# Patient Record
Sex: MALE | Race: WHITE
[De-identification: names, ages, dates, MRNs, and addresses within clinical notes are randomized per-mention and may not be internally consistent; named-entity substitution may affect disease eponyms.]

---

## 2017-01-17 ENCOUNTER — HOSPITAL ENCOUNTER (OUTPATIENT)
Dept: HOSPITAL 75 - SURG | Age: 67
Discharge: HOME | End: 2017-01-17
Attending: INTERNAL MEDICINE
Payer: SELF-PAY

## 2017-01-17 VITALS — DIASTOLIC BLOOD PRESSURE: 85 MMHG | SYSTOLIC BLOOD PRESSURE: 140 MMHG

## 2017-01-17 VITALS
WEIGHT: 200 LBS | BODY MASS INDEX: 30.31 KG/M2 | DIASTOLIC BLOOD PRESSURE: 82 MMHG | SYSTOLIC BLOOD PRESSURE: 126 MMHG | HEIGHT: 68 IN

## 2017-01-17 VITALS — DIASTOLIC BLOOD PRESSURE: 71 MMHG | SYSTOLIC BLOOD PRESSURE: 100 MMHG

## 2017-01-17 VITALS — SYSTOLIC BLOOD PRESSURE: 126 MMHG | DIASTOLIC BLOOD PRESSURE: 81 MMHG

## 2017-01-17 VITALS — DIASTOLIC BLOOD PRESSURE: 82 MMHG | SYSTOLIC BLOOD PRESSURE: 125 MMHG

## 2017-01-17 VITALS — SYSTOLIC BLOOD PRESSURE: 143 MMHG | DIASTOLIC BLOOD PRESSURE: 86 MMHG

## 2017-01-17 VITALS — SYSTOLIC BLOOD PRESSURE: 142 MMHG | DIASTOLIC BLOOD PRESSURE: 87 MMHG

## 2017-01-17 VITALS — DIASTOLIC BLOOD PRESSURE: 87 MMHG | SYSTOLIC BLOOD PRESSURE: 142 MMHG

## 2017-01-17 VITALS — DIASTOLIC BLOOD PRESSURE: 91 MMHG | SYSTOLIC BLOOD PRESSURE: 140 MMHG

## 2017-01-17 VITALS — DIASTOLIC BLOOD PRESSURE: 86 MMHG | SYSTOLIC BLOOD PRESSURE: 132 MMHG

## 2017-01-17 VITALS — DIASTOLIC BLOOD PRESSURE: 79 MMHG | SYSTOLIC BLOOD PRESSURE: 128 MMHG

## 2017-01-17 DIAGNOSIS — E11.9: ICD-10-CM

## 2017-01-17 DIAGNOSIS — Z72.0: ICD-10-CM

## 2017-01-17 DIAGNOSIS — I25.118: ICD-10-CM

## 2017-01-17 DIAGNOSIS — Z79.899: ICD-10-CM

## 2017-01-17 DIAGNOSIS — I10: ICD-10-CM

## 2017-01-17 DIAGNOSIS — Z98.61: ICD-10-CM

## 2017-01-17 DIAGNOSIS — J44.9: ICD-10-CM

## 2017-01-17 DIAGNOSIS — R07.89: Primary | ICD-10-CM

## 2017-01-17 DIAGNOSIS — E78.5: ICD-10-CM

## 2017-01-17 DIAGNOSIS — I70.0: ICD-10-CM

## 2017-01-17 LAB
ALBUMIN SERPL-MCNC: 4.1 G/DL (ref 3.2–4.5)
ALT SERPL-CCNC: 17 U/L (ref 0–55)
ANION GAP SERPL CALC-SCNC: 13 MMOL/L (ref 5–14)
APTT BLD: 27 SEC (ref 24–35)
AST SERPL-CCNC: 17 U/L (ref 5–34)
BILIRUB SERPL-MCNC: 0.3 MG/DL (ref 0.1–1)
BUN SERPL-MCNC: 29 MG/DL (ref 7–18)
BUN/CREAT SERPL: 22
CALCIUM SERPL-MCNC: 9.4 MG/DL (ref 8.5–10.1)
CHLORIDE SERPL-SCNC: 98 MMOL/L (ref 98–107)
CHOLEST SERPL-MCNC: 160 MG/DL (ref ?–200)
CO2 SERPL-SCNC: 25 MMOL/L (ref 21–32)
CREAT SERPL-MCNC: 1.29 MG/DL (ref 0.6–1.3)
ERYTHROCYTE [DISTWIDTH] IN BLOOD BY AUTOMATED COUNT: 14.4 % (ref 10–14.5)
GFR SERPLBLD BASED ON 1.73 SQ M-ARVRAT: 56 ML/MIN
GLUCOSE SERPL-MCNC: 154 MG/DL (ref 70–105)
INR PPP: 0.9 (ref 0.8–1.4)
LDLC SERPL DIRECT ASSAY-MCNC: 41 MG/DL (ref 1–129)
MCH RBC QN AUTO: 32 PG (ref 25–34)
MCHC RBC AUTO-ENTMCNC: 33 G/DL (ref 32–36)
MCV RBC AUTO: 97 FL (ref 80–99)
PLATELET # BLD: 135 10^3/UL (ref 130–400)
PMV BLD AUTO: 11.9 FL (ref 7.4–10.4)
POTASSIUM SERPL-SCNC: 4.2 MMOL/L (ref 3.6–5)
PROT SERPL-MCNC: 7.6 G/DL (ref 6.4–8.2)
PROTHROMBIN TIME: 11.6 SEC (ref 12.2–14.7)
RBC # BLD AUTO: 4.17 10^6/UL (ref 4.35–5.85)
SODIUM SERPL-SCNC: 136 MMOL/L (ref 135–145)
TRIGL SERPL-MCNC: 769 MG/DL (ref ?–150)
VLDLC SERPL CALC-MCNC: 154 MG/DL (ref 5–40)
WBC # BLD AUTO: 10.9 10^3/UL (ref 4.3–11)

## 2017-01-17 PROCEDURE — 85730 THROMBOPLASTIN TIME PARTIAL: CPT

## 2017-01-17 PROCEDURE — 93571 IV DOP VEL&/PRESS C FLO 1ST: CPT

## 2017-01-17 PROCEDURE — 87081 CULTURE SCREEN ONLY: CPT

## 2017-01-17 PROCEDURE — 36221 PLACE CATH THORACIC AORTA: CPT

## 2017-01-17 PROCEDURE — 80053 COMPREHEN METABOLIC PANEL: CPT

## 2017-01-17 PROCEDURE — 85027 COMPLETE CBC AUTOMATED: CPT

## 2017-01-17 PROCEDURE — 80061 LIPID PANEL: CPT

## 2017-01-17 PROCEDURE — 93306 TTE W/DOPPLER COMPLETE: CPT

## 2017-01-17 PROCEDURE — 36415 COLL VENOUS BLD VENIPUNCTURE: CPT

## 2017-01-17 PROCEDURE — 93458 L HRT ARTERY/VENTRICLE ANGIO: CPT

## 2017-01-17 PROCEDURE — 85610 PROTHROMBIN TIME: CPT

## 2017-01-17 NOTE — DISCHARGE INST-POST CATH
Discharge Inst-CATH


Post Cardiac Cath D/C Inst


Follow Up/Plan


F/u with Dr Guerrero in 1-2 weeks


HOLD METFORMIN UNTIL THE EVENING OF 1/20/17; THEN RESUME PREVIOUS HOME DOSE


CARDIAC CATH DISCHARGE INSTRUCTIONS





*Hold Metformin for 72 hours post heart cath.





ACTIVITY





* Go Home directly and rest.


* Limit activity of the leg (or wrist if it was used) for 7 days including 

aerobics, swimming,


   jogging, bicycling, etc.


* Restrict stair-climbing for 7 days if possible, if not, climb up with your non

-cath leg, then


   bring together on the same step.


* Avoid lifting, pushing, pulling or excessive movement of the affected 

extremity for 7 days.


* Customary sexual activity may be resumed after 2 days-use caution not to use 

a position  


   that strains or causes pain to the affected extremity.


* No driving for 24 hours.


* NO SMOKING. 


* Avoid straining for bowel movements for 7 days.


* Gentle walking on level ground is allowed.


* Returning to work will depend on the type of procedure and the results. Your 

doctor will discuss


   this with you.





CALL YOUR DOCTOR FOR ANY OF THE FOLLOWING:





*If bleeding from the puncture site occurs- Apply gentle pressure to site with 

clean cloth and call


   your doctor or EMS.


* If a knot or lump forms under the skin, increases in size, or causes pain.


* If bruising appears to be worsening or moving further down your leg instead 

of disappearing.


* Temperature above 101 F.





CARE OF YOUR GROIN INCISION;





* Bruising or purple discoloration of the skin near the puncture site is common.


* You may shower only, no bathtub bathing for 5 days.  Be careful to avoid 

slipping as your


   leg may feel stiff.


* If a closure device was used on your femoral artery, please see the attached 

guide regarding


   care of the device and your leg.


* REMOVE the dressing from your groin the next day after your procedure in the 

shower.





CARE OF YOUR WRIST INCISION;





* Bruising or purple discoloration of the skin near the puncture site is common.


* You may shower.


* DO NOT submerge wrist.


* Remove dressing in 24 hours.








JOHNNIE GUERRERO MD FACP [facility] CCDS Jan 17, 2017 10:58

## 2017-01-17 NOTE — CARDIAC CATHETERIZATION
PROCEDURE PHYSICIAN:   JOHNNIE RON

 

DATE OF PROCEDURE:  

01/17/2017

 

Pradeep Fowler is a 68-year-old man with multiple coronary risk

factors and history of coronary artery disease. He states that he

has had previous coronary intervention in the 1980s. He has been

experiencing chest discomfort suggestive of new onset and

progressive angina. Cardiac catheterization was carried out today

after having obtained an informed consent. 

 

PROCEDURE:

He was brought to the cardiac catheterization laboratory in a

fasting state. The right groin was prepared and draped usual

sterile fashion. 1% lidocaine was used for local anesthesia.

Modified Seldinger technique was used to advance a 5-Vincentian

sheath in the right femoral artery. 5-Vincentian JL4 catheter was

used for left coronary angiography. 5-Vincentian JR4 catheter was

used for right coronary angiography. 5-Vincentian pigtail catheter

was used for left heart catheterization and left ventricular

angiography. A 5-Vincentian pigtail catheter was pulled back to the

aortic arch and aortic arch angiography was performed. 

 

FRACTIONAL FLOW RESERVE MEASUREMENT IN THE LEFT CIRCUMFLEX

ARTERY: 

We exchanged the sheath over a wire for a 6-Vincentian sheath. We

advanced a 6-Vincentian JL4 guide catheter. We advanced an Aeris wire

across the lesion in the mid left circumflex artery. We gave

adenosine and 140 mcg/kg/min for 2-1/2 minutes. Fractional flow

reserve was measured at 0.93, indicating hemodynamic

non-significance of the lesion. The wire was removed.  Repeat

angiography of the left circumflex artery was performed. The

catheter was removed. Angiography of the right femoral artery had

been carried out at the beginning of the procedure. At the end of

the procedure, Angio-Seal was used to achieve hemostasis. He

tolerated the procedure well. 

 

HEMODYNAMICS:

Left ventricular end diastolic pressure following coronary

angiography was 19 mmHg.  There is no significant pressure

gradient on pullback across the valve and ascending aortic

pressure was 119/66 with a mean 86 mmHg. 

 

LEFT VENTRICULAR ANGIOGRAPHY:

Left angiography was carried out in the right anterior oblique

projection. Global left ventricular systolic function was normal.

Left ventricular ejection fraction of approximately 65%. There

does not appear to be significant mitral regurgitation. 

 

AORTIC ARCH ANGIOGRAPHY:  

Aortic arch angiography did not indicate any significant thoracic

aortic aneurysm or dissection, to the extent visualized. The

aortic arch does exhibit moderate calcification. The neck

arteries were not adequately visualized. 

 

CORONARY ANGIOGRAPHY:  

The left main coronary artery is free of significant disease. The

left anterior descending artery has diffuse, mild disease and 40

to 50% mid vessel stenosis. The left circumflex artery has a long

50 to 60% mid vessel stenosis and fractional flow reserve across

the lesion is 0.93, indicating hemodynamic non-significance. The

left circumflex artery is dominant. Right coronary artery is of

small and nondominant and does not exhibit significant disease. 

 

CONCLUSION:

1.   Coronary artery disease, moderate, consisting of

approximately 50% stenoses in the mid left anterior descending

and left circumflex arteries. Fractional flow reserve in the mid

left circumflex artery is 0.93. 

2.   Normal global left ventricular function with an ejection

fraction of 55%. 

3.   Elevated left ventricular end diastolic pressure. 

4.   No significant mitral regurgitation. 

 

DISCUSSION AND RECOMMENDATIONS:  

Based on the results of this study, it appears appropriate to

continue a conservative approach and risk factor modification.

This was discussed with him in detail. Outpatient follow-up is

advised. 

 

 

 

Job ID: [id number]

Dictated Date: 01/17/2017 10:41:59 

Transcription Date: 01/17/2017 12:49:41 / [email or web address]

## 2017-01-17 NOTE — DISCHARGE INST-CARDIOLOGY
Discharge Inst-Cardiac


Discharge Medications


New Medications:  


Aspirin (Aspirin) 81 Mg Tab.chew


81 MG PO DAILY #90 Ref 3 TAB


 


Continued Medications:  


Gabapentin (Gabapentin) 800 Mg Tablet


800 MG PO TID TAB


Linagliptin (Tradjenta) 5 Mg Tablet


5 MG PO DAILY TAB


Lisinopril/Hydrochlorothiazide (Lisinopril-Hctz 20-25 mg Tab) 1 Each Tablet


1 EACH PO DAILY TAB


Nortriptyline HCl (Nortriptyline HCl) 50 Mg Capsule


50 MG PO HS CAP


Oxycodone HCl/Acetaminophen (Oxycodone-Acetaminophen ) 1 Each Tablet


1 EACH PO Q4H PRN PAIN TAB


Pravastatin Sodium (Pravastatin Sodium) 40 Mg Tablet


40 MG PO HS TAB


 


Discontinued Medications:  


Metformin HCl (Metformin HCl) 1,000 Mg Tablet


1000 MG PO BID TAB








Patient Instructions


Patient Instructions:  


Hold metformin until the evening of 10/20/16; then resume previous home


dose





Orders-Post D/C & Referrals


Pneu Vac Indicated:  Yes








JOHNNIE RON MD FACP [facility]S Jan 17, 2017 10:59

## 2017-01-17 NOTE — CARDIAC PROCEDURE NOTE-CS/ASA
Pre-Procedure Note


Pre-Op Procedure Note


H&P Reviewed


The H&P was reviewed, patient examined and no changes noted.


Date H&P Reviewed:  Jan 17, 2017


Time H&P Reviewed:  09:44





Conscious Sedation Pre-Proced


Time Reviewed:  09:44


ASA Class:  2











Airway Mallampati Classification: ([place] appropriate class) I.  II.  III,  IV


 


Lungs 


 


Heart 


 


 ASA score


 


 ASA 1: a normal healthy patient


 


 ASA 2:  a patient with a mild systemic disease (mid diabetes, controlled 

hypertension, obesity 


 


 ASA 3:  a patient with a severe systemic disease that limits activity  (angina

, COPD, prior Myocardial infarction)


 


 ASA 4:  a patient with an incapacitating disease that is a constant threat to 

life (CHF, renal failure)


 


 ASA 5:  a moribund patient not expected to survive 24 hrs.  (ruptured aneurysm)


 


 ASA 6:  a declared brain dead patient whose organs are being harvested.


 


 For emergent operations, add the letter E after the classification








Grade 2


Sedation Plan:  Analgesia, Amnesia, Plan communicated to team members, 

Discussed options with patient/fam, Discussed risks with patient/fam


Note


The patient is an appropriate candidate to undergo the planned procedure, 

sedation, and anesthesia.





The patient immediately re-assessed prior to indication.








JOHNNIE RON MD FACP [facility]S Jan 17, 2017 09:44

## 2017-01-18 NOTE — ECHOCARDIOGRAPHY REPORT
PROCEDURE PHYSICIAN:   JOHNNIE RON

 

DATE OF PROCEDURE:  01/17/2017

 

TWO DIMENSIONAL ECHOCARDIOGRAM REPORT 

 

PRIMARY PHYSICIAN:       Dr. Mackey 

OTHER PHYSICIAN:         

REFERRING PHYSICIAN:          

ORDERING PHYSICIAN:      NARENDRA Broderick 

 

INDICATION FOR THE PROCEDURE:  

1.   Chest pain.  

2.   Shortness of breath.  

 

MEASUREMENTS                  DERIVED VALUES 

 

LV DIAMETER (LAX)   NORMALS                       NORMALS

Diastolic      4.9  (3.6-5.2)      Eject. Fract.  (60%+/-6%)    

  

Systolic            (2.3-3.9)      Diastolic Vol.      

% Shortening        (0.22-0.42)    Systolic Vol.       

                                   Aortic Root    3.1  

IVS THICKNESS 

Diastolic      0.9  (0.6-1.1)

 

LVPW THICKNESS 

Diastolic      1.1  (0.6-1.1)

 

LA DIAMETER 

Systolic       3.6  (2.1-3.7)  

 

 

DESCRIPTION:  

Two-dimensional echocardiography shows a well preserved global

left ventricular systolic function with left ventricular ejection

fraction of approximately 60%.  There is no significant

pericardial effusion. There is mild aortic valve sclerosis.

Aortic, mitral and tricuspid valve leaflets seem to have good

leaflet excursion.  The study is technically difficult and not

ideal for wall motion analysis. Doppler imaging shows no

significant valvular stenosis. There appears to be mild diastolic

dysfunction of the left ventricle. Mitral inflow is consistent

with grade 2 diastolic dysfunction of the left ventricle. There

is no evidence of significant intracardiac shunt on this

transthoracic echocardiographic study. Inferior vena cava does

not appear to be dilated. Pulmonary artery systolic pressure is

estimated to be approximately 25 mmHg. 

 

CONCLUSIONS:

1.   Normal global left ventricular systolic function with an

ejection fraction of approximately 60%. 

2.   Trivial mitral and tricuspid regurgitation. 

3.   Moderate diastolic dysfunction of the left ventricle. 

4.   No evidence of significant valvular stenosis. 

5.   Pulmonary artery systolic pressure is estimated to be

approximately 25 mmHg.   

 

 

 

 

Job ID: [id number]

Dictated Date: 01/17/2017 14:54:23 

Transcription Date: 01/18/2017 07:19:44 / tbk

## 2017-09-25 ENCOUNTER — HOSPITAL ENCOUNTER (EMERGENCY)
Dept: HOSPITAL 75 - ER | Age: 67
Discharge: LEFT BEFORE DISCHARGE | End: 2017-09-25
Payer: SELF-PAY

## 2017-09-25 VITALS — SYSTOLIC BLOOD PRESSURE: 129 MMHG | DIASTOLIC BLOOD PRESSURE: 76 MMHG

## 2017-09-25 VITALS — HEIGHT: 68 IN | WEIGHT: 200 LBS | BODY MASS INDEX: 30.31 KG/M2

## 2017-09-25 DIAGNOSIS — R47.1: ICD-10-CM

## 2017-09-25 DIAGNOSIS — E86.1: ICD-10-CM

## 2017-09-25 DIAGNOSIS — Z79.82: ICD-10-CM

## 2017-09-25 DIAGNOSIS — E87.5: ICD-10-CM

## 2017-09-25 DIAGNOSIS — N17.9: Primary | ICD-10-CM

## 2017-09-25 DIAGNOSIS — R29.707: ICD-10-CM

## 2017-09-25 LAB
ALBUMIN SERPL-MCNC: 3.9 GM/DL (ref 3.2–4.5)
ALT SERPL-CCNC: 16 U/L (ref 0–55)
ANION GAP SERPL CALC-SCNC: 12 MMOL/L (ref 5–14)
APTT BLD: 28 SEC (ref 24–35)
AST SERPL-CCNC: 16 U/L (ref 5–34)
BASOPHILS # BLD AUTO: 0.1 10^3/UL (ref 0–0.1)
BASOPHILS NFR BLD AUTO: 1 % (ref 0–10)
BILIRUB SERPL-MCNC: 0.4 MG/DL (ref 0.1–1)
BILIRUB UR QL STRIP: NEGATIVE
BUN SERPL-MCNC: 71 MG/DL (ref 7–18)
BUN/CREAT SERPL: 11
CALCIUM SERPL-MCNC: 8.3 MG/DL (ref 8.5–10.1)
CHLORIDE SERPL-SCNC: 103 MMOL/L (ref 98–107)
CO2 SERPL-SCNC: 20 MMOL/L (ref 21–32)
CREAT SERPL-MCNC: 6.62 MG/DL (ref 0.6–1.3)
EOSINOPHIL # BLD AUTO: 0.3 10^3/UL (ref 0–0.3)
EOSINOPHIL NFR BLD AUTO: 3 % (ref 0–10)
ERYTHROCYTE [DISTWIDTH] IN BLOOD BY AUTOMATED COUNT: 15.1 % (ref 10–14.5)
GFR SERPLBLD BASED ON 1.73 SQ M-ARVRAT: 8 ML/MIN
GLUCOSE SERPL-MCNC: 213 MG/DL (ref 70–105)
INR PPP: 1 (ref 0.8–1.4)
KETONES UR QL STRIP: NEGATIVE
LEUKOCYTE ESTERASE UR QL STRIP: (no result)
LYMPHOCYTES # BLD AUTO: 1.7 X 10^3 (ref 1–4)
LYMPHOCYTES NFR BLD AUTO: 17 % (ref 12–44)
MAGNESIUM SERPL-MCNC: 2.1 MG/DL (ref 1.8–2.4)
MCH RBC QN AUTO: 33 PG (ref 25–34)
MCHC RBC AUTO-ENTMCNC: 32 G/DL (ref 32–36)
MCV RBC AUTO: 103 FL (ref 80–99)
MONOCYTES # BLD AUTO: 0.6 X 10^3 (ref 0–1)
MONOCYTES NFR BLD AUTO: 6 % (ref 0–12)
NEUTROPHILS # BLD AUTO: 7.2 X 10^3 (ref 1.8–7.8)
NEUTROPHILS NFR BLD AUTO: 73 % (ref 42–75)
NITRITE UR QL STRIP: NEGATIVE
PH UR STRIP: 5 [PH] (ref 5–9)
PLATELET # BLD: 147 10^3/UL (ref 130–400)
PMV BLD AUTO: 11.3 FL (ref 7.4–10.4)
POTASSIUM SERPL-SCNC: 6.3 MMOL/L (ref 3.6–5)
PROT SERPL-MCNC: 7.6 GM/DL (ref 6.4–8.2)
PROT UR QL STRIP: (no result)
PROTHROMBIN TIME: 13.1 SEC (ref 12.2–14.7)
RBC # BLD AUTO: 3.44 10^6/UL (ref 4.35–5.85)
SODIUM SERPL-SCNC: 135 MMOL/L (ref 135–145)
SP GR UR STRIP: 1.02 (ref 1.02–1.02)
SQUAMOUS #/AREA URNS HPF: (no result) /HPF
TROPONIN I SERPL-MCNC: < 0.3 NG/ML (ref ?–0.3)
UROBILINOGEN UR-MCNC: NORMAL MG/DL
WBC # BLD AUTO: 9.8 10^3/UL (ref 4.3–11)
WBC #/AREA URNS HPF: (no result) /HPF

## 2017-09-25 PROCEDURE — 36415 COLL VENOUS BLD VENIPUNCTURE: CPT

## 2017-09-25 PROCEDURE — 85025 COMPLETE CBC W/AUTO DIFF WBC: CPT

## 2017-09-25 PROCEDURE — 70450 CT HEAD/BRAIN W/O DYE: CPT

## 2017-09-25 PROCEDURE — 93005 ELECTROCARDIOGRAM TRACING: CPT

## 2017-09-25 PROCEDURE — 96360 HYDRATION IV INFUSION INIT: CPT

## 2017-09-25 PROCEDURE — 84484 ASSAY OF TROPONIN QUANT: CPT

## 2017-09-25 PROCEDURE — 85610 PROTHROMBIN TIME: CPT

## 2017-09-25 PROCEDURE — 87088 URINE BACTERIA CULTURE: CPT

## 2017-09-25 PROCEDURE — 83735 ASSAY OF MAGNESIUM: CPT

## 2017-09-25 PROCEDURE — 85730 THROMBOPLASTIN TIME PARTIAL: CPT

## 2017-09-25 PROCEDURE — 81000 URINALYSIS NONAUTO W/SCOPE: CPT

## 2017-09-25 PROCEDURE — 93041 RHYTHM ECG TRACING: CPT

## 2017-09-25 PROCEDURE — 80053 COMPREHEN METABOLIC PANEL: CPT

## 2017-09-25 PROCEDURE — 71010: CPT

## 2017-09-25 NOTE — ED NEUROLOGICAL PROBLEM
General


Chief Complaint:  Neuro-Stroke Like Symptoms


Stated Complaint:  POSS STROKE/FACIAL DROOPING/CANNOT LIFT LEFT ARM


Nursing Triage Note:  


Pt ambulatory w/ walker to ED room 5. Pt and pt's wife reports pt began to 


experience left sided weakness midday on Saturday () and symptoms have 


continued to get worse. Pt also reports he fell in the bathroom yestereday, 


hitting the back of his head. Pt advises he does take a blood thinner but is 

not 


sure what it is called.


Nursing Sepsis Screen:  No Definite Risk


Source:  patient, family


Exam Limitations:  no limitations





History of Present Illness


Time seen by provider:  09:47


Initial Comments


Mr. Smith is a 67-year-old man who presents to the emergency room with primary 

complaints of left sided weakness and dulled speech that started on Saturday, 

 he also reports having a fall in the bathroom yesterday in which 

he struck the back of his head.  He denies any other injuries.  Stroke 

activation was not paged due to the duration of symptoms.  He has generalized 

weakness as well and difficulty ambulating.  He is alert and oriented.





Allergies and Home Medications


Allergies


Coded Allergies:  


     No Known Drug Allergies (Unverified , 17)





Home Medications


Aspirin 81 Mg Tab.chew, 81 MG PO DAILY, #90 Ref 3


   Prescribed by: JOHNNIE RNO on 17 1052


Gabapentin 800 Mg Tablet, 800 MG PO TID, (Reported)


Linagliptin 5 Mg Tablet, 5 MG PO DAILY, (Reported)


Lisinopril/Hydrochlorothiazide 1 Each Tablet, 1 EACH PO DAILY, (Reported)


Nortriptyline HCl 50 Mg Capsule, 50 MG PO HS, (Reported)


Oxycodone HCl/Acetaminophen 1 Each Tablet, 1 EACH PO Q4H PRN for PAIN, (Reported

)


Pravastatin Sodium 40 Mg Tablet, 40 MG PO HS, (Reported)





Constitutional:  see HPI


Eyes:  No Symptoms Reported


Ears, Nose, Mouth, Throat:  no symptoms reported


Respiratory:  no symptoms reported


Cardiovascular:  no symptoms reported


Gastrointestinal:  no symptoms reported


Genitourinary:  no symptoms reported


Musculoskeletal:  no symptoms reported


Skin:  no symptoms reported


Psychiatric/Neurological:  See HPI


Endocrine:  No Symptoms Reported


Hematologic/Lymphatic:  No Symptoms Reported





Past Medical-Social-Family Hx


Patient Social History


Alcohol Use:  Occasionally Uses


Recreational Drug Use:  No


Smoking Status:  Current Everyday Smoker


Type Used:  Cigarettes


Recent Foreign Travel:  No


Contact w/Someone Who Travel:  No


Recent Infectious Disease Expo:  No


Recent Hopitalizations:  No





Seasonal Allergies


Seasonal Allergies:  No





Surgeries


History of Surgeries:  Yes


Surgeries:  Appendectomy, Gallbladder





Respiratory


History of Respiratory Disorde:  No





Cardiovascular


History of Cardiac Disorders:  Yes (cardiac cath w/ balloon)


Cardiac Disorders:  Coronary Artery Disease, High Cholesterol, Hypertension





Reproductive System


Hx Reproductive Disorders:  No





Genitourinary


History of Genitourinary Disor:  No





Gastrointestinal


History of Gastrointestinal Di:  No





Musculoskeletal


History of Musculoskeletal Dis:  No





Endocrine


History of Endocrine Disorders:  No





HEENT


History of HEENT Disorders:  No





Cancer


History of Cancer:  Yes


Cancer:  Prostate





Psychosocial


History of Psychiatric Problem:  No





Integumentary


History of Skin or Integumenta:  No





Blood Transfusions


History of Blood Disorders:  No





Physical Exam


Vital Signs





Vital Sign - Last 12Hours








 17





 09:49 10:00


 


Temp 98.4 


 


Pulse 108 


 


Resp 18 


 


B/P (MAP) 129/76 


 


Pulse Ox 91 


 


O2 Delivery Room Air 


 


O2 Flow Rate  2.00





Capillary Refill : Less Than 3 Seconds


General Appearance:  WD/WN, no apparent distress


HEENT:  PERRL/EOMI, normal ENT inspection, pharynx normal


Neck:  normal inspection


Respiratory:  lungs clear, normal breath sounds, no respiratory distress, no 

accessory muscle use


Cardiovascular:  regular rate, rhythm, no edema, no murmur


Gastrointestinal:  normal bowel sounds, non tender, soft


Extremities:  normal inspection, no pedal edema


Neurologic/Psychiatric:  alert, normal mood/affect, oriented x 3, other (

Patient walks with a walker with a slight limp.  Left lower extremity and right 

lower extremity are both weak with strength about 3/5.  Left upper extremity is 

also weak with strength about 3/5.  There was questionable left-sided facial 

droop that seems to resolve with exertion a facial muscles.  Patient has some 

slurring of the speech.)


Crainal Nerves:  normal hearing, PERRL, abnormal speech


Coordination/Gait:  normal finger to nose, abnormal gait


Motor/Sensory:  weak motor strength LUE, weak motor strength RLE, weak motor 

strength LLE


Skin:  normal color, warm/dry





Stroke


NIH Stroke Scale Assessment





 Select: Initial Level of Consciousness: 0=Alert (0), Level of Consciousness-

Questions: 0=Answers both month/age (0), LOC Commands: 0=Performs both tasks (0)

, Visual Fields: 0=No visual loss (0), Facial Movement (Facial Paresis): 0=

Normal symmetrical mnt (0), Motor Function-Arms Right: 0=No drift (0), Motor 

Function-Arms Left: 1=Drift (1), Motor Function-Legs Right: 2=Some effort/

gravity (2), Motor Function-Legs Left: 2=Some effort/gravity (2), Limb Ataxia: 0

=Absent (0), Sensory: 1=Mild to Moderate loss (1), Best Language: 0=No aphasia (

0), Dysarthria: 1=Mild to moderate loss (1), Extinction & Inattention: 0=No 

abnormality (0), Total: 7





Progress/Results/Core Measures


Results/Orders


Lab Results





Laboratory Tests








Test


  17


10:08 17


12:34 Range/Units


 


 


White Blood Count


  9.8 


  


  4.3-11.0


10^3/uL


 


Red Blood Count


  3.44 L


  


  4.35-5.85


10^6/uL


 


Hemoglobin 11.2 L  13.3-17.7  G/DL


 


Hematocrit 35 L  40-54  %


 


Mean Corpuscular Volume 103 H  80-99  FL


 


Mean Corpuscular Hemoglobin 33   25-34  PG


 


Mean Corpuscular Hemoglobin


Concent 32 


  


  32-36  G/DL


 


 


Red Cell Distribution Width 15.1 H  10.0-14.5  %


 


Platelet Count


  147 


  


  130-400


10^3/uL


 


Mean Platelet Volume 11.3 H  7.4-10.4  FL


 


Neutrophils (%) (Auto) 73   42-75  %


 


Lymphocytes (%) (Auto) 17   12-44  %


 


Monocytes (%) (Auto) 6   0-12  %


 


Eosinophils (%) (Auto) 3   0-10  %


 


Basophils (%) (Auto) 1   0-10  %


 


Neutrophils # (Auto) 7.2   1.8-7.8  X 10^3


 


Lymphocytes # (Auto) 1.7   1.0-4.0  X 10^3


 


Monocytes # (Auto) 0.6   0.0-1.0  X 10^3


 


Eosinophils # (Auto)


  0.3 


  


  0.0-0.3


10^3/uL


 


Basophils # (Auto)


  0.1 


  


  0.0-0.1


10^3/uL


 


Prothrombin Time 13.1   12.2-14.7  SEC


 


INR Comment 1.0   0.8-1.4  


 


Activated Partial


Thromboplast Time 28 


  


  24-35  SEC


 


 


Sodium Level 135   135-145  MMOL/L


 


Potassium Level 6.3 H  3.6-5.0  MMOL/L


 


Chloride Level 103     MMOL/L


 


Carbon Dioxide Level 20 L  21-32  MMOL/L


 


Anion Gap 12   5-14  MMOL/L


 


Blood Urea Nitrogen 71 H  7-18  MG/DL


 


Creatinine


  6.62 H


  


  0.60-1.30


MG/DL


 


Estimat Glomerular Filtration


Rate 8 


  


   


 


 


BUN/Creatinine Ratio 11    


 


Glucose Level 213 H    MG/DL


 


Calcium Level 8.3 L  8.5-10.1  MG/DL


 


Magnesium Level 2.1   1.8-2.4  MG/DL


 


Total Bilirubin 0.4   0.1-1.0  MG/DL


 


Aspartate Amino Transf


(AST/SGOT) 16 


  


  5-34  U/L


 


 


Alanine Aminotransferase


(ALT/SGPT) 16 


  


  0-55  U/L


 


 


Alkaline Phosphatase 83     U/L


 


Troponin I < 0.30   <0.30  NG/ML


 


Total Protein 7.6   6.4-8.2  GM/DL


 


Albumin 3.9   3.2-4.5  GM/DL


 


Urine Color  YELLOW   


 


Urine Clarity  CLEAR   


 


Urine pH  5  5-9  


 


Urine Specific Gravity  1.020  1.016-1.022  


 


Urine Protein  2+ H NEGATIVE  


 


Urine Glucose (UA)  NEGATIVE  NEGATIVE  


 


Urine Ketones  NEGATIVE  NEGATIVE  


 


Urine Nitrite  NEGATIVE  NEGATIVE  


 


Urine Bilirubin  NEGATIVE  NEGATIVE  


 


Urine Urobilinogen  NORMAL  NORMAL  MG/DL


 


Urine Leukocyte Esterase  1+ H NEGATIVE  


 


Urine RBC (Auto)  2+ H NEGATIVE  


 


Urine RBC  RARE   /HPF


 


Urine WBC  5-10 H  /HPF


 


Urine Squamous Epithelial


Cells 


  NONE 


   /HPF


 


 


Urine Crystals  NONE   /LPF


 


Urine Bacteria  NEGATIVE   /HPF


 


Urine Casts  NONE   /LPF


 


Urine Mucus  NEGATIVE   /LPF


 


Urine Culture Indicated  YES   








My Orders





Orders - CHRISTINE MUKHERJEE MD


Cbc With Automated Diff (17 09:51)


Comprehensive Metabolic Panel (17 09:51)


Magnesium (17 09:51)


Troponin I (17 09:51)


Ua Culture If Indicated (17 09:51)


Accucheck Stat ONCE (17 09:51)


Saline Lock/Iv-Start (17 09:51)


Ekg Tracing (17 09:51)


Monitor-Rhythm Ecg Trace Only (17 09:51)


Chest 1 View, Ap/Pa Only (17 09:51)


Protime With Inr (17 09:51)


Partial Thromboplastin Time (17 09:51)


Nothing By Mouth (17 Lunch)


Vital Signs - Stroke Q15M (17 09:51)


Ct Head Wo-R/O Stroke (17 09:51)


O2 (17 09:51)


Dysphagia Screening Tool (17 09:51)


Sodium Polystyrene Sulfonate (Kayexalate (17 11:45)


Ns Iv 1000 Ml (Sodium Chloride 0.9%) (17 11:37)


Ns Iv 1000 Ml (Sodium Chloride 0.9%) (17 11:53)


Urine Culture (17 12:34)





Medications Given in ED





Current Medications








 Medications  Dose


 Ordered  Sig/Fadumo


 Route  Start Time


 Stop Time Status Last Admin


Dose Admin


 


 Sodium Chloride  1,000 ml @ 


 0 mls/hr  Q0M ONCE


 IV  17 11:37


 17 11:39 DC 17 11:47


999 MLS/HR








Vital Signs/I&O





Vital Sign - Last 12Hours








 17





 09:49 10:00


 


Temp 98.4 


 


Pulse 108 


 


Resp 18 


 


B/P (MAP) 129/76 


 


Pulse Ox 91 88


 


O2 Delivery Room Air Nasal Cannula


 


O2 Flow Rate  2.00














Intake and Output 


 


 17





 00:00


 


Intake Total 1000 ml


 


Balance 1000 ml














Blood Pressure Mean:  93








Progress Note #1:  


   Time:  11:52


Progress Note


CT scan was unremarkable for acute pathology.  Patient was found to be in 

significant renal failure with hyperkalemia.  There were no significant 

abnormalities on the EKG.  A liter of IV fluids was initiated and Kayexalate 

was ordered.  After discussion with the patient, he is stating he plans to 

leave AGAINST MEDICAL ADVICE.  He will commit to at least thinking about 

admission here or transfer to a facility with a nephrologist.  He also commits 

to receiving 2 L of IV fluids in the ER.


Progress Note #2:  


   Time:  13:22


Progress Note


Patient demanded to leave AGAINST MEDICAL ADVICE before the second liter of IV 

fluids could be infused.  He was informed that leaving AGAINST MEDICAL ADVICE 

could result in death, cardiac arrhythmia, worsening renal failure, and other 

medical complications.  He was able to repeat these consequences to me and 

still decided to leave AGAINST MEDICAL ADVICE.  I made it very clear that he 

could be leaving with a potentially life-threatening condition.  Patient was 

advised to stop his exogenous potassium, drink lots of water, and follow-up 

with Dr. López as soon as possible.





ECG


Initial ECG Impression Date:  Sep 25, 2017


Initial ECG Impression Time:  10:26


Initial ECG Rate:  97


Initial ECG Rhythm:  Normal Sinus


Comment


Sinus rhythm with borderline tachycardia.  No ST elevation or depression.  

Borderline left axis deviation.  No abnormal intervals.





Diagnostic Imaging





   Diagonstic Imaging:  CT


   Plain Films/CT/US/NM/MRI:  head


Comments


CT head viewed by me and report reviewed.  See report below:





NAME:      DAVID SMITH


MED REC#:   [id number]


ACCOUNT#:   [id number]


PT STATUS:   DEP ER


:      1950


PHYSICIAN:    CHRISTINE MUKHERJEE MD


ADMIT DATE:   17/ER


***Signed***


Date of Exam:   17





CT HEAD WO-R/O STROKE





Clinical indication: Patient with facial droop and shakes.





Exam: Axial CT scan of the brain performed without IV contrast.





Comparison: None.





Findings:


There is no evidence of acute cerebral infarct, intracranial


hemorrhage, or gross mass effect. 





There are several focal areas of low-attenuation white matter


changes in both cerebral hemispheres, likely representing chronic


small vessel ischemic disease. There is normal gray-white matter


distinction. The brain parenchymal volume appears appropriate for


patient's age. There is no significant midline shift or


herniation. 





 There is no evidence of hydrocephalus. The basal cisterns are


unremarkable. The skull, extracranial soft tissue, and orbits are


unremarkable. There is minimal mucosal thickening involving the


ethmoid sinus, bilateral maxillary sinus, and sphenoid sinus.


Temporal bone structures show no significant abnormality.





IMPRESSION:


1: There is no CT evidence of acute cerebral infarct,


intracranial hemorrhage, brain herniation, or hydrocephalus. If


there is continued clinical concern for acute cerebral infarct,


then MRI of the brain would better evaluate.





2: Age-related brain parenchymal changes with mild chronic small


vessel ischemic disease.





3: Minimal paranasal sinus disease.





Dictated by: 





  Dictated on workstation # [id number]





[id number]





Dict:      17 1030


Trans:      17 1738





Interpreted by:         RUBINA LAYNE MD


Electronically signed by:   RUBINA LAYNE MD 17 1738








   Diagonstic Imaging:  Xray


   Plain Films/CT/US/NM/MRI:  chest


Comments


NAME:      DAVID SMITH


MED REC#:   [id number]


ACCOUNT#:   [id number]


PT STATUS:   REG ER


:      1950


PHYSICIAN:    CHRISTINE MUKHERJEE MD


ADMIT DATE:   17/ER


***Signed***


Date of Exam:   17





CHEST 1 VIEW, AP/PA ONLY


 





EXAMINATION: Portable upright radiograph of the chest.





INDICATION: Slurred speech.





FINDINGS:


There is a low density opacity seen in the lingula which could be


from a prominent pericardial fat pad. The cardiac size is


prominent. No effusion or pneumothorax.





Mediastinum and shaila appear unremarkable.





IMPRESSION: Low density opacity near the left cardiac border may


relate to a prominent pericardial fat pad. If there is clinical


suspicion of a consolidation in the lingula, then 2 views


radiograph would be helpful.





Dictated by: 





  Dictated on workstation # [id number]





[id number]





Dict:      17 1009


Trans:      17 1121





Interpreted by:         CELIA BARRERA MD


Electronically signed by:   CELIA BARRERA MD 17 1121





Departure


Impression


Impression:  


 Primary Impression:  


 Acute renal failure


 Qualified Codes:  N17.9 - Acute kidney failure, unspecified


 Additional Impressions:  


 Hypovolemia


 Hyperkalemia


 Left-sided weakness


 Dysarthria


 Left against medical advice


Disposition:   ADMITTED AS INPATIENT


Condition:  Against Medical Advice





Departure-Patient Inst.


Referrals:  


MK LÓPEZ MD (PCP/Family)


Primary Care Physician





Copy


Copies To 1:   MK LÓPEZ MD, JOSHUA T MD Sep 25, 2017 11:53

## 2017-09-25 NOTE — DIAGNOSTIC IMAGING REPORT
Clinical indication: Patient with facial droop and shakes.



Exam: Axial CT scan of the brain performed without IV contrast.



Comparison: None.



Findings:

There is no evidence of acute cerebral infarct, intracranial

hemorrhage, or gross mass effect. 



There are several focal areas of low-attenuation white matter

changes in both cerebral hemispheres, likely representing chronic

small vessel ischemic disease. There is normal gray-white matter

distinction. The brain parenchymal volume appears appropriate for

patient's age. There is no significant midline shift or

herniation. 



 There is no evidence of hydrocephalus. The basal cisterns are

unremarkable. The skull, extracranial soft tissue, and orbits are

unremarkable. There is minimal mucosal thickening involving the

ethmoid sinus, bilateral maxillary sinus, and sphenoid sinus.

Temporal bone structures show no significant abnormality.



IMPRESSION:

1: There is no CT evidence of acute cerebral infarct,

intracranial hemorrhage, brain herniation, or hydrocephalus. If

there is continued clinical concern for acute cerebral infarct,

then MRI of the brain would better evaluate.



2: Age-related brain parenchymal changes with mild chronic small

vessel ischemic disease.



3: Minimal paranasal sinus disease.



Dictated by: 



  Dictated on workstation # [id number]

## 2017-09-25 NOTE — DIAGNOSTIC IMAGING REPORT
EXAMINATION: Portable upright radiograph of the chest.



INDICATION: Slurred speech.



FINDINGS:

There is a low density opacity seen in the lingula which could be

from a prominent pericardial fat pad. The cardiac size is

prominent. No effusion or pneumothorax.



Mediastinum and shaila appear unremarkable.



IMPRESSION: Low density opacity near the left cardiac border may

relate to a prominent pericardial fat pad. If there is clinical

suspicion of a consolidation in the lingula, then 2 views

radiograph would be helpful.



Dictated by: 



  Dictated on workstation # [id number]

## 2019-06-16 ENCOUNTER — HOSPITAL ENCOUNTER (INPATIENT)
Dept: HOSPITAL 75 - ER FS | Age: 69
LOS: 4 days | Discharge: HOME | DRG: 982 | End: 2019-06-20
Attending: FAMILY MEDICINE | Admitting: FAMILY MEDICINE
Payer: SELF-PAY

## 2019-06-16 VITALS — SYSTOLIC BLOOD PRESSURE: 165 MMHG | DIASTOLIC BLOOD PRESSURE: 94 MMHG

## 2019-06-16 VITALS — SYSTOLIC BLOOD PRESSURE: 133 MMHG | DIASTOLIC BLOOD PRESSURE: 66 MMHG

## 2019-06-16 VITALS — WEIGHT: 215.4 LBS | HEIGHT: 68.5 IN | BODY MASS INDEX: 32.27 KG/M2

## 2019-06-16 VITALS — DIASTOLIC BLOOD PRESSURE: 66 MMHG | SYSTOLIC BLOOD PRESSURE: 133 MMHG

## 2019-06-16 VITALS — SYSTOLIC BLOOD PRESSURE: 130 MMHG | DIASTOLIC BLOOD PRESSURE: 76 MMHG

## 2019-06-16 DIAGNOSIS — E11.69: Primary | ICD-10-CM

## 2019-06-16 DIAGNOSIS — E11.40: ICD-10-CM

## 2019-06-16 DIAGNOSIS — E11.621: ICD-10-CM

## 2019-06-16 DIAGNOSIS — L03.115: ICD-10-CM

## 2019-06-16 DIAGNOSIS — G47.33: ICD-10-CM

## 2019-06-16 DIAGNOSIS — I10: ICD-10-CM

## 2019-06-16 DIAGNOSIS — M86.9: ICD-10-CM

## 2019-06-16 DIAGNOSIS — E78.00: ICD-10-CM

## 2019-06-16 DIAGNOSIS — Z85.46: ICD-10-CM

## 2019-06-16 DIAGNOSIS — E11.65: ICD-10-CM

## 2019-06-16 DIAGNOSIS — I25.10: ICD-10-CM

## 2019-06-16 DIAGNOSIS — L97.514: ICD-10-CM

## 2019-06-16 DIAGNOSIS — Z95.5: ICD-10-CM

## 2019-06-16 DIAGNOSIS — L02.611: ICD-10-CM

## 2019-06-16 DIAGNOSIS — F17.210: ICD-10-CM

## 2019-06-16 DIAGNOSIS — L97.529: ICD-10-CM

## 2019-06-16 DIAGNOSIS — L03.031: ICD-10-CM

## 2019-06-16 LAB
ALBUMIN SERPL-MCNC: 3.7 GM/DL (ref 3.2–4.5)
ALP SERPL-CCNC: 92 U/L (ref 40–136)
ALT SERPL-CCNC: 16 U/L (ref 0–55)
BASOPHILS # BLD AUTO: 0.1 10^3/UL (ref 0–0.1)
BASOPHILS NFR BLD AUTO: 1 % (ref 0–10)
BILIRUB SERPL-MCNC: 0.3 MG/DL (ref 0.1–1)
BUN/CREAT SERPL: 22
CALCIUM SERPL-MCNC: 9 MG/DL (ref 8.5–10.1)
CHLORIDE SERPL-SCNC: 100 MMOL/L (ref 98–107)
CO2 SERPL-SCNC: 26 MMOL/L (ref 21–32)
CREAT SERPL-MCNC: 0.96 MG/DL (ref 0.6–1.3)
EOSINOPHIL # BLD AUTO: 0.2 10^3/UL (ref 0–0.3)
EOSINOPHIL NFR BLD AUTO: 1 % (ref 0–10)
ERYTHROCYTE [DISTWIDTH] IN BLOOD BY AUTOMATED COUNT: 16.8 % (ref 10–14.5)
GFR SERPLBLD BASED ON 1.73 SQ M-ARVRAT: > 60 ML/MIN
GLUCOSE SERPL-MCNC: 192 MG/DL (ref 70–105)
HCT VFR BLD CALC: 40 % (ref 40–54)
HGB BLD-MCNC: 12.6 G/DL (ref 13.3–17.7)
LYMPHOCYTES # BLD AUTO: 1.6 X 10^3 (ref 1–4)
LYMPHOCYTES NFR BLD AUTO: 11 % (ref 12–44)
MANUAL DIFFERENTIAL PERFORMED BLD QL: YES
MCH RBC QN AUTO: 32 PG (ref 25–34)
MCHC RBC AUTO-ENTMCNC: 32 G/DL (ref 32–36)
MCV RBC AUTO: 101 FL (ref 80–99)
MONOCYTES # BLD AUTO: 0.8 X 10^3 (ref 0–1)
MONOCYTES NFR BLD AUTO: 6 % (ref 0–12)
MONOCYTES NFR BLD: 3 %
NEUTROPHILS # BLD AUTO: 11.8 X 10^3 (ref 1.8–7.8)
NEUTROPHILS NFR BLD AUTO: 81 % (ref 42–75)
NEUTS BAND NFR BLD MANUAL: 65 %
NEUTS BAND NFR BLD: 17 %
PLATELET # BLD: 120 10^3/UL (ref 130–400)
PMV BLD AUTO: 12.8 FL (ref 7.4–10.4)
POTASSIUM SERPL-SCNC: 4.7 MMOL/L (ref 3.6–5)
PROT SERPL-MCNC: 7.6 GM/DL (ref 6.4–8.2)
RBC MORPH BLD: NORMAL
SODIUM SERPL-SCNC: 141 MMOL/L (ref 135–145)
VARIANT LYMPHS NFR BLD MANUAL: 15 %
WBC # BLD AUTO: 14.6 10^3/UL (ref 4.3–11)

## 2019-06-16 PROCEDURE — 87081 CULTURE SCREEN ONLY: CPT

## 2019-06-16 PROCEDURE — 87075 CULTR BACTERIA EXCEPT BLOOD: CPT

## 2019-06-16 PROCEDURE — 85025 COMPLETE CBC W/AUTO DIFF WBC: CPT

## 2019-06-16 PROCEDURE — 87070 CULTURE OTHR SPECIMN AEROBIC: CPT

## 2019-06-16 PROCEDURE — 94640 AIRWAY INHALATION TREATMENT: CPT

## 2019-06-16 PROCEDURE — 80053 COMPREHEN METABOLIC PANEL: CPT

## 2019-06-16 PROCEDURE — 87186 SC STD MICRODIL/AGAR DIL: CPT

## 2019-06-16 PROCEDURE — 85007 BL SMEAR W/DIFF WBC COUNT: CPT

## 2019-06-16 PROCEDURE — 87077 CULTURE AEROBIC IDENTIFY: CPT

## 2019-06-16 PROCEDURE — 87205 SMEAR GRAM STAIN: CPT

## 2019-06-16 PROCEDURE — 36415 COLL VENOUS BLD VENIPUNCTURE: CPT

## 2019-06-16 PROCEDURE — 99282 EMERGENCY DEPT VISIT SF MDM: CPT

## 2019-06-16 PROCEDURE — 94760 N-INVAS EAR/PLS OXIMETRY 1: CPT

## 2019-06-16 PROCEDURE — 96365 THER/PROPH/DIAG IV INF INIT: CPT

## 2019-06-16 PROCEDURE — 82962 GLUCOSE BLOOD TEST: CPT

## 2019-06-16 PROCEDURE — 85027 COMPLETE CBC AUTOMATED: CPT

## 2019-06-16 PROCEDURE — 80202 ASSAY OF VANCOMYCIN: CPT

## 2019-06-16 RX ADMIN — NORTRIPTYLINE HYDROCHLORIDE SCH MG: 25 CAPSULE ORAL at 21:34

## 2019-06-16 RX ADMIN — PREGABALIN SCH MG: 75 CAPSULE ORAL at 21:41

## 2019-06-16 RX ADMIN — INSULIN ASPART SCH UNIT: 100 INJECTION, SOLUTION INTRAVENOUS; SUBCUTANEOUS at 21:32

## 2019-06-16 RX ADMIN — SODIUM CHLORIDE SCH MLS/HR: 900 INJECTION, SOLUTION INTRAVENOUS at 21:32

## 2019-06-16 RX ADMIN — OXYCODONE HYDROCHLORIDE AND ACETAMINOPHEN PRN TAB: 10; 325 TABLET ORAL at 21:36

## 2019-06-16 NOTE — ED INTEGUMENTARY GENERAL
General


Chief Complaint:  Skin/Wound Problems


Stated Complaint:  RT FOOT SWOLLEN AND RED


Nursing Triage Note:  


Noticed on Friday that his foot was getting red and swollen. Is worse today and 


is now having foul smelling drainage from the bottom of his foot. Denies fevers 


at home. Has hx of diabetes and states his blood sugars usually run  in the 


200's.


Source:  patient


Exam Limitations:  no limitations





History of Present Illness


Date Seen by Provider:  Jun 16, 2019


Time Seen by Provider:  11:53


Initial Comments


The patient is a 68-year-old white male diabetic.  He reports that he has 

neuropathy.  He sees Kenn López at [facility].  He also noted the onset

of this on about Friday.  There was at first redness and now there is swelling 

and drainage from the hypertrophic callus over the right first MP joint.  He al

so smokes and has hypertension and heart disease with previous stenting.


Timing/Duration:  week, getting worse


Location:  feet





Allergies and Home Medications


Allergies


Coded Allergies:  


     No Known Drug Allergies (Unverified , 1/17/17)





Home Medications


Aspirin 81 Mg Tab.chew, 81 MG PO DAILY


   Prescribed by: JOHNNIE RON on 1/17/17 1052


Gabapentin 800 Mg Tablet, 800 MG PO TID, (Reported)


Linagliptin 5 Mg Tablet, 5 MG PO DAILY, (Reported)


Lisinopril/Hydrochlorothiazide 1 Each Tablet, 1 EACH PO DAILY, (Reported)


Nortriptyline HCl 50 Mg Capsule, 50 MG PO HS, (Reported)


Oxycodone HCl/Acetaminophen 1 Each Tablet, 1 EACH PO Q4H PRN for PAIN, 

(Reported)


Pravastatin Sodium 40 Mg Tablet, 40 MG PO HS, (Reported)





Patient Home Medication List


Home Medication List Reviewed:  Yes





Review of Systems


Review of Systems


Constitutional:  see HPI


EENTM:  no symptoms reported


Respiratory:  no symptoms reported


Cardiovascular:  no symptoms reported


Gastrointestinal:  no symptoms reported


Genitourinary:  no symptoms reported


Musculoskeletal:  no symptoms reported


Skin:  no symptoms reported


Psychiatric/Neurological:  No Symptoms Reported


Endocrine:  No Symptoms Reported


Hematologic/Lymphatic:  No Symptoms Reported





Past Medical-Social-Family Hx


Patient Social History


Alcohol Use:  Occasionally Uses


Recreational Drug Use:  No


Smoking Status:  Current Everyday Smoker


Type Used:  Cigarettes


2nd Hand Smoke Exposure:  Yes


Recent Foreign Travel:  No


Contact w/Someone Who Travel:  No


Recent Infectious Disease Expo:  No


Recent Hopitalizations:  No


Physical Abuse:  No


Sexual Abuse:  No


Mistreated:  No


Fear:  No





Seasonal Allergies


Seasonal Allergies:  No





Past Medical History


Surgeries:  Yes (liver surgery)


Appendectomy, Coronary Stent, Gallbladder


Respiratory:  No


Cardiac:  Yes (cardiac cath w/ balloon)


Coronary Artery Disease, High Cholesterol, Hypertension


Neurological:  No


Reproductive Disorders:  No


Genitourinary:  No


Gastrointestinal:  No


Musculoskeletal:  No


Endocrine:  No


HEENT:  No


Cancer:  Yes


Prostate


Psychosocial:  No


Integumentary:  No


Blood Disorders:  No





Physical Exam


Vital Signs





Vital Signs - First Documented








 6/16/19





 11:24


 


Temp 98.5


 


Pulse 99


 


Resp 18


 


B/P (MAP) 117/83 (94)


 


Pulse Ox 92





Capillary Refill : Less Than 3 Seconds


General Appearance:  WD/WN, no apparent distress


HEENT:  normal ENT inspection


Neck:  full range of motion


Cardiovascular:  normal peripheral pulses, regular rate, rhythm, no edema, no 

gallop, no JVD, no murmur


Respiratory:  chest non-tender, lungs clear, normal breath sounds, no respirator

y distress, no accessory muscle use, other (odor of cigarettes)


Gastrointestinal:  normal bowel sounds, non tender


Comments


The right foot shows erythema on the dorsum proximal to the MP joints.  No 

streaking is noted.  The plantar MP joints on the great toe has a hypertrophic 

callus with a central ulceration and serous drainage.  The left foot shows a 

similar callus with early ulceration but not the fluctuance noted on the right.





Progress/Results/Core Measures


Results/Orders


Lab Results





Laboratory Tests








Test


 6/16/19


11:56 Range/Units


 


 


White Blood Count


 14.6 H


 4.3-11.0


10^3/uL


 


Red Blood Count


 3.92 L


 4.35-5.85


10^6/uL


 


Hemoglobin 12.6 L 13.3-17.7  G/DL


 


Hematocrit 40  40-54  %


 


Mean Corpuscular Volume 101 H 80-99  FL


 


Mean Corpuscular Hemoglobin 32  25-34  PG


 


Mean Corpuscular Hemoglobin


Concent 32 


 32-36  G/DL





 


Red Cell Distribution Width 16.8 H 10.0-14.5  %


 


Platelet Count


 120 L


 130-400


10^3/uL


 


Mean Platelet Volume 12.8 H 7.4-10.4  FL


 


Neutrophils (%) (Auto) 81 H 42-75  %


 


Lymphocytes (%) (Auto) 11 L 12-44  %


 


Monocytes (%) (Auto) 6  0-12  %


 


Eosinophils (%) (Auto) 1  0-10  %


 


Basophils (%) (Auto) 1  0-10  %


 


Neutrophils # (Auto) 11.8 H 1.8-7.8  X 10^3


 


Lymphocytes # (Auto) 1.6  1.0-4.0  X 10^3


 


Monocytes # (Auto) 0.8  0.0-1.0  X 10^3


 


Eosinophils # (Auto)


 0.2 


 0.0-0.3


10^3/uL


 


Basophils # (Auto)


 0.1 


 0.0-0.1


10^3/uL


 


Neutrophils % (Manual) 65   %


 


Lymphocytes % (Manual) 15   %


 


Monocytes % (Manual) 3   %


 


Band Neutrophils 17   %


 


Blood Morphology Comment NORMAL   


 


Sodium Level 141  135-145  MMOL/L


 


Potassium Level 4.7  3.6-5.0  MMOL/L


 


Chloride Level 100    MMOL/L


 


Carbon Dioxide Level 26  21-32  MMOL/L


 


Anion Gap 15 H 5-14  MMOL/L


 


Blood Urea Nitrogen 21 H 7-18  MG/DL


 


Creatinine


 0.96 


 0.60-1.30


MG/DL


 


Estimat Glomerular Filtration


Rate > 60 


  





 


BUN/Creatinine Ratio 22   


 


Glucose Level 192 H   MG/DL


 


Calcium Level 9.0  8.5-10.1  MG/DL


 


Corrected Calcium 9.2  8.5-10.1  MG/DL


 


Total Bilirubin 0.3  0.1-1.0  MG/DL


 


Aspartate Amino Transf


(AST/SGOT) 23 


 5-34  U/L





 


Alanine Aminotransferase


(ALT/SGPT) 16 


 0-55  U/L





 


Alkaline Phosphatase 92    U/L


 


Total Protein 7.6  6.4-8.2  GM/DL


 


Albumin 3.7  3.2-4.5  GM/DL








My Orders





Orders - ELISSA MORALES MD


Wound Culture (6/16/19 11:50)


Cbc With Automated Diff (6/16/19 12:44)


Comprehensive Metabolic Panel (6/16/19 12:44)


Manual Differential (6/16/19 11:56)





Vital Signs/I&O











 6/16/19





 11:24


 


Temp 98.5


 


Pulse 99


 


Resp 18


 


B/P (MAP) 117/83 (94)


 


Pulse Ox 92








2





Blood Pressure Mean:                    94











Departure


Impression





   Primary Impression:  


   cellulitis/possible osteomyelitis right foot


   Additional Impression:  


   diabetes mellitus type II


Disposition:  09 ADMITTED AS INPATIENT


Condition:  Stable/Unchanged





Admissions


Decision to Admit Reason:  Admit from ER (General)


Decision to Admit/Date:  Jun 16, 2019


Time/Decision to Admit Time:  12:00





Transfer


Time Spoke to Accepting Phy:  13:24


Transfer Progress Notes


Spoke to Dr. BEAUCHAMP at 1325.  He accepts for [facility]Patient Inst.


Referrals:  


KENN LÓPEZ MD (PCP/Family)


Primary Care Physician











ELISSA MORALES MD             Jun 16, 2019 11:58

## 2019-06-16 NOTE — NUR
Dr. Mcneil notified of pt's request for home medications at HS to be continued.  See EMR for 
medications continued.

## 2019-06-16 NOTE — NUR
DAVID SMITH admitted to room 424-1, with an admitting diagnosis of cellulitis, on 06/16/19 
from ED via stretcher , accompanied by EMS. DAVID SMITH introduced to surroundings, call 
light, bed controls, phone, TV, temperature control, lights, meal times, smoking policy, 
visitor policy, side rail policy, bathrooms and showers.  Patient Rights given to patient in 
the handbook. DAVID SMITH verbalizes understanding that [facility] is not responsible for 
the loss or damage to any personal effects or valuables that are kept in the patients 
possession during their hospitalization.  The following Patient Care Plans were discussed 
with the patient: Discharge Planning, medications, pain management, and dehydration. 
DAVID SMITH verbalizes understanding of Interdisciplinary Patient Education. Patient and/or 
family were informed about the Rapid Response Team and its purpose.

## 2019-06-16 NOTE — HISTORY & PHYSICIAL ([facility])
HPI


History of Present Illness:


68-year-old male with known diabetes presents to [facility] emergency 

department with redness to the right foot.  He reports he does have neuropathy 

and he doesn't always notice pain until he sees discoloration.  He noticed this 

about 3 days ago and now there is redness and swelling.  He has also noticed 

some drainage from the callus at the first MP joint.  He does see Dr. Rodriguez at 

[facility].


Source:  patient


Exam Limitations:  clinical condition


Date seen by provider:  Jun 16, 2019


Time Seen by Provider:  17:45


Attending Physician


Yue Beauchamp MD


PCP


Kenn Mackey MD


Consult





Date of Admission


Jun 16, 2019 at 13:31





Home Medications


Home Medications


Reviewed patient Home Medication Reconciliation performed by pharmacy medication

reconciliations technician and/or nursing.


Patients Allergies have been reviewed.





Allergies


Coded Allergies:  


     No Known Drug Allergies (Unverified , 1/17/17)





PMH-Social-Family Hx


Patient Social History


Alcohol Use:  Occasionally Uses


Recreational Drug Use:  No


Smoking Status:  Current Everyday Smoker


Type Used:  Cigarettes


2nd Hand Smoke Exposure:  Yes


Recent Foreign Travel:  No


Contact w/other who traveled:  No


Recent Hopitalizations:  No


Recent Infectious Disease Expo:  No





Review of Systems ([facility])


Constitutional:  see HPI





Reviewed Test Results


Reviewed Test Results


Lab





Laboratory Tests








Test


 6/16/19


11:56 Range/Units


 


 


White Blood Count


 14.6 H


 4.3-11.0


10^3/uL


 


Red Blood Count


 3.92 L


 4.35-5.85


10^6/uL


 


Hemoglobin 12.6 L 13.3-17.7  G/DL


 


Hematocrit 40  40-54  %


 


Mean Corpuscular Volume 101 H 80-99  FL


 


Mean Corpuscular Hemoglobin 32  25-34  PG


 


Mean Corpuscular Hemoglobin


Concent 32 


 32-36  G/DL





 


Red Cell Distribution Width 16.8 H 10.0-14.5  %


 


Platelet Count


 120 L


 130-400


10^3/uL


 


Mean Platelet Volume 12.8 H 7.4-10.4  FL


 


Neutrophils (%) (Auto) 81 H 42-75  %


 


Lymphocytes (%) (Auto) 11 L 12-44  %


 


Monocytes (%) (Auto) 6  0-12  %


 


Eosinophils (%) (Auto) 1  0-10  %


 


Basophils (%) (Auto) 1  0-10  %


 


Neutrophils # (Auto) 11.8 H 1.8-7.8  X 10^3


 


Lymphocytes # (Auto) 1.6  1.0-4.0  X 10^3


 


Monocytes # (Auto) 0.8  0.0-1.0  X 10^3


 


Eosinophils # (Auto)


 0.2 


 0.0-0.3


10^3/uL


 


Basophils # (Auto)


 0.1 


 0.0-0.1


10^3/uL


 


Neutrophils % (Manual) 65   %


 


Lymphocytes % (Manual) 15   %


 


Monocytes % (Manual) 3   %


 


Band Neutrophils 17   %


 


Blood Morphology Comment NORMAL   


 


Sodium Level 141  135-145  MMOL/L


 


Potassium Level 4.7  3.6-5.0  MMOL/L


 


Chloride Level 100    MMOL/L


 


Carbon Dioxide Level 26  21-32  MMOL/L


 


Anion Gap 15 H 5-14  MMOL/L


 


Blood Urea Nitrogen 21 H 7-18  MG/DL


 


Creatinine


 0.96 


 0.60-1.30


MG/DL


 


Estimat Glomerular Filtration


Rate > 60 


  





 


BUN/Creatinine Ratio 22   


 


Glucose Level 192 H   MG/DL


 


Calcium Level 9.0  8.5-10.1  MG/DL


 


Corrected Calcium 9.2  8.5-10.1  MG/DL


 


Total Bilirubin 0.3  0.1-1.0  MG/DL


 


Aspartate Amino Transf


(AST/SGOT) 23 


 5-34  U/L





 


Alanine Aminotransferase


(ALT/SGPT) 16 


 0-55  U/L





 


Alkaline Phosphatase 92    U/L


 


Total Protein 7.6  6.4-8.2  GM/DL


 


Albumin 3.7  3.2-4.5  GM/DL











Physical Exam-([facility])


Physical Exam


Vital Signs





                          VS - Last 72 Hours, by Label








 6/16/19 6/16/19 6/16/19 6/16/19





 11:24 13:40 16:24 18:47


 


Temp 98.5 98.0 97.8 


 


Pulse 99 98 97 


 


Resp 18 18 20 


 


B/P (MAP) 117/83 (94) 130/69 (89) 133/66 (88) 


 


Pulse Ox 92 92 92 


 


O2 Delivery    Room Air


 


    





 6/16/19 6/16/19 6/16/19 6/16/19





 18:52 18:52 19:27 19:28


 


Temp   98.9 98.5


 


Pulse  97 90 95


 


Resp   20 22


 


B/P (MAP)   130/76 (94) 165/94


 


Pulse Ox 84 84 95 92


 


O2 Delivery Room Air  Nasal Cannula Nasal Cannula


 


O2 Flow Rate   3.00 3.00


 


FiO2  21  


 


    





 6/16/19 6/17/19 6/17/19 6/17/19





 20:50 00:15 04:00 07:04


 


Temp  98.7 98.5 


 


Pulse  108 94 


 


Resp  20 20 


 


B/P (MAP)  101/61 (74) 132/73 (92) 


 


Pulse Ox 95 93 93 86


 


O2 Delivery Nasal Cannula Nasal Cannula Nasal Cannula Nasal Cannula


 


O2 Flow Rate 3.00 3.00 3.00 3.00





Capillary Refill : Less Than 3 Seconds


General Appearance:  no apparent distress


HEENT:  pharynx normal


Neck:  non-tender


Respiratory:  lungs clear


Cardiovascular:  regular rate, rhythm


Gastrointestinal:  soft


Rectal:  deferred


Extremities:  other (Noted erythema at the first metatarsal joint on the right)





Assessment/Plan


Assessment/Plan


Admission Dx


1.  Cellulitis of the right foot.  Consideration to osteomyelitis


2.  Diabetes mellitus type II


3.  Hypertension history of


4.  History of coronary artery disease


Admission Status:  Inpatient Order (span 2 midnights)


Reason for Inpatient Admission:  


Patient admitted for IV antibiotics and consultation with wound care.


Assessment & Plan


1.  Cellulitis of the right foot.  Consideration to osteomyelitis


-Patient has been initiated on vancomycin and Zosyn


-We'll consult Dr. Manning wound specialist.





2.  Diabetes mellitus type II


-Sliding-scale insulin for now





3.  Hypertension history of





4.  History of coronary artery disease





(1) Cellulitis of foot, right


(2) Diabetes type 2, uncontrolled











YUE BEAUCHAMP MD              Jun 16, 2019 17:20

## 2019-06-17 VITALS — DIASTOLIC BLOOD PRESSURE: 67 MMHG | SYSTOLIC BLOOD PRESSURE: 132 MMHG

## 2019-06-17 VITALS — SYSTOLIC BLOOD PRESSURE: 115 MMHG | DIASTOLIC BLOOD PRESSURE: 64 MMHG

## 2019-06-17 VITALS — DIASTOLIC BLOOD PRESSURE: 73 MMHG | SYSTOLIC BLOOD PRESSURE: 132 MMHG

## 2019-06-17 VITALS — DIASTOLIC BLOOD PRESSURE: 68 MMHG | SYSTOLIC BLOOD PRESSURE: 134 MMHG

## 2019-06-17 VITALS — SYSTOLIC BLOOD PRESSURE: 103 MMHG | DIASTOLIC BLOOD PRESSURE: 65 MMHG

## 2019-06-17 VITALS — DIASTOLIC BLOOD PRESSURE: 61 MMHG | SYSTOLIC BLOOD PRESSURE: 101 MMHG

## 2019-06-17 LAB
ALBUMIN SERPL-MCNC: 3.3 GM/DL (ref 3.2–4.5)
ALP SERPL-CCNC: 76 U/L (ref 40–136)
ALT SERPL-CCNC: 16 U/L (ref 0–55)
BASOPHILS # BLD AUTO: 0 10^3/UL (ref 0–0.1)
BASOPHILS NFR BLD AUTO: 0 % (ref 0–10)
BILIRUB SERPL-MCNC: 0.6 MG/DL (ref 0.1–1)
BUN/CREAT SERPL: 19
CALCIUM SERPL-MCNC: 8.5 MG/DL (ref 8.5–10.1)
CHLORIDE SERPL-SCNC: 100 MMOL/L (ref 98–107)
CO2 SERPL-SCNC: 26 MMOL/L (ref 21–32)
CREAT SERPL-MCNC: 1.11 MG/DL (ref 0.6–1.3)
EOSINOPHIL # BLD AUTO: 0.1 10^3/UL (ref 0–0.3)
EOSINOPHIL NFR BLD AUTO: 1 % (ref 0–10)
ERYTHROCYTE [DISTWIDTH] IN BLOOD BY AUTOMATED COUNT: 16.5 % (ref 10–14.5)
GFR SERPLBLD BASED ON 1.73 SQ M-ARVRAT: > 60 ML/MIN
GLUCOSE SERPL-MCNC: 196 MG/DL (ref 70–105)
HCT VFR BLD CALC: 36 % (ref 40–54)
HGB BLD-MCNC: 11.1 G/DL (ref 13.3–17.7)
LYMPHOCYTES # BLD AUTO: 1.4 X 10^3 (ref 1–4)
LYMPHOCYTES NFR BLD AUTO: 11 % (ref 12–44)
MANUAL DIFFERENTIAL PERFORMED BLD QL: NO
MCH RBC QN AUTO: 32 PG (ref 25–34)
MCHC RBC AUTO-ENTMCNC: 31 G/DL (ref 32–36)
MCV RBC AUTO: 101 FL (ref 80–99)
MONOCYTES # BLD AUTO: 0.6 X 10^3 (ref 0–1)
MONOCYTES NFR BLD AUTO: 5 % (ref 0–12)
NEUTROPHILS # BLD AUTO: 10.3 X 10^3 (ref 1.8–7.8)
NEUTROPHILS NFR BLD AUTO: 83 % (ref 42–75)
PLATELET # BLD: 106 10^3/UL (ref 130–400)
PMV BLD AUTO: 12.5 FL (ref 7.4–10.4)
POTASSIUM SERPL-SCNC: 4.4 MMOL/L (ref 3.6–5)
PROT SERPL-MCNC: 6.6 GM/DL (ref 6.4–8.2)
SODIUM SERPL-SCNC: 135 MMOL/L (ref 135–145)
WBC # BLD AUTO: 12.5 10^3/UL (ref 4.3–11)

## 2019-06-17 RX ADMIN — ATORVASTATIN CALCIUM SCH MG: 80 TABLET, FILM COATED ORAL at 21:42

## 2019-06-17 RX ADMIN — INSULIN ASPART SCH UNIT: 100 INJECTION, SOLUTION INTRAVENOUS; SUBCUTANEOUS at 21:42

## 2019-06-17 RX ADMIN — OXYCODONE HYDROCHLORIDE AND ACETAMINOPHEN PRN TAB: 10; 325 TABLET ORAL at 05:44

## 2019-06-17 RX ADMIN — OXYCODONE HYDROCHLORIDE AND ACETAMINOPHEN PRN TAB: 10; 325 TABLET ORAL at 12:29

## 2019-06-17 RX ADMIN — PREGABALIN SCH MG: 75 CAPSULE ORAL at 12:29

## 2019-06-17 RX ADMIN — VANCOMYCIN HYDROCHLORIDE SCH MLS/HR: 500 INJECTION, POWDER, LYOPHILIZED, FOR SOLUTION INTRAVENOUS at 18:53

## 2019-06-17 RX ADMIN — PREGABALIN SCH MG: 75 CAPSULE ORAL at 21:42

## 2019-06-17 RX ADMIN — SODIUM CHLORIDE SCH MLS/HR: 900 INJECTION, SOLUTION INTRAVENOUS at 21:43

## 2019-06-17 RX ADMIN — IPRATROPIUM BROMIDE AND ALBUTEROL SULFATE SCH ML: .5; 3 SOLUTION RESPIRATORY (INHALATION) at 19:14

## 2019-06-17 RX ADMIN — IPRATROPIUM BROMIDE AND ALBUTEROL SULFATE SCH ML: .5; 3 SOLUTION RESPIRATORY (INHALATION) at 11:09

## 2019-06-17 RX ADMIN — SODIUM CHLORIDE SCH MLS/HR: 900 INJECTION, SOLUTION INTRAVENOUS at 14:30

## 2019-06-17 RX ADMIN — OXYCODONE HYDROCHLORIDE AND ACETAMINOPHEN PRN TAB: 10; 325 TABLET ORAL at 23:11

## 2019-06-17 RX ADMIN — OXYCODONE HYDROCHLORIDE AND ACETAMINOPHEN PRN TAB: 10; 325 TABLET ORAL at 18:59

## 2019-06-17 RX ADMIN — PREGABALIN SCH MG: 75 CAPSULE ORAL at 08:43

## 2019-06-17 RX ADMIN — IPRATROPIUM BROMIDE AND ALBUTEROL SULFATE SCH ML: .5; 3 SOLUTION RESPIRATORY (INHALATION) at 07:01

## 2019-06-17 RX ADMIN — ENOXAPARIN SODIUM SCH MG: 100 INJECTION SUBCUTANEOUS at 21:42

## 2019-06-17 RX ADMIN — IPRATROPIUM BROMIDE AND ALBUTEROL SULFATE SCH ML: .5; 3 SOLUTION RESPIRATORY (INHALATION) at 15:34

## 2019-06-17 RX ADMIN — INSULIN ASPART SCH UNIT: 100 INJECTION, SOLUTION INTRAVENOUS; SUBCUTANEOUS at 06:41

## 2019-06-17 RX ADMIN — NORTRIPTYLINE HYDROCHLORIDE SCH MG: 25 CAPSULE ORAL at 21:42

## 2019-06-17 RX ADMIN — VANCOMYCIN HYDROCHLORIDE SCH MLS/HR: 500 INJECTION, POWDER, LYOPHILIZED, FOR SOLUTION INTRAVENOUS at 07:45

## 2019-06-17 RX ADMIN — INSULIN ASPART SCH UNIT: 100 INJECTION, SOLUTION INTRAVENOUS; SUBCUTANEOUS at 11:35

## 2019-06-17 RX ADMIN — SODIUM CHLORIDE SCH MLS/HR: 900 INJECTION, SOLUTION INTRAVENOUS at 05:44

## 2019-06-17 RX ADMIN — INSULIN ASPART SCH UNIT: 100 INJECTION, SOLUTION INTRAVENOUS; SUBCUTANEOUS at 16:34

## 2019-06-17 NOTE — WOUND CARE ASSESSMENT
Wound Care Assessment


Date Seen by Provider:  Jun 17, 2019


Time Seen by Provider:  17:50


Chief Complaint


R foot ulcer.


HPI


The patient is a 68 year old male with an acutely infected deep ulcer of the R 

1st sub-metatarsal area, with extension of the necrosis proximally.  The entire 

medial aspect of the foot is red and tender.  There is a central draining 

opening from which purulent drainage is coming.  Would recommend urgent surgical

drainage.


Smoking Status:  Current Everyday Smoker


Recreational Drug Use:  No


Alcohol Use:  Occasionally Uses


Exam





Vital Signs








  Date Time  Temp Pulse Resp B/P (MAP) Pulse Ox O2 Delivery O2 Flow Rate FiO2


 


6/17/19 20:05 100.1       


 


6/17/19 19:25  110 18 134/68 (90) 94 Nasal Cannula 5.00 


 


6/16/19 18:52        21





Capillary Refill : Less Than 3 Seconds





Results


Laboratory Tests


6/16/19 21:22: Glucometer 156H


6/17/19 06:07: Glucometer 195H


6/17/19 09:55: 


White Blood Count 12.5H, Red Blood Count 3.51L, Hemoglobin 11.1L, Hematocrit 36L

, Mean Corpuscular Volume 101H, Mean Corpuscular Hemoglobin 32, Mean Corpuscular

Hemoglobin Concent 31L, Red Cell Distribution Width 16.5H, Platelet Count 106L, 

Mean Platelet Volume 12.5H, Neutrophils (%) (Auto) 83H, Lymphocytes (%) (Auto) 

11L, Monocytes (%) (Auto) 5, Eosinophils (%) (Auto) 1, Basophils (%) (Auto) 0, 

Neutrophils # (Auto) 10.3H, Lymphocytes # (Auto) 1.4, Monocytes # (Auto) 0.6, 

Eosinophils # (Auto) 0.1, Basophils # (Auto) 0.0, Sodium Level 135, Potassium 

Level 4.4, Chloride Level 100, Carbon Dioxide Level 26, Anion Gap 9, Blood Urea 

Nitrogen 21H, Creatinine 1.11, Estimat Glomerular Filtration Rate > 60, 

BUN/Creatinine Ratio 19, Glucose Level 196H, Calcium Level 8.5, Corrected 

Calcium 9.1, Total Bilirubin 0.6, Aspartate Amino Transf (AST/SGOT) 14, Alanine 

Aminotransferase (ALT/SGPT) 16, Alkaline Phosphatase 76, Total Protein 6.6, 

Albumin 3.3


6/17/19 11:10: Glucometer 176H


6/17/19 16:04: Glucometer 266H





Microbiology


6/16/19 Gram Stain, Resulted


          Pending


6/16/19 Wound Culture - Preliminary, Resulted


          Mixed Bacterial Kimberly


          Staphylococcus aureus





Microbiology


6/16/19 Gram Stain, Resulted


          Pending


6/16/19 Wound Culture - Preliminary, Resulted


          Mixed Bacterial Kimberly


          Staphylococcus aureus











YULISA DENNIS MD             Jun 17, 2019 20:59

## 2019-06-17 NOTE — NUR
PHARMACY TO DOSE VANCO: ADJ BW 80.8, SCr 1, EST CrCl 80.8

NO LOADING DOSE: 1 GM GIVEN 6/16/19 @ 21:50

MAIN DOSE: 1,500 MG IV Q12HR

VANCOMYCIN TROUGH DUE 6/18/20 @ 18:00

IF TROUGH GREATER THAN 20 HOLD 6/18/20 19:00 DOSE.

## 2019-06-17 NOTE — CONSULTATION (SURGERY)
History of Present Illness


History of Present Illness


Patient Consulted On(bay/time)


19


 18:34


Date Seen by Provider:  2019


Time Seen by Provider:  18:34


History of Present Illness


Consult requested by Dr. Anderson for right foot abscess


Patient is a 68-year-old male who is diabetic.  Patient states that he's had a 

problem with his right foot for approximately 4-5 days.  Patient started noti

cing redness. patient states pain is moderate and tenderness.  No radiation of 

pain.  Worse with walking and ambulating on the foot.  Patient states that he's 

noticed the erythema and then it then became more fluctuant.  Has been draining 

a little bit of fluid he states.  He states he might of been running a fever 

swell.  Patient with no other complaints at this time.  He denies any nausea 

vomiting fever sweats chills shortness of breath or chest pain at this time.





Allergies and Home Medications


Allergies


Coded Allergies:  


     No Known Drug Allergies (Unverified , 17)





Home Medications


Aspirin 81 Mg Tab.chew, 81 MG PO DAILY, (Reported)


Atorvastatin Calcium 80 Mg Tablet, 80 MG PO HS, (Reported)


Glipizide 10 Mg Tablet, 10 MG PO DAILY, (Reported)


Linagliptin 5 Mg Tablet, 5 MG PO DAILY, (Reported)


Lisinopril 10 Mg Tablet, 10 MG PO DAILY, (Reported)


Metformin HCl 1,000 Mg Tablet, 1,000 MG PO BID, (Reported)


   SOMETIMES SKIPS EVENING DOSE DEPENDING ON BLOOD SUGAR 


Multivitamin 1 Each Tablet, 1 TAB PO DAILY, (Reported)


Nitroglycerin 0.4 Mg Tab.subl, 0.4 MG SL UD PRN for CHEST PAIN, (Reported)


Nortriptyline HCl 50 Mg Capsule, 50 MG PO HS, (Reported)


Oxycodone HCl/Acetaminophen 1 Each Tablet, 1 TAB PO Q4H PRN for PAIN-MODERATE, 

(Reported)


Pregabalin 150 Mg Capsule, 150 MG PO TID, (Reported)





Patient Home Medication List


Home Medication List Reviewed:  Yes





Past Medical-Social-Family Hx


Patient Social History


Alcohol Use:  Occasionally Uses


Recreational Drug Use:  No


Smoking Status:  Current Everyday Smoker


Type Used:  Cigarettes


2nd Hand Smoke Exposure:  Yes


Recent Foreign Travel:  No


Contact w/Someone Who Travel:  No


Recent Infectious Disease Expo:  No


Recent Hopitalizations:  No





Seasonal Allergies


Seasonal Allergies:  No





Surgeries


History of Surgeries:  Yes (liver surgery)


Surgeries:  Appendectomy, Coronary Stent, Gallbladder





Respiratory


History of Respiratory Disorde:  No





Cardiovascular


History of Cardiac Disorders:  Yes (cardiac cath w/ balloon)


Cardiac Disorders:  Coronary Artery Disease, High Cholesterol, Hypertension





Neurological


History of Neurological Disord:  No





Reproductive System


Hx Reproductive Disorders:  No





Genitourinary


History of Genitourinary Disor:  No





Gastrointestinal


History of Gastrointestinal Di:  No





Musculoskeletal


History of Musculoskeletal Dis:  No





Endocrine


History of Endocrine Disorders:  No





HEENT


History of HEENT Disorders:  No





Cancer


History of Cancer:  Yes


Cancer:  Prostate





Psychosocial


History of Psychiatric Problem:  No





Integumentary


History of Skin or Integumenta:  No





Blood Transfusions


History of Blood Disorders:  No





Family Medical History


Significant Family History:  No Pertinent Family Hx





Review of Systems-General


Constitutional:  no symptoms reported


EENTM:  no symptoms reported


Respiratory:  no symptoms reported


Cardiovascular:  no symptoms reported


Gastrointestinal:  no symptoms reported


Genitourinary:  no symptoms reported


Musculoskeletal:  no symptoms reported


Skin:  see HPI


Psychiatric/Neurological:  No Symptoms Reported





Physical Exam-General Problems


Physical Exam


Vital Signs





Vital Signs - First Documented








 19





 11:24 18:47 18:52 19:27


 


Temp 98.5   


 


Pulse 99   


 


Resp 18   


 


B/P (MAP) 117/83 (94)   


 


Pulse Ox 92   


 


O2 Delivery  Room Air  


 


O2 Flow Rate    3.00


 


FiO2   21 





Capillary Refill : Less Than 3 Seconds


General Appearance:  WD/WN, no apparent distress


HEENT:  PERRL/EOMI, normal ENT inspection


Neck:  non-tender, supple


Respiratory:  chest non-tender, no respiratory distress, no accessory muscle use


Cardiovascular:  regular rate, rhythm


Gastrointestinal:  non tender, soft


Rectal:  deferred


Back:  no CVA tenderness


Extremities:  other (tenderness right plantar surface of foot with some 

fluctuance underneath right first metatarsal head some surrounding erythema)


Neurologic/Psychiatric:  alert, normal mood/affect, oriented x 3, other (slightl

y decreased sensation lower extremities)


Skin:  normal color (Except for slight erythema right foot)


Lymphatic:  no adenopathy





Data Review


Labs


Laboratory Tests


19 21:22: Glucometer 156H


19 06:07: Glucometer 195H


19 09:55: 


White Blood Count 12.5H, Red Blood Count 3.51L, Hemoglobin 11.1L, Hematocrit 36L

, Mean Corpuscular Volume 101H, Mean Corpuscular Hemoglobin 32, Mean Corpuscular

Hemoglobin Concent 31L, Red Cell Distribution Width 16.5H, Platelet Count 106L, 

Mean Platelet Volume 12.5H, Neutrophils (%) (Auto) 83H, Lymphocytes (%) (Auto) 

11L, Monocytes (%) (Auto) 5, Eosinophils (%) (Auto) 1, Basophils (%) (Auto) 0, 

Neutrophils # (Auto) 10.3H, Lymphocytes # (Auto) 1.4, Monocytes # (Auto) 0.6, 

Eosinophils # (Auto) 0.1, Basophils # (Auto) 0.0, Sodium Level 135, Potassium 

Level 4.4, Chloride Level 100, Carbon Dioxide Level 26, Anion Gap 9, Blood Urea 

Nitrogen 21H, Creatinine 1.11, Estimat Glomerular Filtration Rate > 60, 

BUN/Creatinine Ratio 19, Glucose Level 196H, Calcium Level 8.5, Corrected 

Calcium 9.1, Total Bilirubin 0.6, Aspartate Amino Transf (AST/SGOT) 14, Alanine 

Aminotransferase (ALT/SGPT) 16, Alkaline Phosphatase 76, Total Protein 6.6, 

Albumin 3.3


19 11:10: Glucometer 176H


19 16:04: Glucometer 266H





Microbiology


19 Gram Stain, Resulted


          Pending


19 Wound Culture - Preliminary, Resulted


          Mixed Bacterial Kimberly


          Staphylococcus aureus





Assessment/Plan


Patient is 68-year-old male with abscess and cellulitis.  Patient was discussed 

need for incision and drainage and debridement of tissue and more long-term 

wound care needs.  Patient understands and wishes to proceed with surgical 

intervention.  Patient to be nothing by mouth after midnight.  Obtaining 

consent.  Continue current medical management.





Clinical Quality Measures


DVT/VTE Risk/Contraindication:


Risk Factor Score Per Nursin


RFS Level Per Nursing on Admit:  4+=Very High


Other:  


SEE INTERVENTIONS











MATEO ALEMAN DO              2019 18:34

## 2019-06-17 NOTE — NUR
CALLED [facility] FOR A LIST OF RECENTLY FILLED MEDICATIONS. THE PATIENT WAS ABLE 
TO LIST ALL OF HIS MEDS TO ME AND I VERIFIED THE DOSES WITH THE LIST FROM [facility].



[facility] FILLED:

5-23-19 PERCOCET  1 Q4H PRN #[id number]

5-23-19 LYRICA 150MG TID 

5-22-19 NITRO 0.4MG SL UD

5-22-19 GLIPIZIDE 10MG DAILY 

5-22-19 TRADJENTA 5 MG DAILY 

5-22-19 LISINOPRIL 10MG DAILY

5-22-19 METFORMIN 1000MG BID

5-22-19 NORTRIPTYLINE 50MG HS

4-24-19 LIPITOR 80MG #30



HE ALSO TAKES ASPIRIN CHEWABLE OTC DAILY AND A MTV.

## 2019-06-17 NOTE — PROGRESS NOTE-HOSPITALIST
Subjective


HPI/CC On Admission


Date Seen by Provider:  2019


Time Seen by Provider:  09:30


Subjective/Events-last exam


Pt doing pretty well today and improved cellulitis since antibiotics were given.




Dr. Hong will be consulted for debridement. 


He does smoke. 


He is retired from body shop and . 


Tolerating Vancomycin and Zosyn. 


Pain is well controlled, 


Restarted home medications. 


Checked meds and labs.





Review of Systems


General:  Fatigue


Musculoskeletal:  foot pain





Objective


Exam


Vital Signs





Vital Signs








  Date Time  Temp Pulse Resp B/P (MAP) Pulse Ox O2 Delivery O2 Flow Rate FiO2


 


19 19:14     93 Nasal Cannula 5.00 


 


19 16:00 99.5 91 18 115/64 (81)    


 


19 18:52        21





Capillary Refill : Less Than 3 Seconds


General Appearance:  No Apparent Distress, WD/WN, Chronically ill


Respiratory:  Chest Non Tender, Lungs Clear, Normal Breath Sounds, No Accessory 

Muscle Use, No Respiratory Distress


Cardiovascular:  Regular Rate, Rhythm, No Edema, No Gallop, No JVD, No Murmur, 

Normal Peripheral Pulses


Extremity:  Swelling, Other (foot abscess draining)


Neurologic/Psychiatric:  Alert, Oriented x3, No Motor/Sensory Deficits, Normal 

Mood/Affect





Results/Procedures


Lab


Laboratory Tests


19 09:55








Patient resulted labs reviewed.





Assessment/Plan


Assessment and Plan


Assess & Plan/Chief Complaint


Assessment:


1.  Cellulitis of the right foot presumed osteomyelitis


2.  Diabetes mellitus type II


3.  Hypertension


4.  History of coronary artery disease





Plan:


IV abx


Home meds


Dr Hong and Kalee consulted





Diagnosis/Problems


Diagnosis/Problems





(1) Cellulitis of foot, right


Status:  Acute


(2) Smoker


Status:  Chronic


(3) CAD (coronary artery disease)


Status:  Chronic


Qualifiers:  


   Coronary Disease-Associated Artery/Lesion type:  native artery  Native vs. 

transplanted heart:  native heart  Associated angina:  without angina  Qualified

Codes:  I25.10 - Atherosclerotic heart disease of native coronary artery without

angina pectoris


(4) Diabetes type 2, uncontrolled


Status:  Chronic


Qualifiers:  


   Glycemic state:  with hyperglycemia  Qualified Codes:  E11.65 - Type 2 

diabetes mellitus with hyperglycemia





Clinical Quality Measures


DVT/VTE Risk/Contraindication:


Risk Factor Score Per Nursin


RFS Level Per Nursing on Admit:  4+=Very High


Other:  


SEE INTERVENTIONS











JASEN DAY DO                2019 09:51

## 2019-06-18 VITALS — SYSTOLIC BLOOD PRESSURE: 107 MMHG | DIASTOLIC BLOOD PRESSURE: 66 MMHG

## 2019-06-18 VITALS — DIASTOLIC BLOOD PRESSURE: 104 MMHG | SYSTOLIC BLOOD PRESSURE: 135 MMHG

## 2019-06-18 VITALS — DIASTOLIC BLOOD PRESSURE: 70 MMHG | SYSTOLIC BLOOD PRESSURE: 103 MMHG

## 2019-06-18 VITALS — DIASTOLIC BLOOD PRESSURE: 67 MMHG | SYSTOLIC BLOOD PRESSURE: 100 MMHG

## 2019-06-18 VITALS — SYSTOLIC BLOOD PRESSURE: 95 MMHG | DIASTOLIC BLOOD PRESSURE: 51 MMHG

## 2019-06-18 VITALS — DIASTOLIC BLOOD PRESSURE: 62 MMHG | SYSTOLIC BLOOD PRESSURE: 111 MMHG

## 2019-06-18 VITALS — SYSTOLIC BLOOD PRESSURE: 105 MMHG | DIASTOLIC BLOOD PRESSURE: 66 MMHG

## 2019-06-18 VITALS — DIASTOLIC BLOOD PRESSURE: 66 MMHG | SYSTOLIC BLOOD PRESSURE: 107 MMHG

## 2019-06-18 VITALS — DIASTOLIC BLOOD PRESSURE: 70 MMHG | SYSTOLIC BLOOD PRESSURE: 104 MMHG

## 2019-06-18 VITALS — SYSTOLIC BLOOD PRESSURE: 104 MMHG | DIASTOLIC BLOOD PRESSURE: 70 MMHG

## 2019-06-18 VITALS — SYSTOLIC BLOOD PRESSURE: 104 MMHG | DIASTOLIC BLOOD PRESSURE: 80 MMHG

## 2019-06-18 VITALS — DIASTOLIC BLOOD PRESSURE: 56 MMHG | SYSTOLIC BLOOD PRESSURE: 104 MMHG

## 2019-06-18 VITALS — SYSTOLIC BLOOD PRESSURE: 123 MMHG | DIASTOLIC BLOOD PRESSURE: 64 MMHG

## 2019-06-18 VITALS — DIASTOLIC BLOOD PRESSURE: 68 MMHG | SYSTOLIC BLOOD PRESSURE: 108 MMHG

## 2019-06-18 LAB
ALBUMIN SERPL-MCNC: 3.2 GM/DL (ref 3.2–4.5)
ALP SERPL-CCNC: 85 U/L (ref 40–136)
ALT SERPL-CCNC: 13 U/L (ref 0–55)
BASOPHILS # BLD AUTO: 0 10^3/UL (ref 0–0.1)
BASOPHILS NFR BLD AUTO: 0 % (ref 0–10)
BILIRUB SERPL-MCNC: 0.3 MG/DL (ref 0.1–1)
BUN/CREAT SERPL: 19
CALCIUM SERPL-MCNC: 8.7 MG/DL (ref 8.5–10.1)
CHLORIDE SERPL-SCNC: 100 MMOL/L (ref 98–107)
CO2 SERPL-SCNC: 27 MMOL/L (ref 21–32)
CREAT SERPL-MCNC: 1.17 MG/DL (ref 0.6–1.3)
EOSINOPHIL # BLD AUTO: 0.1 10^3/UL (ref 0–0.3)
EOSINOPHIL NFR BLD AUTO: 1 % (ref 0–10)
ERYTHROCYTE [DISTWIDTH] IN BLOOD BY AUTOMATED COUNT: 16.9 % (ref 10–14.5)
GFR SERPLBLD BASED ON 1.73 SQ M-ARVRAT: > 60 ML/MIN
GLUCOSE SERPL-MCNC: 183 MG/DL (ref 70–105)
HCT VFR BLD CALC: 34 % (ref 40–54)
HGB BLD-MCNC: 10.4 G/DL (ref 13.3–17.7)
LYMPHOCYTES # BLD AUTO: 1.5 X 10^3 (ref 1–4)
LYMPHOCYTES NFR BLD AUTO: 14 % (ref 12–44)
MANUAL DIFFERENTIAL PERFORMED BLD QL: NO
MCH RBC QN AUTO: 32 PG (ref 25–34)
MCHC RBC AUTO-ENTMCNC: 31 G/DL (ref 32–36)
MCV RBC AUTO: 102 FL (ref 80–99)
MONOCYTES # BLD AUTO: 0.7 X 10^3 (ref 0–1)
MONOCYTES NFR BLD AUTO: 7 % (ref 0–12)
NEUTROPHILS # BLD AUTO: 8.2 X 10^3 (ref 1.8–7.8)
NEUTROPHILS NFR BLD AUTO: 78 % (ref 42–75)
PLATELET # BLD: 116 10^3/UL (ref 130–400)
PMV BLD AUTO: 12.2 FL (ref 7.4–10.4)
POTASSIUM SERPL-SCNC: 4.8 MMOL/L (ref 3.6–5)
PROT SERPL-MCNC: 6.7 GM/DL (ref 6.4–8.2)
SODIUM SERPL-SCNC: 137 MMOL/L (ref 135–145)
WBC # BLD AUTO: 10.5 10^3/UL (ref 4.3–11)

## 2019-06-18 PROCEDURE — 0LBV0ZZ EXCISION OF RIGHT FOOT TENDON, OPEN APPROACH: ICD-10-PCS | Performed by: SURGERY

## 2019-06-18 RX ADMIN — ATORVASTATIN CALCIUM SCH MG: 80 TABLET, FILM COATED ORAL at 19:43

## 2019-06-18 RX ADMIN — DOCUSATE SODIUM AND SENNOSIDES SCH EA: 8.6; 5 TABLET, FILM COATED ORAL at 19:43

## 2019-06-18 RX ADMIN — SODIUM CHLORIDE SCH MLS/HR: 900 INJECTION, SOLUTION INTRAVENOUS at 05:42

## 2019-06-18 RX ADMIN — INSULIN ASPART SCH UNIT: 100 INJECTION, SOLUTION INTRAVENOUS; SUBCUTANEOUS at 11:05

## 2019-06-18 RX ADMIN — PREGABALIN SCH MG: 75 CAPSULE ORAL at 09:24

## 2019-06-18 RX ADMIN — INSULIN ASPART SCH UNIT: 100 INJECTION, SOLUTION INTRAVENOUS; SUBCUTANEOUS at 21:04

## 2019-06-18 RX ADMIN — SODIUM CHLORIDE SCH MLS/HR: 900 INJECTION, SOLUTION INTRAVENOUS at 15:18

## 2019-06-18 RX ADMIN — DOCUSATE SODIUM AND SENNOSIDES SCH EA: 8.6; 5 TABLET, FILM COATED ORAL at 09:24

## 2019-06-18 RX ADMIN — INSULIN ASPART SCH UNIT: 100 INJECTION, SOLUTION INTRAVENOUS; SUBCUTANEOUS at 16:25

## 2019-06-18 RX ADMIN — IPRATROPIUM BROMIDE AND ALBUTEROL SULFATE SCH ML: .5; 3 SOLUTION RESPIRATORY (INHALATION) at 07:19

## 2019-06-18 RX ADMIN — IPRATROPIUM BROMIDE AND ALBUTEROL SULFATE SCH ML: .5; 3 SOLUTION RESPIRATORY (INHALATION) at 10:45

## 2019-06-18 RX ADMIN — VANCOMYCIN HYDROCHLORIDE SCH MLS/HR: 500 INJECTION, POWDER, LYOPHILIZED, FOR SOLUTION INTRAVENOUS at 18:32

## 2019-06-18 RX ADMIN — PREGABALIN SCH MG: 75 CAPSULE ORAL at 19:43

## 2019-06-18 RX ADMIN — LINAGLIPTIN SCH MG: 5 TABLET, FILM COATED ORAL at 09:24

## 2019-06-18 RX ADMIN — INSULIN ASPART SCH UNIT: 100 INJECTION, SOLUTION INTRAVENOUS; SUBCUTANEOUS at 05:42

## 2019-06-18 RX ADMIN — IPRATROPIUM BROMIDE AND ALBUTEROL SULFATE SCH ML: .5; 3 SOLUTION RESPIRATORY (INHALATION) at 14:56

## 2019-06-18 RX ADMIN — SODIUM CHLORIDE SCH MLS/HR: 900 INJECTION, SOLUTION INTRAVENOUS at 21:05

## 2019-06-18 RX ADMIN — LISINOPRIL SCH MG: 10 TABLET ORAL at 09:24

## 2019-06-18 RX ADMIN — NORTRIPTYLINE HYDROCHLORIDE SCH MG: 25 CAPSULE ORAL at 19:43

## 2019-06-18 RX ADMIN — VANCOMYCIN HYDROCHLORIDE SCH MLS/HR: 500 INJECTION, POWDER, LYOPHILIZED, FOR SOLUTION INTRAVENOUS at 06:11

## 2019-06-18 RX ADMIN — ASPIRIN 81 MG CHEWABLE TABLET SCH MG: 81 TABLET CHEWABLE at 09:24

## 2019-06-18 RX ADMIN — OXYCODONE HYDROCHLORIDE AND ACETAMINOPHEN PRN TAB: 10; 325 TABLET ORAL at 09:26

## 2019-06-18 RX ADMIN — ENOXAPARIN SODIUM SCH MG: 100 INJECTION SUBCUTANEOUS at 19:43

## 2019-06-18 RX ADMIN — OXYCODONE HYDROCHLORIDE AND ACETAMINOPHEN PRN TAB: 10; 325 TABLET ORAL at 19:44

## 2019-06-18 RX ADMIN — Medication SCH EA: at 09:24

## 2019-06-18 RX ADMIN — GLIPIZIDE SCH MG: 5 TABLET ORAL at 05:35

## 2019-06-18 RX ADMIN — PREGABALIN SCH MG: 75 CAPSULE ORAL at 12:24

## 2019-06-18 RX ADMIN — IPRATROPIUM BROMIDE AND ALBUTEROL SULFATE SCH ML: .5; 3 SOLUTION RESPIRATORY (INHALATION) at 22:18

## 2019-06-18 NOTE — PROGRESS NOTE-HOSPITALIST
Subjective


HPI/CC On Admission


Date Seen by Provider:  2019


Time Seen by Provider:  09:30


Subjective/Events-last exam


Dr. Hong will take to surgery and debride today 


IV antibiotics are greatly improving the status of the right foot but suspected 

deep abscess 


Nebulizer treatments are maintained 


Oxygen supplementation maintain as needed 


Pain is well controlled 


All home meds were restarted 


Bowels are moving





Review of Systems


Musculoskeletal:  foot pain





Objective


Exam


Vital Signs





Vital Signs








  Date Time  Temp Pulse Resp B/P (MAP) Pulse Ox O2 Delivery O2 Flow Rate FiO2


 


19 15:15 97.6 101 20 107/66 (80) 95 OxyMask 4.00 


 


19 18:52        21





Capillary Refill : Less Than 3 Seconds


General Appearance:  No Apparent Distress, WD/WN, Chronically ill


Respiratory:  Chest Non Tender, No Accessory Muscle Use, No Respiratory Distress


Cardiovascular:  Regular Rate, Rhythm, Normal Peripheral Pulses


Extremity:  Swelling, Other (foot abscess draining right surrounding erythema)


Neurologic/Psychiatric:  Alert, Oriented x3, No Motor/Sensory Deficits, Normal 

Mood/Affect


Skin:  Other (right foot as noted above)





Results/Procedures


Lab


Laboratory Tests


19 05:30








Patient resulted labs reviewed.





Assessment/Plan


Assessment and Plan


Assess & Plan/Chief Complaint


Assessment:


1.  Cellulitis of the right foot presumed osteomyelitis s/p debridement POD # 0


2.  Diabetes mellitus type II


3.  Hypertension


4.  History of coronary artery disease





Plan:


IV abx


Home meds


Dr Hong and Kalee consulted





Diagnosis/Problems


Diagnosis/Problems





(1) Cellulitis of foot, right


Status:  Acute


(2) Smoker


Status:  Chronic


(3) CAD (coronary artery disease)


Status:  Chronic


Qualifiers:  


   Coronary Disease-Associated Artery/Lesion type:  native artery  Native vs. 

transplanted heart:  native heart  Associated angina:  without angina  Qualified

Codes:  I25.10 - Atherosclerotic heart disease of native coronary artery without

angina pectoris


(4) Diabetes type 2, uncontrolled


Status:  Chronic


Qualifiers:  


   Glycemic state:  with hyperglycemia  Qualified Codes:  E11.65 - Type 2 

diabetes mellitus with hyperglycemia





Clinical Quality Measures


DVT/VTE Risk/Contraindication:


Risk Factor Score Per Nursin


RFS Level Per Nursing on Admit:  4+=Very High


Other:  


SEE INTERVENTIONS











JASEN DAY DO                2019 10:09

## 2019-06-18 NOTE — ANESTHESIA-GENERAL POST-OP
General


Patient Condition


Mental Status/LOC:  Same as Preop


Cardiovascular:  Satisfactory


Nausea/Vomiting:  Absent


Respiratory:  Satisfactory


Pain:  Controlled


Complications:  Absent





Post Op Complications


Complications


None





Follow Up Care/Instructions


Patient Instructions


None needed.





Anesthesia/Patient Condition


Patient Condition


Patient is doing well, no complaints, stable vital signs, no apparent adverse 

anesthesia problems.   


No complications reported per nursing.











KISHORE BONNER CRNA          Jun 18, 2019 12:13

## 2019-06-18 NOTE — PROGRESS NOTE
Subjective


Date Seen by a Provider:  2019


Time Seen by a Provider:  07:49


Subjective/Events-last exam


patient no new complaints.  right foot the same.  denies n/v fever sweats chills

shortness of breath or chest pain.


npo currently.





Objective


Exam





Vital Signs








  Date Time  Temp Pulse Resp B/P (MAP) Pulse Ox O2 Delivery O2 Flow Rate FiO2


 


19 07:19     74 Room Air  


 


19 04:00 98.6 110 18 123/64 (83) 95 High Flow N/C 5.00 


 


19 00:45 97.8 113 18 104/70 (81) 94 High Flow N/C 5.00 


 


19 20:05 100.1       


 


19 19:25 100.2 110 18 134/68 (90) 94 Nasal Cannula 5.00 


 


19 19:14     93 Nasal Cannula 5.00 


 


19 16:00 99.5 91 18 115/64 (81) 93 Nasal Cannula 5.00 


 


19 15:34     91 Nasal Cannula 5.00 


 


19 12:00 98.4 105 18 132/67 (88) 95 Nasal Cannula 5.00 





       5.00 


 


19 11:09     91 Nasal Cannula 5.00 


 


19 08:00 97.1 99 18 103/65 (78) 94 Nasal Cannula 5.00 


 


19 08:00     94 Nasal Cannula 5.00 














I & O 


 


 19





 06:59


 


Intake Total 2330 ml


 


Output Total 1175 ml


 


Balance 1155 ml





Capillary Refill : Less Than 3 Seconds


General Appearance:  No Apparent Distress, WD/WN, Chronically ill


Respiratory:  Chest Non Tender, No Accessory Muscle Use, No Respiratory Distress


Cardiovascular:  Regular Rate, Rhythm, Normal Peripheral Pulses


Gastrointestinal:  non tender, soft, no organomegaly


Extremity:  Swelling, Other (foot abscess draining right surrounding erythema)


Neurologic/Psychiatric:  Alert, Oriented x3, No Motor/Sensory Deficits, Normal 

Mood/Affect


Skin:  Other (right foot as noted above)





Results


Lab


Laboratory Tests


19 09:55: 


White Blood Count 12.5H, Red Blood Count 3.51L, Hemoglobin 11.1L, Hematocrit 36L

, Mean Corpuscular Volume 101H, Mean Corpuscular Hemoglobin 32, Mean Corpuscular

Hemoglobin Concent 31L, Red Cell Distribution Width 16.5H, Platelet Count 106L, 

Mean Platelet Volume 12.5H, Neutrophils (%) (Auto) 83H, Lymphocytes (%) (Auto) 

11L, Monocytes (%) (Auto) 5, Eosinophils (%) (Auto) 1, Basophils (%) (Auto) 0, 

Neutrophils # (Auto) 10.3H, Lymphocytes # (Auto) 1.4, Monocytes # (Auto) 0.6, 

Eosinophils # (Auto) 0.1, Basophils # (Auto) 0.0, Sodium Level 135, Potassium 

Level 4.4, Chloride Level 100, Carbon Dioxide Level 26, Anion Gap 9, Blood Urea 

Nitrogen 21H, Creatinine 1.11, Estimat Glomerular Filtration Rate > 60, 

BUN/Creatinine Ratio 19, Glucose Level 196H, Calcium Level 8.5, Corrected 

Calcium 9.1, Total Bilirubin 0.6, Aspartate Amino Transf (AST/SGOT) 14, Alanine 

Aminotransferase (ALT/SGPT) 16, Alkaline Phosphatase 76, Total Protein 6.6, 

Albumin 3.3


19 11:10: Glucometer 176H


19 16:04: Glucometer 266H


19 20:54: Glucometer 261H


19 05:30: 


White Blood Count 10.5, Red Blood Count 3.30L, Hemoglobin 10.4L, Hematocrit 34L,

Mean Corpuscular Volume 102H, Mean Corpuscular Hemoglobin 32, Mean Corpuscular H

emoglobin Concent 31L, Red Cell Distribution Width 16.9H, Platelet Count 116L, 

Mean Platelet Volume 12.2H, Neutrophils (%) (Auto) 78H, Lymphocytes (%) (Auto) 

14, Monocytes (%) (Auto) 7, Eosinophils (%) (Auto) 1, Basophils (%) (Auto) 0, 

Neutrophils # (Auto) 8.2H, Lymphocytes # (Auto) 1.5, Monocytes # (Auto) 0.7, 

Eosinophils # (Auto) 0.1, Basophils # (Auto) 0.0, Sodium Level 137, Potassium 

Level 4.8, Chloride Level 100, Carbon Dioxide Level 27, Anion Gap 10, Blood Urea

Nitrogen 22H, Creatinine 1.17, Estimat Glomerular Filtration Rate > 60, 

BUN/Creatinine Ratio 19, Glucose Level 183H, Calcium Level 8.7, Corrected 

Calcium 9.3, Total Bilirubin 0.3, Aspartate Amino Transf (AST/SGOT) 13, Alanine 

Aminotransferase (ALT/SGPT) 13, Alkaline Phosphatase 85, Total Protein 6.7, 

Albumin 3.2


19 05:58: Glucometer 188H





Microbiology


19 Gram Stain - Final, Resulted


          


19 Wound Culture - Preliminary, Resulted


          Mixed Bacterial Kimberly


          Staphylococcus aureus





Assessment/Plan


Patient is 68-year-old male with abscess and cellulitis right foot.  Patient was

discussed need for incision and drainage and debridement of tissue and more 

long-term wound care needs.  Patient understands and wishes to proceed with 

surgical intervention.  Patient is nothing by mouth.  To or today.  Continue 

current medical management.





Clinical Quality Measures


DVT/VTE Risk/Contraindication:


Risk Factor Score Per Nursin


RFS Level Per Nursing on Admit:  4+=Very High


Other:  


SEE INTERVENTIONS











MATEO ALEMAN DO              2019 07:52

## 2019-06-18 NOTE — OPERATIVE REPORT
DATE OF SERVICE:  06/18/2019



PREOPERATIVE DIAGNOSIS:

Right foot ulcer abscess.



POSTOPERATIVE DIAGNOSIS:

Right foot ulcer abscess.



PROCEDURE PERFORMED:

Sharp and cautery excisional debridement, 2 x 8 cm, right plantar foot and

debridement of skin, subcutaneous tissue, muscle and tendon.



SURGEON:

aMteo Hong DO.



ANESTHESIA:

General.



ESTIMATED BLOOD LOSS:

Minimal.



COMPLICATIONS:

None.



SPECIMEN:

Culture, right foot.



INDICATIONS:

The patient is a 68-year-old male with right foot ulcer/abscess.  He was

discussed risks and benefits of procedure and wished to proceed with procedure. 

Consent was signed in the chart.



DESCRIPTION OF PROCEDURE:

The patient was taken to the operating suite, was prepped and draped in sterile

fashion.  Surgical pause was performed.  A 10-blade scalpel was used to make an

incision over the area of fluctuance.  Purulent material erupted, culture was

obtained.  The sharp dissection with scalpel and scissors was used to take

callus skin excised from the area.  The tract of abscess was then continued to

be followed and opened up.  A lot of necrotic tissue of subcutaneous tissue,

some tendon and some muscle present within the wound.  This was then began to be

excised both sharply with scalpel and with cautery.  Subcutaneous tissue, a 
small

amount of tendon and muscle had to be removed as well due to being necrotic. 

All the purulent material was erupted.  All loculations were broken up. 

Overall, dimensions of the wound being 2 x 8 cm.  The dissection was taken down

to good healthy, clean viable-appearing tissue.  The wound was then irrigated

with copious amounts of irrigation and suctioned.  The wound was then packed

with iodoform gauze and sterile bandage was applied.  The patient tolerated the

procedure well without any complications, taken to recovery room in stable

condition.





Job ID: [id number]

DocumentID: [id number]

Dictated Date:  06/18/2019 14:02:46

Transcription Date: 06/18/2019 18:26:21

Dictated By: MATEO HONG DO

[facility]

## 2019-06-18 NOTE — PROGRESS NOTE-POST OPERATIVE
Post-Operative Progess Note


Surgeon (s)/Assistant (s)


Surgeon


MATEO ALEMAN DO


Assistant:  na





Pre-Operative Diagnosis


right foot ulcer/abscess





Post-Operative Diagnosis





same





Procedure & Operative Findings


Date of Procedure


6/18/19


Procedure Performed/Findings


sharp and cautery excisional debridement 2x8 cm right plantar foot c debridement

skin, subcutaneous tissue, muscle and tendon


Anesthesia Type


gen





Estimated Blood Loss


Estimated blood loss (mL):  min





Specimens/Packing


Specimens Removed


culture right foot











MATEO ALEMAN DO              Jun 18, 2019 14:00

## 2019-06-18 NOTE — NUR
DR. LAY AWARE THAT THIS PATIENT'S BLOOD SUGAR  WAS LOW THIS EVENING, NEW ORDERES 
RECEIVED TO DISCONTINUE THE GLIPIZIDE 20 PO THIS SHIFT.

## 2019-06-19 VITALS — SYSTOLIC BLOOD PRESSURE: 104 MMHG | DIASTOLIC BLOOD PRESSURE: 58 MMHG

## 2019-06-19 VITALS — SYSTOLIC BLOOD PRESSURE: 92 MMHG | DIASTOLIC BLOOD PRESSURE: 61 MMHG

## 2019-06-19 VITALS — SYSTOLIC BLOOD PRESSURE: 118 MMHG | DIASTOLIC BLOOD PRESSURE: 63 MMHG

## 2019-06-19 VITALS — DIASTOLIC BLOOD PRESSURE: 64 MMHG | SYSTOLIC BLOOD PRESSURE: 97 MMHG

## 2019-06-19 VITALS — SYSTOLIC BLOOD PRESSURE: 122 MMHG | DIASTOLIC BLOOD PRESSURE: 70 MMHG

## 2019-06-19 VITALS — DIASTOLIC BLOOD PRESSURE: 59 MMHG | SYSTOLIC BLOOD PRESSURE: 119 MMHG

## 2019-06-19 LAB
ALBUMIN SERPL-MCNC: 3.2 GM/DL (ref 3.2–4.5)
ALP SERPL-CCNC: 111 U/L (ref 40–136)
ALT SERPL-CCNC: 15 U/L (ref 0–55)
BASOPHILS # BLD AUTO: 0 10^3/UL (ref 0–0.1)
BASOPHILS NFR BLD AUTO: 0 % (ref 0–10)
BILIRUB SERPL-MCNC: 0.3 MG/DL (ref 0.1–1)
BUN/CREAT SERPL: 21
CALCIUM SERPL-MCNC: 8.5 MG/DL (ref 8.5–10.1)
CHLORIDE SERPL-SCNC: 102 MMOL/L (ref 98–107)
CO2 SERPL-SCNC: 25 MMOL/L (ref 21–32)
CREAT SERPL-MCNC: 1.39 MG/DL (ref 0.6–1.3)
EOSINOPHIL # BLD AUTO: 0.1 10^3/UL (ref 0–0.3)
EOSINOPHIL NFR BLD AUTO: 1 % (ref 0–10)
ERYTHROCYTE [DISTWIDTH] IN BLOOD BY AUTOMATED COUNT: 17.1 % (ref 10–14.5)
GFR SERPLBLD BASED ON 1.73 SQ M-ARVRAT: 51 ML/MIN
GLUCOSE SERPL-MCNC: 161 MG/DL (ref 70–105)
HCT VFR BLD CALC: 32 % (ref 40–54)
HGB BLD-MCNC: 9.8 G/DL (ref 13.3–17.7)
LYMPHOCYTES # BLD AUTO: 1.7 X 10^3 (ref 1–4)
LYMPHOCYTES NFR BLD AUTO: 16 % (ref 12–44)
MANUAL DIFFERENTIAL PERFORMED BLD QL: NO
MCH RBC QN AUTO: 32 PG (ref 25–34)
MCHC RBC AUTO-ENTMCNC: 30 G/DL (ref 32–36)
MCV RBC AUTO: 104 FL (ref 80–99)
MONOCYTES # BLD AUTO: 0.8 X 10^3 (ref 0–1)
MONOCYTES NFR BLD AUTO: 8 % (ref 0–12)
NEUTROPHILS # BLD AUTO: 8.1 X 10^3 (ref 1.8–7.8)
NEUTROPHILS NFR BLD AUTO: 75 % (ref 42–75)
PLATELET # BLD: 129 10^3/UL (ref 130–400)
PMV BLD AUTO: 12.4 FL (ref 7.4–10.4)
POTASSIUM SERPL-SCNC: 4.6 MMOL/L (ref 3.6–5)
PROT SERPL-MCNC: 6.7 GM/DL (ref 6.4–8.2)
SODIUM SERPL-SCNC: 138 MMOL/L (ref 135–145)
VANCOMYCIN TROUGH SERPL-MCNC: 15.2 UG/ML (ref 10–20)
WBC # BLD AUTO: 10.7 10^3/UL (ref 4.3–11)

## 2019-06-19 RX ADMIN — OXYCODONE HYDROCHLORIDE AND ACETAMINOPHEN PRN TAB: 10; 325 TABLET ORAL at 13:48

## 2019-06-19 RX ADMIN — Medication SCH EA: at 09:15

## 2019-06-19 RX ADMIN — SODIUM CHLORIDE SCH MLS/HR: 900 INJECTION, SOLUTION INTRAVENOUS at 22:05

## 2019-06-19 RX ADMIN — SODIUM CHLORIDE SCH MLS/HR: 900 INJECTION, SOLUTION INTRAVENOUS at 13:47

## 2019-06-19 RX ADMIN — IPRATROPIUM BROMIDE AND ALBUTEROL SULFATE SCH ML: .5; 3 SOLUTION RESPIRATORY (INHALATION) at 06:53

## 2019-06-19 RX ADMIN — DOCUSATE SODIUM AND SENNOSIDES SCH EA: 8.6; 5 TABLET, FILM COATED ORAL at 09:15

## 2019-06-19 RX ADMIN — PREGABALIN SCH MG: 75 CAPSULE ORAL at 13:58

## 2019-06-19 RX ADMIN — ENOXAPARIN SODIUM SCH MG: 100 INJECTION SUBCUTANEOUS at 22:05

## 2019-06-19 RX ADMIN — VANCOMYCIN HYDROCHLORIDE SCH MLS/HR: 500 INJECTION, POWDER, LYOPHILIZED, FOR SOLUTION INTRAVENOUS at 15:38

## 2019-06-19 RX ADMIN — IPRATROPIUM BROMIDE AND ALBUTEROL SULFATE SCH ML: .5; 3 SOLUTION RESPIRATORY (INHALATION) at 13:15

## 2019-06-19 RX ADMIN — INSULIN ASPART SCH UNIT: 100 INJECTION, SOLUTION INTRAVENOUS; SUBCUTANEOUS at 05:39

## 2019-06-19 RX ADMIN — INSULIN ASPART SCH UNIT: 100 INJECTION, SOLUTION INTRAVENOUS; SUBCUTANEOUS at 12:09

## 2019-06-19 RX ADMIN — GLIPIZIDE SCH MG: 5 TABLET ORAL at 06:44

## 2019-06-19 RX ADMIN — NORTRIPTYLINE HYDROCHLORIDE SCH MG: 25 CAPSULE ORAL at 22:04

## 2019-06-19 RX ADMIN — PREGABALIN SCH MG: 75 CAPSULE ORAL at 22:04

## 2019-06-19 RX ADMIN — INSULIN ASPART SCH UNIT: 100 INJECTION, SOLUTION INTRAVENOUS; SUBCUTANEOUS at 16:00

## 2019-06-19 RX ADMIN — DOCUSATE SODIUM AND SENNOSIDES SCH EA: 8.6; 5 TABLET, FILM COATED ORAL at 22:03

## 2019-06-19 RX ADMIN — INSULIN ASPART SCH UNIT: 100 INJECTION, SOLUTION INTRAVENOUS; SUBCUTANEOUS at 22:05

## 2019-06-19 RX ADMIN — ATORVASTATIN CALCIUM SCH MG: 80 TABLET, FILM COATED ORAL at 22:04

## 2019-06-19 RX ADMIN — SODIUM CHLORIDE SCH MLS/HR: 900 INJECTION, SOLUTION INTRAVENOUS at 05:23

## 2019-06-19 RX ADMIN — PREGABALIN SCH MG: 75 CAPSULE ORAL at 09:15

## 2019-06-19 RX ADMIN — ASPIRIN 81 MG CHEWABLE TABLET SCH MG: 81 TABLET CHEWABLE at 09:15

## 2019-06-19 RX ADMIN — LISINOPRIL SCH MG: 10 TABLET ORAL at 09:15

## 2019-06-19 RX ADMIN — IPRATROPIUM BROMIDE AND ALBUTEROL SULFATE SCH ML: .5; 3 SOLUTION RESPIRATORY (INHALATION) at 19:58

## 2019-06-19 RX ADMIN — IPRATROPIUM BROMIDE AND ALBUTEROL SULFATE SCH ML: .5; 3 SOLUTION RESPIRATORY (INHALATION) at 15:16

## 2019-06-19 RX ADMIN — OXYCODONE HYDROCHLORIDE AND ACETAMINOPHEN PRN TAB: 10; 325 TABLET ORAL at 09:15

## 2019-06-19 RX ADMIN — OXYCODONE HYDROCHLORIDE AND ACETAMINOPHEN PRN TAB: 10; 325 TABLET ORAL at 22:12

## 2019-06-19 NOTE — ANESTHESIA-GENERAL POST-OP
General


Patient Condition


Mental Status/LOC:  Same as Preop


Cardiovascular:  Satisfactory


Nausea/Vomiting:  Absent


Respiratory:  Satisfactory


Pain:  Controlled


Complications:  Absent





Post Op Complications


Complications


None





Follow Up Care/Instructions


Patient Instructions


None needed.





Anesthesia/Patient Condition


Patient Condition


Patient is doing well, no complaints, stable vital signs, no apparent adverse 

anesthesia problems.   


No complications reported per nursing.











CHRISTINA MATOS CRNA            Jun 19, 2019 12:46

## 2019-06-19 NOTE — PROGRESS NOTE
Subjective


Date Seen by a Provider:  2019


Time Seen by a Provider:  17:20


Subjective/Events-last exam


patient pain controlled.  feels erythema right foot decreased some. wanting to 

go home


denies fever sweats chills or chest pain.





Objective


Exam





Vital Signs








  Date Time  Temp Pulse Resp B/P (MAP) Pulse Ox O2 Delivery O2 Flow Rate FiO2


 


19 20:15 98.5 86 20 118/63 (81) 97 OxyMask 4.00 


 


19 19:58     94 OxyMask 6.00 


 


19 16:05 96.9 91 18 97/64 (75) 97 OxyMask 6.00 


 


19 15:22     95 OxyMask 8.00 


 


19 15:01 97.3 97   94 OxyMask 8.00 


 


19 14:18 97.3       


 


19 12:00 98.3 99 18 122/70 (87) 96 OxyMask 8.00 


 


19 08:00     94 OxyMask 8.00 


 


19 08:00 97.4 100 20 119/59 (79) 94 OxyMask 8.00 


 


19 06:55     91 OxyMask 8.00 


 


19 04:00 97.5 94 16 92/61 (71) 95  8.00 


 


19 01:20 99.2       


 


19 00:28 100.2       


 


19 23:30 100.2 115 20 104/56 (72) 95 OxyMask 8.00 


 


19 22:21      OxyMask 10.00 














I & O 


 


 19





 07:00


 


Intake Total 1020 ml


 


Output Total 875 ml


 


Balance 145 ml





Capillary Refill : Less Than 3 Seconds


General Appearance:  No Apparent Distress, WD/WN, Chronically ill


HEENT:  PERRL/EOMI


Neck:  Supple


Respiratory:  Chest Non Tender, No Accessory Muscle Use, No Respiratory Distress


Cardiovascular:  Regular Rate, Rhythm, Normal Peripheral Pulses


Gastrointestinal:  non tender, soft (hernia incisional), no organomegaly


Extremity:  Swelling, Other (right foot  surrounding erythema less, wound 

appears stable, no purulent material expressed)


Neurologic/Psychiatric:  Alert, Oriented x3, No Motor/Sensory Deficits, Normal 

Mood/Affect


Skin:  Other (right foot as noted above)





Results


Lab


Laboratory Tests


19 20:55: Glucometer 210H


19 05:17: Glucometer 173H


19 05:31: 


White Blood Count 10.7, Red Blood Count 3.11L, Hemoglobin 9.8L, Hematocrit 32L, 

Mean Corpuscular Volume 104H, Mean Corpuscular Hemoglobin 32, Mean Corpuscular 

Hemoglobin Concent 30L, Red Cell Distribution Width 17.1H, Platelet Count 129L, 

Mean Platelet Volume 12.4H, Neutrophils (%) (Auto) 75, Lymphocytes (%) (Auto) 

16, Monocytes (%) (Auto) 8, Eosinophils (%) (Auto) 1, Basophils (%) (Auto) 0, 

Neutrophils # (Auto) 8.1H, Lymphocytes # (Auto) 1.7, Monocytes # (Auto) 0.8, 

Eosinophils # (Auto) 0.1, Basophils # (Auto) 0.0, Sodium Level 138, Potassium 

Level 4.6, Chloride Level 102, Carbon Dioxide Level 25, Anion Gap 11, Blood Urea

Nitrogen 29H, Creatinine 1.39H, Estimat Glomerular Filtration Rate 51, 

BUN/Creatinine Ratio 21, Glucose Level 161H, Calcium Level 8.5, Corrected 

Calcium 9.1, Total Bilirubin 0.3, Aspartate Amino Transf (AST/SGOT) 15, Alanine 

Aminotransferase (ALT/SGPT) 15, Alkaline Phosphatase 111, Total Protein 6.7, 

Albumin 3.2, Vancomycin Level Trough 15.2


19 16:03: Glucometer 110


19 20:41: Glucometer 260H





Microbiology


19 MRSA Screen - Final, Complete


          MRSA not isolated


19 Gram Stain - Final, Resulted


          


19 Anaerobic Culture, Resulted


          Pending


19 Surgical Culture - Preliminary, Resulted


          Staphylococcus aureus


          Enterococcus species





Assessment/Plan


Patient is 68-year-old male with abscess and cellulitis right foot.  s/p Sharp 

and cautery excisional debridement, 2 x 8 cm, right plantar foot and


debridement of skin, subcutaneous tissue, muscle and tendon.  Stable at this 

time, may need further debridement.


will follow


continue medical management





Clinical Quality Measures


DVT/VTE Risk/Contraindication:


Risk Factor Score Per Nursin


RFS Level Per Nursing on Admit:  4+=Very High


Other:  


SEE INTERVENTIONS











MATEO ALEMAN DO              2019 20:57

## 2019-06-19 NOTE — PROGRESS NOTE-HOSPITALIST
Subjective


HPI/CC On Admission


Date Seen by Provider:  2019


Time Seen by Provider:  09:30


Subjective/Events-last exam


Pt wants to go home. 


Oxygen level is 94% and he was placed on 8 liters but I told the nurse to 

titrate that down because being a smoker and having MACIE his oxygen level remains

probably at 90% so will wean off oxygen. 


Does not use home oxygen at home. 


Picc line may be needed for IV antibiotics. 


Cultures are pending at this time.





Review of Systems


Musculoskeletal:  foot pain





Objective


Exam


Vital Signs





Vital Signs








  Date Time  Temp Pulse Resp B/P (MAP) Pulse Ox O2 Delivery O2 Flow Rate FiO2


 


19 16:05 96.9 91 18 97/64 (75) 97 OxyMask 6.00 


 


19 18:52        21





Capillary Refill : Less Than 3 Seconds


General Appearance:  No Apparent Distress, WD/WN, Chronically ill


Respiratory:  Chest Non Tender, Lungs Clear, Normal Breath Sounds, No Accessory 

Muscle Use, No Respiratory Distress


Cardiovascular:  Regular Rate, Rhythm, Normal Peripheral Pulses


Extremity:  Swelling, Other (foot abscess draining right surrounding erythema)


Neurologic/Psychiatric:  Alert, Oriented x3, No Motor/Sensory Deficits, Normal 

Mood/Affect


Skin:  Other (right foot as noted above)





Results/Procedures


Lab


Laboratory Tests


19 05:31








Patient resulted labs reviewed.





Assessment/Plan


Assessment and Plan


Assess & Plan/Chief Complaint


Assessment:


1.  Cellulitis of the right foot presumed osteomyelitis s/p debridement POD # 1


2.  Diabetes mellitus type II


3.  Hypertension


4.  History of coronary artery disease





Plan:


IV abx


Home meds


Dr Hong and Kalee consulted


AK soon





Diagnosis/Problems


Diagnosis/Problems





(1) Cellulitis of foot, right


Status:  Acute


(2) Smoker


Status:  Chronic


(3) CAD (coronary artery disease)


Status:  Chronic


Qualifiers:  


   Coronary Disease-Associated Artery/Lesion type:  native artery  Native vs. 

transplanted heart:  native heart  Associated angina:  without angina  Qualified

Codes:  I25.10 - Atherosclerotic heart disease of native coronary artery without

angina pectoris


(4) Diabetes type 2, uncontrolled


Status:  Chronic


Qualifiers:  


   Glycemic state:  with hyperglycemia  Qualified Codes:  E11.65 - Type 2 

diabetes mellitus with hyperglycemia





Clinical Quality Measures


DVT/VTE Risk/Contraindication:


Risk Factor Score Per Nursin


RFS Level Per Nursing on Admit:  4+=Very High


Other:  


SEE INTERVENTIONS











JASEN DAY DO                2019 12:13

## 2019-06-20 VITALS — SYSTOLIC BLOOD PRESSURE: 128 MMHG | DIASTOLIC BLOOD PRESSURE: 76 MMHG

## 2019-06-20 VITALS — DIASTOLIC BLOOD PRESSURE: 69 MMHG | SYSTOLIC BLOOD PRESSURE: 115 MMHG

## 2019-06-20 LAB
ALBUMIN SERPL-MCNC: 3.4 GM/DL (ref 3.2–4.5)
ALP SERPL-CCNC: 120 U/L (ref 40–136)
ALT SERPL-CCNC: 17 U/L (ref 0–55)
BASOPHILS # BLD AUTO: 0 10^3/UL (ref 0–0.1)
BASOPHILS NFR BLD AUTO: 0 % (ref 0–10)
BILIRUB SERPL-MCNC: 0.3 MG/DL (ref 0.1–1)
BUN/CREAT SERPL: 21
CALCIUM SERPL-MCNC: 9.3 MG/DL (ref 8.5–10.1)
CHLORIDE SERPL-SCNC: 101 MMOL/L (ref 98–107)
CO2 SERPL-SCNC: 27 MMOL/L (ref 21–32)
CREAT SERPL-MCNC: 1.11 MG/DL (ref 0.6–1.3)
EOSINOPHIL # BLD AUTO: 0.2 10^3/UL (ref 0–0.3)
EOSINOPHIL NFR BLD AUTO: 2 % (ref 0–10)
ERYTHROCYTE [DISTWIDTH] IN BLOOD BY AUTOMATED COUNT: 16.8 % (ref 10–14.5)
GFR SERPLBLD BASED ON 1.73 SQ M-ARVRAT: > 60 ML/MIN
GLUCOSE SERPL-MCNC: 160 MG/DL (ref 70–105)
HCT VFR BLD CALC: 36 % (ref 40–54)
HGB BLD-MCNC: 10.8 G/DL (ref 13.3–17.7)
LYMPHOCYTES # BLD AUTO: 1.5 X 10^3 (ref 1–4)
LYMPHOCYTES NFR BLD AUTO: 15 % (ref 12–44)
MANUAL DIFFERENTIAL PERFORMED BLD QL: NO
MCH RBC QN AUTO: 31 PG (ref 25–34)
MCHC RBC AUTO-ENTMCNC: 30 G/DL (ref 32–36)
MCV RBC AUTO: 103 FL (ref 80–99)
MONOCYTES # BLD AUTO: 0.6 X 10^3 (ref 0–1)
MONOCYTES NFR BLD AUTO: 6 % (ref 0–12)
NEUTROPHILS # BLD AUTO: 7.7 X 10^3 (ref 1.8–7.8)
NEUTROPHILS NFR BLD AUTO: 77 % (ref 42–75)
PLATELET # BLD: 162 10^3/UL (ref 130–400)
PMV BLD AUTO: 11.8 FL (ref 7.4–10.4)
POTASSIUM SERPL-SCNC: 4.4 MMOL/L (ref 3.6–5)
PROT SERPL-MCNC: 7.2 GM/DL (ref 6.4–8.2)
SODIUM SERPL-SCNC: 137 MMOL/L (ref 135–145)
WBC # BLD AUTO: 10.1 10^3/UL (ref 4.3–11)

## 2019-06-20 RX ADMIN — LINAGLIPTIN SCH MG: 5 TABLET, FILM COATED ORAL at 08:58

## 2019-06-20 RX ADMIN — PREGABALIN SCH MG: 75 CAPSULE ORAL at 08:58

## 2019-06-20 RX ADMIN — IPRATROPIUM BROMIDE AND ALBUTEROL SULFATE SCH ML: .5; 3 SOLUTION RESPIRATORY (INHALATION) at 14:49

## 2019-06-20 RX ADMIN — OXYCODONE HYDROCHLORIDE AND ACETAMINOPHEN PRN TAB: 10; 325 TABLET ORAL at 15:04

## 2019-06-20 RX ADMIN — INSULIN ASPART SCH UNIT: 100 INJECTION, SOLUTION INTRAVENOUS; SUBCUTANEOUS at 06:05

## 2019-06-20 RX ADMIN — Medication SCH EA: at 08:58

## 2019-06-20 RX ADMIN — GLIPIZIDE SCH MG: 5 TABLET ORAL at 06:06

## 2019-06-20 RX ADMIN — IPRATROPIUM BROMIDE AND ALBUTEROL SULFATE SCH ML: .5; 3 SOLUTION RESPIRATORY (INHALATION) at 10:57

## 2019-06-20 RX ADMIN — IPRATROPIUM BROMIDE AND ALBUTEROL SULFATE SCH ML: .5; 3 SOLUTION RESPIRATORY (INHALATION) at 07:28

## 2019-06-20 RX ADMIN — ASPIRIN 81 MG CHEWABLE TABLET SCH MG: 81 TABLET CHEWABLE at 08:58

## 2019-06-20 RX ADMIN — DOCUSATE SODIUM AND SENNOSIDES SCH EA: 8.6; 5 TABLET, FILM COATED ORAL at 08:58

## 2019-06-20 RX ADMIN — VANCOMYCIN HYDROCHLORIDE SCH MLS/HR: 500 INJECTION, POWDER, LYOPHILIZED, FOR SOLUTION INTRAVENOUS at 15:09

## 2019-06-20 RX ADMIN — SODIUM CHLORIDE SCH MLS/HR: 900 INJECTION, SOLUTION INTRAVENOUS at 06:05

## 2019-06-20 RX ADMIN — OXYCODONE HYDROCHLORIDE AND ACETAMINOPHEN PRN TAB: 10; 325 TABLET ORAL at 10:32

## 2019-06-20 RX ADMIN — OXYCODONE HYDROCHLORIDE AND ACETAMINOPHEN PRN TAB: 10; 325 TABLET ORAL at 06:04

## 2019-06-20 RX ADMIN — INSULIN ASPART SCH UNIT: 100 INJECTION, SOLUTION INTRAVENOUS; SUBCUTANEOUS at 11:29

## 2019-06-20 RX ADMIN — PREGABALIN SCH MG: 75 CAPSULE ORAL at 15:03

## 2019-06-20 RX ADMIN — SODIUM CHLORIDE SCH MLS/HR: 900 INJECTION, SOLUTION INTRAVENOUS at 15:09

## 2019-06-20 RX ADMIN — LISINOPRIL SCH MG: 10 TABLET ORAL at 08:58

## 2019-06-20 NOTE — NUR
CM/SS spoke with the patient for discharge planning. He is in need of a nebulizer and Wound 
Care. He has began an application for [employer], though is missing some of the 
documentation for verification of food stamps / amount. He stated that he has that letter at 
home and can bring it in tomorrow. [facility] will have a nebulizer ready for . Faxed 
information necessary.

## 2019-06-20 NOTE — DISCHARGE SUMMARY-HOSPITALIST
Diagnosis/Chief Complaint


Date of Admission


2019 at 13:31


Date of Discharge





Discharge Date:  2019


Discharge Diagnosis





(1) Cellulitis of foot, right


Status:  Acute


(2) Smoker


Status:  Chronic


(3) CAD (coronary artery disease)


Status:  Chronic


(4) Diabetes type 2, uncontrolled


Status:  Chronic





Discharge Summary


Discharge Physical Exam


Allergies:  


Coded Allergies:  


     No Known Drug Allergies (Unverified , 17)


Vitals & I&Os





Vital Signs








  Date Time  Temp Pulse Resp B/P (MAP) Pulse Ox O2 Delivery O2 Flow Rate FiO2


 


19 17:31  96 20 128/76 73 Room Air 4.00 


 


19 07:39 98.4       


 


19 18:52        21








General Appearance:  No Apparent Distress, WD/WN, Chronically ill, Obese


Respiratory:  Chest Non Tender, Lungs Clear, Normal Breath Sounds, No Accessory 

Muscle Use, No Respiratory Distress


Neurologic/Psychiatric:  Alert, Oriented x3, No Motor/Sensory Deficits, Normal 

Mood/Affect





Hospital Course


Was the Problem List Reviewed?:  Yes


Hospital Course: Pt had a complicated lengthy hospital course for five days 

after he was admitted for right foot cellulitis, appeared to be abscess 

formation so debridement was performed by Dr. Hong in an uncomplicated manor. 

Pt maintained on broad spectrum antibiotics and Dr. Granda was consulted and pt 

remained stable through the entire hospital course. He maintained on all his 

home medication and did benefit from nebulizer treatments so that was ordered on

DC and Dr. Granda will have close follow up, and managed the post DC antibiotic 

regimen and he will see Dr. Granda along with [facility] for close follow up . Smoking 

cessation was counseled and overall prognosis was poor given the severity his 

comorbidities and his smoking.


Labs (last 24 hrs)


Laboratory Tests


19 05:25: 


White Blood Count 10.1, Red Blood Count 3.44L, Hemoglobin 10.8L, Hematocrit 36L,

Mean Corpuscular Volume 103H, Mean Corpuscular Hemoglobin 31, Mean Corpuscular 

Hemoglobin Concent 30L, Red Cell Distribution Width 16.8H, Platelet Count 162, 

Mean Platelet Volume 11.8H, Neutrophils (%) (Auto) 77H, Lymphocytes (%) (Auto) 

15, Monocytes (%) (Auto) 6, Eosinophils (%) (Auto) 2, Basophils (%) (Auto) 0, 

Neutrophils # (Auto) 7.7, Lymphocytes # (Auto) 1.5, Monocytes # (Auto) 0.6, 

Eosinophils # (Auto) 0.2, Basophils # (Auto) 0.0, Sodium Level 137, Potassium 

Level 4.4, Chloride Level 101, Carbon Dioxide Level 27, Anion Gap 9, Blood Urea 

Nitrogen 23H, Creatinine 1.11, Estimat Glomerular Filtration Rate > 60, BUN

/Creatinine Ratio 21, Glucose Level 160H, Calcium Level 9.3, Corrected Calcium 

9.8, Total Bilirubin 0.3, Aspartate Amino Transf (AST/SGOT) 17, Alanine 

Aminotransferase (ALT/SGPT) 17, Alkaline Phosphatase 120, Total Protein 7.2, 

Albumin 3.4


19 05:35: Glucometer 174H


19 10:55: Glucometer 277H





Microbiology


19 MRSA Screen - Final, Complete


          MRSA not isolated


19 Gram Stain - Final, Resulted


          


19 Anaerobic Culture - Preliminary, Resulted


          No anaerobes isolated


19 Surgical Culture - Preliminary, Resulted


          Staphylococcus aureus


          Enterococcus faecalis


Patient resulted labs reviewed.


Pending Labs








Discussion & Recommendations


Discharge Planning:  <30 minutes discharge planning





Discharge


Home Medications:





Active Scripts


Active


Iprat-Albut 0.5-3(2.5) mg/3 ml (Ipratropium/Albuterol Sulfate) 3 Ml Ampul.neb 3 

Ml INH RTQID


Reported


Multivitamins (Multivitamin) 1 Each Tablet 1 Tab PO DAILY


Aspirin 81 Mg Tab.chew 81 Mg PO DAILY


Nitroglycerin 0.4 Mg Tab.subl 0.4 Mg SL UD PRN


Metformin HCl 1,000 Mg Tablet 1,000 Mg PO BID


     SOMETIMES SKIPS EVENING DOSE DEPENDING ON BLOOD SUGAR


Glipizide 10 Mg Tablet 10 Mg PO DAILY


Lisinopril 10 Mg Tablet 10 Mg PO DAILY


Atorvastatin Calcium 80 Mg Tablet 80 Mg PO HS


Lyrica (Pregabalin) 150 Mg Capsule 150 Mg PO TID


Tradjenta (Linagliptin) 5 Mg Tablet 5 Mg PO DAILY


Nortriptyline HCl 50 Mg Capsule 50 Mg PO HS


Oxycodone-Acetaminophen  (Oxycodone HCl/Acetaminophen) 1 Each Tablet 1 Tab

PO Q4H PRN





Instructions to patient/family


Please see electronic discharge instructions given to patient.





Clinical Quality Measures


DVT/VTE Risk/Contraindication:


Risk Factor Score Per Nursin


RFS Level Per Nursing on Admit:  4+=Very High


Other:  


SEE INTERVENTIONS





Problem Qualifiers





(1) CAD (coronary artery disease):  


Coronary Disease-Associated Artery/Lesion type:  native artery  Native vs. 

transplanted heart:  native heart  Associated angina:  without angina  Qualified

Codes:  I25.10 - Atherosclerotic heart disease of native coronary artery without

angina pectoris


(2) Diabetes type 2, uncontrolled:  


Glycemic state:  with hyperglycemia  Qualified Codes:  E11.65 - Type 2 diabetes 

mellitus with hyperglycemia








JASEN DAY DO                2019 10:46

## 2019-06-20 NOTE — NUR
Reviewed culture results with Dr. Granda and per his verbal orders I have called in 
Ampicillin 500mg PO QID x10 days (#40) and Bactrim DS 1 tab PO BID x10 days (#20) to 
patient's pharmacy of choice, [facility]. Order received for quarter strength Dakin's 
solution to be used BID for wound dressing and Penny entered that order and will demonstrate 
to patient prior to discharge proper technique for changing his dressings.

## 2019-06-26 ENCOUNTER — HOSPITAL ENCOUNTER (OUTPATIENT)
Dept: HOSPITAL 75 - WOUNDCARE | Age: 69
End: 2019-06-26
Attending: SURGERY
Payer: SELF-PAY

## 2019-06-26 DIAGNOSIS — L03.115: ICD-10-CM

## 2019-06-26 DIAGNOSIS — E11.621: Primary | ICD-10-CM

## 2019-06-26 DIAGNOSIS — E11.42: ICD-10-CM

## 2019-06-26 DIAGNOSIS — L97.513: ICD-10-CM

## 2019-06-26 PROCEDURE — 11043 DBRDMT MUSC&/FSCA 1ST 20/<: CPT

## 2019-07-03 ENCOUNTER — HOSPITAL ENCOUNTER (OUTPATIENT)
Dept: HOSPITAL 75 - WOUNDCARE | Age: 69
End: 2019-07-03
Attending: SURGERY
Payer: SELF-PAY

## 2019-07-03 DIAGNOSIS — E11.621: Primary | ICD-10-CM

## 2019-07-03 DIAGNOSIS — E11.42: ICD-10-CM

## 2019-07-03 DIAGNOSIS — L97.513: ICD-10-CM

## 2019-07-03 DIAGNOSIS — L03.115: ICD-10-CM

## 2019-07-03 PROCEDURE — 11043 DBRDMT MUSC&/FSCA 1ST 20/<: CPT

## 2019-07-17 ENCOUNTER — HOSPITAL ENCOUNTER (OUTPATIENT)
Dept: HOSPITAL 75 - WOUNDCARE | Age: 69
End: 2019-07-17
Attending: SURGERY
Payer: SELF-PAY

## 2019-07-17 DIAGNOSIS — E11.52: ICD-10-CM

## 2019-07-17 DIAGNOSIS — E11.42: ICD-10-CM

## 2019-07-17 DIAGNOSIS — L97.413: ICD-10-CM

## 2019-07-17 DIAGNOSIS — E11.621: Primary | ICD-10-CM

## 2019-07-17 DIAGNOSIS — I96: ICD-10-CM

## 2019-07-17 PROCEDURE — 11043 DBRDMT MUSC&/FSCA 1ST 20/<: CPT

## 2019-07-31 ENCOUNTER — HOSPITAL ENCOUNTER (OUTPATIENT)
Dept: HOSPITAL 75 - WOUNDCARE | Age: 69
End: 2019-07-31
Attending: SURGERY
Payer: SELF-PAY

## 2019-07-31 DIAGNOSIS — M65.071: ICD-10-CM

## 2019-07-31 DIAGNOSIS — L97.412: ICD-10-CM

## 2019-07-31 DIAGNOSIS — E11.42: ICD-10-CM

## 2019-07-31 DIAGNOSIS — I96: ICD-10-CM

## 2019-07-31 DIAGNOSIS — E11.52: ICD-10-CM

## 2019-07-31 DIAGNOSIS — E11.621: Primary | ICD-10-CM

## 2019-07-31 PROCEDURE — 87205 SMEAR GRAM STAIN: CPT

## 2019-07-31 PROCEDURE — 87077 CULTURE AEROBIC IDENTIFY: CPT

## 2019-07-31 PROCEDURE — 11042 DBRDMT SUBQ TIS 1ST 20SQCM/<: CPT

## 2019-07-31 PROCEDURE — 87186 SC STD MICRODIL/AGAR DIL: CPT

## 2019-07-31 PROCEDURE — 87070 CULTURE OTHR SPECIMN AEROBIC: CPT

## 2019-08-07 ENCOUNTER — HOSPITAL ENCOUNTER (OUTPATIENT)
Dept: HOSPITAL 75 - WOUNDCARE | Age: 69
End: 2019-08-07
Attending: SURGERY
Payer: SELF-PAY

## 2019-08-07 DIAGNOSIS — L97.412: ICD-10-CM

## 2019-08-07 DIAGNOSIS — E11.621: Primary | ICD-10-CM

## 2019-08-07 DIAGNOSIS — E11.42: ICD-10-CM

## 2019-08-07 DIAGNOSIS — E11.52: ICD-10-CM

## 2019-08-07 DIAGNOSIS — M65.071: ICD-10-CM

## 2019-08-07 DIAGNOSIS — I70.261: ICD-10-CM

## 2019-08-07 PROCEDURE — 11042 DBRDMT SUBQ TIS 1ST 20SQCM/<: CPT

## 2019-08-08 ENCOUNTER — HOSPITAL ENCOUNTER (OUTPATIENT)
Dept: HOSPITAL 75 - CARD | Age: 69
End: 2019-08-08
Attending: INTERNAL MEDICINE
Payer: SELF-PAY

## 2019-08-08 DIAGNOSIS — E11.9: ICD-10-CM

## 2019-08-08 DIAGNOSIS — I63.9: ICD-10-CM

## 2019-08-08 DIAGNOSIS — I25.10: ICD-10-CM

## 2019-08-08 DIAGNOSIS — Z72.0: ICD-10-CM

## 2019-08-08 DIAGNOSIS — I11.9: Primary | ICD-10-CM

## 2019-08-08 PROCEDURE — 93306 TTE W/DOPPLER COMPLETE: CPT

## 2019-08-14 ENCOUNTER — HOSPITAL ENCOUNTER (OUTPATIENT)
Dept: HOSPITAL 75 - WOUNDCARE | Age: 69
End: 2019-08-14
Attending: SURGERY
Payer: SELF-PAY

## 2019-08-14 DIAGNOSIS — M65.071: ICD-10-CM

## 2019-08-14 DIAGNOSIS — L97.412: ICD-10-CM

## 2019-08-14 DIAGNOSIS — E11.52: ICD-10-CM

## 2019-08-14 DIAGNOSIS — E11.621: Primary | ICD-10-CM

## 2019-08-14 DIAGNOSIS — I70.261: ICD-10-CM

## 2019-08-14 DIAGNOSIS — E11.42: ICD-10-CM

## 2019-08-14 PROCEDURE — 11042 DBRDMT SUBQ TIS 1ST 20SQCM/<: CPT

## 2019-08-21 ENCOUNTER — HOSPITAL ENCOUNTER (OUTPATIENT)
Dept: HOSPITAL 75 - WOUNDCARE | Age: 69
End: 2019-08-21
Attending: SURGERY
Payer: SELF-PAY

## 2019-08-21 DIAGNOSIS — E11.621: Primary | ICD-10-CM

## 2019-08-21 DIAGNOSIS — E11.52: ICD-10-CM

## 2019-08-21 DIAGNOSIS — I70.234: ICD-10-CM

## 2019-08-21 DIAGNOSIS — E11.42: ICD-10-CM

## 2019-08-21 DIAGNOSIS — M65.071: ICD-10-CM

## 2019-08-21 DIAGNOSIS — I96: ICD-10-CM

## 2019-08-21 PROCEDURE — 11042 DBRDMT SUBQ TIS 1ST 20SQCM/<: CPT

## 2019-08-22 ENCOUNTER — HOSPITAL ENCOUNTER (OUTPATIENT)
Dept: HOSPITAL 75 - CATH | Age: 69
LOS: 1 days | Discharge: HOME | End: 2019-08-23
Attending: INTERNAL MEDICINE
Payer: COMMERCIAL

## 2019-08-22 VITALS — SYSTOLIC BLOOD PRESSURE: 137 MMHG | DIASTOLIC BLOOD PRESSURE: 84 MMHG

## 2019-08-22 VITALS — SYSTOLIC BLOOD PRESSURE: 144 MMHG | DIASTOLIC BLOOD PRESSURE: 82 MMHG

## 2019-08-22 VITALS — DIASTOLIC BLOOD PRESSURE: 84 MMHG | SYSTOLIC BLOOD PRESSURE: 130 MMHG

## 2019-08-22 VITALS — DIASTOLIC BLOOD PRESSURE: 89 MMHG | SYSTOLIC BLOOD PRESSURE: 130 MMHG

## 2019-08-22 VITALS — WEIGHT: 206.05 LBS | BODY MASS INDEX: 30.87 KG/M2 | HEIGHT: 68.5 IN

## 2019-08-22 VITALS — SYSTOLIC BLOOD PRESSURE: 135 MMHG | DIASTOLIC BLOOD PRESSURE: 90 MMHG

## 2019-08-22 VITALS — DIASTOLIC BLOOD PRESSURE: 82 MMHG | SYSTOLIC BLOOD PRESSURE: 100 MMHG

## 2019-08-22 VITALS — SYSTOLIC BLOOD PRESSURE: 119 MMHG | DIASTOLIC BLOOD PRESSURE: 73 MMHG

## 2019-08-22 VITALS — DIASTOLIC BLOOD PRESSURE: 89 MMHG | SYSTOLIC BLOOD PRESSURE: 131 MMHG

## 2019-08-22 VITALS — SYSTOLIC BLOOD PRESSURE: 140 MMHG | DIASTOLIC BLOOD PRESSURE: 88 MMHG

## 2019-08-22 DIAGNOSIS — I25.10: ICD-10-CM

## 2019-08-22 DIAGNOSIS — I71.4: ICD-10-CM

## 2019-08-22 DIAGNOSIS — Z86.73: ICD-10-CM

## 2019-08-22 DIAGNOSIS — Z79.82: ICD-10-CM

## 2019-08-22 DIAGNOSIS — Z82.49: ICD-10-CM

## 2019-08-22 DIAGNOSIS — F17.210: ICD-10-CM

## 2019-08-22 DIAGNOSIS — I11.9: ICD-10-CM

## 2019-08-22 DIAGNOSIS — L97.519: ICD-10-CM

## 2019-08-22 DIAGNOSIS — Z79.84: ICD-10-CM

## 2019-08-22 DIAGNOSIS — Z79.899: ICD-10-CM

## 2019-08-22 DIAGNOSIS — E78.5: ICD-10-CM

## 2019-08-22 DIAGNOSIS — I99.8: Primary | ICD-10-CM

## 2019-08-22 DIAGNOSIS — E11.40: ICD-10-CM

## 2019-08-22 DIAGNOSIS — Z83.3: ICD-10-CM

## 2019-08-22 DIAGNOSIS — Z82.3: ICD-10-CM

## 2019-08-22 LAB
ALBUMIN SERPL-MCNC: 4.3 GM/DL (ref 3.2–4.5)
ALP SERPL-CCNC: 88 U/L (ref 40–136)
ALT SERPL-CCNC: 17 U/L (ref 0–55)
APTT BLD: 31 SEC (ref 24–35)
BILIRUB SERPL-MCNC: 0.2 MG/DL (ref 0.1–1)
BUN/CREAT SERPL: 17
CALCIUM SERPL-MCNC: 9.4 MG/DL (ref 8.5–10.1)
CHLORIDE SERPL-SCNC: 105 MMOL/L (ref 98–107)
CHOLEST SERPL-MCNC: 129 MG/DL (ref ?–200)
CO2 SERPL-SCNC: 23 MMOL/L (ref 21–32)
CREAT SERPL-MCNC: 1.15 MG/DL (ref 0.6–1.3)
ERYTHROCYTE [DISTWIDTH] IN BLOOD BY AUTOMATED COUNT: 15.6 % (ref 10–14.5)
GFR SERPLBLD BASED ON 1.73 SQ M-ARVRAT: > 60 ML/MIN
GLUCOSE SERPL-MCNC: 175 MG/DL (ref 70–105)
HCT VFR BLD CALC: 39 % (ref 40–54)
HDLC SERPL-MCNC: 35 MG/DL (ref 40–60)
HGB BLD-MCNC: 12.1 G/DL (ref 13.3–17.7)
INR PPP: 1 (ref 0.8–1.4)
MCH RBC QN AUTO: 31 PG (ref 25–34)
MCHC RBC AUTO-ENTMCNC: 31 G/DL (ref 32–36)
MCV RBC AUTO: 100 FL (ref 80–99)
PLATELET # BLD: 140 10^3/UL (ref 130–400)
PMV BLD AUTO: 11.9 FL (ref 7.4–10.4)
POTASSIUM SERPL-SCNC: 5.7 MMOL/L (ref 3.6–5)
PROT SERPL-MCNC: 7.9 GM/DL (ref 6.4–8.2)
PROTHROMBIN TIME: 13 SEC (ref 12.2–14.7)
SODIUM SERPL-SCNC: 138 MMOL/L (ref 135–145)
TRIGL SERPL-MCNC: 297 MG/DL (ref ?–150)
VLDLC SERPL CALC-MCNC: 59 MG/DL (ref 5–40)
WBC # BLD AUTO: 10.6 10^3/UL (ref 4.3–11)

## 2019-08-22 PROCEDURE — 85730 THROMBOPLASTIN TIME PARTIAL: CPT

## 2019-08-22 PROCEDURE — 87081 CULTURE SCREEN ONLY: CPT

## 2019-08-22 PROCEDURE — 85027 COMPLETE CBC AUTOMATED: CPT

## 2019-08-22 PROCEDURE — 75716 ARTERY X-RAYS ARMS/LEGS: CPT

## 2019-08-22 PROCEDURE — 85347 COAGULATION TIME ACTIVATED: CPT

## 2019-08-22 PROCEDURE — 75625 CONTRAST EXAM ABDOMINL AORTA: CPT

## 2019-08-22 PROCEDURE — 36415 COLL VENOUS BLD VENIPUNCTURE: CPT

## 2019-08-22 PROCEDURE — 85610 PROTHROMBIN TIME: CPT

## 2019-08-22 PROCEDURE — 37226: CPT

## 2019-08-22 PROCEDURE — 80053 COMPREHEN METABOLIC PANEL: CPT

## 2019-08-22 PROCEDURE — 80061 LIPID PANEL: CPT

## 2019-08-22 PROCEDURE — 80048 BASIC METABOLIC PNL TOTAL CA: CPT

## 2019-08-22 RX ADMIN — SODIUM CHLORIDE SCH MLS/HR: 900 INJECTION, SOLUTION INTRAVENOUS at 17:39

## 2019-08-22 RX ADMIN — IBUPROFEN SCH MG: 200 TABLET, FILM COATED ORAL at 18:15

## 2019-08-22 RX ADMIN — PREGABALIN SCH MG: 150 CAPSULE ORAL at 20:12

## 2019-08-22 RX ADMIN — IBUPROFEN SCH MG: 200 TABLET, FILM COATED ORAL at 23:21

## 2019-08-22 NOTE — CORONARY ANGIOGRAPHY & PCI
Peripheral Angio & Interv


DATE OF SERVICE: 


8/22/19 





PRIMARY PHYSICIAN:


Kenn Mackey MD 





REFERRING PHYSICIAN:


Johnson Granda MD





PERFORMING INTERVENTIONALIST:


Dr. WOODY Rodriguez





INDICATION:


critical limb ischemia





PREOPERATIVE INDICATION:


critical limb ischemia





POSTOPERATIVE DIAGNOSIS:


severe right SFA, anterior tibial, posterior tibial stenosis treated 

successfully with intervention.


infrarenal distal abdominal aortic aneurysm.





HISTORY:


this is a 68-year-old gentleman with nonhealing ulcer on the medial aspect of 

the right foot.  Severely abnormal MONO and TBI in the right lower extremity.  

Working diagnosis is critical limb ischemia.  Peripheral angiography and 

intervention is recommended.





PROCEDURE PERFORMED:


1.  Abdominal aortogram and nonselective bilateral renal angiogram.


2.  Bilateral lower extremity runoff.


3.  PTA right anterior tibial artery.


4.  PTA to right posterior tibial artery.


5.  PTA/stent to the right SFA.





SPECIMENS:


None.





ANESTHESIA:


Conscious sedation.





BLOOD LOSS:


20 mL.





COMPLICATIONS:


None.





CONTRAST USED:


162 ml.





FLUOROSCOPY DOSE:


619 Mgy.





FLUOROSCOPY TIME:


13.8 minutes.





ANTICOAGULATION:


none





DESCRIPTION OF PROCEDURE:


The patient was brought to the cath lab after informed consent was taken. All 

the risks and complications were explained in detail. The patient was draped and

prepped in the usual sterile fashion. Access was gained in the left femoral 

artery with a 6 Mauritian sheath.  We advanced a UF catheter over a regular J-

tipped guidewire and placed it just above the renal arteries in the mid 

abdominal aorta.  Abdominal aortogram and nonselective bilateral renal angiogram

was done.the catheter was then pulled back just above the bifurcation of the 

common iliac artery and bilateral lower extremity runoff was done. crossover was

performed with the rim catheter.  The tip of the Storq wire was placed in the 

mid right SFA.  The sheath was replaced to a 6 Mauritian by 70 cm sheath.  

Selective right lower extremity angiograms were done.





FINDINGS:


mild distal abdominal aortic disease.  Bilateral nonselective renal angiogram 

did not show any severe focal stenosis.


Infrarenal abdominal aortic aneurysm noted.


left lower extremity: Patent left common iliac artery, external iliac artery, 

common femoral artery.  Moderate to severe disease in the mid distal SFA.patent 

popliteal artery.  At least 2 vessel runoff below the knee with a posterior 

tibial artery and a deep peroneal artery.the anterior tibial artery was not well

visualized.


Right lower extremity: mild disease in the right common iliac artery, external 

iliac artery, common femoral artery.  moderate to severe disease in the proximal

section of the SFA. severe diffuse disease in the mid and distal SFA.patent 

popliteal artery.  moderate to severe proximal anterior tibial artery 

stenosis.severe proximal posterior tibial artery stenosis.  Two-vessel runoff 

below the knee.





RECOMMENDATIONS:


1.  PTA is recommended to the right anterior tibial artery.


2.  PTA is recommended to the right posterior tibial artery.


3.  PTA/stent is recommended to the right SFA.





INTERVENTIONAL DETAILS:


significant gradient was noted across the lesions in the SFA.  We crossed the 

lesions in the SFA with a 0.018 command short-taper wire.  The tip of the wire 

was placed in the mid right posterior tibial artery.  We then took an Bentley 18 

2.0 x 40 x 150 balloon and performed balloon angioplasty of the proximal segment

of the right posterior tibial artery at 14 moraima for 181 seconds with excellent 

results. we then took the wire back and crossed the lesion in the anterior 

tibial artery.  The tip of the wire was placed in the mid anterior tibial 

artery.  We used the same balloon and performed another balloon angioplasty of 

the anterior tibial artery at 12 moraima for 230 seconds.  Excellent results were 

noted.  The balloon was taken out.  We then took another Bentley 18 5X 150 at 150

balloon and performed predilatation in the mid distal SFA for 14 moraima and 187 

seconds. the balloon was then pulled back and the mid and proximal SFA was 

treated at 14 moraima for 184 seconds. we then placed an absolute pro 6 x 100 x 135 

in the distal SFA.  Another overlapping stent absolute pro 7 x 80 x 80 was 

placed in the mid SFA.excellent post results with brisk flow to the lower 

extremity. postdilatation with the same balloon at 14 moraima for 26 seconds in the 

distal and mid SFA.  Mid SFA was treated at 14 moraima for 22 seconds. proximal SFA 

was treated at 8 moraima for 26 seconds.post angiogram results were excellent with 

no residual stenosis and brisk flow to the foot. minx closure was done to the 

left femoral artery.


 


CONCLUSION:


critical limb ischemia; successful intervention to the right anterior tibial 

artery, right posterior tibial artery, right superficial femoral artery.


Continue dual antiplatelet therapy and aggressive medical therapy.


 


WOODY Rodriguez MD, FACP, FACC, [facility]


Vascular Medicine and Endovascular Interventions











ELISE RODRIGUEZ MD             Aug 22, 2019 17:17

## 2019-08-22 NOTE — CARDIAC PROCEDURE NOTE-CS/ASA
Pre-Procedure Note


Pre-Op Procedure Note


H&P Reviewed


The H&P was reviewed, patient examined and no changes noted.


Date H&P Reviewed:  Aug 22, 2019


Time H&P Reviewed:  11:00





Conscious Sedation Pre-Proced


Time


11:00





ASA Score


3


For ASA 3 and 4: Consider anesthesia and medical clearance. Also, for patients

with a history of failed moderate sedation consider anesthesia.

















Airway 


 


Lungs 


 


Heart 


 


 ASA score


 


 ASA 1: a normal healthy patient


 


 ASA 2:  a patient with a mild systemic disease (mid diabetes, controlled 

hypertension, obesity 


 


 ASA 3:  a patient with a severe systemic disease that limits activity  (angina,

COPD, prior Myocardial infarction)


 


 ASA 4:  a patient with an incapacitating disease that is a constant threat to 

life (CHF, renal failure)


 


 ASA 5:  a moribund patient not expected to survive 24 hrs.  (ruptured aneurysm)


 


 ASA 6:  a declared brain-dead patient whose organs are being harvested.


 


 For emergent operations, add the letter E after the classification











Mallampati Classification


Grade 1





Sedation Plan


Analgesia, Amnesia, Plan communicated to team members, Discussed options with 

patient/fam, Discussed risks with patient/fam


The patient is an appropriate candidate to undergo the planned procedure, 

sedation, and anesthesia.





The patient immediately re-assessed prior to indication.











ELISE RODRIGUEZ MD             Aug 22, 2019 17:17

## 2019-08-22 NOTE — NUR
SPOKE WITH PT, WENT THRU THE MEDICATIONS LISTED ON THE CHART AND CALLED [facility] TO 
COMPLETE THE MED REC.



2019 ATORVASTATIN 80MG: FILLED #30/30DS. I DID LEAVE THIS ON INCASE PT OR PRESCRIBER 
HAS CHANGED THE FREQUENCY AND THE BOTTLE DID NOT REFLECT THAT. 



OTC MEDS: 

IBUPROFEN 200M TABS Q 6 H PRN 

ASPIRIN 81M DAILY

MULTIVITAMIN; 1 DAILY

## 2019-08-23 VITALS — DIASTOLIC BLOOD PRESSURE: 77 MMHG | SYSTOLIC BLOOD PRESSURE: 129 MMHG

## 2019-08-23 LAB
BUN/CREAT SERPL: 17
BUN/CREAT SERPL: 20
CALCIUM SERPL-MCNC: 8.5 MG/DL (ref 8.5–10.1)
CALCIUM SERPL-MCNC: 8.7 MG/DL (ref 8.5–10.1)
CHLORIDE SERPL-SCNC: 106 MMOL/L (ref 98–107)
CHLORIDE SERPL-SCNC: 106 MMOL/L (ref 98–107)
CO2 SERPL-SCNC: 23 MMOL/L (ref 21–32)
CO2 SERPL-SCNC: 25 MMOL/L (ref 21–32)
CREAT SERPL-MCNC: 0.86 MG/DL (ref 0.6–1.3)
CREAT SERPL-MCNC: 0.94 MG/DL (ref 0.6–1.3)
ERYTHROCYTE [DISTWIDTH] IN BLOOD BY AUTOMATED COUNT: 15.7 % (ref 10–14.5)
GFR SERPLBLD BASED ON 1.73 SQ M-ARVRAT: > 60 ML/MIN
GFR SERPLBLD BASED ON 1.73 SQ M-ARVRAT: > 60 ML/MIN
GLUCOSE SERPL-MCNC: 123 MG/DL (ref 70–105)
GLUCOSE SERPL-MCNC: 133 MG/DL (ref 70–105)
HCT VFR BLD CALC: 35 % (ref 40–54)
HGB BLD-MCNC: 10.9 G/DL (ref 13.3–17.7)
MCH RBC QN AUTO: 31 PG (ref 25–34)
MCHC RBC AUTO-ENTMCNC: 31 G/DL (ref 32–36)
MCV RBC AUTO: 100 FL (ref 80–99)
PLATELET # BLD: 107 10^3/UL (ref 130–400)
PMV BLD AUTO: 11.6 FL (ref 7.4–10.4)
POTASSIUM SERPL-SCNC: 5.5 MMOL/L (ref 3.6–5)
POTASSIUM SERPL-SCNC: 6.5 MMOL/L (ref 3.6–5)
SODIUM SERPL-SCNC: 137 MMOL/L (ref 135–145)
SODIUM SERPL-SCNC: 139 MMOL/L (ref 135–145)
WBC # BLD AUTO: 7.8 10^3/UL (ref 4.3–11)

## 2019-08-23 RX ADMIN — IBUPROFEN SCH MG: 200 TABLET, FILM COATED ORAL at 06:43

## 2019-08-23 RX ADMIN — METFORMIN HYDROCHLORIDE SCH MG: 500 TABLET, FILM COATED ORAL at 07:49

## 2019-08-23 RX ADMIN — SODIUM CHLORIDE SCH MLS/HR: 900 INJECTION, SOLUTION INTRAVENOUS at 02:42

## 2019-08-23 RX ADMIN — METFORMIN HYDROCHLORIDE SCH MG: 500 TABLET, FILM COATED ORAL at 06:43

## 2019-08-23 RX ADMIN — SODIUM CHLORIDE SCH MLS/HR: 900 INJECTION, SOLUTION INTRAVENOUS at 05:22

## 2019-08-23 RX ADMIN — PREGABALIN SCH MG: 150 CAPSULE ORAL at 08:37

## 2019-08-23 NOTE — CARDIOLOGY DISCHARGE SUMMARY
Diagnosis/Chief Complaint


Date of Admission


8/22/2019


Date of Discharge


8/23/2019


Admission Diagnosis


Critical limb ischemia





Final/Discharge Diagnosis


Critical limb ischemia, successful revascularization of the right lower 

extremity.





Chief Complaint/HPI


Chief Complaint/HPI


This is a 68-year-old gentleman with nonhealing ulcer on the medial aspect of 

the right foot.  Significantly abnormal MONO and TBI.  Peripheral angiography and

intervention is recommended.





Discharge Summary


Procedures


Peripheral angiography shows infrarenal distal abdominal aorta aneurysm.


Severe stenosis in the mid/distal right SFA, treated successfully with PTA and 

stenting.


Severe stenosis in the proximal segment of the right posterior tibial artery tr

eated successfully with PTA.


Moderate to severe stenosis in the proximal segment of the right anterior tibial

artery, treated successfully with PTA.


Discharge Physical Examination


Stable.





Hospital Course


Was the Problem List Reviewed?:  Yes


Patient potassium was 6.6 this morning.  He was given Kayexalate, insulin, IV 

fluids.  Repeat potassium is 5.5.  We will discontinue lisinopril.  Continue 

hydrochlorothiazide.  Low potassium diet.  Give another dose of Kayexalate 30 g 

times one.  Repeat potassium tomorrow morning.  Recommend follow-up with 

nephrology.


Pending Labs


Laboratory Tests


8/23/19 07:50: 


Sodium Level 139, Potassium Level 5.5, Chloride Level 106, Carbon Dioxide Level 

25, Anion Gap 8, Blood Urea Nitrogen 16, Creatinine 0.94, Estimat Glomerular 

Filtration Rate > 60, BUN/Creatinine Ratio 17, Glucose Level 133, Calcium Level 

8.7








Discussion & Recommendations


Discussion


Discharge took over 30 minutes to complete.  Dual antiplatelet therapy.  Follow-

up with nephrology as well.


Follow up appt.:  


Dr Maximo Dai, in two to three weeks.


Dr Caren Dai for Nephrology consultation for hyperkalemia.


Dicharge Diet:  other diet (Low potassium diet)


Activity as Tolerated:  Yes


Home Medications


Reviewed patient Home Medication Reconciliation performed by pharmacy medication

reconciliations technician and/or nursing.


Patients Allergies have been reviewed.





Discharge


Home Medications:


Reviewed and agree with Discharge Medication list on patient's Discharge 

Instruction sheet


Condition at discharge


stable


Instructions to patient/family


Discussed at length with the patient.











ELISE DAI MD             Aug 23, 2019 12:29

## 2019-08-23 NOTE — NUR
Initial visit with the pt and his friend who was present to provide transportation. The pt 
is Taoist and welcomed  support. He shared about his medical interventions of late 
to save his foot, and states his leg already feels like there is better blood flow since his 
most recent procedure. He shared about the spiritual and emotional challenges to poor 
health, for which I provided active listening and compassionate presence.

## 2019-08-28 ENCOUNTER — HOSPITAL ENCOUNTER (OUTPATIENT)
Dept: HOSPITAL 75 - WOUNDCARE | Age: 69
End: 2019-08-28
Attending: SURGERY
Payer: COMMERCIAL

## 2019-08-28 DIAGNOSIS — I96: ICD-10-CM

## 2019-08-28 DIAGNOSIS — I70.234: ICD-10-CM

## 2019-08-28 DIAGNOSIS — E11.621: Primary | ICD-10-CM

## 2019-08-28 DIAGNOSIS — L97.412: ICD-10-CM

## 2019-08-28 DIAGNOSIS — M65.071: ICD-10-CM

## 2019-08-28 DIAGNOSIS — E11.52: ICD-10-CM

## 2019-08-28 DIAGNOSIS — E11.42: ICD-10-CM

## 2019-08-28 PROCEDURE — 11042 DBRDMT SUBQ TIS 1ST 20SQCM/<: CPT

## 2019-08-28 PROCEDURE — 87077 CULTURE AEROBIC IDENTIFY: CPT

## 2019-08-28 PROCEDURE — 87070 CULTURE OTHR SPECIMN AEROBIC: CPT

## 2019-08-28 PROCEDURE — 87205 SMEAR GRAM STAIN: CPT

## 2019-09-04 ENCOUNTER — HOSPITAL ENCOUNTER (OUTPATIENT)
Dept: HOSPITAL 75 - WOUNDCARE | Age: 69
End: 2019-09-04
Attending: SURGERY
Payer: SELF-PAY

## 2019-09-04 DIAGNOSIS — M65.071: ICD-10-CM

## 2019-09-04 DIAGNOSIS — E11.621: Primary | ICD-10-CM

## 2019-09-04 DIAGNOSIS — L97.412: ICD-10-CM

## 2019-09-04 DIAGNOSIS — I70.234: ICD-10-CM

## 2019-09-04 DIAGNOSIS — E11.52: ICD-10-CM

## 2019-09-04 DIAGNOSIS — I96: ICD-10-CM

## 2019-09-04 DIAGNOSIS — E11.42: ICD-10-CM

## 2019-09-04 PROCEDURE — 11042 DBRDMT SUBQ TIS 1ST 20SQCM/<: CPT

## 2019-09-18 ENCOUNTER — HOSPITAL ENCOUNTER (OUTPATIENT)
Dept: HOSPITAL 75 - WOUNDCARE | Age: 69
End: 2019-09-18
Attending: SURGERY
Payer: COMMERCIAL

## 2019-09-18 DIAGNOSIS — M65.071: ICD-10-CM

## 2019-09-18 DIAGNOSIS — E11.52: ICD-10-CM

## 2019-09-18 DIAGNOSIS — E11.42: ICD-10-CM

## 2019-09-18 DIAGNOSIS — I70.234: ICD-10-CM

## 2019-09-18 DIAGNOSIS — E11.621: Primary | ICD-10-CM

## 2019-09-18 DIAGNOSIS — L97.412: ICD-10-CM

## 2019-09-18 PROCEDURE — 11042 DBRDMT SUBQ TIS 1ST 20SQCM/<: CPT

## 2019-10-02 ENCOUNTER — HOSPITAL ENCOUNTER (OUTPATIENT)
Dept: HOSPITAL 75 - WOUNDCARE | Age: 69
End: 2019-10-02
Attending: SURGERY
Payer: COMMERCIAL

## 2019-10-02 DIAGNOSIS — L97.413: ICD-10-CM

## 2019-10-02 DIAGNOSIS — I70.261: ICD-10-CM

## 2019-10-02 DIAGNOSIS — E11.52: ICD-10-CM

## 2019-10-02 DIAGNOSIS — E11.42: ICD-10-CM

## 2019-10-02 DIAGNOSIS — M65.071: ICD-10-CM

## 2019-10-02 DIAGNOSIS — E11.621: Primary | ICD-10-CM

## 2019-10-02 PROCEDURE — 11043 DBRDMT MUSC&/FSCA 1ST 20/<: CPT

## 2019-10-09 ENCOUNTER — HOSPITAL ENCOUNTER (OUTPATIENT)
Dept: HOSPITAL 75 - WOUNDCARE | Age: 69
End: 2019-10-09
Attending: SURGERY
Payer: SELF-PAY

## 2019-10-09 DIAGNOSIS — L97.413: ICD-10-CM

## 2019-10-09 DIAGNOSIS — I70.234: ICD-10-CM

## 2019-10-09 DIAGNOSIS — E11.42: ICD-10-CM

## 2019-10-09 DIAGNOSIS — E11.52: ICD-10-CM

## 2019-10-09 DIAGNOSIS — M65.071: ICD-10-CM

## 2019-10-09 DIAGNOSIS — E11.621: Primary | ICD-10-CM

## 2019-10-09 PROCEDURE — 11042 DBRDMT SUBQ TIS 1ST 20SQCM/<: CPT

## 2019-10-23 ENCOUNTER — HOSPITAL ENCOUNTER (OUTPATIENT)
Dept: HOSPITAL 75 - WOUNDCARE | Age: 69
End: 2019-10-23
Attending: SURGERY
Payer: COMMERCIAL

## 2019-10-23 ENCOUNTER — HOSPITAL ENCOUNTER (OUTPATIENT)
Dept: HOSPITAL 75 - LAB | Age: 69
End: 2019-10-23
Attending: SURGERY
Payer: SELF-PAY

## 2019-10-23 DIAGNOSIS — I70.261: ICD-10-CM

## 2019-10-23 DIAGNOSIS — E11.52: ICD-10-CM

## 2019-10-23 DIAGNOSIS — L97.509: ICD-10-CM

## 2019-10-23 DIAGNOSIS — M65.071: ICD-10-CM

## 2019-10-23 DIAGNOSIS — E11.42: ICD-10-CM

## 2019-10-23 DIAGNOSIS — E11.621: Primary | ICD-10-CM

## 2019-10-23 DIAGNOSIS — L97.413: ICD-10-CM

## 2019-10-23 LAB
ALBUMIN SERPL-MCNC: 4 GM/DL (ref 3.2–4.5)
ALP SERPL-CCNC: 104 U/L (ref 40–136)
ALT SERPL-CCNC: 15 U/L (ref 0–55)
BASOPHILS # BLD AUTO: 0.1 10^3/UL (ref 0–0.1)
BASOPHILS NFR BLD AUTO: 1 % (ref 0–10)
BILIRUB SERPL-MCNC: 0.2 MG/DL (ref 0.1–1)
BUN/CREAT SERPL: 17
CALCIUM SERPL-MCNC: 9 MG/DL (ref 8.5–10.1)
CHLORIDE SERPL-SCNC: 107 MMOL/L (ref 98–107)
CO2 SERPL-SCNC: 23 MMOL/L (ref 21–32)
CREAT SERPL-MCNC: 1.83 MG/DL (ref 0.6–1.3)
EOSINOPHIL # BLD AUTO: 0.2 10^3/UL (ref 0–0.3)
EOSINOPHIL NFR BLD AUTO: 2 % (ref 0–10)
ERYTHROCYTE [DISTWIDTH] IN BLOOD BY AUTOMATED COUNT: 16.6 % (ref 10–14.5)
GFR SERPLBLD BASED ON 1.73 SQ M-ARVRAT: 37 ML/MIN
GLUCOSE SERPL-MCNC: 135 MG/DL (ref 70–105)
HCT VFR BLD CALC: 38 % (ref 40–54)
HGB BLD-MCNC: 12 G/DL (ref 13.3–17.7)
LYMPHOCYTES # BLD AUTO: 1.5 X 10^3 (ref 1–4)
LYMPHOCYTES NFR BLD AUTO: 17 % (ref 12–44)
MANUAL DIFFERENTIAL PERFORMED BLD QL: NO
MCH RBC QN AUTO: 32 PG (ref 25–34)
MCHC RBC AUTO-ENTMCNC: 32 G/DL (ref 32–36)
MCV RBC AUTO: 98 FL (ref 80–99)
MONOCYTES # BLD AUTO: 0.4 X 10^3 (ref 0–1)
MONOCYTES NFR BLD AUTO: 4 % (ref 0–12)
NEUTROPHILS # BLD AUTO: 7 X 10^3 (ref 1.8–7.8)
NEUTROPHILS NFR BLD AUTO: 76 % (ref 42–75)
PLATELET # BLD: 123 10^3/UL (ref 130–400)
PMV BLD AUTO: 12.1 FL (ref 7.4–10.4)
POTASSIUM SERPL-SCNC: 5.3 MMOL/L (ref 3.6–5)
PROT SERPL-MCNC: 7 GM/DL (ref 6.4–8.2)
SODIUM SERPL-SCNC: 140 MMOL/L (ref 135–145)
WBC # BLD AUTO: 9.1 10^3/UL (ref 4.3–11)

## 2019-10-23 PROCEDURE — 84134 ASSAY OF PREALBUMIN: CPT

## 2019-10-23 PROCEDURE — 85025 COMPLETE CBC W/AUTO DIFF WBC: CPT

## 2019-10-23 PROCEDURE — 36415 COLL VENOUS BLD VENIPUNCTURE: CPT

## 2019-10-23 PROCEDURE — 83036 HEMOGLOBIN GLYCOSYLATED A1C: CPT

## 2019-10-23 PROCEDURE — 11043 DBRDMT MUSC&/FSCA 1ST 20/<: CPT

## 2019-10-23 PROCEDURE — 80053 COMPREHEN METABOLIC PANEL: CPT

## 2019-10-30 ENCOUNTER — HOSPITAL ENCOUNTER (OUTPATIENT)
Dept: HOSPITAL 75 - WOUNDCARE | Age: 69
End: 2019-10-30
Attending: SURGERY
Payer: SELF-PAY

## 2019-10-30 DIAGNOSIS — E11.42: ICD-10-CM

## 2019-10-30 DIAGNOSIS — E11.52: ICD-10-CM

## 2019-10-30 DIAGNOSIS — E11.621: Primary | ICD-10-CM

## 2019-10-30 DIAGNOSIS — L97.412: ICD-10-CM

## 2019-10-30 DIAGNOSIS — I70.234: ICD-10-CM

## 2019-10-30 DIAGNOSIS — M65.071: ICD-10-CM

## 2019-10-30 PROCEDURE — 11042 DBRDMT SUBQ TIS 1ST 20SQCM/<: CPT

## 2019-11-06 ENCOUNTER — HOSPITAL ENCOUNTER (OUTPATIENT)
Dept: HOSPITAL 75 - WOUNDCARE | Age: 69
End: 2019-11-06
Attending: SURGERY
Payer: SELF-PAY

## 2019-11-06 DIAGNOSIS — I70.234: ICD-10-CM

## 2019-11-06 DIAGNOSIS — M65.071: ICD-10-CM

## 2019-11-06 DIAGNOSIS — E11.42: ICD-10-CM

## 2019-11-06 DIAGNOSIS — L97.412: ICD-10-CM

## 2019-11-06 DIAGNOSIS — E11.52: ICD-10-CM

## 2019-11-06 DIAGNOSIS — E11.621: Primary | ICD-10-CM

## 2019-11-06 PROCEDURE — 11042 DBRDMT SUBQ TIS 1ST 20SQCM/<: CPT

## 2019-11-13 ENCOUNTER — HOSPITAL ENCOUNTER (OUTPATIENT)
Dept: HOSPITAL 75 - WOUNDCARE | Age: 69
End: 2019-11-13
Attending: SURGERY
Payer: SELF-PAY

## 2019-11-13 ENCOUNTER — HOSPITAL ENCOUNTER (EMERGENCY)
Dept: HOSPITAL 75 - ER | Age: 69
Discharge: LEFT BEFORE DISCHARGE | End: 2019-11-13
Payer: SELF-PAY

## 2019-11-13 VITALS — SYSTOLIC BLOOD PRESSURE: 133 MMHG | DIASTOLIC BLOOD PRESSURE: 81 MMHG

## 2019-11-13 VITALS — BODY MASS INDEX: 30.61 KG/M2 | WEIGHT: 201.94 LBS | HEIGHT: 67.99 IN

## 2019-11-13 DIAGNOSIS — I10: ICD-10-CM

## 2019-11-13 DIAGNOSIS — Z79.82: ICD-10-CM

## 2019-11-13 DIAGNOSIS — Z86.73: ICD-10-CM

## 2019-11-13 DIAGNOSIS — M65.071: ICD-10-CM

## 2019-11-13 DIAGNOSIS — J44.9: Primary | ICD-10-CM

## 2019-11-13 DIAGNOSIS — I70.234: ICD-10-CM

## 2019-11-13 DIAGNOSIS — R09.02: ICD-10-CM

## 2019-11-13 DIAGNOSIS — E11.42: ICD-10-CM

## 2019-11-13 DIAGNOSIS — Z85.46: ICD-10-CM

## 2019-11-13 DIAGNOSIS — E11.621: Primary | ICD-10-CM

## 2019-11-13 DIAGNOSIS — Z90.49: ICD-10-CM

## 2019-11-13 DIAGNOSIS — Z95.5: ICD-10-CM

## 2019-11-13 DIAGNOSIS — Z77.22: ICD-10-CM

## 2019-11-13 DIAGNOSIS — E11.52: ICD-10-CM

## 2019-11-13 DIAGNOSIS — I25.10: ICD-10-CM

## 2019-11-13 DIAGNOSIS — Z79.02: ICD-10-CM

## 2019-11-13 DIAGNOSIS — L97.413: ICD-10-CM

## 2019-11-13 DIAGNOSIS — I25.2: ICD-10-CM

## 2019-11-13 LAB
ALBUMIN SERPL-MCNC: 4.1 GM/DL (ref 3.2–4.5)
ALP SERPL-CCNC: 88 U/L (ref 40–136)
ALT SERPL-CCNC: 15 U/L (ref 0–55)
ARTERIAL PATENCY WRIST A: POSITIVE
BASE EXCESS STD BLDA CALC-SCNC: -0.1 MMOL/L (ref -2.5–2.5)
BASOPHILS # BLD AUTO: 0.1 10^3/UL (ref 0–0.1)
BASOPHILS NFR BLD AUTO: 1 % (ref 0–10)
BDY SITE: (no result)
BILIRUB SERPL-MCNC: 0.2 MG/DL (ref 0.1–1)
BODY TEMPERATURE: 98
BUN/CREAT SERPL: 25
CALCIUM SERPL-MCNC: 8.6 MG/DL (ref 8.5–10.1)
CHLORIDE SERPL-SCNC: 103 MMOL/L (ref 98–107)
CO2 BLDA CALC-SCNC: 28.5 MMOL/L (ref 21–31)
CO2 SERPL-SCNC: 23 MMOL/L (ref 21–32)
CREAT SERPL-MCNC: 1.49 MG/DL (ref 0.6–1.3)
EOSINOPHIL # BLD AUTO: 0.2 10^3/UL (ref 0–0.3)
EOSINOPHIL NFR BLD AUTO: 2 % (ref 0–10)
ERYTHROCYTE [DISTWIDTH] IN BLOOD BY AUTOMATED COUNT: 16.5 % (ref 10–14.5)
GFR SERPLBLD BASED ON 1.73 SQ M-ARVRAT: 47 ML/MIN
GLUCOSE SERPL-MCNC: 131 MG/DL (ref 70–105)
HCT VFR BLD CALC: 37 % (ref 40–54)
HGB BLD-MCNC: 11.8 G/DL (ref 13.3–17.7)
INHALED O2 FLOW RATE: (no result) L/MIN
LYMPHOCYTES # BLD AUTO: 2 X 10^3 (ref 1–4)
LYMPHOCYTES NFR BLD AUTO: 20 % (ref 12–44)
MANUAL DIFFERENTIAL PERFORMED BLD QL: NO
MCH RBC QN AUTO: 32 PG (ref 25–34)
MCHC RBC AUTO-ENTMCNC: 32 G/DL (ref 32–36)
MCV RBC AUTO: 99 FL (ref 80–99)
MONOCYTES # BLD AUTO: 0.5 X 10^3 (ref 0–1)
MONOCYTES NFR BLD AUTO: 5 % (ref 0–12)
NEUTROPHILS # BLD AUTO: 7.4 X 10^3 (ref 1.8–7.8)
NEUTROPHILS NFR BLD AUTO: 73 % (ref 42–75)
PCO2 BLDA: 63 MMHG (ref 35–45)
PH BLDA: 7.24 [PH] (ref 7.37–7.43)
PLATELET # BLD: 125 10^3/UL (ref 130–400)
PMV BLD AUTO: 13.3 FL (ref 7.4–10.4)
PO2 BLDA: 38 MMHG (ref 79–93)
POTASSIUM SERPL-SCNC: 5.6 MMOL/L (ref 3.6–5)
PROT SERPL-MCNC: 7.4 GM/DL (ref 6.4–8.2)
SAO2 % BLDA FROM PO2: 66 % (ref 94–100)
SODIUM SERPL-SCNC: 138 MMOL/L (ref 135–145)
VENTILATION MODE VENT: NO
WBC # BLD AUTO: 10.1 10^3/UL (ref 4.3–11)

## 2019-11-13 PROCEDURE — 93005 ELECTROCARDIOGRAM TRACING: CPT

## 2019-11-13 PROCEDURE — 82805 BLOOD GASES W/O2 SATURATION: CPT

## 2019-11-13 PROCEDURE — 71045 X-RAY EXAM CHEST 1 VIEW: CPT

## 2019-11-13 PROCEDURE — 36415 COLL VENOUS BLD VENIPUNCTURE: CPT

## 2019-11-13 PROCEDURE — 85025 COMPLETE CBC W/AUTO DIFF WBC: CPT

## 2019-11-13 PROCEDURE — 84484 ASSAY OF TROPONIN QUANT: CPT

## 2019-11-13 PROCEDURE — 11043 DBRDMT MUSC&/FSCA 1ST 20/<: CPT

## 2019-11-13 PROCEDURE — 80053 COMPREHEN METABOLIC PANEL: CPT

## 2019-11-13 NOTE — DIAGNOSTIC IMAGING REPORT
INDICATION: Hypoxemia



Portable chest 2:13 PM



Heart size and pulmonary vascularity are normal. Lungs are clear.

There are no effusions or pneumothoraces.



IMPRESSION: Negative chest.



Dictated by: 



  Dictated on workstation # [id number]

## 2019-11-13 NOTE — NUR
Pt wants to leave ED.  This nurse stated to pt that this is not a good idea as 
pt had O2 sat of 79% upon arrival to ED.  Grey Norris notified.  Grey in room 
speaking with pt.

## 2019-11-13 NOTE — ED COUGH/URI
General


Chief Complaint:  Respiratory Problems


Stated Complaint:  LOW OXYGEN, AMS


Source:  patient


Exam Limitations:  no limitations





History of Present Illness


Date Seen by Provider:  Nov 13, 2019


Time Seen by Provider:  13:49


Initial Comments


To ER from outpatient wound care where he is being evaluated by Dr. Granda for a 

right foot wound. He was noted to have oxygen saturation of 85% on room air and 

falling asleep during exam. Has a history of COPD does not wear oxygen at home.


Timing/Duration:  just prior to arrival


Severity/Quality:  moderate


Prior Episodes/Possible Cause:  no prior episodes


Associated Symptoms:  denies symptoms





Allergies and Home Medications


Allergies


Coded Allergies:  


     No Known Drug Allergies (Unverified , 1/17/17)





Home Medications


Aspirin 81 Mg Tab.chew, 81 MG PO DAILY, (Reported)


Atorvastatin Calcium 80 Mg Tablet, 80 MG PO HS, (Reported)


   LAST FILLED 04- FOR A 30 DAY SUPPLY 


Clopidogrel Bisulfate 75 Mg Tablet, 75 MG PO DAILY


   Prescribed by: EVAN CANO on 8/23/19 0955


Glipizide 10 Mg Tablet, 10 MG PO DAILY, (Reported)


Hydrochlorothiazide 25 Mg Tablet, 25 MG PO DAILY@0900


   Prescribed by: EVAN CANO on 8/23/19 0955


Ibuprofen 200 Mg Tablet, 400 MG PO Q6H, (Reported)


Ipratropium/Albuterol Sulfate 3 Ml Ampul.neb, 3 ML IH BID PRN for SHORTNESS OF 

BREATH, (Reported)


Linagliptin 5 Mg Tablet, 5 MG PO DAILY, (Reported)


Multivitamin 1 Each Tablet, 1 TAB PO DAILY, (Reported)


Nitroglycerin 0.4 Mg Tab.subl, 0.4 MG SL UD PRN for CHEST PAIN, (Reported)


Nortriptyline HCl 50 Mg Capsule, 50 MG PO HS, (Reported)


Oxycodone HCl/Acetaminophen 1 Each Tablet, 1 TAB PO Q4H PRN for PAIN-MODERATE, 

(Reported)


Pregabalin 150 Mg Capsule, 150 MG PO TID, (Reported)





Patient Home Medication List


Home Medication List Reviewed:  Yes





Review of Systems


Review of Systems


Constitutional:  see HPI, other (states he doesn't feel any worse than usual)


EENTM:  see HPI


Respiratory:  no symptoms reported


Cardiovascular:  no symptoms reported


Genitourinary:  no symptoms reported


Musculoskeletal:  no symptoms reported


Skin:  no symptoms reported


Psychiatric/Neurological:  No Symptoms Reported


Hematologic/Lymphatic:  No Symptoms Reported


Immunological/Allergic:  no symptoms reported (to not)





Past Medical-Social-Family Hx


Patient Social History


Type Used:  Cigarettes


2nd Hand Smoke Exposure:  Yes


Recent Hopitalizations:  No





Immunizations Up To Date


Date of Pneumonia Vaccine:  Aug 22, 2016





Seasonal Allergies


Seasonal Allergies:  No





Past Medical History


Surgeries:  Yes (liver surgery)


Appendectomy, Coronary Stent, Gallbladder


Respiratory:  No


COPD


Currently Using CPAP:  No


Cardiac:  Yes (cardiac cath w/ balloon)


Coronary Artery Disease, High Cholesterol, Hypertension


Neurological:  Yes (2018)


Stroke


Reproductive Disorders:  No


Genitourinary:  No


Gastrointestinal:  No


Musculoskeletal:  No


Endocrine:  No


HEENT:  No


Cancer:  Yes


Prostate


Did You Recieve Any Treatments:  Yes


Psychosocial:  No


Integumentary:  No


Blood Disorders:  No





Family Medical History


No Pertinent Family Hx





Physical Exam





Vital Signs - First Documented








 11/13/19 11/13/19





 13:28 13:45


 


Temp 36.7 


 


Pulse 113 


 


Resp 9 


 


B/P (MAP) 133/81 (98) 


 


Pulse Ox 79 


 


O2 Delivery Room Air 


 


O2 Flow Rate  35.00





Capillary Refill :


Height: 5'8.50"


Weight: 206lbs. 0.8oz. [id number]; 30.4 BMI


Method:Stated


General Appearance:  WD/WN, no apparent distress, obese, other (he is lethargic 

but he is able to answer questions)


Eyes:  Bilateral Eye Normal Inspection, Bilateral Eye PERRL, Bilateral Eye EOMI


HEENT:  PERRL/EOMI, normal ENT inspection


Respiratory:  no respiratory distress, no accessory muscle use, decreased breath

sounds


Gastrointestinal:  normal bowel sounds, non tender, soft


Neurologic/Psychiatric:  alert, normal mood/affect, oriented x 3


Skin:  normal color, warm/dry





Progress/Results/Core Measures


Suspected Sepsis


SIRS


Temperature: 


Pulse:  


Respiratory Rate: 


 


Laboratory Tests


11/13/19 13:35: White Blood Count 10.1


Blood Pressure  / 


Mean: 


 


Laboratory Tests


11/13/19 13:35: 


Creatinine 1.49H, Platelet Count 125L, Total Bilirubin 0.2








Results/Orders


Lab Results





Laboratory Tests








Test


 11/13/19


13:35 11/13/19


13:36 Range/Units


 


 


White Blood Count


 10.1 


 


 4.3-11.0


10^3/uL


 


Red Blood Count


 3.74 L


 


 4.35-5.85


10^6/uL


 


Hemoglobin 11.8 L  13.3-17.7  G/DL


 


Hematocrit 37 L  40-54  %


 


Mean Corpuscular Volume 99   80-99  FL


 


Mean Corpuscular Hemoglobin 32   25-34  PG


 


Mean Corpuscular Hemoglobin


Concent 32 


 


 32-36  G/DL





 


Red Cell Distribution Width 16.5 H  10.0-14.5  %


 


Platelet Count


 125 L


 


 130-400


10^3/uL


 


Mean Platelet Volume 13.3 H  7.4-10.4  FL


 


Neutrophils (%) (Auto) 73   42-75  %


 


Lymphocytes (%) (Auto) 20   12-44  %


 


Monocytes (%) (Auto) 5   0-12  %


 


Eosinophils (%) (Auto) 2   0-10  %


 


Basophils (%) (Auto) 1   0-10  %


 


Neutrophils # (Auto) 7.4   1.8-7.8  X 10^3


 


Lymphocytes # (Auto) 2.0   1.0-4.0  X 10^3


 


Monocytes # (Auto) 0.5   0.0-1.0  X 10^3


 


Eosinophils # (Auto)


 0.2 


 


 0.0-0.3


10^3/uL


 


Basophils # (Auto)


 0.1 


 


 0.0-0.1


10^3/uL


 


Sodium Level 138   135-145  MMOL/L


 


Potassium Level 5.6 H  3.6-5.0  MMOL/L


 


Chloride Level 103     MMOL/L


 


Carbon Dioxide Level 23   21-32  MMOL/L


 


Anion Gap 12   5-14  MMOL/L


 


Blood Urea Nitrogen 37 H  7-18  MG/DL


 


Creatinine


 1.49 H


 


 0.60-1.30


MG/DL


 


Estimat Glomerular Filtration


Rate 47 


 


  





 


BUN/Creatinine Ratio 25    


 


Glucose Level 131 H    MG/DL


 


Calcium Level 8.6   8.5-10.1  MG/DL


 


Corrected Calcium 8.5   8.5-10.1  MG/DL


 


Total Bilirubin 0.2   0.1-1.0  MG/DL


 


Aspartate Amino Transf


(AST/SGOT) 23 


 


 5-34  U/L





 


Alanine Aminotransferase


(ALT/SGPT) 15 


 


 0-55  U/L





 


Alkaline Phosphatase 88     U/L


 


Troponin I < 0.028   <0.028  NG/ML


 


Total Protein 7.4   6.4-8.2  GM/DL


 


Albumin 4.1   3.2-4.5  GM/DL


 


Blood Gas Puncture Site  RIGHT RADIAL   


 


Blood Gas Patient Temperature  98   


 


Arterial Blood pH  7.24 *L 7.37-7.43  


 


Arterial Blood Partial


Pressure CO2 


 63 H


 35-45  MMHG





 


Arterial Blood Partial


Pressure O2 


 38 *L


 79-93  MMHG





 


Arterial Blood HCO3  27  23-27  MMOL/L


 


Arterial Blood Total CO2


 


 28.5 


 21.0-31.0


MMOL/L


 


Arterial Blood Oxygen


Saturation 


 66 L


   %





 


Arterial Blood Base Excess


 


 -0.1 


 -2.5-2.5


MMOL/L


 


Rojelio Test  POSITIVE   


 


Blood Gas Ventilator Setting  NO   


 


Blood Gas Inspired Oxygen  3 L   








My Orders





Orders - ARASELI RICHARDSON


Arterial Blood Gas (11/13/19 13:42)


Troponin I (11/13/19 13:42)


Chest 1 View, Ap/Pa Only (11/13/19 13:42)


Ekg Tracing (11/13/19 13:42)


Bipap (Bilevel) Set Up (11/13/19 13:54)


Ns Iv 1000 Ml (Sodium Chloride 0.9%) (11/13/19 14:15)


Drug Screen Stat (Urine) (11/13/19 14:17)


Dexamethasone Injection (Decadron Inject (11/13/19 14:45)





Vital Signs/I&O











 11/13/19 11/13/19





 13:28 13:45


 


Temp 36.7 


 


Pulse 113 105


 


Resp 9 


 


B/P (MAP) 133/81 (98) 


 


Pulse Ox 79 88


 


O2 Delivery Room Air 


 


O2 Flow Rate  35.00





Capillary Refill :





Departure


Communication (Admissions)


4614-patient sitting up on the edge of the bed, still somewhat lethargic. States

that he has not taken any more pain medication than prescribed. He is initial 

BiPAP settings which were placed on his arrival, 16/825% FiO2. His ABG seems to 

have been a venous sample, either way the PCO2 is not alarming for CO2 narcosis.

Patient states that he does have oxygen that he wears at home sometimes. He is 

requesting to go home stating that he doesn't have insurance and has to many 

obligations at home. He is appreciative of the care that he received, not upset 

but doesn't want to be admitted. He states he'll return for any worsening. I did

advise him of the risks of death, worsening of condition or permanent disability

if he leaves AGAINST MEDICAL ADVICE, he is understanding of these and will sign 

out AGAINST MEDICAL ADVICE. I was also he is alert and oriented to person place 

time and situation and is of appropriate mindset to make this decision





Impression





   Primary Impression:  


   COPD (chronic obstructive pulmonary disease)


   Qualified Codes:  J44.9 - Chronic obstructive pulmonary disease, unspecified


   Additional Impression:  


   Hypoxia


Disposition:  07 AGAINST MEDICAL ADVICE


Condition:  Against Medical Advice





Departure-Patient Inst.


Referrals:  


MK LÓPEZ MD (PCP/Family)


Primary Care Physician











ARASELI RICHARDSON             Nov 13, 2019 13:53


POS

## 2019-11-27 ENCOUNTER — HOSPITAL ENCOUNTER (OUTPATIENT)
Dept: HOSPITAL 75 - RAD | Age: 69
End: 2019-11-27
Attending: SURGERY
Payer: SELF-PAY

## 2019-11-27 ENCOUNTER — HOSPITAL ENCOUNTER (OUTPATIENT)
Dept: HOSPITAL 75 - WOUNDCARE | Age: 69
End: 2019-11-27
Attending: SURGERY
Payer: SELF-PAY

## 2019-11-27 DIAGNOSIS — L97.416: ICD-10-CM

## 2019-11-27 DIAGNOSIS — E11.42: ICD-10-CM

## 2019-11-27 DIAGNOSIS — L97.413: ICD-10-CM

## 2019-11-27 DIAGNOSIS — E11.52: ICD-10-CM

## 2019-11-27 DIAGNOSIS — I70.234: ICD-10-CM

## 2019-11-27 DIAGNOSIS — M65.071: ICD-10-CM

## 2019-11-27 DIAGNOSIS — E11.621: Primary | ICD-10-CM

## 2019-11-27 DIAGNOSIS — I70.261: ICD-10-CM

## 2019-11-27 PROCEDURE — 73630 X-RAY EXAM OF FOOT: CPT

## 2019-11-27 PROCEDURE — 11043 DBRDMT MUSC&/FSCA 1ST 20/<: CPT

## 2019-11-27 PROCEDURE — 87070 CULTURE OTHR SPECIMN AEROBIC: CPT

## 2019-11-27 PROCEDURE — 87205 SMEAR GRAM STAIN: CPT

## 2019-11-27 PROCEDURE — 87186 SC STD MICRODIL/AGAR DIL: CPT

## 2019-11-27 PROCEDURE — 87077 CULTURE AEROBIC IDENTIFY: CPT

## 2019-11-27 NOTE — DIAGNOSTIC IMAGING REPORT
INDICATION:  Plantar ulcer. 



COMPARISON: None.



FINDINGS: Three views of the right foot demonstrate no acute

fracture or dislocation. There are no focal osseous lesions.

There is no soft tissue swelling. Joint spaces are well

maintained.  No radiopaque foreign bodies are seen.



IMPRESSION: Unremarkable radiographic exam of the right foot.



Dictated by: 



  Dictated on workstation # [id number]

## 2019-12-04 ENCOUNTER — HOSPITAL ENCOUNTER (OUTPATIENT)
Dept: HOSPITAL 75 - WOUNDCARE | Age: 69
End: 2019-12-04
Attending: SURGERY
Payer: SELF-PAY

## 2019-12-04 DIAGNOSIS — E11.52: ICD-10-CM

## 2019-12-04 DIAGNOSIS — L97.412: ICD-10-CM

## 2019-12-04 DIAGNOSIS — E11.42: ICD-10-CM

## 2019-12-04 DIAGNOSIS — I70.234: ICD-10-CM

## 2019-12-04 DIAGNOSIS — E11.621: Primary | ICD-10-CM

## 2019-12-04 PROCEDURE — 11042 DBRDMT SUBQ TIS 1ST 20SQCM/<: CPT

## 2019-12-11 ENCOUNTER — HOSPITAL ENCOUNTER (OUTPATIENT)
Dept: HOSPITAL 75 - WOUNDCARE | Age: 69
End: 2019-12-11
Attending: SURGERY
Payer: SELF-PAY

## 2019-12-11 DIAGNOSIS — E11.42: ICD-10-CM

## 2019-12-11 DIAGNOSIS — E11.621: Primary | ICD-10-CM

## 2019-12-11 DIAGNOSIS — I70.261: ICD-10-CM

## 2019-12-11 DIAGNOSIS — L97.412: ICD-10-CM

## 2019-12-11 DIAGNOSIS — E11.52: ICD-10-CM

## 2019-12-11 PROCEDURE — 11042 DBRDMT SUBQ TIS 1ST 20SQCM/<: CPT

## 2019-12-18 ENCOUNTER — HOSPITAL ENCOUNTER (OUTPATIENT)
Dept: HOSPITAL 75 - WOUNDCARE | Age: 69
End: 2019-12-18
Attending: SURGERY
Payer: SELF-PAY

## 2019-12-18 DIAGNOSIS — E11.621: Primary | ICD-10-CM

## 2019-12-18 DIAGNOSIS — I70.234: ICD-10-CM

## 2019-12-18 DIAGNOSIS — E11.52: ICD-10-CM

## 2019-12-18 DIAGNOSIS — L97.412: ICD-10-CM

## 2019-12-18 DIAGNOSIS — E11.42: ICD-10-CM

## 2019-12-18 PROCEDURE — 11043 DBRDMT MUSC&/FSCA 1ST 20/<: CPT

## 2020-01-15 ENCOUNTER — HOSPITAL ENCOUNTER (OUTPATIENT)
Dept: HOSPITAL 75 - WOUNDCARE | Age: 70
End: 2020-01-15
Attending: ORTHOPAEDIC SURGERY
Payer: MEDICAID

## 2020-01-15 DIAGNOSIS — E11.621: Primary | ICD-10-CM

## 2020-01-15 DIAGNOSIS — E11.42: ICD-10-CM

## 2020-01-15 DIAGNOSIS — I70.234: ICD-10-CM

## 2020-01-15 DIAGNOSIS — L97.413: ICD-10-CM

## 2020-01-15 PROCEDURE — 11042 DBRDMT SUBQ TIS 1ST 20SQCM/<: CPT

## 2020-01-22 ENCOUNTER — HOSPITAL ENCOUNTER (OUTPATIENT)
Dept: HOSPITAL 75 - WOUNDCARE | Age: 70
End: 2020-01-22
Attending: ORTHOPAEDIC SURGERY
Payer: MEDICAID

## 2020-01-22 DIAGNOSIS — E11.42: ICD-10-CM

## 2020-01-22 DIAGNOSIS — I70.234: ICD-10-CM

## 2020-01-22 DIAGNOSIS — L97.412: ICD-10-CM

## 2020-01-22 DIAGNOSIS — E11.621: Primary | ICD-10-CM

## 2020-01-22 DIAGNOSIS — E11.52: ICD-10-CM

## 2020-01-22 PROCEDURE — 11042 DBRDMT SUBQ TIS 1ST 20SQCM/<: CPT

## 2020-01-29 ENCOUNTER — HOSPITAL ENCOUNTER (OUTPATIENT)
Dept: HOSPITAL 75 - WOUNDCARE | Age: 70
End: 2020-01-29
Attending: ORTHOPAEDIC SURGERY
Payer: MEDICAID

## 2020-01-29 DIAGNOSIS — E11.42: ICD-10-CM

## 2020-01-29 DIAGNOSIS — I70.234: ICD-10-CM

## 2020-01-29 DIAGNOSIS — E11.621: Primary | ICD-10-CM

## 2020-01-29 DIAGNOSIS — L97.413: ICD-10-CM

## 2020-01-29 DIAGNOSIS — I70.261: ICD-10-CM

## 2020-01-29 PROCEDURE — 11042 DBRDMT SUBQ TIS 1ST 20SQCM/<: CPT

## 2020-01-29 PROCEDURE — 87077 CULTURE AEROBIC IDENTIFY: CPT

## 2020-01-29 PROCEDURE — 87070 CULTURE OTHR SPECIMN AEROBIC: CPT

## 2020-01-29 PROCEDURE — 87205 SMEAR GRAM STAIN: CPT

## 2020-02-06 ENCOUNTER — HOSPITAL ENCOUNTER (INPATIENT)
Dept: HOSPITAL 75 - 4TH | Age: 70
LOS: 6 days | Discharge: HOME HEALTH | DRG: 853 | End: 2020-02-12
Attending: INTERNAL MEDICINE | Admitting: FAMILY MEDICINE
Payer: MEDICAID

## 2020-02-06 ENCOUNTER — HOSPITAL ENCOUNTER (OUTPATIENT)
Dept: HOSPITAL 75 - WOUNDCARE | Age: 70
End: 2020-02-06
Attending: ORTHOPAEDIC SURGERY
Payer: MEDICAID

## 2020-02-06 VITALS — SYSTOLIC BLOOD PRESSURE: 150 MMHG | DIASTOLIC BLOOD PRESSURE: 83 MMHG

## 2020-02-06 VITALS
WEIGHT: 203.93 LBS | SYSTOLIC BLOOD PRESSURE: 134 MMHG | BODY MASS INDEX: 30.91 KG/M2 | DIASTOLIC BLOOD PRESSURE: 83 MMHG | HEIGHT: 67.99 IN

## 2020-02-06 VITALS — SYSTOLIC BLOOD PRESSURE: 137 MMHG | DIASTOLIC BLOOD PRESSURE: 77 MMHG

## 2020-02-06 VITALS — SYSTOLIC BLOOD PRESSURE: 112 MMHG | DIASTOLIC BLOOD PRESSURE: 77 MMHG

## 2020-02-06 DIAGNOSIS — I70.201: ICD-10-CM

## 2020-02-06 DIAGNOSIS — Z91.81: ICD-10-CM

## 2020-02-06 DIAGNOSIS — I70.234: ICD-10-CM

## 2020-02-06 DIAGNOSIS — E87.5: ICD-10-CM

## 2020-02-06 DIAGNOSIS — E78.00: ICD-10-CM

## 2020-02-06 DIAGNOSIS — M19.90: ICD-10-CM

## 2020-02-06 DIAGNOSIS — E11.65: ICD-10-CM

## 2020-02-06 DIAGNOSIS — B95.62: ICD-10-CM

## 2020-02-06 DIAGNOSIS — M86.171: ICD-10-CM

## 2020-02-06 DIAGNOSIS — Z86.73: ICD-10-CM

## 2020-02-06 DIAGNOSIS — B96.5: ICD-10-CM

## 2020-02-06 DIAGNOSIS — R74.0: ICD-10-CM

## 2020-02-06 DIAGNOSIS — A41.9: Primary | ICD-10-CM

## 2020-02-06 DIAGNOSIS — L97.519: ICD-10-CM

## 2020-02-06 DIAGNOSIS — E11.52: ICD-10-CM

## 2020-02-06 DIAGNOSIS — L03.031: ICD-10-CM

## 2020-02-06 DIAGNOSIS — I10: ICD-10-CM

## 2020-02-06 DIAGNOSIS — E11.621: Primary | ICD-10-CM

## 2020-02-06 DIAGNOSIS — E11.621: ICD-10-CM

## 2020-02-06 DIAGNOSIS — Z85.46: ICD-10-CM

## 2020-02-06 DIAGNOSIS — I25.10: ICD-10-CM

## 2020-02-06 DIAGNOSIS — D69.6: ICD-10-CM

## 2020-02-06 DIAGNOSIS — I71.4: ICD-10-CM

## 2020-02-06 DIAGNOSIS — M72.6: ICD-10-CM

## 2020-02-06 DIAGNOSIS — I96: ICD-10-CM

## 2020-02-06 DIAGNOSIS — K42.0: ICD-10-CM

## 2020-02-06 DIAGNOSIS — J44.9: ICD-10-CM

## 2020-02-06 DIAGNOSIS — L97.413: ICD-10-CM

## 2020-02-06 DIAGNOSIS — E11.42: ICD-10-CM

## 2020-02-06 DIAGNOSIS — F17.210: ICD-10-CM

## 2020-02-06 DIAGNOSIS — Z95.5: ICD-10-CM

## 2020-02-06 LAB
ALBUMIN SERPL-MCNC: 3.8 GM/DL (ref 3.2–4.5)
ALP SERPL-CCNC: 131 U/L (ref 40–136)
ALT SERPL-CCNC: 73 U/L (ref 0–55)
BASOPHILS # BLD AUTO: 0.1 10^3/UL (ref 0–0.1)
BASOPHILS NFR BLD AUTO: 0 % (ref 0–10)
BILIRUB SERPL-MCNC: 0.4 MG/DL (ref 0.1–1)
BUN/CREAT SERPL: 20
CALCIUM SERPL-MCNC: 9.5 MG/DL (ref 8.5–10.1)
CHLORIDE SERPL-SCNC: 102 MMOL/L (ref 98–107)
CO2 SERPL-SCNC: 27 MMOL/L (ref 21–32)
CREAT SERPL-MCNC: 1.15 MG/DL (ref 0.6–1.3)
EOSINOPHIL # BLD AUTO: 0.3 10^3/UL (ref 0–0.3)
EOSINOPHIL NFR BLD AUTO: 2 % (ref 0–10)
ERYTHROCYTE [DISTWIDTH] IN BLOOD BY AUTOMATED COUNT: 16.1 % (ref 10–14.5)
GFR SERPLBLD BASED ON 1.73 SQ M-ARVRAT: > 60 ML/MIN
GLUCOSE SERPL-MCNC: 177 MG/DL (ref 70–105)
HCT VFR BLD CALC: 37 % (ref 40–54)
HGB BLD-MCNC: 11.8 G/DL (ref 13.3–17.7)
LYMPHOCYTES # BLD AUTO: 1.6 X 10^3 (ref 1–4)
LYMPHOCYTES NFR BLD AUTO: 11 % (ref 12–44)
MANUAL DIFFERENTIAL PERFORMED BLD QL: NO
MCH RBC QN AUTO: 31 PG (ref 25–34)
MCHC RBC AUTO-ENTMCNC: 32 G/DL (ref 32–36)
MCV RBC AUTO: 99 FL (ref 80–99)
MONOCYTES # BLD AUTO: 0.8 X 10^3 (ref 0–1)
MONOCYTES NFR BLD AUTO: 6 % (ref 0–12)
NEUTROPHILS # BLD AUTO: 11.3 X 10^3 (ref 1.8–7.8)
NEUTROPHILS NFR BLD AUTO: 81 % (ref 42–75)
PLATELET # BLD: 129 10^3/UL (ref 130–400)
PMV BLD AUTO: 12.3 FL (ref 7.4–10.4)
POTASSIUM SERPL-SCNC: 4.9 MMOL/L (ref 3.6–5)
PROT SERPL-MCNC: 7.7 GM/DL (ref 6.4–8.2)
SODIUM SERPL-SCNC: 137 MMOL/L (ref 135–145)
WBC # BLD AUTO: 14 10^3/UL (ref 4.3–11)

## 2020-02-06 PROCEDURE — 87040 BLOOD CULTURE FOR BACTERIA: CPT

## 2020-02-06 PROCEDURE — 87075 CULTR BACTERIA EXCEPT BLOOD: CPT

## 2020-02-06 PROCEDURE — 83605 ASSAY OF LACTIC ACID: CPT

## 2020-02-06 PROCEDURE — 94640 AIRWAY INHALATION TREATMENT: CPT

## 2020-02-06 PROCEDURE — 87205 SMEAR GRAM STAIN: CPT

## 2020-02-06 PROCEDURE — 82962 GLUCOSE BLOOD TEST: CPT

## 2020-02-06 PROCEDURE — 73630 X-RAY EXAM OF FOOT: CPT

## 2020-02-06 PROCEDURE — 87077 CULTURE AEROBIC IDENTIFY: CPT

## 2020-02-06 PROCEDURE — 97597 DBRDMT OPN WND 1ST 20 CM/<: CPT

## 2020-02-06 PROCEDURE — 87070 CULTURE OTHR SPECIMN AEROBIC: CPT

## 2020-02-06 PROCEDURE — 94760 N-INVAS EAR/PLS OXIMETRY 1: CPT

## 2020-02-06 PROCEDURE — 97598 DBRDMT OPN WND ADDL 20CM/<: CPT

## 2020-02-06 PROCEDURE — 80053 COMPREHEN METABOLIC PANEL: CPT

## 2020-02-06 PROCEDURE — 85025 COMPLETE CBC W/AUTO DIFF WBC: CPT

## 2020-02-06 PROCEDURE — 87186 SC STD MICRODIL/AGAR DIL: CPT

## 2020-02-06 PROCEDURE — 36415 COLL VENOUS BLD VENIPUNCTURE: CPT

## 2020-02-06 PROCEDURE — 80202 ASSAY OF VANCOMYCIN: CPT

## 2020-02-06 PROCEDURE — 73701 CT LOWER EXTREMITY W/DYE: CPT

## 2020-02-06 RX ADMIN — INSULIN ASPART SCH UNIT: 100 INJECTION, SOLUTION INTRAVENOUS; SUBCUTANEOUS at 20:51

## 2020-02-06 RX ADMIN — SODIUM CHLORIDE SCH MLS/HR: 900 INJECTION, SOLUTION INTRAVENOUS at 21:58

## 2020-02-06 RX ADMIN — ENOXAPARIN SODIUM SCH MG: 100 INJECTION SUBCUTANEOUS at 23:27

## 2020-02-06 RX ADMIN — PREGABALIN SCH MG: 150 CAPSULE ORAL at 21:29

## 2020-02-06 RX ADMIN — OXYCODONE HYDROCHLORIDE AND ACETAMINOPHEN PRN TAB: 10; 325 TABLET ORAL at 23:59

## 2020-02-06 RX ADMIN — NORTRIPTYLINE HYDROCHLORIDE SCH MG: 25 CAPSULE ORAL at 23:27

## 2020-02-06 RX ADMIN — OXYCODONE HYDROCHLORIDE AND ACETAMINOPHEN PRN TAB: 10; 325 TABLET ORAL at 18:48

## 2020-02-06 NOTE — CONSULTATION - SURGERY
[facility] 20 1649:


History of Present Illness


History of Present Illness


Patient Consulted On(bay/time)


20


 16:32


Date Seen by Provider:  2020


Time Seen by Provider:  16:12


History of Present Illness


CC: Right Lower Extremity Pain





HPI: 70 yo male presents to the hospital after visiting his weekly appointment 

at the wound clinic. He was seeking care after a wound debridement in 2019 located on the plantar aspect of the 1st phalanx. The new wound is located 

on the medial aspect of the 1st phalanx. Patient claims the new wound started on

monday (2/3/2020) after falling on a stool and catching himself on his right 

foot. Patient claims the wound became "festered and boiled up" Wednesday. 

Patient currently has pain in right foot and travels to his knee. Currently 

rates his pain as a 9 on the pain scale and describes it as "you know it's 

there". Walking makes the pain worse. Nothing makes it better. Patient denies 

fevers and chills, but a red streak does appear to be traveling up his leg and 

closely resemble infectious lymphangitis.





Allergies and Home Medications


Allergies


Coded Allergies:  


     No Known Drug Allergies (Unverified , 17)





Home Medications


Aspirin 81 Mg Tab.chew, 81 MG PO DAILY, (Reported)


Clopidogrel Bisulfate 75 Mg Tablet, 75 MG PO DAILY, (Reported)


Glipizide 10 Mg Tablet, 10 MG PO DAILY, (Reported)


Hydrochlorothiazide 25 Mg Tablet, 25 MG PO DAILY, (Reported)


Ipratropium/Albuterol Sulfate 3 Ml Ampul.neb, 3 ML IH BID PRN for SHORTNESS OF 

BREATH, (Reported)


Linagliptin 5 Mg Tablet, 5 MG PO DAILY, (Reported)


Lisinopril 10 Mg Tablet, 10 MG PO DAILY, (Reported)


Metformin HCl 1,000 Mg Tablet, 1,000 MG PO BID WITH MEALS, (Reported)


Multivitamin 1 Each Tablet, 1 TAB PO DAILY, (Reported)


Nitroglycerin 0.4 Mg Tab.subl, 0.4 MG SL UD PRN for CHEST PAIN, (Reported)


Nortriptyline HCl 50 Mg Capsule, 50 MG PO HS, (Reported)


Oxycodone HCl/Acetaminophen 1 Each Tablet, 1 TAB PO Q4H PRN for PAIN-MODERATE, 

(Reported)


Pregabalin 150 Mg Capsule, 150 MG PO TID, (Reported)





Past Medical-Social-Family Hx


Patient Social History


Alcohol Use:  Occasionally Uses


Number of Drinks Today:  1


Recreational Drug Use:  No


Smoking Status:  Current Everyday Smoker (Has cut back from 2ppd to 1 pack per 

week. )


Type Used:  Cigarettes


2nd Hand Smoke Exposure:  Yes


Recent Foreign Travel:  No


Contact w/Someone Who Travel:  No


Recent Infectious Disease Expo:  No


Recent Hopitalizations:  No


Physical Abuse Screen:  No


Sexual Abuse:  No





Immunizations Up To Date


PED Vaccines UTD:  No


Date of Pneumonia Vaccine:  Aug 22, 2016


Date of Influenza Vaccine:  Dec 1, 2019





Seasonal Allergies


Seasonal Allergies:  No





Surgeries


History of Surgeries:  Yes (Liver biopsy and stent placement)


Surgeries:  Appendectomy, Coronary Stent, Gallbladder





Respiratory


History of Respiratory Disorde:  No


Respiratory Disorders:  COPD





Cardiovascular


History of Cardiac Disorders:  Yes (cardiac cath w/ balloon)


Cardiac Disorders:  Coronary Artery Disease, High Cholesterol, Hypertension





Neurological


History of Neurological Disord:  Yes ()


Neurological Disorders:  Stroke





Reproductive System


Hx Reproductive Disorders:  No


Sexually Transmitted Disease:  No


HIV/AIDS:  No





Genitourinary


History of Genitourinary Disor:  No





Gastrointestinal


History of Gastrointestinal Di:  Yes (cholecystectomy and liver biopsy)


Gastrointestinal Disorders:  Gall Bladder Disease





Musculoskeletal


History of Musculoskeletal Dis:  Yes


Musculoskeletal Disorders:  Arthritis





Endocrine


History of Endocrine Disorders:  Yes


Endocrine Disorders:  Diabetes, Non-Insulin dep





HEENT


History of HEENT Disorders:  Yes


HEENT Disorders:  Cataract


Loss of Vision:  Right





Cancer


History of Cancer:  Yes


Cancer:  Prostate





Psychosocial


History of Psychiatric Problem:  No





Integumentary


History of Skin or Integumenta:  Yes (R foot wound )





Blood Transfusions


History of Blood Disorders:  No





Family Medical History


Significant Family History:  Heart Disease (mother and father  of MI)


Family Medial History:  


Cardiovascular disease


  G8 SISTER


Cataracts


  19 MOTHER


Colon cancer


  19 MOTHER


Completed stroke


  19 MOTHER


Diabetes mellitus


  G8 SISTER


Drug abuse


  G8 BROTHER


Hypercholesterolemia


  G8 BROTHER


Hypertension


  G8 BROTHER


  G8 SISTER


Myocardial infarction


  19 FATHER


  19 MOTHER


Respiratory disorder


  19 FATHER


  19 MOTHER


  G8 BROTHER


  G8 BROTHER


  G8 SISTER


  G8 SISTER





Review of Systems-General


Constitutional:  No chills, No fever; other (tired)


EENTM:  No ear pain, No eye pain, No throat pain


Respiratory:  No cough, No short of breath


Cardiovascular:  No chest pain, No palpitations


Gastrointestinal:  No abdominal pain


Genitourinary:  see HPI; No dysuria, No frequency


Musculoskeletal:  see HPI, other (MSK pain due to arthritis)


Skin:  other (Ulcer at Right plantar aspect of foot and 1st phalanx)


Psychiatric/Neurological:  No Symptoms Reported


Other


Bruises easier due to age, but no bleeding disorder





Physical Exam-General Problems


Physical Exam


Vital Signs





Vital Signs - First Documented








 20





 14:15


 


Temp 36.3


 


Pulse 96


 


Resp 20


 


B/P (MAP) 134/83


 


Pulse Ox 96


 


O2 Delivery Room Air





Capillary Refill :


General Appearance:  WD/WN, no apparent distress


Neck:  non-tender


Respiratory:  chest non-tender, lungs clear, no respiratory distress, no 

accessory muscle use, decreased breath sounds (bilaterally), wheezing (on 

exhalation)


Cardiovascular:  normal peripheral pulses (2/4 radial pulse bilaterally), 

regular rate, rhythm, no edema, no murmur


Peripheral Pulses:  2+ Radial Pulses (R), 2+ Radial Pulses (L)


Gastrointestinal:  non tender, no organomegaly, no pulsatile mass, distended 

(appears to be chronic)


Extremities:  inflammation (Possible infectious lymphangitis on the medial 

aspect of the tibia), other (Swollen erythematous right 1st phalanx with ulcers 

on foot. Healing ulcer on the plantar aspect of right foot)


Neurologic/Psychiatric:  alert, normal mood/affect, oriented x 3


Skin:  other (See Extremety PE)


Lymphatic:  other (Right tibia infectious lymphangitis)





Assessment/Plan


right foot 1st phalanx foot ulcer


Healing right foot plantar ulcer


infectious lymphangitis from right foot 1st phalanx foot ulcer


Possible peripheral Artery disease





CT of Right LE


Ultrasound of right lower extremity for PAD


Antibiotics


Possible wound debridement





DOUGLAS BROOKS DO 20 0133:


History of Present Illness


History of Present Illness


Time Seen by Provider:  16:14


History of Present Illness


Surgery asked to consult regarding Diabetic foot ulcer with worsening 

appearance.  When I spoke to pt he stated his foot did not look like this last 

week.  He states he fell off step stool on Monday and landed with all his wieght

on the right foot.  Pt states this looks as bad as when


Dr. Hong had to do an I&D of bottom of his foot.





Allergies and Home Medications


Allergies


Coded Allergies:  


     No Known Drug Allergies (Unverified , 1/17/17)





Home Medications


Aspirin 81 Mg Tab.chew, 81 MG PO DAILY, (Reported)


Clopidogrel Bisulfate 75 Mg Tablet, 75 MG PO DAILY, (Reported)


Glipizide 10 Mg Tablet, 10 MG PO DAILY, (Reported)


Hydrochlorothiazide 25 Mg Tablet, 25 MG PO DAILY, (Reported)


Ipratropium/Albuterol Sulfate 3 Ml Ampul.neb, 3 ML IH BID PRN for SHORTNESS OF 

BREATH, (Reported)


Linagliptin 5 Mg Tablet, 5 MG PO DAILY, (Reported)


Lisinopril 10 Mg Tablet, 10 MG PO DAILY, (Reported)


Metformin HCl 1,000 Mg Tablet, 1,000 MG PO BID WITH MEALS, (Reported)


Multivitamin 1 Each Tablet, 1 TAB PO DAILY, (Reported)


Nitroglycerin 0.4 Mg Tab.subl, 0.4 MG SL UD PRN for CHEST PAIN, (Reported)


Nortriptyline HCl 50 Mg Capsule, 50 MG PO HS, (Reported)


Oxycodone HCl/Acetaminophen 1 Each Tablet, 1 TAB PO Q4H PRN for PAIN-MODERATE, 

(Reported)


Pregabalin 150 Mg Capsule, 150 MG PO TID, (Reported)





Patient Home Medication List


Home Medication List Reviewed:  Yes





Past Medical-Social-Family Hx


Patient Social History


Alcohol Use:  Regular Use


Recreational Drug Use:  No





Family Medical History


Significant Family History:  Diabetes


Family Medial History:  


Cardiovascular disease


  G8 SISTER


Cataracts


  19 MOTHER


Colon cancer


  19 MOTHER


Completed stroke


  19 MOTHER


Diabetes mellitus


  G8 SISTER


Drug abuse


  G8 BROTHER


Hypercholesterolemia


  G8 BROTHER


Hypertension


  G8 BROTHER


  G8 SISTER


Myocardial infarction


  19 FATHER


  19 MOTHER


Respiratory disorder


  19 FATHER


  19 MOTHER


  G8 BROTHER


  G8 BROTHER


  G8 SISTER


  G8 SISTER





Review of Systems-General


Constitutional:  weakness


EENTM:  No double vision


Cardiovascular:  Hx of Intervention


Gastrointestinal:  No nausea, No vomiting


Musculoskeletal:  joint swelling, muscle pain, muscle stiffness, muscle cramps


Psychiatric/Neurological:  Denies Anxiety, Denies Depressed, Denies Seizure, 

Denies Tremors





Physical Exam-General Problems


Physical Exam


Eyes:  Bilateral Eye PERRL, Bilateral Eye EOMI


HEENT:  pharynx normal; No scleral icterus (R), No scleral icterus (L)


Neck:  supple


Extremities:  no calf tenderness, other (Swollen erythematous right 1st phalanx 

with ulcers on foot. Healing ulcer on the plantar aspect of right foot, with 

apparent streaking up leg)


Skin:  normal color, warm/dry, other (See Extremety PE)


Lymphatic:  no adenopathy (neck or axilla), other (Right tibia infectious 

lymphangitis)





Assessment/Plan


CT does not show Osteomyelitis (MRI is more sensitive) but it did show some 

fluid collections and edema.  Pt will probably need debridement of the necrotic 

tissue.  Will monitor to make sure it is not getting worse.  He will be on IV 

ABX and may have to 


stop his blood thinners prior to surgery.





Supervisory-Addendum Brief


Verification & Attestation


Participated in pt care:  history, MDM, physical


Personally performed:  exam, history, MDM


Care discussed with:  Medical Student


Procedures:  n/a


Verification and Attestation of Medical Student E/M Service





A medical student performed and documented this service in my presence. I 

reviewed and verified all information documented by the medical student and made

modifications to such information, when appropriate. I personally performed the 

physical exam and medical decision making. 





 Douglas Brooks, 2020,01:36











PETER IRELAND [facility]     2020 16:49


DOUGLAS BROOKS DO                2020 01:33

## 2020-02-06 NOTE — DIAGNOSTIC IMAGING REPORT
INDICATION: Red, oozing pus, wound infection.



TECHNIQUE: Three views of the right foot.



CORRELATION STUDY: 11/27/2019.



FINDINGS: Rather prominent edema along the medial aspect of the

right foot. There is a focal area of gas collection adjacent to

the first metatarsal head. No soft tissue foreign body. No bettie

bony erosive change. No acute fracture. Alignment is anatomic.

Degenerative changes at the talonavicular articulation. 



IMPRESSION:

1. Significant soft tissue swelling and focal gas collection

along the medial aspect of the foot. No definitive foreign body

or bettie bony erosive change. If further imaging for potential

osteomyelitis desired, MRI would be recommended. 



Dictated by: 



  Dictated on workstation # [id number]

## 2020-02-06 NOTE — NUR
DAVID SMITH admitted to room 429-1, with an admitting diagnosis of CELLULITIS, on 02/06/20 
from  ED via BED, accompanied by STAFF. DAVID SMITH introduced to surroundings, call light, 
bed controls, phone, TV, temperature control, lights, meal times, smoking policy, visitor 
policy, side rail policy, bathrooms and showers.  Patient Rights given to patient in the 
handbook. DAVID SMITH verbalizes understanding that [facility] is not responsible for the 
loss or damage to any personal effects or valuables that are kept in the patients posession 
during their hospitalization.  The following Patient Care Plans were discussed with the 
PATIENT: Discharge Planning, IMMOBILITY, PAIN AND KNOWLEDGE. DAVID SMITH verbalizes 
understanding of Interdisciplinary Patient Education.

## 2020-02-06 NOTE — HISTORY & PHYSICAL
TEOFILO JEAN,MED STUDENT 2/6/20 1538:


HPI


History of Present Illness:


Mr. Pradeep Fowler is a 69 year old male being admitted for a diabetic foot wound 

and cellulitis. He states he has had a wound on his right foot since July. On 

Monday (2/3/2020) he fell off a stool and hit it. It was fine at first, but on 

Tuesday he noticed an increase in redness, pain and swelling, and it has gotten 

worse since then. He went an appointment with the wound clinic today, and was f

ound to have more open areas to the right foot, and the bone was able to be 

palpated through the wound. Has had previous cultures positive for pseudomonas 

and MRSA


Source:  patient, old records


Exam Limitations:  no limitations


Date seen by provider:  Feb 6, 2020


Time Seen by Provider:  14:30


Attending Physician


Gabriela Delaney MD


PCP


Kenn Mackey MD


Consult





Date of Admission


Feb 6, 2020 at 14:15





Home Medications


Home Medications


Reviewed patient Home Medication Reconciliation performed by pharmacy medication

reconciliations technician and/or nursing.


Patients Allergies have been reviewed.





Allergies


Coded Allergies:  


     No Known Drug Allergies (Unverified , 1/17/17)





PMH-Social-Family Hx


Patient Social History


Alcohol Use:  Occasionally Uses


Recreational Drug Use:  No


Smoking Status:  Current Everyday Smoker


Type Used:  Cigarettes


2nd Hand Smoke Exposure:  Yes


Recent Foreign Travel:  No


Contact w/other who traveled:  No


Recent Hopitalizations:  No


Recent Infectious Disease Expo:  No





Immunizations Up To Date


Date of Pneumonia Vaccine:  Aug 22, 2016





Past Medical History


Medical Hx :


-Diabetes Mellitis


-Hypertension


-High cholesterol


-CAD


-Umbilical hernia


-Prostate cancer








Surgical Hx


-Cardiac stents


-Right foot debridement


-Appendectomy


-Gallbladder





Family Medical History


Significant Family History:  Heart Disease (mother)





Review of Systems ([facility])


Constitutional:  No chills, No dizziness, No fever; weakness


Respiratory:  No cough, No short of breath


Cardiovascular:  No chest pain; Hx of Intervention; No palpitations


Gastrointestinal:  No abdominal pain, No nausea, No vomiting


Genitourinary:  No dysuria; other (urgency)


Musculoskeletal:  other (right foot pain and tenderness)


Skin:  other (open wound, erythema and swelling to right foot and lower leg)


Psychiatric/Neurological:  Numbness (hands and feet), Tingling (hands and feet),

Weakness





Physical Exam-([facility])


Physical Exam


Vital Signs





                          VS - Last 72 Hours, by Label








 2/6/20





 14:15


 


Temp 36.3


 


Pulse 96


 


Resp 20


 


B/P (MAP) 134/83


 


Pulse Ox 96


 


O2 Delivery Room Air





Capillary Refill :


General Appearance:  no apparent distress


Eyes:  Bilateral Eye EOMI


HEENT:  No scleral icterus (R), No scleral icterus (L)


Neck:  non-tender, full range of motion


Respiratory:  no respiratory distress, no accessory muscle use


Cardiovascular:  tachycardia


Gastrointestinal:  normal bowel sounds, non tender, soft


Extremities:  inflammation (right foot and leg), swelling (right foot and leg), 

other (right foot with open areas to plantar and dorsomedial first metatarsal 

areas, erythema tracks up the lower leg)


Neurologic/Psychiatric:  alert, normal mood/affect, oriented x 3


Skin:  normal color, warm/dry





Assessment/Plan


Assessment/Plan


Assessment & Plan


Chronic diabetic wound of right foot, with cellulitis: starting patient on 

vancomycin and zosyn, obtaining routine labwork, Will also consult surgery for 

recommendations





Rule out osteomyelitis: will obtain x-ray to assess





GABRIELA DELANEY MD 2/6/20 2049:


Home Medications


Allergies


Coded Allergies:  


     No Known Drug Allergies (Unverified , 1/17/17)





Assessment/Plan


Assessment/Plan


Admission Status:  Inpatient Order (span 2 midnights)


Reason for Inpatient Admission:  


Sepsis with underlying diabetes





(1) Sepsis


Assessment & Plan:  Secondary to cellulitis, no clear end organ damage noted. 

Start zosyn and vancomycin.


Qualifiers:  


   Qualified Codes:  A41.9 - Sepsis, unspecified organism


(2) Cellulitis of foot, right


Status:  Acute


Assessment & Plan:  New acute deep wounds with purulence, erythema and edema 

with erythema extending up shin. Zosyn and vancomycin, cultures.





(3) Diabetic ulcer of right foot


Status:  Chronic


Assessment & Plan:  Plantar metatarsal chronic wound appears stable and without 

evidence of infection, Continue dressing changes per typical wound care 

recommendations prior to admission.


Qualifiers:  


   


(4) Atherosclerotic occlusive disease


Status:  Chronic


Assessment & Plan:  Right SFA, anterior tibial and posterior tibial stenosis 

treated with intervention by Dr. Dai in August 2019. Continue dual antip

latelet therapy.





(5) Hypertension


Status:  Chronic


Assessment & Plan:  Resume home medications


Qualifiers:  


   Qualified Codes:  I10 - Essential (primary) hypertension


(6) Diabetes type 2, uncontrolled


Status:  Chronic


Assessment & Plan:  Diabetic diet, sliding scale insulin, resume home metformin 

and linagliptan





(7) CAD (coronary artery disease)


Status:  Chronic


(8) Thrombocytopenia


Assessment & Plan:  Intermittently present over last year or more, monitor 

closely with use of enoxaparin for DVT prophylaxis.





(9) Transaminitis


Status:  Acute


Assessment & Plan:  Possibly infection related, If persistent, check hepatitis 

panel and liver US. 





(10) Abdominal aortic aneurysm (AAA)


Status:  Chronic


Assessment & Plan:  Noted on angiogram per Dr. Dai 8/2019.


Qualifiers:  


   Qualified Codes:  I71.4 - Abdominal aortic aneurysm, without rupture





Supervisory-Addendum Brief


Verification & Attestation


Participated in pt care:  history, MDM, physical


Personally performed:  exam, history, MDM


Care discussed with:  Medical Student


Procedures:  n/a


I personally have seen and evaluated the patient and performed the physical exam

as well as the history. I agree with the medical student's documentation, see 

problem list for my plan.











TEOFILO JEAN,MED STUDENT     Feb 6, 2020 15:38


GABRIELA DELANEY MD              Feb 6, 2020 20:49

## 2020-02-06 NOTE — NUR
SPOKE WITH THE PATIENT ABOUT HIS MEDICATIONS. WE WENT OVER THE LIST WE HAVE ON FILE AND HE 
VERIFIED HOW HE TAKES THEM. I HAD A LIST FAXED OVER FROM [facility] AND VERIFIED 
LAST FILL DATES AND DOSES.



WHEN I ASKED HIM ABOUT A CHOLESTEROL MEDICATION HE STATES HE THINKS HE TAKES IT HOWEVER IT 
HAS NOT BEEN FILLED RECENTLY AND LAST TIME HE WAS HERE IT WAS PAST DUE FOR REFILL AS WELL. I 
REMOVED IT FROM THE MED REC AT THIS TIME.



[facility] FILLED:

1-31-20 OXYCODONE 10-325MG 1 Q4H PRN #166

1-29-20 METFORMIN 1000MG BID #60

1-29-20 LYRICA 150MG TID #84

1-29-20 LISINOPRIL 10MG DAILY #30

1-28-20 TRADJENTA 5MG DAILY #30

1-2-20 GLIPIZIDE 10MG DAILY #30

11-8-19 PLAVIX 75MG DAILY #90

11-6-19 HCTZ 25MG DAILY #90

12-4-19 NORTRIPTYLINE 50MG HS #90



OTC MEDS:

ASPIRIN 81MG DAILY

MTV DAILY



HE STATES HE HAS NITRO AS NEEDED AND DUONEB AS NEEDED AT HOME AS WELL.

## 2020-02-06 NOTE — DIAGNOSTIC IMAGING REPORT
PROCEDURE: CT right lower extremity with contrast.



TECHNIQUE: Multiple contiguous axial CT images of the right

extremity were obtained after intravenous administration of

iodinated contrast. Auto Exposure Controls were utilized during

the CT exam to meet ALARA standards for radiation dose reduction.





DATE: February 6, 2020.



INDICATION: 69-year-old male, right foot ulcer at the plantar

aspect of the foot. There is edema at the level of the proximal

tibia and fibula.



COMPARISON: Right foot radiographs February 6, 2020.



FINDINGS: There is limited spatial resolution given the large

field-of-view of acquisition. This may limit sensitivity for

detection of osteomyelitis, especially at the level of the foot.



There is a skin ulcer volarly located at the level of the distal

aspect of the first metatarsal as well is medially located near

the level of the first metatarsophalangeal joint. There is a

focal area of fluid medial to the first metatarsophalangeal joint

measuring approximately 2.1 x 1.1 cm in size. This may relate to

phlegmon or early abscess. This abnormal fluid collection also

appears to be contiguous with the more volarly located skin

ulcer. There is extensive soft tissue swelling at the level of

the first digit.



There is no cortical or aggressive bone destruction to diagnose

osteomyelitis on CT. MRI would be more sensitive for detection.



There is diffuse subcutaneous edema at the level of the tibia and

fibula. There is no identified focal fluid collection at the

level of the tibia or fibula. There are atherosclerotic

calcifications.



IMPRESSION:

1. Volar and medial skin ulcer near the level of the first

metatarsophalangeal joint with a subjacent fluid collection most

consistent with phlegmon or early abscess, measuring up to 2.1 x

1.2 cm in axial dimension.

2. No CT evidence of osteomyelitis. MRI would be more sensitive

for detection.

3. No focal fluid collection at the level of the imaged tibia or

fibula. 



Dictated by: 



  Dictated on workstation # [id number]

## 2020-02-07 VITALS — SYSTOLIC BLOOD PRESSURE: 161 MMHG | DIASTOLIC BLOOD PRESSURE: 78 MMHG

## 2020-02-07 VITALS — DIASTOLIC BLOOD PRESSURE: 70 MMHG | SYSTOLIC BLOOD PRESSURE: 137 MMHG

## 2020-02-07 VITALS — DIASTOLIC BLOOD PRESSURE: 71 MMHG | SYSTOLIC BLOOD PRESSURE: 117 MMHG

## 2020-02-07 VITALS — SYSTOLIC BLOOD PRESSURE: 102 MMHG | DIASTOLIC BLOOD PRESSURE: 72 MMHG

## 2020-02-07 VITALS — DIASTOLIC BLOOD PRESSURE: 85 MMHG | SYSTOLIC BLOOD PRESSURE: 140 MMHG

## 2020-02-07 VITALS — DIASTOLIC BLOOD PRESSURE: 92 MMHG | SYSTOLIC BLOOD PRESSURE: 140 MMHG

## 2020-02-07 VITALS — DIASTOLIC BLOOD PRESSURE: 74 MMHG | SYSTOLIC BLOOD PRESSURE: 133 MMHG

## 2020-02-07 VITALS — DIASTOLIC BLOOD PRESSURE: 77 MMHG | SYSTOLIC BLOOD PRESSURE: 140 MMHG

## 2020-02-07 VITALS — SYSTOLIC BLOOD PRESSURE: 143 MMHG | DIASTOLIC BLOOD PRESSURE: 93 MMHG

## 2020-02-07 VITALS — SYSTOLIC BLOOD PRESSURE: 146 MMHG | DIASTOLIC BLOOD PRESSURE: 68 MMHG

## 2020-02-07 VITALS — SYSTOLIC BLOOD PRESSURE: 135 MMHG | DIASTOLIC BLOOD PRESSURE: 81 MMHG

## 2020-02-07 LAB
ALBUMIN SERPL-MCNC: 3.3 GM/DL (ref 3.2–4.5)
ALP SERPL-CCNC: 116 U/L (ref 40–136)
ALT SERPL-CCNC: 66 U/L (ref 0–55)
BASOPHILS # BLD AUTO: 0 10^3/UL (ref 0–0.1)
BASOPHILS NFR BLD AUTO: 0 % (ref 0–10)
BILIRUB SERPL-MCNC: 0.3 MG/DL (ref 0.1–1)
BUN/CREAT SERPL: 22
CALCIUM SERPL-MCNC: 9 MG/DL (ref 8.5–10.1)
CHLORIDE SERPL-SCNC: 103 MMOL/L (ref 98–107)
CO2 SERPL-SCNC: 25 MMOL/L (ref 21–32)
CREAT SERPL-MCNC: 1.2 MG/DL (ref 0.6–1.3)
EOSINOPHIL # BLD AUTO: 0.3 10^3/UL (ref 0–0.3)
EOSINOPHIL NFR BLD AUTO: 3 % (ref 0–10)
ERYTHROCYTE [DISTWIDTH] IN BLOOD BY AUTOMATED COUNT: 15.8 % (ref 10–14.5)
GFR SERPLBLD BASED ON 1.73 SQ M-ARVRAT: 60 ML/MIN
GLUCOSE SERPL-MCNC: 179 MG/DL (ref 70–105)
HCT VFR BLD CALC: 35 % (ref 40–54)
HGB BLD-MCNC: 10.8 G/DL (ref 13.3–17.7)
LYMPHOCYTES # BLD AUTO: 1.6 X 10^3 (ref 1–4)
LYMPHOCYTES NFR BLD AUTO: 14 % (ref 12–44)
MANUAL DIFFERENTIAL PERFORMED BLD QL: NO
MCH RBC QN AUTO: 31 PG (ref 25–34)
MCHC RBC AUTO-ENTMCNC: 31 G/DL (ref 32–36)
MCV RBC AUTO: 100 FL (ref 80–99)
MONOCYTES # BLD AUTO: 0.7 X 10^3 (ref 0–1)
MONOCYTES NFR BLD AUTO: 6 % (ref 0–12)
NEUTROPHILS # BLD AUTO: 9 X 10^3 (ref 1.8–7.8)
NEUTROPHILS NFR BLD AUTO: 78 % (ref 42–75)
PLATELET # BLD: 130 10^3/UL (ref 130–400)
PMV BLD AUTO: 12.5 FL (ref 7.4–10.4)
POTASSIUM SERPL-SCNC: 5.2 MMOL/L (ref 3.6–5)
PROT SERPL-MCNC: 6.8 GM/DL (ref 6.4–8.2)
SODIUM SERPL-SCNC: 137 MMOL/L (ref 135–145)
WBC # BLD AUTO: 11.5 10^3/UL (ref 4.3–11)

## 2020-02-07 PROCEDURE — 0LBV0ZZ EXCISION OF RIGHT FOOT TENDON, OPEN APPROACH: ICD-10-PCS | Performed by: SURGERY

## 2020-02-07 RX ADMIN — CLOPIDOGREL BISULFATE SCH MG: 75 TABLET, FILM COATED ORAL at 09:03

## 2020-02-07 RX ADMIN — ASPIRIN 81 MG CHEWABLE TABLET SCH MG: 81 TABLET CHEWABLE at 09:03

## 2020-02-07 RX ADMIN — SODIUM CHLORIDE SCH MLS/HR: 900 INJECTION, SOLUTION INTRAVENOUS at 14:02

## 2020-02-07 RX ADMIN — Medication SCH EA: at 09:04

## 2020-02-07 RX ADMIN — LINAGLIPTIN SCH MG: 5 TABLET, FILM COATED ORAL at 09:04

## 2020-02-07 RX ADMIN — OXYCODONE HYDROCHLORIDE AND ACETAMINOPHEN PRN TAB: 10; 325 TABLET ORAL at 17:20

## 2020-02-07 RX ADMIN — HYDROCHLOROTHIAZIDE SCH MG: 25 TABLET ORAL at 09:03

## 2020-02-07 RX ADMIN — PREGABALIN SCH MG: 150 CAPSULE ORAL at 09:03

## 2020-02-07 RX ADMIN — PREGABALIN SCH MG: 150 CAPSULE ORAL at 21:41

## 2020-02-07 RX ADMIN — ENOXAPARIN SODIUM SCH MG: 100 INJECTION SUBCUTANEOUS at 21:42

## 2020-02-07 RX ADMIN — VANCOMYCIN HYDROCHLORIDE SCH MLS/HR: 500 INJECTION, POWDER, LYOPHILIZED, FOR SOLUTION INTRAVENOUS at 20:32

## 2020-02-07 RX ADMIN — INSULIN ASPART SCH UNIT: 100 INJECTION, SOLUTION INTRAVENOUS; SUBCUTANEOUS at 21:41

## 2020-02-07 RX ADMIN — INSULIN ASPART SCH UNIT: 100 INJECTION, SOLUTION INTRAVENOUS; SUBCUTANEOUS at 17:25

## 2020-02-07 RX ADMIN — OXYCODONE HYDROCHLORIDE AND ACETAMINOPHEN PRN TAB: 10; 325 TABLET ORAL at 21:42

## 2020-02-07 RX ADMIN — SODIUM CHLORIDE SCH MLS/HR: 900 INJECTION, SOLUTION INTRAVENOUS at 21:42

## 2020-02-07 RX ADMIN — INSULIN ASPART SCH UNIT: 100 INJECTION, SOLUTION INTRAVENOUS; SUBCUTANEOUS at 11:10

## 2020-02-07 RX ADMIN — SODIUM CHLORIDE SCH MLS/HR: 900 INJECTION, SOLUTION INTRAVENOUS at 05:32

## 2020-02-07 RX ADMIN — VANCOMYCIN HYDROCHLORIDE SCH MLS/HR: 500 INJECTION, POWDER, LYOPHILIZED, FOR SOLUTION INTRAVENOUS at 08:52

## 2020-02-07 RX ADMIN — INSULIN ASPART SCH UNIT: 100 INJECTION, SOLUTION INTRAVENOUS; SUBCUTANEOUS at 05:28

## 2020-02-07 RX ADMIN — PREGABALIN SCH MG: 150 CAPSULE ORAL at 14:01

## 2020-02-07 RX ADMIN — NORTRIPTYLINE HYDROCHLORIDE SCH MG: 25 CAPSULE ORAL at 21:39

## 2020-02-07 RX ADMIN — GLIPIZIDE SCH MG: 5 TABLET ORAL at 09:03

## 2020-02-07 NOTE — NUR
PT RECEIVED FROM SURGERY, PT ALERT AND ORIENTED. WOUND VAC IN PLACE ON RIGHT FOOT, NO LEAKS. 
PT COMPLAINING OF PAIN OF 7, PERCOCET GIVEN. OXYGEN ON AT 3L NC.

## 2020-02-07 NOTE — NUR
RD ASSESSMENT 



PMHx: DM; HTN; hypercholesterolemia; CAD; CA(prostate)



PT INTERACTION: Pt was awake and pleasant during nutrition assessment. Pt states current 
appetite is good and has been for some time. Note PO intake of 100% x1meal, per chart 
review. Pt states following diet where "watches the sugars", and has no issues with 
chewing/swallowing food. Pt states no recent issues with n/v/c/d at this time and that his 
last BM was 2/6. Note pt not currently on bowel regimen per chart review. Pt states no 
recent wt changes. Note unable to determine recent wt hx, per chart review. Note presence of 
wounds on right foot, per chart review. Pt states current DM management is pretty good, and 
that his average fasting blood glucose levels are between . 



ABNORMAL NUTRITION-RELATED LAB VALUES

LOW: 

HIGH: K 5.2; BUN 26; glu 179; AST 43; ALT 66



Est. kcal needs: 1175-3136 kcal | 20-25 kcal/kg

Est. Pro needs:  111-130 g Pro | 1.2-1.4 g Pro/kg



PES STATEMENT: Inadequate protein intake (NI-2.1) related to increased protein needs as 
evidenced by presence of wounds (right foot)



INTERVENTION:  

Note pt is currently NPO. 

Recommend advancing diet to consistent CHO diet, when medically able and as tolerated. 

Upon diet advancement, recommend adding Ensure HP (vary) to meals TID. Provides 160 kcal and 
16 g Pro per serving for perceived benefit to wound healing. 

Will continue to follow and reassess as pt needs and status change. 



MONITOR/EVALUATE:  

PO Intake; Plan of Care; Hydration Status; Weight Status; Lab Values 





JAG Jesus, MS, RD, LD

## 2020-02-07 NOTE — PROGRESS NOTE
TEOFILO JEAN,MED STUDENT 20 0934:


Subjective


Subjective/Events-last exam


Patient seen and examined this morning. States he is still having some pain in 

his right foot, a little improved from yesterday. Denies fever, nausea, 

vomiting, chest pain, shortness of breath. Erythema appears about the same as 

yesterday, has not spread past drawn markings on the leg. Nurse notes increased 

drainage.


Review of Systems


General:  No Chills


Pulmonary:  No Dyspnea, No Cough


Cardiovascular:  No: Chest Pain, Palpitations


Gastrointestinal:  No: Nausea, Vomiting, Abdominal Pain


Musculoskeletal:  foot pain





Focused Exam


Lactate Level


20 16:36: Lactic Acid Level 1.48





Objective


Exam


Last Set of Vital Signs





Vital Signs








  Date Time  Temp Pulse Resp B/P (MAP) Pulse Ox O2 Delivery O2 Flow Rate FiO2


 


20 03:59 36.1 94 16 117/71 (86) 92 Nasal Cannula 1.00 





Capillary Refill : Less Than 3 Seconds


I&O











Intake and Output 


 


 20





 00:00


 


Intake Total 1410 ml


 


Balance 1410 ml


 


 


 


Intake Oral 650 ml


 


IV Total 760 ml


 


# Voids 2


 


Daily Weight Change No





 No








General:  Alert, Oriented X3, No Acute Distress


HEENT:  EOMI, Mucous Memb Moist/Pink


Lungs:  Clear to Auscultation


Heart:  Regular Rate, No Murmurs


Abdomen:  Soft, No Tenderness


Extremities:  Other (dressing in place to right foot, soaked with serosanginous 

drainage at time of exam)


Skin:  Other (erythema and streaking to RLE, does not spread past drawn lines, )





Results/Procedures


Lab


Laboratory Tests


20 16:36: 


White Blood Count 14.0H, Red Blood Count 3.78L, Hemoglobin 11.8L, Hematocrit 37L

, Mean Corpuscular Volume 99, Mean Corpuscular Hemoglobin 31, Mean Corpuscular 

Hemoglobin Concent 32, Red Cell Distribution Width 16.1H, Platelet Count 129L, 

Mean Platelet Volume 12.3H, Neutrophils (%) (Auto) 81H, Lymphocytes (%) (Auto) 

11L, Monocytes (%) (Auto) 6, Eosinophils (%) (Auto) 2, Basophils (%) (Auto) 0, 

Neutrophils # (Auto) 11.3H, Lymphocytes # (Auto) 1.6, Monocytes # (Auto) 0.8, 

Eosinophils # (Auto) 0.3, Basophils # (Auto) 0.1, Sodium Level 137, Potassium 

Level 4.9, Chloride Level 102, Carbon Dioxide Level 27, Anion Gap 8, Blood Urea 

Nitrogen 23H, Creatinine 1.15, Estimat Glomerular Filtration Rate > 60, 

BUN/Creatinine Ratio 20, Glucose Level 177H, Lactic Acid Level 1.48, Calcium 

Level 9.5, Corrected Calcium 9.7, Total Bilirubin 0.4, Aspartate Amino Transf 

(AST/SGOT) 63H, Alanine Aminotransferase (ALT/SGPT) 73H, Alkaline Phosphatase 

131, Total Protein 7.7, Albumin 3.8


20 20:43: Glucometer 221H


20 05:13: 


White Blood Count 11.5H, Red Blood Count 3.45L, Hemoglobin 10.8L, Hematocrit 35L

, Mean Corpuscular Volume 100H, Mean Corpuscular Hemoglobin 31, Mean Corpuscular

Hemoglobin Concent 31L, Red Cell Distribution Width 15.8H, Platelet Count 130, 

Mean Platelet Volume 12.5H, Neutrophils (%) (Auto) 78H, Lymphocytes (%) (Auto) 

14, Monocytes (%) (Auto) 6, Eosinophils (%) (Auto) 3, Basophils (%) (Auto) 0, 

Neutrophils # (Auto) 9.0H, Lymphocytes # (Auto) 1.6, Monocytes # (Auto) 0.7, 

Eosinophils # (Auto) 0.3, Basophils # (Auto) 0.0, Sodium Level 137, Potassium 

Level 5.2H, Chloride Level 103, Carbon Dioxide Level 25, Anion Gap 9, Blood Urea

Nitrogen 26H, Creatinine 1.20, Estimat Glomerular Filtration Rate 60, 

BUN/Creatinine Ratio 22, Glucose Level 179H, Calcium Level 9.0, Corrected 

Calcium 9.6, Total Bilirubin 0.3, Aspartate Amino Transf (AST/SGOT) 43H, Alanine

Aminotransferase (ALT/SGPT) 66H, Alkaline Phosphatase 116, Total Protein 6.8, 

Albumin 3.3


20 05:18: Glucometer 193H





Assessment/Plan


Assessment/Plan


Assessment & Plan


Cellulitis of right foot, with sepsis: wound grew MRSA, continue vanc and zosyn,

appreciate surgery recommendations





Chronic diabetic ulcer of right foot





Hyperkalemia: appears to have been a problem at prior admission, will hold ACE 

inhibitor





Diabetes Mellitus, non-insulin dependent: continue diabetic diet, sliding scale 

insulin and home insulin meds





Transaminitis: possibly infection related, will monitor, get hepatitis panel if 

indicated





Clinical Quality Measures


DVT/VTE Risk/Contraindication:


Risk Factor Score Per Nursin


RFS Level Per Nursing on Admit:  4+=Very High





MARIELY DELANEY MD 20 1241:


Supervisory-Addendum Brief


Verification & Attestation


Participated in pt care:  history, MDM, physical


Personally performed:  exam, history, MDM


Care discussed with:  Medical Student


Procedures:  n/a


I personally have seen and evaluated the patient and performed the history and 

physical exam. I agree with the student documentation.











TEOFILO JEAN,MED STUDENT     2020 09:34


MARIELY DELANEY MD              2020 12:41

## 2020-02-07 NOTE — NUR
CM/SS:  Visited with pt as per consult related to pt being unable to afford to purchase 
diabetic supplies, and as to his plan for discharge.



Plan:  Plan to be determined, pt lives at home and would like to return there possibly with 
in home or home care.



Summary:  Pt seems frustrated as he is waiting to hear from surgeon as to plan for his foot. 
 He reports being hungry.  Pt reports living alone and that he may need help in the home.  
He is unsure if he has Home Community Based services through [facility].  This worker will 
follow up.

## 2020-02-07 NOTE — PROGRESS NOTE-POST OPERATIVE
Post-Operative Progess Note


Surgeon (s)/Assistant (s)


Surgeon


DOUGLAS BROOKS DO


Assistant:  CAITLYN Lima MSIII





Pre-Operative Diagnosis


right foot ulcer/abscess





Post-Operative Diagnosis





Necrotizing Fasciitis





Procedure & Operative Findings


Date of Procedure


2/7/20


Procedure Performed/Findings


Debridement down to bone of Right foot appx 8 x 8cm


Placement of wound VAC


Anesthesia Type


GET





Estimated Blood Loss


Estimated blood loss (mL):  less than 5ml





Specimens/Packing


Specimens Removed


necrotic tissue including muscle, fat, tendon and skin











DOUGLAS BROOKS DO                Feb 7, 2020 16:33

## 2020-02-07 NOTE — OPERATIVE REPORT
DATE OF SERVICE:  



PREOPERATIVE DIAGNOSIS:

Right foot ulcer and abscess.



POSTOPERATIVE DIAGNOSIS:

Necrotizing fasciitis of right foot.



PROCEDURE:

1.  Debridement down to bone of right foot approximately 8 cm x 8 cm.

2.  Placement of wound VAC approximately 8 cm x 8 cm.



SURGEON:

Tremayne Britt DO



FIRST ASSISTANT:

Megha Lee MS3 and Ruiz Campo MS3.



ANESTHESIA:

General endotracheal tube.



SPECIMEN:

Necrotic tissue including muscle fat tendon and skin as well as fluid as well as

culture.



BLOOD LOSS:

Less than 5 mL.



FLUIDS:

Per anesthesia.



POSTOPERATIVE CONDITION:

Stable.



INDICATION FOR PROCEDURE:

The patient is a 69-year-old male who had a fall on Monday and landed on his

right foot.  Stated the foot started getting bad and he went to wound care,

wound care admitted him to the floor, seen yesterday and then again today. 

Today, the wound looked much worse and he was elected to go to the operating

room to do some I and D and debridement.



FINDINGS:

Unfortunately, findings just looked like a necrotizing fasciitis.  There was pus

and dead tissue along the fascial planes resected to clean healthy tissue and

then wound VAC placed.



PROCEDURE NOTE:

After informed consent was obtained, the patient was brought to the operating

room.  He has been marked and timeout done on the right side, after he was

sterilely prepped and draped, then elected to incise the first 2 small areas of

necrotic tissue of necrosis and opening on the skin.  Cut with the Bovie

electrocautery and there was actually no bleeding, found some dead tissue. 

Started going wider and finally more necrotic tissue, started cutting skin,

muscle, fat and probably some tendon all the way down to fascia and right on top

of the bones taking out all the dead tissue.  This extended from just below the

nail bed of the right toe up the foot about 8 cm and then from the midline to

under about another 8 cm in diameter.  Again, cut out all the necrotic tissue

and purulent fluid, got cultures from the tissue.  Tissue was all passed off the

table.  At this point, then irrigated with pulse  with 3 liters of warm

normal saline to get some good clean tissue, had a little bit of bleeding from

the edges, which was controlled with Bovie electrocautery and at this time, I

elected to place a wound VAC to cover this, did not find any other dead tissue,

so elected to stop.  Placed the VAC sponge on to the wound and then placed

occlusive dressing over the top.  This was then hooked up to suction to the

wound VAC, low intermittent suction at 125 mmHg and there was no leak seen.  The

patient tolerated the procedure.  Sponge and needle count correct at the end of

the case.  He was then transferred to recovery room in stable condition.





Job ID: [id number]

DocumentID: [id number]

Dictated Date:  02/07/2020 18:06:27

Transcription Date: 02/07/2020 20:39:58

Dictated By: TREMAYNE BRITT DO

## 2020-02-07 NOTE — PROGRESS NOTE - SURGERY
[facility] 20 0859:


Subjective


Date Seen by a Provider:  2020


Time Seen by a Provider:  08:15


Subjective/Events-last exam


Patient states that he feels the same. Has pain in the RLE. Infection has not 

appeared to spread past the markings. Still has Infectious lymphangitis. Noted 

warmed and swelling compared to the Left Lower Extremity. Patient denies Fevers 

and chills.


Review of Systems


General:  No Chills, No Other (fevrs)


Cardiovascular:  No: Chest Pain, Palpitations, Edema


Gastrointestinal:  No: Nausea, Vomiting


Musculoskeletal:  foot pain (Right extremity due to Infection)





Focused Exam


Lactate Level


20 16:36: Lactic Acid Level 1.48





Objective


Exam





Vital Signs








  Date Time  Temp Pulse Resp B/P (MAP) Pulse Ox O2 Delivery O2 Flow Rate FiO2


 


20 03:59 36.1 94 16 117/71 (86) 92 Nasal Cannula 1.00 


 


20 23:49 36.1 95 20 150/83 (105) 93 Room Air  


 


20 20:20      Nasal Cannula 1.00 


 


20 20:00 36.9 102 18 137/77 (97) 93 Nasal Cannula 2.00 


 


20 17:52     94 Nasal Cannula 2.00 


 


20 16:00 37.0 98 20 112/77 (89) 94 Nasal Cannula 2.00 


 


20 14:15 36.3 96 20 134/83 96 Room Air  














I & O 


 


 20





 07:00


 


Intake Total 1730 ml


 


Output Total 200 ml


 


Balance 1530 ml





Capillary Refill : Less Than 3 Seconds


General Appearance:  No Apparent Distress, WD/WN


Neck:  Non Tender


Respiratory:  Chest Non Tender, Lungs Clear, No Accessory Muscle Use, No 

Respiratory Distress, Decreased Breath Sounds (bilaterally)


Cardiovascular:  Regular Rate, Rhythm, Normal Peripheral Pulses (2/4 radial 

pulse bilaterally)


Peripheral Pulses:  2+ Radial Pulses (R), 2+ Radial Pulses (L)


Gastrointestinal:  non tender, no organomegaly, no pulsatile mass, distended 

(appears to be chronic), hernia (Umbilical hernia noted. No pain reported.)


Extremity:  No Calf Tenderness, Inflammation (RLE. Does not appear to be 

spreading), Other (RLE was wrapped and was warm to palpation. Was some swelling 

compared to the left lower extremity)


Neurologic/Psychiatric:  Alert, Oriented x3


Skin:  Normal Color, Warm/Dry


Lymphatic:  Other (Infectious Lymphangitis on right medial aspect of tibia)





Results


Lab


Laboratory Tests


20 16:36: 


White Blood Count 14.0H, Red Blood Count 3.78L, Hemoglobin 11.8L, Hematocrit 37L

, Mean Corpuscular Volume 99, Mean Corpuscular Hemoglobin 31, Mean Corpuscular 

Hemoglobin Concent 32, Red Cell Distribution Width 16.1H, Platelet Count 129L, 

Mean Platelet Volume 12.3H, Neutrophils (%) (Auto) 81H, Lymphocytes (%) (Auto) 

11L, Monocytes (%) (Auto) 6, Eosinophils (%) (Auto) 2, Basophils (%) (Auto) 0, 

Neutrophils # (Auto) 11.3H, Lymphocytes # (Auto) 1.6, Monocytes # (Auto) 0.8, 

Eosinophils # (Auto) 0.3, Basophils # (Auto) 0.1, Sodium Level 137, Potassium 

Level 4.9, Chloride Level 102, Carbon Dioxide Level 27, Anion Gap 8, Blood Urea 

Nitrogen 23H, Creatinine 1.15, Estimat Glomerular Filtration Rate > 60, 

BUN/Creatinine Ratio 20, Glucose Level 177H, Lactic Acid Level 1.48, Calcium 

Level 9.5, Corrected Calcium 9.7, Total Bilirubin 0.4, Aspartate Amino Transf 

(AST/SGOT) 63H, Alanine Aminotransferase (ALT/SGPT) 73H, Alkaline Phosphatase 

131, Total Protein 7.7, Albumin 3.8


20 20:43: Glucometer 221H


20 05:13: 


White Blood Count 11.5H, Red Blood Count 3.45L, Hemoglobin 10.8L, Hematocrit 35L

, Mean Corpuscular Volume 100H, Mean Corpuscular Hemoglobin 31, Mean Corpuscular

Hemoglobin Concent 31L, Red Cell Distribution Width 15.8H, Platelet Count 130, 

Mean Platelet Volume 12.5H, Neutrophils (%) (Auto) 78H, Lymphocytes (%) (Auto) 

14, Monocytes (%) (Auto) 6, Eosinophils (%) (Auto) 3, Basophils (%) (Auto) 0, 

Neutrophils # (Auto) 9.0H, Lymphocytes # (Auto) 1.6, Monocytes # (Auto) 0.7, 

Eosinophils # (Auto) 0.3, Basophils # (Auto) 0.0, Sodium Level 137, Potassium 

Level 5.2H, Chloride Level 103, Carbon Dioxide Level 25, Anion Gap 9, Blood Urea

Nitrogen 26H, Creatinine 1.20, Estimat Glomerular Filtration Rate 60, 

BUN/Creatinine Ratio 22, Glucose Level 179H, Calcium Level 9.0, Corrected 

Calcium 9.6, Total Bilirubin 0.3, Aspartate Amino Transf (AST/SGOT) 43H, Alanine

Aminotransferase (ALT/SGPT) 66H, Alkaline Phosphatase 116, Total Protein 6.8, 

Albumin 3.3


20 05:18: Glucometer 193H





Assessment/Plan


Right 1st phalanx infection on foot


Chronic ulcer on plantar aspect


CT and X-ray did not show osteomyelitis


MRI possibly indicated due to increase sensitivity for Osteomyelitis








Continue antibiotics


Wound debridement





Clinical Quality Measures


DVT/VTE Risk/Contraindication:


Risk Factor Score Per Nursin


RFS Level Per Nursing on Admit:  4+=Very High





TREMAYNE BROOKS DO 20 1503:


Subjective


Time Seen by a Provider:  12:41


Subjective/Events-last exam


Pt seen and examined, states pain has not changed but it is not as red.  "It 

looks awful".


Review of Systems


Pulmonary:  No Dyspnea, No Cough





Objective


Exam


General Appearance:  No Apparent Distress, WD/WN


Respiratory:  Lungs Clear, No Accessory Muscle Use, No Respiratory Distress


Cardiovascular:  Regular Rate, Rhythm, No Murmur


Gastrointestinal:  hernia (Large incarcerated Umbilical hernia, no pain 

reported.)


Extremity:  Other (RLE with swelling and erythema,  3 spots of necrotic tissue 

on foot by toes)





Assessment/Plan


Right foot, near first toe has 3 areas of necrotic tissue





Plan is to take pt to the OR for I&D with debridement of necrotic tissue.  Will 

cut out all dead tissue and leave the areas open; if bone is visible will get a 

biopsy.  Pt understood, states he had similar done recently by Dr. Hong.  

Risks and complications


discussed; not limited to pain, bleeding, infection, scar.  All questions answ

ered to his satisfaction.





Supervisory-Addendum Brief


Verification & Attestation


Participated in pt care:  history, MDM, physical


Personally performed:  exam, history, MDM


Care discussed with:  Medical Student


Procedures:  n/a


Verification and Attestation of Medical Student E/M Service





A medical student performed and documented this service in my presence. I 

reviewed and verified all information documented by the medical student and made

modifications to such information, when appropriate. I personally performed the 

physical exam and medical decision making. 





 Tremayne Brooks, 2020,15:04











PETER IRELAND [facility]     2020 08:59


TREMAYNE BROOKS DO                2020 15:03

## 2020-02-07 NOTE — NUR
VANCOMYCIN DOSING



SCR 1.2; ADJ BW 78 KG; CRCL ~ 64; BOLUS VANC 20 MG/KG X 92 KG ~ 2 GM (GIVEN YESTERDAY); VANC 
15 MG/KG X 92 ~ 1500 MG Q12H

CHECK TROUGH LEVEL 2/8 0700

HOLD DOSE AND CONTACT PHARMACY IF LEVEL IS GREATER THAN 20

## 2020-02-08 VITALS — SYSTOLIC BLOOD PRESSURE: 139 MMHG | DIASTOLIC BLOOD PRESSURE: 79 MMHG

## 2020-02-08 VITALS — SYSTOLIC BLOOD PRESSURE: 162 MMHG | DIASTOLIC BLOOD PRESSURE: 82 MMHG

## 2020-02-08 VITALS — SYSTOLIC BLOOD PRESSURE: 116 MMHG | DIASTOLIC BLOOD PRESSURE: 64 MMHG

## 2020-02-08 VITALS — SYSTOLIC BLOOD PRESSURE: 161 MMHG | DIASTOLIC BLOOD PRESSURE: 79 MMHG

## 2020-02-08 VITALS — DIASTOLIC BLOOD PRESSURE: 80 MMHG | SYSTOLIC BLOOD PRESSURE: 134 MMHG

## 2020-02-08 VITALS — SYSTOLIC BLOOD PRESSURE: 127 MMHG | DIASTOLIC BLOOD PRESSURE: 79 MMHG

## 2020-02-08 LAB
ALBUMIN SERPL-MCNC: 3.5 GM/DL (ref 3.2–4.5)
ALP SERPL-CCNC: 136 U/L (ref 40–136)
ALT SERPL-CCNC: 65 U/L (ref 0–55)
BASOPHILS # BLD AUTO: 0.1 10^3/UL (ref 0–0.1)
BASOPHILS NFR BLD AUTO: 1 % (ref 0–10)
BILIRUB SERPL-MCNC: 0.3 MG/DL (ref 0.1–1)
BUN/CREAT SERPL: 17
CALCIUM SERPL-MCNC: 9 MG/DL (ref 8.5–10.1)
CHLORIDE SERPL-SCNC: 102 MMOL/L (ref 98–107)
CO2 SERPL-SCNC: 27 MMOL/L (ref 21–32)
CREAT SERPL-MCNC: 1.17 MG/DL (ref 0.6–1.3)
EOSINOPHIL # BLD AUTO: 0.3 10^3/UL (ref 0–0.3)
EOSINOPHIL NFR BLD AUTO: 3 % (ref 0–10)
ERYTHROCYTE [DISTWIDTH] IN BLOOD BY AUTOMATED COUNT: 16 % (ref 10–14.5)
GFR SERPLBLD BASED ON 1.73 SQ M-ARVRAT: > 60 ML/MIN
GLUCOSE SERPL-MCNC: 147 MG/DL (ref 70–105)
HCT VFR BLD CALC: 34 % (ref 40–54)
HGB BLD-MCNC: 10.7 G/DL (ref 13.3–17.7)
LYMPHOCYTES # BLD AUTO: 1.8 X 10^3 (ref 1–4)
LYMPHOCYTES NFR BLD AUTO: 19 % (ref 12–44)
MANUAL DIFFERENTIAL PERFORMED BLD QL: NO
MCH RBC QN AUTO: 31 PG (ref 25–34)
MCHC RBC AUTO-ENTMCNC: 31 G/DL (ref 32–36)
MCV RBC AUTO: 101 FL (ref 80–99)
MONOCYTES # BLD AUTO: 0.5 X 10^3 (ref 0–1)
MONOCYTES NFR BLD AUTO: 5 % (ref 0–12)
NEUTROPHILS # BLD AUTO: 6.6 X 10^3 (ref 1.8–7.8)
NEUTROPHILS NFR BLD AUTO: 72 % (ref 42–75)
PLATELET # BLD: 140 10^3/UL (ref 130–400)
PMV BLD AUTO: 12 FL (ref 7.4–10.4)
POTASSIUM SERPL-SCNC: 4.8 MMOL/L (ref 3.6–5)
PROT SERPL-MCNC: 7.1 GM/DL (ref 6.4–8.2)
SODIUM SERPL-SCNC: 138 MMOL/L (ref 135–145)
VANCOMYCIN TROUGH SERPL-MCNC: 22.4 UG/ML (ref 10–20)
WBC # BLD AUTO: 9.2 10^3/UL (ref 4.3–11)

## 2020-02-08 RX ADMIN — PREGABALIN SCH MG: 150 CAPSULE ORAL at 20:44

## 2020-02-08 RX ADMIN — PREGABALIN SCH MG: 150 CAPSULE ORAL at 08:12

## 2020-02-08 RX ADMIN — CLOPIDOGREL BISULFATE SCH MG: 75 TABLET, FILM COATED ORAL at 08:10

## 2020-02-08 RX ADMIN — INSULIN ASPART SCH UNIT: 100 INJECTION, SOLUTION INTRAVENOUS; SUBCUTANEOUS at 11:34

## 2020-02-08 RX ADMIN — ASPIRIN 81 MG CHEWABLE TABLET SCH MG: 81 TABLET CHEWABLE at 08:12

## 2020-02-08 RX ADMIN — SODIUM CHLORIDE SCH MLS/HR: 900 INJECTION, SOLUTION INTRAVENOUS at 06:16

## 2020-02-08 RX ADMIN — Medication SCH EA: at 08:12

## 2020-02-08 RX ADMIN — OXYCODONE HYDROCHLORIDE AND ACETAMINOPHEN PRN TAB: 10; 325 TABLET ORAL at 20:43

## 2020-02-08 RX ADMIN — GLIPIZIDE SCH MG: 5 TABLET ORAL at 06:15

## 2020-02-08 RX ADMIN — INSULIN ASPART SCH UNIT: 100 INJECTION, SOLUTION INTRAVENOUS; SUBCUTANEOUS at 21:31

## 2020-02-08 RX ADMIN — INSULIN ASPART SCH UNIT: 100 INJECTION, SOLUTION INTRAVENOUS; SUBCUTANEOUS at 06:16

## 2020-02-08 RX ADMIN — OXYCODONE HYDROCHLORIDE AND ACETAMINOPHEN PRN TAB: 10; 325 TABLET ORAL at 10:24

## 2020-02-08 RX ADMIN — PREGABALIN SCH MG: 150 CAPSULE ORAL at 12:19

## 2020-02-08 RX ADMIN — SODIUM CHLORIDE SCH MLS/HR: 900 INJECTION INTRAVENOUS at 21:31

## 2020-02-08 RX ADMIN — SODIUM CHLORIDE SCH MLS/HR: 900 INJECTION INTRAVENOUS at 16:11

## 2020-02-08 RX ADMIN — INSULIN ASPART SCH UNIT: 100 INJECTION, SOLUTION INTRAVENOUS; SUBCUTANEOUS at 16:11

## 2020-02-08 RX ADMIN — VANCOMYCIN HYDROCHLORIDE SCH MLS/HR: 500 INJECTION, POWDER, LYOPHILIZED, FOR SOLUTION INTRAVENOUS at 08:38

## 2020-02-08 RX ADMIN — OXYCODONE HYDROCHLORIDE AND ACETAMINOPHEN PRN TAB: 10; 325 TABLET ORAL at 15:44

## 2020-02-08 RX ADMIN — NORTRIPTYLINE HYDROCHLORIDE SCH MG: 25 CAPSULE ORAL at 20:44

## 2020-02-08 RX ADMIN — LINAGLIPTIN SCH MG: 5 TABLET, FILM COATED ORAL at 08:10

## 2020-02-08 RX ADMIN — OXYCODONE HYDROCHLORIDE AND ACETAMINOPHEN PRN TAB: 10; 325 TABLET ORAL at 06:16

## 2020-02-08 RX ADMIN — HYDROCHLOROTHIAZIDE SCH MG: 25 TABLET ORAL at 08:10

## 2020-02-08 RX ADMIN — VANCOMYCIN HYDROCHLORIDE SCH MLS/HR: 500 INJECTION, POWDER, LYOPHILIZED, FOR SOLUTION INTRAVENOUS at 20:44

## 2020-02-08 RX ADMIN — ENOXAPARIN SODIUM SCH MG: 100 INJECTION SUBCUTANEOUS at 21:31

## 2020-02-08 NOTE — PROGRESS NOTE - HOSPITALIST
Subjective


HPI/CC On Admission


Date Seen by Provider:  2020


Time Seen by Provider:  11:30


Subjective/Events-last exam


Wound culture noted


Vanc maintained


DC Zosyn 


Pseudomonas so added Cefepime


No pain is reported today since debridement yesterday


Family at bedside


Wound vac in place





Review of Systems


Musculoskeletal:  foot pain





Focused Exam


Lactate Level


20 16:36: Lactic Acid Level 1.48








Objective


Exam


Vital Signs





Vital Signs








  Date Time  Temp Pulse Resp B/P (MAP) Pulse Ox O2 Delivery O2 Flow Rate FiO2


 


20 13:59     86 Room Air  


 


20 12:00 36.5 88 18 139/79 (99)   2.00 





Capillary Refill : Less Than 3 SecondsLess Than 3 Seconds


General Appearance:  No Apparent Distress, WD/WN, Chronically ill


Respiratory:  Chest Non Tender, No Accessory Muscle Use, No Respiratory 

Distress, Crackles, Decreased Breath Sounds


Cardiovascular:  Regular Rate, Rhythm, No Edema, No Gallop, No JVD, No Murmur, 

Normal Peripheral Pulses


Neurologic/Psychiatric:  Alert, Oriented x3, No Motor/Sensory Deficits, Normal 

Mood/Affect





Results/Procedures


Lab


Laboratory Tests


20 07:39








Patient resulted labs reviewed.





Assessment/Plan


Assessment and Plan


Assess & Plan/Chief Complaint


Assessment:


Right wound Pseudomonas and MRSA s/p debridement POD # 1


COPD


Crackles on lungs


Right foot pain





Plan:


Nebs


Add Cefepime for Pseudomonas until sensitivity is completed


Lovenox DVT PPx





Diagnosis/Problems


Diagnosis/Problems





(1) Diabetic ulcer of right foot


Status:  Chronic


Qualifiers:  


   Diabetes mellitus type:  type 2  


(2) Diabetes type 2, uncontrolled


Status:  Chronic


(3) Hypertension


Status:  Chronic


Qualifiers:  


   Hypertension type:  essential hypertension  Qualified Codes:  I10 - Essential

(primary) hypertension


(4) Smoker


Status:  Chronic





Clinical Quality Measures


DVT/VTE Risk/Contraindication:


Risk Factor Score Per Nursin


RFS Level Per Nursing on Admit:  4+=Very High











JASEN DAY DO                 2020 13:10

## 2020-02-08 NOTE — PROGRESS NOTE - SURGERY
PETER IRELAND [facility] 20 0917:


Subjective


Date Seen by a Provider:  2020


Time Seen by a Provider:  08:35


Subjective/Events-last exam


Patient states that the pain is much better. Patient denies any fevers or 

chills. Extremity does look to be to red, but does not appear to be spreading.


Review of Systems


General:  No Chills, No Other (fevers)


Cardiovascular:  No: Chest Pain, Edema


Gastrointestinal:  Abdominal Pain (Hernia); No: Nausea, Vomiting


Musculoskeletal:  leg pain (Right Extremity due to infection)


Neurological:  Other (Tingling in extremities due to comorbidities)





Focused Exam


Lactate Level


20 16:36: Lactic Acid Level 1.48





Objective


Exam





Vital Signs








  Date Time  Temp Pulse Resp B/P (MAP) Pulse Ox O2 Delivery O2 Flow Rate FiO2


 


20 04:38 36.4 92 16 127/79 (95) 90 Nasal Cannula 2.00 


 


20 00:00 36.2 106 20 116/64 (81) 91 Nasal Cannula 2.00 


 


20 23:05     92 Nasal Cannula 2.00 


 


20 20:00      Room Air  


 


20 20:00 37.0 101 18 102/72 (82) 92 Nasal Cannula 3.00 


 


20 17:25 36.4 94 20 140/77 (98) 93 Nasal Cannula 3.00 


 


20 17:10 36.3  20 135/81 (99) 96 Nasal Cannula 3 


 


20 17:10      Nasal Cannula 3 


 


20 17:00      Nasal Cannula 3 


 


20 17:00   20 140/85 (103) 96 Nasal Cannula 3 


 


20 16:50   20 143/93 (110) 94 Room Air  


 


20 16:45      OxyMask 3 


 


20 16:40   20 140/92 (108) 97 OxyMask 3 


 


20 16:30      OxyMask 6 


 


20 16:30   20 146/68 (94) 98 OxyMask 6 


 


20 16:18      OxyMask 6 


 


20 16:18 36.3  16 137/70 (92) 98 OxyMask 6 


 


20 12:14     91 Nasal Cannula 1.00 


 


20 12:00 36.4 98 18 161/78 (105) 91 Nasal Cannula 1.00 














I & O 


 


 20





 07:00


 


Intake Total 1597 ml


 


Output Total 1175 ml


 


Balance 422 ml





Capillary Refill : Less Than 3 SecondsLess Than 3 Seconds


General Appearance:  No Apparent Distress, WD/WN


Respiratory:  Lungs Clear, No Accessory Muscle Use, No Respiratory Distress, D

ecreased Breath Sounds, Wheezing (on expiration)


Cardiovascular:  Regular Rate, Rhythm, No Murmur


Peripheral Pulses:  2+ Radial Pulses (R), 2+ Radial Pulses (L)


Gastrointestinal:  distended, tenderness (around umbilicus), hernia (Large 

incarcerated Umbilical hernia, no pain reported.)


Extremity:  Inflammation (RLE), Other (RLE with swelling and erythema,  1st 

phalynx is red, Infection does not appear to be spreading. There is tenderness 

to palpation. )


Neurologic/Psychiatric:  Alert, Oriented x3


Skin:  Normal Color, Warm/Dry


Lymphatic:  Other (Infectious Lymphangitis on right medial aspect of tibia)





Results


Lab


Laboratory Tests


20 11:06: Glucometer 181H


20 17:24: Glucometer 166H


20 20:15: Glucometer 236H


20 05:36: Glucometer 157H


20 07:39: 


White Blood Count 9.2, Red Blood Count 3.40L, Hemoglobin 10.7L, Hematocrit 34L, 

Mean Corpuscular Volume 101H, Mean Corpuscular Hemoglobin 31, Mean Corpuscular 

Hemoglobin Concent 31L, Red Cell Distribution Width 16.0H, Platelet Count 140, 

Mean Platelet Volume 12.0H, Neutrophils (%) (Auto) 72, Lymphocytes (%) (Auto) 

19, Monocytes (%) (Auto) 5, Eosinophils (%) (Auto) 3, Basophils (%) (Auto) 1, 

Neutrophils # (Auto) 6.6, Lymphocytes # (Auto) 1.8, Monocytes # (Auto) 0.5, 

Eosinophils # (Auto) 0.3, Basophils # (Auto) 0.1, Sodium Level 138, Potassium 

Level 4.8, Chloride Level 102, Carbon Dioxide Level 27, Anion Gap 9, Blood Urea 

Nitrogen 20H, Creatinine 1.17, Estimat Glomerular Filtration Rate > 60, 

BUN/Creatinine Ratio 17, Glucose Level 147H, Calcium Level 9.0, Corrected Calciu

m 9.4, Total Bilirubin 0.3, Aspartate Amino Transf (AST/SGOT) 42H, Alanine 

Aminotransferase (ALT/SGPT) 65H, Alkaline Phosphatase 136, Total Protein 7.1, 

Albumin 3.5, Vancomycin Level Trough 22.4H





Microbiology


20 Gram Stain - Final, Resulted


         


20 Anaerobic Culture, Resulted


         Pending


20 Surgical Culture - Preliminary, Resulted


         Staphylococcus aureus


20 Blood Culture - Preliminary, Resulted


         No growth





Assessment/Plan


Right foot, near first toe has 3 areas of necrotic tissue





Plan is to take pt to the OR for I&D with debridement of necrotic tissue.  Will 

cut out all dead tissue and leave the areas open; if bone is visible will get a 

biopsy.  Pt understood, states he had similar done recently by Dr. Hong.  

Risks and complications


discussed; not limited to pain, bleeding, infection, scar.  All questions a

nswered to his satisfaction.





Clinical Quality Measures


DVT/VTE Risk/Contraindication:


Risk Factor Score Per Nursin


RFS Level Per Nursing on Admit:  4+=Very High





TREMAYNE BROOKS DO 20 1358:


Subjective


Time Seen by a Provider:  13:15


Subjective/Events-last exam


Pt seen and examined, states he thinks he is doing better.


Review of Systems


General:  No Chills


Pulmonary:  No Dyspnea, No Cough





Objective


Exam


Extremity:  Inflammation (RLE), Other (RLE with about same swelling and erythema

as yesterday...possibly less,  1st phalynx is red, Infection does not appear to 

be spreading and there are no signs of necrotic tissue.  )





Assessment/Plan


S/P Debridement of Necrotic tissue for Necrotizing Fasciitis - does not appear 

to be spreading at this time.  May take down VAC tomorrow to get better look and

will have nurse keep an eye on foot/leg.





Supervisory-Addendum Brief


Verification & Attestation


Participated in pt care:  history, MDM, physical


Personally performed:  exam, history, MDM


Care discussed with:  Medical Student


Procedures:  n/a


Verification and Attestation of Medical Student E/M Service





A medical student performed and documented this service in my presence. I 

reviewed and verified all information documented by the medical student and made

modifications to such information, when appropriate. I personally performed the 

physical exam and medical decision making. 





 Tremayne Brooks, 2020,13:58











PETER IRELAND     2020 09:17


TREMAYNE BROOKS DO                2020 13:58

## 2020-02-08 NOTE — ANESTHESIA-GENERAL POST-OP
General


Patient Condition


Mental Status/LOC:  Same as Preop


Cardiovascular:  Satisfactory


Nausea/Vomiting:  Absent


Respiratory:  Satisfactory


Pain:  Controlled


Complications:  Absent





Post Op Complications


Complications


None





Follow Up Care/Instructions


Patient Instructions


None needed.





Anesthesia/Patient Condition


Patient Condition


Patient is doing well, no complaints, stable vital signs, no apparent adverse 

anesthesia problems.   


No complications reported per nursing.











TRINA ANDERS CRNA               Feb 8, 2020 13:24

## 2020-02-09 VITALS — DIASTOLIC BLOOD PRESSURE: 94 MMHG | SYSTOLIC BLOOD PRESSURE: 175 MMHG

## 2020-02-09 VITALS — SYSTOLIC BLOOD PRESSURE: 157 MMHG | DIASTOLIC BLOOD PRESSURE: 80 MMHG

## 2020-02-09 VITALS — DIASTOLIC BLOOD PRESSURE: 84 MMHG | SYSTOLIC BLOOD PRESSURE: 160 MMHG

## 2020-02-09 VITALS — DIASTOLIC BLOOD PRESSURE: 87 MMHG | SYSTOLIC BLOOD PRESSURE: 186 MMHG

## 2020-02-09 VITALS — DIASTOLIC BLOOD PRESSURE: 89 MMHG | SYSTOLIC BLOOD PRESSURE: 152 MMHG

## 2020-02-09 VITALS — SYSTOLIC BLOOD PRESSURE: 159 MMHG | DIASTOLIC BLOOD PRESSURE: 85 MMHG

## 2020-02-09 LAB
ALBUMIN SERPL-MCNC: 3.6 GM/DL (ref 3.2–4.5)
ALP SERPL-CCNC: 122 U/L (ref 40–136)
ALT SERPL-CCNC: 53 U/L (ref 0–55)
BASOPHILS # BLD AUTO: 0.1 10^3/UL (ref 0–0.1)
BASOPHILS NFR BLD AUTO: 1 % (ref 0–10)
BILIRUB SERPL-MCNC: 0.2 MG/DL (ref 0.1–1)
BUN/CREAT SERPL: 17
CALCIUM SERPL-MCNC: 9.3 MG/DL (ref 8.5–10.1)
CHLORIDE SERPL-SCNC: 100 MMOL/L (ref 98–107)
CO2 SERPL-SCNC: 24 MMOL/L (ref 21–32)
CREAT SERPL-MCNC: 1.09 MG/DL (ref 0.6–1.3)
EOSINOPHIL # BLD AUTO: 0.3 10^3/UL (ref 0–0.3)
EOSINOPHIL NFR BLD AUTO: 3 % (ref 0–10)
ERYTHROCYTE [DISTWIDTH] IN BLOOD BY AUTOMATED COUNT: 15.4 % (ref 10–14.5)
GFR SERPLBLD BASED ON 1.73 SQ M-ARVRAT: > 60 ML/MIN
GLUCOSE SERPL-MCNC: 235 MG/DL (ref 70–105)
HCT VFR BLD CALC: 36 % (ref 40–54)
HGB BLD-MCNC: 11.3 G/DL (ref 13.3–17.7)
LYMPHOCYTES # BLD AUTO: 1.9 X 10^3 (ref 1–4)
LYMPHOCYTES NFR BLD AUTO: 21 % (ref 12–44)
MANUAL DIFFERENTIAL PERFORMED BLD QL: NO
MCH RBC QN AUTO: 31 PG (ref 25–34)
MCHC RBC AUTO-ENTMCNC: 32 G/DL (ref 32–36)
MCV RBC AUTO: 99 FL (ref 80–99)
MONOCYTES # BLD AUTO: 0.6 X 10^3 (ref 0–1)
MONOCYTES NFR BLD AUTO: 6 % (ref 0–12)
NEUTROPHILS # BLD AUTO: 6.6 X 10^3 (ref 1.8–7.8)
NEUTROPHILS NFR BLD AUTO: 70 % (ref 42–75)
PLATELET # BLD: 146 10^3/UL (ref 130–400)
PMV BLD AUTO: 12.5 FL (ref 7.4–10.4)
POTASSIUM SERPL-SCNC: 4.7 MMOL/L (ref 3.6–5)
PROT SERPL-MCNC: 7.2 GM/DL (ref 6.4–8.2)
SODIUM SERPL-SCNC: 134 MMOL/L (ref 135–145)
WBC # BLD AUTO: 9.4 10^3/UL (ref 4.3–11)

## 2020-02-09 RX ADMIN — VANCOMYCIN HYDROCHLORIDE SCH MLS/HR: 500 INJECTION, POWDER, LYOPHILIZED, FOR SOLUTION INTRAVENOUS at 20:21

## 2020-02-09 RX ADMIN — DOCUSATE SODIUM SCH MG: 100 CAPSULE ORAL at 14:06

## 2020-02-09 RX ADMIN — ENOXAPARIN SODIUM SCH MG: 100 INJECTION SUBCUTANEOUS at 22:19

## 2020-02-09 RX ADMIN — PREGABALIN SCH MG: 150 CAPSULE ORAL at 20:19

## 2020-02-09 RX ADMIN — POLYETHYLENE GLYCOL (3350) SCH GM: 17 POWDER, FOR SOLUTION ORAL at 14:06

## 2020-02-09 RX ADMIN — HYDROCHLOROTHIAZIDE SCH MG: 25 TABLET ORAL at 09:05

## 2020-02-09 RX ADMIN — GLIPIZIDE SCH MG: 5 TABLET ORAL at 06:42

## 2020-02-09 RX ADMIN — Medication SCH EA: at 09:05

## 2020-02-09 RX ADMIN — IPRATROPIUM BROMIDE AND ALBUTEROL SULFATE SCH ML: .5; 3 SOLUTION RESPIRATORY (INHALATION) at 20:47

## 2020-02-09 RX ADMIN — NORTRIPTYLINE HYDROCHLORIDE SCH MG: 25 CAPSULE ORAL at 20:19

## 2020-02-09 RX ADMIN — ASPIRIN 81 MG CHEWABLE TABLET SCH MG: 81 TABLET CHEWABLE at 09:04

## 2020-02-09 RX ADMIN — DOCUSATE SODIUM AND SENNOSIDES SCH EA: 8.6; 5 TABLET, FILM COATED ORAL at 20:19

## 2020-02-09 RX ADMIN — DOCUSATE SODIUM AND SENNOSIDES SCH EA: 8.6; 5 TABLET, FILM COATED ORAL at 14:06

## 2020-02-09 RX ADMIN — SODIUM CHLORIDE SCH MLS/HR: 900 INJECTION INTRAVENOUS at 22:20

## 2020-02-09 RX ADMIN — OXYCODONE HYDROCHLORIDE AND ACETAMINOPHEN PRN TAB: 10; 325 TABLET ORAL at 03:30

## 2020-02-09 RX ADMIN — CLOPIDOGREL BISULFATE SCH MG: 75 TABLET, FILM COATED ORAL at 09:05

## 2020-02-09 RX ADMIN — OXYCODONE HYDROCHLORIDE AND ACETAMINOPHEN PRN TAB: 10; 325 TABLET ORAL at 11:52

## 2020-02-09 RX ADMIN — INSULIN ASPART SCH UNIT: 100 INJECTION, SOLUTION INTRAVENOUS; SUBCUTANEOUS at 21:39

## 2020-02-09 RX ADMIN — INSULIN ASPART SCH UNIT: 100 INJECTION, SOLUTION INTRAVENOUS; SUBCUTANEOUS at 11:42

## 2020-02-09 RX ADMIN — OXYCODONE HYDROCHLORIDE AND ACETAMINOPHEN PRN TAB: 10; 325 TABLET ORAL at 16:05

## 2020-02-09 RX ADMIN — POLYETHYLENE GLYCOL (3350) SCH GM: 17 POWDER, FOR SOLUTION ORAL at 20:18

## 2020-02-09 RX ADMIN — IPRATROPIUM BROMIDE AND ALBUTEROL SULFATE SCH ML: .5; 3 SOLUTION RESPIRATORY (INHALATION) at 00:41

## 2020-02-09 RX ADMIN — OXYCODONE HYDROCHLORIDE AND ACETAMINOPHEN PRN TAB: 10; 325 TABLET ORAL at 20:20

## 2020-02-09 RX ADMIN — INSULIN ASPART SCH UNIT: 100 INJECTION, SOLUTION INTRAVENOUS; SUBCUTANEOUS at 18:16

## 2020-02-09 RX ADMIN — DOCUSATE SODIUM SCH MG: 100 CAPSULE ORAL at 20:19

## 2020-02-09 RX ADMIN — METFORMIN HYDROCHLORIDE SCH MG: 500 TABLET, FILM COATED ORAL at 16:05

## 2020-02-09 RX ADMIN — SODIUM CHLORIDE SCH MLS/HR: 900 INJECTION INTRAVENOUS at 16:05

## 2020-02-09 RX ADMIN — VANCOMYCIN HYDROCHLORIDE SCH MLS/HR: 500 INJECTION, POWDER, LYOPHILIZED, FOR SOLUTION INTRAVENOUS at 09:05

## 2020-02-09 RX ADMIN — IPRATROPIUM BROMIDE AND ALBUTEROL SULFATE SCH ML: .5; 3 SOLUTION RESPIRATORY (INHALATION) at 16:18

## 2020-02-09 RX ADMIN — SODIUM CHLORIDE SCH MLS/HR: 900 INJECTION INTRAVENOUS at 09:10

## 2020-02-09 RX ADMIN — INSULIN ASPART SCH UNIT: 100 INJECTION, SOLUTION INTRAVENOUS; SUBCUTANEOUS at 06:42

## 2020-02-09 RX ADMIN — IPRATROPIUM BROMIDE AND ALBUTEROL SULFATE SCH ML: .5; 3 SOLUTION RESPIRATORY (INHALATION) at 07:40

## 2020-02-09 RX ADMIN — LINAGLIPTIN SCH MG: 5 TABLET, FILM COATED ORAL at 09:05

## 2020-02-09 RX ADMIN — PREGABALIN SCH MG: 150 CAPSULE ORAL at 12:59

## 2020-02-09 RX ADMIN — PREGABALIN SCH MG: 150 CAPSULE ORAL at 09:05

## 2020-02-09 RX ADMIN — METFORMIN HYDROCHLORIDE SCH MG: 500 TABLET, FILM COATED ORAL at 06:42

## 2020-02-09 RX ADMIN — SODIUM CHLORIDE SCH MLS/HR: 900 INJECTION INTRAVENOUS at 03:31

## 2020-02-09 RX ADMIN — LACTULOSE SCH GM: 20 SOLUTION ORAL at 20:20

## 2020-02-09 NOTE — PROGRESS NOTE - SURGERY
[facility] 20 1311:


Subjective


Date Seen by a Provider:  2020


Time Seen by a Provider:  12:10


Subjective/Events-last exam


Patient seen and examined. Patient states he is doing better and is having less 

pain. Patient denies fevers and chills.


Review of Systems


General:  No Chills, No Other (maribell)


Pulmonary:  No Dyspnea, No Cough


Cardiovascular:  No: Chest Pain, Palpitations


Gastrointestinal:  No: Nausea, Vomiting, Abdominal Pain


Neurological:  Numbness (when sitting for long periods of time), Other (Tingling

in LE when sitting for long periods of time)





Focused Exam


Lactate Level


20 16:36: Lactic Acid Level 1.48





Objective


Exam





Vital Signs








  Date Time  Temp Pulse Resp B/P (MAP) Pulse Ox O2 Delivery O2 Flow Rate FiO2


 


20 12:00 36.2 92 20 175/94 (121) 91 Room Air  


 


20 08:00     93 Nasal Cannula 2.00 


 


20 08:00 36.0 101 20 186/87 (120) 94 Room Air  


 


20 04:00 36.8 99 18 157/80 (105) 93 Room Air  


 


20 00:43      Nasal Cannula 2.00 


 


20 00:00 37.1 99 18 152/89 (110) 93 Room Air  


 


20 20:00 36.7 100 20 162/82 (108) 93 Nasal Cannula 2.00 


 


20 20:00      Room Air  


 


20 16:00 36.6 95 20 161/79 (106) 95 Nasal Cannula 2.00 


 


20 13:59     86 Room Air  














I & O 


 


 20





 07:00


 


Intake Total 3080 ml


 


Output Total 2105 ml


 


Balance 975 ml





Capillary Refill : Less Than 3 SecondsGreater Than 3 Seconds


General Appearance:  No Apparent Distress, WD/WN, Chronically ill


Respiratory:  Chest Non Tender, No Accessory Muscle Use, No Respiratory 

Distress, Accessory Muscle Use, Crackles, Decreased Breath Sounds


Cardiovascular:  Regular Rate, Rhythm, No Murmur, Normal Peripheral Pulses (2/4 

radial pulse bilaterally)


Peripheral Pulses:  2+ Radial Pulses (R), 2+ Radial Pulses (L)


Gastrointestinal:  non tender; No soft; distended, hernia (Large incarcerated 

Umbilical hernia, no pain reported.)


Extremity:  Inflammation (Inflammation and redness is decreased at the medial 

tibia. ), Other (no signs of tissue necrosis at the 1st phalynx. Overall much 

improved.   )


Neurologic/Psychiatric:  Alert, Oriented x3, No Motor/Sensory Deficits, Normal 

Mood/Affect


Skin:  Normal Color, Warm/Dry





Results


Lab


Laboratory Tests


20 15:55: Glucometer 243H


20 19:03: Vancomycin Level Trough 15.6


20 20:27: Glucometer 201H


20 05:31: Glucometer 288H


20 06:18: 


White Blood Count 9.4, Red Blood Count 3.62L, Hemoglobin 11.3L, Hematocrit 36L, 

Mean Corpuscular Volume 99, Mean Corpuscular Hemoglobin 31, Mean Corpuscular 

Hemoglobin Concent 32, Red Cell Distribution Width 15.4H, Platelet Count 146, 

Mean Platelet Volume 12.5H, Neutrophils (%) (Auto) 70, Lymphocytes (%) (Auto) 

21, Monocytes (%) (Auto) 6, Eosinophils (%) (Auto) 3, Basophils (%) (Auto) 1, 

Neutrophils # (Auto) 6.6, Lymphocytes # (Auto) 1.9, Monocytes # (Auto) 0.6, 

Eosinophils # (Auto) 0.3, Basophils # (Auto) 0.1, Sodium Level 134L, Potassium 

Level 4.7, Chloride Level 100, Carbon Dioxide Level 24, Anion Gap 10, Blood Urea

Nitrogen 18, Creatinine 1.09, Estimat Glomerular Filtration Rate > 60, 

BUN/Creatinine Ratio 17, Glucose Level 235H, Calcium Level 9.3, Corrected 

Calcium 9.6, Total Bilirubin 0.2, Aspartate Amino Transf (AST/SGOT) 24, Alanine 

Aminotransferase (ALT/SGPT) 53, Alkaline Phosphatase 122, Total Protein 7.2, 

Albumin 3.6


20 11:07: Glucometer 120H





Microbiology


20 Gram Stain - Final, Resulted


         


20 Anaerobic Culture, Resulted


         Pending


20 Surgical Culture - Preliminary, Resulted


         Staphylococcus aureus


         Pseudomonas aeruginosa


20 Blood Culture - Preliminary, Resulted


         No growth





Assessment/Plan


S/P Debridement of Necrotic tissue for Necrotizing Fasciitis - 


Does not appear to be spreading. Looks to be improved overall.


Will remove wound vac tomorrow and look to remove any dead tissue .





Clinical Quality Measures


DVT/VTE Risk/Contraindication:


Risk Factor Score Per Nursin


RFS Level Per Nursing on Admit:  4+=Very High





DOUGLAS BRITT DO 20 1346:


Subjective


Time Seen by a Provider:  12:26


Subjective/Events-last exam


Pt seen and examined, thinks he is doing better today.





Objective


Exam


Extremity:  Other (no signs of tissue necrosis at the 1st phalynx. above ankle 

has almost no erythema, there is some on top of foot.  No signs of necrotic 

tissue  )





Assessment/Plan


Looks like the Necrotizing fasciitis has been kept in check, will takedown wound

VAC tomorrow.  May need some debridement, will make NPO after midnight.





Supervisory-Addendum Brief


Verification & Attestation


Participated in pt care:  history, MDM, physical


Personally performed:  exam, history, MDM


Care discussed with:  Medical Student


Procedures:  n/a


Verification and Attestation of Medical Student E/M Service





A medical student performed and documented this service in my presence. I 

reviewed and verified all information documented by the medical student and made

modifications to such information, when appropriate. I personally performed the 

physical exam and medical decision making. 





 Douglas Britt, 2020,13:48











PETER IRELAND     2020 13:11


DOUGLAS BRITT DO                2020 13:46

## 2020-02-09 NOTE — PROGRESS NOTE - HOSPITALIST
Subjective


HPI/CC On Admission


Date Seen by Provider:  2020


Time Seen by Provider:  11:00


Subjective/Events-last exam


Dr Britt pleased with wound vac output


Changing wound vac tomorrow 


May need additional debridement


No pain is reported


No BM yet so ordered meds





Review of Systems


General:  Fatigue


Musculoskeletal:  leg pain





Focused Exam


Lactate Level








Objective


Exam


Vital Signs





Vital Signs








  Date Time  Temp Pulse Resp B/P (MAP) Pulse Ox O2 Delivery O2 Flow Rate FiO2


 


20 16:18     92 Room Air  


 


20 16:00 35.8 95 18 159/85 (109)    


 


20 08:00       2.00 





Capillary Refill : Less Than 3 SecondsGreater Than 3 Seconds


General Appearance:  No Apparent Distress, WD/WN, Chronically ill


Respiratory:  Chest Non Tender, Lungs Clear, Normal Breath Sounds, No Accessory 

Muscle Use, No Respiratory Distress


Cardiovascular:  Regular Rate, Rhythm, No Edema, No Gallop, No JVD, No Murmur, 

Normal Peripheral Pulses


Extremity:  Other (wound vac right foot)


Neurologic/Psychiatric:  Alert, Oriented x3, No Motor/Sensory Deficits, Normal 

Mood/Affect





Results/Procedures


Lab


Laboratory Tests


20 06:18








Patient resulted labs reviewed.





Assessment/Plan


Assessment and Plan


Assess & Plan/Chief Complaint


Assessment:


Right wound Pseudomonas and MRSA s/p debridement POD # 2


COPD


Crackles on lungs improved today after nebs


Right foot pain





Plan:


Nebs


Add Cefepime for Pseudomonas until sensitivity is completed


Lovenox DVT PPx





Diagnosis/Problems


Diagnosis/Problems





(1) Diabetic ulcer of right foot


Status:  Chronic


Qualifiers:  


   Diabetes mellitus type:  type 2  


(2) Diabetes type 2, uncontrolled


Status:  Chronic


(3) Hypertension


Status:  Chronic


Qualifiers:  


   Hypertension type:  essential hypertension  Qualified Codes:  I10 - Essential

(primary) hypertension


(4) Smoker


Status:  Chronic





Clinical Quality Measures


DVT/VTE Risk/Contraindication:


Risk Factor Score Per Nursin


RFS Level Per Nursing on Admit:  4+=Very High











JASEN DAY DO                 2020 13:30

## 2020-02-10 VITALS — SYSTOLIC BLOOD PRESSURE: 152 MMHG | DIASTOLIC BLOOD PRESSURE: 85 MMHG

## 2020-02-10 VITALS — DIASTOLIC BLOOD PRESSURE: 89 MMHG | SYSTOLIC BLOOD PRESSURE: 176 MMHG

## 2020-02-10 VITALS — DIASTOLIC BLOOD PRESSURE: 90 MMHG | SYSTOLIC BLOOD PRESSURE: 144 MMHG

## 2020-02-10 VITALS — SYSTOLIC BLOOD PRESSURE: 132 MMHG | DIASTOLIC BLOOD PRESSURE: 74 MMHG

## 2020-02-10 VITALS — DIASTOLIC BLOOD PRESSURE: 74 MMHG | SYSTOLIC BLOOD PRESSURE: 138 MMHG

## 2020-02-10 VITALS — SYSTOLIC BLOOD PRESSURE: 141 MMHG | DIASTOLIC BLOOD PRESSURE: 75 MMHG

## 2020-02-10 LAB
ALBUMIN SERPL-MCNC: 3.8 GM/DL (ref 3.2–4.5)
ALP SERPL-CCNC: 121 U/L (ref 40–136)
ALT SERPL-CCNC: 48 U/L (ref 0–55)
BASOPHILS # BLD AUTO: 0.1 10^3/UL (ref 0–0.1)
BASOPHILS NFR BLD AUTO: 1 % (ref 0–10)
BILIRUB SERPL-MCNC: 0.3 MG/DL (ref 0.1–1)
BUN/CREAT SERPL: 17
CALCIUM SERPL-MCNC: 9.9 MG/DL (ref 8.5–10.1)
CHLORIDE SERPL-SCNC: 98 MMOL/L (ref 98–107)
CO2 SERPL-SCNC: 27 MMOL/L (ref 21–32)
CREAT SERPL-MCNC: 1.14 MG/DL (ref 0.6–1.3)
EOSINOPHIL # BLD AUTO: 0.2 10^3/UL (ref 0–0.3)
EOSINOPHIL NFR BLD AUTO: 2 % (ref 0–10)
ERYTHROCYTE [DISTWIDTH] IN BLOOD BY AUTOMATED COUNT: 15.6 % (ref 10–14.5)
GFR SERPLBLD BASED ON 1.73 SQ M-ARVRAT: > 60 ML/MIN
GLUCOSE SERPL-MCNC: 159 MG/DL (ref 70–105)
HCT VFR BLD CALC: 38 % (ref 40–54)
HGB BLD-MCNC: 11.9 G/DL (ref 13.3–17.7)
LYMPHOCYTES # BLD AUTO: 1.6 X 10^3 (ref 1–4)
LYMPHOCYTES NFR BLD AUTO: 16 % (ref 12–44)
MANUAL DIFFERENTIAL PERFORMED BLD QL: NO
MCH RBC QN AUTO: 31 PG (ref 25–34)
MCHC RBC AUTO-ENTMCNC: 31 G/DL (ref 32–36)
MCV RBC AUTO: 98 FL (ref 80–99)
MONOCYTES # BLD AUTO: 0.6 X 10^3 (ref 0–1)
MONOCYTES NFR BLD AUTO: 6 % (ref 0–12)
NEUTROPHILS # BLD AUTO: 7.5 X 10^3 (ref 1.8–7.8)
NEUTROPHILS NFR BLD AUTO: 75 % (ref 42–75)
PLATELET # BLD: 171 10^3/UL (ref 130–400)
PMV BLD AUTO: 11.7 FL (ref 7.4–10.4)
POTASSIUM SERPL-SCNC: 5.4 MMOL/L (ref 3.6–5)
PROT SERPL-MCNC: 7.9 GM/DL (ref 6.4–8.2)
SODIUM SERPL-SCNC: 134 MMOL/L (ref 135–145)
WBC # BLD AUTO: 9.9 10^3/UL (ref 4.3–11)

## 2020-02-10 RX ADMIN — SODIUM CHLORIDE SCH MLS/HR: 900 INJECTION INTRAVENOUS at 17:44

## 2020-02-10 RX ADMIN — DOCUSATE SODIUM SCH MG: 100 CAPSULE ORAL at 09:33

## 2020-02-10 RX ADMIN — LACTULOSE SCH GM: 20 SOLUTION ORAL at 09:34

## 2020-02-10 RX ADMIN — SODIUM CHLORIDE SCH MLS/HR: 900 INJECTION INTRAVENOUS at 09:39

## 2020-02-10 RX ADMIN — CLOPIDOGREL BISULFATE SCH MG: 75 TABLET, FILM COATED ORAL at 09:33

## 2020-02-10 RX ADMIN — OXYCODONE HYDROCHLORIDE AND ACETAMINOPHEN PRN TAB: 10; 325 TABLET ORAL at 04:43

## 2020-02-10 RX ADMIN — METFORMIN HYDROCHLORIDE SCH MG: 500 TABLET, FILM COATED ORAL at 17:44

## 2020-02-10 RX ADMIN — OXYCODONE HYDROCHLORIDE AND ACETAMINOPHEN PRN TAB: 10; 325 TABLET ORAL at 20:02

## 2020-02-10 RX ADMIN — INSULIN ASPART SCH UNIT: 100 INJECTION, SOLUTION INTRAVENOUS; SUBCUTANEOUS at 11:42

## 2020-02-10 RX ADMIN — VANCOMYCIN HYDROCHLORIDE SCH MLS/HR: 500 INJECTION, POWDER, LYOPHILIZED, FOR SOLUTION INTRAVENOUS at 09:32

## 2020-02-10 RX ADMIN — SODIUM CHLORIDE SCH MLS/HR: 900 INJECTION INTRAVENOUS at 04:00

## 2020-02-10 RX ADMIN — VANCOMYCIN HYDROCHLORIDE SCH MLS/HR: 500 INJECTION, POWDER, LYOPHILIZED, FOR SOLUTION INTRAVENOUS at 20:02

## 2020-02-10 RX ADMIN — METFORMIN HYDROCHLORIDE SCH MG: 500 TABLET, FILM COATED ORAL at 05:54

## 2020-02-10 RX ADMIN — OXYCODONE HYDROCHLORIDE AND ACETAMINOPHEN PRN TAB: 10; 325 TABLET ORAL at 12:01

## 2020-02-10 RX ADMIN — PREGABALIN SCH MG: 150 CAPSULE ORAL at 09:34

## 2020-02-10 RX ADMIN — IPRATROPIUM BROMIDE AND ALBUTEROL SULFATE SCH ML: .5; 3 SOLUTION RESPIRATORY (INHALATION) at 21:15

## 2020-02-10 RX ADMIN — POLYETHYLENE GLYCOL (3350) SCH GM: 17 POWDER, FOR SOLUTION ORAL at 21:26

## 2020-02-10 RX ADMIN — Medication SCH EA: at 09:33

## 2020-02-10 RX ADMIN — DOCUSATE SODIUM AND SENNOSIDES SCH EA: 8.6; 5 TABLET, FILM COATED ORAL at 09:33

## 2020-02-10 RX ADMIN — POLYETHYLENE GLYCOL (3350) SCH GM: 17 POWDER, FOR SOLUTION ORAL at 09:34

## 2020-02-10 RX ADMIN — SODIUM CHLORIDE SCH MLS/HR: 900 INJECTION INTRAVENOUS at 22:16

## 2020-02-10 RX ADMIN — NORTRIPTYLINE HYDROCHLORIDE SCH MG: 25 CAPSULE ORAL at 21:26

## 2020-02-10 RX ADMIN — DOCUSATE SODIUM SCH MG: 100 CAPSULE ORAL at 21:26

## 2020-02-10 RX ADMIN — ASPIRIN 81 MG CHEWABLE TABLET SCH MG: 81 TABLET CHEWABLE at 09:33

## 2020-02-10 RX ADMIN — LACTULOSE SCH GM: 20 SOLUTION ORAL at 21:26

## 2020-02-10 RX ADMIN — DOCUSATE SODIUM AND SENNOSIDES SCH EA: 8.6; 5 TABLET, FILM COATED ORAL at 21:27

## 2020-02-10 RX ADMIN — OXYCODONE HYDROCHLORIDE AND ACETAMINOPHEN PRN TAB: 10; 325 TABLET ORAL at 15:53

## 2020-02-10 RX ADMIN — IPRATROPIUM BROMIDE AND ALBUTEROL SULFATE SCH ML: .5; 3 SOLUTION RESPIRATORY (INHALATION) at 15:50

## 2020-02-10 RX ADMIN — ENOXAPARIN SODIUM SCH MG: 100 INJECTION SUBCUTANEOUS at 22:17

## 2020-02-10 RX ADMIN — PREGABALIN SCH MG: 150 CAPSULE ORAL at 21:27

## 2020-02-10 RX ADMIN — INSULIN ASPART SCH UNIT: 100 INJECTION, SOLUTION INTRAVENOUS; SUBCUTANEOUS at 05:54

## 2020-02-10 RX ADMIN — HYDROCHLOROTHIAZIDE SCH MG: 25 TABLET ORAL at 09:33

## 2020-02-10 RX ADMIN — GLIPIZIDE SCH MG: 5 TABLET ORAL at 05:54

## 2020-02-10 RX ADMIN — INSULIN ASPART SCH UNIT: 100 INJECTION, SOLUTION INTRAVENOUS; SUBCUTANEOUS at 17:44

## 2020-02-10 RX ADMIN — IPRATROPIUM BROMIDE AND ALBUTEROL SULFATE SCH ML: .5; 3 SOLUTION RESPIRATORY (INHALATION) at 10:21

## 2020-02-10 RX ADMIN — INSULIN ASPART SCH UNIT: 100 INJECTION, SOLUTION INTRAVENOUS; SUBCUTANEOUS at 21:27

## 2020-02-10 RX ADMIN — PREGABALIN SCH MG: 150 CAPSULE ORAL at 12:13

## 2020-02-10 RX ADMIN — LINAGLIPTIN SCH MG: 5 TABLET, FILM COATED ORAL at 09:33

## 2020-02-10 NOTE — NUR
Wound vac alarming with "blockage" alert.  Tubing leading from wound to connection changed 
(tubing from connection to container flushed and cleared of drainage).  Op-site dressing 
applied.  No leak noted.  Will continue to monitor.

## 2020-02-10 NOTE — PROGRESS NOTE - SURGERY
[facility] 2/10/20 0815:


Subjective


Date Seen by a Provider:  Feb 10, 2020


Time Seen by a Provider:  07:12


Subjective/Events-last exam


Patient seen and examined. Patient recently took a pain pill and said pain was 

controlled. Denied fevers and chills.


Review of Systems


General:  No Chills, No Other (fevers)


Pulmonary:  No Dyspnea, No Cough


Cardiovascular:  No: Chest Pain, Palpitations


Gastrointestinal:  Abdominal Pain (Minimal in RLQ); No: Nausea, Vomiting





Objective


Exam





Vital Signs








  Date Time  Temp Pulse Resp B/P (MAP) Pulse Ox O2 Delivery O2 Flow Rate FiO2


 


2/10/20 04:00 36.2 100 20 144/90 (108) 92 Room Air  


 


2/10/20 00:00 36.6 90 22 152/85 (107) 93 Room Air  


 


20 20:48     93 Room Air  


 


20 20:00 36.3 96 22 160/84 (109) 91 Room Air  


 


20 20:00      Room Air  


 


20 16:18     92 Room Air  


 


20 16:00 35.8 95 18 159/85 (109) 95 Room Air  


 


20 12:00 36.2 92 20 175/94 (121) 91 Room Air  














I & O 


 


 2/10/20





 07:00


 


Intake Total 1460 ml


 


Output Total 2425 ml


 


Balance -965 ml





Capillary Refill : Less Than 3 SecondsLess Than 3 Seconds


General Appearance:  No Apparent Distress, WD/WN, Chronically ill


Respiratory:  Chest Non Tender, No Accessory Muscle Use, No Respiratory 

Distress, Crackles, Decreased Breath Sounds


Cardiovascular:  Regular Rate, Rhythm, No Murmur, Normal Peripheral Pulses (2.4 

radial pulse bilaterally)


Peripheral Pulses:  2+ Radial Pulses (R), 2+ Radial Pulses (L)


Gastrointestinal:  non tender; No soft; distended, hernia (Large incarcerated 

Umbilical hernia, no pain reported.)


Extremity:  No Calf Tenderness, Other (wound vac right foot, 1st phalanx and f

oot is erythematous, decreased swelling in the right lower extremity)


Neurologic/Psychiatric:  Alert, Oriented x3, Normal Mood/Affect


Skin:  Normal Color, Warm/Dry





Results


Lab


Laboratory Tests


20 11:07: Glucometer 120H


20 16:06: Glucometer 177H


20 20:16: Glucometer 195H


2/10/20 05:48: Glucometer 177H


2/10/20 07:20: 


White Blood Count 9.9, Red Blood Count 3.89L, Hemoglobin 11.9L, Hematocrit 38L, 

Mean Corpuscular Volume 98, Mean Corpuscular Hemoglobin 31, Mean Corpuscular 

Hemoglobin Concent 31L, Red Cell Distribution Width 15.6H, Platelet Count 171, 

Mean Platelet Volume 11.7H, Neutrophils (%) (Auto) 75, Lymphocytes (%) (Auto) 

16, Monocytes (%) (Auto) 6, Eosinophils (%) (Auto) 2, Basophils (%) (Auto) 1, 

Neutrophils # (Auto) 7.5, Lymphocytes # (Auto) 1.6, Monocytes # (Auto) 0.6, 

Eosinophils # (Auto) 0.2, Basophils # (Auto) 0.1





Microbiology


20 Gram Stain - Final, Resulted


         


20 Anaerobic Culture, Resulted


         Pending


20 Surgical Culture - Preliminary, Resulted


         Staphylococcus aureus


         Pseudomonas aeruginosa


20 Blood Culture - Preliminary, Resulted


         No growth





Assessment/Plan


Remove wound vac today and remove dead tissue





Clinical Quality Measures


DVT/VTE Risk/Contraindication:


Risk Factor Score Per Nursin


RFS Level Per Nursing on Admit:  4+=Very High





TREMAYNE BRITT DO 20 0920:


Subjective


Time Seen by a Provider:  15:45


Subjective/Events-last exam


Pt seen and examined, states he is doing well.





Objective


Exam


Extremity:  Other (wound vac right foot, 1st phalanx has minimal erythema, 

decreased swelling in the right lower extremity, no erythema on leg. )





Assessment/Plan


S/P debridemnt for Necrotizing Fasciitis





Wound VAC taken down, no spreading necrotic tissue appeared to be healing.  The 

leg looks better.  Continue wound VAC and ABX, can go home with home wound VAC 

once set up.





Supervisory-Addendum Brief


Verification & Attestation


Participated in pt care:  history, MDM, physical


Personally performed:  exam, history, MDM


Care discussed with:  Medical Student


Procedures:  n/a


Verification and Attestation of Medical Student E/M Service





A medical student performed and documented this service in my presence. I 

reviewed and verified all information documented by the medical student and made

modifications to such information, when appropriate. I personally performed the 

physical exam and medical decision making. 





 Tremayne Britt, 2020,09:19











PETER IRELAND    Feb 10, 2020 08:15


TREMAYNE BRITT DO               2020 09:20

## 2020-02-10 NOTE — PROGRESS NOTE - HOSPITALIST
Subjective


HPI/CC On Admission


Date Seen by Provider:  Feb 10, 2020


Time Seen by Provider:  10:30


Subjective/Events-last exam


Pt doing very with right foot wound 


Wound vac still in place 


Tolerating IV antibiotics 


Bowels not moving and I did give him multiple laxatives yesterday 


Overall proceeding on and progressing well





Review of Systems


General:  Fatigue


Gastrointestinal:  Constipation


Musculoskeletal:  foot pain





Objective


Exam


Vital Signs





Vital Signs








  Date Time  Temp Pulse Resp B/P (MAP) Pulse Ox O2 Delivery O2 Flow Rate FiO2


 


2/10/20 20:11 36.8 104 20 138/74 (95) 95 Room Air  


 


20 08:00       2.00 





Capillary Refill : Less Than 3 SecondsLess Than 3 Seconds


General Appearance:  No Apparent Distress, WD/WN


Respiratory:  Chest Non Tender, Lungs Clear, Normal Breath Sounds, No Accessory 

Muscle Use, No Respiratory Distress


Cardiovascular:  Regular Rate, Rhythm, No Edema, No Gallop, No JVD, No Murmur, 

Normal Peripheral Pulses


Neurologic/Psychiatric:  Alert, Oriented x3, No Motor/Sensory Deficits, Normal 

Mood/Affect





Results/Procedures


Lab


Laboratory Tests


2/10/20 07:20








Patient resulted labs reviewed.





Assessment/Plan


Assessment and Plan


Assess & Plan/Chief Complaint


Assessment:


Right wound Pseudomonas and MRSA s/p debridement POD # 3


COPD


Crackles on lungs improved today after nebs


Right foot pain





Plan:


Nebs


Add Cefepime for Pseudomonas until sensitivity is completed


Lovenox DVT PPx





Diagnosis/Problems


Diagnosis/Problems





(1) Diabetic ulcer of right foot


Status:  Chronic


Qualifiers:  


   Diabetes mellitus type:  type 2  


(2) Diabetes type 2, uncontrolled


Status:  Chronic


(3) Hypertension


Status:  Chronic


Qualifiers:  


   Hypertension type:  essential hypertension  Qualified Codes:  I10 - Essential

(primary) hypertension


(4) Smoker


Status:  Chronic





Clinical Quality Measures


DVT/VTE Risk/Contraindication:


Risk Factor Score Per Nursin


RFS Level Per Nursing on Admit:  4+=Very High











JASEN DAY DO                Feb 10, 2020 12:02

## 2020-02-10 NOTE — NUR
Wound vac continues to alarm "blockage" alert.  Black Granufoam and tubing changed at this 
time in an attempt to keep wound vac in place.  Wound vac op-sites applied as needed to 
promote seal.  Will continue to monitor.

## 2020-02-11 VITALS — DIASTOLIC BLOOD PRESSURE: 64 MMHG | SYSTOLIC BLOOD PRESSURE: 106 MMHG

## 2020-02-11 VITALS — SYSTOLIC BLOOD PRESSURE: 133 MMHG | DIASTOLIC BLOOD PRESSURE: 88 MMHG

## 2020-02-11 VITALS — SYSTOLIC BLOOD PRESSURE: 153 MMHG | DIASTOLIC BLOOD PRESSURE: 78 MMHG

## 2020-02-11 VITALS — SYSTOLIC BLOOD PRESSURE: 157 MMHG | DIASTOLIC BLOOD PRESSURE: 75 MMHG

## 2020-02-11 VITALS — DIASTOLIC BLOOD PRESSURE: 80 MMHG | SYSTOLIC BLOOD PRESSURE: 133 MMHG

## 2020-02-11 VITALS — DIASTOLIC BLOOD PRESSURE: 74 MMHG | SYSTOLIC BLOOD PRESSURE: 132 MMHG

## 2020-02-11 VITALS — DIASTOLIC BLOOD PRESSURE: 72 MMHG | SYSTOLIC BLOOD PRESSURE: 150 MMHG

## 2020-02-11 LAB
ALBUMIN SERPL-MCNC: 3.6 GM/DL (ref 3.2–4.5)
ALP SERPL-CCNC: 99 U/L (ref 40–136)
ALT SERPL-CCNC: 38 U/L (ref 0–55)
BASOPHILS # BLD AUTO: 0.1 10^3/UL (ref 0–0.1)
BASOPHILS NFR BLD AUTO: 1 % (ref 0–10)
BILIRUB SERPL-MCNC: 0.3 MG/DL (ref 0.1–1)
BUN/CREAT SERPL: 18
CALCIUM SERPL-MCNC: 9.5 MG/DL (ref 8.5–10.1)
CHLORIDE SERPL-SCNC: 98 MMOL/L (ref 98–107)
CO2 SERPL-SCNC: 26 MMOL/L (ref 21–32)
CREAT SERPL-MCNC: 1.2 MG/DL (ref 0.6–1.3)
EOSINOPHIL # BLD AUTO: 0.2 10^3/UL (ref 0–0.3)
EOSINOPHIL NFR BLD AUTO: 2 % (ref 0–10)
ERYTHROCYTE [DISTWIDTH] IN BLOOD BY AUTOMATED COUNT: 15.7 % (ref 10–14.5)
GFR SERPLBLD BASED ON 1.73 SQ M-ARVRAT: 60 ML/MIN
GLUCOSE SERPL-MCNC: 157 MG/DL (ref 70–105)
HCT VFR BLD CALC: 35 % (ref 40–54)
HGB BLD-MCNC: 11.1 G/DL (ref 13.3–17.7)
LYMPHOCYTES # BLD AUTO: 1.9 X 10^3 (ref 1–4)
LYMPHOCYTES NFR BLD AUTO: 18 % (ref 12–44)
MANUAL DIFFERENTIAL PERFORMED BLD QL: NO
MCH RBC QN AUTO: 31 PG (ref 25–34)
MCHC RBC AUTO-ENTMCNC: 32 G/DL (ref 32–36)
MCV RBC AUTO: 98 FL (ref 80–99)
MONOCYTES # BLD AUTO: 0.6 X 10^3 (ref 0–1)
MONOCYTES NFR BLD AUTO: 6 % (ref 0–12)
NEUTROPHILS # BLD AUTO: 8 X 10^3 (ref 1.8–7.8)
NEUTROPHILS NFR BLD AUTO: 74 % (ref 42–75)
PLATELET # BLD: 161 10^3/UL (ref 130–400)
PMV BLD AUTO: 11.7 FL (ref 7.4–10.4)
POTASSIUM SERPL-SCNC: 5.2 MMOL/L (ref 3.6–5)
PROT SERPL-MCNC: 7.3 GM/DL (ref 6.4–8.2)
SODIUM SERPL-SCNC: 134 MMOL/L (ref 135–145)
WBC # BLD AUTO: 10.7 10^3/UL (ref 4.3–11)

## 2020-02-11 RX ADMIN — PREGABALIN SCH MG: 150 CAPSULE ORAL at 20:35

## 2020-02-11 RX ADMIN — PREGABALIN SCH MG: 150 CAPSULE ORAL at 08:45

## 2020-02-11 RX ADMIN — PREGABALIN SCH MG: 150 CAPSULE ORAL at 13:04

## 2020-02-11 RX ADMIN — OXYCODONE HYDROCHLORIDE AND ACETAMINOPHEN PRN TAB: 10; 325 TABLET ORAL at 16:37

## 2020-02-11 RX ADMIN — ASPIRIN 81 MG CHEWABLE TABLET SCH MG: 81 TABLET CHEWABLE at 08:44

## 2020-02-11 RX ADMIN — HYDROCHLOROTHIAZIDE SCH MG: 25 TABLET ORAL at 08:44

## 2020-02-11 RX ADMIN — INSULIN ASPART SCH UNIT: 100 INJECTION, SOLUTION INTRAVENOUS; SUBCUTANEOUS at 11:00

## 2020-02-11 RX ADMIN — OXYCODONE HYDROCHLORIDE AND ACETAMINOPHEN PRN TAB: 10; 325 TABLET ORAL at 00:25

## 2020-02-11 RX ADMIN — IPRATROPIUM BROMIDE AND ALBUTEROL SULFATE SCH ML: .5; 3 SOLUTION RESPIRATORY (INHALATION) at 13:57

## 2020-02-11 RX ADMIN — LINAGLIPTIN SCH MG: 5 TABLET, FILM COATED ORAL at 08:44

## 2020-02-11 RX ADMIN — OXYCODONE HYDROCHLORIDE AND ACETAMINOPHEN PRN TAB: 10; 325 TABLET ORAL at 04:44

## 2020-02-11 RX ADMIN — VANCOMYCIN HYDROCHLORIDE SCH MLS/HR: 500 INJECTION, POWDER, LYOPHILIZED, FOR SOLUTION INTRAVENOUS at 18:20

## 2020-02-11 RX ADMIN — OXYCODONE HYDROCHLORIDE AND ACETAMINOPHEN PRN TAB: 10; 325 TABLET ORAL at 20:35

## 2020-02-11 RX ADMIN — LACTULOSE SCH GM: 20 SOLUTION ORAL at 08:45

## 2020-02-11 RX ADMIN — METFORMIN HYDROCHLORIDE SCH MG: 500 TABLET, FILM COATED ORAL at 06:21

## 2020-02-11 RX ADMIN — ENOXAPARIN SODIUM SCH MG: 100 INJECTION SUBCUTANEOUS at 22:19

## 2020-02-11 RX ADMIN — POLYETHYLENE GLYCOL (3350) SCH GM: 17 POWDER, FOR SOLUTION ORAL at 20:34

## 2020-02-11 RX ADMIN — CLOPIDOGREL BISULFATE SCH MG: 75 TABLET, FILM COATED ORAL at 08:45

## 2020-02-11 RX ADMIN — LACTULOSE SCH GM: 20 SOLUTION ORAL at 20:34

## 2020-02-11 RX ADMIN — POLYETHYLENE GLYCOL (3350) SCH GM: 17 POWDER, FOR SOLUTION ORAL at 08:45

## 2020-02-11 RX ADMIN — INSULIN ASPART SCH UNIT: 100 INJECTION, SOLUTION INTRAVENOUS; SUBCUTANEOUS at 06:18

## 2020-02-11 RX ADMIN — OXYCODONE HYDROCHLORIDE AND ACETAMINOPHEN PRN TAB: 10; 325 TABLET ORAL at 08:44

## 2020-02-11 RX ADMIN — OXYCODONE HYDROCHLORIDE AND ACETAMINOPHEN PRN TAB: 10; 325 TABLET ORAL at 13:02

## 2020-02-11 RX ADMIN — SODIUM CHLORIDE SCH MLS/HR: 900 INJECTION INTRAVENOUS at 10:05

## 2020-02-11 RX ADMIN — IPRATROPIUM BROMIDE AND ALBUTEROL SULFATE SCH ML: .5; 3 SOLUTION RESPIRATORY (INHALATION) at 19:09

## 2020-02-11 RX ADMIN — METFORMIN HYDROCHLORIDE SCH MG: 500 TABLET, FILM COATED ORAL at 16:34

## 2020-02-11 RX ADMIN — SODIUM CHLORIDE SCH MLS/HR: 900 INJECTION INTRAVENOUS at 04:44

## 2020-02-11 RX ADMIN — SODIUM CHLORIDE SCH MLS/HR: 900 INJECTION INTRAVENOUS at 22:19

## 2020-02-11 RX ADMIN — INSULIN ASPART SCH UNIT: 100 INJECTION, SOLUTION INTRAVENOUS; SUBCUTANEOUS at 22:19

## 2020-02-11 RX ADMIN — DOCUSATE SODIUM AND SENNOSIDES SCH EA: 8.6; 5 TABLET, FILM COATED ORAL at 20:34

## 2020-02-11 RX ADMIN — GLIPIZIDE SCH MG: 5 TABLET ORAL at 06:21

## 2020-02-11 RX ADMIN — NORTRIPTYLINE HYDROCHLORIDE SCH MG: 25 CAPSULE ORAL at 20:35

## 2020-02-11 RX ADMIN — DOCUSATE SODIUM AND SENNOSIDES SCH EA: 8.6; 5 TABLET, FILM COATED ORAL at 08:44

## 2020-02-11 RX ADMIN — Medication SCH EA: at 08:44

## 2020-02-11 RX ADMIN — INSULIN ASPART SCH UNIT: 100 INJECTION, SOLUTION INTRAVENOUS; SUBCUTANEOUS at 16:21

## 2020-02-11 RX ADMIN — DOCUSATE SODIUM SCH MG: 100 CAPSULE ORAL at 08:44

## 2020-02-11 RX ADMIN — DOCUSATE SODIUM SCH MG: 100 CAPSULE ORAL at 20:35

## 2020-02-11 RX ADMIN — SODIUM CHLORIDE SCH MLS/HR: 900 INJECTION INTRAVENOUS at 16:35

## 2020-02-11 NOTE — PROGRESS NOTE - HOSPITALIST
Subjective


HPI/CC On Admission


Date Seen by Provider:  2020


Time Seen by Provider:  10:00


Subjective/Events-last exam


No BM yet, multiple laxatives given. 


Soap suds enema ordered. 


Remains on a wound vac on his right foot. 


Appreciate Dr. Britt's expertise. 


Maintain on treatment.





Review of Systems


General:  Fatigue


Musculoskeletal:  foot pain





Objective


Exam


Vital Signs





Vital Signs








  Date Time  Temp Pulse Resp B/P (MAP) Pulse Ox O2 Delivery O2 Flow Rate FiO2


 


20 19:21 36.5 91 20 132/74 (93) 93 Room Air  


 


20 08:00       2.00 





Capillary Refill : Less Than 3 SecondsLess Than 3 Seconds


General Appearance:  No Apparent Distress, WD/WN


Respiratory:  Chest Non Tender, Lungs Clear, Normal Breath Sounds, No Accessory 

Muscle Use, No Respiratory Distress


Cardiovascular:  Regular Rate, Rhythm, No Edema, No Gallop, No JVD, No Murmur, 

Normal Peripheral Pulses


Neurologic/Psychiatric:  Alert, Oriented x3, No Motor/Sensory Deficits, Normal 

Mood/Affect





Results/Procedures


Lab


Laboratory Tests


20 05:31








Patient resulted labs reviewed.





Assessment/Plan


Assessment and Plan


Assess & Plan/Chief Complaint


Assessment:


Right wound Pseudomonas and MRSA s/p debridement POD # 4


COPD


Crackles on lungs improved today after nebs


Right foot pain





Plan:


Nebs


Add Cefepime for Pseudomonas until sensitivity is completed


Lovenox DVT PPx





Diagnosis/Problems


Diagnosis/Problems





(1) Diabetic ulcer of right foot


Status:  Chronic


Qualifiers:  


   Diabetes mellitus type:  type 2  


(2) Diabetes type 2, uncontrolled


Status:  Chronic


(3) Hypertension


Status:  Chronic


Qualifiers:  


   Hypertension type:  essential hypertension  Qualified Codes:  I10 - Essential

(primary) hypertension


(4) Smoker


Status:  Chronic





Clinical Quality Measures


DVT/VTE Risk/Contraindication:


Risk Factor Score Per Nursin


RFS Level Per Nursing on Admit:  4+=Very High











JASEN DAY DO                2020 11:34

## 2020-02-11 NOTE — NUR
CM/SS:  Visited with pt as to plan for discharge 



DME:  Pt will need wheelchair 



Plan:  Pt to discharge to home with  Wound Vac, and [facility] 



Summary:  Pt is educated on home care and medical equipment. He is given a choice list.  He 
does choose [facility] as well as [facility] for medical equipment as they are 
both in [place].   



Pt is encouraged to wait one more day, so that all services can be arranged prior to his 
discharge.  Pt does report he left AMA at his last hospital stay.  Pt is willing to remain 
per the recommendation of the ONEIDA Lopez and this worker.

## 2020-02-11 NOTE — PROGRESS NOTE - SURGERY
Subjective


Date Seen by a Provider:  2020


Time Seen by a Provider:  08:25


Subjective/Events-last exam


Patient seen and examined. Patient reports feeling a little better. Patient's 

foots looks much better. 








Dr. Britt





Pt seen and examined at 11:36, states he is doing fine but worried about the 

little bit of bleeding from foot.


Review of Systems


General:  No Chills, No Other (fevers)


Pulmonary:  No Dyspnea, No Cough


Cardiovascular:  No: Chest Pain, Edema


Gastrointestinal:  No: Nausea, Vomiting, Abdominal Pain





Objective


Exam





Vital Signs








  Date Time  Temp Pulse Resp B/P (MAP) Pulse Ox O2 Delivery O2 Flow Rate FiO2


 


20 08:00 36.2 101 22 153/78 (103) 93 Room Air  


 


20 04:00 36.3 92 21 150/72 (98) 94 Room Air  


 


20 00:00 36.4 96 20 133/80 (97) 97 Room Air  


 


2/10/20 21:13     92 Room Air  


 


2/10/20 20:20      Room Air  


 


2/10/20 20:11 36.8 104 20 138/74 (95) 95 Room Air  


 


2/10/20 17:08 36.7 103 18 132/74 (93) 92 Room Air  


 


2/10/20 16:00 36.7 103 18 132/74 (93) 92 Room Air  


 


2/10/20 15:50     94 Room Air  


 


2/10/20 12:00 36.4 94 18 176/89 (118) 94 Room Air  


 


2/10/20 10:21      Room Air  














I & O 


 


 20





 07:00


 


Intake Total 1322.5 ml


 


Output Total 2050 ml


 


Balance -727.5 ml





Capillary Refill : Less Than 3 SecondsLess Than 3 Seconds


General Appearance:  No Apparent Distress, Chronically ill


Respiratory:  Chest Non Tender, Lungs Clear, No Accessory Muscle Use, No 

Respiratory Distress, Decreased Breath Sounds (bilaterally)


Cardiovascular:  Regular Rate, Rhythm, No Murmur


Peripheral Pulses:  2+ Radial Pulses (R), 2+ Radial Pulses (L)


Gastrointestinal:  non tender; No soft; distended, hernia (Large incarcerated 

Umbilical hernia, no pain reported.)


Extremity:  No Calf Tenderness, Other (Foot is continuing to improve. Redness is

noticed over the platar aspect of the first phalanx. DIstal tibia minimal 

erythema noted. )


Neurologic/Psychiatric:  Alert, Oriented x3, Normal Mood/Affect


Skin:  Normal Color, Warm/Dry





Results


Lab


Laboratory Tests


2/10/20 11:35: Glucometer 158H


2/10/20 17:06: Glucometer 306H


2/10/20 18:00: Vancomycin Level Trough 18.5


2/10/20 20:01: Glucometer 218H


20 05:31: 


White Blood Count 10.7, Red Blood Count 3.56L, Hemoglobin 11.1L, Hematocrit 35L,

Mean Corpuscular Volume 98, Mean Corpuscular Hemoglobin 31, Mean Corpuscular 

Hemoglobin Concent 32, Red Cell Distribution Width 15.7H, Platelet Count 161, 

Mean Platelet Volume 11.7H, Neutrophils (%) (Auto) 74, Lymphocytes (%) (Auto) 

18, Monocytes (%) (Auto) 6, Eosinophils (%) (Auto) 2, Basophils (%) (Auto) 1, 

Neutrophils # (Auto) 8.0H, Lymphocytes # (Auto) 1.9, Monocytes # (Auto) 0.6, 

Eosinophils # (Auto) 0.2, Basophils # (Auto) 0.1, Sodium Level 134L, Potassium 

Level 5.2H, Chloride Level 98, Carbon Dioxide Level 26, Anion Gap 10, Blood Urea

Nitrogen 22H, Creatinine 1.20, Estimat Glomerular Filtration Rate 60, 

BUN/Creatinine Ratio 18, Glucose Level 157H, Calcium Level 9.5, Corrected 

Calcium 9.8, Total Bilirubin 0.3, Aspartate Amino Transf (AST/SGOT) 20, Alanine 

Aminotransferase (ALT/SGPT) 38, Alkaline Phosphatase 99, Total Protein 7.3, 

Albumin 3.6





Microbiology


20 Gram Stain - Final, Resulted


         


20 Anaerobic Culture - Preliminary, Resulted


         No anaerobes isolated


20 Surgical Culture - Preliminary, Resulted


         Staphylococcus aureus


         Pseudomonas aeruginosa


         See Comments


20 Blood Culture - Preliminary, Resulted


         No growth





Assessment/Plan


Right Foot Necrotizing fasciitis. 


Infection





Set up a wound vac that patient can take home. 





Set up home wound VAC and change MWF.  Foot looks good, the bleeding is normal 

and means healthy tissue is coming into wound.





Clinical Quality Measures


DVT/VTE Risk/Contraindication:


Risk Factor Score Per Nursin


RFS Level Per Nursing on Admit:  4+=Very High





Supervisory-Addendum Brief


Verification & Attestation


Participated in pt care:  history, MDM, physical


Personally performed:  exam, history, MDM


Care discussed with:  Medical Student


Procedures:  n/a


Verification and Attestation of Medical Student E/M Service





A medical student performed and documented this service in my presence. I 

reviewed and verified all information documented by the medical student and made

modifications to such information, when appropriate. I personally performed the 

physical exam and medical decision making. 





 Tremayne Britt, 2020,14:29











PETER IRELAND    2020 09:02


TREMAYNE BRITT DO               2020 14:29

## 2020-02-12 VITALS — SYSTOLIC BLOOD PRESSURE: 140 MMHG | DIASTOLIC BLOOD PRESSURE: 89 MMHG

## 2020-02-12 LAB
ALBUMIN SERPL-MCNC: 3.8 GM/DL (ref 3.2–4.5)
ALP SERPL-CCNC: 102 U/L (ref 40–136)
ALT SERPL-CCNC: 29 U/L (ref 0–55)
BASOPHILS # BLD AUTO: 0.1 10^3/UL (ref 0–0.1)
BASOPHILS NFR BLD AUTO: 1 % (ref 0–10)
BILIRUB SERPL-MCNC: 0.3 MG/DL (ref 0.1–1)
BUN/CREAT SERPL: 20
CALCIUM SERPL-MCNC: 9.5 MG/DL (ref 8.5–10.1)
CHLORIDE SERPL-SCNC: 97 MMOL/L (ref 98–107)
CO2 SERPL-SCNC: 24 MMOL/L (ref 21–32)
CREAT SERPL-MCNC: 1.22 MG/DL (ref 0.6–1.3)
EOSINOPHIL # BLD AUTO: 0.2 10^3/UL (ref 0–0.3)
EOSINOPHIL NFR BLD AUTO: 2 % (ref 0–10)
ERYTHROCYTE [DISTWIDTH] IN BLOOD BY AUTOMATED COUNT: 15.7 % (ref 10–14.5)
GFR SERPLBLD BASED ON 1.73 SQ M-ARVRAT: 59 ML/MIN
GLUCOSE SERPL-MCNC: 131 MG/DL (ref 70–105)
HCT VFR BLD CALC: 34 % (ref 40–54)
HGB BLD-MCNC: 10.7 G/DL (ref 13.3–17.7)
LYMPHOCYTES # BLD AUTO: 2.3 X 10^3 (ref 1–4)
LYMPHOCYTES NFR BLD AUTO: 19 % (ref 12–44)
MANUAL DIFFERENTIAL PERFORMED BLD QL: NO
MCH RBC QN AUTO: 31 PG (ref 25–34)
MCHC RBC AUTO-ENTMCNC: 32 G/DL (ref 32–36)
MCV RBC AUTO: 97 FL (ref 80–99)
MONOCYTES # BLD AUTO: 0.6 X 10^3 (ref 0–1)
MONOCYTES NFR BLD AUTO: 5 % (ref 0–12)
NEUTROPHILS # BLD AUTO: 8.6 X 10^3 (ref 1.8–7.8)
NEUTROPHILS NFR BLD AUTO: 73 % (ref 42–75)
PLATELET # BLD: 160 10^3/UL (ref 130–400)
PMV BLD AUTO: 12.1 FL (ref 7.4–10.4)
POTASSIUM SERPL-SCNC: 5.5 MMOL/L (ref 3.6–5)
PROT SERPL-MCNC: 7.6 GM/DL (ref 6.4–8.2)
SODIUM SERPL-SCNC: 130 MMOL/L (ref 135–145)
WBC # BLD AUTO: 11.8 10^3/UL (ref 4.3–11)

## 2020-02-12 RX ADMIN — LINAGLIPTIN SCH MG: 5 TABLET, FILM COATED ORAL at 08:58

## 2020-02-12 RX ADMIN — IPRATROPIUM BROMIDE AND ALBUTEROL SULFATE SCH ML: .5; 3 SOLUTION RESPIRATORY (INHALATION) at 08:10

## 2020-02-12 RX ADMIN — HYDROCHLOROTHIAZIDE SCH MG: 25 TABLET ORAL at 08:58

## 2020-02-12 RX ADMIN — DOCUSATE SODIUM AND SENNOSIDES SCH EA: 8.6; 5 TABLET, FILM COATED ORAL at 08:59

## 2020-02-12 RX ADMIN — OXYCODONE HYDROCHLORIDE AND ACETAMINOPHEN PRN TAB: 10; 325 TABLET ORAL at 00:25

## 2020-02-12 RX ADMIN — CLOPIDOGREL BISULFATE SCH MG: 75 TABLET, FILM COATED ORAL at 08:58

## 2020-02-12 RX ADMIN — METFORMIN HYDROCHLORIDE SCH MG: 500 TABLET, FILM COATED ORAL at 06:23

## 2020-02-12 RX ADMIN — PREGABALIN SCH MG: 150 CAPSULE ORAL at 08:58

## 2020-02-12 RX ADMIN — INSULIN ASPART SCH UNIT: 100 INJECTION, SOLUTION INTRAVENOUS; SUBCUTANEOUS at 06:45

## 2020-02-12 RX ADMIN — POLYETHYLENE GLYCOL (3350) SCH GM: 17 POWDER, FOR SOLUTION ORAL at 08:59

## 2020-02-12 RX ADMIN — Medication SCH EA: at 08:58

## 2020-02-12 RX ADMIN — LACTULOSE SCH GM: 20 SOLUTION ORAL at 08:58

## 2020-02-12 RX ADMIN — GLIPIZIDE SCH MG: 5 TABLET ORAL at 06:23

## 2020-02-12 RX ADMIN — OXYCODONE HYDROCHLORIDE AND ACETAMINOPHEN PRN TAB: 10; 325 TABLET ORAL at 04:22

## 2020-02-12 RX ADMIN — DOCUSATE SODIUM SCH MG: 100 CAPSULE ORAL at 08:58

## 2020-02-12 RX ADMIN — SODIUM CHLORIDE SCH MLS/HR: 900 INJECTION INTRAVENOUS at 11:28

## 2020-02-12 RX ADMIN — SODIUM CHLORIDE SCH MLS/HR: 900 INJECTION INTRAVENOUS at 04:22

## 2020-02-12 RX ADMIN — ASPIRIN 81 MG CHEWABLE TABLET SCH MG: 81 TABLET CHEWABLE at 08:58

## 2020-02-12 RX ADMIN — OXYCODONE HYDROCHLORIDE AND ACETAMINOPHEN PRN TAB: 10; 325 TABLET ORAL at 13:57

## 2020-02-12 RX ADMIN — PREGABALIN SCH MG: 150 CAPSULE ORAL at 14:00

## 2020-02-12 NOTE — PROGRESS NOTE - SURGERY
[facility] 20 0748:


Subjective


Date Seen by a Provider:  2020


Time Seen by a Provider:  07:14


Subjective/Events-last exam


Patient seen and examined. WBC is elevated to 11.8 but patient denies fevers and

chills and reports feeling much better. Patient also reported a bowel movement.


Review of Systems


General:  No Chills, No Other (fevers)


Pulmonary:  No Dyspnea; Cough


Cardiovascular:  No: Chest Pain, Palpitations


Gastrointestinal:  No: Nausea, Vomiting, Abdominal Pain





Objective


Exam





Vital Signs








  Date Time  Temp Pulse Resp B/P (MAP) Pulse Ox O2 Delivery O2 Flow Rate FiO2


 


20 23:22 37.0 93 20 106/64 (78) 93 Room Air  


 


20 20:40      Room Air  


 


20 19:21 36.5 91 20 132/74 (93) 93 Room Air  


 


20 19:10     92 Room Air  


 


20 15:45 37.0 89 20 157/75 (102) 96 Room Air  


 


20 12:06 36.8 97 18 133/88 (103) 92 Room Air  


 


20 10:18      Room Air  


 


20 08:00      Room Air  


 


20 08:00 36.2 101 22 153/78 (103) 93 Room Air  














I & O 


 


 20





 07:00


 


Intake Total 2330 ml


 


Output Total 2550 ml


 


Balance -220 ml





Capillary Refill : Less Than 3 SecondsLess Than 3 Seconds


General Appearance:  No Apparent Distress, WD/WN


Respiratory:  Chest Non Tender, Lungs Clear, No Accessory Muscle Use, No 

Respiratory Distress, Decreased Breath Sounds


Cardiovascular:  Regular Rate, Rhythm, No Murmur


Peripheral Pulses:  2+ Radial Pulses (R), 2+ Radial Pulses (L)


Gastrointestinal:  non tender; No soft; distended, hernia (Large incarcerated 

Umbilical hernia, no pain reported.)


Extremity:  No Calf Tenderness, Other (Foot remains relatively similar to 

yesterday (). Erythema noted but swelling is decreased. )


Neurologic/Psychiatric:  Alert, Oriented x3, No Motor/Sensory Deficits, Normal 

Mood/Affect


Skin:  Normal Color, Warm/Dry





Results


Lab


Laboratory Tests


20 11:03: Glucometer 129H


20 15:45: Glucometer 156H


20 20:44: Glucometer 206H


20 05:40: 


White Blood Count 11.8H, Red Blood Count 3.49L, Hemoglobin 10.7L, Hematocrit 34L

, Mean Corpuscular Volume 97, Mean Corpuscular Hemoglobin 31, Mean Corpuscular 

Hemoglobin Concent 32, Red Cell Distribution Width 15.7H, Platelet Count 160, 

Mean Platelet Volume 12.1H, Neutrophils (%) (Auto) 73, Lymphocytes (%) (Auto) 

19, Monocytes (%) (Auto) 5, Eosinophils (%) (Auto) 2, Basophils (%) (Auto) 1, 

Neutrophils # (Auto) 8.6H, Lymphocytes # (Auto) 2.3, Monocytes # (Auto) 0.6, 

Eosinophils # (Auto) 0.2, Basophils # (Auto) 0.1, Sodium Level 130L, Potassium 

Level 5.5H, Chloride Level 97L, Carbon Dioxide Level 24, Anion Gap 9, Blood Urea

Nitrogen 25H, Creatinine 1.22, Estimat Glomerular Filtration Rate 59, 

BUN/Creatinine Ratio 20, Glucose Level 131H, Calcium Level 9.5, Corrected 

Calcium 9.7, Total Bilirubin 0.3, Aspartate Amino Transf (AST/SGOT) 22, Alanine 

Aminotransferase (ALT/SGPT) 29, Alkaline Phosphatase 102, Total Protein 7.6, 

Albumin 3.8


20 06:43: Glucometer 145H





Microbiology


20 Gram Stain - Final, Complete


         


20 Anaerobic Culture - Final, Complete


         No anaerobes isolated


20 Surgical Culture - Final, Complete


         Staphylococcus aureus


         Pseudomonas aeruginosa


         See Comments


20 Blood Culture - Final, Complete


         No growth





Assessment/Plan


Right Foot Necrotizing fasciitis. 


Infection





Patient has received orders for wound vac. Patient can be signed off.





Clinical Quality Measures


DVT/VTE Risk/Contraindication:


Risk Factor Score Per Nursin


RFS Level Per Nursing on Admit:  4+=Very High





TREMAYNE BRITT DO 20 1254:


Subjective


Time Seen by a Provider:  12:28


Subjective/Events-last exam


Pt seen and examined, going home.





Assessment/Plan


Continue wound VAC at home, follow up in clinic in one week.





Supervisory-Addendum Brief


Verification & Attestation


Participated in pt care:  history, MDM, physical


Personally performed:  exam, history, MDM


Care discussed with:  Medical Student


Procedures:  n/a


Verification and Attestation of Medical Student E/M Service





A medical student performed and documented this service in my presence. I 

reviewed and verified all information documented by the medical student and made

modifications to such information, when appropriate. I personally performed the 

physical exam and medical decision making. 





 Tremayne Britt, 2020,12:54











PETER IRELAND [facility]    2020 07:48


TREMAYNE BRITT DO               2020 12:54

## 2020-02-12 NOTE — DISCHARGE SUMMARY
Discharge Summary


Hospital Course


Was the Problem List Reviewed?:  Yes


Problems/Dx:  


(1) Diabetic ulcer of right foot


Status:  Chronic


Qualifiers:  


   


(2) Diabetes type 2, uncontrolled


Status:  Chronic


(3) Hypertension


Status:  Chronic


Qualifiers:  


   Qualified Codes:  I10 - Essential (primary) hypertension


(4) Smoker


Status:  Chronic


Hospital Course


Date of Admission: 2020 at 14:15 


Admission Diagnosis :  





Family Physician/Provider: Kenn Mackey MD  





Date of Discharge: 20 


Discharge Diagnosis: right dm foot wound, dm, copd, smoker








Hospital Course:


Hospital Course: Pt had an uneventful but lengthy hospital course when he was 

admitted for right foot wound, placed on empiric antibiotics. He did have a 

debridement and a wound vac placement and home wound vac was arranged, [facility] will be in charge of managing that wound vac, and a wheelchair was 

ordered. He did not require any home O2, IV antibiotics were discontinued placed

on oral antibiotics of Levaquin and Doxycycline and will have a close follow up 

with [facility]. 














Labs and Pending Lab Test:


Laboratory Tests


20 15:45: Glucometer 156H


20 20:44: Glucometer 206H


20 05:40: 


White Blood Count 11.8H, Red Blood Count 3.49L, Hemoglobin 10.7L, Hematocrit 34L

, Mean Corpuscular Volume 97, Mean Corpuscular Hemoglobin 31, Mean Corpuscular 

Hemoglobin Concent 32, Red Cell Distribution Width 15.7H, Platelet Count 160, 

Mean Platelet Volume 12.1H, Neutrophils (%) (Auto) 73, Lymphocytes (%) (Auto) 

19, Monocytes (%) (Auto) 5, Eosinophils (%) (Auto) 2, Basophils (%) (Auto) 1, 

Neutrophils # (Auto) 8.6H, Lymphocytes # (Auto) 2.3, Monocytes # (Auto) 0.6, 

Eosinophils # (Auto) 0.2, Basophils # (Auto) 0.1, Sodium Level 130L, Potassium 

Level 5.5H, Chloride Level 97L, Carbon Dioxide Level 24, Anion Gap 9, Blood Urea

Nitrogen 25H, Creatinine 1.22, Estimat Glomerular Filtration Rate 59, 

BUN/Creatinine Ratio 20, Glucose Level 131H, Calcium Level 9.5, Corrected 

Calcium 9.7, Total Bilirubin 0.3, Aspartate Amino Transf (AST/SGOT) 22, Alanine 

Aminotransferase (ALT/SGPT) 29, Alkaline Phosphatase 102, Total Protein 7.6, 

Albumin 3.8


20 06:43: Glucometer 145H





Microbiology


20 Gram Stain - Final, Complete


         


20 Anaerobic Culture - Final, Complete


         No anaerobes isolated


20 Surgical Culture - Final, Complete


         Staphylococcus aureus


         Pseudomonas aeruginosa


         See Comments


20 Blood Culture - Final, Complete


         No growth





Home Meds


Active


Oxycodone-Acetaminophen  (Oxycodone HCl/Acetaminophen) 1 Each Tablet 1 Tab

PO Q4H PRN


Reported


Hydrochlorothiazide 25 Mg Tablet 25 Mg PO DAILY


Plavix (Clopidogrel Bisulfate) 75 Mg Tablet 75 Mg PO DAILY


Pregabalin 150 Mg Capsule 150 Mg PO TID


Metformin HCl 1,000 Mg Tablet 1,000 Mg PO BID WITH MEALS


Lisinopril 10 Mg Tablet 10 Mg PO DAILY


Iprat-Albut 0.5-3(2.5) mg/3 ml (Ipratropium/Albuterol Sulfate) 3 Ml Ampul.neb 3 

Ml IH BID PRN


Multivitamins (Multivitamin) 1 Each Tablet 1 Tab PO DAILY


Aspirin 81 Mg Tab.chew 81 Mg PO DAILY


Nitroglycerin 0.4 Mg Tab.subl 0.4 Mg SL UD PRN


Glipizide 10 Mg Tablet 10 Mg PO DAILY


Tradjenta (Linagliptin) 5 Mg Tablet 5 Mg PO DAILY


Nortriptyline HCl 50 Mg Capsule 50 Mg PO HS


Assessment/Pt Instructions


[facility] 1 week


Discharge Planning:  <30 minutes discharge planning





Discharge Instructions


Pneumonia Vaccine Order Indica:  Yes





Discharge Physical Examination


Vital Signs





Vital Signs








  Date Time  Temp Pulse Resp B/P (MAP) Pulse Ox O2 Delivery O2 Flow Rate FiO2


 


20 08:30 36.1 86 18 140/89 (106) 92 Room Air  


 


20 08:00       2.00 








General Appearance:  No Apparent Distress, WD/WN, Chronically ill


Allergies:  


Coded Allergies:  


     No Known Drug Allergies (Unverified , 17)





Discharge Summary


Date of Admission


2020 at 14:15


Date of Discharge





Discharge Date:  2020


Discharge Diagnosis


Assessment:


Right wound Pseudomonas and MRSA s/p debridement POD # 4


COPD


Crackles on lungs improved today after nebs


Right foot pain





Plan:


Nebs


Add Cefepime for Pseudomonas until sensitivity is completed


Lovenox DVT PPx





(1) Diabetic ulcer of right foot


Status:  Chronic


Qualifiers:  


   


(2) Diabetes type 2, uncontrolled


Status:  Chronic


(3) Hypertension


Status:  Chronic


Qualifiers:  


   Qualified Codes:  I10 - Essential (primary) hypertension


(4) Smoker


Status:  Chronic





Clinical Quality Measures


DVT/VTE Risk/Contraindication:


Risk Factor Score Per Nursin


RFS Level Per Nursing on Admit:  4+=Very High











JASEN DAY DO                2020 11:48

## 2020-02-12 NOTE — D/C HH FACE TO FACE ORDER
D/C  Face to Face Orders


Reconcile Patient Problems


Problems Reviewed?:  Yes





Instructions for Patient


[facility]


Patient Instructions/FollowUp:  


[facility] 1 week


Physician to follow Patient:  [facility]


Discharge Diet for Home:  ADA Diet


Patient Problems:  


Right foot wound


Goals for Patient:  


Complete wound vac treatment





Patient Data-Allergies,Ht & Wt


Patient Allergies:  


Coded Allergies:  


     No Known Drug Allergies (Unverified , 1/17/17)


Height (Feet):  5


Height (Inches):  8.50


Weight (Pounds):  206


Weight (Ounces):  0.8





Home Health Need/Face to Face


Date of Face to Face:  Feb 12, 2020


Clinical Findings:  Generalized weakness and fatigue, Muscle weakness, Unsteady 

gait, Wound infection


I have seen Pt face-to-face:  Yes


Discharged To:  Home


Diagnosis/Conditions:  


Right foot wound


Patient is Homebound due to:  CognItive deficits, Shortness of breath/distress


Homebound Status


   Due to the above stated illness, injury or surgical procedure (medical 

condition or diagnosis) and associated clinical findings, the patient is 

homebound because of his/her inability to leave home except with aid of a 

supportive device and/or person AND leaving the home requires a considerable and

taxing effort or is medically contraindicated.


Pt req the following assistanc:  Wheelchair





Home Health Nursing Orders


Home Health Services Order:  Nursing Services, Wound Care-Eval/Treat





Home Health Infusion Therapy


Line Start Date:  Feb 6, 2020


Certify Stmt


I certify that this patient is under my care and that I, a nurse practitioner or

a physician; a assistant working with me, had a face to face encounter that -

meets the physician face to face encounter requirements with this patient as 

dated.











JASEN DAY DO                Feb 12, 2020 11:47

## 2020-02-14 NOTE — PHYSICIAN QUERY CLARIFICATION
PQ-Uncertain Diagnosis


Admission/Discharge


Admission Date: Feb 6, 2020 at 14:15 


Discharge Date:  Feb 12, 2020 at 14:40





The medical record reflects the following clinical scenario:





History/Risk Factors:


Sepsis with underlying diabetes





Clinical Findings:


Lab WBC  14.0 on admission


Cellulitis of foot, right





Treatment:


Inj Vancomycin, Inj Cefepime





Question:





Is Sepsis a clinically valid diagnosis?


Sepsis was documented in the H&P and Progress Note on 2/7/2020 with no further 

documentation in the medical record. 





Please document a response in Progress Note or Discharge Summary.





   1.  Yes, clinically valid, condition resolved.





   2.  No, condition ruled out.





   3.  Other, with explanation of clinical findings.





   4.  Undetermined, no explanation for clinical findings.





PHYSICIAN RESPONSE


Diagnosis clinically valid:  Yes, Conditon resolved


Please remember a lack of response to the above will prompt a phone page by 

CDI/Coding staff. 





In responding to this query, please exercise your independent professional 

judgment.  The purpose of this communication is to more accurately reflect the 

complexity of your patients condition. The fact that a question is asked does 

not imply that any particular answer is desired or expected.  





Thank you for your timely response to this clarification.      


   


Requestors name: [Dada ]   





Phone # [[phone] ]








THIS PHYSICIAN QUERY FORM IS A PERMANENT PART OF THE MEDICAL RECORD











VAHE HWANG                   Feb 14, 2020 23:43


JASEN DAY DO                Feb 20, 2020 06:55

## 2020-02-19 ENCOUNTER — HOSPITAL ENCOUNTER (OUTPATIENT)
Dept: HOSPITAL 75 - WOUNDCARE | Age: 70
End: 2020-02-19
Attending: ORTHOPAEDIC SURGERY
Payer: MEDICAID

## 2020-02-19 DIAGNOSIS — B96.5: ICD-10-CM

## 2020-02-19 DIAGNOSIS — B95.62: ICD-10-CM

## 2020-02-19 DIAGNOSIS — I70.234: ICD-10-CM

## 2020-02-19 DIAGNOSIS — E11.42: ICD-10-CM

## 2020-02-19 DIAGNOSIS — M86.171: ICD-10-CM

## 2020-02-19 DIAGNOSIS — E11.621: Primary | ICD-10-CM

## 2020-02-19 DIAGNOSIS — L97.414: ICD-10-CM

## 2020-02-19 PROCEDURE — 11046 DBRDMT MUSC&/FSCA EA ADDL: CPT

## 2020-02-19 PROCEDURE — 11043 DBRDMT MUSC&/FSCA 1ST 20/<: CPT

## 2020-02-26 ENCOUNTER — HOSPITAL ENCOUNTER (OUTPATIENT)
Dept: HOSPITAL 75 - WOUNDCARE | Age: 70
End: 2020-02-26
Attending: ORTHOPAEDIC SURGERY
Payer: MEDICAID

## 2020-02-26 DIAGNOSIS — L97.416: ICD-10-CM

## 2020-02-26 DIAGNOSIS — B96.5: ICD-10-CM

## 2020-02-26 DIAGNOSIS — B95.62: ICD-10-CM

## 2020-02-26 DIAGNOSIS — M86.171: ICD-10-CM

## 2020-02-26 DIAGNOSIS — I70.234: ICD-10-CM

## 2020-02-26 DIAGNOSIS — E11.621: Primary | ICD-10-CM

## 2020-02-26 DIAGNOSIS — E11.42: ICD-10-CM

## 2020-02-26 DIAGNOSIS — E11.52: ICD-10-CM

## 2020-02-26 PROCEDURE — 11043 DBRDMT MUSC&/FSCA 1ST 20/<: CPT

## 2020-02-26 PROCEDURE — 11046 DBRDMT MUSC&/FSCA EA ADDL: CPT

## 2020-02-27 ENCOUNTER — HOSPITAL ENCOUNTER (OUTPATIENT)
Dept: HOSPITAL 75 - CARD | Age: 70
End: 2020-02-27
Attending: INTERNAL MEDICINE
Payer: MEDICAID

## 2020-02-27 VITALS — DIASTOLIC BLOOD PRESSURE: 93 MMHG | SYSTOLIC BLOOD PRESSURE: 153 MMHG

## 2020-02-27 VITALS — HEIGHT: 67.72 IN | BODY MASS INDEX: 30.42 KG/M2 | WEIGHT: 198.42 LBS

## 2020-02-27 DIAGNOSIS — I99.8: ICD-10-CM

## 2020-02-27 DIAGNOSIS — I25.10: ICD-10-CM

## 2020-02-27 DIAGNOSIS — E11.9: ICD-10-CM

## 2020-02-27 DIAGNOSIS — Z72.0: ICD-10-CM

## 2020-02-27 DIAGNOSIS — E66.9: ICD-10-CM

## 2020-02-27 DIAGNOSIS — Z01.810: Primary | ICD-10-CM

## 2020-02-27 DIAGNOSIS — I10: ICD-10-CM

## 2020-02-27 PROCEDURE — 78452 HT MUSCLE IMAGE SPECT MULT: CPT

## 2020-02-27 PROCEDURE — 93017 CV STRESS TEST TRACING ONLY: CPT

## 2020-02-27 RX ADMIN — Medication PRN ML: at 11:33

## 2020-02-27 RX ADMIN — Medication PRN ML: at 12:41

## 2020-03-04 ENCOUNTER — HOSPITAL ENCOUNTER (OUTPATIENT)
Dept: HOSPITAL 75 - WOUNDCARE | Age: 70
End: 2020-03-04
Attending: SURGERY
Payer: MEDICAID

## 2020-03-04 DIAGNOSIS — I70.234: ICD-10-CM

## 2020-03-04 DIAGNOSIS — E11.42: ICD-10-CM

## 2020-03-04 DIAGNOSIS — E11.621: Primary | ICD-10-CM

## 2020-03-04 DIAGNOSIS — E11.52: ICD-10-CM

## 2020-03-04 DIAGNOSIS — L97.416: ICD-10-CM

## 2020-03-04 PROCEDURE — 87205 SMEAR GRAM STAIN: CPT

## 2020-03-04 PROCEDURE — 87070 CULTURE OTHR SPECIMN AEROBIC: CPT

## 2020-03-04 PROCEDURE — 11043 DBRDMT MUSC&/FSCA 1ST 20/<: CPT

## 2020-03-04 PROCEDURE — 11046 DBRDMT MUSC&/FSCA EA ADDL: CPT

## 2020-03-04 PROCEDURE — 87077 CULTURE AEROBIC IDENTIFY: CPT

## 2020-03-11 ENCOUNTER — HOSPITAL ENCOUNTER (OUTPATIENT)
Dept: HOSPITAL 75 - WOUNDCARE | Age: 70
End: 2020-03-11
Attending: SURGERY
Payer: MEDICAID

## 2020-03-11 DIAGNOSIS — E11.42: ICD-10-CM

## 2020-03-11 DIAGNOSIS — E11.52: ICD-10-CM

## 2020-03-11 DIAGNOSIS — E11.621: Primary | ICD-10-CM

## 2020-03-11 DIAGNOSIS — L97.416: ICD-10-CM

## 2020-03-11 DIAGNOSIS — I70.234: ICD-10-CM

## 2020-03-11 PROCEDURE — 11043 DBRDMT MUSC&/FSCA 1ST 20/<: CPT

## 2020-03-11 PROCEDURE — 11046 DBRDMT MUSC&/FSCA EA ADDL: CPT

## 2020-03-13 ENCOUNTER — HOSPITAL ENCOUNTER (OUTPATIENT)
Dept: HOSPITAL 75 - SDC | Age: 70
Discharge: HOME | End: 2020-03-13
Attending: SURGERY
Payer: MEDICAID

## 2020-03-13 VITALS — WEIGHT: 196.21 LBS | HEIGHT: 68.5 IN | BODY MASS INDEX: 29.4 KG/M2

## 2020-03-13 VITALS — DIASTOLIC BLOOD PRESSURE: 81 MMHG | SYSTOLIC BLOOD PRESSURE: 140 MMHG

## 2020-03-13 DIAGNOSIS — E11.42: ICD-10-CM

## 2020-03-13 DIAGNOSIS — I70.234: ICD-10-CM

## 2020-03-13 DIAGNOSIS — L97.416: ICD-10-CM

## 2020-03-13 DIAGNOSIS — B96.5: ICD-10-CM

## 2020-03-13 DIAGNOSIS — B95.62: ICD-10-CM

## 2020-03-13 DIAGNOSIS — E11.621: Primary | ICD-10-CM

## 2020-03-13 PROCEDURE — 76937 US GUIDE VASCULAR ACCESS: CPT

## 2020-03-13 PROCEDURE — 36569 INSJ PICC 5 YR+ W/O IMAGING: CPT

## 2020-03-13 PROCEDURE — 96374 THER/PROPH/DIAG INJ IV PUSH: CPT

## 2020-03-18 ENCOUNTER — HOSPITAL ENCOUNTER (OUTPATIENT)
Dept: HOSPITAL 75 - WOUNDCARE | Age: 70
End: 2020-03-18
Attending: SURGERY
Payer: MEDICAID

## 2020-03-18 DIAGNOSIS — I70.234: ICD-10-CM

## 2020-03-18 DIAGNOSIS — E11.621: Primary | ICD-10-CM

## 2020-03-18 DIAGNOSIS — B95.62: ICD-10-CM

## 2020-03-18 DIAGNOSIS — L97.416: ICD-10-CM

## 2020-03-18 DIAGNOSIS — E11.42: ICD-10-CM

## 2020-03-18 DIAGNOSIS — B96.5: ICD-10-CM

## 2020-03-18 PROCEDURE — 11046 DBRDMT MUSC&/FSCA EA ADDL: CPT

## 2020-03-18 PROCEDURE — 11043 DBRDMT MUSC&/FSCA 1ST 20/<: CPT

## 2020-03-25 ENCOUNTER — HOSPITAL ENCOUNTER (OUTPATIENT)
Dept: HOSPITAL 75 - WOUNDCARE | Age: 70
End: 2020-03-25
Attending: SURGERY
Payer: MEDICAID

## 2020-03-25 DIAGNOSIS — B96.5: ICD-10-CM

## 2020-03-25 DIAGNOSIS — E11.42: ICD-10-CM

## 2020-03-25 DIAGNOSIS — B95.62: ICD-10-CM

## 2020-03-25 DIAGNOSIS — E11.621: Primary | ICD-10-CM

## 2020-03-25 DIAGNOSIS — L97.416: ICD-10-CM

## 2020-03-25 DIAGNOSIS — I70.234: ICD-10-CM

## 2020-03-25 PROCEDURE — 11046 DBRDMT MUSC&/FSCA EA ADDL: CPT

## 2020-03-25 PROCEDURE — 11043 DBRDMT MUSC&/FSCA 1ST 20/<: CPT

## 2020-04-12 ENCOUNTER — HOSPITAL ENCOUNTER (INPATIENT)
Dept: HOSPITAL 75 - ER FS | Age: 70
LOS: 22 days | Discharge: HOME HEALTH | DRG: 870 | End: 2020-05-04
Attending: FAMILY MEDICINE | Admitting: FAMILY MEDICINE
Payer: MEDICAID

## 2020-04-12 VITALS — BODY MASS INDEX: 26.48 KG/M2 | HEIGHT: 68.9 IN | WEIGHT: 178.79 LBS

## 2020-04-12 VITALS — DIASTOLIC BLOOD PRESSURE: 56 MMHG | SYSTOLIC BLOOD PRESSURE: 71 MMHG

## 2020-04-12 VITALS — SYSTOLIC BLOOD PRESSURE: 88 MMHG | DIASTOLIC BLOOD PRESSURE: 62 MMHG

## 2020-04-12 DIAGNOSIS — J44.1: ICD-10-CM

## 2020-04-12 DIAGNOSIS — R65.21: ICD-10-CM

## 2020-04-12 DIAGNOSIS — F15.10: ICD-10-CM

## 2020-04-12 DIAGNOSIS — Z89.431: ICD-10-CM

## 2020-04-12 DIAGNOSIS — N17.9: ICD-10-CM

## 2020-04-12 DIAGNOSIS — I07.1: ICD-10-CM

## 2020-04-12 DIAGNOSIS — I73.9: ICD-10-CM

## 2020-04-12 DIAGNOSIS — E78.00: ICD-10-CM

## 2020-04-12 DIAGNOSIS — E87.5: ICD-10-CM

## 2020-04-12 DIAGNOSIS — J96.01: ICD-10-CM

## 2020-04-12 DIAGNOSIS — J96.02: ICD-10-CM

## 2020-04-12 DIAGNOSIS — I48.91: ICD-10-CM

## 2020-04-12 DIAGNOSIS — J15.212: ICD-10-CM

## 2020-04-12 DIAGNOSIS — E83.39: ICD-10-CM

## 2020-04-12 DIAGNOSIS — I13.0: ICD-10-CM

## 2020-04-12 DIAGNOSIS — I44.1: ICD-10-CM

## 2020-04-12 DIAGNOSIS — A41.9: Primary | ICD-10-CM

## 2020-04-12 DIAGNOSIS — E11.65: ICD-10-CM

## 2020-04-12 DIAGNOSIS — E83.42: ICD-10-CM

## 2020-04-12 DIAGNOSIS — I50.33: ICD-10-CM

## 2020-04-12 DIAGNOSIS — E66.9: ICD-10-CM

## 2020-04-12 DIAGNOSIS — E86.0: ICD-10-CM

## 2020-04-12 DIAGNOSIS — I25.10: ICD-10-CM

## 2020-04-12 DIAGNOSIS — N18.9: ICD-10-CM

## 2020-04-12 DIAGNOSIS — F17.210: ICD-10-CM

## 2020-04-12 DIAGNOSIS — I21.A1: ICD-10-CM

## 2020-04-12 DIAGNOSIS — K92.2: ICD-10-CM

## 2020-04-12 DIAGNOSIS — Z20.828: ICD-10-CM

## 2020-04-12 DIAGNOSIS — M19.91: ICD-10-CM

## 2020-04-12 DIAGNOSIS — I45.10: ICD-10-CM

## 2020-04-12 LAB
ALBUMIN SERPL-MCNC: 3.7 GM/DL (ref 3.2–4.5)
ALP SERPL-CCNC: 91 U/L (ref 40–136)
ALT SERPL-CCNC: 16 U/L (ref 0–55)
ANISOCYTOSIS BLD QL SMEAR: SLIGHT
APAP SERPL-MCNC: < 10 UG/ML (ref 10–30)
APTT PPP: YELLOW S
BACTERIA #/AREA URNS HPF: (no result) /HPF
BARBITURATES UR QL: NEGATIVE
BASO STIPL BLD QL SMEAR: SLIGHT
BASOPHILS # BLD AUTO: 0.1 10^3/UL (ref 0–0.1)
BASOPHILS NFR BLD AUTO: 1 % (ref 0–10)
BENZODIAZ UR QL SCN: NEGATIVE
BILIRUB SERPL-MCNC: 0.2 MG/DL (ref 0.1–1)
BILIRUB UR QL STRIP: (no result)
BUN/CREAT SERPL: 13
CALCIUM SERPL-MCNC: 8 MG/DL (ref 8.5–10.1)
CHLORIDE SERPL-SCNC: 102 MMOL/L (ref 98–107)
CO2 SERPL-SCNC: 19 MMOL/L (ref 21–32)
COCAINE UR QL: NEGATIVE
CREAT SERPL-MCNC: 3.84 MG/DL (ref 0.6–1.3)
EOSINOPHIL # BLD AUTO: 0 10^3/UL (ref 0–0.3)
EOSINOPHIL NFR BLD AUTO: 0 % (ref 0–10)
ERYTHROCYTE [DISTWIDTH] IN BLOOD BY AUTOMATED COUNT: 16.6 % (ref 10–14.5)
FIBRINOGEN PPP-MCNC: (no result) MG/DL
GFR SERPLBLD BASED ON 1.73 SQ M-ARVRAT: 16 ML/MIN
GLUCOSE SERPL-MCNC: 211 MG/DL (ref 70–105)
GLUCOSE UR STRIP-MCNC: NEGATIVE MG/DL
GRAN CASTS #/AREA URNS LPF: (no result) /LPF
HCT VFR BLD CALC: 33 % (ref 40–54)
HGB BLD-MCNC: 10 G/DL (ref 13.3–17.7)
KETONES UR QL STRIP: (no result)
LEUKOCYTE ESTERASE UR QL STRIP: NEGATIVE
LYMPHOCYTES # BLD AUTO: 1.1 X 10^3 (ref 1–4)
LYMPHOCYTES NFR BLD AUTO: 8 % (ref 12–44)
MANUAL DIFFERENTIAL PERFORMED BLD QL: YES
MCH RBC QN AUTO: 31 PG (ref 25–34)
MCHC RBC AUTO-ENTMCNC: 31 G/DL (ref 32–36)
MCV RBC AUTO: 101 FL (ref 80–99)
METHADONE UR QL SCN: NEGATIVE
METHAMPHETAMINE SCREEN URINE S: POSITIVE
MONOCYTES # BLD AUTO: 0.8 X 10^3 (ref 0–1)
MONOCYTES NFR BLD AUTO: 6 % (ref 0–12)
MONOCYTES NFR BLD: 6 %
NEUTROPHILS # BLD AUTO: 12 X 10^3 (ref 1.8–7.8)
NEUTROPHILS NFR BLD AUTO: 85 % (ref 42–75)
NEUTS BAND NFR BLD MANUAL: 86 %
NITRITE UR QL STRIP: NEGATIVE
OPIATES UR QL SCN: NEGATIVE
OVALOCYTES BLD QL SMEAR: SLIGHT
OXYCODONE UR QL: POSITIVE
PH UR STRIP: 5 [PH] (ref 5–9)
PLATELET # BLD: 124 10^3/UL (ref 130–400)
PMV BLD AUTO: 13.2 FL (ref 7.4–10.4)
POIKILOCYTOSIS BLD QL SMEAR: SLIGHT
POLYCHROMASIA BLD QL SMEAR: SLIGHT
POTASSIUM SERPL-SCNC: 5.6 MMOL/L (ref 3.6–5)
PROPOXYPH UR QL: NEGATIVE
PROT SERPL-MCNC: 6.8 GM/DL (ref 6.4–8.2)
PROT UR QL STRIP: (no result)
RBC #/AREA URNS HPF: (no result) /HPF
SALICYLATES SERPL-MCNC: < 0.3 MG/DL (ref 5–20)
SODIUM SERPL-SCNC: 135 MMOL/L (ref 135–145)
SP GR UR STRIP: >=1.03 (ref 1.02–1.02)
SQUAMOUS #/AREA URNS HPF: (no result) /HPF
STOMATOCYTES BLD QL SMEAR: (no result)
TARGETS BLD QL SMEAR: SLIGHT
TOXIC GRANULES BLD QL SMEAR: (no result)
TRICYCLICS UR QL SCN: POSITIVE
VARIANT LYMPHS NFR BLD MANUAL: 4 %
VARIANT LYMPHS NFR BLD MANUAL: 4 %
WBC # BLD AUTO: 14.2 10^3/UL (ref 4.3–11)
WBC #/AREA URNS HPF: (no result) /HPF

## 2020-04-12 PROCEDURE — 87088 URINE BACTERIA CULTURE: CPT

## 2020-04-12 PROCEDURE — 94002 VENT MGMT INPAT INIT DAY: CPT

## 2020-04-12 PROCEDURE — 93005 ELECTROCARDIOGRAM TRACING: CPT

## 2020-04-12 PROCEDURE — 72125 CT NECK SPINE W/O DYE: CPT

## 2020-04-12 PROCEDURE — 85025 COMPLETE CBC W/AUTO DIFF WBC: CPT

## 2020-04-12 PROCEDURE — 87077 CULTURE AEROBIC IDENTIFY: CPT

## 2020-04-12 PROCEDURE — 71275 CT ANGIOGRAPHY CHEST: CPT

## 2020-04-12 PROCEDURE — 84145 PROCALCITONIN (PCT): CPT

## 2020-04-12 PROCEDURE — 87186 SC STD MICRODIL/AGAR DIL: CPT

## 2020-04-12 PROCEDURE — 80329 ANALGESICS NON-OPIOID 1 OR 2: CPT

## 2020-04-12 PROCEDURE — 84484 ASSAY OF TROPONIN QUANT: CPT

## 2020-04-12 PROCEDURE — 93320 DOPPLER ECHO COMPLETE: CPT

## 2020-04-12 PROCEDURE — 83735 ASSAY OF MAGNESIUM: CPT

## 2020-04-12 PROCEDURE — 84478 ASSAY OF TRIGLYCERIDES: CPT

## 2020-04-12 PROCEDURE — 96365 THER/PROPH/DIAG IV INF INIT: CPT

## 2020-04-12 PROCEDURE — 82805 BLOOD GASES W/O2 SATURATION: CPT

## 2020-04-12 PROCEDURE — 85379 FIBRIN DEGRADATION QUANT: CPT

## 2020-04-12 PROCEDURE — 70450 CT HEAD/BRAIN W/O DYE: CPT

## 2020-04-12 PROCEDURE — 94799 UNLISTED PULMONARY SVC/PX: CPT

## 2020-04-12 PROCEDURE — 80320 DRUG SCREEN QUANTALCOHOLS: CPT

## 2020-04-12 PROCEDURE — 96375 TX/PRO/DX INJ NEW DRUG ADDON: CPT

## 2020-04-12 PROCEDURE — 96376 TX/PRO/DX INJ SAME DRUG ADON: CPT

## 2020-04-12 PROCEDURE — 85610 PROTHROMBIN TIME: CPT

## 2020-04-12 PROCEDURE — 87040 BLOOD CULTURE FOR BACTERIA: CPT

## 2020-04-12 PROCEDURE — 87205 SMEAR GRAM STAIN: CPT

## 2020-04-12 PROCEDURE — 85007 BL SMEAR W/DIFF WBC COUNT: CPT

## 2020-04-12 PROCEDURE — 87635 SARS-COV-2 COVID-19 AMP PRB: CPT

## 2020-04-12 PROCEDURE — 94760 N-INVAS EAR/PLS OXIMETRY 1: CPT

## 2020-04-12 PROCEDURE — 80053 COMPREHEN METABOLIC PANEL: CPT

## 2020-04-12 PROCEDURE — 96367 TX/PROPH/DG ADDL SEQ IV INF: CPT

## 2020-04-12 PROCEDURE — 93970 EXTREMITY STUDY: CPT

## 2020-04-12 PROCEDURE — 96361 HYDRATE IV INFUSION ADD-ON: CPT

## 2020-04-12 PROCEDURE — 80048 BASIC METABOLIC PNL TOTAL CA: CPT

## 2020-04-12 PROCEDURE — 93306 TTE W/DOPPLER COMPLETE: CPT

## 2020-04-12 PROCEDURE — 71045 X-RAY EXAM CHEST 1 VIEW: CPT

## 2020-04-12 PROCEDURE — 93312 ECHO TRANSESOPHAGEAL: CPT

## 2020-04-12 PROCEDURE — 82728 ASSAY OF FERRITIN: CPT

## 2020-04-12 PROCEDURE — 82962 GLUCOSE BLOOD TEST: CPT

## 2020-04-12 PROCEDURE — 83880 ASSAY OF NATRIURETIC PEPTIDE: CPT

## 2020-04-12 PROCEDURE — 80306 DRUG TEST PRSMV INSTRMNT: CPT

## 2020-04-12 PROCEDURE — 87899 AGENT NOS ASSAY W/OPTIC: CPT

## 2020-04-12 PROCEDURE — 94660 CPAP INITIATION&MGMT: CPT

## 2020-04-12 PROCEDURE — 84100 ASSAY OF PHOSPHORUS: CPT

## 2020-04-12 PROCEDURE — 80202 ASSAY OF VANCOMYCIN: CPT

## 2020-04-12 PROCEDURE — 94003 VENT MGMT INPAT SUBQ DAY: CPT

## 2020-04-12 PROCEDURE — 81000 URINALYSIS NONAUTO W/SCOPE: CPT

## 2020-04-12 PROCEDURE — 94640 AIRWAY INHALATION TREATMENT: CPT

## 2020-04-12 PROCEDURE — 87070 CULTURE OTHR SPECIMN AEROBIC: CPT

## 2020-04-12 PROCEDURE — 87804 INFLUENZA ASSAY W/OPTIC: CPT

## 2020-04-12 PROCEDURE — 36415 COLL VENOUS BLD VENIPUNCTURE: CPT

## 2020-04-12 PROCEDURE — 87449 NOS EACH ORGANISM AG IA: CPT

## 2020-04-12 PROCEDURE — 83540 ASSAY OF IRON: CPT

## 2020-04-12 PROCEDURE — 93325 DOPPLER ECHO COLOR FLOW MAPG: CPT

## 2020-04-12 PROCEDURE — 87631 RESP VIRUS 3-5 TARGETS: CPT

## 2020-04-12 PROCEDURE — 83605 ASSAY OF LACTIC ACID: CPT

## 2020-04-12 PROCEDURE — 87081 CULTURE SCREEN ONLY: CPT

## 2020-04-12 PROCEDURE — 85027 COMPLETE CBC AUTOMATED: CPT

## 2020-04-12 NOTE — DIAGNOSTIC IMAGING REPORT
Clinical indication: Patient found unresponsive.



Exam: Axial Head CT without IV contrast. Axial CT scan of the

cervical spine with sagittal and coronal reformations. Auto

Exposure Controls were utilized during the CT exam to meet ALARA

standards for radiation dose reduction.



Comparison: Head CT without contrast dated 09/25/2017. MRI of the

cervical spine performed without IV contrast dated 02/28/2012.



Findings:



Head CT: There are wires overlying the anterior aspect of the

head which cause streak artifact obscuring anterior aspect of the

head. There is streak artifact obscuring the brainstem, posterior

fossa and a portion of the brain near the skull base. There is no

evidence of acute cerebral infarct, intracranial hemorrhage,

brain herniation or midline shift, as visualized. The brain

parenchymal volume appears appropriate for patient's age. There

is no hydrocephalus. Basal cisterns are unremarkable.



The extracranial soft tissue, skull and orbits show no

significant abnormality. Paranasal sinuses and mastoid air cells

are clear.



Cervical spine CT: There is motion artifact which causes stair

step artifact at the C3 level.



There is no acute cervical spine fracture. There is grade 1

anterolisthesis of C2 on C3 and C3 on C4. There is chronic

compression deformity of the upper endplate of the C7 vertebra.

There are hypertrophic spurs and facet arthropathy throughout the

cervical spine. There is mild central canal narrowing seen from

the C2 through C7 levels. There is severe bilateral neural

foramen narrowing seen from the C3-C7 levels with the right C6-C7

neural foramen showing mild narrowing. There is no significant

neck soft tissue abnormality.



Impression:

1: There is no evidence of acute intracranial process, as

visualized.

2: There is multilevel cervical spine degenerative disease with

no acute fracture. 



Dictated by: 



  Dictated on workstation # [id number]

## 2020-04-12 NOTE — DIAGNOSTIC IMAGING REPORT
Clinical indication: Patient found unresponsive.



Exam: Portable chest x-ray upright view.



Comparisons: Chest x-ray dated 11/13/2019.



Findings:



Lungs/pleura: There is slight groundglass opacification involving

the left midlung field and left lung base. This may possibly be

related to extrathoracic soft tissue, but subtle infiltrate

cannot be completely excluded. Otherwise, lungs are clear. There

is no pneumothorax. There is no pleural effusion.



Mediastinum: Unremarkable. 



Pulmonary vasculature: Unremarkable.



Heart: Unremarkable.



Bones/extrathoracic soft tissue: There are hypertrophic spurs

involving the thoracic spine.



Impression: There is slight groundglass opacification involving

the left midlung field and left lung base. This may be related to

extrathoracic soft tissue shadows, but superimposed subtle

infiltrate cannot be completely excluded. 



Dictated by: 



  Dictated on workstation # [id number]

## 2020-04-13 VITALS — DIASTOLIC BLOOD PRESSURE: 55 MMHG | SYSTOLIC BLOOD PRESSURE: 74 MMHG

## 2020-04-13 VITALS — SYSTOLIC BLOOD PRESSURE: 93 MMHG | DIASTOLIC BLOOD PRESSURE: 35 MMHG

## 2020-04-13 VITALS — DIASTOLIC BLOOD PRESSURE: 49 MMHG | SYSTOLIC BLOOD PRESSURE: 137 MMHG

## 2020-04-13 VITALS — DIASTOLIC BLOOD PRESSURE: 61 MMHG | SYSTOLIC BLOOD PRESSURE: 108 MMHG

## 2020-04-13 VITALS — SYSTOLIC BLOOD PRESSURE: 122 MMHG | DIASTOLIC BLOOD PRESSURE: 51 MMHG

## 2020-04-13 VITALS — SYSTOLIC BLOOD PRESSURE: 138 MMHG | DIASTOLIC BLOOD PRESSURE: 57 MMHG

## 2020-04-13 VITALS — SYSTOLIC BLOOD PRESSURE: 116 MMHG | DIASTOLIC BLOOD PRESSURE: 50 MMHG

## 2020-04-13 VITALS — DIASTOLIC BLOOD PRESSURE: 46 MMHG | SYSTOLIC BLOOD PRESSURE: 126 MMHG

## 2020-04-13 VITALS — SYSTOLIC BLOOD PRESSURE: 132 MMHG | DIASTOLIC BLOOD PRESSURE: 57 MMHG

## 2020-04-13 VITALS — SYSTOLIC BLOOD PRESSURE: 127 MMHG | DIASTOLIC BLOOD PRESSURE: 46 MMHG

## 2020-04-13 VITALS — DIASTOLIC BLOOD PRESSURE: 77 MMHG | SYSTOLIC BLOOD PRESSURE: 129 MMHG

## 2020-04-13 VITALS — DIASTOLIC BLOOD PRESSURE: 64 MMHG | SYSTOLIC BLOOD PRESSURE: 102 MMHG

## 2020-04-13 VITALS — SYSTOLIC BLOOD PRESSURE: 123 MMHG | DIASTOLIC BLOOD PRESSURE: 69 MMHG

## 2020-04-13 VITALS — DIASTOLIC BLOOD PRESSURE: 63 MMHG | SYSTOLIC BLOOD PRESSURE: 102 MMHG

## 2020-04-13 VITALS — SYSTOLIC BLOOD PRESSURE: 124 MMHG | DIASTOLIC BLOOD PRESSURE: 51 MMHG

## 2020-04-13 VITALS — DIASTOLIC BLOOD PRESSURE: 45 MMHG | SYSTOLIC BLOOD PRESSURE: 102 MMHG

## 2020-04-13 VITALS — SYSTOLIC BLOOD PRESSURE: 96 MMHG | DIASTOLIC BLOOD PRESSURE: 40 MMHG

## 2020-04-13 VITALS — SYSTOLIC BLOOD PRESSURE: 101 MMHG | DIASTOLIC BLOOD PRESSURE: 42 MMHG

## 2020-04-13 VITALS — DIASTOLIC BLOOD PRESSURE: 71 MMHG | SYSTOLIC BLOOD PRESSURE: 107 MMHG

## 2020-04-13 VITALS — DIASTOLIC BLOOD PRESSURE: 53 MMHG | SYSTOLIC BLOOD PRESSURE: 123 MMHG

## 2020-04-13 VITALS — DIASTOLIC BLOOD PRESSURE: 71 MMHG | SYSTOLIC BLOOD PRESSURE: 104 MMHG

## 2020-04-13 VITALS — DIASTOLIC BLOOD PRESSURE: 46 MMHG | SYSTOLIC BLOOD PRESSURE: 136 MMHG

## 2020-04-13 VITALS — DIASTOLIC BLOOD PRESSURE: 55 MMHG | SYSTOLIC BLOOD PRESSURE: 76 MMHG

## 2020-04-13 VITALS — DIASTOLIC BLOOD PRESSURE: 50 MMHG | SYSTOLIC BLOOD PRESSURE: 138 MMHG

## 2020-04-13 VITALS — DIASTOLIC BLOOD PRESSURE: 72 MMHG | SYSTOLIC BLOOD PRESSURE: 91 MMHG

## 2020-04-13 VITALS — SYSTOLIC BLOOD PRESSURE: 101 MMHG | DIASTOLIC BLOOD PRESSURE: 47 MMHG

## 2020-04-13 VITALS — SYSTOLIC BLOOD PRESSURE: 113 MMHG | DIASTOLIC BLOOD PRESSURE: 48 MMHG

## 2020-04-13 VITALS — DIASTOLIC BLOOD PRESSURE: 53 MMHG | SYSTOLIC BLOOD PRESSURE: 78 MMHG

## 2020-04-13 VITALS — SYSTOLIC BLOOD PRESSURE: 100 MMHG | DIASTOLIC BLOOD PRESSURE: 41 MMHG

## 2020-04-13 VITALS — SYSTOLIC BLOOD PRESSURE: 86 MMHG | DIASTOLIC BLOOD PRESSURE: 58 MMHG

## 2020-04-13 VITALS — DIASTOLIC BLOOD PRESSURE: 70 MMHG | SYSTOLIC BLOOD PRESSURE: 106 MMHG

## 2020-04-13 LAB
ALBUMIN SERPL-MCNC: 3.6 GM/DL (ref 3.2–4.5)
ALP SERPL-CCNC: 79 U/L (ref 40–136)
ALT SERPL-CCNC: 19 U/L (ref 0–55)
ARTERIAL PATENCY WRIST A: (no result)
ARTERIAL PATENCY WRIST A: POSITIVE
ARTERIAL PATENCY WRIST A: POSITIVE
BASE EXCESS STD BLDA CALC-SCNC: -3.5 MMOL/L (ref -2.5–2.5)
BASE EXCESS STD BLDA CALC-SCNC: -4.5 MMOL/L (ref -2.5–2.5)
BASE EXCESS STD BLDA CALC-SCNC: -6.1 MMOL/L (ref -2.5–2.5)
BASE EXCESS STD BLDA CALC-SCNC: -6.8 MMOL/L (ref -2.5–2.5)
BASOPHILS # BLD AUTO: 0 10^3/UL (ref 0–0.1)
BASOPHILS NFR BLD AUTO: 0 % (ref 0–10)
BDY SITE: (no result)
BILIRUB SERPL-MCNC: 0.3 MG/DL (ref 0.1–1)
BODY TEMPERATURE: 36.9
BODY TEMPERATURE: 37
BUN/CREAT SERPL: 14
CALCIUM SERPL-MCNC: 7.2 MG/DL (ref 8.5–10.1)
CHLORIDE SERPL-SCNC: 108 MMOL/L (ref 98–107)
CO2 BLDA CALC-SCNC: 22.2 MMOL/L (ref 21–31)
CO2 BLDA CALC-SCNC: 22.7 MMOL/L (ref 21–31)
CO2 BLDA CALC-SCNC: 24 MMOL/L (ref 21–31)
CO2 BLDA CALC-SCNC: 25.5 MMOL/L (ref 21–31)
CO2 SERPL-SCNC: 18 MMOL/L (ref 21–32)
CREAT SERPL-MCNC: 3.29 MG/DL (ref 0.6–1.3)
D DIMER PPP FEU-MCNC: 1.27 UG/ML (ref 0–0.49)
EOSINOPHIL # BLD AUTO: 0 10^3/UL (ref 0–0.3)
EOSINOPHIL NFR BLD AUTO: 0 % (ref 0–10)
ERYTHROCYTE [DISTWIDTH] IN BLOOD BY AUTOMATED COUNT: 17 % (ref 10–14.5)
GFR SERPLBLD BASED ON 1.73 SQ M-ARVRAT: 19 ML/MIN
GLUCOSE SERPL-MCNC: 167 MG/DL (ref 70–105)
HCT VFR BLD CALC: 31 % (ref 40–54)
HGB BLD-MCNC: 9.7 G/DL (ref 13.3–17.7)
INHALED O2 FLOW RATE: (no result) L/MIN
INHALED O2 FLOW RATE: (no result) L/MIN
INHALED O2 FLOW RATE: 1 L/MIN
INHALED O2 FLOW RATE: 40 L/MIN
INR PPP: 1.1 (ref 0.8–1.4)
LYMPHOCYTES # BLD AUTO: 1.7 X 10^3 (ref 1–4)
LYMPHOCYTES NFR BLD AUTO: 14 % (ref 12–44)
MAGNESIUM SERPL-MCNC: 1.8 MG/DL (ref 1.6–2.4)
MANUAL DIFFERENTIAL PERFORMED BLD QL: NO
MCH RBC QN AUTO: 31 PG (ref 25–34)
MCHC RBC AUTO-ENTMCNC: 31 G/DL (ref 32–36)
MCV RBC AUTO: 99 FL (ref 80–99)
MONOCYTES # BLD AUTO: 0.8 X 10^3 (ref 0–1)
MONOCYTES NFR BLD AUTO: 7 % (ref 0–12)
NEUTROPHILS # BLD AUTO: 9.5 X 10^3 (ref 1.8–7.8)
NEUTROPHILS NFR BLD AUTO: 79 % (ref 42–75)
PCO2 BLDA: 44 MMHG (ref 35–45)
PCO2 BLDA: 62 MMHG (ref 35–45)
PCO2 BLDA: 64 MMHG (ref 35–45)
PCO2 BLDA: 70 MMHG (ref 35–45)
PH BLDA: 7.13 [PH] (ref 7.37–7.43)
PH BLDA: 7.15 [PH] (ref 7.37–7.43)
PH BLDA: 7.19 [PH] (ref 7.37–7.43)
PH BLDA: 7.3 [PH] (ref 7.37–7.43)
PHOSPHATE SERPL-MCNC: 5.8 MG/DL (ref 2.3–4.7)
PLATELET # BLD: 123 10^3/UL (ref 130–400)
PMV BLD AUTO: 12.8 FL (ref 7.4–10.4)
PO2 BLDA: 152 MMHG (ref 79–93)
PO2 BLDA: 43 MMHG (ref 79–93)
PO2 BLDA: 47 MMHG (ref 79–93)
PO2 BLDA: 55 MMHG (ref 79–93)
POTASSIUM SERPL-SCNC: 5.9 MMOL/L (ref 3.6–5)
PROT SERPL-MCNC: 6.8 GM/DL (ref 6.4–8.2)
PROTHROMBIN TIME: 14.1 SEC (ref 12.2–14.7)
SAO2 % BLDA FROM PO2: 64 % (ref 94–100)
SAO2 % BLDA FROM PO2: 70 % (ref 94–100)
SAO2 % BLDA FROM PO2: 80 % (ref 94–100)
SAO2 % BLDA FROM PO2: 91 % (ref 94–100)
SODIUM SERPL-SCNC: 138 MMOL/L (ref 135–145)
VENTILATION MODE VENT: NO
VENTILATION MODE VENT: NO
VENTILATION MODE VENT: YES
WBC # BLD AUTO: 12 10^3/UL (ref 4.3–11)

## 2020-04-13 PROCEDURE — 5A1955Z RESPIRATORY VENTILATION, GREATER THAN 96 CONSECUTIVE HOURS: ICD-10-PCS | Performed by: NURSE ANESTHETIST, CERTIFIED REGISTERED

## 2020-04-13 PROCEDURE — 0BH17EZ INSERTION OF ENDOTRACHEAL AIRWAY INTO TRACHEA, VIA NATURAL OR ARTIFICIAL OPENING: ICD-10-PCS | Performed by: NURSE ANESTHETIST, CERTIFIED REGISTERED

## 2020-04-13 RX ADMIN — SODIUM CHLORIDE SCH MLS/HR: 900 INJECTION, SOLUTION INTRAVENOUS at 03:57

## 2020-04-13 RX ADMIN — SODIUM CHLORIDE, SODIUM LACTATE, POTASSIUM CHLORIDE, AND CALCIUM CHLORIDE SCH MLS/HR: 600; 310; 30; 20 INJECTION, SOLUTION INTRAVENOUS at 05:51

## 2020-04-13 RX ADMIN — SODIUM CHLORIDE SCH MLS/HR: 900 INJECTION, SOLUTION INTRAVENOUS at 15:18

## 2020-04-13 RX ADMIN — SODIUM CHLORIDE SCH MLS/HR: 900 INJECTION, SOLUTION INTRAVENOUS at 00:30

## 2020-04-13 RX ADMIN — VANCOMYCIN HYDROCHLORIDE SCH MLS/HR: 500 INJECTION, POWDER, LYOPHILIZED, FOR SOLUTION INTRAVENOUS at 20:41

## 2020-04-13 RX ADMIN — Medication SCH MLS/HR: at 08:30

## 2020-04-13 RX ADMIN — PROPOFOL SCH MLS/HR: 10 INJECTION, EMULSION INTRAVENOUS at 16:36

## 2020-04-13 RX ADMIN — ACETAMINOPHEN PRN MG: 325 SOLUTION ORAL at 13:43

## 2020-04-13 RX ADMIN — SODIUM CHLORIDE, SODIUM LACTATE, POTASSIUM CHLORIDE, AND CALCIUM CHLORIDE SCH MLS/HR: 600; 310; 30; 20 INJECTION, SOLUTION INTRAVENOUS at 18:16

## 2020-04-13 RX ADMIN — SODIUM CHLORIDE, SODIUM LACTATE, POTASSIUM CHLORIDE, AND CALCIUM CHLORIDE SCH MLS/HR: 600; 310; 30; 20 INJECTION, SOLUTION INTRAVENOUS at 13:43

## 2020-04-13 RX ADMIN — SODIUM CHLORIDE, SODIUM LACTATE, POTASSIUM CHLORIDE, AND CALCIUM CHLORIDE SCH MLS/HR: 600; 310; 30; 20 INJECTION, SOLUTION INTRAVENOUS at 09:31

## 2020-04-13 RX ADMIN — INSULIN ASPART SCH UNIT: 100 INJECTION, SOLUTION INTRAVENOUS; SUBCUTANEOUS at 18:15

## 2020-04-13 RX ADMIN — SODIUM CHLORIDE, SODIUM LACTATE, POTASSIUM CHLORIDE, AND CALCIUM CHLORIDE SCH MLS/HR: 600; 310; 30; 20 INJECTION, SOLUTION INTRAVENOUS at 06:55

## 2020-04-13 RX ADMIN — Medication SCH MLS/HR: at 01:01

## 2020-04-13 RX ADMIN — PROPOFOL SCH MLS/HR: 10 INJECTION, EMULSION INTRAVENOUS at 21:49

## 2020-04-13 RX ADMIN — SODIUM CHLORIDE, SODIUM LACTATE, POTASSIUM CHLORIDE, AND CALCIUM CHLORIDE SCH MLS/HR: 600; 310; 30; 20 INJECTION, SOLUTION INTRAVENOUS at 21:46

## 2020-04-13 RX ADMIN — SODIUM CHLORIDE SCH MLS/HR: 900 INJECTION, SOLUTION INTRAVENOUS at 05:03

## 2020-04-13 RX ADMIN — Medication SCH MLS/HR: at 21:47

## 2020-04-13 RX ADMIN — PROPOFOL SCH MLS/HR: 10 INJECTION, EMULSION INTRAVENOUS at 09:50

## 2020-04-13 RX ADMIN — SODIUM CHLORIDE SCH MLS/HR: 900 INJECTION, SOLUTION INTRAVENOUS at 09:31

## 2020-04-13 RX ADMIN — Medication SCH MLS/HR: at 09:16

## 2020-04-13 NOTE — NUR
THIS RN NOTIFIED DR MERA PT CRITICAL TROPONIN WAS 0.328. ORDERS GIVEN TROPONIN LABS Q6H 
X3. AND EKG.

## 2020-04-13 NOTE — NUR
UNABLE TO SPEAK WITH THE PT AT THIS TIME- I WENT THRU THE EXT MED HISTORY AND HAD [facility] 
SEND A RECENT FILL LIST TO COMPLETE THE MED REC



THE FOLLOWING MEDICATIONS WERE LISTED WHEN HE WAS HERE IN FEB 2020:

METFORMIN 100MG

LISINOPRIL 10MG

TRADJENTA 5MG

GLIPIZIDE 10MG

THEY WERE ALL CURRENT WHEN THE OTHER TECH SPOKE WITH THE PT- BUT KNOW THEY ARE PAST DUE FOR 
A FILL. I HAVE LEFT YELLOW TRIANGLES ON THEM SO WHEN I AM ABLE TO SPEAK WITH THE PT I WILL 
GET MORE INFORMATION.




-------------------------------------------------------------------------------

Addendum: 04/29/20 at 1308 by LUCIEN MEANS [facility]

-------------------------------------------------------------------------------

I WAS ABLE TO SPEAK WITH THE PT TODAY REGARDING THE FOLLOWING MEDICATIONS:

METFORMIN 100MG,LISINOPRIL 10MG, TRADJENTA 5MG AND GLIPIZIDE 10MG - THESE MEDS ARE ALL PAST 
DUE. TODAY WHEN I SPOKE WITH THE PT HE THOUGHT HE WAS STILL TAKING THEM AND JUST THINKS HE 
IS BEHIND ON A REFILL. I LEFT THEM ON THE MED REC WITH THE PAST DUE FILL DATES

## 2020-04-13 NOTE — PULMONARY CONSULTATION
History of Present Illness


History of Present Illness


Date Seen by Provider:  Apr 13, 2020


Time Seen by Provider:  05:26


Date of Admission





History of Present Illness


69-year-old male with history of tobacco use, diabetes, hypertension, COPD who 

recently underwent amputation of the distal foot on the right. He was discharged

from the hospital on March 12. He presented to ED via EMS secondary to MS 

changes/lethargy. No additional history is available.


EMS gave one dose of Narcan in route to the hospital but this did not improve 

the patient's symptoms.He was also noted to be in respiratory distress and was 

also hypotensive .





Allergies and Home Medications


Allergies


Coded Allergies:  


     No Known Drug Allergies (Unverified , 1/17/17)





Home Medications


Aspirin 81 Mg Tab.chew, 81 MG PO DAILY, (Reported)


Clopidogrel Bisulfate 75 Mg Tablet, 75 MG PO DAILY, (Reported)


Glipizide 10 Mg Tablet, 10 MG PO DAILY, (Reported)


   LAST FILLED 02- #30/30 DAY SUPPLY 


Hydrochlorothiazide 25 Mg Tablet, 25 MG PO DAILY, (Reported)


Ipratropium/Albuterol Sulfate 3 Ml Ampul.neb, 3 ML IH BID PRN for SHORTNESS OF 

BREATH, (Reported)


Linagliptin 5 Mg Tablet, 5 MG PO DAILY, (Reported)


   LAST FILLED 01- #30/30 DAY SUPPLY 


Lisinopril 10 Mg Tablet, 10 MG PO DAILY, (Reported)


   LAST FILLED 01- #30/30 DAY SUPPLY 


Metformin HCl 1,000 Mg Tablet, 1,000 MG PO BID WITH MEALS, (Reported)


   LAST FILLED 01- #60/30 DAY SUPPLY 


Multivitamin 1 Each Tablet, 1 TAB PO DAILY, (Reported)


Nitroglycerin 0.4 Mg Tab.subl, 0.4 MG SL UD PRN for CHEST PAIN, (Reported)


Nortriptyline HCl 50 Mg Capsule, 50 MG PO HS, (Reported)


Oxycodone HCl/Acetaminophen 1 Each Tablet, 1 EACH PO QID PRN for PAIN-MODERATE, 

(Reported)


Pregabalin 150 Mg Capsule, 150 MG PO TID, (Reported)


Sitagliptin Phosphate 100 Mg Tablet, 100 MG PO DAILY, (Reported)





Past Medical-Social-Family Hx


Patient Social History


Alcohol Use:  Rarely Uses


Number of Drinks Today:  AA


Alcohol Beverage of Choice:  Beer


Recreational Drug Use:  No


Type Used:  Cigarettes


2nd Hand Smoke Exposure:  Yes


Recent Foreign Travel:  No


Contact w/Someone Who Travel:  No


Recent Infectious Disease Expo:  No


Recent Hopitalizations:  No


Physical Abuse:  No


Sexual Abuse:  No





Immunizations Up To Date


PED Vaccines UTD:  No


Date of Pneumonia Vaccine:  Aug 22, 2016


Date of Influenza Vaccine:  Dec 1, 2019





Seasonal Allergies


Seasonal Allergies:  No





Past Medical History


Surgeries:  Yes (Liver biopsy and stent placement)


Appendectomy, Coronary Stent, Gallbladder


Respiratory:  No


COPD


Currently Using CPAP:  No


Currently Using BIPAP:  No


Cardiac:  Yes (cardiac cath w/ balloon)


Coronary Artery Disease, High Cholesterol, Hypertension


Neurological:  Yes (2018)


Stroke


Reproductive Disorders:  No


Sexually Transmitted Disease:  No


HIV/AIDS:  No


Genitourinary:  No


Prostate Problems


Gastrointestinal:  Yes (cholecystectomy and liver biopsy)


Gall Bladder Disease


Musculoskeletal:  Yes


Arthritis


Endocrine:  Yes


Diabetes, Non-Insulin dep


HEENT:  Yes


Cataract


Loss of Vision:  Right


Cancer:  Yes


Prostate


Did You Recieve Any Treatments:  Yes


What Type of Treatment Did You:  Surgical Intervention


Psychosocial:  No


Integumentary:  Yes (R foot wound 11/19)


Blood Disorders:  No





Family Medical History





Cardiovascular disease


  G8 SISTER


Cataracts


  19 MOTHER


Colon cancer


  19 MOTHER


Completed stroke


  19 MOTHER


Diabetes mellitus


  G8 SISTER


Drug abuse


  G8 BROTHER


Hypercholesterolemia


  G8 BROTHER


Hypertension


  G8 BROTHER


  G8 SISTER


Myocardial infarction


  19 FATHER


  19 MOTHER


Respiratory disorder


  19 FATHER


  19 MOTHER


  G8 BROTHER


  G8 BROTHER


  G8 SISTER


  G8 SISTER


Diabetes





Review of Systems


Time Seen by Provider:  10:09





Sepsis Event


Evaluation


Height, Weight, BMI


Height: 5'8.50"


Weight: 206lbs. 0.8oz. 93.987798vp; 30.95 BMI


Method:Stated





Exam


Exam





Vital Signs








  Date Time  Temp Pulse Resp B/P (MAP) Pulse Ox O2 Delivery O2 Flow Rate FiO2


 


4/13/20 04:00 37.1       


 


4/13/20 04:00     95 NIV Bilevel 1.00 40


 


4/13/20 03:45  113   82  40.00 


 


4/13/20 02:00  112 12 91/72 (78) 96 Nasal Cannula 1.00 


 


4/13/20 01:30  110 12 108/61 (77) 96 Nasal Cannula 1.00 


 


4/13/20 01:01    76/55    


 


4/13/20 01:00  104 12 76/55 (62) 98 Nasal Cannula 1.00 


 


4/13/20 01:00  104      


 


4/13/20 00:30  101 12 78/53 (61) 100 Nasal Cannula 1.00 


 


4/13/20 00:12 36.8       


 


4/13/20 00:00      Nasal Cannula 1.00 


 


4/13/20 00:00  101 12 74/55 (61) 100 Nasal Cannula 1.00 


 


4/12/20 23:45  103 12 71/56 (61) 100 Nasal Cannula 1.00 


 


4/12/20 23:31  106      


 


4/12/20 23:30  105 12 88/62 (71) 100 Nasal Cannula 1.00 


 


4/12/20 22:14  103 12 105/64 99 Nasal Cannula 2.00 


 


4/12/20 20:01 35.7 96 10 78/52 (61) 100 Nasal Cannula 2.00 














I & O 


 


 4/13/20





 07:00


 


Intake Total 1925 ml


 


Output Total 200 ml


 


Balance 1725 ml








Height & Weight


Height: 5'8.50"


Weight: 206lbs. 0.8oz. 93.368510om; 30.95 BMI


Method:Stated


General Appearance:  Chronically ill, Obese


HEENT:  TMs Normal, Other (Pupils equal/reactive 3 mm bilaterally)


Respiratory:  Other (breathing 10 time/minute.  Air mvt in all fields but 

congestion in upper airway.)


Cardiovascular:  Regular Rate, Rhythm, Normal Peripheral Pulses


Capillary Refill:  Less Than 3 Seconds


Extremity:  Normal Capillary Refill, Other (bandage over right foot.  skin near 

bandage is normal appearing)


Neurologic/Psychiatric:  Other (GCS 11.  No facial asymmetry, protecting airway,

garbled speech, moves all extremities to painful stimuli, opens eyes to painful 

stimuli,)


Skin:  Normal Color





Results


Lab


Laboratory Tests


4/12/20 20:14








4/13/20 03:09











Assessment/Plan


Assessment/Plan


Acute respiratory failure 


   -Currently on BiPAP - change to 18/8 and increase RR to 18 


   -Repeat ABG in 1hr 


   -Will check COVID, ddimer, ferritin, and Troponin 


   -Repeat Narcan 


   -Procalcitonin pending 


Pneumonia with possible aspiration 


   -Pan cultures 


   -MRSA swab 


   -Continue Abx 


   -Check urine strep and legionella ag 


Acute renal failure with dehydration 


   -Give 2 liters bolus of LR and increase to 150 


Hyperkalemia


   -GIve insulin, bicarb, calcium gluconate, 


   -Monitor











GUILLERMINA MERA DO              Apr 13, 2020 05:31

## 2020-04-13 NOTE — NUR
TIMELINE

 0915- DR MERA NOTIFIED OF PT ABG RESULTS. ORDERS GIVEN TO INTUBATE. DR MERA ORDERED 
VENT SETTINGS, ART LINE PLACEMENT, AND 1 LITER OF LR TO RUN OVER ONE HOUR DURING INTUBATION. 
ABG TO BE DRAWN 1 HOUR AFTER INTUBATION. ANESTHESIA  AND RT NOTIFIED. 



0950- RT, ANESTHESIA, AND RNS AT BEDSIDE. 



0958-PT ON BIPAP. ANESTHESIA ADMINISTERED 20 OF ETOMIDATE AND 50 MG OF ROCURONIUM.



0959- RT AND ANESTHESIA PREPARING TO INTUBATE. 



1000-PT INTUBATED ON VENT. ETT SIZE 8, 22 CM AT THE LIP. COLOR CHANGE.



1003- ANESTHESIA BAGGING PT. NO BREATH SOUNDS ON LEFT SIDE. ANESTHESIA PULLED ET TUBE TO 21 
CM AT THE LIP. DIMINISHED BREATH SOUNDS ON THE LEFT. 



1008-RESTRAINTS APPLIED.



1009- RT COLLECTED SPUTUM.



1011- RIGHT RADIAL ART LINE PLACED PER ANESTHESIA.



1015- OG PLACED. 



1018- PROPOFOL STARTED AT 20



1030- CHEST X-RAY HERE.



1040- RT LOOKED AT XRAY. 



1100- ABG COLLECTED BY NURSE. 



THIS NURSE AT BEDSIDE. PT HAS SYMMETRICAL CHEST MOVEMENT. LUNGS SOUNDS HEARD BILATERALLY. 
OXYGEN SATURATIONS ARE IN THE HIGH 90S. WILL CONTINUE TO MONITOR.

## 2020-04-13 NOTE — DIAGNOSTIC IMAGING REPORT
INDICATION: Intubation and OG tube placement.



Comparison made with prior examination from 04/13/2020.



FINDINGS: There is cardiomegaly and venous congestion. There may

be a left pleural effusion. ET and NG tubes are in place.



IMPRESSION: Interval placement of ET and NG tubes which appear to

be in satisfactory position.



Cardiomegaly and some central pulmonary venous congestion.



Dictated by: 



  Dictated on workstation # [id number]

## 2020-04-13 NOTE — NUR
THIS NURSE ATTEMPTED TO NOTIFIED DR MERA AT 1110 AND 1115 PT SBP IS 70-80S. PT GOT 1 LITER 
OF LR DURING INTUBATION. UNABLE TO GET ANY PIV. THIS NURSE NOTIFIED DR WORTHY. ORDER GIVEN 
FOR CENTRAL LINE PLACEMENT. GENERAL SURGERY CONSULTED. DR SEVERINO UNABLE TO COME AT THIS TIME. 
DR ALEMAN NOTIFIED AND ON HIS WAY. DR MERA UPDATE ON PLAN OF CARE. ORDERS GIVEN TO GIVE 
ANOTHER LITER BOLUS OF LR OVER AN HOUR AND TO PULL PICC LINE ONCE CENTRAL LINE IS PLACED AND 
CULTURE TIP.

## 2020-04-13 NOTE — CONSULTATION - SURGERY
History of Present Illness


History of Present Illness


Patient Consulted On(bay/time)


20


 14:08


Date Seen by Provider:  2020


Time Seen by Provider:  14:08


History of Present Illness


Consult requested by Dr. Valerio for Central line placement.








Patient is a 69 year old male that was admitted with change in mental status  by

friend.  EMS was called, and brought to ED.  A dose of Narcan was used without 

any improvement. Patient has now been intubated.  Having fever.  Patient blood 

pressures and become hypotensive and required pressors at times.  Intubated and 

sedated.





Allergies and Home Medications


Allergies


Coded Allergies:  


     No Known Drug Allergies (Unverified , 17)





Home Medications


Aspirin 81 Mg Tab.chew, 81 MG PO DAILY, (Reported)


Clopidogrel Bisulfate 75 Mg Tablet, 75 MG PO DAILY, (Reported)


Doxycycline Monohydrate 100 Mg Capsule, 100 MG PO BID


   Prescribed by: JASEN DAY on 20 1216


Glipizide 10 Mg Tablet, 10 MG PO DAILY, (Reported)


Hydrochlorothiazide 25 Mg Tablet, 25 MG PO DAILY, (Reported)


Ipratropium/Albuterol Sulfate 3 Ml Ampul.neb, 3 ML IH BID PRN for SHORTNESS OF 

BREATH, (Reported)


Levofloxacin 750 Mg Tablet, 750 MG PO DAILY


   Prescribed by: JASEN DAY on 20 1216


Linagliptin 5 Mg Tablet, 5 MG PO DAILY, (Reported)


Lisinopril 10 Mg Tablet, 10 MG PO DAILY, (Reported)


Metformin HCl 1,000 Mg Tablet, 1,000 MG PO BID WITH MEALS, (Reported)


Multivitamin 1 Each Tablet, 1 TAB PO DAILY, (Reported)


Nitroglycerin 0.4 Mg Tab.subl, 0.4 MG SL UD PRN for CHEST PAIN, (Reported)


Nortriptyline HCl 50 Mg Capsule, 50 MG PO HS, (Reported)


Oxycodone HCl/Acetaminophen 1 Each Tablet, 1 TAB PO Q4H PRN for PAIN-MODERATE


   Prescribed by: JASEN DAY on 20 1145


Pregabalin 150 Mg Capsule, 150 MG PO TID, (Reported)





Patient Home Medication List


Home Medication List Reviewed:  Yes





Past Medical-Social-Family Hx


Patient Social History


Alcohol Use:  Rarely Uses


Number of Drinks Today:  AA


Recreational Drug Use:  No


Type Used:  Cigarettes


2nd Hand Smoke Exposure:  Yes


Recent Foreign Travel:  No


Contact w/Someone Who Travel:  No


Recent Infectious Disease Expo:  No


Recent Hopitalizations:  No





Immunizations Up To Date


PED Vaccines UTD:  No


Date of Pneumonia Vaccine:  Aug 22, 2016


Date of Influenza Vaccine:  Dec 1, 2019





Seasonal Allergies


Seasonal Allergies:  No





Surgeries


History of Surgeries:  Yes (Liver biopsy and stent placement)


Surgeries:  Appendectomy, Coronary Stent, Gallbladder





Respiratory


History of Respiratory Disorde:  No


Respiratory Disorders:  COPD





Cardiovascular


History of Cardiac Disorders:  Yes (cardiac cath w/ balloon)


Cardiac Disorders:  Coronary Artery Disease, High Cholesterol, Hypertension





Neurological


History of Neurological Disord:  Yes (2018)


Neurological Disorders:  Stroke





Reproductive System


Hx Reproductive Disorders:  No


Sexually Transmitted Disease:  No


HIV/AIDS:  No





Genitourinary


History of Genitourinary Disor:  No


Genitourinary Disorders:  Prostate Problems





Gastrointestinal


History of Gastrointestinal Di:  Yes (cholecystectomy and liver biopsy)


Gastrointestinal Disorders:  Gall Bladder Disease





Musculoskeletal


History of Musculoskeletal Dis:  Yes


Musculoskeletal Disorders:  Arthritis





Endocrine


History of Endocrine Disorders:  Yes


Endocrine Disorders:  Diabetes, Non-Insulin dep





HEENT


History of HEENT Disorders:  Yes


HEENT Disorders:  Cataract


Loss of Vision:  Right





Cancer


History of Cancer:  Yes


Cancer:  Prostate





Psychosocial


History of Psychiatric Problem:  No





Integumentary


History of Skin or Integumenta:  Yes (R foot wound )





Blood Transfusions


History of Blood Disorders:  No





Reviewed Nursing Assessment


Reviewed/Agree w Nursing PMH:  Yes





Family Medical History


Significant Family History:  No Pertinent Family Hx, Diabetes


Family Medial History:  


Cardiovascular disease


  G8 SISTER


Cataracts


  19 MOTHER


Colon cancer


  19 MOTHER


Completed stroke


  19 MOTHER


Diabetes mellitus


  G8 SISTER


Drug abuse


  G8 BROTHER


Hypercholesterolemia


  G8 BROTHER


Hypertension


  G8 BROTHER


  G8 SISTER


Myocardial infarction


  19 FATHER


  19 MOTHER


Respiratory disorder


  19 FATHER


  19 MOTHER


  G8 BROTHER


  G8 BROTHER


  G8 SISTER


  G8 SISTER





Review of Systems-General


ROS-Unable to Obtain:  unable to obtain-intubated





Physical Exam-General Problems


Physical Exam


Vital Signs





Vital Signs - First Documented








 20





 20:01 04:00


 


Temp 35.7 


 


Pulse 96 


 


Resp 10 


 


B/P (MAP) 78/52 (61) 


 


Pulse Ox 100 


 


O2 Delivery Nasal Cannula 


 


O2 Flow Rate 2.00 


 


FiO2  40





Capillary Refill : Less Than 3 Seconds


General Appearance:  other (intubated/sedated)


HEENT:  normal ENT inspection


Neck:  supple, normal inspection


Respiratory:  other (equal chest rise)


Cardiovascular:  tachycardia


Gastrointestinal:  soft, no organomegaly


Rectal:  deferred


Extremities:  normal inspection


Neurologic/Psychiatric:  No alert, No oriented x 3 (intubated sedated)


Skin:  normal color, warm/dry


Lymphatic:  no adenopathy





Data Review


Labs


Laboratory Tests


20 20:14: 


White Blood Count 14.2H, Red Blood Count 3.23L, Hemoglobin 10.0L, Hematocrit 33L

, Mean Corpuscular Volume 101H, Mean Corpuscular Hemoglobin 31, Mean Corpuscular

Hemoglobin Concent 31L, Red Cell Distribution Width 16.6H, Platelet Count 124L, 

Mean Platelet Volume 13.2H, Neutrophils (%) (Auto) 85H, Lymphocytes (%) (Auto) 

8L, Monocytes (%) (Auto) 6, Eosinophils (%) (Auto) 0, Basophils (%) (Auto) 1, 

Neutrophils # (Auto) 12.0H, Lymphocytes # (Auto) 1.1, Monocytes # (Auto) 0.8, 

Eosinophils # (Auto) 0.0, Basophils # (Auto) 0.1, Neutrophils % (Manual) 86, 

Lymphocytes % (Manual) 4, Monocytes % (Manual) 6, Reactive Lymphocytes 4, Toxic 

Granulation 3+, Polychromasia SLIGHT, Poikilocytosis SLIGHT, Basophilic 

Stippling SLIGHT, Anisocytosis SLIGHT, Target Cells SLIGHT, Stomatocytes 

MODERATE, Elliptocytes SLIGHT, Sodium Level 135, Potassium Level 5.6H, Chloride 

Level 102, Carbon Dioxide Level 19L, Anion Gap 14, Blood Urea Nitrogen 48H, 

Creatinine 3.84H, Estimat Glomerular Filtration Rate 16, BUN/Creatinine Ratio 

13, Glucose Level 211H, Lactic Acid Level 1.51, Calcium Level 8.0L, Corrected 

Calcium 8.2L, Total Bilirubin 0.2, Aspartate Amino Transf (AST/SGOT) 28, Alanine

Aminotransferase (ALT/SGPT) 16, Alkaline Phosphatase 91, Troponin I < 0.30, 

Total Protein 6.8, Albumin 3.7, Salicylates Level < 0.3L, Acetaminophen Level < 

10L, Serum Alcohol < 10


20 20:25: 


Urine Color YELLOW, Urine Clarity SLIGHTLY CLOUDY, Urine pH 5.0, Urine Specific 

Gravity >=1.030, Urine Protein 1+H, Urine Glucose (UA) NEGATIVE, Urine Ketones 

TRACEH, Urine Nitrite NEGATIVE, Urine Bilirubin 1+H, Urine Urobilinogen 0.2, Uri

ne Leukocyte Esterase NEGATIVE, Urine RBC (Auto) TRACE-I, Urine RBC NONE, Urine 

WBC 5-10H, Urine Squamous Epithelial Cells NONE, Urine Crystals NONE, Urine 

Bacteria FEWH, Urine Casts PRESENT, Urine Hyaline Casts 10-25H, Urine Granular 

Casts 0-2H, Urine Coarse Granular Casts 2-5H, Urine Mucus LARGEH, Urine Culture 

Indicated YES, Urine Opiates Screen NEGATIVE, Urine Oxycodone Screen POSITIVEH, 

Urine Methadone Screen NEGATIVE, Urine Propoxyphene Screen NEGATIVE, Urine 

Barbiturates Screen NEGATIVE, Ur Tricyclic Antidepressants Screen POSITIVEH, 

Urine Phencyclidine Screen NEGATIVE, Urine Amphetamines Screen NEGATIVE, Urine 

Methamphetamines Screen POSITIVEH, Urine Benzodiazepines Screen NEGATIVE, Urine 

Cocaine Screen NEGATIVE, Urine Cannabinoids Screen NEGATIVE


20 03:09: 


White Blood Count 12.0H, Red Blood Count 3.18L, Hemoglobin 9.7L, Hematocrit 31L,

Mean Corpuscular Volume 99, Mean Corpuscular Hemoglobin 31, Mean Corpuscular 

Hemoglobin Concent 31L, Red Cell Distribution Width 17.0H, Platelet Count 123L, 

Mean Platelet Volume 12.8H, Neutrophils (%) (Auto) 79H, Lymphocytes (%) (Auto) 

14, Monocytes (%) (Auto) 7, Eosinophils (%) (Auto) 0, Basophils (%) (Auto) 0, 

Neutrophils # (Auto) 9.5H, Lymphocytes # (Auto) 1.7, Monocytes # (Auto) 0.8, 

Eosinophils # (Auto) 0.0, Basophils # (Auto) 0.0, Sodium Level 138, Potassium 

Level 5.9H, Chloride Level 108H, Carbon Dioxide Level 18L, Anion Gap 12, Blood 

Urea Nitrogen 47H, Creatinine 3.29#H, Estimat Glomerular Filtration Rate 19, 

BUN/Creatinine Ratio 14, Glucose Level 167H, Calcium Level 7.2L, Corrected 

Calcium 7.5L, Total Bilirubin 0.3, Aspartate Amino Transf (AST/SGOT) 34, Alanine

Aminotransferase (ALT/SGPT) 19, Alkaline Phosphatase 79, Total Protein 6.8, 

Albumin 3.6, Blood Gas Puncture Site RIGHT RADIAL, Blood Gas Patient Temperature

36.9, Arterial Blood pH 7.15*L, Arterial Blood Partial Pressure CO2 62H, 

Arterial Blood Partial Pressure O2 43L, Arterial Blood HCO3 21L, Arterial Blood 

Total CO2 22.7, Arterial Blood Oxygen Saturation 70L, Arterial Blood Base Excess

-6.8L, Rojelio Test POSITIVE, Blood Gas Ventilator Setting NO, Blood Gas Inspired 

Oxygen 1, Phosphorus Level 5.8H, Magnesium Level 1.8, Triglycerides Level 367H


20 06:34: Coronavirus (COVID-19)(PCR) TNP:Duplicate Order


20 06:40: 


Prothrombin Time 14.1, INR Comment 1.1, D-Dimer 1.27H, Blood Gas Puncture Site 

RIGHT RADIAL, Blood Gas Patient Temperature 37.0, Arterial Blood pH 7.13*L, 

Arterial Blood Partial Pressure CO2 70H, Arterial Blood Partial Pressure O2 152H

, Arterial Blood HCO3 22L, Arterial Blood Total CO2 24.0, Arterial Blood Oxygen 

Saturation 64L, Arterial Blood Base Excess -6.1L, Rojelio Test POSITIVE, Blood Gas

Ventilator Setting NO, Blood Gas Inspired Oxygen 40, Lactic Acid Level 0.60, 

Troponin I 0.328*H


20 09:09: 


Blood Gas Puncture Site NA, Blood Gas Patient Temperature 37.0, Arterial Blood 

pH 7.19*L, Arterial Blood Partial Pressure CO2 64H, Arterial Blood Partial 

Pressure O2 47L, Arterial Blood HCO3 24, Arterial Blood Total CO2 25.5, Arterial

Blood Oxygen Saturation 80L, Arterial Blood Base Excess -3.5L, Rojelio Test NA, 

Blood Gas Ventilator Setting NA, Blood Gas Inspired Oxygen 25%


20 11:02: 


Blood Gas Puncture Site RT ART LINE, Blood Gas Patient Temperature 37.0, 

Arterial Blood pH 7.30*L, Arterial Blood Partial Pressure CO2 44, Arterial Blood

Partial Pressure O2 55L, Arterial Blood HCO3 21L, Arterial Blood Total CO2 22.2,

Arterial Blood Oxygen Saturation 91L, Arterial Blood Base Excess -4.5L, Rojelio 

Test YES-POS, Blood Gas Ventilator Setting YES, Blood Gas Inspired Oxygen 30%


20 12:48: 





Microbiology


20 Influenza Types A,B Antigen (KODI) - Final, Complete





Assessment/Plan


acute respiratory failure- intubated


hypotension requiring pressers at times- central line placed


pneumonia 








central line placed


continue medical management


will sign off, call if needed.











PROCEDURE:


Right internal jugular vein central line using ultrasound guidance. 


 


COMPLICATIONS:


None. 


 


INDICATIONS:


The patient is a 69 year old male hyoptenisive and intubated. 


Needing central line.


 


PROCEDURE:


The patient was prepped and


draped in the sterile fashion. A surgical pause was performed.


Ultrasound was used to locate the right internal jugular vein. 


The right internal vein was accessed under ultrasound guidance. Dark 

nonpulsatile


blood was withdrawn. The wire was inserted. The needle was removed.  11 blade


scalpel was used to make skin incision at insertion point.  Dilator was advanced


over wire, dilated and removed. Triple lumen catheter was advanced over 


the wire and wire removed.  All ports were accessed and flushed without 

difficulty. Sewn into place.


Area was washed and dried and sterile bandage applied. The patient tolerated the

procedure


well without complication and was taken to recovery room in


stable condition. Chest x-ray is pending.





Clinical Quality Measures


DVT/VTE Risk/Contraindication:


Risk Factor Score Per Nursin


RFS Level Per Nursing on Admit:  4+=Very High











MATEO ALEMAN DO              2020 14:16

## 2020-04-13 NOTE — NUR
THIS NURSE NOTIFIED EICU OF CRITICAL ABG PH 7.30. NO NEW ORDERS AT THIS TIME. WILL CONTINUE 
TO MONITOR.

## 2020-04-13 NOTE — HISTORY & PHYSICAL-HOSPITALIST
History of Present Illness


HPI/Chief Complaint


Pradeep Fowler is a 69-year-old male with past medical history of hypertension, 

diabetes, peripheral artery disease, coronary artery disease, AAA, COPD, who 

presented with altered mental status.  He was reportedly brought in by friend.  

He had supposedly been taking some narcotics for which he been prescribed.  He 

was given a dose of Narcan and his mental status improved.  He was started on 

BiPAP in the ICU.  The morning after his admission, he remained lethargic and an

ABG showed acute hypercapnia.  He was intubated due to acute hypercapnic 

respiratory failure.  At the time of my examination he is intubated and sedated.


Source:  RN/MD


Exam Limitations:  clinical condition


Date Seen


20


Time Seen by a Provider:  11:50


Attending Physician


Amy Freeman MD


PCP


Kenn Mackey MD


Referring Physician





Date of Admission


2020 at 21:51





Home Medications & Allergies


Home Medications


Reviewed patient Home Medication Reconciliation performed by pharmacy medication

reconciliations technician and/or nursing.


Patients Allergies have been reviewed.





Allergies





Allergies


Coded Allergies


  No Known Drug Allergies ([id number])








Past Medical-Social-Family Hx


Past Med/Social Hx:  Reviewed Nursing Past Med/Soc Hx


Patient Social History


Alcohol Use:  Rarely Uses


Number of Drinks Today:  AA


Alcohol Beverage of Choice:  Beer


Recreational Drug Use:  No


Type Used:  Cigarettes


2nd Hand Smoke Exposure:  Yes


Recent Foreign Travel:  No


Contact w/other who traveled:  No


Recent Hopitalizations:  No


Recent Infectious Disease Expo:  No





Immunizations Up To Date


Pediatric:  No


Date of Pneumonia Vaccine:  Aug 22, 2016


Date of Influenza Vaccine:  Dec 1, 2019





Seasonal Allergies


Seasonal Allergies:  No





Past Medical History


Surgeries:  Appendectomy, Coronary Stent, Gallbladder


Currently Using CPAP:  No


Currently Using BIPAP:  No


Cardiac:  Coronary Artery Disease, High Cholesterol, Hypertension


Neurological:  Stroke


Reproductive:  No


Sexually Transmitted Disease:  No


HIV/AIDS:  No


Genitourinary:  Prostate Problems


Gastrointestinal:  Gall Bladder Disease


Musculoskeletal:  Arthritis


Endocrine:  Diabetes, Non-Insulin dep


HEENT:  Cataract


Loss of Vision:  Right


Cancer:  Prostate


Did You Recieve Any Treatments:  Yes


What Type of Treatment Did You:  Surgical Intervention


History of Blood Disorders:  No





Family History





Cardiovascular disease


  G8 SISTER


Cataracts


  19 MOTHER


Colon cancer


  19 MOTHER


Completed stroke


  19 MOTHER


Diabetes mellitus


  G8 SISTER


Drug abuse


  G8 BROTHER


Hypercholesterolemia


  G8 BROTHER


Hypertension


  G8 BROTHER


  G8 SISTER


Myocardial infarction


  19 FATHER


  19 MOTHER


Respiratory disorder


  19 FATHER


  19 MOTHER


  G8 BROTHER


  G8 BROTHER


  G8 SISTER


  G8 SISTER


No Pertinent Family Hx, Diabetes





Review of Systems


ROS-Unable to Obtain:  intubated and sedated


Constitutional:  see HPI





Physical Exam


Physical Exam


Vital Signs





Vital Signs - First Documented








 20





 20:01 04:00


 


Temp 35.7 


 


Pulse 96 


 


Resp 10 


 


B/P (MAP) 78/52 (61) 


 


Pulse Ox 100 


 


O2 Delivery Nasal Cannula 


 


O2 Flow Rate 2.00 


 


FiO2  40





Capillary Refill : Less Than 3 Seconds


Height, Weight, BMI


Height: 5'8.50"


Weight: 206lbs. 0.8oz. [id number]; 30.95 BMI


Method:Stated


General Appearance:  No Apparent Distress, Obese, Other (intubated and sedated)


Respiratory:  No Respiratory Distress, Other (intubated and mechanically 

ventilated)


Cardiovascular:  No Murmur, Tachycardia (regular rhythm)


Gastrointestinal:  Normal Bowel Sounds, Soft


Extremity:  Normal Inspection, No Pedal Edema


Neurologic/Psychiatric:  Other (sedated)


Skin:  Normal Color, Warm/Dry





Results


Results/Procedures


Labs


Laboratory Tests


20 20:14








20 03:09








Patient resulted labs reviewed.


Imaging:  Reviewed Imaging Report





Assessment/Plan


Admission Diagnosis


Altered mental status


Admission Status:  Inpatient Order (span 2 midnights)


Reason for Inpatient Admission:  


respiratory failure





Assessment and Plan


Acute respiratory failure with hypoxemia and hypercapnia


COPD


Possible narcotic overdose


Possible pneumonia


Shock


Pulmonology consulted, appreciate assistance


Chest x-ray without clear infiltrate


WBC mildly elevated, tachycardic, meeting SIRS criteria


Procalcitonin pending


Responded well to Narcan with EMS and in the emergency room


started on vancomycin and Zosyn


COVID negative


ABG revealed acute hypercapnia


Intubated this morning 


Continue antibiotics


surgery consulted for central line placement


Begin Levophed if needed





NSTEMI


troponin negative on arrival, trended up to 0.3 this morning


consult cardiology


Continue to trend troponin





Acute kidney injury superimposed on chronic kidney disease


Creatinine 3.4 on arrival, improved to 3.2 this morning


Continue IV fluids





T2DM


sliding scale insulin





HTN


CAD


PAD


AAA


chronic medical conditions, no acute management needs





DVT prophylaxis: heparin





Diagnosis/Problems


Diagnosis/Problems





(1) Acute respiratory failure with hypoxia and hypercapnia


Status:  Acute


(2) Shock


Status:  Acute


(3) NSTEMI (non-ST elevation myocardial infarction)


Status:  Acute


(4) Acute kidney injury superimposed on chronic kidney disease


Status:  Acute


(5) T2DM (type 2 diabetes mellitus)


Status:  Chronic


(6) COPD (chronic obstructive pulmonary disease)


Status:  Chronic





Clinical Quality Measures


DVT/VTE Risk/Contraindication:


Risk Factor Score Per Nursin


RFS Level Per Nursing on Admit:  4+=Very High











MILENA WORTHY MD              2020 16:36

## 2020-04-13 NOTE — NUR
THIS NURSE NOTIFIED DR RON ABOUT THE MEDICATIONS PT RECEIVED THIS MORNING FOR 
HYPERKALEMIA. ORDER GIVEN. SEE ORDER HX. UPDATED DR WORTHY PER DR RON'S REQUEST.

## 2020-04-13 NOTE — CONSULTATION-CARDIOLOGY
HPI-Cardiology


Cardiology Consultation:


Date of Consultation


20


Time Seen by a Provider:  17:00


Date of Admission





Attending Physician


Amy Freeman MD


Admitting Physician


Kenn Mackey MD


Consulting Physician


JOHNNIE RON MD, MA, FACP, FACC, FSCAI, CCDS





HPI:


Chief Complaint:


Reason for consultation: Elevated troponin





HPI


70 yo man admitted through ER for altered mental status and worsening breathing 

status. Had to be intubated this am and placed on mech vent. Troponin mildly 

positive and Dr Valerio asked us to see him in card consult for that. He has been

COVID tested and is negative for that


He is on mech vent and sedated and unable to provide any history





Review of Systems-Cardiology


Review of Systems


Constitutional:  other (on mech vent, sedated, unable to communicate)





All Other Systems Reviewed


Negative Unless Noted:  No





PMH-Social-Family Hx


Patient Social History


Alcohol Use:  Rarely Uses


Recreational Drug Use:  No


Type Used:  Cigarettes


2nd Hand Smoke Exposure:  Yes


Recent Foreign Travel:  No


Recent Infectious Disease Expo:  No





Immunizations Up To Date


Date of Pneumonia Vaccine:  Aug 22, 2016


Date of Influenza Vaccine:  Dec 1, 2019





Past Medical History


PMH


As described under Assessment.





Family Medical History


Family History:  


Cardiovascular disease


  G8 SISTER


Cataracts


  19 MOTHER


Colon cancer


  19 MOTHER


Completed stroke


  19 MOTHER


Diabetes mellitus


  G8 SISTER


Drug abuse


  G8 BROTHER


Hypercholesterolemia


  G8 BROTHER


Hypertension


  G8 BROTHER


  G8 SISTER


Myocardial infarction


  19 FATHER


  19 MOTHER


Respiratory disorder


  19 FATHER


  19 MOTHER


  G8 BROTHER


  G8 BROTHER


  G8 SISTER


  G8 SISTER





Allergies and Home Medications


Allergies


Coded Allergies:  


     No Known Drug Allergies (Unverified , 17)





Home Medications


Aspirin 81 Mg Tab.chew, 81 MG PO DAILY, (Reported)


Clopidogrel Bisulfate 75 Mg Tablet, 75 MG PO DAILY, (Reported)


Glipizide 10 Mg Tablet, 10 MG PO DAILY, (Reported)


   LAST FILLED 2020 #30/30 DAY SUPPLY 


Hydrochlorothiazide 25 Mg Tablet, 25 MG PO DAILY, (Reported)


Ipratropium/Albuterol Sulfate 3 Ml Ampul.neb, 3 ML IH BID PRN for SHORTNESS OF 

BREATH, (Reported)


Linagliptin 5 Mg Tablet, 5 MG PO DAILY, (Reported)


   LAST FILLED 2020 #30/30 DAY SUPPLY 


Lisinopril 10 Mg Tablet, 10 MG PO DAILY, (Reported)


   LAST FILLED 2020 #30/30 DAY SUPPLY 


Metformin HCl 1,000 Mg Tablet, 1,000 MG PO BID WITH MEALS, (Reported)


   LAST FILLED 2020 #60/30 DAY SUPPLY 


Multivitamin 1 Each Tablet, 1 TAB PO DAILY, (Reported)


Nitroglycerin 0.4 Mg Tab.subl, 0.4 MG SL UD PRN for CHEST PAIN, (Reported)


Nortriptyline HCl 50 Mg Capsule, 50 MG PO HS, (Reported)


Oxycodone HCl/Acetaminophen 1 Each Tablet, 1 EACH PO QID PRN for PAIN-MODERATE, 

(Reported)


Pregabalin 150 Mg Capsule, 150 MG PO TID, (Reported)


Sitagliptin Phosphate 100 Mg Tablet, 100 MG PO DAILY, (Reported)





Patient Home Medication List


Home Medication List Reviewed:  Yes





Physical Exam-Cardiology


Physical Exam


Vital Signs/I&O











 20





 06:00 06:40 07:00 07:23


 


Pulse 116 113 116 117


 


Resp 15  8 


 


B/P (MAP) 86/58 (67)  106/70 (82) 


 


Pulse Ox 90  89 97


 


O2 Delivery Nasal Cannula  Nasal Cannula 


 


O2 Flow Rate 1.00  1.00 25.00





 20





 08:00 08:00 08:00 09:00


 


Temp 37.0   


 


Pulse   116 118


 


Resp   14 9


 


B/P (MAP)   107/71 (83) 104/71 (82)


 


Pulse Ox  94 95 94


 


O2 Delivery  NIV Bilevel Nasal Cannula Nasal Cannula


 


O2 Flow Rate   1.00 1.00


 


FiO2  40  


 


    





 20





 09:50 10:00 10:20 11:00


 


Pulse  118 113 110


 


Resp  13 24 19


 


B/P (MAP) 100/43 102/63 (76)  100/41 (60)


 


Pulse Ox  95 91 100


 


O2 Delivery  Nasal Cannula  Mechanical Ventilator


 


O2 Flow Rate  1.00  30.00


 


FiO2   30 





 20





 12:00 12:00 12:00 13:00


 


Temp  39.8  


 


Pulse   111 106


 


Resp   9 


 


B/P (MAP)   122/51 (74) 


 


Pulse Ox 98  100 


 


O2 Delivery Mechanical Ventilator  Mechanical Ventilator 


 


O2 Flow Rate   30.00 


 


FiO2 30   


 


    





 20





 13:00 13:43 14:00 14:00


 


Temp  39.8 39.1 


 


Pulse 105   114


 


Resp 24   23


 


B/P (MAP) 102/45 (64)   116/50 (72)


 


Pulse Ox 100   100


 


O2 Delivery Mechanical Ventilator   Mechanical Ventilator


 


O2 Flow Rate 30.00   30.00


 


    





 20





 14:30 14:40 15:00 15:32


 


Temp 39.1   38.8


 


Pulse  109 105 


 


Resp  24 24 


 


B/P (MAP)   101/42 (61) 


 


Pulse Ox  100 100 


 


O2 Delivery   Mechanical Ventilator 


 


O2 Flow Rate   30.00 


 


FiO2  30  


 


    





 20





 16:00 16:00 16:22 16:36


 


Temp   38.7 


 


Pulse 106   


 


Resp 23   


 


B/P (MAP) 138/57 (84)   108/47


 


Pulse Ox 100 98  


 


O2 Delivery Mechanical Ventilator Mechanical Ventilator  


 


O2 Flow Rate 30.00   


 


FiO2  30  


 


    





 20   





 17:00   


 


Pulse 105   


 


Resp 24   


 


B/P (MAP) 123/53 (76)   


 


Pulse Ox 100   


 


O2 Delivery Mechanical Ventilator   


 


O2 Flow Rate 30.00   














 20





 00:00


 


Intake Total 2375 ml


 


Balance 2375 ml





Capillary Refill : Less Than 3 Seconds


Constitutional:  other (on mec vent, unresponsive)


HEENT:  EOMI; No xanthelasmas are seen


Neck:  carotid pulses are 2 + bilaterally, with good upstrokes


Respiratory:  No accessory muscle use; other (fair to good bilat air entry)


Cardiovascular:  regular rate-rhythm, S1 and S2, systolic murmur (soft BUTCH at 

card base)


Gastrointestinal:  audible bowel sounds, other (mildly distended)


Extremities:  swelling (mild edema), other (R foot in dressing that wasn't 

removed); No clubbing, No cyanosis


Neurologic/Psychiatric:  other (unresponsive, on mech vent)


Skin:  No rash on exposed areas, No ulcerations on exposed areas


Lymphatic:  no adenopathy





Data Review


Labs


Laboratory Tests


20 20:14: 


White Blood Count 14.2H, Red Blood Count 3.23L, Hemoglobin 10.0L, Hematocrit 33L

, Mean Corpuscular Volume 101H, Mean Corpuscular Hemoglobin 31, Mean Corpuscular

Hemoglobin Concent 31L, Red Cell Distribution Width 16.6H, Platelet Count 124L, 

Mean Platelet Volume 13.2H, Neutrophils (%) (Auto) 85H, Lymphocytes (%) (Auto) 

8L, Monocytes (%) (Auto) 6, Eosinophils (%) (Auto) 0, Basophils (%) (Auto) 1, 

Neutrophils # (Auto) 12.0H, Lymphocytes # (Auto) 1.1, Monocytes # (Auto) 0.8, 

Eosinophils # (Auto) 0.0, Basophils # (Auto) 0.1, Neutrophils % (Manual) 86, 

Lymphocytes % (Manual) 4, Monocytes % (Manual) 6, Reactive Lymphocytes 4, Toxic 

Granulation 3+, Polychromasia SLIGHT, Poikilocytosis SLIGHT, Basophilic 

Stippling SLIGHT, Anisocytosis SLIGHT, Target Cells SLIGHT, Stomatocytes 

MODERATE, Elliptocytes SLIGHT, Sodium Level 135, Potassium Level 5.6H, Chloride 

Level 102, Carbon Dioxide Level 19L, Anion Gap 14, Blood Urea Nitrogen 48H, 

Creatinine 3.84H, Estimat Glomerular Filtration Rate 16, BUN/Creatinine Ratio 1

3, Glucose Level 211H, Lactic Acid Level 1.51, Calcium Level 8.0L, Corrected 

Calcium 8.2L, Total Bilirubin 0.2, Aspartate Amino Transf (AST/SGOT) 28, Alanine

Aminotransferase (ALT/SGPT) 16, Alkaline Phosphatase 91, Troponin I < 0.30, Tot

al Protein 6.8, Albumin 3.7, Salicylates Level < 0.3L, Acetaminophen Level < 10L

, Serum Alcohol < 10


20 20:25: 


Urine Color YELLOW, Urine Clarity SLIGHTLY CLOUDY, Urine pH 5.0, Urine Specific 

Gravity >=1.030, Urine Protein 1+H, Urine Glucose (UA) NEGATIVE, Urine Ketones 

TRACEH, Urine Nitrite NEGATIVE, Urine Bilirubin 1+H, Urine Urobilinogen 0.2, 

Urine Leukocyte Esterase NEGATIVE, Urine RBC (Auto) TRACE-I, Urine RBC NONE, 

Urine WBC 5-10H, Urine Squamous Epithelial Cells NONE, Urine Crystals NONE, 

Urine Bacteria FEWH, Urine Casts PRESENT, Urine Hyaline Casts 10-25H, Urine 

Granular Casts 0-2H, Urine Coarse Granular Casts 2-5H, Urine Mucus LARGEH, Urine

Culture Indicated YES, Urine Opiates Screen NEGATIVE, Urine Oxycodone Screen 

POSITIVEH, Urine Methadone Screen NEGATIVE, Urine Propoxyphene Screen NEGATIVE, 

Urine Barbiturates Screen NEGATIVE, Ur Tricyclic Antidepressants Screen 

POSITIVEH, Urine Phencyclidine Screen NEGATIVE, Urine Amphetamines Screen 

NEGATIVE, Urine Methamphetamines Screen POSITIVEH, Urine Benzodiazepines Screen 

NEGATIVE, Urine Cocaine Screen NEGATIVE, Urine Cannabinoids Screen NEGATIVE


20 03:09: 


White Blood Count 12.0H, Red Blood Count 3.18L, Hemoglobin 9.7L, Hematocrit 31L,

Mean Corpuscular Volume 99, Mean Corpuscular Hemoglobin 31, Mean Corpuscular 

Hemoglobin Concent 31L, Red Cell Distribution Width 17.0H, Platelet Count 123L, 

Mean Platelet Volume 12.8H, Neutrophils (%) (Auto) 79H, Lymphocytes (%) (Auto) 

14, Monocytes (%) (Auto) 7, Eosinophils (%) (Auto) 0, Basophils (%) (Auto) 0, 

Neutrophils # (Auto) 9.5H, Lymphocytes # (Auto) 1.7, Monocytes # (Auto) 0.8, 

Eosinophils # (Auto) 0.0, Basophils # (Auto) 0.0, Sodium Level 138, Potassium 

Level 5.9H, Chloride Level 108H, Carbon Dioxide Level 18L, Anion Gap 12, Blood 

Urea Nitrogen 47H, Creatinine 3.29#H, Estimat Glomerular Filtration Rate 19, 

BUN/Creatinine Ratio 14, Glucose Level 167H, Calcium Level 7.2L, Corrected 

Calcium 7.5L, Total Bilirubin 0.3, Aspartate Amino Transf (AST/SGOT) 34, Alanine

Aminotransferase (ALT/SGPT) 19, Alkaline Phosphatase 79, Total Protein 6.8, 

Albumin 3.6, Blood Gas Puncture Site RIGHT RADIAL, Blood Gas Patient Temperature

36.9, Arterial Blood pH 7.15*L, Arterial Blood Partial Pressure CO2 62H, 

Arterial Blood Partial Pressure O2 43L, Arterial Blood HCO3 21L, Arterial Blood 

Total CO2 22.7, Arterial Blood Oxygen Saturation 70L, Arterial Blood Base Excess

-6.8L, Rojelio Test POSITIVE, Blood Gas Ventilator Setting NO, Blood Gas Inspired 

Oxygen 1, Phosphorus Level 5.8H, Magnesium Level 1.8, Triglycerides Level 367H


20 06:40: 


Lactic Acid Level 0.60, Troponin I 0.328*H, Blood Gas Puncture Site RIGHT 

RADIAL, Blood Gas Patient Temperature 37.0, Arterial Blood pH 7.13*L, Arterial 

Blood Partial Pressure CO2 70H, Arterial Blood Partial Pressure O2 152H, 

Arterial Blood HCO3 22L, Arterial Blood Total CO2 24.0, Arterial Blood Oxygen 

Saturation 64L, Arterial Blood Base Excess -6.1L, Rojelio Test POSITIVE, Blood Gas

Ventilator Setting NO, Blood Gas Inspired Oxygen 40, Prothrombin Time 14.1, INR 

Comment 1.1, D-Dimer 1.27H


20 09:09: 


Blood Gas Puncture Site NA, Blood Gas Patient Temperature 37.0, Arterial Blood 

pH 7.19*L, Arterial Blood Partial Pressure CO2 64H, Arterial Blood Partial 

Pressure O2 47L, Arterial Blood HCO3 24, Arterial Blood Total CO2 25.5, Arterial

Blood Oxygen Saturation 80L, Arterial Blood Base Excess -3.5L, Rojelio Test NA, 

Blood Gas Ventilator Setting NA, Blood Gas Inspired Oxygen 25%


20 11:02: 


Blood Gas Puncture Site RT ART LINE, Blood Gas Patient Temperature 37.0, 

Arterial Blood pH 7.30*L, Arterial Blood Partial Pressure CO2 44, Arterial Blood

Partial Pressure O2 55L, Arterial Blood HCO3 21L, Arterial Blood Total CO2 22.2,

Arterial Blood Oxygen Saturation 91L, Arterial Blood Base Excess -4.5L, Rojelio 

Test YES-POS, Blood Gas Ventilator Setting YES, Blood Gas Inspired Oxygen 30%


20 12:48: Troponin I 0.555*H


20 16:14: Coronavirus (COVID-19)(PCR) Negative





Microbiology


20 Influenza Types A,B Antigen (KODI) - Final, Complete


          


20 Urine Culture - Final, Complete


          NO GROWTH


20 Blood Culture - Preliminary, Resulted


          No growth





Laboratory Tests


20 20:14








20 03:09











A/P-Cardiology


Assessment/Admission Diagnosis





Acute resp failure and shock of undetermined etiology





Cannot exclude pulmonary embolism





Acute renal failure (AYAH-3)





Mild troponin elevation, likely type-2 MI due to reasons noted above





CAD. Card cath of 2017 showed moderate, nonobstructive disease and LVEF 55% 

and elevated LVEDP. MPI of 20 by Dr Dai showed no ischemia or infarction

and normal LV function





Chronic tobacco use (smokes cigarettes according to previous records)





H/o DM II





H/o Htn





H/o Hyperlipidemia





PAD.  Peripheral angiogram and intervention on 2019 by Dr Dai with PTA 

of the right AT, PT and stenting of the right SFA. Significant improvement of 

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





Discussion and Recomendations





* Complex management


* I discussed his case with Dr Valerio on the phone. PE is a possibility: has 

  risk factors and had transient RBBB (had it yesterday and not today). Consider

  treatment dose enoxaparin


* Correct hyperkalemia


* Echo 


* Monitor labs





Clinical Quality Measures


DVT/VTE Risk/Contraindication:


Risk Factor Score Per Nursin


RFS Level Per Nursing on Admit:  4+=Very High











JOHNNIE RON MD FACP FAC CCDS   2020 17:32

## 2020-04-13 NOTE — NUR
PTD VANCOMYCIN

LABS: SCR 3.29 (3.84 YESTERDAY)

A/P: PATIENT RECEIVE VANCOMYCIN 2,000MG ON 4/12 @ 2200 (FSED) WE WILL CONTINUE WITH 
VANCOMYCIN 1,500MG IV Q24H 2 2000 (TODAY) AND CHECK A TROUGH LEVEL PRIOR TO 3RD DOSE 4/14 @ 
1900 HOLDING IF LEVEL IS GREATER THAN 20 (MONITOR SCR/RENAL FXN CLOSELY).

## 2020-04-13 NOTE — ANESTHESIA-PROCEDURE NOTE
Procedures/Interventions


Procedure Start/Stop/Diagnosis


Date of Procedure:  Apr 13, 2020


Start Time:  09:50


Stop Time:  10:20





Intubation


RSI:  No


100% pre-Ox, [id number]:  Yes


Intubation Method:  orotracheal


Videoscope used:  Yes


Grade View:  1


Medications:  Etomidate (20), Rocuronium (50)


Mask Ventilation:  positive


Positive End Tide CO2:  Yes


Breath Sounds after Intubation:  right greater than left


ETT Securred @ (cm):  21


Intubated with ease:  Yes


Intubation Complications:  no complications


Post Intubation Xray-done:  Yes


Progress/Xray Impression:  Wating to be read


Post Procedure


Patient intubated with ease. K to high to allow use Sux. Tolerated procedure 

well. BS right greater than left. ETT to 21 at the lip. Will wait to make any 

change til after the chest Xray.


Care turned over to:


ICU RN





Arterial Line


Arterial Line Catheter:  20G


Type:  Radial


Location:  Right


Procedure:  prepped, draped in sterile fashion, good wave-form was obtained, 

patient tolerated procedure well, no immediate complications, post procedure 

area cleaned, post procedure dressing applied











KISHORE BONNER CRNA          Apr 13, 2020 10:30

## 2020-04-13 NOTE — DIAGNOSTIC IMAGING REPORT
INDICATION: Central venous catheter evaluation.



Portable image of the chest is obtained. Since examination of

earlier in the day there has been placement of right jugular

central venous catheter with tip projecting over the lower

superior vena cava. Right upper extremity PICC appears to reach

the right brachiocephalic vein. Left basilar atelectasis and

infiltrate is not changed. Endotracheal tube is in place with tip

at the level of thoracic inlet.



IMPRESSION: No evidence of complication related to central venous

catheter placement. Right upper extremity PICC tip is not clearly

identified but appears to be in the brachiocephalic vein.



Dictated by: 



  Dictated on workstation # [id number]

## 2020-04-13 NOTE — NUR
THIS NURSE NOTIFIED E-ICU PT IS STILL RUNNING A TEMP. ORDER GIVEN FOR ANOTHER ONE TIME DOSE 
OF TYLENOL. WILL CONTINUE TO MONITOR.

## 2020-04-13 NOTE — DIAGNOSTIC IMAGING REPORT
INDICATION: Confusion, mental status changes, infiltrate.



COMPARISON:  04/12/2020.



FINDINGS: Single view of the chest demonstrates stable minimal

cardiac enlargement. Lungs are otherwise clear. There is no

pneumothorax, effusion or focal infiltrate. Osseous structures

stable.



IMPRESSION: Stable cardiac enlargement without overt pulmonary

edema or infiltrate.



Dictated by: 



  Dictated on workstation # [id number]

## 2020-04-14 VITALS — SYSTOLIC BLOOD PRESSURE: 164 MMHG | DIASTOLIC BLOOD PRESSURE: 63 MMHG

## 2020-04-14 VITALS — SYSTOLIC BLOOD PRESSURE: 136 MMHG | DIASTOLIC BLOOD PRESSURE: 59 MMHG

## 2020-04-14 VITALS — SYSTOLIC BLOOD PRESSURE: 160 MMHG | DIASTOLIC BLOOD PRESSURE: 77 MMHG

## 2020-04-14 VITALS — DIASTOLIC BLOOD PRESSURE: 73 MMHG | SYSTOLIC BLOOD PRESSURE: 147 MMHG

## 2020-04-14 VITALS — DIASTOLIC BLOOD PRESSURE: 53 MMHG | SYSTOLIC BLOOD PRESSURE: 114 MMHG

## 2020-04-14 VITALS — SYSTOLIC BLOOD PRESSURE: 142 MMHG | DIASTOLIC BLOOD PRESSURE: 60 MMHG

## 2020-04-14 VITALS — SYSTOLIC BLOOD PRESSURE: 110 MMHG | DIASTOLIC BLOOD PRESSURE: 55 MMHG

## 2020-04-14 VITALS — DIASTOLIC BLOOD PRESSURE: 67 MMHG | SYSTOLIC BLOOD PRESSURE: 147 MMHG

## 2020-04-14 VITALS — SYSTOLIC BLOOD PRESSURE: 138 MMHG | DIASTOLIC BLOOD PRESSURE: 56 MMHG

## 2020-04-14 VITALS — DIASTOLIC BLOOD PRESSURE: 54 MMHG | SYSTOLIC BLOOD PRESSURE: 123 MMHG

## 2020-04-14 VITALS — DIASTOLIC BLOOD PRESSURE: 45 MMHG | SYSTOLIC BLOOD PRESSURE: 105 MMHG

## 2020-04-14 VITALS — DIASTOLIC BLOOD PRESSURE: 56 MMHG | SYSTOLIC BLOOD PRESSURE: 125 MMHG

## 2020-04-14 VITALS — DIASTOLIC BLOOD PRESSURE: 52 MMHG | SYSTOLIC BLOOD PRESSURE: 114 MMHG

## 2020-04-14 VITALS — DIASTOLIC BLOOD PRESSURE: 57 MMHG | SYSTOLIC BLOOD PRESSURE: 126 MMHG

## 2020-04-14 VITALS — SYSTOLIC BLOOD PRESSURE: 109 MMHG | DIASTOLIC BLOOD PRESSURE: 55 MMHG

## 2020-04-14 VITALS — DIASTOLIC BLOOD PRESSURE: 51 MMHG | SYSTOLIC BLOOD PRESSURE: 57 MMHG

## 2020-04-14 VITALS — SYSTOLIC BLOOD PRESSURE: 103 MMHG | DIASTOLIC BLOOD PRESSURE: 51 MMHG

## 2020-04-14 VITALS — DIASTOLIC BLOOD PRESSURE: 57 MMHG | SYSTOLIC BLOOD PRESSURE: 125 MMHG

## 2020-04-14 VITALS — DIASTOLIC BLOOD PRESSURE: 59 MMHG | SYSTOLIC BLOOD PRESSURE: 127 MMHG

## 2020-04-14 VITALS — SYSTOLIC BLOOD PRESSURE: 114 MMHG | DIASTOLIC BLOOD PRESSURE: 53 MMHG

## 2020-04-14 VITALS — DIASTOLIC BLOOD PRESSURE: 58 MMHG | SYSTOLIC BLOOD PRESSURE: 138 MMHG

## 2020-04-14 VITALS — SYSTOLIC BLOOD PRESSURE: 114 MMHG | DIASTOLIC BLOOD PRESSURE: 47 MMHG

## 2020-04-14 VITALS — SYSTOLIC BLOOD PRESSURE: 103 MMHG | DIASTOLIC BLOOD PRESSURE: 46 MMHG

## 2020-04-14 VITALS — DIASTOLIC BLOOD PRESSURE: 60 MMHG | SYSTOLIC BLOOD PRESSURE: 131 MMHG

## 2020-04-14 VITALS — DIASTOLIC BLOOD PRESSURE: 59 MMHG | SYSTOLIC BLOOD PRESSURE: 138 MMHG

## 2020-04-14 VITALS — DIASTOLIC BLOOD PRESSURE: 85 MMHG | SYSTOLIC BLOOD PRESSURE: 127 MMHG

## 2020-04-14 VITALS — SYSTOLIC BLOOD PRESSURE: 138 MMHG | DIASTOLIC BLOOD PRESSURE: 58 MMHG

## 2020-04-14 VITALS — DIASTOLIC BLOOD PRESSURE: 50 MMHG | SYSTOLIC BLOOD PRESSURE: 119 MMHG

## 2020-04-14 LAB
ALBUMIN SERPL-MCNC: 3 GM/DL (ref 3.2–4.5)
ALP SERPL-CCNC: 82 U/L (ref 40–136)
ALT SERPL-CCNC: 29 U/L (ref 0–55)
ARIPIPRAZOLE SERPL-MCNC: (no result) NG/ML
ARTERIAL PATENCY WRIST A: POSITIVE
BASE EXCESS STD BLDA CALC-SCNC: -2.1 MMOL/L (ref -2.5–2.5)
BASOPHILS # BLD AUTO: 0.1 10^3/UL (ref 0–0.1)
BASOPHILS NFR BLD AUTO: 0 % (ref 0–10)
BDY SITE: (no result)
BILIRUB SERPL-MCNC: 0.3 MG/DL (ref 0.1–1)
BODY TEMPERATURE: 38
BUN/CREAT SERPL: 26
BUN/CREAT SERPL: 27
CALCIUM SERPL-MCNC: 7.8 MG/DL (ref 8.5–10.1)
CALCIUM SERPL-MCNC: 7.9 MG/DL (ref 8.5–10.1)
CHLORIDE SERPL-SCNC: 110 MMOL/L (ref 98–107)
CHLORIDE SERPL-SCNC: 110 MMOL/L (ref 98–107)
CO2 BLDA CALC-SCNC: 22.2 MMOL/L (ref 21–31)
CO2 SERPL-SCNC: 18 MMOL/L (ref 21–32)
CO2 SERPL-SCNC: 19 MMOL/L (ref 21–32)
CREAT SERPL-MCNC: 1.43 MG/DL (ref 0.6–1.3)
CREAT SERPL-MCNC: 1.45 MG/DL (ref 0.6–1.3)
EOSINOPHIL # BLD AUTO: 0.1 10^3/UL (ref 0–0.3)
EOSINOPHIL NFR BLD AUTO: 1 % (ref 0–10)
ERYTHROCYTE [DISTWIDTH] IN BLOOD BY AUTOMATED COUNT: 17.1 % (ref 10–14.5)
GFR SERPLBLD BASED ON 1.73 SQ M-ARVRAT: 48 ML/MIN
GFR SERPLBLD BASED ON 1.73 SQ M-ARVRAT: 49 ML/MIN
GLUCOSE SERPL-MCNC: 160 MG/DL (ref 70–105)
GLUCOSE SERPL-MCNC: 161 MG/DL (ref 70–105)
HCT VFR BLD CALC: 29 % (ref 40–54)
HGB BLD-MCNC: 9.3 G/DL (ref 13.3–17.7)
INHALED O2 FLOW RATE: 21 L/MIN
LYMPHOCYTES # BLD AUTO: 2.1 X 10^3 (ref 1–4)
LYMPHOCYTES NFR BLD AUTO: 19 % (ref 12–44)
MAGNESIUM SERPL-MCNC: 1.3 MG/DL (ref 1.6–2.4)
MANUAL DIFFERENTIAL PERFORMED BLD QL: NO
MCH RBC QN AUTO: 30 PG (ref 25–34)
MCHC RBC AUTO-ENTMCNC: 32 G/DL (ref 32–36)
MCV RBC AUTO: 95 FL (ref 80–99)
MONOCYTES # BLD AUTO: 0.9 X 10^3 (ref 0–1)
MONOCYTES NFR BLD AUTO: 8 % (ref 0–12)
NEUTROPHILS # BLD AUTO: 8.1 X 10^3 (ref 1.8–7.8)
NEUTROPHILS NFR BLD AUTO: 72 % (ref 42–75)
PCO2 BLDA: 32 MMHG (ref 35–45)
PH BLDA: 7.44 [PH] (ref 7.37–7.43)
PHOSPHATE SERPL-MCNC: 1.7 MG/DL (ref 2.3–4.7)
PLATELET # BLD: 108 10^3/UL (ref 130–400)
PMV BLD AUTO: 13.1 FL (ref 7.4–10.4)
PO2 BLDA: 165 MMHG (ref 79–93)
POTASSIUM SERPL-SCNC: 4.3 MMOL/L (ref 3.6–5)
POTASSIUM SERPL-SCNC: 4.3 MMOL/L (ref 3.6–5)
PROT SERPL-MCNC: 5.9 GM/DL (ref 6.4–8.2)
SAO2 % BLDA FROM PO2: 84 % (ref 94–100)
SODIUM SERPL-SCNC: 139 MMOL/L (ref 135–145)
SODIUM SERPL-SCNC: 139 MMOL/L (ref 135–145)
VENTILATION MODE VENT: NO
WBC # BLD AUTO: 11.2 10^3/UL (ref 4.3–11)

## 2020-04-14 RX ADMIN — HYDROCORTISONE SODIUM SUCCINATE SCH MG: 100 INJECTION, POWDER, FOR SOLUTION INTRAMUSCULAR; INTRAVENOUS at 21:22

## 2020-04-14 RX ADMIN — MAGNESIUM SULFATE IN DEXTROSE SCH MLS/HR: 10 INJECTION, SOLUTION INTRAVENOUS at 10:32

## 2020-04-14 RX ADMIN — HYDROCORTISONE SODIUM SUCCINATE SCH MG: 100 INJECTION, POWDER, FOR SOLUTION INTRAMUSCULAR; INTRAVENOUS at 06:21

## 2020-04-14 RX ADMIN — MAGNESIUM SULFATE IN DEXTROSE SCH MLS/HR: 10 INJECTION, SOLUTION INTRAVENOUS at 07:39

## 2020-04-14 RX ADMIN — SODIUM CHLORIDE, SODIUM LACTATE, POTASSIUM CHLORIDE, AND CALCIUM CHLORIDE SCH MLS/HR: 600; 310; 30; 20 INJECTION, SOLUTION INTRAVENOUS at 10:52

## 2020-04-14 RX ADMIN — SODIUM CHLORIDE SCH MLS/HR: 900 INJECTION, SOLUTION INTRAVENOUS at 22:28

## 2020-04-14 RX ADMIN — Medication SCH MLS/HR: at 10:52

## 2020-04-14 RX ADMIN — PROPOFOL SCH MLS/HR: 10 INJECTION, EMULSION INTRAVENOUS at 21:18

## 2020-04-14 RX ADMIN — INSULIN ASPART SCH UNIT: 100 INJECTION, SOLUTION INTRAVENOUS; SUBCUTANEOUS at 12:57

## 2020-04-14 RX ADMIN — HYDROCORTISONE SODIUM SUCCINATE SCH MG: 100 INJECTION, POWDER, FOR SOLUTION INTRAMUSCULAR; INTRAVENOUS at 14:23

## 2020-04-14 RX ADMIN — ACETAMINOPHEN PRN MG: 325 SOLUTION ORAL at 04:50

## 2020-04-14 RX ADMIN — PANTOPRAZOLE SODIUM SCH MG: 40 INJECTION, POWDER, FOR SOLUTION INTRAVENOUS at 20:10

## 2020-04-14 RX ADMIN — ENOXAPARIN SODIUM SCH MG: 100 INJECTION SUBCUTANEOUS at 20:10

## 2020-04-14 RX ADMIN — POTASSIUM CHLORIDE SCH MLS/HR: 200 INJECTION, SOLUTION INTRAVENOUS at 06:20

## 2020-04-14 RX ADMIN — ENOXAPARIN SODIUM SCH MG: 100 INJECTION SUBCUTANEOUS at 09:23

## 2020-04-14 RX ADMIN — SODIUM CHLORIDE, SODIUM LACTATE, POTASSIUM CHLORIDE, AND CALCIUM CHLORIDE SCH MLS/HR: 600; 310; 30; 20 INJECTION, SOLUTION INTRAVENOUS at 04:09

## 2020-04-14 RX ADMIN — Medication SCH MLS/HR: at 06:19

## 2020-04-14 RX ADMIN — Medication SCH MLS/HR: at 19:18

## 2020-04-14 RX ADMIN — MAGNESIUM SULFATE IN DEXTROSE SCH MLS/HR: 10 INJECTION, SOLUTION INTRAVENOUS at 08:51

## 2020-04-14 RX ADMIN — PROPOFOL SCH MLS/HR: 10 INJECTION, EMULSION INTRAVENOUS at 07:39

## 2020-04-14 RX ADMIN — POTASSIUM CHLORIDE SCH MEQ: 1500 TABLET, EXTENDED RELEASE ORAL at 06:20

## 2020-04-14 RX ADMIN — VANCOMYCIN HYDROCHLORIDE SCH MLS/HR: 500 INJECTION, POWDER, LYOPHILIZED, FOR SOLUTION INTRAVENOUS at 19:59

## 2020-04-14 RX ADMIN — INSULIN ASPART SCH UNIT: 100 INJECTION, SOLUTION INTRAVENOUS; SUBCUTANEOUS at 16:29

## 2020-04-14 RX ADMIN — INSULIN ASPART SCH UNIT: 100 INJECTION, SOLUTION INTRAVENOUS; SUBCUTANEOUS at 01:14

## 2020-04-14 RX ADMIN — INSULIN ASPART SCH UNIT: 100 INJECTION, SOLUTION INTRAVENOUS; SUBCUTANEOUS at 09:23

## 2020-04-14 RX ADMIN — PROPOFOL SCH MLS/HR: 10 INJECTION, EMULSION INTRAVENOUS at 17:22

## 2020-04-14 RX ADMIN — PROPOFOL SCH MLS/HR: 10 INJECTION, EMULSION INTRAVENOUS at 12:11

## 2020-04-14 RX ADMIN — SODIUM CHLORIDE, SODIUM LACTATE, POTASSIUM CHLORIDE, AND CALCIUM CHLORIDE SCH MLS/HR: 600; 310; 30; 20 INJECTION, SOLUTION INTRAVENOUS at 18:51

## 2020-04-14 RX ADMIN — INSULIN ASPART SCH UNIT: 100 INJECTION, SOLUTION INTRAVENOUS; SUBCUTANEOUS at 23:39

## 2020-04-14 RX ADMIN — INSULIN ASPART SCH UNIT: 100 INJECTION, SOLUTION INTRAVENOUS; SUBCUTANEOUS at 20:23

## 2020-04-14 RX ADMIN — MAGNESIUM SULFATE IN DEXTROSE SCH MLS/HR: 10 INJECTION, SOLUTION INTRAVENOUS at 06:19

## 2020-04-14 RX ADMIN — SODIUM CHLORIDE SCH MLS/HR: 900 INJECTION, SOLUTION INTRAVENOUS at 15:52

## 2020-04-14 RX ADMIN — INSULIN ASPART SCH UNIT: 100 INJECTION, SOLUTION INTRAVENOUS; SUBCUTANEOUS at 00:56

## 2020-04-14 RX ADMIN — MAGNESIUM SULFATE IN DEXTROSE SCH MLS/HR: 10 INJECTION, SOLUTION INTRAVENOUS at 06:20

## 2020-04-14 RX ADMIN — INSULIN ASPART SCH UNIT: 100 INJECTION, SOLUTION INTRAVENOUS; SUBCUTANEOUS at 04:48

## 2020-04-14 RX ADMIN — PROPOFOL SCH MLS/HR: 10 INJECTION, EMULSION INTRAVENOUS at 02:25

## 2020-04-14 RX ADMIN — SODIUM CHLORIDE SCH MLS/HR: 900 INJECTION, SOLUTION INTRAVENOUS at 00:57

## 2020-04-14 RX ADMIN — SODIUM CHLORIDE SCH MLS/HR: 900 INJECTION, SOLUTION INTRAVENOUS at 06:21

## 2020-04-14 NOTE — PROGRESS NOTE - CARDIOLOGY
Cardiology SOAP Progress Note


Subjective:


Intubated and sedated





Objective:


I&O/Vital Signs











 20





 22:00 22:18 23:00 23:35


 


Temp 37.4  36.8 


 


Pulse 90 93 79 


 


Resp  24 


 


B/P (MAP) 138/58 (84)  109/55 (73) 


 


Pulse Ox 95 94 91 92


 


O2 Delivery Mechanical Ventilator  Mechanical Ventilator Mechanical Ventilator


 


O2 Flow Rate 40.00  40.00 


 


FiO2  40  40


 


    





 4/15/20 4/15/20 4/15/20 4/15/20





 00:00 01:00 01:00 01:30


 


Temp 36.8  36.8 


 


Pulse 81 78 78 75


 


Resp  


 


B/P (MAP) 141/68 (92)  132/61 (84) 119/57


 


Pulse Ox 93  92 


 


O2 Delivery Mechanical Ventilator  Mechanical Ventilator 


 


O2 Flow Rate 40.00  40.00 


 


    





 4/15/20 4/15/20 4/15/20 4/15/20





 02:00 02:26 03:00 04:00


 


Temp 36.7  36.5 36.4


 


Pulse 73 72 70 77


 


Resp  23


 


B/P (MAP) 105/52 (69)  101/50 (67) 132/53 (79)


 


Pulse Ox 91 91 91 93


 


O2 Delivery Mechanical Ventilator  Mechanical Ventilator Mechanical Ventilator


 


O2 Flow Rate 40.00  40.00 40.00


 


FiO2  40  


 


    





 4/15/20 4/15/20 4/15/20 4/15/20





 04:00 05:00 06:00 06:05


 


Temp  36.5 36.4 


 


Pulse  77 72 95


 


Resp   


 


B/P (MAP)  119/48 (71) 123/54 (77) 113/52


 


Pulse Ox 93 93 93 


 


O2 Delivery Mechanical Ventilator Mechanical Ventilator Mechanical Ventilator 


 


O2 Flow Rate  40.00 40.00 


 


FiO2 40   


 


    





 4/15/20 4/15/20 4/15/20 4/15/20





 06:14 07:00 07:00 08:00


 


Temp  36.7  36.7


 


Pulse 86 87 72 70


 


Resp 


 


B/P (MAP)  143/57 (85)  106/44 (64)


 


Pulse Ox 96 94  92


 


O2 Delivery  Mechanical Ventilator  Mechanical Ventilator


 


O2 Flow Rate  40.00  40.00


 


FiO2 40   


 


    





 4/15/20 4/15/20  





 08:00 09:00  


 


Temp  36.7  


 


Pulse  72  


 


Resp  21  


 


B/P (MAP)  117/48 (71)  


 


Pulse Ox  93  


 


O2 Delivery Mechanical Ventilator Mechanical Ventilator  


 


O2 Flow Rate  40.00  


 


FiO2 40   














 4/15/20





 00:00


 


Intake Total 1995 ml


 


Output Total 1360 ml


 


Balance 635 ml








Weight (Pounds):  206


Weight (Ounces):  0.8


Weight (Calculated Kilograms):  93.672545


Constitutional:  other (on mec vent, unresponsive)


Respiratory:  No accessory muscle use; other (fair to good bilat air entry)


Cardiovascular:  regular rate-rhythm, S1 and S2, systolic murmur (soft BUTCH at 

card base)


Gastrointestional:  audible bowel sounds, other (mildly distended)


Extremities:  swelling (mild edema), other (R foot in dressing that wasn't 

removed); No clubbing, No cyanosis


Neurologic/Psychiatric:  other (unresponsive, on mech vent)


Skin:  No rash on exposed areas, No ulcerations on exposed areas; other 

(dressing to right foot, D&I)





Results/Procedures:


Labs


Laboratory Tests


20 12:53: Glucometer 201H


20 16:28: Glucometer 132H


20 18:48: Vancomycin Level Trough 16.1


20 20:22: Glucometer 162H


20 23:38: Glucometer 171H


4/15/20 03:20: 


White Blood Count 7.0, Red Blood Count 2.57L, Hemoglobin 7.9L, Hematocrit 25L, 

Mean Corpuscular Volume 95, Mean Corpuscular Hemoglobin 31, Mean Corpuscular 

Hemoglobin Concent 32, Red Cell Distribution Width 17.2H, Platelet Count 94L, 

Mean Platelet Volume 12.1H, Neutrophils (%) (Auto) 83H, Lymphocytes (%) (Auto) 

12, Monocytes (%) (Auto) 5, Eosinophils (%) (Auto) 0, Basophils (%) (Auto) 0, 

Neutrophils # (Auto) 5.8, Lymphocytes # (Auto) 0.8L, Monocytes # (Auto) 0.3, 

Eosinophils # (Auto) 0.0, Basophils # (Auto) 0.0, Blood Gas Puncture Site RIGHT 

RADIAL, Blood Gas Patient Temperature 36.9, Arterial Blood pH 7.46H, Arterial 

Blood Partial Pressure CO2 33L, Arterial Blood Partial Pressure O2 67L, Arterial

Blood HCO3 23, Arterial Blood Total CO2 24.2, Arterial Blood Oxygen Saturation 

95, Arterial Blood Base Excess -0.2, Rojelio Test POSITIVE, Blood Gas Ventilator 

Setting YES, Blood Gas Inspired Oxygen 40, Sodium Level 140, Potassium Level 

3.6, Chloride Level 107, Carbon Dioxide Level 20L, Anion Gap 13, Blood Urea 

Nitrogen 28H, Creatinine 0.98, Estimat Glomerular Filtration Rate > 60, 

BUN/Creatinine Ratio 29, Glucose Level 154H, Calcium Level 7.5L, Phosphorus 

Level 3.4, Magnesium Level 1.8


4/15/20 06:45: 


Blood Gas Puncture Site RIGHT RADIAL, Blood Gas Patient Temperature 36.9, 

Arterial Blood pH 7.30*L, Arterial Blood Partial Pressure CO2 51H, Arterial 

Blood Partial Pressure O2 73L, Arterial Blood HCO3 24, Arterial Blood Total CO2 

25.9, Arterial Blood Oxygen Saturation 94, Arterial Blood Base Excess -1.3, 

Rojelio Test POSITIVE, Blood Gas Ventilator Setting YES, Blood Gas Inspired Oxygen

40





Microbiology


20 Catheter Tip Culture - Preliminary, Resulted


          No growth


20 Gram Stain - Final, Resulted


          


20 Sputum Culture - Preliminary, Resulted


          No growth


20 Urine Culture - Final, Complete


          NO GROWTH





Procedures


NAME:   DAVID SMITH


MED REC#:   [id number]


ACCOUNT#:   [id number]


PT STATUS:   ADM IN


:   1950


PHYSICIAN:   ARIELLA LI MD


ADMIT DATE:   20/ICU


***Draft***


Date of Exam:20





CHEST 1 VIEW, AP/PA ONLY








INDICATION: Intubated patient. Respiratory failure.





COMPARISON: 2020





FINDINGS: Single frontal radiographic view of the chest was


obtained and demonstrates indwelling endotracheal tube with tip


at the lower level of the clavicular heads. Gastric tube is


coiled in the stomach. Lungs show significant interval decreased


inspiratory volumes with increased generalized hazy and


consolidative opacity of the left lung, greatest within the left


mid and lower lung field. Right lung otherwise remains relatively


clear. There is no large effusion on the right. Small left


effusion cannot be entirely excluded. No pneumothorax is


identified. Cardiac silhouette and pulmonary vasculature appear


to be within normal limits considering low inspiratory volumes.





IMPRESSION:


1. Interval increase in opacification left hemithorax, which may


be related to interval decrease in inspiratory volumes with


background infiltrate in the mid and lower lung field. Continued


follow-up is advised.


2. Lines and tubes as above.





  Dictated on workstation # [id number]








Dict:   [id number]


Trans:   [id number]


[facility] ([facility] [phone]





Interpreted by:     KWADWO RIVERA MD


Electronically signed by:





A/P:


Assessment:





Acute resp failure and shock of undetermined etiology





Cannot exclude pulmonary embolism





Acute renal failure (AYAH-3) - renal function improving





Mild troponin elevation, likely type-2 MI due to reasons noted above





CAD. Card cath of 2017 showed moderate, nonobstructive disease and LVEF 55% 

and elevated LVEDP. MPI of 20 by Dr Dai showed no ischemia or infarction

and normal LV function





Chronic tobacco use (smokes cigarettes according to previous records)





H/o DM II





H/o Htn





H/o Hyperlipidemia





PAD.  Peripheral angiogram and intervention on 2019 by Dr Dai with PTA 

of the right AT, PT and stenting of the right SFA. Significant improvement of 

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


Plan:





* Complex management


* PE is a possibility: has risk factors and had transient RBBB (had it 2020

  and not seen on 2020). 


* Continue with treatment dose enoxaparin


* Correct hypomag


* Echo  pending


* Monitor labs











TEOFILO REYNOLDS           2020 08:43

## 2020-04-14 NOTE — NUR
THIS NURSE NOTIFIED DR JOHN SMALL AMOUNT OF DARK RED BLOOD COMING OUT OF OGT. PT IS ON 
LOVENOX. NO NEW ORDERS AT THIS TIME. WILL CONTINUE TO MONITOR.

## 2020-04-14 NOTE — NUR
THIS NURSE NOTIFIED DR JOHN OF WOUND CARE NURSE DRESSING CHANGE RECOMMENDATIONS. ORDER 
OBTAINED. SEE ORDER HX.

## 2020-04-14 NOTE — PULMONARY PROGRESS NOTE
Subjective


Time Seen by a Provider:  05:47


Subjective/Events-last exam


Sedated on vent





Sepsis Event


Evaluation


Height, Weight, BMI


Height: 5'8.50"


Weight: 206lbs. 0.8oz. 93.464513yh; 30.95 BMI


Method:Stated





Focused Exam


Lactate Level


4/12/20 20:14: Lactic Acid Level 1.51


4/13/20 06:40: Lactic Acid Level 0.60





Exam


Exam





Vital Signs








  Date Time  Temp Pulse Resp B/P (MAP) Pulse Ox O2 Delivery O2 Flow Rate FiO2


 


4/14/20 05:00  126 23 119/50 (73) 98 Mechanical Ventilator 21.00 


 


4/14/20 04:50 38.4       


 


4/14/20 04:00     98 Mechanical Ventilator  21


 


4/14/20 04:00  130 19 164/63 (96) 100 Mechanical Ventilator 21.00 


 


4/14/20 03:01  105 23 123/54 (77) 100 Mechanical Ventilator 21.00 


 


4/14/20 02:25    132/59    


 


4/14/20 02:00  109 23 138/59 (85) 100 Mechanical Ventilator 21.00 


 


4/14/20 01:55  110 24  100   21


 


4/14/20 01:00  102      


 


4/14/20 01:00  102 24 114/47 (69) 100 Mechanical Ventilator 21.00 


 


4/14/20 00:00     98 Mechanical Ventilator  21


 


4/14/20 00:00  106 23 103/46 (65) 100 Mechanical Ventilator 21.00 


 


4/13/20 23:08  115 25 101/47 (65) 99 Mechanical Ventilator 21.00 


 


4/13/20 22:28  109 24  98   21


 


4/13/20 22:05  113 23 127/46 (73) 98 Mechanical Ventilator 21.00 


 


4/13/20 21:49    111/53    


 


4/13/20 21:47    111/53    


 


4/13/20 21:05  115 23 136/46 (76) 97 Mechanical Ventilator 21.00 


 


4/13/20 21:00  114 15 93/35 (54) 95 Mechanical Ventilator 21.00 


 


4/13/20 20:00     98 Mechanical Ventilator  21


 


4/13/20 20:00  105 23 113/48 (69) 99 Mechanical Ventilator 21.00 


 


4/13/20 19:53 36.6       


 


4/13/20 19:17 36.6       


 


4/13/20 19:00  116 24 138/50 (79) 99 Mechanical Ventilator 21.00 


 


4/13/20 19:00  116      


 


4/13/20 18:49  115 24  100   30


 


4/13/20 18:47 38.8       


 


4/13/20 18:00 38.8       


 


4/13/20 18:00  106 23 132/57 (82) 100 Mechanical Ventilator 30.00 


 


4/13/20 17:00  105 24 123/53 (76) 100 Mechanical Ventilator 30.00 


 


4/13/20 16:36    108/47    


 


4/13/20 16:22 38.7       


 


4/13/20 16:00     98 Mechanical Ventilator  30


 


4/13/20 16:00  106 23 138/57 (84) 100 Mechanical Ventilator 30.00 


 


4/13/20 15:32 38.8       


 


4/13/20 15:00  105 24 101/42 (61) 100 Mechanical Ventilator 30.00 


 


4/13/20 14:40  109 24  100   30


 


4/13/20 14:30 39.1       


 


4/13/20 14:00  114 23 116/50 (72) 100 Mechanical Ventilator 30.00 


 


4/13/20 14:00 39.1       


 


4/13/20 13:43 39.8       


 


4/13/20 13:00  105 24 102/45 (64) 100 Mechanical Ventilator 30.00 


 


4/13/20 13:00  106      


 


4/13/20 12:00  111 9 122/51 (74) 100 Mechanical Ventilator 30.00 


 


4/13/20 12:00 39.8       


 


4/13/20 12:00     98 Mechanical Ventilator  30


 


4/13/20 11:00  110 19 100/41 (60) 100 Mechanical Ventilator 30.00 


 


4/13/20 10:20  113 24  91   30


 


4/13/20 10:00  118 13 102/63 (76) 95 Nasal Cannula 1.00 


 


4/13/20 09:50    100/43    


 


4/13/20 09:00  118 9 104/71 (82) 94 Nasal Cannula 1.00 


 


4/13/20 08:00  116 14 107/71 (83) 95 Nasal Cannula 1.00 


 


4/13/20 08:00     94 NIV Bilevel  40


 


4/13/20 08:00 37.0       


 


4/13/20 07:23  117   97  25.00 


 


4/13/20 07:00  116 8 106/70 (82) 89 Nasal Cannula 1.00 


 


4/13/20 06:40  113      


 


4/13/20 06:00  116 15 86/58 (67) 90 Nasal Cannula 1.00 














I & O 


 


 4/14/20





 07:00


 


Intake Total 4872 ml


 


Output Total 1325 ml


 


Balance 3547 ml








Height & Weight


Height: 5'8.50"


Weight: 206lbs. 0.8oz. 93.826814jd; 30.95 BMI


Method:Stated


General Appearance:  No Apparent Distress, Obese, Other (intubated and sedated)


HEENT:  TMs Normal, Other (Pupils equal/reactive 3 mm bilaterally)


Respiratory:  No Respiratory Distress, Other (intubated and mechanically 

ventilated)


Cardiovascular:  No Murmur, Tachycardia (regular rhythm)


Capillary Refill:  Less Than 3 Seconds


Gastrointestinal:  soft, no organomegaly


Extremity:  Normal Inspection, No Pedal Edema


Neurologic/Psychiatric:  Other (sedated)


Skin:  Normal Color, Warm/Dry





Results


Lab


Laboratory Tests


4/12/20 20:14








4/13/20 03:09








4/14/20 02:58











Assessment/Plan


Assessment/Plan


Acute respiratory failure 


   -Pt was intubated yesterday 


   - COVID is negative 


   -Procalcitonin pending 


   -CXR appears much worse.


   -Check bilateral dopplers 


   -Echo is pending  


   -Increase PEEP to 10 


sepsis with PICC line


   -Picc line d/c'd


      -Culturing tip 


      -Question if pt has been injecting drugs into PICC line


   -Pan cultures pending


Pneumonia with possible aspiration 


   -Pan cultures 


   -MRSA swab 


   -COVID is negative 


   -Influenza is negative 


   -Continue Vanco and Zosyn 


   - urine strep and legionella ag - pending 


   -RVP is pending 


Hypotension


   -Currently on Levophed


   -Start Levophed 


   -Echo pending 


NSTEMI


   -Cardiology following 


Hypophos, hypomag 


   -replace 


Acute renal failure with dehydration 


   -IVF











GUILLERMINA MERA DO              Apr 14, 2020 05:53

## 2020-04-14 NOTE — NUR
INCREASE ENOXAPARIN TO BID DUE TO SCR IMPROVED T O1.43 FROM 3.84 CRCL GREATER THAN 30ML/MIN. 
NEXT DUE THIS MORNING.

## 2020-04-14 NOTE — PROGRESS NOTE - CARDIOLOGY
Cardiology SOAP Progress Note


Subjective:


On mech vent


Unresponsive





Objective:


I&O/Vital Signs











 4/14/20 4/14/20 4/14/20 4/14/20





 04:00 04:00 04:50 05:00


 


Temp   38.4 


 


Pulse 130   126


 


Resp 19   23


 


B/P (MAP) 164/63 (96)   119/50 (73)


 


Pulse Ox 100 98  98


 


O2 Delivery Mechanical Ventilator Mechanical Ventilator  Mechanical Ventilator


 


O2 Flow Rate 21.00   21.00


 


FiO2  21  


 


    





 4/14/20 4/14/20 4/14/20 4/14/20





 05:20 06:00 07:00 07:00


 


Temp 38.1   


 


Pulse  100 96 97


 


Resp  23 23 


 


B/P (MAP)  114/53 (73) 125/57 (79) 


 


Pulse Ox  99 99 


 


O2 Delivery  Mechanical Ventilator Mechanical Ventilator 


 


O2 Flow Rate  21.00 21.00 





    


 


    





 4/14/20 4/14/20 4/14/20 4/14/20





 07:28 07:39 08:00 08:00


 


Pulse 110  105 


 


Resp 24  23 


 


B/P (MAP)  133/53 138/56 (83) 


 


Pulse Ox 100  98 97


 


O2 Delivery   Mechanical Ventilator Mechanical Ventilator


 


O2 Flow Rate   21.00 


 


FiO2 24   21





 4/14/20 4/14/20 4/14/20 4/14/20





 08:00 09:00 10:00 10:00


 


Temp 37.7  37.4 


 


Pulse  93  90


 


Resp  23  23


 


B/P (MAP)  147/73 (97)  138/58 (84)


 


Pulse Ox    95


 


O2 Delivery  Mechanical Ventilator  Mechanical Ventilator


 


O2 Flow Rate  60.00  60.00


 


    





 4/14/20 4/14/20 4/14/20 4/14/20





 10:49 10:58 11:00 12:00


 


Pulse 85 86 88 


 


Resp  24 23 


 


B/P (MAP)   138/58 (84) 


 


Pulse Ox 94 94 95 93


 


O2 Delivery   Mechanical Ventilator Mechanical Ventilator


 


O2 Flow Rate 30.00  60.00 


 


FiO2  60  50





 4/14/20 4/14/20 4/14/20 4/14/20





 12:00 12:11 12:23 13:00


 


Temp   37.0 36.9


 


Pulse 85   88


 


Resp 23   24


 


B/P (MAP) 142/60 (87) 140/60  136/59 (84)


 


Pulse Ox 93   97


 


O2 Delivery Mechanical Ventilator   Mechanical Ventilator


 


O2 Flow Rate 50.00   50.00


 


    





 4/14/20 4/14/20 4/14/20 4/14/20





 13:00 14:00 14:19 15:11


 


Temp  36.9 36.9 


 


Pulse 101 86  95


 


Resp  24  24


 


B/P (MAP)  160/77 (104)  


 


Pulse Ox  96  97


 


O2 Delivery  Mechanical Ventilator  


 


O2 Flow Rate  50.00  


 


FiO2    60














 4/14/20





 00:00


 


Intake Total 2772 ml


 


Output Total 925 ml


 


Balance 1847 ml








Weight (Pounds):  206


Weight (Ounces):  0.8


Weight (Calculated Kilograms):  93.436623


Constitutional:  other (on mec vent, unresponsive)


Respiratory:  No accessory muscle use; other (fair to good bilat air entry)


Cardiovascular:  regular rate-rhythm, S1 and S2, systolic murmur (soft BUTCH at 

card base)


Gastrointestional:  audible bowel sounds, other (mildly distended)


Extremities:  swelling (mild edema), other (R foot in dressing that wasn't 

removed); No clubbing, No cyanosis


Neurologic/Psychiatric:  other (unresponsive, on mech vent)


Skin:  No rash on exposed areas, No ulcerations on exposed areas; other (lorie

ssing to right foot, D&I)





Results/Procedures:


Labs


Laboratory Tests


4/13/20 16:14: 


Ferritin 769.8H, Adenovirus (PCR) Footnote, Coronavirus (COVID-19)(PCR) 

Negative, Human Metapneumovirus (PCR) Not Detected, Influenza Virus Type A (PCR)

Not Detected, Influenza Virus Type B (PCR) Not Detected, Urine Legionella 

pneumophilia Ag Negative, Parainfluenza Type 1 (PCR) Footnote, Parainfluenza 

Type 2 (PCR) Footnote, Parainfluenza Type 3 (PCR) Footnote, Respiratory Syncyti

al Virus (PCR) Not Detected, Streptococcus pneumoniae Antigen Negative


4/13/20 18:15: Glucometer 165H


4/13/20 18:51: Troponin I 0.576*H


4/13/20 22:12: Glucometer 181H


4/14/20 01:08: Glucometer 214H


4/14/20 02:58: 


White Blood Count 11.2H, Red Blood Count 3.07L, Hemoglobin 9.3L, Hematocrit 29L,

Mean Corpuscular Volume 95, Mean Corpuscular Hemoglobin 30, Mean Corpuscular 

Hemoglobin Concent 32, Red Cell Distribution Width 17.1H, Platelet Count 108L, 

Mean Platelet Volume 13.1H, Neutrophils (%) (Auto) 72, Lymphocytes (%) (Auto) 

19, Monocytes (%) (Auto) 8, Eosinophils (%) (Auto) 1, Basophils (%) (Auto) 0, 

Neutrophils # (Auto) 8.1H, Lymphocytes # (Auto) 2.1, Monocytes # (Auto) 0.9, 

Eosinophils # (Auto) 0.1, Basophils # (Auto) 0.1, Blood Gas Puncture Site RIGHT 

RADIAL, Blood Gas Patient Temperature 38.0, Arterial Blood pH 7.44H, Arterial 

Blood Partial Pressure CO2 32L, Arterial Blood Partial Pressure O2 165H, 

Arterial Blood HCO3 21L, Arterial Blood Total CO2 22.2, Arterial Blood Oxygen 

Saturation 84L, Arterial Blood Base Excess -2.1, Rojelio Test POSITIVE, Blood Gas 

Ventilator Setting NO, Blood Gas Inspired Oxygen 21, Sodium Level 139, Potassium

Level 4.3, Chloride Level 110H, Carbon Dioxide Level 18L, Anion Gap 11, Blood 

Urea Nitrogen 38H, Creatinine 1.43H, Estimat Glomerular Filtration Rate 49, 

BUN/Creatinine Ratio 27, Glucose Level 160H, Calcium Level 7.9L, Corrected 

Calcium 8.7, Phosphorus Level 1.7L, Magnesium Level 1.3L, Total Bilirubin 0.3, 

Aspartate Amino Transf (AST/SGOT) 56H, Alanine Aminotransferase (ALT/SGPT) 29, 

Alkaline Phosphatase 82, Total Protein 5.9L, Albumin 3.0L, Procalcitonin 1.33H


4/14/20 08:35: Glucometer 234H


4/14/20 12:53: Glucometer 201H





Microbiology


4/13/20 Catheter Tip Culture - Preliminary, Resulted


          No growth


4/13/20 Gram Stain, Resulted


          Pending


4/13/20 Sputum Culture - Preliminary, Resulted


          No growth


4/12/20 Urine Culture - Final, Complete


          NO GROWTH





Laboratory Tests


4/12/20 20:14








4/13/20 03:09








4/14/20 02:58











A/P:


Assessment:





Acute resp failure and shock of undetermined etiology





Cannot exclude pulmonary embolism





Acute renal failure (AYAH-3) - renal function improving





Mild troponin elevation, likely type-2 MI due to reasons noted above





CAD. Card cath of Jan 2017 showed moderate, nonobstructive disease and LVEF 55% 

and elevated LVEDP. MPI of 2/27/20 by Dr Dai showed no ischemia or infarction

and normal LV function





Echo on 4/14/20: LVEF 45%, grade 1 fajardo dysfunction, mild to mod TR, RVSP 29 

mmHg





Chronic tobacco use (smokes cigarettes according to previous records)





H/o DM II





H/o Htn





H/o Hyperlipidemia





PAD.  Peripheral angiogram and intervention on 8/22/2019 by Dr Dai with PTA 

of the right AT, PT and stenting of the right SFA. Significant improvement of 

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


Plan:





* Complex management


* Correct hypomag


* Monitor labs











JOHNNIE RON MD [facility]   Apr 14, 2020 15:23

## 2020-04-14 NOTE — DIAGNOSTIC IMAGING REPORT
PROCEDURE: US Venous Lower Ext Lemer.



TECHNIQUE: Multiple real-time grayscale images were obtained over

the lower extremities in various projections, bilaterally.

Additional duplex Doppler and color Doppler images were also

obtained.



INDICATION: Swelling



COMPARISON: None available



FINDINGS: Normal flow, compression, and augmentation within the

visualized deep venous structures of the bilateral lower

extremities.



IMPRESSION: No evidence of deep venous thrombosis within the

bilateral lower extremities.



Dictated by: 



  Dictated on workstation # [id number]

## 2020-04-14 NOTE — PROGRESS NOTE
Subjective


Subjective/Events-last exam


Intubated and Sedated


Review of Systems


Intubated and Sedated





Focused Exam


Lactate Level


20 20:14: Lactic Acid Level 1.51


20 06:40: Lactic Acid Level 0.60





Objective


Exam


Last Set of Vital Signs





Vital Signs








  Date Time  Temp Pulse Resp B/P (MAP) Pulse Ox O2 Delivery O2 Flow Rate FiO2


 


20 20:00     93 Mechanical Ventilator  40


 


20 20:00 36.9 87 24 114/52 (72)   40.00 





Capillary Refill : Less Than 3 Seconds


I&O











Intake and Output 


 


 20





 00:00


 


Intake Total 5272 ml


 


Output Total 1675 ml


 


Balance 3597 ml


 


 


 


Intake Oral 0 ml


 


IV Total 5222 ml


 


Other 50 ml


 


Output Urine Total 1575 ml


 


Gastric Drainage Total 100 ml








General:  Other (Intubated and sedated)


HEENT:  Mucous Memb Moist/Pink, Other (blood tinged fluid from OG)


Lungs:  Other (Course breath sounds bilaterally with diminished air movement at 

the bases)


Heart:  Regular Rate


Abdomen:  Soft


Extremities:  Other (2+ pitting edema)





Results/Procedures


Lab


Laboratory Tests


20 22:12: Glucometer 181H


20 01:08: Glucometer 214H


20 02:58: 


White Blood Count 11.2H, Red Blood Count 3.07L, Hemoglobin 9.3L, Hematocrit 29L,

Mean Corpuscular Volume 95, Mean Corpuscular Hemoglobin 30, Mean Corpuscular 

Hemoglobin Concent 32, Red Cell Distribution Width 17.1H, Platelet Count 108L, 

Mean Platelet Volume 13.1H, Neutrophils (%) (Auto) 72, Lymphocytes (%) (Auto) 

19, Monocytes (%) (Auto) 8, Eosinophils (%) (Auto) 1, Basophils (%) (Auto) 0, 

Neutrophils # (Auto) 8.1H, Lymphocytes # (Auto) 2.1, Monocytes # (Auto) 0.9, 

Eosinophils # (Auto) 0.1, Basophils # (Auto) 0.1, Blood Gas Puncture Site RIGHT 

RADIAL, Blood Gas Patient Temperature 38.0, Arterial Blood pH 7.44H, Arterial 

Blood Partial Pressure CO2 32L, Arterial Blood Partial Pressure O2 165H, 

Arterial Blood HCO3 21L, Arterial Blood Total CO2 22.2, Arterial Blood Oxygen 

Saturation 84L, Arterial Blood Base Excess -2.1, Rojelio Test POSITIVE, Blood Gas 

Ventilator Setting NO, Blood Gas Inspired Oxygen 21, Sodium Level 139, Potassium

Level 4.3, Chloride Level 110H, Carbon Dioxide Level 18L, Anion Gap 11, Blood 

Urea Nitrogen 38H, Creatinine 1.43H, Estimat Glomerular Filtration Rate 49, 

BUN/Creatinine Ratio 27, Glucose Level 160H, Calcium Level 7.9L, Corrected 

Calcium 8.7, Phosphorus Level 1.7L, Magnesium Level 1.3L, Total Bilirubin 0.3, 

Aspartate Amino Transf (AST/SGOT) 56H, Alanine Aminotransferase (ALT/SGPT) 29, 

Alkaline Phosphatase 82, Total Protein 5.9L, Albumin 3.0L, Procalcitonin 1.33H


20 08:35: Glucometer 234H


20 12:53: Glucometer 201H


20 16:28: Glucometer 132H


20 18:48: Vancomycin Level Trough 16.1


20 20:22: Glucometer 162H





Microbiology


20 Catheter Tip Culture - Preliminary, Resulted


          No growth


20 Gram Stain - Final, Resulted


          


20 Sputum Culture - Preliminary, Resulted


          No growth


20 Urine Culture - Final, Complete


          NO GROWTH





Assessment/Plan


Assessment/Plan





(1) Sepsis


Status:  Acute


Assessment & Plan:  : Poor prognosis, Patient on Sepsis IVF protocol, 

continue IV antibiotics, Dr James consulted for critical care, blood cultures 

pending


Qualifiers:  


   Qualified Codes:  A41.9 - Sepsis, unspecified organism; R65.21 - Severe 

sepsis with septic shock; N17.9 - Acute kidney failure, unspecified


(2) Acute respiratory failure with hypoxia and hypercapnia


Status:  Acute


Assessment & Plan:  : Currently Intubated, Dr James managing vent





(3) Acute kidney injury superimposed on chronic kidney disease


Status:  Acute


Assessment & Plan:  : Will monitor daily BUN/Cr





(4) NSTEMI (non-ST elevation myocardial infarction)


Status:  Acute


Assessment & Plan:  : Cardiology consulted, appreciate recommendations, 

Recent Cath 2020





(5) Upper GI bleed


Status:  Acute


Assessment & Plan:  : Today started having blood tinged fluid from OG, L

ovenox stopped and started on IV protonix





(6) CAD (coronary artery disease)


Status:  Chronic


Qualifiers:  


   Qualified Codes:  I25.10 - Atherosclerotic heart disease of native coronary 

artery without angina pectoris


(7) Hypertension


Status:  Chronic


Assessment & Plan:  : Currently hypotensive and on Levaphed for Sepsis


Qualifiers:  


   Qualified Codes:  I10 - Essential (primary) hypertension


(8) Diabetes type 2, uncontrolled


Status:  Chronic


Qualifiers:  


   Qualified Codes:  E11.65 - Type 2 diabetes mellitus with hyperglycemia


(9) COPD (chronic obstructive pulmonary disease)


Status:  Chronic


Qualifiers:  


   Qualified Codes:  J44.9 - Chronic obstructive pulmonary disease, unspecified


(10) Normocytic anemia


Status:  Acute


(11) Pneumonia


Status:  Acute


Qualifiers:  


   


(12) DVT prophylaxis


Status:  Acute


Assessment & Plan:  : On Lovenox, stopped today due to GI bleeding








Clinical Quality Measures


DVT/VTE Risk/Contraindication:


Risk Factor Score Per Nursin


RFS Level Per Nursing on Admit:  4+=Very High











SRINI JOHN MD               2020 21:08

## 2020-04-14 NOTE — DIAGNOSTIC IMAGING REPORT
INDICATION: Intubated patient. Respiratory failure.



COMPARISON: 04/13/2020



FINDINGS: Single frontal radiographic view of the chest was

obtained and demonstrates indwelling endotracheal tube with tip

at the lower level of the clavicular heads. Gastric tube is

coiled in the stomach. Lungs show significant interval decreased

inspiratory volumes with increased generalized hazy and

consolidative opacity of the left lung, greatest within the left

mid and lower lung field. Right lung otherwise remains relatively

clear. There is no large effusion on the right. Small left

effusion cannot be entirely excluded. No pneumothorax is

identified. Cardiac silhouette and pulmonary vasculature appear

to be within normal limits considering low inspiratory volumes.



IMPRESSION:

1. Interval increase in opacification left hemithorax, which may

be related to interval decrease in inspiratory volumes with

background infiltrate in the mid and lower lung field. Continued

follow-up is advised.

2. Lines and tubes as above.



Dictated by: 



  Dictated on workstation # [id number]

## 2020-04-15 VITALS — DIASTOLIC BLOOD PRESSURE: 65 MMHG | SYSTOLIC BLOOD PRESSURE: 151 MMHG

## 2020-04-15 VITALS — SYSTOLIC BLOOD PRESSURE: 162 MMHG | DIASTOLIC BLOOD PRESSURE: 74 MMHG

## 2020-04-15 VITALS — DIASTOLIC BLOOD PRESSURE: 67 MMHG | SYSTOLIC BLOOD PRESSURE: 168 MMHG

## 2020-04-15 VITALS — DIASTOLIC BLOOD PRESSURE: 59 MMHG | SYSTOLIC BLOOD PRESSURE: 135 MMHG

## 2020-04-15 VITALS — SYSTOLIC BLOOD PRESSURE: 146 MMHG | DIASTOLIC BLOOD PRESSURE: 62 MMHG

## 2020-04-15 VITALS — DIASTOLIC BLOOD PRESSURE: 68 MMHG | SYSTOLIC BLOOD PRESSURE: 141 MMHG

## 2020-04-15 VITALS — DIASTOLIC BLOOD PRESSURE: 57 MMHG | SYSTOLIC BLOOD PRESSURE: 143 MMHG

## 2020-04-15 VITALS — DIASTOLIC BLOOD PRESSURE: 54 MMHG | SYSTOLIC BLOOD PRESSURE: 113 MMHG

## 2020-04-15 VITALS — SYSTOLIC BLOOD PRESSURE: 106 MMHG | DIASTOLIC BLOOD PRESSURE: 44 MMHG

## 2020-04-15 VITALS — DIASTOLIC BLOOD PRESSURE: 48 MMHG | SYSTOLIC BLOOD PRESSURE: 117 MMHG

## 2020-04-15 VITALS — DIASTOLIC BLOOD PRESSURE: 67 MMHG | SYSTOLIC BLOOD PRESSURE: 158 MMHG

## 2020-04-15 VITALS — SYSTOLIC BLOOD PRESSURE: 135 MMHG | DIASTOLIC BLOOD PRESSURE: 53 MMHG

## 2020-04-15 VITALS — DIASTOLIC BLOOD PRESSURE: 57 MMHG | SYSTOLIC BLOOD PRESSURE: 122 MMHG

## 2020-04-15 VITALS — SYSTOLIC BLOOD PRESSURE: 139 MMHG | DIASTOLIC BLOOD PRESSURE: 58 MMHG

## 2020-04-15 VITALS — SYSTOLIC BLOOD PRESSURE: 105 MMHG | DIASTOLIC BLOOD PRESSURE: 52 MMHG

## 2020-04-15 VITALS — SYSTOLIC BLOOD PRESSURE: 128 MMHG | DIASTOLIC BLOOD PRESSURE: 49 MMHG

## 2020-04-15 VITALS — SYSTOLIC BLOOD PRESSURE: 119 MMHG | DIASTOLIC BLOOD PRESSURE: 48 MMHG

## 2020-04-15 VITALS — SYSTOLIC BLOOD PRESSURE: 117 MMHG | DIASTOLIC BLOOD PRESSURE: 49 MMHG

## 2020-04-15 VITALS — DIASTOLIC BLOOD PRESSURE: 61 MMHG | SYSTOLIC BLOOD PRESSURE: 162 MMHG

## 2020-04-15 VITALS — DIASTOLIC BLOOD PRESSURE: 61 MMHG | SYSTOLIC BLOOD PRESSURE: 134 MMHG

## 2020-04-15 VITALS — DIASTOLIC BLOOD PRESSURE: 68 MMHG | SYSTOLIC BLOOD PRESSURE: 163 MMHG

## 2020-04-15 VITALS — DIASTOLIC BLOOD PRESSURE: 65 MMHG | SYSTOLIC BLOOD PRESSURE: 171 MMHG

## 2020-04-15 VITALS — DIASTOLIC BLOOD PRESSURE: 49 MMHG | SYSTOLIC BLOOD PRESSURE: 121 MMHG

## 2020-04-15 VITALS — DIASTOLIC BLOOD PRESSURE: 61 MMHG | SYSTOLIC BLOOD PRESSURE: 148 MMHG

## 2020-04-15 VITALS — SYSTOLIC BLOOD PRESSURE: 135 MMHG | DIASTOLIC BLOOD PRESSURE: 55 MMHG

## 2020-04-15 VITALS — SYSTOLIC BLOOD PRESSURE: 132 MMHG | DIASTOLIC BLOOD PRESSURE: 53 MMHG

## 2020-04-15 VITALS — DIASTOLIC BLOOD PRESSURE: 54 MMHG | SYSTOLIC BLOOD PRESSURE: 123 MMHG

## 2020-04-15 VITALS — DIASTOLIC BLOOD PRESSURE: 68 MMHG | SYSTOLIC BLOOD PRESSURE: 109 MMHG

## 2020-04-15 VITALS — DIASTOLIC BLOOD PRESSURE: 61 MMHG | SYSTOLIC BLOOD PRESSURE: 132 MMHG

## 2020-04-15 VITALS — SYSTOLIC BLOOD PRESSURE: 101 MMHG | DIASTOLIC BLOOD PRESSURE: 50 MMHG

## 2020-04-15 LAB
ARTERIAL PATENCY WRIST A: POSITIVE
ARTERIAL PATENCY WRIST A: POSITIVE
BASE EXCESS STD BLDA CALC-SCNC: -0.2 MMOL/L (ref -2.5–2.5)
BASE EXCESS STD BLDA CALC-SCNC: -1.3 MMOL/L (ref -2.5–2.5)
BASOPHILS # BLD AUTO: 0 10^3/UL (ref 0–0.1)
BASOPHILS NFR BLD AUTO: 0 % (ref 0–10)
BDY SITE: (no result)
BDY SITE: (no result)
BODY TEMPERATURE: 36.9
BODY TEMPERATURE: 36.9
BUN/CREAT SERPL: 29
CALCIUM SERPL-MCNC: 7.5 MG/DL (ref 8.5–10.1)
CHLORIDE SERPL-SCNC: 107 MMOL/L (ref 98–107)
CO2 BLDA CALC-SCNC: 24.2 MMOL/L (ref 21–31)
CO2 BLDA CALC-SCNC: 25.9 MMOL/L (ref 21–31)
CO2 SERPL-SCNC: 20 MMOL/L (ref 21–32)
CREAT SERPL-MCNC: 0.98 MG/DL (ref 0.6–1.3)
EOSINOPHIL # BLD AUTO: 0 10^3/UL (ref 0–0.3)
EOSINOPHIL NFR BLD AUTO: 0 % (ref 0–10)
ERYTHROCYTE [DISTWIDTH] IN BLOOD BY AUTOMATED COUNT: 17.2 % (ref 10–14.5)
GFR SERPLBLD BASED ON 1.73 SQ M-ARVRAT: > 60 ML/MIN
GLUCOSE SERPL-MCNC: 154 MG/DL (ref 70–105)
HCT VFR BLD CALC: 25 % (ref 40–54)
HGB BLD-MCNC: 7.9 G/DL (ref 13.3–17.7)
INHALED O2 FLOW RATE: 40 L/MIN
INHALED O2 FLOW RATE: 40 L/MIN
LYMPHOCYTES # BLD AUTO: 0.8 X 10^3 (ref 1–4)
LYMPHOCYTES NFR BLD AUTO: 12 % (ref 12–44)
MAGNESIUM SERPL-MCNC: 1.8 MG/DL (ref 1.6–2.4)
MANUAL DIFFERENTIAL PERFORMED BLD QL: NO
MCH RBC QN AUTO: 31 PG (ref 25–34)
MCHC RBC AUTO-ENTMCNC: 32 G/DL (ref 32–36)
MCV RBC AUTO: 95 FL (ref 80–99)
MONOCYTES # BLD AUTO: 0.3 X 10^3 (ref 0–1)
MONOCYTES NFR BLD AUTO: 5 % (ref 0–12)
NEUTROPHILS # BLD AUTO: 5.8 X 10^3 (ref 1.8–7.8)
NEUTROPHILS NFR BLD AUTO: 83 % (ref 42–75)
PCO2 BLDA: 33 MMHG (ref 35–45)
PCO2 BLDA: 51 MMHG (ref 35–45)
PH BLDA: 7.3 [PH] (ref 7.37–7.43)
PH BLDA: 7.46 [PH] (ref 7.37–7.43)
PHOSPHATE SERPL-MCNC: 3.4 MG/DL (ref 2.3–4.7)
PLATELET # BLD: 94 10^3/UL (ref 130–400)
PMV BLD AUTO: 12.1 FL (ref 7.4–10.4)
PO2 BLDA: 67 MMHG (ref 79–93)
PO2 BLDA: 73 MMHG (ref 79–93)
POTASSIUM SERPL-SCNC: 3.6 MMOL/L (ref 3.6–5)
SAO2 % BLDA FROM PO2: 94 % (ref 94–100)
SAO2 % BLDA FROM PO2: 95 % (ref 94–100)
SODIUM SERPL-SCNC: 140 MMOL/L (ref 135–145)
VENTILATION MODE VENT: YES
VENTILATION MODE VENT: YES
WBC # BLD AUTO: 7 10^3/UL (ref 4.3–11)

## 2020-04-15 RX ADMIN — POTASSIUM CHLORIDE SCH MLS/HR: 200 INJECTION, SOLUTION INTRAVENOUS at 03:59

## 2020-04-15 RX ADMIN — PROPOFOL SCH MLS/HR: 10 INJECTION, EMULSION INTRAVENOUS at 14:48

## 2020-04-15 RX ADMIN — PANTOPRAZOLE SODIUM SCH MG: 40 INJECTION, POWDER, FOR SOLUTION INTRAVENOUS at 19:59

## 2020-04-15 RX ADMIN — MAGNESIUM SULFATE IN DEXTROSE SCH MLS/HR: 10 INJECTION, SOLUTION INTRAVENOUS at 03:46

## 2020-04-15 RX ADMIN — PROPOFOL SCH MLS/HR: 10 INJECTION, EMULSION INTRAVENOUS at 22:44

## 2020-04-15 RX ADMIN — Medication SCH MLS/HR: at 01:24

## 2020-04-15 RX ADMIN — Medication SCH MLS/HR: at 22:45

## 2020-04-15 RX ADMIN — POTASSIUM CHLORIDE SCH MEQ: 1500 TABLET, EXTENDED RELEASE ORAL at 03:47

## 2020-04-15 RX ADMIN — PANTOPRAZOLE SODIUM SCH MG: 40 INJECTION, POWDER, FOR SOLUTION INTRAVENOUS at 08:20

## 2020-04-15 RX ADMIN — POTASSIUM CHLORIDE SCH MLS/HR: 200 INJECTION, SOLUTION INTRAVENOUS at 05:03

## 2020-04-15 RX ADMIN — SODIUM CHLORIDE SCH MLS/HR: 900 INJECTION, SOLUTION INTRAVENOUS at 14:47

## 2020-04-15 RX ADMIN — HYDROCORTISONE SODIUM SUCCINATE SCH MG: 100 INJECTION, POWDER, FOR SOLUTION INTRAMUSCULAR; INTRAVENOUS at 14:47

## 2020-04-15 RX ADMIN — PROPOFOL SCH MLS/HR: 10 INJECTION, EMULSION INTRAVENOUS at 18:54

## 2020-04-15 RX ADMIN — HYDROCORTISONE SODIUM SUCCINATE SCH MG: 100 INJECTION, POWDER, FOR SOLUTION INTRAMUSCULAR; INTRAVENOUS at 22:31

## 2020-04-15 RX ADMIN — SODIUM CHLORIDE, SODIUM LACTATE, POTASSIUM CHLORIDE, AND CALCIUM CHLORIDE SCH MLS/HR: 600; 310; 30; 20 INJECTION, SOLUTION INTRAVENOUS at 19:58

## 2020-04-15 RX ADMIN — INSULIN ASPART SCH UNIT: 100 INJECTION, SOLUTION INTRAVENOUS; SUBCUTANEOUS at 18:18

## 2020-04-15 RX ADMIN — Medication SCH MLS/HR: at 08:21

## 2020-04-15 RX ADMIN — SODIUM CHLORIDE, SODIUM LACTATE, POTASSIUM CHLORIDE, AND CALCIUM CHLORIDE SCH MLS/HR: 600; 310; 30; 20 INJECTION, SOLUTION INTRAVENOUS at 08:20

## 2020-04-15 RX ADMIN — PROPOFOL SCH MLS/HR: 10 INJECTION, EMULSION INTRAVENOUS at 06:05

## 2020-04-15 RX ADMIN — POTASSIUM CHLORIDE SCH MLS/HR: 200 INJECTION, SOLUTION INTRAVENOUS at 03:46

## 2020-04-15 RX ADMIN — INSULIN ASPART SCH UNIT: 100 INJECTION, SOLUTION INTRAVENOUS; SUBCUTANEOUS at 11:27

## 2020-04-15 RX ADMIN — PROPOFOL SCH MLS/HR: 10 INJECTION, EMULSION INTRAVENOUS at 01:30

## 2020-04-15 RX ADMIN — ENOXAPARIN SODIUM SCH MG: 100 INJECTION SUBCUTANEOUS at 08:20

## 2020-04-15 RX ADMIN — HYDROCORTISONE SODIUM SUCCINATE SCH MG: 100 INJECTION, POWDER, FOR SOLUTION INTRAMUSCULAR; INTRAVENOUS at 05:03

## 2020-04-15 RX ADMIN — Medication SCH MLS/HR: at 16:52

## 2020-04-15 RX ADMIN — SODIUM CHLORIDE, SODIUM LACTATE, POTASSIUM CHLORIDE, AND CALCIUM CHLORIDE SCH MLS/HR: 600; 310; 30; 20 INJECTION, SOLUTION INTRAVENOUS at 01:29

## 2020-04-15 RX ADMIN — SODIUM CHLORIDE, SODIUM LACTATE, POTASSIUM CHLORIDE, AND CALCIUM CHLORIDE SCH MLS/HR: 600; 310; 30; 20 INJECTION, SOLUTION INTRAVENOUS at 15:01

## 2020-04-15 RX ADMIN — INSULIN ASPART SCH UNIT: 100 INJECTION, SOLUTION INTRAVENOUS; SUBCUTANEOUS at 03:47

## 2020-04-15 RX ADMIN — SODIUM CHLORIDE SCH MLS/HR: 900 INJECTION, SOLUTION INTRAVENOUS at 06:00

## 2020-04-15 RX ADMIN — SODIUM CHLORIDE SCH MLS/HR: 900 INJECTION, SOLUTION INTRAVENOUS at 22:45

## 2020-04-15 RX ADMIN — VANCOMYCIN HYDROCHLORIDE SCH MLS/HR: 500 INJECTION, POWDER, LYOPHILIZED, FOR SOLUTION INTRAVENOUS at 19:58

## 2020-04-15 RX ADMIN — PROPOFOL SCH MLS/HR: 10 INJECTION, EMULSION INTRAVENOUS at 10:38

## 2020-04-15 NOTE — PULMONARY PROGRESS NOTE
Subjective


Time Seen by a Provider:  06:32


Subjective/Events-last exam


Sedated on vent





Sepsis Event


Evaluation


Height, Weight, BMI


Height: 5'8.50"


Weight: 206lbs. 0.8oz. 93.656251wu; 30.95 BMI


Method:Stated





Focused Exam


Lactate Level


4/12/20 20:14: Lactic Acid Level 1.51


4/13/20 06:40: Lactic Acid Level 0.60





Exam


Exam





Vital Signs








  Date Time  Temp Pulse Resp B/P (MAP) Pulse Ox O2 Delivery O2 Flow Rate FiO2


 


4/15/20 06:14  86 25  96   40


 


4/15/20 06:05  95  113/52    


 


4/15/20 05:00 36.5 77 23 119/48 (71) 93 Mechanical Ventilator 40.00 


 


4/15/20 04:00     93 Mechanical Ventilator  40


 


4/15/20 04:00 36.4 77 23 132/53 (79) 93 Mechanical Ventilator 40.00 


 


4/15/20 03:00 36.5 70 24 101/50 (67) 91 Mechanical Ventilator 40.00 


 


4/15/20 02:26  72 24  91   40


 


4/15/20 02:00 36.7 73 24 105/52 (69) 91 Mechanical Ventilator 40.00 


 


4/15/20 01:30  75  119/57    


 


4/15/20 01:00 36.8 78 24 132/61 (84) 92 Mechanical Ventilator 40.00 


 


4/15/20 01:00  78      


 


4/15/20 00:00 36.8 81 24 141/68 (92) 93 Mechanical Ventilator 40.00 


 


4/14/20 23:35     92 Mechanical Ventilator  40


 


4/14/20 23:00 36.8 79 24 109/55 (73) 91 Mechanical Ventilator 40.00 


 


4/14/20 22:18  93 24  94   40


 


4/14/20 22:00 37.4 90 23 138/58 (84) 95 Mechanical Ventilator 40.00 


 


4/14/20 21:18  81  94/43    


 


4/14/20 21:00 36.9 88 23 105/45 (65) 95 Mechanical Ventilator 40.00 


 


4/14/20 20:00     93 Mechanical Ventilator  40


 


4/14/20 20:00 36.9 87 24 114/52 (72) 93 Mechanical Ventilator 40.00 


 


4/14/20 19:00  92      


 


4/14/20 19:00 36.9 92 18 126/57 (80) 93 Mechanical Ventilator 40.00 


 


4/14/20 18:37  92 24  94   40


 


4/14/20 18:00 36.9 86 23 114/53 (73) 93 Mechanical Ventilator 40.00 


 


4/14/20 17:59      Mechanical Ventilator 40.00 


 


4/14/20 17:22    109/53    


 


4/14/20 17:00 37.0 82 24 103/51 (68) 94 Mechanical Ventilator 50.00 


 


4/14/20 16:01 36.9       


 


4/14/20 16:00     95 Mechanical Ventilator  50


 


4/14/20 16:00 36.9 82 23 110/55 (73) 95 Mechanical Ventilator 50.00 


 


4/14/20 15:11  95 24  97   60


 


4/14/20 15:00 36.9 92 23 131/60 (83) 96 Mechanical Ventilator 50.00 


 


4/14/20 14:19 36.9       


 


4/14/20 14:00 36.9 86 24 160/77 (104) 96 Mechanical Ventilator 50.00 


 


4/14/20 13:00  101      


 


4/14/20 13:00 36.9 88 24 136/59 (84) 97 Mechanical Ventilator 50.00 


 


4/14/20 12:23 37.0       


 


4/14/20 12:11    140/60    


 


4/14/20 12:00  85 23 142/60 (87) 93 Mechanical Ventilator 50.00 


 


4/14/20 12:00     93 Mechanical Ventilator  50


 


4/14/20 11:00  88 23 138/58 (84) 95 Mechanical Ventilator 60.00 


 


4/14/20 10:58  86 24  94   60


 


4/14/20 10:49  85   94  30.00 


 


4/14/20 10:00  90 23 138/58 (84) 95 Mechanical Ventilator 60.00 


 


4/14/20 10:00 37.4       


 


4/14/20 09:00  93 23 147/73 (97)  Mechanical Ventilator 60.00 


 


4/14/20 08:00 37.7       


 


4/14/20 08:00     97 Mechanical Ventilator  21


 


4/14/20 08:00  105 23 138/56 (83) 98 Mechanical Ventilator 21.00 


 


4/14/20 07:39    133/53    


 


4/14/20 07:28  110 24  100   24


 


4/14/20 07:00  97      


 


4/14/20 07:00  96 23 125/57 (79) 99 Mechanical Ventilator 21.00 





        














I & O 


 


 4/15/20





 07:00


 


Intake Total 4975 ml


 


Output Total 2435 ml


 


Balance 2540 ml








Height & Weight


Height: 5'8.50"


Weight: 206lbs. 0.8oz. 93.421520za; 30.95 BMI


Method:Stated


General Appearance:  No Apparent Distress, Obese, Other (intubated and sedated)


HEENT:  TMs Normal, Other (Pupils equal/reactive 3 mm bilaterally)


Respiratory:  No Respiratory Distress, Other (intubated and mechanically venti

lated)


Cardiovascular:  No Murmur, Tachycardia (regular rhythm)


Capillary Refill:  Less Than 3 Seconds


Gastrointestinal:  soft, no organomegaly


Extremity:  Normal Inspection, No Pedal Edema


Neurologic/Psychiatric:  Other (sedated)


Skin:  Normal Color, Warm/Dry





Results


Lab


Laboratory Tests


4/14/20 02:58








4/15/20 03:20











Assessment/Plan


Assessment/Plan


Acute respiratory failure 


   - COVID is negative 


   -Procalcitonin pending 


   -Check bilateral dopplers 


   -Echo is pending  


   - PEEP to 10 


sepsis with PICC line


   -Picc line d/c'd


      -Culturing tip 


      -Question if pt has been injecting drugs into PICC line


   -Pan cultures pending


Pneumonia with possible aspiration 


   -Pan cultures 


   -MRSA swab 


   -COVID is negative 


   -Influenza is negative 


   -Continue Vanco and Zosyn 


   - urine strep and legionella ag - pending 


   -RVP is pending 


Hypotension


   -Levophed


   -Echo pending 


NSTEMI


   -Cardiology following 


Hypophos, hypomag 


   -replace 


Acute renal failure with dehydration 


   -IVF











GUILLERMINA MERA DO              Apr 15, 2020 06:34

## 2020-04-15 NOTE — PROGRESS NOTE - CARDIOLOGY
Cardiology SOAP Progress Note


Subjective:


Intubated


Sedated





Objective:


I&O/Vital Signs











 20





 21:48 22:00 23:00 23:02


 


Pulse 87 81 82 


 


Resp 17 18 18 


 


B/P (MAP)  153/50 (84) 145/46 (79) 


 


Pulse Ox 96 97 97 


 


O2 Delivery  Mechanical Ventilator Mechanical Ventilator Mechanical Ventilator


 


O2 Flow Rate  60.00 60.00 50.00


 


FiO2 60   





 20





 00:00 00:00 00:09 01:00


 


Temp   36.3 


 


Pulse  77  79


 


Resp  17  


 


B/P (MAP)  149/52 (84)  


 


Pulse Ox  95  


 


O2 Delivery Mechanical Ventilator Mechanical Ventilator  


 


O2 Flow Rate  50.00  


 


FiO2 60   


 


    





 20





 01:00 02:00 02:00 02:12


 


Pulse 77 75  79


 


Resp 21 13  17


 


B/P (MAP) 156/59 (91) 166/92 (116)  


 


Pulse Ox 94 93  94


 


O2 Delivery Mechanical Ventilator Mechanical Ventilator Mechanical Ventilator 


 


O2 Flow Rate 50.00 40.00 40.00 


 


FiO2    60





 20





 03:00 03:19 03:25 04:00


 


Temp  36.9  


 


Pulse 75  75 75


 


Resp 12   9


 


B/P (MAP) 161/79 (106)  161/79 129/66 (87)


 


Pulse Ox 93   93


 


O2 Delivery Mechanical Ventilator   Mechanical Ventilator


 


O2 Flow Rate 40.00   40.00


 


    





 20





 04:00 05:00 06:08 06:10


 


Pulse  73 53 53


 


Resp  9 18 


 


B/P (MAP)  142/70 (94) 136/77 (96) 136/77


 


Pulse Ox  92 99 


 


O2 Delivery Mechanical Ventilator Mechanical Ventilator Mechanical Ventilator 


 


O2 Flow Rate  40.00 40.00 


 


FiO2 40   





 20





 06:56 07:00 07:00 07:09


 


Pulse 71 72 75 71


 


Resp 15 5  15


 


B/P (MAP)  146/70 (95)  


 


Pulse Ox 94 99  94


 


O2 Delivery  Mechanical Ventilator  


 


O2 Flow Rate  40.00  


 


FiO2 40   40





 20  





 08:00 09:00  


 


Pulse 74 71  


 


Resp 5 4  


 


B/P (MAP) 142/73 (96) 177/68 (104)  


 


Pulse Ox 99 99  


 


O2 Delivery Mechanical Ventilator Mechanical Ventilator  


 


O2 Flow Rate 40.00 40.00  














 20





 00:00


 


Intake Total 260 ml


 


Output Total 600 ml


 


Balance -340 ml








Weight (Pounds):  206


Weight (Ounces):  0.8


Weight (Calculated Kilograms):  93.542869


Constitutional:  other (on mec vent, unresponsive)


Respiratory:  No accessory muscle use; other (fair to good bilat air entry)


Cardiovascular:  regular rate-rhythm, S1 and S2, systolic murmur (soft BUTCH at 

card base)


Gastrointestional:  hernia (abdominal), audible bowel sounds, other (mildly 

distended)


Extremities:  swelling (mild edema), other (R foot in dressing that wasn't 

removed); No clubbing, No cyanosis


Neurologic/Psychiatric:  other (unresponsive, on mech vent)


Skin:  No rash on exposed areas, No ulcerations on exposed areas; other 

(dressing to right foot, D&I)





Results/Procedures:


Labs


Laboratory Tests


20 11:53: Glucometer 350H


20 17:46: Glucometer 321H


20 23:57: Glucometer 320H


20 03:00: 


White Blood Count 6.3, Red Blood Count 2.96L, Hemoglobin 8.8L, Hematocrit 29L, 

Mean Corpuscular Volume 98, Mean Corpuscular Hemoglobin 30, Mean Corpuscular 

Hemoglobin Concent 30L, Red Cell Distribution Width 16.5H, Platelet Count 166, 

Mean Platelet Volume 11.6H, Neutrophils (%) (Auto) 92H, Lymphocytes (%) (Auto) 

5L, Monocytes (%) (Auto) 3, Eosinophils (%) (Auto) 0, Basophils (%) (Auto) 0, 

Neutrophils # (Auto) 5.8, Lymphocytes # (Auto) 0.3L, Monocytes # (Auto) 0.2, 

Eosinophils # (Auto) 0.0, Basophils # (Auto) 0.0, Blood Gas Puncture Site RT ART

LINE, Blood Gas Patient Temperature 36.9, Arterial Blood pH 7.45H, Arterial 

Blood Partial Pressure CO2 53H, Arterial Blood Partial Pressure O2 89, Arterial 

Blood HCO3 37H, Arterial Blood Total CO2 38.3H, Arterial Blood Oxygen Saturation

97, Arterial Blood Base Excess 12.0H, Rojelio Test ART LINE, Blood Gas Ventilator 

Setting YES, Blood Gas Inspired Oxygen 40%, Sodium Level 136, Potassium Level 

3.8, Chloride Level 94L, Carbon Dioxide Level 30, Anion Gap 12, Blood Urea 

Nitrogen 27H, Creatinine 1.02, Estimat Glomerular Filtration Rate > 60, 

BUN/Creatinine Ratio 26, Glucose Level 301H, Calcium Level 8.3L, Phosphorus 

Level 3.4, Magnesium Level 2.0





Microbiology


20 Catheter Tip Culture - Final, Complete


          No growth


20 Gram Stain - Final, Complete


          


20 Sputum Culture - Final, Complete


          No growth


20 Urine Culture - Final, Complete


          NO GROWTH





Procedures


NAME:   DAVID SMITH


MED REC#:   [id number]


ACCOUNT#:   [id number]


PT STATUS:   ADM IN


:   1950


PHYSICIAN:   ARIELLA LI MD


ADMIT DATE:   20/ICU


***Signed***


Date of Exam:04/15/20





CHEST 1 VIEW, AP/PA ONLY








EXAMINATION: Portable erect AP chest at 333h.





INDICATION: Respiratory distress





There is a better inspiratory effort on this exam than on the


previous study of 2020. The left lung base does seem better


aerated on this exam although there is still residual


atelectasis/infiltrate and fluid obscuring the left


hemidiaphragm. A band of increased density is also seen in the


left perihilar region. The heart is stable in size. The right


lung remains relatively clear. The mediastinum is not widened.


The osseous structures are intact. The supportive tubes and lines


seem to be in good position although the tip of the NG line is


not well visualized.





IMPRESSION:


The appearance of the chest has improved since the prior exam as


the left lung base does seem much better aerated. A follow-up


study would be recommended for continued evaluation.





Dictated by: 





  Dictated on workstation # [id number]








Dict:   04/15/20 [id number]


Trans:   04/15/20 0811


[facility] [id number]





Interpreted by:     ALVARO CORRIGAN MD


Electronically signed by: ALVARO CORRIGAN MD 04/15/20 0811





A/P:


Assessment:





Acute resp failure and shock of undetermined etiology





Cannot exclude pulmonary embolism





Acute renal failure (AYAH-3) - renal function improved





Mild troponin elevation, likely type-2 MI due to reasons noted above





Drug test (+) for Meth on 2020





CAD. Card cath of 2017 showed moderate, nonobstructive disease and LVEF 55% 

and elevated LVEDP. MPI of 20 by Dr Dai showed no ischemia or infarction

and normal LV function





Echo on 20: LVEF 45%, grade 1 fajardo dysfunction, mild to mod TR, RVSP 29 

mmHg





Chronic tobacco use (smokes cigarettes according to previous records)





H/o DM II





H/o Htn





H/o Hyperlipidemia





PAD.  Peripheral angiogram and intervention on 2019 by Dr Dai with PTA 

of the right AT, PT and stenting of the right SFA. Significant improvement of 

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


Plan:





* Complex management


* Renal function improved


* Monitor labs











TEOFILO REYNOLDS           Apr 15, 2020 09:55

## 2020-04-15 NOTE — PROGRESS NOTE - CARDIOLOGY
Cardiology SOAP Progress Note


Subjective:


Unable to provide any history because on mech vent and is unresponsive





Objective:


I&O/Vital Signs











 4/15/20 4/15/20 4/15/20 4/15/20





 00:00 01:00 01:00 01:30


 


Temp 36.8  36.8 


 


Pulse 81 78 78 75


 


Resp 24  24 


 


B/P (MAP) 141/68 (92)  132/61 (84) 119/57


 


Pulse Ox 93  92 


 


O2 Delivery Mechanical Ventilator  Mechanical Ventilator 


 


O2 Flow Rate 40.00  40.00 


 


    





 4/15/20 4/15/20 4/15/20 4/15/20





 02:00 02:26 03:00 04:00


 


Temp 36.7  36.5 36.4


 


Pulse 73 72 70 77


 


Resp 24 24 24 23


 


B/P (MAP) 105/52 (69)  101/50 (67) 132/53 (79)


 


Pulse Ox 91 91 91 93


 


O2 Delivery Mechanical Ventilator  Mechanical Ventilator Mechanical Ventilator


 


O2 Flow Rate 40.00  40.00 40.00


 


FiO2  40  


 


    





 4/15/20 4/15/20 4/15/20 4/15/20





 04:00 05:00 06:00 06:05


 


Temp  36.5 36.4 


 


Pulse  77 72 95


 


Resp  23 22 


 


B/P (MAP)  119/48 (71) 123/54 (77) 113/52


 


Pulse Ox 93 93 93 


 


O2 Delivery Mechanical Ventilator Mechanical Ventilator Mechanical Ventilator 


 


O2 Flow Rate  40.00 40.00 


 


FiO2 40   


 


    





 4/15/20 4/15/20 4/15/20 4/15/20





 06:14 07:00 07:00 08:00


 


Temp  36.7  36.7


 


Pulse 86 87 72 70


 


Resp 25 21 21


 


B/P (MAP)  143/57 (85)  106/44 (64)


 


Pulse Ox 96 94  92


 


O2 Delivery  Mechanical Ventilator  Mechanical Ventilator


 


O2 Flow Rate  40.00  40.00


 


FiO2 40   


 


    





 4/15/20 4/15/20 4/15/20 4/15/20





 08:00 09:00 10:00 10:00


 


Temp  36.7 36.6 36.7


 


Pulse  72 73 99


 


Resp  21 21 22


 


B/P (MAP)  117/48 (71) 134/58 (83) 134/61 (85)


 


Pulse Ox  93 94 92


 


O2 Delivery Mechanical Ventilator Mechanical Ventilator Mechanical Ventilator 

Mechanical Ventilator


 


O2 Flow Rate  40.00 40.00 40.00


 


FiO2 40   


 


    





 4/15/20 4/15/20 4/15/20 





 10:10 10:38 11:00 


 


Temp   36.7 


 


Pulse 71  84 


 


Resp 22  22 


 


B/P (MAP)  184/73 135/53 (80) 


 


Pulse Ox 93  95 


 


O2 Delivery   Mechanical Ventilator 


 


O2 Flow Rate   40.00 


 


FiO2 40   














 4/15/20





 00:00


 


Intake Total 1995 ml


 


Output Total 1360 ml


 


Balance 635 ml








Weight (Pounds):  206


Weight (Ounces):  0.8


Weight (Calculated Kilograms):  93.385103


Constitutional:  other (on mec vent, unresponsive)


Respiratory:  No accessory muscle use; other (fair to good bilat air entry)


Cardiovascular:  regular rate-rhythm, S1 and S2, systolic murmur (soft BUTCH at 

card base)


Gastrointestional:  hernia (abdominal), audible bowel sounds, other (mildly 

distended)


Extremities:  swelling (mild edema), other (R foot in dressing that wasn't 

removed); No clubbing, No cyanosis


Neurologic/Psychiatric:  other (unresponsive, on mech vent)


Skin:  No rash on exposed areas, No ulcerations on exposed areas; other (dress

ing to right foot, D&I)





Results/Procedures:


Labs


Laboratory Tests


4/14/20 12:53: Glucometer 201H


4/14/20 16:28: Glucometer 132H


4/14/20 18:48: Vancomycin Level Trough 16.1


4/14/20 20:22: Glucometer 162H


4/14/20 23:38: Glucometer 171H


4/15/20 03:20: 


White Blood Count 7.0, Red Blood Count 2.57L, Hemoglobin 7.9L, Hematocrit 25L, 

Mean Corpuscular Volume 95, Mean Corpuscular Hemoglobin 31, Mean Corpuscular 

Hemoglobin Concent 32, Red Cell Distribution Width 17.2H, Platelet Count 94L, 

Mean Platelet Volume 12.1H, Neutrophils (%) (Auto) 83H, Lymphocytes (%) (Auto) 

12, Monocytes (%) (Auto) 5, Eosinophils (%) (Auto) 0, Basophils (%) (Auto) 0, 

Neutrophils # (Auto) 5.8, Lymphocytes # (Auto) 0.8L, Monocytes # (Auto) 0.3, 

Eosinophils # (Auto) 0.0, Basophils # (Auto) 0.0, Blood Gas Puncture Site RIGHT 

RADIAL, Blood Gas Patient Temperature 36.9, Arterial Blood pH 7.46H, Arterial 

Blood Partial Pressure CO2 33L, Arterial Blood Partial Pressure O2 67L, Arterial

Blood HCO3 23, Arterial Blood Total CO2 24.2, Arterial Blood Oxygen Saturation 

95, Arterial Blood Base Excess -0.2, Rojelio Test POSITIVE, Blood Gas Ventilator 

Setting YES, Blood Gas Inspired Oxygen 40, Sodium Level 140, Potassium Level 

3.6, Chloride Level 107, Carbon Dioxide Level 20L, Anion Gap 13, Blood Urea 

Nitrogen 28H, Creatinine 0.98, Estimat Glomerular Filtration Rate > 60, 

BUN/Creatinine Ratio 29, Glucose Level 154H, Calcium Level 7.5L, Phosphorus 

Level 3.4, Magnesium Level 1.8


4/15/20 06:45: 


Blood Gas Puncture Site RIGHT RADIAL, Blood Gas Patient Temperature 36.9, 

Arterial Blood pH 7.30*L, Arterial Blood Partial Pressure CO2 51H, Arterial 

Blood Partial Pressure O2 73L, Arterial Blood HCO3 24, Arterial Blood Total CO2 

25.9, Arterial Blood Oxygen Saturation 94, Arterial Blood Base Excess -1.3, 

Rojelio Test POSITIVE, Blood Gas Ventilator Setting YES, Blood Gas Inspired Oxygen

40


4/15/20 11:25: Glucometer 179H





Microbiology


4/13/20 Catheter Tip Culture - Preliminary, Resulted


          No growth


4/13/20 Gram Stain - Final, Resulted


          


4/13/20 Sputum Culture - Preliminary, Resulted


          No growth


4/12/20 Urine Culture - Final, Complete


          NO GROWTH





Laboratory Tests


4/14/20 02:58








4/15/20 03:20











A/P:


Assessment:





Acute resp failure and shock of undetermined etiology; shock resolved





Cannot exclude pulmonary embolism. Was started on treatment dose Lovenox at 

admission, but being stopped on 4/15/20 due to falling H & H and falling 

platelet count





Acute renal failure (AYAH-3) likely due to shock - resolved





Mild troponin elevation, likely type-2 MI due to reasons noted above





Drug test (+) for Meth on 4-





CAD. Card cath of Jan 2017 showed moderate, nonobstructive disease and LVEF 55% 

and elevated LVEDP. MPI of 2/27/20 by Dr Dai showed no ischemia or infarction

and normal LV function





Echo on 4/14/20: LVEF 45%, grade 1 fajardo dysfunction, mild to mod TR, RVSP 29 

mmHg





Chronic tobacco use (smokes cigarettes according to previous records)





H/o DM II





H/o Htn





H/o Hyperlipidemia





PAD.  Peripheral angiogram and intervention on 8/22/2019 by Dr Dai with PTA 

of the right AT, PT and stenting of the right SFA. Significant improvement of 

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


Plan:





* Complex management


* Prognosis guarded


* D/c Lovenox if ok with the Med and Pulm services


* Monitor labs











JOHNNIE RON MD FACP FAC CCDS   Apr 15, 2020 12:01

## 2020-04-15 NOTE — PROGRESS NOTE
Subjective


Subjective/Events-last exam


No change ON. Continues to not follow commands.


Review of Systems


Intubated and Sedated





Focused Exam


Lactate Level


20 20:14: Lactic Acid Level 1.51


20 06:40: Lactic Acid Level 0.60





Objective


Exam


Last Set of Vital Signs





Vital Signs








  Date Time  Temp Pulse Resp B/P (MAP) Pulse Ox O2 Delivery O2 Flow Rate FiO2


 


4/15/20 18:54    147/62    


 


4/15/20 18:00  75 21  95 Mechanical Ventilator 40.00 


 


4/15/20 17:59        40


 


4/15/20 15:00 36.7       





Capillary Refill : Less Than 3 Seconds


I&O











Intake and Output 


 


 4/15/20





 00:00


 


Intake Total 4595 ml


 


Output Total 2310 ml


 


Balance 2285 ml


 


 


 


Intake Oral 0 ml


 


IV Total 4595 ml


 


Output Urine Total 1660 ml


 


Gastric Drainage Total 650 ml








General:  Other (Intubated and Sedated)


HEENT:  Mucous Memb Moist/Pink


Lungs:  Other (Course breath sounds, breathing with vent)


Heart:  Regular Rate


Abdomen:  Normal Bowel Sounds, Soft


Extremities:  Other (2+ pitting edema)





Results/Procedures


Lab


Laboratory Tests


20 20:22: Glucometer 162H


20 23:38: Glucometer 171H


4/15/20 03:20: 


White Blood Count 7.0, Red Blood Count 2.57L, Hemoglobin 7.9L, Hematocrit 25L, 

Mean Corpuscular Volume 95, Mean Corpuscular Hemoglobin 31, Mean Corpuscular 

Hemoglobin Concent 32, Red Cell Distribution Width 17.2H, Platelet Count 94L, 

Mean Platelet Volume 12.1H, Neutrophils (%) (Auto) 83H, Lymphocytes (%) (Auto) 

12, Monocytes (%) (Auto) 5, Eosinophils (%) (Auto) 0, Basophils (%) (Auto) 0, 

Neutrophils # (Auto) 5.8, Lymphocytes # (Auto) 0.8L, Monocytes # (Auto) 0.3, 

Eosinophils # (Auto) 0.0, Basophils # (Auto) 0.0, Blood Gas Puncture Site RIGHT 

RADIAL, Blood Gas Patient Temperature 36.9, Arterial Blood pH 7.46H, Arterial 

Blood Partial Pressure CO2 33L, Arterial Blood Partial Pressure O2 67L, Arterial

Blood HCO3 23, Arterial Blood Total CO2 24.2, Arterial Blood Oxygen Saturation 

95, Arterial Blood Base Excess -0.2, Rojelio Test POSITIVE, Blood Gas Ventilator 

Setting YES, Blood Gas Inspired Oxygen 40, Sodium Level 140, Potassium Level 

3.6, Chloride Level 107, Carbon Dioxide Level 20L, Anion Gap 13, Blood Urea 

Nitrogen 28H, Creatinine 0.98, Estimat Glomerular Filtration Rate > 60, 

BUN/Creatinine Ratio 29, Glucose Level 154H, Calcium Level 7.5L, Phosphorus 

Level 3.4, Magnesium Level 1.8


4/15/20 06:45: 


Blood Gas Puncture Site RIGHT RADIAL, Blood Gas Patient Temperature 36.9, 

Arterial Blood pH 7.30*L, Arterial Blood Partial Pressure CO2 51H, Arterial 

Blood Partial Pressure O2 73L, Arterial Blood HCO3 24, Arterial Blood Total CO2 

25.9, Arterial Blood Oxygen Saturation 94, Arterial Blood Base Excess -1.3, 

Rojelio Test POSITIVE, Blood Gas Ventilator Setting YES, Blood Gas Inspired Oxygen

40


4/15/20 11:25: Glucometer 179H


4/15/20 18:07: Glucometer 162H





Microbiology


20 Catheter Tip Culture - Preliminary, Resulted


          No growth


20 Gram Stain - Final, Complete


          


20 Sputum Culture - Final, Complete


          No growth


20 Urine Culture - Final, Complete


          NO GROWTH





Assessment/Plan


Assessment/Plan





(1) Sepsis


Status:  Acute


Assessment & Plan:  : Poor prognosis, Patient on Sepsis IVF protocol, 

continue IV antibiotics, Dr James consulted for critical care, blood cultures 

pending


4/15: Off Levaphed today, continue to monitor blood pressure, continue IV 

antibiotics, patient is not following commands when sedation is turned off


Qualifiers:  


   Qualified Codes:  A41.9 - Sepsis, unspecified organism; R65.21 - Severe 

sepsis with septic shock; N17.9 - Acute kidney failure, unspecified


(2) Acute respiratory failure with hypoxia and hypercapnia


Status:  Acute


Assessment & Plan:  : Currently Intubated, Dr James managing vent





(3) Acute kidney injury superimposed on chronic kidney disease


Status:  Resolved


Assessment & Plan:  : Will monitor daily BUN/Cr





(4) NSTEMI (non-ST elevation myocardial infarction)


Status:  Acute


Assessment & Plan:  : Cardiology consulted, appreciate recommendations, 

Recent Cath 2020





(5) Upper GI bleed


Status:  Acute


Assessment & Plan:  : Today started having blood tinged fluid from OG, 

Lovenox stopped and started on IV protonix


4/15: Hgb dropped today, Will get iron studies





(6) CAD (coronary artery disease)


Status:  Chronic


Qualifiers:  


   Qualified Codes:  I25.10 - Atherosclerotic heart disease of native coronary 

artery without angina pectoris


(7) Hypertension


Status:  Chronic


Assessment & Plan:  : Currently hypotensive and on Levaphed for Sepsis


4/15: Off pressors


Qualifiers:  


   Qualified Codes:  I10 - Essential (primary) hypertension


(8) Diabetes type 2, uncontrolled


Status:  Chronic


Qualifiers:  


   Qualified Codes:  E11.65 - Type 2 diabetes mellitus with hyperglycemia


(9) COPD (chronic obstructive pulmonary disease)


Status:  Chronic


Qualifiers:  


   Qualified Codes:  J44.9 - Chronic obstructive pulmonary disease, unspecified


(10) Normocytic anemia


Status:  Acute


(11) Pneumonia


Status:  Acute


Qualifiers:  


   


(12) DVT prophylaxis


Status:  Acute


Assessment & Plan:  : On Lovenox, stopped today due to GI bleeding


4/15: Continues off Lovenox, SCDs








Clinical Quality Measures


DVT/VTE Risk/Contraindication:


Risk Factor Score Per Nursin


RFS Level Per Nursing on Admit:  4+=Very High











SRINI JOHN MD               Apr 15, 2020 19:56

## 2020-04-15 NOTE — DIAGNOSTIC IMAGING REPORT
EXAMINATION: Portable erect AP chest at 333h.



INDICATION: Respiratory distress



There is a better inspiratory effort on this exam than on the

previous study of 04/14/2020. The left lung base does seem better

aerated on this exam although there is still residual

atelectasis/infiltrate and fluid obscuring the left

hemidiaphragm. A band of increased density is also seen in the

left perihilar region. The heart is stable in size. The right

lung remains relatively clear. The mediastinum is not widened.

The osseous structures are intact. The supportive tubes and lines

seem to be in good position although the tip of the NG line is

not well visualized.



IMPRESSION:

The appearance of the chest has improved since the prior exam as

the left lung base does seem much better aerated. A follow-up

study would be recommended for continued evaluation.



Dictated by: 



  Dictated on workstation # [id number]

## 2020-04-16 VITALS — SYSTOLIC BLOOD PRESSURE: 109 MMHG | DIASTOLIC BLOOD PRESSURE: 68 MMHG

## 2020-04-16 VITALS — SYSTOLIC BLOOD PRESSURE: 161 MMHG | DIASTOLIC BLOOD PRESSURE: 57 MMHG

## 2020-04-16 VITALS — SYSTOLIC BLOOD PRESSURE: 137 MMHG | DIASTOLIC BLOOD PRESSURE: 56 MMHG

## 2020-04-16 VITALS — DIASTOLIC BLOOD PRESSURE: 55 MMHG | SYSTOLIC BLOOD PRESSURE: 147 MMHG

## 2020-04-16 VITALS — SYSTOLIC BLOOD PRESSURE: 156 MMHG | DIASTOLIC BLOOD PRESSURE: 56 MMHG

## 2020-04-16 VITALS — SYSTOLIC BLOOD PRESSURE: 160 MMHG | DIASTOLIC BLOOD PRESSURE: 60 MMHG

## 2020-04-16 VITALS — DIASTOLIC BLOOD PRESSURE: 68 MMHG | SYSTOLIC BLOOD PRESSURE: 109 MMHG

## 2020-04-16 VITALS — DIASTOLIC BLOOD PRESSURE: 77 MMHG | SYSTOLIC BLOOD PRESSURE: 178 MMHG

## 2020-04-16 VITALS — DIASTOLIC BLOOD PRESSURE: 67 MMHG | SYSTOLIC BLOOD PRESSURE: 163 MMHG

## 2020-04-16 VITALS — SYSTOLIC BLOOD PRESSURE: 160 MMHG | DIASTOLIC BLOOD PRESSURE: 63 MMHG

## 2020-04-16 VITALS — DIASTOLIC BLOOD PRESSURE: 59 MMHG | SYSTOLIC BLOOD PRESSURE: 157 MMHG

## 2020-04-16 VITALS — DIASTOLIC BLOOD PRESSURE: 61 MMHG | SYSTOLIC BLOOD PRESSURE: 160 MMHG

## 2020-04-16 VITALS — SYSTOLIC BLOOD PRESSURE: 167 MMHG | DIASTOLIC BLOOD PRESSURE: 63 MMHG

## 2020-04-16 VITALS — SYSTOLIC BLOOD PRESSURE: 147 MMHG | DIASTOLIC BLOOD PRESSURE: 54 MMHG

## 2020-04-16 VITALS — DIASTOLIC BLOOD PRESSURE: 58 MMHG | SYSTOLIC BLOOD PRESSURE: 165 MMHG

## 2020-04-16 VITALS — SYSTOLIC BLOOD PRESSURE: 162 MMHG | DIASTOLIC BLOOD PRESSURE: 58 MMHG

## 2020-04-16 VITALS — SYSTOLIC BLOOD PRESSURE: 154 MMHG | DIASTOLIC BLOOD PRESSURE: 62 MMHG

## 2020-04-16 VITALS — DIASTOLIC BLOOD PRESSURE: 53 MMHG | SYSTOLIC BLOOD PRESSURE: 137 MMHG

## 2020-04-16 VITALS — SYSTOLIC BLOOD PRESSURE: 161 MMHG | DIASTOLIC BLOOD PRESSURE: 60 MMHG

## 2020-04-16 VITALS — DIASTOLIC BLOOD PRESSURE: 65 MMHG | SYSTOLIC BLOOD PRESSURE: 170 MMHG

## 2020-04-16 VITALS — SYSTOLIC BLOOD PRESSURE: 154 MMHG | DIASTOLIC BLOOD PRESSURE: 54 MMHG

## 2020-04-16 VITALS — DIASTOLIC BLOOD PRESSURE: 45 MMHG | SYSTOLIC BLOOD PRESSURE: 140 MMHG

## 2020-04-16 VITALS — SYSTOLIC BLOOD PRESSURE: 169 MMHG | DIASTOLIC BLOOD PRESSURE: 70 MMHG

## 2020-04-16 VITALS — SYSTOLIC BLOOD PRESSURE: 146 MMHG | DIASTOLIC BLOOD PRESSURE: 54 MMHG

## 2020-04-16 VITALS — DIASTOLIC BLOOD PRESSURE: 65 MMHG | SYSTOLIC BLOOD PRESSURE: 161 MMHG

## 2020-04-16 VITALS — DIASTOLIC BLOOD PRESSURE: 55 MMHG | SYSTOLIC BLOOD PRESSURE: 150 MMHG

## 2020-04-16 VITALS — SYSTOLIC BLOOD PRESSURE: 167 MMHG | DIASTOLIC BLOOD PRESSURE: 64 MMHG

## 2020-04-16 VITALS — SYSTOLIC BLOOD PRESSURE: 161 MMHG | DIASTOLIC BLOOD PRESSURE: 62 MMHG

## 2020-04-16 VITALS — SYSTOLIC BLOOD PRESSURE: 159 MMHG | DIASTOLIC BLOOD PRESSURE: 65 MMHG

## 2020-04-16 VITALS — DIASTOLIC BLOOD PRESSURE: 72 MMHG | SYSTOLIC BLOOD PRESSURE: 171 MMHG

## 2020-04-16 LAB
ARTERIAL PATENCY WRIST A: (no result)
BASE EXCESS STD BLDA CALC-SCNC: -0.1 MMOL/L (ref -2.5–2.5)
BASOPHILS # BLD AUTO: 0 10^3/UL (ref 0–0.1)
BASOPHILS NFR BLD AUTO: 0 % (ref 0–10)
BDY SITE: (no result)
BODY TEMPERATURE: 37
BUN/CREAT SERPL: 24
CALCIUM SERPL-MCNC: 7.6 MG/DL (ref 8.5–10.1)
CHLORIDE SERPL-SCNC: 105 MMOL/L (ref 98–107)
CO2 BLDA CALC-SCNC: 25 MMOL/L (ref 21–31)
CO2 SERPL-SCNC: 21 MMOL/L (ref 21–32)
CREAT SERPL-MCNC: 0.84 MG/DL (ref 0.6–1.3)
EOSINOPHIL # BLD AUTO: 0 10^3/UL (ref 0–0.3)
EOSINOPHIL NFR BLD AUTO: 0 % (ref 0–10)
ERYTHROCYTE [DISTWIDTH] IN BLOOD BY AUTOMATED COUNT: 16.6 % (ref 10–14.5)
GFR SERPLBLD BASED ON 1.73 SQ M-ARVRAT: > 60 ML/MIN
GLUCOSE SERPL-MCNC: 154 MG/DL (ref 70–105)
HCT VFR BLD CALC: 25 % (ref 40–54)
HGB BLD-MCNC: 7.9 G/DL (ref 13.3–17.7)
INHALED O2 FLOW RATE: (no result) L/MIN
LYMPHOCYTES # BLD AUTO: 0.7 X 10^3 (ref 1–4)
LYMPHOCYTES NFR BLD AUTO: 11 % (ref 12–44)
MAGNESIUM SERPL-MCNC: 1.6 MG/DL (ref 1.6–2.4)
MANUAL DIFFERENTIAL PERFORMED BLD QL: NO
MCH RBC QN AUTO: 30 PG (ref 25–34)
MCHC RBC AUTO-ENTMCNC: 32 G/DL (ref 32–36)
MCV RBC AUTO: 95 FL (ref 80–99)
MONOCYTES # BLD AUTO: 0.3 X 10^3 (ref 0–1)
MONOCYTES NFR BLD AUTO: 4 % (ref 0–12)
NEUTROPHILS # BLD AUTO: 5.7 X 10^3 (ref 1.8–7.8)
NEUTROPHILS NFR BLD AUTO: 85 % (ref 42–75)
PCO2 BLDA: 38 MMHG (ref 35–45)
PH BLDA: 7.42 [PH] (ref 7.37–7.43)
PHOSPHATE SERPL-MCNC: 2.7 MG/DL (ref 2.3–4.7)
PLATELET # BLD: 112 10^3/UL (ref 130–400)
PMV BLD AUTO: 12.5 FL (ref 7.4–10.4)
PO2 BLDA: 82 MMHG (ref 79–93)
POTASSIUM SERPL-SCNC: 3.7 MMOL/L (ref 3.6–5)
SAO2 % BLDA FROM PO2: 97 % (ref 94–100)
SODIUM SERPL-SCNC: 137 MMOL/L (ref 135–145)
VENTILATION MODE VENT: YES
WBC # BLD AUTO: 6.7 10^3/UL (ref 4.3–11)

## 2020-04-16 RX ADMIN — SODIUM CHLORIDE SCH MLS/HR: 900 INJECTION, SOLUTION INTRAVENOUS at 06:11

## 2020-04-16 RX ADMIN — POTASSIUM CHLORIDE SCH MLS/HR: 200 INJECTION, SOLUTION INTRAVENOUS at 03:40

## 2020-04-16 RX ADMIN — MAGNESIUM SULFATE IN DEXTROSE SCH MLS/HR: 10 INJECTION, SOLUTION INTRAVENOUS at 03:36

## 2020-04-16 RX ADMIN — SODIUM CHLORIDE, SODIUM LACTATE, POTASSIUM CHLORIDE, AND CALCIUM CHLORIDE SCH MLS/HR: 600; 310; 30; 20 INJECTION, SOLUTION INTRAVENOUS at 03:53

## 2020-04-16 RX ADMIN — Medication SCH MLS/HR: at 12:37

## 2020-04-16 RX ADMIN — LISINOPRIL SCH MG: 20 TABLET ORAL at 13:01

## 2020-04-16 RX ADMIN — MAGNESIUM SULFATE IN DEXTROSE SCH MLS/HR: 10 INJECTION, SOLUTION INTRAVENOUS at 04:17

## 2020-04-16 RX ADMIN — INSULIN ASPART SCH UNIT: 100 INJECTION, SOLUTION INTRAVENOUS; SUBCUTANEOUS at 17:16

## 2020-04-16 RX ADMIN — Medication SCH MLS/HR: at 04:18

## 2020-04-16 RX ADMIN — POTASSIUM CHLORIDE SCH MLS/HR: 200 INJECTION, SOLUTION INTRAVENOUS at 05:37

## 2020-04-16 RX ADMIN — PROPOFOL SCH MLS/HR: 10 INJECTION, EMULSION INTRAVENOUS at 07:41

## 2020-04-16 RX ADMIN — DEXMEDETOMIDINE HYDROCHLORIDE SCH MLS/HR: 100 INJECTION, SOLUTION, CONCENTRATE INTRAVENOUS at 03:36

## 2020-04-16 RX ADMIN — HYDROCORTISONE SODIUM SUCCINATE SCH MG: 100 INJECTION, POWDER, FOR SOLUTION INTRAMUSCULAR; INTRAVENOUS at 22:12

## 2020-04-16 RX ADMIN — POTASSIUM CHLORIDE SCH MLS/HR: 200 INJECTION, SOLUTION INTRAVENOUS at 07:31

## 2020-04-16 RX ADMIN — PROPOFOL SCH MLS/HR: 10 INJECTION, EMULSION INTRAVENOUS at 02:36

## 2020-04-16 RX ADMIN — POTASSIUM CHLORIDE SCH MLS/HR: 200 INJECTION, SOLUTION INTRAVENOUS at 03:35

## 2020-04-16 RX ADMIN — PANTOPRAZOLE SODIUM SCH MG: 40 INJECTION, POWDER, FOR SOLUTION INTRAVENOUS at 20:06

## 2020-04-16 RX ADMIN — INSULIN ASPART SCH UNIT: 100 INJECTION, SOLUTION INTRAVENOUS; SUBCUTANEOUS at 23:49

## 2020-04-16 RX ADMIN — POTASSIUM CHLORIDE SCH MLS/HR: 200 INJECTION, SOLUTION INTRAVENOUS at 08:49

## 2020-04-16 RX ADMIN — PANTOPRAZOLE SODIUM SCH MG: 40 INJECTION, POWDER, FOR SOLUTION INTRAVENOUS at 08:55

## 2020-04-16 RX ADMIN — RISPERIDONE SCH MG: 2 TABLET, FILM COATED ORAL at 20:06

## 2020-04-16 RX ADMIN — PROPOFOL SCH MLS/HR: 10 INJECTION, EMULSION INTRAVENOUS at 13:30

## 2020-04-16 RX ADMIN — VANCOMYCIN HYDROCHLORIDE SCH MLS/HR: 500 INJECTION, POWDER, LYOPHILIZED, FOR SOLUTION INTRAVENOUS at 20:06

## 2020-04-16 RX ADMIN — MAGNESIUM SULFATE IN DEXTROSE SCH MLS/HR: 10 INJECTION, SOLUTION INTRAVENOUS at 03:40

## 2020-04-16 RX ADMIN — INSULIN ASPART SCH UNIT: 100 INJECTION, SOLUTION INTRAVENOUS; SUBCUTANEOUS at 11:23

## 2020-04-16 RX ADMIN — PROPOFOL SCH MLS/HR: 10 INJECTION, EMULSION INTRAVENOUS at 18:04

## 2020-04-16 RX ADMIN — POTASSIUM CHLORIDE SCH MLS/HR: 200 INJECTION, SOLUTION INTRAVENOUS at 04:17

## 2020-04-16 RX ADMIN — INSULIN ASPART SCH UNIT: 100 INJECTION, SOLUTION INTRAVENOUS; SUBCUTANEOUS at 00:14

## 2020-04-16 RX ADMIN — POTASSIUM CHLORIDE SCH MEQ: 1500 TABLET, EXTENDED RELEASE ORAL at 03:40

## 2020-04-16 RX ADMIN — HYDROCORTISONE SODIUM SUCCINATE SCH MG: 100 INJECTION, POWDER, FOR SOLUTION INTRAMUSCULAR; INTRAVENOUS at 05:37

## 2020-04-16 RX ADMIN — SODIUM CHLORIDE SCH MLS/HR: 900 INJECTION, SOLUTION INTRAVENOUS at 22:12

## 2020-04-16 RX ADMIN — INSULIN ASPART SCH UNIT: 100 INJECTION, SOLUTION INTRAVENOUS; SUBCUTANEOUS at 04:18

## 2020-04-16 RX ADMIN — RISPERIDONE SCH MG: 2 TABLET, FILM COATED ORAL at 08:55

## 2020-04-16 RX ADMIN — POTASSIUM CHLORIDE SCH MLS/HR: 200 INJECTION, SOLUTION INTRAVENOUS at 06:22

## 2020-04-16 RX ADMIN — PROPOFOL SCH MLS/HR: 10 INJECTION, EMULSION INTRAVENOUS at 23:46

## 2020-04-16 RX ADMIN — HYDROCORTISONE SODIUM SUCCINATE SCH MG: 100 INJECTION, POWDER, FOR SOLUTION INTRAMUSCULAR; INTRAVENOUS at 13:02

## 2020-04-16 RX ADMIN — SODIUM CHLORIDE SCH MLS/HR: 900 INJECTION, SOLUTION INTRAVENOUS at 15:41

## 2020-04-16 RX ADMIN — Medication SCH MLS/HR: at 21:58

## 2020-04-16 RX ADMIN — DEXMEDETOMIDINE HYDROCHLORIDE SCH MLS/HR: 100 INJECTION, SOLUTION, CONCENTRATE INTRAVENOUS at 17:13

## 2020-04-16 NOTE — DIAGNOSTIC IMAGING REPORT
Indication: Altered mental status, substance abuse



Portable chest 3:39 AM



ET tube projects over the trachea. NG tube enters the stomach.

Right IJ central line tip projects over the SVC. Heart size and

pulmonary vascularity are normal. There is some left basilar

atelectasis with small left effusion.



IMPRESSION: Left basilar atelectasis with small effusion. No

change compared to the previous day.



Dictated by: 



  Dictated on workstation # [id number]

## 2020-04-16 NOTE — CARDIOLOGY PROGRESS NOTE
Cardiology SOAP Progress Note


Subjective:


On mechanical ventilation/sedated





Objective:


I&O/Vital Signs











 4/16/20 4/16/20 4/16/20 4/16/20





 01:51 02:00 02:36 03:00


 


Temp  37.1  37.0


 


Pulse 71 74 69 72


 


Resp 22 22 22


 


B/P (MAP)  137/56 (83) 141/51 147/55 (85)


 


Pulse Ox 95 96  96


 


O2 Delivery  Mechanical Ventilator  Mechanical Ventilator


 


O2 Flow Rate  40.00  40.00


 


FiO2 40   


 


    





 4/16/20 4/16/20 4/16/20 4/16/20





 03:36 04:00 04:00 04:20


 


Temp  37.0 37.0 


 


Pulse 71  70 


 


Resp   21 


 


B/P (MAP) 150/53  154/54 (87) 


 


Pulse Ox   95 96


 


O2 Delivery  Mechanical Ventilator Mechanical Ventilator Mechanical Ventilator


 


O2 Flow Rate  40.00 40.00 


 


FiO2    40


 


    





 4/16/20 4/16/20 4/16/20 4/16/20





 05:00 06:00 06:43 07:00


 


Temp 37.0 36.9  36.9


 


Pulse 68 55 58 54


 


Resp 22 21 21


 


B/P (MAP) 160/61 (94) 150/55 (86)  154/62 (92)


 


Pulse Ox 97 96  96


 


O2 Delivery Mechanical Ventilator Mechanical Ventilator  Mechanical Ventilator


 


O2 Flow Rate 40.00 40.00  40.00


 


    





 4/16/20 4/16/20 4/16/20 4/16/20





 07:23 07:41 08:00 08:00


 


Temp   36.8 


 


Pulse 75 55 54 


 


Resp 25  21 


 


B/P (MAP)  168/65 159/65 (96) 


 


Pulse Ox 98  94 


 


O2 Delivery   Mechanical Ventilator Mechanical Ventilator


 


O2 Flow Rate   40.00 


 


FiO2 40   40


 


    





 4/16/20 4/16/20 4/16/20 4/16/20





 09:00 10:00 10:26 11:00


 


Temp 36.9 36.8  36.9


 


Pulse 55 52 52 53


 


Resp 21 22 22 21


 


B/P (MAP) 163/67 (99) 169/70 (103)  156/56 (89)


 


Pulse Ox 93 95 94 95


 


O2 Delivery Mechanical Ventilator Mechanical Ventilator  Mechanical Ventilator


 


O2 Flow Rate 40.00 40.00  40.00


 


FiO2   40 


 


    





 4/16/20 4/16/20 4/16/20 4/16/20





 11:05 11:38 12:00 12:00


 


Temp 35.8 36.1 36.8 


 


Pulse   53 


 


Resp   21 


 


B/P (MAP)   161/57 (91) 


 


Pulse Ox   94 94


 


O2 Delivery   Mechanical Ventilator Mechanical Ventilator


 


O2 Flow Rate   40.00 


 


FiO2    40


 


    





 4/16/20   





 12:39   


 


Pulse 53   














 4/16/20





 00:00


 


Intake Total 2835 ml


 


Output Total 715 ml


 


Balance 2120 ml








Weight (Pounds):  206


Weight (Ounces):  0.8


Weight (Calculated Kilograms):  93.152685


Constitutional:  other (on mec vent, unresponsive)


Respiratory:  No accessory muscle use; other (fair to good bilat air entry)


Cardiovascular:  regular rate-rhythm, S1 and S2, systolic murmur (soft BUTCH at 

card base)


Gastrointestional:  hernia (abdominal), audible bowel sounds, other (mildly 

distended)


Extremities:  swelling (mild edema), other (R foot in dressing that wasn't 

removed); No clubbing, No cyanosis


Neurologic/Psychiatric:  other (unresponsive, on mech vent)


Skin:  No rash on exposed areas, No ulcerations on exposed areas; other 

(dressing to right foot, D&I)





Results/Procedures:


Labs


Laboratory Tests


4/15/20 18:07: Glucometer 162H


4/15/20 23:31: Glucometer 145H


4/16/20 02:30: 


White Blood Count 6.7, Red Blood Count 2.63L, Hemoglobin 7.9L, Hematocrit 25L, 

Mean Corpuscular Volume 95, Mean Corpuscular Hemoglobin 30, Mean Corpuscular 

Hemoglobin Concent 32, Red Cell Distribution Width 16.6H, Platelet Count 112L, 

Mean Platelet Volume 12.5H, Neutrophils (%) (Auto) 85H, Lymphocytes (%) (Auto) 

11L, Monocytes (%) (Auto) 4, Eosinophils (%) (Auto) 0, Basophils (%) (Auto) 0, 

Neutrophils # (Auto) 5.7, Lymphocytes # (Auto) 0.7L, Monocytes # (Auto) 0.3, 

Eosinophils # (Auto) 0.0, Basophils # (Auto) 0.0, Blood Gas Puncture Site R RAD,

Blood Gas Patient Temperature 37.0, Arterial Blood pH 7.42, Arterial Blood 

Partial Pressure CO2 38, Arterial Blood Partial Pressure O2 82, Arterial Blood 

HCO3 24, Arterial Blood Total CO2 25.0, Arterial Blood Oxygen Saturation 97, 

Arterial Blood Base Excess -0.1, Rojelio Test ART LINE, Blood Gas Ventilator 

Setting YES, Blood Gas Inspired Oxygen 40%, Sodium Level 137, Potassium Level 

3.7, Chloride Level 105, Carbon Dioxide Level 21, Anion Gap 11, Blood Urea 

Nitrogen 20H, Creatinine 0.84, Estimat Glomerular Filtration Rate > 60, 

BUN/Creatinine Ratio 24, Glucose Level 154H, Calcium Level 7.6L, Phosphorus Lev

el 2.7, Magnesium Level 1.6, B-Type Natriuretic Peptide 305.3H


4/16/20 11:07: Glucometer 218H





Microbiology


4/13/20 Catheter Tip Culture - Final, Complete


          No growth


4/13/20 Gram Stain - Final, Complete


          


4/13/20 Sputum Culture - Final, Complete


          No growth


4/12/20 Urine Culture - Final, Complete


          NO GROWTH








A/P:


Assessment/Dx:


Acute resp failure and shock of undetermined etiology; shock resolved





Cannot exclude pulmonary embolism. Was started on treatment dose Lovenox at 

admission, but being stopped on 4/15/20 due to falling H & H and falling 

platelet count





Acute renal failure (AYAH-3) likely due to shock - resolved





Mild troponin elevation, likely type-2 MI due to reasons noted above





Drug test (+) for Meth on 4-





CAD. Card cath of Jan 2017 showed moderate, nonobstructive disease and LVEF 55% 

and elevated LVEDP. MPI of 2/27/20 by Dr Dai showed no ischemia or infarction

and normal LV function





Echo on 4/14/20: LVEF 45%, grade 1 fajardo dysfunction, mild to mod TR, RVSP 29 

mmHg





Chronic tobacco use (smokes cigarettes according to previous records)





H/o DM II





H/o Htn





H/o Hyperlipidemia





PAD.  Peripheral angiogram and intervention on 8/22/2019 by Dr Dai with PTA 

of the right AT, PT and stenting of the right SFA. Significant improvement of 

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


Plan:








* Complex management


* Prognosis guarded


* D/c Lovenox if ok with the Med and Pulm services


* Monitor labs








Thank you for your consultation. Please call me if you have any questions.








WOODY Dai MD, FACP, FACC, FSCAI, FHRS, CCDS


Interventional Cardiology


Cardiac Electrophysiology


Vascular Medicine and Endovascular Interventions











ELISE DAI MD             Apr 16, 2020 13:23

## 2020-04-16 NOTE — PULMONARY PROGRESS NOTE
Subjective


Time Seen by a Provider:  05:16


Subjective/Events-last exam


Sedated on vent





Sepsis Event


Evaluation


Height, Weight, BMI


Height: 5'8.50"


Weight: 206lbs. 0.8oz. 93.264987km; 30.95 BMI


Method:Stated





Focused Exam


Lactate Level


4/13/20 06:40: Lactic Acid Level 0.60





Exam


Exam





Vital Signs








  Date Time  Temp Pulse Resp B/P (MAP) Pulse Ox O2 Delivery O2 Flow Rate FiO2


 


4/16/20 04:20     96 Mechanical Ventilator  40


 


4/16/20 04:00 37.0     Mechanical Ventilator 40.00 


 


4/16/20 03:36  71  150/53    


 


4/16/20 02:36  69  141/51    


 


4/16/20 02:00 37.1 74 22 137/56 (83) 96 Mechanical Ventilator 40.00 


 


4/16/20 01:51  71 22  95   40


 


4/16/20 01:00  72      


 


4/16/20 01:00 37.1 72 22 161/60 (93) 94 Mechanical Ventilator 40.00 


 


4/16/20 00:00 37.0 76 22 167/63 (97) 96 Mechanical Ventilator 40.00 


 


4/15/20 23:30     97 Mechanical Ventilator  40


 


4/15/20 23:25 37.0     Mechanical Ventilator 40.00 


 


4/15/20 23:00 36.9 79 22 171/65 (100) 96 Mechanical Ventilator 40.00 


 


4/15/20 22:44  77  163/63    


 


4/15/20 22:05  80 22  95   40


 


4/15/20 22:00 36.9 86 22 168/67 (100) 95 Mechanical Ventilator 40.00 


 


4/15/20 21:00 36.8 85 19 163/68 (99) 95 Mechanical Ventilator 40.00 


 


4/15/20 20:15     93 Mechanical Ventilator  40


 


4/15/20 20:00 36.9     Mechanical Ventilator 40.00 


 


4/15/20 20:00  76 22 158/67 (97) 96 Mechanical Ventilator 40.00 


 


4/15/20 19:00  74      


 


4/15/20 19:00  74 22 148/61 (90) 91 Mechanical Ventilator 40.00 


 


4/15/20 18:54    147/62    


 


4/15/20 18:00  75 21 151/65 (93) 95 Mechanical Ventilator 40.00 


 


4/15/20 17:59  73 22  95   40


 


4/15/20 17:00  77 21 146/62 (90) 95 Mechanical Ventilator 40.00 


 


4/15/20 16:00      Mechanical Ventilator  40


 


4/15/20 16:00  86 21 162/74 (103) 90 Mechanical Ventilator 40.00 


 


4/15/20 15:00 36.7 89 16 135/55 (81) 95 Mechanical Ventilator 40.00 


 


4/15/20 14:48    179/76    


 


4/15/20 14:17  74 22  96   40


 


4/15/20 14:00 36.5 75 22 139/58 (85) 96 Mechanical Ventilator 40.00 


 


4/15/20 13:00 36.6 73 21 128/49 (75) 95 Mechanical Ventilator 40.00 


 


4/15/20 12:56  75      


 


4/15/20 12:00 36.7 75 21 121/49 (73) 94 Mechanical Ventilator 40.00 


 


4/15/20 12:00      Mechanical Ventilator  40


 


4/15/20 11:00 36.7 84 22 135/53 (80) 95 Mechanical Ventilator 40.00 


 


4/15/20 10:38    184/73    


 


4/15/20 10:10  71 22  93   40


 


4/15/20 10:00 36.7 99 22 134/61 (85) 92 Mechanical Ventilator 40.00 


 


4/15/20 10:00 36.6 73 21 134/58 (83) 94 Mechanical Ventilator 40.00 


 


4/15/20 09:00 36.7 72 21 117/48 (71) 93 Mechanical Ventilator 40.00 


 


4/15/20 08:00      Mechanical Ventilator  40


 


4/15/20 08:00 36.7 70 21 106/44 (64) 92 Mechanical Ventilator 40.00 


 


4/15/20 07:00  72      


 


4/15/20 07:00 36.7 87 21 143/57 (85) 94 Mechanical Ventilator 40.00 


 


4/15/20 06:14  86 25  96   40


 


4/15/20 06:05  95  113/52    


 


4/15/20 06:00 36.4 72 22 123/54 (77) 93 Mechanical Ventilator 40.00 














I & O 


 


 4/16/20





 07:00


 


Intake Total 5235 ml


 


Output Total 1165 ml


 


Balance 4070 ml








Height & Weight


Height: 5'8.50"


Weight: 206lbs. 0.8oz. 93.820775qs; 30.95 BMI


Method:Stated


General Appearance:  No Apparent Distress, Obese, Other (intubated and sedated)


HEENT:  TMs Normal, Other (Pupils equal/reactive 3 mm bilaterally)


Respiratory:  No Respiratory Distress, Other (intubated and mechanically ventila

yusuf)


Cardiovascular:  No Murmur, Tachycardia (regular rhythm)


Capillary Refill:  Less Than 3 Seconds


Gastrointestinal:  soft, no organomegaly


Extremity:  Normal Inspection, No Pedal Edema


Neurologic/Psychiatric:  Other (sedated)


Skin:  Normal Color, Warm/Dry





Results


Lab


Laboratory Tests


4/15/20 03:20








4/16/20 02:30











Assessment/Plan


Assessment/Plan


Acute respiratory failure 


   - COVID is negative 


   -Procalcitonin pending 


   -Check bilateral dopplers 


   -Echo is pending  


   - PEEP to 10 


sepsis with PICC line


   -Picc line d/c'd


      -Culturing tip 


      -Question if pt has been injecting drugs into PICC line


   -Pan cultures pending


Pneumonia with possible aspiration 


   -Pan cultures 


   -MRSA swab 


   -COVID is negative 


   -Influenza is negative 


   -Continue Vanco and Zosyn 


   - urine strep and legionella ag - pending 


   -RVP is pending 


UDS is positive for methamphetamine 


Hypotension


   -Levophed


   -Echo pending 


NSTEMI


   -Cardiology following 


Hypophos, hypomag 


   -replace 


Acute renal failure with dehydration 


   -IVF











GUILLERMINA MERA DO              Apr 16, 2020 05:21

## 2020-04-16 NOTE — NUR
RD ASSESSMENT 



PMHx: HTN; DM; PAD; CAD; COPD; CA(prostate); hypercholesterolemia



PT INTERACTION: Note pt currently intubated and sedated. All information taken from chart 
review. Pt currently receiving TF of Jevity 1.5 at rate of 15ml/hr with flushes of 100ml 
q6h. 



ABNORMAL NUTRITION-RELATED LAB VALUES

LOW: Ca 7.6

HIGH: BUN 20; glu 154



Est. kcal needs: 6279-1787 kcal | 15-18 kcal/kg 

Est. Pro needs:   g Pro | 0.8-1.0 g Pro/kg 



PES STATEMENT: Inadequate oral intake (NI-2.1) related to NPO status as evidenced by pt 
intubated/sedated | chart review



INTERVENTION:  

Would recommend the following TF: 

Glucerna 1.5 at goal rate of 45ml/hr. Begin at 15ml/hr and increase by 10ml q6h as 
tolerated. Monitor gastric residuals for tolerance. 

At goal rate, provides 1620 kcal (15 kcal/kg); 89 g Pro (0.8 g Pro/kg); and 820ml free 
water. 

Flush with 100ml H2O q4h for hydration status. With flushes provides 1420ml free water. 



Will continue to follow and reassess as pt needs, intake, and status change. 



MONITOR/EVALUATE:  

PO Intake; Plan of Care; Hydration Status; Weight Status; Lab Values 





JAG Jesus, MS, RD, LD

## 2020-04-16 NOTE — PROGRESS NOTE
Subjective


Subjective/Events-last exam


Vented and Sedated


Review of Systems


Intubated and Sedated





Objective


Exam


Last Set of Vital Signs





Vital Signs








  Date Time  Temp Pulse Resp B/P (MAP) Pulse Ox O2 Delivery O2 Flow Rate FiO2


 


20 20:00     93 Mechanical Ventilator  40


 


20 20:00  49 21 162/58 (92)   40.00 


 


20 15:44 36.1       





Capillary Refill : Less Than 3 Seconds


I&O











Intake and Output 


 


 20





 00:00


 


Intake Total 5315 ml


 


Output Total 1640 ml


 


Balance 3675 ml


 


 


 


Intake Oral 0 ml


 


IV Total 5255 ml


 


Other 60 ml


 


Output Urine Total 1390 ml


 


Gastric Drainage Total 250 ml








General:  Other (Intubated and sedated)


HEENT:  Mucous Memb Moist/Pink


Lungs:  Clear to Auscultation


Heart:  Regular Rate, No Murmurs


Abdomen:  Soft, Other (Large reducible umbilical hernia, no erythema or color 

change)


Extremities:  Other (2+ pitting edema bilateral LE)





Results/Procedures


Lab


Laboratory Tests


4/15/20 23:31: Glucometer 145H


20 02:30: 


White Blood Count 6.7, Red Blood Count 2.63L, Hemoglobin 7.9L, Hematocrit 25L, 

Mean Corpuscular Volume 95, Mean Corpuscular Hemoglobin 30, Mean Corpuscular 

Hemoglobin Concent 32, Red Cell Distribution Width 16.6H, Platelet Count 112L, 

Mean Platelet Volume 12.5H, Neutrophils (%) (Auto) 85H, Lymphocytes (%) (Auto) 

11L, Monocytes (%) (Auto) 4, Eosinophils (%) (Auto) 0, Basophils (%) (Auto) 0, 

Neutrophils # (Auto) 5.7, Lymphocytes # (Auto) 0.7L, Monocytes # (Auto) 0.3, 

Eosinophils # (Auto) 0.0, Basophils # (Auto) 0.0, Blood Gas Puncture Site R RAD,

Blood Gas Patient Temperature 37.0, Arterial Blood pH 7.42, Arterial Blood 

Partial Pressure CO2 38, Arterial Blood Partial Pressure O2 82, Arterial Blood 

HCO3 24, Arterial Blood Total CO2 25.0, Arterial Blood Oxygen Saturation 97, 

Arterial Blood Base Excess -0.1, Rojelio Test ART LINE, Blood Gas Ventilator 

Setting YES, Blood Gas Inspired Oxygen 40%, Sodium Level 137, Potassium Level 

3.7, Chloride Level 105, Carbon Dioxide Level 21, Anion Gap 11, Blood Urea 

Nitrogen 20H, Creatinine 0.84, Estimat Glomerular Filtration Rate > 60, 

BUN/Creatinine Ratio 24, Glucose Level 154H, Calcium Level 7.6L, Phosphorus 

Level 2.7, Magnesium Level 1.6, Iron Level 19L, Total Iron Binding Capacity 180L

, Unsaturated Iron Binding Capacity 161, Transferrin % Saturation 11L, Ferritin 

374.4H, B-Type Natriuretic Peptide 305.3H


20 11:07: Glucometer 218H


20 17:16: Glucometer 179H





Microbiology


20 Catheter Tip Culture - Final, Complete


          No growth


20 Gram Stain - Final, Complete


          


20 Sputum Culture - Final, Complete


          No growth


20 Urine Culture - Final, Complete


          NO GROWTH





Assessment/Plan


Assessment/Plan





(1) Sepsis


Status:  Acute


Assessment & Plan:  : Poor prognosis, Patient on Sepsis IVF protocol, 

continue IV antibiotics, Dr James consulted for critical care, blood cultures 

pending


4/15: Off Levaphed today, continue to monitor blood pressure, continue IV 

antibiotics, patient is not following commands when sedation is turned off


: Prognosis remains guarded, tube feeds started today, blood pressures 

running high, restarted blood pressure medications


Qualifiers:  


   Qualified Codes:  A41.9 - Sepsis, unspecified organism; R65.21 - Severe 

sepsis with septic shock; N17.9 - Acute kidney failure, unspecified


(2) Acute respiratory failure with hypoxia and hypercapnia


Status:  Acute


Assessment & Plan:  : Currently Intubated, Dr James managing vent





(3) Acute kidney injury superimposed on chronic kidney disease


Status:  Resolved


Assessment & Plan:  : Will monitor daily BUN/Cr





(4) NSTEMI (non-ST elevation myocardial infarction)


Status:  Acute


Assessment & Plan:  : Cardiology consulted, appreciate recommendations, 

Recent Cath 2020: BNP elevated, patient given IV lasix, diuresing well





(5) Upper GI bleed


Status:  Acute


Assessment & Plan:  : Today started having blood tinged fluid from OG, 

Lovenox stopped and started on IV protonix


4/15: Hgb dropped today, Will get iron studies





(6) CAD (coronary artery disease)


Status:  Chronic


Qualifiers:  


   Qualified Codes:  I25.10 - Atherosclerotic heart disease of native coronary 

artery without angina pectoris


(7) Hypertension


Status:  Chronic


Assessment & Plan:  : Currently hypotensive and on Levaphed for Sepsis


4/15: Off pressors


: Restarted Lisinopril today


Qualifiers:  


   Qualified Codes:  I10 - Essential (primary) hypertension


(8) Diabetes type 2, uncontrolled


Status:  Chronic


Qualifiers:  


   Qualified Codes:  E11.65 - Type 2 diabetes mellitus with hyperglycemia


(9) COPD (chronic obstructive pulmonary disease)


Status:  Chronic


Qualifiers:  


   Qualified Codes:  J44.9 - Chronic obstructive pulmonary disease, unspecified


(10) Normocytic anemia


Status:  Acute


Assessment & Plan:  : Iron deficient, will consider Fe replacement when 

patient stablizes





(11) Pneumonia


Status:  Acute


Qualifiers:  


   Qualified Codes:  J18.9 - Pneumonia, unspecified organism


(12) DVT prophylaxis


Status:  Acute


Assessment & Plan:  : On Lovenox, stopped today due to GI bleeding


4/15: Continues off Lovenox, SCDs








Clinical Quality Measures


DVT/VTE Risk/Contraindication:


Risk Factor Score Per Nursin


RFS Level Per Nursing on Admit:  4+=Very High











SRINI JOHN MD               2020 21:41

## 2020-04-17 VITALS — DIASTOLIC BLOOD PRESSURE: 59 MMHG | SYSTOLIC BLOOD PRESSURE: 147 MMHG

## 2020-04-17 VITALS — DIASTOLIC BLOOD PRESSURE: 53 MMHG | SYSTOLIC BLOOD PRESSURE: 152 MMHG

## 2020-04-17 VITALS — DIASTOLIC BLOOD PRESSURE: 53 MMHG | SYSTOLIC BLOOD PRESSURE: 153 MMHG

## 2020-04-17 VITALS — SYSTOLIC BLOOD PRESSURE: 169 MMHG | DIASTOLIC BLOOD PRESSURE: 56 MMHG

## 2020-04-17 VITALS — SYSTOLIC BLOOD PRESSURE: 133 MMHG | DIASTOLIC BLOOD PRESSURE: 50 MMHG

## 2020-04-17 VITALS — SYSTOLIC BLOOD PRESSURE: 185 MMHG | DIASTOLIC BLOOD PRESSURE: 41 MMHG

## 2020-04-17 VITALS — SYSTOLIC BLOOD PRESSURE: 142 MMHG | DIASTOLIC BLOOD PRESSURE: 49 MMHG

## 2020-04-17 VITALS — DIASTOLIC BLOOD PRESSURE: 68 MMHG | SYSTOLIC BLOOD PRESSURE: 109 MMHG

## 2020-04-17 VITALS — DIASTOLIC BLOOD PRESSURE: 55 MMHG | SYSTOLIC BLOOD PRESSURE: 165 MMHG

## 2020-04-17 VITALS — SYSTOLIC BLOOD PRESSURE: 158 MMHG | DIASTOLIC BLOOD PRESSURE: 57 MMHG

## 2020-04-17 VITALS — DIASTOLIC BLOOD PRESSURE: 49 MMHG | SYSTOLIC BLOOD PRESSURE: 142 MMHG

## 2020-04-17 VITALS — DIASTOLIC BLOOD PRESSURE: 55 MMHG | SYSTOLIC BLOOD PRESSURE: 155 MMHG

## 2020-04-17 VITALS — SYSTOLIC BLOOD PRESSURE: 167 MMHG | DIASTOLIC BLOOD PRESSURE: 55 MMHG

## 2020-04-17 VITALS — DIASTOLIC BLOOD PRESSURE: 52 MMHG | SYSTOLIC BLOOD PRESSURE: 146 MMHG

## 2020-04-17 VITALS — SYSTOLIC BLOOD PRESSURE: 159 MMHG | DIASTOLIC BLOOD PRESSURE: 57 MMHG

## 2020-04-17 VITALS — DIASTOLIC BLOOD PRESSURE: 57 MMHG | SYSTOLIC BLOOD PRESSURE: 169 MMHG

## 2020-04-17 VITALS — DIASTOLIC BLOOD PRESSURE: 56 MMHG | SYSTOLIC BLOOD PRESSURE: 160 MMHG

## 2020-04-17 VITALS — SYSTOLIC BLOOD PRESSURE: 167 MMHG | DIASTOLIC BLOOD PRESSURE: 65 MMHG

## 2020-04-17 VITALS — SYSTOLIC BLOOD PRESSURE: 155 MMHG | DIASTOLIC BLOOD PRESSURE: 48 MMHG

## 2020-04-17 VITALS — SYSTOLIC BLOOD PRESSURE: 186 MMHG | DIASTOLIC BLOOD PRESSURE: 52 MMHG

## 2020-04-17 VITALS — SYSTOLIC BLOOD PRESSURE: 130 MMHG | DIASTOLIC BLOOD PRESSURE: 70 MMHG

## 2020-04-17 VITALS — SYSTOLIC BLOOD PRESSURE: 135 MMHG | DIASTOLIC BLOOD PRESSURE: 45 MMHG

## 2020-04-17 VITALS — SYSTOLIC BLOOD PRESSURE: 157 MMHG | DIASTOLIC BLOOD PRESSURE: 55 MMHG

## 2020-04-17 VITALS — DIASTOLIC BLOOD PRESSURE: 58 MMHG | SYSTOLIC BLOOD PRESSURE: 165 MMHG

## 2020-04-17 VITALS — SYSTOLIC BLOOD PRESSURE: 135 MMHG | DIASTOLIC BLOOD PRESSURE: 53 MMHG

## 2020-04-17 VITALS — SYSTOLIC BLOOD PRESSURE: 147 MMHG | DIASTOLIC BLOOD PRESSURE: 53 MMHG

## 2020-04-17 VITALS — SYSTOLIC BLOOD PRESSURE: 161 MMHG | DIASTOLIC BLOOD PRESSURE: 57 MMHG

## 2020-04-17 VITALS — SYSTOLIC BLOOD PRESSURE: 180 MMHG | DIASTOLIC BLOOD PRESSURE: 76 MMHG

## 2020-04-17 VITALS — SYSTOLIC BLOOD PRESSURE: 160 MMHG | DIASTOLIC BLOOD PRESSURE: 55 MMHG

## 2020-04-17 LAB
ARTERIAL PATENCY WRIST A: (no result)
BASE EXCESS STD BLDA CALC-SCNC: 2.6 MMOL/L (ref -2.5–2.5)
BASOPHILS # BLD AUTO: 0 10^3/UL (ref 0–0.1)
BASOPHILS NFR BLD AUTO: 0 % (ref 0–10)
BDY SITE: (no result)
BODY TEMPERATURE: 37
BUN/CREAT SERPL: 22
CALCIUM SERPL-MCNC: 7.8 MG/DL (ref 8.5–10.1)
CHLORIDE SERPL-SCNC: 103 MMOL/L (ref 98–107)
CO2 BLDA CALC-SCNC: 27.8 MMOL/L (ref 21–31)
CO2 SERPL-SCNC: 23 MMOL/L (ref 21–32)
CREAT SERPL-MCNC: 0.97 MG/DL (ref 0.6–1.3)
EOSINOPHIL # BLD AUTO: 0 10^3/UL (ref 0–0.3)
EOSINOPHIL NFR BLD AUTO: 0 % (ref 0–10)
ERYTHROCYTE [DISTWIDTH] IN BLOOD BY AUTOMATED COUNT: 16.6 % (ref 10–14.5)
GFR SERPLBLD BASED ON 1.73 SQ M-ARVRAT: > 60 ML/MIN
GLUCOSE SERPL-MCNC: 183 MG/DL (ref 70–105)
HCT VFR BLD CALC: 26 % (ref 40–54)
HGB BLD-MCNC: 8.4 G/DL (ref 13.3–17.7)
INHALED O2 FLOW RATE: (no result) L/MIN
LYMPHOCYTES # BLD AUTO: 0.8 X 10^3 (ref 1–4)
LYMPHOCYTES NFR BLD AUTO: 12 % (ref 12–44)
MAGNESIUM SERPL-MCNC: 1.7 MG/DL (ref 1.6–2.4)
MANUAL DIFFERENTIAL PERFORMED BLD QL: NO
MCH RBC QN AUTO: 30 PG (ref 25–34)
MCHC RBC AUTO-ENTMCNC: 32 G/DL (ref 32–36)
MCV RBC AUTO: 95 FL (ref 80–99)
MONOCYTES # BLD AUTO: 0.3 X 10^3 (ref 0–1)
MONOCYTES NFR BLD AUTO: 5 % (ref 0–12)
NEUTROPHILS # BLD AUTO: 5.9 X 10^3 (ref 1.8–7.8)
NEUTROPHILS NFR BLD AUTO: 83 % (ref 42–75)
PCO2 BLDA: 41 MMHG (ref 35–45)
PH BLDA: 7.43 [PH] (ref 7.37–7.43)
PHOSPHATE SERPL-MCNC: 2.6 MG/DL (ref 2.3–4.7)
PLATELET # BLD: 139 10^3/UL (ref 130–400)
PMV BLD AUTO: 11.6 FL (ref 7.4–10.4)
PO2 BLDA: 77 MMHG (ref 79–93)
POTASSIUM SERPL-SCNC: 3.6 MMOL/L (ref 3.6–5)
SAO2 % BLDA FROM PO2: 96 % (ref 94–100)
SODIUM SERPL-SCNC: 139 MMOL/L (ref 135–145)
VENTILATION MODE VENT: YES
WBC # BLD AUTO: 7.1 10^3/UL (ref 4.3–11)

## 2020-04-17 RX ADMIN — PROPOFOL SCH MLS/HR: 10 INJECTION, EMULSION INTRAVENOUS at 10:15

## 2020-04-17 RX ADMIN — PANTOPRAZOLE SODIUM SCH MG: 40 INJECTION, POWDER, FOR SOLUTION INTRAVENOUS at 08:04

## 2020-04-17 RX ADMIN — POTASSIUM CHLORIDE SCH MLS/HR: 200 INJECTION, SOLUTION INTRAVENOUS at 04:09

## 2020-04-17 RX ADMIN — PROPOFOL SCH MLS/HR: 10 INJECTION, EMULSION INTRAVENOUS at 04:00

## 2020-04-17 RX ADMIN — SODIUM CHLORIDE SCH MLS/HR: 900 INJECTION, SOLUTION INTRAVENOUS at 22:08

## 2020-04-17 RX ADMIN — PANTOPRAZOLE SODIUM SCH MG: 40 INJECTION, POWDER, FOR SOLUTION INTRAVENOUS at 20:09

## 2020-04-17 RX ADMIN — PROPOFOL SCH MLS/HR: 10 INJECTION, EMULSION INTRAVENOUS at 23:54

## 2020-04-17 RX ADMIN — SODIUM CHLORIDE SCH MLS/HR: 900 INJECTION, SOLUTION INTRAVENOUS at 15:37

## 2020-04-17 RX ADMIN — POTASSIUM CHLORIDE SCH MLS/HR: 200 INJECTION, SOLUTION INTRAVENOUS at 04:06

## 2020-04-17 RX ADMIN — MAGNESIUM SULFATE IN DEXTROSE SCH MLS/HR: 10 INJECTION, SOLUTION INTRAVENOUS at 05:15

## 2020-04-17 RX ADMIN — HYDRALAZINE HYDROCHLORIDE PRN MG: 20 INJECTION INTRAMUSCULAR; INTRAVENOUS at 21:25

## 2020-04-17 RX ADMIN — RISPERIDONE SCH MG: 2 TABLET, FILM COATED ORAL at 20:09

## 2020-04-17 RX ADMIN — BUMETANIDE SCH MG: 0.25 INJECTION, SOLUTION INTRAMUSCULAR; INTRAVENOUS at 08:04

## 2020-04-17 RX ADMIN — PROPOFOL SCH MLS/HR: 10 INJECTION, EMULSION INTRAVENOUS at 07:24

## 2020-04-17 RX ADMIN — PROPOFOL SCH MLS/HR: 10 INJECTION, EMULSION INTRAVENOUS at 18:18

## 2020-04-17 RX ADMIN — MAGNESIUM SULFATE IN DEXTROSE SCH MLS/HR: 10 INJECTION, SOLUTION INTRAVENOUS at 04:06

## 2020-04-17 RX ADMIN — BUMETANIDE SCH MG: 0.25 INJECTION, SOLUTION INTRAMUSCULAR; INTRAVENOUS at 20:09

## 2020-04-17 RX ADMIN — PROPOFOL SCH MLS/HR: 10 INJECTION, EMULSION INTRAVENOUS at 13:10

## 2020-04-17 RX ADMIN — VANCOMYCIN HYDROCHLORIDE SCH MLS/HR: 500 INJECTION, POWDER, LYOPHILIZED, FOR SOLUTION INTRAVENOUS at 20:09

## 2020-04-17 RX ADMIN — INSULIN ASPART SCH UNIT: 100 INJECTION, SOLUTION INTRAVENOUS; SUBCUTANEOUS at 17:21

## 2020-04-17 RX ADMIN — PROPOFOL SCH MLS/HR: 10 INJECTION, EMULSION INTRAVENOUS at 15:36

## 2020-04-17 RX ADMIN — PROPOFOL SCH MLS/HR: 10 INJECTION, EMULSION INTRAVENOUS at 21:18

## 2020-04-17 RX ADMIN — SODIUM CHLORIDE SCH MLS/HR: 900 INJECTION, SOLUTION INTRAVENOUS at 07:23

## 2020-04-17 RX ADMIN — HYDROCORTISONE SODIUM SUCCINATE SCH MG: 100 INJECTION, POWDER, FOR SOLUTION INTRAMUSCULAR; INTRAVENOUS at 07:23

## 2020-04-17 RX ADMIN — HYDROCORTISONE SODIUM SUCCINATE SCH MG: 100 INJECTION, POWDER, FOR SOLUTION INTRAMUSCULAR; INTRAVENOUS at 13:10

## 2020-04-17 RX ADMIN — INSULIN ASPART SCH UNIT: 100 INJECTION, SOLUTION INTRAVENOUS; SUBCUTANEOUS at 05:49

## 2020-04-17 RX ADMIN — MAGNESIUM SULFATE IN DEXTROSE SCH MLS/HR: 10 INJECTION, SOLUTION INTRAVENOUS at 04:09

## 2020-04-17 RX ADMIN — POTASSIUM CHLORIDE SCH MLS/HR: 200 INJECTION, SOLUTION INTRAVENOUS at 05:15

## 2020-04-17 RX ADMIN — RISPERIDONE SCH MG: 2 TABLET, FILM COATED ORAL at 08:04

## 2020-04-17 RX ADMIN — SODIUM CHLORIDE, SODIUM LACTATE, POTASSIUM CHLORIDE, AND CALCIUM CHLORIDE SCH MLS/HR: 600; 310; 30; 20 INJECTION, SOLUTION INTRAVENOUS at 04:06

## 2020-04-17 RX ADMIN — LISINOPRIL SCH MG: 20 TABLET ORAL at 08:04

## 2020-04-17 RX ADMIN — ENOXAPARIN SODIUM SCH MG: 100 INJECTION SUBCUTANEOUS at 08:04

## 2020-04-17 RX ADMIN — HYDROCORTISONE SODIUM SUCCINATE SCH MG: 100 INJECTION, POWDER, FOR SOLUTION INTRAMUSCULAR; INTRAVENOUS at 21:19

## 2020-04-17 RX ADMIN — Medication SCH MLS/HR: at 04:01

## 2020-04-17 RX ADMIN — INSULIN ASPART SCH UNIT: 100 INJECTION, SOLUTION INTRAVENOUS; SUBCUTANEOUS at 11:27

## 2020-04-17 RX ADMIN — POTASSIUM CHLORIDE SCH MEQ: 1500 TABLET, EXTENDED RELEASE ORAL at 04:06

## 2020-04-17 NOTE — PULMONARY PROGRESS NOTE
Subjective


Time Seen by a Provider:  06:58





Sepsis Event


Evaluation


Height, Weight, BMI


Height: 5'8.50"


Weight: 206lbs. 0.8oz. 93.610942ch; 30.95 BMI


Method:Stated





Exam


Exam





Vital Signs








  Date Time  Temp Pulse Resp B/P (MAP) Pulse Ox O2 Delivery O2 Flow Rate FiO2


 


4/17/20 06:00  60 21 135/53 (80) 92 Mechanical Ventilator 40.00 


 


4/17/20 05:00  83 22 130/70 (90) 95 Mechanical Ventilator 40.00 


 


4/17/20 04:00     92 Mechanical Ventilator  40


 


4/17/20 04:00    153/53    


 


4/17/20 04:00  63 22 153/53 (86) 94 Mechanical Ventilator 40.00 


 


4/17/20 03:00  75 21 160/55 (90) 96 Mechanical Ventilator 40.00 


 


4/17/20 02:00  63 21 161/57 (91) 94 Mechanical Ventilator 40.00 


 


4/17/20 01:00  60 22 167/55 (92) 93 Mechanical Ventilator 40.00 


 


4/17/20 01:00  60      


 


4/17/20 00:00     92 Mechanical Ventilator  40


 


4/17/20 00:00  62 21 169/57 (94) 93 Mechanical Ventilator 40.00 


 


4/16/20 23:46  66  165/60    


 


4/16/20 23:00  64 16 140/45 (76) 94 Mechanical Ventilator 40.00 


 


4/16/20 22:00  53 22 147/54 (85) 94 Mechanical Ventilator 40.00 


 


4/16/20 21:59  53 22  94   40


 


4/16/20 21:58  53  148/54    


 


4/16/20 21:00  50 22 157/59 (91) 93 Mechanical Ventilator 40.00 


 


4/16/20 20:00     93 Mechanical Ventilator  40


 


4/16/20 20:00  49 21 162/58 (92) 93 Mechanical Ventilator 40.00 


 


4/16/20 19:00  51      


 


4/16/20 19:00  50 21 165/58 (93) 93 Mechanical Ventilator 40.00 


 


4/16/20 18:08  91 28  98   40


 


4/16/20 18:04  52  170/66    


 


4/16/20 18:00  52 22 170/65 (100) 92 Mechanical Ventilator 40.00 


 


4/16/20 17:13  54  168/65    


 


4/16/20 17:00  52 21 167/64 (98) 92 Mechanical Ventilator 40.00 


 


4/16/20 16:00  60 22 161/65 (97) 92 Mechanical Ventilator 40.00 


 


4/16/20 15:51      Mechanical Ventilator  40


 


4/16/20 15:44 36.1       


 


4/16/20 15:00 36.9 53 22 160/60 (93) 93 Mechanical Ventilator 40.00 


 


4/16/20 14:38  52 22  93   40


 


4/16/20 14:00 36.9 52 21 160/63 (95) 93 Mechanical Ventilator 40.00 


 


4/16/20 13:30  52  160/61    


 


4/16/20 13:00 36.9 54 22 161/62 (95) 93 Mechanical Ventilator 40.00 


 


4/16/20 12:39  53      


 


4/16/20 12:00     94 Mechanical Ventilator  40


 


4/16/20 12:00 36.8 53 21 161/57 (91) 94 Mechanical Ventilator 40.00 


 


4/16/20 11:38 36.1       


 


4/16/20 11:05 35.8       


 


4/16/20 11:00 36.9 53 21 156/56 (89) 95 Mechanical Ventilator 40.00 


 


4/16/20 10:26  52 22  94   40


 


4/16/20 10:00 36.8 52 22 169/70 (103) 95 Mechanical Ventilator 40.00 


 


4/16/20 09:00 36.9 55 21 163/67 (99) 93 Mechanical Ventilator 40.00 


 


4/16/20 08:00      Mechanical Ventilator  40


 


4/16/20 08:00 36.8 54 21 159/65 (96) 94 Mechanical Ventilator 40.00 


 


4/16/20 07:41  55  168/65    


 


4/16/20 07:23  75 25  98   40


 


4/16/20 07:00 36.9 54 21 154/62 (92) 96 Mechanical Ventilator 40.00 














I & O 


 


 4/17/20





 07:00


 


Intake Total 1561 ml


 


Output Total 3475 ml


 


Balance -1914 ml








Height & Weight


Height: 5'8.50"


Weight: 206lbs. 0.8oz. 93.908680zg; 30.95 BMI


Method:Stated


General Appearance:  No Apparent Distress, Obese, Other (intubated and sedated)


HEENT:  TMs Normal, Other (Pupils equal/reactive 3 mm bilaterally)


Respiratory:  No Respiratory Distress, Other (intubated and mechanically 

ventilated)


Cardiovascular:  No Murmur, Tachycardia (regular rhythm)


Capillary Refill:  Less Than 3 Seconds


Gastrointestinal:  soft, no organomegaly


Extremity:  Normal Inspection, No Pedal Edema


Neurologic/Psychiatric:  Other (sedated)


Skin:  Normal Color, Warm/Dry





Results


Lab


Laboratory Tests


4/16/20 02:30








4/17/20 02:55











Assessment/Plan


Assessment/Plan


Acute respiratory failure 


   - COVID is negative 


   -Check bilateral dopplers 


   -Will start weaning vent tomorrow


   -Start bumex 1mg BID 


   -Echo is pending  


   - PEEP to 10 


CHF EF 45% with grade 1 diastolic dysfunction 


   -Cardiology following


sepsis with PICC line


   -Picc line d/c'd


      -Culturing tip 


      -Question if pt has been injecting drugs into PICC line


   -Pan cultures pending


Pneumonia with possible aspiration 


   -Pan cultures 


   -MRSA swab 


   -COVID is negative 


   -Influenza is negative 


   -Continue Vanco and Zosyn 


   - urine strep and legionella ag - pending 


   -RVP is pending 


UDS is positive for methamphetamine 


Hypotension


   -Levophed


   -Echo pending 


NSTEMI


   -Cardiology following 


Hypophos, hypomag 


   -replace 


Acute renal failure with dehydration 


   -IVF











GUILLERMINA MERA DO              Apr 17, 2020 07:03

## 2020-04-17 NOTE — CARDIOLOGY PROGRESS NOTE
Cardiology SOAP Progress Note


Subjective:


Intubated/ventilated.





Objective:


I&O/Vital Signs











 4/17/20 4/17/20 4/17/20 4/17/20





 02:00 03:00 04:00 04:00


 


Pulse 63 75 63 


 


Resp 21 21 22 


 


B/P (MAP) 161/57 (91) 160/55 (90) 153/53 (86) 153/53


 


Pulse Ox 94 96 94 


 


O2 Delivery Mechanical Ventilator Mechanical Ventilator Mechanical Ventilator 


 


O2 Flow Rate 40.00 40.00 40.00 





 4/17/20 4/17/20 4/17/20 4/17/20





 04:00 05:00 06:00 06:40


 


Pulse  83 60 53


 


Resp  22 21 


 


B/P (MAP)  130/70 (90) 135/53 (80) 


 


Pulse Ox 92 95 92 


 


O2 Delivery Mechanical Ventilator Mechanical Ventilator Mechanical Ventilator 


 


O2 Flow Rate  40.00 40.00 


 


FiO2 40   





 4/17/20 4/17/20 4/17/20 4/17/20





 07:00 07:24 08:00 08:00


 


Pulse 54 56  81


 


Resp 21   22


 


B/P (MAP) 133/50 (77) 151/54  158/57 (90)


 


Pulse Ox 94   92


 


O2 Delivery Mechanical Ventilator  Mechanical Ventilator Mechanical Ventilator


 


O2 Flow Rate 40.00   40.00


 


FiO2   50 





 4/17/20 4/17/20 4/17/20 4/17/20





 08:14 08:26 08:53 09:00


 


Temp 37.2   


 


Pulse   74 65


 


Resp   22 22


 


B/P (MAP)    155/48 (83)


 


Pulse Ox   95 92


 


O2 Delivery  Mechanical Ventilator  Mechanical Ventilator


 


O2 Flow Rate  50.00  50.00


 


FiO2   50 


 


    





 4/17/20 4/17/20 4/17/20 4/17/20





 10:00 10:15 11:00 11:21


 


Temp    37.1


 


Pulse 65 69 76 


 


Resp 21  13 


 


B/P (MAP) 157/55 (89) 155/54 147/53 (84) 


 


Pulse Ox 94  93 


 


O2 Delivery Mechanical Ventilator  Mechanical Ventilator 


 


O2 Flow Rate 50.00  50.00 


 


    





 4/17/20 4/17/20 4/17/20 4/17/20





 11:42 11:43 12:00 13:10


 


Pulse 65  65 83


 


Resp 22  21 


 


B/P (MAP)   142/49 (80) 148/53


 


Pulse Ox 94  94 


 


O2 Delivery  Mechanical Ventilator Mechanical Ventilator 


 


O2 Flow Rate   50.00 


 


FiO2 50 50  














 4/17/20





 00:00


 


Intake Total 924 ml


 


Output Total 1425 ml


 


Balance -501 ml








Weight (Pounds):  206


Weight (Ounces):  0.8


Weight (Calculated Kilograms):  93.651509


Constitutional:  other (on mec vent, unresponsive)


Respiratory:  No accessory muscle use; other (fair to good bilat air entry)


Cardiovascular:  regular rate-rhythm, S1 and S2, systolic murmur (soft BUTCH at 

card base)


Gastrointestional:  hernia (abdominal), audible bowel sounds, other (mildly 

distended)


Extremities:  swelling (mild edema), other (R foot in dressing that wasn't 

removed); No clubbing, No cyanosis


Neurologic/Psychiatric:  other (unresponsive, on mech vent)


Skin:  No rash on exposed areas, No ulcerations on exposed areas; other 

(dressing to right foot, D&I)





Results/Procedures:


Labs


Laboratory Tests


4/16/20 17:16: Glucometer 179H


4/16/20 23:48: Glucometer 190H


4/17/20 02:55: 


White Blood Count 7.1, Red Blood Count 2.78L, Hemoglobin 8.4L, Hematocrit 26L, 

Mean Corpuscular Volume 95, Mean Corpuscular Hemoglobin 30, Mean Corpuscular 

Hemoglobin Concent 32, Red Cell Distribution Width 16.6H, Platelet Count 139, 

Mean Platelet Volume 11.6H, Neutrophils (%) (Auto) 83H, Lymphocytes (%) (Auto) 

12, Monocytes (%) (Auto) 5, Eosinophils (%) (Auto) 0, Basophils (%) (Auto) 0, 

Neutrophils # (Auto) 5.9, Lymphocytes # (Auto) 0.8L, Monocytes # (Auto) 0.3, 

Eosinophils # (Auto) 0.0, Basophils # (Auto) 0.0, Blood Gas Puncture Site RIGHT 

RADIAL, Blood Gas Patient Temperature 37.0, Arterial Blood pH 7.43, Arterial 

Blood Partial Pressure CO2 41, Arterial Blood Partial Pressure O2 77L, Arterial 

Blood HCO3 27, Arterial Blood Total CO2 27.8, Arterial Blood Oxygen Saturation 

96, Arterial Blood Base Excess 2.6H, Rojelio Test YES-POS, Blood Gas Ventilator 

Setting YES, Blood Gas Inspired Oxygen 40%, Sodium Level 139, Potassium Level 

3.6, Chloride Level 103, Carbon Dioxide Level 23, Anion Gap 13, Blood Urea 

Nitrogen 21H, Creatinine 0.97, Estimat Glomerular Filtration Rate > 60, 

BUN/Creatinine Ratio 22, Glucose Level 183H, Calcium Level 7.8L, Phosphorus 

Level 2.6, Magnesium Level 1.7, B-Type Natriuretic Peptide 503.3H


4/17/20 11:23: Glucometer 193H


4/17/20 12:00: Troponin I 0.064H





Microbiology


4/13/20 Catheter Tip Culture - Final, Complete


          No growth


4/13/20 Gram Stain - Final, Complete


          


4/13/20 Sputum Culture - Final, Complete


          No growth


4/12/20 Urine Culture - Final, Complete


          NO GROWTH








A/P:


Assessment/Dx:


Acute resp failure and shock of undetermined etiology; shock resolved





Mild acute on chronic diastolic congestive heart failure.  Agree with IV 

diuretics.





Cannot exclude pulmonary embolism. Was started on treatment dose Lovenox at 

admission, but being stopped on 4/15/20 due to falling H & H and falling 

platelet count





Acute renal failure (AYAH-3) likely due to shock - resolved





Mild troponin elevation, likely type-2 MI due to reasons noted above





Drug test (+) for Meth on 4-





CAD. Card cath of Jan 2017 showed moderate, nonobstructive disease and LVEF 55% 

and elevated LVEDP. MPI of 2/27/20 by Dr Dai showed no ischemia or infarction

and normal LV function





Echo on 4/14/20: LVEF 45%, grade 1 fajardo dysfunction, mild to mod TR, RVSP 29 

mmHg





Chronic tobacco use (smokes cigarettes according to previous records)





H/o DM II





H/o Htn





H/o Hyperlipidemia





PAD.  Peripheral angiogram and intervention on 8/22/2019 by Dr Dai with PTA 

of the right AT, PT and stenting of the right SFA. Significant improvement of 

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


Plan:








* Complex management


* Prognosis guarded


* D/c Lovenox if ok with the Med and Pulm services


* Monitor labs








Thank you for your consultation. Please call me if you have any questions.








WOODY Dai MD, FACP, FACC, FSCAI, FHRS, CCDS


Interventional Cardiology


Cardiac Electrophysiology


Vascular Medicine and Endovascular Interventions











ELISE DAI MD             Apr 17, 2020 13:34

## 2020-04-17 NOTE — PROGRESS NOTE
Subjective


Subjective/Events-last exam


Intubated and sedated. No new ON events


Review of Systems


Intubated and Sedated





Objective


Exam


Last Set of Vital Signs





Vital Signs








  Date Time  Temp Pulse Resp B/P (MAP) Pulse Ox O2 Delivery O2 Flow Rate FiO2


 


20 17:00  73 17 160/56 (90) 93 Mechanical Ventilator 50.00 


 


20 15:42 37.2       


 


20 15:35        50





Capillary Refill : Less Than 3 Seconds


I&O











Intake and Output 


 


 20





 00:00


 


Intake Total 3046 ml


 


Output Total 3660 ml


 


Balance -614 ml


 


 


 


Intake Oral 0 ml


 


IV Total 2610 ml


 


Tube Feeding 346 ml


 


Other 90 ml


 


Output Urine Total 3460 ml


 


Gastric Drainage Total 200 ml








General:  Other (Intubated and sedated, not following commands)


Lungs:  Clear to Auscultation, Normal Air Movement


Heart:  Regular Rate


Abdomen:  Soft, Other (large reducible umbilical hernia)


Extremities:  Other (2+ pitting edema bilaterally)





Results/Procedures


Lab


Laboratory Tests


20 23:48: Glucometer 190H


20 02:55: 


White Blood Count 7.1, Red Blood Count 2.78L, Hemoglobin 8.4L, Hematocrit 26L, 

Mean Corpuscular Volume 95, Mean Corpuscular Hemoglobin 30, Mean Corpuscular 

Hemoglobin Concent 32, Red Cell Distribution Width 16.6H, Platelet Count 139, 

Mean Platelet Volume 11.6H, Neutrophils (%) (Auto) 83H, Lymphocytes (%) (Auto) 

12, Monocytes (%) (Auto) 5, Eosinophils (%) (Auto) 0, Basophils (%) (Auto) 0, 

Neutrophils # (Auto) 5.9, Lymphocytes # (Auto) 0.8L, Monocytes # (Auto) 0.3, 

Eosinophils # (Auto) 0.0, Basophils # (Auto) 0.0, Blood Gas Puncture Site RIGHT 

RADIAL, Blood Gas Patient Temperature 37.0, Arterial Blood pH 7.43, Arterial 

Blood Partial Pressure CO2 41, Arterial Blood Partial Pressure O2 77L, Arterial 

Blood HCO3 27, Arterial Blood Total CO2 27.8, Arterial Blood Oxygen Saturation 

96, Arterial Blood Base Excess 2.6H, Rojelio Test YES-POS, Blood Gas Ventilator 

Setting YES, Blood Gas Inspired Oxygen 40%, Sodium Level 139, Potassium Level 

3.6, Chloride Level 103, Carbon Dioxide Level 23, Anion Gap 13, Blood Urea 

Nitrogen 21H, Creatinine 0.97, Estimat Glomerular Filtration Rate > 60, 

BUN/Creatinine Ratio 22, Glucose Level 183H, Calcium Level 7.8L, Phosphorus 

Level 2.6, Magnesium Level 1.7, B-Type Natriuretic Peptide 503.3H


20 11:23: Glucometer 193H


20 12:00: Troponin I 0.064H





Microbiology


20 Catheter Tip Culture - Final, Complete


          No growth


20 Gram Stain - Final, Complete


          


20 Sputum Culture - Final, Complete


          No growth


20 Urine Culture - Final, Complete


          NO GROWTH





Assessment/Plan


Assessment/Plan





(1) Sepsis


Status:  Acute


Assessment & Plan:  : Poor prognosis, Patient on Sepsis IVF protocol, 

continue IV antibiotics, Dr James consulted for critical care, blood cultures 

pending


4/15: Off Levaphed today, continue to monitor blood pressure, continue IV 

antibiotics, patient is not following commands when sedation is turned off


: Prognosis remains guarded, tube feeds started today, blood pressures 

running high, restarted blood pressure medications


: Dr james managing critical care, continue IV antibiotics, possible wean 

and extubation over the weekend


Qualifiers:  


   Qualified Codes:  A41.9 - Sepsis, unspecified organism; R65.21 - Severe 

sepsis with septic shock; N17.9 - Acute kidney failure, unspecified


(2) Acute respiratory failure with hypoxia and hypercapnia


Status:  Acute


Assessment & Plan:  : Currently Intubated, Dr James managing vent





(3) Acute kidney injury superimposed on chronic kidney disease


Status:  Resolved


Assessment & Plan:  : Will monitor daily BUN/Cr





(4) NSTEMI (non-ST elevation myocardial infarction)


Status:  Acute


Assessment & Plan:  : Cardiology consulted, appreciate recommendations, 

Recent Cath 2020: BNP elevated, patient given IV lasix, diuresing well





(5) Upper GI bleed


Status:  Acute


Assessment & Plan:  : Today started having blood tinged fluid from OG, 

Lovenox stopped and started on IV protonix


4/15: Hgb dropped today, Will get iron studies





(6) CAD (coronary artery disease)


Status:  Chronic


Qualifiers:  


   Qualified Codes:  I25.10 - Atherosclerotic heart disease of native coronary 

artery without angina pectoris


(7) Hypertension


Status:  Chronic


Assessment & Plan:  : Currently hypotensive and on Levaphed for Sepsis


4/15: Off pressors


: Restarted Lisinopril today


Qualifiers:  


   Qualified Codes:  I10 - Essential (primary) hypertension


(8) Diabetes type 2, uncontrolled


Status:  Chronic


Qualifiers:  


   Qualified Codes:  E11.65 - Type 2 diabetes mellitus with hyperglycemia


(9) COPD (chronic obstructive pulmonary disease)


Status:  Chronic


Qualifiers:  


   Qualified Codes:  J44.9 - Chronic obstructive pulmonary disease, unspecified


(10) Normocytic anemia


Status:  Acute


Assessment & Plan:  : Iron deficient, will consider Fe replacement when 

patient stablizes





(11) Pneumonia


Status:  Acute


Qualifiers:  


   Qualified Codes:  J18.9 - Pneumonia, unspecified organism


(12) DVT prophylaxis


Status:  Acute


Assessment & Plan:  : On Lovenox, stopped today due to GI bleeding


4/15: Continues off Lovenox, SCDs








Clinical Quality Measures


DVT/VTE Risk/Contraindication:


Risk Factor Score Per Nursin


RFS Level Per Nursing on Admit:  4+=Very High











SRINI JOHN MD               2020 17:20

## 2020-04-17 NOTE — NUR
RD FOLLOW-UP



Est kcal needs: 5076-8655 kcal | 15-18 kcal/kg 

Est Pro needs:   g Pro | 0.8-1.0 g Pro/kg 



Note pt currently receiving Jevity 1.5 at rate of 15ml/hr with 100ml flushes q6h for 
hydration status. 

Note abnormal lab value of glu 183 (H), per chart review. 



Would recommend switching from Jevity 1.5 to Glucerna 1.5, for better glucose control. 

Would recommend continuing at rate of 15ml/hr, with progression to 45ml/hr as medically able 
and as tolerated. 

At goal rate, provides 1620 kcal (15 kcal/kg); 89 g Pro (0.8 g Pro/kg); and 820ml free 
water. With flushes, provides 1420ml free water. 



Will continue to follow and reassess as pt needs, intake, and status change. 



JAG Jesus, MS, RD, LD

[phone]

## 2020-04-17 NOTE — DIAGNOSTIC IMAGING REPORT
INDICATION:  Altered mental status.



TECHNIQUE:  Single view chest 4:05 AM.



CORRELATION STUDY:  04/16/2020



FINDINGS: 

There is very limited, suboptimal depth of inspiration. This

results in crowding lung bases. Superimposed effusion with

perhaps atelectasis or infiltrate left lung base is present.

Right lung with minimal basilar atelectasis.  

Heart size enlarged, vasculature is increased somewhat

accentuated by limited depth of inspiration.

The support lines and tubes including endotracheal tube, gastric

tube and right IJ central line are all limited in their

visualization assessment but appear generally stable.



IMPRESSION: 

1. Limited portable chest demonstrates suboptimal depth of

inspiration.

2. Superimposed effusion with infiltrate and/or atelectasis at

the left lung base appears adversely changed.

3. Generally stable appearance about support lines and tubes.



Dictated by: 



  Dictated on workstation # [id number]

## 2020-04-18 VITALS — SYSTOLIC BLOOD PRESSURE: 123 MMHG | DIASTOLIC BLOOD PRESSURE: 38 MMHG

## 2020-04-18 VITALS — DIASTOLIC BLOOD PRESSURE: 40 MMHG | SYSTOLIC BLOOD PRESSURE: 126 MMHG

## 2020-04-18 VITALS — DIASTOLIC BLOOD PRESSURE: 45 MMHG | SYSTOLIC BLOOD PRESSURE: 142 MMHG

## 2020-04-18 VITALS — SYSTOLIC BLOOD PRESSURE: 120 MMHG | DIASTOLIC BLOOD PRESSURE: 37 MMHG

## 2020-04-18 VITALS — DIASTOLIC BLOOD PRESSURE: 52 MMHG | SYSTOLIC BLOOD PRESSURE: 150 MMHG

## 2020-04-18 VITALS — SYSTOLIC BLOOD PRESSURE: 135 MMHG | DIASTOLIC BLOOD PRESSURE: 43 MMHG

## 2020-04-18 VITALS — SYSTOLIC BLOOD PRESSURE: 138 MMHG | DIASTOLIC BLOOD PRESSURE: 44 MMHG

## 2020-04-18 VITALS — DIASTOLIC BLOOD PRESSURE: 41 MMHG | SYSTOLIC BLOOD PRESSURE: 185 MMHG

## 2020-04-18 VITALS — DIASTOLIC BLOOD PRESSURE: 49 MMHG | SYSTOLIC BLOOD PRESSURE: 182 MMHG

## 2020-04-18 VITALS — DIASTOLIC BLOOD PRESSURE: 43 MMHG | SYSTOLIC BLOOD PRESSURE: 131 MMHG

## 2020-04-18 VITALS — SYSTOLIC BLOOD PRESSURE: 159 MMHG | DIASTOLIC BLOOD PRESSURE: 58 MMHG

## 2020-04-18 VITALS — DIASTOLIC BLOOD PRESSURE: 58 MMHG | SYSTOLIC BLOOD PRESSURE: 160 MMHG

## 2020-04-18 VITALS — DIASTOLIC BLOOD PRESSURE: 37 MMHG | SYSTOLIC BLOOD PRESSURE: 129 MMHG

## 2020-04-18 VITALS — DIASTOLIC BLOOD PRESSURE: 46 MMHG | SYSTOLIC BLOOD PRESSURE: 136 MMHG

## 2020-04-18 VITALS — SYSTOLIC BLOOD PRESSURE: 152 MMHG | DIASTOLIC BLOOD PRESSURE: 53 MMHG

## 2020-04-18 VITALS — SYSTOLIC BLOOD PRESSURE: 148 MMHG | DIASTOLIC BLOOD PRESSURE: 44 MMHG

## 2020-04-18 VITALS — SYSTOLIC BLOOD PRESSURE: 140 MMHG | DIASTOLIC BLOOD PRESSURE: 49 MMHG

## 2020-04-18 VITALS — SYSTOLIC BLOOD PRESSURE: 116 MMHG | DIASTOLIC BLOOD PRESSURE: 40 MMHG

## 2020-04-18 VITALS — SYSTOLIC BLOOD PRESSURE: 169 MMHG | DIASTOLIC BLOOD PRESSURE: 54 MMHG

## 2020-04-18 VITALS — SYSTOLIC BLOOD PRESSURE: 117 MMHG | DIASTOLIC BLOOD PRESSURE: 38 MMHG

## 2020-04-18 VITALS — SYSTOLIC BLOOD PRESSURE: 126 MMHG | DIASTOLIC BLOOD PRESSURE: 44 MMHG

## 2020-04-18 VITALS — DIASTOLIC BLOOD PRESSURE: 48 MMHG | SYSTOLIC BLOOD PRESSURE: 138 MMHG

## 2020-04-18 VITALS — DIASTOLIC BLOOD PRESSURE: 40 MMHG | SYSTOLIC BLOOD PRESSURE: 138 MMHG

## 2020-04-18 VITALS — SYSTOLIC BLOOD PRESSURE: 132 MMHG | DIASTOLIC BLOOD PRESSURE: 43 MMHG

## 2020-04-18 VITALS — DIASTOLIC BLOOD PRESSURE: 47 MMHG | SYSTOLIC BLOOD PRESSURE: 132 MMHG

## 2020-04-18 VITALS — DIASTOLIC BLOOD PRESSURE: 39 MMHG | SYSTOLIC BLOOD PRESSURE: 121 MMHG

## 2020-04-18 VITALS — DIASTOLIC BLOOD PRESSURE: 38 MMHG | SYSTOLIC BLOOD PRESSURE: 120 MMHG

## 2020-04-18 VITALS — DIASTOLIC BLOOD PRESSURE: 42 MMHG | SYSTOLIC BLOOD PRESSURE: 152 MMHG

## 2020-04-18 VITALS — DIASTOLIC BLOOD PRESSURE: 51 MMHG | SYSTOLIC BLOOD PRESSURE: 151 MMHG

## 2020-04-18 VITALS — DIASTOLIC BLOOD PRESSURE: 37 MMHG | SYSTOLIC BLOOD PRESSURE: 124 MMHG

## 2020-04-18 LAB
ARTERIAL PATENCY WRIST A: (no result)
BASE EXCESS STD BLDA CALC-SCNC: 7.2 MMOL/L (ref -2.5–2.5)
BASOPHILS # BLD AUTO: 0 10^3/UL (ref 0–0.1)
BASOPHILS NFR BLD AUTO: 0 % (ref 0–10)
BDY SITE: (no result)
BODY TEMPERATURE: 37.5
BUN/CREAT SERPL: 19
CALCIUM SERPL-MCNC: 7.7 MG/DL (ref 8.5–10.1)
CHLORIDE SERPL-SCNC: 100 MMOL/L (ref 98–107)
CO2 BLDA CALC-SCNC: 31.9 MMOL/L (ref 21–31)
CO2 SERPL-SCNC: 25 MMOL/L (ref 21–32)
CREAT SERPL-MCNC: 0.86 MG/DL (ref 0.6–1.3)
EOSINOPHIL # BLD AUTO: 0 10^3/UL (ref 0–0.3)
EOSINOPHIL NFR BLD AUTO: 1 % (ref 0–10)
ERYTHROCYTE [DISTWIDTH] IN BLOOD BY AUTOMATED COUNT: 16.4 % (ref 10–14.5)
GFR SERPLBLD BASED ON 1.73 SQ M-ARVRAT: > 60 ML/MIN
GLUCOSE SERPL-MCNC: 171 MG/DL (ref 70–105)
HCT VFR BLD CALC: 26 % (ref 40–54)
HGB BLD-MCNC: 8.4 G/DL (ref 13.3–17.7)
INHALED O2 FLOW RATE: (no result) L/MIN
LYMPHOCYTES # BLD AUTO: 1 X 10^3 (ref 1–4)
LYMPHOCYTES NFR BLD AUTO: 15 % (ref 12–44)
MAGNESIUM SERPL-MCNC: 1.4 MG/DL (ref 1.6–2.4)
MANUAL DIFFERENTIAL PERFORMED BLD QL: NO
MCH RBC QN AUTO: 31 PG (ref 25–34)
MCHC RBC AUTO-ENTMCNC: 32 G/DL (ref 32–36)
MCV RBC AUTO: 95 FL (ref 80–99)
MONOCYTES # BLD AUTO: 0.4 X 10^3 (ref 0–1)
MONOCYTES NFR BLD AUTO: 6 % (ref 0–12)
NEUTROPHILS # BLD AUTO: 5.1 X 10^3 (ref 1.8–7.8)
NEUTROPHILS NFR BLD AUTO: 78 % (ref 42–75)
PCO2 BLDA: 40 MMHG (ref 35–45)
PH BLDA: 7.5 [PH] (ref 7.37–7.43)
PHOSPHATE SERPL-MCNC: 3.1 MG/DL (ref 2.3–4.7)
PLATELET # BLD: 152 10^3/UL (ref 130–400)
PMV BLD AUTO: 12.2 FL (ref 7.4–10.4)
PO2 BLDA: 72 MMHG (ref 79–93)
POTASSIUM SERPL-SCNC: 2.9 MMOL/L (ref 3.6–5)
SAO2 % BLDA FROM PO2: 94 % (ref 94–100)
SODIUM SERPL-SCNC: 138 MMOL/L (ref 135–145)
VENTILATION MODE VENT: YES
WBC # BLD AUTO: 6.6 10^3/UL (ref 4.3–11)

## 2020-04-18 RX ADMIN — INSULIN ASPART SCH UNIT: 100 INJECTION, SOLUTION INTRAVENOUS; SUBCUTANEOUS at 00:28

## 2020-04-18 RX ADMIN — PROPOFOL SCH MLS/HR: 10 INJECTION, EMULSION INTRAVENOUS at 12:48

## 2020-04-18 RX ADMIN — BUMETANIDE SCH MG: 0.25 INJECTION, SOLUTION INTRAMUSCULAR; INTRAVENOUS at 05:10

## 2020-04-18 RX ADMIN — PROPOFOL SCH MLS/HR: 10 INJECTION, EMULSION INTRAVENOUS at 18:14

## 2020-04-18 RX ADMIN — SODIUM CHLORIDE SCH MLS/HR: 900 INJECTION, SOLUTION INTRAVENOUS at 05:10

## 2020-04-18 RX ADMIN — ENOXAPARIN SODIUM SCH MG: 100 INJECTION SUBCUTANEOUS at 08:00

## 2020-04-18 RX ADMIN — PROPOFOL SCH MLS/HR: 10 INJECTION, EMULSION INTRAVENOUS at 04:59

## 2020-04-18 RX ADMIN — RISPERIDONE SCH MG: 2 TABLET, FILM COATED ORAL at 08:00

## 2020-04-18 RX ADMIN — IPRATROPIUM BROMIDE AND ALBUTEROL SULFATE SCH ML: .5; 3 SOLUTION RESPIRATORY (INHALATION) at 10:18

## 2020-04-18 RX ADMIN — POTASSIUM CHLORIDE SCH MLS/HR: 200 INJECTION, SOLUTION INTRAVENOUS at 08:05

## 2020-04-18 RX ADMIN — POTASSIUM CHLORIDE SCH MLS/HR: 200 INJECTION, SOLUTION INTRAVENOUS at 07:35

## 2020-04-18 RX ADMIN — LISINOPRIL SCH MG: 20 TABLET ORAL at 08:00

## 2020-04-18 RX ADMIN — HYDROCORTISONE SODIUM SUCCINATE SCH MG: 100 INJECTION, POWDER, FOR SOLUTION INTRAMUSCULAR; INTRAVENOUS at 05:12

## 2020-04-18 RX ADMIN — POTASSIUM CHLORIDE SCH MLS/HR: 200 INJECTION, SOLUTION INTRAVENOUS at 05:11

## 2020-04-18 RX ADMIN — IPRATROPIUM BROMIDE AND ALBUTEROL SULFATE SCH ML: .5; 3 SOLUTION RESPIRATORY (INHALATION) at 22:40

## 2020-04-18 RX ADMIN — METHYLPREDNISOLONE SODIUM SUCCINATE SCH MG: 40 INJECTION, POWDER, FOR SOLUTION INTRAMUSCULAR; INTRAVENOUS at 05:15

## 2020-04-18 RX ADMIN — INSULIN ASPART SCH UNIT: 100 INJECTION, SOLUTION INTRAVENOUS; SUBCUTANEOUS at 05:12

## 2020-04-18 RX ADMIN — SODIUM CHLORIDE SCH MLS/HR: 900 INJECTION, SOLUTION INTRAVENOUS at 15:13

## 2020-04-18 RX ADMIN — POTASSIUM CHLORIDE SCH MLS/HR: 200 INJECTION, SOLUTION INTRAVENOUS at 08:52

## 2020-04-18 RX ADMIN — INSULIN ASPART SCH UNIT: 100 INJECTION, SOLUTION INTRAVENOUS; SUBCUTANEOUS at 16:19

## 2020-04-18 RX ADMIN — PROPOFOL SCH MLS/HR: 10 INJECTION, EMULSION INTRAVENOUS at 07:36

## 2020-04-18 RX ADMIN — PANTOPRAZOLE SODIUM SCH MG: 40 INJECTION, POWDER, FOR SOLUTION INTRAVENOUS at 20:00

## 2020-04-18 RX ADMIN — PROPOFOL SCH MLS/HR: 10 INJECTION, EMULSION INTRAVENOUS at 16:21

## 2020-04-18 RX ADMIN — METHYLPREDNISOLONE SODIUM SUCCINATE SCH MG: 40 INJECTION, POWDER, FOR SOLUTION INTRAMUSCULAR; INTRAVENOUS at 16:19

## 2020-04-18 RX ADMIN — MAGNESIUM SULFATE IN DEXTROSE SCH MLS/HR: 10 INJECTION, SOLUTION INTRAVENOUS at 08:05

## 2020-04-18 RX ADMIN — MAGNESIUM SULFATE IN DEXTROSE SCH MLS/HR: 10 INJECTION, SOLUTION INTRAVENOUS at 07:35

## 2020-04-18 RX ADMIN — MAGNESIUM SULFATE IN DEXTROSE SCH MLS/HR: 10 INJECTION, SOLUTION INTRAVENOUS at 05:11

## 2020-04-18 RX ADMIN — POTASSIUM CHLORIDE SCH MEQ: 1500 TABLET, EXTENDED RELEASE ORAL at 05:12

## 2020-04-18 RX ADMIN — POTASSIUM CHLORIDE SCH MLS/HR: 200 INJECTION, SOLUTION INTRAVENOUS at 08:51

## 2020-04-18 RX ADMIN — METHYLPREDNISOLONE SODIUM SUCCINATE SCH MG: 40 INJECTION, POWDER, FOR SOLUTION INTRAMUSCULAR; INTRAVENOUS at 12:47

## 2020-04-18 RX ADMIN — BUMETANIDE SCH MG: 0.25 INJECTION, SOLUTION INTRAMUSCULAR; INTRAVENOUS at 20:00

## 2020-04-18 RX ADMIN — IPRATROPIUM BROMIDE AND ALBUTEROL SULFATE SCH ML: .5; 3 SOLUTION RESPIRATORY (INHALATION) at 19:03

## 2020-04-18 RX ADMIN — PANTOPRAZOLE SODIUM SCH MG: 40 INJECTION, POWDER, FOR SOLUTION INTRAVENOUS at 08:00

## 2020-04-18 RX ADMIN — IPRATROPIUM BROMIDE AND ALBUTEROL SULFATE SCH ML: .5; 3 SOLUTION RESPIRATORY (INHALATION) at 14:28

## 2020-04-18 RX ADMIN — RISPERIDONE SCH MG: 2 TABLET, FILM COATED ORAL at 20:00

## 2020-04-18 RX ADMIN — POTASSIUM CHLORIDE SCH MLS/HR: 200 INJECTION, SOLUTION INTRAVENOUS at 08:06

## 2020-04-18 RX ADMIN — PROPOFOL SCH MLS/HR: 10 INJECTION, EMULSION INTRAVENOUS at 02:06

## 2020-04-18 RX ADMIN — HYDROCORTISONE SODIUM SUCCINATE SCH MG: 100 INJECTION, POWDER, FOR SOLUTION INTRAMUSCULAR; INTRAVENOUS at 12:47

## 2020-04-18 RX ADMIN — INSULIN ASPART SCH UNIT: 100 INJECTION, SOLUTION INTRAVENOUS; SUBCUTANEOUS at 11:38

## 2020-04-18 RX ADMIN — MAGNESIUM SULFATE IN DEXTROSE SCH MLS/HR: 10 INJECTION, SOLUTION INTRAVENOUS at 06:22

## 2020-04-18 RX ADMIN — POTASSIUM CHLORIDE SCH MLS/HR: 200 INJECTION, SOLUTION INTRAVENOUS at 06:23

## 2020-04-18 NOTE — DIAGNOSTIC IMAGING REPORT
INDICATION: Altered mental status.



Time of exam 3:34 AM



Correlation is made with prior chest from one day earlier.



ET tube tip is above the asia. A right IJ line has tip at the

SVC right atrial junction. There continues to be some left

basilar consolidation and pleural fluid. Right lung is clear.

There is no pneumothorax.



IMPRESSION: Continued left basilar consolidation and pleural

fluid, similar to one day earlier.



Dictated by: 



  Dictated on workstation # [id number]

## 2020-04-18 NOTE — PROGRESS NOTE - HOSPITALIST
Subjective


HPI/CC On Admission


Date Seen by Provider:  2020


Time Seen by Provider:  12:15


Pradeep Fowler is a 69-year-old male with past medical history of hypertension, 

diabetes, peripheral artery disease, coronary artery disease, AAA, COPD, who 

presented with altered mental status.  He was reportedly brought in by friend.  

He had supposedly been taking some narcotics for which he been prescribed.  He 

was given a dose of Narcan and his mental status improved.  He was started on 

BiPAP in the ICU.  The morning after his admission, he remained lethargic and an

ABG showed acute hypercapnia.  He was intubated due to acute hypercapnic 

respiratory failure.  At the time of my examination he is intubated and sedated.


Subjective/Events-last exam


Maintained on vent


Failed extubation this morning


Small amount of tube feeding may have been aspirated so will monitor that 

closely


No pain appearing, sedated on vent


Meth use noted on UDS





Objective


Exam


Vital Signs





Vital Signs








  Date Time  Temp Pulse Resp B/P (MAP) Pulse Ox O2 Delivery O2 Flow Rate FiO2


 


20 16:21  88  129/55    


 


20 16:00   18  93 Mechanical Ventilator 60.00 


 


20 16:00 37.3       


 


20 16:00        60





Capillary Refill : Less Than 3 Seconds


General Appearance:  No Apparent Distress, WD/WN, Chronically ill, Other 

(sedated on vent)


Respiratory:  Decreased Breath Sounds


Cardiovascular:  Regular Rate, Rhythm





Results/Procedures


Lab


Laboratory Tests


20 03:05








Patient resulted labs reviewed.


Imaging:  Reviewed Imaging Report





Assessment/Plan


Assessment and Plan


Assess & Plan/Chief Complaint


Assessment/Plan





(1) Sepsis


Status:  Acute


Assessment & Plan:  : Poor prognosis, Patient on Sepsis IVF protocol, 

continue IV antibiotics, Dr James consulted for critical care, blood cultures 

pending


4/15: Off Levaphed today, continue to monitor blood pressure, continue IV 

antibiotics, patient is not following commands when sedation is turned off


: Prognosis remains guarded, tube feeds started today, blood pressures 

running high, restarted blood pressure medications


: Dr ajmes managing critical care, continue IV antibiotics, possible wean 

and extubation over the weekend


: failed extubation today and small amount of aspiration of tube feeding 

possible so monitor that closely


Qualifiers:  


   Qualified Codes:  A41.9 - Sepsis, unspecified organism; R65.21 - Severe 

sepsis with septic shock; N17.9 - Acute kidney failure, unspecified


(2) Acute respiratory failure with hypoxia and hypercapnia


Status:  Acute


Assessment & Plan:  : Currently Intubated, Dr James managing vent





(3) Acute kidney injury superimposed on chronic kidney disease


Status:  Resolved


Assessment & Plan:  : Will monitor daily BUN/Cr





(4) NSTEMI (non-ST elevation myocardial infarction)


Status:  Acute


Assessment & Plan:  : Cardiology consulted, appreciate recommendations, 

Recent Cath 2020: BNP elevated, patient given IV lasix, diuresing well





(5) Upper GI bleed


Status:  Acute


Assessment & Plan:  : Today started having blood tinged fluid from OG, Loven

ox stopped and started on IV protonix


4/15: Hgb dropped today, Will get iron studies


: stable hgb 8.4





(6) CAD (coronary artery disease)


Status:  Chronic


Qualifiers:  


   Qualified Codes:  I25.10 - Atherosclerotic heart disease of native coronary 

artery without angina pectoris


(7) Hypertension


Status:  Chronic


Assessment & Plan:  : Currently hypotensive and on Levaphed for Sepsis


4/15: Off pressors


: Restarted Lisinopril today


Qualifiers:  


   Qualified Codes:  I10 - Essential (primary) hypertension


(8) Diabetes type 2, uncontrolled


Status:  Chronic


Qualifiers:  


   Qualified Codes:  E11.65 - Type 2 diabetes mellitus with hyperglycemia


(9) COPD (chronic obstructive pulmonary disease)


Status:  Chronic


Qualifiers:  


   Qualified Codes:  J44.9 - Chronic obstructive pulmonary disease, unspecified


(10) Normocytic anemia


Status:  Acute


Assessment & Plan:  : Iron deficient, will consider Fe replacement when 

patient stablizes





(11) Pneumonia


Status:  Acute


Qualifiers:  


   Qualified Codes:  J18.9 - Pneumonia, unspecified organism


(12) DVT prophylaxis


Status:  Acute


Assessment & Plan:  : On Lovenox, stopped today due to GI bleeding


4/15: Continues off Lovenox, SCDs





13) Meth use





Clinical Quality Measures


DVT/VTE Risk/Contraindication:


Risk Factor Score Per Nursin


RFS Level Per Nursing on Admit:  4+=Very High











JASEN DAY DO                2020 12:10

## 2020-04-18 NOTE — CARDIOLOGY PROGRESS NOTE
Cardiology SOAP Progress Note


Subjective:


Intubated/ventilated.





Objective:


I&O/Vital Signs











 4/17/20 4/18/20 4/18/20 4/18/20





 23:54 00:00 00:00 00:00


 


Temp  37.2  


 


Pulse 71   70


 


Resp    22


 


B/P (MAP) 186/51   182/49 (93)


 


Pulse Ox    92


 


O2 Delivery   Mechanical Ventilator Mechanical Ventilator


 


O2 Flow Rate    60.00


 


FiO2   60 


 


    





 4/18/20 4/18/20 4/18/20 4/18/20





 01:00 01:00 02:00 02:06


 


Pulse 66 64 63 66


 


Resp  21 22 


 


B/P (MAP)  152/42 (78) 129/37 (67) 137/32


 


Pulse Ox  91 93 


 


O2 Delivery  Mechanical Ventilator Mechanical Ventilator 


 


O2 Flow Rate  60.00 60.00 





 4/18/20 4/18/20 4/18/20 4/18/20





 03:00 03:25 04:00 04:00


 


Temp    37.4


 


Pulse 74 77  


 


Resp 22 22  


 


B/P (MAP) 138/44 (75)   


 


Pulse Ox 90 92  


 


O2 Delivery Mechanical Ventilator  Mechanical Ventilator 


 


O2 Flow Rate 60.00   


 


FiO2  50 50 


 


    





 4/18/20 4/18/20 4/18/20 4/18/20





 04:00 04:59 05:00 06:00


 


Temp    37.2


 


Pulse 62 77 113 


 


Resp 22  15 


 


B/P (MAP) 120/38 (65) 185/41 117/38 (64) 


 


Pulse Ox 92  90 


 


O2 Delivery Mechanical Ventilator  Mechanical Ventilator 


 


O2 Flow Rate 60.00  60.00 


 


    





 4/18/20 4/18/20 4/18/20 4/18/20





 06:00 06:48 06:52 07:00


 


Pulse 73  64 76


 


Resp 21  22 


 


B/P (MAP) 121/39 (66)   


 


Pulse Ox 90  90 


 


O2 Delivery Mechanical Ventilator Mechanical Ventilator  


 


O2 Flow Rate 50.00 60.00  


 


FiO2   60 





 4/18/20 4/18/20 4/18/20 4/18/20





 07:00 07:36 08:00 08:00


 


Temp   37.0 


 


Pulse 65 94  


 


Resp 22   


 


B/P (MAP) 126/44 (71) 186/66  


 


Pulse Ox 90   


 


O2 Delivery Mechanical Ventilator   Mechanical Ventilator


 


O2 Flow Rate 60.00   


 


FiO2    60


 


    





 4/18/20 4/18/20 4/18/20 4/18/20





 08:00 09:00 10:00 10:18


 


Pulse 81 73 79 84


 


Resp 24 21 22 23


 


B/P (MAP) 159/58 (91) 152/53 (86) 169/54 (92) 


 


Pulse Ox 94 91 91 94


 


O2 Delivery Mechanical Ventilator Mechanical Ventilator Mechanical Ventilator 


 


O2 Flow Rate 60.00 60.00 60.00 


 


FiO2    60





 4/18/20   





 11:00   


 


Pulse 89   


 


Resp 22   


 


B/P (MAP) 151/51 (84)   


 


Pulse Ox 92   


 


O2 Delivery Mechanical Ventilator   


 


O2 Flow Rate 60.00   














 4/18/20





 00:00


 


Intake Total 1359 ml


 


Output Total 3350 ml


 


Balance -1991 ml








Weight (Pounds):  206


Weight (Ounces):  0.8


Weight (Calculated Kilograms):  93.675109


Constitutional:  other (on mec vent, unresponsive)


Respiratory:  No accessory muscle use; other (fair to good bilat air entry)


Cardiovascular:  regular rate-rhythm, S1 and S2, systolic murmur (soft BUTCH at 

card base)


Gastrointestional:  hernia (abdominal), audible bowel sounds, other (mildly 

distended)


Extremities:  swelling (mild edema), other (R foot in dressing that wasn't 

removed); No clubbing, No cyanosis


Neurologic/Psychiatric:  other (unresponsive, on mech vent)


Skin:  No rash on exposed areas, No ulcerations on exposed areas; other 

(dressing to right foot, D&I)





Results/Procedures:


Labs


Laboratory Tests


4/17/20 12:00: Troponin I 0.064H


4/17/20 17:16: Glucometer 219H


4/17/20 23:53: Glucometer 165H


4/18/20 03:05: 


White Blood Count 6.6, Red Blood Count 2.75L, Hemoglobin 8.4L, Hematocrit 26L, 

Mean Corpuscular Volume 95, Mean Corpuscular Hemoglobin 31, Mean Corpuscular 

Hemoglobin Concent 32, Red Cell Distribution Width 16.4H, Platelet Count 152, 

Mean Platelet Volume 12.2H, Neutrophils (%) (Auto) 78H, Lymphocytes (%) (Auto) 

15, Monocytes (%) (Auto) 6, Eosinophils (%) (Auto) 1, Basophils (%) (Auto) 0, 

Neutrophils # (Auto) 5.1, Lymphocytes # (Auto) 1.0, Monocytes # (Auto) 0.4, 

Eosinophils # (Auto) 0.0, Basophils # (Auto) 0.0, Blood Gas Puncture Site RIGHT 

ART LINE, Blood Gas Patient Temperature 37.5, Arterial Blood pH 7.50H, Arterial 

Blood Partial Pressure CO2 40, Arterial Blood Partial Pressure O2 72L, Arterial 

Blood HCO3 31H, Arterial Blood Total CO2 31.9H, Arterial Blood Oxygen Saturation

94, Arterial Blood Base Excess 7.2H, Rojelio Test ART LINE, Blood Gas Ventilator 

Setting YES, Blood Gas Inspired Oxygen 60%, Sodium Level 138, Potassium Level 2.

9L, Chloride Level 100, Carbon Dioxide Level 25, Anion Gap 13, Blood Urea 

Nitrogen 16, Creatinine 0.86, Estimat Glomerular Filtration Rate > 60, 

BUN/Creatinine Ratio 19, Glucose Level 171H, Calcium Level 7.7L, Phosphorus 

Level 3.1, Magnesium Level 1.4L





Microbiology


4/13/20 Catheter Tip Culture - Final, Complete


          No growth


4/13/20 Gram Stain - Final, Complete


          


4/13/20 Sputum Culture - Final, Complete


          No growth


4/12/20 Urine Culture - Final, Complete


          NO GROWTH








A/P:


Assessment/Dx:


Acute resp failure and shock of undetermined etiology; shock resolved





Mild acute on chronic diastolic congestive heart failure.  Agree with IV 

diuretics.





Cannot exclude pulmonary embolism. Was started on treatment dose Lovenox at 

admission, but being stopped on 4/15/20 due to falling H & H and falling 

platelet count





Acute renal failure (AYAH-3) likely due to shock - resolved





Mild troponin elevation, likely type-2 MI due to reasons noted above





Drug test (+) for Meth on 4-





CAD. Card cath of Jan 2017 showed moderate, nonobstructive disease and LVEF 55% 

and elevated LVEDP. MPI of 2/27/20 by Dr Dai showed no ischemia or infarction

and normal LV function





Echo on 4/14/20: LVEF 45%, grade 1 fajardo dysfunction, mild to mod TR, RVSP 29 

mmHg





Chronic tobacco use (smokes cigarettes according to previous records)





H/o DM II





H/o Htn





H/o Hyperlipidemia





PAD.  Peripheral angiogram and intervention on 8/22/2019 by Dr Dai with PTA 

of the right AT, PT and stenting of the right SFA. Significant improvement of 

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


Plan:








* Complex management


* Prognosis guarded


* D/c Lovenox if ok with the Med and Pulm services


* Monitor labs








Thank you for your consultation. Please call me if you have any questions.








WOODY Dai MD, FACP, FACC, [facility], FHRS, CCDS


Interventional Cardiology


Cardiac Electrophysiology


Vascular Medicine and Endovascular Interventions











ELISE DAI MD             Apr 18, 2020 11:24

## 2020-04-18 NOTE — PULMONARY PROGRESS NOTE
Subjective


Time Seen by a Provider:  10:02





Sepsis Event


Evaluation


Height, Weight, BMI


Height: 5'8.50"


Weight: 206lbs. 0.8oz. 93.232089sj; 30.95 BMI


Method:Stated





Exam


Exam





Vital Signs








  Date Time  Temp Pulse Resp B/P (MAP) Pulse Ox O2 Delivery O2 Flow Rate FiO2


 


4/18/20 03:25  77 22  92   50


 


4/18/20 03:00  74 22 138/44 (75) 90 Mechanical Ventilator 60.00 


 


4/18/20 02:06  66  137/32    


 


4/18/20 02:00  63 22 129/37 (67) 93 Mechanical Ventilator 60.00 


 


4/18/20 01:00  64 21 152/42 (78) 91 Mechanical Ventilator 60.00 


 


4/18/20 01:00  66      


 


4/18/20 00:00  70 22 182/49 (93) 92 Mechanical Ventilator 60.00 


 


4/18/20 00:00      Mechanical Ventilator  60


 


4/17/20 23:54  71  186/51    


 


4/17/20 23:20      Mechanical Ventilator 60.00 


 


4/17/20 23:12  77 22  92   60


 


4/17/20 23:00  78 20 186/52 (96) 92 Mechanical Ventilator 50.00 


 


4/17/20 22:44      Mechanical Ventilator 50.00 


 


4/17/20 22:30      Mechanical Ventilator 40.00 


 


4/17/20 22:00  85 16 167/65 (99) 91 Mechanical Ventilator 35.00 


 


4/17/20 21:18  85  157/50    


 


4/17/20 21:00  82 21 169/56 (93) 93 Mechanical Ventilator 35.00 


 


4/17/20 20:00  62 22 135/45 (75) 92 Mechanical Ventilator 35.00 


 


4/17/20 20:00      Mechanical Ventilator  35


 


4/17/20 19:51  75 22  97   40


 


4/17/20 19:00  65      


 


4/17/20 19:00  64 21 152/53 (86) 93 Mechanical Ventilator 40.00 


 


4/17/20 18:18  64  154/49    


 


4/17/20 18:00  66 21 155/55 (88) 93 Mechanical Ventilator 50.00 


 


4/17/20 17:00  73 17 160/56 (90) 93 Mechanical Ventilator 50.00 


 


4/17/20 16:00  108 20 180/76 (110) 94 Mechanical Ventilator 50.00 


 


4/17/20 15:42 37.2       


 


4/17/20 15:36    165/62    


 


4/17/20 15:35      Mechanical Ventilator  50


 


4/17/20 15:02  67 22  94   50


 


4/17/20 15:00  66 22 159/57 (91) 94 Mechanical Ventilator 50.00 


 


4/17/20 14:00  68 21 142/49 (80) 95 Mechanical Ventilator 50.00 


 


4/17/20 13:10  83  148/53    


 


4/17/20 13:00  95 26 147/59 (88) 94 Mechanical Ventilator 50.00 


 


4/17/20 12:57  77      


 


4/17/20 12:00  65 21 142/49 (80) 94 Mechanical Ventilator 50.00 


 


4/17/20 11:43      Mechanical Ventilator  50


 


4/17/20 11:42  65 22  94   50


 


4/17/20 11:21 37.1       


 


4/17/20 11:00  76 13 147/53 (84) 93 Mechanical Ventilator 50.00 


 


4/17/20 10:15  69  155/54    


 


4/17/20 10:00  65 21 157/55 (89) 94 Mechanical Ventilator 50.00 


 


4/17/20 09:00  65 22 155/48 (83) 92 Mechanical Ventilator 50.00 


 


4/17/20 08:53  74 22  95   50


 


4/17/20 08:26      Mechanical Ventilator 50.00 


 


4/17/20 08:14 37.2       


 


4/17/20 08:00  81 22 158/57 (90) 92 Mechanical Ventilator 40.00 


 


4/17/20 08:00      Mechanical Ventilator  50


 


4/17/20 07:24  56  151/54    


 


4/17/20 07:00  54 21 133/50 (77) 94 Mechanical Ventilator 40.00 


 


4/17/20 06:40  53      


 


4/17/20 06:00  60 21 135/53 (80) 92 Mechanical Ventilator 40.00 


 


4/17/20 05:00  83 22 130/70 (90) 95 Mechanical Ventilator 40.00 














I & O 


 


 4/18/20





 07:00


 


Intake Total 1412 ml


 


Output Total 4600 ml


 


Balance -3188 ml








Height & Weight


Height: 5'8.50"


Weight: 206lbs. 0.8oz. 93.112491im; 30.95 BMI


Method:Stated


General Appearance:  No Apparent Distress, Obese, Other (intubated and sedated)


HEENT:  TMs Normal, Other (Pupils equal/reactive 3 mm bilaterally)


Respiratory:  No Respiratory Distress, Other (intubated and mechanically 

ventilated)


Cardiovascular:  No Murmur, Tachycardia (regular rhythm)


Capillary Refill:  Less Than 3 Seconds


Gastrointestinal:  soft, no organomegaly


Extremity:  Normal Inspection, No Pedal Edema


Neurologic/Psychiatric:  Other (sedated)


Skin:  Normal Color, Warm/Dry





Results


Lab


Laboratory Tests


4/17/20 02:55








4/18/20 03:05











Assessment/Plan


Assessment/Plan


Acute respiratory failure 


   - COVID is negative 


   -Check bilateral dopplers 


   -Will start weaning vent tomorrow


   -Start bumex 1mg BID 


   -Echo is pending  


   - PEEP to 10 


CHF EF 45% with grade 1 diastolic dysfunction 


   -Cardiology following


sepsis with PICC line


   -Picc line d/c'd


      -Culturing tip 


      -Question if pt has been injecting drugs into PICC line


   -Pan cultures pending


Pneumonia with possible aspiration 


   -Pan cultures 


   -MRSA swab 


   -COVID is negative 


   -Influenza is negative 


   -Continue Vanco and Zosyn 


   - urine strep and legionella ag - pending 


   -RVP is pending 


UDS is positive for methamphetamine 


Hypotension


   -Levophed


   -Echo pending 


NSTEMI


   -Cardiology following 


Hypophos, hypomag 


   -replace 


Acute renal failure with dehydration 


   -IVF











GUILLERMINA MERA DO              Apr 18, 2020 04:50

## 2020-04-19 VITALS — DIASTOLIC BLOOD PRESSURE: 44 MMHG | SYSTOLIC BLOOD PRESSURE: 139 MMHG

## 2020-04-19 VITALS — SYSTOLIC BLOOD PRESSURE: 109 MMHG | DIASTOLIC BLOOD PRESSURE: 68 MMHG

## 2020-04-19 VITALS — SYSTOLIC BLOOD PRESSURE: 144 MMHG | DIASTOLIC BLOOD PRESSURE: 49 MMHG

## 2020-04-19 VITALS — SYSTOLIC BLOOD PRESSURE: 137 MMHG | DIASTOLIC BLOOD PRESSURE: 57 MMHG

## 2020-04-19 VITALS — SYSTOLIC BLOOD PRESSURE: 160 MMHG | DIASTOLIC BLOOD PRESSURE: 54 MMHG

## 2020-04-19 VITALS — DIASTOLIC BLOOD PRESSURE: 62 MMHG | SYSTOLIC BLOOD PRESSURE: 116 MMHG

## 2020-04-19 VITALS — DIASTOLIC BLOOD PRESSURE: 72 MMHG | SYSTOLIC BLOOD PRESSURE: 133 MMHG

## 2020-04-19 VITALS — DIASTOLIC BLOOD PRESSURE: 87 MMHG | SYSTOLIC BLOOD PRESSURE: 196 MMHG

## 2020-04-19 VITALS — SYSTOLIC BLOOD PRESSURE: 137 MMHG | DIASTOLIC BLOOD PRESSURE: 44 MMHG

## 2020-04-19 VITALS — DIASTOLIC BLOOD PRESSURE: 56 MMHG | SYSTOLIC BLOOD PRESSURE: 158 MMHG

## 2020-04-19 VITALS — SYSTOLIC BLOOD PRESSURE: 148 MMHG | DIASTOLIC BLOOD PRESSURE: 50 MMHG

## 2020-04-19 VITALS — SYSTOLIC BLOOD PRESSURE: 127 MMHG | DIASTOLIC BLOOD PRESSURE: 68 MMHG

## 2020-04-19 VITALS — DIASTOLIC BLOOD PRESSURE: 68 MMHG | SYSTOLIC BLOOD PRESSURE: 130 MMHG

## 2020-04-19 VITALS — DIASTOLIC BLOOD PRESSURE: 47 MMHG | SYSTOLIC BLOOD PRESSURE: 121 MMHG

## 2020-04-19 VITALS — SYSTOLIC BLOOD PRESSURE: 144 MMHG | DIASTOLIC BLOOD PRESSURE: 69 MMHG

## 2020-04-19 VITALS — SYSTOLIC BLOOD PRESSURE: 132 MMHG | DIASTOLIC BLOOD PRESSURE: 71 MMHG

## 2020-04-19 VITALS — DIASTOLIC BLOOD PRESSURE: 61 MMHG | SYSTOLIC BLOOD PRESSURE: 144 MMHG

## 2020-04-19 VITALS — SYSTOLIC BLOOD PRESSURE: 115 MMHG | DIASTOLIC BLOOD PRESSURE: 56 MMHG

## 2020-04-19 VITALS — SYSTOLIC BLOOD PRESSURE: 140 MMHG | DIASTOLIC BLOOD PRESSURE: 74 MMHG

## 2020-04-19 VITALS — SYSTOLIC BLOOD PRESSURE: 113 MMHG | DIASTOLIC BLOOD PRESSURE: 57 MMHG

## 2020-04-19 VITALS — SYSTOLIC BLOOD PRESSURE: 143 MMHG | DIASTOLIC BLOOD PRESSURE: 43 MMHG

## 2020-04-19 VITALS — SYSTOLIC BLOOD PRESSURE: 147 MMHG | DIASTOLIC BLOOD PRESSURE: 51 MMHG

## 2020-04-19 VITALS — DIASTOLIC BLOOD PRESSURE: 48 MMHG | SYSTOLIC BLOOD PRESSURE: 139 MMHG

## 2020-04-19 VITALS — SYSTOLIC BLOOD PRESSURE: 109 MMHG | DIASTOLIC BLOOD PRESSURE: 58 MMHG

## 2020-04-19 VITALS — SYSTOLIC BLOOD PRESSURE: 122 MMHG | DIASTOLIC BLOOD PRESSURE: 46 MMHG

## 2020-04-19 VITALS — SYSTOLIC BLOOD PRESSURE: 128 MMHG | DIASTOLIC BLOOD PRESSURE: 68 MMHG

## 2020-04-19 VITALS — DIASTOLIC BLOOD PRESSURE: 48 MMHG | SYSTOLIC BLOOD PRESSURE: 150 MMHG

## 2020-04-19 VITALS — SYSTOLIC BLOOD PRESSURE: 154 MMHG | DIASTOLIC BLOOD PRESSURE: 80 MMHG

## 2020-04-19 VITALS — DIASTOLIC BLOOD PRESSURE: 69 MMHG | SYSTOLIC BLOOD PRESSURE: 127 MMHG

## 2020-04-19 LAB
ARTERIAL PATENCY WRIST A: (no result)
BASE EXCESS STD BLDA CALC-SCNC: 9.4 MMOL/L (ref -2.5–2.5)
BASOPHILS # BLD AUTO: 0 10^3/UL (ref 0–0.1)
BASOPHILS NFR BLD AUTO: 0 % (ref 0–10)
BDY SITE: (no result)
BODY TEMPERATURE: 37.4
BUN/CREAT SERPL: 18
CALCIUM SERPL-MCNC: 8 MG/DL (ref 8.5–10.1)
CHLORIDE SERPL-SCNC: 98 MMOL/L (ref 98–107)
CO2 BLDA CALC-SCNC: 34.7 MMOL/L (ref 21–31)
CO2 SERPL-SCNC: 28 MMOL/L (ref 21–32)
CREAT SERPL-MCNC: 0.94 MG/DL (ref 0.6–1.3)
EOSINOPHIL # BLD AUTO: 0 10^3/UL (ref 0–0.3)
EOSINOPHIL NFR BLD AUTO: 0 % (ref 0–10)
ERYTHROCYTE [DISTWIDTH] IN BLOOD BY AUTOMATED COUNT: 16.7 % (ref 10–14.5)
GFR SERPLBLD BASED ON 1.73 SQ M-ARVRAT: > 60 ML/MIN
GLUCOSE SERPL-MCNC: 252 MG/DL (ref 70–105)
HCT VFR BLD CALC: 26 % (ref 40–54)
HGB BLD-MCNC: 8.1 G/DL (ref 13.3–17.7)
LYMPHOCYTES # BLD AUTO: 0.6 X 10^3 (ref 1–4)
LYMPHOCYTES NFR BLD AUTO: 8 % (ref 12–44)
MAGNESIUM SERPL-MCNC: 1.8 MG/DL (ref 1.6–2.4)
MANUAL DIFFERENTIAL PERFORMED BLD QL: NO
MCH RBC QN AUTO: 30 PG (ref 25–34)
MCHC RBC AUTO-ENTMCNC: 31 G/DL (ref 32–36)
MCV RBC AUTO: 96 FL (ref 80–99)
MONOCYTES # BLD AUTO: 0.4 X 10^3 (ref 0–1)
MONOCYTES NFR BLD AUTO: 6 % (ref 0–12)
NEUTROPHILS # BLD AUTO: 6 X 10^3 (ref 1.8–7.8)
NEUTROPHILS NFR BLD AUTO: 86 % (ref 42–75)
PCO2 BLDA: 46 MMHG (ref 35–45)
PH BLDA: 7.48 [PH] (ref 7.37–7.43)
PHOSPHATE SERPL-MCNC: 3 MG/DL (ref 2.3–4.7)
PLATELET # BLD: 144 10^3/UL (ref 130–400)
PMV BLD AUTO: 11.4 FL (ref 7.4–10.4)
PO2 BLDA: 91 MMHG (ref 79–93)
POTASSIUM SERPL-SCNC: 3.4 MMOL/L (ref 3.6–5)
SAO2 % BLDA FROM PO2: 98 % (ref 94–100)
SODIUM SERPL-SCNC: 139 MMOL/L (ref 135–145)
VENTILATION MODE VENT: YES
WBC # BLD AUTO: 7 10^3/UL (ref 4.3–11)

## 2020-04-19 RX ADMIN — POTASSIUM CHLORIDE SCH MLS/HR: 200 INJECTION, SOLUTION INTRAVENOUS at 06:52

## 2020-04-19 RX ADMIN — PROPOFOL SCH MLS/HR: 10 INJECTION, EMULSION INTRAVENOUS at 09:53

## 2020-04-19 RX ADMIN — IPRATROPIUM BROMIDE AND ALBUTEROL SULFATE SCH ML: .5; 3 SOLUTION RESPIRATORY (INHALATION) at 06:58

## 2020-04-19 RX ADMIN — METHYLPREDNISOLONE SODIUM SUCCINATE SCH MG: 40 INJECTION, POWDER, FOR SOLUTION INTRAMUSCULAR; INTRAVENOUS at 11:13

## 2020-04-19 RX ADMIN — RISPERIDONE SCH MG: 2 TABLET, FILM COATED ORAL at 22:03

## 2020-04-19 RX ADMIN — METHYLPREDNISOLONE SODIUM SUCCINATE SCH MG: 40 INJECTION, POWDER, FOR SOLUTION INTRAMUSCULAR; INTRAVENOUS at 05:51

## 2020-04-19 RX ADMIN — SODIUM CHLORIDE SCH MLS/HR: 900 INJECTION, SOLUTION INTRAVENOUS at 05:52

## 2020-04-19 RX ADMIN — PROPOFOL SCH MLS/HR: 10 INJECTION, EMULSION INTRAVENOUS at 22:10

## 2020-04-19 RX ADMIN — IPRATROPIUM BROMIDE AND ALBUTEROL SULFATE SCH ML: .5; 3 SOLUTION RESPIRATORY (INHALATION) at 14:29

## 2020-04-19 RX ADMIN — POTASSIUM CHLORIDE SCH MLS/HR: 200 INJECTION, SOLUTION INTRAVENOUS at 07:52

## 2020-04-19 RX ADMIN — Medication SCH MLS/HR: at 22:11

## 2020-04-19 RX ADMIN — IPRATROPIUM BROMIDE AND ALBUTEROL SULFATE SCH ML: .5; 3 SOLUTION RESPIRATORY (INHALATION) at 02:40

## 2020-04-19 RX ADMIN — INSULIN ASPART SCH UNIT: 100 INJECTION, SOLUTION INTRAVENOUS; SUBCUTANEOUS at 11:13

## 2020-04-19 RX ADMIN — METHYLPREDNISOLONE SODIUM SUCCINATE SCH MG: 40 INJECTION, POWDER, FOR SOLUTION INTRAMUSCULAR; INTRAVENOUS at 00:24

## 2020-04-19 RX ADMIN — POTASSIUM CHLORIDE SCH MLS/HR: 200 INJECTION, SOLUTION INTRAVENOUS at 05:53

## 2020-04-19 RX ADMIN — IPRATROPIUM BROMIDE AND ALBUTEROL SULFATE SCH ML: .5; 3 SOLUTION RESPIRATORY (INHALATION) at 18:48

## 2020-04-19 RX ADMIN — LISINOPRIL SCH MG: 20 TABLET ORAL at 07:53

## 2020-04-19 RX ADMIN — SODIUM CHLORIDE SCH MLS/HR: 900 INJECTION, SOLUTION INTRAVENOUS at 14:24

## 2020-04-19 RX ADMIN — INSULIN ASPART SCH UNIT: 100 INJECTION, SOLUTION INTRAVENOUS; SUBCUTANEOUS at 17:56

## 2020-04-19 RX ADMIN — PROPOFOL SCH MLS/HR: 10 INJECTION, EMULSION INTRAVENOUS at 03:38

## 2020-04-19 RX ADMIN — PROPOFOL SCH MLS/HR: 10 INJECTION, EMULSION INTRAVENOUS at 14:24

## 2020-04-19 RX ADMIN — POTASSIUM CHLORIDE SCH MLS/HR: 200 INJECTION, SOLUTION INTRAVENOUS at 09:53

## 2020-04-19 RX ADMIN — IPRATROPIUM BROMIDE AND ALBUTEROL SULFATE SCH ML: .5; 3 SOLUTION RESPIRATORY (INHALATION) at 09:35

## 2020-04-19 RX ADMIN — INSULIN ASPART SCH UNIT: 100 INJECTION, SOLUTION INTRAVENOUS; SUBCUTANEOUS at 05:52

## 2020-04-19 RX ADMIN — SODIUM CHLORIDE SCH MLS/HR: 900 INJECTION, SOLUTION INTRAVENOUS at 22:03

## 2020-04-19 RX ADMIN — POTASSIUM CHLORIDE SCH MLS/HR: 200 INJECTION, SOLUTION INTRAVENOUS at 09:55

## 2020-04-19 RX ADMIN — POTASSIUM CHLORIDE SCH MEQ: 1500 TABLET, EXTENDED RELEASE ORAL at 05:53

## 2020-04-19 RX ADMIN — POTASSIUM CHLORIDE SCH MLS/HR: 200 INJECTION, SOLUTION INTRAVENOUS at 11:14

## 2020-04-19 RX ADMIN — PANTOPRAZOLE SODIUM SCH MG: 40 INJECTION, POWDER, FOR SOLUTION INTRAVENOUS at 07:53

## 2020-04-19 RX ADMIN — Medication SCH MLS/HR: at 14:47

## 2020-04-19 RX ADMIN — MAGNESIUM SULFATE IN DEXTROSE SCH MLS/HR: 10 INJECTION, SOLUTION INTRAVENOUS at 05:53

## 2020-04-19 RX ADMIN — Medication SCH MLS/HR: at 09:55

## 2020-04-19 RX ADMIN — PANTOPRAZOLE SODIUM SCH MG: 40 INJECTION, POWDER, FOR SOLUTION INTRAVENOUS at 22:03

## 2020-04-19 RX ADMIN — DEXTROSE SCH MLS/HR: 5 SOLUTION INTRAVENOUS at 22:11

## 2020-04-19 RX ADMIN — SODIUM CHLORIDE SCH MLS/HR: 900 INJECTION, SOLUTION INTRAVENOUS at 00:24

## 2020-04-19 RX ADMIN — PROPOFOL SCH MLS/HR: 10 INJECTION, EMULSION INTRAVENOUS at 17:56

## 2020-04-19 RX ADMIN — ENOXAPARIN SODIUM SCH MG: 100 INJECTION SUBCUTANEOUS at 07:52

## 2020-04-19 RX ADMIN — IPRATROPIUM BROMIDE AND ALBUTEROL SULFATE SCH ML: .5; 3 SOLUTION RESPIRATORY (INHALATION) at 22:32

## 2020-04-19 RX ADMIN — METHYLPREDNISOLONE SODIUM SUCCINATE SCH MG: 40 INJECTION, POWDER, FOR SOLUTION INTRAMUSCULAR; INTRAVENOUS at 17:56

## 2020-04-19 RX ADMIN — DEXTROSE SCH MLS/HR: 5 SOLUTION INTRAVENOUS at 14:47

## 2020-04-19 RX ADMIN — BUMETANIDE SCH MG: 0.25 INJECTION, SOLUTION INTRAMUSCULAR; INTRAVENOUS at 22:03

## 2020-04-19 RX ADMIN — INSULIN ASPART SCH UNIT: 100 INJECTION, SOLUTION INTRAVENOUS; SUBCUTANEOUS at 00:27

## 2020-04-19 RX ADMIN — RISPERIDONE SCH MG: 2 TABLET, FILM COATED ORAL at 07:52

## 2020-04-19 RX ADMIN — BUMETANIDE SCH MG: 0.25 INJECTION, SOLUTION INTRAMUSCULAR; INTRAVENOUS at 07:53

## 2020-04-19 NOTE — CARDIOLOGY PROGRESS NOTE
Cardiology SOAP Progress Note


Subjective:


Ventilator/intubated/sedated.





Objective:


I&O/Vital Signs











 4/19/20 4/19/20 4/19/20 4/19/20





 02:40 03:00 03:38 04:00


 


Pulse 85 92 89 90


 


Resp 25 25  21


 


B/P (MAP)  122/46 (71) 121/39 139/44 (75)


 


Pulse Ox 93 93  94


 


O2 Delivery  Mechanical Ventilator  Mechanical Ventilator


 


O2 Flow Rate  65.00  65.00


 


FiO2 65   





 4/19/20 4/19/20 4/19/20 4/19/20





 04:00 05:00 06:00 06:59


 


Pulse  89 88 97


 


Resp  22 22 25


 


B/P (MAP)  137/44 (75) 150/48 (82) 


 


Pulse Ox  94 94 94


 


O2 Delivery Mechanical Ventilator Mechanical Ventilator Mechanical Ventilator 


 


O2 Flow Rate  65.00 65.00 


 


FiO2 65   60





 4/19/20 4/19/20 4/19/20 4/19/20





 07:00 07:00 08:00 08:00


 


Temp   37.0 


 


Pulse 93 93  


 


Resp  19  


 


B/P (MAP)  160/54 (89)  


 


Pulse Ox  94  


 


O2 Delivery  Mechanical Ventilator  Mechanical Ventilator


 


O2 Flow Rate  65.00  


 


FiO2    65


 


    





 4/19/20 4/19/20 4/19/20 4/19/20





 08:00 09:00 09:30 09:53


 


Pulse 95 89 152 152


 


Resp 19 19 26 


 


B/P (MAP) 158/56 (90) 144/49 (80)  128/49


 


Pulse Ox 94 94 96 


 


O2 Delivery Mechanical Ventilator Mechanical Ventilator  


 


O2 Flow Rate 65.00 65.00  


 


FiO2   60 





 4/19/20 4/19/20 4/19/20 4/19/20





 10:00 11:00 12:00 12:00


 


Temp   37.0 


 


Pulse 149 152  


 


Resp 18 20  


 


B/P (MAP) 121/47 (71) 144/61 (88)  


 


Pulse Ox 93 92  


 


O2 Delivery Mechanical Ventilator Mechanical Ventilator  Mechanical Ventilator


 


O2 Flow Rate 65.00 65.00  


 


FiO2    65


 


    





 4/19/20 4/19/20 4/19/20 4/19/20





 12:00 12:20 13:00 14:00


 


Pulse 152 155 156 156


 


Resp 19  18 26


 


B/P (MAP) 137/57 (83)  140/74 (96) 154/80 (104)


 


Pulse Ox 94  94 93


 


O2 Delivery Mechanical Ventilator  Mechanical Ventilator Mechanical Ventilator


 


O2 Flow Rate 65.00  65.00 65.00





 4/19/20   





 14:24   


 


Pulse 154   














 4/19/20





 00:00


 


Intake Total 40 ml


 


Output Total 1700 ml


 


Balance -1660 ml








Weight (Pounds):  206


Weight (Ounces):  0.8


Weight (Calculated Kilograms):  93.446709


Constitutional:  other (on mec vent, unresponsive)


Respiratory:  No accessory muscle use; other (fair to good bilat air entry)


Cardiovascular:  irregularly irregular, tachycardia, S1 and S2, systolic murmur 

(soft BUTCH at card base)


Gastrointestional:  hernia (abdominal), audible bowel sounds, other (mildly 

distended)


Extremities:  swelling (mild edema), other (R foot in dressing that wasn't 

removed); No clubbing, No cyanosis


Neurologic/Psychiatric:  other (unresponsive, on mech vent)


Skin:  No rash on exposed areas, No ulcerations on exposed areas; other 

(dressing to right foot, D&I)





Results/Procedures:


Labs


Laboratory Tests


4/18/20 15:42: Glucometer 278H


4/18/20 17:49: Glucometer 256H


4/18/20 22:55: Glucometer 316H


4/19/20 03:30: 


White Blood Count 7.0, Red Blood Count 2.70L, Hemoglobin 8.1L, Hematocrit 26L, 

Mean Corpuscular Volume 96, Mean Corpuscular Hemoglobin 30, Mean Corpuscular 

Hemoglobin Concent 31L, Red Cell Distribution Width 16.7H, Platelet Count 144, 

Mean Platelet Volume 11.4H, Neutrophils (%) (Auto) 86H, Lymphocytes (%) (Auto) 

8L, Monocytes (%) (Auto) 6, Eosinophils (%) (Auto) 0, Basophils (%) (Auto) 0, 

Neutrophils # (Auto) 6.0, Lymphocytes # (Auto) 0.6L, Monocytes # (Auto) 0.4, 

Eosinophils # (Auto) 0.0, Basophils # (Auto) 0.0, Blood Gas Puncture Site RIGHT 

ART LINE, Blood Gas Patient Temperature 37.4, Arterial Blood pH 7.48H, Arterial 

Blood Partial Pressure CO2 46H, Arterial Blood Partial Pressure O2 91, Arterial 

Blood HCO3 33H, Arterial Blood Total CO2 34.7H, Arterial Blood Oxygen Saturation

98, Arterial Blood Base Excess 9.4H, Rojelio Test ART LINE, Blood Gas Ventilator 

Setting YES, Blood Gas Inspired Oxygen 65%, Sodium Level 139, Potassium Level 

3.4L, Chloride Level 98, Carbon Dioxide Level 28, Anion Gap 13, Blood Urea 

Nitrogen 17, Creatinine 0.94, Estimat Glomerular Filtration Rate > 60, 

BUN/Creatinine Ratio 18, Glucose Level 252H, Calcium Level 8.0L, Phosphorus 

Level 3.0, Magnesium Level 1.8, B-Type Natriuretic Peptide 501.3H


4/19/20 11:00: Glucometer 272H





Microbiology


4/13/20 Catheter Tip Culture - Final, Complete


          No growth


4/13/20 Gram Stain - Final, Complete


          


4/13/20 Sputum Culture - Final, Complete


          No growth


4/12/20 Urine Culture - Final, Complete


          NO GROWTH








A/P:


Assessment/Dx:


Acute resp failure and shock of undetermined etiology; shock resolved





Atrial flutter with 2-1 AV block with heart rate in the 150s.





Mild acute on chronic diastolic congestive heart failure.  Agree with IV 

diuretics.





Cannot exclude pulmonary embolism. Was started on treatment dose Lovenox at 

admission, but being stopped on 4/15/20 due to falling H & H and falling 

platelet count





Acute renal failure (AYAH-3) likely due to shock - resolved





Mild troponin elevation, likely type-2 MI due to reasons noted above





Drug test (+) for Meth on 4-





CAD. Card cath of Jan 2017 showed moderate, nonobstructive disease and LVEF 55% 

and elevated LVEDP. MPI of 2/27/20 by Dr Dai showed no ischemia or infarction

and normal LV function





Echo on 4/14/20: LVEF 45%, grade 1 fajardo dysfunction, mild to mod TR, RVSP 29 

mmHg





Chronic tobacco use (smokes cigarettes according to previous records)





H/o DM II





H/o Htn





H/o Hyperlipidemia





PAD.  Peripheral angiogram and intervention on 8/22/2019 by Dr Dai with PTA 

of the right AT, PT and stenting of the right SFA. Significant improvement of 

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


Plan:








* Complex management


* Prognosis guarded


* Atrial flutter with 2-1 AV block, anticoagulation is recommended.  We started 

  the patient on IV Cardizem with no significant response.  We will start 

  amiodarone infusion.  I will also load with dig.  I have requested the RN to 

  talk to the son that we will try medical therapy, however if the patient 

  becomes hemodynamically compromised, we will consider emergent cardioversion 

  and with that there is a 1-2 percent risk of stroke.








Thank you for your consultation. Please call me if you have any questions.








WOODY Dai MD, FACP, FACC, [facility]SCAI, FHRS, CCDS


Interventional Cardiology


Cardiac Electrophysiology


Vascular Medicine and Endovascular Interventions











ELISE DAI MD             Apr 19, 2020 14:29

## 2020-04-19 NOTE — DIAGNOSTIC IMAGING REPORT
INDICATION: Respiratory distress



Portable chest shows normal heart size and vascularity. Basilar

atelectasis and a small effusion on the left. ET tube is in good

position. IJ line tip is in the SVC.



IMPRESSION: There has been slight improved aeration of left lung

base since 04/18/2020.



Dictated by: 



  Dictated on workstation # [id number]

## 2020-04-19 NOTE — NUR
Returned with pt from CT scan at this time. Pt HR noted to be 150-160 upon returning to pt's 
room. EKG obtained.

0933 Dr. James notified of pt's HR and EKG. Orders received to start pt on cardizem gtt and 
bolus with 10mg Cardizem at this time. 

1000 Dr. Dai consulted at this time for pt's HR. New orders received to obtain Echo at 
this time. Will continue to monitor pt for further decline in status.

## 2020-04-19 NOTE — NUR
This RN spoke with Gabriel Fowler, son at this time. Updates provided. Education provided on 
irregular heart rhythm, known as Atrial flutter and treatments including medications, such 
as, Amiodarone, Cardizem, and Digoxin, as well as anticoagulation. Pt family also educated 
on risk of clot formation, and possibility of CVA resulting from atrial flutter. Discussed 
the procedure (cardioversion), both emergent and non-emergent versions, due to atrial 
flutter, and hemodynamic stability.  Family agreed to cardioversion if pt is unable to 
successfully convert with medications given. Family also understands the possibility of 
cardioversion if patient were to become hemodynamically unstable with hypotension. Pt's son 
requested updates be provided in A.M. regarding pt's heart rhythm, and  "next steps" in 
patients treatment.  

Son also requested during phone call that pt's password be changed to DOPE to prevent 
patient's niece from obtaining information.

## 2020-04-19 NOTE — NUR
Dr. Dai at bedside at this time to see and assess pt. New orders received at this time. 
Will continue to monitor.

## 2020-04-19 NOTE — DIAGNOSTIC IMAGING REPORT
PROCEDURE: CT angiography of the chest with contrast.



TECHNIQUE: Multiple contiguous axial images were obtained through

the chest after uneventful bolus administration of intravenous

contrast. 3D reconstructed CTA MIP acquisitions were also

performed.

Auto Exposure Controls were utilized during the CT exam to meet

ALARA standards for radiation dose reduction. 



 

INDICATION:  Pulmonary embolism.



No prior studies are available for comparison.



The patient is intubated. ET tube has its tip above the asia.

There is a nasogastric tube passing into the stomach. Pulmonary

arterial system is without thromboembolism. No definite filling

defects are seen within central, lobar or segmental branches. The

thoracic aorta is partially calcified but non-aneurysmal. No

dissection is seen. There is no pericardial fluid. There are

small bilateral pleural effusions. There are areas of

consolidation bilateral lower lobes, greatest on the left with

some air bronchograms. Upper lobes are well aerated and clear.

Upper abdomen is unremarkable.



IMPRESSION:

1. No evidence of pulmonary embolism or thoracic aortic

dissection.

2. Small bilateral pleural effusions with bibasilar

consolidation.



Dictated by: 



  Dictated on workstation # [id number]

## 2020-04-19 NOTE — PROGRESS NOTE - HOSPITALIST
Subjective


HPI/CC On Admission


Date Seen by Provider:  2020


Time Seen by Provider:  10:30


Pradeep Fowler is a 69-year-old male with past medical history of hypertension, 

diabetes, peripheral artery disease, coronary artery disease, AAA, COPD, who 

presented with altered mental status.  He was reportedly brought in by friend.  

He had supposedly been taking some narcotics for which he been prescribed.  He 

was given a dose of Narcan and his mental status improved.  He was started on 

BiPAP in the ICU.  The morning after his admission, he remained lethargic and an

ABG showed acute hypercapnia.  He was intubated due to acute hypercapnic 

respiratory failure.  At the time of my examination he is intubated and sedated.


Subjective/Events-last exam


Remains intubated


AF w/RVR started when he returned from CT scan for PE r/o CT angiogram


Cardizem drip maintained


No pain appearing


Sedation maintained





Objective


Exam


Vital Signs





Vital Signs








  Date Time  Temp Pulse Resp B/P (MAP) Pulse Ox O2 Delivery O2 Flow Rate FiO2


 


20 15:00  151 28 127/69 (88) 93 Mechanical Ventilator 65.00 


 


20 14:30        60


 


20 12:00 37.0       





Capillary Refill : Less Than 3 Seconds


General Appearance:  No Apparent Distress, WD/WN, Chronically ill, Other 

(sedated on vent)


Respiratory:  Lungs Clear


Cardiovascular:  Irregularly Irregular, Tachycardia





Results/Procedures


Lab


Laboratory Tests


20 03:30








Patient resulted labs reviewed.


Imaging:  Reviewed Imaging Report





Assessment/Plan


Assessment and Plan


Assess & Plan/Chief Complaint


Assessment/Plan





(1) Sepsis


Status:  Acute


Assessment & Plan:  : Poor prognosis, Patient on Sepsis IVF protocol, 

continue IV antibiotics, Dr James consulted for critical care, blood cultures 

pending


4/15: Off Levaphed today, continue to monitor blood pressure, continue IV 

antibiotics, patient is not following commands when sedation is turned off


: Prognosis remains guarded, tube feeds started today, blood pressures 

running high, restarted blood pressure medications


: Dr james managing critical care, continue IV antibiotics, possible wean 

and extubation over the weekend


: failed extubation today and small amount of aspiration of tube feeding 

possible so monitor that closely


Qualifiers:  


   Qualified Codes:  A41.9 - Sepsis, unspecified organism; R65.21 - Severe 

sepsis with septic shock; N17.9 - Acute kidney failure, unspecified


(2) Acute respiratory failure with hypoxia and hypercapnia


Status:  Acute


Assessment & Plan:  : Currently Intubated, Dr James managing vent





(3) Acute kidney injury superimposed on chronic kidney disease


Status:  Resolved


Assessment & Plan:  : Will monitor daily BUN/Cr





(4) NSTEMI (non-ST elevation myocardial infarction)


Status:  Acute


Assessment & Plan:  : Cardiology consulted, appreciate recommendations, 

Recent Cath 2020: BNP elevated, patient given IV lasix, diuresing well





(5) Upper GI bleed


Status:  Acute


Assessment & Plan:  : Today started having blood tinged fluid from OG, 

Lovenox stopped and started on IV protonix


4/15: Hgb dropped today, Will get iron studies


: stable hgb 8.4





(6) CAD (coronary artery disease)


Status:  Chronic


Qualifiers:  


   Qualified Codes:  I25.10 - Atherosclerotic heart disease of native coronary 

artery without angina pectoris


(7) Hypertension


Status:  Chronic


Assessment & Plan:  : Currently hypotensive and on Levaphed for Sepsis


4/15: Off pressors


: Restarted Lisinopril today


Qualifiers:  


   Qualified Codes:  I10 - Essential (primary) hypertension


(8) Diabetes type 2, uncontrolled


Status:  Chronic


Qualifiers:  


   Qualified Codes:  E11.65 - Type 2 diabetes mellitus with hyperglycemia


(9) COPD (chronic obstructive pulmonary disease)


Status:  Chronic


Qualifiers:  


   Qualified Codes:  J44.9 - Chronic obstructive pulmonary disease, unspecified


(10) Normocytic anemia


Status:  Acute


Assessment & Plan:  : Iron deficient, will consider Fe replacement when 

patient stablizes





(11) Pneumonia


Status:  Acute


Qualifiers:  


   Qualified Codes:  J18.9 - Pneumonia, unspecified organism


(12) DVT prophylaxis


Status:  Acute


Assessment & Plan:  : On Lovenox, stopped today due to GI bleeding


4/15: Continues off Lovenox, SCDs





13) Meth use


(14) AF w/RVR placed on Cardizem drip today 20





Clinical Quality Measures


DVT/VTE Risk/Contraindication:


Risk Factor Score Per Nursin


RFS Level Per Nursing on Admit:  4+=Very High











JASEN DAY DO                2020 11:33

## 2020-04-19 NOTE — NUR
This RN called Dr. Dai to give update on patient status, notified that patient HR 
sustaining Atrial Flutter in 150's, Amiodarone gtt currently running at 33 ml/hr and 
Cardizem gtt currently running at 15mg/hr. Pt 's and BP per R-Radial Art Line 134/70. 
Order received from Dr. Dai to continue current gtt's if blood pressure sustains, if 
hypotension occurs will need to do "emergent cardioversion", otherwise will complete ZAMZAM 
with Cardioversion in AM on 4/20/20. This RN will continue to monitor.

## 2020-04-19 NOTE — PULMONARY PROGRESS NOTE
Subjective


Time Seen by a Provider:  06:24


Subjective/Events-last exam


Sedated on vent





Sepsis Event


Evaluation


Height, Weight, BMI


Height: 5'8.50"


Weight: 206lbs. 0.8oz. 93.893921mb; 30.95 BMI


Method:Stated





Exam


Exam





Vital Signs








  Date Time  Temp Pulse Resp B/P (MAP) Pulse Ox O2 Delivery O2 Flow Rate FiO2


 


4/19/20 06:00  88 22 150/48 (82) 94 Mechanical Ventilator 65.00 


 


4/19/20 05:00  89 22 137/44 (75) 94 Mechanical Ventilator 65.00 


 


4/19/20 04:00      Mechanical Ventilator  65


 


4/19/20 04:00  90 21 139/44 (75) 94 Mechanical Ventilator 65.00 


 


4/19/20 03:38  89  121/39    


 


4/19/20 03:00  92 25 122/46 (71) 93 Mechanical Ventilator 65.00 


 


4/19/20 02:40  85 25  93   65


 


4/19/20 02:00  89 21 147/51 (83) 93 Mechanical Ventilator 65.00 


 


4/19/20 01:00  94 19 143/43 (76) 93 Mechanical Ventilator 65.00 


 


4/19/20 01:00  92      


 


4/19/20 00:00      Mechanical Ventilator  65


 


4/19/20 00:00 37.2       


 


4/19/20 00:00  95 22 139/48 (78) 93 Mechanical Ventilator 65.00 


 


4/18/20 23:00  93 21 138/48 (78) 93 Mechanical Ventilator 65.00 


 


4/18/20 22:40  92 22  94   65


 


4/18/20 22:00  93 21 150/52 (84) 93 Mechanical Ventilator 65.00 


 


4/18/20 21:00  92 21 132/47 (75) 93 Mechanical Ventilator 65.00 


 


4/18/20 20:00  96 19 142/45 (77) 93 Mechanical Ventilator 65.00 


 


4/18/20 20:00      Mechanical Ventilator  65


 


4/18/20 20:00 37.1       


 


4/18/20 19:03  94 24  92   65


 


4/18/20 19:00  93 23 120/37 (64) 91 Mechanical Ventilator 65.00 


 


4/18/20 19:00  93      


 


4/18/20 18:14  113      


 


4/18/20 18:00  93 17 148/44 (78) 91 Mechanical Ventilator 60.00 


 


4/18/20 17:00  89 22 124/37 (66) 93 Mechanical Ventilator 60.00 


 


4/18/20 16:21  88  129/55    


 


4/18/20 16:00  87 18 135/43 (73) 93 Mechanical Ventilator 60.00 


 


4/18/20 16:00 37.3       


 


4/18/20 16:00      Mechanical Ventilator  60


 


4/18/20 15:00  87 21 136/46 (76) 92 Mechanical Ventilator 60.00 


 


4/18/20 14:29  75 26  92   60


 


4/18/20 14:00  75 21 126/40 (68) 93 Mechanical Ventilator 60.00 


 


4/18/20 13:00  90 19 132/43 (72) 93 Mechanical Ventilator 60.00 


 


4/18/20 12:48  93      


 


4/18/20 12:48  93  169/53    


 


4/18/20 12:14  83      


 


4/18/20 12:00      Mechanical Ventilator  60


 


4/18/20 12:00  79 18 116/40 (65) 91 Mechanical Ventilator 60.00 


 


4/18/20 11:00  89 22 151/51 (84) 92 Mechanical Ventilator 60.00 


 


4/18/20 10:18  84 23  94   60


 


4/18/20 10:00  79 22 169/54 (92) 91 Mechanical Ventilator 60.00 


 


4/18/20 09:00  73 21 152/53 (86) 91 Mechanical Ventilator 60.00 


 


4/18/20 08:00  81 24 159/58 (91) 94 Mechanical Ventilator 60.00 


 


4/18/20 08:00      Mechanical Ventilator  60


 


4/18/20 08:00 37.0       


 


4/18/20 07:36  94  186/66    


 


4/18/20 07:00  65 22 126/44 (71) 90 Mechanical Ventilator 60.00 


 


4/18/20 07:00  76      


 


4/18/20 06:52  64 22  90   60


 


4/18/20 06:48      Mechanical Ventilator 60.00 














I & O 


 


 4/19/20





 07:00


 


Intake Total 1000 ml


 


Output Total 3950 ml


 


Balance -2950 ml








Height & Weight


Height: 5'8.50"


Weight: 206lbs. 0.8oz. 93.074027uh; 30.95 BMI


Method:Stated


General Appearance:  No Apparent Distress, WD/WN, Chronically ill, Other 

(sedated on vent)


HEENT:  TMs Normal, Other (Pupils equal/reactive 3 mm bilaterally)


Respiratory:  Decreased Breath Sounds


Cardiovascular:  Regular Rate, Rhythm


Capillary Refill:  Less Than 3 Seconds


Gastrointestinal:  soft, no organomegaly


Extremity:  Normal Inspection, No Pedal Edema


Neurologic/Psychiatric:  Other (sedated)


Skin:  Normal Color, Warm/Dry





Results


Lab


Laboratory Tests


4/18/20 03:05








4/19/20 03:30











Assessment/Plan


Assessment/Plan


Acute respiratory failure 


   - COVID is negative 


   -bilateral dopplers -- negative


   -Check CTA of chest - pt is requiring increased amounts of oxygen however 

improving CXR. 


   -Increase PEEP 12 decrease RR to 18 


   -Hold TF for now


   - bumex 1mg BID 


CHF EF 45% with grade 1 diastolic dysfunction 


   -Cardiology following


sepsis with PICC line


   -1/2 BC + -- probably contamination 


   -Culture of picc tip is negative 


   -Currently on Zosyn only Vanco d/c'd 


   -Pan cultures pending


Pneumonia with possible aspiration 


   -Pan cultures 


   -MRSA swab - neg 


   -COVID is negative 


   -Influenza is negative 


   -Continue Zosyn 


   -RVP is pending 


UDS is positive for methamphetamine 


NSTEMI


   -Cardiology following 


Hypophos, hypomag 


   -replace 


Acute renal failure with dehydration 


   -IVF











GUILLERMINA MERA DO              Apr 19, 2020 06:28

## 2020-04-20 VITALS — SYSTOLIC BLOOD PRESSURE: 135 MMHG | DIASTOLIC BLOOD PRESSURE: 51 MMHG

## 2020-04-20 VITALS — DIASTOLIC BLOOD PRESSURE: 55 MMHG | SYSTOLIC BLOOD PRESSURE: 102 MMHG

## 2020-04-20 VITALS — SYSTOLIC BLOOD PRESSURE: 141 MMHG | DIASTOLIC BLOOD PRESSURE: 45 MMHG

## 2020-04-20 VITALS — SYSTOLIC BLOOD PRESSURE: 126 MMHG | DIASTOLIC BLOOD PRESSURE: 47 MMHG

## 2020-04-20 VITALS — SYSTOLIC BLOOD PRESSURE: 109 MMHG | DIASTOLIC BLOOD PRESSURE: 68 MMHG

## 2020-04-20 VITALS — DIASTOLIC BLOOD PRESSURE: 58 MMHG | SYSTOLIC BLOOD PRESSURE: 123 MMHG

## 2020-04-20 VITALS — SYSTOLIC BLOOD PRESSURE: 123 MMHG | DIASTOLIC BLOOD PRESSURE: 43 MMHG

## 2020-04-20 VITALS — DIASTOLIC BLOOD PRESSURE: 59 MMHG | SYSTOLIC BLOOD PRESSURE: 146 MMHG

## 2020-04-20 VITALS — DIASTOLIC BLOOD PRESSURE: 58 MMHG | SYSTOLIC BLOOD PRESSURE: 113 MMHG

## 2020-04-20 VITALS — SYSTOLIC BLOOD PRESSURE: 153 MMHG | DIASTOLIC BLOOD PRESSURE: 50 MMHG

## 2020-04-20 VITALS — SYSTOLIC BLOOD PRESSURE: 147 MMHG | DIASTOLIC BLOOD PRESSURE: 55 MMHG

## 2020-04-20 VITALS — DIASTOLIC BLOOD PRESSURE: 58 MMHG | SYSTOLIC BLOOD PRESSURE: 152 MMHG

## 2020-04-20 VITALS — SYSTOLIC BLOOD PRESSURE: 111 MMHG | DIASTOLIC BLOOD PRESSURE: 45 MMHG

## 2020-04-20 VITALS — SYSTOLIC BLOOD PRESSURE: 118 MMHG | DIASTOLIC BLOOD PRESSURE: 43 MMHG

## 2020-04-20 VITALS — SYSTOLIC BLOOD PRESSURE: 144 MMHG | DIASTOLIC BLOOD PRESSURE: 45 MMHG

## 2020-04-20 VITALS — DIASTOLIC BLOOD PRESSURE: 63 MMHG | SYSTOLIC BLOOD PRESSURE: 128 MMHG

## 2020-04-20 VITALS — DIASTOLIC BLOOD PRESSURE: 43 MMHG | SYSTOLIC BLOOD PRESSURE: 126 MMHG

## 2020-04-20 VITALS — DIASTOLIC BLOOD PRESSURE: 47 MMHG | SYSTOLIC BLOOD PRESSURE: 133 MMHG

## 2020-04-20 VITALS — SYSTOLIC BLOOD PRESSURE: 145 MMHG | DIASTOLIC BLOOD PRESSURE: 46 MMHG

## 2020-04-20 VITALS — SYSTOLIC BLOOD PRESSURE: 146 MMHG | DIASTOLIC BLOOD PRESSURE: 53 MMHG

## 2020-04-20 VITALS — SYSTOLIC BLOOD PRESSURE: 109 MMHG | DIASTOLIC BLOOD PRESSURE: 55 MMHG

## 2020-04-20 VITALS — SYSTOLIC BLOOD PRESSURE: 120 MMHG | DIASTOLIC BLOOD PRESSURE: 50 MMHG

## 2020-04-20 VITALS — SYSTOLIC BLOOD PRESSURE: 147 MMHG | DIASTOLIC BLOOD PRESSURE: 56 MMHG

## 2020-04-20 VITALS — DIASTOLIC BLOOD PRESSURE: 61 MMHG | SYSTOLIC BLOOD PRESSURE: 107 MMHG

## 2020-04-20 VITALS — DIASTOLIC BLOOD PRESSURE: 47 MMHG | SYSTOLIC BLOOD PRESSURE: 120 MMHG

## 2020-04-20 VITALS — DIASTOLIC BLOOD PRESSURE: 64 MMHG | SYSTOLIC BLOOD PRESSURE: 124 MMHG

## 2020-04-20 VITALS — SYSTOLIC BLOOD PRESSURE: 115 MMHG | DIASTOLIC BLOOD PRESSURE: 41 MMHG

## 2020-04-20 VITALS — SYSTOLIC BLOOD PRESSURE: 124 MMHG | DIASTOLIC BLOOD PRESSURE: 61 MMHG

## 2020-04-20 VITALS — SYSTOLIC BLOOD PRESSURE: 92 MMHG | DIASTOLIC BLOOD PRESSURE: 40 MMHG

## 2020-04-20 VITALS — SYSTOLIC BLOOD PRESSURE: 109 MMHG | DIASTOLIC BLOOD PRESSURE: 46 MMHG

## 2020-04-20 LAB
ARTERIAL PATENCY WRIST A: (no result)
BASE EXCESS STD BLDA CALC-SCNC: 9.8 MMOL/L (ref -2.5–2.5)
BASOPHILS # BLD AUTO: 0 10^3/UL (ref 0–0.1)
BASOPHILS NFR BLD AUTO: 0 % (ref 0–10)
BDY SITE: (no result)
BODY TEMPERATURE: 36.7
BUN/CREAT SERPL: 19
CALCIUM SERPL-MCNC: 8.2 MG/DL (ref 8.5–10.1)
CHLORIDE SERPL-SCNC: 95 MMOL/L (ref 98–107)
CO2 BLDA CALC-SCNC: 35.4 MMOL/L (ref 21–31)
CO2 SERPL-SCNC: 28 MMOL/L (ref 21–32)
CREAT SERPL-MCNC: 0.98 MG/DL (ref 0.6–1.3)
EOSINOPHIL # BLD AUTO: 0 10^3/UL (ref 0–0.3)
EOSINOPHIL NFR BLD AUTO: 0 % (ref 0–10)
ERYTHROCYTE [DISTWIDTH] IN BLOOD BY AUTOMATED COUNT: 16.5 % (ref 10–14.5)
GFR SERPLBLD BASED ON 1.73 SQ M-ARVRAT: > 60 ML/MIN
GLUCOSE SERPL-MCNC: 268 MG/DL (ref 70–105)
HCT VFR BLD CALC: 29 % (ref 40–54)
HGB BLD-MCNC: 8.9 G/DL (ref 13.3–17.7)
INHALED O2 FLOW RATE: (no result) L/MIN
LYMPHOCYTES # BLD AUTO: 0.5 X 10^3 (ref 1–4)
LYMPHOCYTES NFR BLD AUTO: 6 % (ref 12–44)
MAGNESIUM SERPL-MCNC: 1.6 MG/DL (ref 1.6–2.4)
MANUAL DIFFERENTIAL PERFORMED BLD QL: NO
MCH RBC QN AUTO: 30 PG (ref 25–34)
MCHC RBC AUTO-ENTMCNC: 31 G/DL (ref 32–36)
MCV RBC AUTO: 97 FL (ref 80–99)
MONOCYTES # BLD AUTO: 0.3 X 10^3 (ref 0–1)
MONOCYTES NFR BLD AUTO: 3 % (ref 0–12)
NEUTROPHILS # BLD AUTO: 7 X 10^3 (ref 1.8–7.8)
NEUTROPHILS NFR BLD AUTO: 91 % (ref 42–75)
PCO2 BLDA: 46 MMHG (ref 35–45)
PH BLDA: 7.48 [PH] (ref 7.37–7.43)
PHOSPHATE SERPL-MCNC: 3.5 MG/DL (ref 2.3–4.7)
PLATELET # BLD: 174 10^3/UL (ref 130–400)
PMV BLD AUTO: 11.7 FL (ref 7.4–10.4)
PO2 BLDA: 81 MMHG (ref 79–93)
POTASSIUM SERPL-SCNC: 4.1 MMOL/L (ref 3.6–5)
SAO2 % BLDA FROM PO2: 96 % (ref 94–100)
SODIUM SERPL-SCNC: 136 MMOL/L (ref 135–145)
VENTILATION MODE VENT: YES
WBC # BLD AUTO: 7.7 10^3/UL (ref 4.3–11)

## 2020-04-20 PROCEDURE — 5A2204Z RESTORATION OF CARDIAC RHYTHM, SINGLE: ICD-10-PCS | Performed by: INTERNAL MEDICINE

## 2020-04-20 RX ADMIN — MAGNESIUM SULFATE IN DEXTROSE SCH MLS/HR: 10 INJECTION, SOLUTION INTRAVENOUS at 06:20

## 2020-04-20 RX ADMIN — IPRATROPIUM BROMIDE AND ALBUTEROL SULFATE SCH ML: .5; 3 SOLUTION RESPIRATORY (INHALATION) at 02:47

## 2020-04-20 RX ADMIN — METHYLPREDNISOLONE SODIUM SUCCINATE SCH MG: 40 INJECTION, POWDER, FOR SOLUTION INTRAMUSCULAR; INTRAVENOUS at 01:31

## 2020-04-20 RX ADMIN — PROPOFOL SCH MLS/HR: 10 INJECTION, EMULSION INTRAVENOUS at 12:48

## 2020-04-20 RX ADMIN — POTASSIUM CHLORIDE SCH MEQ: 1500 TABLET, EXTENDED RELEASE ORAL at 04:42

## 2020-04-20 RX ADMIN — IPRATROPIUM BROMIDE AND ALBUTEROL SULFATE SCH ML: .5; 3 SOLUTION RESPIRATORY (INHALATION) at 14:14

## 2020-04-20 RX ADMIN — IPRATROPIUM BROMIDE AND ALBUTEROL SULFATE SCH ML: .5; 3 SOLUTION RESPIRATORY (INHALATION) at 21:48

## 2020-04-20 RX ADMIN — INSULIN ASPART SCH UNIT: 100 INJECTION, SOLUTION INTRAVENOUS; SUBCUTANEOUS at 18:17

## 2020-04-20 RX ADMIN — METHYLPREDNISOLONE SODIUM SUCCINATE SCH MG: 40 INJECTION, POWDER, FOR SOLUTION INTRAMUSCULAR; INTRAVENOUS at 18:16

## 2020-04-20 RX ADMIN — ENOXAPARIN SODIUM SCH MG: 100 INJECTION SUBCUTANEOUS at 06:19

## 2020-04-20 RX ADMIN — DIGOXIN SCH MG: 0.25 INJECTION INTRAMUSCULAR; INTRAVENOUS at 08:50

## 2020-04-20 RX ADMIN — METHYLPREDNISOLONE SODIUM SUCCINATE SCH MG: 40 INJECTION, POWDER, FOR SOLUTION INTRAMUSCULAR; INTRAVENOUS at 12:45

## 2020-04-20 RX ADMIN — LISINOPRIL SCH MG: 20 TABLET ORAL at 08:52

## 2020-04-20 RX ADMIN — IPRATROPIUM BROMIDE AND ALBUTEROL SULFATE SCH ML: .5; 3 SOLUTION RESPIRATORY (INHALATION) at 18:22

## 2020-04-20 RX ADMIN — INSULIN ASPART SCH UNIT: 100 INJECTION, SOLUTION INTRAVENOUS; SUBCUTANEOUS at 06:19

## 2020-04-20 RX ADMIN — APIXABAN SCH MG: 5 TABLET, FILM COATED ORAL at 18:16

## 2020-04-20 RX ADMIN — RISPERIDONE SCH MG: 2 TABLET, FILM COATED ORAL at 20:50

## 2020-04-20 RX ADMIN — PROPOFOL SCH MLS/HR: 10 INJECTION, EMULSION INTRAVENOUS at 16:41

## 2020-04-20 RX ADMIN — IPRATROPIUM BROMIDE AND ALBUTEROL SULFATE SCH ML: .5; 3 SOLUTION RESPIRATORY (INHALATION) at 07:15

## 2020-04-20 RX ADMIN — PROPOFOL SCH MLS/HR: 10 INJECTION, EMULSION INTRAVENOUS at 20:50

## 2020-04-20 RX ADMIN — PANTOPRAZOLE SODIUM SCH MG: 40 INJECTION, POWDER, FOR SOLUTION INTRAVENOUS at 08:50

## 2020-04-20 RX ADMIN — PROPOFOL SCH MLS/HR: 10 INJECTION, EMULSION INTRAVENOUS at 02:27

## 2020-04-20 RX ADMIN — MAGNESIUM SULFATE IN DEXTROSE SCH MLS/HR: 10 INJECTION, SOLUTION INTRAVENOUS at 04:42

## 2020-04-20 RX ADMIN — INSULIN ASPART SCH UNIT: 100 INJECTION, SOLUTION INTRAVENOUS; SUBCUTANEOUS at 12:45

## 2020-04-20 RX ADMIN — INSULIN ASPART SCH UNIT: 100 INJECTION, SOLUTION INTRAVENOUS; SUBCUTANEOUS at 01:40

## 2020-04-20 RX ADMIN — BUMETANIDE SCH MG: 0.25 INJECTION, SOLUTION INTRAMUSCULAR; INTRAVENOUS at 06:19

## 2020-04-20 RX ADMIN — METHYLPREDNISOLONE SODIUM SUCCINATE SCH MG: 40 INJECTION, POWDER, FOR SOLUTION INTRAMUSCULAR; INTRAVENOUS at 06:19

## 2020-04-20 RX ADMIN — POTASSIUM CHLORIDE SCH MLS/HR: 200 INJECTION, SOLUTION INTRAVENOUS at 04:41

## 2020-04-20 RX ADMIN — MAGNESIUM SULFATE IN DEXTROSE SCH MLS/HR: 10 INJECTION, SOLUTION INTRAVENOUS at 07:20

## 2020-04-20 RX ADMIN — RISPERIDONE SCH MG: 2 TABLET, FILM COATED ORAL at 08:52

## 2020-04-20 RX ADMIN — IPRATROPIUM BROMIDE AND ALBUTEROL SULFATE SCH ML: .5; 3 SOLUTION RESPIRATORY (INHALATION) at 10:28

## 2020-04-20 RX ADMIN — PANTOPRAZOLE SODIUM SCH MG: 40 INJECTION, POWDER, FOR SOLUTION INTRAVENOUS at 20:50

## 2020-04-20 NOTE — PROGRESS NOTE - HOSPITALIST
Subjective


HPI/CC On Admission


Date Seen by Provider:  2020


Time Seen by Provider:  10:00


Pradeep Fowler is a 69-year-old male with past medical history of hypertension, 

diabetes, peripheral artery disease, coronary artery disease, AAA, COPD, who 

presented with altered mental status.  He was reportedly brought in by friend.  

He had supposedly been taking some narcotics for which he been prescribed.  He 

was given a dose of Narcan and his mental status improved.  He was started on 

BiPAP in the ICU.  The morning after his admission, he remained lethargic and an

ABG showed acute hypercapnia.  He was intubated due to acute hypercapnic 

respiratory failure.  At the time of my examination he is intubated and sedated.


Subjective/Events-last exam


Intubated


Cardioversion planned 


Cardizem drip maintained and BP labile


Sedation maintained





Objective


Exam


Vital Signs





Vital Signs








  Date Time  Temp Pulse Resp B/P (MAP) Pulse Ox O2 Delivery O2 Flow Rate FiO2


 


20 20:50  78  107/61    


 


20 19:11 37.0       


 


20 18:22   17  94   60


 


20 18:00      Mechanical Ventilator 60.00 





Capillary Refill : Less Than 3 Seconds


General Appearance:  No Apparent Distress, WD/WN, Chronically ill, Other 

(sedated)


Respiratory:  Lungs Clear


Cardiovascular:  Irregularly Irregular, Tachycardia





Results/Procedures


Lab


Laboratory Tests


20 03:18








Patient resulted labs reviewed.


Imaging:  Reviewed Imaging Report





Assessment/Plan


Assessment and Plan


Assess & Plan/Chief Complaint


Assessment/Plan





(1) Sepsis


Status:  Acute


Assessment & Plan:  : Poor prognosis, Patient on Sepsis IVF protocol, 

continue IV antibiotics, Dr James consulted for critical care, blood cultures 

pending


4/15: Off Levaphed today, continue to monitor blood pressure, continue IV 

antibiotics, patient is not following commands when sedation is turned off


: Prognosis remains guarded, tube feeds started today, blood pressures 

running high, restarted blood pressure medications


: Dr james managing critical care, continue IV antibiotics, possible wean 

and extubation over the weekend


: failed extubation today and small amount of aspiration of tube feeding 

possible so monitor that closely


Qualifiers:  


   Qualified Codes:  A41.9 - Sepsis, unspecified organism; R65.21 - Severe 

sepsis with septic shock; N17.9 - Acute kidney failure, unspecified


(2) Acute respiratory failure with hypoxia and hypercapnia


Status:  Acute


Assessment & Plan:  : Currently Intubated, Dr James managing vent





(3) Acute kidney injury superimposed on chronic kidney disease


Status:  Resolved


Assessment & Plan:  : Will monitor daily BUN/Cr





(4) NSTEMI (non-ST elevation myocardial infarction)


Status:  Acute


Assessment & Plan:  : Cardiology consulted, appreciate recommendations, 

Recent Cath 2020: BNP elevated, patient given IV lasix, diuresing well





(5) Upper GI bleed


Status:  Acute


Assessment & Plan:  : Today started having blood tinged fluid from OG, 

Lovenox stopped and started on IV protonix


4/15: Hgb dropped today, Will get iron studies


: stable hgb 8.4





(6) CAD (coronary artery disease)


Status:  Chronic


Qualifiers:  


   Qualified Codes:  I25.10 - Atherosclerotic heart disease of native coronary 

artery without angina pectoris


(7) Hypertension


Status:  Chronic


Assessment & Plan:  : Currently hypotensive and on Levaphed for Sepsis


4/15: Off pressors


: Restarted Lisinopril today


Qualifiers:  


   Qualified Codes:  I10 - Essential (primary) hypertension


(8) Diabetes type 2, uncontrolled


Status:  Chronic


Qualifiers:  


   Qualified Codes:  E11.65 - Type 2 diabetes mellitus with hyperglycemia


(9) COPD (chronic obstructive pulmonary disease)


Status:  Chronic


Qualifiers:  


   Qualified Codes:  J44.9 - Chronic obstructive pulmonary disease, unspecified


(10) Normocytic anemia


Status:  Acute


Assessment & Plan:  : Iron deficient, will consider Fe replacement when 

patient stablizes





(11) Pneumonia


Status:  Acute


Qualifiers:  


   Qualified Codes:  J18.9 - Pneumonia, unspecified organism


(12) DVT prophylaxis


Status:  Acute


Assessment & Plan:  : On Lovenox, stopped today due to GI bleeding


4/15: Continues off Lovenox, SCDs





13) Meth use


(14) AF w/RVR placed on Cardizem drip and cardioversion is planned





Clinical Quality Measures


DVT/VTE Risk/Contraindication:


Risk Factor Score Per Nursin


RFS Level Per Nursing on Admit:  4+=Very High











JASEN DAY DO                2020 10:28

## 2020-04-20 NOTE — CARDIOVERSION
Cardioversion


PROCEDURE PHYSICIAN:   WOODY Dai MD


 


DATE OF PROCEDURE:  4/20/20 


 


DIRECT EXTERNAL ELECTRICAL CARDIOVERSION:  


 


Indications:


Atrial flutter with rapid ventricular rate





Preoperative diagnoses: 


Atrial flutter with rapid ventricular rate


 


Postoperative diagnosis: 


Sinus rhythm, Successful Electrical Cardioversion





History: 69-year-old gentleman with acute respiratory failure, 

intubated/sedated.  Went into atrial flutter with 2-1 AV block with rapid jen

tricular rate.  Was started on overnight Cardizem infusion and amiodarone 

infusion.  Patient continues to be in atrial flutter.  Transesophageal 

echocardiogram/cardioversion recommended.





Anesthesia:


By Anesthesia services





Complications: 


None





Specimen: 


None





Contrast: 0





Flouroscopy: none





Procedure Details:


 


Since the patient was on mechanical ventilation and sedated, informed consent 

was taken from the son over the phone.  Risk of stroke was discussed as well as 

risk of esophageal damage.  Transesophageal echocardiogram did not show any left

atrial clot.  Electrical cardioversion was carried out with anesthesia support 

with propofol. 200 joules of synchronized shock was  delivered through external 

patches which promptly restored sinus rhythm. The patient tolerated the 

procedure well.  





Conclusions:


1.Successful Cardioversion.


2.  Start Eliquis, by mouth amiodarone and Cardizem.





WOODY Dai MD, [facility] [[email or web address]]


Cardiac Electrophysiology











ELISE DAI MD             Apr 20, 2020 17:50

## 2020-04-20 NOTE — NUR
Verbal order received from  for Cardizem 30mg QID PO, Amiodarone 200mg daily PO, 
Eliquis 5mg BID PO and to give Eliquis at 1800 and to discontinue lovenox. 

This nurse verified with pharmacy that the three medications are able to be crushed, 
pharmacy verified.

## 2020-04-20 NOTE — ANESTHESIA-GENERAL POST-OP
MAC


Patient Condition


Mental Status/LOC:  Same as Preop


Cardiovascular:  Satisfactory


Nausea/Vomiting:  Absent


Respiratory:  Satisfactory


Pain:  Controlled


Complications:  Absent





Post Op Complications


Complications


None





Follow Up Care/Instructions


Patient Instructions


None needed.





Anesthesiology Discharge Order


Discharge Order


Patient is still intubated and sedated (as before the procedure), stable vital 

signs, no apparent adverse anesthesia problems. He returned to  after 

cardioversion times one.











SHERWIN REYES DO         Apr 20, 2020 16:50

## 2020-04-20 NOTE — PULMONARY PROGRESS NOTE
Subjective


Time Seen by a Provider:  05:12


Subjective/Events-last exam


Pt went into aflutter with 's. he is sedated on vent.





Sepsis Event


Evaluation


Height, Weight, BMI


Height: 5'8.50"


Weight: 206lbs. 0.8oz. 93.934766wx; 30.95 BMI


Method:Stated





Exam


Exam





Vital Signs








  Date Time  Temp Pulse Resp B/P (MAP) Pulse Ox O2 Delivery O2 Flow Rate FiO2


 


4/20/20 04:00 36.7       


 


4/20/20 04:00  160 21 124/61 (82) 95 Mechanical Ventilator 60.00 


 


4/20/20 03:00  161 23 113/58 (76) 94 Mechanical Ventilator 60.00 


 


4/20/20 02:48  160 24  92   60


 


4/20/20 02:27  158  123/58    


 


4/20/20 02:00  158 24 123/58 (79) 94 Mechanical Ventilator 60.00 


 


4/20/20 01:01  151      


 


4/20/20 01:00  152 20 124/64 (84) 94 Mechanical Ventilator 60.00 


 


4/20/20 00:00 36.8       


 


4/20/20 00:00  151 18 102/55 (71) 94 Mechanical Ventilator 60.00 


 


4/19/20 23:00  151 17 109/58 (75) 94 Mechanical Ventilator 60.00 


 


4/19/20 22:35  118 19  94   60


 


4/19/20 22:10  111  113/57    


 


4/19/20 22:00  111 18 113/57 (75) 93 Mechanical Ventilator 60.00 


 


4/19/20 21:00  151 20 133/72 (92) 94 Mechanical Ventilator 60.00 


 


4/19/20 20:00 37.0       


 


4/19/20 20:00  152 18 132/71 (91) 95 Mechanical Ventilator 60.00 


 


4/19/20 20:00      Mechanical Ventilator  65


 


4/19/20 19:00  154 18 130/68 (88) 94 Mechanical Ventilator 60.00 


 


4/19/20 19:00  154      


 


4/19/20 18:48  154 32  94   60


 


4/19/20 18:00  149 20 144/69 (94) 95 Mechanical Ventilator 65.00 


 


4/19/20 17:56  152      


 


4/19/20 17:00  152 30 116/62 (80) 94 Mechanical Ventilator 65.00 


 


4/19/20 16:00  152 19 128/68 (88) 95 Mechanical Ventilator 65.00 


 


4/19/20 16:00      Mechanical Ventilator  65


 


4/19/20 15:53 37.1       


 


4/19/20 15:00  151 28 127/69 (88) 93 Mechanical Ventilator 65.00 


 


4/19/20 14:35  156      


 


4/19/20 14:30  154 32  94   60


 


4/19/20 14:24  154      


 


4/19/20 14:00  156 26 154/80 (104) 93 Mechanical Ventilator 65.00 


 


4/19/20 13:00  156 18 140/74 (96) 94 Mechanical Ventilator 65.00 


 


4/19/20 12:20  155      


 


4/19/20 12:00  152 19 137/57 (83) 94 Mechanical Ventilator 65.00 


 


4/19/20 12:00      Mechanical Ventilator  65


 


4/19/20 12:00 37.0       


 


4/19/20 11:00  152 20 144/61 (88) 92 Mechanical Ventilator 65.00 


 


4/19/20 10:00  149 18 121/47 (71) 93 Mechanical Ventilator 65.00 


 


4/19/20 09:53  152  128/49    


 


4/19/20 09:30  152 26  96   60


 


4/19/20 09:00  89 19 144/49 (80) 94 Mechanical Ventilator 65.00 


 


4/19/20 08:00  95 19 158/56 (90) 94 Mechanical Ventilator 65.00 


 


4/19/20 08:00      Mechanical Ventilator  65


 


4/19/20 08:00 37.0       


 


4/19/20 07:00  93 19 160/54 (89) 94 Mechanical Ventilator 65.00 


 


4/19/20 07:00  93      


 


4/19/20 06:59  97 25  94   60


 


4/19/20 06:00  88 22 150/48 (82) 94 Mechanical Ventilator 65.00 














I & O 


 


 4/20/20





 07:00


 


Intake Total 676 ml


 


Output Total 3075 ml


 


Balance -2399 ml








Height & Weight


Height: 5'8.50"


Weight: 206lbs. 0.8oz. 93.531413mx; 30.95 BMI


Method:Stated


General Appearance:  No Apparent Distress, WD/WN, Chronically ill, Other 

(sedated on vent)


HEENT:  TMs Normal, Other (Pupils equal/reactive 3 mm bilaterally)


Respiratory:  Lungs Clear


Cardiovascular:  Irregularly Irregular, Tachycardia


Capillary Refill:  Less Than 3 Seconds


Gastrointestinal:  soft, no organomegaly


Extremity:  Normal Inspection, No Pedal Edema


Neurologic/Psychiatric:  Other (sedated)


Skin:  Normal Color, Warm/Dry





Results


Lab


Laboratory Tests


4/19/20 03:30








4/20/20 03:18











Assessment/Plan


Assessment/Plan


Acute respiratory failure 


   - COVID is negative 


   -bilateral dopplers -- negative


   -Check CTA of chest -neg for PE shows small bilateral pleural effusions


   -Increase PEEP 14 decrease RR to 14- repeat abg in 1hr 


   -Add precedex 


   -Hold TF for now


   - decrease to bumex 1mg daily 


Aflutter with 's 


   -Cardiology planning ZAMZAM with cardioversion 


CHF EF 45% with grade 1 diastolic dysfunction 


   -Cardiology following


sepsis with PICC line


   -1/2 BC + -- probably contamination 


   -Culture of picc tip is negative 


   -Currently on Zosyn only Vanco d/c'd 


   -Pan cultures pending


Pneumonia with possible aspiration 


   -Pan cultures 


   -MRSA swab - neg 


   -COVID is negative 


   -Influenza is negative 


   -Continue Zosyn 


   -RVP is pending 


UDS is positive for methamphetamine 


NSTEMI


   -Cardiology following 


Hypophos, hypomag 


   -replace 


Acute renal failure with dehydration 


   -IVF











GUILLERMINA MERA DO              Apr 20, 2020 05:12

## 2020-04-20 NOTE — NUR
This RN called Dr. Dai to notify that patient's SBP decreased to 80-90's. New order 
received to decrease Cardizem gtt to 5mg/hr and to D/C if blood pressure does not tolerate 
it. This RN notified Dr. Dai that Dr. James ordered 1L bolus of LR, Dr. Dai okay with 
this order.

## 2020-04-20 NOTE — CARDIOLOGY PROGRESS NOTE
Cardiology SOAP Progress Note


Subjective:


Intubated/sedated.





Objective:


I&O/Vital Signs











 4/21/20 4/21/20 4/21/20 4/21/20





 02:00 02:00 02:12 03:00


 


Pulse 75  79 75


 


Resp 13  17 12


 


B/P (MAP) 166/92 (116)   161/79 (106)


 


Pulse Ox 93  94 93


 


O2 Delivery Mechanical Ventilator Mechanical Ventilator  Mechanical Ventilator


 


O2 Flow Rate 40.00 40.00  40.00


 


FiO2   60 





 4/21/20 4/21/20 4/21/20 4/21/20





 03:19 03:25 04:00 04:00


 


Temp 36.9   


 


Pulse  75 75 


 


Resp   9 


 


B/P (MAP)  161/79 129/66 (87) 


 


Pulse Ox   93 


 


O2 Delivery   Mechanical Ventilator Mechanical Ventilator


 


O2 Flow Rate   40.00 


 


FiO2    40


 


    





 4/21/20 4/21/20 4/21/20 4/21/20





 05:00 06:08 06:10 06:56


 


Pulse 73 53 53 71


 


Resp 9 18  15


 


B/P (MAP) 142/70 (94) 136/77 (96) 136/77 


 


Pulse Ox 92 99  94


 


O2 Delivery Mechanical Ventilator Mechanical Ventilator  


 


O2 Flow Rate 40.00 40.00  


 


FiO2    40





 4/21/20 4/21/20 4/21/20 4/21/20





 07:00 07:00 07:09 08:00


 


Pulse 72 75 71 74


 


Resp 5  15 5


 


B/P (MAP) 146/70 (95)   142/73 (96)


 


Pulse Ox 99  94 99


 


O2 Delivery Mechanical Ventilator   Mechanical Ventilator


 


O2 Flow Rate 40.00   40.00


 


FiO2   40 





 4/21/20 4/21/20 4/21/20 4/21/20





 08:20 08:20 09:00 10:03


 


Temp  37.0  


 


Pulse   71 73


 


Resp   4 5


 


B/P (MAP)   177/68 (104) 171/65 (100)


 


Pulse Ox   99 93


 


O2 Delivery Mechanical Ventilator  Mechanical Ventilator Mechanical Ventilator


 


O2 Flow Rate   40.00 40.00


 


FiO2 40   


 


    





 4/21/20 4/21/20 4/21/20 4/21/20





 10:34 11:00 11:28 13:08


 


Pulse 73 72 69 66


 


Resp 18 11  


 


B/P (MAP)  175/64 (101) 174/65 


 


Pulse Ox 93 93  


 


O2 Delivery  Mechanical Ventilator  


 


O2 Flow Rate  40.00  


 


FiO2 40   














 4/21/20





 00:00


 


Intake Total 260 ml


 


Output Total 600 ml


 


Balance -340 ml








Weight (Pounds):  206


Weight (Ounces):  0.8


Weight (Calculated Kilograms):  93.721794


Constitutional:  other (on mec vent, unresponsive)


Respiratory:  No accessory muscle use; other (fair to good bilat air entry)


Cardiovascular:  irregularly irregular, tachycardia, S1 and S2, systolic murmur 

(soft BUTCH at card base)


Gastrointestional:  hernia (abdominal), audible bowel sounds, other (mildly 

distended)


Extremities:  swelling (mild edema), other (R foot in dressing that wasn't 

removed); No clubbing, No cyanosis


Neurologic/Psychiatric:  other (unresponsive, on mech vent)


Skin:  No rash on exposed areas, No ulcerations on exposed areas; other 

(dressing to right foot, D&I)





Results/Procedures:


Labs


Laboratory Tests


4/20/20 17:46: Glucometer 321H


4/20/20 23:57: Glucometer 320H


4/21/20 03:00: 


White Blood Count 6.3, Red Blood Count 2.96L, Hemoglobin 8.8L, Hematocrit 29L, 

Mean Corpuscular Volume 98, Mean Corpuscular Hemoglobin 30, Mean Corpuscular 

Hemoglobin Concent 30L, Red Cell Distribution Width 16.5H, Platelet Count 166, 

Mean Platelet Volume 11.6H, Neutrophils (%) (Auto) 92H, Lymphocytes (%) (Auto) 

5L, Monocytes (%) (Auto) 3, Eosinophils (%) (Auto) 0, Basophils (%) (Auto) 0, 

Neutrophils # (Auto) 5.8, Lymphocytes # (Auto) 0.3L, Monocytes # (Auto) 0.2, 

Eosinophils # (Auto) 0.0, Basophils # (Auto) 0.0, Blood Gas Puncture Site RT ART

LINE, Blood Gas Patient Temperature 36.9, Arterial Blood pH 7.45H, Arterial 

Blood Partial Pressure CO2 53H, Arterial Blood Partial Pressure O2 89, Arterial 

Blood HCO3 37H, Arterial Blood Total CO2 38.3H, Arterial Blood Oxygen Saturation

97, Arterial Blood Base Excess 12.0H, Rojelio Test ART LINE, Blood Gas Ventilator 

Setting YES, Blood Gas Inspired Oxygen 40%, Sodium Level 136, Potassium Level 

3.8, Chloride Level 94L, Carbon Dioxide Level 30, Anion Gap 12, Blood Urea 

Nitrogen 27H, Creatinine 1.02, Estimat Glomerular Filtration Rate > 60, 

BUN/Creatinine Ratio 26, Glucose Level 301H, Calcium Level 8.3L, Phosphorus 

Level 3.4, Magnesium Level 2.0


4/21/20 11:34: Glucometer 259H





Microbiology


4/13/20 Catheter Tip Culture - Final, Complete


          No growth


4/13/20 Gram Stain - Final, Complete


          


4/13/20 Sputum Culture - Final, Complete


          No growth


4/12/20 Urine Culture - Final, Complete


          NO GROWTH








A/P:


Assessment/Dx:


Acute resp failure and shock of undetermined etiology; shock resolved





Atrial flutter with 2-1 AV block with heart rate in the 150s.





Mild acute on chronic diastolic congestive heart failure.  Agree with IV 

diuretics.





Cannot exclude pulmonary embolism. Was started on treatment dose Lovenox at 

admission, but being stopped on 4/15/20 due to falling H & H and falling 

platelet count





Acute renal failure (AYAH-3) likely due to shock - resolved





Mild troponin elevation, likely type-2 MI due to reasons noted above





Drug test (+) for Meth on 4-





CAD. Card cath of Jan 2017 showed moderate, nonobstructive disease and LVEF 55% 

and elevated LVEDP. MPI of 2/27/20 by Dr Dai showed no ischemia or infarction

and normal LV function





Echo on 4/14/20: LVEF 45%, grade 1 fajardo dysfunction, mild to mod TR, RVSP 29 

mmHg





Chronic tobacco use (smokes cigarettes according to previous records)





H/o DM II





H/o Htn





H/o Hyperlipidemia





PAD.  Peripheral angiogram and intervention on 8/22/2019 by Dr Dai with PTA 

of the right AT, PT and stenting of the right SFA. Significant improvement of 

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


Plan:








* Complex management


* Prognosis guarded


* Atrial flutter with 2-1 AV block; no response with IV amiodarone and Cardizem.

   Informed consent was taken from the son for transesophageal echocardiogram 

  assisted cardioversion.  Risk of damage to the esophagus and stroke was 

  discussed.  The son accepted and gave informed consent.  Transesophageal 

  echocardiogram was done on 4/20/2020 which showed no left atrium or left 

  atrial appendage thrombus.  Cardioversion was performed with 200 J which 

  converted the patient to sinus rhythm.








Thank you for your consultation. Please call me if you have any questions.








WOODY Dai MD, FACP, FACC, FSCAI, FHRS, CCDS


Interventional Cardiology


Cardiac Electrophysiology


Vascular Medicine and Endovascular Interventions











ELISE DAI MD             Apr 20, 2020 17:48

## 2020-04-20 NOTE — NUR
1621 anesthesia, echo tech,  and this nurse all in room. 

1623 ZAMZAM beginning, bubble test performed when instructed by . 

1632 1 shock delivered at 200j per  instruction. 

1635 anesthesia end time 

1637 EKG obtained, patient is now in NSR. will continue to monitor.

## 2020-04-20 NOTE — NUR
This RN called Dr. Dai to give update on patient status, notified that patient HR has 
increased and currently sustaining Atrial Flutter in 160's, Amiodarone gtt currently running 
at 17 ml/hr and Cardizem gtt currently running at 15mg/hr. Order received from Dr. Dai to 
continue current gtt's as long as SBP >100. If hypotension occurs (SBP <100), will need to 
do "emergent cardioversion", otherwise will complete ZAMZAM with Cardioversion in AM on 
4/20/20. This RN will continue to monitor.

## 2020-04-20 NOTE — NUR
This RN called patient's son, Gabriel, to give update on patient status. Telephone consent 
received by this RN for ZAMZAM with Cardioversion. Witnessed by ONEIDA Ty.

## 2020-04-20 NOTE — DIAGNOSTIC IMAGING REPORT
CHEST 1 VIEW, AP/PA ONLY



Indication: Intubation



Comparison: 04/19/2020



Findings:

Stable ET and enteric tubes. Stable right IJ central venous

catheter. Small left pleural effusion and left basilar pulmonary

opacities are unchanged. No pneumothorax. Stable

cardiomediastinal silhouette.



Impression: 

1. Stable support devices. No adverse development.



Dictated by: 



  Dictated on workstation # [id number]

## 2020-04-21 VITALS — DIASTOLIC BLOOD PRESSURE: 68 MMHG | SYSTOLIC BLOOD PRESSURE: 181 MMHG

## 2020-04-21 VITALS — DIASTOLIC BLOOD PRESSURE: 64 MMHG | SYSTOLIC BLOOD PRESSURE: 175 MMHG

## 2020-04-21 VITALS — SYSTOLIC BLOOD PRESSURE: 148 MMHG | DIASTOLIC BLOOD PRESSURE: 80 MMHG

## 2020-04-21 VITALS — DIASTOLIC BLOOD PRESSURE: 92 MMHG | SYSTOLIC BLOOD PRESSURE: 166 MMHG

## 2020-04-21 VITALS — DIASTOLIC BLOOD PRESSURE: 77 MMHG | SYSTOLIC BLOOD PRESSURE: 136 MMHG

## 2020-04-21 VITALS — DIASTOLIC BLOOD PRESSURE: 68 MMHG | SYSTOLIC BLOOD PRESSURE: 177 MMHG

## 2020-04-21 VITALS — SYSTOLIC BLOOD PRESSURE: 161 MMHG | DIASTOLIC BLOOD PRESSURE: 79 MMHG

## 2020-04-21 VITALS — DIASTOLIC BLOOD PRESSURE: 48 MMHG | SYSTOLIC BLOOD PRESSURE: 133 MMHG

## 2020-04-21 VITALS — SYSTOLIC BLOOD PRESSURE: 171 MMHG | DIASTOLIC BLOOD PRESSURE: 64 MMHG

## 2020-04-21 VITALS — DIASTOLIC BLOOD PRESSURE: 60 MMHG | SYSTOLIC BLOOD PRESSURE: 161 MMHG

## 2020-04-21 VITALS — DIASTOLIC BLOOD PRESSURE: 66 MMHG | SYSTOLIC BLOOD PRESSURE: 187 MMHG

## 2020-04-21 VITALS — SYSTOLIC BLOOD PRESSURE: 142 MMHG | DIASTOLIC BLOOD PRESSURE: 70 MMHG

## 2020-04-21 VITALS — DIASTOLIC BLOOD PRESSURE: 73 MMHG | SYSTOLIC BLOOD PRESSURE: 142 MMHG

## 2020-04-21 VITALS — SYSTOLIC BLOOD PRESSURE: 128 MMHG | DIASTOLIC BLOOD PRESSURE: 49 MMHG

## 2020-04-21 VITALS — DIASTOLIC BLOOD PRESSURE: 52 MMHG | SYSTOLIC BLOOD PRESSURE: 149 MMHG

## 2020-04-21 VITALS — SYSTOLIC BLOOD PRESSURE: 124 MMHG | DIASTOLIC BLOOD PRESSURE: 46 MMHG

## 2020-04-21 VITALS — SYSTOLIC BLOOD PRESSURE: 156 MMHG | DIASTOLIC BLOOD PRESSURE: 59 MMHG

## 2020-04-21 VITALS — SYSTOLIC BLOOD PRESSURE: 142 MMHG | DIASTOLIC BLOOD PRESSURE: 54 MMHG

## 2020-04-21 VITALS — DIASTOLIC BLOOD PRESSURE: 66 MMHG | SYSTOLIC BLOOD PRESSURE: 129 MMHG

## 2020-04-21 VITALS — DIASTOLIC BLOOD PRESSURE: 47 MMHG | SYSTOLIC BLOOD PRESSURE: 122 MMHG

## 2020-04-21 VITALS — SYSTOLIC BLOOD PRESSURE: 128 MMHG | DIASTOLIC BLOOD PRESSURE: 47 MMHG

## 2020-04-21 VITALS — SYSTOLIC BLOOD PRESSURE: 146 MMHG | DIASTOLIC BLOOD PRESSURE: 70 MMHG

## 2020-04-21 VITALS — DIASTOLIC BLOOD PRESSURE: 66 MMHG | SYSTOLIC BLOOD PRESSURE: 170 MMHG

## 2020-04-21 VITALS — DIASTOLIC BLOOD PRESSURE: 48 MMHG | SYSTOLIC BLOOD PRESSURE: 129 MMHG

## 2020-04-21 VITALS — DIASTOLIC BLOOD PRESSURE: 65 MMHG | SYSTOLIC BLOOD PRESSURE: 171 MMHG

## 2020-04-21 VITALS — SYSTOLIC BLOOD PRESSURE: 157 MMHG | DIASTOLIC BLOOD PRESSURE: 59 MMHG

## 2020-04-21 VITALS — DIASTOLIC BLOOD PRESSURE: 47 MMHG | SYSTOLIC BLOOD PRESSURE: 128 MMHG

## 2020-04-21 VITALS — SYSTOLIC BLOOD PRESSURE: 167 MMHG | DIASTOLIC BLOOD PRESSURE: 96 MMHG

## 2020-04-21 VITALS — DIASTOLIC BLOOD PRESSURE: 60 MMHG | SYSTOLIC BLOOD PRESSURE: 162 MMHG

## 2020-04-21 LAB
ARTERIAL PATENCY WRIST A: (no result)
BASE EXCESS STD BLDA CALC-SCNC: 12 MMOL/L (ref -2.5–2.5)
BASOPHILS # BLD AUTO: 0 10^3/UL (ref 0–0.1)
BASOPHILS NFR BLD AUTO: 0 % (ref 0–10)
BDY SITE: (no result)
BODY TEMPERATURE: 36.9
BUN/CREAT SERPL: 26
CALCIUM SERPL-MCNC: 8.3 MG/DL (ref 8.5–10.1)
CHLORIDE SERPL-SCNC: 94 MMOL/L (ref 98–107)
CO2 BLDA CALC-SCNC: 38.3 MMOL/L (ref 21–31)
CO2 SERPL-SCNC: 30 MMOL/L (ref 21–32)
CREAT SERPL-MCNC: 1.02 MG/DL (ref 0.6–1.3)
EOSINOPHIL # BLD AUTO: 0 10^3/UL (ref 0–0.3)
EOSINOPHIL NFR BLD AUTO: 0 % (ref 0–10)
ERYTHROCYTE [DISTWIDTH] IN BLOOD BY AUTOMATED COUNT: 16.5 % (ref 10–14.5)
GFR SERPLBLD BASED ON 1.73 SQ M-ARVRAT: > 60 ML/MIN
GLUCOSE SERPL-MCNC: 301 MG/DL (ref 70–105)
HCT VFR BLD CALC: 29 % (ref 40–54)
HGB BLD-MCNC: 8.8 G/DL (ref 13.3–17.7)
INHALED O2 FLOW RATE: (no result) L/MIN
LYMPHOCYTES # BLD AUTO: 0.3 X 10^3 (ref 1–4)
LYMPHOCYTES NFR BLD AUTO: 5 % (ref 12–44)
MAGNESIUM SERPL-MCNC: 2 MG/DL (ref 1.6–2.4)
MANUAL DIFFERENTIAL PERFORMED BLD QL: NO
MCH RBC QN AUTO: 30 PG (ref 25–34)
MCHC RBC AUTO-ENTMCNC: 30 G/DL (ref 32–36)
MCV RBC AUTO: 98 FL (ref 80–99)
MONOCYTES # BLD AUTO: 0.2 X 10^3 (ref 0–1)
MONOCYTES NFR BLD AUTO: 3 % (ref 0–12)
NEUTROPHILS # BLD AUTO: 5.8 X 10^3 (ref 1.8–7.8)
NEUTROPHILS NFR BLD AUTO: 92 % (ref 42–75)
PCO2 BLDA: 53 MMHG (ref 35–45)
PH BLDA: 7.45 [PH] (ref 7.37–7.43)
PHOSPHATE SERPL-MCNC: 3.4 MG/DL (ref 2.3–4.7)
PLATELET # BLD: 166 10^3/UL (ref 130–400)
PMV BLD AUTO: 11.6 FL (ref 7.4–10.4)
PO2 BLDA: 89 MMHG (ref 79–93)
POTASSIUM SERPL-SCNC: 3.8 MMOL/L (ref 3.6–5)
SAO2 % BLDA FROM PO2: 97 % (ref 94–100)
SODIUM SERPL-SCNC: 136 MMOL/L (ref 135–145)
VENTILATION MODE VENT: YES
WBC # BLD AUTO: 6.3 10^3/UL (ref 4.3–11)

## 2020-04-21 RX ADMIN — METHYLPREDNISOLONE SODIUM SUCCINATE SCH MG: 40 INJECTION, POWDER, FOR SOLUTION INTRAMUSCULAR; INTRAVENOUS at 11:52

## 2020-04-21 RX ADMIN — INSULIN ASPART SCH UNIT: 100 INJECTION, SOLUTION INTRAVENOUS; SUBCUTANEOUS at 00:12

## 2020-04-21 RX ADMIN — PROPOFOL SCH MLS/HR: 10 INJECTION, EMULSION INTRAVENOUS at 11:28

## 2020-04-21 RX ADMIN — IPRATROPIUM BROMIDE AND ALBUTEROL SULFATE SCH ML: .5; 3 SOLUTION RESPIRATORY (INHALATION) at 02:12

## 2020-04-21 RX ADMIN — INSULIN ASPART SCH UNIT: 100 INJECTION, SOLUTION INTRAVENOUS; SUBCUTANEOUS at 11:52

## 2020-04-21 RX ADMIN — METHYLPREDNISOLONE SODIUM SUCCINATE SCH MG: 40 INJECTION, POWDER, FOR SOLUTION INTRAMUSCULAR; INTRAVENOUS at 06:09

## 2020-04-21 RX ADMIN — INSULIN ASPART SCH UNIT: 100 INJECTION, SOLUTION INTRAVENOUS; SUBCUTANEOUS at 23:25

## 2020-04-21 RX ADMIN — PROPOFOL SCH MLS/HR: 10 INJECTION, EMULSION INTRAVENOUS at 03:25

## 2020-04-21 RX ADMIN — INSULIN ASPART SCH UNIT: 100 INJECTION, SOLUTION INTRAVENOUS; SUBCUTANEOUS at 17:56

## 2020-04-21 RX ADMIN — APIXABAN SCH MG: 5 TABLET, FILM COATED ORAL at 06:09

## 2020-04-21 RX ADMIN — PROPOFOL SCH MLS/HR: 10 INJECTION, EMULSION INTRAVENOUS at 19:22

## 2020-04-21 RX ADMIN — POTASSIUM CHLORIDE SCH MEQ: 1500 TABLET, EXTENDED RELEASE ORAL at 05:09

## 2020-04-21 RX ADMIN — METHYLPREDNISOLONE SODIUM SUCCINATE SCH MG: 40 INJECTION, POWDER, FOR SOLUTION INTRAMUSCULAR; INTRAVENOUS at 23:25

## 2020-04-21 RX ADMIN — METHYLPREDNISOLONE SODIUM SUCCINATE SCH MG: 40 INJECTION, POWDER, FOR SOLUTION INTRAMUSCULAR; INTRAVENOUS at 17:56

## 2020-04-21 RX ADMIN — MAGNESIUM SULFATE IN DEXTROSE SCH MLS/HR: 10 INJECTION, SOLUTION INTRAVENOUS at 05:08

## 2020-04-21 RX ADMIN — BUDESONIDE SCH MG: 0.5 SUSPENSION RESPIRATORY (INHALATION) at 06:55

## 2020-04-21 RX ADMIN — IPRATROPIUM BROMIDE AND ALBUTEROL SULFATE SCH ML: .5; 3 SOLUTION RESPIRATORY (INHALATION) at 13:55

## 2020-04-21 RX ADMIN — PROPOFOL SCH MLS/HR: 10 INJECTION, EMULSION INTRAVENOUS at 23:26

## 2020-04-21 RX ADMIN — DIGOXIN SCH MG: 0.25 INJECTION INTRAMUSCULAR; INTRAVENOUS at 09:43

## 2020-04-21 RX ADMIN — FENTANYL CITRATE SCH MLS/HR: 50 INJECTION, SOLUTION INTRAMUSCULAR; INTRAVENOUS at 23:32

## 2020-04-21 RX ADMIN — FENTANYL CITRATE SCH MLS/HR: 50 INJECTION, SOLUTION INTRAMUSCULAR; INTRAVENOUS at 06:07

## 2020-04-21 RX ADMIN — IPRATROPIUM BROMIDE AND ALBUTEROL SULFATE SCH ML: .5; 3 SOLUTION RESPIRATORY (INHALATION) at 06:55

## 2020-04-21 RX ADMIN — FENTANYL CITRATE SCH MLS/HR: 50 INJECTION, SOLUTION INTRAMUSCULAR; INTRAVENOUS at 10:36

## 2020-04-21 RX ADMIN — METOPROLOL TARTRATE SCH MG: 50 TABLET, FILM COATED ORAL at 09:43

## 2020-04-21 RX ADMIN — AMIODARONE HYDROCHLORIDE SCH MG: 200 TABLET ORAL at 09:42

## 2020-04-21 RX ADMIN — Medication SCH MLS/HR: at 10:39

## 2020-04-21 RX ADMIN — IPRATROPIUM BROMIDE AND ALBUTEROL SULFATE SCH ML: .5; 3 SOLUTION RESPIRATORY (INHALATION) at 10:33

## 2020-04-21 RX ADMIN — PROPOFOL SCH MLS/HR: 10 INJECTION, EMULSION INTRAVENOUS at 15:44

## 2020-04-21 RX ADMIN — POTASSIUM CHLORIDE SCH MLS/HR: 200 INJECTION, SOLUTION INTRAVENOUS at 07:05

## 2020-04-21 RX ADMIN — LISINOPRIL SCH MG: 20 TABLET ORAL at 09:43

## 2020-04-21 RX ADMIN — RISPERIDONE SCH MG: 2 TABLET, FILM COATED ORAL at 06:07

## 2020-04-21 RX ADMIN — IPRATROPIUM BROMIDE AND ALBUTEROL SULFATE SCH ML: .5; 3 SOLUTION RESPIRATORY (INHALATION) at 18:04

## 2020-04-21 RX ADMIN — APIXABAN SCH MG: 5 TABLET, FILM COATED ORAL at 17:56

## 2020-04-21 RX ADMIN — PANTOPRAZOLE SODIUM SCH MG: 40 INJECTION, POWDER, FOR SOLUTION INTRAVENOUS at 21:26

## 2020-04-21 RX ADMIN — MORPHINE SULFATE PRN MG: 4 INJECTION, SOLUTION INTRAMUSCULAR; INTRAVENOUS at 02:53

## 2020-04-21 RX ADMIN — METOPROLOL TARTRATE SCH MG: 50 TABLET, FILM COATED ORAL at 21:26

## 2020-04-21 RX ADMIN — POTASSIUM CHLORIDE SCH MLS/HR: 200 INJECTION, SOLUTION INTRAVENOUS at 06:09

## 2020-04-21 RX ADMIN — POTASSIUM CHLORIDE SCH MLS/HR: 200 INJECTION, SOLUTION INTRAVENOUS at 07:00

## 2020-04-21 RX ADMIN — PANTOPRAZOLE SODIUM SCH MG: 40 INJECTION, POWDER, FOR SOLUTION INTRAVENOUS at 09:42

## 2020-04-21 RX ADMIN — POTASSIUM CHLORIDE SCH MLS/HR: 200 INJECTION, SOLUTION INTRAVENOUS at 05:08

## 2020-04-21 RX ADMIN — HYDRALAZINE HYDROCHLORIDE PRN MG: 20 INJECTION INTRAMUSCULAR; INTRAVENOUS at 03:17

## 2020-04-21 RX ADMIN — BUDESONIDE SCH MG: 0.5 SUSPENSION RESPIRATORY (INHALATION) at 18:05

## 2020-04-21 RX ADMIN — BUMETANIDE SCH MG: 0.25 INJECTION, SOLUTION INTRAMUSCULAR; INTRAVENOUS at 02:46

## 2020-04-21 RX ADMIN — INSULIN ASPART SCH UNIT: 100 INJECTION, SOLUTION INTRAVENOUS; SUBCUTANEOUS at 06:09

## 2020-04-21 RX ADMIN — RISPERIDONE SCH MG: 2 TABLET, FILM COATED ORAL at 09:43

## 2020-04-21 RX ADMIN — IPRATROPIUM BROMIDE AND ALBUTEROL SULFATE SCH ML: .5; 3 SOLUTION RESPIRATORY (INHALATION) at 21:15

## 2020-04-21 RX ADMIN — PROPOFOL SCH MLS/HR: 10 INJECTION, EMULSION INTRAVENOUS at 06:10

## 2020-04-21 RX ADMIN — METHYLPREDNISOLONE SODIUM SUCCINATE SCH MG: 40 INJECTION, POWDER, FOR SOLUTION INTRAMUSCULAR; INTRAVENOUS at 00:12

## 2020-04-21 RX ADMIN — POTASSIUM CHLORIDE SCH MLS/HR: 200 INJECTION, SOLUTION INTRAVENOUS at 06:10

## 2020-04-21 NOTE — NUR
This RN called patient's son, Gabriel, to give update on patient status. Per Dr. James patient 
is not ready to be weaned off vent. We will continue to monitor.

## 2020-04-21 NOTE — NUR
RD FOLLOW-UP



Est kcal needs: 0630-5830 kcal | 15-18 kcal/kg 

Est Pro needs:   kcal | 0.8-1.0 g Pro/kg 



Note pt currently receiving Jevity 1.5 at rate of 15ml/hr with 100ml flushes q6h for 
hydration status. 

Note abnormal lab value of glu 301 (H), per chart review. 



Would recommend switching from Jevity 1.5 to Glucerna 1.5, for better glucose control and 
increased protein for perceived benefit to wound healing. 

Would recommend continuing at rate of 15ml/hr, with progression to 45ml/hr as medically able 
and as tolerated. 

At goal rate, provides 1620 kcal (15 kcal/kg); 89 g Pro (0.8 g Pro/kg); and 820ml free 
water. With flushes, provides 1220ml free water. 



Will continue to follow and reassess as pt needs, intake, and status change. 



JAG Jesus, MS, RD, LD

[phone]

## 2020-04-21 NOTE — PROGRESS NOTE - HOSPITALIST
Subjective


HPI/CC On Admission


Date Seen by Provider:  2020


Time Seen by Provider:  10:15


Pradeep Fowler is a 69-year-old male with past medical history of hypertension, 

diabetes, peripheral artery disease, coronary artery disease, AAA, COPD, who 

presented with altered mental status.  He was reportedly brought in by friend.  

He had supposedly been taking some narcotics for which he been prescribed.  He 

was given a dose of Narcan and his mental status improved.  He was started on 

BiPAP in the ICU.  The morning after his admission, he remained lethargic and an

ABG showed acute hypercapnia.  He was intubated due to acute hypercapnic 

respiratory failure.  At the time of my examination he is intubated and sedated.


Subjective/Events-last exam


Cardioversion yesterday at 1600 worked very well and currently NSR 74


No extubation planned for today


OGT tolerated well





Objective


Exam


Vital Signs





Vital Signs








  Date Time  Temp Pulse Resp B/P (MAP) Pulse Ox O2 Delivery O2 Flow Rate FiO2


 


20 21:15  66 15  92   40


 


20 20:00 36.6       


 


20 20:00    133/48 (76)  Mechanical Ventilator 40.00 





Capillary Refill : Less Than 3 Seconds


General Appearance:  No Apparent Distress, WD/WN, Chronically ill


Respiratory:  Chest Non Tender, Lungs Clear, Normal Breath Sounds, No Accessory 

Muscle Use, No Respiratory Distress


Cardiovascular:  Regular Rate, Rhythm, No Edema, No Gallop, No JVD, No Murmur, 

Normal Peripheral Pulses





Results/Procedures


Lab


Laboratory Tests


20 03:00








Patient resulted labs reviewed.


Imaging:  Reviewed Imaging Report





Assessment/Plan


Assessment and Plan


Assess & Plan/Chief Complaint


Assessment/Plan





(1) Sepsis


Status:  Acute


Assessment & Plan:  : Poor prognosis, Patient on Sepsis IVF protocol, 

continue IV antibiotics, Dr James consulted for critical care, blood cultures 

pending


4/15: Off Levaphed today, continue to monitor blood pressure, continue IV 

antibiotics, patient is not following commands when sedation is turned off


: Prognosis remains guarded, tube feeds started today, blood pressures 

running high, restarted blood pressure medications


: Dr james managing critical care, continue IV antibiotics, possible wean 

and extubation over the weekend


: failed extubation today and small amount of aspiration of tube feeding 

possible so monitor that closely


Qualifiers:  


   Qualified Codes:  A41.9 - Sepsis, unspecified organism; R65.21 - Severe 

sepsis with septic shock; N17.9 - Acute kidney failure, unspecified


(2) Acute respiratory failure with hypoxia and hypercapnia


Status:  Acute


Assessment & Plan:  : Currently Intubated, Dr James managing vent





(3) Acute kidney injury superimposed on chronic kidney disease


Status:  Resolved


Assessment & Plan:  : Will monitor daily BUN/Cr





(4) NSTEMI (non-ST elevation myocardial infarction)


Status:  Acute


Assessment & Plan:  : Cardiology consulted, appreciate recommendations, 

Recent Cath 2020: BNP elevated, patient given IV lasix, diuresing well





(5) Upper GI bleed


Status:  Acute


Assessment & Plan:  : Today started having blood tinged fluid from OG, 

Lovenox stopped and started on IV protonix


4/15: Hgb dropped today, Will get iron studies


: stable hgb 8.4





(6) CAD (coronary artery disease)


Status:  Chronic


Qualifiers:  


   Qualified Codes:  I25.10 - Atherosclerotic heart disease of native coronary 

artery without angina pectoris


(7) Hypertension


Status:  Chronic


Assessment & Plan:  : Currently hypotensive and on Levaphed for Sepsis


4/15: Off pressors


: Restarted Lisinopril today


Qualifiers:  


   Qualified Codes:  I10 - Essential (primary) hypertension


(8) Diabetes type 2, uncontrolled


Status:  Chronic


Qualifiers:  


   Qualified Codes:  E11.65 - Type 2 diabetes mellitus with hyperglycemia


(9) COPD (chronic obstructive pulmonary disease)


Status:  Chronic


Qualifiers:  


   Qualified Codes:  J44.9 - Chronic obstructive pulmonary disease, unspecified


(10) Normocytic anemia


Status:  Acute


Assessment & Plan:  : Iron deficient, will consider Fe replacement when 

patient stablizes





(11) Pneumonia


Status:  Acute


Qualifiers:  


   Qualified Codes:  J18.9 - Pneumonia, unspecified organism


(12) DVT prophylaxis


Status:  Acute


Assessment & Plan:  : On Lovenox, stopped today due to GI bleeding


4/15: Continues off Lovenox, SCDs





13) Meth use


(14) AF w/RVR s/p cardioversion successful





Clinical Quality Measures


DVT/VTE Risk/Contraindication:


Risk Factor Score Per Nursin


RFS Level Per Nursing on Admit:  4+=Very High











DAY,JASEN DO                2020 11:37

## 2020-04-21 NOTE — NUR
This RN notified Tele-ICU of Pt Hypertension with SBP on Arterial Line reading 190's 
systolic. New order received at this time, see eMAR and order hx.

## 2020-04-21 NOTE — NUR
CUFF PRESSURE 22 CM

-------------------------------------------------------------------------------

Addendum: 04/21/20 at 2120 by DORY RUSSELL RT

-------------------------------------------------------------------------------

Amended: Links added.

## 2020-04-21 NOTE — WOUND CARE ASSESSMENT
Wound Care Assessment


Date Seen by Provider:  Apr 21, 2020


Time Seen by Provider:  09:48


Chief Complaint


R foot wound.


HPI


The patient is a 69 year old male diabetic with a R medial foot wound s/p R 1st 

and 2nd ray amputation per Dr. Dempsey.  The incision is clean and intact.   I 

would not recommend removal of sutures at this time.  Xeroform dressings 

ordered.  Will see again as needed, and, if discharged, will follow-up in Wound 

Center.


Past Medical History:  Admits Diabetes Type II, Admits Heart Disease (Atrial 

flutter, CAD, drug abuse.), Admits Peripheral Artery Disease


Smoking Status:  Current Everyday Smoker


Recreational Drug Use:  Yes


Alcohol Use:  Rarely Uses


Review of Systems


Other systems


The patient is ventilated.  No ROS obtainable





Exam





Vital Signs








  Date Time  Temp Pulse Resp B/P (MAP) Pulse Ox O2 Delivery O2 Flow Rate FiO2


 


4/21/20 09:00  71 4 177/68 (104) 99 Mechanical Ventilator 40.00 


 


4/21/20 07:09        40


 


4/21/20 03:19 36.9       





Capillary Refill : Less Than 3 Seconds


General Appearance:  no apparent distress (Sedated.)


Cardiovascular:  regular rate, rhythm


Respiratory:  normal breath sounds


Gastrointestinal:  non tender, soft


Extremities:  other (Intact R medial foot incision with some eschar.)





Results


Laboratory Tests


4/20/20 11:53: Glucometer 350H


4/20/20 17:46: Glucometer 321H


4/20/20 23:57: Glucometer 320H


4/21/20 03:00: 


White Blood Count 6.3, Red Blood Count 2.96L, Hemoglobin 8.8L, Hematocrit 29L, 

Mean Corpuscular Volume 98, Mean Corpuscular Hemoglobin 30, Mean Corpuscular 

Hemoglobin Concent 30L, Red Cell Distribution Width 16.5H, Platelet Count 166, 

Mean Platelet Volume 11.6H, Neutrophils (%) (Auto) 92H, Lymphocytes (%) (Auto) 

5L, Monocytes (%) (Auto) 3, Eosinophils (%) (Auto) 0, Basophils (%) (Auto) 0, 

Neutrophils # (Auto) 5.8, Lymphocytes # (Auto) 0.3L, Monocytes # (Auto) 0.2, 

Eosinophils # (Auto) 0.0, Basophils # (Auto) 0.0, Blood Gas Puncture Site RT ART

LINE, Blood Gas Patient Temperature 36.9, Arterial Blood pH 7.45H, Arterial 

Blood Partial Pressure CO2 53H, Arterial Blood Partial Pressure O2 89, Arterial 

Blood HCO3 37H, Arterial Blood Total CO2 38.3H, Arterial Blood Oxygen Saturation

97, Arterial Blood Base Excess 12.0H, Rojelio Test ART LINE, Blood Gas Ventilator 

Setting YES, Blood Gas Inspired Oxygen 40%, Sodium Level 136, Potassium Level 

3.8, Chloride Level 94L, Carbon Dioxide Level 30, Anion Gap 12, Blood Urea 

Nitrogen 27H, Creatinine 1.02, Estimat Glomerular Filtration Rate > 60, 

BUN/Creatinine Ratio 26, Glucose Level 301H, Calcium Level 8.3L, Phosphorus 

Level 3.4, Magnesium Level 2.0





Microbiology


4/13/20 Catheter Tip Culture - Final, Complete


          No growth


4/13/20 Gram Stain - Final, Complete


          


4/13/20 Sputum Culture - Final, Complete


          No growth


4/12/20 Urine Culture - Final, Complete


          NO GROWTH





Assessment/Plan/Dx


1. S/P R 1st and 2nd ray amputation, with intact incision and no sign of 

infection.





2. Diabetes with foot ulcer, surgically closed.





3. Respiratory failure.





Plan: Xeroform dressings, leave sutures in place, will follow as needed.











YULISA DENNIS MD             Apr 21, 2020 09:53

## 2020-04-21 NOTE — DIAGNOSTIC IMAGING REPORT
EXAMINATION: Chest 1 view



INDICATION: Altered mental status. Methamphetamine abuse.



COMPARISON: Chest radiograph on 04/20/2020.



FINDINGS: The right internal jugular central line, endotracheal

tube, and enteric tube have a stable configuration.



Lung volumes are improved. Continued consolidative opacities are

seen in the left lung base with likely small left pleural

effusion. No large pneumothorax. Stable cardiac silhouette. No

acute osseous abnormalities.



IMPRESSION: 



1. Improved lung volumes.

2. Unchanged consolidative opacities in the left lung base with

small left pleural effusion.

3. Stable support devices.



Dictated by: 



  Dictated on workstation # [id number]

## 2020-04-21 NOTE — PULMONARY PROGRESS NOTE
Subjective


Time Seen by a Provider:  05:28


Subjective/Events-last exam


Sedated on vent





Sepsis Event


Evaluation


Height, Weight, BMI


Height: 5'8.50"


Weight: 206lbs. 0.8oz. 93.119264aj; 30.95 BMI


Method:Stated





Exam


Exam





Vital Signs








  Date Time  Temp Pulse Resp B/P (MAP) Pulse Ox O2 Delivery O2 Flow Rate FiO2


 


4/21/20 05:00  73 9 142/70 (94) 92 Mechanical Ventilator 40.00 


 


4/21/20 04:00      Mechanical Ventilator  40


 


4/21/20 04:00  75 9 129/66 (87) 93 Mechanical Ventilator 40.00 


 


4/21/20 03:25  75  161/79    


 


4/21/20 03:19 36.9       


 


4/21/20 03:00  75 12 161/79 (106) 93 Mechanical Ventilator 40.00 


 


4/21/20 02:12  79 17  94   60


 


4/21/20 02:00      Mechanical Ventilator 40.00 


 


4/21/20 02:00  75 13 166/92 (116) 93 Mechanical Ventilator 40.00 


 


4/21/20 01:00  77 21 156/59 (91) 94 Mechanical Ventilator 50.00 


 


4/21/20 01:00  79      


 


4/21/20 00:09 36.3       


 


4/21/20 00:00  77 17 149/52 (84) 95 Mechanical Ventilator 50.00 


 


4/21/20 00:00      Mechanical Ventilator  60


 


4/20/20 23:02      Mechanical Ventilator 50.00 


 


4/20/20 23:00  82 18 145/46 (79) 97 Mechanical Ventilator 60.00 


 


4/20/20 22:00  81 18 153/50 (84) 97 Mechanical Ventilator 60.00 


 


4/20/20 21:48  87 17  96   60


 


4/20/20 21:00  84 21 146/59 (88) 92 Mechanical Ventilator 60.00 


 


4/20/20 20:50  78  107/61    


 


4/20/20 20:00      Mechanical Ventilator  60


 


4/20/20 20:00  80 18 144/45 (78) 97 Mechanical Ventilator 60.00 


 


4/20/20 19:11 37.0       


 


4/20/20 19:00  77 13 141/45 (77) 95 Mechanical Ventilator 60.00 


 


4/20/20 19:00  77      


 


4/20/20 18:22  78 17  94   60


 


4/20/20 18:00  80 14 133/47 (75) 95 Mechanical Ventilator 60.00 


 


4/20/20 17:00  75 14 126/43 (70) 94 Mechanical Ventilator 60.00 


 


4/20/20 16:41  156  120/47    


 


4/20/20 16:00      Mechanical Ventilator  60


 


4/20/20 16:00  156 18 120/47 (71) 93 Mechanical Ventilator 60.00 


 


4/20/20 15:52 37.0       


 


4/20/20 15:00  156 19 120/50 (73) 96 Mechanical Ventilator 60.00 


 


4/20/20 14:14  123 17  94   60


 


4/20/20 14:00  154 16 109/46 (67) 93 Mechanical Ventilator 60.00 


 


4/20/20 13:00  98 20 123/43 (69) 94 Mechanical Ventilator 60.00 


 


4/20/20 12:48    135/54    


 


4/20/20 12:13  152      


 


4/20/20 12:00      Mechanical Ventilator  60


 


4/20/20 12:00 37.1       


 


4/20/20 12:00  152 20 147/56 (86) 94 Mechanical Ventilator 60.00 


 


4/20/20 11:00  135 22 135/51 (79) 93 Mechanical Ventilator 60.00 


 


4/20/20 10:28  147 24  92   60


 


4/20/20 10:00  154 21 146/53 (84) 93 Mechanical Ventilator 60.00 


 


4/20/20 09:00  154 28 115/41 (65) 92 Mechanical Ventilator 60.00 


 


4/20/20 08:00  158 22 126/47 (73) 96 Mechanical Ventilator 60.00 


 


4/20/20 08:00 36.5       


 


4/20/20 08:00      Mechanical Ventilator  60


 


4/20/20 07:16  114 21  94   60


 


4/20/20 07:00  158 25 118/43 (68) 94 Mechanical Ventilator 60.00 


 


4/20/20 07:00  113      


 


4/20/20 06:23  160  109/55    


 


4/20/20 06:00  160 20 109/55 (73) 94 Mechanical Ventilator 60.00 














I & O 


 


 4/21/20





 07:00


 


Intake Total 650 ml


 


Output Total 1475 ml


 


Balance -825 ml








Height & Weight


Height: 5'8.50"


Weight: 206lbs. 0.8oz. 93.236859ws; 30.95 BMI


Method:Stated


General Appearance:  No Apparent Distress, WD/WN, Chronically ill, Other 

(sedated)


HEENT:  TMs Normal, Other (Pupils equal/reactive 3 mm bilaterally)


Respiratory:  Lungs Clear


Cardiovascular:  Irregularly Irregular, Tachycardia


Capillary Refill:  Less Than 3 Seconds


Gastrointestinal:  soft, no organomegaly


Extremity:  Normal Inspection, No Pedal Edema


Neurologic/Psychiatric:  Other (sedated)


Skin:  Normal Color, Warm/Dry





Results


Lab


Laboratory Tests


4/20/20 03:18








4/21/20 03:00











Assessment/Plan


Assessment/Plan


Acute respiratory failure 


   - COVID is negative 


   -bilateral dopplers -- negative


   - CTA of chest -neg for PE shows small bilateral pleural effusions


    PEEP 14 decrease RR to 14- repeat abg in 1hr 


   -Change Sedation to Propofol and fentany. 


   -Resume TF at 15cc/hr. 


   - decrease to bumex 1mg daily 


Aflutter


   -Cardiology s/p ZAMZAM with cardioversion 


CHF EF 45% with grade 1 diastolic dysfunction 


   -Cardiology following


sepsis with PICC line


   -1/2 BC + -- probably contamination 


   -Culture of picc tip is negative 


   -Currently on Zosyn only Vanco d/c'd 


   -Pan cultures pending


Pneumonia with possible aspiration 


   -Pan cultures 


   -MRSA swab - neg 


   -COVID is negative 


   -Influenza is negative 


   -Continue Zosyn 


   -RVP is pending 


HTN


   -start lopressor PO 


   -Continue lisinopril 


UDS is positive for methamphetamine 


NSTEMI


   -Cardiology following 


Hypophos, hypomag 


   -replace 


Acute renal failure with dehydration 


   -IVF











GUILLERMINA MERA DO              Apr 21, 2020 05:35

## 2020-04-21 NOTE — CARDIOLOGY PROGRESS NOTE
Cardiology SOAP Progress Note


Subjective:


Intubated/sedated.





Objective:


I&O/Vital Signs











 4/21/20 4/21/20 4/21/20 4/21/20





 02:00 02:00 02:12 03:00


 


Pulse 75  79 75


 


Resp 13  17 12


 


B/P (MAP) 166/92 (116)   161/79 (106)


 


Pulse Ox 93  94 93


 


O2 Delivery Mechanical Ventilator Mechanical Ventilator  Mechanical Ventilator


 


O2 Flow Rate 40.00 40.00  40.00


 


FiO2   60 





 4/21/20 4/21/20 4/21/20 4/21/20





 03:19 03:25 04:00 04:00


 


Temp 36.9   


 


Pulse  75 75 


 


Resp   9 


 


B/P (MAP)  161/79 129/66 (87) 


 


Pulse Ox   93 


 


O2 Delivery   Mechanical Ventilator Mechanical Ventilator


 


O2 Flow Rate   40.00 


 


FiO2    40


 


    





 4/21/20 4/21/20 4/21/20 4/21/20





 05:00 06:08 06:10 06:56


 


Pulse 73 53 53 71


 


Resp 9 18  15


 


B/P (MAP) 142/70 (94) 136/77 (96) 136/77 


 


Pulse Ox 92 99  94


 


O2 Delivery Mechanical Ventilator Mechanical Ventilator  


 


O2 Flow Rate 40.00 40.00  


 


FiO2    40





 4/21/20 4/21/20 4/21/20 4/21/20





 07:00 07:00 07:09 08:00


 


Pulse 72 75 71 74


 


Resp 5  15 5


 


B/P (MAP) 146/70 (95)   142/73 (96)


 


Pulse Ox 99  94 99


 


O2 Delivery Mechanical Ventilator   Mechanical Ventilator


 


O2 Flow Rate 40.00   40.00


 


FiO2   40 





 4/21/20 4/21/20 4/21/20 4/21/20





 08:20 08:20 09:00 10:03


 


Temp  37.0  


 


Pulse   71 73


 


Resp   4 5


 


B/P (MAP)   177/68 (104) 171/65 (100)


 


Pulse Ox   99 93


 


O2 Delivery Mechanical Ventilator  Mechanical Ventilator Mechanical Ventilator


 


O2 Flow Rate   40.00 40.00


 


FiO2 40   


 


    





 4/21/20 4/21/20 4/21/20 4/21/20





 10:34 11:00 11:28 13:08


 


Pulse 73 72 69 66


 


Resp 18 11  


 


B/P (MAP)  175/64 (101) 174/65 


 


Pulse Ox 93 93  


 


O2 Delivery  Mechanical Ventilator  


 


O2 Flow Rate  40.00  


 


FiO2 40   














 4/21/20





 00:00


 


Intake Total 260 ml


 


Output Total 600 ml


 


Balance -340 ml








Weight (Pounds):  206


Weight (Ounces):  0.8


Weight (Calculated Kilograms):  93.335546


Constitutional:  other (on mec vent, unresponsive)


Respiratory:  No accessory muscle use; other (fair to good bilat air entry)


Cardiovascular:  regular rate-rhythm, S1 and S2, systolic murmur (soft BUTCH at 

card base)


Gastrointestional:  hernia (abdominal), audible bowel sounds, other (mildly 

distended)


Extremities:  swelling (mild edema), other (R foot in dressing that wasn't 

removed); No clubbing, No cyanosis


Neurologic/Psychiatric:  other (unresponsive, on mech vent)


Skin:  No rash on exposed areas, No ulcerations on exposed areas; other 

(dressing to right foot, D&I)





Results/Procedures:


Labs


Laboratory Tests


4/20/20 17:46: Glucometer 321H


4/20/20 23:57: Glucometer 320H


4/21/20 03:00: 


White Blood Count 6.3, Red Blood Count 2.96L, Hemoglobin 8.8L, Hematocrit 29L, 

Mean Corpuscular Volume 98, Mean Corpuscular Hemoglobin 30, Mean Corpuscular 

Hemoglobin Concent 30L, Red Cell Distribution Width 16.5H, Platelet Count 166, 

Mean Platelet Volume 11.6H, Neutrophils (%) (Auto) 92H, Lymphocytes (%) (Auto) 

5L, Monocytes (%) (Auto) 3, Eosinophils (%) (Auto) 0, Basophils (%) (Auto) 0, 

Neutrophils # (Auto) 5.8, Lymphocytes # (Auto) 0.3L, Monocytes # (Auto) 0.2, 

Eosinophils # (Auto) 0.0, Basophils # (Auto) 0.0, Blood Gas Puncture Site RT ART

LINE, Blood Gas Patient Temperature 36.9, Arterial Blood pH 7.45H, Arterial 

Blood Partial Pressure CO2 53H, Arterial Blood Partial Pressure O2 89, Arterial 

Blood HCO3 37H, Arterial Blood Total CO2 38.3H, Arterial Blood Oxygen Saturation

97, Arterial Blood Base Excess 12.0H, Rojelio Test ART LINE, Blood Gas Ventilator 

Setting YES, Blood Gas Inspired Oxygen 40%, Sodium Level 136, Potassium Level 

3.8, Chloride Level 94L, Carbon Dioxide Level 30, Anion Gap 12, Blood Urea 

Nitrogen 27H, Creatinine 1.02, Estimat Glomerular Filtration Rate > 60, 

BUN/Creatinine Ratio 26, Glucose Level 301H, Calcium Level 8.3L, Phosphorus 

Level 3.4, Magnesium Level 2.0


4/21/20 11:34: Glucometer 259H





Microbiology


4/13/20 Catheter Tip Culture - Final, Complete


          No growth


4/13/20 Gram Stain - Final, Complete


          


4/13/20 Sputum Culture - Final, Complete


          No growth


4/12/20 Urine Culture - Final, Complete


          NO GROWTH








A/P:


Assessment/Dx:


Acute resp failure and shock of undetermined etiology; shock resolved





Atrial flutter with 2-1 AV block ; status post cardioversion.  Currently in 

sinus rhythm.  Continue Cardizem, amiodarone and Eliquis through NG tube.





Mild acute on chronic diastolic congestive heart failure.  Agree with IV 

diuretics.





Cannot exclude pulmonary embolism. Was started on treatment dose Lovenox at 

admission, but being stopped on 4/15/20 due to falling H & H and falling 

platelet count.  However due to atrial fibrillation/atrial flutter, the patient 

is started on Eliquis.





Acute renal failure (AYAH-3) likely due to shock - resolved





Mild troponin elevation, likely type-2 MI due to reasons noted above





Drug test (+) for Meth on 4-





CAD. Card cath of Jan 2017 showed moderate, nonobstructive disease and LVEF 55% 

and elevated LVEDP. MPI of 2/27/20 by Dr Dai showed no ischemia or infarction

and normal LV function





Echo on 4/14/20: LVEF 45%, grade 1 fajardo dysfunction, mild to mod TR, RVSP 29 

mmHg





Chronic tobacco use (smokes cigarettes according to previous records)





H/o DM II





H/o Htn





H/o Hyperlipidemia





PAD.  Peripheral angiogram and intervention on 8/22/2019 by Dr Dai with PTA 

of the right AT, PT and stenting of the right SFA. Significant improvement of 

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


Plan:








* Complex management


* Prognosis guarded


* Atrial flutter with 2-1 AV block; no response with IV amiodarone and Cardizem.

   Informed consent was taken from the son for transesophageal echocardiogram 

  assisted cardioversion.  Risk of damage to the esophagus and stroke was 

  discussed.  The son accepted and gave informed consent.  Transesophageal 

  echocardiogram was done on 4/20/2020 which showed no left atrium or left 

  atrial appendage thrombus.  Cardioversion was performed with 200 J which 

  converted the patient to sinus rhythm.








Thank you for your consultation. Please call me if you have any questions.








WOODY Dai MD, FACP, FACC, FSCAI, FHRS, CCDS


Interventional Cardiology


Cardiac Electrophysiology


Vascular Medicine and Endovascular Interventions











ELISE DAI MD             Apr 21, 2020 13:26

## 2020-04-22 VITALS — DIASTOLIC BLOOD PRESSURE: 37 MMHG | SYSTOLIC BLOOD PRESSURE: 122 MMHG

## 2020-04-22 VITALS — SYSTOLIC BLOOD PRESSURE: 97 MMHG | DIASTOLIC BLOOD PRESSURE: 38 MMHG

## 2020-04-22 VITALS — SYSTOLIC BLOOD PRESSURE: 104 MMHG | DIASTOLIC BLOOD PRESSURE: 37 MMHG

## 2020-04-22 VITALS — DIASTOLIC BLOOD PRESSURE: 50 MMHG | SYSTOLIC BLOOD PRESSURE: 155 MMHG

## 2020-04-22 VITALS — DIASTOLIC BLOOD PRESSURE: 46 MMHG | SYSTOLIC BLOOD PRESSURE: 137 MMHG

## 2020-04-22 VITALS — DIASTOLIC BLOOD PRESSURE: 38 MMHG | SYSTOLIC BLOOD PRESSURE: 97 MMHG

## 2020-04-22 VITALS — SYSTOLIC BLOOD PRESSURE: 112 MMHG | DIASTOLIC BLOOD PRESSURE: 36 MMHG

## 2020-04-22 VITALS — SYSTOLIC BLOOD PRESSURE: 125 MMHG | DIASTOLIC BLOOD PRESSURE: 41 MMHG

## 2020-04-22 VITALS — DIASTOLIC BLOOD PRESSURE: 38 MMHG | SYSTOLIC BLOOD PRESSURE: 124 MMHG

## 2020-04-22 VITALS — SYSTOLIC BLOOD PRESSURE: 126 MMHG | DIASTOLIC BLOOD PRESSURE: 60 MMHG

## 2020-04-22 VITALS — SYSTOLIC BLOOD PRESSURE: 134 MMHG | DIASTOLIC BLOOD PRESSURE: 43 MMHG

## 2020-04-22 VITALS — DIASTOLIC BLOOD PRESSURE: 35 MMHG | SYSTOLIC BLOOD PRESSURE: 98 MMHG

## 2020-04-22 VITALS — DIASTOLIC BLOOD PRESSURE: 33 MMHG | SYSTOLIC BLOOD PRESSURE: 136 MMHG

## 2020-04-22 VITALS — DIASTOLIC BLOOD PRESSURE: 35 MMHG | SYSTOLIC BLOOD PRESSURE: 113 MMHG

## 2020-04-22 VITALS — SYSTOLIC BLOOD PRESSURE: 144 MMHG | DIASTOLIC BLOOD PRESSURE: 46 MMHG

## 2020-04-22 VITALS — DIASTOLIC BLOOD PRESSURE: 34 MMHG | SYSTOLIC BLOOD PRESSURE: 93 MMHG

## 2020-04-22 VITALS — DIASTOLIC BLOOD PRESSURE: 42 MMHG | SYSTOLIC BLOOD PRESSURE: 141 MMHG

## 2020-04-22 VITALS — SYSTOLIC BLOOD PRESSURE: 126 MMHG | DIASTOLIC BLOOD PRESSURE: 62 MMHG

## 2020-04-22 VITALS — SYSTOLIC BLOOD PRESSURE: 138 MMHG | DIASTOLIC BLOOD PRESSURE: 47 MMHG

## 2020-04-22 VITALS — SYSTOLIC BLOOD PRESSURE: 102 MMHG | DIASTOLIC BLOOD PRESSURE: 31 MMHG

## 2020-04-22 VITALS — SYSTOLIC BLOOD PRESSURE: 105 MMHG | DIASTOLIC BLOOD PRESSURE: 36 MMHG

## 2020-04-22 VITALS — SYSTOLIC BLOOD PRESSURE: 155 MMHG | DIASTOLIC BLOOD PRESSURE: 52 MMHG

## 2020-04-22 VITALS — DIASTOLIC BLOOD PRESSURE: 46 MMHG | SYSTOLIC BLOOD PRESSURE: 147 MMHG

## 2020-04-22 VITALS — DIASTOLIC BLOOD PRESSURE: 51 MMHG | SYSTOLIC BLOOD PRESSURE: 155 MMHG

## 2020-04-22 VITALS — SYSTOLIC BLOOD PRESSURE: 114 MMHG | DIASTOLIC BLOOD PRESSURE: 35 MMHG

## 2020-04-22 VITALS — DIASTOLIC BLOOD PRESSURE: 36 MMHG | SYSTOLIC BLOOD PRESSURE: 116 MMHG

## 2020-04-22 VITALS — DIASTOLIC BLOOD PRESSURE: 34 MMHG | SYSTOLIC BLOOD PRESSURE: 90 MMHG

## 2020-04-22 VITALS — DIASTOLIC BLOOD PRESSURE: 47 MMHG | SYSTOLIC BLOOD PRESSURE: 135 MMHG

## 2020-04-22 VITALS — DIASTOLIC BLOOD PRESSURE: 39 MMHG | SYSTOLIC BLOOD PRESSURE: 118 MMHG

## 2020-04-22 VITALS — DIASTOLIC BLOOD PRESSURE: 38 MMHG | SYSTOLIC BLOOD PRESSURE: 104 MMHG

## 2020-04-22 VITALS — DIASTOLIC BLOOD PRESSURE: 41 MMHG | SYSTOLIC BLOOD PRESSURE: 103 MMHG

## 2020-04-22 LAB
ARTERIAL PATENCY WRIST A: (no result)
BASE EXCESS STD BLDA CALC-SCNC: 11 MMOL/L (ref -2.5–2.5)
BASOPHILS # BLD AUTO: 0 10^3/UL (ref 0–0.1)
BASOPHILS NFR BLD AUTO: 0 % (ref 0–10)
BDY SITE: (no result)
BODY TEMPERATURE: 36.8
BUN/CREAT SERPL: 41
CALCIUM SERPL-MCNC: 8.1 MG/DL (ref 8.5–10.1)
CHLORIDE SERPL-SCNC: 95 MMOL/L (ref 98–107)
CO2 BLDA CALC-SCNC: 37.2 MMOL/L (ref 21–31)
CO2 SERPL-SCNC: 31 MMOL/L (ref 21–32)
CREAT SERPL-MCNC: 1.03 MG/DL (ref 0.6–1.3)
EOSINOPHIL # BLD AUTO: 0 10^3/UL (ref 0–0.3)
EOSINOPHIL NFR BLD AUTO: 0 % (ref 0–10)
ERYTHROCYTE [DISTWIDTH] IN BLOOD BY AUTOMATED COUNT: 16.5 % (ref 10–14.5)
GFR SERPLBLD BASED ON 1.73 SQ M-ARVRAT: > 60 ML/MIN
GLUCOSE SERPL-MCNC: 265 MG/DL (ref 70–105)
HCT VFR BLD CALC: 29 % (ref 40–54)
HGB BLD-MCNC: 9.1 G/DL (ref 13.3–17.7)
INHALED O2 FLOW RATE: (no result) L/MIN
LYMPHOCYTES # BLD AUTO: 0.3 X 10^3 (ref 1–4)
LYMPHOCYTES NFR BLD AUTO: 5 % (ref 12–44)
MAGNESIUM SERPL-MCNC: 1.9 MG/DL (ref 1.6–2.4)
MANUAL DIFFERENTIAL PERFORMED BLD QL: NO
MCH RBC QN AUTO: 30 PG (ref 25–34)
MCHC RBC AUTO-ENTMCNC: 31 G/DL (ref 32–36)
MCV RBC AUTO: 98 FL (ref 80–99)
MONOCYTES # BLD AUTO: 0.3 X 10^3 (ref 0–1)
MONOCYTES NFR BLD AUTO: 4 % (ref 0–12)
NEUTROPHILS # BLD AUTO: 6.4 X 10^3 (ref 1.8–7.8)
NEUTROPHILS NFR BLD AUTO: 92 % (ref 42–75)
PCO2 BLDA: 52 MMHG (ref 35–45)
PH BLDA: 7.45 [PH] (ref 7.37–7.43)
PHOSPHATE SERPL-MCNC: 3.7 MG/DL (ref 2.3–4.7)
PLATELET # BLD: 177 10^3/UL (ref 130–400)
PMV BLD AUTO: 11.5 FL (ref 7.4–10.4)
PO2 BLDA: 73 MMHG (ref 79–93)
POTASSIUM SERPL-SCNC: 4.3 MMOL/L (ref 3.6–5)
SAO2 % BLDA FROM PO2: 94 % (ref 94–100)
SODIUM SERPL-SCNC: 136 MMOL/L (ref 135–145)
VENTILATION MODE VENT: YES
WBC # BLD AUTO: 7 10^3/UL (ref 4.3–11)

## 2020-04-22 RX ADMIN — IPRATROPIUM BROMIDE AND ALBUTEROL SULFATE SCH ML: .5; 3 SOLUTION RESPIRATORY (INHALATION) at 22:10

## 2020-04-22 RX ADMIN — PANTOPRAZOLE SODIUM SCH MG: 40 INJECTION, POWDER, FOR SOLUTION INTRAVENOUS at 08:21

## 2020-04-22 RX ADMIN — IPRATROPIUM BROMIDE AND ALBUTEROL SULFATE SCH ML: .5; 3 SOLUTION RESPIRATORY (INHALATION) at 11:00

## 2020-04-22 RX ADMIN — POTASSIUM CHLORIDE SCH MLS/HR: 200 INJECTION, SOLUTION INTRAVENOUS at 03:58

## 2020-04-22 RX ADMIN — INSULIN ASPART SCH UNIT: 100 INJECTION, SOLUTION INTRAVENOUS; SUBCUTANEOUS at 17:36

## 2020-04-22 RX ADMIN — LISINOPRIL SCH MG: 20 TABLET ORAL at 08:21

## 2020-04-22 RX ADMIN — PROPOFOL SCH MLS/HR: 10 INJECTION, EMULSION INTRAVENOUS at 02:55

## 2020-04-22 RX ADMIN — IPRATROPIUM BROMIDE AND ALBUTEROL SULFATE SCH ML: .5; 3 SOLUTION RESPIRATORY (INHALATION) at 18:13

## 2020-04-22 RX ADMIN — FENTANYL CITRATE SCH MLS/HR: 50 INJECTION, SOLUTION INTRAMUSCULAR; INTRAVENOUS at 11:30

## 2020-04-22 RX ADMIN — POTASSIUM CHLORIDE SCH MEQ: 1500 TABLET, EXTENDED RELEASE ORAL at 03:58

## 2020-04-22 RX ADMIN — BUDESONIDE SCH MG: 0.5 SUSPENSION RESPIRATORY (INHALATION) at 06:11

## 2020-04-22 RX ADMIN — BUDESONIDE SCH MG: 0.5 SUSPENSION RESPIRATORY (INHALATION) at 18:13

## 2020-04-22 RX ADMIN — METHYLPREDNISOLONE SODIUM SUCCINATE SCH MG: 40 INJECTION, POWDER, FOR SOLUTION INTRAMUSCULAR; INTRAVENOUS at 06:06

## 2020-04-22 RX ADMIN — PANTOPRAZOLE SODIUM SCH MG: 40 INJECTION, POWDER, FOR SOLUTION INTRAVENOUS at 21:50

## 2020-04-22 RX ADMIN — APIXABAN SCH MG: 5 TABLET, FILM COATED ORAL at 06:06

## 2020-04-22 RX ADMIN — APIXABAN SCH MG: 5 TABLET, FILM COATED ORAL at 17:35

## 2020-04-22 RX ADMIN — METHYLPREDNISOLONE SODIUM SUCCINATE SCH MG: 40 INJECTION, POWDER, FOR SOLUTION INTRAMUSCULAR; INTRAVENOUS at 12:25

## 2020-04-22 RX ADMIN — INSULIN ASPART SCH UNIT: 100 INJECTION, SOLUTION INTRAVENOUS; SUBCUTANEOUS at 12:25

## 2020-04-22 RX ADMIN — METOPROLOL TARTRATE SCH MG: 50 TABLET, FILM COATED ORAL at 08:21

## 2020-04-22 RX ADMIN — IPRATROPIUM BROMIDE AND ALBUTEROL SULFATE SCH ML: .5; 3 SOLUTION RESPIRATORY (INHALATION) at 14:09

## 2020-04-22 RX ADMIN — RISPERIDONE SCH MG: 2 TABLET, FILM COATED ORAL at 08:22

## 2020-04-22 RX ADMIN — IPRATROPIUM BROMIDE AND ALBUTEROL SULFATE SCH ML: .5; 3 SOLUTION RESPIRATORY (INHALATION) at 06:11

## 2020-04-22 RX ADMIN — AMIODARONE HYDROCHLORIDE SCH MG: 200 TABLET ORAL at 08:22

## 2020-04-22 RX ADMIN — BUMETANIDE SCH MG: 0.25 INJECTION, SOLUTION INTRAMUSCULAR; INTRAVENOUS at 03:00

## 2020-04-22 RX ADMIN — IPRATROPIUM BROMIDE AND ALBUTEROL SULFATE SCH ML: .5; 3 SOLUTION RESPIRATORY (INHALATION) at 01:53

## 2020-04-22 RX ADMIN — PROPOFOL SCH MLS/HR: 10 INJECTION, EMULSION INTRAVENOUS at 08:22

## 2020-04-22 RX ADMIN — METHYLPREDNISOLONE SODIUM SUCCINATE SCH MG: 40 INJECTION, POWDER, FOR SOLUTION INTRAMUSCULAR; INTRAVENOUS at 17:36

## 2020-04-22 RX ADMIN — DIGOXIN SCH MG: 0.25 INJECTION INTRAMUSCULAR; INTRAVENOUS at 08:21

## 2020-04-22 RX ADMIN — PROPOFOL SCH MLS/HR: 10 INJECTION, EMULSION INTRAVENOUS at 06:38

## 2020-04-22 RX ADMIN — METOPROLOL TARTRATE SCH MG: 50 TABLET, FILM COATED ORAL at 21:49

## 2020-04-22 RX ADMIN — Medication SCH MLS/HR: at 10:00

## 2020-04-22 RX ADMIN — INSULIN ASPART SCH UNIT: 100 INJECTION, SOLUTION INTRAVENOUS; SUBCUTANEOUS at 06:37

## 2020-04-22 RX ADMIN — MAGNESIUM SULFATE IN DEXTROSE SCH MLS/HR: 10 INJECTION, SOLUTION INTRAVENOUS at 03:58

## 2020-04-22 NOTE — PULMONARY PROGRESS NOTE
Subjective


Date Seen by a Provider:  Apr 22, 2020


Time Seen by a Provider:  04:25


Subjective/Events-last exam


Pt is still requiring a lot of oxygen support and PEEP.





Sepsis Event


Evaluation


Height, Weight, BMI


Height: 5'8.50"


Weight: 206lbs. 0.8oz. 93.247665ac; 30.95 BMI


Method:Stated





Exam


Exam





Vital Signs








  Date Time  Temp Pulse Resp B/P (MAP) Pulse Ox O2 Delivery O2 Flow Rate FiO2


 


4/22/20 04:03      Mechanical Ventilator  40


 


4/22/20 04:00  80 13 138/47 (77) 92 Mechanical Ventilator 40.00 


 


4/22/20 03:42 36.8       


 


4/22/20 03:00  81 12 155/52 (86) 92 Mechanical Ventilator 40.00 


 


4/22/20 02:55  80  148/51    


 


4/22/20 02:00  70 13 97/38 (57) 93 Mechanical Ventilator 40.00 


 


4/22/20 01:58  84   94  55.00 


 


4/22/20 01:53  70 14  92   40


 


4/22/20 01:00  70 14 103/41 (61) 92 Mechanical Ventilator 40.00 


 


4/22/20 00:50  72      


 


4/22/20 00:00  71 13 135/47 (76) 93 Mechanical Ventilator 40.00 


 


4/21/20 23:42 36.8       


 


4/21/20 23:42      Mechanical Ventilator  40


 


4/21/20 23:26  73  131/48    


 


4/21/20 23:00  73 13 124/46 (72) 93 Mechanical Ventilator 40.00 


 


4/21/20 22:00  72 14 129/48 (75) 92 Mechanical Ventilator 40.00 


 


4/21/20 21:15  66 15  92   40


 


4/21/20 21:00  64 10 128/47 (74) 92 Mechanical Ventilator 40.00 


 


4/21/20 20:00 36.6       


 


4/21/20 20:00      Mechanical Ventilator  40


 


4/21/20 20:00  64 11 133/48 (76) 92 Mechanical Ventilator 40.00 


 


4/21/20 19:22  66  132/49    


 


4/21/20 19:00  65 13 133/48 (76) 92 Mechanical Ventilator 40.00 


 


4/21/20 19:00  65      


 


4/21/20 18:05  65 14  91   40


 


4/21/20 18:03  67 8 128/49 (75) 92 Mechanical Ventilator 40.00 


 


4/21/20 17:00  64 13 122/47 (72) 92 Mechanical Ventilator 40.00 


 


4/21/20 16:01  63 7 142/54 (83) 93 Mechanical Ventilator 40.00 


 


4/21/20 16:00 36.7       


 


4/21/20 15:44  63  152/57    


 


4/21/20 15:00  64 12 157/59 (91) 93 Mechanical Ventilator 40.00 


 


4/21/20 14:00  66 12 162/60 (94) 93 Mechanical Ventilator 40.00 


 


4/21/20 13:56  67 14  92   40


 


4/21/20 13:08  66      


 


4/21/20 13:00  65 12 171/64 (99) 93 Mechanical Ventilator 40.00 


 


4/21/20 12:00  69 14 181/68 (105) 93 Mechanical Ventilator 40.00 


 


4/21/20 12:00      Mechanical Ventilator  40


 


4/21/20 11:28  69  174/65    


 


4/21/20 11:00  72 11 175/64 (101) 93 Mechanical Ventilator 40.00 


 


4/21/20 10:34  73 18  93   40


 


4/21/20 10:03  73 5 171/65 (100) 93 Mechanical Ventilator 40.00 


 


4/21/20 09:00  71 4 177/68 (104) 99 Mechanical Ventilator 40.00 


 


4/21/20 08:20 37.0       


 


4/21/20 08:20      Mechanical Ventilator  40


 


4/21/20 08:00  74 5 142/73 (96) 99 Mechanical Ventilator 40.00 


 


4/21/20 07:09  71 15  94   40


 


4/21/20 07:00  75      


 


4/21/20 07:00  72 5 146/70 (95) 99 Mechanical Ventilator 40.00 


 


4/21/20 06:56  71 15  94   40


 


4/21/20 06:10  53  136/77    


 


4/21/20 06:08  53 18 136/77 (96) 99 Mechanical Ventilator 40.00 


 


4/21/20 05:00  73 9 142/70 (94) 92 Mechanical Ventilator 40.00 














I & O 


 


 4/22/20





 07:00


 


Intake Total 1935 ml


 


Output Total 1050 ml


 


Balance 885 ml








Height & Weight


Height: 5'8.50"


Weight: 206lbs. 0.8oz. 93.656908pp; 30.95 BMI


Method:Stated


General Appearance:  No Apparent Distress, WD/WN, Chronically ill


HEENT:  TMs Normal, Other (Pupils equal/reactive 3 mm bilaterally)


Respiratory:  Chest Non Tender, Lungs Clear, Normal Breath Sounds, No Accessory 

Muscle Use, No Respiratory Distress


Cardiovascular:  Regular Rate, Rhythm, No Edema, No Gallop, No JVD, No Murmur, 

Normal Peripheral Pulses


Capillary Refill:  Less Than 3 Seconds


Gastrointestinal:  non tender, soft


Extremity:  Normal Inspection, No Pedal Edema


Neurologic/Psychiatric:  Other (sedated)


Skin:  Normal Color, Warm/Dry





Results


Lab


Laboratory Tests


4/21/20 03:00








4/22/20 03:13











Assessment/Plan


Assessment/Plan


Acute respiratory failure 


   -Decrease PEEP to 10 


   - COVID is negative 


   -bilateral dopplers -- negative


   - CTA of chest -neg for PE shows small bilateral pleural effusions


    PEEP 14 decrease RR to 14- repeat abg in 1hr 


   -Change Sedation to Propofol and fentany. 


   -Resume TF at 15cc/hr. 


   - bumex 1mg daily 


   -Abx have completed. 


Aflutter


   -Cardiology s/p ZAMZAM with cardioversion 


CHF EF 45% with grade 1 diastolic dysfunction 


   -Cardiology following


sepsis with PICC line


   -1/2 BC + -- probably contamination 


   -Culture of picc tip is negative 


   -Currently on Zosyn only Vanco d/c'd 


   -Pan cultures pending


Pneumonia with possible aspiration 


   -Pan cultures 


   -MRSA swab - neg 


   -COVID is negative 


   -Influenza is negative 


   -Continue Zosyn 


   -RVP is pending 


HTN


   -start lopressor PO 


   -Continue lisinopril 


UDS is positive for methamphetamine 


NSTEMI


   -Cardiology following 


Hypophos, hypomag 


   -replace 


Acute renal failure with dehydration 


   -IVF











GUILLERMINA MERA DO              Apr 22, 2020 04:29

## 2020-04-22 NOTE — CARDIOLOGY PROGRESS NOTE
Cardiology SOAP Progress Note


Subjective:


Intubated/ventilated





Objective:


I&O/Vital Signs











 4/24/20 4/24/20 4/24/20 4/24/20





 03:59 04:00 04:00 04:01


 


Temp    36.4


 


Pulse   57 


 


Resp   13 


 


B/P (MAP)   145/42 (76) 


 


Pulse Ox   96 


 


O2 Delivery  Mechanical Ventilator Mechanical Ventilator 


 


O2 Flow Rate 45.00  45.00 


 


FiO2  45  


 


    





 4/24/20 4/24/20 4/24/20 4/24/20





 05:00 05:06 06:00 06:40


 


Pulse 56 57 59 57


 


Resp 14  14 


 


B/P (MAP) 137/40 (72) 136/40 125/34 (64) 


 


Pulse Ox 95  95 


 


O2 Delivery Mechanical Ventilator  Mechanical Ventilator 


 


O2 Flow Rate 45.00  45.00 





 4/24/20 4/24/20 4/24/20 4/24/20





 06:45 07:00 07:43 07:55


 


Temp    36.4


 


Pulse 59 57  


 


Resp 15 12  


 


B/P (MAP)  148/43 (78) 137/39 


 


Pulse Ox 93 94  


 


O2 Delivery  Mechanical Ventilator  


 


O2 Flow Rate  45.00  


 


FiO2 50   


 


    





 4/24/20 4/24/20 4/24/20 4/24/20





 08:00 08:00 09:00 09:56


 


Pulse  57 69 


 


Resp  14 12 


 


B/P (MAP)  135/41 (72) 130/41 (70) 


 


Pulse Ox 93 93 92 


 


O2 Delivery Mechanical Ventilator Mechanical Ventilator Mechanical Ventilator 

Mechanical Ventilator


 


O2 Flow Rate  45.00 45.00 60.00


 


FiO2 45   





 4/24/20 4/24/20 4/24/20 4/24/20





 10:00 10:19 11:00 12:00


 


Pulse 65 72 66 


 


Resp 12 17 11 


 


B/P (MAP) 121/36 (64)  118/35 (62) 


 


Pulse Ox 93 94 93 93


 


O2 Delivery Mechanical Ventilator  Mechanical Ventilator Mechanical Ventilator


 


O2 Flow Rate 60.00  60.00 


 


FiO2  60  60





 4/24/20 4/24/20 4/24/20 4/24/20





 12:00 12:00 12:55 13:00


 


Temp 36.5   


 


Pulse  61 56 57


 


Resp  12  14


 


B/P (MAP)  122/37 (65)  119/36 (63)


 


Pulse Ox  94  94


 


O2 Delivery  Mechanical Ventilator  Mechanical Ventilator


 


O2 Flow Rate  60.00  60.00


 


    





 4/24/20 4/24/20 4/24/20 4/24/20





 14:00 14:00 14:29 14:34


 


Pulse  68 57 


 


Resp  17 17 


 


B/P (MAP) 136/39 151/81 (104)  


 


Pulse Ox  98 96 


 


O2 Delivery  Mechanical Ventilator  Mechanical Ventilator


 


O2 Flow Rate  60.00  50.00


 


FiO2   50 














 4/24/20





 00:00


 


Intake Total 1035 ml


 


Output Total 950 ml


 


Balance 85 ml








Weight (Pounds):  206


Weight (Ounces):  0.8


Weight (Calculated Kilograms):  93.295565


Constitutional:  other (on mec vent, unresponsive)


Respiratory:  No accessory muscle use; other (fair to good bilat air entry)


Cardiovascular:  regular rate-rhythm, S1 and S2, systolic murmur (soft BUTCH at 

card base)


Gastrointestional:  hernia (abdominal), audible bowel sounds, other (mildly 

distended)


Extremities:  swelling (mild edema), other (R foot in dressing that wasn't 

removed); No clubbing, No cyanosis


Neurologic/Psychiatric:  other (unresponsive, on mech vent)


Skin:  No rash on exposed areas, No ulcerations on exposed areas; other 

(dressing to right foot, D&I)





Results/Procedures:


Labs


Laboratory Tests


4/23/20 17:50: Glucometer 254H


4/23/20 23:09: Glucometer 223H


4/24/20 03:35: 


White Blood Count 7.3, Red Blood Count 3.17L, Hemoglobin 9.6L, Hematocrit 31L, 

Mean Corpuscular Volume 97, Mean Corpuscular Hemoglobin 30, Mean Corpuscular He

moglobin Concent 31L, Red Cell Distribution Width 16.3H, Platelet Count 173, 

Mean Platelet Volume 11.7H, Neutrophils (%) (Auto) 93H, Lymphocytes (%) (Auto) 

4L, Monocytes (%) (Auto) 4, Eosinophils (%) (Auto) 0, Basophils (%) (Auto) 0, 

Neutrophils # (Auto) 6.7, Lymphocytes # (Auto) 0.3L, Monocytes # (Auto) 0.3, 

Eosinophils # (Auto) 0.0, Basophils # (Auto) 0.0, Blood Gas Puncture Site RIGHT 

RADIAL, Blood Gas Patient Temperature 36.4, Arterial Blood pH 7.44H, Arterial 

Blood Partial Pressure CO2 54H, Arterial Blood Partial Pressure O2 142H, 

Arterial Blood HCO3 36H, Arterial Blood Total CO2 37.9H, Arterial Blood Oxygen 

Saturation 97, Arterial Blood Base Excess 11.3H, Rojelio Test POSITIVE, Blood Gas 

Ventilator Setting YES, Blood Gas Inspired Oxygen 12, Sodium Level 135, 

Potassium Level 4.9, Chloride Level 95L, Carbon Dioxide Level 32, Anion Gap 8, 

Blood Urea Nitrogen 41H, Creatinine 0.81, Estimat Glomerular Filtration Rate > 

60, BUN/Creatinine Ratio 51, Glucose Level 204H, Calcium Level 8.2L, Phosphorus 

Level 3.5, Magnesium Level 2.0, B-Type Natriuretic Peptide 311.9H


4/24/20 11:44: Glucometer 251H





Microbiology


4/13/20 Catheter Tip Culture - Final, Complete


          No growth


4/13/20 Gram Stain - Final, Complete


          


4/13/20 Sputum Culture - Final, Complete


          No growth


4/12/20 Urine Culture - Final, Complete


          NO GROWTH








A/P:


Assessment/Dx:


Acute resp failure and shock of undetermined etiology; shock resolved





Atrial flutter with 2-1 AV block ; status post cardioversion.  Currently in 

sinus rhythm.  Continue Cardizem, amiodarone and Eliquis through NG tube.





Mild acute on chronic diastolic congestive heart failure.  Agree with IV 

diuretics.





Cannot exclude pulmonary embolism. Was started on treatment dose Lovenox at 

admission, but being stopped on 4/15/20 due to falling H & H and falling 

platelet count.  However due to atrial fibrillation/atrial flutter, the patient 

is started on Eliquis.





Acute renal failure (AYAH-3) likely due to shock - resolved





Mild troponin elevation, likely type-2 MI due to reasons noted above





Drug test (+) for Meth on 4-





CAD. Card cath of Jan 2017 showed moderate, nonobstructive disease and LVEF 55% 

and elevated LVEDP. MPI of 2/27/20 by Dr Dai showed no ischemia or infarction

and normal LV function





Echo on 4/14/20: LVEF 45%, grade 1 fajardo dysfunction, mild to mod TR, RVSP 29 

mmHg





Chronic tobacco use (smokes cigarettes according to previous records)





H/o DM II





H/o Htn





H/o Hyperlipidemia





PAD.  Peripheral angiogram and intervention on 8/22/2019 by Dr Dai with PTA 

of the right AT, PT and stenting of the right SFA. Significant improvement of 

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


Plan:








* Complex management


* Prognosis guarded


* Atrial flutter with 2-1 AV block; no response with IV amiodarone and Cardizem.

   Informed consent was taken from the son for transesophageal echocardiogram 

  assisted cardioversion.  Risk of damage to the esophagus and stroke was 

  discussed.  The son accepted and gave informed consent.  Transesophageal 

  echocardiogram was done on 4/20/2020 which showed no left atrium or left 

  atrial appendage thrombus.  Cardioversion was performed with 200 J which 

  converted the patient to sinus rhythm.








Thank you for your consultation. Please call me if you have any questions.








WOODY Dai MD, FACP, FACC, FSCAI, FHRS, CCDS


Interventional Cardiology


Cardiac Electrophysiology


Vascular Medicine and Endovascular Interventions











ELISE DAI MD             Apr 22, 2020 13:49

## 2020-04-22 NOTE — DIAGNOSTIC IMAGING REPORT
Indication: Substance abuse, altered mental status



Portable chest 3:07 AM



There is ET tube projects over the trachea. NG tube projects over

the stomach. Right IJ central line tip projects over the SVC.

There is consolidation of left lung base. Right lung is clear.



IMPRESSION: Left basilar consolidation has increased since

previous day. This could represent aspiration pneumonia.



Dictated by: 



  Dictated on workstation # [id number]

## 2020-04-22 NOTE — PROGRESS NOTE - HOSPITALIST
Subjective


HPI/CC On Admission


Date Seen by Provider:  2020


Time Seen by Provider:  09:30


Pradeep Fowler is a 69-year-old male with past medical history of hypertension, 

diabetes, peripheral artery disease, coronary artery disease, AAA, COPD, who 

presented with altered mental status.  He was reportedly brought in by friend.  

He had supposedly been taking some narcotics for which he been prescribed.  He 

was given a dose of Narcan and his mental status improved.  He was started on 

BiPAP in the ICU.  The morning after his admission, he remained lethargic and an

ABG showed acute hypercapnia.  He was intubated due to acute hypercapnic 

respiratory failure.  At the time of my examination he is intubated and sedated.


Subjective/Events-last exam


NSR remains


Not an extubation candidate today per Dr James


Sedation maintained


Long term prognosis guarded





Objective


Exam


Vital Signs





Vital Signs








  Date Time  Temp Pulse Resp B/P (MAP) Pulse Ox O2 Delivery O2 Flow Rate FiO2


 


20 18:17  90 15  91   35


 


20 18:00    104/37 (59)  Mechanical Ventilator 35.00 


 


20 13:00 37.0       





Capillary Refill : Less Than 3 Seconds


General Appearance:  No Apparent Distress, WD/WN, Chronically ill


Respiratory:  Lungs Clear


Cardiovascular:  Regular Rate, Rhythm





Results/Procedures


Lab


Laboratory Tests


20 03:13








Patient resulted labs reviewed.


Imaging:  Reviewed Imaging Report





Assessment/Plan


Assessment and Plan


Assess & Plan/Chief Complaint


Assessment/Plan





(1) Sepsis


Status:  Acute


Assessment & Plan:  : Poor prognosis, Patient on Sepsis IVF protocol, 

continue IV antibiotics, Dr James consulted for critical care, blood cultures 

pending


4/15: Off Levaphed today, continue to monitor blood pressure, continue IV 

antibiotics, patient is not following commands when sedation is turned off


: Prognosis remains guarded, tube feeds started today, blood pressures 

running high, restarted blood pressure medications


: Dr james managing critical care, continue IV antibiotics, possible wean 

and extubation over the weekend


: failed extubation today and small amount of aspiration of tube feeding 

possible so monitor that closely


Qualifiers:  


   Qualified Codes:  A41.9 - Sepsis, unspecified organism; R65.21 - Severe 

sepsis with septic shock; N17.9 - Acute kidney failure, unspecified


(2) Acute respiratory failure with hypoxia and hypercapnia


Status:  Acute


Assessment & Plan:  : Currently Intubated, Dr James managing vent





(3) Acute kidney injury superimposed on chronic kidney disease


Status:  Resolved


Assessment & Plan:  : Will monitor daily BUN/Cr





(4) NSTEMI (non-ST elevation myocardial infarction)


Status:  Acute


Assessment & Plan:  : Cardiology consulted, appreciate recommendations, 

Recent Cath 2020: BNP elevated, patient given IV lasix, diuresing well





(5) Upper GI bleed


Status:  Acute


Assessment & Plan:  : Today started having blood tinged fluid from OG, 

Lovenox stopped and started on IV protonix


4/15: Hgb dropped today, Will get iron studies


: stable hgb 8.4





(6) CAD (coronary artery disease)


Status:  Chronic


Qualifiers:  


   Qualified Codes:  I25.10 - Atherosclerotic heart disease of native coronary 

artery without angina pectoris


(7) Hypertension


Status:  Chronic


Assessment & Plan:  : Currently hypotensive and on Levaphed for Sepsis


4/15: Off pressors


: Restarted Lisinopril today


Qualifiers:  


   Qualified Codes:  I10 - Essential (primary) hypertension


(8) Diabetes type 2, uncontrolled


Status:  Chronic


Qualifiers:  


   Qualified Codes:  E11.65 - Type 2 diabetes mellitus with hyperglycemia


(9) COPD (chronic obstructive pulmonary disease)


Status:  Chronic


Qualifiers:  


   Qualified Codes:  J44.9 - Chronic obstructive pulmonary disease, unspecified


(10) Normocytic anemia


Status:  Acute


Assessment & Plan:  : Iron deficient, will consider Fe replacement when 

patient stablizes





(11) Pneumonia


Status:  Acute


Qualifiers:  


   Qualified Codes:  J18.9 - Pneumonia, unspecified organism


(12) DVT prophylaxis


Status:  Acute


Assessment & Plan:  : On Lovenox, stopped today due to GI bleeding


4/15: Continues off Lovenox, SCDs





13) Meth use


(14) AF w/RVR s/p cardioversion successful





Plan:


Await extubation





Clinical Quality Measures


DVT/VTE Risk/Contraindication:


Risk Factor Score Per Nursin


RFS Level Per Nursing on Admit:  4+=Very High











JASEN DAY DO                2020 09:41

## 2020-04-23 VITALS — DIASTOLIC BLOOD PRESSURE: 44 MMHG | SYSTOLIC BLOOD PRESSURE: 144 MMHG

## 2020-04-23 VITALS — DIASTOLIC BLOOD PRESSURE: 50 MMHG | SYSTOLIC BLOOD PRESSURE: 148 MMHG

## 2020-04-23 VITALS — DIASTOLIC BLOOD PRESSURE: 44 MMHG | SYSTOLIC BLOOD PRESSURE: 141 MMHG

## 2020-04-23 VITALS — DIASTOLIC BLOOD PRESSURE: 51 MMHG | SYSTOLIC BLOOD PRESSURE: 156 MMHG

## 2020-04-23 VITALS — SYSTOLIC BLOOD PRESSURE: 152 MMHG | DIASTOLIC BLOOD PRESSURE: 48 MMHG

## 2020-04-23 VITALS — DIASTOLIC BLOOD PRESSURE: 41 MMHG | SYSTOLIC BLOOD PRESSURE: 125 MMHG

## 2020-04-23 VITALS — SYSTOLIC BLOOD PRESSURE: 154 MMHG | DIASTOLIC BLOOD PRESSURE: 49 MMHG

## 2020-04-23 VITALS — SYSTOLIC BLOOD PRESSURE: 121 MMHG | DIASTOLIC BLOOD PRESSURE: 37 MMHG

## 2020-04-23 VITALS — DIASTOLIC BLOOD PRESSURE: 74 MMHG | SYSTOLIC BLOOD PRESSURE: 137 MMHG

## 2020-04-23 VITALS — SYSTOLIC BLOOD PRESSURE: 122 MMHG | DIASTOLIC BLOOD PRESSURE: 39 MMHG

## 2020-04-23 VITALS — SYSTOLIC BLOOD PRESSURE: 140 MMHG | DIASTOLIC BLOOD PRESSURE: 66 MMHG

## 2020-04-23 VITALS — DIASTOLIC BLOOD PRESSURE: 38 MMHG | SYSTOLIC BLOOD PRESSURE: 119 MMHG

## 2020-04-23 VITALS — DIASTOLIC BLOOD PRESSURE: 43 MMHG | SYSTOLIC BLOOD PRESSURE: 134 MMHG

## 2020-04-23 VITALS — SYSTOLIC BLOOD PRESSURE: 137 MMHG | DIASTOLIC BLOOD PRESSURE: 44 MMHG

## 2020-04-23 VITALS — DIASTOLIC BLOOD PRESSURE: 37 MMHG | SYSTOLIC BLOOD PRESSURE: 100 MMHG

## 2020-04-23 VITALS — DIASTOLIC BLOOD PRESSURE: 49 MMHG | SYSTOLIC BLOOD PRESSURE: 152 MMHG

## 2020-04-23 VITALS — DIASTOLIC BLOOD PRESSURE: 71 MMHG | SYSTOLIC BLOOD PRESSURE: 144 MMHG

## 2020-04-23 VITALS — SYSTOLIC BLOOD PRESSURE: 145 MMHG | DIASTOLIC BLOOD PRESSURE: 44 MMHG

## 2020-04-23 VITALS — SYSTOLIC BLOOD PRESSURE: 147 MMHG | DIASTOLIC BLOOD PRESSURE: 45 MMHG

## 2020-04-23 VITALS — SYSTOLIC BLOOD PRESSURE: 111 MMHG | DIASTOLIC BLOOD PRESSURE: 36 MMHG

## 2020-04-23 VITALS — DIASTOLIC BLOOD PRESSURE: 73 MMHG | SYSTOLIC BLOOD PRESSURE: 151 MMHG

## 2020-04-23 VITALS — DIASTOLIC BLOOD PRESSURE: 50 MMHG | SYSTOLIC BLOOD PRESSURE: 154 MMHG

## 2020-04-23 VITALS — SYSTOLIC BLOOD PRESSURE: 140 MMHG | DIASTOLIC BLOOD PRESSURE: 45 MMHG

## 2020-04-23 VITALS — DIASTOLIC BLOOD PRESSURE: 51 MMHG | SYSTOLIC BLOOD PRESSURE: 147 MMHG

## 2020-04-23 VITALS — DIASTOLIC BLOOD PRESSURE: 40 MMHG | SYSTOLIC BLOOD PRESSURE: 135 MMHG

## 2020-04-23 VITALS — SYSTOLIC BLOOD PRESSURE: 136 MMHG | DIASTOLIC BLOOD PRESSURE: 43 MMHG

## 2020-04-23 VITALS — SYSTOLIC BLOOD PRESSURE: 162 MMHG | DIASTOLIC BLOOD PRESSURE: 53 MMHG

## 2020-04-23 VITALS — SYSTOLIC BLOOD PRESSURE: 139 MMHG | DIASTOLIC BLOOD PRESSURE: 42 MMHG

## 2020-04-23 LAB
%HYPO/RBC NFR BLD AUTO: SLIGHT %
ANISOCYTOSIS BLD QL SMEAR: SLIGHT
ARTERIAL PATENCY WRIST A: (no result)
BASE EXCESS STD BLDA CALC-SCNC: 10.7 MMOL/L (ref -2.5–2.5)
BASOPHILS # BLD AUTO: 0 10^3/UL (ref 0–0.1)
BASOPHILS NFR BLD AUTO: 0 % (ref 0–10)
BDY SITE: (no result)
BODY TEMPERATURE: 36.6
BUN/CREAT SERPL: 52
CALCIUM SERPL-MCNC: 8 MG/DL (ref 8.5–10.1)
CHLORIDE SERPL-SCNC: 97 MMOL/L (ref 98–107)
CO2 BLDA CALC-SCNC: 37.5 MMOL/L (ref 21–31)
CO2 SERPL-SCNC: 30 MMOL/L (ref 21–32)
CREAT SERPL-MCNC: 0.99 MG/DL (ref 0.6–1.3)
EOSINOPHIL # BLD AUTO: 0 10^3/UL (ref 0–0.3)
EOSINOPHIL NFR BLD AUTO: 0 % (ref 0–10)
ERYTHROCYTE [DISTWIDTH] IN BLOOD BY AUTOMATED COUNT: 16.7 % (ref 10–14.5)
GFR SERPLBLD BASED ON 1.73 SQ M-ARVRAT: > 60 ML/MIN
GLUCOSE SERPL-MCNC: 218 MG/DL (ref 70–105)
HCT VFR BLD CALC: 27 % (ref 40–54)
HGB BLD-MCNC: 8.4 G/DL (ref 13.3–17.7)
INHALED O2 FLOW RATE: (no result) L/MIN
LYMPHOCYTES # BLD AUTO: 0.2 X 10^3 (ref 1–4)
LYMPHOCYTES NFR BLD AUTO: 3 % (ref 12–44)
MAGNESIUM SERPL-MCNC: 2 MG/DL (ref 1.6–2.4)
MANUAL DIFFERENTIAL PERFORMED BLD QL: YES
MCH RBC QN AUTO: 31 PG (ref 25–34)
MCHC RBC AUTO-ENTMCNC: 32 G/DL (ref 32–36)
MCV RBC AUTO: 99 FL (ref 80–99)
MONOCYTES # BLD AUTO: 0.3 X 10^3 (ref 0–1)
MONOCYTES NFR BLD AUTO: 5 % (ref 0–12)
MONOCYTES NFR BLD: 5 %
NEUTROPHILS # BLD AUTO: 6.2 X 10^3 (ref 1.8–7.8)
NEUTROPHILS NFR BLD AUTO: 92 % (ref 42–75)
NEUTS BAND NFR BLD MANUAL: 85 %
NEUTS BAND NFR BLD: 5 %
PCO2 BLDA: 57 MMHG (ref 35–45)
PH BLDA: 7.41 [PH] (ref 7.37–7.43)
PHOSPHATE SERPL-MCNC: 3.7 MG/DL (ref 2.3–4.7)
PLATELET # BLD: 168 10^3/UL (ref 130–400)
PMV BLD AUTO: 11.5 FL (ref 7.4–10.4)
PO2 BLDA: 72 MMHG (ref 79–93)
POTASSIUM SERPL-SCNC: 4.4 MMOL/L (ref 3.6–5)
SAO2 % BLDA FROM PO2: 94 % (ref 94–100)
SODIUM SERPL-SCNC: 137 MMOL/L (ref 135–145)
VARIANT LYMPHS NFR BLD MANUAL: 5 %
VENTILATION MODE VENT: YES
WBC # BLD AUTO: 6.8 10^3/UL (ref 4.3–11)

## 2020-04-23 RX ADMIN — BUDESONIDE SCH MG: 0.5 SUSPENSION RESPIRATORY (INHALATION) at 18:47

## 2020-04-23 RX ADMIN — APIXABAN SCH MG: 5 TABLET, FILM COATED ORAL at 17:23

## 2020-04-23 RX ADMIN — INSULIN ASPART SCH UNIT: 100 INJECTION, SOLUTION INTRAVENOUS; SUBCUTANEOUS at 12:00

## 2020-04-23 RX ADMIN — METOPROLOL TARTRATE SCH MG: 50 TABLET, FILM COATED ORAL at 09:26

## 2020-04-23 RX ADMIN — FENTANYL CITRATE SCH MLS/HR: 50 INJECTION, SOLUTION INTRAMUSCULAR; INTRAVENOUS at 01:56

## 2020-04-23 RX ADMIN — METOPROLOL TARTRATE SCH MG: 50 TABLET, FILM COATED ORAL at 20:29

## 2020-04-23 RX ADMIN — METHYLPREDNISOLONE SODIUM SUCCINATE SCH MG: 40 INJECTION, POWDER, FOR SOLUTION INTRAMUSCULAR; INTRAVENOUS at 17:23

## 2020-04-23 RX ADMIN — IPRATROPIUM BROMIDE AND ALBUTEROL SULFATE SCH ML: .5; 3 SOLUTION RESPIRATORY (INHALATION) at 02:45

## 2020-04-23 RX ADMIN — Medication SCH MLS/HR: at 10:06

## 2020-04-23 RX ADMIN — IPRATROPIUM BROMIDE AND ALBUTEROL SULFATE SCH ML: .5; 3 SOLUTION RESPIRATORY (INHALATION) at 10:16

## 2020-04-23 RX ADMIN — AMIODARONE HYDROCHLORIDE SCH MG: 200 TABLET ORAL at 09:26

## 2020-04-23 RX ADMIN — IPRATROPIUM BROMIDE AND ALBUTEROL SULFATE SCH ML: .5; 3 SOLUTION RESPIRATORY (INHALATION) at 14:04

## 2020-04-23 RX ADMIN — PANTOPRAZOLE SODIUM SCH MG: 40 INJECTION, POWDER, FOR SOLUTION INTRAVENOUS at 20:29

## 2020-04-23 RX ADMIN — IPRATROPIUM BROMIDE AND ALBUTEROL SULFATE SCH ML: .5; 3 SOLUTION RESPIRATORY (INHALATION) at 18:47

## 2020-04-23 RX ADMIN — INSULIN ASPART SCH UNIT: 100 INJECTION, SOLUTION INTRAVENOUS; SUBCUTANEOUS at 00:32

## 2020-04-23 RX ADMIN — METHYLPREDNISOLONE SODIUM SUCCINATE SCH MG: 40 INJECTION, POWDER, FOR SOLUTION INTRAMUSCULAR; INTRAVENOUS at 23:19

## 2020-04-23 RX ADMIN — INSULIN ASPART SCH UNIT: 100 INJECTION, SOLUTION INTRAVENOUS; SUBCUTANEOUS at 18:11

## 2020-04-23 RX ADMIN — POTASSIUM CHLORIDE SCH MLS/HR: 200 INJECTION, SOLUTION INTRAVENOUS at 05:44

## 2020-04-23 RX ADMIN — BUDESONIDE SCH MG: 0.5 SUSPENSION RESPIRATORY (INHALATION) at 06:44

## 2020-04-23 RX ADMIN — PROPOFOL SCH MLS/HR: 10 INJECTION, EMULSION INTRAVENOUS at 15:27

## 2020-04-23 RX ADMIN — POTASSIUM CHLORIDE SCH MEQ: 1500 TABLET, EXTENDED RELEASE ORAL at 05:44

## 2020-04-23 RX ADMIN — LISINOPRIL SCH MG: 20 TABLET ORAL at 09:26

## 2020-04-23 RX ADMIN — MAGNESIUM SULFATE IN DEXTROSE SCH MLS/HR: 10 INJECTION, SOLUTION INTRAVENOUS at 05:44

## 2020-04-23 RX ADMIN — METHYLPREDNISOLONE SODIUM SUCCINATE SCH MG: 40 INJECTION, POWDER, FOR SOLUTION INTRAMUSCULAR; INTRAVENOUS at 05:54

## 2020-04-23 RX ADMIN — FENTANYL CITRATE SCH MLS/HR: 50 INJECTION, SOLUTION INTRAMUSCULAR; INTRAVENOUS at 23:42

## 2020-04-23 RX ADMIN — METHYLPREDNISOLONE SODIUM SUCCINATE SCH MG: 40 INJECTION, POWDER, FOR SOLUTION INTRAMUSCULAR; INTRAVENOUS at 12:00

## 2020-04-23 RX ADMIN — PROPOFOL SCH MLS/HR: 10 INJECTION, EMULSION INTRAVENOUS at 00:26

## 2020-04-23 RX ADMIN — DIGOXIN SCH MG: 0.25 INJECTION INTRAMUSCULAR; INTRAVENOUS at 09:26

## 2020-04-23 RX ADMIN — APIXABAN SCH MG: 5 TABLET, FILM COATED ORAL at 05:54

## 2020-04-23 RX ADMIN — BUMETANIDE SCH MG: 0.25 INJECTION, SOLUTION INTRAMUSCULAR; INTRAVENOUS at 04:15

## 2020-04-23 RX ADMIN — PROPOFOL SCH MLS/HR: 10 INJECTION, EMULSION INTRAVENOUS at 23:20

## 2020-04-23 RX ADMIN — METHYLPREDNISOLONE SODIUM SUCCINATE SCH MG: 40 INJECTION, POWDER, FOR SOLUTION INTRAMUSCULAR; INTRAVENOUS at 00:26

## 2020-04-23 RX ADMIN — INSULIN ASPART SCH UNIT: 100 INJECTION, SOLUTION INTRAVENOUS; SUBCUTANEOUS at 05:54

## 2020-04-23 RX ADMIN — INSULIN ASPART SCH UNIT: 100 INJECTION, SOLUTION INTRAVENOUS; SUBCUTANEOUS at 23:20

## 2020-04-23 RX ADMIN — IPRATROPIUM BROMIDE AND ALBUTEROL SULFATE SCH ML: .5; 3 SOLUTION RESPIRATORY (INHALATION) at 06:44

## 2020-04-23 RX ADMIN — FENTANYL CITRATE SCH MLS/HR: 50 INJECTION, SOLUTION INTRAMUSCULAR; INTRAVENOUS at 12:26

## 2020-04-23 RX ADMIN — PANTOPRAZOLE SODIUM SCH MG: 40 INJECTION, POWDER, FOR SOLUTION INTRAVENOUS at 09:25

## 2020-04-23 RX ADMIN — IPRATROPIUM BROMIDE AND ALBUTEROL SULFATE SCH ML: .5; 3 SOLUTION RESPIRATORY (INHALATION) at 22:18

## 2020-04-23 NOTE — NUR
RD FOLLOW-UP



Est kcal needs: 0659-0778 kcal | 15-18 kcal/kg 

Est Pro needs:   kcal | 0.8-1.0 g Pro/kg 



Note pt currently receiving Glucerna 1.5 at rate of 30ml/hr with 100ml flushes q6h for 
hydration status. 

Note abnormal lab value of glu 218 (H), per chart review. 



Would recommend continuing at rate of 30ml/hr, with progression to 45ml/hr as medically able 
and as tolerated. 

At goal rate, provides 1620 kcal (15 kcal/kg); 89 g Pro (0.8 g Pro/kg); and 820ml free 
water. With flushes, provides 1220ml free water. 



Will continue to follow and reassess as pt needs, intake, and status change. 



JAG Jesus MS, RD, LD

[phone]

## 2020-04-23 NOTE — PULMONARY PROGRESS NOTE
Subjective


Time Seen by a Provider:  05:00


Subjective/Events-last exam


Pt is sedated on vent.





Sepsis Event


Evaluation


Height, Weight, BMI


Height: 5'8.50"


Weight: 206lbs. 0.8oz. 93.834821sm; 30.95 BMI


Method:Stated





Exam


Exam





Vital Signs








  Date Time  Temp Pulse Resp B/P (MAP) Pulse Ox O2 Delivery O2 Flow Rate FiO2


 


4/23/20 04:00  85 13 140/45 (76) 96 Mechanical Ventilator 45.00 


 


4/23/20 03:00  78 12 111/36 (61) 96 Mechanical Ventilator 45.00 


 


4/23/20 02:45  78 14  92   50


 


4/23/20 02:00  81 14 119/38 (65) 94 Mechanical Ventilator 45.00 


 


4/23/20 01:00  81 14 100/37 (58) 94 Mechanical Ventilator 45.00 


 


4/23/20 01:00  80      


 


4/23/20 00:26  95  155/50    


 


4/23/20 00:00 37.2       


 


4/23/20 00:00      Mechanical Ventilator  40


 


4/23/20 00:00  87 13 125/41 (69) 93 Mechanical Ventilator 45.00 


 


4/22/20 23:00  95 14 155/50 (85) 96 Mechanical Ventilator 45.00 


 


4/22/20 22:28      Mechanical Ventilator 45.00 


 


4/22/20 22:10  85 15  93   40


 


4/22/20 22:00  83 14 118/39 (65) 93 Mechanical Ventilator 40.00 


 


4/22/20 21:00  87 11 113/35 (61) 94 Mechanical Ventilator 40.00 


 


4/22/20 20:00  85 12 122/37 (65) 93 Mechanical Ventilator 40.00 


 


4/22/20 20:00      Mechanical Ventilator  40


 


4/22/20 19:42 37.3 87 14 134/43 (73) 92 Mechanical Ventilator 40.00 


 


4/22/20 19:00  90      


 


4/22/20 19:00  89 12 124/38 (66) 87 Mechanical Ventilator 35.00 


 


4/22/20 18:17  90 15  91   35


 


4/22/20 18:00  89 11 104/37 (59) 89 Mechanical Ventilator 35.00 


 


4/22/20 17:00  85 10 136/33 (67) 92 Mechanical Ventilator 35.00 


 


4/22/20 16:00  77 12 98/35 (56) 93 Mechanical Ventilator 40.00 


 


4/22/20 16:00      Mechanical Ventilator  40


 


4/22/20 15:00  75 13 102/31 (54) 92 Mechanical Ventilator 40.00 


 


4/22/20 14:10  71 16  92   40


 


4/22/20 14:00  78 12 114/35 (61) 92 Mechanical Ventilator 40.00 


 


4/22/20 13:00 37.0 79  116/36 (62) 93 Mechanical Ventilator 40.00 


 


4/22/20 12:31  74      


 


4/22/20 12:00      Mechanical Ventilator  40


 


4/22/20 12:00  71 14 93/34 (53) 94 Mechanical Ventilator 45.00 


 


4/22/20 11:10      Mechanical Ventilator 45.00 


 


4/22/20 11:00  71 14  96   45


 


4/22/20 11:00  69 11 90/30 (50) 96 Mechanical Ventilator 40.00 


 


4/22/20 10:00  70 14 104/38 (60) 93 Mechanical Ventilator 40.00 


 


4/22/20 09:00  79 12 105/36 (59) 90 Mechanical Ventilator 40.00 


 


4/22/20 08:22  81  121/41    


 


4/22/20 08:00  87 14 147/46 (79) 92 Mechanical Ventilator 40.00 


 


4/22/20 08:00      Mechanical Ventilator  40


 


4/22/20 07:00 37.2 80 10 126/62 (83) 90 Mechanical Ventilator 40.00 


 


4/22/20 06:50  85      


 


4/22/20 06:38    157/50    


 


4/22/20 06:11  80 14  92   40


 


4/22/20 06:00  81 10 155/51 (85) 91 Mechanical Ventilator 40.00 














I & O 


 


 4/23/20





 07:00


 


Intake Total 1180 ml


 


Output Total 590 ml


 


Balance 590 ml








Height & Weight


Height: 5'8.50"


Weight: 206lbs. 0.8oz. 93.836772lt; 30.95 BMI


Method:Stated


General Appearance:  No Apparent Distress, WD/WN, Chronically ill


HEENT:  TMs Normal, Other (Pupils equal/reactive 3 mm bilaterally)


Respiratory:  Lungs Clear


Cardiovascular:  Regular Rate, Rhythm


Capillary Refill:  Less Than 3 Seconds


Gastrointestinal:  non tender, soft


Extremity:  Normal Inspection, No Pedal Edema


Neurologic/Psychiatric:  Other (sedated)


Skin:  Normal Color, Warm/Dry





Results


Lab


Laboratory Tests


4/22/20 03:13








4/23/20 03:05











Assessment/Plan


Assessment/Plan


Acute respiratory failure 


   -PEEP is at 10 


   -Fi02 is currently 50%


      -The only thing keeping pt from weaning currently is the amount of PEEP 

and Fi02 he is requiring. 


   -CXR reviewed 


   - COVID is negative 


   -bilateral dopplers -- negative


   - CTA of chest -neg for PE shows small bilateral pleural effusions


   - Propofol and fentany. Add precedex 


   -TF-Advance TF to 30cc/hr 


   - bumex 1mg daily 


   -Abx have completed. 


   -[facility] consult [facility] for [facility] transfer. 


Aflutter


   -Cardiology s/p ZAMZAM with cardioversion 


   -On Eliquis 


CHF EF 45% with grade 1 diastolic dysfunction 


   -Cardiology following


sepsis with PICC line


   -1/2 BC + -- probably contamination 


   -Culture of picc tip is negative 


   -Currently on Zosyn only Vanco d/c'd 


   -Pan cultures pending


Pneumonia with possible aspiration 


   -Pan cultures 


   -MRSA swab - neg 


   -COVID is negative 


   -Influenza is negative 


   -RVP is negative 


UDS is positive for methamphetamine 





NSTEMI


   -Cardiology following











GUILLERMINA MERA DO              Apr 23, 2020 05:09 spoon/fed by clinician fed by clinician cup/spoon

## 2020-04-23 NOTE — PROGRESS NOTE - HOSPITALIST
Subjective


HPI/CC On Admission


Date Seen by Provider:  2020


Time Seen by Provider:  10:00


Pradeep Fowler is a 69-year-old male with past medical history of hypertension, 

diabetes, peripheral artery disease, coronary artery disease, AAA, COPD, who 

presented with altered mental status.  He was reportedly brought in by friend.  

He had supposedly been taking some narcotics for which he been prescribed.  He 

was given a dose of Narcan and his mental status improved.  He was started on 

BiPAP in the ICU.  The morning after his admission, he remained lethargic and an

ABG showed acute hypercapnia.  He was intubated due to acute hypercapnic 

respiratory failure.  At the time of my examination he is intubated and sedated.


Subjective/Events-last exam


Catherine pending


VDRF


No issues


Patient likely long term vent patient





Objective


Exam


Vital Signs





Vital Signs








  Date Time  Temp Pulse Resp B/P (MAP) Pulse Ox O2 Delivery O2 Flow Rate FiO2


 


20 20:00  64 14 147/45 (79) 94 Mechanical Ventilator 45.00 


 


20 19:13 36.9       


 


20 18:48        50





Capillary Refill : Less Than 3 Seconds


General Appearance:  No Apparent Distress, WD/WN, Chronically ill


Respiratory:  Lungs Clear


Cardiovascular:  Regular Rate, Rhythm





Results/Procedures


Lab


Laboratory Tests


20 03:05








Patient resulted labs reviewed.


Imaging:  Reviewed Imaging Report





Assessment/Plan


Assessment and Plan


Assess & Plan/Chief Complaint


Assessment/Plan





(1) Sepsis


Status:  Acute


Assessment & Plan:  : Poor prognosis, Patient on Sepsis IVF protocol, 

continue IV antibiotics, Dr James consulted for critical care, blood cultures 

pending


4/15: Off Levaphed today, continue to monitor blood pressure, continue IV 

antibiotics, patient is not following commands when sedation is turned off


: Prognosis remains guarded, tube feeds started today, blood pressures 

running high, restarted blood pressure medications


: Dr james managing critical care, continue IV antibiotics, possible wean 

and extubation over the weekend


: failed extubation today and small amount of aspiration of tube feeding 

possible so monitor that closely


Qualifiers:  


   Qualified Codes:  A41.9 - Sepsis, unspecified organism; R65.21 - Severe s

epsis with septic shock; N17.9 - Acute kidney failure, unspecified


(2) Acute respiratory failure with hypoxia and hypercapnia


Status:  Acute


Assessment & Plan:  : Currently Intubated, Dr James managing vent





(3) Acute kidney injury superimposed on chronic kidney disease


Status:  Resolved


Assessment & Plan:  : Will monitor daily BUN/Cr





(4) NSTEMI (non-ST elevation myocardial infarction)


Status:  Acute


Assessment & Plan:  : Cardiology consulted, appreciate recommendations, 

Recent Cath 2020: BNP elevated, patient given IV lasix, diuresing well





(5) Upper GI bleed


Status:  Acute


Assessment & Plan:  : Today started having blood tinged fluid from OG, 

Lovenox stopped and started on IV protonix


4/15: Hgb dropped today, Will get iron studies


: stable hgb 8.4





(6) CAD (coronary artery disease)


Status:  Chronic


Qualifiers:  


   Qualified Codes:  I25.10 - Atherosclerotic heart disease of native coronary 

artery without angina pectoris


(7) Hypertension


Status:  Chronic


Assessment & Plan:  : Currently hypotensive and on Levaphed for Sepsis


4/15: Off pressors


: Restarted Lisinopril today


Qualifiers:  


   Qualified Codes:  I10 - Essential (primary) hypertension


(8) Diabetes type 2, uncontrolled


Status:  Chronic


Qualifiers:  


   Qualified Codes:  E11.65 - Type 2 diabetes mellitus with hyperglycemia


(9) COPD (chronic obstructive pulmonary disease)


Status:  Chronic


Qualifiers:  


   Qualified Codes:  J44.9 - Chronic obstructive pulmonary disease, unspecified


(10) Normocytic anemia


Status:  Acute


Assessment & Plan:  : Iron deficient, will consider Fe replacement when 

patient stablizes





(11) Pneumonia


Status:  Acute


Qualifiers:  


   Qualified Codes:  J18.9 - Pneumonia, unspecified organism


(12) DVT prophylaxis


Status:  Acute


Assessment & Plan:  : On Lovenox, stopped today due to GI bleeding


4/15: Continues off Lovenox, SCDs





13) Meth use


(14) AF w/RVR s/p cardioversion successful





Plan:


Await Catherine





Clinical Quality Measures


DVT/VTE Risk/Contraindication:


Risk Factor Score Per Nursin


RFS Level Per Nursing on Admit:  4+=Very High











JASEN DAY DO                2020 10:01

## 2020-04-23 NOTE — DIAGNOSTIC IMAGING REPORT
Indication: Altered mental status



Chest 3:29 AM



Right IJ central line tip projects over the SVC. Heart size upper

limits of normal. Pulmonary vascularity is normal. There is

improved aeration of left lung base compared to the previous day.



IMPRESSION: Improving chest. No acute abnormality seen.



Dictated by: 



  Dictated on workstation # [id number]

## 2020-04-23 NOTE — CARDIOLOGY PROGRESS NOTE
Cardiology SOAP Progress Note


Subjective:


Intubated/ventilated





Objective:


I&O/Vital Signs











 4/24/20 4/24/20 4/24/20 4/24/20





 03:59 04:00 04:00 04:01


 


Temp    36.4


 


Pulse   57 


 


Resp   13 


 


B/P (MAP)   145/42 (76) 


 


Pulse Ox   96 


 


O2 Delivery  Mechanical Ventilator Mechanical Ventilator 


 


O2 Flow Rate 45.00  45.00 


 


FiO2  45  


 


    





 4/24/20 4/24/20 4/24/20 4/24/20





 05:00 05:06 06:00 06:40


 


Pulse 56 57 59 57


 


Resp 14  14 


 


B/P (MAP) 137/40 (72) 136/40 125/34 (64) 


 


Pulse Ox 95  95 


 


O2 Delivery Mechanical Ventilator  Mechanical Ventilator 


 


O2 Flow Rate 45.00  45.00 





 4/24/20 4/24/20 4/24/20 4/24/20





 06:45 07:00 07:43 07:55


 


Temp    36.4


 


Pulse 59 57  


 


Resp 15 12  


 


B/P (MAP)  148/43 (78) 137/39 


 


Pulse Ox 93 94  


 


O2 Delivery  Mechanical Ventilator  


 


O2 Flow Rate  45.00  


 


FiO2 50   


 


    





 4/24/20 4/24/20 4/24/20 4/24/20





 08:00 08:00 09:00 09:56


 


Pulse  57 69 


 


Resp  14 12 


 


B/P (MAP)  135/41 (72) 130/41 (70) 


 


Pulse Ox 93 93 92 


 


O2 Delivery Mechanical Ventilator Mechanical Ventilator Mechanical Ventilator 

Mechanical Ventilator


 


O2 Flow Rate  45.00 45.00 60.00


 


FiO2 45   





 4/24/20 4/24/20 4/24/20 4/24/20





 10:00 10:19 11:00 12:00


 


Pulse 65 72 66 


 


Resp 12 17 11 


 


B/P (MAP) 121/36 (64)  118/35 (62) 


 


Pulse Ox 93 94 93 93


 


O2 Delivery Mechanical Ventilator  Mechanical Ventilator Mechanical Ventilator


 


O2 Flow Rate 60.00  60.00 


 


FiO2  60  60





 4/24/20 4/24/20 4/24/20 4/24/20





 12:00 12:00 12:55 13:00


 


Temp 36.5   


 


Pulse  61 56 57


 


Resp  12  14


 


B/P (MAP)  122/37 (65)  119/36 (63)


 


Pulse Ox  94  94


 


O2 Delivery  Mechanical Ventilator  Mechanical Ventilator


 


O2 Flow Rate  60.00  60.00


 


    





 4/24/20 4/24/20 4/24/20 4/24/20





 14:00 14:00 14:29 14:34


 


Pulse  68 57 


 


Resp  17 17 


 


B/P (MAP) 136/39 151/81 (104)  


 


Pulse Ox  98 96 


 


O2 Delivery  Mechanical Ventilator  Mechanical Ventilator


 


O2 Flow Rate  60.00  50.00


 


FiO2   50 














 4/24/20





 00:00


 


Intake Total 1035 ml


 


Output Total 950 ml


 


Balance 85 ml








Weight (Pounds):  206


Weight (Ounces):  0.8


Weight (Calculated Kilograms):  93.868238


Constitutional:  other (on mec vent, unresponsive)


Respiratory:  No accessory muscle use; other (fair to good bilat air entry)


Cardiovascular:  regular rate-rhythm, S1 and S2, systolic murmur (soft BUTCH at 

card base)


Gastrointestional:  hernia (abdominal), audible bowel sounds, other (mildly 

distended)


Extremities:  swelling (mild edema), other (R foot in dressing that wasn't 

removed); No clubbing, No cyanosis


Neurologic/Psychiatric:  other (unresponsive, on mech vent)


Skin:  No rash on exposed areas, No ulcerations on exposed areas; other 

(dressing to right foot, D&I)





Results/Procedures:


Labs


Laboratory Tests


4/23/20 17:50: Glucometer 254H


4/23/20 23:09: Glucometer 223H


4/24/20 03:35: 


White Blood Count 7.3, Red Blood Count 3.17L, Hemoglobin 9.6L, Hematocrit 31L, 

Mean Corpuscular Volume 97, Mean Corpuscular Hemoglobin 30, Mean Corpuscular He

moglobin Concent 31L, Red Cell Distribution Width 16.3H, Platelet Count 173, 

Mean Platelet Volume 11.7H, Neutrophils (%) (Auto) 93H, Lymphocytes (%) (Auto) 

4L, Monocytes (%) (Auto) 4, Eosinophils (%) (Auto) 0, Basophils (%) (Auto) 0, 

Neutrophils # (Auto) 6.7, Lymphocytes # (Auto) 0.3L, Monocytes # (Auto) 0.3, 

Eosinophils # (Auto) 0.0, Basophils # (Auto) 0.0, Blood Gas Puncture Site RIGHT 

RADIAL, Blood Gas Patient Temperature 36.4, Arterial Blood pH 7.44H, Arterial 

Blood Partial Pressure CO2 54H, Arterial Blood Partial Pressure O2 142H, 

Arterial Blood HCO3 36H, Arterial Blood Total CO2 37.9H, Arterial Blood Oxygen 

Saturation 97, Arterial Blood Base Excess 11.3H, Rojelio Test POSITIVE, Blood Gas 

Ventilator Setting YES, Blood Gas Inspired Oxygen 12, Sodium Level 135, 

Potassium Level 4.9, Chloride Level 95L, Carbon Dioxide Level 32, Anion Gap 8, 

Blood Urea Nitrogen 41H, Creatinine 0.81, Estimat Glomerular Filtration Rate > 

60, BUN/Creatinine Ratio 51, Glucose Level 204H, Calcium Level 8.2L, Phosphorus 

Level 3.5, Magnesium Level 2.0, B-Type Natriuretic Peptide 311.9H


4/24/20 11:44: Glucometer 251H





Microbiology


4/13/20 Catheter Tip Culture - Final, Complete


          No growth


4/13/20 Gram Stain - Final, Complete


          


4/13/20 Sputum Culture - Final, Complete


          No growth


4/12/20 Urine Culture - Final, Complete


          NO GROWTH








A/P:


Assessment/Dx:


Acute resp failure and shock of undetermined etiology; shock resolved





Atrial flutter with 2-1 AV block ; status post cardioversion.  Currently in 

sinus rhythm.  Continue Cardizem, amiodarone and Eliquis through NG tube.





Mild acute on chronic diastolic congestive heart failure.  Agree with IV 

diuretics.





Cannot exclude pulmonary embolism. Was started on treatment dose Lovenox at 

admission, but being stopped on 4/15/20 due to falling H & H and falling 

platelet count.  However due to atrial fibrillation/atrial flutter, the patient 

is started on Eliquis.





Acute renal failure (AYAH-3) likely due to shock - resolved





Mild troponin elevation, likely type-2 MI due to reasons noted above





Drug test (+) for Meth on 4-





CAD. Card cath of Jan 2017 showed moderate, nonobstructive disease and LVEF 55% 

and elevated LVEDP. MPI of 2/27/20 by Dr Dai showed no ischemia or infarction

and normal LV function





Echo on 4/14/20: LVEF 45%, grade 1 fajardo dysfunction, mild to mod TR, RVSP 29 

mmHg





Chronic tobacco use (smokes cigarettes according to previous records)





H/o DM II





H/o Htn





H/o Hyperlipidemia





PAD.  Peripheral angiogram and intervention on 8/22/2019 by Dr Dai with PTA 

of the right AT, PT and stenting of the right SFA. Significant improvement of 

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


Plan:








* Complex management


* Prognosis guarded


* Atrial flutter with 2-1 AV block; no response with IV amiodarone and Cardizem.

   Informed consent was taken from the son for transesophageal echocardiogram 

  assisted cardioversion.  Risk of damage to the esophagus and stroke was 

  discussed.  The son accepted and gave informed consent.  Transesophageal 

  echocardiogram was done on 4/20/2020 which showed no left atrium or left 

  atrial appendage thrombus.  Cardioversion was performed with 200 J which 

  converted the patient to sinus rhythm.


* Patient continues to be in sinus rhythm since cardioversion.








Thank you for your consultation. Please call me if you have any questions.








WOODY Dai MD, FACP, FACC, FSCAI, FHRS, CCDS


Interventional Cardiology


Cardiac Electrophysiology


Vascular Medicine and Endovascular Interventions











ELISE DAI MD             Apr 23, 2020 22:23

## 2020-04-23 NOTE — NUR
CM/SS:  Physician has requested that  [facility] be sought for pt.



Plan:  Pt to go to [facility] facility 



Summary:  Referral made to [facility] in [place] - Pt has been declined as they do not take 
pt's payor source - [place] [employer]. 



Referral made to [facility] - Fax [phone] - Pt is under review. Facility is 
requesting that pt have a trach or feeding tube, as well as understanding to the plan for 
pt.   is [facility] - phone number [phone].  [facility] has also requested negative 
COVID -19 testing.  

This worker will follow up.

## 2020-04-24 VITALS — DIASTOLIC BLOOD PRESSURE: 40 MMHG | SYSTOLIC BLOOD PRESSURE: 137 MMHG

## 2020-04-24 VITALS — SYSTOLIC BLOOD PRESSURE: 131 MMHG | DIASTOLIC BLOOD PRESSURE: 40 MMHG

## 2020-04-24 VITALS — SYSTOLIC BLOOD PRESSURE: 132 MMHG | DIASTOLIC BLOOD PRESSURE: 41 MMHG

## 2020-04-24 VITALS — SYSTOLIC BLOOD PRESSURE: 135 MMHG | DIASTOLIC BLOOD PRESSURE: 41 MMHG

## 2020-04-24 VITALS — DIASTOLIC BLOOD PRESSURE: 42 MMHG | SYSTOLIC BLOOD PRESSURE: 145 MMHG

## 2020-04-24 VITALS — DIASTOLIC BLOOD PRESSURE: 41 MMHG | SYSTOLIC BLOOD PRESSURE: 143 MMHG

## 2020-04-24 VITALS — DIASTOLIC BLOOD PRESSURE: 37 MMHG | SYSTOLIC BLOOD PRESSURE: 118 MMHG

## 2020-04-24 VITALS — DIASTOLIC BLOOD PRESSURE: 37 MMHG | SYSTOLIC BLOOD PRESSURE: 122 MMHG

## 2020-04-24 VITALS — DIASTOLIC BLOOD PRESSURE: 29 MMHG | SYSTOLIC BLOOD PRESSURE: 108 MMHG

## 2020-04-24 VITALS — SYSTOLIC BLOOD PRESSURE: 121 MMHG | DIASTOLIC BLOOD PRESSURE: 36 MMHG

## 2020-04-24 VITALS — SYSTOLIC BLOOD PRESSURE: 108 MMHG | DIASTOLIC BLOOD PRESSURE: 35 MMHG

## 2020-04-24 VITALS — SYSTOLIC BLOOD PRESSURE: 137 MMHG | DIASTOLIC BLOOD PRESSURE: 74 MMHG

## 2020-04-24 VITALS — SYSTOLIC BLOOD PRESSURE: 148 MMHG | DIASTOLIC BLOOD PRESSURE: 43 MMHG

## 2020-04-24 VITALS — DIASTOLIC BLOOD PRESSURE: 41 MMHG | SYSTOLIC BLOOD PRESSURE: 130 MMHG

## 2020-04-24 VITALS — SYSTOLIC BLOOD PRESSURE: 147 MMHG | DIASTOLIC BLOOD PRESSURE: 44 MMHG

## 2020-04-24 VITALS — DIASTOLIC BLOOD PRESSURE: 31 MMHG | SYSTOLIC BLOOD PRESSURE: 116 MMHG

## 2020-04-24 VITALS — DIASTOLIC BLOOD PRESSURE: 41 MMHG | SYSTOLIC BLOOD PRESSURE: 139 MMHG

## 2020-04-24 VITALS — DIASTOLIC BLOOD PRESSURE: 74 MMHG | SYSTOLIC BLOOD PRESSURE: 137 MMHG

## 2020-04-24 VITALS — DIASTOLIC BLOOD PRESSURE: 81 MMHG | SYSTOLIC BLOOD PRESSURE: 151 MMHG

## 2020-04-24 VITALS — DIASTOLIC BLOOD PRESSURE: 34 MMHG | SYSTOLIC BLOOD PRESSURE: 125 MMHG

## 2020-04-24 VITALS — SYSTOLIC BLOOD PRESSURE: 119 MMHG | DIASTOLIC BLOOD PRESSURE: 36 MMHG

## 2020-04-24 VITALS — DIASTOLIC BLOOD PRESSURE: 43 MMHG | SYSTOLIC BLOOD PRESSURE: 142 MMHG

## 2020-04-24 VITALS — SYSTOLIC BLOOD PRESSURE: 102 MMHG | DIASTOLIC BLOOD PRESSURE: 32 MMHG

## 2020-04-24 VITALS — SYSTOLIC BLOOD PRESSURE: 118 MMHG | DIASTOLIC BLOOD PRESSURE: 35 MMHG

## 2020-04-24 VITALS — DIASTOLIC BLOOD PRESSURE: 37 MMHG | SYSTOLIC BLOOD PRESSURE: 127 MMHG

## 2020-04-24 VITALS — SYSTOLIC BLOOD PRESSURE: 145 MMHG | DIASTOLIC BLOOD PRESSURE: 44 MMHG

## 2020-04-24 VITALS — SYSTOLIC BLOOD PRESSURE: 121 MMHG | DIASTOLIC BLOOD PRESSURE: 37 MMHG

## 2020-04-24 LAB
ARTERIAL PATENCY WRIST A: POSITIVE
BASE EXCESS STD BLDA CALC-SCNC: 11.3 MMOL/L (ref -2.5–2.5)
BASOPHILS # BLD AUTO: 0 10^3/UL (ref 0–0.1)
BASOPHILS NFR BLD AUTO: 0 % (ref 0–10)
BDY SITE: (no result)
BODY TEMPERATURE: 36.4
BUN/CREAT SERPL: 51
CALCIUM SERPL-MCNC: 8.2 MG/DL (ref 8.5–10.1)
CHLORIDE SERPL-SCNC: 95 MMOL/L (ref 98–107)
CO2 BLDA CALC-SCNC: 37.9 MMOL/L (ref 21–31)
CO2 SERPL-SCNC: 32 MMOL/L (ref 21–32)
CREAT SERPL-MCNC: 0.81 MG/DL (ref 0.6–1.3)
EOSINOPHIL # BLD AUTO: 0 10^3/UL (ref 0–0.3)
EOSINOPHIL NFR BLD AUTO: 0 % (ref 0–10)
ERYTHROCYTE [DISTWIDTH] IN BLOOD BY AUTOMATED COUNT: 16.3 % (ref 10–14.5)
GFR SERPLBLD BASED ON 1.73 SQ M-ARVRAT: > 60 ML/MIN
GLUCOSE SERPL-MCNC: 204 MG/DL (ref 70–105)
HCT VFR BLD CALC: 31 % (ref 40–54)
HGB BLD-MCNC: 9.6 G/DL (ref 13.3–17.7)
INHALED O2 FLOW RATE: 12 L/MIN
LYMPHOCYTES # BLD AUTO: 0.3 X 10^3 (ref 1–4)
LYMPHOCYTES NFR BLD AUTO: 4 % (ref 12–44)
MAGNESIUM SERPL-MCNC: 2 MG/DL (ref 1.6–2.4)
MANUAL DIFFERENTIAL PERFORMED BLD QL: NO
MCH RBC QN AUTO: 30 PG (ref 25–34)
MCHC RBC AUTO-ENTMCNC: 31 G/DL (ref 32–36)
MCV RBC AUTO: 97 FL (ref 80–99)
MONOCYTES # BLD AUTO: 0.3 X 10^3 (ref 0–1)
MONOCYTES NFR BLD AUTO: 4 % (ref 0–12)
NEUTROPHILS # BLD AUTO: 6.7 X 10^3 (ref 1.8–7.8)
NEUTROPHILS NFR BLD AUTO: 93 % (ref 42–75)
PCO2 BLDA: 54 MMHG (ref 35–45)
PH BLDA: 7.44 [PH] (ref 7.37–7.43)
PHOSPHATE SERPL-MCNC: 3.5 MG/DL (ref 2.3–4.7)
PLATELET # BLD: 173 10^3/UL (ref 130–400)
PMV BLD AUTO: 11.7 FL (ref 7.4–10.4)
PO2 BLDA: 142 MMHG (ref 79–93)
POTASSIUM SERPL-SCNC: 4.9 MMOL/L (ref 3.6–5)
SAO2 % BLDA FROM PO2: 97 % (ref 94–100)
SODIUM SERPL-SCNC: 135 MMOL/L (ref 135–145)
VENTILATION MODE VENT: YES
WBC # BLD AUTO: 7.3 10^3/UL (ref 4.3–11)

## 2020-04-24 RX ADMIN — APIXABAN SCH MG: 5 TABLET, FILM COATED ORAL at 05:06

## 2020-04-24 RX ADMIN — PANTOPRAZOLE SODIUM SCH MG: 40 INJECTION, POWDER, FOR SOLUTION INTRAVENOUS at 09:02

## 2020-04-24 RX ADMIN — IPRATROPIUM BROMIDE AND ALBUTEROL SULFATE SCH ML: .5; 3 SOLUTION RESPIRATORY (INHALATION) at 02:34

## 2020-04-24 RX ADMIN — BUDESONIDE SCH MG: 0.5 SUSPENSION RESPIRATORY (INHALATION) at 18:51

## 2020-04-24 RX ADMIN — RISPERIDONE SCH MG: 1 TABLET, FILM COATED ORAL at 20:29

## 2020-04-24 RX ADMIN — PROPOFOL SCH MLS/HR: 10 INJECTION, EMULSION INTRAVENOUS at 20:29

## 2020-04-24 RX ADMIN — Medication SCH MLS/HR: at 09:05

## 2020-04-24 RX ADMIN — INSULIN ASPART SCH UNIT: 100 INJECTION, SOLUTION INTRAVENOUS; SUBCUTANEOUS at 05:06

## 2020-04-24 RX ADMIN — INSULIN ASPART SCH UNIT: 100 INJECTION, SOLUTION INTRAVENOUS; SUBCUTANEOUS at 12:12

## 2020-04-24 RX ADMIN — METOPROLOL TARTRATE SCH MG: 50 TABLET, FILM COATED ORAL at 09:02

## 2020-04-24 RX ADMIN — IPRATROPIUM BROMIDE AND ALBUTEROL SULFATE SCH ML: .5; 3 SOLUTION RESPIRATORY (INHALATION) at 10:19

## 2020-04-24 RX ADMIN — POTASSIUM CHLORIDE SCH MEQ: 1500 TABLET, EXTENDED RELEASE ORAL at 04:31

## 2020-04-24 RX ADMIN — PROPOFOL SCH MLS/HR: 10 INJECTION, EMULSION INTRAVENOUS at 14:00

## 2020-04-24 RX ADMIN — APIXABAN SCH MG: 5 TABLET, FILM COATED ORAL at 18:14

## 2020-04-24 RX ADMIN — METOPROLOL TARTRATE SCH MG: 50 TABLET, FILM COATED ORAL at 20:29

## 2020-04-24 RX ADMIN — METHYLPREDNISOLONE SODIUM SUCCINATE SCH MG: 40 INJECTION, POWDER, FOR SOLUTION INTRAMUSCULAR; INTRAVENOUS at 18:15

## 2020-04-24 RX ADMIN — METHYLPREDNISOLONE SODIUM SUCCINATE SCH MG: 40 INJECTION, POWDER, FOR SOLUTION INTRAMUSCULAR; INTRAVENOUS at 05:06

## 2020-04-24 RX ADMIN — INSULIN ASPART SCH UNIT: 100 INJECTION, SOLUTION INTRAVENOUS; SUBCUTANEOUS at 18:14

## 2020-04-24 RX ADMIN — BUDESONIDE SCH MG: 0.5 SUSPENSION RESPIRATORY (INHALATION) at 06:45

## 2020-04-24 RX ADMIN — FENTANYL CITRATE SCH MLS/HR: 50 INJECTION, SOLUTION INTRAMUSCULAR; INTRAVENOUS at 09:55

## 2020-04-24 RX ADMIN — AMIODARONE HYDROCHLORIDE SCH MG: 200 TABLET ORAL at 09:02

## 2020-04-24 RX ADMIN — POTASSIUM CHLORIDE SCH MLS/HR: 200 INJECTION, SOLUTION INTRAVENOUS at 04:31

## 2020-04-24 RX ADMIN — MAGNESIUM SULFATE IN DEXTROSE SCH MLS/HR: 10 INJECTION, SOLUTION INTRAVENOUS at 04:31

## 2020-04-24 RX ADMIN — PANTOPRAZOLE SODIUM SCH MG: 40 INJECTION, POWDER, FOR SOLUTION INTRAVENOUS at 20:29

## 2020-04-24 RX ADMIN — PROPOFOL SCH MLS/HR: 10 INJECTION, EMULSION INTRAVENOUS at 07:43

## 2020-04-24 RX ADMIN — METHYLPREDNISOLONE SODIUM SUCCINATE SCH MG: 40 INJECTION, POWDER, FOR SOLUTION INTRAMUSCULAR; INTRAVENOUS at 12:12

## 2020-04-24 RX ADMIN — IPRATROPIUM BROMIDE AND ALBUTEROL SULFATE SCH ML: .5; 3 SOLUTION RESPIRATORY (INHALATION) at 14:29

## 2020-04-24 RX ADMIN — FENTANYL CITRATE SCH MLS/HR: 50 INJECTION, SOLUTION INTRAMUSCULAR; INTRAVENOUS at 20:30

## 2020-04-24 RX ADMIN — BUMETANIDE SCH MG: 0.25 INJECTION, SOLUTION INTRAMUSCULAR; INTRAVENOUS at 02:48

## 2020-04-24 RX ADMIN — LISINOPRIL SCH MG: 20 TABLET ORAL at 09:02

## 2020-04-24 RX ADMIN — IPRATROPIUM BROMIDE AND ALBUTEROL SULFATE SCH ML: .5; 3 SOLUTION RESPIRATORY (INHALATION) at 21:56

## 2020-04-24 RX ADMIN — RISPERIDONE SCH MG: 1 TABLET, FILM COATED ORAL at 09:02

## 2020-04-24 RX ADMIN — IPRATROPIUM BROMIDE AND ALBUTEROL SULFATE SCH ML: .5; 3 SOLUTION RESPIRATORY (INHALATION) at 18:51

## 2020-04-24 RX ADMIN — DIGOXIN SCH MG: 0.25 INJECTION INTRAMUSCULAR; INTRAVENOUS at 09:02

## 2020-04-24 RX ADMIN — IPRATROPIUM BROMIDE AND ALBUTEROL SULFATE SCH ML: .5; 3 SOLUTION RESPIRATORY (INHALATION) at 06:45

## 2020-04-24 NOTE — PHYSICIAN QUERY CLARIFICATION
PQ-Link Manifestation-Etiology


Admission/Discharge


Admission Date: Apr 12, 2020 at 21:51 


Discharge Date:


The medical record reflects the following clinical scenario:





History/Risk Factors:


Sepsis with septic shock


Acute respiratory failure with hypoxia and hypercapnia.


Pneumonia 


COPD





Clinical Findings:4/13 Blood gases pH 7.15, pC02 62, p02 43, HC03 21, Total C02 

22.7, 02 Sats 70%, base excess -6.8.








Treatment:BiPAP,Intubated on 4/13 Mechanical Vent.








Question:  Can you specify if the Acute respiratory failure with hypercapnia and

hypoxia is due to/associated with Sepsis? 


Please document a response in the Progress Note or Discharge Summary.





   1. Yes - Acute respiratory failure with hypercapnia and hypoxia  is due 

to/associated with sepsis.





   2. No - Acute respiratory with hypercapnia and hypoxia is not due 

to/associated with sepsis.





   3. Other, with explanation of the clinical findings.





   4. Clinically undetermined, no explanation for the clinical findings.





PHYSICIAN RESPONSE


Manifestation due to/assoic:  Other,explanation/clinical finding (inquire with 

dr fried since she was seeing him then)


Please remember a lack of response to the above will prompt a phone page by 

CDI/Coding staff. 





In responding to this query, please exercise your independent professional 

judgment.  The purpose of this communication is to more accurately reflect the 

complexity of your patients condition. The fact that a question is asked does 

not imply that any particular answer is desired or expected.  





Thank you for your timely response to this clarification.      


   


Requestors name: Marie Patel [facility]   





Phone # ext [phone] or [phone]








THIS PHYSICIAN QUERY FORM IS A PERMANENT PART OF THE MEDICAL RECORD











MARIE PATEL                 Apr 24, 2020 09:41


JASEN DAY DO                Apr 24, 2020 11:11

## 2020-04-24 NOTE — PROGRESS NOTE - HOSPITALIST
Subjective


HPI/CC On Admission


Date Seen by Provider:  2020


Time Seen by Provider:  10:00


Pradeep Fowler is a 69-year-old male with past medical history of hypertension, 

diabetes, peripheral artery disease, coronary artery disease, AAA, COPD, who 

presented with altered mental status.  He was reportedly brought in by friend.  

He had supposedly been taking some narcotics for which he been prescribed.  He 

was given a dose of Narcan and his mental status improved.  He was started on 

BiPAP in the ICU.  The morning after his admission, he remained lethargic and an

ABG showed acute hypercapnia.  He was intubated due to acute hypercapnic 

respiratory failure.  At the time of my examination he is intubated and sedated.


Subjective/Events-last exam


Patient still intubated


[facility] not an [facility] since he has Medicaid [place]


Patient awake currently and in no distress


Checked meds and labs





Objective


Exam


Vital Signs





Vital Signs








  Date Time  Temp Pulse Resp B/P (MAP) Pulse Ox O2 Delivery O2 Flow Rate FiO2


 


20 13:00  57 14 119/36 (63) 94 Mechanical Ventilator 60.00 


 


20 12:00 36.5       


 


20 12:00        60





Capillary Refill : Less Than 3 Seconds


General Appearance:  No Apparent Distress, WD/WN, Chronically ill, Obese


Respiratory:  No Accessory Muscle Use, No Respiratory Distress, Decreased Breath

Sounds


Cardiovascular:  Regular Rate, Rhythm





Results/Procedures


Lab


Laboratory Tests


20 03:35








Patient resulted labs reviewed.


Imaging:  Reviewed Imaging Report





Assessment/Plan


Assessment and Plan


Assess & Plan/Chief Complaint


Assessment/Plan





(1) Sepsis


Status:  Acute


Assessment & Plan:  : Poor prognosis, Patient on Sepsis IVF protocol, 

continue IV antibiotics, Dr James consulted for critical care, blood cultures 

pending


4/15: Off Levaphed today, continue to monitor blood pressure, continue IV 

antibiotics, patient is not following commands when sedation is turned off


: Prognosis remains guarded, tube feeds started today, blood pressures 

running high, restarted blood pressure medications


: Dr james managing critical care, continue IV antibiotics, possible wean 

and extubation over the weekend


: failed extubation today and small amount of aspiration of tube feeding 

possible so monitor that closely


Qualifiers:  


   Qualified Codes:  A41.9 - Sepsis, unspecified organism; R65.21 - Severe 

sepsis with septic shock; N17.9 - Acute kidney failure, unspecified


(2) Acute respiratory failure with hypoxia and hypercapnia


Status:  Acute


Assessment & Plan:  : Currently Intubated, Dr James managing vent





(3) Acute kidney injury superimposed on chronic kidney disease


Status:  Resolved


Assessment & Plan:  : Will monitor daily BUN/Cr





(4) NSTEMI (non-ST elevation myocardial infarction)


Status:  Acute


Assessment & Plan:  : Cardiology consulted, appreciate recommendations, 

Recent Cath 2020: BNP elevated, patient given IV lasix, diuresing well





(5) Upper GI bleed


Status:  Acute


Assessment & Plan:  : Today started having blood tinged fluid from OG, 

Lovenox stopped and started on IV protonix


4/15: Hgb dropped today, Will get iron studies


: stable hgb 8.4





(6) CAD (coronary artery disease)


Status:  Chronic


Qualifiers:  


   Qualified Codes:  I25.10 - Atherosclerotic heart disease of native coronary 

artery without angina pectoris


(7) Hypertension


Status:  Chronic


Assessment & Plan:  : Currently hypotensive and on Levaphed for Sepsis


4/15: Off pressors


: Restarted Lisinopril today


Qualifiers:  


   Qualified Codes:  I10 - Essential (primary) hypertension


(8) Diabetes type 2, uncontrolled


Status:  Chronic


Qualifiers:  


   Qualified Codes:  E11.65 - Type 2 diabetes mellitus with hyperglycemia


(9) COPD (chronic obstructive pulmonary disease)


Status:  Chronic


Qualifiers:  


   Qualified Codes:  J44.9 - Chronic obstructive pulmonary disease, unspecified


(10) Normocytic anemia


Status:  Acute


Assessment & Plan:  : Iron deficient, will consider Fe replacement when 

patient stablizes





(11) Pneumonia


Status:  Acute


Qualifiers:  


   Qualified Codes:  J18.9 - Pneumonia, unspecified organism


(12) DVT prophylaxis


Status:  Acute


Assessment & Plan:  : On Lovenox, stopped today due to GI bleeding


4/15: Continues off Lovenox, SCDs





13) Meth use


(14) AF w/RVR s/p cardioversion successful





Plan:


Monitor closely





Clinical Quality Measures


DVT/VTE Risk/Contraindication:


Risk Factor Score Per Nursin


RFS Level Per Nursing on Admit:  4+=Very High











JASEN DAY DO                2020 10:06

## 2020-04-24 NOTE — PULMONARY PROGRESS NOTE
Subjective


Time Seen by a Provider:  05:56


Subjective/Events-last exam


sedated on vent





Sepsis Event


Evaluation


Height, Weight, BMI


Height: 5'8.50"


Weight: 206lbs. 0.8oz. 93.141556of; 30.95 BMI


Method:Stated





Exam


Exam





Vital Signs








  Date Time  Temp Pulse Resp B/P (MAP) Pulse Ox O2 Delivery O2 Flow Rate FiO2


 


4/24/20 05:06  57  136/40    


 


4/24/20 04:01 36.4       


 


4/24/20 04:00  57 13 145/42 (76) 96 Mechanical Ventilator 45.00 


 


4/24/20 04:00      Mechanical Ventilator  45


 


4/24/20 03:59       45.00 


 


4/24/20 03:00  58 13 147/44 (78) 95 Mechanical Ventilator 50.00 


 


4/24/20 02:35  52 15  94   50


 


4/24/20 02:00  51 12 145/44 (77) 95 Mechanical Ventilator 50.00 


 


4/24/20 01:00  51 14 142/43 (76) 95 Mechanical Ventilator 50.00 


 


4/24/20 01:00  50      


 


4/24/20 00:00  55 12 143/41 (75) 95 Mechanical Ventilator 50.00 


 


4/23/20 23:20  61  147/45    


 


4/23/20 23:06      Mechanical Ventilator  50


 


4/23/20 23:05 37.2       


 


4/23/20 23:00  60 12 145/44 (77) 95 Mechanical Ventilator 50.00 


 


4/23/20 22:18  60 14  95   50


 


4/23/20 22:00  63 13 144/44 (77) 94 Mechanical Ventilator 50.00 


 


4/23/20 21:00  65 12 136/43 (74) 93 Mechanical Ventilator 50.00 


 


4/23/20 20:00  64 14 147/45 (79) 94 Mechanical Ventilator 50.00 


 


4/23/20 20:00      Mechanical Ventilator  50


 


4/23/20 19:13 36.9       


 


4/23/20 19:00  66      


 


4/23/20 19:00  65 14 154/49 (84) 94 Mechanical Ventilator 50.00 


 


4/23/20 18:48  67 14  94   50


 


4/23/20 18:00  66 12 152/48 (82) 93 Mechanical Ventilator 45.00 


 


4/23/20 17:00  66 13 162/53 (89) 95 Mechanical Ventilator 45.00 


 


4/23/20 16:00     93 Mechanical Ventilator  50


 


4/23/20 16:00  67 9 154/50 (84) 94 Mechanical Ventilator 45.00 


 


4/23/20 15:27 36.8       


 


4/23/20 15:27    153/52    


 


4/23/20 15:27 36.8       


 


4/23/20 15:00  67 8 156/51 (86) 93 Mechanical Ventilator 45.00 


 


4/23/20 14:04  66 14  93   50


 


4/23/20 14:00  64 13 152/49 (83) 95 Mechanical Ventilator 45.00 


 


4/23/20 13:00  61 13 141/44 (76)  Mechanical Ventilator 45.00 


 


4/23/20 12:21  64      


 


4/23/20 12:00     94 Mechanical Ventilator  50


 


4/23/20 12:00  62 14 137/44 (75)  Mechanical Ventilator 45.00 


 


4/23/20 12:00 36.7       


 


4/23/20 11:00  62 13 121/37 (65)  Mechanical Ventilator 45.00 


 


4/23/20 10:16  73 17  93   50


 


4/23/20 10:12    119/36    


 


4/23/20 10:00  69 13 135/40 (71) 96 Mechanical Ventilator 45.00 


 


4/23/20 09:00  86 12 147/51 (83) 96 Mechanical Ventilator 45.00 


 


4/23/20 08:00     95 Mechanical Ventilator  50


 


4/23/20 08:00  77 12 134/43 (73) 94 Mechanical Ventilator 45.00 


 


4/23/20 08:00 37.1       


 


4/23/20 07:00  81 13 139/42 (74) 93 Mechanical Ventilator 45.00 


 


4/23/20 07:00  78      


 


4/23/20 06:44  89 28  93   50


 


4/23/20 06:00  87 14 151/48 (82) 95 Mechanical Ventilator 45.00 














I & O 


 


 4/24/20





 07:00


 


Intake Total 1665 ml


 


Output Total 1650 ml


 


Balance 15 ml








Height & Weight


Height: 5'8.50"


Weight: 206lbs. 0.8oz. 93.763169mi; 30.95 BMI


Method:Stated


General Appearance:  No Apparent Distress, WD/WN, Chronically ill


HEENT:  TMs Normal, Other (Pupils equal/reactive 3 mm bilaterally)


Respiratory:  Lungs Clear


Cardiovascular:  Regular Rate, Rhythm


Capillary Refill:  Less Than 3 Seconds


Gastrointestinal:  non tender, soft


Extremity:  Normal Inspection, No Pedal Edema


Neurologic/Psychiatric:  Other (sedated)


Skin:  Normal Color, Warm/Dry





Results


Lab


Laboratory Tests


4/23/20 03:05








4/24/20 03:35











Assessment/Plan


Assessment/Plan


Acute respiratory failure 


   -Give total 2mg of bumex this AM 


   -Fi02 is currently 45% with peep of 8 


      -Decrease peep to 5 


   -CXR reviewed 


   - COVID is negative 


   -bilateral dopplers -- negative


   - CTA of chest -neg for PE shows small bilateral pleural effusions


   - Propofol and fentany. Add precedex 


   -TF-Advance TF to 30cc/hr 


   - bumex 1mg daily 


   -Abx have completed. 


Aflutter


   -Cardiology s/p [id number] with cardioversion 


   -On Eliquis 


CHF EF 45% with grade 1 diastolic dysfunction 


   -Cardiology following


sepsis with PICC line


   -1/2 BC + -- probably contamination 


   -Culture of picc tip is negative 


   -Currently on Zosyn only Vanco d/c'd 


   -Pan cultures pending


Pneumonia with possible aspiration 


   -Pan cultures 


   -MRSA swab - neg 


   -COVID is negative 


   -Influenza is negative 


   -RVP is negative 


UDS is positive for methamphetamine 





NSTEMI


   -Cardiology following











GUILLERMINA MERA DO              Apr 24, 2020 05:56

## 2020-04-24 NOTE — PHYSICIAN QUERY CLARIFICATION
PQ-Further Specificity


Admission/Discharge


Admission Date: Apr 12, 2020 at 21:51 


Discharge Date:





The medical record reflects the following clinical scenario:





History/Risk Factors:


Sepsis with septic shock


Sepsis with picc line


Pneumonia





Clinical Findings:T 35.7, P 96, Resp 10, BP 78/52, WBC 14.2, Lactic acid 1.51, 

Blood culture-Port/Picc Staph,Coag Neg, Streptomyces paraquayensis.








Treatment:Picc line discontinued. IV Vancomycin and IV Zosyn, Levophed.








Question:  Can you further specify Sepsis with septic shock per the clinical 

indicators above? 


Please document a response in the Progress Notes or Discharge Summary.





1. Sepsis with septic shock due to pneumonia.





2. Sepsis with septic shock due to Picc line infection.





3. Other, with explanation of the clinical findings.





4. Clinically undetermined, no explanation for the clinical findings.





PHYSICIAN RESPONSE


Can you specify per above:  Other, explanation/clinical finding (inquire with dr fried since she was seeing him then)


Please remember a lack of response to the above will prompt a phone page by 

CDI/Coding staff. 





In responding to this query, please exercise your independent professional 

judgment.  The purpose of this communication is to more accurately reflect the 

complexity of your patients condition. The fact that a question is asked does 

not imply that any particular answer is desired or expected.  





Thank you for your timely response to this clarification.      


   


Requestors name: [facility] Patel [facility]   





Phone # ext [phone] or [phone]








THIS PHYSICIAN QUERY FORM IS A PERMANENT PART OF THE MEDICAL RECORD











MARIE PATEL                 Apr 24, 2020 09:27


JASEN DAY DO                Apr 24, 2020 11:10

## 2020-04-24 NOTE — CARDIOLOGY PROGRESS NOTE
Cardiology SOAP Progress Note


Subjective:


Intubated and ventilated





Objective:


I&O/Vital Signs











 4/24/20 4/24/20 4/24/20 4/24/20





 03:59 04:00 04:00 04:01


 


Temp    36.4


 


Pulse   57 


 


Resp   13 


 


B/P (MAP)   145/42 (76) 


 


Pulse Ox   96 


 


O2 Delivery  Mechanical Ventilator Mechanical Ventilator 


 


O2 Flow Rate 45.00  45.00 


 


FiO2  45  


 


    





 4/24/20 4/24/20 4/24/20 4/24/20





 05:00 05:06 06:00 06:40


 


Pulse 56 57 59 57


 


Resp 14  14 


 


B/P (MAP) 137/40 (72) 136/40 125/34 (64) 


 


Pulse Ox 95  95 


 


O2 Delivery Mechanical Ventilator  Mechanical Ventilator 


 


O2 Flow Rate 45.00  45.00 





 4/24/20 4/24/20 4/24/20 4/24/20





 06:45 07:00 07:43 07:55


 


Temp    36.4


 


Pulse 59 57  


 


Resp 15 12  


 


B/P (MAP)  148/43 (78) 137/39 


 


Pulse Ox 93 94  


 


O2 Delivery  Mechanical Ventilator  


 


O2 Flow Rate  45.00  


 


FiO2 50   


 


    





 4/24/20 4/24/20 4/24/20 4/24/20





 08:00 08:00 09:00 09:56


 


Pulse  57 69 


 


Resp  14 12 


 


B/P (MAP)  135/41 (72) 130/41 (70) 


 


Pulse Ox 93 93 92 


 


O2 Delivery Mechanical Ventilator Mechanical Ventilator Mechanical Ventilator 

Mechanical Ventilator


 


O2 Flow Rate  45.00 45.00 60.00


 


FiO2 45   





 4/24/20 4/24/20 4/24/20 4/24/20





 10:00 10:19 11:00 12:00


 


Pulse 65 72 66 


 


Resp 12 17 11 


 


B/P (MAP) 121/36 (64)  118/35 (62) 


 


Pulse Ox 93 94 93 93


 


O2 Delivery Mechanical Ventilator  Mechanical Ventilator Mechanical Ventilator


 


O2 Flow Rate 60.00  60.00 


 


FiO2  60  60





 4/24/20 4/24/20 4/24/20 4/24/20





 12:00 12:00 12:55 13:00


 


Temp 36.5   


 


Pulse  61 56 57


 


Resp  12  14


 


B/P (MAP)  122/37 (65)  119/36 (63)


 


Pulse Ox  94  94


 


O2 Delivery  Mechanical Ventilator  Mechanical Ventilator


 


O2 Flow Rate  60.00  60.00


 


    





 4/24/20 4/24/20 4/24/20 4/24/20





 14:00 14:00 14:29 14:34


 


Pulse  68 57 


 


Resp  17 17 


 


B/P (MAP) 136/39 151/81 (104)  


 


Pulse Ox  98 96 


 


O2 Delivery  Mechanical Ventilator  Mechanical Ventilator


 


O2 Flow Rate  60.00  50.00


 


FiO2   50 














 4/24/20





 00:00


 


Intake Total 1035 ml


 


Output Total 950 ml


 


Balance 85 ml








Weight (Pounds):  206


Weight (Ounces):  0.8


Weight (Calculated Kilograms):  93.790974


Constitutional:  other (on mec vent, unresponsive)


Respiratory:  No accessory muscle use; other (fair to good bilat air entry)


Cardiovascular:  regular rate-rhythm, S1 and S2, systolic murmur (soft BUTCH at 

card base)


Gastrointestional:  hernia (abdominal), audible bowel sounds, other (mildly 

distended)


Extremities:  swelling (mild edema), other (R foot in dressing that wasn't 

removed); No clubbing, No cyanosis


Neurologic/Psychiatric:  other (unresponsive, on mech vent)


Skin:  No rash on exposed areas, No ulcerations on exposed areas; other 

(dressing to right foot, D&I)





Results/Procedures:


Labs


Laboratory Tests


4/23/20 17:50: Glucometer 254H


4/23/20 23:09: Glucometer 223H


4/24/20 03:35: 


White Blood Count 7.3, Red Blood Count 3.17L, Hemoglobin 9.6L, Hematocrit 31L, 

Mean Corpuscular Volume 97, Mean Corpuscular Hemoglobin 30, Mean Corpuscular 

Hemoglobin Concent 31L, Red Cell Distribution Width 16.3H, Platelet Count 173, 

Mean Platelet Volume 11.7H, Neutrophils (%) (Auto) 93H, Lymphocytes (%) (Auto) 

4L, Monocytes (%) (Auto) 4, Eosinophils (%) (Auto) 0, Basophils (%) (Auto) 0, 

Neutrophils # (Auto) 6.7, Lymphocytes # (Auto) 0.3L, Monocytes # (Auto) 0.3, 

Eosinophils # (Auto) 0.0, Basophils # (Auto) 0.0, Blood Gas Puncture Site RIGHT 

RADIAL, Blood Gas Patient Temperature 36.4, Arterial Blood pH 7.44H, Arterial 

Blood Partial Pressure CO2 54H, Arterial Blood Partial Pressure O2 142H, 

Arterial Blood HCO3 36H, Arterial Blood Total CO2 37.9H, Arterial Blood Oxygen 

Saturation 97, Arterial Blood Base Excess 11.3H, Rojelio Test POSITIVE, Blood Gas 

Ventilator Setting YES, Blood Gas Inspired Oxygen 12, Sodium Level 135, Potass

ium Level 4.9, Chloride Level 95L, Carbon Dioxide Level 32, Anion Gap 8, Blood 

Urea Nitrogen 41H, Creatinine 0.81, Estimat Glomerular Filtration Rate > 60, 

BUN/Creatinine Ratio 51, Glucose Level 204H, Calcium Level 8.2L, Phosphorus 

Level 3.5, Magnesium Level 2.0, B-Type Natriuretic Peptide 311.9H


4/24/20 11:44: Glucometer 251H





Microbiology


4/13/20 Catheter Tip Culture - Final, Complete


          No growth


4/13/20 Gram Stain - Final, Complete


          


4/13/20 Sputum Culture - Final, Complete


          No growth


4/12/20 Urine Culture - Final, Complete


          NO GROWTH








A/P:


Assessment/Dx:


Acute resp failure and shock of undetermined etiology; shock resolved





Atrial flutter with 2-1 AV block ; status post cardioversion.  Currently in 

sinus rhythm.  Continue Cardizem, amiodarone and Eliquis through NG tube.





Mild acute on chronic diastolic congestive heart failure.  Agree with IV 

diuretics.





Cannot exclude pulmonary embolism. Was started on treatment dose Lovenox at 

admission, but being stopped on 4/15/20 due to falling H & H and falling 

platelet count.  However due to atrial fibrillation/atrial flutter, the patient 

is started on Eliquis.





Acute renal failure (AYAH-3) likely due to shock - resolved





Mild troponin elevation, likely type-2 MI due to reasons noted above





Drug test (+) for Meth on 4-





CAD. Card cath of Jan 2017 showed moderate, nonobstructive disease and LVEF 55% 

and elevated LVEDP. MPI of 2/27/20 by Dr Dai showed no ischemia or infarction

and normal LV function





Echo on 4/14/20: LVEF 45%, grade 1 fajardo dysfunction, mild to mod TR, RVSP 29 

mmHg





Chronic tobacco use (smokes cigarettes according to previous records)





H/o DM II





H/o Htn





H/o Hyperlipidemia





PAD.  Peripheral angiogram and intervention on 8/22/2019 by Dr Dai with PTA 

of the right AT, PT and stenting of the right SFA. Significant improvement of 

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


Plan:








* Complex management


* Prognosis guarded


* Atrial flutter with 2-1 AV block; no response with IV amiodarone and Cardizem.

   Informed consent was taken from the son for transesophageal echocardiogram 

  assisted cardioversion.  Risk of damage to the esophagus and stroke was 

  discussed.  The son accepted and gave informed consent.  Transesophageal 

  echocardiogram was done on 4/20/2020 which showed no left atrium or left 

  atrial appendage thrombus.  Cardioversion was performed with 200 J which 

  converted the patient to sinus rhythm.


* Patient continues to be in sinus rhythm since cardioversion.








Thank you for your consultation. Please call me if you have any questions.








WOODY Dai MD, FACP, FACC, FSCAI, FHRS, CCDS


Interventional Cardiology


Cardiac Electrophysiology


Vascular Medicine and Endovascular Interventions











ELISE DAI MD             Apr 24, 2020 15:20

## 2020-04-24 NOTE — DIAGNOSTIC IMAGING REPORT
EXAMINATION: Chest 1 view



HISTORY: Tube placement



COMPARISON: 04/23/2020



FINDINGS: 



Endotracheal tube tip terminates 5 cm above the asia. Gastric

tube tip terminates below the field of view. Right internal

jugular central venous catheter tip terminates in the superior

vena cava.



Heart is normal in size. There is a small left pleural effusion

with left lower lobe atelectasis. No pneumothorax is seen. No

edema.



IMPRESSION: 



1. Small left pleural effusion and left lower lobe atelectasis.



Dictated by: 



  Dictated on workstation # [id number]

## 2020-04-24 NOTE — PHYSICIAN QUERY CLARIFICATION
PQ-Present on Admission


Admission/Discharge


Admission Date: Apr 12, 2020 at 21:51 


Discharge Date:


Question:


Sepsis with septic shock was documented in 4/14 progress note-Dr. Nash. Can you

specify if this condition was present on admission? 


Please document a response in Progress Note or Discharge Summary.





   1.  Yes - Condition was present at the time of inpatient admission.





   2.  No - Condition was not present at the time of inpatient admission and it 

developed during the inpatient stay.





   3.  W - Provider is unable to clinically determine whether condition was 

present on admission or not.





   4.  Other [please specify]





PHYSICIAN RESPONSE


Condition was Present on Admit:  Other, specify below (inquire with dr nash)


Please remember a lack of response to the above will prompt a phone page by 

CDI/Coding staff. 





In responding to this query, please exercise your independent professional 

judgment.  The purpose of this communication is to more accurately reflect the 

complexity of your patients condition. The fact that a question is asked does 

not imply that any particular answer is desired or expected.  





Thank you for your timely response to this clarification.      


   


Requestors name: Marie Patel [facility]   





Phone #ext [phone] or [phone]








THIS PHYSICIAN QUERY FORM IS A PERMANENT PART OF THE MEDICAL RECORD











MARIE PATEL                 Apr 24, 2020 08:59


JASEN DAY DO                Apr 24, 2020 11:09

## 2020-04-24 NOTE — PHYSICIAN QUERY CLARIFICATION
PQ-Conflicting Diagnosis


Admission/Discharge


Admission Date: Apr 12, 2020 at 21:51 


Discharge Date:








The medical record reflects the following clinical scenario:





History/Risk Factors:


NSTEMI


Acute Respiratory failure with hypoxia and hypercapnia


Tobacco use





Clinical Findings:Troponin 4/13 0.328 rising to 0.576.








Treatment:


Cardiac consult,echo,troponins.





Question:


Do you agree with the impression of the Type II MI per Dr. Guerrero consult?


Please document a response in Progress Note or Discharge Summary.





   1. Yes





   2. No





   3. Other, with explanation of clinical findings





   4. Clinically undetermined, no explanation for clinical findings.





PHYSICIAN RESPONSE


Do you agree w/Consulting Dx?:  Yes


Please remember a lack of response to the above will prompt a phone page by 

CDI/Coding staff. 





In responding to this query, please exercise your independent professional j

udgment.  The purpose of this communication is to more accurately reflect the 

complexity of your patients condition. The fact that a question is asked does 

not imply that any particular answer is desired or expected.  





Thank you for your timely response to this clarification.      


   


Requestors name: [ ]   





Phone # [ ]








THIS PHYSICIAN QUERY FORM IS A PERMANENT PART OF THE MEDICAL RECORD











WALI PATEL                 Apr 24, 2020 09:14


SRINI JOHN MD                May 6, 2020 11:42

## 2020-04-25 VITALS — DIASTOLIC BLOOD PRESSURE: 41 MMHG | SYSTOLIC BLOOD PRESSURE: 128 MMHG

## 2020-04-25 VITALS — DIASTOLIC BLOOD PRESSURE: 35 MMHG | SYSTOLIC BLOOD PRESSURE: 105 MMHG

## 2020-04-25 VITALS — SYSTOLIC BLOOD PRESSURE: 140 MMHG | DIASTOLIC BLOOD PRESSURE: 40 MMHG

## 2020-04-25 VITALS — SYSTOLIC BLOOD PRESSURE: 125 MMHG | DIASTOLIC BLOOD PRESSURE: 37 MMHG

## 2020-04-25 VITALS — SYSTOLIC BLOOD PRESSURE: 112 MMHG | DIASTOLIC BLOOD PRESSURE: 37 MMHG

## 2020-04-25 VITALS — SYSTOLIC BLOOD PRESSURE: 118 MMHG | DIASTOLIC BLOOD PRESSURE: 34 MMHG

## 2020-04-25 VITALS — SYSTOLIC BLOOD PRESSURE: 114 MMHG | DIASTOLIC BLOOD PRESSURE: 35 MMHG

## 2020-04-25 VITALS — SYSTOLIC BLOOD PRESSURE: 122 MMHG | DIASTOLIC BLOOD PRESSURE: 39 MMHG

## 2020-04-25 VITALS — DIASTOLIC BLOOD PRESSURE: 41 MMHG | SYSTOLIC BLOOD PRESSURE: 137 MMHG

## 2020-04-25 VITALS — SYSTOLIC BLOOD PRESSURE: 106 MMHG | DIASTOLIC BLOOD PRESSURE: 36 MMHG

## 2020-04-25 VITALS — DIASTOLIC BLOOD PRESSURE: 37 MMHG | SYSTOLIC BLOOD PRESSURE: 112 MMHG

## 2020-04-25 VITALS — DIASTOLIC BLOOD PRESSURE: 40 MMHG | SYSTOLIC BLOOD PRESSURE: 124 MMHG

## 2020-04-25 VITALS — SYSTOLIC BLOOD PRESSURE: 137 MMHG | DIASTOLIC BLOOD PRESSURE: 74 MMHG

## 2020-04-25 VITALS — DIASTOLIC BLOOD PRESSURE: 74 MMHG | SYSTOLIC BLOOD PRESSURE: 133 MMHG

## 2020-04-25 VITALS — DIASTOLIC BLOOD PRESSURE: 32 MMHG | SYSTOLIC BLOOD PRESSURE: 112 MMHG

## 2020-04-25 VITALS — SYSTOLIC BLOOD PRESSURE: 142 MMHG | DIASTOLIC BLOOD PRESSURE: 41 MMHG

## 2020-04-25 VITALS — DIASTOLIC BLOOD PRESSURE: 38 MMHG | SYSTOLIC BLOOD PRESSURE: 129 MMHG

## 2020-04-25 VITALS — SYSTOLIC BLOOD PRESSURE: 114 MMHG | DIASTOLIC BLOOD PRESSURE: 36 MMHG

## 2020-04-25 VITALS — SYSTOLIC BLOOD PRESSURE: 140 MMHG | DIASTOLIC BLOOD PRESSURE: 42 MMHG

## 2020-04-25 VITALS — SYSTOLIC BLOOD PRESSURE: 105 MMHG | DIASTOLIC BLOOD PRESSURE: 35 MMHG

## 2020-04-25 VITALS — SYSTOLIC BLOOD PRESSURE: 123 MMHG | DIASTOLIC BLOOD PRESSURE: 36 MMHG

## 2020-04-25 VITALS — SYSTOLIC BLOOD PRESSURE: 126 MMHG | DIASTOLIC BLOOD PRESSURE: 37 MMHG

## 2020-04-25 VITALS — SYSTOLIC BLOOD PRESSURE: 113 MMHG | DIASTOLIC BLOOD PRESSURE: 38 MMHG

## 2020-04-25 VITALS — DIASTOLIC BLOOD PRESSURE: 35 MMHG | SYSTOLIC BLOOD PRESSURE: 121 MMHG

## 2020-04-25 VITALS — DIASTOLIC BLOOD PRESSURE: 35 MMHG | SYSTOLIC BLOOD PRESSURE: 108 MMHG

## 2020-04-25 VITALS — SYSTOLIC BLOOD PRESSURE: 141 MMHG | DIASTOLIC BLOOD PRESSURE: 43 MMHG

## 2020-04-25 VITALS — SYSTOLIC BLOOD PRESSURE: 117 MMHG | DIASTOLIC BLOOD PRESSURE: 33 MMHG

## 2020-04-25 VITALS — DIASTOLIC BLOOD PRESSURE: 35 MMHG | SYSTOLIC BLOOD PRESSURE: 122 MMHG

## 2020-04-25 LAB
ARTERIAL PATENCY WRIST A: POSITIVE
BASE EXCESS STD BLDA CALC-SCNC: 11.7 MMOL/L (ref -2.5–2.5)
BASOPHILS # BLD AUTO: 0 10^3/UL (ref 0–0.1)
BASOPHILS NFR BLD AUTO: 0 % (ref 0–10)
BDY SITE: (no result)
BODY TEMPERATURE: 36.7
BUN/CREAT SERPL: 55
CALCIUM SERPL-MCNC: 8.2 MG/DL (ref 8.5–10.1)
CHLORIDE SERPL-SCNC: 94 MMOL/L (ref 98–107)
CO2 BLDA CALC-SCNC: 38.1 MMOL/L (ref 21–31)
CO2 SERPL-SCNC: 32 MMOL/L (ref 21–32)
CREAT SERPL-MCNC: 0.89 MG/DL (ref 0.6–1.3)
EOSINOPHIL # BLD AUTO: 0 10^3/UL (ref 0–0.3)
EOSINOPHIL NFR BLD AUTO: 0 % (ref 0–10)
ERYTHROCYTE [DISTWIDTH] IN BLOOD BY AUTOMATED COUNT: 16.2 % (ref 10–14.5)
GFR SERPLBLD BASED ON 1.73 SQ M-ARVRAT: > 60 ML/MIN
GLUCOSE SERPL-MCNC: 248 MG/DL (ref 70–105)
HCT VFR BLD CALC: 31 % (ref 40–54)
HGB BLD-MCNC: 9.5 G/DL (ref 13.3–17.7)
INHALED O2 FLOW RATE: 85 L/MIN
LYMPHOCYTES # BLD AUTO: 0.3 X 10^3 (ref 1–4)
LYMPHOCYTES NFR BLD AUTO: 4 % (ref 12–44)
MAGNESIUM SERPL-MCNC: 1.9 MG/DL (ref 1.6–2.4)
MANUAL DIFFERENTIAL PERFORMED BLD QL: NO
MCH RBC QN AUTO: 30 PG (ref 25–34)
MCHC RBC AUTO-ENTMCNC: 31 G/DL (ref 32–36)
MCV RBC AUTO: 98 FL (ref 80–99)
MONOCYTES # BLD AUTO: 0.3 X 10^3 (ref 0–1)
MONOCYTES NFR BLD AUTO: 3 % (ref 0–12)
NEUTROPHILS # BLD AUTO: 9.1 X 10^3 (ref 1.8–7.8)
NEUTROPHILS NFR BLD AUTO: 94 % (ref 42–75)
PCO2 BLDA: 53 MMHG (ref 35–45)
PH BLDA: 7.45 [PH] (ref 7.37–7.43)
PHOSPHATE SERPL-MCNC: 3.6 MG/DL (ref 2.3–4.7)
PLATELET # BLD: 165 10^3/UL (ref 130–400)
PMV BLD AUTO: 11.6 FL (ref 7.4–10.4)
PO2 BLDA: 81 MMHG (ref 79–93)
POTASSIUM SERPL-SCNC: 4.7 MMOL/L (ref 3.6–5)
SAO2 % BLDA FROM PO2: 97 % (ref 94–100)
SODIUM SERPL-SCNC: 136 MMOL/L (ref 135–145)
VENTILATION MODE VENT: YES
WBC # BLD AUTO: 9.7 10^3/UL (ref 4.3–11)

## 2020-04-25 RX ADMIN — PROPOFOL SCH MLS/HR: 10 INJECTION, EMULSION INTRAVENOUS at 05:39

## 2020-04-25 RX ADMIN — METHYLPREDNISOLONE SODIUM SUCCINATE SCH MG: 40 INJECTION, POWDER, FOR SOLUTION INTRAMUSCULAR; INTRAVENOUS at 00:54

## 2020-04-25 RX ADMIN — PROPOFOL SCH MLS/HR: 10 INJECTION, EMULSION INTRAVENOUS at 23:35

## 2020-04-25 RX ADMIN — METHYLPREDNISOLONE SODIUM SUCCINATE SCH MG: 40 INJECTION, POWDER, FOR SOLUTION INTRAMUSCULAR; INTRAVENOUS at 11:43

## 2020-04-25 RX ADMIN — PANTOPRAZOLE SODIUM SCH MG: 40 INJECTION, POWDER, FOR SOLUTION INTRAVENOUS at 19:42

## 2020-04-25 RX ADMIN — IPRATROPIUM BROMIDE AND ALBUTEROL SULFATE SCH ML: .5; 3 SOLUTION RESPIRATORY (INHALATION) at 14:16

## 2020-04-25 RX ADMIN — INSULIN ASPART SCH UNIT: 100 INJECTION, SOLUTION INTRAVENOUS; SUBCUTANEOUS at 23:33

## 2020-04-25 RX ADMIN — IPRATROPIUM BROMIDE AND ALBUTEROL SULFATE SCH ML: .5; 3 SOLUTION RESPIRATORY (INHALATION) at 22:52

## 2020-04-25 RX ADMIN — INSULIN ASPART SCH UNIT: 100 INJECTION, SOLUTION INTRAVENOUS; SUBCUTANEOUS at 00:57

## 2020-04-25 RX ADMIN — PANTOPRAZOLE SODIUM SCH MG: 40 INJECTION, POWDER, FOR SOLUTION INTRAVENOUS at 08:13

## 2020-04-25 RX ADMIN — INSULIN ASPART SCH UNIT: 100 INJECTION, SOLUTION INTRAVENOUS; SUBCUTANEOUS at 17:54

## 2020-04-25 RX ADMIN — METOPROLOL TARTRATE SCH MG: 50 TABLET, FILM COATED ORAL at 19:43

## 2020-04-25 RX ADMIN — RISPERIDONE SCH MG: 1 TABLET, FILM COATED ORAL at 08:12

## 2020-04-25 RX ADMIN — METOPROLOL TARTRATE SCH MG: 50 TABLET, FILM COATED ORAL at 08:12

## 2020-04-25 RX ADMIN — INSULIN ASPART SCH UNIT: 100 INJECTION, SOLUTION INTRAVENOUS; SUBCUTANEOUS at 11:43

## 2020-04-25 RX ADMIN — IPRATROPIUM BROMIDE AND ALBUTEROL SULFATE SCH ML: .5; 3 SOLUTION RESPIRATORY (INHALATION) at 18:05

## 2020-04-25 RX ADMIN — BUDESONIDE SCH MG: 0.5 SUSPENSION RESPIRATORY (INHALATION) at 07:31

## 2020-04-25 RX ADMIN — PROPOFOL SCH MLS/HR: 10 INJECTION, EMULSION INTRAVENOUS at 15:18

## 2020-04-25 RX ADMIN — DIGOXIN SCH MG: 0.25 INJECTION INTRAMUSCULAR; INTRAVENOUS at 08:13

## 2020-04-25 RX ADMIN — BUMETANIDE SCH MG: 0.25 INJECTION, SOLUTION INTRAMUSCULAR; INTRAVENOUS at 06:40

## 2020-04-25 RX ADMIN — POTASSIUM CHLORIDE SCH MLS/HR: 200 INJECTION, SOLUTION INTRAVENOUS at 06:28

## 2020-04-25 RX ADMIN — IPRATROPIUM BROMIDE AND ALBUTEROL SULFATE SCH ML: .5; 3 SOLUTION RESPIRATORY (INHALATION) at 02:13

## 2020-04-25 RX ADMIN — FENTANYL CITRATE SCH MLS/HR: 50 INJECTION, SOLUTION INTRAMUSCULAR; INTRAVENOUS at 09:55

## 2020-04-25 RX ADMIN — Medication SCH MLS/HR: at 10:06

## 2020-04-25 RX ADMIN — MAGNESIUM SULFATE IN DEXTROSE SCH MLS/HR: 10 INJECTION, SOLUTION INTRAVENOUS at 06:28

## 2020-04-25 RX ADMIN — METHYLPREDNISOLONE SODIUM SUCCINATE SCH MG: 40 INJECTION, POWDER, FOR SOLUTION INTRAMUSCULAR; INTRAVENOUS at 22:06

## 2020-04-25 RX ADMIN — METHYLPREDNISOLONE SODIUM SUCCINATE SCH MG: 40 INJECTION, POWDER, FOR SOLUTION INTRAMUSCULAR; INTRAVENOUS at 06:40

## 2020-04-25 RX ADMIN — AMIODARONE HYDROCHLORIDE SCH MG: 200 TABLET ORAL at 08:12

## 2020-04-25 RX ADMIN — APIXABAN SCH MG: 5 TABLET, FILM COATED ORAL at 06:40

## 2020-04-25 RX ADMIN — RISPERIDONE SCH MG: 1 TABLET, FILM COATED ORAL at 19:43

## 2020-04-25 RX ADMIN — IPRATROPIUM BROMIDE AND ALBUTEROL SULFATE SCH ML: .5; 3 SOLUTION RESPIRATORY (INHALATION) at 10:21

## 2020-04-25 RX ADMIN — METHYLPREDNISOLONE SODIUM SUCCINATE SCH MG: 40 INJECTION, POWDER, FOR SOLUTION INTRAMUSCULAR; INTRAVENOUS at 13:01

## 2020-04-25 RX ADMIN — LISINOPRIL SCH MG: 20 TABLET ORAL at 08:12

## 2020-04-25 RX ADMIN — APIXABAN SCH MG: 5 TABLET, FILM COATED ORAL at 17:45

## 2020-04-25 RX ADMIN — PROPOFOL SCH MLS/HR: 10 INJECTION, EMULSION INTRAVENOUS at 10:45

## 2020-04-25 RX ADMIN — INSULIN ASPART SCH UNIT: 100 INJECTION, SOLUTION INTRAVENOUS; SUBCUTANEOUS at 06:41

## 2020-04-25 RX ADMIN — IPRATROPIUM BROMIDE AND ALBUTEROL SULFATE SCH ML: .5; 3 SOLUTION RESPIRATORY (INHALATION) at 07:31

## 2020-04-25 RX ADMIN — BUDESONIDE SCH MG: 0.5 SUSPENSION RESPIRATORY (INHALATION) at 18:05

## 2020-04-25 RX ADMIN — POTASSIUM CHLORIDE SCH MEQ: 1500 TABLET, EXTENDED RELEASE ORAL at 06:29

## 2020-04-25 NOTE — CARDIOLOGY PROGRESS NOTE
Cardiology SOAP Progress Note


Subjective:


Intubated/ventilated





Objective:


I&O/Vital Signs











 4/25/20 4/25/20 4/25/20 4/25/20





 02:00 02:13 03:00 03:09


 


Pulse 57 58 58 


 


Resp 12 17 12 


 


B/P (MAP) 117/33 (61)  112/32 (58) 


 


Pulse Ox 94 94 90 


 


O2 Delivery Mechanical Ventilator  Mechanical Ventilator Mechanical Ventilator


 


O2 Flow Rate 50.00  50.00 55.00


 


FiO2  50  





 4/25/20 4/25/20 4/25/20 4/25/20





 03:33 04:00 04:00 05:00


 


Pulse  58  67


 


Resp  12  12


 


B/P (MAP)  121/35 (63)  114/36 (62)


 


Pulse Ox  97 95 96


 


O2 Delivery Mechanical Ventilator Mechanical Ventilator Mechanical Ventilator 

Mechanical Ventilator


 


O2 Flow Rate 70.00 70.00  70.00


 


FiO2   55 





 4/25/20 4/25/20 4/25/20 4/25/20





 05:39 06:00 06:41 07:00


 


Pulse 71 64 62 62


 


Resp  12  12


 


B/P (MAP) 125/40 108/35 (59)  113/38 (63)


 


Pulse Ox  96  96


 


O2 Delivery  Mechanical Ventilator  Mechanical Ventilator


 


O2 Flow Rate  70.00  70.00





 4/25/20 4/25/20 4/25/20 4/25/20





 07:13 07:13 07:32 07:45


 


Temp 36.2 36.2  


 


Pulse   64 64


 


Resp   16 16


 


Pulse Ox   95 95


 


FiO2   55 55


 


    





 4/25/20 4/25/20 4/25/20 4/25/20





 08:00 08:00 09:01 10:00


 


Pulse 70  65 63


 


Resp 10  12 10


 


B/P (MAP) 106/36 (59)  112/37 (62) 112/37 (62)


 


Pulse Ox 94 94 95 96


 


O2 Delivery Mechanical Ventilator Mechanical Ventilator Mechanical Ventilator 

Mechanical Ventilator


 


O2 Flow Rate 70.00  70.00 70.00


 


FiO2  55  





 4/25/20 4/25/20 4/25/20 4/25/20





 10:22 10:45 11:00 11:36


 


Temp    36.5


 


Pulse 68 68 58 


 


Resp 16  12 


 


B/P (MAP)  137/74 124/40 (68) 


 


Pulse Ox 94  98 


 


O2 Delivery   Mechanical Ventilator 


 


O2 Flow Rate   70.00 


 


FiO2 55   


 


    





 4/25/20 4/25/20  





 12:00 12:00  


 


Pulse 56   


 


Resp 12   


 


B/P (MAP) 128/41 (70)   


 


Pulse Ox 100 94  


 


O2 Delivery Mechanical Ventilator Mechanical Ventilator  


 


O2 Flow Rate 70.00   


 


FiO2  55  














 4/25/20





 00:00


 


Intake Total 660 ml


 


Output Total 575 ml


 


Balance 85 ml








Weight (Pounds):  206


Weight (Ounces):  0.8


Weight (Calculated Kilograms):  93.988037


Constitutional:  other (on mec vent, unresponsive)


Respiratory:  No accessory muscle use; other (fair to good bilat air entry)


Cardiovascular:  regular rate-rhythm, S1 and S2, systolic murmur (soft BUTCH at 

card base)


Gastrointestional:  hernia (abdominal), audible bowel sounds, other (mildly 

distended)


Extremities:  swelling (mild edema), other (R foot in dressing that wasn't 

removed); No clubbing, No cyanosis


Neurologic/Psychiatric:  other (unresponsive, on mech vent)


Skin:  No rash on exposed areas, No ulcerations on exposed areas; other 

(dressing to right foot, D&I)





Results/Procedures:


Labs


Laboratory Tests


4/24/20 17:36: Glucometer 263H


4/25/20 00:56: Glucometer 252H


4/25/20 01:40: 


White Blood Count 9.7, Red Blood Count 3.13L, Hemoglobin 9.5L, Hematocrit 31L, 

Mean Corpuscular Volume 98, Mean Corpuscular Hemoglobin 30, Mean Corpuscular 

Hemoglobin Concent 31L, Red Cell Distribution Width 16.2H, Platelet Count 165, 

Mean Platelet Volume 11.6H, Neutrophils (%) (Auto) 94H, Lymphocytes (%) (Auto) 

4L, Monocytes (%) (Auto) 3, Eosinophils (%) (Auto) 0, Basophils (%) (Auto) 0, 

Neutrophils # (Auto) 9.1H, Lymphocytes # (Auto) 0.3L, Monocytes # (Auto) 0.3, 

Eosinophils # (Auto) 0.0, Basophils # (Auto) 0.0, Blood Gas Puncture Site RIGHT 

RADIAL, Blood Gas Patient Temperature 36.7, Arterial Blood pH 7.45H, Arterial 

Blood Partial Pressure CO2 53H, Arterial Blood Partial Pressure O2 81, Arterial 

Blood HCO3 36H, Arterial Blood Total CO2 38.1H, Arterial Blood Oxygen Saturation

97, Arterial Blood Base Excess 11.7H, Rojelio Test POSITIVE, Blood Gas Ventilator 

Setting YES, Blood Gas Inspired Oxygen 85, Sodium Level 136, Potassium Level 

4.7, Chloride Level 94L, Carbon Dioxide Level 32, Anion Gap 10, Blood Urea 

Nitrogen 49H, Creatinine 0.89, Estimat Glomerular Filtration Rate > 60, 

BUN/Creatinine Ratio 55, Glucose Level 248H, Calcium Level 8.2L, Phosphorus 

Level 3.6, Magnesium Level 1.9


4/25/20 11:35: Glucometer 264H





Microbiology


4/13/20 Catheter Tip Culture - Final, Complete


          No growth


4/13/20 Gram Stain - Final, Complete


          


4/13/20 Sputum Culture - Final, Complete


          No growth


4/12/20 Urine Culture - Final, Complete


          NO GROWTH








A/P:


Assessment/Dx:


Acute resp failure and shock of undetermined etiology; shock resolved





Atrial flutter with 2-1 AV block ; status post cardioversion.  Currently in 

sinus rhythm.  Continue Cardizem, amiodarone and Eliquis through NG tube.





Mild acute on chronic diastolic congestive heart failure.  Agree with IV 

diuretics.





Cannot exclude pulmonary embolism. Was started on treatment dose Lovenox at 

admission, but being stopped on 4/15/20 due to falling H & H and falling 

platelet count.  However due to atrial fibrillation/atrial flutter, the patient 

is started on Eliquis.





Acute renal failure (AYAH-3) likely due to shock - resolved





Mild troponin elevation, likely type-2 MI due to reasons noted above





Drug test (+) for Meth on 4-





CAD. Card cath of Jan 2017 showed moderate, nonobstructive disease and LVEF 55% 

and elevated LVEDP. MPI of 2/27/20 by Dr Dai showed no ischemia or infarction

and normal LV function





Echo on 4/14/20: LVEF 45%, grade 1 fajardo dysfunction, mild to mod TR, RVSP 29 

mmHg





Chronic tobacco use (smokes cigarettes according to previous records)





H/o DM II





H/o Htn





H/o Hyperlipidemia





PAD.  Peripheral angiogram and intervention on 8/22/2019 by Dr Dai with PTA 

of the right AT, PT and stenting of the right SFA. Significant improvement of 

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


Plan:








* Complex management


* Prognosis guarded


* Atrial flutter with 2-1 AV block; no response with IV amiodarone and Cardizem.

   Informed consent was taken from the son for transesophageal echocardiogram 

  assisted cardioversion.  Risk of damage to the esophagus and stroke was 

  discussed.  The son accepted and gave informed consent.  Transesophageal 

  echocardiogram was done on 4/20/2020 which showed no left atrium or left 

  atrial appendage thrombus.  Cardioversion was performed with 200 J which 

  converted the patient to sinus rhythm.


* Patient continues to be in sinus rhythm since cardioversion.








Thank you for your consultation. Please call me if you have any questions.








WOODY Dai MD, FACP, FACC, FSCAI, FHRS, CCDS


Interventional Cardiology


Cardiac Electrophysiology


Vascular Medicine and Endovascular Interventions











ELISE DAI MD             Apr 25, 2020 13:55

## 2020-04-25 NOTE — DIAGNOSTIC IMAGING REPORT
EXAM: CHEST 1 VIEW, AP/PA ONLY



INDICATION: Intubated. Acute kidney injury.



COMPARISON: 04/24/2020.



FINDINGS: Low lung volumes. Persistent left basilar atelectasis

or infiltrate and small left pleural effusion. No pneumothorax.

NG tube tip below the field-of-view. ETT tip midway between the

level of the clavicles and asia. Right IJ CVC tip mid SVC. No

pneumothorax. No acute osseous findings.



IMPRESSION: 

1. Support lines in the expected positions.

2. Persistent consolidation left lung base and left pleural

effusion.



Dictated by: 



  Dictated on workstation # [id number]

## 2020-04-26 VITALS — SYSTOLIC BLOOD PRESSURE: 139 MMHG | DIASTOLIC BLOOD PRESSURE: 41 MMHG

## 2020-04-26 VITALS — SYSTOLIC BLOOD PRESSURE: 137 MMHG | DIASTOLIC BLOOD PRESSURE: 74 MMHG

## 2020-04-26 VITALS — SYSTOLIC BLOOD PRESSURE: 122 MMHG | DIASTOLIC BLOOD PRESSURE: 39 MMHG

## 2020-04-26 VITALS — DIASTOLIC BLOOD PRESSURE: 33 MMHG | SYSTOLIC BLOOD PRESSURE: 110 MMHG

## 2020-04-26 VITALS — SYSTOLIC BLOOD PRESSURE: 118 MMHG | DIASTOLIC BLOOD PRESSURE: 36 MMHG

## 2020-04-26 VITALS — DIASTOLIC BLOOD PRESSURE: 37 MMHG | SYSTOLIC BLOOD PRESSURE: 125 MMHG

## 2020-04-26 VITALS — DIASTOLIC BLOOD PRESSURE: 38 MMHG | SYSTOLIC BLOOD PRESSURE: 133 MMHG

## 2020-04-26 VITALS — DIASTOLIC BLOOD PRESSURE: 37 MMHG | SYSTOLIC BLOOD PRESSURE: 124 MMHG

## 2020-04-26 VITALS — DIASTOLIC BLOOD PRESSURE: 39 MMHG | SYSTOLIC BLOOD PRESSURE: 138 MMHG

## 2020-04-26 VITALS — SYSTOLIC BLOOD PRESSURE: 130 MMHG | DIASTOLIC BLOOD PRESSURE: 38 MMHG

## 2020-04-26 VITALS — DIASTOLIC BLOOD PRESSURE: 35 MMHG | SYSTOLIC BLOOD PRESSURE: 121 MMHG

## 2020-04-26 VITALS — SYSTOLIC BLOOD PRESSURE: 130 MMHG | DIASTOLIC BLOOD PRESSURE: 40 MMHG

## 2020-04-26 VITALS — DIASTOLIC BLOOD PRESSURE: 36 MMHG | SYSTOLIC BLOOD PRESSURE: 121 MMHG

## 2020-04-26 VITALS — DIASTOLIC BLOOD PRESSURE: 32 MMHG | SYSTOLIC BLOOD PRESSURE: 112 MMHG

## 2020-04-26 VITALS — SYSTOLIC BLOOD PRESSURE: 114 MMHG | DIASTOLIC BLOOD PRESSURE: 33 MMHG

## 2020-04-26 VITALS — DIASTOLIC BLOOD PRESSURE: 40 MMHG | SYSTOLIC BLOOD PRESSURE: 148 MMHG

## 2020-04-26 VITALS — SYSTOLIC BLOOD PRESSURE: 124 MMHG | DIASTOLIC BLOOD PRESSURE: 36 MMHG

## 2020-04-26 VITALS — SYSTOLIC BLOOD PRESSURE: 139 MMHG | DIASTOLIC BLOOD PRESSURE: 37 MMHG

## 2020-04-26 VITALS — DIASTOLIC BLOOD PRESSURE: 35 MMHG | SYSTOLIC BLOOD PRESSURE: 123 MMHG

## 2020-04-26 VITALS — SYSTOLIC BLOOD PRESSURE: 122 MMHG | DIASTOLIC BLOOD PRESSURE: 36 MMHG

## 2020-04-26 VITALS — SYSTOLIC BLOOD PRESSURE: 115 MMHG | DIASTOLIC BLOOD PRESSURE: 34 MMHG

## 2020-04-26 VITALS — SYSTOLIC BLOOD PRESSURE: 122 MMHG | DIASTOLIC BLOOD PRESSURE: 35 MMHG

## 2020-04-26 VITALS — DIASTOLIC BLOOD PRESSURE: 31 MMHG | SYSTOLIC BLOOD PRESSURE: 119 MMHG

## 2020-04-26 VITALS — DIASTOLIC BLOOD PRESSURE: 36 MMHG | SYSTOLIC BLOOD PRESSURE: 128 MMHG

## 2020-04-26 VITALS — SYSTOLIC BLOOD PRESSURE: 118 MMHG | DIASTOLIC BLOOD PRESSURE: 33 MMHG

## 2020-04-26 VITALS — SYSTOLIC BLOOD PRESSURE: 118 MMHG | DIASTOLIC BLOOD PRESSURE: 34 MMHG

## 2020-04-26 VITALS — SYSTOLIC BLOOD PRESSURE: 120 MMHG | DIASTOLIC BLOOD PRESSURE: 33 MMHG

## 2020-04-26 VITALS — SYSTOLIC BLOOD PRESSURE: 117 MMHG | DIASTOLIC BLOOD PRESSURE: 35 MMHG

## 2020-04-26 VITALS — DIASTOLIC BLOOD PRESSURE: 74 MMHG | SYSTOLIC BLOOD PRESSURE: 137 MMHG

## 2020-04-26 VITALS — DIASTOLIC BLOOD PRESSURE: 32 MMHG | SYSTOLIC BLOOD PRESSURE: 118 MMHG

## 2020-04-26 VITALS — SYSTOLIC BLOOD PRESSURE: 124 MMHG | DIASTOLIC BLOOD PRESSURE: 38 MMHG

## 2020-04-26 VITALS — DIASTOLIC BLOOD PRESSURE: 36 MMHG | SYSTOLIC BLOOD PRESSURE: 104 MMHG

## 2020-04-26 LAB
ARTERIAL PATENCY WRIST A: POSITIVE
BASE EXCESS STD BLDA CALC-SCNC: 10 MMOL/L (ref -2.5–2.5)
BASOPHILS # BLD AUTO: 0 10^3/UL (ref 0–0.1)
BASOPHILS NFR BLD AUTO: 0 % (ref 0–10)
BDY SITE: (no result)
BODY TEMPERATURE: 36.8
BUN/CREAT SERPL: 59
CALCIUM SERPL-MCNC: 8 MG/DL (ref 8.5–10.1)
CHLORIDE SERPL-SCNC: 96 MMOL/L (ref 98–107)
CO2 BLDA CALC-SCNC: 36.1 MMOL/L (ref 21–31)
CO2 SERPL-SCNC: 30 MMOL/L (ref 21–32)
CREAT SERPL-MCNC: 0.82 MG/DL (ref 0.6–1.3)
EOSINOPHIL # BLD AUTO: 0 10^3/UL (ref 0–0.3)
EOSINOPHIL NFR BLD AUTO: 0 % (ref 0–10)
ERYTHROCYTE [DISTWIDTH] IN BLOOD BY AUTOMATED COUNT: 16.6 % (ref 10–14.5)
GFR SERPLBLD BASED ON 1.73 SQ M-ARVRAT: > 60 ML/MIN
GLUCOSE SERPL-MCNC: 205 MG/DL (ref 70–105)
HCT VFR BLD CALC: 30 % (ref 40–54)
HGB BLD-MCNC: 9.4 G/DL (ref 13.3–17.7)
INHALED O2 FLOW RATE: 85 L/MIN
LYMPHOCYTES # BLD AUTO: 0.3 X 10^3 (ref 1–4)
LYMPHOCYTES NFR BLD AUTO: 3 % (ref 12–44)
MAGNESIUM SERPL-MCNC: 2 MG/DL (ref 1.6–2.4)
MANUAL DIFFERENTIAL PERFORMED BLD QL: NO
MCH RBC QN AUTO: 30 PG (ref 25–34)
MCHC RBC AUTO-ENTMCNC: 31 G/DL (ref 32–36)
MCV RBC AUTO: 97 FL (ref 80–99)
MONOCYTES # BLD AUTO: 0.4 X 10^3 (ref 0–1)
MONOCYTES NFR BLD AUTO: 4 % (ref 0–12)
NEUTROPHILS # BLD AUTO: 7.6 X 10^3 (ref 1.8–7.8)
NEUTROPHILS NFR BLD AUTO: 93 % (ref 42–75)
PCO2 BLDA: 51 MMHG (ref 35–45)
PH BLDA: 7.45 [PH] (ref 7.37–7.43)
PHOSPHATE SERPL-MCNC: 3.5 MG/DL (ref 2.3–4.7)
PLATELET # BLD: 156 10^3/UL (ref 130–400)
PMV BLD AUTO: 11.4 FL (ref 7.4–10.4)
PO2 BLDA: 92 MMHG (ref 79–93)
POTASSIUM SERPL-SCNC: 4.3 MMOL/L (ref 3.6–5)
SAO2 % BLDA FROM PO2: 97 % (ref 94–100)
SODIUM SERPL-SCNC: 134 MMOL/L (ref 135–145)
VENTILATION MODE VENT: YES
WBC # BLD AUTO: 8.2 10^3/UL (ref 4.3–11)

## 2020-04-26 RX ADMIN — BUDESONIDE SCH MG: 0.5 SUSPENSION RESPIRATORY (INHALATION) at 17:43

## 2020-04-26 RX ADMIN — METOPROLOL TARTRATE SCH MG: 50 TABLET, FILM COATED ORAL at 07:55

## 2020-04-26 RX ADMIN — IPRATROPIUM BROMIDE AND ALBUTEROL SULFATE SCH ML: .5; 3 SOLUTION RESPIRATORY (INHALATION) at 17:43

## 2020-04-26 RX ADMIN — FENTANYL CITRATE SCH MLS/HR: 50 INJECTION, SOLUTION INTRAMUSCULAR; INTRAVENOUS at 03:28

## 2020-04-26 RX ADMIN — INSULIN ASPART SCH UNIT: 100 INJECTION, SOLUTION INTRAVENOUS; SUBCUTANEOUS at 18:55

## 2020-04-26 RX ADMIN — METHYLPREDNISOLONE SODIUM SUCCINATE SCH MG: 40 INJECTION, POWDER, FOR SOLUTION INTRAMUSCULAR; INTRAVENOUS at 21:27

## 2020-04-26 RX ADMIN — BUDESONIDE SCH MG: 0.5 SUSPENSION RESPIRATORY (INHALATION) at 06:58

## 2020-04-26 RX ADMIN — PROPOFOL SCH MLS/HR: 10 INJECTION, EMULSION INTRAVENOUS at 06:05

## 2020-04-26 RX ADMIN — POTASSIUM CHLORIDE SCH MEQ: 1500 TABLET, EXTENDED RELEASE ORAL at 02:56

## 2020-04-26 RX ADMIN — METOPROLOL TARTRATE SCH MG: 50 TABLET, FILM COATED ORAL at 08:00

## 2020-04-26 RX ADMIN — RISPERIDONE SCH MG: 1 TABLET, FILM COATED ORAL at 21:27

## 2020-04-26 RX ADMIN — INSULIN ASPART SCH UNIT: 100 INJECTION, SOLUTION INTRAVENOUS; SUBCUTANEOUS at 10:25

## 2020-04-26 RX ADMIN — INSULIN ASPART SCH UNIT: 100 INJECTION, SOLUTION INTRAVENOUS; SUBCUTANEOUS at 21:14

## 2020-04-26 RX ADMIN — Medication SCH MLS/HR: at 10:06

## 2020-04-26 RX ADMIN — LISINOPRIL SCH MG: 20 TABLET ORAL at 07:56

## 2020-04-26 RX ADMIN — PANTOPRAZOLE SODIUM SCH MG: 40 INJECTION, POWDER, FOR SOLUTION INTRAVENOUS at 21:27

## 2020-04-26 RX ADMIN — APIXABAN SCH MG: 5 TABLET, FILM COATED ORAL at 05:43

## 2020-04-26 RX ADMIN — APIXABAN SCH MG: 5 TABLET, FILM COATED ORAL at 18:55

## 2020-04-26 RX ADMIN — INSULIN ASPART SCH UNIT: 100 INJECTION, SOLUTION INTRAVENOUS; SUBCUTANEOUS at 05:44

## 2020-04-26 RX ADMIN — IPRATROPIUM BROMIDE AND ALBUTEROL SULFATE SCH ML: .5; 3 SOLUTION RESPIRATORY (INHALATION) at 10:52

## 2020-04-26 RX ADMIN — IPRATROPIUM BROMIDE AND ALBUTEROL SULFATE SCH ML: .5; 3 SOLUTION RESPIRATORY (INHALATION) at 14:10

## 2020-04-26 RX ADMIN — MAGNESIUM SULFATE IN DEXTROSE SCH MLS/HR: 10 INJECTION, SOLUTION INTRAVENOUS at 02:56

## 2020-04-26 RX ADMIN — AMIODARONE HYDROCHLORIDE SCH MG: 200 TABLET ORAL at 07:55

## 2020-04-26 RX ADMIN — DIGOXIN SCH MG: 0.25 INJECTION INTRAMUSCULAR; INTRAVENOUS at 08:00

## 2020-04-26 RX ADMIN — PANTOPRAZOLE SODIUM SCH MG: 40 INJECTION, POWDER, FOR SOLUTION INTRAVENOUS at 08:00

## 2020-04-26 RX ADMIN — FENTANYL CITRATE SCH MLS/HR: 50 INJECTION, SOLUTION INTRAMUSCULAR; INTRAVENOUS at 21:58

## 2020-04-26 RX ADMIN — IPRATROPIUM BROMIDE AND ALBUTEROL SULFATE SCH ML: .5; 3 SOLUTION RESPIRATORY (INHALATION) at 21:18

## 2020-04-26 RX ADMIN — BUMETANIDE SCH MG: 0.25 INJECTION, SOLUTION INTRAMUSCULAR; INTRAVENOUS at 03:17

## 2020-04-26 RX ADMIN — PROPOFOL SCH MLS/HR: 10 INJECTION, EMULSION INTRAVENOUS at 19:00

## 2020-04-26 RX ADMIN — PROPOFOL SCH MLS/HR: 10 INJECTION, EMULSION INTRAVENOUS at 13:08

## 2020-04-26 RX ADMIN — METHYLPREDNISOLONE SODIUM SUCCINATE SCH MG: 40 INJECTION, POWDER, FOR SOLUTION INTRAMUSCULAR; INTRAVENOUS at 05:43

## 2020-04-26 RX ADMIN — RISPERIDONE SCH MG: 1 TABLET, FILM COATED ORAL at 07:56

## 2020-04-26 RX ADMIN — IPRATROPIUM BROMIDE AND ALBUTEROL SULFATE SCH ML: .5; 3 SOLUTION RESPIRATORY (INHALATION) at 02:27

## 2020-04-26 RX ADMIN — IPRATROPIUM BROMIDE AND ALBUTEROL SULFATE SCH ML: .5; 3 SOLUTION RESPIRATORY (INHALATION) at 06:58

## 2020-04-26 RX ADMIN — POTASSIUM CHLORIDE SCH MLS/HR: 200 INJECTION, SOLUTION INTRAVENOUS at 02:56

## 2020-04-26 RX ADMIN — METHYLPREDNISOLONE SODIUM SUCCINATE SCH MG: 40 INJECTION, POWDER, FOR SOLUTION INTRAMUSCULAR; INTRAVENOUS at 14:09

## 2020-04-26 RX ADMIN — INSULIN ASPART SCH UNIT: 100 INJECTION, SOLUTION INTRAVENOUS; SUBCUTANEOUS at 14:10

## 2020-04-26 NOTE — CARDIOLOGY PROGRESS NOTE
Cardiology SOAP Progress Note


Subjective:


Intubated/ventilated.





Objective:


I&O/Vital Signs











 4/26/20 4/26/20 4/26/20 4/26/20





 02:27 03:00 03:00 03:01


 


Temp  36.8  


 


Pulse 53  55 


 


Resp 18  12 


 


B/P (MAP)   118/33 (61) 


 


Pulse Ox 94  94 94


 


O2 Delivery   Mechanical Ventilator Mechanical Ventilator


 


O2 Flow Rate   50.00 


 


FiO2 50   50


 


    





 4/26/20 4/26/20 4/26/20 4/26/20





 04:00 05:00 05:49 06:00


 


Pulse 55 52  55


 


Resp 12 12  12


 


B/P (MAP) 124/36 (65) 130/40 (70)  122/36 (64)


 


Pulse Ox 98 99  99


 


O2 Delivery Mechanical Ventilator Mechanical Ventilator Mechanical Ventilator 

Mechanical Ventilator


 


O2 Flow Rate 50.00 50.00 45.00 45.00





 4/26/20 4/26/20 4/26/20 4/26/20





 06:05 06:41 07:00 07:01


 


Pulse  51 51 53


 


Resp   9 15


 


B/P (MAP) 127/39  118/36 (63) 


 


Pulse Ox   98 99


 


O2 Delivery   Mechanical Ventilator 


 


O2 Flow Rate   45.00 


 


FiO2    45





 4/26/20 4/26/20 4/26/20 4/26/20





 08:00 08:00 08:15 09:00


 


Temp   36.2 


 


Pulse  51  50


 


Resp  13  12


 


B/P (MAP)  122/39 (66)  125/37 (66)


 


Pulse Ox 95 97  98


 


O2 Delivery Mechanical Ventilator Mechanical Ventilator  Mechanical Ventilator


 


O2 Flow Rate  45.00  45.00


 


FiO2 40   


 


    





 4/26/20 4/26/20 4/26/20 4/26/20





 10:00 10:52 11:00 11:50


 


Temp    36.1


 


Pulse 53 49 50 


 


Resp 12 18 13 


 


B/P (MAP) 110/33 (58)  121/35 (63) 


 


Pulse Ox 99 99 95 


 


O2 Delivery Mechanical Ventilator  Mechanical Ventilator 


 


O2 Flow Rate 45.00  45.00 


 


FiO2  40  


 


    





 4/26/20 4/26/20 4/26/20 4/26/20





 12:00 12:00 12:36 13:08


 


Pulse 56  51 56


 


Resp 14   


 


B/P (MAP) 119/31 (60)   119/31


 


Pulse Ox 93 95  


 


O2 Delivery Mechanical Ventilator Mechanical Ventilator  


 


O2 Flow Rate 45.00   


 


FiO2  40  





 4/26/20   





 14:10   


 


Pulse 48   


 


Resp 15   


 


Pulse Ox 95   


 


FiO2 30   














 4/26/20





 00:00


 


Intake Total 1110 ml


 


Output Total 1150 ml


 


Balance -40 ml








Weight (Pounds):  206


Weight (Ounces):  0.8


Weight (Calculated Kilograms):  93.863822


Constitutional:  other (on mec vent, unresponsive)


Respiratory:  No accessory muscle use; other (fair to good bilat air entry)


Cardiovascular:  regular rate-rhythm, bradycardia, S1 and S2, systolic murmur 

(soft BUTCH at card base)


Gastrointestional:  hernia (abdominal), audible bowel sounds, other (mildly 

distended)


Extremities:  swelling (mild edema), other (R foot in dressing that wasn't 

removed); No clubbing, No cyanosis


Neurologic/Psychiatric:  other (unresponsive, on mech vent)


Skin:  No rash on exposed areas, No ulcerations on exposed areas; other 

(dressing to right foot, D&I)





Results/Procedures:


Labs


Laboratory Tests


4/25/20 17:50: Glucometer 277H


4/25/20 23:04: Glucometer 255H


4/26/20 02:22: 


White Blood Count 8.2, Red Blood Count 3.11L, Hemoglobin 9.4L, Hematocrit 30L, 

Mean Corpuscular Volume 97, Mean Corpuscular Hemoglobin 30, Mean Corpuscular 

Hemoglobin Concent 31L, Red Cell Distribution Width 16.6H, Platelet Count 156, 

Mean Platelet Volume 11.4H, Neutrophils (%) (Auto) 93H, Lymphocytes (%) (Auto) 

3L, Monocytes (%) (Auto) 4, Eosinophils (%) (Auto) 0, Basophils (%) (Auto) 0, 

Neutrophils # (Auto) 7.6, Lymphocytes # (Auto) 0.3L, Monocytes # (Auto) 0.4, 

Eosinophils # (Auto) 0.0, Basophils # (Auto) 0.0, Blood Gas Puncture Site RIGHT 

RADIAL, Blood Gas Patient Temperature 36.8, Arterial Blood pH 7.45H, Arterial 

Blood Partial Pressure CO2 51H, Arterial Blood Partial Pressure O2 92, Arterial 

Blood HCO3 35H, Arterial Blood Total CO2 36.1H, Arterial Blood Oxygen Saturation

97, Arterial Blood Base Excess 10.0H, Rojelio Test POSITIVE, Blood Gas Ventilator 

Setting YES, Blood Gas Inspired Oxygen 85, Sodium Level 134L, Potassium Level 4

.3, Chloride Level 96L, Carbon Dioxide Level 30, Anion Gap 8, Blood Urea 

Nitrogen 48H, Creatinine 0.82, Estimat Glomerular Filtration Rate > 60, 

BUN/Creatinine Ratio 59, Glucose Level 205H, Calcium Level 8.0L, Phosphorus 

Level 3.5, Magnesium Level 2.0


4/26/20 10:04: Glucometer 245H


4/26/20 14:05: Glucometer 230H





Microbiology


4/13/20 Catheter Tip Culture - Final, Complete


          No growth


4/13/20 Gram Stain - Final, Complete


          


4/13/20 Sputum Culture - Final, Complete


          No growth


4/12/20 Urine Culture - Final, Complete


          NO GROWTH








A/P:


Assessment/Dx:


Acute resp failure and shock of undetermined etiology; shock resolved





Sinus bradycardia.  DC Cardizem.  Holding parameters for metoprolol.  DC digoxin

as well.





Atrial flutter with 2-1 AV block ; status post cardioversion.  Currently in 

sinus rhythm.  Continue amiodarone and Eliquis.





Mild acute on chronic diastolic congestive heart failure.  Agree with IV 

diuretics.





Cannot exclude pulmonary embolism. Was started on treatment dose Lovenox at 

admission, but being stopped on 4/15/20 due to falling H & H and falling 

platelet count.  However due to atrial fibrillation/atrial flutter, the patient 

is started on Eliquis.





Acute renal failure (AYAH-3) likely due to shock - resolved





Mild troponin elevation, likely type-2 MI due to reasons noted above





Drug test (+) for Meth on 4-





CAD. Card cath of Jan 2017 showed moderate, nonobstructive disease and LVEF 55% 

and elevated LVEDP. MPI of 2/27/20 by Dr Dai showed no ischemia or infarction

and normal LV function





Echo on 4/14/20: LVEF 45%, grade 1 fajardo dysfunction, mild to mod TR, RVSP 29 

mmHg





Chronic tobacco use (smokes cigarettes according to previous records)





H/o DM II





H/o Htn





H/o Hyperlipidemia





PAD.  Peripheral angiogram and intervention on 8/22/2019 by Dr Dai with PTA 

of the right AT, PT and stenting of the right SFA. Significant improvement of 

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


Plan:








* Complex management


* Prognosis guarded


* Atrial flutter with 2-1 AV block; no response with IV amiodarone and Cardizem.

   Informed consent was taken from the son for transesophageal echocardiogram 

  assisted cardioversion.  Risk of damage to the esophagus and stroke was 

  discussed.  The son accepted and gave informed consent.  Transesophageal 

  echocardiogram was done on 4/20/2020 which showed no left atrium or left 

  atrial appendage thrombus.  Cardioversion was performed with 200 J which 

  converted the patient to sinus rhythm.


* Patient continues to be in sinus rhythm since cardioversion.








Thank you for your consultation. Please call me if you have any questions.








WOODY Dai MD, FACP, FACC, FSCAI, FHRS, CCDS


Interventional Cardiology


Cardiac Electrophysiology


Vascular Medicine and Endovascular Interventions











ELISE DAI MD             Apr 26, 2020 14:15

## 2020-04-26 NOTE — DIAGNOSTIC IMAGING REPORT
INDICATION: Ventilator followup.



Comparison with 04/25/2020.



FINDINGS: ET tube, NG tube and right central line remain

unchanged in position. Small left basilar effusion again noted.

Right lung is clear with sharp costophrenic angle. Heart is not

enlarged.



IMPRESSION:

1. Overall stable appearance. Tubes and lines remain in good

position.



Dictated by: 



  Dictated on workstation # [id number]

## 2020-04-27 VITALS — DIASTOLIC BLOOD PRESSURE: 42 MMHG | SYSTOLIC BLOOD PRESSURE: 134 MMHG

## 2020-04-27 VITALS — SYSTOLIC BLOOD PRESSURE: 119 MMHG | DIASTOLIC BLOOD PRESSURE: 40 MMHG

## 2020-04-27 VITALS — SYSTOLIC BLOOD PRESSURE: 133 MMHG | DIASTOLIC BLOOD PRESSURE: 41 MMHG

## 2020-04-27 VITALS — DIASTOLIC BLOOD PRESSURE: 57 MMHG | SYSTOLIC BLOOD PRESSURE: 98 MMHG

## 2020-04-27 VITALS — SYSTOLIC BLOOD PRESSURE: 166 MMHG | DIASTOLIC BLOOD PRESSURE: 53 MMHG

## 2020-04-27 VITALS — SYSTOLIC BLOOD PRESSURE: 98 MMHG | DIASTOLIC BLOOD PRESSURE: 57 MMHG

## 2020-04-27 VITALS — SYSTOLIC BLOOD PRESSURE: 137 MMHG | DIASTOLIC BLOOD PRESSURE: 74 MMHG

## 2020-04-27 VITALS — SYSTOLIC BLOOD PRESSURE: 110 MMHG | DIASTOLIC BLOOD PRESSURE: 32 MMHG

## 2020-04-27 VITALS — DIASTOLIC BLOOD PRESSURE: 42 MMHG | SYSTOLIC BLOOD PRESSURE: 120 MMHG

## 2020-04-27 VITALS — DIASTOLIC BLOOD PRESSURE: 49 MMHG | SYSTOLIC BLOOD PRESSURE: 157 MMHG

## 2020-04-27 VITALS — DIASTOLIC BLOOD PRESSURE: 31 MMHG | SYSTOLIC BLOOD PRESSURE: 106 MMHG

## 2020-04-27 VITALS — DIASTOLIC BLOOD PRESSURE: 44 MMHG | SYSTOLIC BLOOD PRESSURE: 123 MMHG

## 2020-04-27 VITALS — SYSTOLIC BLOOD PRESSURE: 125 MMHG | DIASTOLIC BLOOD PRESSURE: 46 MMHG

## 2020-04-27 VITALS — DIASTOLIC BLOOD PRESSURE: 41 MMHG | SYSTOLIC BLOOD PRESSURE: 137 MMHG

## 2020-04-27 VITALS — DIASTOLIC BLOOD PRESSURE: 37 MMHG | SYSTOLIC BLOOD PRESSURE: 125 MMHG

## 2020-04-27 VITALS — SYSTOLIC BLOOD PRESSURE: 110 MMHG | DIASTOLIC BLOOD PRESSURE: 39 MMHG

## 2020-04-27 VITALS — DIASTOLIC BLOOD PRESSURE: 30 MMHG | SYSTOLIC BLOOD PRESSURE: 109 MMHG

## 2020-04-27 VITALS — DIASTOLIC BLOOD PRESSURE: 43 MMHG | SYSTOLIC BLOOD PRESSURE: 124 MMHG

## 2020-04-27 VITALS — SYSTOLIC BLOOD PRESSURE: 116 MMHG | DIASTOLIC BLOOD PRESSURE: 37 MMHG

## 2020-04-27 VITALS — DIASTOLIC BLOOD PRESSURE: 38 MMHG | SYSTOLIC BLOOD PRESSURE: 101 MMHG

## 2020-04-27 VITALS — DIASTOLIC BLOOD PRESSURE: 46 MMHG | SYSTOLIC BLOOD PRESSURE: 132 MMHG

## 2020-04-27 VITALS — DIASTOLIC BLOOD PRESSURE: 41 MMHG | SYSTOLIC BLOOD PRESSURE: 118 MMHG

## 2020-04-27 VITALS — SYSTOLIC BLOOD PRESSURE: 135 MMHG | DIASTOLIC BLOOD PRESSURE: 42 MMHG

## 2020-04-27 VITALS — DIASTOLIC BLOOD PRESSURE: 32 MMHG | SYSTOLIC BLOOD PRESSURE: 108 MMHG

## 2020-04-27 VITALS — SYSTOLIC BLOOD PRESSURE: 125 MMHG | DIASTOLIC BLOOD PRESSURE: 38 MMHG

## 2020-04-27 VITALS — SYSTOLIC BLOOD PRESSURE: 126 MMHG | DIASTOLIC BLOOD PRESSURE: 39 MMHG

## 2020-04-27 VITALS — SYSTOLIC BLOOD PRESSURE: 119 MMHG | DIASTOLIC BLOOD PRESSURE: 38 MMHG

## 2020-04-27 VITALS — DIASTOLIC BLOOD PRESSURE: 35 MMHG | SYSTOLIC BLOOD PRESSURE: 113 MMHG

## 2020-04-27 VITALS — SYSTOLIC BLOOD PRESSURE: 131 MMHG | DIASTOLIC BLOOD PRESSURE: 37 MMHG

## 2020-04-27 VITALS — DIASTOLIC BLOOD PRESSURE: 32 MMHG | SYSTOLIC BLOOD PRESSURE: 103 MMHG

## 2020-04-27 VITALS — DIASTOLIC BLOOD PRESSURE: 32 MMHG | SYSTOLIC BLOOD PRESSURE: 114 MMHG

## 2020-04-27 VITALS — SYSTOLIC BLOOD PRESSURE: 105 MMHG | DIASTOLIC BLOOD PRESSURE: 38 MMHG

## 2020-04-27 VITALS — DIASTOLIC BLOOD PRESSURE: 38 MMHG | SYSTOLIC BLOOD PRESSURE: 110 MMHG

## 2020-04-27 LAB
ARTERIAL PATENCY WRIST A: POSITIVE
BASE EXCESS STD BLDA CALC-SCNC: 5.9 MMOL/L (ref -2.5–2.5)
BASOPHILS # BLD AUTO: 0 10^3/UL (ref 0–0.1)
BASOPHILS NFR BLD AUTO: 0 % (ref 0–10)
BDY SITE: (no result)
BODY TEMPERATURE: 36.4
BUN/CREAT SERPL: 58
CALCIUM SERPL-MCNC: 7.9 MG/DL (ref 8.5–10.1)
CHLORIDE SERPL-SCNC: 98 MMOL/L (ref 98–107)
CO2 BLDA CALC-SCNC: 31.6 MMOL/L (ref 21–31)
CO2 SERPL-SCNC: 27 MMOL/L (ref 21–32)
CREAT SERPL-MCNC: 0.85 MG/DL (ref 0.6–1.3)
EOSINOPHIL # BLD AUTO: 0 10^3/UL (ref 0–0.3)
EOSINOPHIL NFR BLD AUTO: 0 % (ref 0–10)
ERYTHROCYTE [DISTWIDTH] IN BLOOD BY AUTOMATED COUNT: 16.2 % (ref 10–14.5)
GFR SERPLBLD BASED ON 1.73 SQ M-ARVRAT: > 60 ML/MIN
GLUCOSE SERPL-MCNC: 224 MG/DL (ref 70–105)
HCT VFR BLD CALC: 29 % (ref 40–54)
HGB BLD-MCNC: 9.2 G/DL (ref 13.3–17.7)
INHALED O2 FLOW RATE: 60 L/MIN
LYMPHOCYTES # BLD AUTO: 0.3 X 10^3 (ref 1–4)
LYMPHOCYTES NFR BLD AUTO: 4 % (ref 12–44)
MAGNESIUM SERPL-MCNC: 2 MG/DL (ref 1.6–2.4)
MANUAL DIFFERENTIAL PERFORMED BLD QL: NO
MCH RBC QN AUTO: 31 PG (ref 25–34)
MCHC RBC AUTO-ENTMCNC: 32 G/DL (ref 32–36)
MCV RBC AUTO: 96 FL (ref 80–99)
MONOCYTES # BLD AUTO: 0.3 X 10^3 (ref 0–1)
MONOCYTES NFR BLD AUTO: 3 % (ref 0–12)
NEUTROPHILS # BLD AUTO: 8.9 X 10^3 (ref 1.8–7.8)
NEUTROPHILS NFR BLD AUTO: 93 % (ref 42–75)
PCO2 BLDA: 45 MMHG (ref 35–45)
PH BLDA: 7.44 [PH] (ref 7.37–7.43)
PHOSPHATE SERPL-MCNC: 3.5 MG/DL (ref 2.3–4.7)
PLATELET # BLD: 146 10^3/UL (ref 130–400)
PMV BLD AUTO: 12 FL (ref 7.4–10.4)
PO2 BLDA: 77 MMHG (ref 79–93)
POTASSIUM SERPL-SCNC: 4.2 MMOL/L (ref 3.6–5)
SAO2 % BLDA FROM PO2: 96 % (ref 94–100)
SODIUM SERPL-SCNC: 134 MMOL/L (ref 135–145)
VENTILATION MODE VENT: YES
WBC # BLD AUTO: 9.5 10^3/UL (ref 4.3–11)

## 2020-04-27 RX ADMIN — BUDESONIDE SCH MG: 0.5 SUSPENSION RESPIRATORY (INHALATION) at 06:41

## 2020-04-27 RX ADMIN — METHYLPREDNISOLONE SODIUM SUCCINATE SCH MG: 40 INJECTION, POWDER, FOR SOLUTION INTRAMUSCULAR; INTRAVENOUS at 06:14

## 2020-04-27 RX ADMIN — LISINOPRIL SCH MG: 20 TABLET ORAL at 08:20

## 2020-04-27 RX ADMIN — IPRATROPIUM BROMIDE AND ALBUTEROL SULFATE SCH ML: .5; 3 SOLUTION RESPIRATORY (INHALATION) at 18:38

## 2020-04-27 RX ADMIN — IPRATROPIUM BROMIDE AND ALBUTEROL SULFATE SCH ML: .5; 3 SOLUTION RESPIRATORY (INHALATION) at 14:18

## 2020-04-27 RX ADMIN — PANTOPRAZOLE SODIUM SCH MG: 40 INJECTION, POWDER, FOR SOLUTION INTRAVENOUS at 19:31

## 2020-04-27 RX ADMIN — IPRATROPIUM BROMIDE AND ALBUTEROL SULFATE SCH ML: .5; 3 SOLUTION RESPIRATORY (INHALATION) at 01:56

## 2020-04-27 RX ADMIN — METOPROLOL TARTRATE SCH MG: 50 TABLET, FILM COATED ORAL at 19:31

## 2020-04-27 RX ADMIN — IPRATROPIUM BROMIDE AND ALBUTEROL SULFATE SCH ML: .5; 3 SOLUTION RESPIRATORY (INHALATION) at 21:17

## 2020-04-27 RX ADMIN — INSULIN ASPART SCH UNIT: 100 INJECTION, SOLUTION INTRAVENOUS; SUBCUTANEOUS at 06:14

## 2020-04-27 RX ADMIN — METHYLPREDNISOLONE SODIUM SUCCINATE SCH MG: 40 INJECTION, POWDER, FOR SOLUTION INTRAMUSCULAR; INTRAVENOUS at 20:16

## 2020-04-27 RX ADMIN — BUDESONIDE SCH MG: 0.5 SUSPENSION RESPIRATORY (INHALATION) at 18:38

## 2020-04-27 RX ADMIN — RISPERIDONE SCH MG: 1 TABLET, FILM COATED ORAL at 19:31

## 2020-04-27 RX ADMIN — BUMETANIDE SCH MG: 0.25 INJECTION, SOLUTION INTRAMUSCULAR; INTRAVENOUS at 06:32

## 2020-04-27 RX ADMIN — BUMETANIDE SCH MG: 0.25 INJECTION, SOLUTION INTRAMUSCULAR; INTRAVENOUS at 05:27

## 2020-04-27 RX ADMIN — METHYLPREDNISOLONE SODIUM SUCCINATE SCH MG: 40 INJECTION, POWDER, FOR SOLUTION INTRAMUSCULAR; INTRAVENOUS at 14:38

## 2020-04-27 RX ADMIN — RISPERIDONE SCH MG: 1 TABLET, FILM COATED ORAL at 08:20

## 2020-04-27 RX ADMIN — IPRATROPIUM BROMIDE AND ALBUTEROL SULFATE SCH ML: .5; 3 SOLUTION RESPIRATORY (INHALATION) at 10:26

## 2020-04-27 RX ADMIN — INSULIN ASPART SCH UNIT: 100 INJECTION, SOLUTION INTRAVENOUS; SUBCUTANEOUS at 01:50

## 2020-04-27 RX ADMIN — INSULIN ASPART SCH UNIT: 100 INJECTION, SOLUTION INTRAVENOUS; SUBCUTANEOUS at 20:10

## 2020-04-27 RX ADMIN — INSULIN ASPART SCH UNIT: 100 INJECTION, SOLUTION INTRAVENOUS; SUBCUTANEOUS at 17:53

## 2020-04-27 RX ADMIN — APIXABAN SCH MG: 5 TABLET, FILM COATED ORAL at 18:14

## 2020-04-27 RX ADMIN — POTASSIUM CHLORIDE SCH MLS/HR: 200 INJECTION, SOLUTION INTRAVENOUS at 05:10

## 2020-04-27 RX ADMIN — INSULIN ASPART SCH UNIT: 100 INJECTION, SOLUTION INTRAVENOUS; SUBCUTANEOUS at 14:39

## 2020-04-27 RX ADMIN — AMIODARONE HYDROCHLORIDE SCH MG: 200 TABLET ORAL at 08:20

## 2020-04-27 RX ADMIN — MAGNESIUM SULFATE IN DEXTROSE SCH MLS/HR: 10 INJECTION, SOLUTION INTRAVENOUS at 05:10

## 2020-04-27 RX ADMIN — INSULIN ASPART SCH UNIT: 100 INJECTION, SOLUTION INTRAVENOUS; SUBCUTANEOUS at 11:02

## 2020-04-27 RX ADMIN — PROPOFOL SCH MLS/HR: 10 INJECTION, EMULSION INTRAVENOUS at 09:44

## 2020-04-27 RX ADMIN — IPRATROPIUM BROMIDE AND ALBUTEROL SULFATE SCH ML: .5; 3 SOLUTION RESPIRATORY (INHALATION) at 06:41

## 2020-04-27 RX ADMIN — PANTOPRAZOLE SODIUM SCH MG: 40 INJECTION, POWDER, FOR SOLUTION INTRAVENOUS at 08:20

## 2020-04-27 RX ADMIN — METOPROLOL TARTRATE SCH MG: 50 TABLET, FILM COATED ORAL at 08:20

## 2020-04-27 RX ADMIN — POTASSIUM CHLORIDE SCH MEQ: 1500 TABLET, EXTENDED RELEASE ORAL at 05:11

## 2020-04-27 RX ADMIN — PROPOFOL SCH MLS/HR: 10 INJECTION, EMULSION INTRAVENOUS at 00:52

## 2020-04-27 RX ADMIN — FENTANYL CITRATE SCH MLS/HR: 50 INJECTION, SOLUTION INTRAMUSCULAR; INTRAVENOUS at 14:25

## 2020-04-27 RX ADMIN — APIXABAN SCH MG: 5 TABLET, FILM COATED ORAL at 06:14

## 2020-04-27 NOTE — NUR
THIS NURSE NOTIFIED DR MERA PT SBP IS 80S-90S AND DBP IS IN THE 30S. ORDERS GIVEN. SEE 
ORDER HX. WILL CONTINUE TO MONITOR.

## 2020-04-27 NOTE — NUR
RD FOLLOW-UP



Est kcal needs: 1434-6417 kcal | 15-18 kcal/kg 

Est Pro needs:   kcal | 0.8-1.0 g Pro/kg 



Note pt currently receiving Glucerna 1.5 at rate of 30ml/hr with 100ml flushes q6h for 
hydration status. Per Zehra MOHAMUD, pt is tolerating this rate well.

Note abnormal lab value of glu 224 (H), per chart review. 



Would recommend continuing at rate of 30ml/hr, with progression to 45ml/hr as medically able 
and as tolerated. 

At goal rate, provides 1620 kcal (16 kcal/kg); 89 g Pro (0.9 g Pro/kg); and 820ml free 
water. With flushes, provides 1220ml free water. 



Will continue to follow and reassess as pt needs, intake, and status change. 



JAG Jesus MS, RD, LD

[phone]

## 2020-04-27 NOTE — DIAGNOSTIC IMAGING REPORT
INDICATION: Intubated



COMPARISON: 04/26/2020



FINDINGS:

Single view chest demonstrates small effusion left base. Right

lung is clear. The heart is prominent without pulmonary edema.

There is no pneumothorax. Support devices are stable.



 

IMPRESSION: Stable aeration of the lungs. No interval change from

prior exam.  



No pneumothorax.



Dictated by: 



  Dictated on workstation # [id number]

## 2020-04-27 NOTE — CARDIOLOGY PROGRESS NOTE
Subjective


Date Seen by Provider:  2020


Time Seen by Provider:  10:37


Subjective/Events-last exam


patient is sedated and intubated, unable to provide history


Review of Systems


General:  Other (sedated and intubated unable to provide review of systems)





Objective-Cardiology


Exam


Last Set of Vital Signs





Vital Signs








 20





 04:30 10:00 10:27 10:35


 


Temp 36.4   


 


Pulse   62 


 


Resp   21 


 


B/P (MAP)  110/38 (62)  


 


Pulse Ox   96 


 


O2 Delivery    Mechanical Ventilator


 


O2 Flow Rate    65.00


 


FiO2   85 





Capillary Refill : Less Than 3 Seconds


I&O











Intake and Output 


 


 20





 00:00


 


Intake Total 2040 ml


 


Output Total 2350 ml


 


Balance -310 ml


 


 


 


IV Total 650 ml


 


Tube Feeding 720 ml


 


Other 670 ml


 


Output Urine Total 2350 ml








General:  Other (Intubated and sedated, not following commands)


HEENT:  Mucous Memb Moist/Pink


Neck:  Supple


Lungs:  Normal Air Movement, Other (bilateral rhonchi)


Heart:  Regular Rate, Normal S1, Normal S2


Abdomen:  Normal Bowel Sounds, Soft, Other (large reducible umbilical hernia)


Extremities:  No Clubbing, Other (2+ pitting edema bilaterally)


Skin:  No Rashes


Neuro:  Other (sedated and intubated)


Psych/Mental Status:  Other (sedated and intubated)





Results


Lab


Laboratory Tests


20 03:18














A/P-Cardiology


Admission Diagnosis


Acute respiratory failure


Paroxysmal atrial flutter


Coronary artery disease


Peripheral arterial disease





Assessment/Plan


Acute respiratory failure, ventilatory dependent, still on PEEP of 8, managed by

Dr. James





Paroxysmal atrial flutter with rapid ventricular response, underwent 

cardioversion, currently in sinus rhythm on amiodarone and Eliquis





Acute resp failure and shock of undetermined etiology; shock resolved





Mild acute on chronic diastolic congestive heart failure received diuretics, 

continue to monitor





Status post acute renal failure, improved, continue to monitor renal function





Coronary artery disease, type II myocardial infarction due to respiratory 

failure,history of cardiac catheterization done in 2017 showing mild to 

moderate coronary artery disease nonobstructive disease, stress test done in 

2020 by Dr. Dai reported as no ischemia or infarction





Echo in 2020 showing ejection fraction 45 percent, grade 1 diastolic 

dysfunction, mild-to-moderate tricuspid regurgitation, PA pressure 29 mmHg





History of hypertension, continue to monitor blood pressure 





History of hyperlipidemia, monitor lipids 





Peripheral arterial disease, had peripheral intervention in 2019 by Dr. Dai with balloon angioplasty to the right anterior tibial, posterior tibial 

and stenting of the right SFA with improvement of his MONO and TBI, had partial 

right foot amputation, followed by Dr. Dai





History of diabetes mellitus, managed by primary care physician





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





Clinical Quality Measures


DVT/VTE Risk/Contraindication:


Risk Factor Score Per Nursin


RFS Level Per Nursing on Admit:  4+=Very High











PREM RUELAS MD              2020 10:43

## 2020-04-27 NOTE — PROGRESS NOTE
Subjective


Subjective/Events-last exam


Afebrile. Can't go to [facility] due to insurance, [place] [facility] requiring tracheostomy and G tube.





Objective


Exam


Last Set of Vital Signs





Vital Signs








  Date Time  Temp Pulse Resp B/P (MAP) Pulse Ox O2 Delivery O2 Flow Rate FiO2


 


20 11:00  71 16 124/43 (70) 98 Mechanical Ventilator 65.00 


 


20 10:27        85


 


20 04:30 36.4       





Capillary Refill : Less Than 3 Seconds


I&O











Intake and Output 


 


 20





 00:00


 


Intake Total 2040 ml


 


Output Total 2350 ml


 


Balance -310 ml


 


 


 


IV Total 650 ml


 


Tube Feeding 720 ml


 


Other 670 ml


 


Output Urine Total 2350 ml








General:  Other (sedated, ventilated)


Lungs:  Normal Air Movement


Heart:  Regular Rate


Abdomen:  Normal Bowel Sounds, Soft


Extremities:  No Edema





Results/Procedures


Lab


Laboratory Tests


20 14:05: Glucometer 230H


20 17:42: Glucometer 190H


20 21:13: Glucometer 171H


20 01:37: Glucometer 239H


20 03:18: 


White Blood Count 9.5, Red Blood Count 2.97L, Hemoglobin 9.2L, Hematocrit 29L, 

Mean Corpuscular Volume 96, Mean Corpuscular Hemoglobin 31, Mean Corpuscular 

Hemoglobin Concent 32, Red Cell Distribution Width 16.2H, Platelet Count 146, 

Mean Platelet Volume 12.0H, Neutrophils (%) (Auto) 93H, Lymphocytes (%) (Auto) 

4L, Monocytes (%) (Auto) 3, Eosinophils (%) (Auto) 0, Basophils (%) (Auto) 0, 

Neutrophils # (Auto) 8.9H, Lymphocytes # (Auto) 0.3L, Monocytes # (Auto) 0.3, 

Eosinophils # (Auto) 0.0, Basophils # (Auto) 0.0, Blood Gas Puncture Site RIGHT 

ARTLINE, Blood Gas Patient Temperature 36.4, Arterial Blood pH 7.44H, Arterial 

Blood Partial Pressure CO2 45, Arterial Blood Partial Pressure O2 77L, Arterial 

Blood HCO3 30H, Arterial Blood Total CO2 31.6H, Arterial Blood Oxygen Saturation

96, Arterial Blood Base Excess 5.9H, Rojelio Test POSITIVE, Blood Gas Ventilator 

Setting YES, Blood Gas Inspired Oxygen 60, Sodium Level 134L, Potassium Level 

4.2, Chloride Level 98, Carbon Dioxide Level 27, Anion Gap 9, Blood Urea 

Nitrogen 49H, Creatinine 0.85, Estimat Glomerular Filtration Rate > 60, 

BUN/Creatinine Ratio 58, Glucose Level 224H, Calcium Level 7.9L, Phosphorus 

Level 3.5, Magnesium Level 2.0, B-Type Natriuretic Peptide 87.6


20 06:11: Glucometer 204H


20 10:36: Glucometer 167H





Microbiology


20 Catheter Tip Culture - Final, Complete


          No growth


20 Gram Stain - Final, Complete


          


20 Sputum Culture - Final, Complete


          No growth


20 Urine Culture - Final, Complete


          NO GROWTH





Assessment/Plan


Assessment/Plan





(1) Sepsis


Status:  Resolved


Assessment & Plan:  : Poor prognosis, Patient on Sepsis IVF protocol, 

continue IV antibiotics, Dr James consulted for critical care, blood cultures 

pending


4/15: Off Levaphed today, continue to monitor blood pressure, continue IV 

antibiotics, patient is not following commands when sedation is turned off


: Prognosis remains guarded, tube feeds started today, blood pressures 

running high, restarted blood pressure medications


: Dr james managing critical care, continue IV antibiotics, possible wean 

and extubation over the weekend


 has competed antibiotics, remains on methylprednisone, possibly working 

toward weaning from vent versus transfer to LTACH


Qualifiers:  


   Qualified Codes:  A41.9 - Sepsis, unspecified organism; R65.21 - Severe 

sepsis with septic shock; N17.9 - Acute kidney failure, unspecified


(2) Acute respiratory failure with hypoxia and hypercapnia


Status:  Acute


Assessment & Plan:  : Currently Intubated, Dr James managing vent


- remains on mechanical ventilation, appreciate Dr. James's management.





(3) Acute kidney injury superimposed on chronic kidney disease


Status:  Resolved


Assessment & Plan:  : Will monitor daily BUN/Cr





(4) NSTEMI (non-ST elevation myocardial infarction)


Status:  Resolved


Assessment & Plan:  : Cardiology consulted, appreciate recommendations, 

Recent Cath 2020: BNP elevated, patient given IV lasix, diuresing well


 likely strain related, troponin trended down





(5) Upper GI bleed


Status:  Acute


Assessment & Plan:  : Today started having blood tinged fluid from OG, 

Lovenox stopped and started on IV protonix


 hemoglobin stable





(6) CAD (coronary artery disease)


Status:  Chronic


Qualifiers:  


   Qualified Codes:  I25.10 - Atherosclerotic heart disease of native coronary 

artery without angina pectoris


(7) Hypertension


Status:  Chronic


Assessment & Plan:  : Currently hypotensive and on Levophed for Sepsis


4/15: Off pressors


: Restarted Lisinopril today


Qualifiers:  


   Qualified Codes:  I10 - Essential (primary) hypertension


(8) Diabetes type 2, uncontrolled


Status:  Chronic


Qualifiers:  


   Qualified Codes:  E11.65 - Type 2 diabetes mellitus with hyperglycemia


(9) COPD (chronic obstructive pulmonary disease)


Status:  Chronic


Qualifiers:  


   Qualified Codes:  J44.9 - Chronic obstructive pulmonary disease, unspecified


(10) Pneumonia


Status:  Acute


Assessment & Plan:  s/p full course of antibiotics


Qualifiers:  


   Qualified Codes:  J18.9 - Pneumonia, unspecified organism


(11) Atrial flutter


Assessment & Plan:  s/p cardioversion, appreciate Cardiology recommendations. On

Eliquis.





(12) DVT prophylaxis


Status:  Acute


Assessment & Plan:  : On Lovenox, stopped today due to GI bleeding


4/15: Continues off Lovenox, SCDs


 now on Eliquis








Clinical Quality Measures


DVT/VTE Risk/Contraindication:


Risk Factor Score Per Nursin


RFS Level Per Nursing on Admit:  4+=Very High











MARIELY DELANEY MD             2020 11:38

## 2020-04-27 NOTE — PULMONARY PROGRESS NOTE
Subjective


Time Seen by a Provider:  04:45


Subjective/Events-last exam


Sedated on vent





Sepsis Event


Evaluation


Height, Weight, BMI


Height: 5'8.50"


Weight: 206lbs. 0.8oz. 93.552473yn; 30.95 BMI


Method:Stated





Exam


Exam





Vital Signs








  Date Time  Temp Pulse Resp B/P (MAP) Pulse Ox O2 Delivery O2 Flow Rate FiO2


 


4/27/20 04:32      Mechanical Ventilator  55


 


4/27/20 04:30 36.4     Mechanical Ventilator 55.00 


 


4/27/20 01:56  59 20  95   60


 


4/27/20 00:52  60  124/37    


 


4/26/20 23:40 36.6       


 


4/26/20 23:40      Mechanical Ventilator  60


 


4/26/20 23:00  68 9 117/35 (62) 92 Mechanical Ventilator 60.00 


 


4/26/20 22:55  68 10 123/35 (64) 90 Mechanical Ventilator 60.00 


 


4/26/20 22:18  66 14 122/35 (64) 91 Mechanical Ventilator 55.00 


 


4/26/20 22:12      Mechanical Ventilator 50.00 


 


4/26/20 22:00  75 17 133/38 (69) 89 Mechanical Ventilator 40.00 


 


4/26/20 21:31      Mechanical Ventilator 40.00 


 


4/26/20 21:18  71 17  91   30


 


4/26/20 21:15 36.5     Mechanical Ventilator 30.00 


 


4/26/20 21:00  69 15 139/37 (71)  Mechanical Ventilator 45.00 


 


4/26/20 20:00  68 13 118/32 (60)  Mechanical Ventilator 45.00 


 


4/26/20 20:00      Mechanical Ventilator  30


 


4/26/20 19:00  68      


 


4/26/20 19:00  68 11 148/40 (76) 89 Mechanical Ventilator 45.00 


 


4/26/20 19:00  52  138/39    


 


4/26/20 18:57  52  138/39    


 


4/26/20 18:00  52 14 138/39 (72) 95 Mechanical Ventilator 45.00 


 


4/26/20 17:44  50 16  95   30


 


4/26/20 17:00  49 10 130/38 (68) 96 Mechanical Ventilator 45.00 


 


4/26/20 16:00     95 Mechanical Ventilator  40


 


4/26/20 16:00  48 11 128/36 (66) 95 Mechanical Ventilator 45.00 


 


4/26/20 15:46 36.3       


 


4/26/20 15:00  51 12 120/33 (62) 94 Mechanical Ventilator 45.00 


 


4/26/20 14:10  48 15  95   30


 


4/26/20 14:00  49 14 112/32 (58) 97 Mechanical Ventilator 45.00 


 


4/26/20 13:08  56  119/31    


 


4/26/20 13:00  51 12 118/34 (62) 95 Mechanical Ventilator 45.00 


 


4/26/20 12:36  51      


 


4/26/20 12:00     95 Mechanical Ventilator  40


 


4/26/20 12:00  56 14 119/31 (60) 93 Mechanical Ventilator 45.00 


 


4/26/20 11:50 36.1       


 


4/26/20 11:00  50 13 121/35 (63) 95 Mechanical Ventilator 45.00 


 


4/26/20 10:52  49 18  99   40


 


4/26/20 10:00  53 12 110/33 (58) 99 Mechanical Ventilator 45.00 


 


4/26/20 09:00  50 12 125/37 (66) 98 Mechanical Ventilator 45.00 


 


4/26/20 08:15 36.2       


 


4/26/20 08:00  51 13 122/39 (66) 97 Mechanical Ventilator 45.00 


 


4/26/20 08:00     95 Mechanical Ventilator  40


 


4/26/20 07:01  53 15  99   45


 


4/26/20 07:00  51 9 118/36 (63) 98 Mechanical Ventilator 45.00 


 


4/26/20 06:41  51      


 


4/26/20 06:05    127/39    


 


4/26/20 06:00  55 12 122/36 (64) 99 Mechanical Ventilator 45.00 


 


4/26/20 05:49      Mechanical Ventilator 45.00 


 


4/26/20 05:00  52 12 130/40 (70) 99 Mechanical Ventilator 50.00 














I & O 


 


 4/27/20





 07:00


 


Intake Total 1545 ml


 


Output Total 1800 ml


 


Balance -255 ml








Height & Weight


Height: 5'8.50"


Weight: 206lbs. 0.8oz. 93.936825rm; 30.95 BMI


Method:Stated


General Appearance:  No Apparent Distress, WD/WN, Chronically ill, Obese


HEENT:  TMs Normal, Other (Pupils equal/reactive 3 mm bilaterally)


Respiratory:  No Accessory Muscle Use, No Respiratory Distress, Decreased Breath

Sounds


Cardiovascular:  Regular Rate, Rhythm


Capillary Refill:  Less Than 3 Seconds


Gastrointestinal:  non tender, soft


Extremity:  Normal Inspection, No Pedal Edema


Neurologic/Psychiatric:  Other (sedated)


Skin:  Normal Color, Warm/Dry





Results


Lab


Laboratory Tests


4/26/20 02:22








4/27/20 03:18











Assessment/Plan


Assessment/Plan


Acute respiratory failure 


   -Fi02 is currently 45% with peep of 8 


   -CXR reviewed 


   - COVID is negative 


   -bilateral dopplers -- negative


   - Propofol and fentany.  precedex 


   -TF-Advance TF to 30cc/hr 


   - bumex 1mg daily 


   -Abx have completed. 


Aflutter


   -Cardiology s/p [id number] with cardioversion 


   -On Eliquis 


CHF EF 45% with grade 1 diastolic dysfunction 


   -Cardiology following


sepsis with PICC line


   -Pan cultures pending


Pneumonia with possible aspiration 


   -Pan cultures 


   -MRSA swab - neg 


   -COVID is negative 


   -Influenza is negative 


   -RVP is negative 


UDS is positive for methamphetamine 





NSTEMI


   -Cardiology following











GUILLERMINA MERA DO              Apr 27, 2020 04:50

## 2020-04-28 VITALS — DIASTOLIC BLOOD PRESSURE: 39 MMHG | SYSTOLIC BLOOD PRESSURE: 112 MMHG

## 2020-04-28 VITALS — SYSTOLIC BLOOD PRESSURE: 119 MMHG | DIASTOLIC BLOOD PRESSURE: 46 MMHG

## 2020-04-28 VITALS — SYSTOLIC BLOOD PRESSURE: 113 MMHG | DIASTOLIC BLOOD PRESSURE: 41 MMHG

## 2020-04-28 VITALS — DIASTOLIC BLOOD PRESSURE: 54 MMHG | SYSTOLIC BLOOD PRESSURE: 133 MMHG

## 2020-04-28 VITALS — DIASTOLIC BLOOD PRESSURE: 46 MMHG | SYSTOLIC BLOOD PRESSURE: 129 MMHG

## 2020-04-28 VITALS — SYSTOLIC BLOOD PRESSURE: 104 MMHG | DIASTOLIC BLOOD PRESSURE: 32 MMHG

## 2020-04-28 VITALS — DIASTOLIC BLOOD PRESSURE: 41 MMHG | SYSTOLIC BLOOD PRESSURE: 105 MMHG

## 2020-04-28 VITALS — SYSTOLIC BLOOD PRESSURE: 114 MMHG | DIASTOLIC BLOOD PRESSURE: 37 MMHG

## 2020-04-28 VITALS — SYSTOLIC BLOOD PRESSURE: 115 MMHG | DIASTOLIC BLOOD PRESSURE: 39 MMHG

## 2020-04-28 VITALS — SYSTOLIC BLOOD PRESSURE: 105 MMHG | DIASTOLIC BLOOD PRESSURE: 34 MMHG

## 2020-04-28 VITALS — SYSTOLIC BLOOD PRESSURE: 119 MMHG | DIASTOLIC BLOOD PRESSURE: 34 MMHG

## 2020-04-28 VITALS — SYSTOLIC BLOOD PRESSURE: 132 MMHG | DIASTOLIC BLOOD PRESSURE: 39 MMHG

## 2020-04-28 VITALS — DIASTOLIC BLOOD PRESSURE: 40 MMHG | SYSTOLIC BLOOD PRESSURE: 110 MMHG

## 2020-04-28 VITALS — SYSTOLIC BLOOD PRESSURE: 115 MMHG | DIASTOLIC BLOOD PRESSURE: 46 MMHG

## 2020-04-28 VITALS — DIASTOLIC BLOOD PRESSURE: 44 MMHG | SYSTOLIC BLOOD PRESSURE: 120 MMHG

## 2020-04-28 VITALS — DIASTOLIC BLOOD PRESSURE: 42 MMHG | SYSTOLIC BLOOD PRESSURE: 137 MMHG

## 2020-04-28 VITALS — SYSTOLIC BLOOD PRESSURE: 97 MMHG | DIASTOLIC BLOOD PRESSURE: 37 MMHG

## 2020-04-28 VITALS — SYSTOLIC BLOOD PRESSURE: 98 MMHG | DIASTOLIC BLOOD PRESSURE: 38 MMHG

## 2020-04-28 VITALS — DIASTOLIC BLOOD PRESSURE: 40 MMHG | SYSTOLIC BLOOD PRESSURE: 103 MMHG

## 2020-04-28 VITALS — SYSTOLIC BLOOD PRESSURE: 84 MMHG | DIASTOLIC BLOOD PRESSURE: 34 MMHG

## 2020-04-28 VITALS — SYSTOLIC BLOOD PRESSURE: 98 MMHG | DIASTOLIC BLOOD PRESSURE: 57 MMHG

## 2020-04-28 VITALS — DIASTOLIC BLOOD PRESSURE: 45 MMHG | SYSTOLIC BLOOD PRESSURE: 121 MMHG

## 2020-04-28 VITALS — DIASTOLIC BLOOD PRESSURE: 44 MMHG | SYSTOLIC BLOOD PRESSURE: 132 MMHG

## 2020-04-28 VITALS — SYSTOLIC BLOOD PRESSURE: 97 MMHG | DIASTOLIC BLOOD PRESSURE: 30 MMHG

## 2020-04-28 VITALS — DIASTOLIC BLOOD PRESSURE: 39 MMHG | SYSTOLIC BLOOD PRESSURE: 126 MMHG

## 2020-04-28 VITALS — DIASTOLIC BLOOD PRESSURE: 40 MMHG | SYSTOLIC BLOOD PRESSURE: 101 MMHG

## 2020-04-28 VITALS — DIASTOLIC BLOOD PRESSURE: 41 MMHG | SYSTOLIC BLOOD PRESSURE: 106 MMHG

## 2020-04-28 VITALS — SYSTOLIC BLOOD PRESSURE: 116 MMHG | DIASTOLIC BLOOD PRESSURE: 36 MMHG

## 2020-04-28 VITALS — SYSTOLIC BLOOD PRESSURE: 103 MMHG | DIASTOLIC BLOOD PRESSURE: 36 MMHG

## 2020-04-28 VITALS — DIASTOLIC BLOOD PRESSURE: 43 MMHG | SYSTOLIC BLOOD PRESSURE: 117 MMHG

## 2020-04-28 LAB
ARTERIAL PATENCY WRIST A: POSITIVE
BASE EXCESS STD BLDA CALC-SCNC: 6.5 MMOL/L (ref -2.5–2.5)
BASOPHILS # BLD AUTO: 0 10^3/UL (ref 0–0.1)
BASOPHILS NFR BLD AUTO: 0 % (ref 0–10)
BDY SITE: (no result)
BODY TEMPERATURE: 36.6
BUN/CREAT SERPL: 61
CALCIUM SERPL-MCNC: 8 MG/DL (ref 8.5–10.1)
CHLORIDE SERPL-SCNC: 98 MMOL/L (ref 98–107)
CO2 BLDA CALC-SCNC: 32.2 MMOL/L (ref 21–31)
CO2 SERPL-SCNC: 27 MMOL/L (ref 21–32)
CREAT SERPL-MCNC: 0.82 MG/DL (ref 0.6–1.3)
EOSINOPHIL # BLD AUTO: 0 10^3/UL (ref 0–0.3)
EOSINOPHIL NFR BLD AUTO: 0 % (ref 0–10)
ERYTHROCYTE [DISTWIDTH] IN BLOOD BY AUTOMATED COUNT: 16.6 % (ref 10–14.5)
GFR SERPLBLD BASED ON 1.73 SQ M-ARVRAT: > 60 ML/MIN
GLUCOSE SERPL-MCNC: 215 MG/DL (ref 70–105)
HCT VFR BLD CALC: 29 % (ref 40–54)
HGB BLD-MCNC: 9.3 G/DL (ref 13.3–17.7)
INHALED O2 FLOW RATE: 40 L/MIN
LYMPHOCYTES # BLD AUTO: 0.3 X 10^3 (ref 1–4)
LYMPHOCYTES NFR BLD AUTO: 4 % (ref 12–44)
MAGNESIUM SERPL-MCNC: 2.1 MG/DL (ref 1.6–2.4)
MANUAL DIFFERENTIAL PERFORMED BLD QL: NO
MCH RBC QN AUTO: 30 PG (ref 25–34)
MCHC RBC AUTO-ENTMCNC: 32 G/DL (ref 32–36)
MCV RBC AUTO: 95 FL (ref 80–99)
MONOCYTES # BLD AUTO: 0.3 X 10^3 (ref 0–1)
MONOCYTES NFR BLD AUTO: 4 % (ref 0–12)
NEUTROPHILS # BLD AUTO: 8 X 10^3 (ref 1.8–7.8)
NEUTROPHILS NFR BLD AUTO: 93 % (ref 42–75)
PCO2 BLDA: 46 MMHG (ref 35–45)
PH BLDA: 7.44 [PH] (ref 7.37–7.43)
PHOSPHATE SERPL-MCNC: 3.1 MG/DL (ref 2.3–4.7)
PLATELET # BLD: 131 10^3/UL (ref 130–400)
PMV BLD AUTO: 11.8 FL (ref 7.4–10.4)
PO2 BLDA: 70 MMHG (ref 79–93)
POTASSIUM SERPL-SCNC: 4.1 MMOL/L (ref 3.6–5)
SAO2 % BLDA FROM PO2: 95 % (ref 94–100)
SODIUM SERPL-SCNC: 133 MMOL/L (ref 135–145)
VENTILATION MODE VENT: YES
WBC # BLD AUTO: 8.7 10^3/UL (ref 4.3–11)

## 2020-04-28 RX ADMIN — PROPOFOL SCH MLS/HR: 10 INJECTION, EMULSION INTRAVENOUS at 02:14

## 2020-04-28 RX ADMIN — PANTOPRAZOLE SODIUM SCH MG: 40 INJECTION, POWDER, FOR SOLUTION INTRAVENOUS at 20:00

## 2020-04-28 RX ADMIN — METHYLPREDNISOLONE SODIUM SUCCINATE SCH MG: 40 INJECTION, POWDER, FOR SOLUTION INTRAMUSCULAR; INTRAVENOUS at 05:24

## 2020-04-28 RX ADMIN — IPRATROPIUM BROMIDE AND ALBUTEROL SULFATE SCH ML: .5; 3 SOLUTION RESPIRATORY (INHALATION) at 07:02

## 2020-04-28 RX ADMIN — IPRATROPIUM BROMIDE AND ALBUTEROL SULFATE SCH ML: .5; 3 SOLUTION RESPIRATORY (INHALATION) at 18:34

## 2020-04-28 RX ADMIN — POTASSIUM CHLORIDE SCH MLS/HR: 200 INJECTION, SOLUTION INTRAVENOUS at 03:19

## 2020-04-28 RX ADMIN — INSULIN ASPART SCH UNIT: 100 INJECTION, SOLUTION INTRAVENOUS; SUBCUTANEOUS at 18:28

## 2020-04-28 RX ADMIN — MAGNESIUM SULFATE IN DEXTROSE SCH MLS/HR: 10 INJECTION, SOLUTION INTRAVENOUS at 03:20

## 2020-04-28 RX ADMIN — METOPROLOL TARTRATE SCH MG: 50 TABLET, FILM COATED ORAL at 08:31

## 2020-04-28 RX ADMIN — PANTOPRAZOLE SODIUM SCH MG: 40 INJECTION, POWDER, FOR SOLUTION INTRAVENOUS at 08:28

## 2020-04-28 RX ADMIN — INSULIN ASPART SCH UNIT: 100 INJECTION, SOLUTION INTRAVENOUS; SUBCUTANEOUS at 02:01

## 2020-04-28 RX ADMIN — IPRATROPIUM BROMIDE AND ALBUTEROL SULFATE SCH ML: .5; 3 SOLUTION RESPIRATORY (INHALATION) at 22:01

## 2020-04-28 RX ADMIN — INSULIN ASPART SCH UNIT: 100 INJECTION, SOLUTION INTRAVENOUS; SUBCUTANEOUS at 10:23

## 2020-04-28 RX ADMIN — BUDESONIDE SCH MG: 0.5 SUSPENSION RESPIRATORY (INHALATION) at 07:02

## 2020-04-28 RX ADMIN — BUDESONIDE SCH MG: 0.5 SUSPENSION RESPIRATORY (INHALATION) at 18:34

## 2020-04-28 RX ADMIN — APIXABAN SCH MG: 5 TABLET, FILM COATED ORAL at 05:23

## 2020-04-28 RX ADMIN — POTASSIUM CHLORIDE SCH MEQ: 1500 TABLET, EXTENDED RELEASE ORAL at 03:20

## 2020-04-28 RX ADMIN — IPRATROPIUM BROMIDE AND ALBUTEROL SULFATE SCH ML: .5; 3 SOLUTION RESPIRATORY (INHALATION) at 14:40

## 2020-04-28 RX ADMIN — AMIODARONE HYDROCHLORIDE SCH MG: 200 TABLET ORAL at 08:28

## 2020-04-28 RX ADMIN — INSULIN ASPART SCH UNIT: 100 INJECTION, SOLUTION INTRAVENOUS; SUBCUTANEOUS at 21:04

## 2020-04-28 RX ADMIN — METHYLPREDNISOLONE SODIUM SUCCINATE SCH MG: 40 INJECTION, POWDER, FOR SOLUTION INTRAMUSCULAR; INTRAVENOUS at 12:20

## 2020-04-28 RX ADMIN — METHYLPREDNISOLONE SODIUM SUCCINATE SCH MG: 40 INJECTION, POWDER, FOR SOLUTION INTRAMUSCULAR; INTRAVENOUS at 22:51

## 2020-04-28 RX ADMIN — INSULIN ASPART SCH UNIT: 100 INJECTION, SOLUTION INTRAVENOUS; SUBCUTANEOUS at 05:23

## 2020-04-28 RX ADMIN — IPRATROPIUM BROMIDE AND ALBUTEROL SULFATE SCH ML: .5; 3 SOLUTION RESPIRATORY (INHALATION) at 11:47

## 2020-04-28 RX ADMIN — RISPERIDONE SCH MG: 1 TABLET, FILM COATED ORAL at 20:00

## 2020-04-28 RX ADMIN — METHYLPREDNISOLONE SODIUM SUCCINATE SCH MG: 40 INJECTION, POWDER, FOR SOLUTION INTRAMUSCULAR; INTRAVENOUS at 05:23

## 2020-04-28 RX ADMIN — INSULIN ASPART SCH UNIT: 100 INJECTION, SOLUTION INTRAVENOUS; SUBCUTANEOUS at 14:44

## 2020-04-28 RX ADMIN — RISPERIDONE SCH MG: 1 TABLET, FILM COATED ORAL at 08:28

## 2020-04-28 RX ADMIN — IPRATROPIUM BROMIDE AND ALBUTEROL SULFATE SCH ML: .5; 3 SOLUTION RESPIRATORY (INHALATION) at 01:49

## 2020-04-28 RX ADMIN — METHYLPREDNISOLONE SODIUM SUCCINATE SCH MG: 40 INJECTION, POWDER, FOR SOLUTION INTRAMUSCULAR; INTRAVENOUS at 18:28

## 2020-04-28 RX ADMIN — METOPROLOL TARTRATE SCH MG: 50 TABLET, FILM COATED ORAL at 20:01

## 2020-04-28 RX ADMIN — APIXABAN SCH MG: 5 TABLET, FILM COATED ORAL at 18:28

## 2020-04-28 RX ADMIN — PROPOFOL SCH MLS/HR: 10 INJECTION, EMULSION INTRAVENOUS at 20:01

## 2020-04-28 NOTE — CARDIOLOGY PROGRESS NOTE
Subjective


Date Seen by Provider:  2020


Time Seen by Provider:  10:00


Subjective/Events-last exam


Patient is sedated and intubated, was hypotensive earlier, sedation was 

decreased.  Blood pressure is better at this time


Review of Systems


General:  Other (unable to provide review of systems)





Objective-Cardiology


Exam


Last Set of Vital Signs





Vital Signs








 20





 08:00 09:00


 


Temp 37.1 


 


Pulse  73


 


Resp  23


 


B/P (MAP)  119/34 (62)


 


Pulse Ox  92


 


O2 Delivery  Mechanical Ventilator


 


O2 Flow Rate  40.00


 


FiO2 40 





Capillary Refill : Less Than 3 Seconds


I&O











Intake and Output 


 


 20





 00:00


 


Intake Total 1920 ml


 


Output Total 2050 ml


 


Balance -130 ml


 


 


 


IV Total 600 ml


 


Tube Feeding 720 ml


 


Other 600 ml


 


Output Urine Total 2050 ml








General:  Other (sedated, ventilated)


HEENT:  Mucous Memb Moist/Pink


Neck:  Supple


Lungs:  Normal Air Movement


Heart:  Regular Rate, Normal S1, Normal S2


Abdomen:  Normal Bowel Sounds, Soft


Extremities:  No Edema


Skin:  No Rashes


Neuro:  Other (sedated and intubated)


Psych/Mental Status:  Other (sedated and intubated)





Results


Lab


Laboratory Tests


20 02:45














A/P-Cardiology


Admission Diagnosis


Acute respiratory failure


Paroxysmal atrial flutter


Coronary artery disease


Peripheral arterial disease





Assessment/Plan


Acute respiratory failure, ventilatory dependent, failed weaning previously, 

managed by Dr. James, possible tracheostomy





Paroxysmal atrial flutter with rapid ventricular response, underwent 

cardioversion, currently in sinus rhythm on amiodarone and Eliquis





Mild acute on chronic diastolic congestive heart failure received diuretics, 

continue to monitor





Status post acute renal failure, improved, continue to monitor renal function





Coronary artery disease, type II myocardial infarction due to respiratory 

failure,history of cardiac catheterization done in 2017 showing mild to 

moderate coronary artery disease nonobstructive disease, stress test done in 

2020 by Dr. Dai reported as no ischemia or infarction





Echo in 2020 showing ejection fraction 45 percent, grade 1 diastolic 

dysfunction, mild-to-moderate tricuspid regurgitation, PA pressure 29 mmHg





History of hypertension, continue to monitor blood pressure 





History of hyperlipidemia, monitor lipids 





Peripheral arterial disease, had peripheral intervention in 2019 by Dr. Dai with balloon angioplasty to the right anterior tibial, posterior tibial 

and stenting of the right SFA with improvement of his MONO and TBI, had partial 

right foot amputation, followed by Dr. Dai





History of diabetes mellitus, managed by primary care physician





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





Clinical Quality Measures


DVT/VTE Risk/Contraindication:


Risk Factor Score Per Nursin


RFS Level Per Nursing on Admit:  4+=Very High











PREM RUELAS MD             2020 10:02 am

## 2020-04-28 NOTE — PROGRESS NOTE
Subjective


Subjective/Events-last exam


Afebrile. Remains ventilated.





Objective


Exam


Last Set of Vital Signs





Vital Signs








  Date Time  Temp Pulse Resp B/P (MAP) Pulse Ox O2 Delivery O2 Flow Rate FiO2


 


20 10:00  77 20 132/39 (70) 96 Mechanical Ventilator 40.00 


 


20 08:00        40


 


20 08:00 37.1       





Capillary Refill : Less Than 3 Seconds


I&O











Intake and Output 


 


 20





 00:00


 


Intake Total 1920 ml


 


Output Total 2050 ml


 


Balance -130 ml


 


 


 


IV Total 600 ml


 


Tube Feeding 720 ml


 


Other 600 ml


 


Output Urine Total 2050 ml








General:  Other (sedated, ventilated)


Lungs:  Other (ronchi)


Heart:  Regular Rate, No Murmurs


Abdomen:  Normal Bowel Sounds, Other (mildly distended)


Extremities:  No Edema





Results/Procedures


Lab


Laboratory Tests


20 10:36: Glucometer 167H


20 14:33: Glucometer 183H


20 17:41: Glucometer 167H


20 20:10: Glucometer 100


20 01:50: Glucometer 224H


20 02:45: 


White Blood Count 8.7, Red Blood Count 3.06L, Hemoglobin 9.3L, Hematocrit 29L, 

Mean Corpuscular Volume 95, Mean Corpuscular Hemoglobin 30, Mean Corpuscular 

Hemoglobin Concent 32, Red Cell Distribution Width 16.6H, Platelet Count 131, 

Mean Platelet Volume 11.8H, Neutrophils (%) (Auto) 93H, Lymphocytes (%) (Auto) 

4L, Monocytes (%) (Auto) 4, Eosinophils (%) (Auto) 0, Basophils (%) (Auto) 0, 

Neutrophils # (Auto) 8.0H, Lymphocytes # (Auto) 0.3L, Monocytes # (Auto) 0.3, 

Eosinophils # (Auto) 0.0, Basophils # (Auto) 0.0, Sodium Level 133L, Potassium 

Level 4.1, Chloride Level 98, Carbon Dioxide Level 27, Anion Gap 8, Blood Urea 

Nitrogen 50H, Creatinine 0.82, Estimat Glomerular Filtration Rate > 60, BUN/Cre

atinine Ratio 61, Glucose Level 215H, Calcium Level 8.0L, Phosphorus Level 3.1, 

Magnesium Level 2.1


20 03:10: 


Blood Gas Puncture Site RIGHT RADIAL, Blood Gas Patient Temperature 36.6, 

Arterial Blood pH 7.44H, Arterial Blood Partial Pressure CO2 46H, Arterial Blood

Partial Pressure O2 70L, Arterial Blood HCO3 31H, Arterial Blood Total CO2 32.2H

, Arterial Blood Oxygen Saturation 95, Arterial Blood Base Excess 6.5H, Rojelio 

Test POSITIVE, Blood Gas Ventilator Setting YES, Blood Gas Inspired Oxygen 40


20 09:34: Glucometer 81





Microbiology


20 Catheter Tip Culture - Final, Complete


          No growth


20 Gram Stain - Final, Complete


          


20 Sputum Culture - Final, Complete


          No growth


20 Urine Culture - Final, Complete


          NO GROWTH





Assessment/Plan


Assessment/Plan





(1) Sepsis


Status:  Resolved


Assessment & Plan:  : Poor prognosis, Patient on Sepsis IVF protocol, 

continue IV antibiotics, Dr James consulted for critical care, blood cultures 

pending


4/15: Off Levaphed today, continue to monitor blood pressure, continue IV 

antibiotics, patient is not following commands when sedation is turned off


: Prognosis remains guarded, tube feeds started today, blood pressures 

running high, restarted blood pressure medications


: Dr james managing critical care, continue IV antibiotics, possible wean 

and extubation over the weekend


 has competed antibiotics, remains on methylprednisone, possibly working 

toward weaning from vent versus transfer to LTACH


Qualifiers:  


   Qualified Codes:  A41.9 - Sepsis, unspecified organism; R65.21 - Severe 

sepsis with septic shock; N17.9 - Acute kidney failure, unspecified


(2) Acute respiratory failure with hypoxia and hypercapnia


Status:  Acute


Assessment & Plan:  : Currently Intubated, Dr James managing vent


- remains on mechanical ventilation, appreciate Dr. James's management.





(3) Acute kidney injury superimposed on chronic kidney disease


Status:  Resolved


Assessment & Plan:  : Will monitor daily BUN/Cr





(4) NSTEMI (non-ST elevation myocardial infarction)


Status:  Resolved


Assessment & Plan:  : Cardiology consulted, appreciate recommendations, 

Recent Cath 2020: BNP elevated, patient given IV lasix, diuresing well


 likely strain related, troponin trended down





(5) Upper GI bleed


Status:  Acute


Assessment & Plan:  : Today started having blood tinged fluid from OG, 

Lovenox stopped and started on IV protonix


 hemoglobin stable





(6) CAD (coronary artery disease)


Status:  Chronic


Qualifiers:  


   Qualified Codes:  I25.10 - Atherosclerotic heart disease of native coronary 

artery without angina pectoris


(7) Hypertension


Status:  Chronic


Assessment & Plan:  : Currently hypotensive and on Levophed for Sepsis


4/15: Off pressors


: Restarted Lisinopril today


Qualifiers:  


   Qualified Codes:  I10 - Essential (primary) hypertension


(8) Diabetes type 2, uncontrolled


Status:  Chronic


Qualifiers:  


   Qualified Codes:  E11.65 - Type 2 diabetes mellitus with hyperglycemia


(9) COPD (chronic obstructive pulmonary disease)


Status:  Chronic


Qualifiers:  


   Qualified Codes:  J44.9 - Chronic obstructive pulmonary disease, unspecified


(10) Pneumonia


Status:  Acute


Assessment & Plan:  s/p full course of antibiotics


Qualifiers:  


   Qualified Codes:  J18.9 - Pneumonia, unspecified organism


(11) Atrial flutter


Assessment & Plan:  s/p cardioversion, appreciate Cardiology recommendations. On

Eliquis.





(12) Hyponatremia


Status:  Acute


(13) Alteration in nutrition


Status:  Acute


Assessment & Plan:  Receiving tube feedings





(14) DVT prophylaxis


Status:  Acute


Assessment & Plan:  : On Lovenox, stopped today due to GI bleeding


4/15: Continues off Lovenox, SCDs


 now on Eliquis








Clinical Quality Measures


DVT/VTE Risk/Contraindication:


Risk Factor Score Per Nursin


RFS Level Per Nursing on Admit:  4+=Very High











MARIELY DELANEY MD             2020 10:26

## 2020-04-28 NOTE — PULMONARY PROGRESS NOTE
Subjective


Date Seen by a Provider:  Apr 28, 2020


Time Seen by a Provider:  05:02


Subjective/Events-last exam


Pt is sedated on vent.





Sepsis Event


Evaluation


Height, Weight, BMI


Height: 5'8.50"


Weight: 206lbs. 0.8oz. 93.824136es; 30.95 BMI


Method:Stated





Exam


Exam





Vital Signs








  Date Time  Temp Pulse Resp B/P (MAP) Pulse Ox O2 Delivery O2 Flow Rate FiO2


 


4/28/20 03:17     95 Mechanical Ventilator  40


 


4/28/20 03:16 36.6       


 


4/28/20 02:14    131/41    


 


4/28/20 01:50  65 17  98   40


 


4/28/20 01:00  63      


 


4/28/20 00:24 37.0       


 


4/28/20 00:00     95 Mechanical Ventilator  50


 


4/27/20 23:15  71 18 135/42 (73) 93 Mechanical Ventilator 50.00 


 


4/27/20 22:00  73 16 133/41 (71) 92 Mechanical Ventilator 50.00 


 


4/27/20 21:40  79  163/49    


 


4/27/20 21:19  80 18  95   40


 


4/27/20 21:00  74 12 157/49 (85) 96 Mechanical Ventilator 50.00 


 


4/27/20 20:00  66 17 126/39 (68) 95 Mechanical Ventilator 50.00 


 


4/27/20 19:54     95 Mechanical Ventilator  50


 


4/27/20 19:30 36.5     Mechanical Ventilator 50.00 


 


4/27/20 19:00  70 26 132/46 (74) 95 Mechanical Ventilator 50.00 


 


4/27/20 19:00  70      


 


4/27/20 18:41  64 18  95   50


 


4/27/20 18:00  57 16 116/37 (63) 93 Mechanical Ventilator 60.00 


 


4/27/20 17:00  60 14 118/41 (66) 91 Mechanical Ventilator 60.00 


 


4/27/20 16:12     93 Mechanical Ventilator  50


 


4/27/20 16:00  78 19 166/53 (90) 95 Mechanical Ventilator 60.00 


 


4/27/20 15:59 36.6       


 


4/27/20 15:00  59 15 125/46 (72) 95 Mechanical Ventilator 60.00 


 


4/27/20 14:19  56 19  94   50


 


4/27/20 14:00  54 11 120/42 (68) 97 Mechanical Ventilator 60.00 


 


4/27/20 13:00  57 12 119/40 (66) 98 Mechanical Ventilator 60.00 


 


4/27/20 12:54      Mechanical Ventilator 60.00 


 


4/27/20 12:23  66      


 


4/27/20 12:00 36.2       


 


4/27/20 12:00  60 12 105/38 (60) 98 Mechanical Ventilator 65.00 


 


4/27/20 11:50     98 Mechanical Ventilator  65


 


4/27/20 11:00  71 16 124/43 (70) 98 Mechanical Ventilator 65.00 


 


4/27/20 10:35      Mechanical Ventilator 65.00 


 


4/27/20 10:27  62 21  96   85


 


4/27/20 10:00  60 13 110/38 (62) 95 Mechanical Ventilator 60.00 


 


4/27/20 09:44    124/38    


 


4/27/20 09:00  58 15 125/38 (67) 96 Mechanical Ventilator 60.00 


 


4/27/20 08:00 36.6       


 


4/27/20 08:00  67 10 119/38 (65) 93 Mechanical Ventilator 60.00 


 


4/27/20 08:00     93 Mechanical Ventilator  60


 


4/27/20 07:00  79      


 


4/27/20 07:00  68 16 110/39 (62) 91 Mechanical Ventilator 60.00 


 


4/27/20 06:41  71 18  92   60


 


4/27/20 06:26  67 11 113/35 (61) 90 Mechanical Ventilator 60.00 


 


4/27/20 06:00  71 18 137/41 (73) 92 Mechanical Ventilator 50.00 


 


4/27/20 05:37  63 17 109/30 (56) 92 Mechanical Ventilator 50.00 














I & O 


 


 4/28/20





 07:00


 


Intake Total 1800 ml


 


Output Total 1900 ml


 


Balance -100 ml








Height & Weight


Height: 5'8.50"


Weight: 206lbs. 0.8oz. 93.270718jd; 30.95 BMI


Method:Stated


General Appearance:  No Apparent Distress, WD/WN, Chronically ill, Obese


HEENT:  TMs Normal, Other (Pupils equal/reactive 3 mm bilaterally)


Respiratory:  No Accessory Muscle Use, No Respiratory Distress, Decreased Breath

Sounds


Cardiovascular:  Regular Rate, Rhythm


Capillary Refill:  Less Than 3 Seconds


Gastrointestinal:  non tender, soft


Extremity:  Normal Inspection, No Pedal Edema


Neurologic/Psychiatric:  Other (sedated)


Skin:  Normal Color, Warm/Dry





Results


Lab


Laboratory Tests


4/27/20 03:18








4/28/20 02:45











Assessment/Plan


Assessment/Plan


Acute respiratory failure 


   -Fi02 is currently 40% with peep of 10


      -Decrease peep to 8 


   - COVID is negative 


   -bilateral dopplers -- negative


   - Propofol and fentany.  precedex 


   -TF-Advance TF to 30cc/hr 


   - bumex 1mg daily 


   -Abx have completed. 


Aflutter


   -Cardiology s/p ZAMZAM with cardioversion 


   -On Eliquis 


CHF EF 45% with grade 1 diastolic dysfunction 


   -Cardiology following


sepsis with PICC line


   -Pan cultures pending


Pneumonia with possible aspiration 


   -Pan cultures 


   -MRSA swab - neg 


   -COVID is negative 


   -Influenza is negative 


   -RVP is negative 


UDS is positive for methamphetamine 





NSTEMI


   -Cardiology following











GUILLERMINA MERA DO              Apr 28, 2020 05:08

## 2020-04-28 NOTE — PHYSICIAN QUERY CLARIFICATION
PQ-Link Manifestation-Etiology


Admission/Discharge


Admission Date: Apr 12, 2020 at 21:51 


Discharge Date:


The medical record reflects the following clinical scenario:





History/Risk Factors:


Sepsis with septic shock


Acute respiratory failure with hypoxia and hypercapnia


Pneumonia


COPD





Clinical Findings: 4/13 Blood gases pH 7.15, pC02 62, p02 43, HC03 21, Total C02

22.7, 02 Sats 70%, base excess 6.8.








Treatment:


BiPAP, Intubated on 4/13 mechanical vent.





Question:  Can you specify if the Acute respiratory failure with hypercapnia and

hypoxia is due to/associated with Sepsis? 


Please document a response in the Progress Note or Discharge Summary.





   1. Yes - Acute respiratory failure with hypercapnia and hypoxia is due 

to/associated with Sepsis.





   2. No - Acute respiratory failure with hypercapnia and hypoxia  is not due 

to/associated with Sepsis.





   3. Other, with explanation of the clinical findings.





   4. Clinically undetermined, no explanation for the clinical findings.





PHYSICIAN RESPONSE


Manifestation due to/assoic:  Yes


Please remember a lack of response to the above will prompt a phone page by 

CDI/Coding staff. 





In responding to this query, please exercise your independent professional 

judgment.  The purpose of this communication is to more accurately reflect the 

complexity of your patients condition. The fact that a question is asked does 

not imply that any particular answer is desired or expected.  





Thank you for your timely response to this clarification.      


   


Requestors name: Marie Patel [facility]   





Phone # ext [phone] or [phone]








THIS PHYSICIAN QUERY FORM IS A PERMANENT PART OF THE MEDICAL RECORD











MARIE PATEL                 Apr 28, 2020 10:30


SRINI JOHN MD                May 6, 2020 11:39

## 2020-04-28 NOTE — PHYSICIAN QUERY CLARIFICATION
PQ-Further Specificity


Admission/Discharge


Admission Date: Apr 12, 2020 at 21:51 


Discharge Date:





The medical record reflects the following clinical scenario:





History/Risk Factors:


Sepsis with septic shock


Sepsis with picc line per Dr. James.


Pneumonia





Clinical Findings:


T 35.7, P 96, Resp 10, BP 78/52, WBC 14.2, Lactic acid 1.51, Blood culture-

Port/Picc Staph,Coag Neg, Streptomyces paraquayensis.





Treatment:


Picc line discontinued. IV Vancomycin and IV Zosyn, Levophed.





Question:  Can you further specify Sepsis with septic shock per the clinical 

indicators above? 


Please document a response in the Progress Notes or Discharge Summary.





1. Sepsis with septic shock due to pneumonia.





2. Sepsis with septic shock due to Picc line infectionl.





3. Other, with explanation of clinical findings.





4. Clinically undetermined, no explanation for the clinical findings.





PHYSICIAN RESPONSE


Can you specify per above:  1


Please remember a lack of response to the above will prompt a phone page by 

CDI/Coding staff. 





In responding to this query, please exercise your independent professional 

judgment.  The purpose of this communication is to more accurately reflect the 

complexity of your patients condition. The fact that a question is asked does 

not imply that any particular answer is desired or expected.  





Thank you for your timely response to this clarification.      


   


Requestors name: Marie Patel [facility]   





Phone # [phone] [phone] or [phone]








THIS PHYSICIAN QUERY FORM IS A PERMANENT PART OF THE MEDICAL RECORD











MARIE PATEL                 Apr 28, 2020 10:22


SRINI JOHN MD                May 6, 2020 11:40

## 2020-04-28 NOTE — DIAGNOSTIC IMAGING REPORT
INDICATION: Follow up, patient on ventilator, acute mental status

changes, acute kidney insufficiency, meth abuse.



EXAMINATION: Chest 04/28/2020 



COMPARISON: 04/27/2020



FINDINGS:



Low lung volumes noted. Left base infiltrate and effusion stable.

The heart is unchanged. Pulmonary vasculature also stable.



There is an ET tube with the tip unremarkable. There is a feeding

tube coiled in the upper abdomen. A right jugular line is again

noted. Its tip is difficult to visualize but is at least to the

mid or distal SVC. 



IMPRESSION:

1. Stable lines and tubes as above.

2. Stable chest.



Dictated by: 



  Dictated on workstation # [id number]

## 2020-04-29 VITALS — SYSTOLIC BLOOD PRESSURE: 123 MMHG | DIASTOLIC BLOOD PRESSURE: 46 MMHG

## 2020-04-29 VITALS — SYSTOLIC BLOOD PRESSURE: 112 MMHG | DIASTOLIC BLOOD PRESSURE: 69 MMHG

## 2020-04-29 VITALS — SYSTOLIC BLOOD PRESSURE: 149 MMHG | DIASTOLIC BLOOD PRESSURE: 81 MMHG

## 2020-04-29 VITALS — SYSTOLIC BLOOD PRESSURE: 162 MMHG | DIASTOLIC BLOOD PRESSURE: 53 MMHG

## 2020-04-29 VITALS — DIASTOLIC BLOOD PRESSURE: 46 MMHG | SYSTOLIC BLOOD PRESSURE: 124 MMHG

## 2020-04-29 VITALS — SYSTOLIC BLOOD PRESSURE: 176 MMHG | DIASTOLIC BLOOD PRESSURE: 59 MMHG

## 2020-04-29 VITALS — SYSTOLIC BLOOD PRESSURE: 141 MMHG | DIASTOLIC BLOOD PRESSURE: 49 MMHG

## 2020-04-29 VITALS — DIASTOLIC BLOOD PRESSURE: 43 MMHG | SYSTOLIC BLOOD PRESSURE: 121 MMHG

## 2020-04-29 VITALS — DIASTOLIC BLOOD PRESSURE: 78 MMHG | SYSTOLIC BLOOD PRESSURE: 132 MMHG

## 2020-04-29 VITALS — DIASTOLIC BLOOD PRESSURE: 45 MMHG | SYSTOLIC BLOOD PRESSURE: 119 MMHG

## 2020-04-29 VITALS — DIASTOLIC BLOOD PRESSURE: 33 MMHG | SYSTOLIC BLOOD PRESSURE: 93 MMHG

## 2020-04-29 VITALS — DIASTOLIC BLOOD PRESSURE: 46 MMHG | SYSTOLIC BLOOD PRESSURE: 141 MMHG

## 2020-04-29 VITALS — SYSTOLIC BLOOD PRESSURE: 151 MMHG | DIASTOLIC BLOOD PRESSURE: 46 MMHG

## 2020-04-29 VITALS — SYSTOLIC BLOOD PRESSURE: 168 MMHG | DIASTOLIC BLOOD PRESSURE: 62 MMHG

## 2020-04-29 VITALS — SYSTOLIC BLOOD PRESSURE: 111 MMHG | DIASTOLIC BLOOD PRESSURE: 108 MMHG

## 2020-04-29 VITALS — DIASTOLIC BLOOD PRESSURE: 69 MMHG | SYSTOLIC BLOOD PRESSURE: 117 MMHG

## 2020-04-29 VITALS — DIASTOLIC BLOOD PRESSURE: 43 MMHG | SYSTOLIC BLOOD PRESSURE: 137 MMHG

## 2020-04-29 VITALS — DIASTOLIC BLOOD PRESSURE: 46 MMHG | SYSTOLIC BLOOD PRESSURE: 126 MMHG

## 2020-04-29 VITALS — SYSTOLIC BLOOD PRESSURE: 136 MMHG | DIASTOLIC BLOOD PRESSURE: 75 MMHG

## 2020-04-29 VITALS — DIASTOLIC BLOOD PRESSURE: 38 MMHG | SYSTOLIC BLOOD PRESSURE: 119 MMHG

## 2020-04-29 VITALS — DIASTOLIC BLOOD PRESSURE: 62 MMHG | SYSTOLIC BLOOD PRESSURE: 163 MMHG

## 2020-04-29 VITALS — DIASTOLIC BLOOD PRESSURE: 52 MMHG | SYSTOLIC BLOOD PRESSURE: 151 MMHG

## 2020-04-29 VITALS — DIASTOLIC BLOOD PRESSURE: 89 MMHG | SYSTOLIC BLOOD PRESSURE: 138 MMHG

## 2020-04-29 LAB
ARTERIAL PATENCY WRIST A: POSITIVE
ARTERIAL PATENCY WRIST A: POSITIVE
BASE EXCESS STD BLDA CALC-SCNC: 4.6 MMOL/L (ref -2.5–2.5)
BASE EXCESS STD BLDA CALC-SCNC: 7.1 MMOL/L (ref -2.5–2.5)
BASOPHILS # BLD AUTO: 0 10^3/UL (ref 0–0.1)
BASOPHILS NFR BLD AUTO: 0 % (ref 0–10)
BDY SITE: (no result)
BDY SITE: (no result)
BODY TEMPERATURE: 36.6
BODY TEMPERATURE: 36.7
BUN/CREAT SERPL: 58
CALCIUM SERPL-MCNC: 8.3 MG/DL (ref 8.5–10.1)
CHLORIDE SERPL-SCNC: 100 MMOL/L (ref 98–107)
CO2 BLDA CALC-SCNC: 29.5 MMOL/L (ref 21–31)
CO2 BLDA CALC-SCNC: 32.4 MMOL/L (ref 21–31)
CO2 SERPL-SCNC: 27 MMOL/L (ref 21–32)
CREAT SERPL-MCNC: 0.76 MG/DL (ref 0.6–1.3)
EOSINOPHIL # BLD AUTO: 0 10^3/UL (ref 0–0.3)
EOSINOPHIL NFR BLD AUTO: 0 % (ref 0–10)
ERYTHROCYTE [DISTWIDTH] IN BLOOD BY AUTOMATED COUNT: 16.4 % (ref 10–14.5)
GFR SERPLBLD BASED ON 1.73 SQ M-ARVRAT: > 60 ML/MIN
GLUCOSE SERPL-MCNC: 137 MG/DL (ref 70–105)
HCT VFR BLD CALC: 30 % (ref 40–54)
HGB BLD-MCNC: 9.4 G/DL (ref 13.3–17.7)
INHALED O2 FLOW RATE: 35 L/MIN
INHALED O2 FLOW RATE: 35 L/MIN
LYMPHOCYTES # BLD AUTO: 0.2 X 10^3 (ref 1–4)
LYMPHOCYTES NFR BLD AUTO: 3 % (ref 12–44)
MAGNESIUM SERPL-MCNC: 2.2 MG/DL (ref 1.6–2.4)
MANUAL DIFFERENTIAL PERFORMED BLD QL: NO
MCH RBC QN AUTO: 30 PG (ref 25–34)
MCHC RBC AUTO-ENTMCNC: 32 G/DL (ref 32–36)
MCV RBC AUTO: 95 FL (ref 80–99)
MONOCYTES # BLD AUTO: 0.3 X 10^3 (ref 0–1)
MONOCYTES NFR BLD AUTO: 3 % (ref 0–12)
NEUTROPHILS # BLD AUTO: 7 X 10^3 (ref 1.8–7.8)
NEUTROPHILS NFR BLD AUTO: 94 % (ref 42–75)
PCO2 BLDA: 39 MMHG (ref 35–45)
PCO2 BLDA: 43 MMHG (ref 35–45)
PH BLDA: 7.47 [PH] (ref 7.37–7.43)
PH BLDA: 7.47 [PH] (ref 7.37–7.43)
PHOSPHATE SERPL-MCNC: 3.2 MG/DL (ref 2.3–4.7)
PLATELET # BLD: 115 10^3/UL (ref 130–400)
PMV BLD AUTO: 12.1 FL (ref 7.4–10.4)
PO2 BLDA: 61 MMHG (ref 79–93)
PO2 BLDA: 76 MMHG (ref 79–93)
POTASSIUM SERPL-SCNC: 4.5 MMOL/L (ref 3.6–5)
SAO2 % BLDA FROM PO2: 94 % (ref 94–100)
SAO2 % BLDA FROM PO2: 97 % (ref 94–100)
SODIUM SERPL-SCNC: 136 MMOL/L (ref 135–145)
VENTILATION MODE VENT: YES
VENTILATION MODE VENT: YES
WBC # BLD AUTO: 7.4 10^3/UL (ref 4.3–11)

## 2020-04-29 RX ADMIN — PANTOPRAZOLE SODIUM SCH MG: 40 INJECTION, POWDER, FOR SOLUTION INTRAVENOUS at 21:32

## 2020-04-29 RX ADMIN — RISPERIDONE SCH MG: 1 TABLET, FILM COATED ORAL at 21:33

## 2020-04-29 RX ADMIN — IPRATROPIUM BROMIDE AND ALBUTEROL SULFATE SCH ML: .5; 3 SOLUTION RESPIRATORY (INHALATION) at 02:06

## 2020-04-29 RX ADMIN — IPRATROPIUM BROMIDE AND ALBUTEROL SULFATE SCH ML: .5; 3 SOLUTION RESPIRATORY (INHALATION) at 18:21

## 2020-04-29 RX ADMIN — METOPROLOL TARTRATE SCH MG: 50 TABLET, FILM COATED ORAL at 10:22

## 2020-04-29 RX ADMIN — METHYLPREDNISOLONE SODIUM SUCCINATE SCH MG: 40 INJECTION, POWDER, FOR SOLUTION INTRAMUSCULAR; INTRAVENOUS at 12:19

## 2020-04-29 RX ADMIN — METHYLPREDNISOLONE SODIUM SUCCINATE SCH MG: 40 INJECTION, POWDER, FOR SOLUTION INTRAMUSCULAR; INTRAVENOUS at 17:53

## 2020-04-29 RX ADMIN — INSULIN ASPART SCH UNIT: 100 INJECTION, SOLUTION INTRAVENOUS; SUBCUTANEOUS at 03:56

## 2020-04-29 RX ADMIN — BUDESONIDE SCH MG: 0.5 SUSPENSION RESPIRATORY (INHALATION) at 06:48

## 2020-04-29 RX ADMIN — POTASSIUM CHLORIDE SCH MLS/HR: 200 INJECTION, SOLUTION INTRAVENOUS at 03:55

## 2020-04-29 RX ADMIN — RISPERIDONE SCH MG: 1 TABLET, FILM COATED ORAL at 10:22

## 2020-04-29 RX ADMIN — INSULIN ASPART SCH UNIT: 100 INJECTION, SOLUTION INTRAVENOUS; SUBCUTANEOUS at 14:40

## 2020-04-29 RX ADMIN — IPRATROPIUM BROMIDE AND ALBUTEROL SULFATE SCH ML: .5; 3 SOLUTION RESPIRATORY (INHALATION) at 06:48

## 2020-04-29 RX ADMIN — FENTANYL CITRATE SCH MLS/HR: 50 INJECTION, SOLUTION INTRAMUSCULAR; INTRAVENOUS at 03:55

## 2020-04-29 RX ADMIN — INSULIN ASPART SCH UNIT: 100 INJECTION, SOLUTION INTRAVENOUS; SUBCUTANEOUS at 21:33

## 2020-04-29 RX ADMIN — PANTOPRAZOLE SODIUM SCH MG: 40 INJECTION, POWDER, FOR SOLUTION INTRAVENOUS at 10:04

## 2020-04-29 RX ADMIN — BUDESONIDE SCH MG: 0.5 SUSPENSION RESPIRATORY (INHALATION) at 18:21

## 2020-04-29 RX ADMIN — AMIODARONE HYDROCHLORIDE SCH MG: 200 TABLET ORAL at 10:22

## 2020-04-29 RX ADMIN — INSULIN ASPART SCH UNIT: 100 INJECTION, SOLUTION INTRAVENOUS; SUBCUTANEOUS at 17:53

## 2020-04-29 RX ADMIN — METHYLPREDNISOLONE SODIUM SUCCINATE SCH MG: 40 INJECTION, POWDER, FOR SOLUTION INTRAMUSCULAR; INTRAVENOUS at 04:16

## 2020-04-29 RX ADMIN — MORPHINE SULFATE PRN MG: 4 INJECTION, SOLUTION INTRAMUSCULAR; INTRAVENOUS at 21:32

## 2020-04-29 RX ADMIN — INSULIN ASPART SCH UNIT: 100 INJECTION, SOLUTION INTRAVENOUS; SUBCUTANEOUS at 10:02

## 2020-04-29 RX ADMIN — APIXABAN SCH MG: 5 TABLET, FILM COATED ORAL at 04:17

## 2020-04-29 RX ADMIN — IPRATROPIUM BROMIDE AND ALBUTEROL SULFATE SCH ML: .5; 3 SOLUTION RESPIRATORY (INHALATION) at 11:17

## 2020-04-29 RX ADMIN — APIXABAN SCH MG: 5 TABLET, FILM COATED ORAL at 17:53

## 2020-04-29 RX ADMIN — IPRATROPIUM BROMIDE AND ALBUTEROL SULFATE SCH ML: .5; 3 SOLUTION RESPIRATORY (INHALATION) at 21:58

## 2020-04-29 RX ADMIN — METOPROLOL TARTRATE SCH MG: 50 TABLET, FILM COATED ORAL at 21:33

## 2020-04-29 RX ADMIN — MAGNESIUM SULFATE IN DEXTROSE SCH MLS/HR: 10 INJECTION, SOLUTION INTRAVENOUS at 03:55

## 2020-04-29 RX ADMIN — IPRATROPIUM BROMIDE AND ALBUTEROL SULFATE SCH ML: .5; 3 SOLUTION RESPIRATORY (INHALATION) at 15:14

## 2020-04-29 RX ADMIN — POTASSIUM CHLORIDE SCH MEQ: 1500 TABLET, EXTENDED RELEASE ORAL at 03:55

## 2020-04-29 RX ADMIN — INSULIN ASPART SCH UNIT: 100 INJECTION, SOLUTION INTRAVENOUS; SUBCUTANEOUS at 02:09

## 2020-04-29 NOTE — NUR
PT REFUSING ORAL CARE AT THIS TIME. PT CLAMPING TEETH DOWN AND SHAKING HIS HEAD NO. PT AWAKE 
AT THIS TIME AND FOLLOWING COMMANDS. I WAS ABLE TO PLACE MOUTH MOISTURIZER IN PTS MOUTH. I 
ASKED PT IF HIS MOUTH HURT. HE SHOOK HIS HEAD YES. I ASKED HIM IF THE STUFF WE PUT IN HIS 
MOUTH BURNED. HE SHOOK HIS HEAD YES. I TRIED TO EDUCATED PT THAT THE ORAL CARE WOULD HELP 
WITH THE SORES IN HIS MOUTH. PT STILL SHOOK HIS HEAD NO. WILL TRY AGAIN.

## 2020-04-29 NOTE — PULMONARY PROGRESS NOTE
Subjective


Time Seen by a Provider:  03:14


Subjective/Events-last exam


Sedated on vent





Sepsis Event


Evaluation


Height, Weight, BMI


Height: 5'8.50"


Weight: 206lbs. 0.8oz. 93.923318zr; 30.95 BMI


Method:Stated





Exam


Exam





Vital Signs








  Date Time  Temp Pulse Resp B/P (MAP) Pulse Ox O2 Delivery O2 Flow Rate FiO2


 


4/29/20 02:06  64 21  92   35


 


4/29/20 02:00  56 17 126/46 (72) 95 Mechanical Ventilator 35.00 


 


4/29/20 01:00  70 20 124/46 (72) 96 Mechanical Ventilator 50.00 


 


4/29/20 01:00  70      


 


4/29/20 00:00  59 17 119/45 (69) 96 Mechanical Ventilator 50.00 


 


4/29/20 00:00      Mechanical Ventilator  50


 


4/28/20 23:00  65 18 106/41 (62) 95 Mechanical Ventilator 50.00 


 


4/28/20 22:53 36.8     Mechanical Ventilator 50.00 


 


4/28/20 22:01  62 21  93   50


 


4/28/20 22:00  61 18 121/45 (70) 93 Mechanical Ventilator 50.00 


 


4/28/20 21:00  67 18 117/43 (67) 94 Mechanical Ventilator 50.00 


 


4/28/20 20:05      Mechanical Ventilator 50.00 


 


4/28/20 20:01  67  121/46    


 


4/28/20 20:00  66 16 119/46 (70) 94 Mechanical Ventilator 40.00 


 


4/28/20 20:00      Mechanical Ventilator  50


 


4/28/20 19:42 36.5       


 


4/28/20 19:00  69      


 


4/28/20 19:00  69 21 97/37 (57) 93 Mechanical Ventilator 40.00 


 


4/28/20 18:34  64 24  96   50


 


4/28/20 18:28  65  105/41    


 


4/28/20 18:00  69 20 103/40 (61)  Mechanical Ventilator 40.00 


 


4/28/20 17:00  75 21 98/38 (58)  Mechanical Ventilator 40.00 


 


4/28/20 16:00      Mechanical Ventilator  40


 


4/28/20 16:00 36.0       


 


4/28/20 16:00  75 17 101/40 (60) 92 Mechanical Ventilator 40.00 


 


4/28/20 15:00  78 21 113/41 (65) 91 Mechanical Ventilator 40.00 


 


4/28/20 14:41  77 24  71   40


 


4/28/20 14:00  80 20 84/34 (51) 90 Mechanical Ventilator 40.00 


 


4/28/20 13:00  121 14 133/54 (80)  Mechanical Ventilator 40.00 


 


4/28/20 12:24  80      


 


4/28/20 12:00 37.2       


 


4/28/20 12:00  89 24 110/40 (63) 92 Mechanical Ventilator 40.00 


 


4/28/20 12:00     92 Mechanical Ventilator  40


 


4/28/20 11:47  77 23  93   40


 


4/28/20 11:00  85 22 132/44 (73) 94 Mechanical Ventilator 40.00 


 


4/28/20 10:00  77 20 132/39 (70) 96 Mechanical Ventilator 40.00 


 


4/28/20 09:00  73 23 119/34 (62) 92 Mechanical Ventilator 40.00 


 


4/28/20 08:00  85 22 126/39 (68) 90 Mechanical Ventilator 40.00 


 


4/28/20 08:00     94 Mechanical Ventilator  40


 


4/28/20 08:00 37.1       


 


4/28/20 07:03  65 20  98   30


 


4/28/20 07:00  62 19 116/36 (62) 98 Mechanical Ventilator 40.00 


 


4/28/20 07:00  70      


 


4/28/20 06:00  62 19 104/32 (56) 96 Mechanical Ventilator 40.00 


 


4/28/20 05:00  62 18 97/30 (52) 95 Mechanical Ventilator 40.00 


 


4/28/20 04:00  63 16 105/34 (57) 97 Mechanical Ventilator 40.00 


 


4/28/20 03:17     95 Mechanical Ventilator  40


 


4/28/20 03:16 36.6       














I & O 


 


 4/29/20





 07:00


 


Intake Total 2240 ml


 


Output Total 2225 ml


 


Balance 15 ml








Height & Weight


Height: 5'8.50"


Weight: 206lbs. 0.8oz. 93.685100tu; 30.95 BMI


Method:Stated


General Appearance:  No Apparent Distress, WD/WN, Chronically ill, Obese


HEENT:  TMs Normal, Other (Pupils equal/reactive 3 mm bilaterally)


Respiratory:  No Accessory Muscle Use, No Respiratory Distress, Decreased Breath

Sounds


Cardiovascular:  Regular Rate, Rhythm


Capillary Refill:  Less Than 3 Seconds


Gastrointestinal:  non tender, soft


Extremity:  Normal Inspection, No Pedal Edema


Neurologic/Psychiatric:  Other (sedated)


Skin:  Normal Color, Warm/Dry





Results


Lab


Laboratory Tests


4/27/20 03:18








4/28/20 02:45








4/29/20 02:50











Assessment/Plan


Assessment/Plan


Acute respiratory failure 


   -Change vent to SIMV/PS with 10/400/10/5 


      -D/C propofol and continue preceded 


   -Give 40 mg IV Lasix 


   - COVID is negative 


   -bilateral dopplers -- negative


   - fentany.  precedex 


   -Hold TF for weaning 


   -Abx have completed. 


Aflutter


   -Cardiology s/p ZAMZAM with cardioversion 


   -On Eliquis 


CHF EF 45% with grade 1 diastolic dysfunction 


   -Cardiology following


sepsis with PICC line


   -Pan cultures pending


Pneumonia with possible aspiration 


   -Pan cultures 


   -MRSA swab - neg 


   -COVID is negative 


   -Influenza is negative 


   -RVP is negative 


UDS is positive for methamphetamine 





NSTEMI


   -Cardiology following











GUILLERMINA MERA DO              Apr 29, 2020 03:19

## 2020-04-29 NOTE — NUR
0641-Restraints removed. 

0642- Pt extubated at this time. Pt tolerating well. 

0649-Pt placed on Bipap FIO2-35% settings 15/8 rate 12. O2 saturation-93%. Pt sitting in bed 
watching tv at this time. 

0710- Report given to ONEIDA Shahid.

## 2020-04-29 NOTE — CARDIOLOGY PROGRESS NOTE
Subjective


Date Seen by Provider:  2020


Time Seen by Provider:  09:00


Subjective/Events-last exam


Patient was seen at bedside, sitting up comfortably, extubated today.  Lethargic


Review of Systems


General:  No Chills, No Night Sweats; Fatigue, Malaise; No Appetite, No Other


HEENT:  No Head Aches, No Visual Changes, No Eye Pain, No Ear Pain, No 

Dysphasia, No Sinus Congestion, No Post Nasal Drip, No Sore Throat, No Other


Pulmonary:  Dyspnea; No Cough, No Pleuritic Chest Pain, No Other


Cardiovascular:  Edema; No: Chest Pain, Palpitations, Orthopnea, Paroxysmal Noc.

Dyspnea, Lt Headedness, Other





Objective-Cardiology


Exam


Last Set of Vital Signs





Vital Signs








 20





 10:00 11:18 12:00


 


Temp   37.4


 


Pulse 77  


 


Resp 19  


 


B/P (MAP) 141/49 (79)  


 


Pulse Ox  92 


 


O2 Delivery  Vapotherm 


 


O2 Flow Rate  25.00 


 


FiO2  35 





Capillary Refill : Less Than 3 Seconds


I&O











Intake and Output 


 


 20





 00:00


 


Intake Total 2760 ml


 


Output Total 2475 ml


 


Balance 285 ml


 


 


 


IV Total 1450 ml


 


Tube Feeding 480 ml


 


Other 830 ml


 


Output Urine Total 2475 ml








General:  Alert, Cooperative, Mild Distress


HEENT:  Atraumatic, PERRLA, Mucous Memb Moist/Pink


Neck:  Supple


Lungs:  Other (ronchi)


Heart:  Regular Rate, Normal S1, Normal S2, No Murmurs


Abdomen:  Normal Bowel Sounds, Other (mildly distended)


Extremities:  No Edema


Skin:  No Rashes


Neuro:  Normal Speech


Psych/Mental Status:  Mental Status NL





Results


Lab


Laboratory Tests


20 02:50














A/P-Cardiology


Admission Diagnosis


Acute respiratory failure


Paroxysmal atrial flutter


Coronary artery disease


Peripheral arterial disease





Assessment/Plan


Status post acute respiratory failure, currently extubated and doing better.  

Managed by Dr. James





Paroxysmal atrial flutter with rapid ventricular response, underwent 

cardioversion, currently in sinus rhythm on amiodarone and Eliquis





Status post acute renal failure, improved, continue to monitor renal function





Coronary artery disease, type II myocardial infarction due to respiratory 

failure,history of cardiac catheterization done in 2017 showing mild to 

moderate coronary artery disease nonobstructive disease, stress test done in 

2020 by Dr. Khalid reported as no ischemia or infarction





Echo in 2020 showing ejection fraction 45 percent, grade 1 diastolic 

dysfunction, mild-to-moderate tricuspid regurgitation, PA pressure 29 mmHg





History of hypertension, continue to monitor blood pressure 





History of hyperlipidemia, monitor lipids 





Peripheral arterial disease, had peripheral intervention in 2019 by Dr. Dai with balloon angioplasty to the right anterior tibial, posterior tibial 

and stenting of the right SFA with improvement of his MONO and TBI, had partial 

right foot amputation, followed by Dr. Dai





History of diabetes mellitus, managed by primary care physician





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





Clinical Quality Measures


DVT/VTE Risk/Contraindication:


Risk Factor Score Per Nursin


RFS Level Per Nursing on Admit:  4+=Very High











PREM RUELAS MD              2020 12:20

## 2020-04-29 NOTE — ST DYSPHAGIA EVALUATION
Speech Evaluation-General


Medical Diagnosis


Respiratory failure


Onset Date:  Apr 29, 2020





Therapy Diagnosis


Therapy Diagnosis:  Cognitive-communication





Referral


Referring Physician:  Dr. James





Medical History


Pertinent Medical History:  CAD, COPD, DM, HTN


Reviewed History:  Yes





Speech PLF/Current-Dysphagia


Prior Level of Function


Patient lived in his own home where he was independent for his daily needs.





Subjective


Patient was cooperative with the Bedside Dysphagia Evaluation.





Cognitive Status


Patient Orientation:  Person, Place





Oral Motor Skills


Dentition:  Natural, Tumbled, Stained


Ability to Follow Directions:  Good


Patient was NPO pending BDE.


Oral Expression Ability:  Mild Impairment





Voice


Voice Phonatory-Based Quality:  Breathy


Voice Pitch:  Normal


Voice Loudness:  Moderately Soft/Quiet





Face


Facial Symmetry:  Symmetrical





Oral-Facial Assessment


Oral-Facial Dentition:  Normal


Labial Seal Description:  Weak


Smile:  Normal


Puff Cheeks:  Reduced Strength


Lingual Protrusion:  Normal


Lingual ROM:  Normal


Lingual Strength:  Abnormal


Weak with intake


Pharynx Velopharyngeal Move.:  Normal


Volitional Dry Swallow:  Yes


Voluntary Cough:  Yes


Can Clear Throat Volitionally:  Yes





Dysphagia Evaluation


Consistencies Presented:  Thin Liquid, Mechanical Soft, Nectar Thick Liquid, 

Pureed


Oral phase is within normal range of function.


Patient cough/clear with thin liquids trials.


Funct. Velo/Pharyngeal Symptom:  Cough After Swallow


Dietary Recommendations:  Pureed


Liquid Recommendations:  Nectar Consistancy


Swallowing Precautions:  Alternate Liquids/Solids, Decreased Bolus 1/2 Tsp, 

Liquids from Straw, Liquids from Spoon, Small Bites and Sips, Sitting Upright 90

Degrees, Sitting 90 Degrees 30 Post Intake


Dysphagia Evaluation Summary


The patient is a 69 year old man who was admitted to the hospital via ER due to 

AMS and respiratory failure. He was intubated upon admission with extubation 

completed this am. Patient completed the BDE with presentations of thin and 

nectar liquids. He was given thin liquids via 1/2 tsp x2 and small sips via 

straw x2 with cough/clear after each presentation. Patient was given nectar 

consistency liquids via 1/2 tsp x2 and small sip via straw x2 without 

difficulty. Patient was also given puree at 1/2 tsp x2 without difficulty. 

Patient was given mechanical soft at 1/2 tsp size with difficulty forming bolus 

and delayed onset of swallow. The patient is recommended for nectar consistency 

liquids and Dysphagia 1 diet level. This information was provided to his nurse 

and written on his white board. He will be re-assessed by ST tomorrow for 

upgrade in diet as deemed appropriate.


Barriers to Learning


Patient's recent medical status





Speech Short Term Goals


Short Term Goals


Short Term Goals


1) Patient will tolerate least restrictive diet level without s/s of aspiration 

with 90% or greater.


2) Patient will utilize compensatory strategies as trainedat 90% or greater.





Speech Long Term Goals


Long Term Goals


Patient will maintain adequate nutrition via safe, effective swallow function.





Speech-Plan


Patient/Family Goals


Patient/Family Goals:  


Patient plans on returning to his home upon discharge from the hospital.





Treatment Plan


Speech Therapy Treatment Plan:  Continue Plan of Care


Treatment Duration:  May 6, 2020


Frequency:  3 times per week


Estimated Hrs Per Day:  .25 hour per day


Rehab Potential:  Good


Barriers to Learning:  


Patient's recent medical status


Pt/Family Agrees to Plan:  Yes





Safety Risks/Education


Teaching Recipient:  Patient


Teaching Methods:  Demonstration, Discussion


Response to Teaching:  Verbalize Understanding, Return Demonstration


Education Topics Provided:  


Safety of oral intake and diet level





Time


Speech Therapy Time In:  10:15


Speech Therapy Time Out:  10:30


Total Billed Time:  15


Billed Treatment Time


ALEXIS Dubois [address]            Apr 29, 2020 14:10

## 2020-04-29 NOTE — NUR
CM/SS:  Visited with pt as to his current status 



Plan:  Undetermined at this time



Summary:  Pt was extubated earlier this morning on this date and in bed with his eyes open.  
Pt is able to whisper and says that he is cold.  This worker gets pt a blanket and puts it 
on pt.  Pt also reports he will be alright.  This worker will follow up.  

-------------------------------------------------------------------------------

Addendum: 04/29/20 at 1521 by RICARDO GREGORIO SS

-------------------------------------------------------------------------------

Visited with pt again on this date.  Called elida Rios [phone] - on speaker with pt.  
Explained my role and plan going forward to help determine the best plan for pt.  Son 
reports he will be going from [facility] to [place] to help pt. when he is 
discharged from the hospital.  This worker will follow up.  Pt also wants to talk to his 
friend Vishal, he is assisted with the phone, and his friend is not home.  The nurse Zehra 
will try and follow up with his friend Vishal later.  This worker will follow up with pt on 
tomorrow.

## 2020-04-29 NOTE — NUR
THIS NURSE SPOKE WITH E-ICU REGARDING PT PAIN. THIS NURSE UPDATED E-ICU ON PT CONDITION. 
E-ICU TO ORDER PAIN MEDICATION.

## 2020-04-29 NOTE — NUR
THIS NURSE NOTIFIED DR MERA PT FAILED BEDSIDE SWALLOW AND SPEECH THERAPY RECOMMENDATIONS. 
DR MERA ORDERED TO KEEP PT NPO AND HOLD MORNING AMIODARONE, LOPRESSOR, RISPERADAL, AND 
LEVEMIR.

## 2020-04-29 NOTE — DIAGNOSTIC IMAGING REPORT
HISTORY: Pneumonia, altered mental status, ventilator



COMPARISON: 04/28/2020



TECHNIQUE: Single frontal view of the chest



FINDINGS:



The endotracheal tube is approximately 4.5 cm above the asia.

An enteric tube crosses the field-of-view. Lung volumes are

mildly low. There is a small left pleural effusion with left

basilar airspace opacities. The cardiac silhouette is normal in

size. The right jugular line tip projects over the SVC. No

pneumothorax is seen.



IMPRESSION:

1. Small left pleural effusion with associated airspace

opacities. Overall findings appear stable since the prior study.



Dictated by: 



  Dictated on workstation # [id number]

## 2020-04-29 NOTE — NUR
THIS NURSE CLARIFIED WITH DR MERA IF HE WANTED TO HOLD ELIQUIS. DR MERA SAID IF PT CAN 
TAKE THE PILL TO GO AHEAD AND ADMINISTER, IF NOT HOLD ELIQUIS.

## 2020-04-29 NOTE — NUR
SPOKE WITH DR. MERA. INFORMED HIM OF PTS ABG RESULTS. TELEPHONE ORDERS RECEIVED TO COLLECT 
SPUTUMN CULTURE. IF PT STILL STABLE AFTER SPUTUMN CULTURE TO EXTUBATE. TO PLACE PT ON BIPAP 
15/8 WITH BACK UP RATE OF 12. TO KEEP PT ON BIPAP FOR AT LEAST 1 HR AND IF PT STABLE. PLACE 
PT ON VAPOTHERM TO TITRATE TO KEEP SATS 90-92%.

## 2020-04-29 NOTE — PROGRESS NOTE
Subjective


Subjective/Events-last exam


Extubated, afebrile. Denies concerns except he states he feels cold.





Objective


Exam


Last Set of Vital Signs





Vital Signs








  Date Time  Temp Pulse Resp B/P (MAP) Pulse Ox O2 Delivery O2 Flow Rate FiO2


 


20 08:07      Vapotherm 25.00 





       35.00 


 


20 08:00 36.8       


 


20 07:45     95   35


 


20 06:57  78  153/51    


 


20 06:53   23     





Capillary Refill : Less Than 3 Seconds


I&O











Intake and Output 


 


 20





 00:00


 


Intake Total 2760 ml


 


Output Total 2475 ml


 


Balance 285 ml


 


 


 


IV Total 1450 ml


 


Tube Feeding 480 ml


 


Other 830 ml


 


Output Urine Total 2475 ml











Results/Procedures


Lab


Laboratory Tests


20 09:34: Glucometer 81


20 13:54: Glucometer 279H


20 17:52: Glucometer 205H


20 20:51: Glucometer 194H


20 02:07: Glucometer 136H


20 02:50: 


White Blood Count 7.4, Red Blood Count 3.12L, Hemoglobin 9.4L, Hematocrit 30L, 

Mean Corpuscular Volume 95, Mean Corpuscular Hemoglobin 30, Mean Corpuscular 

Hemoglobin Concent 32, Red Cell Distribution Width 16.4H, Platelet Count 115L, 

Mean Platelet Volume 12.1H, Neutrophils (%) (Auto) 94H, Lymphocytes (%) (Auto) 

3L, Monocytes (%) (Auto) 3, Eosinophils (%) (Auto) 0, Basophils (%) (Auto) 0, 

Neutrophils # (Auto) 7.0, Lymphocytes # (Auto) 0.2L, Monocytes # (Auto) 0.3, 

Eosinophils # (Auto) 0.0, Basophils # (Auto) 0.0, Blood Gas Puncture Site RIGHT 

RADIAL, Blood Gas Patient Temperature 36.6, Arterial Blood pH 7.47H, Arterial 

Blood Partial Pressure CO2 43, Arterial Blood Partial Pressure O2 61L, Arterial 

Blood HCO3 31H, Arterial Blood Total CO2 32.4H, Arterial Blood Oxygen Saturation

94, Arterial Blood Base Excess 7.1H, Rojelio Test POSITIVE, Blood Gas Ventilator 

Setting YES, Blood Gas Inspired Oxygen 35, Sodium Level 136, Potassium Level 

4.5, Chloride Level 100, Carbon Dioxide Level 27, Anion Gap 9, Blood Urea 

Nitrogen 44H, Creatinine 0.76, Estimat Glomerular Filtration Rate > 60, 

BUN/Creatinine Ratio 58, Glucose Level 137H, Calcium Level 8.3L, Phosphorus 

Level 3.2, Magnesium Level 2.2


20 04:42: 


Blood Gas Puncture Site RT RADIAL ARTLINE, Blood Gas Patient Temperature 36.7, 

Arterial Blood pH 7.47H, Arterial Blood Partial Pressure CO2 39, Arterial Blood 

Partial Pressure O2 76L, Arterial Blood HCO3 28H, Arterial Blood Total CO2 29.5,

Arterial Blood Oxygen Saturation 97, Arterial Blood Base Excess 4.6H, Rojelio Test

POSITIVE, Blood Gas Ventilator Setting YES, Blood Gas Inspired Oxygen 35





Microbiology


20 Catheter Tip Culture - Final, Complete


          No growth


20 Gram Stain - Final, Complete


          


20 Sputum Culture - Final, Complete


          No growth


20 Urine Culture - Final, Complete


          NO GROWTH





Assessment/Plan


Assessment/Plan





(1) Sepsis


Status:  Resolved


Assessment & Plan:  : Poor prognosis, Patient on Sepsis IVF protocol, 

continue IV antibiotics, Dr James consulted for critical care, blood cultures 

pending


4/15: Off Levaphed today, continue to monitor blood pressure, continue IV 

antibiotics, patient is not following commands when sedation is turned off


: Prognosis remains guarded, tube feeds started today, blood pressures 

running high, restarted blood pressure medications


: Dr james managing critical care, continue IV antibiotics, possible wean 

and extubation over the weekend


 has competed antibiotics, remains on methylprednisone, possibly working 

toward weaning from vent versus transfer to LTACH


- extubated.


Qualifiers:  


   Qualified Codes:  A41.9 - Sepsis, unspecified organism; R65.21 - Severe 

sepsis with septic shock; N17.9 - Acute kidney failure, unspecified


(2) Acute respiratory failure with hypoxia and hypercapnia


Status:  Acute


Assessment & Plan:  : Currently Intubated, Dr James managing vent


- remains on mechanical ventilation, appreciate Dr. James's management.


 extubated, appreciate Dr. James's management.





(3) Acute kidney injury superimposed on chronic kidney disease


Status:  Resolved


Assessment & Plan:  : Will monitor daily BUN/Cr





(4) NSTEMI (non-ST elevation myocardial infarction)


Status:  Resolved


Assessment & Plan:  : Cardiology consulted, appreciate recommendations, 

Recent Cath 2020: BNP elevated, patient given IV lasix, diuresing well


 likely strain related, troponin trended down





(5) Upper GI bleed


Status:  Acute


Assessment & Plan:  : Today started having blood tinged fluid from OG, 

Lovenox stopped and started on IV protonix


 hemoglobin stable





(6) CAD (coronary artery disease)


Status:  Chronic


Qualifiers:  


   Qualified Codes:  I25.10 - Atherosclerotic heart disease of native coronary 

artery without angina pectoris


(7) Hypertension


Status:  Chronic


Assessment & Plan:  : Currently hypotensive and on Levophed for Sepsis


4/15: Off pressors


: Restarted Lisinopril today


Qualifiers:  


   Qualified Codes:  I10 - Essential (primary) hypertension


(8) Diabetes type 2, uncontrolled


Status:  Chronic


Qualifiers:  


   Qualified Codes:  E11.65 - Type 2 diabetes mellitus with hyperglycemia


(9) COPD (chronic obstructive pulmonary disease)


Status:  Chronic


Qualifiers:  


   Qualified Codes:  J44.9 - Chronic obstructive pulmonary disease, unspecified


(10) Pneumonia


Status:  Acute


Assessment & Plan:  s/p full course of antibiotics


Qualifiers:  


   Qualified Codes:  J18.9 - Pneumonia, unspecified organism


(11) Atrial flutter


Assessment & Plan:  s/p cardioversion, appreciate Cardiology recommendations. On

Eliquis.





(12) Hyponatremia


Status:  Resolved


(13) Alteration in nutrition


Status:  Acute


Assessment & Plan:  Receiving tube feedings while intubated





(14) DVT prophylaxis


Status:  Acute


Assessment & Plan:  : On Lovenox, stopped today due to GI bleeding


4/15: Continues off Lovenox, SCDs


 now on Eliquis








Clinical Quality Measures


DVT/VTE Risk/Contraindication:


Risk Factor Score Per Nursin


RFS Level Per Nursing on Admit:  4+=Very High











MARIELY DELANEY MD             2020 08:37

## 2020-04-30 VITALS — SYSTOLIC BLOOD PRESSURE: 117 MMHG | DIASTOLIC BLOOD PRESSURE: 66 MMHG

## 2020-04-30 VITALS — SYSTOLIC BLOOD PRESSURE: 117 MMHG | DIASTOLIC BLOOD PRESSURE: 69 MMHG

## 2020-04-30 VITALS — SYSTOLIC BLOOD PRESSURE: 104 MMHG | DIASTOLIC BLOOD PRESSURE: 63 MMHG

## 2020-04-30 VITALS — SYSTOLIC BLOOD PRESSURE: 114 MMHG | DIASTOLIC BLOOD PRESSURE: 64 MMHG

## 2020-04-30 VITALS — SYSTOLIC BLOOD PRESSURE: 140 MMHG | DIASTOLIC BLOOD PRESSURE: 79 MMHG

## 2020-04-30 VITALS — SYSTOLIC BLOOD PRESSURE: 110 MMHG | DIASTOLIC BLOOD PRESSURE: 63 MMHG

## 2020-04-30 VITALS — DIASTOLIC BLOOD PRESSURE: 68 MMHG | SYSTOLIC BLOOD PRESSURE: 126 MMHG

## 2020-04-30 VITALS — SYSTOLIC BLOOD PRESSURE: 138 MMHG | DIASTOLIC BLOOD PRESSURE: 113 MMHG

## 2020-04-30 VITALS — SYSTOLIC BLOOD PRESSURE: 128 MMHG | DIASTOLIC BLOOD PRESSURE: 68 MMHG

## 2020-04-30 VITALS — SYSTOLIC BLOOD PRESSURE: 141 MMHG | DIASTOLIC BLOOD PRESSURE: 75 MMHG

## 2020-04-30 VITALS — DIASTOLIC BLOOD PRESSURE: 84 MMHG | SYSTOLIC BLOOD PRESSURE: 143 MMHG

## 2020-04-30 VITALS — SYSTOLIC BLOOD PRESSURE: 135 MMHG | DIASTOLIC BLOOD PRESSURE: 70 MMHG

## 2020-04-30 VITALS — DIASTOLIC BLOOD PRESSURE: 70 MMHG | SYSTOLIC BLOOD PRESSURE: 127 MMHG

## 2020-04-30 VITALS — SYSTOLIC BLOOD PRESSURE: 133 MMHG | DIASTOLIC BLOOD PRESSURE: 65 MMHG

## 2020-04-30 VITALS — SYSTOLIC BLOOD PRESSURE: 135 MMHG | DIASTOLIC BLOOD PRESSURE: 73 MMHG

## 2020-04-30 LAB
ANISOCYTOSIS BLD QL SMEAR: (no result)
BASOPHILS # BLD AUTO: 0 10^3/UL (ref 0–0.1)
BASOPHILS NFR BLD AUTO: 0 % (ref 0–10)
BUN/CREAT SERPL: 44
CALCIUM SERPL-MCNC: 8.4 MG/DL (ref 8.5–10.1)
CHLORIDE SERPL-SCNC: 99 MMOL/L (ref 98–107)
CO2 SERPL-SCNC: 27 MMOL/L (ref 21–32)
CREAT SERPL-MCNC: 0.78 MG/DL (ref 0.6–1.3)
EOSINOPHIL # BLD AUTO: 0 10^3/UL (ref 0–0.3)
EOSINOPHIL NFR BLD AUTO: 0 % (ref 0–10)
EOSINOPHIL NFR BLD MANUAL: 1 %
ERYTHROCYTE [DISTWIDTH] IN BLOOD BY AUTOMATED COUNT: 17.1 % (ref 10–14.5)
GFR SERPLBLD BASED ON 1.73 SQ M-ARVRAT: > 60 ML/MIN
GLUCOSE SERPL-MCNC: 140 MG/DL (ref 70–105)
HCT VFR BLD CALC: 30 % (ref 40–54)
HGB BLD-MCNC: 9.8 G/DL (ref 13.3–17.7)
LYMPHOCYTES # BLD AUTO: 0.4 X 10^3 (ref 1–4)
LYMPHOCYTES NFR BLD AUTO: 4 % (ref 12–44)
MAGNESIUM SERPL-MCNC: 2 MG/DL (ref 1.6–2.4)
MANUAL DIFFERENTIAL PERFORMED BLD QL: YES
MCH RBC QN AUTO: 31 PG (ref 25–34)
MCHC RBC AUTO-ENTMCNC: 32 G/DL (ref 32–36)
MCV RBC AUTO: 95 FL (ref 80–99)
MONOCYTES # BLD AUTO: 0.4 X 10^3 (ref 0–1)
MONOCYTES NFR BLD AUTO: 3 % (ref 0–12)
MONOCYTES NFR BLD: 2 %
NEUTROPHILS # BLD AUTO: 10.2 X 10^3 (ref 1.8–7.8)
NEUTROPHILS NFR BLD AUTO: 93 % (ref 42–75)
NEUTS BAND NFR BLD MANUAL: 88 %
NEUTS BAND NFR BLD: 5 %
PHOSPHATE SERPL-MCNC: 2.3 MG/DL (ref 2.3–4.7)
PLATELET # BLD: 126 10^3/UL (ref 130–400)
PMV BLD AUTO: 12 FL (ref 7.4–10.4)
POTASSIUM SERPL-SCNC: 4.4 MMOL/L (ref 3.6–5)
SMUDGE CELLS # BLD: (no result) 10*3/UL
SODIUM SERPL-SCNC: 135 MMOL/L (ref 135–145)
VARIANT LYMPHS NFR BLD MANUAL: 4 %
WBC # BLD AUTO: 11 10^3/UL (ref 4.3–11)

## 2020-04-30 RX ADMIN — AMIODARONE HYDROCHLORIDE SCH MG: 200 TABLET ORAL at 08:08

## 2020-04-30 RX ADMIN — IPRATROPIUM BROMIDE AND ALBUTEROL SULFATE SCH ML: .5; 3 SOLUTION RESPIRATORY (INHALATION) at 07:39

## 2020-04-30 RX ADMIN — INSULIN ASPART SCH UNIT: 100 INJECTION, SOLUTION INTRAVENOUS; SUBCUTANEOUS at 03:50

## 2020-04-30 RX ADMIN — IPRATROPIUM BROMIDE AND ALBUTEROL SULFATE SCH ML: .5; 3 SOLUTION RESPIRATORY (INHALATION) at 18:45

## 2020-04-30 RX ADMIN — MORPHINE SULFATE PRN MG: 4 INJECTION, SOLUTION INTRAMUSCULAR; INTRAVENOUS at 12:27

## 2020-04-30 RX ADMIN — PANTOPRAZOLE SODIUM SCH MG: 40 INJECTION, POWDER, FOR SOLUTION INTRAVENOUS at 08:09

## 2020-04-30 RX ADMIN — METOPROLOL TARTRATE SCH MG: 50 TABLET, FILM COATED ORAL at 21:39

## 2020-04-30 RX ADMIN — IPRATROPIUM BROMIDE AND ALBUTEROL SULFATE SCH ML: .5; 3 SOLUTION RESPIRATORY (INHALATION) at 10:34

## 2020-04-30 RX ADMIN — RISPERIDONE SCH MG: 1 TABLET, FILM COATED ORAL at 21:31

## 2020-04-30 RX ADMIN — PANTOPRAZOLE SODIUM SCH MG: 40 INJECTION, POWDER, FOR SOLUTION INTRAVENOUS at 21:31

## 2020-04-30 RX ADMIN — INSULIN ASPART SCH UNIT: 100 INJECTION, SOLUTION INTRAVENOUS; SUBCUTANEOUS at 21:31

## 2020-04-30 RX ADMIN — METHYLPREDNISOLONE SODIUM SUCCINATE SCH MG: 40 INJECTION, POWDER, FOR SOLUTION INTRAMUSCULAR; INTRAVENOUS at 23:44

## 2020-04-30 RX ADMIN — APIXABAN SCH MG: 5 TABLET, FILM COATED ORAL at 05:47

## 2020-04-30 RX ADMIN — IPRATROPIUM BROMIDE AND ALBUTEROL SULFATE SCH ML: .5; 3 SOLUTION RESPIRATORY (INHALATION) at 14:44

## 2020-04-30 RX ADMIN — METOPROLOL TARTRATE SCH MG: 50 TABLET, FILM COATED ORAL at 08:08

## 2020-04-30 RX ADMIN — METHYLPREDNISOLONE SODIUM SUCCINATE SCH MG: 40 INJECTION, POWDER, FOR SOLUTION INTRAMUSCULAR; INTRAVENOUS at 05:37

## 2020-04-30 RX ADMIN — METHYLPREDNISOLONE SODIUM SUCCINATE SCH MG: 40 INJECTION, POWDER, FOR SOLUTION INTRAMUSCULAR; INTRAVENOUS at 00:27

## 2020-04-30 RX ADMIN — INSULIN ASPART SCH UNIT: 100 INJECTION, SOLUTION INTRAVENOUS; SUBCUTANEOUS at 12:18

## 2020-04-30 RX ADMIN — IPRATROPIUM BROMIDE AND ALBUTEROL SULFATE SCH ML: .5; 3 SOLUTION RESPIRATORY (INHALATION) at 22:01

## 2020-04-30 RX ADMIN — INSULIN ASPART SCH UNIT: 100 INJECTION, SOLUTION INTRAVENOUS; SUBCUTANEOUS at 16:23

## 2020-04-30 RX ADMIN — APIXABAN SCH MG: 5 TABLET, FILM COATED ORAL at 18:59

## 2020-04-30 RX ADMIN — IPRATROPIUM BROMIDE AND ALBUTEROL SULFATE SCH ML: .5; 3 SOLUTION RESPIRATORY (INHALATION) at 02:24

## 2020-04-30 RX ADMIN — MORPHINE SULFATE PRN MG: 4 INJECTION, SOLUTION INTRAMUSCULAR; INTRAVENOUS at 03:50

## 2020-04-30 RX ADMIN — METHYLPREDNISOLONE SODIUM SUCCINATE SCH MG: 40 INJECTION, POWDER, FOR SOLUTION INTRAMUSCULAR; INTRAVENOUS at 12:18

## 2020-04-30 RX ADMIN — BUDESONIDE SCH MG: 0.5 SUSPENSION RESPIRATORY (INHALATION) at 18:45

## 2020-04-30 RX ADMIN — RISPERIDONE SCH MG: 1 TABLET, FILM COATED ORAL at 08:08

## 2020-04-30 RX ADMIN — BUDESONIDE SCH MG: 0.5 SUSPENSION RESPIRATORY (INHALATION) at 07:39

## 2020-04-30 RX ADMIN — METHYLPREDNISOLONE SODIUM SUCCINATE SCH MG: 40 INJECTION, POWDER, FOR SOLUTION INTRAMUSCULAR; INTRAVENOUS at 18:59

## 2020-04-30 NOTE — NUR
CM/SS:  Visited with pt as to plan for discharge and to discuss in patient rehab



Plan:  Pt will go to Inpatient Rehab on [address], May 3rd. 



Summary:  Pt reports that he is still pretty weak.  Pt reports his son will help him at home 
until he is strong enough to do things for himself.  Discussed with pt as to plan for him to 
get stronger and to go to inpatient rehab.  Pt is open to do so.  Pt would like to continue 
with [facility]. This worker will pass along the message to the rehab social 
worker.  He is ok with that at this time.  Pt is reminded that he will go to Rehab on [place] 
5/3.  Pt asked for a warm blanket and is wished well this worker.  A warm blanket was given 
to pt as well.  



Samantha Wright,  for In patient rehab is notified about pt having 
[facility] prior to his hospital admission.

## 2020-04-30 NOTE — PROGRESS NOTE
Subjective


Subjective/Events-last exam


Afebrile, no acute events. States he is feeling somewhat better, breathing is 

feeling better. He has pain in his foot, but doesn't want to take opiate 

medications due to constipation side effect.





Objective


Exam


Last Set of Vital Signs





Vital Signs








  Date Time  Temp Pulse Resp B/P (MAP) Pulse Ox O2 Delivery O2 Flow Rate FiO2


 


20 09:00  103 13 140/79 (99) 95   


 


20 07:40      Vapotherm 15.00 35


 


20 19:48 37.5       





Capillary Refill : Less Than 3 Seconds


I&O











Intake and Output 


 


 20





 00:00


 


Intake Total 510 ml


 


Output Total 3300 ml


 


Balance -2790 ml


 


 


 


Intake Oral 0 ml


 


Tube Feeding 240 ml


 


Other 270 ml


 


Output Urine Total 3300 ml








General:  Alert, No Acute Distress


Lungs:  Clear to Auscultation, Normal Air Movement


Heart:  Regular Rate, No Murmurs


Neuro:  Normal Speech


Psych/Mental Status:  Mood NL





Results/Procedures


Lab


Laboratory Tests


20 09:59: Glucometer 159H


20 14:36: Glucometer 153H


20 17:29: Glucometer 167H


20 21:14: Glucometer 239H


20 02:38: 


White Blood Count 11.0, Red Blood Count 3.20L, Hemoglobin 9.8L, Hematocrit 30L, 

Mean Corpuscular Volume 95, Mean Corpuscular Hemoglobin 31, Mean Corpuscular 

Hemoglobin Concent 32, Red Cell Distribution Width 17.1H, Platelet Count 126L, 

Mean Platelet Volume 12.0H, Neutrophils (%) (Auto) 93H, Lymphocytes (%) (Auto) 

4L, Monocytes (%) (Auto) 3, Eosinophils (%) (Auto) 0, Basophils (%) (Auto) 0, 

Neutrophils # (Auto) 10.2H, Lymphocytes # (Auto) 0.4L, Monocytes # (Auto) 0.4, 

Eosinophils # (Auto) 0.0, Basophils # (Auto) 0.0, Neutrophils % (Manual) 88, 

Lymphocytes % (Manual) 4, Monocytes % (Manual) 2, Eosinophils % (Manual) 1, Band

Neutrophils 5, Smudge Cells MOD, Anisocytosis MODERATE, Sodium Level 135, 

Potassium Level 4.4, Chloride Level 99, Carbon Dioxide Level 27, Anion Gap 9, 

Blood Urea Nitrogen 34H, Creatinine 0.78, Estimat Glomerular Filtration Rate > 

60, BUN/Creatinine Ratio 44, Glucose Level 140H, Calcium Level 8.4L, Phosphorus 

Level 2.3, Magnesium Level 2.0





Microbiology


20 Gram Stain - Final, Resulted


          


20 Sputum Culture - Preliminary, Resulted


          Staphylococcus aureus


20 Catheter Tip Culture - Final, Complete


          No growth


20 Urine Culture - Final, Complete


          NO GROWTH





Assessment/Plan


Assessment/Plan





(1) Sepsis


Status:  Resolved


Assessment & Plan:  : Poor prognosis, Patient on Sepsis IVF protocol, 

continue IV antibiotics, Dr James consulted for critical care, blood cultures 

pending


4/15: Off Levaphed today, continue to monitor blood pressure, continue IV 

antibiotics, patient is not following commands when sedation is turned off


: Prognosis remains guarded, tube feeds started today, blood pressures 

running high, restarted blood pressure medications


: Dr james managing critical care, continue IV antibiotics, possible wean 

and extubation over the weekend


 has competed antibiotics, remains on methylprednisone, possibly working 

toward weaning from vent versus transfer to LTACH


- extubated.


 transfer to floor today


Qualifiers:  


   Qualified Codes:  A41.9 - Sepsis, unspecified organism; R65.21 - Severe 

sepsis with septic shock; N17.9 - Acute kidney failure, unspecified


(2) Acute respiratory failure with hypoxia and hypercapnia


Status:  Acute


Assessment & Plan:  : Currently Intubated, Dr James managing vent


- remains on mechanical ventilation, appreciate Dr. James's management.


 extubated, appreciate Dr. James's management.





(3) Acute kidney injury superimposed on chronic kidney disease


Status:  Resolved


Assessment & Plan:  : Will monitor daily BUN/Cr





(4) NSTEMI (non-ST elevation myocardial infarction)


Status:  Resolved


Assessment & Plan:  : Cardiology consulted, appreciate recommendations, 

Recent Cath 2020: BNP elevated, patient given IV lasix, diuresing well


 likely strain related, troponin trended down





(5) Upper GI bleed


Status:  Acute


Assessment & Plan:  : Today started having blood tinged fluid from OG, 

Lovenox stopped and started on IV protonix


 hemoglobin stable





(6) CAD (coronary artery disease)


Status:  Chronic


Qualifiers:  


   Qualified Codes:  I25.10 - Atherosclerotic heart disease of native coronary 

artery without angina pectoris


(7) Hypertension


Status:  Chronic


Assessment & Plan:  : Currently hypotensive and on Levophed for Sepsis


4/15: Off pressors


: Restarted Lisinopril today


Qualifiers:  


   Qualified Codes:  I10 - Essential (primary) hypertension


(8) Diabetes type 2, uncontrolled


Status:  Chronic


Qualifiers:  


   Qualified Codes:  E11.65 - Type 2 diabetes mellitus with hyperglycemia


(9) COPD (chronic obstructive pulmonary disease)


Status:  Chronic


Qualifiers:  


   Qualified Codes:  J44.9 - Chronic obstructive pulmonary disease, unspecified


(10) Pneumonia


Status:  Acute


Assessment & Plan:  s/p full course of antibiotics


Qualifiers:  


   Qualified Codes:  J18.9 - Pneumonia, unspecified organism


(11) Atrial flutter


Assessment & Plan:  s/p cardioversion, appreciate Cardiology recommendations. On

Eliquis.





(12) Hyponatremia


Status:  Resolved


(13) Alteration in nutrition


Status:  Acute


Assessment & Plan:  Received tube feedings while intubated


 speech therapy recommended pureed diet and nectar liquids yesterday, will 

continue to reassess.





(14) DVT prophylaxis


Status:  Acute


Assessment & Plan:  : On Lovenox, stopped today due to GI bleeding


4/15: Continues off Lovenox, SCDs


 now on Eliquis








Clinical Quality Measures


DVT/VTE Risk/Contraindication:


Risk Factor Score Per Nursin


RFS Level Per Nursing on Admit:  4+=Very High











MARIELY DELANEY MD             2020 10:00

## 2020-04-30 NOTE — DIAGNOSTIC IMAGING REPORT
INDICATION: Altered mental status and acute kidney insufficiency.



Comparison made with prior examination of 04/29/2020.



FINDINGS: Heart size is normal. Some left basilar atelectasis and

pneumonitis and small left pleural effusion. No pneumothorax.

Mediastinum is unremarkable.



IMPRESSION: Left basilar atelectasis and/or pneumonitis and small

left pleural effusion.



Dictated by: 



  Dictated on workstation # [id number]

## 2020-04-30 NOTE — CARDIOLOGY PROGRESS NOTE
Subjective


Date Seen by Provider:  2020


Time Seen by Provider:  10:00


Subjective/Events-last exam


Patient is sitting in a chair, eating breakfast, denied any chest pain, still 

having some shortness of breath


Review of Systems


General:  No Chills, No Night Sweats, No Fatigue, No Malaise, No Appetite, No 

Other


HEENT:  No Head Aches, No Visual Changes, No Eye Pain, No Ear Pain, No 

Dysphasia, No Sinus Congestion, No Post Nasal Drip, No Sore Throat, No Other


Pulmonary:  Dyspnea; No Cough, No Pleuritic Chest Pain, No Other


Cardiovascular:  No: Chest Pain, Palpitations, Orthopnea, Paroxysmal Noc. 

Dyspnea, Lt Headedness, Other





Objective-Cardiology


Exam


Last Set of Vital Signs





Vital Signs








 20





 19:48 07:40 09:00


 


Temp 37.5  


 


Pulse   103


 


Resp   13


 


B/P (MAP)   140/79 (99)


 


Pulse Ox   95


 


O2 Delivery  Vapotherm 


 


O2 Flow Rate  15.00 


 


FiO2  35 





Capillary Refill : Less Than 3 Seconds


I&O











Intake and Output 


 


 20





 00:00


 


Intake Total 510 ml


 


Output Total 3300 ml


 


Balance -2790 ml


 


 


 


Intake Oral 0 ml


 


Tube Feeding 240 ml


 


Other 270 ml


 


Output Urine Total 3300 ml








General:  Alert, No Acute Distress


HEENT:  Atraumatic, PERRLA, Mucous Memb Moist/Pink


Neck:  Supple


Lungs:  Clear to Auscultation, Normal Air Movement


Heart:  Regular Rate, Normal S1, Normal S2, No Murmurs


Abdomen:  Normal Bowel Sounds, Other (mildly distended)


Extremities:  No Clubbing, No Edema


Skin:  No Rashes


Neuro:  Normal Speech


Psych/Mental Status:  Mental Status NL, Mood NL





Results


Lab


Laboratory Tests


20 02:38














A/P-Cardiology


Admission Diagnosis


Acute respiratory failure


Paroxysmal atrial flutter


Coronary artery disease


Peripheral arterial disease





Assessment/Plan


Status post acute respiratory failure, improving, currently off the ventilator, 

still on Vapotherm and doing better.  





Paroxysmal atrial flutter with rapid ventricular response, underwent card

ioversion, currently in sinus rhythm on amiodarone and Eliquis





Status post acute renal failure, improved, continue to monitor renal function





Coronary artery disease, type II myocardial infarction due to respiratory 

failure,history of cardiac catheterization done in 2017 showing mild to 

moderate coronary artery disease nonobstructive disease, stress test done in 

2020 by Dr. Dai reported as no ischemia or infarction





Echo in 2020 showing ejection fraction 45 percent, grade 1 diastolic 

dysfunction, mild-to-moderate tricuspid regurgitation, PA pressure 29 mmHg





History of hypertension, continue to monitor blood pressure 





History of hyperlipidemia, monitor lipids 





Peripheral arterial disease, had peripheral intervention in 2019 by Dr. Dai with balloon angioplasty to the right anterior tibial, posterior tibial 

and stenting of the right SFA with improvement of his MONO and TBI, had partial 

right foot amputation, followed by Dr. Dai





History of diabetes mellitus, managed by primary care physician





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





Clinical Quality Measures


DVT/VTE Risk/Contraindication:


Risk Factor Score Per Nursin


RFS Level Per Nursing on Admit:  4+=Very High











PREM RUELAS MD              2020 10:01

## 2020-04-30 NOTE — PULMONARY PROGRESS NOTE
Subjective


Time Seen by a Provider:  05:04


Subjective/Events-last exam


Pt is doing well off vent.





Sepsis Event


Evaluation


Height, Weight, BMI


Height: 5'8.50"


Weight: 206lbs. 0.8oz. 93.762448fs; 30.95 BMI


Method:Stated





Exam


Exam





Vital Signs








  Date Time  Temp Pulse Resp B/P (MAP) Pulse Ox O2 Delivery O2 Flow Rate FiO2


 


4/30/20 04:00  88 15 133/65 (87) 93 Vapotherm 25.00 





       35.00 


 


4/30/20 04:00     92 Vapotherm 20.00 35


 


4/30/20 03:00  89 18 127/70 (89) 95 Vapotherm 25.00 





       35.00 


 


4/30/20 02:24     91 Vapotherm 20.00 35


 


4/30/20 02:00  86 28 128/68 (88) 93 Vapotherm 25.00 





       35.00 


 


4/30/20 01:00  90      


 


4/30/20 01:00  86 28 117/69 (85) 93 Vapotherm 25.00 





       35.00 


 


4/30/20 00:00     92 Vapotherm 20.00 35


 


4/30/20 00:00  99 25 126/68 (87) 94 Vapotherm 25.00 





       35.00 


 


4/29/20 23:00  96 28 117/69 (85) 94 Vapotherm 25.00 





       35.00 


 


4/29/20 22:00  95 26 112/69 (83) 93 Vapotherm 25.00 





       35.00 


 


4/29/20 21:58     94 Vapotherm 20.00 35


 


4/29/20 21:00  111 23 136/75 (95) 92 Vapotherm 25.00 





       35.00 


 


4/29/20 20:00     92 Vapotherm 20.00 35


 


4/29/20 20:00  102 27 132/78 (96) 92 Vapotherm 25.00 





       35.00 


 


4/29/20 19:48 37.5       


 


4/29/20 19:00  111      


 


4/29/20 19:00  114 27 149/81 (103) 92 Vapotherm 25.00 





       35.00 


 


4/29/20 18:21     94 Vapotherm 20.00 35


 


4/29/20 18:00  99 28 138/89 (105) 91 Vapotherm 25.00 





       35.00 


 


4/29/20 17:00  108 13 111/108 (109) 91 Vapotherm 25.00 





       35.00 


 


4/29/20 16:00 37.3       


 


4/29/20 16:00  95 27 176/59 (98) 92 Vapotherm 25.00 





       35.00 


 


4/29/20 16:00     93 Vapotherm 20.00 35


 


4/29/20 15:20      Vapotherm 20.00 





       35.00 


 


4/29/20 15:14     94 Vapotherm 25.00 35


 


4/29/20 14:00  93 23 163/62 (95) 94 Vapotherm 25.00 





       35.00 


 


4/29/20 13:00  97 21 168/62 (97) 93 Vapotherm 25.00 





       35.00 


 


4/29/20 12:48  86      


 


4/29/20 12:00 37.4       


 


4/29/20 12:00     93 Vapotherm 25.00 35


 


4/29/20 11:18     92 Vapotherm 25.00 35


 


4/29/20 10:00  77 19 141/49 (79) 93 Vapotherm 25.00 





       35.00 


 


4/29/20 09:00  71 14 151/46 (81) 93 Vapotherm 25.00 





       35.00 


 


4/29/20 08:07     93 Vapotherm 25.00 35


 


4/29/20 08:07      Vapotherm 25.00 





       35.00 


 


4/29/20 08:00 36.8       


 


4/29/20 08:00  72 14 119/38 (65) 91 Mechanical Ventilator 35.00 


 


4/29/20 07:45     95 NIV Bilevel  35


 


4/29/20 07:00  77 17 141/46 (77) 95 Mechanical Ventilator 35.00 


 


4/29/20 06:57  78  153/51    


 


4/29/20 06:53  84 23  95  35.00 


 


4/29/20 06:49  84 23  95  35.00 


 


4/29/20 06:45  86      


 


4/29/20 06:00  82 23 162/53 (89) 94 Mechanical Ventilator 35.00 














I & O 


 


 4/30/20





 07:00


 


Intake Total 0 ml


 


Output Total 3075 ml


 


Balance -3075 ml








Height & Weight


Height: 5'8.50"


Weight: 206lbs. 0.8oz. 93.691670we; 30.95 BMI


Method:Stated


General Appearance:  No Apparent Distress, WD/WN, Chronically ill, Obese


HEENT:  TMs Normal, Other (Pupils equal/reactive 3 mm bilaterally)


Respiratory:  No Accessory Muscle Use, No Respiratory Distress, Decreased Breath

Sounds


Cardiovascular:  Regular Rate, Rhythm


Capillary Refill:  Less Than 3 Seconds


Gastrointestinal:  non tender, soft


Extremity:  Normal Inspection, No Pedal Edema


Neurologic/Psychiatric:  Other (sedated)


Skin:  Normal Color, Warm/Dry





Results


Lab


Laboratory Tests


4/29/20 02:50








4/30/20 02:38











Assessment/Plan


Assessment/Plan


Acute respiratory failure 


   -Pt was extubated yesterday 


   -Dowing well on Vapotherm 


      -Currently 35 %


   -Lasix 40mg IV X 1 


   - COVID is negative 


   -bilateral dopplers -- negative


   - fentany.  precedex 


   -Hold TF for weaning 


   -Abx have completed. 


Debility


   -PT/OT 


Aflutter


   -Cardiology s/p ZAMZAM with cardioversion 


   -On Eliquis 


CHF EF 45% with grade 1 diastolic dysfunction 


   -Cardiology following


sepsis with PICC line


   -Pan cultures pending


Pneumonia with possible aspiration 


   -Pan cultures 


   -MRSA swab - neg 


   -COVID is negative 


   -Influenza is negative 


   -RVP is negative 


UDS is positive for methamphetamine 





NSTEMI


   -Cardiology following











GUILLERMINA MERA DO              Apr 30, 2020 05:07

## 2020-04-30 NOTE — NUR
RD ASSESSMENT 



PMHx: HTN; PAD; DM; COPD; CA(prostate); hypercholesterolemia



PT INTERACTION: Pt was awake and pleasant during nutrition follow-up. Pt states current 
appetite since extubation is pretty good. Note PO intake 25% x1meal, per chart review. Pt 
states no issues with nausea, vomiting, constipation, or diarrhea. Note no BM has been 
recorded, and pt not currently on bowel regimen per chart review. Pt states DM control prior 
to admit was pretty good. Note recent HbA1c of 6.4, taken 10/23/19, per chart review. 



ABNORMAL NUTRITION-RELATED LAB VALUES

LOW: Ca 8.4

HIGH: BUN 34; glu 140



Est. kcal needs: 3545-1777 kcal | 15-18 kcal/kg  

Est. Pro needs:   g Pro | 0.8-1.0 g Pro/kg 



PES STATEMENT: Inadequate oral intake (NI-2.1) related to loss of appetite as evidenced by 
pt interview | PO intake 25% x1meal



INTERVENTION:  

Continue with current diet order of CHO 75g/m 0snack diet. 

Pt may benefit from further CHO restriction if blood glucose levels become elevated. 

Add Glucerna (vary) to meals TID, for increased kcal intake. Provides 220 kcal and 10 g Pro 
per serving. 

Will continue to follow and reassess as pt needs, intake, and status change. 



MONITOR/EVALUATE:  

PO Intake; Plan of Care; Hydration Status; Weight Status; Lab Values 





JAG Jesus, MS, RD, LD

## 2020-04-30 NOTE — NUR
This RN took over PT care at this time. Pt transferred to med surg 417 in recliner at this 
time with belonging. Pt alert and orientated and verbalizes no other needs at this time

## 2020-04-30 NOTE — OCCUPATIONAL THERAPY EVAL
OT Evaluation-General/PLF


Medical Diagnosis


Admission Date


Apr 12, 2020 at 21:51


Medical Diagnosis:  Respiratory failure


Onset Date:  Apr 13, 2020





Therapy Diagnosis


Therapy Diagnosis:  Weakness, Decreased ADL skills





Height/Weight


Height (Feet):  5


Height (Inches):  8.50


Weight (Pounds):  206


Weight (Ounces):  0.8





Precautions


Precautions/Isolations:  Fall Prevention, Standard Precautions, Pressure Ulcer


Safety Interventions:  Bed Exit Alarm, Reorient-PRN





Weight Bear Status


Weight Bearing Restriction:  Weight Bearing/Tolerated





Referral


Physician:  Erika


Referral Reason:  Activity Tolerance, Self Care, Evaluation/Treatment, 

Strengthening/ROM





Medical History


Pertinent Medical History:  Alcoholism, CAD, COPD, CVA, DM, HTN, Smoking


Current History


Pt. on ventilator for acute respiratory failure with hypoxia and hypercapnia.  

Extubated on 4-29


Reviewed History:  Yes





Social History


Home:  Single Level


Current Living Status:  Alone


Pt. is unable to state if he has stairs, and states, "I can get a deck."





ADL-Prior Level of Function


SCALE: Activities may be completed with or without assistive devices.





6-Indepedent-patient completes the activity by him/herself with no assistance 

from a helper.


5-Set-up or Clean-up Assistance-helper sets up or cleans up; patient completes 

activity. [place] assists only prior to or  


    following the activity.


4-Supervision or Touching Assistance-helper provides verbal cues and/or 

touching/steadying and/or contact guard assistance as patient completes 

activity. Assistance may be provided   


    throughout the activity or intermittently.


3-Partial/Moderate Assistance-helper does LESS THAN HALF the effort. [place] 

lifts, holds or supports trunk or limbs, but provides less than half the effort.


2-Substantial/Maximal Assistance-helper does MORE THAN HALF the effort. [place] 

lifts or holds trunk or limbs and provides more than half the effort.


0-Jzlphtccn-vqxdee does ALL the effort. Patient does none of the effort to 

complete the activity. Or, the assistance of 2 or more helpers is required for 

the patient to complete the  


    activity.


If activity was not attempted, code reason:


7-Patient Refused.


9-Not Applicable-not attempted and the patient did not perform the activity 

before the current illness, exacerbation or injury.


10-Not Attempted due to Environmental Limitations-(lack of equipment, weather 

restraints, etc.).


88-Not Attempted due to Medical Conditions or Safety Concerns.


ADL PLOF Comments


Pt. states "yes" when OT asks if he was able to bathe/dress self previous to 

this hospitalization.


Self Care:  Unknown


Functional Cognition:  Unknown


DME/Equipment Comments


Pt. is unable to state what equipment he has if any.


Drive Self:  Yes





OT Current Status


Subjective


Pt. does not report pain.





Appearance


Pt. up in chair when therapy enters room.  Pt. would like to go back to bed.





Mental Status/Objective


Patient Orientation:  Unable to Assess


Attachments:  Santacruz Catheter, Oxygen





Current


Upper Extremity ROM


Very limited at shoulders, elbows, wrists.


Upper Extremity Coordination


impaired secondary to weakness


Upper Extremity Strength


2/5 bilateral UE





ADL-Treatment


OT/PT completed partial co-treat with PT for evaluation.  Pt. up in chair but 

agrees to go back to bed.  Max x 2 for sit-stand and pivot to bed.  Max x 2 for 

sit-supine and bed mobility.  PT/OT both provided skilled assistance for UE/LE 

positioning and transfers.  PT facilitated pt. to roll to right side.  Max x 2 

for this and pillow positioned behind him.  OT applied cream to rear nicko area. 

PT left room. Elevated UE.  Provided pt. with yellow hand sponge to work on 

strengthening and edema control.  Pt. began squeezing sponge at bed level.  OT 

provided nilam cup, plate guard, and foam built up handles for utensils, for 

easier eating when food came.  Let nursing know.  Pt. requested chocolate milk 

and so nursing obtained for him.  OT put this in nilam cup, and pt. able to 

hold and drink with head elevated in bed.  Pt. requested to call his son.  

Unable to call out so nursing called son, who then called pt.  OT positioned 

phone so that pt. able to hold at bed level.  All needs met.





Education


OT Patient Education:  Correct positioning, Exercise program, Modified ADL 

techniques, Progress toward Goal/Update tx plan, Purpose of tx/functional 

activities, Reviewed precautions, Rehab process, Transfer techniques


Teaching Recipient:  Patient


Teaching Methods:  Demonstration, Discussion


Response to Teaching:  Verbalize Understanding, Return Demonstration





OT Short Term Goals


Short Term Goals


Time Frame:  May 7, 2020


Eating:  3


Oral hygiene:  3


Toileting hygiene:  3


Upper body dressing:  3


Lower body dressing:  3


Putting on/taking off footwear:  3





OT Long Term Goals


Long Term Goals


Time Frame:  May 21, 2020


Eating (QC):  5


Oral Hygiene (QC):  5


Toileting Hygiene (QC):  4


Shower/Bathe Self (QC):  3


Upper Body Dressing (QC):  4


Lower Body Dressing (QC):  4


On/Off Footwear (QC):  4


Additional Goals:  1-Demonstrate ADL Tasks, 2-Verbalize Understanding, 3-

ImproveStrength/Jeny


1=Demonstrate adherence to instructed precautions during ADL tasks.


2=Patient will verbalize/demonstrate understanding of assistive 

devices/modifications for ADL.


3=Patient will improve strength/tolerance for activity to enable patient to 

perform ADL's.





OT Education/Plan


Problem List/Assessment


Assessment:  Decreased Activ Tolerance, Decreased UE Strength, Dependent 

Transfers, Impaired Bed Mobility, Impaired Cognition, Impaired Coordination, 

Impaired Funct Balance, Impaired I ADL's, Impaired Self-Care Skills, Restricted 

Funct UE ROM





Discharge Recommendations


Plan/Recommendations:  Continue POC


Therapy Discharge Recommendati:  24 Hour Supervision, Post Acute OT


Comment


Equipment needs and discharge location to be determined.





Treatment Plan/Plan of Care


Treatment,Training & Education:  Yes


Patient would benefit from OT for education, treatment and training to promote 

independence in ADL's, mobility, safety and/or upper extremity function for 

ADL's.


Plan of Care:  ADL Retraining, Functional Mobility, UE Funct Exercise/Act


Treatment Duration:  May 21, 2020


Frequency:  5 times per week


Estimated Hrs Per Day:  .25 hour per day


Agreement:  Yes


Rehab Potential:  Fair





Time/GCodes


Start Time:  10:50


Stop Time:  11:25


Total Time Billed (hr/min):  35


Billed Treatment Time


9417-5372 1, EVH x 15minutes, co-treatment with PT. Untimed


4282-3298 ADL x 20minutes, OT only











SHAYNE FLORES OT           Apr 30, 2020 15:43

## 2020-04-30 NOTE — PHYSICAL THERAPY EVALUATION
PT Evaluation-General


Medical Diagnosis


Admission Date


2020 at 21:51


Medical Diagnosis:  Respiratory failure


Onset Date:  2020





Therapy Diagnosis


Therapy Diagnosis:  generalized weakness/debility





Height/Weight


Height (Feet):  5


Height (Inches):  8.50


Weight (Pounds):  206


Weight (Ounces):  0.8





Precautions


Precautions/Isolations:  Aspiration, Fall Prevention, Standard Precautions, 

Pressure Ulcer





Referral


Physician:  Erika


Reason for Referral:  Evaluation/Treatment





Medical History


Pertinent Medical History:  Alcoholism, CAD, COPD, CVA, DM, HTN, Smoking


Additional Medical History


right foot toe amputation (3/12/20)


Current History


EMS secondary to AMS per family


Reviewed History:  Yes





Social History


Home:  Single Level


Current Living Status:  Alone





Prior


Prior Level of Function


SCALE: Activities may be completed with or without assistive devices.





6-Indepedent-patient completes the activity by him/herself with no assistance 

from a helper.


5-Set-up or Clean-up Assistance-helper sets up or cleans up; patient completes 

activity. [place] assists only prior to or  


    following the activity.


4-Supervision or Touching Assistance-helper provides verbal cues and/or 

touching/steadying and/or contact guard assistance as patient completes 

activity. Assistance may be provided   


    throughout the activity or intermittently.


3-Partial/Moderate Assistance-helper does LESS THAN HALF the effort. [place] 

lifts, holds or supports trunk or limbs, but provides less than half the effort.


2-Substantial/Maximal Assistance-helper does MORE THAN HALF the effort. [place] 

lifts or holds trunk or limbs and provides more than half the effort.


6-Dqxhdjizf-jlvjcn does ALL the effort. Patient does none of the effort to 

complete the activity. Or, the assistance of 2 or more helpers is required for 

the patient to complete the  


    activity.


If activity was not attempted, code reason:


7-Patient Refused.


9-Not Applicable-not attempted and the patient did not perform the activity 

before the current illness, exacerbation or injury.


10-Not Attempted due to Environmental Limitations-(lack of equipment, weather 

restraints, etc.).


88-Not Attempted due to Medical Conditions or Safety Concerns.


Bed Mobility:  6


Transfers (B,C,W/C):  6


Gait:  6


Indoor Mobility (Ambulation):  Independent


Prior Devices Use:  None, Walker





PT Evaluation-Current


Subjective


Patient is very slow to respond but agrees to PT.





Pain





   Numeric Pain Scale:  5-Moderate Pain


   Location:  Lower


   Location Body Site:  Back


   Pain Description:  Chronic





Objective


Patient Orientation:  Person, Time, Situation


Attachments:  Oxygen (vapotherm), Santacruz Catheter





ROM/Strength


ROM Lower Extremities


bilateral LE WFL


Strength Lower Extremities


2+/5 grossly bilateral LE





Integumentary/Posture


Integumentary


refer to nursing notes


Bowel Incontinence:  Yes


Bladder Incontinence:  Santacruz Cath


Posture


trunk flexed posture





Neuromuscular


(Tone, Coordination, Reflexes)


severely diminished due to vent x several days





Sensory


Vision:  Functional


Hearing:  Functional


Sensation Right Lower Extremit:  Impaired


Sensation Left Lower Extremity:  Impaired





Transfers


Roll Left to Right (QC):  1


Lying to Sitting/Side of Bed(Q:  1


Sit to Stand (QC):  1


Chair/Bed-to-Chair Xfer(QC):  1


dependent assist with all mobility/dependent SPT bed to recliner with inability 

to utilize FWW due to severe weakness





Gait


Does the Patient Walk?:  No and Walking Goal IS indicated





Balance


Sitting Static:  Fair


Sitting Dynamic:  Fair


Standing Static:  Poor


 Standing Dynamic:  Poor





Assessment/Needs


69 y.o. male, will benefit from skilled PT to address functional strength and 

mobility to improve current LOF to safely return to home at maximum LOF.


Rehab Potential:  Fair





PT Short Term Goals


Short Term Goals


Time Frame:  May 9, 2020


Roll Left & Right:  4


Sit to lyin


Lying to sitting on side of be:  4


Sit to stand:  3


Chair/bed-to-chair transfer:  3


Toilet transfer:  3


Walk 10 feet:  3


Walk 50 feet with two turns:  3





PT Long Term Goals


Long Term Goals


PT Long Term Goals Time Frame:  May 23, 2020


Roll Left & Right (QC):  6


Sit to Lying (QC):  6


Lying-Sitting on Side/Bed(QC):  6


Sit to Stand (QC):  6


Chair/Bed-to-Chair Xfer(QC):  6


Toilet Transfer (QC):  6


Car Transfer (QC):  6


Does the Patient Walk:  Yes


Walk 10 feet (QC):  5


Walk 50ft with 2 Turns (QC):  5


Walk 150 ft (QC):  5





PT Plan


Problem List


Problem List:  Activity Tolerance, Functional Strength, Safety, Balance, Gait, 

Transfer, Bed Mobility





Treatment/Plan


Treatment Plan:  Continue Plan of Care


Treatment Plan:  Bed Mobility, Education, Functional Activity Jeny, Functional 

Strength, Gait, Safety, Therapeutic Exercise, Transfers


Treatment Duration:  May 23, 2020


Frequency:  6 times per week


Estimated Hrs Per Day:  .5 hour per day


Patient and/or Family Agrees t:  Yes





Discharge Recommendations


Therapy Discharge Recommendati:  Other, See Comments (ARU)





Time/GCodes


Time In:  825


Time Out:  901


Total Billed Treatment Time:  36


Total Billed Treatment


1 visit


EVModC 20 min


FA 16 min











DORY CASANOVA PT              2020 09:34

## 2020-05-01 VITALS — DIASTOLIC BLOOD PRESSURE: 67 MMHG | SYSTOLIC BLOOD PRESSURE: 121 MMHG

## 2020-05-01 VITALS — DIASTOLIC BLOOD PRESSURE: 58 MMHG | SYSTOLIC BLOOD PRESSURE: 115 MMHG

## 2020-05-01 VITALS — DIASTOLIC BLOOD PRESSURE: 55 MMHG | SYSTOLIC BLOOD PRESSURE: 114 MMHG

## 2020-05-01 VITALS — DIASTOLIC BLOOD PRESSURE: 59 MMHG | SYSTOLIC BLOOD PRESSURE: 126 MMHG

## 2020-05-01 VITALS — SYSTOLIC BLOOD PRESSURE: 126 MMHG | DIASTOLIC BLOOD PRESSURE: 65 MMHG

## 2020-05-01 LAB
BASOPHILS # BLD AUTO: 0 10^3/UL (ref 0–0.1)
BASOPHILS NFR BLD AUTO: 0 % (ref 0–10)
BUN/CREAT SERPL: 45
CALCIUM SERPL-MCNC: 8.3 MG/DL (ref 8.5–10.1)
CHLORIDE SERPL-SCNC: 100 MMOL/L (ref 98–107)
CO2 SERPL-SCNC: 26 MMOL/L (ref 21–32)
CREAT SERPL-MCNC: 0.76 MG/DL (ref 0.6–1.3)
EOSINOPHIL # BLD AUTO: 0 10^3/UL (ref 0–0.3)
EOSINOPHIL NFR BLD AUTO: 0 % (ref 0–10)
ERYTHROCYTE [DISTWIDTH] IN BLOOD BY AUTOMATED COUNT: 16.5 % (ref 10–14.5)
GFR SERPLBLD BASED ON 1.73 SQ M-ARVRAT: > 60 ML/MIN
GLUCOSE SERPL-MCNC: 179 MG/DL (ref 70–105)
HCT VFR BLD CALC: 31 % (ref 40–54)
HGB BLD-MCNC: 9.8 G/DL (ref 13.3–17.7)
LYMPHOCYTES # BLD AUTO: 0.4 X 10^3 (ref 1–4)
LYMPHOCYTES NFR BLD AUTO: 6 % (ref 12–44)
MAGNESIUM SERPL-MCNC: 1.8 MG/DL (ref 1.6–2.4)
MANUAL DIFFERENTIAL PERFORMED BLD QL: NO
MCH RBC QN AUTO: 30 PG (ref 25–34)
MCHC RBC AUTO-ENTMCNC: 32 G/DL (ref 32–36)
MCV RBC AUTO: 94 FL (ref 80–99)
MONOCYTES # BLD AUTO: 0.3 X 10^3 (ref 0–1)
MONOCYTES NFR BLD AUTO: 4 % (ref 0–12)
NEUTROPHILS # BLD AUTO: 7.2 X 10^3 (ref 1.8–7.8)
NEUTROPHILS NFR BLD AUTO: 91 % (ref 42–75)
PHOSPHATE SERPL-MCNC: 3.2 MG/DL (ref 2.3–4.7)
PLATELET # BLD: 115 10^3/UL (ref 130–400)
PMV BLD AUTO: 12.1 FL (ref 7.4–10.4)
POTASSIUM SERPL-SCNC: 4.3 MMOL/L (ref 3.6–5)
SODIUM SERPL-SCNC: 137 MMOL/L (ref 135–145)
WBC # BLD AUTO: 7.9 10^3/UL (ref 4.3–11)

## 2020-05-01 RX ADMIN — INSULIN ASPART SCH UNIT: 100 INJECTION, SOLUTION INTRAVENOUS; SUBCUTANEOUS at 11:00

## 2020-05-01 RX ADMIN — IPRATROPIUM BROMIDE AND ALBUTEROL SULFATE SCH ML: .5; 3 SOLUTION RESPIRATORY (INHALATION) at 18:50

## 2020-05-01 RX ADMIN — METOPROLOL TARTRATE SCH MG: 50 TABLET, FILM COATED ORAL at 09:02

## 2020-05-01 RX ADMIN — METHYLPREDNISOLONE SODIUM SUCCINATE SCH MG: 40 INJECTION, POWDER, FOR SOLUTION INTRAMUSCULAR; INTRAVENOUS at 06:18

## 2020-05-01 RX ADMIN — IPRATROPIUM BROMIDE AND ALBUTEROL SULFATE SCH ML: .5; 3 SOLUTION RESPIRATORY (INHALATION) at 22:13

## 2020-05-01 RX ADMIN — PANTOPRAZOLE SODIUM SCH MG: 40 INJECTION, POWDER, FOR SOLUTION INTRAVENOUS at 09:02

## 2020-05-01 RX ADMIN — MORPHINE SULFATE PRN MG: 4 INJECTION, SOLUTION INTRAMUSCULAR; INTRAVENOUS at 16:55

## 2020-05-01 RX ADMIN — AMIODARONE HYDROCHLORIDE SCH MG: 200 TABLET ORAL at 09:03

## 2020-05-01 RX ADMIN — MORPHINE SULFATE PRN MG: 4 INJECTION, SOLUTION INTRAMUSCULAR; INTRAVENOUS at 09:16

## 2020-05-01 RX ADMIN — APIXABAN SCH MG: 5 TABLET, FILM COATED ORAL at 05:25

## 2020-05-01 RX ADMIN — IPRATROPIUM BROMIDE AND ALBUTEROL SULFATE SCH ML: .5; 3 SOLUTION RESPIRATORY (INHALATION) at 06:19

## 2020-05-01 RX ADMIN — APIXABAN SCH MG: 5 TABLET, FILM COATED ORAL at 17:20

## 2020-05-01 RX ADMIN — INSULIN ASPART SCH UNIT: 100 INJECTION, SOLUTION INTRAVENOUS; SUBCUTANEOUS at 21:18

## 2020-05-01 RX ADMIN — IPRATROPIUM BROMIDE AND ALBUTEROL SULFATE SCH ML: .5; 3 SOLUTION RESPIRATORY (INHALATION) at 02:41

## 2020-05-01 RX ADMIN — PANTOPRAZOLE SODIUM SCH MG: 40 INJECTION, POWDER, FOR SOLUTION INTRAVENOUS at 21:18

## 2020-05-01 RX ADMIN — MORPHINE SULFATE PRN MG: 4 INJECTION, SOLUTION INTRAMUSCULAR; INTRAVENOUS at 21:52

## 2020-05-01 RX ADMIN — METOPROLOL TARTRATE SCH MG: 50 TABLET, FILM COATED ORAL at 21:18

## 2020-05-01 RX ADMIN — IPRATROPIUM BROMIDE AND ALBUTEROL SULFATE SCH ML: .5; 3 SOLUTION RESPIRATORY (INHALATION) at 15:37

## 2020-05-01 RX ADMIN — IPRATROPIUM BROMIDE AND ALBUTEROL SULFATE SCH ML: .5; 3 SOLUTION RESPIRATORY (INHALATION) at 10:17

## 2020-05-01 RX ADMIN — INSULIN ASPART SCH UNIT: 100 INJECTION, SOLUTION INTRAVENOUS; SUBCUTANEOUS at 06:17

## 2020-05-01 RX ADMIN — METHYLPREDNISOLONE SODIUM SUCCINATE SCH MG: 40 INJECTION, POWDER, FOR SOLUTION INTRAMUSCULAR; INTRAVENOUS at 09:04

## 2020-05-01 RX ADMIN — METHYLPREDNISOLONE SODIUM SUCCINATE SCH MG: 40 INJECTION, POWDER, FOR SOLUTION INTRAMUSCULAR; INTRAVENOUS at 05:24

## 2020-05-01 RX ADMIN — MORPHINE SULFATE PRN MG: 4 INJECTION, SOLUTION INTRAMUSCULAR; INTRAVENOUS at 12:57

## 2020-05-01 RX ADMIN — INSULIN ASPART SCH UNIT: 100 INJECTION, SOLUTION INTRAVENOUS; SUBCUTANEOUS at 16:01

## 2020-05-01 RX ADMIN — RISPERIDONE SCH MG: 1 TABLET, FILM COATED ORAL at 21:18

## 2020-05-01 RX ADMIN — RISPERIDONE SCH MG: 1 TABLET, FILM COATED ORAL at 09:03

## 2020-05-01 RX ADMIN — BUDESONIDE SCH MG: 0.5 SUSPENSION RESPIRATORY (INHALATION) at 18:51

## 2020-05-01 RX ADMIN — BUDESONIDE SCH MG: 0.5 SUSPENSION RESPIRATORY (INHALATION) at 06:19

## 2020-05-01 NOTE — PROGRESS NOTE
Subjective


Subjective/Events-last exam


Afebrile, feels tired but denies other concerns.





Objective


Exam


Last Set of Vital Signs





Vital Signs








  Date Time  Temp Pulse Resp B/P (MAP) Pulse Ox O2 Delivery O2 Flow Rate FiO2


 


20 12:00 37.2 72 20 115/58 (77) 95 Nasal Cannula 1.00 


 


20 08:00        35





Capillary Refill : Less Than 3 SecondsLess Than 3 Seconds


I&O











Intake and Output 


 


 20





 00:00


 


Intake Total 760 ml


 


Output Total 3830 ml


 


Balance -3070 ml


 


 


 


Intake Oral 760 ml


 


Output Urine Total 3830 ml








General:  Alert, No Acute Distress


Lungs:  Other (declined posterior lung exam due to discomfort.)


Heart:  Regular Rate, Other (systolic murmur)


Neuro:  Normal Speech


Psych/Mental Status:  Mental Status NL





Results/Procedures


Lab


Laboratory Tests


20 15:55: Glucometer 189H


20 21:30: Glucometer 128H


20 05:28: 


White Blood Count 7.9, Red Blood Count 3.24L, Hemoglobin 9.8L, Hematocrit 31L, 

Mean Corpuscular Volume 94, Mean Corpuscular Hemoglobin 30, Mean Corpuscular 

Hemoglobin Concent 32, Red Cell Distribution Width 16.5H, Platelet Count 115L, 

Mean Platelet Volume 12.1H, Neutrophils (%) (Auto) 91H, Lymphocytes (%) (Auto) 

6L, Monocytes (%) (Auto) 4, Eosinophils (%) (Auto) 0, Basophils (%) (Auto) 0, 

Neutrophils # (Auto) 7.2, Lymphocytes # (Auto) 0.4L, Monocytes # (Auto) 0.3, 

Eosinophils # (Auto) 0.0, Basophils # (Auto) 0.0, Sodium Level 137, Potassium 

Level 4.3, Chloride Level 100, Carbon Dioxide Level 26, Anion Gap 11, Blood Urea

Nitrogen 34H, Creatinine 0.76, Estimat Glomerular Filtration Rate > 60, 

BUN/Creatinine Ratio 45, Glucose Level 179H, Calcium Level 8.3L, Phosphorus 

Level 3.2, Magnesium Level 1.8


20 12:26: Glucometer 152H





Microbiology


20 Gram Stain - Final, Resulted


          


20 Sputum Culture - Preliminary, Resulted


          Usual upper respiratory claudia


          Staphylococcus aureus


20 Catheter Tip Culture - Final, Complete


          No growth


20 Urine Culture - Final, Complete


          NO GROWTH





Assessment/Plan


Assessment/Plan





(1) Sepsis


Status:  Resolved


Assessment & Plan:  : Poor prognosis, Patient on Sepsis IVF protocol, 

continue IV antibiotics, Dr James consulted for critical care, blood cultures 

pending


4/15: Off Levaphed today, continue to monitor blood pressure, continue IV 

antibiotics, patient is not following commands when sedation is turned off


: Prognosis remains guarded, tube feeds started today, blood pressures 

running high, restarted blood pressure medications


: Dr james managing critical care, continue IV antibiotics, possible wean 

and extubation over the weekend


 has competed antibiotics, remains on methylprednisone, possibly working 

toward weaning from vent versus transfer to LTACH


- extubated.


 transfer to floor today


Qualifiers:  


   Qualified Codes:  A41.9 - Sepsis, unspecified organism; R65.21 - Severe 

sepsis with septic shock; N17.9 - Acute kidney failure, unspecified


(2) Acute respiratory failure with hypoxia and hypercapnia


Status:  Acute


Assessment & Plan:  : Currently Intubated, Dr James managing vent


- remains on mechanical ventilation, appreciate Dr. James's management.


 extubated, appreciate Dr. James's management.





(3) Acute kidney injury superimposed on chronic kidney disease


Status:  Resolved


Assessment & Plan:  : Will monitor daily BUN/Cr





(4) NSTEMI (non-ST elevation myocardial infarction)


Status:  Resolved


Assessment & Plan:  : Cardiology consulted, appreciate recommendations, 

Recent Cath 2020: BNP elevated, patient given IV lasix, diuresing well


 likely strain related, troponin trended down





(5) Upper GI bleed


Status:  Acute


Assessment & Plan:  : Today started having blood tinged fluid from OG, 

Lovenox stopped and started on IV protonix


 hemoglobin stable





(6) CAD (coronary artery disease)


Status:  Chronic


Qualifiers:  


   Qualified Codes:  I25.10 - Atherosclerotic heart disease of native coronary 

artery without angina pectoris


(7) Hypertension


Status:  Chronic


Assessment & Plan:  : Currently hypotensive and on Levophed for Sepsis


4/15: Off pressors


: Restarted Lisinopril today


Qualifiers:  


   Qualified Codes:  I10 - Essential (primary) hypertension


(8) Diabetes type 2, uncontrolled


Status:  Chronic


Qualifiers:  


   Qualified Codes:  E11.65 - Type 2 diabetes mellitus with hyperglycemia


(9) COPD (chronic obstructive pulmonary disease)


Status:  Chronic


Qualifiers:  


   Qualified Codes:  J44.9 - Chronic obstructive pulmonary disease, unspecified


(10) Pneumonia


Status:  Acute


Assessment & Plan:  s/p full course of antibiotics


Qualifiers:  


   Qualified Codes:  J18.9 - Pneumonia, unspecified organism


(11) Atrial flutter


Assessment & Plan:  s/p cardioversion, appreciate Cardiology recommendations. On

Eliquis.





(12) Hyponatremia


Status:  Resolved


(13) Alteration in nutrition


Status:  Acute


Assessment & Plan:  Received tube feedings while intubated


 speech therapy recommended pureed diet and nectar liquids yesterday, will 

continue to reassess.





(14) DVT prophylaxis


Status:  Acute


Assessment & Plan:  : On Lovenox, stopped today due to GI bleeding


4/15: Continues off Lovenox, SCDs


 now on Eliquis








Clinical Quality Measures


DVT/VTE Risk/Contraindication:


Risk Factor Score Per Nursin


RFS Level Per Nursing on Admit:  4+=Very High











MARIELY DELANEY MD              May 1, 2020 15:15

## 2020-05-01 NOTE — PHYSICAL THERAPY DAILY NOTE
PT Daily Note-Current


Subjective


Pt agreed to performing bed exercises.  He asked to not get up due to foot and 

glute pain.





Pain





   Numeric Pain Scale:  5-Moderate Pain


   Location:  Right


   Location Body Site:  Foot


   Pain Description:  Ache, Dull





Mental Status


Patient Orientation:  Person, Place





Transfers


SCALE: Activities may be completed with or without assistive devices.





6-Indepedent-patient completes the activity by him/herself with no assistance 

from a helper.


5-Set-up or Clean-up Assistance-helper sets up or cleans up; patient completes 

activity. [place] assists only prior to or  


    following the activity.


4-Supervision or Touching Assistance-helper provides verbal cues and/or 

touching/steadying and/or contact guard assistance as patient completes 

activity. Assistance may be provided   


    throughout the activity or intermittently.


3-Partial/Moderate Assistance-helper does LESS THAN HALF the effort. [place] 

lifts, holds or supports trunk or limbs, but provides less than half the effort.


2-Substantial/Maximal Assistance-helper does MORE THAN HALF the effort. [place] 

lifts or holds trunk or limbs and provides more than half the effort.


8-[id number]-[id number] does ALL the effort. Patient does none of the effort to 

complete the activity. Or, the assistance of 2 or more helpers is required for 

the patient to complete the  


    activity.


If activity was not attempted, code reason:


7-Patient Refused.


9-Not Applicable-not attempted and the patient did not perform the activity 

before the current illness, exacerbation or injury.


10-Not Attempted due to Environmental Limitations-(lack of equipment, weather 

restraints, etc.).


88-Not Attempted due to Medical Conditions or Safety Concerns.





Gait Training


Does the Patient Walk?:  No and Walking Goal IS indicated


Pt stated that he walked very short distances at home using a walker.





Exercises


Supine Ex:  LE Protocol


Supine Reps:  20





Assessment


Pt participated with the leg exercises and was able to assist more with the (R) 

than (L). Pt positioned on his (L), with pillows under his (R) side.





PT Short Term Goals


Short Term Goals


Time Frame:  May 9, 2020


Roll Left & Right:  4


Sit to lyin


Lying to sitting on side of be:  4


Sit to stand:  3


Chair/bed-to-chair transfer:  3


Toilet transfer:  3


Walk 10 feet:  3


Walk 50 feet with two turns:  3





PT Long Term Goals


Long Term Goals


PT Long Term Goals Time Frame:  May 23, 2020


Roll Left & Right (QC):  6


Sit to Lying (QC):  6


Lying-Sitting on Side/Bed(QC):  6


Sit to Stand (QC):  6


Chair/Bed-to-Chair Xfer(QC):  6


Toilet Transfer (QC):  6


Car Transfer (QC):  6


Does the Patient Walk:  Yes


Walk 10 feet (QC):  5


Walk 50ft with 2 Turns (QC):  5


Walk 150 ft (QC):  5





PT Plan


Treatment/Plan


Treatment Plan:  Continue Plan of Care


Treatment Plan:  Bed Mobility, Education, Functional Activity Jeny, Functional 

Strength, Gait, Safety, Therapeutic Exercise, Transfers


Treatment Duration:  May 23, 2020


Frequency:  6 times per week


Estimated Hrs Per Day:  .5 hour per day


Patient and/or Family Agrees t:  Yes





Time/GCodes


Time In:  0846


Time Out:  0900


Total Billed Treatment Time:  14


Total Billed Treatment


1, ex (14)











TIRSO TRINIDAD PT             May 1, 2020 09:39

## 2020-05-01 NOTE — NUR
CM/SS:  Visited with pt as to his status and his going to Inpatient rehab to get stronger to 
go home. 



Plan:  Pt will be admitted to Inpatient Rehab on [place] May 3, 2020



Summary: Pt reports he is feeling better, however he is very weak.  He has been working with 
physical therapy.  Pt reports he is ready to get back home.  Pt feels as if his son and 
others want him to get stronger.  He reports he is trying however it is alot of work.  He is 
encouraged to do so.  Pt shows this worker the things Occupational Therapy has him doing.  
Pt is able to laugh when this worker shares that he is not quite at a 100 percent, maybe at 
85 percent.  Pt reports he is less than that laughing.  This worker reminds him that he will 
go to rehab on Sunday and then transition to home when he is done and have [facility] as before.  He verbalizes understanding.  Pt encouraged to do well.

## 2020-05-01 NOTE — OCCUPATIONAL THER DAILY NOTE
Noted, please find out if she needs a referral to orthopedics for the hip.      Prescription sent.      Also let her know her nerve study of the legs was normal.   OT Current Status-Daily Note


Subjective


Pt resting in bed, agrees to therapy with encouragement.





Mental Status/Objective


Attachments:  Sant[place]ruz Catheter, Oxygen





ADL-Treatment


Therapy Code Descriptions/Definitions 





Functional [place]ake Measure:


0=Not Assessed/NA        4=Minimal Assistance


1=Total Assistance        5=Supervision or Setup


2=Maximal Assistance  6=Modified Leake


3=Moderate Assistance 7=Complete IndependenceSCALE: Activities may be completed 

with or without assistive devices.





6-Indepedent-patient completes the activity by him/herself with no assistance 

from a helper.


5-Set-up or Clean-up Assistance-helper sets up or cleans up; patient completes 

activity. [place] assists only prior to or  


    following the activity.


4-Supervision or Touching Assistance-helper provides verbal cues and/or 

touching/steadying and/or contact guard assistance as patient completes 

activity. Assistance may be provided   


    throughout the activity or intermittently.


3-Partial/Moderate Assistance-helper does LESS THAN HALF the effort. [place] 

lifts, holds or supports trunk or limbs, but provides less than half the effort.


2-Substantial/Maximal Assistance-helper does MORE THAN HALF the effort. [place] 

lifts or holds trunk or limbs and provides more than half the effort.


2-[id number]-tpeluv does ALL the effort. Patient does none of the effort to 

complete the activity. Or, the assistance of 2 or more helpers is required for 

the patient to complete the  


    activity.


If activity was not attempted, code reason:


7-Patient Refused.


9-Not Applicable-not attempted and the patient did not perform the activity 

before the current illness, exacerbation or injury.


10-Not Attempted due to Environmental Limitations-(lack of equipment, weather 

restraints, etc.).


88-Not Attempted due to Medical Conditions or Safety Concerns.





Other Treatment


Pt declined to perform OOB activity, stating he is finally comfortable in bed. 

Pt agrees to UE exercises. Pt performed bilateral UE AAROM exercises x10 reps in

all planes to increase ROM and strength. Pt demonstrates bilateral weakness and 

fatigues quickly with activity. Bilateral hand  exercise with mild 

resistance therapy foam to increase  strength needed for functional task 

completion. Pt declined to perform any ADL tasks at this time. Pt resting in bed

with needs met after session.





OT Short Term Goals


Short Term Goals


Time Frame:  May 7, 2020


Eating:  3


Oral hygiene:  3


Toileting hygiene:  3


Upper body dressing:  3


Lower body dressing:  3


Putting on/taking off footwear:  3





OT Long Term Goals


Long Term Goals


Time Frame:  May 21, 2020


Eating (QC):  5


Oral Hygiene (QC):  5


Toileting Hygiene (QC):  4


Shower/Bathe Self (QC):  3


Upper Body Dressing (QC):  4


Lower Body Dressing (QC):  4


On/Off Footwear (QC):  4


Additional Goals:  1-Demonstrate ADL Tasks, 2-Verbalize Understanding, 3-

ImproveStrength/Jeny


1=Demonstrate adherence to instructed precautions during ADL tasks.


2=Patient will verbalize/demonstrate understanding of assistive devices/modif

ications for ADL.


3=Patient will improve strength/tolerance for activity to enable patient to 

perform ADL's.





OT Education/Plan


Discharge Recommendations


Plan/Recommendations:  Continue POC





Treatment Plan/Plan of Care


Patient would benefit from OT for education, treatment and training to promote 

independence in ADL's, mobility, safety and/or upper extremity function for 

ADL's.


Plan of Care:  ADL Retraining, Functional Mobility, UE Funct Exercise/Act


Treatment Duration:  May 21, 2020


Frequency:  5 times per week


Estimated Hrs Per Day:  .25 hour per day


Agreement:  Yes


Rehab Potential:  Fair





Time/GCodes


Start Time:  11:26


Stop Time:  11:38


Total Time Billed (hr/min):  12


Billed Treatment Time


1 visit, EX(12minutes)











BARBRA JACKSON OT             May 1, 2020 11:48

## 2020-05-01 NOTE — DIAGNOSTIC IMAGING REPORT
CHEST 1 VIEW, AP/PA ONLY



Indication: Pneumonia



Comparison: 04/30/2020



Findings:

Stable right IJ central venous catheter. Improvement in left

basilar pulmonary opacities. Potential small left pleural

effusion persists. No pneumothorax. Stable cardiomediastinal

silhouette.



Impression: 

1. Improvement in left basilar pulmonary opacities with

persistent small left pleural effusion.



Dictated by: 



  Dictated on workstation # [id number]

## 2020-05-01 NOTE — CARDIOLOGY PROGRESS NOTE
Cardiology SOAP Progress Note


Subjective:


Mild shortness of breath.





Objective:


I&O/Vital Signs











 5/3/20 5/3/20 5/3/20 5/3/20





 08:00 08:00 12:00 13:44


 


Temp 36.6  36.2 


 


Pulse 89  80 


 


Resp 22  20 


 


B/P (MAP) 184/84 (117)  132/67 (88) 


 


Pulse Ox 98  97 92


 


O2 Delivery Nasal Cannula Nasal Cannula Nasal Cannula Nasal Cannula


 


O2 Flow Rate 1.00 1.00 1.00 1.00


 


    





 5/3/20   





 16:00   


 


Temp 37.0   


 


Pulse 83   


 


Resp 18   


 


B/P (MAP) 146/65 (92)   


 


Pulse Ox 96   


 


O2 Delivery Nasal Cannula   


 


O2 Flow Rate 1.00   














 5/3/20





 00:00


 


Intake Total 1697.5 ml


 


Output Total 300 ml


 


Balance 1397.5 ml








Weight (Pounds):  206


Weight (Ounces):  0.8


Weight (Calculated Kilograms):  93.871419


Constitutional:  AAO x 3; No apparent distress; PERRL, well-developed, well-

nourished


Respiratory:  No accessory muscle use; chest is bilaterally symmetric, lungs 

clear to auscultation; No crackles, No rhonchi, No rales


Cardiovascular:  regular rate-rhythm; No irregularly irregular, No extra beats, 

No parasternal heave is noted; bradycardia; No tachycardia; S1 and S2, systolic 

murmur (soft BUTCH at card base)


Gastrointestional:  soft, hernia (abdominal), audible bowel sounds, other 

(mildly distended)


Extremities:  swelling (mild edema), other (R foot in dressing that wasn't 

removed); No clubbing, No cyanosis


Neurologic/Psychiatric:  no motor/sensory deficits, alert, normal mood/affect, 

oriented x 3


Skin:  normal color; No rash on exposed areas, No ulcerations on exposed areas; 

other (dressing to right foot, D&I)





Results/Procedures:


Labs


Laboratory Tests


5/2/20 21:15: Glucometer 97


5/3/20 05:20: 


White Blood Count 10.2, Red Blood Count 3.23L, Hemoglobin 9.8L, Hematocrit 30L, 

Mean Corpuscular Volume 94, Mean Corpuscular Hemoglobin 30, Mean Corpuscular 

Hemoglobin Concent 32, Red Cell Distribution Width 16.1H, Platelet Count 100L, 

Mean Platelet Volume 11.8H, Neutrophils (%) (Auto) 81H, Lymphocytes (%) (Auto) 

14, Monocytes (%) (Auto) 4, Eosinophils (%) (Auto) 1, Basophils (%) (Auto) 0, 

Neutrophils # (Auto) 8.2H, Lymphocytes # (Auto) 1.4, Monocytes # (Auto) 0.5, 

Eosinophils # (Auto) 0.1, Basophils # (Auto) 0.0, Sodium Level 137, Potassium 

Level 3.6, Chloride Level 101, Carbon Dioxide Level 26, Anion Gap 10, Blood Urea

Nitrogen 24H, Creatinine 0.65, Estimat Glomerular Filtration Rate > 60, 

BUN/Creatinine Ratio 37, Glucose Level 49*L, Calcium Level 8.1L, Phosphorus 

Level 2.7, Magnesium Level 1.7


5/3/20 06:02: Glucometer 56*L


5/3/20 06:20: Glucometer 83


5/3/20 06:49: Glucometer 107


5/3/20 11:27: Glucometer 186H


5/3/20 16:16: Glucometer 347H





Microbiology


4/29/20 Gram Stain - Final, Complete


          


4/29/20 Sputum Culture - Final, Complete


          Usual upper respiratory claudia


          Staphylococcus aureus


4/13/20 Catheter Tip Culture - Final, Complete


          No growth


4/12/20 Urine Culture - Final, Complete


          NO GROWTH








A/P:


Assessment/Dx:


Acute resp failure and shock of undetermined etiology;  Resolved.  Improving 

gradually.





Sinus bradycardia.  Low dose metoprolol.





Atrial flutter with 2-1 AV block ; status post cardioversion earlier during the 

admission..  Currently in sinus rhythm.  Continue amiodarone and Eliquis.





Mild acute on chronic diastolic congestive heart failure.  Agree with IV 

diuretics.





Cannot exclude pulmonary embolism. Was started on treatment dose Lovenox at 

admission, but being stopped on 4/15/20 due to falling H & H and falling 

platelet count.  However due to atrial fibrillation/atrial flutter, the patient 

is started on Eliquis.





Acute renal failure (AYAH-3) likely due to shock - resolved





Mild troponin elevation, likely type-2 MI due to reasons noted above





Drug test (+) for Meth on 4-





CAD. Card cath of Jan 2017 showed moderate, nonobstructive disease and LVEF 55% 

and elevated LVEDP. MPI of 2/27/20 by Dr Dai showed no ischemia or infarction

and normal LV function





Echo on 4/14/20: LVEF 45%, grade 1 fajardo dysfunction, mild to mod TR, RVSP 29 

mmHg





Chronic tobacco use (smokes cigarettes according to previous records)





H/o DM II





H/o Htn





H/o Hyperlipidemia





PAD.  Peripheral angiogram and intervention on 8/22/2019 by Dr Dai with PTA 

of the right AT, PT and stenting of the right SFA. Significant improvement of 

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


Plan:








* Complex management


* Atrial flutter with 2-1 AV block; no response with IV amiodarone and Cardizem.

   Informed consent was taken from the son for transesophageal echocardiogram 

  assisted cardioversion.  Risk of damage to the esophagus and stroke was 

  discussed.  The son accepted and gave informed consent.  Transesophageal 

  echocardiogram was done on 4/20/2020 which showed no left atrium or left 

  atrial appendage thrombus.  Cardioversion was performed with 200 J which 

  converted the patient to sinus rhythm.


* Patient continues to be in sinus rhythm since cardioversion.








Thank you for your consultation. Please call me if you have any questions.








WOODY Dai MD, FACP, FACC, FSCAI, FHRS, CCDS


Interventional Cardiology


Cardiac Electrophysiology


Vascular Medicine and Endovascular Interventions











ELISE DAI MD              May 1, 2020 19:12

## 2020-05-02 VITALS — DIASTOLIC BLOOD PRESSURE: 71 MMHG | SYSTOLIC BLOOD PRESSURE: 133 MMHG

## 2020-05-02 VITALS — SYSTOLIC BLOOD PRESSURE: 128 MMHG | DIASTOLIC BLOOD PRESSURE: 65 MMHG

## 2020-05-02 VITALS — SYSTOLIC BLOOD PRESSURE: 108 MMHG | DIASTOLIC BLOOD PRESSURE: 59 MMHG

## 2020-05-02 VITALS — SYSTOLIC BLOOD PRESSURE: 105 MMHG | DIASTOLIC BLOOD PRESSURE: 68 MMHG

## 2020-05-02 VITALS — DIASTOLIC BLOOD PRESSURE: 63 MMHG | SYSTOLIC BLOOD PRESSURE: 133 MMHG

## 2020-05-02 VITALS — DIASTOLIC BLOOD PRESSURE: 69 MMHG | SYSTOLIC BLOOD PRESSURE: 145 MMHG

## 2020-05-02 LAB
BASOPHILS # BLD AUTO: 0 10^3/UL (ref 0–0.1)
BASOPHILS NFR BLD AUTO: 0 % (ref 0–10)
BUN/CREAT SERPL: 39
CALCIUM SERPL-MCNC: 8.2 MG/DL (ref 8.5–10.1)
CHLORIDE SERPL-SCNC: 100 MMOL/L (ref 98–107)
CO2 SERPL-SCNC: 25 MMOL/L (ref 21–32)
CREAT SERPL-MCNC: 0.74 MG/DL (ref 0.6–1.3)
EOSINOPHIL # BLD AUTO: 0 10^3/UL (ref 0–0.3)
EOSINOPHIL NFR BLD AUTO: 0 % (ref 0–10)
ERYTHROCYTE [DISTWIDTH] IN BLOOD BY AUTOMATED COUNT: 16.4 % (ref 10–14.5)
GFR SERPLBLD BASED ON 1.73 SQ M-ARVRAT: > 60 ML/MIN
GLUCOSE SERPL-MCNC: 137 MG/DL (ref 70–105)
HCT VFR BLD CALC: 31 % (ref 40–54)
HGB BLD-MCNC: 9.9 G/DL (ref 13.3–17.7)
LYMPHOCYTES # BLD AUTO: 1 X 10^3 (ref 1–4)
LYMPHOCYTES NFR BLD AUTO: 12 % (ref 12–44)
MAGNESIUM SERPL-MCNC: 1.7 MG/DL (ref 1.6–2.4)
MANUAL DIFFERENTIAL PERFORMED BLD QL: NO
MCH RBC QN AUTO: 30 PG (ref 25–34)
MCHC RBC AUTO-ENTMCNC: 32 G/DL (ref 32–36)
MCV RBC AUTO: 94 FL (ref 80–99)
MONOCYTES # BLD AUTO: 0.4 X 10^3 (ref 0–1)
MONOCYTES NFR BLD AUTO: 5 % (ref 0–12)
NEUTROPHILS # BLD AUTO: 7.3 X 10^3 (ref 1.8–7.8)
NEUTROPHILS NFR BLD AUTO: 83 % (ref 42–75)
PHOSPHATE SERPL-MCNC: 3 MG/DL (ref 2.3–4.7)
PLATELET # BLD: 102 10^3/UL (ref 130–400)
PMV BLD AUTO: 12.3 FL (ref 7.4–10.4)
POTASSIUM SERPL-SCNC: 4.1 MMOL/L (ref 3.6–5)
SODIUM SERPL-SCNC: 135 MMOL/L (ref 135–145)
WBC # BLD AUTO: 8.8 10^3/UL (ref 4.3–11)

## 2020-05-02 RX ADMIN — IPRATROPIUM BROMIDE AND ALBUTEROL SULFATE SCH ML: .5; 3 SOLUTION RESPIRATORY (INHALATION) at 22:49

## 2020-05-02 RX ADMIN — INSULIN ASPART SCH UNIT: 100 INJECTION, SOLUTION INTRAVENOUS; SUBCUTANEOUS at 11:05

## 2020-05-02 RX ADMIN — METOPROLOL TARTRATE SCH MG: 50 TABLET, FILM COATED ORAL at 08:29

## 2020-05-02 RX ADMIN — RISPERIDONE SCH MG: 1 TABLET, FILM COATED ORAL at 21:14

## 2020-05-02 RX ADMIN — MORPHINE SULFATE PRN MG: 4 INJECTION, SOLUTION INTRAMUSCULAR; INTRAVENOUS at 21:14

## 2020-05-02 RX ADMIN — METOPROLOL TARTRATE SCH MG: 50 TABLET, FILM COATED ORAL at 21:14

## 2020-05-02 RX ADMIN — APIXABAN SCH MG: 5 TABLET, FILM COATED ORAL at 17:14

## 2020-05-02 RX ADMIN — IPRATROPIUM BROMIDE AND ALBUTEROL SULFATE SCH ML: .5; 3 SOLUTION RESPIRATORY (INHALATION) at 19:45

## 2020-05-02 RX ADMIN — AMIODARONE HYDROCHLORIDE SCH MG: 200 TABLET ORAL at 08:29

## 2020-05-02 RX ADMIN — METHYLPREDNISOLONE SODIUM SUCCINATE SCH MG: 40 INJECTION, POWDER, FOR SOLUTION INTRAMUSCULAR; INTRAVENOUS at 08:29

## 2020-05-02 RX ADMIN — APIXABAN SCH MG: 5 TABLET, FILM COATED ORAL at 06:34

## 2020-05-02 RX ADMIN — IPRATROPIUM BROMIDE AND ALBUTEROL SULFATE SCH ML: .5; 3 SOLUTION RESPIRATORY (INHALATION) at 06:47

## 2020-05-02 RX ADMIN — PANTOPRAZOLE SODIUM SCH MG: 40 INJECTION, POWDER, FOR SOLUTION INTRAVENOUS at 21:13

## 2020-05-02 RX ADMIN — MORPHINE SULFATE PRN MG: 4 INJECTION, SOLUTION INTRAMUSCULAR; INTRAVENOUS at 03:55

## 2020-05-02 RX ADMIN — PANTOPRAZOLE SODIUM SCH MG: 40 INJECTION, POWDER, FOR SOLUTION INTRAVENOUS at 08:29

## 2020-05-02 RX ADMIN — IPRATROPIUM BROMIDE AND ALBUTEROL SULFATE SCH ML: .5; 3 SOLUTION RESPIRATORY (INHALATION) at 10:30

## 2020-05-02 RX ADMIN — INSULIN ASPART SCH UNIT: 100 INJECTION, SOLUTION INTRAVENOUS; SUBCUTANEOUS at 06:33

## 2020-05-02 RX ADMIN — INSULIN ASPART SCH UNIT: 100 INJECTION, SOLUTION INTRAVENOUS; SUBCUTANEOUS at 21:28

## 2020-05-02 RX ADMIN — BUDESONIDE SCH MG: 0.5 SUSPENSION RESPIRATORY (INHALATION) at 19:46

## 2020-05-02 RX ADMIN — VANCOMYCIN HYDROCHLORIDE SCH MLS/HR: 500 INJECTION, POWDER, LYOPHILIZED, FOR SOLUTION INTRAVENOUS at 22:00

## 2020-05-02 RX ADMIN — INSULIN ASPART SCH UNIT: 100 INJECTION, SOLUTION INTRAVENOUS; SUBCUTANEOUS at 16:54

## 2020-05-02 RX ADMIN — RISPERIDONE SCH MG: 1 TABLET, FILM COATED ORAL at 08:29

## 2020-05-02 RX ADMIN — MORPHINE SULFATE PRN MG: 4 INJECTION, SOLUTION INTRAMUSCULAR; INTRAVENOUS at 15:22

## 2020-05-02 RX ADMIN — BUDESONIDE SCH MG: 0.5 SUSPENSION RESPIRATORY (INHALATION) at 06:48

## 2020-05-02 NOTE — NUR
PTD Vancomycin - Administer 1750mg IV x 1 Loading dose, then start 1gm every 8 hours. Trough 
ordered prior to 4th dose.

-------------------------------------------------------------------------------

Addendum: 05/03/20 at 1354 by ADALBERTO JUSTIN [facility]

-------------------------------------------------------------------------------

Trough 16.1, no changes to current Vanco regimen.

## 2020-05-02 NOTE — CARDIOLOGY PROGRESS NOTE
Cardiology SOAP Progress Note


Subjective:


Improved shortness of breath.





Objective:


I&O/Vital Signs











 5/3/20 5/3/20 5/3/20 5/3/20





 08:00 08:00 12:00 13:44


 


Temp 36.6  36.2 


 


Pulse 89  80 


 


Resp 22  20 


 


B/P (MAP) 184/84 (117)  132/67 (88) 


 


Pulse Ox 98  97 92


 


O2 Delivery Nasal Cannula Nasal Cannula Nasal Cannula Nasal Cannula


 


O2 Flow Rate 1.00 1.00 1.00 1.00


 


    





 5/3/20   





 16:00   


 


Temp 37.0   


 


Pulse 83   


 


Resp 18   


 


B/P (MAP) 146/65 (92)   


 


Pulse Ox 96   


 


O2 Delivery Nasal Cannula   


 


O2 Flow Rate 1.00   














 5/3/20





 00:00


 


Intake Total 1697.5 ml


 


Output Total 300 ml


 


Balance 1397.5 ml








Weight (Pounds):  206


Weight (Ounces):  0.8


Weight (Calculated Kilograms):  93.493542


Constitutional:  AAO x 3, apparent distress, PERRL, well-developed


Respiratory:  No accessory muscle use; chest is bilaterally symmetric, lungs 

clear to auscultation


Cardiovascular:  regular rate-rhythm, bradycardia, S1 and S2, systolic murmur 

(soft BUTCH at card base)


Gastrointestional:  soft, hernia (abdominal), audible bowel sounds, other 

(mildly distended)


Extremities:  swelling (mild edema), other (R foot in dressing that wasn't 

removed); No clubbing, No cyanosis


Neurologic/Psychiatric:  no motor/sensory deficits, alert, normal mood/affect, 

oriented x 3


Skin:  No rash on exposed areas, No ulcerations on exposed areas; other 

(dressing to right foot, D&I)





Results/Procedures:


Labs


Laboratory Tests


5/2/20 21:15: Glucometer 97


5/3/20 05:20: 


White Blood Count 10.2, Red Blood Count 3.23L, Hemoglobin 9.8L, Hematocrit 30L, 

Mean Corpuscular Volume 94, Mean Corpuscular Hemoglobin 30, Mean Corpuscular 

Hemoglobin Concent 32, Red Cell Distribution Width 16.1H, Platelet Count 100L, 

Mean Platelet Volume 11.8H, Neutrophils (%) (Auto) 81H, Lymphocytes (%) (Auto) 

14, Monocytes (%) (Auto) 4, Eosinophils (%) (Auto) 1, Basophils (%) (Auto) 0, 

Neutrophils # (Auto) 8.2H, Lymphocytes # (Auto) 1.4, Monocytes # (Auto) 0.5, 

Eosinophils # (Auto) 0.1, Basophils # (Auto) 0.0, Sodium Level 137, Potassium 

Level 3.6, Chloride Level 101, Carbon Dioxide Level 26, Anion Gap 10, Blood Urea

Nitrogen 24H, Creatinine 0.65, Estimat Glomerular Filtration Rate > 60, B

UN/Creatinine Ratio 37, Glucose Level 49*L, Calcium Level 8.1L, Phosphorus Level

2.7, Magnesium Level 1.7


5/3/20 06:02: Glucometer 56*L


5/3/20 06:20: Glucometer 83


5/3/20 06:49: Glucometer 107


5/3/20 11:27: Glucometer 186H


5/3/20 16:16: Glucometer 347H





Microbiology


4/29/20 Gram Stain - Final, Complete


          


4/29/20 Sputum Culture - Final, Complete


          Usual upper respiratory claudia


          Staphylococcus aureus


4/13/20 Catheter Tip Culture - Final, Complete


          No growth


4/12/20 Urine Culture - Final, Complete


          NO GROWTH








A/P:


Assessment/Dx:


Acute resp failure and shock of undetermined etiology; shock resolved





Sinus bradycardia.  DC Cardizem.  Holding parameters for metoprolol.  DC digoxin

as well.





Atrial flutter with 2-1 AV block ; status post cardioversion.  Currently in 

sinus rhythm.  Continue amiodarone and Eliquis.





Mild acute on chronic diastolic congestive heart failure.  Agree with IV 

diuretics.





Cannot exclude pulmonary embolism. Was started on treatment dose Lovenox at 

admission, but being stopped on 4/15/20 due to falling H & H and falling jewell

telet count.  However due to atrial fibrillation/atrial flutter, the patient is 

started on Eliquis.





Acute renal failure (AYAH-3) likely due to shock - resolved





Mild troponin elevation, likely type-2 MI due to reasons noted above





Drug test (+) for Meth on 4-





CAD. Card cath of Jan 2017 showed moderate, nonobstructive disease and LVEF 55% 

and elevated LVEDP. MPI of 2/27/20 by Dr Dai showed no ischemia or infarction

and normal LV function





Echo on 4/14/20: LVEF 45%, grade 1 fajardo dysfunction, mild to mod TR, RVSP 29 

mmHg





Chronic tobacco use (smokes cigarettes according to previous records)





H/o DM II





H/o Htn





H/o Hyperlipidemia





PAD.  Peripheral angiogram and intervention on 8/22/2019 by Dr Dai with PTA 

of the right AT, PT and stenting of the right SFA. Significant improvement of 

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


Plan:








* Complex management


* Prognosis guarded


* Atrial flutter with 2-1 AV block; no response with IV amiodarone and Cardizem.

   Informed consent was taken from the son for transesophageal echocardiogram 

  assisted cardioversion.  Risk of damage to the esophagus and stroke was 

  discussed.  The son accepted and gave informed consent.  Transesophageal 

  echocardiogram was done on 4/20/2020 which showed no left atrium or left atr

  ial appendage thrombus.  Cardioversion was performed with 200 J which 

  converted the patient to sinus rhythm.


* Patient continues to be in sinus rhythm since cardioversion.








Thank you for your consultation. Please call me if you have any questions.








WOODY Dai MD, FACP, FACC, FSCAI, FHRS, CCDS


Interventional Cardiology


Cardiac Electrophysiology


Vascular Medicine and Endovascular Interventions











ELISE DAI MD              May 2, 2020 21:06

## 2020-05-02 NOTE — PROGRESS NOTE - HOSPITALIST
Subjective


HPI/CC On Admission


Date Seen by Provider:  May 2, 2020


Time Seen by Provider:  12:00


Pradeep Fowler is a 69-year-old male with past medical history of hypertension, 

diabetes, peripheral artery disease, coronary artery disease, AAA, COPD, who 

presented with altered mental status.  He was reportedly brought in by friend.  

He had supposedly been taking some narcotics for which he been prescribed.  He 

was given a dose of Narcan and his mental status improved.  He was started on 

BiPAP in the ICU.  The morning after his admission, he remained lethargic and an

ABG showed acute hypercapnia.  He was intubated due to acute hypercapnic 

respiratory failure.  At the time of my examination he is intubated and sedated.


Subjective/Events-last exam


Patient doing much better


All he is focused on his when he can go home


He is so weak that he will require inpatient rehab or skilled nursing depending 

on his motivation


Santacruz catheter will be discontinued


Bowels are moving pretty well


Therapy was able to work with him for a little bit but weakness prevented a full

structured treatment plan.


Sputum grew MRSA so started on vancomycin


Hemoglobin 9.9


Check meds and labs


Conferred with RN





Review of Systems


General:  Fatigue


Pulmonary:  Dyspnea


Cardiovascular:  Edema


Neurological:  Weakness, Incoordination





Objective


Exam


Vital Signs





Vital Signs








  Date Time  Temp Pulse Resp B/P (MAP) Pulse Ox O2 Delivery O2 Flow Rate FiO2


 


20 08:00 37.2 86 20 133/71 (91) 97 Nasal Cannula 1.00 


 


20 08:00        35





Capillary Refill : Less Than 3 SecondsLess Than 3 Seconds


General Appearance:  No Apparent Distress, WD/WN, Chronically ill


Respiratory:  No Accessory Muscle Use, No Respiratory Distress, Decreased Breath

Sounds, Wheezing


Cardiovascular:  Regular Rate, Rhythm, No Edema, No Gallop, No JVD, No Murmur, 

Normal Peripheral Pulses


Neurologic/Psychiatric:  Alert, Oriented x3, No Motor/Sensory Deficits, 

Depressed Affect





Results/Procedures


Lab


Laboratory Tests


20 05:00








Patient resulted labs reviewed.


Imaging:  Reviewed Imaging Report





Assessment/Plan


Assessment and Plan


Assess & Plan/Chief Complaint


Assessment/Plan





(1) Sepsis


Status:  Acute


Assessment & Plan:  : Poor prognosis, Patient on Sepsis IVF protocol, 

continue IV antibiotics, Dr James consulted for critical care, blood cultures 

pending


4/15: Off Levaphed today, continue to monitor blood pressure, continue IV 

antibiotics, patient is not following commands when sedation is turned off


: Prognosis remains guarded, tube feeds started today, blood pressures 

running high, restarted blood pressure medications


: Dr james managing critical care, continue IV antibiotics, possible wean 

and extubation over the weekend


: failed extubation today and small amount of aspiration of tube feeding 

possible so monitor that closely


: Doing well since transferred to floor now resolved


Qualifiers:  


   Qualified Codes:  A41.9 - Sepsis, unspecified organism; R65.21 - Severe 

sepsis with septic shock; N17.9 - Acute kidney failure, unspecified


(2) Acute respiratory failure with hypoxia and hypercapnia


Status:  Acute


Assessment & Plan:  : Currently Extubated





(3) Acute kidney injury superimposed on chronic kidney disease


Status:  Resolved


Assessment & Plan:  : Will monitor daily BUN/Cr





(4) NSTEMI (non-ST elevation myocardial infarction)


Status:  Acute


Assessment & Plan:  : Cardiology consulted, appreciate recommendations, 

Recent Cath 2020: BNP elevated, patient given IV lasix, diuresing well





(5) Upper GI bleed


Status:  Acute


Assessment & Plan:  : Today started having blood tinged fluid from OG, 

Lovenox stopped and started on IV protonix


4/15: Hgb dropped today, Will get iron studies


: stable hgb 8.4





(6) CAD (coronary artery disease)


Status:  Chronic


Qualifiers:  


   Qualified Codes:  I25.10 - Atherosclerotic heart disease of native coronary 

artery without angina pectoris


(7) Hypertension


Status:  Chronic


Assessment & Plan:  : Currently hypotensive and on Levaphed for Sepsis


4/15: Off pressors


: Restarted Lisinopril today


Qualifiers:  


   Qualified Codes:  I10 - Essential (primary) hypertension


(8) Diabetes type 2, uncontrolled


Status:  Chronic


Qualifiers:  


   Qualified Codes:  E11.65 - Type 2 diabetes mellitus with hyperglycemia


(9) COPD (chronic obstructive pulmonary disease)


Status:  Chronic


Qualifiers:  


   Qualified Codes:  J44.9 - Chronic obstructive pulmonary disease, unspecified


(10) Normocytic anemia


Status:  Acute


Assessment & Plan:  : Iron deficient, will consider Fe replacement when 

patient stablizes





(11) Pneumonia


Status:  Acute


Qualifiers:  


   Qualified Codes:  J18.9 - Pneumonia, unspecified organism


(12) DVT prophylaxis


Status:  Acute


Assessment & Plan:  : On Lovenox, stopped today due to GI bleeding


4/15: Continues off Lovenox, SCDs





13) Meth use


(14) AF w/RVR s/p cardioversion successful





Plan:


PT OT


DC Santacruz


IRF? SNF?





Diagnosis/Problems


Diagnosis/Problems





(1) Acute respiratory failure with hypoxia and hypercapnia


Status:  Acute


(2) COPD (chronic obstructive pulmonary disease)


Status:  Chronic


Qualifiers:  


   COPD type:  unspecified COPD  Qualified Codes:  J44.9 - Chronic obstructive 

pulmonary disease, unspecified


(3) CAD (coronary artery disease)


Status:  Chronic


Qualifiers:  


   Coronary Disease-Associated Artery/Lesion type:  native artery  Native vs. 

transplanted heart:  native heart  Associated angina:  angina presence 

unspecified  Qualified Codes:  I25.10 - Atherosclerotic heart disease of native 

coronary artery without angina pectoris


(4) Normocytic anemia


Status:  Acute


(5) Hypertension


Status:  Chronic


Qualifiers:  


   Hypertension type:  essential hypertension  Qualified Codes:  I10 - Essential

(primary) hypertension


(6) Pneumonia


Status:  Acute


Qualifiers:  


   Pneumonia type:  due to unspecified organism  Laterality:  bilateral  Lung 

location:  unspecified part of lung  Qualified Codes:  J18.9 - Pneumonia, 

unspecified organism


(7) Diabetes type 2, uncontrolled


Status:  Chronic


Qualifiers:  


   Glycemic state:  with hyperglycemia  Qualified Codes:  E11.65 - Type 2 

diabetes mellitus with hyperglycemia


(8) Acute kidney injury superimposed on chronic kidney disease


Status:  Resolved


Resolution Date/Time:  4/15/20 @ 19:54


(9) Atrial flutter


(10) NSTEMI (non-ST elevation myocardial infarction)


Status:  Resolved


Resolution Date/Time:  20 @ 11:38





Clinical Quality Measures


DVT/VTE Risk/Contraindication:


Risk Factor Score Per Nursin


RFS Level Per Nursing on Admit:  4+=Very High











JASEN DAY DO                 May 2, 2020 13:07

## 2020-05-02 NOTE — PHYSICAL THERAPY DAILY NOTE
PT Daily Note-Current


Subjective


Pt declines transferring to the chair.  Declines lying down; agrees to sit to 

stand at EOB and then requests to remain sitting EOB post treatment.  Pt 

expresses desire to be outside in the "beautiful day."





Mental Status


Patient Orientation:  Person, Place, Time, Situation





Transfers


SCALE: Activities may be completed with or without assistive devices.





6-Indepedent-patient completes the activity by him/herself with no assistance 

from a helper.


5-Set-up or Clean-up Assistance-helper sets up or cleans up; patient completes 

activity. [place] assists only prior to or  


    following the activity.


4-Supervision or Touching Assistance-helper provides verbal cues and/or 

touching/steadying and/or contact guard assistance as patient completes 

activity. Assistance may be provided   


    throughout the activity or intermittently.


3-Partial/Moderate Assistance-helper does LESS THAN HALF the effort. [place] 

lifts, holds or supports trunk or limbs, but provides less than half the effort.


2-Substantial/Maximal Assistance-helper does MORE THAN HALF the effort. [place] 

lifts or holds trunk or limbs and provides more than half the effort.


8-[id number] does ALL the effort. Patient does none of the effort to 

complete the activity. Or, the assistance of 2 or more helpers is required for 

the patient to complete the  


    activity.


If activity was not attempted, code reason:


7-Patient Refused.


9-Not Applicable-not attempted and the patient did not perform the activity 

before the current illness, exacerbation or injury.


10-Not Attempted due to Environmental Limitations-(lack of equipment, weather 

restraints, etc.).


88-Not Attempted due to Medical Conditions or Safety Concerns.





Treatments


Sit to stand x 2 with dependent assist of 2.  As pt standing, nursing applied 

medication to his buttock and assisted with adjusting clothing.  Pt able to 

stand x 30 seconds each time with FWW with max assist.  In sitting EOB, pt 

worked on scooting up/down to change position at the bed.  Pt sitting EOB post 

treatment, nurse and nurse aide present and aware.  Nurse and aide report he has

been sitting EOB all morning and agree that he is fine to remain in this 

position.  Bed alarm was activated when this PT entered, therefore, it was 

reactivated post treatment.  Pt with call light in reach post treatment.





Assessment


Functional weakness noted with decreased tolerance to activity.





PT Short Term Goals


Short Term Goals


Time Frame:  May 9, 2020


Roll Left & Right:  4


Sit to lyin


Lying to sitting on side of be:  4


Sit to stand:  3


Chair/bed-to-chair transfer:  3


Toilet transfer:  3


Walk 10 feet:  3


Walk 50 feet with two turns:  3





PT Long Term Goals


Long Term Goals


PT Long Term Goals Time Frame:  May 23, 2020


Roll Left & Right (QC):  6


Sit to Lying (QC):  6


Lying-Sitting on Side/Bed(QC):  6


Sit to Stand (QC):  6


Chair/Bed-to-Chair Xfer(QC):  6


Toilet Transfer (QC):  6


Car Transfer (QC):  6


Does the Patient Walk:  Yes


Walk 10 feet (QC):  5


Walk 50ft with 2 Turns (QC):  5


Walk 150 ft (QC):  5





PT Plan


Problem List


Problem List:  Activity Tolerance, Functional Strength, Safety, Balance, Gait, 

Transfer, Bed Mobility





Treatment/Plan


Treatment Plan:  Continue Plan of Care


Treatment Plan:  Bed Mobility, Education, Functional Activity Jeny, Functional 

Strength, Gait, Safety, Therapeutic Exercise, Transfers


Treatment Duration:  May 23, 2020


Frequency:  6 times per week


Estimated Hrs Per Day:  .5 hour per day


Patient and/or Family Agrees t:  Yes





Safety Risks/Education


Patient Education:  Safety Issues


Teaching Recipient:  Patient


Teaching Methods:  Discussion


Response to Teaching:  Verbalize Understanding





Time/GCodes


Time In:  835


Time Out:  850


Total Billed Treatment Time:  15


Total Billed Treatment


visit


FA 15











KELY LOUISE PT              May 2, 2020 10:13

## 2020-05-02 NOTE — NUR
MICROBIOLOGY CALLED TO REPORT MRSA OF THE SPUTUM.  DROPLET ISOLATION ORDERED.  MESSAGE LEFT 
FOR DR DAY.

## 2020-05-03 VITALS — SYSTOLIC BLOOD PRESSURE: 184 MMHG | DIASTOLIC BLOOD PRESSURE: 84 MMHG

## 2020-05-03 VITALS — SYSTOLIC BLOOD PRESSURE: 132 MMHG | DIASTOLIC BLOOD PRESSURE: 66 MMHG

## 2020-05-03 VITALS — DIASTOLIC BLOOD PRESSURE: 67 MMHG | SYSTOLIC BLOOD PRESSURE: 132 MMHG

## 2020-05-03 VITALS — DIASTOLIC BLOOD PRESSURE: 65 MMHG | SYSTOLIC BLOOD PRESSURE: 146 MMHG

## 2020-05-03 LAB
BASOPHILS # BLD AUTO: 0 10^3/UL (ref 0–0.1)
BASOPHILS NFR BLD AUTO: 0 % (ref 0–10)
BUN/CREAT SERPL: 37
CALCIUM SERPL-MCNC: 8.1 MG/DL (ref 8.5–10.1)
CHLORIDE SERPL-SCNC: 101 MMOL/L (ref 98–107)
CO2 SERPL-SCNC: 26 MMOL/L (ref 21–32)
CREAT SERPL-MCNC: 0.65 MG/DL (ref 0.6–1.3)
EOSINOPHIL # BLD AUTO: 0.1 10^3/UL (ref 0–0.3)
EOSINOPHIL NFR BLD AUTO: 1 % (ref 0–10)
ERYTHROCYTE [DISTWIDTH] IN BLOOD BY AUTOMATED COUNT: 16.1 % (ref 10–14.5)
GFR SERPLBLD BASED ON 1.73 SQ M-ARVRAT: > 60 ML/MIN
GLUCOSE SERPL-MCNC: 49 MG/DL (ref 70–105)
HCT VFR BLD CALC: 30 % (ref 40–54)
HGB BLD-MCNC: 9.8 G/DL (ref 13.3–17.7)
LYMPHOCYTES # BLD AUTO: 1.4 X 10^3 (ref 1–4)
LYMPHOCYTES NFR BLD AUTO: 14 % (ref 12–44)
MAGNESIUM SERPL-MCNC: 1.7 MG/DL (ref 1.6–2.4)
MANUAL DIFFERENTIAL PERFORMED BLD QL: NO
MCH RBC QN AUTO: 30 PG (ref 25–34)
MCHC RBC AUTO-ENTMCNC: 32 G/DL (ref 32–36)
MCV RBC AUTO: 94 FL (ref 80–99)
MONOCYTES # BLD AUTO: 0.5 X 10^3 (ref 0–1)
MONOCYTES NFR BLD AUTO: 4 % (ref 0–12)
NEUTROPHILS # BLD AUTO: 8.2 X 10^3 (ref 1.8–7.8)
NEUTROPHILS NFR BLD AUTO: 81 % (ref 42–75)
PHOSPHATE SERPL-MCNC: 2.7 MG/DL (ref 2.3–4.7)
PLATELET # BLD: 100 10^3/UL (ref 130–400)
PMV BLD AUTO: 11.8 FL (ref 7.4–10.4)
POTASSIUM SERPL-SCNC: 3.6 MMOL/L (ref 3.6–5)
SODIUM SERPL-SCNC: 137 MMOL/L (ref 135–145)
WBC # BLD AUTO: 10.2 10^3/UL (ref 4.3–11)

## 2020-05-03 RX ADMIN — VANCOMYCIN HYDROCHLORIDE SCH MLS/HR: 500 INJECTION, POWDER, LYOPHILIZED, FOR SOLUTION INTRAVENOUS at 16:33

## 2020-05-03 RX ADMIN — VANCOMYCIN HYDROCHLORIDE SCH MLS/HR: 500 INJECTION, POWDER, LYOPHILIZED, FOR SOLUTION INTRAVENOUS at 06:26

## 2020-05-03 RX ADMIN — INSULIN ASPART SCH UNIT: 100 INJECTION, SOLUTION INTRAVENOUS; SUBCUTANEOUS at 16:27

## 2020-05-03 RX ADMIN — MORPHINE SULFATE PRN MG: 4 INJECTION, SOLUTION INTRAMUSCULAR; INTRAVENOUS at 20:51

## 2020-05-03 RX ADMIN — MORPHINE SULFATE PRN MG: 4 INJECTION, SOLUTION INTRAMUSCULAR; INTRAVENOUS at 16:30

## 2020-05-03 RX ADMIN — INSULIN ASPART SCH UNIT: 100 INJECTION, SOLUTION INTRAVENOUS; SUBCUTANEOUS at 11:29

## 2020-05-03 RX ADMIN — INSULIN ASPART SCH UNIT: 100 INJECTION, SOLUTION INTRAVENOUS; SUBCUTANEOUS at 06:21

## 2020-05-03 RX ADMIN — METOPROLOL TARTRATE SCH MG: 50 TABLET, FILM COATED ORAL at 08:27

## 2020-05-03 RX ADMIN — MORPHINE SULFATE PRN MG: 4 INJECTION, SOLUTION INTRAMUSCULAR; INTRAVENOUS at 08:26

## 2020-05-03 RX ADMIN — PANTOPRAZOLE SODIUM SCH MG: 40 INJECTION, POWDER, FOR SOLUTION INTRAVENOUS at 08:26

## 2020-05-03 RX ADMIN — METOPROLOL TARTRATE SCH MG: 50 TABLET, FILM COATED ORAL at 20:50

## 2020-05-03 RX ADMIN — METHYLPREDNISOLONE SODIUM SUCCINATE SCH MG: 40 INJECTION, POWDER, FOR SOLUTION INTRAMUSCULAR; INTRAVENOUS at 08:27

## 2020-05-03 RX ADMIN — RISPERIDONE SCH MG: 1 TABLET, FILM COATED ORAL at 08:27

## 2020-05-03 RX ADMIN — IPRATROPIUM BROMIDE AND ALBUTEROL SULFATE SCH ML: .5; 3 SOLUTION RESPIRATORY (INHALATION) at 02:12

## 2020-05-03 RX ADMIN — AMIODARONE HYDROCHLORIDE SCH MG: 200 TABLET ORAL at 08:27

## 2020-05-03 RX ADMIN — INSULIN ASPART SCH UNIT: 100 INJECTION, SOLUTION INTRAVENOUS; SUBCUTANEOUS at 20:51

## 2020-05-03 RX ADMIN — APIXABAN SCH MG: 5 TABLET, FILM COATED ORAL at 06:21

## 2020-05-03 RX ADMIN — PANTOPRAZOLE SODIUM SCH MG: 40 INJECTION, POWDER, FOR SOLUTION INTRAVENOUS at 20:50

## 2020-05-03 RX ADMIN — APIXABAN SCH MG: 5 TABLET, FILM COATED ORAL at 17:24

## 2020-05-03 RX ADMIN — RISPERIDONE SCH MG: 1 TABLET, FILM COATED ORAL at 20:50

## 2020-05-03 RX ADMIN — VANCOMYCIN HYDROCHLORIDE SCH MLS/HR: 500 INJECTION, POWDER, LYOPHILIZED, FOR SOLUTION INTRAVENOUS at 22:09

## 2020-05-03 NOTE — CARDIOLOGY PROGRESS NOTE
Cardiology SOAP Progress Note


Subjective:


No cardiac complaints today.





Objective:


I&O/Vital Signs











 5/3/20 5/3/20 5/3/20 5/3/20





 08:00 08:00 12:00 13:44


 


Temp 36.6  36.2 


 


Pulse 89  80 


 


Resp 22  20 


 


B/P (MAP) 184/84 (117)  132/67 (88) 


 


Pulse Ox 98  97 92


 


O2 Delivery Nasal Cannula Nasal Cannula Nasal Cannula Nasal Cannula


 


O2 Flow Rate 1.00 1.00 1.00 1.00


 


    





 5/3/20   





 16:00   


 


Temp 37.0   


 


Pulse 83   


 


Resp 18   


 


B/P (MAP) 146/65 (92)   


 


Pulse Ox 96   


 


O2 Delivery Nasal Cannula   


 


O2 Flow Rate 1.00   














 5/3/20





 00:00


 


Intake Total 1697.5 ml


 


Output Total 300 ml


 


Balance 1397.5 ml








Weight (Pounds):  206


Weight (Ounces):  0.8


Weight (Calculated Kilograms):  93.175368


Constitutional:  AAO x 3, apparent distress, PERRL, well-developed


Respiratory:  No accessory muscle use; chest is bilaterally symmetric, lungs 

clear to auscultation


Cardiovascular:  regular rate-rhythm, bradycardia, S1 and S2, systolic murmur 

(soft BUTCH at card base)


Gastrointestional:  soft, hernia (abdominal), audible bowel sounds, other 

(mildly distended)


Extremities:  swelling (mild edema), other (R foot in dressing that wasn't 

removed); No clubbing, No cyanosis


Neurologic/Psychiatric:  no motor/sensory deficits, alert, normal mood/affect, 

oriented x 3


Skin:  No rash on exposed areas, No ulcerations on exposed areas; other 

(dressing to right foot, D&I)





Results/Procedures:


Labs


Laboratory Tests


5/2/20 21:15: Glucometer 97


5/3/20 05:20: 


White Blood Count 10.2, Red Blood Count 3.23L, Hemoglobin 9.8L, Hematocrit 30L, 

Mean Corpuscular Volume 94, Mean Corpuscular Hemoglobin 30, Mean Corpuscular 

Hemoglobin Concent 32, Red Cell Distribution Width 16.1H, Platelet Count 100L, 

Mean Platelet Volume 11.8H, Neutrophils (%) (Auto) 81H, Lymphocytes (%) (Auto) 

14, Monocytes (%) (Auto) 4, Eosinophils (%) (Auto) 1, Basophils (%) (Auto) 0, 

Neutrophils # (Auto) 8.2H, Lymphocytes # (Auto) 1.4, Monocytes # (Auto) 0.5, 

Eosinophils # (Auto) 0.1, Basophils # (Auto) 0.0, Sodium Level 137, Potassium 

Level 3.6, Chloride Level 101, Carbon Dioxide Level 26, Anion Gap 10, Blood Urea

Nitrogen 24H, Creatinine 0.65, Estimat Glomerular Filtration Rate > 60, BU

N/Creatinine Ratio 37, Glucose Level 49*L, Calcium Level 8.1L, Phosphorus Level 

2.7, Magnesium Level 1.7


5/3/20 06:02: Glucometer 56*L


5/3/20 06:20: Glucometer 83


5/3/20 06:49: Glucometer 107


5/3/20 11:27: Glucometer 186H


5/3/20 16:16: Glucometer 347H





Microbiology


4/29/20 Gram Stain - Final, Complete


          


4/29/20 Sputum Culture - Final, Complete


          Usual upper respiratory claudia


          Staphylococcus aureus


4/13/20 Catheter Tip Culture - Final, Complete


          No growth


4/12/20 Urine Culture - Final, Complete


          NO GROWTH








A/P:


Assessment/Dx:


Acute resp failure and shock of undetermined etiology;  Resolved.  Improving 

gradually.





Sinus bradycardia.  Low dose metoprolol.





Atrial flutter with 2-1 AV block ; status post cardioversion earlier during the 

admission..  Currently in sinus rhythm.  Continue amiodarone and Eliquis.





Mild acute on chronic diastolic congestive heart failure.  Agree with IV 

diuretics.





Cannot exclude pulmonary embolism. Was started on treatment dose Lovenox at 

admission, but being stopped on 4/15/20 due to falling H & H and falling plate

let count.  However due to atrial fibrillation/atrial flutter, the patient is 

started on Eliquis.





Acute renal failure (AYAH-3) likely due to shock - resolved





Mild troponin elevation, likely type-2 MI due to reasons noted above





Drug test (+) for Meth on 4-





CAD. Card cath of Jan 2017 showed moderate, nonobstructive disease and LVEF 55% 

and elevated LVEDP. MPI of 2/27/20 by Dr Dai showed no ischemia or infarction

and normal LV function





Echo on 4/14/20: LVEF 45%, grade 1 fajardo dysfunction, mild to mod TR, RVSP 29 

mmHg





Chronic tobacco use (smokes cigarettes according to previous records)





H/o DM II





H/o Htn





H/o Hyperlipidemia





PAD.  Peripheral angiogram and intervention on 8/22/2019 by Dr Dai with PTA 

of the right AT, PT and stenting of the right SFA. Significant improvement of 

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


Plan:








* Complex management


* Atrial flutter with 2-1 AV block; no response with IV amiodarone and Cardizem.

   Informed consent was taken from the son for transesophageal echocardiogram 

  assisted cardioversion.  Risk of damage to the esophagus and stroke was 

  discussed.  The son accepted and gave informed consent.  Transesophageal 

  echocardiogram was done on 4/20/2020 which showed no left atrium or left 

  atrial appendage thrombus.  Cardioversion was performed with 200 J which 

  converted the patient to sinus rhythm.


* Patient continues to be in sinus rhythm since cardioversion.








Thank you for your consultation. Please call me if you have any questions.








WOODY Dai MD, FACP, FACC, FSCAI, FHRS, CCDS


Interventional Cardiology


Cardiac Electrophysiology


Vascular Medicine and Endovascular Interventions











ELISE DAI MD              May 3, 2020 18:52

## 2020-05-03 NOTE — PROGRESS NOTE - HOSPITALIST
Subjective


HPI/CC On Admission


Date Seen by Provider:  May 3, 2020


Time Seen by Provider:  11:45


Pradeep Fowler is a 69-year-old male with past medical history of hypertension, 

diabetes, peripheral artery disease, coronary artery disease, AAA, COPD, who 

presented with altered mental status.  He was reportedly brought in by friend.  

He had supposedly been taking some narcotics for which he been prescribed.  He 

was given a dose of Narcan and his mental status improved.  He was started on 

BiPAP in the ICU.  The morning after his admission, he remained lethargic and an

ABG showed acute hypercapnia.  He was intubated due to acute hypercapnic 

respiratory failure.  At the time of my examination he is intubated and sedated.


Subjective/Events-last exam


Patient insisting on going home and declines nursing home placement and 

inpatient rehab stay


Home O2 evaluation will be needed


Hemoglobin stable at 9.8


Platelets 100,000


Nebs changed to as needed since he is refusing most of the schedule ones


Catheter out urinating but incontinent sometimes


On vancomycin for MRSA in sputum will change to Zyvox tomorrow at discharge


He goes to the pharmacy at the [facility]





Review of Systems


General:  Fatigue


Pulmonary:  Dyspnea





Objective


Exam


Vital Signs





Vital Signs








  Date Time  Temp Pulse Resp B/P (MAP) Pulse Ox O2 Delivery O2 Flow Rate FiO2


 


5/3/20 13:44     92 Nasal Cannula 1.00 


 


5/3/20 12:00 36.2 80 20 132/67 (88)    


 


20 08:00        35





Capillary Refill : Less Than 3 SecondsLess Than 3 Seconds


General Appearance:  No Apparent Distress, WD/WN, Chronically ill, Obese


Respiratory:  Chest Non Tender, Lungs Clear, No Accessory Muscle Use, No 

Respiratory Distress, Decreased Breath Sounds


Cardiovascular:  Regular Rate, Rhythm, No Edema, No Gallop, No JVD, No Murmur, 

Normal Peripheral Pulses


Neurologic/Psychiatric:  Alert, Oriented x3, No Motor/Sensory Deficits, Normal 

Mood/Affect





Results/Procedures


Lab


Laboratory Tests


5/3/20 05:20








Patient resulted labs reviewed.


Imaging:  Reviewed Imaging Report





Assessment/Plan


Assessment and Plan


Assess & Plan/Chief Complaint


Assessment/Plan





(1) Sepsis


Status:  Acute


Assessment & Plan:  : Poor prognosis, Patient on Sepsis IVF protocol, 

continue IV antibiotics, Dr James consulted for critical care, blood cultures 

pending


4/15: Off Levaphed today, continue to monitor blood pressure, continue IV 

antibiotics, patient is not following commands when sedation is turned off


: Prognosis remains guarded, tube feeds started today, blood pressures 

running high, restarted blood pressure medications


: Dr james managing critical care, continue IV antibiotics, possible wean 

and extubation over the weekend


: failed extubation today and small amount of aspiration of tube feeding 

possible so monitor that closely


: Doing well since transferred to floor now resolved


Qualifiers:  


   Qualified Codes:  A41.9 - Sepsis, unspecified organism; R65.21 - Severe 

sepsis with septic shock; N17.9 - Acute kidney failure, unspecified


(2) Acute respiratory failure with hypoxia and hypercapnia


Status:  Acute


Assessment & Plan:  : Currently Extubated





(3) Acute kidney injury superimposed on chronic kidney disease


Status:  Resolved


Assessment & Plan:  : Will monitor daily BUN/Cr





(4) NSTEMI (non-ST elevation myocardial infarction)


Status:  Acute


Assessment & Plan:  : Cardiology consulted, appreciate recommendations, 

Recent Cath 2020: BNP elevated, patient given IV lasix, diuresing well





(5) Upper GI bleed


Status:  Acute


Assessment & Plan:  : Today started having blood tinged fluid from OG, Lo

venox stopped and started on IV protonix


4/15: Hgb dropped today, Will get iron studies


: stable hgb 8.4





(6) CAD (coronary artery disease)


Status:  Chronic


Qualifiers:  


   Qualified Codes:  I25.10 - Atherosclerotic heart disease of native coronary 

artery without angina pectoris


(7) Hypertension


Status:  Chronic


Assessment & Plan:  : Currently hypotensive and on Levaphed for Sepsis


4/15: Off pressors


: Restarted Lisinopril today


Qualifiers:  


   Qualified Codes:  I10 - Essential (primary) hypertension


(8) Diabetes type 2, uncontrolled


Status:  Chronic


Qualifiers:  


   Qualified Codes:  E11.65 - Type 2 diabetes mellitus with hyperglycemia


(9) COPD (chronic obstructive pulmonary disease)


Status:  Chronic


Qualifiers:  


   Qualified Codes:  J44.9 - Chronic obstructive pulmonary disease, unspecified


(10) Normocytic anemia


Status:  Acute


Assessment & Plan:  : Iron deficient, will consider Fe replacement when 

patient stablizes





(11) Pneumonia


Status:  Acute


Qualifiers:  


   Qualified Codes:  J18.9 - Pneumonia, unspecified organism


(12) DVT prophylaxis


Status:  Acute


Assessment & Plan:  : On Lovenox, stopped today due to GI bleeding


4/15: Continues off Lovenox, SCDs





13) Meth use


(14) AF w/RVR s/p cardioversion successful


(15) MRSA sputum placed on Vanc Day # 2 today 5/3/20





Plan:


PT OT


DCed Santacruz


IRF? SNF? refuses both will DC tomorrow on HH





Diagnosis/Problems


Diagnosis/Problems





(1) Acute respiratory failure with hypoxia and hypercapnia


Status:  Acute


(2) COPD (chronic obstructive pulmonary disease)


Status:  Chronic


Qualifiers:  


   COPD type:  unspecified COPD  Qualified Codes:  J44.9 - Chronic obstructive 

pulmonary disease, unspecified


(3) CAD (coronary artery disease)


Status:  Chronic


Qualifiers:  


   Coronary Disease-Associated Artery/Lesion type:  native artery  Native vs. 

transplanted heart:  native heart  Associated angina:  angina presence 

unspecified  Qualified Codes:  I25.10 - Atherosclerotic heart disease of native 

coronary artery without angina pectoris


(4) Normocytic anemia


Status:  Acute


(5) Hypertension


Status:  Chronic


Qualifiers:  


   Hypertension type:  essential hypertension  Qualified Codes:  I10 - Essential

(primary) hypertension


(6) Pneumonia


Status:  Acute


Qualifiers:  


   Pneumonia type:  due to unspecified organism  Laterality:  bilateral  Lung 

location:  unspecified part of lung  Qualified Codes:  J18.9 - Pneumonia, 

unspecified organism


(7) Diabetes type 2, uncontrolled


Status:  Chronic


Qualifiers:  


   Glycemic state:  with hyperglycemia  Qualified Codes:  E11.65 - Type 2 

diabetes mellitus with hyperglycemia


(8) Acute kidney injury superimposed on chronic kidney disease


Status:  Resolved


Resolution Date/Time:  4/15/20 @ 19:54


(9) Atrial flutter


(10) NSTEMI (non-ST elevation myocardial infarction)


Status:  Resolved


Resolution Date/Time:  20 @ 11:38





Clinical Quality Measures


DVT/VTE Risk/Contraindication:


Risk Factor Score Per Nursin


RFS Level Per Nursing on Admit:  4+=Very High











JASEN DAY DO                 May 3, 2020 11:18

## 2020-05-04 VITALS — DIASTOLIC BLOOD PRESSURE: 72 MMHG | SYSTOLIC BLOOD PRESSURE: 134 MMHG

## 2020-05-04 VITALS — SYSTOLIC BLOOD PRESSURE: 101 MMHG | DIASTOLIC BLOOD PRESSURE: 65 MMHG

## 2020-05-04 LAB
BASOPHILS # BLD AUTO: 0 10^3/UL (ref 0–0.1)
BASOPHILS NFR BLD AUTO: 0 % (ref 0–10)
BUN/CREAT SERPL: 31
CALCIUM SERPL-MCNC: 7.9 MG/DL (ref 8.5–10.1)
CHLORIDE SERPL-SCNC: 102 MMOL/L (ref 98–107)
CO2 SERPL-SCNC: 26 MMOL/L (ref 21–32)
CREAT SERPL-MCNC: 0.71 MG/DL (ref 0.6–1.3)
EOSINOPHIL # BLD AUTO: 0.1 10^3/UL (ref 0–0.3)
EOSINOPHIL NFR BLD AUTO: 1 % (ref 0–10)
ERYTHROCYTE [DISTWIDTH] IN BLOOD BY AUTOMATED COUNT: 16.5 % (ref 10–14.5)
GFR SERPLBLD BASED ON 1.73 SQ M-ARVRAT: > 60 ML/MIN
GLUCOSE SERPL-MCNC: 124 MG/DL (ref 70–105)
HCT VFR BLD CALC: 29 % (ref 40–54)
HGB BLD-MCNC: 9.3 G/DL (ref 13.3–17.7)
LYMPHOCYTES # BLD AUTO: 1.3 X 10^3 (ref 1–4)
LYMPHOCYTES NFR BLD AUTO: 14 % (ref 12–44)
MAGNESIUM SERPL-MCNC: 2.5 MG/DL (ref 1.6–2.4)
MANUAL DIFFERENTIAL PERFORMED BLD QL: NO
MCH RBC QN AUTO: 30 PG (ref 25–34)
MCHC RBC AUTO-ENTMCNC: 32 G/DL (ref 32–36)
MCV RBC AUTO: 95 FL (ref 80–99)
MONOCYTES # BLD AUTO: 0.5 X 10^3 (ref 0–1)
MONOCYTES NFR BLD AUTO: 5 % (ref 0–12)
NEUTROPHILS # BLD AUTO: 7.3 X 10^3 (ref 1.8–7.8)
NEUTROPHILS NFR BLD AUTO: 80 % (ref 42–75)
PHOSPHATE SERPL-MCNC: 2.3 MG/DL (ref 2.3–4.7)
PLATELET # BLD: 93 10^3/UL (ref 130–400)
PMV BLD AUTO: 11.7 FL (ref 7.4–10.4)
POTASSIUM SERPL-SCNC: 3.9 MMOL/L (ref 3.6–5)
SODIUM SERPL-SCNC: 136 MMOL/L (ref 135–145)
WBC # BLD AUTO: 9.2 10^3/UL (ref 4.3–11)

## 2020-05-04 RX ADMIN — VANCOMYCIN HYDROCHLORIDE SCH MLS/HR: 500 INJECTION, POWDER, LYOPHILIZED, FOR SOLUTION INTRAVENOUS at 05:20

## 2020-05-04 RX ADMIN — METOPROLOL TARTRATE SCH MG: 50 TABLET, FILM COATED ORAL at 08:57

## 2020-05-04 RX ADMIN — PANTOPRAZOLE SODIUM SCH MG: 40 INJECTION, POWDER, FOR SOLUTION INTRAVENOUS at 08:57

## 2020-05-04 RX ADMIN — INSULIN ASPART SCH UNIT: 100 INJECTION, SOLUTION INTRAVENOUS; SUBCUTANEOUS at 05:20

## 2020-05-04 RX ADMIN — MORPHINE SULFATE PRN MG: 4 INJECTION, SOLUTION INTRAMUSCULAR; INTRAVENOUS at 09:38

## 2020-05-04 RX ADMIN — INSULIN ASPART SCH UNIT: 100 INJECTION, SOLUTION INTRAVENOUS; SUBCUTANEOUS at 11:35

## 2020-05-04 RX ADMIN — AMIODARONE HYDROCHLORIDE SCH MG: 200 TABLET ORAL at 08:57

## 2020-05-04 RX ADMIN — APIXABAN SCH MG: 5 TABLET, FILM COATED ORAL at 05:20

## 2020-05-04 RX ADMIN — RISPERIDONE SCH MG: 1 TABLET, FILM COATED ORAL at 08:57

## 2020-05-04 NOTE — NUR
PTD VANCOMYCIN

LABS: SCR 0.71 VANCOMYCIN LEVELS 5/3 @ 2100 - 26.5 REPEAT 5/4 0330 18.4

PHARMACOKINETIC CALC: KD ~ 0.0561 T1/2 ~ 12,4 HOURS

PLAN: CHANGE VANCOMYCIN TO 1 GRAM IV Q12H, NEXT DOSE DUE TONIGHT (IF STILL IN HOSPITAL)

## 2020-05-04 NOTE — D/C HH FACE TO FACE ORDER
D/C  Face to Face Orders


Reconcile Patient Problems


Problems Reviewed?:  Yes





Instructions for Patient


[facility] [employer], [phone]


Patient Instructions/FollowUp:  


[facility] 1 week


Physician to follow Patient:  [facility]


Discharge Diet for Home:  ADA Diet


Patient Problems:  


s/p vent dependence


MRSA PNA


GIB


New AF s/p cardioversion


DM





Patient Data-Allergies,Ht & Wt


Patient Allergies:  


Coded Allergies:  


     No Known Drug Allergies (Unverified , 1/17/17)


Height (Feet):  5


Height (Inches):  8.50


Weight (Pounds):  206


Weight (Ounces):  0.8





Home Health Need/Face to Face


Date of Face to Face:  May 4, 2020


Clinical Findings:  Generalized weakness and fatigue, Instability, Muscle 

weakness, Shortness of breath, Unsteady gait


I have seen Pt face-to-face:  Yes


Discharged To:  Home


Diagnosis/Conditions:  


s/p vent dependence


MRSA PNA


GIB


New AF s/p cardioversion


DM


Patient is Homebound due to:  Nikole fall risk due to instabilty, Muscle 

weakness, Shortness of breath/distress


Homebound Status


   Due to the above stated illness, injury or surgical procedure (medical 

condition or diagnosis) and associated clinical findings, the patient is 

homebound because of his/her inability to leave home except with aid of a 

supportive device and/or person AND leaving the home requires a considerable and

taxing effort or is medically contraindicated.


Pt req the following assistanc:  Walker, Wheelchair





Home Health Nursing Orders


Home Health Services Order:  Nursing Services, Occupational Ther-Evaluate & 

Treat, Physical Therapy-Evaluate & Treat





Home Health Infusion Therapy


Line Start Date:  Apr 12, 2020


Certify Stmt


I certify that this patient is under my care and that I, a nurse practitioner or

a physician; a assistant working with me, had a face to face encounter that -

meets the physician face to face encounter requirements with this patient as 

dated.











JASEN DAY DO                 May 4, 2020 10:29

## 2020-05-04 NOTE — NUR
CM/SS:  Visited with pt as to his going home instead of going to in patient rehab.



Plan:  Pt will discharge to home with [facility] services



Summary:  Pt has decided that he wants to go home with home care and not stay at the 
hospital and go to inpatient rehab.  Pt feels as if he can do it from home and be back in 
his own bed.  Pt reports his son will live with him for awhile until he can get back to how 
he was before he came into the hospital.  Pt still has difficulty with walking and  
transferring.  Pt had made up his mind.  Pt is ready to go now.  He is encouraged to wait 
until  puts in orders, and not leave AMA. He is educated on the process.  He seems to be 
ok to wait until until  rounds.  He is informed that a referral will be made to [facility].  Pt is wished well.  



Referral made to [facility].

## 2020-05-04 NOTE — NUR
OVERNIGHT PULSE OXIMETRY REPORT.

-------------------------------------------------------------------------------

Addendum: 05/04/20 at 0814 by SHYAM HELLER RT

-------------------------------------------------------------------------------

Amended: Links added.

## 2020-05-04 NOTE — PULMONARY PROGRESS NOTE
Subjective


Time Seen by a Provider:  05:29


Subjective/Events-last exam


No complications noted.





Sepsis Event


Evaluation


Height, Weight, BMI


Height: 5'8.50"


Weight: 206lbs. 0.8oz. 93.328496tu; 30.95 BMI


Method:Stated





Exam


Exam





Vital Signs








  Date Time  Temp Pulse Resp B/P (MAP) Pulse Ox O2 Delivery O2 Flow Rate FiO2


 


5/4/20 00:07 37.3 68 18 101/65 (77) 96 Nasal Cannula 1.00 


 


5/3/20 20:00      Nasal Cannula 1.00 


 


5/3/20 16:00 37.0 83 18 146/65 (92) 96 Nasal Cannula 1.00 


 


5/3/20 13:44     92 Nasal Cannula 1.00 


 


5/3/20 12:00 36.2 80 20 132/67 (88) 97 Nasal Cannula 1.00 


 


5/3/20 08:00      Nasal Cannula 1.00 


 


5/3/20 08:00 36.6 89 22 184/84 (117) 98 Nasal Cannula 1.00 














I & O 


 


 5/4/20





 07:00


 


Intake Total 1000 ml


 


Output Total 400 ml


 


Balance 600 ml








Height & Weight


Height: 5'8.50"


Weight: 206lbs. 0.8oz. 93.291192mv; 30.95 BMI


Method:Stated


General Appearance:  No Apparent Distress, WD/WN, Chronically ill, Obese


HEENT:  TMs Normal, Other (Pupils equal/reactive 3 mm bilaterally)


Respiratory:  Chest Non Tender, Lungs Clear, No Accessory Muscle Use, No 

Respiratory Distress, Decreased Breath Sounds


Cardiovascular:  Regular Rate, Rhythm, No Edema, No Gallop, No JVD, No Murmur, 

Normal Peripheral Pulses


Capillary Refill:  Less Than 3 Seconds


Gastrointestinal:  non tender, soft


Extremity:  Normal Inspection, No Pedal Edema


Neurologic/Psychiatric:  Alert, Oriented x3, No Motor/Sensory Deficits, Normal 

Mood/Affect


Skin:  Normal Color, Warm/Dry





Results


Lab


Laboratory Tests


5/3/20 05:20








5/4/20 03:20











Assessment/Plan


Assessment/Plan


Acute respiratory failure 


   -s/p vent 


   - COVID is negative 


   -bilateral dopplers -- negative


   - fentany.  precedex 


COPD


   -Start advair and Spiriva 


Debility


   -PT/OT 


Aflutter


   -Cardiology s/p ZAMZAM with cardioversion 


   -On Eliquis 


CHF EF 45% with grade 1 diastolic dysfunction 


   -Cardiology following


sepsis with PICC line


   -Pan cultures pending


Pneumonia with possible aspiration 


   -Pan cultures 


   -MRSA swab - neg 


   -COVID is negative 


   -Influenza is negative 


   -RVP is negative 


UDS is positive for methamphetamine 





NSTEMI


   -Cardiology following











GUILLERMINA MERA DO               May 4, 2020 05:31

## 2020-05-04 NOTE — DISCHARGE SUMMARY
Discharge Summary


Hospital Course


Problems/Dx:  


(1) Acute respiratory failure with hypoxia and hypercapnia


Status:  Acute


(2) COPD (chronic obstructive pulmonary disease)


Status:  Chronic


Qualifiers:  


   Qualified Codes:  J44.9 - Chronic obstructive pulmonary disease, unspecified


(3) CAD (coronary artery disease)


Status:  Chronic


Qualifiers:  


   Qualified Codes:  I25.10 - Atherosclerotic heart disease of native coronary 

artery without angina pectoris


(4) Normocytic anemia


Status:  Acute


(5) Hypertension


Status:  Chronic


Qualifiers:  


   Qualified Codes:  I10 - Essential (primary) hypertension


(6) Pneumonia


Status:  Acute


Qualifiers:  


   Qualified Codes:  J18.9 - Pneumonia, unspecified organism


(7) Diabetes type 2, uncontrolled


Status:  Chronic


Qualifiers:  


   Qualified Codes:  E11.65 - Type 2 diabetes mellitus with hyperglycemia


(8) Acute kidney injury superimposed on chronic kidney disease


Status:  Resolved


(9) Atrial flutter


(10) NSTEMI (non-ST elevation myocardial infarction)


Status:  Resolved


Hospital Course


Date of Admission: 2020 at 21:51 


Admission Diagnosis :  





Family Physician/Provider: Kenn Mackey MD  





Date of Discharge: 20 


Discharge Diagnosis: Sepsis, VDRF, COPD, Meth use, MRSA PNA








Hospital Course:


Hospital Course: Pt had a lengthy hospital course for 22 days that began with 

sepsis and respiratory failure. Pt became ventilator dependent due to severe 

exacerbation of COPD and he did have a long recovery on the ventilator. Dr. James was able to extubate him without difficulty without a trach placement and

he did have an episode of AF with RVR s/p cardioversion by cardiology. Pt was 

maintained on Eliquis, stopped Plavix and Aspirin due to GI bleed and placed on 

oral anticoagulation at [facility]. Pt wanted to leave [place] several times before the 

discharge date. He did not need oxygen on assessment from respiratory therapy 

and overall was very noncompliant with the treatment plan. He insisted on going 

home on home health, declined to go to inpatient rehab and nursing home. Overall

prognosis is poor in general. PCP and cardiology follow up recommended.














Labs and Pending Lab Test:


Laboratory Tests


5/3/20 11:27: Glucometer 186H


5/3/20 16:16: Glucometer 347H


5/3/20 20:15: Glucometer 213H


5/3/20 21:00: Vancomycin Level Trough 26.5*H


20 03:20: 


White Blood Count 9.2, Red Blood Count 3.09L, Hemoglobin 9.3L, Hematocrit 29L, 

Mean Corpuscular Volume 95, Mean Corpuscular Hemoglobin 30, Mean Corpuscular 

Hemoglobin Concent 32, Red Cell Distribution Width 16.5H, Platelet Count 93L, 

Mean Platelet Volume 11.7H, Neutrophils (%) (Auto) 80H, Lymphocytes (%) (Auto) 1

4, Monocytes (%) (Auto) 5, Eosinophils (%) (Auto) 1, Basophils (%) (Auto) 0, 

Neutrophils # (Auto) 7.3, Lymphocytes # (Auto) 1.3, Monocytes # (Auto) 0.5, 

Eosinophils # (Auto) 0.1, Basophils # (Auto) 0.0, Sodium Level 136, Potassium 

Level 3.9, Chloride Level 102, Carbon Dioxide Level 26, Anion Gap 8, Blood Urea 

Nitrogen 22H, Creatinine 0.71, Estimat Glomerular Filtration Rate > 60, 

BUN/Creatinine Ratio 31, Glucose Level 124H, Glucometer 121H, Calcium Level 7.9L

, Phosphorus Level 2.3, Magnesium Level 2.5H


20 03:30: 


B-Type Natriuretic Peptide 70.6, Vancomycin Level Trough 18.4





Microbiology


20 Gram Stain - Final, Complete


          


20 Sputum Culture - Final, Complete


          Usual upper respiratory claudia


          Staphylococcus aureus


20 Catheter Tip Culture - Final, Complete


          No growth


20 Urine Culture - Final, Complete


          NO GROWTH





Home Meds


Active


Zyvox (Linezolid) 600 Mg Tablet 600 Mg PO BID


Glipizide 5 Mg Tablet 2.5 Mg PO DAILY


Protonix (Pantoprazole Sodium) 40 Mg Tablet.dr 40 Mg PO BID


Advair Hfa 115-21 Mcg Inhaler (Fluticasone/Salmeterol) 12 Gm Hfa.aer.ad 1 Puff 

IH RTBID


Metoprolol Tartrate 50 Mg Tablet 12.5 Mg PO BID


Amiodarone HCl 200 Mg Tablet 200 Mg PO DAILY


Eliquis (Apixaban) 5 Mg Tablet 5 Mg PO Q12H


Incruse Ellipta (Umeclidinium Bromide) 62.5 Mcg Blst.w.dev 1 Inh IH DAILY@0800


Oxycodone-Acetaminophen  (Oxycodone HCl/Acetaminophen) 1 Each Tablet 1 

Each PO QID PRN MDD 3


Reported


Clopidogrel (Clopidogrel Bisulfate) 75 Mg Tablet 75 Mg PO DAILY


Januvia (Sitagliptin Phosphate) 100 Mg Tablet 100 Mg PO DAILY


Hydrochlorothiazide 25 Mg Tablet 25 Mg PO DAILY


Pregabalin 150 Mg Capsule 150 Mg PO TID


Metformin HCl 1,000 Mg Tablet 1,000 Mg PO BID WITH MEALS


     LAST FILLED 2020 # DAY SUPPLY


Lisinopril 10 Mg Tablet 10 Mg PO DAILY


     LAST FILLED 2020 # DAY SUPPLY


Iprat-Albut 0.5-3(2.5) mg/3 ml (Ipratropium/Albuterol Sulfate) 3 Ml Ampul.neb 3 

Ml IH BID PRN


Multivitamins (Multivitamin) 1 Each Tablet 1 Tab PO DAILY


Aspirin 81 Mg Tab.chew 81 Mg PO DAILY


Nitroglycerin 0.4 Mg Tab.subl 0.4 Mg SL UD PRN


Glipizide 10 Mg Tablet 10 Mg PO DAILY


     LAST FILLED 2020 # DAY SUPPLY


Tradjenta (Linagliptin) 5 Mg Tablet 5 Mg PO DAILY


     LAST FILLED 2020 # DAY SUPPLY


Nortriptyline HCl 50 Mg Capsule 50 Mg PO HS


Assessment/Pt Instructions


[facility] this week


Discharge Planning:  <30 minutes discharge planning





Discharge Instructions


Discharge Diet:  No Restrictions





Discharge Physical Examination


Vital Signs





Vital Signs








  Date Time  Temp Pulse Resp B/P (MAP) Pulse Ox O2 Delivery O2 Flow Rate FiO2


 


20 08:00 36.8 96 20 134/72 (92) 95 Nasal Cannula 1.00 


 


20 08:00        35








General Appearance:  No Apparent Distress, WD/WN, Chronically ill


Allergies:  


Coded Allergies:  


     No Known Drug Allergies (Unverified , 17)





Discharge Summary


Date of Admission


2020 at 21:51


Date of Discharge





Discharge Date:  May 4, 2020


Admission Diagnosis


Altered mental status


Discharge Diagnosis


Assessment/Plan





(1) Sepsis


Status:  Acute


Assessment & Plan:  : Poor prognosis, Patient on Sepsis IVF protocol, 

continue IV antibiotics, Dr James consulted for critical care, blood cultures 

pending


4/15: Off Levaphed today, continue to monitor blood pressure, continue IV 

antibiotics, patient is not following commands when sedation is turned off


: Prognosis remains guarded, tube feeds started today, blood pressures 

running high, restarted blood pressure medications


: Dr james managing critical care, continue IV antibiotics, possible wean 

and extubation over the weekend


: failed extubation today and small amount of aspiration of tube feeding 

possible so monitor that closely


: Doing well since transferred to floor now resolved


Qualifiers:  


   Qualified Codes:  A41.9 - Sepsis, unspecified organism; R65.21 - Severe 

sepsis with septic shock; N17.9 - Acute kidney failure, unspecified


(2) Acute respiratory failure with hypoxia and hypercapnia


Status:  Acute


Assessment & Plan:  : Currently Extubated





(3) Acute kidney injury superimposed on chronic kidney disease


Status:  Resolved


Assessment & Plan:  : Will monitor daily BUN/Cr





(4) NSTEMI (non-ST elevation myocardial infarction)


Status:  Acute


Assessment & Plan:  : Cardiology consulted, appreciate recommendations, 

Recent Cath 2020: BNP elevated, patient given IV lasix, diuresing well





(5) Upper GI bleed


Status:  Acute


Assessment & Plan:  : Today started having blood tinged fluid from OG, 

Lovenox stopped and started on IV protonix


4/15: Hgb dropped today, Will get iron studies


: stable hgb 8.4





(6) CAD (coronary artery disease)


Status:  Chronic


Qualifiers:  


   Qualified Codes:  I25.10 - Atherosclerotic heart disease of native coronary 

artery without angina pectoris


(7) Hypertension


Status:  Chronic


Assessment & Plan:  : Currently hypotensive and on Levaphed for Sepsis


4/15: Off pressors


: Restarted Lisinopril today


Qualifiers:  


   Qualified Codes:  I10 - Essential (primary) hypertension


(8) Diabetes type 2, uncontrolled


Status:  Chronic


Qualifiers:  


   Qualified Codes:  E11.65 - Type 2 diabetes mellitus with hyperglycemia


(9) COPD (chronic obstructive pulmonary disease)


Status:  Chronic


Qualifiers:  


   Qualified Codes:  J44.9 - Chronic obstructive pulmonary disease, unspecified


(10) Normocytic anemia


Status:  Acute


Assessment & Plan:  : Iron deficient, will consider Fe replacement when 

patient stablizes





(11) Pneumonia


Status:  Acute


Qualifiers:  


   Qualified Codes:  J18.9 - Pneumonia, unspecified organism


(12) DVT prophylaxis


Status:  Acute


Assessment & Plan:  : On Lovenox, stopped today due to GI bleeding


4/15: Continues off Lovenox, SCDs





13) Meth use


(14) AF w/RVR s/p cardioversion successful


(15) MRSA sputum placed on Vanc Day # 2 today 5/3/20





Plan:


PT OT


Diazd Santacruz


IRF? SNF? refuses both will DC tomorrow on HH





(1) Acute respiratory failure with hypoxia and hypercapnia


Status:  Acute


(2) COPD (chronic obstructive pulmonary disease)


Status:  Chronic


Qualifiers:  


   Qualified Codes:  J44.9 - Chronic obstructive pulmonary disease, unspecified


(3) CAD (coronary artery disease)


Status:  Chronic


Qualifiers:  


   Qualified Codes:  I25.10 - Atherosclerotic heart disease of native coronary 

artery without angina pectoris


(4) Normocytic anemia


Status:  Acute


(5) Hypertension


Status:  Chronic


Qualifiers:  


   Qualified Codes:  I10 - Essential (primary) hypertension


(6) Pneumonia


Status:  Acute


Qualifiers:  


   Qualified Codes:  J18.9 - Pneumonia, unspecified organism


(7) Diabetes type 2, uncontrolled


Status:  Chronic


Qualifiers:  


   Qualified Codes:  E11.65 - Type 2 diabetes mellitus with hyperglycemia


(8) Acute kidney injury superimposed on chronic kidney disease


Status:  Resolved


(9) Atrial flutter


(10) NSTEMI (non-ST elevation myocardial infarction)


Status:  Resolved





Clinical Quality Measures


DVT/VTE Risk/Contraindication:


Risk Factor Score Per Nursin


RFS Level Per Nursing on Admit:  4+=Very High











JASEN DAY DO                 May 4, 2020 10:30

## 2020-05-04 NOTE — CARDIOLOGY PROGRESS NOTE
Cardiology SOAP Progress Note


Subjective:


Improved shortness of breath.





Objective:


I&O/Vital Signs











 5/4/20 5/4/20 5/4/20 5/4/20





 00:07 07:16 08:00 08:00


 


Temp 37.3   36.8


 


Pulse 68   96


 


Resp 18   20


 


B/P (MAP) 101/65 (77)   134/72 (92)


 


Pulse Ox 96 90  95


 


O2 Delivery Nasal Cannula Room Air Room Air Nasal Cannula


 


O2 Flow Rate 1.00   1.00














 5/4/20





 00:00


 


Intake Total 1000 ml


 


Balance 1000 ml








Weight (Pounds):  206


Weight (Ounces):  0.8


Weight (Calculated Kilograms):  93.594672


Constitutional:  AAO x 3, apparent distress, PERRL, well-developed


Respiratory:  No accessory muscle use; chest is bilaterally symmetric, lungs 

clear to auscultation


Cardiovascular:  regular rate-rhythm, bradycardia, S1 and S2, systolic murmur 

(soft BUTCH at card base)


Gastrointestional:  soft, hernia (abdominal), audible bowel sounds, other 

(mildly distended)


Extremities:  swelling (mild edema), other (R foot in dressing that wasn't 

removed); No clubbing, No cyanosis


Neurologic/Psychiatric:  no motor/sensory deficits, alert, normal mood/affect, 

oriented x 3


Skin:  No rash on exposed areas, No ulcerations on exposed areas; other 

(dressing to right foot, D&I)





Results/Procedures:


Labs


Laboratory Tests


5/3/20 16:16: Glucometer 347H


5/3/20 20:15: Glucometer 213H


5/3/20 21:00: Vancomycin Level Trough 26.5*H


5/4/20 03:20: 


Glucometer 121H, White Blood Count 9.2, Red Blood Count 3.09L, Hemoglobin 9.3L, 

Hematocrit 29L, Mean Corpuscular Volume 95, Mean Corpuscular Hemoglobin 30, Mean

Corpuscular Hemoglobin Concent 32, Red Cell Distribution Width 16.5H, Platelet 

Count 93L, Mean Platelet Volume 11.7H, Neutrophils (%) (Auto) 80H, Lymphocytes 

(%) (Auto) 14, Monocytes (%) (Auto) 5, Eosinophils (%) (Auto) 1, Basophils (%) 

(Auto) 0, Neutrophils # (Auto) 7.3, Lymphocytes # (Auto) 1.3, Monocytes # (Auto)

0.5, Eosinophils # (Auto) 0.1, Basophils # (Auto) 0.0, Sodium Level 136, 

Potassium Level 3.9, Chloride Level 102, Carbon Dioxide Level 26, Anion Gap 8, 

Blood Urea Nitrogen 22H, Creatinine 0.71, Estimat Glomerular Filtration Rate > 

60, BUN/Creatinine Ratio 31, Glucose Level 124H, Calcium Level 7.9L, Phosphorus 

Level 2.3, Magnesium Level 2.5H


5/4/20 03:30: 


B-Type Natriuretic Peptide 70.6, Vancomycin Level Trough 18.4


5/4/20 10:56: Glucometer 167H





Microbiology


4/29/20 Gram Stain - Final, Complete


          


4/29/20 Sputum Culture - Final, Complete


          Usual upper respiratory claudia


          Staphylococcus aureus


4/13/20 Catheter Tip Culture - Final, Complete


          No growth


4/12/20 Urine Culture - Final, Complete


          NO GROWTH








A/P:


Assessment/Dx:


Acute resp failure and shock of undetermined etiology;  Resolved.  Improving 

gradually.





Sinus bradycardia.  Low dose metoprolol.





Atrial flutter with 2-1 AV block ; status post cardioversion earlier during the 

admission..  Currently in sinus rhythm.  Continue amiodarone and Eliquis.





Mild acute on chronic diastolic congestive heart failure.  Agree with IV 

diuretics.





Cannot exclude pulmonary embolism. Was started on treatment dose Lovenox at 

admission, but being stopped on 4/15/20 due to falling H & H and falling 

platelet count.  However due to atrial fibrillation/atrial flutter, the patient 

is started on Eliquis.





Acute renal failure (AYAH-3) likely due to shock - resolved





Mild troponin elevation, likely type-2 MI due to reasons noted above





Drug test (+) for Meth on 4-





CAD. Card cath of Jan 2017 showed moderate, nonobstructive disease and LVEF 55% 

and elevated LVEDP. MPI of 2/27/20 by Dr Dai showed no ischemia or infarction

and normal LV function





Echo on 4/14/20: LVEF 45%, grade 1 fajardo dysfunction, mild to mod TR, RVSP 29 

mmHg





Chronic tobacco use (smokes cigarettes according to previous records)





H/o DM II





H/o Htn





H/o Hyperlipidemia





PAD.  Peripheral angiogram and intervention on 8/22/2019 by Dr Dai with PTA 

of the right AT, PT and stenting of the right SFA. Significant improvement of 

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


Plan:








* Complex management


* Atrial flutter with 2-1 AV block; no response with IV amiodarone and Cardizem.

   Informed consent was taken from the son for transesophageal echocardiogram 

  assisted cardioversion.  Risk of damage to the esophagus and stroke was 

  discussed.  The son accepted and gave informed consent.  Transesophageal 

  echocardiogram was done on 4/20/2020 which showed no left atrium or left 

  atrial appendage thrombus.  Cardioversion was performed with 200 J which 

  converted the patient to sinus rhythm.


* Patient continues to be in sinus rhythm since cardioversion.


* Patient has refused inpatient rehabilitation as well as going to a nursing 

  home.  He will likely be discharged and follow-up in the office in one to 2 

  weeks.








Thank you for your consultation. Please call me if you have any questions.








WOODY Dai MD, FACP, FACC, FSCAI, FHRS, CCDS


Interventional Cardiology


Cardiac Electrophysiology


Vascular Medicine and Endovascular Interventions











ELISE DAI MD              May 4, 2020 12:00

## 2020-05-05 NOTE — PHYSICIAN QUERY CLARIFICATION
PQ-Further Specificity


Admission/Discharge


Admission Date: Apr 12, 2020 at 21:51 


Discharge Date:  May 4, 2020 at 12:10





The medical record reflects the following clinical scenario:





History/Risk Factors:


sepsis, respiratory failure, pneumonia





Clinical Findings:


4/13 CXR - Stable cardiac enlargement without overt pulmonary


edema or infiltrate. T 35.7, P 96, R 10, BP 78/52, WBC 14.2, 





Treatment:


IV Zosyn, IV Vancomycin





Question:  Can you further specify pneumonia per the clinical indicators above? 


Please document a response in the Progress Notes or Discharge Summary.





1. Aspiration pneumonia





2. Pneumonia unspecified





3. MRSA pneumonia present on admission or developed after admission?





4. Other, with explanation of the clinical findings.





5. Clinically undetermined, no explanation for the clinical findings.





PHYSICIAN RESPONSE


Can you specify per above:  Other, explanation/clinical finding


Please remember a lack of response to the above will prompt a phone page by 

CDI/Coding staff. 





In responding to this query, please exercise your independent professional 

judgment.  The purpose of this communication is to more accurately reflect the 

complexity of your patients condition. The fact that a question is asked does 

not imply that any particular answer is desired or expected.  





Thank you for your timely response to this clarification.      


   


Requestors name: Dorothy   





Phone # [phone]








THIS PHYSICIAN QUERY FORM IS A PERMANENT PART OF THE MEDICAL RECORD











DOROTHY SUMMERS                  May 5, 2020 10:54


JASEN DAY DO                 May 5, 2020 19:08

## 2020-05-11 ENCOUNTER — HOSPITAL ENCOUNTER (OUTPATIENT)
Dept: HOSPITAL 75 - WOUNDCARE | Age: 70
End: 2020-05-11
Attending: SURGERY
Payer: MEDICAID

## 2020-05-11 DIAGNOSIS — J44.9: ICD-10-CM

## 2020-05-11 DIAGNOSIS — I70.234: ICD-10-CM

## 2020-05-11 DIAGNOSIS — Z89.411: ICD-10-CM

## 2020-05-11 DIAGNOSIS — E11.51: ICD-10-CM

## 2020-05-11 DIAGNOSIS — I10: ICD-10-CM

## 2020-05-11 DIAGNOSIS — M19.91: ICD-10-CM

## 2020-05-11 DIAGNOSIS — L97.412: ICD-10-CM

## 2020-05-11 DIAGNOSIS — E11.621: Primary | ICD-10-CM

## 2020-05-11 DIAGNOSIS — I47.1: ICD-10-CM

## 2020-05-11 DIAGNOSIS — E11.42: ICD-10-CM

## 2020-05-11 DIAGNOSIS — Z92.21: ICD-10-CM

## 2020-05-11 DIAGNOSIS — Z85.46: ICD-10-CM

## 2020-05-11 DIAGNOSIS — T81.31XA: ICD-10-CM

## 2020-05-11 DIAGNOSIS — Z95.5: ICD-10-CM

## 2020-05-11 DIAGNOSIS — I25.10: ICD-10-CM

## 2020-05-11 PROCEDURE — 11042 DBRDMT SUBQ TIS 1ST 20SQCM/<: CPT

## 2020-05-18 ENCOUNTER — HOSPITAL ENCOUNTER (OUTPATIENT)
Dept: HOSPITAL 75 - WOUNDCARE | Age: 70
End: 2020-05-18
Attending: SURGERY
Payer: MEDICAID

## 2020-05-18 DIAGNOSIS — T81.31XA: ICD-10-CM

## 2020-05-18 DIAGNOSIS — Z89.411: ICD-10-CM

## 2020-05-18 DIAGNOSIS — E78.5: ICD-10-CM

## 2020-05-18 DIAGNOSIS — E11.621: Primary | ICD-10-CM

## 2020-05-18 DIAGNOSIS — Z95.5: ICD-10-CM

## 2020-05-18 DIAGNOSIS — J44.9: ICD-10-CM

## 2020-05-18 DIAGNOSIS — L97.412: ICD-10-CM

## 2020-05-18 DIAGNOSIS — M19.91: ICD-10-CM

## 2020-05-18 DIAGNOSIS — Z85.46: ICD-10-CM

## 2020-05-18 DIAGNOSIS — I70.234: ICD-10-CM

## 2020-05-18 DIAGNOSIS — Z87.891: ICD-10-CM

## 2020-05-18 DIAGNOSIS — E11.42: ICD-10-CM

## 2020-05-18 DIAGNOSIS — I10: ICD-10-CM

## 2020-05-18 DIAGNOSIS — I47.1: ICD-10-CM

## 2020-05-18 DIAGNOSIS — I25.10: ICD-10-CM

## 2020-05-18 DIAGNOSIS — E11.51: ICD-10-CM

## 2020-05-18 DIAGNOSIS — Z92.21: ICD-10-CM

## 2020-05-18 PROCEDURE — 11042 DBRDMT SUBQ TIS 1ST 20SQCM/<: CPT

## 2020-05-21 ENCOUNTER — HOSPITAL ENCOUNTER (EMERGENCY)
Dept: HOSPITAL 75 - ER | Age: 70
Discharge: HOME | End: 2020-05-21
Payer: MEDICAID

## 2020-05-21 ENCOUNTER — HOSPITAL ENCOUNTER (OUTPATIENT)
Dept: HOSPITAL 75 - LAB FS | Age: 70
End: 2020-05-21
Attending: INTERNAL MEDICINE
Payer: MEDICAID

## 2020-05-21 VITALS — BODY MASS INDEX: 30.73 KG/M2 | HEIGHT: 67.72 IN | WEIGHT: 200.4 LBS

## 2020-05-21 VITALS — DIASTOLIC BLOOD PRESSURE: 72 MMHG | SYSTOLIC BLOOD PRESSURE: 105 MMHG

## 2020-05-21 DIAGNOSIS — J44.9: ICD-10-CM

## 2020-05-21 DIAGNOSIS — I25.10: ICD-10-CM

## 2020-05-21 DIAGNOSIS — Z79.84: ICD-10-CM

## 2020-05-21 DIAGNOSIS — Z82.49: ICD-10-CM

## 2020-05-21 DIAGNOSIS — Z01.89: Primary | ICD-10-CM

## 2020-05-21 DIAGNOSIS — Z79.51: ICD-10-CM

## 2020-05-21 DIAGNOSIS — E86.9: ICD-10-CM

## 2020-05-21 DIAGNOSIS — Z95.5: ICD-10-CM

## 2020-05-21 DIAGNOSIS — F17.210: ICD-10-CM

## 2020-05-21 DIAGNOSIS — E11.9: ICD-10-CM

## 2020-05-21 DIAGNOSIS — Z79.01: ICD-10-CM

## 2020-05-21 DIAGNOSIS — Z80.0: ICD-10-CM

## 2020-05-21 DIAGNOSIS — Z86.73: ICD-10-CM

## 2020-05-21 DIAGNOSIS — E83.42: ICD-10-CM

## 2020-05-21 DIAGNOSIS — Z85.46: ICD-10-CM

## 2020-05-21 DIAGNOSIS — R60.0: Primary | ICD-10-CM

## 2020-05-21 DIAGNOSIS — I10: ICD-10-CM

## 2020-05-21 LAB
ALBUMIN SERPL-MCNC: 3.1 GM/DL (ref 3.2–4.5)
ALBUMIN SERPL-MCNC: 3.3 GM/DL (ref 3.2–4.5)
ALP SERPL-CCNC: 100 U/L (ref 40–136)
ALP SERPL-CCNC: 90 U/L (ref 40–136)
ALT SERPL-CCNC: 23 U/L (ref 0–55)
ALT SERPL-CCNC: 26 U/L (ref 0–55)
APTT PPP: YELLOW S
BACTERIA #/AREA URNS HPF: NEGATIVE /HPF
BARBITURATES UR QL: NEGATIVE
BASOPHILS # BLD AUTO: 0 10^3/UL (ref 0–0.1)
BASOPHILS # BLD AUTO: 0 10^3/UL (ref 0–0.1)
BASOPHILS NFR BLD AUTO: 0 % (ref 0–10)
BASOPHILS NFR BLD AUTO: 1 % (ref 0–10)
BENZODIAZ UR QL SCN: NEGATIVE
BILIRUB SERPL-MCNC: 0.2 MG/DL (ref 0.1–1)
BILIRUB SERPL-MCNC: 0.2 MG/DL (ref 0.1–1)
BILIRUB UR QL STRIP: NEGATIVE
BUN/CREAT SERPL: 30
BUN/CREAT SERPL: 31
CALCIUM SERPL-MCNC: 7.8 MG/DL (ref 8.5–10.1)
CALCIUM SERPL-MCNC: 7.8 MG/DL (ref 8.5–10.1)
CHLORIDE SERPL-SCNC: 112 MMOL/L (ref 98–107)
CHLORIDE SERPL-SCNC: 115 MMOL/L (ref 98–107)
CO2 SERPL-SCNC: 20 MMOL/L (ref 21–32)
CO2 SERPL-SCNC: 20 MMOL/L (ref 21–32)
COCAINE UR QL: NEGATIVE
CREAT SERPL-MCNC: 0.93 MG/DL (ref 0.6–1.3)
CREAT SERPL-MCNC: 1 MG/DL (ref 0.6–1.3)
EOSINOPHIL # BLD AUTO: 0.1 10^3/UL (ref 0–0.3)
EOSINOPHIL # BLD AUTO: 0.1 10^3/UL (ref 0–0.3)
EOSINOPHIL NFR BLD AUTO: 1 % (ref 0–10)
EOSINOPHIL NFR BLD AUTO: 1 % (ref 0–10)
ERYTHROCYTE [DISTWIDTH] IN BLOOD BY AUTOMATED COUNT: 20.4 % (ref 10–14.5)
ERYTHROCYTE [DISTWIDTH] IN BLOOD BY AUTOMATED COUNT: 20.6 % (ref 10–14.5)
FIBRINOGEN PPP-MCNC: CLEAR MG/DL
GFR SERPLBLD BASED ON 1.73 SQ M-ARVRAT: > 60 ML/MIN
GFR SERPLBLD BASED ON 1.73 SQ M-ARVRAT: > 60 ML/MIN
GLUCOSE SERPL-MCNC: 110 MG/DL (ref 70–105)
GLUCOSE SERPL-MCNC: 63 MG/DL (ref 70–105)
GLUCOSE UR STRIP-MCNC: NEGATIVE MG/DL
HCT VFR BLD CALC: 28 % (ref 40–54)
HCT VFR BLD CALC: 29 % (ref 40–54)
HGB BLD-MCNC: 8.4 G/DL (ref 13.3–17.7)
HGB BLD-MCNC: 8.9 G/DL (ref 13.3–17.7)
HYALINE CASTS #/AREA URNS LPF: (no result) /LPF
KETONES UR QL STRIP: NEGATIVE
LEUKOCYTE ESTERASE UR QL STRIP: NEGATIVE
LYMPHOCYTES # BLD AUTO: 1.6 X 10^3 (ref 1–4)
LYMPHOCYTES # BLD AUTO: 2 X 10^3 (ref 1–4)
LYMPHOCYTES NFR BLD AUTO: 21 % (ref 12–44)
LYMPHOCYTES NFR BLD AUTO: 23 % (ref 12–44)
MAGNESIUM SERPL-MCNC: 1.3 MG/DL (ref 1.6–2.4)
MANUAL DIFFERENTIAL PERFORMED BLD QL: NO
MANUAL DIFFERENTIAL PERFORMED BLD QL: NO
MCH RBC QN AUTO: 31 PG (ref 25–34)
MCH RBC QN AUTO: 31 PG (ref 25–34)
MCHC RBC AUTO-ENTMCNC: 30 G/DL (ref 32–36)
MCHC RBC AUTO-ENTMCNC: 31 G/DL (ref 32–36)
MCV RBC AUTO: 100 FL (ref 80–99)
MCV RBC AUTO: 102 FL (ref 80–99)
METHADONE UR QL SCN: NEGATIVE
METHAMPHETAMINE SCREEN URINE S: NEGATIVE
MONOCYTES # BLD AUTO: 0.6 X 10^3 (ref 0–1)
MONOCYTES # BLD AUTO: 0.6 X 10^3 (ref 0–1)
MONOCYTES NFR BLD AUTO: 7 % (ref 0–12)
MONOCYTES NFR BLD AUTO: 7 % (ref 0–12)
NEUTROPHILS # BLD AUTO: 5.4 X 10^3 (ref 1.8–7.8)
NEUTROPHILS # BLD AUTO: 5.8 X 10^3 (ref 1.8–7.8)
NEUTROPHILS NFR BLD AUTO: 68 % (ref 42–75)
NEUTROPHILS NFR BLD AUTO: 69 % (ref 42–75)
NITRITE UR QL STRIP: NEGATIVE
OPIATES UR QL SCN: NEGATIVE
OXYCODONE UR QL: NEGATIVE
PH UR STRIP: 5 [PH] (ref 5–9)
PLATELET # BLD: 115 10^3/UL (ref 130–400)
PLATELET # BLD: 129 10^3/UL (ref 130–400)
PMV BLD AUTO: (no result) FL (ref 7.4–10.4)
PMV BLD AUTO: 14.1 FL (ref 7.4–10.4)
POTASSIUM SERPL-SCNC: 3.5 MMOL/L (ref 3.6–5)
POTASSIUM SERPL-SCNC: 3.7 MMOL/L (ref 3.6–5)
PROPOXYPH UR QL: NEGATIVE
PROT SERPL-MCNC: 5.7 GM/DL (ref 6.4–8.2)
PROT SERPL-MCNC: 6 GM/DL (ref 6.4–8.2)
PROT UR QL STRIP: NEGATIVE
RBC #/AREA URNS HPF: (no result) /HPF
SODIUM SERPL-SCNC: 146 MMOL/L (ref 135–145)
SODIUM SERPL-SCNC: 148 MMOL/L (ref 135–145)
SP GR UR STRIP: 1.01 (ref 1.02–1.02)
T4 FREE SERPL-MCNC: 0.88 NG/DL (ref 0.7–1.48)
TRICYCLICS UR QL SCN: NEGATIVE
WBC # BLD AUTO: 7.8 10^3/UL (ref 4.3–11)
WBC # BLD AUTO: 8.6 10^3/UL (ref 4.3–11)
WBC #/AREA URNS HPF: (no result) /HPF

## 2020-05-21 PROCEDURE — 80053 COMPREHEN METABOLIC PANEL: CPT

## 2020-05-21 PROCEDURE — 83735 ASSAY OF MAGNESIUM: CPT

## 2020-05-21 PROCEDURE — 71045 X-RAY EXAM CHEST 1 VIEW: CPT

## 2020-05-21 PROCEDURE — 83880 ASSAY OF NATRIURETIC PEPTIDE: CPT

## 2020-05-21 PROCEDURE — 85025 COMPLETE CBC W/AUTO DIFF WBC: CPT

## 2020-05-21 PROCEDURE — 84439 ASSAY OF FREE THYROXINE: CPT

## 2020-05-21 PROCEDURE — 80306 DRUG TEST PRSMV INSTRMNT: CPT

## 2020-05-21 PROCEDURE — 36415 COLL VENOUS BLD VENIPUNCTURE: CPT

## 2020-05-21 PROCEDURE — 82962 GLUCOSE BLOOD TEST: CPT

## 2020-05-21 PROCEDURE — 83036 HEMOGLOBIN GLYCOSYLATED A1C: CPT

## 2020-05-21 PROCEDURE — 81000 URINALYSIS NONAUTO W/SCOPE: CPT

## 2020-05-21 PROCEDURE — 84443 ASSAY THYROID STIM HORMONE: CPT

## 2020-05-21 PROCEDURE — 93005 ELECTROCARDIOGRAM TRACING: CPT

## 2020-05-21 RX ADMIN — MAGNESIUM SULFATE IN DEXTROSE SCH MLS/HR: 10 INJECTION, SOLUTION INTRAVENOUS at 14:28

## 2020-05-21 RX ADMIN — MAGNESIUM SULFATE IN DEXTROSE SCH MLS/HR: 10 INJECTION, SOLUTION INTRAVENOUS at 15:28

## 2020-05-21 NOTE — ED GENERAL
General


Chief Complaint:  Glucose Problems


Stated Complaint:  BLOOD SUGAR ISSUES,LEG SWELLING


Nursing Triage Note:  


pt presents to ed with complaints of high blood sugars and increased swelling in




his legs. pt reports he was recently dishcharged from the hospital.


Nursing Sepsis Screen:  No Definite Risk


Source of Information:  Patient


Exam Limitations:  No Limitations





History of Present Illness


Date Seen by Provider:  May 21, 2020


Time Seen by Provider:  13:37


Initial Comments


To ER with c/o increased swelling to BLE for about 1 week. No fevers, no chills,

no cough. No shortness of breath, no chest pain. On lasix 20mg daily. Recently 

admitted to hospital for a-fib, acute renal failure. Also underwent 

cardioversion for a-flutter with successful conversion to NSR two days ago.


Timing/Duration:  1-2 Days


Severity:  Moderate


Associated Systoms:  Weakness





Allergies and Home Medications


Allergies


Coded Allergies:  


     No Known Drug Allergies (Unverified , 17)





Home Medications


Amiodarone HCl 200 Mg Tablet, 200 MG PO DAILY


   Prescribed by: JASEN DAY on 20 1027


Apixaban 5 Mg Tablet, 5 MG PO Q12H


   Prescribed by: JASEN DAY on 20 1027


Fluticasone/Salmeterol 12 Gm Hfa.aer.ad, 1 PUFF IH RTBID


   Prescribed by: JASEN DAY on 20 1027


Glipizide 5 Mg Tablet, 2.5 MG PO DAILY


   Prescribed by: JASEN DAY on 20 1027


Ipratropium/Albuterol Sulfate 3 Ml Ampul.neb, 3 ML IH BID PRN for SHORTNESS OF 

BREATH, (Reported)


Linagliptin 5 Mg Tablet, 5 MG PO DAILY, (Reported)


   LAST FILLED 2020 #30/30 DAY SUPPLY 


Metoprolol Tartrate 50 Mg Tablet, 12.5 MG PO BID


   Prescribed by: JASEN DAY on 20 1027


Multivitamin 1 Each Tablet, 1 TAB PO DAILY, (Reported)


Nitroglycerin 0.4 Mg Tab.subl, 0.4 MG SL UD PRN for CHEST PAIN, (Reported)


Nortriptyline HCl 50 Mg Capsule, 50 MG PO HS, (Reported)


Oxycodone HCl/Acetaminophen 1 Each Tablet, 1 EACH PO QID PRN for PAIN-MODERATE


   Prescribed by: JASEN DAY on 20 1028


Pantoprazole Sodium 40 Mg Tablet.dr, 40 MG PO BID


   Prescribed by: JASEN DAY on 20 1027


Pregabalin 150 Mg Capsule, 150 MG PO TID, (Reported)


Sitagliptin Phosphate 100 Mg Tablet, 100 MG PO DAILY, (Reported)


Umeclidinium Bromide 62.5 Mcg Blst.w.dev, 1 INH IH DAILY@0800


   Prescribed by: JASEN DAY on 20 1027





Patient Home Medication List


Home Medication List Reviewed:  Yes





Review of Systems


Review of Systems


Constitutional:  see HPI


EENTM:  see HPI


Respiratory:  no symptoms reported


Cardiovascular:  see HPI; No chest pain; edema


Genitourinary:  no symptoms reported


Musculoskeletal:  no symptoms reported


Skin:  no symptoms reported


Psychiatric/Neurological:  No Symptoms Reported


Hematologic/Lymphatic:  No Symptoms Reported





Past Medical-Social-Family Hx


Patient Social History


Alcohol Use:  Denies Use


Number of Drinks Today:  AA


Alcohol Beverage of Choice:  Beer


Recreational Drug Use:  No


Smoking Status:  Current Everyday Smoker


Type Used:  Cigarettes


2nd Hand Smoke Exposure:  Yes


Recent Foreign Travel:  No


Contact w/Someone Who Travel:  No


Recent Infectious Disease Expo:  No


Recent Hopitalizations:  No


Physical Abuse:  No


Sexual Abuse:  No


Mistreated:  No


Fear:  No





Immunizations Up To Date


Tetanus Booster (TDap):  Unknown


PED Vaccines UTD:  No


Date of Pneumonia Vaccine:  Aug 22, 2016


Date of Influenza Vaccine:  Dec 1, 2019





Seasonal Allergies


Seasonal Allergies:  No





Past Medical History


Surgeries:  Yes (Liver biopsy and stent placement)


Appendectomy, Coronary Stent, Gallbladder


Respiratory:  No


COPD


Currently Using CPAP:  No


Currently Using BIPAP:  No


Cardiac:  Yes (cardiac cath w/ balloon)


Coronary Artery Disease, High Cholesterol, Hypertension


Neurological:  Yes ()


Stroke


Reproductive Disorders:  No


Sexually Transmitted Disease:  No


HIV/AIDS:  No


Genitourinary:  No


Prostate Problems


Gastrointestinal:  Yes (cholecystectomy and liver biopsy)


Gall Bladder Disease


Musculoskeletal:  Yes


Arthritis


Endocrine:  Yes


Diabetes, Non-Insulin dep


HEENT:  Yes


Cataract


Loss of Vision:  Right


Cancer:  Yes


Prostate


Did You Recieve Any Treatments:  Yes


What Type of Treatment Did You:  Surgical Intervention


Psychosocial:  No


Integumentary:  Yes (R foot wound )


Blood Disorders:  No





Family Medical History





Cardiovascular disease


  G8 SISTER


Cataracts


  19 MOTHER


Colon cancer


  19 MOTHER


Completed stroke


  19 MOTHER


Diabetes mellitus


  G8 SISTER


Drug abuse


  G8 BROTHER


Hypercholesterolemia


  G8 BROTHER


Hypertension


  G8 BROTHER


  G8 SISTER


Myocardial infarction


  19 FATHER


  19 MOTHER


Respiratory disorder


  19 FATHER


  19 MOTHER


  G8 BROTHER


  G8 BROTHER


  G8 SISTER


  G8 SISTER


No Pertinent Family Hx, Diabetes





Physical Exam


Vital Signs





Vital Signs - First Documented








 20





 13:19


 


Temp 36.4


 


Pulse 83


 


Resp 20


 


B/P (MAP) 103/79 (87)


 


Pulse Ox 96





Capillary Refill : Less Than 3 Seconds


Height, Weight, BMI


Height: 5'8.50"


Weight: 206lbs. 0.8oz. 93.647067sz; 30.00 BMI


Method:Stated


General Appearance:  No Apparent Distress, WD/WN


Eyes:  Bilateral Eye Normal Inspection, Bilateral Eye PERRL


HEENT:  PERRL/EOMI, TMs Normal


Neck:  Full Range of Motion, Normal Inspection


Respiratory:  No Accessory Muscle Use, No Respiratory Distress


Cardiovascular:  Regular Rate, Rhythm, Normal Peripheral Pulses


Gastrointestinal:  Normal Bowel Sounds, Non Tender, Soft


Extremity:  Normal Capillary Refill, Swelling (3+ pitting edema up to knees. )


Neurologic/Psychiatric:  Alert, Oriented x3


Skin:  Normal Color, Warm/Dry





Procedures/Interventions


Date of ETT Placement:  2020


Time of ETT Placement:  1511





Progress/Results/Core Measures


Suspected Sepsis


Recent Fever Within 48 Hours:  No


Infection Criteria Present:  None


New/Unexplained  Altered Menta:  No


Sepsis Screen:  No Definite Risk


SIRS


Temperature: 


Pulse: 83 


Respiratory Rate: 20


 


Laboratory Tests


20 13:13: White Blood Count 8.6


Blood Pressure 103 /79 


Mean: 87


 


Laboratory Tests


20 13:13: 


Creatinine 0.93, Platelet Count 129L, Total Bilirubin 0.2








Results/Orders


Lab Results





Laboratory Tests








Test


 20


13:09 20


13:13 20


13:47 Range/Units


 


 


Glucometer 77      MG/DL


 


White Blood Count


 


 8.6 


 


 4.3-11.0


10^3/uL


 


Red Blood Count


 


 2.92 L


 


 4.35-5.85


10^6/uL


 


Hemoglobin  8.9 L  13.3-17.7  G/DL


 


Hematocrit  29 L  40-54  %


 


Mean Corpuscular Volume  100 H  80-99  FL


 


Mean Corpuscular Hemoglobin  31   25-34  PG


 


Mean Corpuscular Hemoglobin


Concent 


 31 L


 


 32-36  G/DL





 


Red Cell Distribution Width  20.6 H  10.0-14.5  %


 


Platelet Count


 


 129 L


 


 130-400


10^3/uL


 


Mean Platelet Volume     7.4-10.4  FL


 


Neutrophils (%) (Auto)  68   42-75  %


 


Lymphocytes (%) (Auto)  23   12-44  %


 


Monocytes (%) (Auto)  7   0-12  %


 


Eosinophils (%) (Auto)  1   0-10  %


 


Basophils (%) (Auto)  0   0-10  %


 


Neutrophils # (Auto)  5.8   1.8-7.8  X 10^3


 


Lymphocytes # (Auto)  2.0   1.0-4.0  X 10^3


 


Monocytes # (Auto)  0.6   0.0-1.0  X 10^3


 


Eosinophils # (Auto)


 


 0.1 


 


 0.0-0.3


10^3/uL


 


Basophils # (Auto)


 


 0.0 


 


 0.0-0.1


10^3/uL


 


Sodium Level  146 H  135-145  MMOL/L


 


Potassium Level  3.5 L  3.6-5.0  MMOL/L


 


Chloride Level  115 H    MMOL/L


 


Carbon Dioxide Level  20 L  21-32  MMOL/L


 


Anion Gap  11   5-14  MMOL/L


 


Blood Urea Nitrogen  29 H  7-18  MG/DL


 


Creatinine


 


 0.93 


 


 0.60-1.30


MG/DL


 


Estimat Glomerular Filtration


Rate 


 > 60 


 


  





 


BUN/Creatinine Ratio  31    


 


Glucose Level  63 L    MG/DL


 


Calcium Level  7.8 L  8.5-10.1  MG/DL


 


Corrected Calcium  8.4 L  8.5-10.1  MG/DL


 


Magnesium Level  1.3 L  1.6-2.4  MG/DL


 


Total Bilirubin  0.2   0.1-1.0  MG/DL


 


Aspartate Amino Transf


(AST/SGOT) 


 16 


 


 5-34  U/L





 


Alanine Aminotransferase


(ALT/SGPT) 


 26 


 


 0-55  U/L





 


Alkaline Phosphatase  90     U/L


 


B-Type Natriuretic Peptide  936.9 H  <100.0  PG/ML


 


Total Protein  6.0 L  6.4-8.2  GM/DL


 


Albumin  3.3   3.2-4.5  GM/DL


 


Thyroid Stimulating Hormone


(TSH) 


 2.39 


 


 0.35-4.94


UIU/ML


 


Free Thyroxine


 


 0.88 


 


 0.70-1.48


NG/DL


 


Urine Color   YELLOW   


 


Urine Clarity   CLEAR   


 


Urine pH   5.0  5-9  


 


Urine Specific Gravity   1.015 L 1.016-1.022  


 


Urine Protein   NEGATIVE  NEGATIVE  


 


Urine Glucose (UA)   NEGATIVE  NEGATIVE  


 


Urine Ketones   NEGATIVE  NEGATIVE  


 


Urine Nitrite   NEGATIVE  NEGATIVE  


 


Urine Bilirubin   NEGATIVE  NEGATIVE  


 


Urine Urobilinogen   0.2  < = 1.0  MG/DL


 


Urine Leukocyte Esterase   NEGATIVE  NEGATIVE  


 


Urine RBC (Auto)   NEGATIVE  NEGATIVE  


 


Urine RBC   NONE   /HPF


 


Urine WBC   NONE   /HPF


 


Urine Crystals   NONE   /LPF


 


Urine Bacteria   NEGATIVE   /HPF


 


Urine Casts   PRESENT   /LPF


 


Urine Hyaline Casts   0-2 H  /LPF


 


Urine Mucus   NEGATIVE   /LPF


 


Urine Culture Indicated   NO   


 


Urine Opiates Screen   NEGATIVE  NEGATIVE  


 


Urine Oxycodone Screen   NEGATIVE  NEGATIVE  


 


Urine Methadone Screen   NEGATIVE  NEGATIVE  


 


Urine Propoxyphene Screen   NEGATIVE  NEGATIVE  


 


Urine Barbiturates Screen   NEGATIVE  NEGATIVE  


 


Ur Tricyclic Antidepressants


Screen 


 


 NEGATIVE 


 NEGATIVE  





 


Urine Phencyclidine Screen   NEGATIVE  NEGATIVE  


 


Urine Amphetamines Screen   NEGATIVE  NEGATIVE  


 


Urine Methamphetamines Screen   NEGATIVE  NEGATIVE  


 


Urine Benzodiazepines Screen   NEGATIVE  NEGATIVE  


 


Urine Cocaine Screen   NEGATIVE  NEGATIVE  


 


Urine Cannabinoids Screen   NEGATIVE  NEGATIVE  








My Orders





Orders - ARASELI RICHARDSON APRN


BNP (20 13:28)


Cbc With Automated Diff (20 13:28)


Comprehensive Metabolic Panel (20 13:28)


Ed Iv/Invasive Line Start (20 13:28)


Chest 1 View, Ap/Pa Only (20 13:28)


Ekg Tracing (20 13:32)


Magnesium (20 13:32)


Thyroid Stimulating Hormone (20 13:32)


Free T4 (Free Thyroxine) (20 13:32)


Ua Culture If Indicated (20 13:33)


Drug Screen Stat (Urine) (20 13:33)


Ns Iv 500 Ml (Sodium Chloride 0.9%) (20 13:45)


Cho 60g/M 3snack ( Julio) (20 Lunch)


Lactated Ringers (Lr 1000 Ml Iv Solution (20 14:00)


Magnesium 1 Gm/100 Ml Ivpb (Magnesium Mcconnell (20 14:00)


Furosemide  Injection (Lasix  Injection) (20 14:30)





Medications Given in ED





Current Medications








 Medications  Dose


 Ordered  Sig/Fadumo


 Route  Start Time


 Stop Time Status Last Admin


Dose Admin


 


 Furosemide  40 mg  ONCE  ONCE


 IVP  20 14:30


 20 14:31 DC 20 15:28


40 MG








Vital Signs/I&O











 20





 13:19


 


Temp 36.4


 


Pulse 83


 


Resp 20


 


B/P (MAP) 103/79 (87)


 


Pulse Ox 96





Capillary Refill : Less Than 3 Seconds








Blood Pressure Mean:                    87











Diagnostic Imaging





   Diagonstic Imaging:  Xray


Comments


NAME:   DAVID SMITH


MED REC#:   [id number]


ACCOUNT#:   [id number]


PT STATUS:   REG ER


:   1950


PHYSICIAN:   ARASELI RICHARDSON


ADMIT DATE:   20/ER


                                   ***Draft***


Date of Exam:20





CHEST 1 VIEW, AP/PA ONLY








INDICATION: Hyperglycemia with swelling in the legs.





COMPARISON: 2020.





FINDINGS: The heart size is stable. There is minimal blunting of


the left costophrenic angle which may be from some pleural


thickening or a tiny amount of pleural fluid. This appears


similar if not less pronounced than on the prior. There has been


no adverse change and no vascular congestion.





IMPRESSION: No adverse development. Blunting of the left


costophrenic angle slightly less pronounced than on prior,


otherwise negative.





  Dictated on workstation # [id number]








Dict:   20 1351


Trans:   20 1402


 [id number]





Interpreted by:     SANDRA FRENCH


Electronically signed by:





Departure


Communication (Admissions)


Appears intravascularly volume depleted with a pressure of 90 systolic. Elevated

BUN, elevated sodium. We'll give 1 L of LR, replace magnesium.





Impression





   Primary Impression:  


   Pedal edema


   Additional Impressions:  


   Hypomagnesemia


   Volume depletion


Disposition:   HOME, SELF-CARE


Condition:  Stable





Departure-Patient Inst.


Decision time for Depature:  16:24


Referrals:  


MK LÓPEZ MD (PCP/Family)


Primary Care Physician


Patient Instructions:  Dependent Edema (DC), Low Magnesium Level (DC)





Add. Discharge Instructions:  


1. Elevate your feet as much as possible.


2. Call Dr. Dai to make an appointment for follow-up


3. Increase her Lasix dose from 20 mg a day to 40 mg a day.





All discharge instructions reviewed with patient and/or family. Voiced 

understanding.











ARASELI RICHARDSON             May 21, 2020 13:39

## 2020-05-21 NOTE — XMS REPORT
Continuity of Care Document

                             Created on: 2020



DAVID SMITH

External Reference #: [id number]

: 1950

Sex: Male



Demographics





                          Home Phone                [phone]

 

                          Preferred Language        Unknown

 

                          Marital Status            Unknown

 

                          Rastafarian Affiliation     Unknown

 

                          Race                      Unknown

 

                          Ethnic Group              Unknown





Author





                          Organization              Unknown

 

                          Address                   Unknown

 

                          Phone                     Unavailable



              



Allergies

      



             Active           Description           Code           Type         

  Severity   

                Reaction           Onset           Reported/Identified          

 

Relationship to Patient                 Clinical Status        

 

                Yes             No Known Drug Allergies           [id number]    

       Drug 

Allergy           Unknown           N/A                             2017  

      

                                                             



                  



Medications

      



There is no data.                  



Problems

      



             Date Dx Coded           Attending           Type           Code    

       

Diagnosis                               Diagnosed By        

 

                1407           YULISA DENNIS MD, Ot           

   B95.62          

                          METHICILLIN RESIS STAPH INFCT CAUSING DI              

      

 

             1407           YULISA DENNIS MD, Ot           B96

.5           

PSEUDOMONAS (MALLEI) CAUSING DISEASES CL                    

 

                1407           YULISA DENNIS MD, Ot           

   E11.42          

                          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              

      

 

                1407           YULISA DENNIS MD, Ot           

   E11.621         

                          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              

      

 

                1407           YULISA DENNIS MD, Ot           

   I70.234         

                          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              

      

 

                1407           YULISA DENNIS MD, Ot           

   L97.416         

                          NON-PRS CHR ULC OF R HEEL/MIDFT W BNE IN              

      

 

             2012           MK LÓPEZ MD                        356.4

           

POLYNEUROPATHY IDIOPATHIC PROGRESSIVE                    

 

             2012                                     356.4           POLY

NEUROPATHY 

IDIOPATHIC PROGRESSIVE                           

 

             2012           MK LÓPEZ MD                        356.4

           

POLYNEUROPATHY IDIOPATHIC PROGRESSIVE                    

 

             2012           MK LÓPEZ MD                        356.4

           

POLYNEUROPATHY IDIOPATHIC PROGRESSIVE                    

 

             2012           MK LÓPEZ MD                        356.4

           

POLYNEUROPATHY IDIOPATHIC PROGRESSIVE                    

 

             2012           MK LÓPEZ MD                        356.4

           

POLYNEUROPATHY IDIOPATHIC PROGRESSIVE                    

 

             2012           MK LÓPEZ MD                        356.4

           

POLYNEUROPATHY IDIOPATHIC PROGRESSIVE                    

 

             2012           MK LÓPEZ MD                        356.4

           

POLYNEUROPATHY IDIOPATHIC PROGRESSIVE                    

 

             2012           MK LÓPEZ MD                        356.4

           

POLYNEUROPATHY IDIOPATHIC PROGRESSIVE                    

 

             2012           MK LÓPEZ MD                        356.4

           

POLYNEUROPATHY IDIOPATHIC PROGRESSIVE                    

 

             2012           MK LÓPEZ MD                        356.4

           

POLYNEUROPATHY IDIOPATHIC PROGRESSIVE                    

 

             2012           MK LÓPEZ MD                        723.0

           

SPINAL STENOSIS IN CERVICAL REGION                    

 

             2012                                     723.0           SPIN

AL STENOSIS IN 

CERVICAL REGION                                  

 

             2012           MK LÓPEZ MD                        723.0

           

SPINAL STENOSIS IN CERVICAL REGION                    

 

             2012           MK LÓPEZ MD                        723.0

           

SPINAL STENOSIS IN CERVICAL REGION                    

 

             2012           RENE SALDIVAR, MK                        723.0

           

SPINAL STENOSIS IN CERVICAL REGION                    

 

             2012           MK LÓPEZ MD                        723.0

           

SPINAL STENOSIS IN CERVICAL REGION                    

 

             2012           RENE SALDIVAR, MK                        723.0

           

SPINAL STENOSIS IN CERVICAL REGION                    

 

             2012           MK LÓPEZ MD                        723.0

           

SPINAL STENOSIS IN CERVICAL REGION                    

 

             2012           RENE SALDIVAR, MK                        723.0

           

SPINAL STENOSIS IN CERVICAL REGION                    

 

             2012           MK LÓPEZ MD                        723.0

           

SPINAL STENOSIS IN CERVICAL REGION                    

 

             2012           MK LÓPEZ MD                        723.0

           

SPINAL STENOSIS IN CERVICAL REGION                    

 

             2012           MK LÓPEZ MD                        250.0

0           

DIABETES MELLITUS WITHOUT MENTION OF COMPLICATION TYPE II OR UNSPECIFIED TYPE 
NOT STATED AS UNCONTROLLED                       

 

             2012                                     250.00           LYNNE

BETES MELLITUS 

WITHOUT MENTION OF COMPLICATION TYPE II OR UNSPECIFIED TYPE NOT STATED AS 
UNCONTROLLED                                     

 

             2012           MK LÓPEZ MD                        250.0

0           

DIABETES MELLITUS WITHOUT MENTION OF COMPLICATION TYPE II OR UNSPECIFIED TYPE 
NOT STATED AS UNCONTROLLED                       

 

             2012           MK LÓPEZ MD                        250.0

0           

DIABETES MELLITUS WITHOUT MENTION OF COMPLICATION TYPE II OR UNSPECIFIED TYPE 
NOT STATED AS UNCONTROLLED                       

 

             2012           MK LÓPEZ MD                        250.0

0           

DIABETES MELLITUS WITHOUT MENTION OF COMPLICATION TYPE II OR UNSPECIFIED TYPE 
NOT STATED AS UNCONTROLLED                       

 

             2012           MK LÓPEZ MD                        250.0

0           

DIABETES MELLITUS WITHOUT MENTION OF COMPLICATION TYPE II OR UNSPECIFIED TYPE 
NOT STATED AS UNCONTROLLED                       

 

             2012           MK LÓPEZ MD                        250.0

0           

DIABETES MELLITUS WITHOUT MENTION OF COMPLICATION TYPE II OR UNSPECIFIED TYPE 
NOT STATED AS UNCONTROLLED                       

 

             2012           MK LÓPEZ MD                        250.0

0           

DIABETES MELLITUS WITHOUT MENTION OF COMPLICATION TYPE II OR UNSPECIFIED TYPE 
NOT STATED AS UNCONTROLLED                       

 

             2012           MK LÓPEZ MD                        250.0

0           

DIABETES MELLITUS WITHOUT MENTION OF COMPLICATION TYPE II OR UNSPECIFIED TYPE 
NOT STATED AS UNCONTROLLED                       

 

             2012           MK LÓPEZ MD                        250.0

0           

DIABETES MELLITUS WITHOUT MENTION OF COMPLICATION TYPE II OR UNSPECIFIED TYPE 
NOT STATED AS UNCONTROLLED                       

 

             2012           MK LÓPEZ MD                        250.0

0           

DIABETES MELLITUS WITHOUT MENTION OF COMPLICATION TYPE II OR UNSPECIFIED TYPE 
NOT STATED AS UNCONTROLLED                       

 

             2012           MK LÓPEZ MD                        716.9

0           

UNSPECIFIED ARTHROPATHY SITE UNSPECIFIED                    

 

             2012                                     716.90           UNS

PECIFIED 

ARTHROPATHY SITE UNSPECIFIED                     

 

             2012           MK LÓPEZ MD                        716.9

0           

UNSPECIFIED ARTHROPATHY SITE UNSPECIFIED                    

 

             2012           MK LÓPEZ MD                        716.9

0           

UNSPECIFIED ARTHROPATHY SITE UNSPECIFIED                    

 

             2012           MK LÓPEZ MD                        716.9

0           

UNSPECIFIED ARTHROPATHY SITE UNSPECIFIED                    

 

             2012           MK LÓPEZ MD                        716.9

0           

UNSPECIFIED ARTHROPATHY SITE UNSPECIFIED                    

 

             2012           MK LÓPEZ MD                        716.9

0           

UNSPECIFIED ARTHROPATHY SITE UNSPECIFIED                    

 

             2012           MK LÓPEZ MD                        716.9

0           

UNSPECIFIED ARTHROPATHY SITE UNSPECIFIED                    

 

             2012           MK LÓPEZ MD                        716.9

0           

UNSPECIFIED ARTHROPATHY SITE UNSPECIFIED                    

 

             2012           MK LÓPEZ MD                        716.9

0           

UNSPECIFIED ARTHROPATHY SITE UNSPECIFIED                    

 

             2012           MK LÓPEZ MD                        716.9

0           

UNSPECIFIED ARTHROPATHY SITE UNSPECIFIED                    

 

             10/23/2012           MK LÓPEZ MD                        443.9

           

PERIPHERAL VASCULAR DISEASE UNSPECIFIED                    

 

             10/23/2012                                     443.9           LEROY

PHERAL VASCULAR

DISEASE UNSPECIFIED                              

 

             10/23/2012           MK LÓPEZ MD                        443.9

           

PERIPHERAL VASCULAR DISEASE UNSPECIFIED                    

 

             10/23/2012           MK LÓPEZ MD                        443.9

           

PERIPHERAL VASCULAR DISEASE UNSPECIFIED                    

 

             10/23/2012           MK LÓPEZ MD                        443.9

           

PERIPHERAL VASCULAR DISEASE UNSPECIFIED                    

 

             10/23/2012           MK LÓPEZ MD                        443.9

           

PERIPHERAL VASCULAR DISEASE UNSPECIFIED                    

 

             10/23/2012           MK LÓPEZ MD                        443.9

           

PERIPHERAL VASCULAR DISEASE UNSPECIFIED                    

 

             10/23/2012           MK LÓPEZ MD                        443.9

           

PERIPHERAL VASCULAR DISEASE UNSPECIFIED                    

 

             10/23/2012           MK LÓPEZ MD                        443.9

           

PERIPHERAL VASCULAR DISEASE UNSPECIFIED                    

 

             10/23/2012           MK LÓPEZ MD                        443.9

           

PERIPHERAL VASCULAR DISEASE UNSPECIFIED                    

 

             10/23/2012           MK LÓPEZ MD                        443.9

           

PERIPHERAL VASCULAR DISEASE UNSPECIFIED                    

 

             2013           MK LÓPEZ MD                        435.9

           

UNSPECIFIED TRANSIENT CEREBRAL ISCHEMIA                    

 

             2013           RENE SALDIVAR, MK                        435.9

           

UNSPECIFIED TRANSIENT CEREBRAL ISCHEMIA                    

 

             2013           RENE SALDIVAR, MK                        435.9

           

UNSPECIFIED TRANSIENT CEREBRAL ISCHEMIA                    

 

             2013           RENE SALDIVAR, MK                        435.9

           

UNSPECIFIED TRANSIENT CEREBRAL ISCHEMIA                    

 

             2013           RENE SALDIVAR, MK                        435.9

           

UNSPECIFIED TRANSIENT CEREBRAL ISCHEMIA                    

 

             2013           RENE SALDIVAR, MK                        435.9

           

UNSPECIFIED TRANSIENT CEREBRAL ISCHEMIA                    

 

             2013           RENE SALDIVAR, MK                        435.9

           

UNSPECIFIED TRANSIENT CEREBRAL ISCHEMIA                    

 

             2013           RENE SALDIVAR, MK                        435.9

           

UNSPECIFIED TRANSIENT CEREBRAL ISCHEMIA                    

 

             10/22/2013           RENE SADLIVAR, MK                        401.9

           

UNSPECIFIED ESSENTIAL HYPERTENSION                    

 

             10/22/2013           RENE SALDIVAR, MK                        401.9

           

UNSPECIFIED ESSENTIAL HYPERTENSION                    

 

             10/22/2013           RENE SALDIVAR, MK                        401.9

           

UNSPECIFIED ESSENTIAL HYPERTENSION                    

 

             10/22/2013           RENE SALDIVAR, MK                        401.9

           

UNSPECIFIED ESSENTIAL HYPERTENSION                    

 

             10/22/2013           RENE SALDIVAR, MK                        401.9

           

UNSPECIFIED ESSENTIAL HYPERTENSION                    

 

             10/22/2013           RENE SALDIVAR, MK                        401.9

           

UNSPECIFIED ESSENTIAL HYPERTENSION                    

 

             2014           RENE SALDIVAR, MK                        724.5

           

BACKACHE UNSPECIFIED                             

 

             2014           RENE SALDIVAR, MK                        724.5

           

BACKACHE UNSPECIFIED                             

 

             2014           RENE SALDIVAR, MK                        724.5

           

BACKACHE UNSPECIFIED                             

 

             2014           RENE SALDIVAR, MK                        724.5

           

BACKACHE UNSPECIFIED                             

 

             2014           MK LÓPEZ MD                        276.5

0           

VOLUME DEPLETION UNSPECIFIED                     

 

             2014           MK LÓPEZ MD                        276.5

0           

VOLUME DEPLETION UNSPECIFIED                     

 

             2014           MK LÓPEZ MD                        276.5

0           

VOLUME DEPLETION UNSPECIFIED                     

 

             2017                        Ot           723.0           CERV

ICAL SPINAL 

STENOSIS                                         

 

             2017                        Ot           433.10           CAR

OTID ARTERY 

OCCLUSION W O CEREBRAL IN                        

 

             2017                        Ot           723.0           CERV

ICAL SPINAL 

STENOSIS                                         

 

             2017                        Ot           433.10           CAR

OTID ARTERY 

OCCLUSION W O CEREBRAL IN                        

 

                2017           ARIADNE SALDIVAR FACC, JOHNNIE MERCEDES CCDS           Ot 

             E11.9 

                          TYPE 2 DIABETES MELLITUS WITHOUT COMPLIC              

      

 

                2017           ARIADNE SALDIVAR FACBO, JOHNNIE MERCEDES CCDS           Ot 

             E78.5 

                          HYPERLIPIDEMIA, UNSPECIFIED                    

 

                2017           ARIADNE SALDIVAR FACC, ALI FACP CCDS           Ot 

             I10   

                          ESSENTIAL (PRIMARY) HYPERTENSION                    

 

                2017           ARIADNE SALDIVAR FACC, ALI FACP CCDS           Ot 

             

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              

      

 

                2017           ARIADNE LUIC, ALI FACP CCDS           Ot 

             I70.0 

                          ATHEROSCLEROSIS OF AORTA                    

 

                2017           ARIADNE SALDIVAR FACC, ALI FACP CCDS           Ot 

             J44.9 

                          CHRONIC OBSTRUCTIVE PULMONARY DISEASE, U              

      

 

                2017           ARIADNE LUIC, ALI FACP CCDS           Ot 

             R07.89

                          OTHER CHEST PAIN                    

 

                2017           ARIADNE SALDIVAR FACC, ALI FACP CCDS           Ot 

             Z72.0 

                          TOBACCO USE                        

 

                2017           ARIADNE SALDIVAR FACC, ALI FACP CCDS           Ot 

             

Z79.899                   OTHER LONG TERM (CURRENT) DRUG THERAPY                

    

 

                2017           ARIADNE SALDIVAR FACC, ALI FACP CCDS           Ot 

             Z98.61

                          CORONARY ANGIOPLASTY STATUS                    

 

                2017           ARIADNE SALDIVAR FACC, ALI FACP CCDS           Ot 

             E11.9 

                          TYPE 2 DIABETES MELLITUS WITHOUT COMPLIC              

      

 

                2017           ARIADNE SALDIVAR FACC, ALI FACP CCDS           Ot 

             E78.5 

                          HYPERLIPIDEMIA, UNSPECIFIED                    

 

                2017           ARIADNE SALDIVAR FACC, ALI FACP CCDS           Ot 

             I10   

                          ESSENTIAL (PRIMARY) HYPERTENSION                    

 

                2017           ARIADNE SALDIVAR FACC, ALI FACP CCDS           Ot 

             

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              

      

 

                2017           ARIADNE SALDIVAR FACC, ALI FACP CCDS           Ot 

             I70.0 

                          ATHEROSCLEROSIS OF AORTA                    

 

                2017           ARIADNE SALDIVAR FACC, ALI FACP CCDS           Ot 

             J44.9 

                          CHRONIC OBSTRUCTIVE PULMONARY DISEASE, U              

      

 

                2017           ARIADNE SALDIVAR FACC, ALI FACP CCDS           Ot 

             R07.89

                          OTHER CHEST PAIN                    

 

                2017           ARIADNE SALDIVAR FACC, ALI FACP CCDS           Ot 

             Z72.0 

                          TOBACCO USE                        

 

                2017           ARIADNE SALDIVAR FACC, ALI FACP CCDS           Ot 

             

Z79.899                   OTHER LONG TERM (CURRENT) DRUG THERAPY                

    

 

                2017           ARIADNE SALDIVAR FACC, ALI FACP CCDS           Ot 

             Z98.61

                          CORONARY ANGIOPLASTY STATUS                    

 

             2017                        Ot           433.10           CAR

OTID ARTERY 

OCCLUSION W O CEREBRAL IN                        

 

                2017           YAZ SALDIVAR, CHRISTINE VARGAS           Ot      

        E86.1      

                          HYPOVOLEMIA                        

 

                2017           CHRISTINE MUKHERJEE MD           Ot      

        E87.5      

                          HYPERKALEMIA                       

 

                2017           CHRISTINE MUKHERJEE MD           Ot      

        N17.9      

                          ACUTE KIDNEY FAILURE, UNSPECIFIED                    

 

                2017           CHRISTINE MUKHERJEE MD           Ot      

        R29.707    

                          NIHSS SCORE 7                      

 

                2017           CHRISTINE MUKHERJEE MD, Ot      

        R47.1      

                          DYSARTHRIA AND ANARTHRIA                    

 

                2017           CHRISTINE MUKHERJEE MD           Ot      

        R53.1      

                          WEAKNESS                           

 

                2017           CHRISTINE MUKHERJEE MD           Ot      

        Z79.82     

                          LONG TERM (CURRENT) USE OF ASPIRIN                    

 

             2017                        Ot           433.10           CAR

OTID ARTERY 

OCCLUSION W O CEREBRAL IN                        

 

             2019           YUE BEAUCHAMP MD, Ot           E11.

40           

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                    

 

                2019           YUE BEAUCHAMP MD, Ot            

  E11.621          

                          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              

      

 

             2019           YUE BEAUCHAMP MD, Ot           E11.

65           

TYPE 2 DIABETES MELLITUS WITH HYPERGLYCE                    

 

             2019           YUE BEAUCHAMP MD, Ot           E11.

69           

TYPE 2 DIABETES MELLITUS WITH OTHER SPEC                    

 

             2019           YUE BEAUCHAMP MD, Ot           E78.

00           

PURE HYPERCHOLESTEROLEMIA, UNSPECIFIED                    

 

                2019           YUE BEAUCHAMP MD, Ot            

  F17.210          

                          NICOTINE DEPENDENCE, CIGARETTES, UNCOMPL              

      

 

             2019           YUE BEAUCHAMP MD           Ot           I10 

          

ESSENTIAL (PRIMARY) HYPERTENSION                    

 

             2019           YUE BEAUCHAMP MD, Ot           I25.

10           

ATHSCL HEART DISEASE OF NATIVE CORONARY                     

 

                2019           YUE BEAUCHAMP MD           Ot            

  L02.611          

                          CUTANEOUS ABSCESS OF RIGHT FOOT                    

 

                2019           YUE BEAUCHAMP MD, Ot            

  L03.031          

                          CELLULITIS OF RIGHT TOE                    

 

                2019           YUE BEAUCHAMP MD           Ot            

  L03.115          

                          CELLULITIS OF RIGHT LOWER LIMB                    

 

                2019           YUE BEAUCHAMP MD, Ot            

  L97.516          

                          NON-PRS CHR ULC OTH PRT R FOOT WITH BNE               

      

 

                2019           YUE BEAUCHAMP MD, Ot            

  L97.519          

                          NON-PRS CHRONIC ULCER OTH PRT RIGHT FOOT              

      

 

                2019           YUE BEAUCHAMP MD, Ot            

  L97.529          

                          NON-PRESSURE CHRONIC ULCER OTH PRT LEFT               

      

 

             2019           YUE BEAUCHAMP MD, Ot           Z85.

46           

PERSONAL HISTORY OF MALIGNANT NEOPLASM O                    

 

             2019           YUE BEAUCHAMP MD, Ot           Z95.

5           

PRESENCE OF CORONARY ANGIOPLASTY IMPLANT                    

 

             2019           YUE BEAUCHAMP MD           Ot           E11.

40           

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                    

 

                2019           YUE BEAUCHAMP MD, Ot            

  E11.621          

                          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              

      

 

             2019           YUE BEAUCHAMP MD, Ot           E11.

65           

TYPE 2 DIABETES MELLITUS WITH HYPERGLYCE                    

 

             2019           YUE BEAUCHAMP MD, Ot           E11.

69           

TYPE 2 DIABETES MELLITUS WITH OTHER SPEC                    

 

             2019           YUE BEAUCHAMP MD, Ot           E78.

00           

PURE HYPERCHOLESTEROLEMIA, UNSPECIFIED                    

 

                2019           YUE BEAUCHAMP MD, Ot            

  F17.210          

                          NICOTINE DEPENDENCE, CIGARETTES, UNCOMPL              

      

 

             2019           YUE BEAUCHAMP MD, Ot           G47.

33           

OBSTRUCTIVE SLEEP APNEA (ADULT) (PEDIATR                    

 

             2019           YUE BEAUCHAMP MD, Ot           I10 

          

ESSENTIAL (PRIMARY) HYPERTENSION                    

 

             2019           YUE BEAUCHAMP MD, Ot           I25.

10           

ATHSCL HEART DISEASE OF NATIVE CORONARY                     

 

                2019           YUE BEAUCHAMP MD, Ot            

  L02.611          

                          CUTANEOUS ABSCESS OF RIGHT FOOT                    

 

                2019           YUE BEAUCHAMP MD           Ot            

  L03.031          

                          CELLULITIS OF RIGHT TOE                    

 

                2019           YUE BEAUCHAMP MD           Ot            

  L03.115          

                          CELLULITIS OF RIGHT LOWER LIMB                    

 

                2019           YUE BEAUCHAMP MD, Ot            

  L97.514          

                          NON-PRS CHRONIC ULCER OTH PRT RIGHT FOOT              

      

 

                2019           YUE BEAUCHAMP MD, Ot            

  L97.516          

                          NON-PRS CHR ULC OTH PRT R FOOT WITH BNE               

      

 

                2019           YUE BEAUCHAMP MD, Ot            

  L97.519          

                          NON-PRS CHRONIC ULCER OTH PRT RIGHT FOOT              

      

 

                2019           YUE BEAUCHAMP MD, Ot            

  L97.529          

                          NON-PRESSURE CHRONIC ULCER OTH PRT LEFT               

      

 

             2019           YUE BEAUCHAMP MD, Ot           M86.

9           

OSTEOMYELITIS, UNSPECIFIED                       

 

             2019           YUE BEAUCHAMP MD           Ot           Z85.

46           

PERSONAL HISTORY OF MALIGNANT NEOPLASM O                    

 

             2019           YUE BEAUCHAMP MD           Ot           Z95.

5           

PRESENCE OF CORONARY ANGIOPLASTY IMPLANT                    

 

                2019           YULISA DENNIS MD, Ot           

   E11.42          

                          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              

      

 

                2019           YULISA DENNIS MD, Ot           

   E11.621         

                          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              

      

 

                2019           YULISA DENNIS MD, Ot           

   L03.115         

                          CELLULITIS OF RIGHT LOWER LIMB                    

 

                2019           YULISA DENNIS MD, Ot           

   L97.513         

                          NON-PRS CHRONIC ULCER OTH PRT RIGHT FOOT              

      

 

                2019           YULISA DENNIS MD, Ot           

   E11.42          

                          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              

      

 

                2019           YULISA DENNIS MD, Ot           

   E11.52          

                          TYPE 2 DIABETES W DIABETIC PERIPHERAL AN              

      

 

                2019           YULISA DENNIS MD, Ot           

   E11.621         

                          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              

      

 

             2019           YULISA DENNIS MD, Ot           I96

           

GANGRENE, NOT ELSEWHERE CLASSIFIED                    

 

                2019           YULISA DENNIS MD, Ot           

   L97.413         

                          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              

      

 

                2019           YULISA DENNIS MD, Ot           

   E11.42          

                          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              

      

 

                2019           YULISA DENNIS MD, Ot           

   E11.52          

                          TYPE 2 DIABETES W DIABETIC PERIPHERAL AN              

      

 

                2019           YULISA DENNIS MD, Ot           

   E11.621         

                          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              

      

 

             2019           YULISA DENNIS MD, Ot           I96

           

GANGRENE, NOT ELSEWHERE CLASSIFIED                    

 

                2019           YULISA DENNIS MD, Ot           

   L97.412         

                          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              

      

 

                2019           YULISA DENNIS MD, Ot           

   M65.071         

                          ABSCESS OF TENDON SHEATH, RIGHT ANKLE AN              

      

 

                2019           YULISA DENNIS MD, Ot           

   E11.42          

                          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              

      

 

                2019           YULISA DENNIS MD, Ot           

   E11.52          

                          TYPE 2 DIABETES W DIABETIC PERIPHERAL AN              

      

 

                2019           YULISA DENNIS MD, Ot           

   E11.621         

                          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              

      

 

                2019           YULISA DENNIS MD, Ot           

   I70.261         

                          ATHSCL NATIVE ARTERIES OF EXTREMITIES W               

      

 

                2019           YULISA DENNIS MD           Ot           

   L97.412         

                          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              

      

 

                2019           YULISA DENNIS MD, Ot           

   M65.071         

                          ABSCESS OF TENDON SHEATH, RIGHT ANKLE AN              

      

 

                2019           YULISA DENNIS MD, Ot           

   E11.42          

                          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              

      

 

                2019           YULISA DENNIS MD           Ot           

   E11.621         

                          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              

      

 

                2019           YULISA DENNIS MD           Ot           

   L03.115         

                          CELLULITIS OF RIGHT LOWER LIMB                    

 

                2019           YULISA DENNIS MD, Ot           

   L97.513         

                          NON-PRS CHRONIC ULCER OTH PRT RIGHT FOOT              

      

 

                2019           YULISA DENNIS MD, Ot           

   E11.42          

                          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              

      

 

                2019           YULISA DENNIS MD, Ot           

   E11.621         

                          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              

      

 

                2019           YULISA DENNIS MD           Ot           

   L03.115         

                          CELLULITIS OF RIGHT LOWER LIMB                    

 

                2019           YULISA DENNIS MD, Ot           

   L97.513         

                          NON-PRS CHRONIC ULCER OTH PRT RIGHT FOOT              

      

 

                2019           YULISA DENNIS MD, Ot           

   E11.42          

                          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              

      

 

                2019           YULISA DENNIS MD           Ot           

   E11.52          

                          TYPE 2 DIABETES W DIABETIC PERIPHERAL AN              

      

 

                2019           YULISA DENNIS MD, Ot           

   E11.621         

                          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              

      

 

             2019           YULISA DENNIS MD           Ot           I96

           

GANGRENE, NOT ELSEWHERE CLASSIFIED                    

 

                2019           YULISA DENNIS MD           Ot           

   L97.413         

                          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              

      

 

                2019           YULISA DENNIS MD, Ot           

   E11.42          

                          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              

      

 

                2019           YULISA DENNIS MD           Ot           

   E11.52          

                          TYPE 2 DIABETES W DIABETIC PERIPHERAL AN              

      

 

                2019           YULISA DENNIS MD, Ot           

   E11.621         

                          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              

      

 

             2019           YULISA DENNIS MD, Ot           I96

           

GANGRENE, NOT ELSEWHERE CLASSIFIED                    

 

                2019           YULISA DENNIS MD           Ot           

   L97.412         

                          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              

      

 

                2019           YULISA DENNIS MD, Ot           

   M65.071         

                          ABSCESS OF TENDON SHEATH, RIGHT ANKLE AN              

      

 

                2019           YULISA DENNIS MD           Ot           

   E11.42          

                          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              

      

 

                2019           YULISA DENNIS MD, Ot           

   E11.52          

                          TYPE 2 DIABETES W DIABETIC PERIPHERAL AN              

      

 

                2019           YULISA DENNIS MD, Ot           

   E11.621         

                          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              

      

 

                2019           YULISA DENNIS MD           Ot           

   I70.261         

                          ATHSCL NATIVE ARTERIES OF EXTREMITIES W               

      

 

                2019           YULISA DENNIS MD           Ot           

   L97.412         

                          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              

      

 

                2019           YULISA DENNIS MD, Ot           

   M65.071         

                          ABSCESS OF TENDON SHEATH, RIGHT ANKLE AN              

      

 

                2019           YULISA DENNIS MD, Ot           

   E11.42          

                          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              

      

 

                2019           YULISA DENNIS MD, Ot           

   E11.52          

                          TYPE 2 DIABETES W DIABETIC PERIPHERAL AN              

      

 

                2019           YULISA DENNIS MD, Ot           

   E11.621         

                          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              

      

 

                2019           YULISA DENNIS MD, Ot           

   I70.261         

                          ATHSCL NATIVE ARTERIES OF EXTREMITIES W               

      

 

                2019           YULISA DENNIS MD, Ot           

   L97.412         

                          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              

      

 

                2019           YULISA DENNIS MD, Ot           

   M65.071         

                          ABSCESS OF TENDON SHEATH, RIGHT ANKLE AN              

      

 

                2019           YULISA DENNIS MD           Ot           

   E11.42          

                          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              

      

 

                2019           YULISA DENNIS MD, Ot           

   E11.52          

                          TYPE 2 DIABETES W DIABETIC PERIPHERAL AN              

      

 

                2019           YULISA DENNIS MD, Ot           

   E11.621         

                          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              

      

 

                2019           YULISA DENNIS MD           Ot           

   I70.234         

                          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              

      

 

             2019           YULISA DENNIS MD           Ot           I96

           

GANGRENE, NOT ELSEWHERE CLASSIFIED                    

 

                2019           YULISA DENNIS MD, Ot           

   M65.071         

                          ABSCESS OF TENDON SHEATH, RIGHT ANKLE AN              

      

 

                2019           JENNIFER SALDIVAR, ELISE RAVI           Ot           

   E11.40          

                          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              

      

 

             2019           ELISE RODRIGUEZ MD           Ot           E78

.5           

HYPERLIPIDEMIA, UNSPECIFIED                      

 

                2019           ELISE RODRIGUEZ MD           Ot           

   F17.210         

                          NICOTINE DEPENDENCE, CIGARETTES, UNCOMPL              

      

 

             2019           ELISE RODRIGUEZ MD           Ot           I11

.9           

HYPERTENSIVE HEART DISEASE WITHOUT HEART                    

 

                2019           ELISE RODRIGUEZ MD, Ot           

   I25.10          

                          ATHSCL HEART DISEASE OF NATIVE CORONARY               

      

 

             2019           EILSE RODRIGUEZ MD, Ot           I71

.4           

ABDOMINAL AORTIC ANEURYSM, WITHOUT RUPTU                    

 

             2019           ELISE RODRIGUEZ MD           Ot           I99

.8           

OTHER DISORDER OF CIRCULATORY SYSTEM                    

 

                2019           ELISE RODRIGUEZ MD           Ot           

   L97.519         

                          NON-PRS CHRONIC ULCER OTH PRT RIGHT FOOT              

      

 

                2019           ELISE RODRIGUEZ MD, Ot           

   Z79.82          

                          LONG TERM (CURRENT) USE OF ASPIRIN                    

 

                2019           ELISE RODRIGUEZ MD, Ot           

   Z79.84          

                          LONG TERM (CURRENT) USE OF ORAL HYPOGLYC              

      

 

                2019           ELISE RODRIGUEZ MD, Ot           

   Z79.899         

                          OTHER LONG TERM (CURRENT) DRUG THERAPY                

    

 

             2019           ELISE RODRIGUEZ MD, Ot           Z82

.3           

FAMILY HISTORY OF STROKE                         

 

                2019           ELISE RODRIGUEZ MD, Ot           

   Z82.49          

                          FAMILY HX OF ISCHEM HEART DIS AND OTH DI              

      

 

             2019           ELISE RODRIGUEZ MD, Ot           Z83

.3           

FAMILY HISTORY OF DIABETES MELLITUS                    

 

                2019           ELISE RODRIGUEZ MD           Ot           

   Z86.73          

                          PRSNL HX OF TIA (TIA), AND CEREB INFRC W              

      

 

             2019           ELISE RODRIGUEZ MD           Ot           E11

.9           

TYPE 2 DIABETES MELLITUS WITHOUT COMPLIC                    

 

             2019           ELISE RODRIGUEZ MD           Ot           I11

.9           

HYPERTENSIVE HEART DISEASE WITHOUT HEART                    

 

                2019           ELISE RODRIGUEZ MD           Ot           

   I25.10          

                          ATHSCL HEART DISEASE OF NATIVE CORONARY               

      

 

             2019           ELISE RODRIGUEZ MD           Ot           I63

.9           

CEREBRAL INFARCTION, UNSPECIFIED                    

 

             2019           ELISE RODRIGUEZ MD, Ot           Z72

.0           

TOBACCO USE                                      

 

                2019           ELISE RODRIGUEZ MD           Ot           

   E11.40          

                          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              

      

 

             2019           KHALID MD, M BREONNA           Ot           E78

.5           

HYPERLIPIDEMIA, UNSPECIFIED                      

 

                2019           ELISE RODRIGUEZ MD, Ot           

   F17.210         

                          NICOTINE DEPENDENCE, CIGARETTES, UNCOMPL              

      

 

             2019           ELISE RODRIGUEZ MD, Ot           I11

.9           

HYPERTENSIVE HEART DISEASE WITHOUT HEART                    

 

                2019           ELISE RODRIGUEZ MD, Ot           

   I25.10          

                          ATHSCL HEART DISEASE OF NATIVE CORONARY               

      

 

             2019           ELISE RODRIGUEZ MD           Ot           I71

.4           

ABDOMINAL AORTIC ANEURYSM, WITHOUT RUPTU                    

 

             2019           ELISE RODRIGUEZ MD, Ot           I99

.8           

OTHER DISORDER OF CIRCULATORY SYSTEM                    

 

                2019           ELISE RODRIGUEZ MD, Ot           

   L97.519         

                          NON-PRS CHRONIC ULCER OTH PRT RIGHT FOOT              

      

 

                2019           ELISE RODRIGUEZ MD, Ot           

   Z79.82          

                          LONG TERM (CURRENT) USE OF ASPIRIN                    

 

                2019           ELISE RODRIGUEZ MD, Ot           

   Z79.84          

                          LONG TERM (CURRENT) USE OF ORAL HYPOGLYC              

      

 

                2019           ELISE RODRIGUEZ MD, Ot           

   Z79.899         

                          OTHER LONG TERM (CURRENT) DRUG THERAPY                

    

 

             2019           ELISE RODRIGUEZ MD, Ot           Z82

.3           

FAMILY HISTORY OF STROKE                         

 

                2019           ELISE RODRIGUEZ MD, Ot           

   Z82.49          

                          FAMILY HX OF ISCHEM HEART DIS AND OTH DI              

      

 

             2019           ELISE RODRIGUEZ MD, Ot           Z83

.3           

FAMILY HISTORY OF DIABETES MELLITUS                    

 

                2019           ELISE RODRIGUEZ MD           Ot           

   Z86.73          

                          PRSNL HX OF TIA (TIA), AND CEREB INFRC W              

      

 

                2019           YULISA DENNIS MD           Ot           

   E11.42          

                          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              

      

 

                2019           YULISA DENNIS MD           Ot           

   E11.52          

                          TYPE 2 DIABETES W DIABETIC PERIPHERAL AN              

      

 

                2019           YULISA DENNIS MD           Ot           

   E11.621         

                          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              

      

 

                2019           YULISA DENNIS MD           Ot           

   I70.234         

                          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              

      

 

             2019           YULISA DENNIS MD           Ot           I96

           

GANGRENE, NOT ELSEWHERE CLASSIFIED                    

 

                2019           YULISA DENNIS MD, Ot           

   L97.412         

                          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              

      

 

                2019           YULISA DENNSI MD, Ot           

   M65.071         

                          ABSCESS OF TENDON SHEATH, RIGHT ANKLE AN              

      

 

                2019           YULISA DENNIS MD, Ot           

   E11.42          

                          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              

      

 

                2019           YULISA DENNIS MD, Ot           

   E11.52          

                          TYPE 2 DIABETES W DIABETIC PERIPHERAL AN              

      

 

                2019           YULISA DENNIS MD, Ot           

   E11.621         

                          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              

      

 

                2019           YULISA DENNIS MD           Ot           

   I70.234         

                          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              

      

 

             2019           YULISA DENNIS MD           Ot           I96

           

GANGRENE, NOT ELSEWHERE CLASSIFIED                    

 

                2019           YULISA DENNIS MD, Ot           

   L97.412         

                          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              

      

 

                2019           YULISA DENNIS MD, Ot           

   M65.071         

                          ABSCESS OF TENDON SHEATH, RIGHT ANKLE AN              

      

 

                2019           YULISA DENNIS MD, Ot           

   E11.42          

                          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              

      

 

                2019           YULISA DENNIS MD           Ot           

   E11.52          

                          TYPE 2 DIABETES W DIABETIC PERIPHERAL AN              

      

 

                2019           YULISA DENNIS MD           Ot           

   E11.621         

                          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              

      

 

                2019           YULISA DENNIS MD           Ot           

   I70.234         

                          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              

      

 

             2019           YULISA DENNIS MD, Ot           I96

           

GANGRENE, NOT ELSEWHERE CLASSIFIED                    

 

                2019           YULISA DENNIS MD           Ot           

   L97.412         

                          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              

      

 

                2019           YULISA DENNIS MD, Ot           

   M65.071         

                          ABSCESS OF TENDON SHEATH, RIGHT ANKLE AN              

      

 

                2019           YLUISA DENNIS MD           Ot           

   E11.42          

                          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              

      

 

                2019           YULISA DENNIS MD           Ot           

   E11.52          

                          TYPE 2 DIABETES W DIABETIC PERIPHERAL AN              

      

 

                2019           YULISA DENNIS MD           Ot           

   E11.621         

                          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              

      

 

                2019           YULISA DENNIS MD           Ot           

   I70.234         

                          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              

      

 

             2019           YULISA DNENIS MD           Ot           I96

           

GANGRENE, NOT ELSEWHERE CLASSIFIED                    

 

                2019           YULISA DENNIS MD, Ot           

   L97.412         

                          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              

      

 

                2019           YULISA DENNIS MD, Ot           

   M65.071         

                          ABSCESS OF TENDON SHEATH, RIGHT ANKLE AN              

      

 

                2019           YULISA DENNIS MD, Ot           

   E11.42          

                          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              

      

 

                2019           YULISA DENNIS MD, Ot           

   E11.621         

                          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              

      

 

                2019           YULISA DENNIS MD, Ot           

   L03.115         

                          CELLULITIS OF RIGHT LOWER LIMB                    

 

                2019           YULISA DENNIS MD, Ot           

   L97.513         

                          NON-PRS CHRONIC ULCER OTH PRT RIGHT FOOT              

      

 

                2019           YULISA DENNIS MD, Ot           

   E11.42          

                          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              

      

 

                2019           YULISA DENNIS MD, Ot           

   E11.52          

                          TYPE 2 DIABETES W DIABETIC PERIPHERAL AN              

      

 

                2019           YULISA DENNIS MD, Ot           

   E11.621         

                          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              

      

 

                2019           YULISA DENNIS MD, Ot           

   I70.261         

                          ATHSCL NATIVE ARTERIES OF EXTREMITIES W               

      

 

                2019           YULISA DENNIS MD, Ot           

   L97.412         

                          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              

      

 

                2019           YULISA DENNIS MD, Ot           

   M65.071         

                          ABSCESS OF TENDON SHEATH, RIGHT ANKLE AN              

      

 

                2019           YULISA DENNIS MD, Ot           

   E11.42          

                          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              

      

 

                2019           YULISA DENNIS MD, Ot           

   E11.52          

                          TYPE 2 DIABETES W DIABETIC PERIPHERAL AN              

      

 

                2019           YULISA DENNIS MD, Ot           

   E11.621         

                          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              

      

 

                2019           YULISA DENNIS MD, Ot           

   I70.234         

                          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              

      

 

             2019           YULISA DENNIS MD           Ot           I96

           

GANGRENE, NOT ELSEWHERE CLASSIFIED                    

 

                2019           YULISA DENNIS MD, Ot           

   M65.071         

                          ABSCESS OF TENDON SHEATH, RIGHT ANKLE AN              

      

 

                2019           YULISA DENNIS MD, Ot           

   E11.42          

                          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              

      

 

                2019           YULISA DENNIS MD, Ot           

   E11.52          

                          TYPE 2 DIABETES W DIABETIC PERIPHERAL AN              

      

 

                2019           YUILSA DENNIS MD, Ot           

   E11.621         

                          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              

      

 

                2019           YULISA DENNIS MD, Ot           

   I70.234         

                          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              

      

 

             2019           YULISA DENNIS MD, Ot           I96

           

GANGRENE, NOT ELSEWHERE CLASSIFIED                    

 

                2019           YULISA DENNIS MD, Ot           

   L97.412         

                          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              

      

 

                2019           YULISA DENNIS MD, Ot           

   M65.071         

                          ABSCESS OF TENDON SHEATH, RIGHT ANKLE AN              

      

 

                2019           YULISA DENNIS MD, Ot           

   E11.42          

                          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              

      

 

                2019           YULISA DENNIS MD, Ot           

   E11.52          

                          TYPE 2 DIABETES W DIABETIC PERIPHERAL AN              

      

 

                2019           YULISA DENNIS MD, Ot           

   E11.621         

                          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              

      

 

             2019           YULISA DENNIS MD, Ot           I96

           

GANGRENE, NOT ELSEWHERE CLASSIFIED                    

 

                2019           YULISA DENNIS MD, Ot           

   L97.412         

                          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              

      

 

                2019           YULISA DENNIS MD, Ot           

   M65.071         

                          ABSCESS OF TENDON SHEATH, RIGHT ANKLE AN              

      

 

             2019           JENNIFER SALDIVAR, ELISE RAVI           Ot           E11

.9           

TYPE 2 DIABETES MELLITUS WITHOUT COMPLIC                    

 

             2019           ELISE RODRIGUEZ MD           Ot           I11

.9           

HYPERTENSIVE HEART DISEASE WITHOUT HEART                    

 

                2019           ELISE RODRIGUEZ MD           Ot           

   I25.10          

                          ATHSCL HEART DISEASE OF NATIVE CORONARY               

      

 

             2019           ELISE RODRIGUEZ MD           Ot           I63

.9           

CEREBRAL INFARCTION, UNSPECIFIED                    

 

             2019           ELISE RODRIGUEZ MD           Ot           Z72

.0           

TOBACCO USE                                      

 

                2019           YULISA DENNIS MD, Ot           

   E11.42          

                          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              

      

 

                2019           YULISA DENNIS MD, Ot           

   E11.52          

                          TYPE 2 DIABETES W DIABETIC PERIPHERAL AN              

      

 

                2019           YULISA DENNIS MD, Ot           

   E11.621         

                          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              

      

 

                2019           YULISA DENNIS MD, Ot           

   I70.261         

                          ATHSCL NATIVE ARTERIES OF EXTREMITIES W               

      

 

                2019           YULISA DENNIS MD, Ot           

   L97.412         

                          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              

      

 

                2019           YULISA DENNIS MD, Ot           

   M65.071         

                          ABSCESS OF TENDON SHEATH, RIGHT ANKLE AN              

      

 

                2019           YULISA DENNIS MD           Ot           

   E11.42          

                          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              

      

 

                2019           YULISA DENNIS MD, Ot           

   E11.621         

                          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              

      

 

                2019           YULISA DENNIS MD           Ot           

   L03.115         

                          CELLULITIS OF RIGHT LOWER LIMB                    

 

                2019           YULISA DENNIS MD           Ot           

   L97.513         

                          NON-PRS CHRONIC ULCER OTH PRT RIGHT FOOT              

      

 

                2019           YULISA DENNIS MD           Ot           

   E11.42          

                          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              

      

 

                2019           YULISA DENNIS MD           Ot           

   E11.52          

                          TYPE 2 DIABETES W DIABETIC PERIPHERAL AN              

      

 

                2019           YULISA DENNIS MD, Ot           

   E11.621         

                          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              

      

 

             2019           YULISA DENNIS MD           Ot           I96

           

GANGRENE, NOT ELSEWHERE CLASSIFIED                    

 

                2019           YULISA DENNIS MD           Ot           

   L97.413         

                          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              

      

 

                09/10/2019           YULISA DENNIS MD           Ot           

   E11.42          

                          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              

      

 

                09/10/2019           YULISA DENNIS MD           Ot           

   E11.621         

                          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              

      

 

                09/10/2019           YULISA DENNIS MD           Ot           

   L03.115         

                          CELLULITIS OF RIGHT LOWER LIMB                    

 

                09/10/2019           YULISA DENNIS MD, Ot           

   L97.513         

                          NON-PRS CHRONIC ULCER OTH PRT RIGHT FOOT              

      

 

                09/10/2019           YULISA DENNIS MD, Ot           

   E11.42          

                          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              

      

 

                09/10/2019           YULISA DENNIS MD           Ot           

   E11.52          

                          TYPE 2 DIABETES W DIABETIC PERIPHERAL AN              

      

 

                09/10/2019           YULISA DENNIS MD           Ot           

   E11.621         

                          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              

      

 

             09/10/2019           YULISA DENNIS MD           Ot           I96

           

GANGRENE, NOT ELSEWHERE CLASSIFIED                    

 

                09/10/2019           YULISA DENNIS MD           Ot           

   L97.413         

                          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              

      

 

                09/10/2019           YULISA DENNIS MD           Ot           

   E11.42          

                          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              

      

 

                09/10/2019           YULISA DENNIS MD           Ot           

   E11.52          

                          TYPE 2 DIABETES W DIABETIC PERIPHERAL AN              

      

 

                09/10/2019           YULISA DENNIS MD           Ot           

   E11.621         

                          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              

      

 

                09/10/2019           YULISA DENNIS MD           Ot           

   I70.234         

                          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              

      

 

             09/10/2019           YULISA DENNIS MD           Ot           I96

           

GANGRENE, NOT ELSEWHERE CLASSIFIED                    

 

                09/10/2019           YULISA DENNIS MD, Ot           

   M65.071         

                          ABSCESS OF TENDON SHEATH, RIGHT ANKLE AN              

      

 

                09/10/2019           YULISA DENNIS MD, Ot           

   E11.42          

                          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              

      

 

                09/10/2019           YULISA DENNIS MD           Ot           

   E11.52          

                          TYPE 2 DIABETES W DIABETIC PERIPHERAL AN              

      

 

                09/10/2019           YULISA DENNIS MD, Ot           

   E11.621         

                          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              

      

 

                09/10/2019           YULISA DENNIS MD, Ot           

   I70.234         

                          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              

      

 

             09/10/2019           YULISA DENNIS MD, Ot           I96

           

GANGRENE, NOT ELSEWHERE CLASSIFIED                    

 

                09/10/2019           YULISA DENNIS MD, Ot           

   L97.412         

                          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              

      

 

                09/10/2019           YULISA DENNIS MD, Ot           

   M65.071         

                          ABSCESS OF TENDON SHEATH, RIGHT ANKLE AN              

      

 

                2019           YULISA DENNIS MD, Ot           

   E11.42          

                          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              

      

 

                2019           YULISA DENNIS MD, Ot           

   E11.52          

                          TYPE 2 DIABETES W DIABETIC PERIPHERAL AN              

      

 

                2019           YULISA DENNIS MD, Ot           

   E11.621         

                          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              

      

 

                2019           YULISA DENNIS MD           Ot           

   I70.234         

                          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              

      

 

                2019           YULISA DENNIS MD, Ot           

   L97.412         

                          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              

      

 

                2019           YULISA DENNIS MD, Ot           

   M65.071         

                          ABSCESS OF TENDON SHEATH, RIGHT ANKLE AN              

      

 

                10/08/2019           YULISA DENNIS MD, Ot           

   E11.42          

                          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              

      

 

                10/08/2019           YULISA DENNIS MD           Ot           

   E11.52          

                          TYPE 2 DIABETES W DIABETIC PERIPHERAL AN              

      

 

                10/08/2019           YULISA DENNIS MD, Ot           

   E11.621         

                          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              

      

 

                10/08/2019           YULISA DENNIS MD           Ot           

   I70.261         

                          ATHSCL NATIVE ARTERIES OF EXTREMITIES W               

      

 

                10/08/2019           YULISA DENNIS MD           Ot           

   L97.413         

                          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              

      

 

                10/08/2019           YULISA DENNIS MD, Ot           

   M65.071         

                          ABSCESS OF TENDON SHEATH, RIGHT ANKLE AN              

      

 

                10/11/2019           YULISA DENNIS MD           Ot           

   E11.42          

                          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              

      

 

                10/11/2019           YULISA DENNIS MD, Ot           

   E11.52          

                          TYPE 2 DIABETES W DIABETIC PERIPHERAL AN              

      

 

                10/11/2019           YULISA DENNIS MD, Ot           

   E11.621         

                          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              

      

 

                10/11/2019           YULISA DENNIS MD, Ot           

   I70.234         

                          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              

      

 

                10/11/2019           YULISA DENNIS MD, Ot           

   L97.413         

                          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              

      

 

                10/11/2019           YULISA DENNIS MD, Ot           

   M65.071         

                          ABSCESS OF TENDON SHEATH, RIGHT ANKLE AN              

      

 

                10/23/2019           YULISA DENNIS MD, Ot           

   E11.42          

                          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              

      

 

                10/23/2019           YULISA DENNIS MD, Ot           

   E11.621         

                          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              

      

 

                10/23/2019           YULISA DENNIS MD           Ot           

   L03.115         

                          CELLULITIS OF RIGHT LOWER LIMB                    

 

                10/23/2019           YULISA DENNIS MD, Ot           

   L97.513         

                          NON-PRS CHRONIC ULCER OTH PRT RIGHT FOOT              

      

 

                10/23/2019           YULISA DENNIS MD, Ot           

   E11.42          

                          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              

      

 

                10/23/2019           YULISA DENNIS MD, Ot           

   E11.621         

                          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              

      

 

                10/23/2019           YULISA DENNIS MD           Ot           

   L03.115         

                          CELLULITIS OF RIGHT LOWER LIMB                    

 

                10/23/2019           YULISA DENNIS MD, Ot           

   L97.513         

                          NON-PRS CHRONIC ULCER OTH PRT RIGHT FOOT              

      

 

                10/23/2019           YULISA DENNIS MD, Ot           

   E11.42          

                          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              

      

 

                10/23/2019           YULISA DENNIS MD, Ot           

   E11.52          

                          TYPE 2 DIABETES W DIABETIC PERIPHERAL AN              

      

 

                10/23/2019           YULISA DENNIS MD, Ot           

   E11.621         

                          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              

      

 

             10/23/2019           YULISA DENNIS MD, Ot           I96

           

GANGRENE, NOT ELSEWHERE CLASSIFIED                    

 

                10/23/2019           YULISA DENNIS MD, Ot           

   L97.413         

                          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              

      

 

                10/23/2019           YULISA DENNIS MD           Ot           

   E11.42          

                          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              

      

 

                10/23/2019           YULISA DENNIS MD, Ot           

   E11.52          

                          TYPE 2 DIABETES W DIABETIC PERIPHERAL AN              

      

 

                10/23/2019           YULISA DENNIS MD, Ot           

   E11.621         

                          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              

      

 

             10/23/2019           YULISA DENNIS MD           Ot           I96

           

GANGRENE, NOT ELSEWHERE CLASSIFIED                    

 

                10/23/2019           YULISA DENNIS MD, Ot           

   L97.412         

                          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              

      

 

                10/23/2019           YULISA DENNIS MD, Ot           

   M65.071         

                          ABSCESS OF TENDON SHEATH, RIGHT ANKLE AN              

      

 

             10/23/2019           JENNIFER SALDIVAR, M BREONNA           Ot           E11

.9           

TYPE 2 DIABETES MELLITUS WITHOUT COMPLIC                    

 

             10/23/2019           JENNIFER SALDIVAR, ELISE RAVI           Ot           I11

.9           

HYPERTENSIVE HEART DISEASE WITHOUT HEART                    

 

                10/23/2019           JENNIFER SALDIVAR, ELISE RAVI           Ot           

   I25.10          

                          ATHSCL HEART DISEASE OF NATIVE CORONARY               

      

 

             10/23/2019           JENNIFER SALDIVAR, ELISE RAVI           Ot           I63

.9           

CEREBRAL INFARCTION, UNSPECIFIED                    

 

             10/23/2019           JENNIFER SALDIVAR, ELISE RAVI           Ot           Z72

.0           

TOBACCO USE                                      

 

                10/23/2019           YULISA DENNIS MD           Ot           

   E11.42          

                          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              

      

 

                10/23/2019           YULISA DENNIS MD, Ot           

   E11.52          

                          TYPE 2 DIABETES W DIABETIC PERIPHERAL AN              

      

 

                10/23/2019           YULISA DENNIS MD, Ot           

   E11.621         

                          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              

      

 

                10/23/2019           YULISA DENNIS MD, Ot           

   I70.261         

                          ATHSCL NATIVE ARTERIES OF EXTREMITIES W               

      

 

                10/23/2019           YULISA DENNIS MD, Ot           

   L97.412         

                          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              

      

 

                10/23/2019           YULISA DENNIS MD, Ot           

   M65.071         

                          ABSCESS OF TENDON SHEATH, RIGHT ANKLE AN              

      

 

                10/23/2019           YULISA DENNIS MD, Ot           

   E11.42          

                          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              

      

 

                10/23/2019           YULISA DENNIS MD, Ot           

   E11.52          

                          TYPE 2 DIABETES W DIABETIC PERIPHERAL AN              

      

 

                10/23/2019           YULISA DENNIS MD, Ot           

   E11.621         

                          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              

      

 

                10/23/2019           YULISA DENNIS MD           Ot           

   I70.261         

                          ATHSCL NATIVE ARTERIES OF EXTREMITIES W               

      

 

                10/23/2019           YULISA DENNIS MD, Ot           

   L97.412         

                          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              

      

 

                10/23/2019           YULISA DENNIS MD, Ot           

   M65.071         

                          ABSCESS OF TENDON SHEATH, RIGHT ANKLE AN              

      

 

                10/23/2019           YULISA DENNIS MD           Ot           

   E11.42          

                          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              

      

 

                10/23/2019           YULISA DENNIS MD           Ot           

   E11.52          

                          TYPE 2 DIABETES W DIABETIC PERIPHERAL AN              

      

 

                10/23/2019           YULISA DENNIS MD, Ot           

   E11.621         

                          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              

      

 

                10/23/2019           YULISA DENNIS MD           Ot           

   I70.234         

                          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              

      

 

             10/23/2019           YULSIA DENNIS MD           Ot           I96

           

GANGRENE, NOT ELSEWHERE CLASSIFIED                    

 

                10/23/2019           YULISA DENNIS MD, Ot           

   M65.071         

                          ABSCESS OF TENDON SHEATH, RIGHT ANKLE AN              

      

 

                10/23/2019           YULISA DENNIS MD, Ot           

   E11.42          

                          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              

      

 

                10/23/2019           YULISA DENNIS MD, Ot           

   E11.52          

                          TYPE 2 DIABETES W DIABETIC PERIPHERAL AN              

      

 

                10/23/2019           YULISA DENNIS MD, Ot           

   E11.621         

                          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              

      

 

                10/23/2019           YULISA DENNIS MD           Ot           

   I70.234         

                          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              

      

 

             10/23/2019           YULISA DENNIS MD           Ot           I96

           

GANGRENE, NOT ELSEWHERE CLASSIFIED                    

 

                10/23/2019           YULISA DENNIS MD           Ot           

   L97.412         

                          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              

      

 

                10/23/2019           YULISA DENNIS MD, Ot           

   M65.071         

                          ABSCESS OF TENDON SHEATH, RIGHT ANKLE AN              

      

 

                10/23/2019           YULISA DENNIS MD           Ot           

   E11.42          

                          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              

      

 

                10/23/2019           YULISA DENNIS MD           Ot           

   E11.52          

                          TYPE 2 DIABETES W DIABETIC PERIPHERAL AN              

      

 

                10/23/2019           YULISA DENNIS MD           Ot           

   E11.621         

                          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              

      

 

                10/23/2019           YULISA DENNIS MD           Ot           

   I70.234         

                          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              

      

 

             10/23/2019           YULISA DENNIS MD           Ot           I96

           

GANGRENE, NOT ELSEWHERE CLASSIFIED                    

 

                10/23/2019           YULISA DENNIS MD           Ot           

   L97.412         

                          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              

      

 

                10/23/2019           YULISA DENNIS MD           Ot           

   M65.071         

                          ABSCESS OF TENDON SHEATH, RIGHT ANKLE AN              

      

 

                10/23/2019           YULISA DENNIS MD, Ot           

   E11.42          

                          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              

      

 

                10/23/2019           YULISA DENNIS MD           Ot           

   E11.52          

                          TYPE 2 DIABETES W DIABETIC PERIPHERAL AN              

      

 

                10/23/2019           YULISA DENNIS MD, Ot           

   E11.621         

                          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              

      

 

                10/23/2019           YULISA DENNIS MD           Ot           

   I70.234         

                          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              

      

 

                10/23/2019           YULISA DENNIS MD           Ot           

   L97.412         

                          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              

      

 

                10/23/2019           YULISA DENNIS MD, Ot           

   M65.071         

                          ABSCESS OF TENDON SHEATH, RIGHT ANKLE AN              

      

 

                10/23/2019           YULISA DENNIS MD, Ot           

   E11.42          

                          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              

      

 

                10/23/2019           YULISA DENNIS MD, Ot           

   E11.52          

                          TYPE 2 DIABETES W DIABETIC PERIPHERAL AN              

      

 

                10/23/2019           YULISA DENNIS MD, Ot           

   E11.621         

                          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              

      

 

                10/23/2019           YULISA DENNIS MD           Ot           

   I70.261         

                          ATHSCL NATIVE ARTERIES OF EXTREMITIES W               

      

 

                10/23/2019           YULISA DENNIS MD           Ot           

   L97.413         

                          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              

      

 

                10/23/2019           YULISA DENNIS MD, Ot           

   M65.071         

                          ABSCESS OF TENDON SHEATH, RIGHT ANKLE AN              

      

 

                10/23/2019           YULISA DENNIS MD, Ot           

   E11.42          

                          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              

      

 

                10/23/2019           YULISA DENNIS MD, Ot           

   E11.52          

                          TYPE 2 DIABETES W DIABETIC PERIPHERAL AN              

      

 

                10/23/2019           YULISA DENNIS MD, Ot           

   E11.621         

                          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              

      

 

                10/23/2019           YULISA DENNIS MD           Ot           

   I70.234         

                          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              

      

 

                10/23/2019           YULISA DENNIS MD           Ot           

   L97.413         

                          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              

      

 

                10/23/2019           YULISA DENNIS MD, Ot           

   M65.071         

                          ABSCESS OF TENDON SHEATH, RIGHT ANKLE AN              

      

 

                10/23/2019           YULISA DENNIS MD           Ot           

   E11.42          

                          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              

      

 

                10/23/2019           YULISA DENNIS MD           Ot           

   E11.52          

                          TYPE 2 DIABETES W DIABETIC PERIPHERAL AN              

      

 

                10/23/2019           YULISA DENNIS MD, Ot           

   E11.621         

                          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              

      

 

                10/23/2019           YULISA DENNIS MD, Ot           

   I70.234         

                          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              

      

 

                10/23/2019           YULISA DENNIS MD, Ot           

   L97.413         

                          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              

      

 

                10/23/2019           YULISA DENNIS MD, Ot           

   M65.071         

                          ABSCESS OF TENDON SHEATH, RIGHT ANKLE AN              

      

 

                10/25/2019           YULISA DENNIS MD, Ot           

   E11.621         

                          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              

      

 

                10/25/2019           YULISA DENNIS MD, Ot           

   L97.509         

                          NON-PRESSURE CHRONIC ULCER OTH PRT UNSP               

      

 

                10/29/2019           YULISA DENNIS MD, Ot           

   E11.42          

                          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              

      

 

                10/29/2019           YULISA DENNIS MD, Ot           

   E11.52          

                          TYPE 2 DIABETES W DIABETIC PERIPHERAL AN              

      

 

                10/29/2019           YULISA DENNIS MD, Ot           

   E11.621         

                          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              

      

 

                10/29/2019           YULISA DENNIS MD, Ot           

   I70.261         

                          ATHSCL NATIVE ARTERIES OF EXTREMITIES W               

      

 

                10/29/2019           YULISA DENNIS MD, Ot           

   L97.413         

                          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              

      

 

                10/29/2019           YULISA DENNIS MD, Ot           

   M65.071         

                          ABSCESS OF TENDON SHEATH, RIGHT ANKLE AN              

      

 

                2019           YULISA DENNIS MD, Ot           

   E11.42          

                          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              

      

 

                2019           YULISA DENNIS MD, Ot           

   E11.52          

                          TYPE 2 DIABETES W DIABETIC PERIPHERAL AN              

      

 

                2019           YULISA DENNIS MD, Ot           

   E11.621         

                          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              

      

 

                2019           YULISA DENNIS MD           Ot           

   I70.234         

                          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              

      

 

                2019           YULISA DENNIS MD, Ot           

   L97.412         

                          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              

      

 

                2019           YULISA DENNIS MD, Ot           

   M65.071         

                          ABSCESS OF TENDON SHEATH, RIGHT ANKLE AN              

      

 

                2019           YULISA DENNIS MD, Ot           

   E11.42          

                          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              

      

 

                2019           YULISA DENNIS MD, Ot           

   E11.52          

                          TYPE 2 DIABETES W DIABETIC PERIPHERAL AN              

      

 

                2019           YULISA DENNIS MD, Ot           

   E11.621         

                          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              

      

 

                2019           YULISA DENNIS MD           Ot           

   I70.234         

                          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              

      

 

                2019           JEANNIE SALDIVAR, YULISA SEALS           Ot           

   L97.412         

                          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              

      

 

                2019           YULISA DENNIS MD           Ot           

   M65.071         

                          ABSCESS OF TENDON SHEATH, RIGHT ANKLE AN              

      

 

             2019           ARASELI RICHARDSON           Ot           I10

           

ESSENTIAL (PRIMARY) HYPERTENSION                    

 

                2019           ARASELI RICHARDSON           Ot           

   I25.10          

                          ATHSCL HEART DISEASE OF NATIVE CORONARY               

      

 

             2019           ARASELI RICHARDSON           Ot           I25

.2           

OLD MYOCARDIAL INFARCTION                        

 

             2019           ARASELI RICHARDSON           Ot           J44

.9           

CHRONIC OBSTRUCTIVE PULMONARY DISEASE, U                    

 

                2019           ARASELI RICHARDSON           Ot           

   R09.02          

                          HYPOXEMIA                          

 

                2019           ARASELI RICHARDSON           Ot           

   R41.82          

                          ALTERED MENTAL STATUS, UNSPECIFIED                    

 

                2019           ARASELI RICHARDSON           Ot           

   Z77.22          

                          CNTCT W AND EXPSR TO ENVIRON TOBACCO SMO              

      

 

                2019           ARASELI RICHARDSON           Ot           

   Z79.02          

                          LONG TERM (CURRENT) USE OF ANTITHROMBOTI              

      

 

                2019           ARASELI RICHARDSON           Ot           

   Z79.82          

                          LONG TERM (CURRENT) USE OF ASPIRIN                    

 

                2019           ARASELI RICHARDSON           Ot           

   Z85.46          

                          PERSONAL HISTORY OF MALIGNANT NEOPLASM O              

      

 

                2019           ARASELI RICHARDSON           Ot           

   Z86.73          

                          PRSNL HX OF TIA (TIA), AND CEREB INFRC W              

      

 

                2019           ARASELI RICHARDSON           Ot           

   Z90.49          

                          ACQUIRED ABSENCE OF OTHER SPECIFIED PART              

      

 

             2019           ARASELI RICHARDSON           Ot           Z95

.5           

PRESENCE OF CORONARY ANGIOPLASTY IMPLANT                    

 

             11/15/2019           ARASELI RICHARDSON           Ot           I10

           

ESSENTIAL (PRIMARY) HYPERTENSION                    

 

                11/15/2019           ARASELI RICHARDSON           Ot           

   I25.10          

                          ATHSCL HEART DISEASE OF NATIVE CORONARY               

      

 

             11/15/2019           ARASELI RICHARDSON           Ot           I25

.2           

OLD MYOCARDIAL INFARCTION                        

 

             11/15/2019           ARASELI RICHARDSON Ot           J44

.9           

CHRONIC OBSTRUCTIVE PULMONARY DISEASE, U                    

 

                11/15/2019           ARASELI RICHARDSON           Ot           

   R09.02          

                          HYPOXEMIA                          

 

                11/15/2019           ARASELI RICHARDSON           Ot           

   R41.82          

                          ALTERED MENTAL STATUS, UNSPECIFIED                    

 

                11/15/2019           ARASELI RICHARDSON           Ot           

   Z77.22          

                          CNTCT W AND EXPSR TO ENVIRON TOBACCO SMO              

      

 

                11/15/2019           ARASELI RICHARDSON           Ot           

   Z79.02          

                          LONG TERM (CURRENT) USE OF ANTITHROMBOTI              

      

 

                11/15/2019           ARASELI RICHARDSON           Ot           

   Z79.82          

                          LONG TERM (CURRENT) USE OF ASPIRIN                    

 

                11/15/2019           ARASELI RICHARDSON           Ot           

   Z85.46          

                          PERSONAL HISTORY OF MALIGNANT NEOPLASM O              

      

 

                11/15/2019           ARASELI RICHARDSON           Ot           

   Z86.73          

                          PRSNL HX OF TIA (TIA), AND CEREB INFRC W              

      

 

                11/15/2019           ARASELI RICHARDSON           Ot           

   Z90.49          

                          ACQUIRED ABSENCE OF OTHER SPECIFIED PART              

      

 

             11/15/2019           ARASELI RICHARDSON           Ot           Z95

.5           

PRESENCE OF CORONARY ANGIOPLASTY IMPLANT                    

 

                2019           YULISA DENNIS MD, Ot           

   E11.42          

                          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              

      

 

                2019           YULISA DENNIS MD, Ot           

   E11.52          

                          TYPE 2 DIABETES W DIABETIC PERIPHERAL AN              

      

 

                2019           YULISA DENNIS MD, Ot           

   E11.621         

                          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              

      

 

                2019           YULISA DENNIS MD, Ot           

   I70.234         

                          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              

      

 

                2019           YULISA DENNIS MD, Ot           

   L97.413         

                          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              

      

 

                2019           YULISA DENNIS MD, Ot           

   M65.071         

                          ABSCESS OF TENDON SHEATH, RIGHT ANKLE AN              

      

 

                2019           YULISA DENNIS MD, Ot           

   E11.42          

                          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              

      

 

                2019           YULISA DENNIS MD, Ot           

   E11.621         

                          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              

      

 

                2019           YULISA DENNIS MD           Ot           

   I70.234         

                          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              

      

 

                2019           YULISA DENNIS MD, Ot           

   L97.413         

                          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              

      

 

                2019           YULISA DENNIS MD, Ot           

   M65.071         

                          ABSCESS OF TENDON SHEATH, RIGHT ANKLE AN              

      

 

                2019           YULISA DENNIS MD, Ot           

   E11.42          

                          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              

      

 

                2019           YULISA DENNIS MD, Ot           

   E11.52          

                          TYPE 2 DIABETES W DIABETIC PERIPHERAL AN              

      

 

                2019           YULISA DENNIS MD, Ot           

   E11.621         

                          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              

      

 

                2019           YULISA DENNIS MD, Ot           

   I70.234         

                          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              

      

 

                2019           YULISA DENNIS MD, Ot           

   L97.412         

                          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              

      

 

                2019           YULISA DENNIS MD, Ot           

   E11.42          

                          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              

      

 

                2019           YULISA DENNIS MD, Ot           

   E11.52          

                          TYPE 2 DIABETES W DIABETIC PERIPHERAL AN              

      

 

                2019           YULISA DENNIS MD, Ot           

   E11.621         

                          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              

      

 

                2019           YULISA DENNIS MD, Ot           

   I70.261         

                          ATHSCL NATIVE ARTERIES OF EXTREMITIES W               

      

 

                2019           YULISA DENNIS MD, Ot           

   L97.412         

                          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              

      

 

                2019           YULISA DENNIS MD, Ot           

   E11.42          

                          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              

      

 

                2019           YULISA DENNIS MD, Ot           

   E11.52          

                          TYPE 2 DIABETES W DIABETIC PERIPHERAL AN              

      

 

                2019           YULISA DENNIS MD, Ot           

   E11.621         

                          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              

      

 

                2019           YULISA DENNIS MD, Ot           

   I70.234         

                          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              

      

 

                2019           YULISA DENNIS MD, Ot           

   L97.412         

                          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              

      

 

                2020           YULISA DENNIS MD, Ot           

   E11.42          

                          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              

      

 

                2020           YULISA DENNIS MD, Ot           

   E11.621         

                          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              

      

 

                2020           YULISA DENNIS MD           Ot           

   L03.115         

                          CELLULITIS OF RIGHT LOWER LIMB                    

 

                2020           YULISA DENNIS MD, Ot           

   L97.513         

                          NON-PRS CHRONIC ULCER OTH PRT RIGHT FOOT              

      

 

                2020           YULISA DENNIS MD, Ot           

   E11.42          

                          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              

      

 

                2020           YULISA DENNIS MD, Ot           

   E11.621         

                          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              

      

 

                2020           YULISA DENNSI MD           Ot           

   L03.115         

                          CELLULITIS OF RIGHT LOWER LIMB                    

 

                2020           YULISA DENNIS MD, Ot           

   L97.513         

                          NON-PRS CHRONIC ULCER OTH PRT RIGHT FOOT              

      

 

                2020           YULISA DENNIS MD, Ot           

   E11.42          

                          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              

      

 

                2020           YULISA DENNIS MD, Ot           

   E11.52          

                          TYPE 2 DIABETES W DIABETIC PERIPHERAL AN              

      

 

                2020           YULISA DENNIS MD, Ot           

   E11.621         

                          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              

      

 

             2020           YULISA DENNIS MD, Ot           I96

           

GANGRENE, NOT ELSEWHERE CLASSIFIED                    

 

                2020           YULISA DENNIS MD, Ot           

   L97.413         

                          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              

      

 

                2020           YULISA DENNIS MD, Ot           

   E11.42          

                          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              

      

 

                2020           YULISA DENNIS MD, Ot           

   E11.52          

                          TYPE 2 DIABETES W DIABETIC PERIPHERAL AN              

      

 

                2020           YULISA DENNIS MD, Ot           

   E11.621         

                          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              

      

 

             2020           YULISA DENNIS MD, Ot           I96

           

GANGRENE, NOT ELSEWHERE CLASSIFIED                    

 

                2020           YULISA DENNIS MD, Ot           

   L97.412         

                          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              

      

 

                2020           YULISA DENNIS MD           Ot           

   M65.071         

                          ABSCESS OF TENDON SHEATH, RIGHT ANKLE AN              

      

 

             2020           JENNIFER SALDIVAR, ELISE RAVI           Ot           E11

.9           

TYPE 2 DIABETES MELLITUS WITHOUT COMPLIC                    

 

             2020           ELISE RODRIGUEZ MD           Ot           I11

.9           

HYPERTENSIVE HEART DISEASE WITHOUT HEART                    

 

                2020           ELISE RODRIGUEZ MD, Ot           

   I25.10          

                          ATHSCL HEART DISEASE OF NATIVE CORONARY               

      

 

             2020           ELISE RODRIGUEZ MD           Ot           I63

.9           

CEREBRAL INFARCTION, UNSPECIFIED                    

 

             2020           ELISE RODRIGUEZ MD           Ot           Z72

.0           

TOBACCO USE                                      

 

                2020           YULISA DENNIS MD, Ot           

   E11.42          

                          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              

      

 

                2020           YULISA DENNIS MD, Ot           

   E11.52          

                          TYPE 2 DIABETES W DIABETIC PERIPHERAL AN              

      

 

                2020           YULISA DENNIS MD, Ot           

   E11.621         

                          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              

      

 

                2020           YULISA DENNIS MD, Ot           

   I70.261         

                          ATHSCL NATIVE ARTERIES OF EXTREMITIES W               

      

 

                2020           YULISA DENNIS MD, Ot           

   L97.412         

                          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              

      

 

                2020           YULISA DENNIS MD, Ot           

   M65.071         

                          ABSCESS OF TENDON SHEATH, RIGHT ANKLE AN              

      

 

                2020           YULISA DENNIS MD, Ot           

   E11.42          

                          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              

      

 

                2020           YULISA DENNIS MD, Ot           

   E11.52          

                          TYPE 2 DIABETES W DIABETIC PERIPHERAL AN              

      

 

                2020           YULISA DENNIS MD, Ot           

   E11.621         

                          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              

      

 

                2020           YULISA DENNIS MD           Ot           

   I70.261         

                          ATHSCL NATIVE ARTERIES OF EXTREMITIES W               

      

 

                2020           YULISA DENNIS MD, Ot           

   L97.412         

                          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              

      

 

                2020           YULISA DENNIS MD, Ot           

   M65.071         

                          ABSCESS OF TENDON SHEATH, RIGHT ANKLE AN              

      

 

                2020           YULISA DENNIS MD, Ot           

   E11.42          

                          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              

      

 

                2020           YULISA DENNIS MD, Ot           

   E11.52          

                          TYPE 2 DIABETES W DIABETIC PERIPHERAL AN              

      

 

                2020           YULISA DENNIS MD, Ot           

   E11.621         

                          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              

      

 

                2020           YULISA DENNIS MD, Ot           

   I70.234         

                          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              

      

 

             2020           YULISA DENNIS MD           Ot           I96

           

GANGRENE, NOT ELSEWHERE CLASSIFIED                    

 

                2020           YULISA DENNIS MD, Ot           

   M65.071         

                          ABSCESS OF TENDON SHEATH, RIGHT ANKLE AN              

      

 

                2020           YULISA DENNIS MD, Ot           

   E11.42          

                          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              

      

 

                2020           YULISA DENNIS MD, Ot           

   E11.52          

                          TYPE 2 DIABETES W DIABETIC PERIPHERAL AN              

      

 

                2020           YULISA DENNIS MD, Ot           

   E11.621         

                          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              

      

 

                2020           YULISA DENNIS MD           Ot           

   I70.234         

                          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              

      

 

             2020           YULISA DENNIS MD           Ot           I96

           

GANGRENE, NOT ELSEWHERE CLASSIFIED                    

 

                2020           YULISA DENNIS MD, Ot           

   L97.412         

                          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              

      

 

                2020           YULISA DENNIS MD, Ot           

   M65.071         

                          ABSCESS OF TENDON SHEATH, RIGHT ANKLE AN              

      

 

                2020           YULISA DENNIS MD           Ot           

   E11.42          

                          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              

      

 

                2020           JEANNIE MD, YULISA G           Ot           

   E11.52          

                          TYPE 2 DIABETES W DIABETIC PERIPHERAL AN              

      

 

                2020           YULISA DENNIS MD, Ot           

   E11.621         

                          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              

      

 

                2020           YULISA DENNIS MD           Ot           

   I70.234         

                          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              

      

 

             2020           YULISA DENNIS MD           Ot           I96

           

GANGRENE, NOT ELSEWHERE CLASSIFIED                    

 

                2020           YULISA DENNIS MD           Ot           

   L97.412         

                          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              

      

 

                2020           YULISA DENNIS MD           Ot           

   M65.071         

                          ABSCESS OF TENDON SHEATH, RIGHT ANKLE AN              

      

 

                2020           YULISA DENNIS MD, Ot           

   E11.42          

                          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              

      

 

                2020           YULISA DENNIS MD, Ot           

   E11.52          

                          TYPE 2 DIABETES W DIABETIC PERIPHERAL AN              

      

 

                2020           YULISA DENNIS MD, Ot           

   E11.621         

                          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              

      

 

                2020           YULISA DENNIS MD           Ot           

   I70.234         

                          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              

      

 

                2020           YULISA DENNIS MD           Ot           

   L97.412         

                          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              

      

 

                2020           YULISA DENNIS MD           Ot           

   M65.071         

                          ABSCESS OF TENDON SHEATH, RIGHT ANKLE AN              

      

 

                2020           YULISA DENNIS MD           Ot           

   E11.42          

                          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              

      

 

                2020           YULISA DENNIS MD           Ot           

   E11.52          

                          TYPE 2 DIABETES W DIABETIC PERIPHERAL AN              

      

 

                2020           YULISA DENNIS MD           Ot           

   E11.621         

                          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              

      

 

                2020           YULISA DENNIS MD           Ot           

   I70.261         

                          ATHSCL NATIVE ARTERIES OF EXTREMITIES W               

      

 

                2020           YULISA DENNIS MD           Ot           

   L97.413         

                          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              

      

 

                2020           YULISA DENNIS MD, Ot           

   M65.071         

                          ABSCESS OF TENDON SHEATH, RIGHT ANKLE AN              

      

 

                2020           YULISA DENNIS MD           Ot           

   E11.42          

                          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              

      

 

                2020           YULISA DENNIS MD           Ot           

   E11.52          

                          TYPE 2 DIABETES W DIABETIC PERIPHERAL AN              

      

 

                2020           YULISA DENNIS MD           Ot           

   E11.621         

                          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              

      

 

                2020           YULISA DENNIS MD, Ot           

   I70.234         

                          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              

      

 

                2020           YULISA DENNIS MD, Ot           

   L97.413         

                          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              

      

 

                2020           YULISA DENNIS MD, Ot           

   M65.071         

                          ABSCESS OF TENDON SHEATH, RIGHT ANKLE AN              

      

 

                2020           YULISA DENNIS MD, Ot           

   E11.42          

                          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              

      

 

                2020           YULIAS DENNIS MD, Ot           

   E11.52          

                          TYPE 2 DIABETES W DIABETIC PERIPHERAL AN              

      

 

                2020           YULISA DENNIS MD, Ot           

   E11.621         

                          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              

      

 

                2020           YULISA DENNIS MD           Ot           

   I70.261         

                          ATHSCL NATIVE ARTERIES OF EXTREMITIES W               

      

 

                2020           YULISA DENNIS MD, Ot           

   L97.413         

                          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              

      

 

                2020           YULISA DENNIS MD, Ot           

   M65.071         

                          ABSCESS OF TENDON SHEATH, RIGHT ANKLE AN              

      

 

                2020           YULISA DENNIS MD, Ot           

   E11.621         

                          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              

      

 

                2020           YULISA DENNIS MD, Ot           

   L97.509         

                          NON-PRESSURE CHRONIC ULCER OTH PRT UNSP               

      

 

                2020           YULISA DENNIS MD, Ot           

   E11.42          

                          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              

      

 

                2020           YULISA DENNIS MD, Ot           

   E11.52          

                          TYPE 2 DIABETES W DIABETIC PERIPHERAL AN              

      

 

                2020           YULISA DENNIS MD, Ot           

   E11.621         

                          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              

      

 

                2020           YULISA DENNIS MD           Ot           

   I70.234         

                          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              

      

 

                2020           YULISA DENNIS MD, Ot           

   L97.412         

                          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              

      

 

                2020           YULISA DENNIS MD, Ot           

   M65.071         

                          ABSCESS OF TENDON SHEATH, RIGHT ANKLE AN              

      

 

                2020           YULISA DENNIS MD, Ot           

   E11.42          

                          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              

      

 

                2020           YULISA DENNIS MD, Ot           

   E11.52          

                          TYPE 2 DIABETES W DIABETIC PERIPHERAL AN              

      

 

                2020           YULISA DENNIS MD, Ot           

   E11.621         

                          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              

      

 

                2020           YULISA DENNIS MD           Ot           

   I70.234         

                          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              

      

 

                2020           YULISA DENNIS MD           Ot           

   L97.412         

                          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              

      

 

                2020           YULISA DENNIS MD, Ot           

   M65.071         

                          ABSCESS OF TENDON SHEATH, RIGHT ANKLE AN              

      

 

                2020           YULISA DENNIS MD, Ot           

   E11.42          

                          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              

      

 

                2020           YULISA DENNIS MD, Ot           

   E11.52          

                          TYPE 2 DIABETES W DIABETIC PERIPHERAL AN              

      

 

                2020           YULISA DENNIS MD, Ot           

   E11.621         

                          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              

      

 

                2020           YULISA DENNIS MD           Ot           

   I70.234         

                          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              

      

 

                2020           YULISA DENNIS MD, Ot           

   L97.413         

                          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              

      

 

                2020           YULISA DENNIS MD, Ot           

   M65.071         

                          ABSCESS OF TENDON SHEATH, RIGHT ANKLE AN              

      

 

                2020           YULISA DENNIS MD, Ot           

   E11.42          

                          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              

      

 

                2020           YULISA DENNIS MD, Ot           

   E11.52          

                          TYPE 2 DIABETES W DIABETIC PERIPHERAL AN              

      

 

                2020           YULISA DENNIS MD, Ot           

   E11.621         

                          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              

      

 

                2020           YULISA DENNIS MD           Ot           

   I70.261         

                          ATHSCL NATIVE ARTERIES OF EXTREMITIES W               

      

 

                2020           YULISA DENNIS MD           Ot           

   L97.416         

                          NON-PRS CHR ULC OF R HEEL/MIDFT W BNE IN              

      

 

                2020           YULISA DENNIS MD           Ot           

   E11.42          

                          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              

      

 

                2020           YULISA DENNIS MD           Ot           

   E11.621         

                          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              

      

 

                2020           YULISA DENNIS MD           Ot           

   I70.234         

                          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              

      

 

                2020           YULISA DENNIS MD           Ot           

   L97.413         

                          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              

      

 

                2020           YULISA DENNIS MD, Ot           

   M65.071         

                          ABSCESS OF TENDON SHEATH, RIGHT ANKLE AN              

      

 

                2020           YULISA DENNIS MD           Ot           

   E11.42          

                          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              

      

 

                2020           YULISA DENNIS MD           Ot           

   E11.52          

                          TYPE 2 DIABETES W DIABETIC PERIPHERAL AN              

      

 

                2020           YULISA DENNIS MD, Ot           

   E11.621         

                          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              

      

 

                2020           YULISA DENNIS MD, Ot           

   I70.234         

                          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              

      

 

                2020           YULISA DENNIS MD, Ot           

   L97.412         

                          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              

      

 

                2020           YULISA DENNIS MD, Ot           

   E11.42          

                          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              

      

 

                2020           YULISA DENNIS MD, Ot           

   E11.52          

                          TYPE 2 DIABETES W DIABETIC PERIPHERAL AN              

      

 

                2020           YULISA DENNIS MD, Ot           

   E11.621         

                          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              

      

 

                2020           YULISA DENNIS MD, Ot           

   I70.261         

                          ATHSCL NATIVE ARTERIES OF EXTREMITIES W               

      

 

                2020           YULISA DENNIS MD, Ot           

   L97.412         

                          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              

      

 

                2020           YULISA DENNIS MD, Ot           

   E11.42          

                          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              

      

 

                2020           YULISA DENNIS MD, Ot           

   E11.52          

                          TYPE 2 DIABETES W DIABETIC PERIPHERAL AN              

      

 

                2020           YULISA DENNIS MD, Ot           

   E11.621         

                          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              

      

 

                2020           YULSIA DENNIS MD, Ot           

   I70.234         

                          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              

      

 

                2020           YULISA DENNIS MD, Ot           

   L97.412         

                          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              

      

 

                2020           YULISA DENNIS MD, Ot           

   E11.42          

                          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              

      

 

                2020           YULISA DENNIS MD, Ot           

   E11.621         

                          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              

      

 

                2020           YULISA DENNIS MD           Ot           

   L03.115         

                          CELLULITIS OF RIGHT LOWER LIMB                    

 

                2020           YULISA DENNIS MD, Ot           

   L97.513         

                          NON-PRS CHRONIC ULCER OTH PRT RIGHT FOOT              

      

 

                2020           YULISA DENNIS MD, Ot           

   E11.42          

                          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              

      

 

                2020           YULISA DENNIS MD, Ot           

   E11.621         

                          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              

      

 

                2020           YULISA DENNIS MD           Ot           

   L03.115         

                          CELLULITIS OF RIGHT LOWER LIMB                    

 

                2020           YULISA DENNIS MD, Ot           

   L97.513         

                          NON-PRS CHRONIC ULCER OTH PRT RIGHT FOOT              

      

 

                2020           YULISA DENNIS MD, Ot           

   E11.42          

                          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              

      

 

                2020           YULISA DENNIS MD, Ot           

   E11.52          

                          TYPE 2 DIABETES W DIABETIC PERIPHERAL AN              

      

 

                2020           YULISA DENNIS MD, Ot           

   E11.621         

                          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              

      

 

             2020           YULISA DENNIS MD, Ot           I96

           

GANGRENE, NOT ELSEWHERE CLASSIFIED                    

 

                2020           YULISA DENNIS MD, Ot           

   L97.413         

                          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              

      

 

                2020           YULISA DENNIS MD, Ot           

   E11.42          

                          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              

      

 

                2020           YULISA DENNIS MD, Ot           

   E11.52          

                          TYPE 2 DIABETES W DIABETIC PERIPHERAL AN              

      

 

                2020           YULISA DENNIS MD, Ot           

   E11.621         

                          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              

      

 

             2020           YULISA DENNIS MD, Ot           I96

           

GANGRENE, NOT ELSEWHERE CLASSIFIED                    

 

                2020           YULISA DENNIS MD, Ot           

   L97.412         

                          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              

      

 

                2020           YULISA DENNIS MD           Ot           

   M65.071         

                          ABSCESS OF TENDON SHEATH, RIGHT ANKLE AN              

      

 

             2020           JENNIFER SALDIVAR, ELISE RAVI           Ot           E11

.9           

TYPE 2 DIABETES MELLITUS WITHOUT COMPLIC                    

 

             2020           ELISE RODRIGUEZ MD           Ot           I11

.9           

HYPERTENSIVE HEART DISEASE WITHOUT HEART                    

 

                2020           ELISE RODRIGUEZ MD, Ot           

   I25.10          

                          ATHSCL HEART DISEASE OF NATIVE CORONARY               

      

 

             2020           ELISE RODRIGUEZ MD           Ot           I63

.9           

CEREBRAL INFARCTION, UNSPECIFIED                    

 

             2020           ELISE RODRIGUEZ MD           Ot           Z72

.0           

TOBACCO USE                                      

 

                2020           YULISA DENNIS MD, Ot           

   E11.42          

                          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              

      

 

                2020           YULISA DENNIS MD, Ot           

   E11.52          

                          TYPE 2 DIABETES W DIABETIC PERIPHERAL AN              

      

 

                2020           YULISA DENNIS MD, Ot           

   E11.621         

                          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              

      

 

                2020           YULISA DENNIS MD, Ot           

   I70.261         

                          ATHSCL NATIVE ARTERIES OF EXTREMITIES W               

      

 

                2020           YULISA DENNIS MD, Ot           

   L97.412         

                          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              

      

 

                2020           YULISA DENNIS MD, Ot           

   M65.071         

                          ABSCESS OF TENDON SHEATH, RIGHT ANKLE AN              

      

 

                2020           YULISA DENNIS MD, Ot           

   E11.42          

                          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              

      

 

                2020           YULISA DENNIS MD, Ot           

   E11.52          

                          TYPE 2 DIABETES W DIABETIC PERIPHERAL AN              

      

 

                2020           YULISA DENNIS MD, Ot           

   E11.621         

                          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              

      

 

                2020           YULISA DENNIS MD           Ot           

   I70.261         

                          ATHSCL NATIVE ARTERIES OF EXTREMITIES W               

      

 

                2020           YULISA DENNIS MD, Ot           

   L97.412         

                          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              

      

 

                2020           YULISA DENNIS MD, Ot           

   M65.071         

                          ABSCESS OF TENDON SHEATH, RIGHT ANKLE AN              

      

 

                2020           YULISA DENNIS MD, Ot           

   E11.42          

                          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              

      

 

                2020           YULISA DENNIS MD, Ot           

   E11.52          

                          TYPE 2 DIABETES W DIABETIC PERIPHERAL AN              

      

 

                2020           YULISA DENNIS MD, Ot           

   E11.621         

                          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              

      

 

                2020           YULISA DENNIS MD           Ot           

   I70.234         

                          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              

      

 

             2020           YULISA DENNIS MD           Ot           I96

           

GANGRENE, NOT ELSEWHERE CLASSIFIED                    

 

                2020           YULISA DENNIS MD, Ot           

   M65.071         

                          ABSCESS OF TENDON SHEATH, RIGHT ANKLE AN              

      

 

                2020           YULISA DENNIS MD, Ot           

   E11.42          

                          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              

      

 

                2020           YULISA DENNIS MD           Ot           

   E11.52          

                          TYPE 2 DIABETES W DIABETIC PERIPHERAL AN              

      

 

                2020           YULISA DENNIS MD           Ot           

   E11.621         

                          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              

      

 

                2020           YULISA DENNIS MD           Ot           

   I70.234         

                          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              

      

 

             2020           YULISA DENNIS MD           Ot           I96

           

GANGRENE, NOT ELSEWHERE CLASSIFIED                    

 

                2020           YULISA DENNIS MD           Ot           

   L97.412         

                          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              

      

 

                2020           YULISA DENNIS MD, Ot           

   M65.071         

                          ABSCESS OF TENDON SHEATH, RIGHT ANKLE AN              

      

 

                2020           YULISA DENNIS MD           Ot           

   E11.42          

                          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              

      

 

                2020           YULISA DENNIS MD, Ot           

   E11.52          

                          TYPE 2 DIABETES W DIABETIC PERIPHERAL AN              

      

 

                2020           YULISA DENNIS MD, Ot           

   E11.621         

                          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              

      

 

                2020           YULISA DENNIS MD           Ot           

   I70.234         

                          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              

      

 

             2020           YULISA DENNIS MD           Ot           I96

           

GANGRENE, NOT ELSEWHERE CLASSIFIED                    

 

                2020           YULISA DENNIS MD, Ot           

   L97.412         

                          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              

      

 

                2020           YULISA DENNIS MD, Ot           

   M65.071         

                          ABSCESS OF TENDON SHEATH, RIGHT ANKLE AN              

      

 

                2020           YULISA DENNIS MD, Ot           

   E11.42          

                          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              

      

 

                2020           YULISA DENNIS MD, Ot           

   E11.52          

                          TYPE 2 DIABETES W DIABETIC PERIPHERAL AN              

      

 

                2020           YULISA DENNIS MD, Ot           

   E11.621         

                          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              

      

 

                2020           YULISA DENNIS MD, Ot           

   I70.234         

                          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              

      

 

                2020           YULISA DENNIS MD           Ot           

   L97.412         

                          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              

      

 

                2020           YULISA DENNIS MD, Ot           

   M65.071         

                          ABSCESS OF TENDON SHEATH, RIGHT ANKLE AN              

      

 

                2020           YULISA DENNIS MD, Ot           

   E11.42          

                          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              

      

 

                2020           YULISA DENNIS MD, Ot           

   E11.52          

                          TYPE 2 DIABETES W DIABETIC PERIPHERAL AN              

      

 

                2020           YULISA DENNIS MD, Ot           

   E11.621         

                          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              

      

 

                2020           YULISA DENNIS MD           Ot           

   I70.261         

                          ATHSCL NATIVE ARTERIES OF EXTREMITIES W               

      

 

                2020           YULISA DENNIS MD           Ot           

   L97.413         

                          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              

      

 

                2020           YULISA DENNIS MD, Ot           

   M65.071         

                          ABSCESS OF TENDON SHEATH, RIGHT ANKLE AN              

      

 

                2020           YULISA DENNIS MD           Ot           

   E11.42          

                          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              

      

 

                2020           YULISA DENNIS MD           Ot           

   E11.52          

                          TYPE 2 DIABETES W DIABETIC PERIPHERAL AN              

      

 

                2020           YULISA DENNIS MD, Ot           

   E11.621         

                          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              

      

 

                2020           YULISA DENNIS MD, Ot           

   I70.234         

                          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              

      

 

                2020           YULISA DENNIS MD, Ot           

   L97.413         

                          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              

      

 

                2020           YULISA DENNIS MD, Ot           

   M65.071         

                          ABSCESS OF TENDON SHEATH, RIGHT ANKLE AN              

      

 

                2020           YULISA DENNIS MD           Ot           

   E11.42          

                          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              

      

 

                2020           YULISA DENNIS MD, Ot           

   E11.52          

                          TYPE 2 DIABETES W DIABETIC PERIPHERAL AN              

      

 

                2020           YULISA DENNIS MD, Ot           

   E11.621         

                          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              

      

 

                2020           YULISA DENNIS MD           Ot           

   I70.261         

                          ATHSCL NATIVE ARTERIES OF EXTREMITIES W               

      

 

                2020           YULISA DENNIS MD, Ot           

   L97.413         

                          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              

      

 

                2020           YULISA DENNIS MD, Ot           

   M65.071         

                          ABSCESS OF TENDON SHEATH, RIGHT ANKLE AN              

      

 

                2020           YULISA DENNIS MD, Ot           

   E11.621         

                          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              

      

 

                2020           YULISA DENNIS MD, Ot           

   L97.509         

                          NON-PRESSURE CHRONIC ULCER OTH PRT UNSP               

      

 

                2020           YULISA DENNIS MD, Ot           

   E11.42          

                          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              

      

 

                2020           YULISA DENNIS MD, Ot           

   E11.52          

                          TYPE 2 DIABETES W DIABETIC PERIPHERAL AN              

      

 

                2020           YULISA DENNIS MD           Ot           

   E11.621         

                          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              

      

 

                2020           YULISA DENNIS MD           Ot           

   I70.234         

                          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              

      

 

                2020           YULSIA DENNIS MD, Ot           

   L97.412         

                          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              

      

 

                2020           YULISA DENNIS MD, Ot           

   M65.071         

                          ABSCESS OF TENDON SHEATH, RIGHT ANKLE AN              

      

 

                2020           YULISA DENNIS MD, Ot           

   E11.42          

                          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              

      

 

                2020           YULISA DENNIS MD           Ot           

   E11.52          

                          TYPE 2 DIABETES W DIABETIC PERIPHERAL AN              

      

 

                2020           YULISA DENNIS MD           Ot           

   E11.621         

                          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              

      

 

                2020           YULISA DENNIS MD           Ot           

   I70.234         

                          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              

      

 

                2020           YULISA DENNIS MD, Ot           

   L97.412         

                          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              

      

 

                2020           YULISA DENNIS MD, Ot           

   M65.071         

                          ABSCESS OF TENDON SHEATH, RIGHT ANKLE AN              

      

 

                2020           YULISA DENNIS MD, Ot           

   E11.42          

                          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              

      

 

                2020           YULISA DENNIS MD, Ot           

   E11.52          

                          TYPE 2 DIABETES W DIABETIC PERIPHERAL AN              

      

 

                2020           YULISA DENNIS MD, Ot           

   E11.621         

                          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              

      

 

                2020           YULISA DENNIS MD, Ot           

   I70.234         

                          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              

      

 

                2020           YULISA DENNIS MD, Ot           

   L97.413         

                          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              

      

 

                2020           YULISA DENNIS MD, Ot           

   M65.071         

                          ABSCESS OF TENDON SHEATH, RIGHT ANKLE AN              

      

 

                2020           YULISA DENNIS MD, Ot           

   E11.42          

                          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              

      

 

                2020           YULISA DENNIS MD, Ot           

   E11.52          

                          TYPE 2 DIABETES W DIABETIC PERIPHERAL AN              

      

 

                2020           YULISA DENNIS MD, Ot           

   E11.621         

                          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              

      

 

                2020           YULISA DENNIS MD, Ot           

   I70.261         

                          ATHSCL NATIVE ARTERIES OF EXTREMITIES W               

      

 

                2020           YULISA DENNIS MD, Ot           

   L97.416         

                          NON-PRS CHR ULC OF R HEEL/MIDFT W BNE IN              

      

 

                2020           YULISA DENNIS MD, Ot           

   E11.42          

                          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              

      

 

                2020           YULISA DENNIS MD, Ot           

   E11.621         

                          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              

      

 

                2020           YULISA DENNIS MD, Ot           

   I70.234         

                          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              

      

 

                2020           YULISA DENNIS MD, Ot           

   L97.413         

                          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              

      

 

                2020           YULISA DENNIS MD, Ot           

   M65.071         

                          ABSCESS OF TENDON SHEATH, RIGHT ANKLE AN              

      

 

                2020           YULISA DENNIS MD, Ot           

   E11.42          

                          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              

      

 

                2020           YULISA DENNIS MD, Ot           

   E11.52          

                          TYPE 2 DIABETES W DIABETIC PERIPHERAL AN              

      

 

                2020           YULISA DENNIS MD, Ot           

   E11.621         

                          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              

      

 

                2020           YULISA DENNIS MD, Ot           

   I70.234         

                          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              

      

 

                2020           YULISA DENNIS MD, Ot           

   L97.412         

                          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              

      

 

                2020           YULISA DENNIS MD, Ot           

   E11.42          

                          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              

      

 

                2020           YULISA DENNIS MD, Ot           

   E11.52          

                          TYPE 2 DIABETES W DIABETIC PERIPHERAL AN              

      

 

                2020           YULISA DENNIS MD, Ot           

   E11.621         

                          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              

      

 

                2020           YULISA DENNIS MD, Ot           

   I70.261         

                          ATHSCL NATIVE ARTERIES OF EXTREMITIES W               

      

 

                2020           YULISA DENNIS MD, Ot           

   L97.412         

                          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              

      

 

                2020           YULISA DENNIS MD, Ot           

   E11.42          

                          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              

      

 

                2020           YULISA DENNIS MD, Ot           

   E11.52          

                          TYPE 2 DIABETES W DIABETIC PERIPHERAL AN              

      

 

                2020           YULISA DENNIS MD, Ot           

   E11.621         

                          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              

      

 

                2020           YULISA DENNIS MD, Ot           

   I70.234         

                          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              

      

 

                2020           YULISA DENNIS MD, Ot           

   L97.412         

                          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              

      

 

                2020           YULISA DENNIS MD, Ot           

   E11.42          

                          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              

      

 

                2020           YULISA DENNIS MD, Ot           

   E11.621         

                          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              

      

 

                2020           YULISA DENNIS MD           Ot           

   L03.115         

                          CELLULITIS OF RIGHT LOWER LIMB                    

 

                2020           YULISA DENNIS MD, Ot           

   L97.513         

                          NON-PRS CHRONIC ULCER OTH PRT RIGHT FOOT              

      

 

                2020           YULISA DENNIS MD, Ot           

   E11.42          

                          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              

      

 

                2020           YULISA DENNIS MD, Ot           

   E11.621         

                          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              

      

 

                2020           YULISA DENNIS MD           Ot           

   L03.115         

                          CELLULITIS OF RIGHT LOWER LIMB                    

 

                2020           YULISA DENNIS MD, Ot           

   L97.513         

                          NON-PRS CHRONIC ULCER OTH PRT RIGHT FOOT              

      

 

                2020           YULISA DENNIS MD, Ot           

   E11.42          

                          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              

      

 

                2020           YULISA DENNIS MD, Ot           

   E11.52          

                          TYPE 2 DIABETES W DIABETIC PERIPHERAL AN              

      

 

                2020           YULISA DENNIS MD, Ot           

   E11.621         

                          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              

      

 

             2020           YULISA DENNIS MD           Ot           I96

           

GANGRENE, NOT ELSEWHERE CLASSIFIED                    

 

                2020           YULISA DENNIS MD, Ot           

   L97.413         

                          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              

      

 

                2020           YULISA DENNIS MD, Ot           

   E11.42          

                          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              

      

 

                2020           YULISA DENNIS MD, Ot           

   E11.52          

                          TYPE 2 DIABETES W DIABETIC PERIPHERAL AN              

      

 

                2020           YULISA DENNIS MD, Ot           

   E11.621         

                          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              

      

 

             2020           YULISA DENNIS MD, Ot           I96

           

GANGRENE, NOT ELSEWHERE CLASSIFIED                    

 

                2020           YULISA DENNIS MD, Ot           

   L97.412         

                          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              

      

 

                2020           YULISA DENNIS MD, Ot           

   M65.071         

                          ABSCESS OF TENDON SHEATH, RIGHT ANKLE AN              

      

 

                2020           YULISA DENNIS MD, Ot           

   E11.42          

                          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              

      

 

                2020           YULISA DENNIS MD, Ot           

   E11.52          

                          TYPE 2 DIABETES W DIABETIC PERIPHERAL AN              

      

 

                2020           YULISA DENNIS MD, Ot           

   E11.621         

                          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              

      

 

                2020           YULISA DENNIS MD           Ot           

   I70.261         

                          ATHSCL NATIVE ARTERIES OF EXTREMITIES W               

      

 

                2020           YULISA DENNIS MD, Ot           

   L97.412         

                          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              

      

 

                2020           YULISA DENNIS MD, Ot           

   M65.071         

                          ABSCESS OF TENDON SHEATH, RIGHT ANKLE AN              

      

 

                2020           YULISA DENNIS MD, Ot           

   E11.42          

                          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              

      

 

                2020           YULISA DENNIS MD, Ot           

   E11.52          

                          TYPE 2 DIABETES W DIABETIC PERIPHERAL AN              

      

 

                2020           YULISA DENNIS MD, Ot           

   E11.621         

                          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              

      

 

                2020           YULISA DENNIS MD           Ot           

   I70.261         

                          ATHSCL NATIVE ARTERIES OF EXTREMITIES W               

      

 

                2020           YULISA DENNIS MD, Ot           

   L97.412         

                          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              

      

 

                2020           YULISA DENNIS MD, Ot           

   M65.071         

                          ABSCESS OF TENDON SHEATH, RIGHT ANKLE AN              

      

 

             2020           JENNIFER SALDIVAR, ELISE RAVI Ot           E11

.9           

TYPE 2 DIABETES MELLITUS WITHOUT COMPLIC                    

 

             2020           JENNIFER SALDIVAR, ELISE RAVI Ot           I11

.9           

HYPERTENSIVE HEART DISEASE WITHOUT HEART                    

 

                2020           JENNIFER SALDIVAR, ELISE RAVI Ot           

   I25.10          

                          ATHSCL HEART DISEASE OF NATIVE CORONARY               

      

 

             2020           JENNIFER SALDIVAR, ELISE RAVI Ot           I63

.9           

CEREBRAL INFARCTION, UNSPECIFIED                    

 

             2020           JENNIFER SALDIVAR, ELISE RAVI Ot           Z72

.0           

TOBACCO USE                                      

 

                2020           YULISA DENNIS MD, Ot           

   E11.42          

                          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              

      

 

                2020           YULISA DENNIS MD, Ot           

   E11.52          

                          TYPE 2 DIABETES W DIABETIC PERIPHERAL AN              

      

 

                2020           YULISA DENNIS MD, Ot           

   E11.621         

                          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              

      

 

                2020           YULISA DENNIS MD, Ot           

   I70.261         

                          ATHSCL NATIVE ARTERIES OF EXTREMITIES W               

      

 

                2020           YULISA DENNIS MD, Ot           

   L97.412         

                          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              

      

 

                2020           YULISA DENNIS MD, Ot           

   M65.071         

                          ABSCESS OF TENDON SHEATH, RIGHT ANKLE AN              

      

 

                2020           YULISA DENNIS MD, Ot           

   E11.42          

                          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              

      

 

                2020           YULISA DENNIS MD, Ot           

   E11.52          

                          TYPE 2 DIABETES W DIABETIC PERIPHERAL AN              

      

 

                2020           YULISA DENNIS MD, Ot           

   E11.621         

                          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              

      

 

                2020           YULISA DENNIS MD, Ot           

   I70.234         

                          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              

      

 

             2020           YULISA DENNIS MD, Ot           I96

           

GANGRENE, NOT ELSEWHERE CLASSIFIED                    

 

                2020           YULISA DENNIS MD, Ot           

   M65.071         

                          ABSCESS OF TENDON SHEATH, RIGHT ANKLE AN              

      

 

                2020           YULISA DENNIS MD, Ot           

   E11.42          

                          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              

      

 

                2020           YULISA DENNIS MD, Ot           

   E11.52          

                          TYPE 2 DIABETES W DIABETIC PERIPHERAL AN              

      

 

                2020           YULISA DENNIS MD           Ot           

   E11.621         

                          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              

      

 

                2020           YULISA DENNIS MD           Ot           

   I70.234         

                          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              

      

 

             2020           YULISA DENNIS MD           Ot           I96

           

GANGRENE, NOT ELSEWHERE CLASSIFIED                    

 

                2020           YULISA DENNIS MD           Ot           

   L97.412         

                          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              

      

 

                2020           YULISA DENNIS MD           Ot           

   M65.071         

                          ABSCESS OF TENDON SHEATH, RIGHT ANKLE AN              

      

 

                2020           YULISA DENNIS MD           Ot           

   E11.42          

                          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              

      

 

                2020           YULISA DENNIS MD           Ot           

   E11.52          

                          TYPE 2 DIABETES W DIABETIC PERIPHERAL AN              

      

 

                2020           YULISA DENNIS MD, Ot           

   E11.621         

                          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              

      

 

                2020           YULISA DENNIS MD           Ot           

   I70.234         

                          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              

      

 

             2020           YULISA DENNIS MD           Ot           I96

           

GANGRENE, NOT ELSEWHERE CLASSIFIED                    

 

                2020           YULISA DENNIS MD           Ot           

   L97.412         

                          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              

      

 

                2020           YULISA DENNIS MD           Ot           

   M65.071         

                          ABSCESS OF TENDON SHEATH, RIGHT ANKLE AN              

      

 

                2020           YULISA DENNIS MD, Ot           

   E11.42          

                          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              

      

 

                2020           YULISA DENNIS MD           Ot           

   E11.52          

                          TYPE 2 DIABETES W DIABETIC PERIPHERAL AN              

      

 

                2020           YULISA DENNIS MD           Ot           

   E11.621         

                          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              

      

 

                2020           YULISA DENNIS MD           Ot           

   I70.234         

                          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              

      

 

             2020           YULISA DENNIS MD           Ot           I96

           

GANGRENE, NOT ELSEWHERE CLASSIFIED                    

 

                2020           YULISA DENNIS MD           Ot           

   L97.412         

                          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              

      

 

                2020           YULISA DENNIS MD           Ot           

   M65.071         

                          ABSCESS OF TENDON SHEATH, RIGHT ANKLE AN              

      

 

                2020           YULISA DENNIS MD           Ot           

   E11.42          

                          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              

      

 

                2020           YULISA DENNIS MD           Ot           

   E11.52          

                          TYPE 2 DIABETES W DIABETIC PERIPHERAL AN              

      

 

                2020           YULISA DENNIS MD, Ot           

   E11.621         

                          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              

      

 

                2020           YULISA DENNIS MD           Ot           

   I70.261         

                          ATHSCL NATIVE ARTERIES OF EXTREMITIES W               

      

 

                2020           YULISA DENNIS MD, Ot           

   L97.413         

                          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              

      

 

                2020           YULISA DENNIS MD, Ot           

   M65.071         

                          ABSCESS OF TENDON SHEATH, RIGHT ANKLE AN              

      

 

                2020           YULISA DENNIS MD, Ot           

   E11.42          

                          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              

      

 

                2020           YULISA DENNIS MD, Ot           

   E11.52          

                          TYPE 2 DIABETES W DIABETIC PERIPHERAL AN              

      

 

                2020           YULISA DENNIS MD, Ot           

   E11.621         

                          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              

      

 

                2020           YULISA DENNIS MD, Ot           

   I70.261         

                          ATHSCL NATIVE ARTERIES OF EXTREMITIES W               

      

 

                2020           YULISA DENNIS MD, Ot           

   L97.413         

                          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              

      

 

                2020           YULISA DENNIS MD, Ot           

   M65.071         

                          ABSCESS OF TENDON SHEATH, RIGHT ANKLE AN              

      

 

                2020           YULISA DENNIS MD, Ot           

   E11.621         

                          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              

      

 

                2020           YULISA DENNIS MD           Ot           

   L97.509         

                          NON-PRESSURE CHRONIC ULCER OTH PRT UNSP               

      

 

                2020           YULISA DENNIS MD, Ot           

   E11.42          

                          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              

      

 

                2020           YULISA DENNIS MD           Ot           

   E11.52          

                          TYPE 2 DIABETES W DIABETIC PERIPHERAL AN              

      

 

                2020           YULISA DENNIS MD, Ot           

   E11.621         

                          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              

      

 

                2020           YULISA DENNIS MD           Ot           

   I70.234         

                          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              

      

 

                2020           YULISA DENNIS MD           Ot           

   L97.412         

                          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              

      

 

                2020           YULISA DENNIS MD, Ot           

   M65.071         

                          ABSCESS OF TENDON SHEATH, RIGHT ANKLE AN              

      

 

                2020           YULISA DENNIS MD, Ot           

   E11.42          

                          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              

      

 

                2020           YULISA DENNIS MD           Ot           

   E11.621         

                          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              

      

 

                2020           YULISA DENNIS MD           Ot           

   L03.115         

                          CELLULITIS OF RIGHT LOWER LIMB                    

 

                2020           YULISA DENNIS MD, Ot           

   L97.513         

                          NON-PRS CHRONIC ULCER OTH PRT RIGHT FOOT              

      

 

                2020           YULIAS DENNIS MD, Ot           

   E11.42          

                          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              

      

 

                2020           YULISA DENNIS MD, Ot           

   E11.52          

                          TYPE 2 DIABETES W DIABETIC PERIPHERAL AN              

      

 

                2020           YULISA DENNIS MD, Ot           

   E11.621         

                          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              

      

 

             2020           YUILSA DENNIS MD, Ot           I96

           

GANGRENE, NOT ELSEWHERE CLASSIFIED                    

 

                2020           YULISA DENNIS MD, Ot           

   L97.412         

                          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              

      

 

                2020           YULISA DENNIS MD, Ot           

   M65.071         

                          ABSCESS OF TENDON SHEATH, RIGHT ANKLE AN              

      

 

             2020           JENNIFER SALDIVAR, ELISE RAVI           Ot           E11

.9           

TYPE 2 DIABETES MELLITUS WITHOUT COMPLIC                    

 

             2020           ELISE RODRIGUEZ MD           Ot           I11

.9           

HYPERTENSIVE HEART DISEASE WITHOUT HEART                    

 

                2020           ELISE RODRIGUEZ MD, Ot           

   I25.10          

                          ATHSCL HEART DISEASE OF NATIVE CORONARY               

      

 

             2020           JENNIFER SALDIVAR, ELISE RAVI           Ot           I63

.9           

CEREBRAL INFARCTION, UNSPECIFIED                    

 

             2020           ELISE RODRIGUEZ MD, Ot           Z72

.0           

TOBACCO USE                                      

 

                2020           YULISA DENNIS MD, Ot           

   E11.42          

                          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              

      

 

                2020           YULISA DENNIS MD, Ot           

   E11.52          

                          TYPE 2 DIABETES W DIABETIC PERIPHERAL AN              

      

 

                2020           YULISA DENNIS MD, Ot           

   E11.621         

                          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              

      

 

                2020           YULISA DENNIS MD           Ot           

   I70.234         

                          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              

      

 

             2020           YULISA DENNIS MD           Ot           I96

           

GANGRENE, NOT ELSEWHERE CLASSIFIED                    

 

                2020           YULISA DENNIS MD, Ot           

   M65.071         

                          ABSCESS OF TENDON SHEATH, RIGHT ANKLE AN              

      

 

                2020           YULISA DENNIS MD, Ot           

   E11.42          

                          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              

      

 

                2020           YULISA DENNIS MD           Ot           

   E11.52          

                          TYPE 2 DIABETES W DIABETIC PERIPHERAL AN              

      

 

                2020           YULISA DENNIS MD, Ot           

   E11.621         

                          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              

      

 

                2020           YULISA DENNIS MD           Ot           

   I70.234         

                          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              

      

 

                2020           YULISA DENNIS MD, Ot           

   L97.412         

                          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              

      

 

                2020           YULISA DENNIS MD, Ot           

   M65.071         

                          ABSCESS OF TENDON SHEATH, RIGHT ANKLE AN              

      

 

                2020           YULISA DENNIS MD           Ot           

   E11.42          

                          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              

      

 

                2020           YULISA DENNIS MD, Ot           

   E11.52          

                          TYPE 2 DIABETES W DIABETIC PERIPHERAL AN              

      

 

                2020           YULISA DENNIS MD, Ot           

   E11.621         

                          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              

      

 

                2020           YULISA DENNIS MD, Ot           

   I70.234         

                          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              

      

 

                2020           YULISA DENNIS MD, Ot           

   L97.412         

                          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              

      

 

                2020           YULISA DENNIS MD, Ot           

   E11.42          

                          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              

      

 

                2020           YULISA DENNIS MD, Ot           

   E11.52          

                          TYPE 2 DIABETES W DIABETIC PERIPHERAL AN              

      

 

                2020           YULISA DENNIS MD, Ot           

   E11.621         

                          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              

      

 

                2020           YULISA DENNIS MD, Ot           

   I70.234         

                          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              

      

 

                2020           YULISA DENNIS MD, Ot           

   L97.412         

                          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              

      

 

                2020           YULISA DENNIS MD, Ot           

   E11.42          

                          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              

      

 

                2020           YULISA DENNIS MD           Ot           

   E11.52          

                          TYPE 2 DIABETES W DIABETIC PERIPHERAL AN              

      

 

                2020           YULISA DENNIS MD, Ot           

   E11.621         

                          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              

      

 

                2020           YULISA DENNIS MD           Ot           

   I70.234         

                          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              

      

 

                2020           YULISA DENNIS MD, Ot           

   L97.413         

                          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              

      

 

                2020           YULISA DENNIS MD, Ot           

   M65.071         

                          ABSCESS OF TENDON SHEATH, RIGHT ANKLE AN              

      

 

                2020           YULISA DENNIS MD           Ot           

   E11.42          

                          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              

      

 

                2020           YULISA DENNIS MD, Ot           

   E11.52          

                          TYPE 2 DIABETES W DIABETIC PERIPHERAL AN              

      

 

                2020           YULISA DENNIS MD, Ot           

   E11.621         

                          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              

      

 

                2020           YULISA DENNIS MD           Ot           

   I70.234         

                          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              

      

 

                2020           YULISA DENNIS MD, Ot           

   L97.413         

                          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              

      

 

                2020           YULISA DENNIS MD, Ot           

   M65.071         

                          ABSCESS OF TENDON SHEATH, RIGHT ANKLE AN              

      

 

                2020           YULISA DENNIS MD, Ot           

   E11.42          

                          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              

      

 

                2020           YULISA DENNIS MD, Ot           

   E11.52          

                          TYPE 2 DIABETES W DIABETIC PERIPHERAL AN              

      

 

                2020           YULISA DENNIS MD, Ot           

   E11.621         

                          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              

      

 

                2020           YULISA DENNIS MD, Ot           

   I70.261         

                          ATHSCL NATIVE ARTERIES OF EXTREMITIES W               

      

 

                2020           YULISA DENNIS MD, Ot           

   L97.412         

                          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              

      

 

                2020           YULISA DENNIS MD, Ot           

   E11.42          

                          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              

      

 

                2020           YULISA DENNIS MD, Ot           

   E11.621         

                          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              

      

 

                2020           YULISA DENNIS MD, Ot           

   I70.234         

                          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              

      

 

                2020           YULISA DENNIS MD, Ot           

   L97.413         

                          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              

      

 

                2020           YULISA DENNIS MD, Ot           

   M65.071         

                          ABSCESS OF TENDON SHEATH, RIGHT ANKLE AN              

      

 

                2020           YULISA DENNIS MD, Ot           

   E11.42          

                          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              

      

 

                2020           YULISA DENNIS MD           Ot           

   E11.621         

                          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              

      

 

                2020           YULISA DENNIS MD           Ot           

   I70.234         

                          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              

      

 

                2020           YULISA DENNIS MD           Ot           

   L97.413         

                          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              

      

 

                2020           YULISA DENNIS MD, Ot           

   M65.071         

                          ABSCESS OF TENDON SHEATH, RIGHT ANKLE AN              

      

 

                2020           YULISA DENNIS MD           Ot           

   E11.42          

                          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              

      

 

                2020           YULISA DENNIS MD           Ot           

   E11.52          

                          TYPE 2 DIABETES W DIABETIC PERIPHERAL AN              

      

 

                2020           YULISA DENNIS MD, Ot           

   E11.621         

                          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              

      

 

                2020           YULISA DENNIS MD           Ot           

   I70.234         

                          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              

      

 

                2020           YULISA DENNIS MD           Ot           

   L97.412         

                          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              

      

 

                2020           YULISA DENNIS MD           Ot           

   M65.071         

                          ABSCESS OF TENDON SHEATH, RIGHT ANKLE AN              

      

 

                2020           YULISA DENNIS MD, Ot           

   E11.42          

                          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              

      

 

                2020           YULISA DENNIS MD, Ot           

   E11.52          

                          TYPE 2 DIABETES W DIABETIC PERIPHERAL AN              

      

 

                2020           YULISA DENNIS MD, Ot           

   E11.621         

                          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              

      

 

                2020           YULISA DENNIS MD, Ot           

   I70.234         

                          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              

      

 

                2020           YULISA DENNIS MD, Ot           

   L97.413         

                          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              

      

 

                2020           YULISA DENNIS MD, Ot           

   M65.071         

                          ABSCESS OF TENDON SHEATH, RIGHT ANKLE AN              

      

 

                2020           YULISA DENNIS MD, Ot           

   E11.42          

                          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              

      

 

                2020           YULISA DENNIS MD, Ot           

   E11.52          

                          TYPE 2 DIABETES W DIABETIC PERIPHERAL AN              

      

 

                2020           YULISA DENNIS MD, Ot           

   E11.621         

                          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              

      

 

                2020           YULISA DENNIS MD           Ot           

   I70.261         

                          ATHSCL NATIVE ARTERIES OF EXTREMITIES W               

      

 

                2020           YULISA DENNIS MD           Ot           

   L97.416         

                          NON-PRS CHR ULC OF R HEEL/MIDFT W BNE IN              

      

 

                2020           YULISA DENNIS MD           Ot           

   E11.42          

                          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              

      

 

                2020           YULISA DENNIS MD, Ot           

   E11.52          

                          TYPE 2 DIABETES W DIABETIC PERIPHERAL AN              

      

 

                2020           YULISA DENNIS MD, Ot           

   E11.621         

                          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              

      

 

                2020           YULISA DENNIS MD           Ot           

   I70.234         

                          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              

      

 

                2020           YULISA DENNIS MD, Ot           

   L97.412         

                          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              

      

 

                2020           YULISA DENNIS MD           Ot           

   E11.42          

                          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              

      

 

                2020           YULISA DENNIS MD, Ot           

   E11.52          

                          TYPE 2 DIABETES W DIABETIC PERIPHERAL AN              

      

 

                2020           YULISA DENNIS MD, Ot           

   E11.621         

                          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              

      

 

                2020           YULISA DENNIS MD           Ot           

   I70.234         

                          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              

      

 

                2020           YULISA DENNIS MD, Ot           

   L97.412         

                          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              

      

 

             2020           THA ODOM MD, Ot           E11.42   

        TYPE 

2 DIABETES MELLITUS WITH DIABETIC P                    

 

             2020           THA ODOM MD, Ot           E11.621  

         TYPE

2 DIABETES MELLITUS WITH FOOT ULCER                    

 

             2020           THA ODOM MD, Ot           I70.234  

         

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                    

 

             2020           THA ODOM MD, Ot           L97.413  

         NON-

PRS CHR ULCER OF RIGHT HEEL AND MIDF                    

 

                2020           YULISA DENNIS MD, Ot           

   E11.42          

                          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              

      

 

                2020           YULISA DENNIS MD, Ot           

   E11.52          

                          TYPE 2 DIABETES W DIABETIC PERIPHERAL AN              

      

 

                2020           YULISA DENNIS MD, Ot           

   E11.621         

                          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              

      

 

                2020           YULISA DENNIS MD           Ot           

   I70.261         

                          ATHSCL NATIVE ARTERIES OF EXTREMITIES W               

      

 

                2020           YULISA DENNIS MD           Ot           

   L97.416         

                          NON-PRS CHR ULC OF R HEEL/MIDFT W BNE IN              

      

 

                2020           YULISA DENNIS MD, Ot           

   E11.42          

                          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              

      

 

                2020           YULISA DENNIS MD           Ot           

   E11.52          

                          TYPE 2 DIABETES W DIABETIC PERIPHERAL AN              

      

 

                2020           YULISA DENNIS MD, Ot           

   E11.621         

                          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              

      

 

                2020           YULISA DENNIS MD           Ot           

   I70.234         

                          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              

      

 

                2020           YULISA DENNIS MD           Ot           

   L97.412         

                          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              

      

 

                2020           YULISA DENNIS MD, Ot           

   E11.42          

                          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              

      

 

                2020           YULISA DENNIS MD           Ot           

   E11.52          

                          TYPE 2 DIABETES W DIABETIC PERIPHERAL AN              

      

 

                2020           YULISA DENNIS MD, Ot           

   E11.621         

                          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              

      

 

                2020           YULISA DENNIS MD           Ot           

   I70.234         

                          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              

      

 

                2020           YULISA DENNIS MD           Ot           

   L97.413         

                          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              

      

 

                2020           YULISA DENNIS MD           Ot           

   M65.071         

                          ABSCESS OF TENDON SHEATH, RIGHT ANKLE AN              

      

 

                2020           YULISA DENNIS MD           Ot           

   E11.42          

                          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              

      

 

                2020           YULISA DENNIS MD           Ot           

   E11.52          

                          TYPE 2 DIABETES W DIABETIC PERIPHERAL AN              

      

 

                2020           YULISA DENNIS MD, Ot           

   E11.621         

                          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              

      

 

                2020           YULISA DENNIS MD           Ot           

   I70.234         

                          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              

      

 

                2020           YULISA DENNIS MD           Ot           

   L97.412         

                          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              

      

 

                2020           YULISA DENNIS MD, Ot           

   M65.071         

                          ABSCESS OF TENDON SHEATH, RIGHT ANKLE AN              

      

 

                2020           YULISA DENNIS MD           Ot           

   E11.42          

                          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              

      

 

                2020           YULISA DENNIS MD           Ot           

   E11.52          

                          TYPE 2 DIABETES W DIABETIC PERIPHERAL AN              

      

 

                2020           YULISA DENNIS MD           Ot           

   E11.621         

                          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              

      

 

                2020           YULISA DENNIS MD           Ot           

   I70.234         

                          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              

      

 

                2020           YULISA DENNIS MD           Ot           

   L97.412         

                          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              

      

 

             2020           ARASELI RICHARDSON           Ot           I10

           

ESSENTIAL (PRIMARY) HYPERTENSION                    

 

                2020           ARASELI RICHARDSON           Ot           

   I25.10          

                          ATHSCL HEART DISEASE OF NATIVE CORONARY               

      

 

             2020           ARASELI RICHARDSON           Ot           I25

.2           

OLD MYOCARDIAL INFARCTION                        

 

             2020           ARASELI RICHARDSON           Ot           J44

.9           

CHRONIC OBSTRUCTIVE PULMONARY DISEASE, U                    

 

                2020           ARASELI RICHARDSON           Ot           

   R09.02          

                          HYPOXEMIA                          

 

                2020           ARASELI RICHARDSON           Ot           

   R41.82          

                          ALTERED MENTAL STATUS, UNSPECIFIED                    

 

                2020           ARASELI RICHARDSON           Ot           

   Z77.22          

                          CNTCT W AND EXPSR TO ENVIRON TOBACCO SMO              

      

 

                2020           ARASELI RICHARDSON           Ot           

   Z79.02          

                          LONG TERM (CURRENT) USE OF ANTITHROMBOTI              

      

 

                2020           ARASELI RICHARDSON           Ot           

   Z79.82          

                          LONG TERM (CURRENT) USE OF ASPIRIN                    

 

                2020           ARASELI RICHARDSON           Ot           

   Z85.46          

                          PERSONAL HISTORY OF MALIGNANT NEOPLASM O              

      

 

                2020           ARASELI RICHARDSON           Ot           

   Z86.73          

                          PRSNL HX OF TIA (TIA), AND CEREB INFRC W              

      

 

                2020           ARASELI RICHARDSON           Ot           

   Z90.49          

                          ACQUIRED ABSENCE OF OTHER SPECIFIED PART              

      

 

             2020           ARASELI RICHARDSON           Ot           Z95

.5           

PRESENCE OF CORONARY ANGIOPLASTY IMPLANT                    

 

             2020           THA ODOM MD           Ot           E11.42   

        TYPE 

2 DIABETES MELLITUS WITH DIABETIC P                    

 

             2020           THA ODOM MD           Ot           E11.52   

        TYPE 

2 DIABETES W DIABETIC PERIPHERAL AN                    

 

             2020           THA ODOM MD, Ot           E11.621  

         TYPE

2 DIABETES MELLITUS WITH FOOT ULCER                    

 

             2020           THA ODOM MD           Ot           I70.234  

         

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                    

 

             2020           THA ODOM MD           Ot           L97.412  

         NON-

PRS CHR ULCER OF RIGHT HEEL AND MIDF                    

 

             2020           THA ODOM MD           Ot           E11.42   

        TYPE 

2 DIABETES MELLITUS WITH DIABETIC P                    

 

             2020           THA ODOM MD           Ot           E11.621  

         TYPE

2 DIABETES MELLITUS WITH FOOT ULCER                    

 

             2020           THA ODOM MD           Ot           I70.234  

         

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                    

 

             2020           THA ODOM MD           Ot           I70.261  

         

ATHSCL NATIVE ARTERIES OF EXTREMITIES W                     

 

             2020           THA ODOM MD           Ot           L97.413  

         NON-

PRS CHR ULCER OF RIGHT HEEL AND MIDF                    

 

             2020           THA ODOM MD, Ot           E11.42   

        TYPE 

2 DIABETES MELLITUS WITH DIABETIC P                    

 

             2020           THA ODOM MD           Ot           E11.621  

         TYPE

2 DIABETES MELLITUS WITH FOOT ULCER                    

 

             2020           THA ODOM MD           Ot           I70.234  

         

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                    

 

             2020           THA ODOM MD           Ot           I70.261  

         

ATHSCL NATIVE ARTERIES OF EXTREMITIES W                     

 

             2020           THA ODOM MD, Ot           L97.413  

         NON-

PRS CHR ULCER OF RIGHT HEEL AND MIDF                    

 

             2020           THA ODOM MD, Ot           E11.42   

        TYPE 

2 DIABETES MELLITUS WITH DIABETIC P                    

 

             2020           THA ODOM MD, Ot           E11.621  

         TYPE

2 DIABETES MELLITUS WITH FOOT ULCER                    

 

             2020           THA ODOM MD, Ot           I70.234  

         

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                    

 

             2020           THA ODOM MD, Ot           I70.261  

         

ATHSCL NATIVE ARTERIES OF EXTREMITIES W                     

 

             2020           THA ODOM MD, Ot           L97.413  

         NON-

PRS CHR ULCER OF RIGHT HEEL AND MIDF                    

 

             2020           THA ODOM MD, Ot           B95.62   

        

METHICILLIN RESIS STAPH INFCT CAUSING DI                    

 

             2020           THA ODOM MD, Ot           B96.5    

       

PSEUDOMONAS (MALLEI) CAUSING DISEASES CL                    

 

             2020           THA ODOM MD, Ot           E11.42   

        TYPE 

2 DIABETES MELLITUS WITH DIABETIC P                    

 

             2020           THA ODOM MD, Ot           E11.621  

         TYPE

2 DIABETES MELLITUS WITH FOOT ULCER                    

 

             2020           THA ODOM MD, Ot           I70.234  

         

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                    

 

             2020           THA ODOM MD, Ot           L97.413  

         NON-

PRS CHR ULCER OF RIGHT HEEL AND MIDF                    

 

             2020           THA ODOM MD, Ot           M86.171  

         

OTHER ACUTE OSTEOMYELITIS, RIGHT ANKLE A                    

 

             2020           DAY DO, JASEN           Ot           A41.02

           

SEPSIS DUE TO METHICILLIN RESISTANT STAP                    

 

             2020           DAY DO JASEN           Ot           A41.52

           

SEPSIS DUE TO PSEUDOMONAS                        

 

             2020           DAY DO JASEN           Ot           D69.6 

          

THROMBOCYTOPENIA, UNSPECIFIED                    

 

             2020           DAY DO JASEN           Ot           E11.52

           

TYPE 2 DIABETES W DIABETIC PERIPHERAL AN                    

 

             2020           BARB DO JASEN           Ot           E11.62

1           

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                    

 

             2020           DAY DO JASEN           Ot           E11.65

           

TYPE 2 DIABETES MELLITUS WITH HYPERGLYCE                    

 

             2020           BARB DO JASEN           Ot           E78.00

           

PURE HYPERCHOLESTEROLEMIA, UNSPECIFIED                    

 

             2020           BARB DO JASEN           Ot           E87.5 

          

HYPERKALEMIA                                     

 

             2020           BARB PANCHAL JASEN           Ot           F17.21

0           

NICOTINE DEPENDENCE, CIGARETTES, UNCOMPL                    

 

             2020           BARB PANCHAL JASEN           Ot           I10   

        

ESSENTIAL (PRIMARY) HYPERTENSION                    

 

             2020           BARB PANCHAL JASEN           Ot           I25.10

           

ATHSCL HEART DISEASE OF NATIVE CORONARY                     

 

             2020           BARB PANCHAL JASEN           Ot           I70.20

1           

UNSP ATHSCL NATIVE ARTERIES OF EXTREMITI                    

 

             2020           BARB PANCHAL JASEN           Ot           I71.4 

          

ABDOMINAL AORTIC ANEURYSM, WITHOUT RUPTU                    

 

             2020           BARB PANCHAL JASEN           Ot           I96   

        

GANGRENE, NOT ELSEWHERE CLASSIFIED                    

 

             2020           BARB PANCHAL JASEN           Ot           J44.9 

          

CHRONIC OBSTRUCTIVE PULMONARY DISEASE, U                    

 

             2020           BARB PANCHAL JASEN           Ot           L03.03

1           

CELLULITIS OF RIGHT TOE                          

 

             2020           BARB PANCHAL JASEN           Ot           L03.11

5           

CELLULITIS OF RIGHT LOWER LIMB                    

 

             2020           BABR PANCHAL JASEN           Ot           M19.91

           

PRIMARY OSTEOARTHRITIS, UNSPECIFIED SITE                    

 

             2020           BARB PANCHAL JASEN           Ot           M72.6 

          

NECROTIZING FASCIITIS                            

 

             2020           BARB PANCHAL JASEN           Ot           R74.0 

          

NONSPEC ELEV OF LEVELS OF TRANSAMNS   LA                    

 

             2020           BARB PANCHAL JASEN           Ot           Z85.46

           

PERSONAL HISTORY OF MALIGNANT NEOPLASM O                    

 

             2020           BARB PANCHAL JASEN           Ot           Z86.73

           

PRSNL HX OF TIA (TIA), AND CEREB INFRC W                    

 

             2020           BARB PANCHAL JASEN           Ot           Z91.81

           

HISTORY OF FALLING                               

 

             2020           BARB PANCHAL JASEN           Ot           Z95.5 

          

PRESENCE OF CORONARY ANGIOPLASTY IMPLANT                    

 

             2020           BARB PANCHAL JASEN           Ot           A41.02

           

SEPSIS DUE TO METHICILLIN RESISTANT STAP                    

 

             2020           BARB PANCHAL JASEN           Ot           A41.52

           

SEPSIS DUE TO PSEUDOMONAS                        

 

             2020           BARB PANCHAL JASEN           Ot           A41.9 

          

SEPSIS, UNSPECIFIED ORGANISM                     

 

             2020           BARB PANCHAL JASEN           Ot           B95.62

           

METHICILLIN RESIS STAPH INFCT CAUSING DI                    

 

             2020           BARB PANCHAL JASEN           Ot           B96.5 

          

PSEUDOMONAS (MALLEI) CAUSING DISEASES CL                    

 

             2020           BARB PANCHAL JASEN           Ot           D69.6 

          

THROMBOCYTOPENIA, UNSPECIFIED                    

 

             2020           DAY DO, JASEN           Ot           E11.52

           

TYPE 2 DIABETES W DIABETIC PERIPHERAL AN                    

 

             2020           DAY DO, JASEN           Ot           E11.62

1           

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                    

 

             2020           DAY DO, JASEN           Ot           E11.65

           

TYPE 2 DIABETES MELLITUS WITH HYPERGLYCE                    

 

             2020           DAY DO, JASEN           Ot           E78.00

           

PURE HYPERCHOLESTEROLEMIA, UNSPECIFIED                    

 

             2020           DAY DO, JASEN           Ot           E87.5 

          

HYPERKALEMIA                                     

 

             2020           DAY DO, JASEN           Ot           F17.21

0           

NICOTINE DEPENDENCE, CIGARETTES, UNCOMPL                    

 

             2020           DAY DO, JASEN           Ot           I10   

        

ESSENTIAL (PRIMARY) HYPERTENSION                    

 

             2020           DAY DO, JASEN           Ot           I25.10

           

ATHSCL HEART DISEASE OF NATIVE CORONARY                     

 

             2020           DAY DO, JASEN           Ot           I70.20

1           

UNSP ATHSCL NATIVE ARTERIES OF EXTREMITI                    

 

             2020           DAY DO, JASEN           Ot           I71.4 

          

ABDOMINAL AORTIC ANEURYSM, WITHOUT RUPTU                    

 

             2020           DAY DO, JASEN           Ot           I96   

        

GANGRENE, NOT ELSEWHERE CLASSIFIED                    

 

             2020           DAY DO, JASEN           Ot           J44.9 

          

CHRONIC OBSTRUCTIVE PULMONARY DISEASE, U                    

 

             2020           DAY DO, JASEN           Ot           K42.0 

          

UMBILICAL HERNIA WITH OBSTRUCTION, WITHO                    

 

             2020           DAY DO, JASEN           Ot           L03.03

1           

CELLULITIS OF RIGHT TOE                          

 

             2020           DAY DO, JASEN           Ot           L03.11

5           

CELLULITIS OF RIGHT LOWER LIMB                    

 

             2020           DAY DO, JASEN           Ot           L97.51

9           

NON-PRS CHRONIC ULCER OTH PRT RIGHT FOOT                    

 

             2020           DAY DO, JASEN           Ot           M19.90

           

UNSPECIFIED OSTEOARTHRITIS, UNSPECIFIED                     

 

             2020           DAY DO, JASEN           Ot           M19.91

           

PRIMARY OSTEOARTHRITIS, UNSPECIFIED SITE                    

 

             2020           DAY DO, JASEN           Ot           M72.6 

          

NECROTIZING FASCIITIS                            

 

             2020           DAY DO, JASEN           Ot           R74.0 

          

NONSPEC ELEV OF LEVELS OF TRANSAMNS   LA                    

 

             2020           DAY DO, JASEN           Ot           Z85.46

           

PERSONAL HISTORY OF MALIGNANT NEOPLASM O                    

 

             2020           DAY DO, JASEN           Ot           Z86.73

           

PRSNL HX OF TIA (TIA), AND CEREB INFRC W                    

 

             2020           JASEN DAY DO           Ot           Z91.81

           

HISTORY OF FALLING                               

 

             2020           JASEN DAY DO           Ot           Z95.5 

          

PRESENCE OF CORONARY ANGIOPLASTY IMPLANT                    

 

                2020           YULISA DENNIS MD, Ot           

   E11.42          

                          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              

      

 

                2020           YULISA DENNIS MD, Ot           

   E11.621         

                          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              

      

 

                2020           YULISA DENNIS MD, Ot           

   L03.115         

                          CELLULITIS OF RIGHT LOWER LIMB                    

 

                2020           YULISA DENNIS MD, Ot           

   L97.513         

                          NON-PRS CHRONIC ULCER OTH PRT RIGHT FOOT              

      

 

                2020           YULISA DENNIS MD, Ot           

   E11.42          

                          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              

      

 

                2020           YULISA DENNIS MD, Ot           

   E11.52          

                          TYPE 2 DIABETES W DIABETIC PERIPHERAL AN              

      

 

                2020           YULISA DENNIS MD, Ot           

   E11.621         

                          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              

      

 

                2020           YULISA DENNIS MD, Ot           

   I70.234         

                          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              

      

 

                2020           YULISA DENNIS MD, Ot           

   L97.413         

                          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              

      

 

                2020           YULISA DENNIS MD, Ot           

   M65.071         

                          ABSCESS OF TENDON SHEATH, RIGHT ANKLE AN              

      

 

                2020           YULISA DENNIS MD, Ot           

   E11.42          

                          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              

      

 

                2020           YULISA DENNIS MD, Ot           

   E11.52          

                          TYPE 2 DIABETES W DIABETIC PERIPHERAL AN              

      

 

                2020           YULISA DENNIS MD, Ot           

   E11.621         

                          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              

      

 

                2020           YULISA DENNIS MD, Ot           

   I70.261         

                          ATHSCL NATIVE ARTERIES OF EXTREMITIES W               

      

 

                2020           YULISA DENNIS MD, Ot           

   L97.413         

                          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              

      

 

                2020           YULISA DENNIS MD, Ot           

   M65.071         

                          ABSCESS OF TENDON SHEATH, RIGHT ANKLE AN              

      

 

                2020           YULISA DENNIS MD, Ot           

   E11.621         

                          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              

      

 

                2020           YULISA DENNIS MD, Ot           

   L97.509         

                          NON-PRESSURE CHRONIC ULCER OTH PRT UNSP               

      

 

                2020           YULISA DENNIS MD, Ot           

   E11.42          

                          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              

      

 

                2020           YULISA DENNIS MD           Ot           

   E11.52          

                          TYPE 2 DIABETES W DIABETIC PERIPHERAL AN              

      

 

                2020           YULISA DENNIS MD           Ot           

   E11.621         

                          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              

      

 

                2020           YULISA DENNIS MD           Ot           

   I70.234         

                          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              

      

 

                2020           YULISA DENNIS MD           Ot           

   L97.412         

                          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              

      

 

                2020           YULISA DENNIS MD           Ot           

   M65.071         

                          ABSCESS OF TENDON SHEATH, RIGHT ANKLE AN              

      

 

                2020           YULISA DENNIS MD           Ot           

   E11.42          

                          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              

      

 

                2020           YULISA DENNIS MD           Ot           

   E11.52          

                          TYPE 2 DIABETES W DIABETIC PERIPHERAL AN              

      

 

                2020           YULISA DENNIS MD, Ot           

   E11.621         

                          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              

      

 

                2020           YULISA DENNIS MD           Ot           

   I70.234         

                          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              

      

 

                2020           YULISA DENNIS MD           Ot           

   L97.412         

                          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              

      

 

                2020           YULISA DENNIS MD           Ot           

   M65.071         

                          ABSCESS OF TENDON SHEATH, RIGHT ANKLE AN              

      

 

                2020           YULISA DENNIS MD           Ot           

   E11.42          

                          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              

      

 

                2020           YULISA DENNIS MD           Ot           

   E11.52          

                          TYPE 2 DIABETES W DIABETIC PERIPHERAL AN              

      

 

                2020           YULISA DENNIS MD           Ot           

   E11.621         

                          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              

      

 

                2020           YULISA DENNIS MD           Ot           

   I70.234         

                          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              

      

 

                2020           YULISA DENNIS MD           Ot           

   L97.413         

                          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              

      

 

                2020           YULISA DENNIS MD           Ot           

   M65.071         

                          ABSCESS OF TENDON SHEATH, RIGHT ANKLE AN              

      

 

                2020           YULISA DENNIS MD           Ot           

   E11.42          

                          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              

      

 

                2020           YULISA DENNIS MD           Ot           

   E11.52          

                          TYPE 2 DIABETES W DIABETIC PERIPHERAL AN              

      

 

                2020           YULISA DENNIS MD           Ot           

   E11.621         

                          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              

      

 

                2020           YULISA DENNIS MD           Ot           

   I70.261         

                          ATHSCL NATIVE ARTERIES OF EXTREMITIES W               

      

 

                2020           YULISA DENNIS MD           Ot           

   L97.416         

                          NON-PRS CHR ULC OF R HEEL/MIDFT W BNE IN              

      

 

                2020           YULISA DENNIS MD           Ot           

   E11.42          

                          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              

      

 

                2020           YULISA DENNIS MD           Ot           

   E11.621         

                          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              

      

 

                2020           YULISA DENNIS MD           Ot           

   I70.234         

                          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              

      

 

                2020           YULISA DENNIS MD           Ot           

   L97.413         

                          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              

      

 

                2020           YULISA DENNIS MD           Ot           

   M65.071         

                          ABSCESS OF TENDON SHEATH, RIGHT ANKLE AN              

      

 

                2020           YULISA DENNIS MD, Ot           

   E11.42          

                          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              

      

 

                2020           YULISA DENNIS MD, Ot           

   E11.52          

                          TYPE 2 DIABETES W DIABETIC PERIPHERAL AN              

      

 

                2020           YULISA DENNIS MD, Ot           

   E11.621         

                          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              

      

 

                2020           YULISA DENNIS MD           Ot           

   I70.234         

                          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              

      

 

                2020           YULISA DENNIS MD           Ot           

   L97.412         

                          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              

      

 

                2020           YULISA DENNIS MD           Ot           

   E11.42          

                          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              

      

 

                2020           YULISA DENNIS MD           Ot           

   E11.52          

                          TYPE 2 DIABETES W DIABETIC PERIPHERAL AN              

      

 

                2020           YULISA DENNIS MD, Ot           

   E11.621         

                          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              

      

 

                2020           YULISA DENNIS MD           Ot           

   I70.261         

                          ATHSCL NATIVE ARTERIES OF EXTREMITIES W               

      

 

                2020           YULISA DENNIS MD           Ot           

   L97.412         

                          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              

      

 

                2020           YULISA DENNIS MD           Ot           

   E11.42          

                          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              

      

 

                2020           YULISA DENNIS MD           Ot           

   E11.52          

                          TYPE 2 DIABETES W DIABETIC PERIPHERAL AN              

      

 

                2020           YULISA DENNIS MD           Ot           

   E11.621         

                          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              

      

 

                2020           YULISA DENNIS MD           Ot           

   I70.234         

                          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              

      

 

                2020           YULISA DENNIS MD           Ot           

   L97.412         

                          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              

      

 

                2020           YULISA DENNIS MD           Ot           

   E11.42          

                          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              

      

 

                2020           YULISA DENNIS MD, Ot           

   E11.621         

                          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              

      

 

                2020           YULISA DENNIS MD           Ot           

   L03.115         

                          CELLULITIS OF RIGHT LOWER LIMB                    

 

                2020           YULISA DENNIS MD           Ot           

   L97.513         

                          NON-PRS CHRONIC ULCER OTH PRT RIGHT FOOT              

      

 

                2020           YULISA DENNIS MD           Ot           

   E11.42          

                          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              

      

 

                2020           YULISA DENNIS MD           Ot           

   E11.621         

                          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              

      

 

                2020           YULISA DENNIS MD           Ot           

   L03.115         

                          CELLULITIS OF RIGHT LOWER LIMB                    

 

                2020           YULISA DENNIS MD           Ot           

   L97.513         

                          NON-PRS CHRONIC ULCER OTH PRT RIGHT FOOT              

      

 

                2020           YULISA DENNIS MD           Ot           

   E11.42          

                          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              

      

 

                2020           YULISA DENNIS MD           Ot           

   E11.52          

                          TYPE 2 DIABETES W DIABETIC PERIPHERAL AN              

      

 

                2020           YULISA DENNIS MD           Ot           

   E11.621         

                          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              

      

 

             2020           YULISA DENNIS MD           Ot           I96

           

GANGRENE, NOT ELSEWHERE CLASSIFIED                    

 

                2020           YULISA DENNIS MD           Ot           

   L97.413         

                          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              

      

 

                2020           YULISA DENNIS MD           Ot           

   E11.42          

                          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              

      

 

                2020           YULISA DENNIS MD           Ot           

   E11.52          

                          TYPE 2 DIABETES W DIABETIC PERIPHERAL AN              

      

 

                2020           YULISA DENNIS MD           Ot           

   E11.621         

                          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              

      

 

             2020           YULISA DENNIS MD           Ot           I96

           

GANGRENE, NOT ELSEWHERE CLASSIFIED                    

 

                2020           YULISA DENNIS MD           Ot           

   L97.412         

                          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              

      

 

                2020           YULISA DENNIS MD           Ot           

   M65.071         

                          ABSCESS OF TENDON SHEATH, RIGHT ANKLE AN              

      

 

             2020           ELISE RODRIGUEZ MD           Ot           E11

.9           

TYPE 2 DIABETES MELLITUS WITHOUT COMPLIC                    

 

             2020           ELISE RODRIGUEZ MDWAN           Ot           I11

.9           

HYPERTENSIVE HEART DISEASE WITHOUT HEART                    

 

                2020           ELISE RODRIGUEZ MD, Ot           

   I25.10          

                          ATHSCL HEART DISEASE OF NATIVE CORONARY               

      

 

             2020           JENNIFER SALDIVAR, ELISE RAVI           Ot           I63

.9           

CEREBRAL INFARCTION, UNSPECIFIED                    

 

             2020           JENNIFER SALDIVAR, ELISE RAVI           Ot           Z72

.0           

TOBACCO USE                                      

 

                2020           YULISA DENNIS MD, Ot           

   E11.42          

                          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              

      

 

                2020           YULISA DENNIS MD, Ot           

   E11.52          

                          TYPE 2 DIABETES W DIABETIC PERIPHERAL AN              

      

 

                2020           YULISA DENNIS MD, Ot           

   E11.621         

                          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              

      

 

                2020           YULISA DENNIS MD, Ot           

   I70.261         

                          ATHSCL NATIVE ARTERIES OF EXTREMITIES W               

      

 

                2020           YULISA DENNIS MD, Ot           

   L97.412         

                          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              

      

 

                2020           YULISA DENNIS MD, Ot           

   M65.071         

                          ABSCESS OF TENDON SHEATH, RIGHT ANKLE AN              

      

 

                2020           YULISA DENNIS MD           Ot           

   E11.42          

                          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              

      

 

                2020           YULISA DENNIS MD           Ot           

   E11.52          

                          TYPE 2 DIABETES W DIABETIC PERIPHERAL AN              

      

 

                2020           YULISA DENNIS MD, Ot           

   E11.621         

                          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              

      

 

                2020           YULISA DENNIS MD           Ot           

   I70.261         

                          ATHSCL NATIVE ARTERIES OF EXTREMITIES W               

      

 

                2020           YULISA DENNIS MD, Ot           

   L97.412         

                          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              

      

 

                2020           YULISA DENNIS MD, Ot           

   M65.071         

                          ABSCESS OF TENDON SHEATH, RIGHT ANKLE AN              

      

 

                2020           YULISA DENNIS MD           Ot           

   E11.42          

                          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              

      

 

                2020           YULISA DENNIS MD, Ot           

   E11.52          

                          TYPE 2 DIABETES W DIABETIC PERIPHERAL AN              

      

 

                2020           YULISA DENNIS MD, Ot           

   E11.621         

                          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              

      

 

                2020           YULISA DENNIS MD           Ot           

   I70.234         

                          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              

      

 

             2020           YULISA DENNIS MD           Ot           I96

           

GANGRENE, NOT ELSEWHERE CLASSIFIED                    

 

                2020           YULISA DENNIS MD, Ot           

   M65.071         

                          ABSCESS OF TENDON SHEATH, RIGHT ANKLE AN              

      

 

                2020           YULISA DENNIS MD, Ot           

   E11.42          

                          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              

      

 

                2020           YULISA DENNIS MD, Ot           

   E11.52          

                          TYPE 2 DIABETES W DIABETIC PERIPHERAL AN              

      

 

                2020           YULISA DENNIS MD, Ot           

   E11.621         

                          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              

      

 

                2020           YULISA DENNIS MD           Ot           

   I70.234         

                          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              

      

 

             2020           YULISA DENNIS MD           Ot           I96

           

GANGRENE, NOT ELSEWHERE CLASSIFIED                    

 

                2020           YULISA DENNIS MD, Ot           

   L97.412         

                          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              

      

 

                2020           YULISA DENNIS MD, Ot           

   M65.071         

                          ABSCESS OF TENDON SHEATH, RIGHT ANKLE AN              

      

 

                2020           YULISA DENNIS MD, Ot           

   E11.42          

                          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              

      

 

                2020           YULISA DENNIS MD, Ot           

   E11.52          

                          TYPE 2 DIABETES W DIABETIC PERIPHERAL AN              

      

 

                2020           YULISA DENNIS MD, Ot           

   E11.621         

                          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              

      

 

                2020           YULISA DENNIS MD           Ot           

   I70.234         

                          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              

      

 

                2020           YULISA DENNIS MD, Ot           

   L97.412         

                          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              

      

 

                2020           YULISA DENNIS MD, Ot           

   M65.071         

                          ABSCESS OF TENDON SHEATH, RIGHT ANKLE AN              

      

 

                2020           YULISA DENNIS MD           Ot           

   E11.42          

                          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              

      

 

                2020           YULISA DENNIS MD, Ot           

   E11.52          

                          TYPE 2 DIABETES W DIABETIC PERIPHERAL AN              

      

 

                2020           YULISA DENNIS MD, Ot           

   E11.621         

                          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              

      

 

                2020           YULISA DENNIS MD           Ot           

   I70.261         

                          ATHSCL NATIVE ARTERIES OF EXTREMITIES W               

      

 

                2020           YULISA DENNIS MD, Ot           

   L97.413         

                          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              

      

 

                2020           YULISA DENNIS MD, Ot           

   M65.071         

                          ABSCESS OF TENDON SHEATH, RIGHT ANKLE AN              

      

 

                2020           YULISA DENNIS MD, Ot           

   E11.42          

                          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              

      

 

                2020           YULISA DENNIS MD, Ot           

   E11.52          

                          TYPE 2 DIABETES W DIABETIC PERIPHERAL AN              

      

 

                2020           YULISA DENNIS MD           Ot           

   E11.621         

                          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              

      

 

                2020           YULISA DENNIS MD           Ot           

   I70.261         

                          ATHSCL NATIVE ARTERIES OF EXTREMITIES W               

      

 

                2020           YULISA DENNIS MD, Ot           

   L97.413         

                          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              

      

 

                2020           YULISA DENNIS MD, Ot           

   M65.071         

                          ABSCESS OF TENDON SHEATH, RIGHT ANKLE AN              

      

 

             2020           THA ODOM MD, Ot           B95.62   

        

METHICILLIN RESIS STAPH INFCT CAUSING DI                    

 

             2020           THA ODOM MD           Ot           B96.5    

       

PSEUDOMONAS (MALLEI) CAUSING DISEASES CL                    

 

             2020           THA ODOM MD, Ot           E11.42   

        TYPE 

2 DIABETES MELLITUS WITH DIABETIC P                    

 

             2020           THA ODOM MD, Ot           E11.621  

         TYPE

2 DIABETES MELLITUS WITH FOOT ULCER                    

 

             2020           THA ODOM MD           Ot           I70.234  

         

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                    

 

             2020           THA ODOM MD           Ot           L97.414  

         NON-

PRS CHR ULCER OF RIGHT HEEL AND MIDF                    

 

             2020           THA ODOM MD           Ot           M86.171  

         

OTHER ACUTE OSTEOMYELITIS, RIGHT ANKLE A                    

 

             2020           THA ODOM MD, Ot           B95.62   

        

METHICILLIN RESIS STAPH INFCT CAUSING DI                    

 

             2020           THA ODOM MD, Ot           B96.5    

       

PSEUDOMONAS (MALLEI) CAUSING DISEASES CL                    

 

             2020           THA ODOM MD, Ot           E11.42   

        TYPE 

2 DIABETES MELLITUS WITH DIABETIC P                    

 

             2020           THA ODOM MD, Ot           E11.621  

         TYPE

2 DIABETES MELLITUS WITH FOOT ULCER                    

 

             2020           THA ODOM MD           Ot           I70.234  

         

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                    

 

             2020           THA ODOM MD           Ot           L97.414  

         NON-

PRS CHR ULCER OF RIGHT HEEL AND MIDF                    

 

             2020           THA ODOM MD           Ot           M86.171  

         

OTHER ACUTE OSTEOMYELITIS, RIGHT ANKLE A                    

 

             2020           ELISE RODRIGUEZ MD           Ot           E11

.9           

TYPE 2 DIABETES MELLITUS WITHOUT COMPLIC                    

 

             2020           ELISE RODRIGUEZ MD, Ot           E66

.9           

OBESITY, UNSPECIFIED                             

 

             2020           ELISE RODRIGUEZ MD           Ot           I10

           

ESSENTIAL (PRIMARY) HYPERTENSION                    

 

                2020           ELISE RODRIGUEZ MD           Ot           

   I25.10          

                          ATHSCL HEART DISEASE OF NATIVE CORONARY               

      

 

             2020           ELISE RODRIGUEZ MD           Ot           I99

.8           

OTHER DISORDER OF CIRCULATORY SYSTEM                    

 

                2020           ELISE RODRIGUEZ MD           Ot           

   Z01.810         

                          ENCOUNTER FOR PREPROCEDURAL CARDIOVASCUL              

      

 

             2020           ELISE RODRIGUEZ MD           Ot           Z72

.0           

TOBACCO USE                                      

 

             2020           ELISE RODRIGUEZ MD           Ot           E11

.9           

TYPE 2 DIABETES MELLITUS WITHOUT COMPLIC                    

 

             2020           ELISE RODRIGUEZ MD           Ot           E66

.9           

OBESITY, UNSPECIFIED                             

 

             2020           ELISE RODRIGUEZ MD           Ot           I10

           

ESSENTIAL (PRIMARY) HYPERTENSION                    

 

                2020           ELISE RODRIGUEZ MD           Ot           

   I25.10          

                          ATHSCL HEART DISEASE OF NATIVE CORONARY               

      

 

             2020           ELISE RODRIGUEZ MD           Ot           I99

.8           

OTHER DISORDER OF CIRCULATORY SYSTEM                    

 

                2020           ELISE RODRIGUEZ MD           Ot           

   Z01.810         

                          ENCOUNTER FOR PREPROCEDURAL CARDIOVASCUL              

      

 

             2020           ELISE RODRIGUEZ MD           Ot           Z72

.0           

TOBACCO USE                                      

 

                2020           YULISA DENNIS MD           Ot           

   E11.42          

                          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              

      

 

                2020           YULISA DENNIS MD, Ot           

   E11.52          

                          TYPE 2 DIABETES W DIABETIC PERIPHERAL AN              

      

 

                2020           YULISA DENNIS MD, Ot           

   E11.621         

                          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              

      

 

                2020           YULISA DENNIS MD, Ot           

   I70.234         

                          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              

      

 

             2020           YULISA DENNIS MD           Ot           I96

           

GANGRENE, NOT ELSEWHERE CLASSIFIED                    

 

                2020           YULISA DENNIS MD, Ot           

   L97.412         

                          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              

      

 

                2020           YULISA DENNIS MD, Ot           

   M65.071         

                          ABSCESS OF TENDON SHEATH, RIGHT ANKLE AN              

      

 

                2020           YULISA DENNIS MD           Ot           

   B95.62          

                          METHICILLIN RESIS STAPH INFCT CAUSING DI              

      

 

             2020           YULISA DENNIS MD, Ot           B96

.5           

PSEUDOMONAS (MALLEI) CAUSING DISEASES CL                    

 

                2020           YULISA DENNIS MD           Ot           

   E11.42          

                          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              

      

 

                2020           YULISA DENNIS MD, Ot           

   E11.621         

                          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              

      

 

                2020           YULISA DENNIS MD           Ot           

   I70.234         

                          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              

      

 

                2020           YULISA DENNIS MD           Ot           

   L97.416         

                          NON-PRS CHR ULC OF R HEEL/MIDFT W BNE IN              

      

 

                2020           YULISA DENNIS MD, Ot           

   B95.62          

                          METHICILLIN RESIS STAPH INFCT CAUSING DI              

      

 

             2020           YULISA DENNIS MD, Ot           B96

.5           

PSEUDOMONAS (MALLEI) CAUSING DISEASES CL                    

 

                2020           YULISA DENNIS MD, Ot           

   E11.42          

                          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              

      

 

                2020           YULISA DENNIS MD, Ot           

   E11.621         

                          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              

      

 

                2020           YULISA DENNIS MD           Ot           

   I70.234         

                          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              

      

 

                2020           YULISA DENNIS MD           Ot           

   L97.416         

                          NON-PRS CHR ULC OF R HEEL/MIDFT W BNE IN              

      

 

                2020           YULISA DENNIS MD, Ot           

   B95.62          

                          METHICILLIN RESIS STAPH INFCT CAUSING DI              

      

 

             2020           YULISA DENNIS MD           Ot           B96

.5           

PSEUDOMONAS (MALLEI) CAUSING DISEASES CL                    

 

                2020           YULISA DENNIS MD           Ot           

   E11.42          

                          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              

      

 

                2020           YULISA DENNIS MD           Ot           

   E11.621         

                          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              

      

 

                2020           YULISA DENNIS MD           Ot           

   I70.234         

                          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              

      

 

                2020           YULISA DENNIS MD           Ot           

   L97.416         

                          NON-PRS CHR ULC OF R HEEL/MIDFT W BNE IN              

      

 

             2020           MARIELY DELANEY MD           Ot           E86

.0           

DEHYDRATION                                      

 

             2020           MARIELY DELANEY MD           Ot           N17

.9           

ACUTE KIDNEY FAILURE, UNSPECIFIED                    

 

                2020           MARIELY DELANEY MD           Ot           

   R41.82          

                          ALTERED MENTAL STATUS, UNSPECIFIED                    

 

             2020           RHETT MD, MARIELY N           Ot           E86

.0           

DEHYDRATION                                      

 

             2020           MARIELY DELANEY MD           Ot           N17

.9           

ACUTE KIDNEY FAILURE, UNSPECIFIED                    

 

                2020           MARIELY DELANEY MD           Ot           

   R41.82          

                          ALTERED MENTAL STATUS, UNSPECIFIED                    

 

             04/15/2020           MARIELY DELANEY MD           Ot           E86

.0           

DEHYDRATION                                      

 

             04/15/2020           MARIELY DELANEY MD           Ot           N17

.9           

ACUTE KIDNEY FAILURE, UNSPECIFIED                    

 

                04/15/2020           MARIELY DELANEY MD           Ot           

   R41.82          

                          ALTERED MENTAL STATUS, UNSPECIFIED                    

 

             2020           MARIELY DELANEY MD           Ot           E11

.9           

TYPE 2 DIABETES MELLITUS WITHOUT COMPLIC                    

 

             2020           MARIELY DELANEY MD           Ot           E66

.9           

OBESITY, UNSPECIFIED                             

 

                2020           MARIELY DELANEY MD           Ot           

   E78.00          

                          PURE HYPERCHOLESTEROLEMIA, UNSPECIFIED                

    

 

                2020           MARIELY DELANEY MD           Ot           

   E83.42          

                          HYPOMAGNESEMIA                     

 

             2020           MARIELY DELANEY MD           Ot           E86

.0           

DEHYDRATION                                      

 

             2020           MARIELY DELANEY MD           Ot           E87

.5           

HYPERKALEMIA                                     

 

                2020           MARIELY DELANEY MD           Ot           

   F17.210         

                          NICOTINE DEPENDENCE, CIGARETTES, UNCOMPL              

      

 

             2020           MARIELY DELANEY MD           Ot           I07

.1           

RHEUMATIC TRICUSPID INSUFFICIENCY                    

 

             2020           MARIELY DELANEY MD           Ot           I12

.9           

HYPERTENSIVE CHRONIC KIDNEY DISEASE W ST                    

 

                2020           MARIELY DELANEY MD           Ot           

   I21.A1          

                          MYOCARDIAL INFARCTION TYPE 2                    

 

                2020           MARIELY DELANEY MD           Ot           

   I25.10          

                          ATHSCL HEART DISEASE OF NATIVE CORONARY               

      

 

                2020           MARIELY DELANEY MD           Ot           

   I45.10          

                          UNSPECIFIED RIGHT BUNDLE-BRANCH BLOCK                 

   

 

             2020           MARIELY DELANEY MD           Ot           I73

.9           

PERIPHERAL VASCULAR DISEASE, UNSPECIFIED                    

 

             2020           MARIELY DELANEY MD           Ot           J44

.9           

CHRONIC OBSTRUCTIVE PULMONARY DISEASE, U                    

 

             2020           MARIELY DELANEY MD           Ot           J69

.0           

PNEUMONITIS DUE TO INHALATION OF FOOD AN                    

 

                2020           MARIELY DELANEY MD           Ot           

   J96.01          

                          ACUTE RESPIRATORY FAILURE WITH HYPOXIA                

    

 

                2020           MARIELY DELANEY MD, Ot           

   J96.02          

                          ACUTE RESPIRATORY FAILURE WITH HYPERCAPN              

      

 

                2020           MARIELY DELANEY MD, Ot           

   M19.91          

                          PRIMARY OSTEOARTHRITIS, UNSPECIFIED SITE              

      

 

             2020           MARIELY DELANEY MD, Ot           N17

.9           

ACUTE KIDNEY FAILURE, UNSPECIFIED                    

 

             2020           MARIELY DELANEY MD, Ot           N18

.9           

CHRONIC KIDNEY DISEASE, UNSPECIFIED                    

 

                2020           MARIELY DELANEY MD, Ot           

   R41.82          

                          ALTERED MENTAL STATUS, UNSPECIFIED                    

 

             2020           MARIELY DELANEY MD, Ot           R57

.9           

SHOCK, UNSPECIFIED                               

 

                2020           MARIELY DELANEY MD, Ot           

   Z68.34          

                          BODY MASS INDEX (BMI) 34.0-34.9, ADULT                

    

 

                2020           MARIELY DELANEY MD, Ot           

   Z79.84          

                          LONG TERM (CURRENT) USE OF ORAL HYPOGLYC              

      

 

                2020           MARIELY DELANEY MD, Ot           

   Z85.46          

                          PERSONAL HISTORY OF MALIGNANT NEOPLASM O              

      

 

                2020           MARIELY DELANEY MD, Ot           

   Z86.73          

                          PRSNL HX OF TIA (TIA), AND CEREB INFRC W              

      

 

                2020           MARIELY DELANEY MD, Ot           

   Z89.431         

                          ACQUIRED ABSENCE OF RIGHT FOOT                    

 

             2020           MARIELY DELANEY MD           Ot           Z95

.5           

PRESENCE OF CORONARY ANGIOPLASTY IMPLANT                    

 

             2020           MARIELY DELANEY MD, Ot           E11

.9           

TYPE 2 DIABETES MELLITUS WITHOUT COMPLIC                    

 

             2020           MARIELY DELANEY MD, Ot           E66

.9           

OBESITY, UNSPECIFIED                             

 

                2020           MARIELY DELANEY MD           Ot           

   E78.00          

                          PURE HYPERCHOLESTEROLEMIA, UNSPECIFIED                

    

 

                2020           MARIELY DELANEY MD           Ot           

   E83.42          

                          HYPOMAGNESEMIA                     

 

             2020           MARIELY DELANEY MD, Ot           E86

.0           

DEHYDRATION                                      

 

             2020           MARIELY DELANEY MD           Ot           E87

.5           

HYPERKALEMIA                                     

 

                2020           MARIELY DELANEY MD           Ot           

   F17.210         

                          NICOTINE DEPENDENCE, CIGARETTES, UNCOMPL              

      

 

             2020           MARIELY DELANEY MD           Ot           I07

.1           

RHEUMATIC TRICUSPID INSUFFICIENCY                    

 

             2020           MARIELY DELANEY MD           Ot           I12

.9           

HYPERTENSIVE CHRONIC KIDNEY DISEASE W ST                    

 

                2020           MARIELY DELANEY MD, Ot           

   I21.A1          

                          MYOCARDIAL INFARCTION TYPE 2                    

 

                2020           MARIELY DELANEY MD, Ot           

   I25.10          

                          ATHSCL HEART DISEASE OF NATIVE CORONARY               

      

 

                2020           MARIELY DELANEY MD, Ot           

   I45.10          

                          UNSPECIFIED RIGHT BUNDLE-BRANCH BLOCK                 

   

 

             2020           MARIELY DELANEY MD, Ot           I73

.9           

PERIPHERAL VASCULAR DISEASE, UNSPECIFIED                    

 

             2020           MARIELY DELANEY MD, Ot           J44

.9           

CHRONIC OBSTRUCTIVE PULMONARY DISEASE, U                    

 

             2020           MARIELY DELANEY MD, Ot           J69

.0           

PNEUMONITIS DUE TO INHALATION OF FOOD AN                    

 

                2020           MARIELY DELANEY MD, Ot           

   J96.01          

                          ACUTE RESPIRATORY FAILURE WITH HYPOXIA                

    

 

                2020           MARIELY DELANEY MD, Ot           

   J96.02          

                          ACUTE RESPIRATORY FAILURE WITH HYPERCAPN              

      

 

                2020           MARIELY DELANEY MD, Ot           

   M19.91          

                          PRIMARY OSTEOARTHRITIS, UNSPECIFIED SITE              

      

 

             2020           MARIELY DELANEY MD, Ot           N17

.9           

ACUTE KIDNEY FAILURE, UNSPECIFIED                    

 

             2020           MARIELY DELANEY MD, Ot           N18

.9           

CHRONIC KIDNEY DISEASE, UNSPECIFIED                    

 

                2020           MARIELY DELANEY MD, Ot           

   R41.82          

                          ALTERED MENTAL STATUS, UNSPECIFIED                    

 

             2020           MARIELY DELANEY MD, Ot           R57

.9           

SHOCK, UNSPECIFIED                               

 

                2020           MARIELY DELANEY MD, Ot           

   Z68.34          

                          BODY MASS INDEX (BMI) 34.0-34.9, ADULT                

    

 

                2020           MARIELY DELANEY MD, Ot           

   Z79.84          

                          LONG TERM (CURRENT) USE OF ORAL HYPOGLYC              

      

 

                2020           MARIELY DELANEY MD, Ot           

   Z85.46          

                          PERSONAL HISTORY OF MALIGNANT NEOPLASM O              

      

 

                2020           MARIELY DELANEY MD, Ot           

   Z86.73          

                          PRSNL HX OF TIA (TIA), AND CEREB INFRC W              

      

 

                2020           MARIELY DELANEY MD, Ot           

   Z89.431         

                          ACQUIRED ABSENCE OF RIGHT FOOT                    

 

             2020           MARIELY DELANEY MD, Ot           Z95

.5           

PRESENCE OF CORONARY ANGIOPLASTY IMPLANT                    

 

             2020           MAIRELY DELANEY MD, Ot           E11

.9           

TYPE 2 DIABETES MELLITUS WITHOUT COMPLIC                    

 

             2020           MARIELY DELANEY MD           Ot           E66

.9           

OBESITY, UNSPECIFIED                             

 

                2020           MARIELY DELANEY MD           Ot           

   E78.00          

                          PURE HYPERCHOLESTEROLEMIA, UNSPECIFIED                

    

 

                2020           MARIELY DELANEY MD           Ot           

   E83.42          

                          HYPOMAGNESEMIA                     

 

             2020           MARIELY DELANEY MD           Ot           E86

.0           

DEHYDRATION                                      

 

             2020           MARIELY DELANEY MD           Ot           E87

.5           

HYPERKALEMIA                                     

 

                2020           MARIELY DELANEY MD           Ot           

   F17.210         

                          NICOTINE DEPENDENCE, CIGARETTES, UNCOMPL              

      

 

             2020           MARIELY DELANEY MD           Ot           I07

.1           

RHEUMATIC TRICUSPID INSUFFICIENCY                    

 

             2020           MARIELY DELANEY MD           Ot           I12

.9           

HYPERTENSIVE CHRONIC KIDNEY DISEASE W ST                    

 

                2020           MARIELY DELANEY MD           Ot           

   I21.A1          

                          MYOCARDIAL INFARCTION TYPE 2                    

 

                2020           MARIELY DELANEY MD           Ot           

   I25.10          

                          ATHSCL HEART DISEASE OF NATIVE CORONARY               

      

 

                2020           MARIELY DELANEY MD           Ot           

   I45.10          

                          UNSPECIFIED RIGHT BUNDLE-BRANCH BLOCK                 

   

 

             2020           MARIELY DELANEY MD           Ot           I73

.9           

PERIPHERAL VASCULAR DISEASE, UNSPECIFIED                    

 

             2020           MARIELY DELANEY MD           Ot           J44

.9           

CHRONIC OBSTRUCTIVE PULMONARY DISEASE, U                    

 

             2020           MARIELY DELANEY MD           Ot           J69

.0           

PNEUMONITIS DUE TO INHALATION OF FOOD AN                    

 

                2020           MARIELY DELANEY MD           Ot           

   J96.01          

                          ACUTE RESPIRATORY FAILURE WITH HYPOXIA                

    

 

                2020           MARIELY DELANEY MD           Ot           

   J96.02          

                          ACUTE RESPIRATORY FAILURE WITH HYPERCAPN              

      

 

                2020           MARIELY DELANEY MD           Ot           

   M19.91          

                          PRIMARY OSTEOARTHRITIS, UNSPECIFIED SITE              

      

 

             2020           MARIELY DELANEY MD           Ot           N17

.9           

ACUTE KIDNEY FAILURE, UNSPECIFIED                    

 

             2020           MARIELY DELANEY MD           Ot           N18

.9           

CHRONIC KIDNEY DISEASE, UNSPECIFIED                    

 

                2020           MARIELY DELANEY MD           Ot           

   R41.82          

                          ALTERED MENTAL STATUS, UNSPECIFIED                    

 

             2020           MARIELY DELANEY MD           Ot           R57

.9           

SHOCK, UNSPECIFIED                               

 

                2020           MARIELY DELANEY MD, Ot           

   Z68.34          

                          BODY MASS INDEX (BMI) 34.0-34.9, ADULT                

    

 

                2020           MARIELY DELANEY MD, Ot           

   Z79.84          

                          LONG TERM (CURRENT) USE OF ORAL HYPOGLYC              

      

 

                2020           MARIELY DELANEY MD, Ot           

   Z85.46          

                          PERSONAL HISTORY OF MALIGNANT NEOPLASM O              

      

 

                2020           MARIELY DELANEY MD, Ot           

   Z86.73          

                          PRSNL HX OF TIA (TIA), AND CEREB INFRC W              

      

 

                2020           MARIELY DELANEY MD, Ot           

   Z89.431         

                          ACQUIRED ABSENCE OF RIGHT FOOT                    

 

             2020           MARIELY DELANEY MD, Ot           Z95

.5           

PRESENCE OF CORONARY ANGIOPLASTY IMPLANT                    

 

             2020           MARIELY DELANEY MD, Ot           E11

.9           

TYPE 2 DIABETES MELLITUS WITHOUT COMPLIC                    

 

             2020           MARIELY DELANEY MD, Ot           E66

.9           

OBESITY, UNSPECIFIED                             

 

                2020           MARIELY DELANEY MD, Ot           

   E78.00          

                          PURE HYPERCHOLESTEROLEMIA, UNSPECIFIED                

    

 

                2020           MARIELY DELANEY MD, Ot           

   E83.42          

                          HYPOMAGNESEMIA                     

 

             2020           MARIELY DELANEY MD, Ot           E86

.0           

DEHYDRATION                                      

 

             2020           MARIELY DELANEY MD, Ot           E87

.5           

HYPERKALEMIA                                     

 

                2020           MARIELY DELANEY MD, Ot           

   F17.210         

                          NICOTINE DEPENDENCE, CIGARETTES, UNCOMPL              

      

 

             2020           MARIELY DELANEY MD, Ot           I07

.1           

RHEUMATIC TRICUSPID INSUFFICIENCY                    

 

             2020           MARIELY DELANEY MD, Ot           I12

.9           

HYPERTENSIVE CHRONIC KIDNEY DISEASE W ST                    

 

                2020           MARIELY DELANEY MD, Ot           

   I21.A1          

                          MYOCARDIAL INFARCTION TYPE 2                    

 

                2020           MARIELY DELANEY MD, Ot           

   I25.10          

                          ATHSCL HEART DISEASE OF NATIVE CORONARY               

      

 

                2020           MARIELY DELANEY MD, Ot           

   I45.10          

                          UNSPECIFIED RIGHT BUNDLE-BRANCH BLOCK                 

   

 

             2020           MARIELY DELANEY MD, Ot           I73

.9           

PERIPHERAL VASCULAR DISEASE, UNSPECIFIED                    

 

             2020           MARIELY DELANEY MD, Ot           J44

.9           

CHRONIC OBSTRUCTIVE PULMONARY DISEASE, U                    

 

             2020           MARIELY DELANEY MD, Ot           J69

.0           

PNEUMONITIS DUE TO INHALATION OF FOOD AN                    

 

                2020           MARIELY DELANEY MD, Ot           

   J96.01          

                          ACUTE RESPIRATORY FAILURE WITH HYPOXIA                

    

 

                2020           MARIELY DELANEY MD, Ot           

   J96.02          

                          ACUTE RESPIRATORY FAILURE WITH HYPERCAPN              

      

 

                2020           MARIELY DELANEY MD, Ot           

   M19.91          

                          PRIMARY OSTEOARTHRITIS, UNSPECIFIED SITE              

      

 

             2020           MARIELY DELANEY MD, Ot           N17

.9           

ACUTE KIDNEY FAILURE, UNSPECIFIED                    

 

             2020           MARIELY DELANEY MD, Ot           N18

.9           

CHRONIC KIDNEY DISEASE, UNSPECIFIED                    

 

                2020           MARIELY DELANEY MD, Ot           

   R41.82          

                          ALTERED MENTAL STATUS, UNSPECIFIED                    

 

             2020           MARIELY DELANEY MD, Ot           R57

.9           

SHOCK, UNSPECIFIED                               

 

                2020           MARIELY DELANEY MD, Ot           

   Z68.34          

                          BODY MASS INDEX (BMI) 34.0-34.9, ADULT                

    

 

                2020           MARIELY DELANEY MD, Ot           

   Z79.84          

                          LONG TERM (CURRENT) USE OF ORAL HYPOGLYC              

      

 

                2020           MARIELY DELANEY MD, Ot           

   Z85.46          

                          PERSONAL HISTORY OF MALIGNANT NEOPLASM O              

      

 

                2020           MARIELY DELANEY MD, Ot           

   Z86.73          

                          PRSNL HX OF TIA (TIA), AND CEREB INFRC W              

      

 

                2020           MARIELY DELANEY MD, Ot           

   Z89.431         

                          ACQUIRED ABSENCE OF RIGHT FOOT                    

 

             2020           MARIELY DELANEY MD, Ot           Z95

.5           

PRESENCE OF CORONARY ANGIOPLASTY IMPLANT                    

 

             2020           MARIELY DELANEY MD, Ot           E11

.9           

TYPE 2 DIABETES MELLITUS WITHOUT COMPLIC                    

 

             2020           MARIELY DELANEY MD           Ot           E66

.9           

OBESITY, UNSPECIFIED                             

 

                2020           MARIELY DELANEY MD           Ot           

   E78.00          

                          PURE HYPERCHOLESTEROLEMIA, UNSPECIFIED                

    

 

                2020           MARIELY DELANEY MD, Ot           

   E83.42          

                          HYPOMAGNESEMIA                     

 

             2020           MARIELY DELANEY MD           Ot           E86

.0           

DEHYDRATION                                      

 

             2020           MARIELY DELANEY MD, Ot           E87

.5           

HYPERKALEMIA                                     

 

                2020           MARIELY DELANEY MD           Ot           

   F17.210         

                          NICOTINE DEPENDENCE, CIGARETTES, UNCOMPL              

      

 

             2020           MARIELY DELANEY MD, Ot           I07

.1           

RHEUMATIC TRICUSPID INSUFFICIENCY                    

 

             2020           MARIELY DELANEY MD, Ot           I12

.9           

HYPERTENSIVE CHRONIC KIDNEY DISEASE W ST                    

 

                2020           MARIELY DELANEY MD, Ot           

   I21.A1          

                          MYOCARDIAL INFARCTION TYPE 2                    

 

                2020           MARIELY DELANEY MD, Ot           

   I25.10          

                          ATHSCL HEART DISEASE OF NATIVE CORONARY               

      

 

                2020           MARIELY DELANEY MD, Ot           

   I45.10          

                          UNSPECIFIED RIGHT BUNDLE-BRANCH BLOCK                 

   

 

             2020           MARIELY DELANEY MD, Ot           I73

.9           

PERIPHERAL VASCULAR DISEASE, UNSPECIFIED                    

 

             2020           MARIELY DELANEY MD, Ot           J44

.9           

CHRONIC OBSTRUCTIVE PULMONARY DISEASE, U                    

 

             2020           MARIELY DELANEY MD, Ot           J69

.0           

PNEUMONITIS DUE TO INHALATION OF FOOD AN                    

 

                2020           MARIELY DELANEY MD, Ot           

   J96.01          

                          ACUTE RESPIRATORY FAILURE WITH HYPOXIA                

    

 

                2020           MARIELY DELANEY MD, Ot           

   J96.02          

                          ACUTE RESPIRATORY FAILURE WITH HYPERCAPN              

      

 

                2020           MARIELY DELANEY MD, Ot           

   M19.91          

                          PRIMARY OSTEOARTHRITIS, UNSPECIFIED SITE              

      

 

             2020           MARIELY DELANEY MD, Ot           N17

.9           

ACUTE KIDNEY FAILURE, UNSPECIFIED                    

 

             2020           MARIELY DELANEY MD, Ot           N18

.9           

CHRONIC KIDNEY DISEASE, UNSPECIFIED                    

 

                2020           MARIELY DELANEY MD, Ot           

   R41.82          

                          ALTERED MENTAL STATUS, UNSPECIFIED                    

 

             2020           MARIELY DELANEY MD, Ot           R57

.9           

SHOCK, UNSPECIFIED                               

 

                2020           MARIELY DELANEY MD, Ot           

   Z68.34          

                          BODY MASS INDEX (BMI) 34.0-34.9, ADULT                

    

 

                2020           MARIELY DELANEY MD, Ot           

   Z79.84          

                          LONG TERM (CURRENT) USE OF ORAL HYPOGLYC              

      

 

                2020           MARIELY DELANEY MD, Ot           

   Z85.46          

                          PERSONAL HISTORY OF MALIGNANT NEOPLASM O              

      

 

                2020           MARIELY DELANEY MD, Ot           

   Z86.73          

                          PRSNL HX OF TIA (TIA), AND CEREB INFRC W              

      

 

                2020           MARIELY DELANEY MD, Ot           

   Z89.431         

                          ACQUIRED ABSENCE OF RIGHT FOOT                    

 

             2020           RHETT MD, MARIELY N           Ot           Z95

.5           

PRESENCE OF CORONARY ANGIOPLASTY IMPLANT                    

 

             2020           MARIELY DELANEY MD           Ot           E11

.9           

TYPE 2 DIABETES MELLITUS WITHOUT COMPLIC                    

 

             2020           MARIELY DELANEY MD           Ot           E66

.9           

OBESITY, UNSPECIFIED                             

 

                2020           MARIELY DELANEY MD           Ot           

   E78.00          

                          PURE HYPERCHOLESTEROLEMIA, UNSPECIFIED                

    

 

                2020           MARIELY DELANEY MD           Ot           

   E83.42          

                          HYPOMAGNESEMIA                     

 

             2020           MARIELY DELANEY MD           Ot           E86

.0           

DEHYDRATION                                      

 

             2020           MARIELY DELANEY MD           Ot           E87

.5           

HYPERKALEMIA                                     

 

                2020           MARIELY DELANEY MD           Ot           

   F17.210         

                          NICOTINE DEPENDENCE, CIGARETTES, UNCOMPL              

      

 

             2020           MARIELY DELANEY MD           Ot           I07

.1           

RHEUMATIC TRICUSPID INSUFFICIENCY                    

 

             2020           MARIELY DELANEY MD           Ot           I12

.9           

HYPERTENSIVE CHRONIC KIDNEY DISEASE W ST                    

 

                2020           MARIELY DELANEY MD           Ot           

   I21.A1          

                          MYOCARDIAL INFARCTION TYPE 2                    

 

                2020           MARIELY DELANEY MD           Ot           

   I25.10          

                          ATHSCL HEART DISEASE OF NATIVE CORONARY               

      

 

                2020           MARIELY DELANEY MD           Ot           

   I45.10          

                          UNSPECIFIED RIGHT BUNDLE-BRANCH BLOCK                 

   

 

             2020           MARIELY DELANEY MD           Ot           I73

.9           

PERIPHERAL VASCULAR DISEASE, UNSPECIFIED                    

 

             2020           MARIELY DELANEY MD           Ot           J44

.9           

CHRONIC OBSTRUCTIVE PULMONARY DISEASE, U                    

 

             2020           MARIELY DELANEY MD           Ot           J69

.0           

PNEUMONITIS DUE TO INHALATION OF FOOD AN                    

 

                2020           MARIELY DELANEY MD           Ot           

   J96.01          

                          ACUTE RESPIRATORY FAILURE WITH HYPOXIA                

    

 

                2020           MARIELY DELANEY MD           Ot           

   J96.02          

                          ACUTE RESPIRATORY FAILURE WITH HYPERCAPN              

      

 

                2020           MARIELY DELANEY MD           Ot           

   M19.91          

                          PRIMARY OSTEOARTHRITIS, UNSPECIFIED SITE              

      

 

             2020           MARIELY DELANEY MD           Ot           N17

.9           

ACUTE KIDNEY FAILURE, UNSPECIFIED                    

 

             2020           MARIELY DELANEY MD           Ot           N18

.9           

CHRONIC KIDNEY DISEASE, UNSPECIFIED                    

 

                2020           MARIELY DELANEY MD           Ot           

   R41.82          

                          ALTERED MENTAL STATUS, UNSPECIFIED                    

 

             2020           MARIELY DELANEY MD, Ot           R57

.9           

SHOCK, UNSPECIFIED                               

 

                2020           MARIELY DELANEY MD, Ot           

   Z68.34          

                          BODY MASS INDEX (BMI) 34.0-34.9, ADULT                

    

 

                2020           MARIELY DELANEY MD, Ot           

   Z79.84          

                          LONG TERM (CURRENT) USE OF ORAL HYPOGLYC              

      

 

                2020           MARIELY DELANEY MD, Ot           

   Z85.46          

                          PERSONAL HISTORY OF MALIGNANT NEOPLASM O              

      

 

                2020           MARIELY DELANEY MD, Ot           

   Z86.73          

                          PRSNL HX OF TIA (TIA), AND CEREB INFRC W              

      

 

                2020           MARIELY DELANEY MD, Ot           

   Z89.431         

                          ACQUIRED ABSENCE OF RIGHT FOOT                    

 

             2020           MARIELY DELANEY MD, Ot           Z95

.5           

PRESENCE OF CORONARY ANGIOPLASTY IMPLANT                    

 

             2020           MARIELY DELANEY MD, Ot           E11

.9           

TYPE 2 DIABETES MELLITUS WITHOUT COMPLIC                    

 

             2020           MARIELY DELANEY MD           Ot           E66

.9           

OBESITY, UNSPECIFIED                             

 

                2020           MARIELY DELANEY MD           Ot           

   E78.00          

                          PURE HYPERCHOLESTEROLEMIA, UNSPECIFIED                

    

 

                2020           MARIELY DELANEY MD, Ot           

   E83.42          

                          HYPOMAGNESEMIA                     

 

             2020           MARIELY DELANEY MD           Ot           E86

.0           

DEHYDRATION                                      

 

             2020           MARIELY DELANEY MD           Ot           E87

.5           

HYPERKALEMIA                                     

 

                2020           MARIELY DELANEY MD           Ot           

   F17.210         

                          NICOTINE DEPENDENCE, CIGARETTES, UNCOMPL              

      

 

             2020           MARIELY DELANEY MD           Ot           I07

.1           

RHEUMATIC TRICUSPID INSUFFICIENCY                    

 

             2020           MARIELY DELANEY MD           Ot           I12

.9           

HYPERTENSIVE CHRONIC KIDNEY DISEASE W ST                    

 

                2020           MARIELY DELANEY MD, Ot           

   I21.A1          

                          MYOCARDIAL INFARCTION TYPE 2                    

 

                2020           MARIELY DELANEY MD, Ot           

   I25.10          

                          ATHSCL HEART DISEASE OF NATIVE CORONARY               

      

 

                2020           MARIELY DELANEY MD, Ot           

   I45.10          

                          UNSPECIFIED RIGHT BUNDLE-BRANCH BLOCK                 

   

 

             2020           MARIELY DELANEY MD, Ot           I73

.9           

PERIPHERAL VASCULAR DISEASE, UNSPECIFIED                    

 

             2020           MARIELY DELANEY MD, Ot           J44

.9           

CHRONIC OBSTRUCTIVE PULMONARY DISEASE, U                    

 

             2020           MARIELY DELANEY MD, Ot           J69

.0           

PNEUMONITIS DUE TO INHALATION OF FOOD AN                    

 

                2020           MARIELY DELANEY MD, Ot           

   J96.01          

                          ACUTE RESPIRATORY FAILURE WITH HYPOXIA                

    

 

                2020           MARIELY DELANEY MD, Ot           

   J96.02          

                          ACUTE RESPIRATORY FAILURE WITH HYPERCAPN              

      

 

                2020           MARIELY DELANEY MD, Ot           

   M19.91          

                          PRIMARY OSTEOARTHRITIS, UNSPECIFIED SITE              

      

 

             2020           MARIELY DELANEY MD, Ot           N17

.9           

ACUTE KIDNEY FAILURE, UNSPECIFIED                    

 

             2020           MARIELY DELANEY MD, Ot           N18

.9           

CHRONIC KIDNEY DISEASE, UNSPECIFIED                    

 

                2020           MARIELY DELANEY MD, Ot           

   R41.82          

                          ALTERED MENTAL STATUS, UNSPECIFIED                    

 

             2020           MARIELY DELANEY MD, Ot           R57

.9           

SHOCK, UNSPECIFIED                               

 

                2020           MARIELY DELANEY MD, Ot           

   Z68.34          

                          BODY MASS INDEX (BMI) 34.0-34.9, ADULT                

    

 

                2020           MARIELY DELANEY MD, Ot           

   Z79.84          

                          LONG TERM (CURRENT) USE OF ORAL HYPOGLYC              

      

 

                2020           MARIELY DELANEY MD, Ot           

   Z85.46          

                          PERSONAL HISTORY OF MALIGNANT NEOPLASM O              

      

 

                2020           MARIELY DELANEY MD, Ot           

   Z86.73          

                          PRSNL HX OF TIA (TIA), AND CEREB INFRC W              

      

 

                2020           MARIELY DELANEY MD, Ot           

   Z89.431         

                          ACQUIRED ABSENCE OF RIGHT FOOT                    

 

             2020           MARIELY DELANEY MD, Ot           Z95

.5           

PRESENCE OF CORONARY ANGIOPLASTY IMPLANT                    

 

             2020           MARIELY DELANEY MD, Ot           E11

.9           

TYPE 2 DIABETES MELLITUS WITHOUT COMPLIC                    

 

             2020           MARIELY DELANEY MD, Ot           E66

.9           

OBESITY, UNSPECIFIED                             

 

                2020           MARIELY DELANEY MD, Ot           

   E78.00          

                          PURE HYPERCHOLESTEROLEMIA, UNSPECIFIED                

    

 

                2020           MARIELY DELANEY MD, Ot           

   E83.42          

                          HYPOMAGNESEMIA                     

 

             2020           MARIELY DELANEY MD           Ot           E86

.0           

DEHYDRATION                                      

 

             2020           MARIELY DELANEY MD, Ot           E87

.5           

HYPERKALEMIA                                     

 

                2020           MARIELY DELANEY MD, Ot           

   F17.210         

                          NICOTINE DEPENDENCE, CIGARETTES, UNCOMPL              

      

 

             2020           MARIELY DELANEY MD, Ot           I07

.1           

RHEUMATIC TRICUSPID INSUFFICIENCY                    

 

             2020           MARIELY DELANEY MD, Ot           I12

.9           

HYPERTENSIVE CHRONIC KIDNEY DISEASE W ST                    

 

                2020           MARIELY DELANEY MD, Ot           

   I21.A1          

                          MYOCARDIAL INFARCTION TYPE 2                    

 

                2020           MARIELY DELANEY MD, Ot           

   I25.10          

                          ATHSCL HEART DISEASE OF NATIVE CORONARY               

      

 

                2020           MARIELY DELANEY MD, Ot           

   I45.10          

                          UNSPECIFIED RIGHT BUNDLE-BRANCH BLOCK                 

   

 

             2020           MARIELY DELANEY MD, Ot           I73

.9           

PERIPHERAL VASCULAR DISEASE, UNSPECIFIED                    

 

             2020           MARIELY DELANEY MD, Ot           J44

.9           

CHRONIC OBSTRUCTIVE PULMONARY DISEASE, U                    

 

             2020           MARIELY DELANEY MD, Ot           J69

.0           

PNEUMONITIS DUE TO INHALATION OF FOOD AN                    

 

                2020           MARIELY DELANEY MD, Ot           

   J96.01          

                          ACUTE RESPIRATORY FAILURE WITH HYPOXIA                

    

 

                2020           MARIELY DELANEY MD, Ot           

   J96.02          

                          ACUTE RESPIRATORY FAILURE WITH HYPERCAPN              

      

 

                2020           MARIELY DELANEY MD, Ot           

   M19.91          

                          PRIMARY OSTEOARTHRITIS, UNSPECIFIED SITE              

      

 

             2020           MARIELY DELANEY MD, Ot           N17

.9           

ACUTE KIDNEY FAILURE, UNSPECIFIED                    

 

             2020           MARIELY DELANEY MD, Ot           N18

.9           

CHRONIC KIDNEY DISEASE, UNSPECIFIED                    

 

                2020           MARIELY DELANEY MD, Ot           

   R41.82          

                          ALTERED MENTAL STATUS, UNSPECIFIED                    

 

             2020           MARIELY EDLANEY MD, Ot           R57

.9           

SHOCK, UNSPECIFIED                               

 

                2020           MARIELY DELANEY MD, Ot           

   Z68.34          

                          BODY MASS INDEX (BMI) 34.0-34.9, ADULT                

    

 

                2020           MARIELY DELANEY MD, Ot           

   Z79.84          

                          LONG TERM (CURRENT) USE OF ORAL HYPOGLYC              

      

 

                2020           MARIELY DELANEY MD, Ot           

   Z85.46          

                          PERSONAL HISTORY OF MALIGNANT NEOPLASM O              

      

 

                2020           MARIELY DELANEY MD, Ot           

   Z86.73          

                          PRSNL HX OF TIA (TIA), AND CEREB INFRC W              

      

 

                2020           MARIELY DELANEY MD, Ot           

   Z89.431         

                          ACQUIRED ABSENCE OF RIGHT FOOT                    

 

             2020           MARIELY DELANEY MD           Ot           Z95

.5           

PRESENCE OF CORONARY ANGIOPLASTY IMPLANT                    

 

             2020           MARIELY DELANEY MD           Ot           E11

.9           

TYPE 2 DIABETES MELLITUS WITHOUT COMPLIC                    

 

             2020           MARIELY DELANEY MD           Ot           E66

.9           

OBESITY, UNSPECIFIED                             

 

                2020           MARIELY DELANEY MD           Ot           

   E78.00          

                          PURE HYPERCHOLESTEROLEMIA, UNSPECIFIED                

    

 

                2020           MARIELY DELANEY MD           Ot           

   E83.42          

                          HYPOMAGNESEMIA                     

 

             2020           MARIELY DELANEY MD           Ot           E86

.0           

DEHYDRATION                                      

 

             2020           MARIELY DELANEY MD           Ot           E87

.5           

HYPERKALEMIA                                     

 

                2020           MARIELY DELANEY MD           Ot           

   F17.210         

                          NICOTINE DEPENDENCE, CIGARETTES, UNCOMPL              

      

 

             2020           MARIELY DELANEY MD           Ot           I07

.1           

RHEUMATIC TRICUSPID INSUFFICIENCY                    

 

             2020           MARIELY DELANEY MD           Ot           I12

.9           

HYPERTENSIVE CHRONIC KIDNEY DISEASE W ST                    

 

                2020           MARIELY DELANEY MD, Ot           

   I21.A1          

                          MYOCARDIAL INFARCTION TYPE 2                    

 

                2020           MARIELY DELANEY MD, Ot           

   I25.10          

                          ATHSCL HEART DISEASE OF NATIVE CORONARY               

      

 

                2020           MARIELY DELANEY MD, Ot           

   I45.10          

                          UNSPECIFIED RIGHT BUNDLE-BRANCH BLOCK                 

   

 

             2020           MARIELY DELANEY MD, Ot           I73

.9           

PERIPHERAL VASCULAR DISEASE, UNSPECIFIED                    

 

             2020           MARIELY DELANEY MD, Ot           J44

.9           

CHRONIC OBSTRUCTIVE PULMONARY DISEASE, U                    

 

             2020           MARIELY DELANEY MD, Ot           J69

.0           

PNEUMONITIS DUE TO INHALATION OF FOOD AN                    

 

                2020           MARIELY DELANEY MD           Ot           

   J96.01          

                          ACUTE RESPIRATORY FAILURE WITH HYPOXIA                

    

 

                2020           MARIELY DELANEY MD, Ot           

   J96.02          

                          ACUTE RESPIRATORY FAILURE WITH HYPERCAPN              

      

 

                2020           MARIELY DELANEY MD, Ot           

   M19.91          

                          PRIMARY OSTEOARTHRITIS, UNSPECIFIED SITE              

      

 

             2020           MARIELY DELANEY MD           Ot           N17

.9           

ACUTE KIDNEY FAILURE, UNSPECIFIED                    

 

             2020           MARIELY DELANEY MD           Ot           N18

.9           

CHRONIC KIDNEY DISEASE, UNSPECIFIED                    

 

                2020           MARIELY DELANEY MD, Ot           

   R41.82          

                          ALTERED MENTAL STATUS, UNSPECIFIED                    

 

             2020           MARIELY DELANEY MD, Ot           R57

.9           

SHOCK, UNSPECIFIED                               

 

                2020           MARIELY DELANEY MD, Ot           

   Z68.34          

                          BODY MASS INDEX (BMI) 34.0-34.9, ADULT                

    

 

                2020           MARIELY DELANEY MD, Ot           

   Z79.84          

                          LONG TERM (CURRENT) USE OF ORAL HYPOGLYC              

      

 

                2020           MARIELY DELANEY MD, Ot           

   Z85.46          

                          PERSONAL HISTORY OF MALIGNANT NEOPLASM O              

      

 

                2020           MARIELY DELANEY MD, Ot           

   Z86.73          

                          PRSNL HX OF TIA (TIA), AND CEREB INFRC W              

      

 

                2020           MARIELY DELANEY MD, Ot           

   Z89.431         

                          ACQUIRED ABSENCE OF RIGHT FOOT                    

 

             2020           MARIELY DELANEY MD, Ot           Z95

.5           

PRESENCE OF CORONARY ANGIOPLASTY IMPLANT                    

 

             2020           MARIELY DELANEY MD, Ot           E11

.9           

TYPE 2 DIABETES MELLITUS WITHOUT COMPLIC                    

 

             2020           MARIELY DELANEY MD, Ot           E66

.9           

OBESITY, UNSPECIFIED                             

 

                2020           MARIELY DELANEY MD, Ot           

   E78.00          

                          PURE HYPERCHOLESTEROLEMIA, UNSPECIFIED                

    

 

                2020           MARIELY DELANEY MD, Ot           

   E83.42          

                          HYPOMAGNESEMIA                     

 

             2020           MARIELY DELANEY MD, Ot           E86

.0           

DEHYDRATION                                      

 

             2020           MARIELY DELANEY MD, Ot           E87

.5           

HYPERKALEMIA                                     

 

                2020           MARIELY DELANEY MD, Ot           

   F17.210         

                          NICOTINE DEPENDENCE, CIGARETTES, UNCOMPL              

      

 

             2020           MARIELY DELANEY MD, Ot           I07

.1           

RHEUMATIC TRICUSPID INSUFFICIENCY                    

 

             2020           MARIELY DELANEY MD, Ot           I12

.9           

HYPERTENSIVE CHRONIC KIDNEY DISEASE W ST                    

 

                2020           MARIELY DELANEY MD, Ot           

   I21.A1          

                          MYOCARDIAL INFARCTION TYPE 2                    

 

                2020           MARIELY DELANEY MD, Ot           

   I25.10          

                          ATHSCL HEART DISEASE OF NATIVE CORONARY               

      

 

                2020           MARIELY DELANEY MD, Ot           

   I45.10          

                          UNSPECIFIED RIGHT BUNDLE-BRANCH BLOCK                 

   

 

             2020           MARIELY DELANEY MD, Ot           I73

.9           

PERIPHERAL VASCULAR DISEASE, UNSPECIFIED                    

 

             2020           MARIELY DELANEY MD, Ot           J44

.9           

CHRONIC OBSTRUCTIVE PULMONARY DISEASE, U                    

 

             2020           MARIELY DELANEY MD, Ot           J69

.0           

PNEUMONITIS DUE TO INHALATION OF FOOD AN                    

 

                2020           MARIELY DELANEY MD, Ot           

   J96.01          

                          ACUTE RESPIRATORY FAILURE WITH HYPOXIA                

    

 

                2020           MARIELY DELANEY MD, Ot           

   J96.02          

                          ACUTE RESPIRATORY FAILURE WITH HYPERCAPN              

      

 

                2020           MARIELY DELANEY MD, Ot           

   M19.91          

                          PRIMARY OSTEOARTHRITIS, UNSPECIFIED SITE              

      

 

             2020           MARIELY DELANEY MD, Ot           N17

.9           

ACUTE KIDNEY FAILURE, UNSPECIFIED                    

 

             2020           MARIELY DELANEY MD, Ot           N18

.9           

CHRONIC KIDNEY DISEASE, UNSPECIFIED                    

 

                2020           MARIELY DELANEY MD, Ot           

   R41.82          

                          ALTERED MENTAL STATUS, UNSPECIFIED                    

 

             2020           MARIELY DELANEY MD, Ot           R57

.9           

SHOCK, UNSPECIFIED                               

 

                2020           MARIELY DELANEY MD, Ot           

   Z68.34          

                          BODY MASS INDEX (BMI) 34.0-34.9, ADULT                

    

 

                2020           MARIELY DELANEY MD, Ot           

   Z79.84          

                          LONG TERM (CURRENT) USE OF ORAL HYPOGLYC              

      

 

                2020           MARIELY DELANEY MD, Ot           

   Z85.46          

                          PERSONAL HISTORY OF MALIGNANT NEOPLASM O              

      

 

                2020           MARIELY DELANEY MD, Ot           

   Z86.73          

                          PRSNL HX OF TIA (TIA), AND CEREB INFRC W              

      

 

                2020           MARIELY DELANEY MD, Ot           

   Z89.431         

                          ACQUIRED ABSENCE OF RIGHT FOOT                    

 

             2020           MARIELY DELANEY MD, Ot           Z95

.5           

PRESENCE OF CORONARY ANGIOPLASTY IMPLANT                    

 

             2020           MARIELY DELANEY MD, Ot           E11

.9           

TYPE 2 DIABETES MELLITUS WITHOUT COMPLIC                    

 

             2020           MARIELY DELANEY MD, Ot           E66

.9           

OBESITY, UNSPECIFIED                             

 

                2020           MARIELY DELANEY MD, Ot           

   E78.00          

                          PURE HYPERCHOLESTEROLEMIA, UNSPECIFIED                

    

 

                2020           MARIELY DELANEY MD, Ot           

   E83.42          

                          HYPOMAGNESEMIA                     

 

             2020           MARIELY DELANEY MD           Ot           E86

.0           

DEHYDRATION                                      

 

             2020           MARIELY DELANEY MD           Ot           E87

.5           

HYPERKALEMIA                                     

 

                2020           MARIELY DELANEY MD           Ot           

   F17.210         

                          NICOTINE DEPENDENCE, CIGARETTES, UNCOMPL              

      

 

             2020           MARIELY DELANEY MD           Ot           I07

.1           

RHEUMATIC TRICUSPID INSUFFICIENCY                    

 

             2020           MARIELY DELANEY MD           Ot           I12

.9           

HYPERTENSIVE CHRONIC KIDNEY DISEASE W ST                    

 

                2020           MARIELY DELANEY MD, Ot           

   I21.A1          

                          MYOCARDIAL INFARCTION TYPE 2                    

 

                2020           MARIELY DELANEY MD, Ot           

   I25.10          

                          ATHSCL HEART DISEASE OF NATIVE CORONARY               

      

 

                2020           MARIELY DELANEY MD, Ot           

   I45.10          

                          UNSPECIFIED RIGHT BUNDLE-BRANCH BLOCK                 

   

 

             2020           MARIELY DELANEY MD, Ot           I73

.9           

PERIPHERAL VASCULAR DISEASE, UNSPECIFIED                    

 

             2020           MARIELY DELANEY MD, Ot           J44

.9           

CHRONIC OBSTRUCTIVE PULMONARY DISEASE, U                    

 

             2020           MARIELY DELANEY MD, Ot           J69

.0           

PNEUMONITIS DUE TO INHALATION OF FOOD AN                    

 

                2020           MARIELY DELANEY MD           Ot           

   J96.01          

                          ACUTE RESPIRATORY FAILURE WITH HYPOXIA                

    

 

                2020           MARIELY DELANEY MD, Ot           

   J96.02          

                          ACUTE RESPIRATORY FAILURE WITH HYPERCAPN              

      

 

                2020           MARIELY DELANEY MD           Ot           

   M19.91          

                          PRIMARY OSTEOARTHRITIS, UNSPECIFIED SITE              

      

 

             2020           MARIELY DELANEY MD, Ot           N17

.9           

ACUTE KIDNEY FAILURE, UNSPECIFIED                    

 

             2020           MARIELY DELANEY MD, Ot           N18

.9           

CHRONIC KIDNEY DISEASE, UNSPECIFIED                    

 

                2020           MARIELY DELANEY MD           Ot           

   R41.82          

                          ALTERED MENTAL STATUS, UNSPECIFIED                    

 

             2020           MARIELY DELANEY MD, Ot           R57

.9           

SHOCK, UNSPECIFIED                               

 

                2020           MARIELY DELANEY MD, Ot           

   Z68.34          

                          BODY MASS INDEX (BMI) 34.0-34.9, ADULT                

    

 

                2020           MARIELY DELANEY MD, Ot           

   Z79.84          

                          LONG TERM (CURRENT) USE OF ORAL HYPOGLYC              

      

 

                2020           MARIELY DELANEY MD           Ot           

   Z85.46          

                          PERSONAL HISTORY OF MALIGNANT NEOPLASM O              

      

 

                2020           MARIELY DELANEY MD, Ot           

   Z86.73          

                          PRSNL HX OF TIA (TIA), AND CEREB INFRC W              

      

 

                2020           MARIELY DELANEY MD, Ot           

   Z89.431         

                          ACQUIRED ABSENCE OF RIGHT FOOT                    

 

             2020           MARIELY DELANEY MD           Ot           Z95

.5           

PRESENCE OF CORONARY ANGIOPLASTY IMPLANT                    

 

             2020           MARIELY DELANEY MD           Ot           A41

.9           

SEPSIS, UNSPECIFIED ORGANISM                     

 

                2020           MARIELY DELANEY MD           Ot           

   E11.65          

                          TYPE 2 DIABETES MELLITUS WITH HYPERGLYCE              

      

 

             2020           MARIELY DELANEY MD           Ot           E66

.9           

OBESITY, UNSPECIFIED                             

 

                2020           MARIELY DELANEY MD           Ot           

   E78.00          

                          PURE HYPERCHOLESTEROLEMIA, UNSPECIFIED                

    

 

                2020           MARIELY DELANEY MD           Ot           

   E83.42          

                          HYPOMAGNESEMIA                     

 

             2020           MARIELY DELANEY MD           Ot           E86

.0           

DEHYDRATION                                      

 

             2020           MARIELY DELANEY MD           Ot           E87

.5           

HYPERKALEMIA                                     

 

                2020           MARIELY DELANEY MD           Ot           

   F17.210         

                          NICOTINE DEPENDENCE, CIGARETTES, UNCOMPL              

      

 

             2020           MARIELY DELANEY MD           Ot           I07

.1           

RHEUMATIC TRICUSPID INSUFFICIENCY                    

 

             2020           MARIELY DELANEY MD           Ot           I12

.9           

HYPERTENSIVE CHRONIC KIDNEY DISEASE W ST                    

 

                2020           MARIELY DELANEY MD, Ot           

   I21.A1          

                          MYOCARDIAL INFARCTION TYPE 2                    

 

                2020           MARIELY DELANEY MD           Ot           

   I25.10          

                          ATHSCL HEART DISEASE OF NATIVE CORONARY               

      

 

                2020           MARIELY DELANEY MD           Ot           

   I45.10          

                          UNSPECIFIED RIGHT BUNDLE-BRANCH BLOCK                 

   

 

                2020           MARIELY DELANEY MD, Ot           

   I50.33          

                          ACUTE ON CHRONIC DIASTOLIC (CONGESTIVE)               

      

 

             2020           MARIELY DELANEY MD, Ot           I73

.9           

PERIPHERAL VASCULAR DISEASE, UNSPECIFIED                    

 

             2020           MARIELY DELANEY MD, Ot           J18

.9           

PNEUMONIA, UNSPECIFIED ORGANISM                    

 

             2020           MARIELY DELANEY MD, Ot           J44

.9           

CHRONIC OBSTRUCTIVE PULMONARY DISEASE, U                    

 

                2020           MARIELY DELANEY MD           Ot           

   J96.01          

                          ACUTE RESPIRATORY FAILURE WITH HYPOXIA                

    

 

                2020           RHETTMARIELY SCHWARTZ MD, Ot           

   J96.02          

                          ACUTE RESPIRATORY FAILURE WITH HYPERCAPN              

      

 

                2020           MARIELY DELANEY MD, Ot           

   M19.91          

                          PRIMARY OSTEOARTHRITIS, UNSPECIFIED SITE              

      

 

             2020           MARIELY DELANEY MD, Ot           N17

.9           

ACUTE KIDNEY FAILURE, UNSPECIFIED                    

 

             2020           MARIELY DELANEY MD, Ot           N18

.9           

CHRONIC KIDNEY DISEASE, UNSPECIFIED                    

 

                2020           MARIELY DELANEY MD, Ot           

   R65.21          

                          SEVERE SEPSIS WITH SEPTIC SHOCK                    

 

                2020           MARIELY DELANEY MD, Ot           

   Z68.34          

                          BODY MASS INDEX (BMI) 34.0-34.9, ADULT                

    

 

                2020           MARIELY DELANEY MD, Ot           

   Z79.84          

                          LONG TERM (CURRENT) USE OF ORAL HYPOGLYC              

      

 

                2020           MARIELY DELANEY MD, Ot           

   Z85.46          

                          PERSONAL HISTORY OF MALIGNANT NEOPLASM O              

      

 

                2020           MARIELY DELANEY MD, Ot           

   Z86.73          

                          PRSNL HX OF TIA (TIA), AND CEREB INFRC W              

      

 

                2020           MARIELY DELANEY MD, Ot           

   Z89.431         

                          ACQUIRED ABSENCE OF RIGHT FOOT                    

 

             2020           MARIELY DELANEY MD, Ot           Z95

.5           

PRESENCE OF CORONARY ANGIOPLASTY IMPLANT                    

 

             2020           MARIELY DELANEY MD, Ot           A41

.9           

SEPSIS, UNSPECIFIED ORGANISM                     

 

                2020           MARIELY DELANEY MD, Ot           

   E11.65          

                          TYPE 2 DIABETES MELLITUS WITH HYPERGLYCE              

      

 

             2020           MARIELY DELANEY MD, Ot           E66

.9           

OBESITY, UNSPECIFIED                             

 

                2020           MARIELY DELANEY MD, Ot           

   E78.00          

                          PURE HYPERCHOLESTEROLEMIA, UNSPECIFIED                

    

 

                2020           MARIELY DELANEY MD           Ot           

   E83.42          

                          HYPOMAGNESEMIA                     

 

             2020           MARIELY DELANEY MD           Ot           E86

.0           

DEHYDRATION                                      

 

             2020           MARIELY DELANEY MD           Ot           E87

.5           

HYPERKALEMIA                                     

 

                2020           MARIELY DELANEY MD           Ot           

   F17.210         

                          NICOTINE DEPENDENCE, CIGARETTES, UNCOMPL              

      

 

             2020           MARIELY DELANEY MD           Ot           I07

.1           

RHEUMATIC TRICUSPID INSUFFICIENCY                    

 

             2020           MARIELY DELANEY MD           Ot           I12

.9           

HYPERTENSIVE CHRONIC KIDNEY DISEASE W ST                    

 

                2020           MARIELY DELANEY MD, Ot           

   I21.A1          

                          MYOCARDIAL INFARCTION TYPE 2                    

 

                2020           MARIELY DELANEY MD, Ot           

   I25.10          

                          ATHSCL HEART DISEASE OF NATIVE CORONARY               

      

 

                2020           MARIELY DELANEY MD, Ot           

   I45.10          

                          UNSPECIFIED RIGHT BUNDLE-BRANCH BLOCK                 

   

 

                2020           MARIELY DELANEY MD, Ot           

   I50.33          

                          ACUTE ON CHRONIC DIASTOLIC (CONGESTIVE)               

      

 

             2020           MARIELY DELANEY MD, Ot           I73

.9           

PERIPHERAL VASCULAR DISEASE, UNSPECIFIED                    

 

             2020           MARIELY DELANEY MD, Ot           J18

.9           

PNEUMONIA, UNSPECIFIED ORGANISM                    

 

             2020           MARIELY DELANEY MD, Ot           J44

.9           

CHRONIC OBSTRUCTIVE PULMONARY DISEASE, U                    

 

                2020           MARIELY DELANEY MD, Ot           

   J96.01          

                          ACUTE RESPIRATORY FAILURE WITH HYPOXIA                

    

 

                2020           MARIELY DELANEY MD, Ot           

   J96.02          

                          ACUTE RESPIRATORY FAILURE WITH HYPERCAPN              

      

 

                2020           MARIELY DELANEY MD, Ot           

   M19.91          

                          PRIMARY OSTEOARTHRITIS, UNSPECIFIED SITE              

      

 

             2020           MARIELY DELANEY MD, Ot           N17

.9           

ACUTE KIDNEY FAILURE, UNSPECIFIED                    

 

             2020           MARIELY DELANEY MD, Ot           N18

.9           

CHRONIC KIDNEY DISEASE, UNSPECIFIED                    

 

                2020           MARIELY DELANEY MD, Ot           

   R65.21          

                          SEVERE SEPSIS WITH SEPTIC SHOCK                    

 

                2020           MARIELY DELANEY MD, Ot           

   Z68.34          

                          BODY MASS INDEX (BMI) 34.0-34.9, ADULT                

    

 

                2020           MARIELY DELANEY MD, Ot           

   Z79.84          

                          LONG TERM (CURRENT) USE OF ORAL HYPOGLYC              

      

 

                2020           MARIELY DELANEY MD, Ot           

   Z85.46          

                          PERSONAL HISTORY OF MALIGNANT NEOPLASM O              

      

 

                2020           MARIELY DELANEY MD, Ot           

   Z86.73          

                          PRSNL HX OF TIA (TIA), AND CEREB INFRC W              

      

 

                2020           MARIELY DELANEY MD, Ot           

   Z89.431         

                          ACQUIRED ABSENCE OF RIGHT FOOT                    

 

             2020           MARIELY DELANEY MD, Ot           Z95

.5           

PRESENCE OF CORONARY ANGIOPLASTY IMPLANT                    

 

             2020           MARIELY DELANEY MD, Ot           A41

.9           

SEPSIS, UNSPECIFIED ORGANISM                     

 

                2020           MARIELY DELANEY MD           Ot           

   E11.65          

                          TYPE 2 DIABETES MELLITUS WITH HYPERGLYCE              

      

 

             2020           MARIELY DELANEY MD           Ot           E66

.9           

OBESITY, UNSPECIFIED                             

 

                2020           MARIELY DELANEY MD           Ot           

   E78.00          

                          PURE HYPERCHOLESTEROLEMIA, UNSPECIFIED                

    

 

                2020           MARIELY DELANEY MD           Ot           

   E83.42          

                          HYPOMAGNESEMIA                     

 

             2020           MARIELY DELANEY MD           Ot           E86

.0           

DEHYDRATION                                      

 

             2020           MARIELY DELANEY MD           Ot           E87

.5           

HYPERKALEMIA                                     

 

                2020           MARIELY DELANEY MD           Ot           

   F17.210         

                          NICOTINE DEPENDENCE, CIGARETTES, UNCOMPL              

      

 

             2020           MARIELY DELANEY MD           Ot           I07

.1           

RHEUMATIC TRICUSPID INSUFFICIENCY                    

 

             2020           MARIELY DELANEY MD           Ot           I12

.9           

HYPERTENSIVE CHRONIC KIDNEY DISEASE W ST                    

 

                2020           MARIELY DELANEY MD           Ot           

   I21.A1          

                          MYOCARDIAL INFARCTION TYPE 2                    

 

                2020           MARIELY DELANEY MD           Ot           

   I25.10          

                          ATHSCL HEART DISEASE OF NATIVE CORONARY               

      

 

                2020           MARIELY DELANEY MD           Ot           

   I45.10          

                          UNSPECIFIED RIGHT BUNDLE-BRANCH BLOCK                 

   

 

                2020           MARIELY DELANEY MD           Ot           

   I50.33          

                          ACUTE ON CHRONIC DIASTOLIC (CONGESTIVE)               

      

 

             2020           MARIELY DELANEY MD           Ot           I73

.9           

PERIPHERAL VASCULAR DISEASE, UNSPECIFIED                    

 

             2020           MARIELY DELANEY MD           Ot           J18

.9           

PNEUMONIA, UNSPECIFIED ORGANISM                    

 

             2020           MARIELY DELANEY MD           Ot           J44

.9           

CHRONIC OBSTRUCTIVE PULMONARY DISEASE, U                    

 

                2020           MARIELY DELANEY MD           Ot           

   J96.01          

                          ACUTE RESPIRATORY FAILURE WITH HYPOXIA                

    

 

                2020           MARIELY DELANEY MD           Ot           

   J96.02          

                          ACUTE RESPIRATORY FAILURE WITH HYPERCAPN              

      

 

                2020           MARIELY DELANEY MD           Ot           

   M19.91          

                          PRIMARY OSTEOARTHRITIS, UNSPECIFIED SITE              

      

 

             2020           MARIELY DELANEY MD           Ot           N17

.9           

ACUTE KIDNEY FAILURE, UNSPECIFIED                    

 

             2020           MARIELY DELANEY MD           Ot           N18

.9           

CHRONIC KIDNEY DISEASE, UNSPECIFIED                    

 

                2020           MARIELY DELANEY MD           Ot           

   R65.21          

                          SEVERE SEPSIS WITH SEPTIC SHOCK                    

 

                2020           MARIELY DELANEY MD, Ot           

   Z68.34          

                          BODY MASS INDEX (BMI) 34.0-34.9, ADULT                

    

 

                2020           MARIELY DELANEY MD, Ot           

   Z79.84          

                          LONG TERM (CURRENT) USE OF ORAL HYPOGLYC              

      

 

                2020           MARIELY DELANEY MD, Ot           

   Z85.46          

                          PERSONAL HISTORY OF MALIGNANT NEOPLASM O              

      

 

                2020           MARIELY DELANEY MD, Ot           

   Z86.73          

                          PRSNL HX OF TIA (TIA), AND CEREB INFRC W              

      

 

                2020           MARIELY DELANEY MD, Ot           

   Z89.431         

                          ACQUIRED ABSENCE OF RIGHT FOOT                    

 

             2020           MARIELY DELANEY MD, Ot           Z95

.5           

PRESENCE OF CORONARY ANGIOPLASTY IMPLANT                    

 

             2020           MARIELY DELANEY MD           Ot           A41

.9           

SEPSIS, UNSPECIFIED ORGANISM                     

 

                2020           MARIELY DELANEY MD           Ot           

   E11.65          

                          TYPE 2 DIABETES MELLITUS WITH HYPERGLYCE              

      

 

             2020           MARIELY DELANEY MD           Ot           E66

.9           

OBESITY, UNSPECIFIED                             

 

                2020           MARIELY DELANEY MD           Ot           

   E78.00          

                          PURE HYPERCHOLESTEROLEMIA, UNSPECIFIED                

    

 

                2020           MARIELY DELANEY MD           Ot           

   E83.42          

                          HYPOMAGNESEMIA                     

 

             2020           MARIELY DELANEY MD           Ot           E86

.0           

DEHYDRATION                                      

 

             2020           MARIELY DELANEY MD           Ot           E87

.5           

HYPERKALEMIA                                     

 

                2020           MARIELY DELANEY MD           Ot           

   F17.210         

                          NICOTINE DEPENDENCE, CIGARETTES, UNCOMPL              

      

 

             2020           MARIELY DELANEY MD           Ot           I07

.1           

RHEUMATIC TRICUSPID INSUFFICIENCY                    

 

             2020           MARIELY DELANEY MD           Ot           I12

.9           

HYPERTENSIVE CHRONIC KIDNEY DISEASE W ST                    

 

                2020           MARIELY DELANEY MD, Ot           

   I21.A1          

                          MYOCARDIAL INFARCTION TYPE 2                    

 

                2020           MARIELY DELANEY MD, Ot           

   I25.10          

                          ATHSCL HEART DISEASE OF NATIVE CORONARY               

      

 

                2020           MARIELY DELANEY MD, Ot           

   I45.10          

                          UNSPECIFIED RIGHT BUNDLE-BRANCH BLOCK                 

   

 

                2020           MARIELY DELANEY MD, Ot           

   I50.33          

                          ACUTE ON CHRONIC DIASTOLIC (CONGESTIVE)               

      

 

             2020           MARIELY DELANEY MD, Ot           I73

.9           

PERIPHERAL VASCULAR DISEASE, UNSPECIFIED                    

 

             2020           MARIELY DELANEY MD, Ot           J18

.9           

PNEUMONIA, UNSPECIFIED ORGANISM                    

 

             2020           MARIELY DLEANEY MD, Ot           J44

.9           

CHRONIC OBSTRUCTIVE PULMONARY DISEASE, U                    

 

                2020           MARIELY DELANEY MD, Ot           

   J96.01          

                          ACUTE RESPIRATORY FAILURE WITH HYPOXIA                

    

 

                2020           MARIELY DELANEY MD, Ot           

   J96.02          

                          ACUTE RESPIRATORY FAILURE WITH HYPERCAPN              

      

 

                2020           MARIELY DELANEY MD           Ot           

   M19.91          

                          PRIMARY OSTEOARTHRITIS, UNSPECIFIED SITE              

      

 

             2020           MARIELY DELANEY MD, Ot           N17

.9           

ACUTE KIDNEY FAILURE, UNSPECIFIED                    

 

             2020           MARIELY DELANEY MD, Ot           N18

.9           

CHRONIC KIDNEY DISEASE, UNSPECIFIED                    

 

                2020           MARIELY DELANEY MD           Ot           

   R65.21          

                          SEVERE SEPSIS WITH SEPTIC SHOCK                    

 

                2020           MARIELY DELANEY MD, Ot           

   Z68.34          

                          BODY MASS INDEX (BMI) 34.0-34.9, ADULT                

    

 

                2020           MARIELY DELANEY MD, Ot           

   Z79.84          

                          LONG TERM (CURRENT) USE OF ORAL HYPOGLYC              

      

 

                2020           MARIELY DELANEY MD, Ot           

   Z85.46          

                          PERSONAL HISTORY OF MALIGNANT NEOPLASM O              

      

 

                2020           MARIELY DELANEY MD, Ot           

   Z86.73          

                          PRSNL HX OF TIA (TIA), AND CEREB INFRC W              

      

 

                2020           MARIELY DELANEY MD, Ot           

   Z89.431         

                          ACQUIRED ABSENCE OF RIGHT FOOT                    

 

             2020           MARIELY DELANEY MD           Ot           Z95

.5           

PRESENCE OF CORONARY ANGIOPLASTY IMPLANT                    

 

             2020           MARIELY DELANEY MD, Ot           A41

.9           

SEPSIS, UNSPECIFIED ORGANISM                     

 

                2020           MARIELY DELANEY MD           Ot           

   E11.65          

                          TYPE 2 DIABETES MELLITUS WITH HYPERGLYCE              

      

 

             2020           MARIELY DELANEY MD           Ot           E66

.9           

OBESITY, UNSPECIFIED                             

 

                2020           MARIELY DELANEY MD           Ot           

   E78.00          

                          PURE HYPERCHOLESTEROLEMIA, UNSPECIFIED                

    

 

                2020           MARIELY DELANEY MD           Ot           

   E83.42          

                          HYPOMAGNESEMIA                     

 

             2020           MARIELY DELANEY MD           Ot           E86

.0           

DEHYDRATION                                      

 

             2020           MARIELY DELANEY MD, Ot           E87

.5           

HYPERKALEMIA                                     

 

                2020           MARIELY DELANEY MD           Ot           

   F17.210         

                          NICOTINE DEPENDENCE, CIGARETTES, UNCOMPL              

      

 

             2020           MARIELY DELANEY MD           Ot           I07

.1           

RHEUMATIC TRICUSPID INSUFFICIENCY                    

 

             2020           MARIELY DELANEY MD           Ot           I12

.9           

HYPERTENSIVE CHRONIC KIDNEY DISEASE W ST                    

 

                2020           MARIELY DELANEY MD, Ot           

   I21.A1          

                          MYOCARDIAL INFARCTION TYPE 2                    

 

                2020           MARIELY DELANEY MD, Ot           

   I25.10          

                          ATHSCL HEART DISEASE OF NATIVE CORONARY               

      

 

                2020           MARIELY DELANEY MD, Ot           

   I45.10          

                          UNSPECIFIED RIGHT BUNDLE-BRANCH BLOCK                 

   

 

                2020           MARIELY DELANEY MD, Ot           

   I50.33          

                          ACUTE ON CHRONIC DIASTOLIC (CONGESTIVE)               

      

 

             2020           MARIELY DELANEY MD           Ot           I73

.9           

PERIPHERAL VASCULAR DISEASE, UNSPECIFIED                    

 

             2020           MARIELY DELANEY MD, Ot           J18

.9           

PNEUMONIA, UNSPECIFIED ORGANISM                    

 

             2020           MARIELY DELANEY MD, Ot           J44

.9           

CHRONIC OBSTRUCTIVE PULMONARY DISEASE, U                    

 

                2020           MARIELY DELANEY MD, Ot           

   J96.01          

                          ACUTE RESPIRATORY FAILURE WITH HYPOXIA                

    

 

                2020           MARIELY DELANEY MD, Ot           

   J96.02          

                          ACUTE RESPIRATORY FAILURE WITH HYPERCAPN              

      

 

                2020           MARIELY DELANEY MD           Ot           

   M19.91          

                          PRIMARY OSTEOARTHRITIS, UNSPECIFIED SITE              

      

 

             2020           MARIELY DELANEY MD           Ot           N17

.9           

ACUTE KIDNEY FAILURE, UNSPECIFIED                    

 

             2020           MARIELY DELANEY MD           Ot           N18

.9           

CHRONIC KIDNEY DISEASE, UNSPECIFIED                    

 

                2020           MARIELY DELANEY MD           Ot           

   R65.21          

                          SEVERE SEPSIS WITH SEPTIC SHOCK                    

 

                2020           MARIELY DELANEY MD, Ot           

   Z68.34          

                          BODY MASS INDEX (BMI) 34.0-34.9, ADULT                

    

 

                2020           MARIELY DELANEY MD, Ot           

   Z79.84          

                          LONG TERM (CURRENT) USE OF ORAL HYPOGLYC              

      

 

                2020           MARIELY DELANEY MD, Ot           

   Z85.46          

                          PERSONAL HISTORY OF MALIGNANT NEOPLASM O              

      

 

                2020           MARIELY DELANEY MD, Ot           

   Z86.73          

                          PRSNL HX OF TIA (TIA), AND CEREB INFRC W              

      

 

                2020           MARIELY DELANEY MD, Ot           

   Z89.431         

                          ACQUIRED ABSENCE OF RIGHT FOOT                    

 

             2020           MARIELY DELANEY MD           Ot           Z95

.5           

PRESENCE OF CORONARY ANGIOPLASTY IMPLANT                    

 

             2020           MARIELY DELANEY MD           Ot           A41

.9           

SEPSIS, UNSPECIFIED ORGANISM                     

 

                2020           MARIELY DELANEY MD           Ot           

   E11.65          

                          TYPE 2 DIABETES MELLITUS WITH HYPERGLYCE              

      

 

             2020           MARIELY DELANEY MD           Ot           E66

.9           

OBESITY, UNSPECIFIED                             

 

                2020           MARIELY DELANEY MD           Ot           

   E78.00          

                          PURE HYPERCHOLESTEROLEMIA, UNSPECIFIED                

    

 

                2020           MARIELY DELANEY MD           Ot           

   E83.42          

                          HYPOMAGNESEMIA                     

 

             2020           MARIELY DELANEY MD           Ot           E86

.0           

DEHYDRATION                                      

 

             2020           MARIELY DELANEY MD           Ot           E87

.5           

HYPERKALEMIA                                     

 

                2020           MARIELY DELANEY MD           Ot           

   F17.210         

                          NICOTINE DEPENDENCE, CIGARETTES, UNCOMPL              

      

 

             2020           MARIELY DELANEY MD           Ot           I07

.1           

RHEUMATIC TRICUSPID INSUFFICIENCY                    

 

             2020           MARIELY DELANEY MD           Ot           I12

.9           

HYPERTENSIVE CHRONIC KIDNEY DISEASE W ST                    

 

                2020           MARIELY DELANEY MD           Ot           

   I21.A1          

                          MYOCARDIAL INFARCTION TYPE 2                    

 

                2020           MARIELY DELANEY MD           Ot           

   I25.10          

                          ATHSCL HEART DISEASE OF NATIVE CORONARY               

      

 

                2020           MARIELY DELANEY MD           Ot           

   I45.10          

                          UNSPECIFIED RIGHT BUNDLE-BRANCH BLOCK                 

   

 

                2020           MARIELY DELANEY MD           Ot           

   I50.33          

                          ACUTE ON CHRONIC DIASTOLIC (CONGESTIVE)               

      

 

             2020           MARIELY DELANEY MD           Ot           I73

.9           

PERIPHERAL VASCULAR DISEASE, UNSPECIFIED                    

 

             2020           MARIELY DELANEY MD           Ot           J18

.9           

PNEUMONIA, UNSPECIFIED ORGANISM                    

 

             2020           MARIELY DELANEY MD, Ot           J44

.9           

CHRONIC OBSTRUCTIVE PULMONARY DISEASE, U                    

 

                2020           MARIELY DELANEY MD           Ot           

   J96.01          

                          ACUTE RESPIRATORY FAILURE WITH HYPOXIA                

    

 

                2020           MARIELY DELANEY MD           Ot           

   J96.02          

                          ACUTE RESPIRATORY FAILURE WITH HYPERCAPN              

      

 

                2020           MARIELY DELANEY MD, Ot           

   M19.91          

                          PRIMARY OSTEOARTHRITIS, UNSPECIFIED SITE              

      

 

             2020           MARIELY DELANEY MD, Ot           N17

.9           

ACUTE KIDNEY FAILURE, UNSPECIFIED                    

 

             2020           MARIELY DELANEY MD, Ot           N18

.9           

CHRONIC KIDNEY DISEASE, UNSPECIFIED                    

 

                2020           MARIELY DELANEY MD, Ot           

   R65.21          

                          SEVERE SEPSIS WITH SEPTIC SHOCK                    

 

                2020           MARIELY DELANEY MD, Ot           

   Z68.34          

                          BODY MASS INDEX (BMI) 34.0-34.9, ADULT                

    

 

                2020           MARIELY DELANEY MD, Ot           

   Z79.84          

                          LONG TERM (CURRENT) USE OF ORAL HYPOGLYC              

      

 

                2020           MARIELY DELANEY MD, Ot           

   Z85.46          

                          PERSONAL HISTORY OF MALIGNANT NEOPLASM O              

      

 

                2020           MARIELY DELANEY MD, Ot           

   Z86.73          

                          PRSNL HX OF TIA (TIA), AND CEREB INFRC W              

      

 

                2020           MARIELY DELANEY MD, Ot           

   Z89.431         

                          ACQUIRED ABSENCE OF RIGHT FOOT                    

 

             2020           MARIELY DELANEY MD, Ot           Z95

.5           

PRESENCE OF CORONARY ANGIOPLASTY IMPLANT                    

 

             2020           MARIELY DELANEY MD, Ot           A41

.9           

SEPSIS, UNSPECIFIED ORGANISM                     

 

                2020           MARIELY DELANEY MD, Ot           

   E11.65          

                          TYPE 2 DIABETES MELLITUS WITH HYPERGLYCE              

      

 

             2020           MARIELY DELANEY MD, Ot           E66

.9           

OBESITY, UNSPECIFIED                             

 

                2020           MARIELY DELANEY MD           Ot           

   E78.00          

                          PURE HYPERCHOLESTEROLEMIA, UNSPECIFIED                

    

 

                2020           MARIELY DELANEY MD           Ot           

   E83.42          

                          HYPOMAGNESEMIA                     

 

             2020           MARIELY DELANEY MD           Ot           E86

.0           

DEHYDRATION                                      

 

             2020           MARIELY DELANEY MD           Ot           E87

.5           

HYPERKALEMIA                                     

 

                2020           MARIELY DELANEY MD           Ot           

   F17.210         

                          NICOTINE DEPENDENCE, CIGARETTES, UNCOMPL              

      

 

             2020           MARIELY DELANEY MD           Ot           I07

.1           

RHEUMATIC TRICUSPID INSUFFICIENCY                    

 

             2020           MARIELY DELANEY MD, Ot           I12

.9           

HYPERTENSIVE CHRONIC KIDNEY DISEASE W ST                    

 

                2020           MARIELY DELANEY MD, Ot           

   I21.A1          

                          MYOCARDIAL INFARCTION TYPE 2                    

 

                2020           MARIELY DELANEY MD, Ot           

   I25.10          

                          ATHSCL HEART DISEASE OF NATIVE CORONARY               

      

 

                2020           MARIELY DELANEY MD, Ot           

   I45.10          

                          UNSPECIFIED RIGHT BUNDLE-BRANCH BLOCK                 

   

 

                2020           MARIELY DELANEY MD, Ot           

   I50.33          

                          ACUTE ON CHRONIC DIASTOLIC (CONGESTIVE)               

      

 

             2020           MARIELY DELANEY MD, Ot           I73

.9           

PERIPHERAL VASCULAR DISEASE, UNSPECIFIED                    

 

             2020           MARIELY DELANEY MD, Ot           J18

.9           

PNEUMONIA, UNSPECIFIED ORGANISM                    

 

             2020           MARIELY DELANEY MD, Ot           J44

.9           

CHRONIC OBSTRUCTIVE PULMONARY DISEASE, U                    

 

                2020           MARIELY DELANEY MD, Ot           

   J96.01          

                          ACUTE RESPIRATORY FAILURE WITH HYPOXIA                

    

 

                2020           MARIELY DELANEY MD, Ot           

   J96.02          

                          ACUTE RESPIRATORY FAILURE WITH HYPERCAPN              

      

 

                2020           MARIELY DELANEY MD, Ot           

   M19.91          

                          PRIMARY OSTEOARTHRITIS, UNSPECIFIED SITE              

      

 

             2020           MARIELY DELANEY MD, Ot           N17

.9           

ACUTE KIDNEY FAILURE, UNSPECIFIED                    

 

             2020           MARIELY DELANEY MD, Ot           N18

.9           

CHRONIC KIDNEY DISEASE, UNSPECIFIED                    

 

                2020           MARIELY DELANEY MD, Ot           

   R65.21          

                          SEVERE SEPSIS WITH SEPTIC SHOCK                    

 

                2020           MARIELY DELANEY MD, Ot           

   Z68.34          

                          BODY MASS INDEX (BMI) 34.0-34.9, ADULT                

    

 

                2020           MARIELY DELANEY MD, Ot           

   Z79.84          

                          LONG TERM (CURRENT) USE OF ORAL HYPOGLYC              

      

 

                2020           MARIELY DELANEY MD, Ot           

   Z85.46          

                          PERSONAL HISTORY OF MALIGNANT NEOPLASM O              

      

 

                2020           MARIELY DELANEY MD, Ot           

   Z86.73          

                          PRSNL HX OF TIA (TIA), AND CEREB INFRC W              

      

 

                2020           MARIELY DELANEY MD, Ot           

   Z89.431         

                          ACQUIRED ABSENCE OF RIGHT FOOT                    

 

             2020           MARIELY DELANEY MD, Ot           Z95

.5           

PRESENCE OF CORONARY ANGIOPLASTY IMPLANT                    

 

             2020           MARIELY DELANEY MD, Ot           A41

.9           

SEPSIS, UNSPECIFIED ORGANISM                     

 

                2020           RHETT MD, MARIELY N           Ot           

   E11.65          

                          TYPE 2 DIABETES MELLITUS WITH HYPERGLYCE              

      

 

             2020           MARIELY DELANEY MD           Ot           E66

.9           

OBESITY, UNSPECIFIED                             

 

                2020           MARIELY DELANEY MD           Ot           

   E78.00          

                          PURE HYPERCHOLESTEROLEMIA, UNSPECIFIED                

    

 

                2020           MARIELY DELANEY MD           Ot           

   E83.42          

                          HYPOMAGNESEMIA                     

 

             2020           MARIELY DELANEY MD           Ot           E86

.0           

DEHYDRATION                                      

 

             2020           MARIELY DELANEY MD           Ot           E87

.5           

HYPERKALEMIA                                     

 

                2020           MARIELY DELANEY MD           Ot           

   F17.210         

                          NICOTINE DEPENDENCE, CIGARETTES, UNCOMPL              

      

 

             2020           MARIELY DELANEY MD           Ot           I07

.1           

RHEUMATIC TRICUSPID INSUFFICIENCY                    

 

             2020           MARIELY DELANEY MD           Ot           I12

.9           

HYPERTENSIVE CHRONIC KIDNEY DISEASE W ST                    

 

                2020           MARIELY DELANEY MD           Ot           

   I21.A1          

                          MYOCARDIAL INFARCTION TYPE 2                    

 

                2020           MARIELY DELANEY MD           Ot           

   I25.10          

                          ATHSCL HEART DISEASE OF NATIVE CORONARY               

      

 

                2020           MARIELY DELANEY MD           Ot           

   I45.10          

                          UNSPECIFIED RIGHT BUNDLE-BRANCH BLOCK                 

   

 

                2020           MARIELY DELANEY MD           Ot           

   I50.33          

                          ACUTE ON CHRONIC DIASTOLIC (CONGESTIVE)               

      

 

             2020           MARIELY DELANEY MD           Ot           I73

.9           

PERIPHERAL VASCULAR DISEASE, UNSPECIFIED                    

 

             2020           MARIELY DELANEY MD           Ot           J18

.9           

PNEUMONIA, UNSPECIFIED ORGANISM                    

 

             2020           MARIELY DELANEY MD           Ot           J44

.9           

CHRONIC OBSTRUCTIVE PULMONARY DISEASE, U                    

 

                2020           MARIELY DELANEY MD           Ot           

   J96.01          

                          ACUTE RESPIRATORY FAILURE WITH HYPOXIA                

    

 

                2020           MARIELY DELANEY MD           Ot           

   J96.02          

                          ACUTE RESPIRATORY FAILURE WITH HYPERCAPN              

      

 

                2020           MARIELY DELANEY MD           Ot           

   M19.91          

                          PRIMARY OSTEOARTHRITIS, UNSPECIFIED SITE              

      

 

             2020           MARIELY DELANEY MD           Ot           N17

.9           

ACUTE KIDNEY FAILURE, UNSPECIFIED                    

 

             2020           MARIELY DELANEY MD           Ot           N18

.9           

CHRONIC KIDNEY DISEASE, UNSPECIFIED                    

 

                2020           MARIELY DELANEY MD           Ot           

   R65.21          

                          SEVERE SEPSIS WITH SEPTIC SHOCK                    

 

                2020           MARIELY DELANEY MD           Ot           

   Z68.34          

                          BODY MASS INDEX (BMI) 34.0-34.9, ADULT                

    

 

                2020           MARIELY DELANEY MD           Ot           

   Z79.84          

                          LONG TERM (CURRENT) USE OF ORAL HYPOGLYC              

      

 

                2020           MARIELY DELANEY MD, Ot           

   Z85.46          

                          PERSONAL HISTORY OF MALIGNANT NEOPLASM O              

      

 

                2020           MARIELY DELANEY MD, Ot           

   Z86.73          

                          PRSNL HX OF TIA (TIA), AND CEREB INFRC W              

      

 

                2020           MARIELY DELANEY MD, Ot           

   Z89.431         

                          ACQUIRED ABSENCE OF RIGHT FOOT                    

 

             2020           MARIELY DELANEY MD, Ot           Z95

.5           

PRESENCE OF CORONARY ANGIOPLASTY IMPLANT                    

 

             2020           MARIELY DELANEY MD, Ot           A41

.9           

SEPSIS, UNSPECIFIED ORGANISM                     

 

                2020           MARIELY DELANEY MD           Ot           

   E11.65          

                          TYPE 2 DIABETES MELLITUS WITH HYPERGLYCE              

      

 

             2020           MARIELY DELANEY MD           Ot           E66

.9           

OBESITY, UNSPECIFIED                             

 

                2020           MARIELY DELANEY MD           Ot           

   E78.00          

                          PURE HYPERCHOLESTEROLEMIA, UNSPECIFIED                

    

 

                2020           MARIELY DELANEY MD           Ot           

   E83.42          

                          HYPOMAGNESEMIA                     

 

             2020           MARIELY DELANEY MD           Ot           E86

.0           

DEHYDRATION                                      

 

             2020           MARIELY DELANEY MD           Ot           E87

.5           

HYPERKALEMIA                                     

 

                2020           MARIELY DELANEY MD           Ot           

   F17.210         

                          NICOTINE DEPENDENCE, CIGARETTES, UNCOMPL              

      

 

             2020           MARIELY DELANEY MD           Ot           I07

.1           

RHEUMATIC TRICUSPID INSUFFICIENCY                    

 

             2020           MARIELY DELANEY MD           Ot           I12

.9           

HYPERTENSIVE CHRONIC KIDNEY DISEASE W ST                    

 

                2020           MARIELY DELANEY MD           Ot           

   I21.A1          

                          MYOCARDIAL INFARCTION TYPE 2                    

 

                2020           MARIELY DELANEY MD           Ot           

   I25.10          

                          ATHSCL HEART DISEASE OF NATIVE CORONARY               

      

 

                2020           MARIELY DELANEY MD           Ot           

   I45.10          

                          UNSPECIFIED RIGHT BUNDLE-BRANCH BLOCK                 

   

 

                2020           MARIELY DELANEY MD           Ot           

   I50.33          

                          ACUTE ON CHRONIC DIASTOLIC (CONGESTIVE)               

      

 

             2020           MARIELY DELANEY MD           Ot           I73

.9           

PERIPHERAL VASCULAR DISEASE, UNSPECIFIED                    

 

             2020           MARIELY DELANEY MD, Ot           J18

.9           

PNEUMONIA, UNSPECIFIED ORGANISM                    

 

             2020           MARIELY DELANEY MD, Ot           J44

.9           

CHRONIC OBSTRUCTIVE PULMONARY DISEASE, U                    

 

                2020           MARIELY DELANEY MD, Ot           

   J96.01          

                          ACUTE RESPIRATORY FAILURE WITH HYPOXIA                

    

 

                2020           MARIELY DELANEY MD           Ot           

   J96.02          

                          ACUTE RESPIRATORY FAILURE WITH HYPERCAPN              

      

 

                2020           MARIELY DELANEY MD           Ot           

   M19.91          

                          PRIMARY OSTEOARTHRITIS, UNSPECIFIED SITE              

      

 

             2020           MARIELY DELANEY MD, Ot           N17

.9           

ACUTE KIDNEY FAILURE, UNSPECIFIED                    

 

             2020           MARIELY DELANEY MD, Ot           N18

.9           

CHRONIC KIDNEY DISEASE, UNSPECIFIED                    

 

                2020           MARIELY DELANEY MD           Ot           

   R65.21          

                          SEVERE SEPSIS WITH SEPTIC SHOCK                    

 

                2020           MARIELY DELANEY MD, Ot           

   Z68.34          

                          BODY MASS INDEX (BMI) 34.0-34.9, ADULT                

    

 

                2020           MARIELY DELANEY MD, Ot           

   Z79.84          

                          LONG TERM (CURRENT) USE OF ORAL HYPOGLYC              

      

 

                2020           MARIELY DELANEY MD, Ot           

   Z85.46          

                          PERSONAL HISTORY OF MALIGNANT NEOPLASM O              

      

 

                2020           MARIELY DELANEY MD, Ot           

   Z86.73          

                          PRSNL HX OF TIA (TIA), AND CEREB INFRC W              

      

 

                2020           MARIELY DELANEY MD, Ot           

   Z89.431         

                          ACQUIRED ABSENCE OF RIGHT FOOT                    

 

             2020           MARIELY DELANEY MD           Ot           Z95

.5           

PRESENCE OF CORONARY ANGIOPLASTY IMPLANT                    

 

             2020           MARIELY DELANEY MD           Ot           A41

.9           

SEPSIS, UNSPECIFIED ORGANISM                     

 

                2020           MARIELY DELANEY MD           Ot           

   E11.65          

                          TYPE 2 DIABETES MELLITUS WITH HYPERGLYCE              

      

 

             2020           MARIELY DELANEY MD           Ot           E66

.9           

OBESITY, UNSPECIFIED                             

 

                2020           MARIELY DELANEY MD           Ot           

   E78.00          

                          PURE HYPERCHOLESTEROLEMIA, UNSPECIFIED                

    

 

                2020           MARIELY DELANEY MD           Ot           

   E83.42          

                          HYPOMAGNESEMIA                     

 

             2020           MARIELY DELANEY MD           Ot           E86

.0           

DEHYDRATION                                      

 

             2020           MARIELY DELANEY MD           Ot           E87

.5           

HYPERKALEMIA                                     

 

                2020           MARIELY DELANEY MD           Ot           

   F17.210         

                          NICOTINE DEPENDENCE, CIGARETTES, UNCOMPL              

      

 

             2020           MARIELY DELANEY MD, Ot           I07

.1           

RHEUMATIC TRICUSPID INSUFFICIENCY                    

 

             2020           MARIELY DELANEY MD, Ot           I12

.9           

HYPERTENSIVE CHRONIC KIDNEY DISEASE W ST                    

 

                2020           MARIELY DELANEY MD, Ot           

   I21.A1          

                          MYOCARDIAL INFARCTION TYPE 2                    

 

                2020           MARIELY DELANEY MD, Ot           

   I25.10          

                          ATHSCL HEART DISEASE OF NATIVE CORONARY               

      

 

                2020           MARIELY DELANEY MD, Ot           

   I45.10          

                          UNSPECIFIED RIGHT BUNDLE-BRANCH BLOCK                 

   

 

                2020           MARIELY DELANEY MD, Ot           

   I50.33          

                          ACUTE ON CHRONIC DIASTOLIC (CONGESTIVE)               

      

 

             2020           MARIELY DELANEY MD, Ot           I73

.9           

PERIPHERAL VASCULAR DISEASE, UNSPECIFIED                    

 

             2020           MARIELY DELANEY MD, Ot           J18

.9           

PNEUMONIA, UNSPECIFIED ORGANISM                    

 

             2020           MARIELY DELANEY MD, Ot           J44

.9           

CHRONIC OBSTRUCTIVE PULMONARY DISEASE, U                    

 

                2020           MARIELY DELANEY MD, Ot           

   J96.01          

                          ACUTE RESPIRATORY FAILURE WITH HYPOXIA                

    

 

                2020           MARIELY DELANEY MD, Ot           

   J96.02          

                          ACUTE RESPIRATORY FAILURE WITH HYPERCAPN              

      

 

                2020           MARIELY DELANEY MD, Ot           

   M19.91          

                          PRIMARY OSTEOARTHRITIS, UNSPECIFIED SITE              

      

 

             2020           MARIELY DELANEY MD, Ot           N17

.9           

ACUTE KIDNEY FAILURE, UNSPECIFIED                    

 

             2020           MARIELY DELANEY MD, Ot           N18

.9           

CHRONIC KIDNEY DISEASE, UNSPECIFIED                    

 

                2020           MARIELY DELANEY MD, Ot           

   R65.21          

                          SEVERE SEPSIS WITH SEPTIC SHOCK                    

 

                2020           MARIELY DELANEY MD, Ot           

   Z68.34          

                          BODY MASS INDEX (BMI) 34.0-34.9, ADULT                

    

 

                2020           MARIELY DELANEY MD, Ot           

   Z79.84          

                          LONG TERM (CURRENT) USE OF ORAL HYPOGLYC              

      

 

                2020           MARIELY DELANEY MD, Ot           

   Z85.46          

                          PERSONAL HISTORY OF MALIGNANT NEOPLASM O              

      

 

                2020           RHETT MD, MARIELY N           Ot           

   Z86.73          

                          PRSNL HX OF TIA (TIA), AND CEREB INFRC W              

      

 

                2020           MARIELY DELANEY MD, Ot           

   Z89.431         

                          ACQUIRED ABSENCE OF RIGHT FOOT                    

 

             2020           MARIELY DELANEY MD           Ot           Z95

.5           

PRESENCE OF CORONARY ANGIOPLASTY IMPLANT                    

 

             2020           MARIELY DELANEY MD           Ot           A41

.9           

SEPSIS, UNSPECIFIED ORGANISM                     

 

                2020           MARIELY DELANEY MD           Ot           

   E11.65          

                          TYPE 2 DIABETES MELLITUS WITH HYPERGLYCE              

      

 

             2020           MARIELY DELANEY MD           Ot           E66

.9           

OBESITY, UNSPECIFIED                             

 

                2020           MARIELY DELANEY MD           Ot           

   E78.00          

                          PURE HYPERCHOLESTEROLEMIA, UNSPECIFIED                

    

 

                2020           MARIELY DELANEY MD           Ot           

   E83.42          

                          HYPOMAGNESEMIA                     

 

             2020           MARIELY DELANEY MD           Ot           E86

.0           

DEHYDRATION                                      

 

             2020           MARIELY DELANEY MD           Ot           E87

.5           

HYPERKALEMIA                                     

 

                2020           MARIELY DELANEY MD           Ot           

   F17.210         

                          NICOTINE DEPENDENCE, CIGARETTES, UNCOMPL              

      

 

             2020           MARIELY DELANEY MD           Ot           I07

.1           

RHEUMATIC TRICUSPID INSUFFICIENCY                    

 

             2020           MARIELY DELANEY MD           Ot           I12

.9           

HYPERTENSIVE CHRONIC KIDNEY DISEASE W ST                    

 

                2020           MARIELY DELANEY MD           Ot           

   I21.A1          

                          MYOCARDIAL INFARCTION TYPE 2                    

 

                2020           MARIELY DELANEY MD           Ot           

   I25.10          

                          ATHSCL HEART DISEASE OF NATIVE CORONARY               

      

 

                2020           MARIELY DELANEY MD           Ot           

   I45.10          

                          UNSPECIFIED RIGHT BUNDLE-BRANCH BLOCK                 

   

 

                2020           MARIELY DELANEY MD           Ot           

   I50.33          

                          ACUTE ON CHRONIC DIASTOLIC (CONGESTIVE)               

      

 

             2020           MARIELY DELANEY MD           Ot           I73

.9           

PERIPHERAL VASCULAR DISEASE, UNSPECIFIED                    

 

             2020           MARIELY DELANEY MD           Ot           J18

.9           

PNEUMONIA, UNSPECIFIED ORGANISM                    

 

             2020           MARIELY DELANEY MD           Ot           J44

.9           

CHRONIC OBSTRUCTIVE PULMONARY DISEASE, U                    

 

                2020           MARIELY DELANEY MD           Ot           

   J96.01          

                          ACUTE RESPIRATORY FAILURE WITH HYPOXIA                

    

 

                2020           MARIELY DELANEY MD           Ot           

   J96.02          

                          ACUTE RESPIRATORY FAILURE WITH HYPERCAPN              

      

 

                2020           MARIELY DELANEY MD, Ot           

   M19.91          

                          PRIMARY OSTEOARTHRITIS, UNSPECIFIED SITE              

      

 

             2020           MARIELY DELANEY MD, Ot           N17

.9           

ACUTE KIDNEY FAILURE, UNSPECIFIED                    

 

             2020           MARIELY DELANEY MD, Ot           N18

.9           

CHRONIC KIDNEY DISEASE, UNSPECIFIED                    

 

                2020           MARIELY DELANEY MD, Ot           

   R65.21          

                          SEVERE SEPSIS WITH SEPTIC SHOCK                    

 

                2020           MARIELY DELANEY MD, Ot           

   Z68.34          

                          BODY MASS INDEX (BMI) 34.0-34.9, ADULT                

    

 

                2020           MARIELY DELANEY MD, Ot           

   Z79.84          

                          LONG TERM (CURRENT) USE OF ORAL HYPOGLYC              

      

 

                2020           MARIELY DELANEY MD, Ot           

   Z85.46          

                          PERSONAL HISTORY OF MALIGNANT NEOPLASM O              

      

 

                2020           MARIELY DELANEY MD, Ot           

   Z86.73          

                          PRSNL HX OF TIA (TIA), AND CEREB INFRC W              

      

 

                2020           MARIELY DELANEY MD, Ot           

   Z89.431         

                          ACQUIRED ABSENCE OF RIGHT FOOT                    

 

             2020           MARIELY DELANEY MD           Ot           Z95

.5           

PRESENCE OF CORONARY ANGIOPLASTY IMPLANT                    

 

             2020           MARIELY DELANEY MD, Ot           A41

.9           

SEPSIS, UNSPECIFIED ORGANISM                     

 

                2020           MARIELY DELANEY MD, Ot           

   E11.65          

                          TYPE 2 DIABETES MELLITUS WITH HYPERGLYCE              

      

 

             2020           MARIELY DELANEY MD, Ot           E66

.9           

OBESITY, UNSPECIFIED                             

 

                2020           MARIELY DELANEY MD, Ot           

   E78.00          

                          PURE HYPERCHOLESTEROLEMIA, UNSPECIFIED                

    

 

                2020           MARIELY DELANEY MD           Ot           

   E83.42          

                          HYPOMAGNESEMIA                     

 

             2020           MARIELY DELANEY MD           Ot           E86

.0           

DEHYDRATION                                      

 

             2020           MARIELY DELANEY MD, Ot           E87

.5           

HYPERKALEMIA                                     

 

                2020           MARIELY DELANEY MD           Ot           

   F17.210         

                          NICOTINE DEPENDENCE, CIGARETTES, UNCOMPL              

      

 

             2020           MARIELY DELANEY MD, Ot           I07

.1           

RHEUMATIC TRICUSPID INSUFFICIENCY                    

 

             2020           MARIELY DELANEY MD           Ot           I12

.9           

HYPERTENSIVE CHRONIC KIDNEY DISEASE W ST                    

 

                2020           MARIELY DELANEY MD, Ot           

   I21.A1          

                          MYOCARDIAL INFARCTION TYPE 2                    

 

                2020           MARIELY DELANEY MD, Ot           

   I25.10          

                          ATHSCL HEART DISEASE OF NATIVE CORONARY               

      

 

                2020           MARIELY DELANEY MD, Ot           

   I45.10          

                          UNSPECIFIED RIGHT BUNDLE-BRANCH BLOCK                 

   

 

                2020           MARIELY DELANEY MD, Ot           

   I50.33          

                          ACUTE ON CHRONIC DIASTOLIC (CONGESTIVE)               

      

 

             2020           MARIELY DELANEY MD, Ot           I73

.9           

PERIPHERAL VASCULAR DISEASE, UNSPECIFIED                    

 

             2020           MARIELY DELANEY MD, Ot           J18

.9           

PNEUMONIA, UNSPECIFIED ORGANISM                    

 

             2020           MARIELY DELANEY MD, Ot           J44

.9           

CHRONIC OBSTRUCTIVE PULMONARY DISEASE, U                    

 

                2020           MARIELY DELANEY MD, Ot           

   J96.01          

                          ACUTE RESPIRATORY FAILURE WITH HYPOXIA                

    

 

                2020           MARIELY DELANEY MD, Ot           

   J96.02          

                          ACUTE RESPIRATORY FAILURE WITH HYPERCAPN              

      

 

                2020           MARIELY DELANEY MD, Ot           

   M19.91          

                          PRIMARY OSTEOARTHRITIS, UNSPECIFIED SITE              

      

 

             2020           MARIELY DELANEY MD, Ot           N17

.9           

ACUTE KIDNEY FAILURE, UNSPECIFIED                    

 

             2020           MARIELY DELANEY MD, Ot           N18

.9           

CHRONIC KIDNEY DISEASE, UNSPECIFIED                    

 

                2020           MARIELY DELANEY MD, Ot           

   R65.21          

                          SEVERE SEPSIS WITH SEPTIC SHOCK                    

 

                2020           MARIELY DELANEY MD, Ot           

   Z68.34          

                          BODY MASS INDEX (BMI) 34.0-34.9, ADULT                

    

 

                2020           MARIELY DELANEY MD, Ot           

   Z79.84          

                          LONG TERM (CURRENT) USE OF ORAL HYPOGLYC              

      

 

                2020           MARIELY DELANEY MD, Ot           

   Z85.46          

                          PERSONAL HISTORY OF MALIGNANT NEOPLASM O              

      

 

                2020           MARIELY DELANEY MD, Ot           

   Z86.73          

                          PRSNL HX OF TIA (TIA), AND CEREB INFRC W              

      

 

                2020           MARIELY DELANEY MD, Ot           

   Z89.431         

                          ACQUIRED ABSENCE OF RIGHT FOOT                    

 

             2020           MARIELY DELANEY MD, Ot           Z95

.5           

PRESENCE OF CORONARY ANGIOPLASTY IMPLANT                    

 

             2020           MARIELY DELANEY MD, Ot           A41

.9           

SEPSIS, UNSPECIFIED ORGANISM                     

 

                2020           RHETT MD, MARIELY N           Ot           

   E11.65          

                          TYPE 2 DIABETES MELLITUS WITH HYPERGLYCE              

      

 

             2020           MARIELY DELANEY MD           Ot           E66

.9           

OBESITY, UNSPECIFIED                             

 

                2020           MARIELY DELANEY MD           Ot           

   E78.00          

                          PURE HYPERCHOLESTEROLEMIA, UNSPECIFIED                

    

 

                2020           MARIELY DELANEY MD           Ot           

   E83.42          

                          HYPOMAGNESEMIA                     

 

             2020           MARIELY DELANEY MD           Ot           E86

.0           

DEHYDRATION                                      

 

             2020           MARIELY DELANEY MD           Ot           E87

.5           

HYPERKALEMIA                                     

 

                2020           MARIELY DELANEY MD           Ot           

   F17.210         

                          NICOTINE DEPENDENCE, CIGARETTES, UNCOMPL              

      

 

             2020           MARIELY DELANEY MD           Ot           I07

.1           

RHEUMATIC TRICUSPID INSUFFICIENCY                    

 

             2020           MARIELY DELANEY MD           Ot           I12

.9           

HYPERTENSIVE CHRONIC KIDNEY DISEASE W ST                    

 

                2020           MARIELY DELANEY MD           Ot           

   I21.A1          

                          MYOCARDIAL INFARCTION TYPE 2                    

 

                2020           MARIELY DELANEY MD           Ot           

   I25.10          

                          ATHSCL HEART DISEASE OF NATIVE CORONARY               

      

 

                2020           MARIELY DELANEY MD           Ot           

   I45.10          

                          UNSPECIFIED RIGHT BUNDLE-BRANCH BLOCK                 

   

 

                2020           MARIELY DELANEY MD           Ot           

   I50.33          

                          ACUTE ON CHRONIC DIASTOLIC (CONGESTIVE)               

      

 

             2020           MARIELY DELANEY MD           Ot           I73

.9           

PERIPHERAL VASCULAR DISEASE, UNSPECIFIED                    

 

             2020           MARIELY DELANEY MD           Ot           J18

.9           

PNEUMONIA, UNSPECIFIED ORGANISM                    

 

             2020           MARIELY DELANEY MD           Ot           J44

.9           

CHRONIC OBSTRUCTIVE PULMONARY DISEASE, U                    

 

                2020           MARIELY DELANEY MD           Ot           

   J96.01          

                          ACUTE RESPIRATORY FAILURE WITH HYPOXIA                

    

 

                2020           MARIELY DELANEY MD           Ot           

   J96.02          

                          ACUTE RESPIRATORY FAILURE WITH HYPERCAPN              

      

 

                2020           MARIELY DELANEY MD           Ot           

   M19.91          

                          PRIMARY OSTEOARTHRITIS, UNSPECIFIED SITE              

      

 

             2020           MARIELY DELANEY MD           Ot           N17

.9           

ACUTE KIDNEY FAILURE, UNSPECIFIED                    

 

             2020           MARIELY DELANEY MD           Ot           N18

.9           

CHRONIC KIDNEY DISEASE, UNSPECIFIED                    

 

                2020           MARIELY DELANEY MD           Ot           

   R65.21          

                          SEVERE SEPSIS WITH SEPTIC SHOCK                    

 

                2020           MARIELY DELANEY MD, Ot           

   Z68.34          

                          BODY MASS INDEX (BMI) 34.0-34.9, ADULT                

    

 

                2020           MARIELY DELANEY MD, Ot           

   Z79.84          

                          LONG TERM (CURRENT) USE OF ORAL HYPOGLYC              

      

 

                2020           MARIELY DELANEY MD, Ot           

   Z85.46          

                          PERSONAL HISTORY OF MALIGNANT NEOPLASM O              

      

 

                2020           MARIELY DELANEY MD, Ot           

   Z86.73          

                          PRSNL HX OF TIA (TIA), AND CEREB INFRC W              

      

 

                2020           MARIELY DELANEY MD, Ot           

   Z89.431         

                          ACQUIRED ABSENCE OF RIGHT FOOT                    

 

             2020           MARIELY DELANEY MD, Ot           Z95

.5           

PRESENCE OF CORONARY ANGIOPLASTY IMPLANT                    

 

             2020           MARIELY DELANEY MD, Ot           A41

.9           

SEPSIS, UNSPECIFIED ORGANISM                     

 

                2020           MARIELY DELANEY MD           Ot           

   E11.65          

                          TYPE 2 DIABETES MELLITUS WITH HYPERGLYCE              

      

 

             2020           MARIELY DELANEY MD           Ot           E66

.9           

OBESITY, UNSPECIFIED                             

 

                2020           MARIELY DELANEY MD           Ot           

   E78.00          

                          PURE HYPERCHOLESTEROLEMIA, UNSPECIFIED                

    

 

                2020           MARIELY DELANEY MD           Ot           

   E83.42          

                          HYPOMAGNESEMIA                     

 

             2020           MARIELY DELANEY MD           Ot           E86

.0           

DEHYDRATION                                      

 

             2020           MARIELY DELANEY MD           Ot           E87

.5           

HYPERKALEMIA                                     

 

                2020           MARIELY DELANEY MD           Ot           

   F17.210         

                          NICOTINE DEPENDENCE, CIGARETTES, UNCOMPL              

      

 

             2020           MARIELY DELANEY MD           Ot           I07

.1           

RHEUMATIC TRICUSPID INSUFFICIENCY                    

 

             2020           MARIELY DELANEY MD           Ot           I12

.9           

HYPERTENSIVE CHRONIC KIDNEY DISEASE W ST                    

 

                2020           MARIELY DELANEY MD, Ot           

   I21.A1          

                          MYOCARDIAL INFARCTION TYPE 2                    

 

                2020           MARIELY DELANEY MD, Ot           

   I25.10          

                          ATHSCL HEART DISEASE OF NATIVE CORONARY               

      

 

                2020           MARIELY DELANEY MD           Ot           

   I45.10          

                          UNSPECIFIED RIGHT BUNDLE-BRANCH BLOCK                 

   

 

                2020           MARIELY DELANEY MD           Ot           

   I50.33          

                          ACUTE ON CHRONIC DIASTOLIC (CONGESTIVE)               

      

 

             2020           MARIELY DELANEY MD, Ot           I73

.9           

PERIPHERAL VASCULAR DISEASE, UNSPECIFIED                    

 

             2020           MARIELY DELANEY MD, Ot           J18

.9           

PNEUMONIA, UNSPECIFIED ORGANISM                    

 

             2020           MARIELY DELANEY MD, Ot           J44

.9           

CHRONIC OBSTRUCTIVE PULMONARY DISEASE, U                    

 

                2020           MARIELY DELANEY MD, Ot           

   J96.01          

                          ACUTE RESPIRATORY FAILURE WITH HYPOXIA                

    

 

                2020           MARIELY DELANEY MD, Ot           

   J96.02          

                          ACUTE RESPIRATORY FAILURE WITH HYPERCAPN              

      

 

                2020           MARIELY DELANEY MD, Ot           

   M19.91          

                          PRIMARY OSTEOARTHRITIS, UNSPECIFIED SITE              

      

 

             2020           AMRIELY DELANEY MD, Ot           N17

.9           

ACUTE KIDNEY FAILURE, UNSPECIFIED                    

 

             2020           MARIELY DELANEY MD, Ot           N18

.9           

CHRONIC KIDNEY DISEASE, UNSPECIFIED                    

 

                2020           MARIELY DELANEY MD           Ot           

   R65.21          

                          SEVERE SEPSIS WITH SEPTIC SHOCK                    

 

                2020           MARIELY DELANEY MD, Ot           

   Z68.34          

                          BODY MASS INDEX (BMI) 34.0-34.9, ADULT                

    

 

                2020           MARIELY DELANEY MD           Ot           

   Z79.84          

                          LONG TERM (CURRENT) USE OF ORAL HYPOGLYC              

      

 

                2020           MARIELY DELANEY MD, Ot           

   Z85.46          

                          PERSONAL HISTORY OF MALIGNANT NEOPLASM O              

      

 

                2020           MARIELY DELANEY MD, Ot           

   Z86.73          

                          PRSNL HX OF TIA (TIA), AND CEREB INFRC W              

      

 

                2020           MARIELY DELANEY MD           Ot           

   Z89.431         

                          ACQUIRED ABSENCE OF RIGHT FOOT                    

 

             2020           MARIELY DELANEY MD           Ot           Z95

.5           

PRESENCE OF CORONARY ANGIOPLASTY IMPLANT                    

 

             2020           MARIELY DELANEY MD           Ot           A41

.9           

SEPSIS, UNSPECIFIED ORGANISM                     

 

                2020           MARIELY DELANEY MD           Ot           

   E11.65          

                          TYPE 2 DIABETES MELLITUS WITH HYPERGLYCE              

      

 

             2020           MARIELY DELANEY MD           Ot           E66

.9           

OBESITY, UNSPECIFIED                             

 

                2020           MARIELY DELANEY MD           Ot           

   E78.00          

                          PURE HYPERCHOLESTEROLEMIA, UNSPECIFIED                

    

 

                2020           MARIELY DELANEY MD           Ot           

   E83.39          

                          OTHER DISORDERS OF PHOSPHORUS METABOLISM              

      

 

                2020           MARIELY DELANEY MD           Ot           

   E83.42          

                          HYPOMAGNESEMIA                     

 

             2020           MARIELY DELANEY MD           Ot           E86

.0           

DEHYDRATION                                      

 

             2020           MARIELY DELANEY MD, Ot           E87

.5           

HYPERKALEMIA                                     

 

                2020           MARIELY DELANEY MD           Ot           

   F15.10          

                          OTHER STIMULANT ABUSE, UNCOMPLICATED                  

  

 

                2020           MARIELY DELANEY MD, Ot           

   F17.210         

                          NICOTINE DEPENDENCE, CIGARETTES, UNCOMPL              

      

 

             2020           MARIELY DELANEY MD, Ot           I07

.1           

RHEUMATIC TRICUSPID INSUFFICIENCY                    

 

             2020           MARIELY DELANEY MD           Ot           I13

.0           

HYP HRT   CHR KDNY DIS W HRT FAIL AND ST                    

 

                2020           MARIELY DELANEY MD, Ot           

   I21.A1          

                          MYOCARDIAL INFARCTION TYPE 2                    

 

                2020           MARIELY DELANEY MD, Ot           

   I25.10          

                          ATHSCL HEART DISEASE OF NATIVE CORONARY               

      

 

             2020           MARIELY DELANEY MD, Ot           I44

.1           

ATRIOVENTRICULAR BLOCK, SECOND DEGREE                    

 

                2020           MARIELY DELANEY MD, Ot           

   I45.10          

                          UNSPECIFIED RIGHT BUNDLE-BRANCH BLOCK                 

   

 

                2020           MARIELY DELANEY MD           Ot           

   I48.91          

                          UNSPECIFIED ATRIAL FIBRILLATION                    

 

                2020           MARIELY DELANEY MD           Ot           

   I50.33          

                          ACUTE ON CHRONIC DIASTOLIC (CONGESTIVE)               

      

 

             2020           MARIELY DELANEY MD, Ot           I73

.9           

PERIPHERAL VASCULAR DISEASE, UNSPECIFIED                    

 

                2020           MARIELY DELANEY MD, Ot           

   J15.212         

                          PNEUMONIA DUE TO METHICILLIN RESISTANT S              

      

 

             2020           MARIELY DELANEY MD, Ot           J44

.1           

CHRONIC OBSTRUCTIVE PULMONARY DISEASE W                     

 

                2020           MARIELY DELANEY MD           Ot           

   J96.01          

                          ACUTE RESPIRATORY FAILURE WITH HYPOXIA                

    

 

                2020           MARIELY DELANEY MD, Ot           

   J96.02          

                          ACUTE RESPIRATORY FAILURE WITH HYPERCAPN              

      

 

             2020           MARIELY DELANEY MD, Ot           K92

.2           

GASTROINTESTINAL HEMORRHAGE, UNSPECIFIED                    

 

                2020           MARIELY DELANEY MD, Ot           

   M19.91          

                          PRIMARY OSTEOARTHRITIS, UNSPECIFIED SITE              

      

 

             2020           MARIELY DELANEY MD           Ot           N17

.9           

ACUTE KIDNEY FAILURE, UNSPECIFIED                    

 

             2020           MARIELY DELANEY MD           Ot           N18

.9           

CHRONIC KIDNEY DISEASE, UNSPECIFIED                    

 

                2020           MARIELY DELANEY MD, Ot           

   R65.21          

                          SEVERE SEPSIS WITH SEPTIC SHOCK                    

 

                2020           MARIELY DELANEY MD, Ot           

   Z20.828         

                          CONTACT W AND EXPOSURE TO OTH VIRAL COMM              

      

 

                2020           MARIELY DELANEY MD, Ot           

   Z89.431         

                          ACQUIRED ABSENCE OF RIGHT FOOT                    

 

                2020           YULISA DENNIS MD, Ot           

   E11.42          

                          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              

      

 

                2020           YULISA DENNIS MD, Ot           

   E11.51          

                          TYPE 2 DIABETES W DIABETIC PERIPHERAL AN              

      

 

                2020           YULISA DENNIS MD, Ot           

   E11.621         

                          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              

      

 

             2020           YULISA DENNIS MD, Ot           I10

           

ESSENTIAL (PRIMARY) HYPERTENSION                    

 

                2020           YULISA DENNIS MD, Ot           

   I25.10          

                          ATHSCL HEART DISEASE OF NATIVE CORONARY               

      

 

             2020           YULISA DENNIS MD, Ot           I47

.1           

SUPRAVENTRICULAR TACHYCARDIA                     

 

                2020           YULISA DENNIS MD, Ot           

   I70.234         

                          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              

      

 

             2020           YULISA DENNIS MD, Ot           J44

.9           

CHRONIC OBSTRUCTIVE PULMONARY DISEASE, U                    

 

                2020           YULISA DENNIS MD, Ot           

   L97.412         

                          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              

      

 

                2020           YULISA DENNIS MD, Ot           

   M19.91          

                          PRIMARY OSTEOARTHRITIS, UNSPECIFIED SITE              

      

 

                2020           YULISA DENNIS MD, Ot           

   T81.31XA        

                          DISRUPTION OF EXTERNAL OPERATION (SURGIC              

      

 

                2020           YULISA DENNIS MD, Ot           

   Z85.46          

                          PERSONAL HISTORY OF MALIGNANT NEOPLASM O              

      

 

                2020           YULISA DENNIS MD, Ot           

   Z89.411         

                          ACQUIRED ABSENCE OF RIGHT GREAT TOE                   

 

 

                2020           YULISA DENNIS MD, Ot           

   Z92.21          

                          PERSONAL HISTORY OF ANTINEOPLASTIC CHEMO              

      

 

             2020           YULISA DENNIS MD, Ot           Z95

.5           

PRESENCE OF CORONARY ANGIOPLASTY IMPLANT                    

 

                2020           YULISA DENNIS MD, Ot           

   E11.42          

                          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              

      

 

                2020           YULISA DENNIS MD, Ot           

   E11.51          

                          TYPE 2 DIABETES W DIABETIC PERIPHERAL AN              

      

 

                2020           YULISA DENNIS MD, Ot           

   E11.621         

                          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              

      

 

             2020           YULISA DENNIS MD, Ot           E78

.5           

HYPERLIPIDEMIA, UNSPECIFIED                      

 

             2020           YULISA DENNIS MD, Ot           I10

           

ESSENTIAL (PRIMARY) HYPERTENSION                    

 

                2020           YULISA DENNIS MD, Ot           

   I25.10          

                          ATHSCL HEART DISEASE OF NATIVE CORONARY               

      

 

             2020           YULISA DENNIS MD, Ot           I47

.1           

SUPRAVENTRICULAR TACHYCARDIA                     

 

                2020           YULISA DENNIS MD, Ot           

   I70.234         

                          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              

      

 

             2020           YULISA DENNIS MD, Ot           J44

.9           

CHRONIC OBSTRUCTIVE PULMONARY DISEASE, U                    

 

                2020           YULISA DENNIS MD, Ot           

   L97.412         

                          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              

      

 

                2020           YULISA DENNIS MD, Ot           

   M19.91          

                          PRIMARY OSTEOARTHRITIS, UNSPECIFIED SITE              

      

 

                2020           YULISA DENNIS MD, Ot           

   T81.31XA        

                          DISRUPTION OF EXTERNAL OPERATION (SURGIC              

      

 

                2020           YULISA DENNIS MD, Ot           

   Z85.46          

                          PERSONAL HISTORY OF MALIGNANT NEOPLASM O              

      

 

                2020           YULISA DENNIS MD, Ot           

   Z87.891         

                          PERSONAL HISTORY OF NICOTINE DEPENDENCE               

     

 

                2020           YULISA DENNIS MD, Ot           

   Z89.411         

                          ACQUIRED ABSENCE OF RIGHT GREAT TOE                   

 

 

                2020           YULISA DENNIS MD, Ot           

   Z92.21          

                          PERSONAL HISTORY OF ANTINEOPLASTIC CHEMO              

      

 

             2020           YULISA DENNIS MD, Ot           Z95

.5           

PRESENCE OF CORONARY ANGIOPLASTY IMPLANT                    



                                                                                
                                                                                
                                                                                
                                                                                
                                                                                
                                                                                
                                                                                
                                                                                
                                                                                
                                                                                
                                                                                
                                                                                
                                                                                
                                                                                
                                                                                
                                                                                
                                                                                
                                                                                
                                                                                
                                                                                
                                                                                
                                                                                
                                                                                
                                                                                
                                                                                
                                                                                
                                                                                
                                                                                
                                                                                
                                                                                
                                                                                
                                                                                
                                                                                
                                                                                
                                                                                
                                                                                
                                                                                
                                                                                
                                                                                
                                                                                
                                                                                
                                                                                
                                                                                
                                                                                
                                                                                
                                                                                
                                                                                
                                                                                
                                                                                
                                                                                
                                



Procedures

      



                Code            Description           Performed By           Per

larry On        

 

                                      17659                                 A1C 

(IN-HOUSE)                

                                                    01/10/2013        

 

                                      15076                                 URIN

E DRUG SCREEN  (IN-HOUSE) 

                                                    01/10/2013        

 

                                      [id number]                                 MICR

O ALBUMIN-IN HOUSE        

                                                    01/10/2013        

 

                                      [id number]                                 MICR

OALBUMIN                  

                                                    2013        

 

                                      [id number]                                  C

AROTID DOPPLER            

                                                    2013        

 

                                      81609                                 A1C 

(IN-HOUSE)                

                                                    2013        

 

                                      08485                                 A1C 

(IN-HOUSE)                

                                                    10/22/2013        

 

                                      14024                                 ROUT

INE VENIPUNCTURE          

                                                    2014        

 

                                      [id number]                                 URIN

E DRUG SCREEN  (IN-HOUSE) 

                                                    2014        

 

                                      16115                                 A1C 

(IN-HOUSE)                

                                                    [id number]                                 GF

R CALC (RESULT ONLY)      

                                                    2014        

 

                                24510                                 CMP       

                    

                                        2014        

 

                                      [id number]                                 LIPI

D PANEL                   

                                                    201452                                 INJ 

TRIGGER POINT 1/2 MUSCL   

                                                    2014        

 

                                      28007                                 A1C 

(IN-HOUSE)                

                                                    2014        

 

                                      [id number]                                 MICR

O ALBUMIN-IN HOUSE        

                                                    2014        

 

                                34089                                 BMP       

                    

                                        2014        

 

                                      [id number]                                 GF

R CALC (RESULT ONLY)      

                                                    2014        

 

                                      26205                                 A1C 

(IN-HOUSE)                

                                                    2014        

 

                                      83773                                 A1C 

(IN-HOUSE)                

                                                    2014        

 

                                37061                                 AMERITOX  

                    

                                        2014        

 

                                      92584                                 A1C 

(IN-HOUSE)                

                                                    2014        

 

                                      [id number]                                 EX

CISION OF RIGHT FOOT 

TENDON, OPEN APPR                                               2019      

  

 

                                      [id number]                                 EX

CISION OF RIGHT FOOT 

TENDON, OPEN APPR                                               2020      

  

 

                                      [id number]                                 EX

CISION OF RIGHT FOOT 

TENDON, OPEN APPR                                               2020      

  

 

                                      [id number]                                 IN

SERTION OF ENDOTRACHEAL 

AIRWAY INTO TR                                               2020        

 

                                      [id number]                                 RE

SPIRATORY VENTILATION, 24-

96 CONSECUTI                                               2020        

 

                                      [id number]                                 RE

SPIRATORY VENTILATION, 

GREATER THAN 96                                               2020        

 

                                      [id number]                                 RE

STORATION OF CARDIAC 

RHYTHM, SINGLE                                               2020        



                                                                          



Results

      



                    Test                Result              Range        

 

                                        Prothrombin Time (PT) - 16 10:45  

       

 

                    INR                 0.9                 0.8-1.2        

 

                    Prothrombin Time           10.0 sec            9.1-12.0     

   

 

                                        Comp. Metabolic Panel (14) - 16 10

:45         

 

                    Glucose, Serum           153 mg/dL           65-99        

 

                    BUN                 34 mg/dL            8-27        

 

                    Creatinine, Serum           1.19 mg/dL           0.76-1.27  

      

 

                    eGFR If NonAfricn Am           63 mL/min/1.73               

>59        

 

                    eGFR If Africn Am           73 mL/min/1.73               >59

        

 

                    BUN/Creatinine Ratio           29                  10-22    

    

 

                    Sodium, Serum           142 mmol/L           136-144        

 

                    Potassium, Serum           4.8 mmol/L           3.5-5.2     

   

 

                    Chloride, Serum           100 mmol/L                  

 

 

                    Carbon Dioxide, Total           26 mmol/L           18-29   

     

 

                    Calcium, Serum           9.4 mg/dL           8.6-10.2       

 

 

                    Protein, Total, Serum           7.5 g/dL            6.0-8.5 

       

 

                    Albumin, Serum           4.6 g/dL            3.6-4.8        

 

                    Globulin, Total           2.9 g/dL            1.5-4.5       

 

 

                    A/G Ratio           1.6                 1.1-2.5        

 

                    Bilirubin, Total           0.2 mg/dL           0.0-1.2      

  

 

                    Alkaline Phosphatase, S           88 IU/L             

        

 

                    AST (SGOT)           22 IU/L             0-40        

 

                    ALT (SGPT)           18 IU/L             0-44        

 

                                        Lipid Panel - 16 10:45         

 

                    Cholesterol, Total           193 mg/dL           100-199    

    

 

                    Triglycerides           283 mg/dL           0-149        

 

                    HDL Cholesterol           38 mg/dL            >39        

 

                    VLDL Cholesterol Julio           57 mg/dL            5-40     

   

 

                    LDL Cholesterol Calc           98 mg/dL            0-99     

   

 

                                        C-Reactive Protein, Cardiac - 16 1

0:45         

 

                    C-Reactive Protein, Cardiac           5.19 mg/L           0.

00-3.00        

 

                                        Magnesium, Serum - 16 10:45       

  

 

                    Magnesium, Serum           1.8 mg/dL           1.6-2.3      

  

 

                                        Automated blood complete blood count (he

mogram) panel - 17 08:11         

 

                          Blood leukocytes automated count (number/volume)      

     10.9 10*3/uL         

                                        4.3-11.0        

 

                          Blood erythrocytes automated count (number/volume)    

       4.17 10*6/uL       

                                        4.35-5.85        

 

                    Venous blood hemoglobin measurement (mass/volume)           

13.5 g/dL           

13.3-17.7        

 

                    Blood hematocrit (volume fraction)           41 %           

     40-54        

 

                    Automated erythrocyte mean corpuscular volume           97 [

foz_us]           

80-99        

 

                                        Automated erythrocyte mean corpuscular h

emoglobin (mass per erythrocyte)        

                          32 pg                     25-34        

 

                                        Automated erythrocyte mean corpuscular h

emoglobin concentration measurement 

(mass/volume)             33 g/dL                   32-36        

 

                    Automated erythrocyte distribution width ratio           14.

4 %              10.0-

14.5        

 

                    Automated blood platelet count (count/volume)           135 

10*3/uL           

130-400        

 

                          Automated blood platelet mean volume measurement      

     11.9 [foz_us]        

                                        7.4-10.4        

 

                                        PT panel in platelet poor plasma by coag

ulation assay - 17 08:11         

 

                          Prothrombin time (PT) in platelet poor plasma by coagu

lation assay           

11.6 s                                  12.2-14.7        

 

                          INR in platelet poor plasma or blood by coagulation as

say           0.9         

                                        0.8-1.4        

 

                                        Activated partial thromboplastin time (a

PTT) in platelet poor plasma 

bycoagulation assay - 17 08:11         

 

                                        Activated partial thromboplastin time (a

PTT) in platelet poor plasma 

bycoagulation assay           27 s                      24-35        

 

                                        Comprehensive metabolic panel - 17

 08:11         

 

                          Serum or plasma sodium measurement (moles/volume)     

      136 mmol/L          

                                        135-145        

 

                          Serum or plasma potassium measurement (moles/volume)  

         4.2 mmol/L       

                                        3.6-5.0        

 

                          Serum or plasma chloride measurement (moles/volume)   

        98 mmol/L         

                                                

 

                    Carbon dioxide           25 mmol/L           21-32        

 

                          Serum or plasma anion gap determination (moles/volume)

           13 mmol/L      

                                        5-14        

 

                          Serum or plasma urea nitrogen measurement (mass/volume

)           29 mg/dL      

                                        7-18        

 

                          Serum or plasma creatinine measurement (mass/volume)  

         1.29 mg/dL       

                                        0.60-1.30        

 

                    Serum or plasma urea nitrogen/creatinine mass ratio         

  22                  NRG 

       

 

                                        Serum or plasma creatinine measurement w

ith calculation of estimated glomerular 

filtration rate           56                        NRG        

 

                    Serum or plasma glucose measurement (mass/volume)           

154 mg/dL           

        

 

                    Serum or plasma calcium measurement (mass/volume)           

9.4 mg/dL           

8.5-10.1        

 

                          Serum or plasma total bilirubin measurement (mass/volu

me)           0.3 mg/dL   

                                        0.1-1.0        

 

                                        Serum or plasma alkaline phosphatase juarez

surement (enzymatic activity/volume)    

                          111 U/L                           

 

                                        Serum or plasma aspartate aminotransfera

se measurement (enzymatic 

activity/volume)           17 U/L                    5-34        

 

                                        Serum or plasma alanine aminotransferase

 measurement (enzymatic activity/volume)

                          17 U/L                    0-55        

 

                    Serum or plasma protein measurement (mass/volume)           

7.6 g/dL            

6.4-8.2        

 

                    Serum or plasma albumin measurement (mass/volume)           

4.1 g/dL            

3.2-4.5        

 

                                        Lipid 1996 panel - 17 08:11       

  

 

                          Serum or plasma triglyceride measurement (mass/volume)

           769 mg/dL      

                                        <150        

 

                          Serum or plasma cholesterol measurement (mass/volume) 

          160 mg/dL       

                                        < 200        

 

                          Serum or plasma cholesterol in HDL measurement (mass/v

olume)           22 mg/dL 

                                        40-60        

 

                          Cholesterol in LDL [mass/volume] in serum or plasma by

 direct assay           41

 mg/dL                                  1-129        

 

                          Serum or plasma cholesterol in VLDL measurement (mass/

volume)           154 

mg/dL                                   5-40        

 

                                        Methicillin resistant Staphylococcus aur

eus (MRSA) screening culture - 17 

08:11         

 

                          Methicillin resistant Staphylococcus aureus (MRSA) scr

eening culture           

NEG                                     NRG        

 

                                        Complete blood count (CBC) with automate

d white blood cell (WBC) differential - 

17 10:08         

 

                          Blood leukocytes automated count (number/volume)      

     9.8 10*3/uL          

                                        4.3-11.0        

 

                          Blood erythrocytes automated count (number/volume)    

       3.44 10*6/uL       

                                        4.35-5.85        

 

                    Venous blood hemoglobin measurement (mass/volume)           

11.2 g/dL           

13.3-17.7        

 

                    Blood hematocrit (volume fraction)           35 %           

     40-54        

 

                    Automated erythrocyte mean corpuscular volume           103 

[foz_us]           

80-99        

 

                                        Automated erythrocyte mean corpuscular h

emoglobin (mass per erythrocyte)        

                          33 pg                     25-34        

 

                                        Automated erythrocyte mean corpuscular h

emoglobin concentration measurement 

(mass/volume)             32 g/dL                   32-36        

 

                    Automated erythrocyte distribution width ratio           15.

1 %              10.0-

14.5        

 

                    Automated blood platelet count (count/volume)           147 

10*3/uL           

130-400        

 

                          Automated blood platelet mean volume measurement      

     11.3 [foz_us]        

                                        7.4-10.4        

 

                    Automated blood neutrophils/100 leukocytes           73 %   

             42-75       

 

 

                    Automated blood lymphocytes/100 leukocytes           17 %   

             12-44       

 

 

                    Blood monocytes/100 leukocytes           6 %                

 0-12        

 

                    Automated blood eosinophils/100 leukocytes           3 %    

             0-10        

 

                    Automated blood basophils/100 leukocytes           1 %      

           0-10        

 

                    Blood neutrophils automated count (number/volume)           

7.2 10*3            

1.8-7.8        

 

                    Blood lymphocytes automated count (number/volume)           

1.7 10*3            

1.0-4.0        

 

                    Blood monocytes automated count (number/volume)           0.

6 10*3            

0.0-1.0        

 

                    Automated eosinophil count           0.3 10*3/uL           0

.0-0.3        

 

                    Automated blood basophil count (count/volume)           0.1 

10*3/uL           

0.0-0.1        

 

                                        Comprehensive metabolic panel - 17

 10:08         

 

                          Serum or plasma sodium measurement (moles/volume)     

      135 mmol/L          

                                        135-145        

 

                          Serum or plasma potassium measurement (moles/volume)  

         6.3 mmol/L       

                                        3.6-5.0        

 

                          Serum or plasma chloride measurement (moles/volume)   

        103 mmol/L        

                                                

 

                    Carbon dioxide           20 mmol/L           21-32        

 

                          Serum or plasma anion gap determination (moles/volume)

           12 mmol/L      

                                        5-14        

 

                          Serum or plasma urea nitrogen measurement (mass/volume

)           71 mg/dL      

                                        7-18        

 

                          Serum or plasma creatinine measurement (mass/volume)  

         6.62 mg/dL       

                                        0.60-1.30        

 

                    Serum or plasma urea nitrogen/creatinine mass ratio         

  11                  NRG 

       

 

                                        Serum or plasma creatinine measurement w

ith calculation of estimated glomerular 

filtration rate           8                         NRG        

 

                    Serum or plasma glucose measurement (mass/volume)           

213 mg/dL           

        

 

                    Serum or plasma calcium measurement (mass/volume)           

8.3 mg/dL           

8.5-10.1        

 

                          Serum or plasma total bilirubin measurement (mass/volu

me)           0.4 mg/dL   

                                        0.1-1.0        

 

                                        Serum or plasma alkaline phosphatase juarez

surement (enzymatic activity/volume)    

                          83 U/L                            

 

                                        Serum or plasma aspartate aminotransfera

se measurement (enzymatic 

activity/volume)           16 U/L                    5-34        

 

                                        Serum or plasma alanine aminotransferase

 measurement (enzymatic activity/volume)

                          16 U/L                    0-55        

 

                    Serum or plasma protein measurement (mass/volume)           

7.6 g/dL            

6.4-8.2        

 

                    Serum or plasma albumin measurement (mass/volume)           

3.9 g/dL            

3.2-4.5        

 

                                        Magnesium - 17 10:08         

 

                    Magnesium           2.1 mg/dL           1.8-2.4        

 

                                        PT panel in platelet poor plasma by coag

ulation assay - 17 10:08         

 

                          Prothrombin time (PT) in platelet poor plasma by coagu

lation assay           

13.1 s                                  12.2-14.7        

 

                          INR in platelet poor plasma or blood by coagulation as

say           1.0         

                                        0.8-1.4        

 

                                        Activated partial thromboplastin time (a

PTT) in platelet poor plasma 

bycoagulation assay - 17 10:08         

 

                                        Activated partial thromboplastin time (a

PTT) in platelet poor plasma 

bycoagulation assay           28 s                      24-35        

 

                                        Serum or plasma troponin i.cardiac measu

rement (mass/volume) - 17 10:08   

      

 

                          Serum or plasma troponin i.cardiac measurement (mass/v

olume)           < ng/mL  

                                        <0.30        

 

                                        Complete urinalysis with reflex to cultu

re - 17 12:34         

 

                    Urine color determination           YELLOW              NRG 

       

 

                    Urine clarity determination           CLEAR               NR

G        

 

                    Urine pH measurement by test strip           5              

     5-9        

 

                    Specific gravity of urine by test strip           1.020     

          1.016-1.022  

      

 

                    Urine protein assay by test strip, semi-quantitative        

   2+                  

NEGATIVE        

 

                    Urine glucose detection by automated test strip           NE

GATIVE            

NEGATIVE        

 

                          Erythrocytes detection in urine sediment by light micr

oscopy           2+       

                                        NEGATIVE        

 

                    Urine ketones detection by automated test strip           NE

GATIVE            

NEGATIVE        

 

                    Urine nitrite detection by test strip           NEGATIVE    

        NEGATIVE    

    

 

                    Urine total bilirubin detection by test strip           NEGA

TIVE            

NEGATIVE        

 

                          Urine urobilinogen measurement by automated test strip

 (mass/volume)           

NORMAL                                  NORMAL        

 

                    Urine leukocyte esterase detection by dipstick           1+ 

                 NEGATIVE 

       

 

                                        Automated urine sediment erythrocyte cou

nt by microscopy (number/high power 

field)                    RARE                      NRG        

 

                                        Automated urine sediment leukocyte count

 by microscopy (number/high power field)

                           [HPF]                    NRG        

 

                          Bacteria detection in urine sediment by light microsco

py           NEGATIVE     

                                        NRG        

 

                                        Squamous epithelial cells detection in u

rine sediment by light microscopy       

                          NONE                      NRG        

 

                          Crystals detection in urine sediment by light microsco

py           NONE         

                                        NRG        

 

                    Casts detection in urine sediment by light microscopy       

    NONE                

NRG        

 

                          Mucus detection in urine sediment by light microscopy 

          NEGATIVE        

                                        NRG        

 

                    Complete urinalysis with reflex to culture           YES    

             NRG        

 

                                        Bacterial urine culture - 17 12:34

         

 

                    Bacterial urine culture           NG                  NRG   

     

 

                                        PDM - AMPHETAMINES W/ REFLEX d/l ISOMERS

 - 18 14:55         

 

                    Prescribed Drug 1           Oxycodone            NRG        

 

                    COMMENT                                 NRG        

 

                    Amphetamine           NEGATIVE ng/mL           <250        

 

                    medMATCH Amphetamine           CONSISTENT            NRG    

    

 

                    Methamphetamine           NEGATIVE ng/mL           <250     

   

 

                    medMATCH Methamphetamine           CONSISTENT            NRG

        

 

                                        CBC - 18 11:54         

 

                    WHITE BLOOD CELL COUNT           10.0 Thousand/uL           

3.8-10.8        

 

                    RED BLOOD CELL COUNT           3.77 Million/uL           4.2

0-5.80        

 

                    HEMOGLOBIN           12.4 g/dL           13.2-17.1        

 

                    HEMATOCRIT           35.6 %              38.5-50.0        

 

                    MCV                 94.4 fL             80.0-100.0        

 

                    MCH                 32.9 pg             27.0-33.0        

 

                    MCHC                34.8 g/dL           32.0-36.0        

 

                    RDW                 14.3 %              11.0-15.0        

 

                    PLATELET COUNT           117 Thousand/uL           140-400  

      

 

                    MPV                 13.6 fL             7.5-12.5        

 

                    ABSOLUTE NEUTROPHILS           6720 cells/uL           1500-

7800        

 

                    ABSOLUTE LYMPHOCYTES           2470 cells/uL           850-3

900        

 

                    ABSOLUTE MONOCYTES           510 cells/uL           200-950 

       

 

                    ABSOLUTE EOSINOPHILS           210 cells/uL           

        

 

                    ABSOLUTE BASOPHILS           90 cells/uL           0-200    

    

 

                    NEUTROPHILS           67.2 %              NRG        

 

                    LYMPHOCYTES           24.7 %              NRG        

 

                    MONOCYTES           5.1 %               NRG        

 

                    EOSINOPHILS           2.1 %               NRG        

 

                    BASOPHILS           0.9 %               NRG        

 

                                        Complete blood count (CBC) with automate

d white blood cell (WBC) differential - 

19 11:56         

 

                          Blood leukocytes automated count (number/volume)      

     14.6 10*3/uL         

                                        4.3-11.0        

 

                          Blood erythrocytes automated count (number/volume)    

       3.92 10*6/uL       

                                        4.35-5.85        

 

                    Venous blood hemoglobin measurement (mass/volume)           

12.6 g/dL           

13.3-17.7        

 

                    Blood hematocrit (volume fraction)           40 %           

     40-54        

 

                    Automated erythrocyte mean corpuscular volume           101 

[foz_us]           

80-99        

 

                                        Automated erythrocyte mean corpuscular h

emoglobin (mass per erythrocyte)        

                          32 pg                     25-34        

 

                                        Automated erythrocyte mean corpuscular h

emoglobin concentration measurement 

(mass/volume)             32 g/dL                   32-36        

 

                    Automated erythrocyte distribution width ratio           16.

8 %              10.0-

14.5        

 

                    Automated blood platelet count (count/volume)           120 

10*3/uL           

130-400        

 

                          Automated blood platelet mean volume measurement      

     12.8 [foz_us]        

                                        7.4-10.4        

 

                    Automated blood neutrophils/100 leukocytes           81 %   

             42-75       

 

 

                    Automated blood lymphocytes/100 leukocytes           11 %   

             12-44       

 

 

                    Blood monocytes/100 leukocytes           6 %                

 0-12        

 

                    Automated blood eosinophils/100 leukocytes           1 %    

             0-10        

 

                    Automated blood basophils/100 leukocytes           1 %      

           0-10        

 

                    Blood neutrophils automated count (number/volume)           

11.8 10*3           

1.8-7.8        

 

                    Blood lymphocytes automated count (number/volume)           

1.6 10*3            

1.0-4.0        

 

                    Blood monocytes automated count (number/volume)           0.

8 10*3            

0.0-1.0        

 

                    Automated eosinophil count           0.2 10*3/uL           0

.0-0.3        

 

                    Automated blood basophil count (count/volume)           0.1 

10*3/uL           

0.0-0.1        

 

                                        Manual absolute plasma cell count -  11:56         

 

                    Blood monocytes/100 leukocytes           3 %                

 NRG        

 

                    Manual blood segmented neutrophils/100 leukocytes           

65 %                NRG  

      

 

                    Blood band neutrophils/100 leukocytes           17 %        

        NRG        

 

                    Manual blood lymphocytes/100 leukocytes           15 %      

          NRG        

 

                    Blood erythrocyte morphology finding identification         

  NORMAL              

NRG        

 

                                        Comprehensive metabolic panel - 19

 11:56         

 

                          Serum or plasma sodium measurement (moles/volume)     

      141 mmol/L          

                                        135-145        

 

                          Serum or plasma potassium measurement (moles/volume)  

         4.7 mmol/L       

                                        3.6-5.0        

 

                          Serum or plasma chloride measurement (moles/volume)   

        100 mmol/L        

                                                

 

                    Carbon dioxide           26 mmol/L           21-32        

 

                          Serum or plasma anion gap determination (moles/volume)

           15 mmol/L      

                                        5-14        

 

                          Serum or plasma urea nitrogen measurement (mass/volume

)           21 mg/dL      

                                        7-18        

 

                          Serum or plasma creatinine measurement (mass/volume)  

         0.96 mg/dL       

                                        0.60-1.30        

 

                    Serum or plasma urea nitrogen/creatinine mass ratio         

  22                  NRG 

       

 

                                        Serum or plasma creatinine measurement w

ith calculation of estimated glomerular 

filtration rate           >                         NRG        

 

                    Serum or plasma glucose measurement (mass/volume)           

192 mg/dL           

        

 

                    Serum or plasma calcium measurement (mass/volume)           

9.0 mg/dL           

8.5-10.1        

 

                          Serum or plasma total bilirubin measurement (mass/volu

me)           0.3 mg/dL   

                                        0.1-1.0        

 

                                        Serum or plasma alkaline phosphatase juarez

surement (enzymatic activity/volume)    

                          92 U/L                            

 

                                        Serum or plasma aspartate aminotransfera

se measurement (enzymatic 

activity/volume)           23 U/L                    5-34        

 

                                        Serum or plasma alanine aminotransferase

 measurement (enzymatic activity/volume)

                          16 U/L                    0-55        

 

                    Serum or plasma protein measurement (mass/volume)           

7.6 g/dL            

6.4-8.2        

 

                    Serum or plasma albumin measurement (mass/volume)           

3.7 g/dL            

3.2-4.5        

 

                    CALCIUM CORRECTED           9.2 mg/dL           8.5-10.1    

    

 

                                        Gram stain microscopy - 19 11:59  

       

 

                    Gram stain microscopy           Many Gram positive cocci in 

clusters            

NRG        

 

                                        Bacteria identification in wound by cult

ure - 19 11:59         

 

                    Bacteria identification in wound by culture           [id number]

[id number]            NRG   

     

 

                    FREE TEXT EXTERNAL           SUSCEPTIBILITY REPORTED  12

:15            NRG  

      

 

                    QUANTITY OF GROWTH           Many                NRG        

 

                    FREE TEXT ENTRY 2           ID REPORTED 19 16:05       

     NRG        

 

                                        Dirithromycin susceptibility test by dis

k diffusion - 19 11:59         

 

                          Oxacillin susceptibility test by minimum inhibitory co

ncentration           >   

                                        NRG        

 

                          Clindamycin susceptibility test by minimum inhibitory 

concentration           <=

                                        NRG        

 

                          Erythromycin susceptibility test by minimum inhibitory

 concentration           >

                                        NRG        

 

                                        Trimethoprim/sulfamethoxazole susceptibi

lity test by minimum 

inhibitoryconcentration           <=                        NRG        

 

                          Vancomycin susceptibility test by minimum inhibitory c

oncentration           1  

                                        NRG        

 

                          Levofloxacin susceptibility test by minimum inhibitory

 concentration           

<=                                      NRG        

 

                          Rifampin susceptibility test by minimum inhibitory con

centration           <=   

                                        NRG        

 

                          Cefazolin susceptibility test by minimum inhibitory co

ncentration           >   

                                        NRG        

 

                          Linezolid susceptibility test by minimum inhibitory co

ncentration           2   

                                        NRG        

 

                          Penicillin G susceptibility test by minimum inhibitory

 concentration           >

                                        NRG        

 

                          Moxifloxacin susceptibility test by minimum inhibitory

 concentration           

<=                                      NRG        

 

                    Minocycline susc KODI           <=                  NRG      

  

 

                                        Capillary blood glucose measurement by g

lucometer (mass/volume) - 19 21:22

         

 

                          Capillary blood glucose measurement by glucometer (mas

s/volume)           156 

mg/dL                                           

 

                                        Capillary blood glucose measurement by g

lucometer (mass/volume) - 19 06:07

         

 

                          Capillary blood glucose measurement by glucometer (mas

s/volume)           195 

mg/dL                                           

 

                                        Complete blood count (CBC) with automate

d white blood cell (WBC) differential - 

19 09:55         

 

                          Blood leukocytes automated count (number/volume)      

     12.5 10*3/uL         

                                        4.3-11.0        

 

                          Blood erythrocytes automated count (number/volume)    

       3.51 10*6/uL       

                                        4.35-5.85        

 

                    Venous blood hemoglobin measurement (mass/volume)           

11.1 g/dL           

13.3-17.7        

 

                    Blood hematocrit (volume fraction)           36 %           

     40-54        

 

                    Automated erythrocyte mean corpuscular volume           101 

[foz_us]           

80-99        

 

                                        Automated erythrocyte mean corpuscular h

emoglobin (mass per erythrocyte)        

                          32 pg                     25-34        

 

                                        Automated erythrocyte mean corpuscular h

emoglobin concentration measurement 

(mass/volume)             31 g/dL                   32-36        

 

                    Automated erythrocyte distribution width ratio           16.

5 %              10.0-

14.5        

 

                    Automated blood platelet count (count/volume)           106 

10*3/uL           

130-400        

 

                          Automated blood platelet mean volume measurement      

     12.5 [foz_us]        

                                        7.4-10.4        

 

                    Automated blood neutrophils/100 leukocytes           83 %   

             42-75       

 

 

                    Automated blood lymphocytes/100 leukocytes           11 %   

             12-44       

 

 

                    Blood monocytes/100 leukocytes           5 %                

 0-12        

 

                    Automated blood eosinophils/100 leukocytes           1 %    

             0-10        

 

                    Automated blood basophils/100 leukocytes           0 %      

           0-10        

 

                    Blood neutrophils automated count (number/volume)           

10.3 10*3           

1.8-7.8        

 

                    Blood lymphocytes automated count (number/volume)           

1.4 10*3            

1.0-4.0        

 

                    Blood monocytes automated count (number/volume)           0.

6 10*3            

0.0-1.0        

 

                    Automated eosinophil count           0.1 10*3/uL           0

.0-0.3        

 

                    Automated blood basophil count (count/volume)           0.0 

10*3/uL           

0.0-0.1        

 

                                        Comprehensive metabolic panel - 19

 09:55         

 

                          Serum or plasma sodium measurement (moles/volume)     

      135 mmol/L          

                                        135-145        

 

                          Serum or plasma potassium measurement (moles/volume)  

         4.4 mmol/L       

                                        3.6-5.0        

 

                          Serum or plasma chloride measurement (moles/volume)   

        100 mmol/L        

                                                

 

                    Carbon dioxide           26 mmol/L           21-32        

 

                          Serum or plasma anion gap determination (moles/volume)

           9 mmol/L       

                                        5-14        

 

                          Serum or plasma urea nitrogen measurement (mass/volume

)           21 mg/dL      

                                        7-18        

 

                          Serum or plasma creatinine measurement (mass/volume)  

         1.11 mg/dL       

                                        0.60-1.30        

 

                    Serum or plasma urea nitrogen/creatinine mass ratio         

  19                  NRG 

       

 

                                        Serum or plasma creatinine measurement w

ith calculation of estimated glomerular 

filtration rate           >                         NRG        

 

                    Serum or plasma glucose measurement (mass/volume)           

196 mg/dL           

        

 

                    Serum or plasma calcium measurement (mass/volume)           

8.5 mg/dL           

8.5-10.1        

 

                          Serum or plasma total bilirubin measurement (mass/volu

me)           0.6 mg/dL   

                                        0.1-1.0        

 

                                        Serum or plasma alkaline phosphatase juarez

surement (enzymatic activity/volume)    

                          76 U/L                            

 

                                        Serum or plasma aspartate aminotransfera

se measurement (enzymatic 

activity/volume)           14 U/L                    5-34        

 

                                        Serum or plasma alanine aminotransferase

 measurement (enzymatic activity/volume)

                          16 U/L                    0-55        

 

                    Serum or plasma protein measurement (mass/volume)           

6.6 g/dL            

6.4-8.2        

 

                    Serum or plasma albumin measurement (mass/volume)           

3.3 g/dL            

3.2-4.5        

 

                    CALCIUM CORRECTED           9.1 mg/dL           8.5-10.1    

    

 

                                        Capillary blood glucose measurement by g

lucometer (mass/volume) - 19 11:10

         

 

                          Capillary blood glucose measurement by glucometer (mas

s/volume)           176 

mg/dL                                           

 

                                        Capillary blood glucose measurement by g

lucometer (mass/volume) - 19 16:04

         

 

                          Capillary blood glucose measurement by glucometer (mas

s/volume)           266 

mg/dL                                           

 

                                        Capillary blood glucose measurement by g

lucometer (mass/volume) - 19 20:54

         

 

                          Capillary blood glucose measurement by glucometer (mas

s/volume)           261 

mg/dL                                           

 

                                        Methicillin resistant Staphylococcus aur

eus (MRSA) screening culture - 19 

21:40         

 

                          Methicillin resistant Staphylococcus aureus (MRSA) scr

eening culture           

NEG                                     NRG        

 

                                        Complete blood count (CBC) with automate

d white blood cell (WBC) differential - 

19 05:30         

 

                          Blood leukocytes automated count (number/volume)      

     10.5 10*3/uL         

                                        4.3-11.0        

 

                          Blood erythrocytes automated count (number/volume)    

       3.30 10*6/uL       

                                        4.35-5.85        

 

                    Venous blood hemoglobin measurement (mass/volume)           

10.4 g/dL           

13.3-17.7        

 

                    Blood hematocrit (volume fraction)           34 %           

     40-54        

 

                    Automated erythrocyte mean corpuscular volume           102 

[foz_us]           

80-99        

 

                                        Automated erythrocyte mean corpuscular h

emoglobin (mass per erythrocyte)        

                          32 pg                     25-34        

 

                                        Automated erythrocyte mean corpuscular h

emoglobin concentration measurement 

(mass/volume)             31 g/dL                   32-36        

 

                    Automated erythrocyte distribution width ratio           16.

9 %              10.0-

14.5        

 

                    Automated blood platelet count (count/volume)           116 

10*3/uL           

130-400        

 

                          Automated blood platelet mean volume measurement      

     12.2 [foz_us]        

                                        7.4-10.4        

 

                    Automated blood neutrophils/100 leukocytes           78 %   

             42-75       

 

 

                    Automated blood lymphocytes/100 leukocytes           14 %   

             12-44       

 

 

                    Blood monocytes/100 leukocytes           7 %                

 0-12        

 

                    Automated blood eosinophils/100 leukocytes           1 %    

             0-10        

 

                    Automated blood basophils/100 leukocytes           0 %      

           0-10        

 

                    Blood neutrophils automated count (number/volume)           

8.2 10*3            

1.8-7.8        

 

                    Blood lymphocytes automated count (number/volume)           

1.5 10*3            

1.0-4.0        

 

                    Blood monocytes automated count (number/volume)           0.

7 10*3            

0.0-1.0        

 

                    Automated eosinophil count           0.1 10*3/uL           0

.0-0.3        

 

                    Automated blood basophil count (count/volume)           0.0 

10*3/uL           

0.0-0.1        

 

                                        Comprehensive metabolic panel - 19

 05:30         

 

                          Serum or plasma sodium measurement (moles/volume)     

      137 mmol/L          

                                        135-145        

 

                          Serum or plasma potassium measurement (moles/volume)  

         4.8 mmol/L       

                                        3.6-5.0        

 

                          Serum or plasma chloride measurement (moles/volume)   

        100 mmol/L        

                                                

 

                    Carbon dioxide           27 mmol/L           21-32        

 

                          Serum or plasma anion gap determination (moles/volume)

           10 mmol/L      

                                        5-14        

 

                          Serum or plasma urea nitrogen measurement (mass/volume

)           22 mg/dL      

                                        7-18        

 

                          Serum or plasma creatinine measurement (mass/volume)  

         1.17 mg/dL       

                                        0.60-1.30        

 

                    Serum or plasma urea nitrogen/creatinine mass ratio         

  19                  NRG 

       

 

                                        Serum or plasma creatinine measurement w

ith calculation of estimated glomerular 

filtration rate           >                         NRG        

 

                    Serum or plasma glucose measurement (mass/volume)           

183 mg/dL           

        

 

                    Serum or plasma calcium measurement (mass/volume)           

8.7 mg/dL           

8.5-10.1        

 

                          Serum or plasma total bilirubin measurement (mass/volu

me)           0.3 mg/dL   

                                        0.1-1.0        

 

                                        Serum or plasma alkaline phosphatase juarez

surement (enzymatic activity/volume)    

                          85 U/L                            

 

                                        Serum or plasma aspartate aminotransfera

se measurement (enzymatic 

activity/volume)           13 U/L                    5-34        

 

                                        Serum or plasma alanine aminotransferase

 measurement (enzymatic activity/volume)

                          13 U/L                    0-55        

 

                    Serum or plasma protein measurement (mass/volume)           

6.7 g/dL            

6.4-8.2        

 

                    Serum or plasma albumin measurement (mass/volume)           

3.2 g/dL            

3.2-4.5        

 

                    CALCIUM CORRECTED           9.3 mg/dL           8.5-10.1    

    

 

                                        Capillary blood glucose measurement by g

lucometer (mass/volume) - 19 05:58

         

 

                          Capillary blood glucose measurement by glucometer (mas

s/volume)           188 

mg/dL                                           

 

                                        Capillary blood glucose measurement by g

lucometer (mass/volume) - 19 11:04

         

 

                          Capillary blood glucose measurement by glucometer (mas

s/volume)           228 

mg/dL                                           

 

                                        Bacteria identification in isolate by an

aerobe culture - 19 13:38         

 

                          Bacteria identification in isolate by anaerobe culture

           NOANA          

                                        NRG        

 

                                        Gram stain microscopy - 19 13:38  

       

 

                    Gram stain microscopy           Many Gram positive cocci in 

pairs            NRG

        

 

                                        Bacteria identification in wound by cult

ure - 19 13:38         

 

                    Bacteria identification in wound by culture           SEE CO

MMEN            NRG 

       

 

                    FREE TEXT EXTERNAL           SUSCEPTIBILITY TO FOLLOW       

     NRG        

 

                    QUANTITY OF GROWTH           .                   NRG        

 

                    MRSA AGAR           RESISTANT ORGANISM/CONTACT PRECAUTIONS  

          NRG       

 

 

                          CALL POSITIVES (F1 HELP)           REPORT FAXED TO DR BEAUCHAMP 19/KD          

                                        NRG        

 

                    PBP2                METHICILLIN-RESISTANT STAPH AUREUS      

      NRG        

 

                                        Dirithromycin susceptibility test by dis

k diffusion - 19 13:38         

 

                          Oxacillin susceptibility test by minimum inhibitory co

ncentration           >   

                                        NRG        

 

                          Clindamycin susceptibility test by minimum inhibitory 

concentration           <=

                                        NRG        

 

                          Erythromycin susceptibility test by minimum inhibitory

 concentration           >

                                        NRG        

 

                                        Trimethoprim/sulfamethoxazole susceptibi

lity test by minimum 

inhibitoryconcentration           <=                        NRG        

 

                          Vancomycin susceptibility test by minimum inhibitory c

oncentration           1  

                                        NRG        

 

                          Levofloxacin susceptibility test by minimum inhibitory

 concentration           

<=                                      NRG        

 

                          Rifampin susceptibility test by minimum inhibitory con

centration           <=   

                                        NRG        

 

                          Cefazolin susceptibility test by minimum inhibitory co

ncentration           >   

                                        NRG        

 

                          Linezolid susceptibility test by minimum inhibitory co

ncentration           2   

                                        NRG        

 

                          Penicillin G susceptibility test by minimum inhibitory

 concentration           >

                                        NRG        

 

                          Moxifloxacin susceptibility test by minimum inhibitory

 concentration           

<=                                      NRG        

 

                    Minocycline susc KODI           <=                  NRG      

  

 

                                        Dirithromycin susceptibility test by dis

k diffusion - 19 13:38         

 

                          Vancomycin susceptibility test by minimum inhibitory c

oncentration           1  

                                        NRG        

 

                          Ampicillin susceptibility test by minimum inhibitory c

oncentration           1  

                                        NRG        

 

                          Linezolid susceptibility test by minimum inhibitory co

ncentration           <=  

                                        NRG        

 

                    Daptomycin susc KODI           4                   NRG       

 

 

                                        Capillary blood glucose measurement by g

lucometer (mass/volume) - 19 15:33

         

 

                          Capillary blood glucose measurement by glucometer (mas

s/volume)           201 

mg/dL                                           

 

                                        Vancomycin trough - 19 18:00      

   

 

                    Vancomycin trough           22.8 ug/mL           10.0-20.0  

      

 

                                        Capillary blood glucose measurement by g

lucometer (mass/volume) - 19 20:55

         

 

                          Capillary blood glucose measurement by glucometer (mas

s/volume)           210 

mg/dL                                           

 

                                        Capillary blood glucose measurement by g

lucometer (mass/volume) - 19 05:17

         

 

                          Capillary blood glucose measurement by glucometer (mas

s/volume)           173 

mg/dL                                           

 

                                        Complete blood count (CBC) with automate

d white blood cell (WBC) differential - 

19 05:31         

 

                          Blood leukocytes automated count (number/volume)      

     10.7 10*3/uL         

                                        4.3-11.0        

 

                          Blood erythrocytes automated count (number/volume)    

       3.11 10*6/uL       

                                        4.35-5.85        

 

                    Venous blood hemoglobin measurement (mass/volume)           

9.8 g/dL            

13.3-17.7        

 

                    Blood hematocrit (volume fraction)           32 %           

     40-54        

 

                    Automated erythrocyte mean corpuscular volume           104 

[foz_us]           

80-99        

 

                                        Automated erythrocyte mean corpuscular h

emoglobin (mass per erythrocyte)        

                          32 pg                     25-34        

 

                                        Automated erythrocyte mean corpuscular h

emoglobin concentration measurement 

(mass/volume)             30 g/dL                   32-36        

 

                    Automated erythrocyte distribution width ratio           17.

1 %              10.0-

14.5        

 

                    Automated blood platelet count (count/volume)           129 

10*3/uL           

130-400        

 

                          Automated blood platelet mean volume measurement      

     12.4 [foz_us]        

                                        7.4-10.4        

 

                    Automated blood neutrophils/100 leukocytes           75 %   

             42-75       

 

 

                    Automated blood lymphocytes/100 leukocytes           16 %   

             12-44       

 

 

                    Blood monocytes/100 leukocytes           8 %                

 0-12        

 

                    Automated blood eosinophils/100 leukocytes           1 %    

             0-10        

 

                    Automated blood basophils/100 leukocytes           0 %      

           0-10        

 

                    Blood neutrophils automated count (number/volume)           

8.1 10*3            

1.8-7.8        

 

                    Blood lymphocytes automated count (number/volume)           

1.7 10*3            

1.0-4.0        

 

                    Blood monocytes automated count (number/volume)           0.

8 10*3            

0.0-1.0        

 

                    Automated eosinophil count           0.1 10*3/uL           0

.0-0.3        

 

                    Automated blood basophil count (count/volume)           0.0 

10*3/uL           

0.0-0.1        

 

                                        Comprehensive metabolic panel - 19

 05:31         

 

                          Serum or plasma sodium measurement (moles/volume)     

      138 mmol/L          

                                        135-145        

 

                          Serum or plasma potassium measurement (moles/volume)  

         4.6 mmol/L       

                                        3.6-5.0        

 

                          Serum or plasma chloride measurement (moles/volume)   

        102 mmol/L        

                                                

 

                    Carbon dioxide           25 mmol/L           21-32        

 

                          Serum or plasma anion gap determination (moles/volume)

           11 mmol/L      

                                        5-14        

 

                          Serum or plasma urea nitrogen measurement (mass/volume

)           29 mg/dL      

                                        7-18        

 

                          Serum or plasma creatinine measurement (mass/volume)  

         1.39 mg/dL       

                                        0.60-1.30        

 

                    Serum or plasma urea nitrogen/creatinine mass ratio         

  21                  NRG 

       

 

                                        Serum or plasma creatinine measurement w

ith calculation of estimated glomerular 

filtration rate           51                        NRG        

 

                    Serum or plasma glucose measurement (mass/volume)           

161 mg/dL           

        

 

                    Serum or plasma calcium measurement (mass/volume)           

8.5 mg/dL           

8.5-10.1        

 

                          Serum or plasma total bilirubin measurement (mass/volu

me)           0.3 mg/dL   

                                        0.1-1.0        

 

                                        Serum or plasma alkaline phosphatase juarez

surement (enzymatic activity/volume)    

                          111 U/L                           

 

                                        Serum or plasma aspartate aminotransfera

se measurement (enzymatic 

activity/volume)           15 U/L                    5-34        

 

                                        Serum or plasma alanine aminotransferase

 measurement (enzymatic activity/volume)

                          15 U/L                    0-55        

 

                    Serum or plasma protein measurement (mass/volume)           

6.7 g/dL            

6.4-8.2        

 

                    Serum or plasma albumin measurement (mass/volume)           

3.2 g/dL            

3.2-4.5        

 

                    CALCIUM CORRECTED           9.1 mg/dL           8.5-10.1    

    

 

                                        Vancomycin trough - 19 05:31      

   

 

                    Vancomycin trough           15.2 ug/mL           10.0-20.0  

      

 

                                        Capillary blood glucose measurement by g

lucometer (mass/volume) - 19 11:05

         

 

                          Capillary blood glucose measurement by glucometer (mas

s/volume)           273 

mg/dL                                           

 

                                        Capillary blood glucose measurement by g

lucometer (mass/volume) - 19 16:03

         

 

                          Capillary blood glucose measurement by glucometer (mas

s/volume)           110 

mg/dL                                           

 

                                        Capillary blood glucose measurement by g

lucometer (mass/volume) - 19 20:41

         

 

                          Capillary blood glucose measurement by glucometer (mas

s/volume)           260 

mg/dL                                           

 

                                        Complete blood count (CBC) with automate

d white blood cell (WBC) differential - 

19 05:25         

 

                          Blood leukocytes automated count (number/volume)      

     10.1 10*3/uL         

                                        4.3-11.0        

 

                          Blood erythrocytes automated count (number/volume)    

       3.44 10*6/uL       

                                        4.35-5.85        

 

                    Venous blood hemoglobin measurement (mass/volume)           

10.8 g/dL           

13.3-17.7        

 

                    Blood hematocrit (volume fraction)           36 %           

     40-54        

 

                    Automated erythrocyte mean corpuscular volume           103 

[foz_us]           

80-99        

 

                                        Automated erythrocyte mean corpuscular h

emoglobin (mass per erythrocyte)        

                          31 pg                     25-34        

 

                                        Automated erythrocyte mean corpuscular h

emoglobin concentration measurement 

(mass/volume)             30 g/dL                   32-36        

 

                    Automated erythrocyte distribution width ratio           16.

8 %              10.0-

14.5        

 

                    Automated blood platelet count (count/volume)           162 

10*3/uL           

130-400        

 

                          Automated blood platelet mean volume measurement      

     11.8 [foz_us]        

                                        7.4-10.4        

 

                    Automated blood neutrophils/100 leukocytes           77 %   

             42-75       

 

 

                    Automated blood lymphocytes/100 leukocytes           15 %   

             12-44       

 

 

                    Blood monocytes/100 leukocytes           6 %                

 0-12        

 

                    Automated blood eosinophils/100 leukocytes           2 %    

             0-10        

 

                    Automated blood basophils/100 leukocytes           0 %      

           0-10        

 

                    Blood neutrophils automated count (number/volume)           

7.7 10*3            

1.8-7.8        

 

                    Blood lymphocytes automated count (number/volume)           

1.5 10*3            

1.0-4.0        

 

                    Blood monocytes automated count (number/volume)           0.

6 10*3            

0.0-1.0        

 

                    Automated eosinophil count           0.2 10*3/uL           0

.0-0.3        

 

                    Automated blood basophil count (count/volume)           0.0 

10*3/uL           

0.0-0.1        

 

                                        Comprehensive metabolic panel - 19

 05:25         

 

                          Serum or plasma sodium measurement (moles/volume)     

      137 mmol/L          

                                        135-145        

 

                          Serum or plasma potassium measurement (moles/volume)  

         4.4 mmol/L       

                                        3.6-5.0        

 

                          Serum or plasma chloride measurement (moles/volume)   

        101 mmol/L        

                                                

 

                    Carbon dioxide           27 mmol/L           21-32        

 

                          Serum or plasma anion gap determination (moles/volume)

           9 mmol/L       

                                        5-14        

 

                          Serum or plasma urea nitrogen measurement (mass/volume

)           23 mg/dL      

                                        7-18        

 

                          Serum or plasma creatinine measurement (mass/volume)  

         1.11 mg/dL       

                                        0.60-1.30        

 

                    Serum or plasma urea nitrogen/creatinine mass ratio         

  21                  NRG 

       

 

                                        Serum or plasma creatinine measurement w

ith calculation of estimated glomerular 

filtration rate           >                         NRG        

 

                    Serum or plasma glucose measurement (mass/volume)           

160 mg/dL           

        

 

                    Serum or plasma calcium measurement (mass/volume)           

9.3 mg/dL           

8.5-10.1        

 

                          Serum or plasma total bilirubin measurement (mass/volu

me)           0.3 mg/dL   

                                        0.1-1.0        

 

                                        Serum or plasma alkaline phosphatase juarez

surement (enzymatic activity/volume)    

                          120 U/L                           

 

                                        Serum or plasma aspartate aminotransfera

se measurement (enzymatic 

activity/volume)           17 U/L                    5-34        

 

                                        Serum or plasma alanine aminotransferase

 measurement (enzymatic activity/volume)

                          17 U/L                    0-55        

 

                    Serum or plasma protein measurement (mass/volume)           

7.2 g/dL            

6.4-8.2        

 

                    Serum or plasma albumin measurement (mass/volume)           

3.4 g/dL            

3.2-4.5        

 

                    CALCIUM CORRECTED           9.8 mg/dL           8.5-10.1    

    

 

                                        Capillary blood glucose measurement by g

lucometer (mass/volume) - 19 05:35

         

 

                          Capillary blood glucose measurement by glucometer (mas

s/volume)           174 

mg/dL                                           

 

                                        Capillary blood glucose measurement by g

lucometer (mass/volume) - 19 10:55

         

 

                          Capillary blood glucose measurement by glucometer (mas

s/volume)           277 

mg/dL                                           

 

                                        PDM - 09 PANEL (PROFILE 1) - 19 16

:01         

 

                    Creatinine           110.3 mg/dL           > or = 20.0      

  

 

                    pH                  6.10                4.5 - 9.0        

 

                    Oxidant             NEGATIVE mcg/mL           <200        

 

                    Amphetamines           NEGATIVE ng/mL           <500        

 

                    medMATCH Amphetamines           CONSISTENT            NRG   

     

 

                    Benzodiazepines           NEGATIVE ng/mL           <100     

   

 

                    medMATCH Benzodiazepines           CONSISTENT            NRG

        

 

                    Marijuana Metabolite           NEGATIVE ng/mL           <20 

       

 

                    medMATCH Marijuana Metab           CONSISTENT            NRG

        

 

                    Cocaine Metabolite           NEGATIVE ng/mL           <150  

      

 

                    medMATCH Cocaine Metab           CONSISTENT            NRG  

      

 

                    Opiates             NEGATIVE CONFIRMED ng/mL           <100 

       

 

                    Oxycodone           POSITIVE ng/mL           <100        

 

                    COMMENT                                 NRG        

 

                      Codeine           NEGATIVE ng/mL           <50        

 

                    medMATCH Codeine           CONSISTENT            NRG        

 

                      Hydrocodone           NEGATIVE ng/mL           <50        

 

                    medMATCH Hydrocodone           CONSISTENT            NRG    

    

 

                      Hydromorphone           NEGATIVE ng/mL           <50      

  

 

                    medMATCH Hydromorphone           CONSISTENT            NRG  

      

 

                      Morphine           NEGATIVE ng/mL           <50        

 

                    medMATCH Morphine           CONSISTENT            NRG       

 

 

                      Norhydrocodone           NEGATIVE ng/mL           <50     

   

 

                    medMATCH Norhydrocodone           CONSISTENT            NRG 

       

 

                      Noroxycodone           5817 ng/mL           <50        

 

                    medMATCH Noroxycodone           INCONSISTENT            NRG 

       

 

                      Oxycodone           5878 ng/mL           <50        

 

                    medMATCH Oxycodone           INCONSISTENT            NRG    

    

 

                      Oxymorphone           6204 ng/mL           <50        

 

                    medMATCH Oxymorphone           INCONSISTENT            NRG  

      

 

                    Barbiturates           NEGATIVE ng/mL           <300        

 

                    medMATCH Barbiturates           CONSISTENT            NRG   

     

 

                    Methadone Metabolite           NEGATIVE ng/mL           <100

        

 

                    medMATCH Methadone Metab           CONSISTENT            NRG

        

 

                    Phencyclidine           NEGATIVE ng/mL           <25        

 

                    medMATCH Phencyclidine           CONSISTENT            NRG  

      

 

                                        Gram stain microscopy - 19 13:29  

       

 

                    Gram stain microscopy           No bacteria seen            

NRG        

 

                                        Bacteria identification in wound by cult

ure - 19 13:29         

 

                    Bacteria identification in wound by culture           [id number]

[id number]             NRG    

    

 

                    FREE TEXT EXTERNAL           SUSCEPTIBILITY REPORTED 19 

12:05            NRG

        

 

                    QUANTITY OF GROWTH           Rare                NRG        

 

                    FREE TEXT ENTRY 2           ID REPORTED 19 14:05        

    NRG        

 

                    FREE TEXT ENTRY 3           CALLED TO [facility]  12:30 

BY ST            NRG

        

 

                                        Dirithromycin susceptibility test by dis

k diffusion - 19 13:29         

 

                          Oxacillin susceptibility test by minimum inhibitory co

ncentration           >   

                                        NRG        

 

                          Clindamycin susceptibility test by minimum inhibitory 

concentration           <=

                                        NRG        

 

                          Erythromycin susceptibility test by minimum inhibitory

 concentration           >

                                        NRG        

 

                                        Trimethoprim/sulfamethoxazole susceptibi

lity test by minimum 

inhibitoryconcentration           <=                        NRG        

 

                          Vancomycin susceptibility test by minimum inhibitory c

oncentration           1  

                                        NRG        

 

                          Levofloxacin susceptibility test by minimum inhibitory

 concentration           

<=                                      NRG        

 

                          Rifampin susceptibility test by minimum inhibitory con

centration           <=   

                                        NRG        

 

                          Cefazolin susceptibility test by minimum inhibitory co

ncentration           >   

                                        NRG        

 

                          Linezolid susceptibility test by minimum inhibitory co

ncentration           2   

                                        NRG        

 

                          Penicillin G susceptibility test by minimum inhibitory

 concentration           >

                                        NRG        

 

                          Moxifloxacin susceptibility test by minimum inhibitory

 concentration           

<=                                      NRG        

 

                    Minocycline susc KODI           <=                  NRG      

  

 

                                        Automated blood complete blood count (he

mogram) panel - 19 10:20         

 

                          Blood leukocytes automated count (number/volume)      

     10.6 10*3/uL         

                                        4.3-11.0        

 

                          Blood erythrocytes automated count (number/volume)    

       3.94 10*6/uL       

                                        4.35-5.85        

 

                    Venous blood hemoglobin measurement (mass/volume)           

12.1 g/dL           

13.3-17.7        

 

                    Blood hematocrit (volume fraction)           39 %           

     40-54        

 

                    Automated erythrocyte mean corpuscular volume           100 

[foz_us]           

80-99        

 

                                        Automated erythrocyte mean corpuscular h

emoglobin (mass per erythrocyte)        

                          31 pg                     25-34        

 

                                        Automated erythrocyte mean corpuscular h

emoglobin concentration measurement 

(mass/volume)             31 g/dL                   32-36        

 

                    Automated erythrocyte distribution width ratio           15.

6 %              10.0-

14.5        

 

                    Automated blood platelet count (count/volume)           140 

10*3/uL           

130-400        

 

                          Automated blood platelet mean volume measurement      

     11.9 [foz_us]        

                                        7.4-10.4        

 

                                        PT panel in platelet poor plasma by coag

ulation assay - 19 10:20         

 

                          Prothrombin time (PT) in platelet poor plasma by coagu

lation assay           

13.0 s                                  12.2-14.7        

 

                          INR in platelet poor plasma or blood by coagulation as

say           1.0         

                                        0.8-1.4        

 

                                        Activated partial thromboplastin time (a

PTT) in platelet poor plasma 

bycoagulation assay - 19 10:20         

 

                                        Activated partial thromboplastin time (a

PTT) in platelet poor plasma 

bycoagulation assay           31 s                      24-35        

 

                                        Comprehensive metabolic panel - 19

 10:20         

 

                          Serum or plasma sodium measurement (moles/volume)     

      138 mmol/L          

                                        135-145        

 

                          Serum or plasma potassium measurement (moles/volume)  

         5.7 mmol/L       

                                        3.6-5.0        

 

                          Serum or plasma chloride measurement (moles/volume)   

        105 mmol/L        

                                                

 

                    Carbon dioxide           23 mmol/L           21-32        

 

                          Serum or plasma anion gap determination (moles/volume)

           10 mmol/L      

                                        5-14        

 

                          Serum or plasma urea nitrogen measurement (mass/volume

)           20 mg/dL      

                                        7-18        

 

                          Serum or plasma creatinine measurement (mass/volume)  

         1.15 mg/dL       

                                        0.60-1.30        

 

                    Serum or plasma urea nitrogen/creatinine mass ratio         

  17                  NRG 

       

 

                                        Serum or plasma creatinine measurement w

ith calculation of estimated glomerular 

filtration rate           >                         NRG        

 

                    Serum or plasma glucose measurement (mass/volume)           

175 mg/dL           

        

 

                    Serum or plasma calcium measurement (mass/volume)           

9.4 mg/dL           

8.5-10.1        

 

                          Serum or plasma total bilirubin measurement (mass/volu

me)           0.2 mg/dL   

                                        0.1-1.0        

 

                                        Serum or plasma alkaline phosphatase juarez

surement (enzymatic activity/volume)    

                          88 U/L                            

 

                                        Serum or plasma aspartate aminotransfera

se measurement (enzymatic 

activity/volume)           15 U/L                    5-34        

 

                                        Serum or plasma alanine aminotransferase

 measurement (enzymatic activity/volume)

                          17 U/L                    0-55        

 

                    Serum or plasma protein measurement (mass/volume)           

7.9 g/dL            

6.4-8.2        

 

                    Serum or plasma albumin measurement (mass/volume)           

4.3 g/dL            

3.2-4.5        

 

                    CALCIUM CORRECTED           9.2 mg/dL           8.5-10.1    

    

 

                                        Lipid 1996 panel - 19 10:20       

  

 

                          Serum or plasma triglyceride measurement (mass/volume)

           297 mg/dL      

                                        <150        

 

                          Serum or plasma cholesterol measurement (mass/volume) 

          129 mg/dL       

                                        < 200        

 

                          Serum or plasma cholesterol in HDL measurement (mass/v

olume)           35 mg/dL 

                                        40-60        

 

                          Cholesterol in LDL [mass/volume] in serum or plasma by

 direct assay           40

 mg/dL                                  1-129        

 

                          Serum or plasma cholesterol in VLDL measurement (mass/

volume)           59 mg/dL

                                        5-40        

 

                                        Methicillin resistant Staphylococcus aur

eus (MRSA) screening culture - 19 

10:20         

 

                          Methicillin resistant Staphylococcus aureus (MRSA) scr

eening culture           

NEG                                     NRG        

 

                                        Automated blood complete blood count (he

mogram) panel - 19 03:55         

 

                          Blood leukocytes automated count (number/volume)      

     7.8 10*3/uL          

                                        4.3-11.0        

 

                          Blood erythrocytes automated count (number/volume)    

       3.54 10*6/uL       

                                        4.35-5.85        

 

                    Venous blood hemoglobin measurement (mass/volume)           

10.9 g/dL           

13.3-17.7        

 

                    Blood hematocrit (volume fraction)           35 %           

     40-54        

 

                    Automated erythrocyte mean corpuscular volume           100 

[foz_us]           

80-99        

 

                                        Automated erythrocyte mean corpuscular h

emoglobin (mass per erythrocyte)        

                          31 pg                     25-34        

 

                                        Automated erythrocyte mean corpuscular h

emoglobin concentration measurement 

(mass/volume)             31 g/dL                   32-36        

 

                    Automated erythrocyte distribution width ratio           15.

7 %              10.0-

14.5        

 

                    Automated blood platelet count (count/volume)           107 

10*3/uL           

130-400        

 

                          Automated blood platelet mean volume measurement      

     11.6 [foz_us]        

                                        7.4-10.4        

 

                                        Whole blood basic metabolic panel - 08/2

3/19 03:55         

 

                          Serum or plasma sodium measurement (moles/volume)     

      137 mmol/L          

                                        135-145        

 

                          Serum or plasma potassium measurement (moles/volume)  

         6.5 mmol/L       

                                        3.6-5.0        

 

                          Serum or plasma chloride measurement (moles/volume)   

        106 mmol/L        

                                                

 

                    Carbon dioxide           23 mmol/L           21-32        

 

                          Serum or plasma anion gap determination (moles/volume)

           8 mmol/L       

                                        5-14        

 

                          Serum or plasma urea nitrogen measurement (mass/volume

)           17 mg/dL      

                                        7-18        

 

                          Serum or plasma creatinine measurement (mass/volume)  

         0.86 mg/dL       

                                        0.60-1.30        

 

                    Serum or plasma urea nitrogen/creatinine mass ratio         

  20                  NRG 

       

 

                                        Serum or plasma creatinine measurement w

ith calculation of estimated glomerular 

filtration rate           >                         NRG        

 

                    Serum or plasma glucose measurement (mass/volume)           

123 mg/dL           

        

 

                    Serum or plasma calcium measurement (mass/volume)           

8.5 mg/dL           

8.5-10.1        

 

                                        Whole blood basic metabolic panel - 08/2

3/19 07:50         

 

                          Serum or plasma sodium measurement (moles/volume)     

      139 mmol/L          

                                        135-145        

 

                          Serum or plasma potassium measurement (moles/volume)  

         5.5 mmol/L       

                                        3.6-5.0        

 

                          Serum or plasma chloride measurement (moles/volume)   

        106 mmol/L        

                                                

 

                    Carbon dioxide           25 mmol/L           21-32        

 

                          Serum or plasma anion gap determination (moles/volume)

           8 mmol/L       

                                        5-14        

 

                          Serum or plasma urea nitrogen measurement (mass/volume

)           16 mg/dL      

                                        7-18        

 

                          Serum or plasma creatinine measurement (mass/volume)  

         0.94 mg/dL       

                                        0.60-1.30        

 

                    Serum or plasma urea nitrogen/creatinine mass ratio         

  17                  NRG 

       

 

                                        Serum or plasma creatinine measurement w

ith calculation of estimated glomerular 

filtration rate           >                         NRG        

 

                    Serum or plasma glucose measurement (mass/volume)           

133 mg/dL           

        

 

                    Serum or plasma calcium measurement (mass/volume)           

8.7 mg/dL           

8.5-10.1        

 

                                        Gram stain microscopy - 19 13:28  

       

 

                    Gram stain microscopy           No bacteria seen            

NRG        

 

                                        Bacteria identification in wound by cult

ure - 19 13:28         

 

                    Bacteria identification in wound by culture           [id number]

08            NRG   

     

 

                    FREE TEXT EXTERNAL           NO SUSCEPTIBILITIES SET UP     

       NRG        

 

                    QUANTITY OF GROWTH           Isolated            NRG        

 

                                        Dirithromycin susceptibility test by dis

k diffusion - 19 13:28         

 

                          Oxacillin susceptibility test by minimum inhibitory co

ncentration           1   

                                        NRG        

 

                          Clindamycin susceptibility test by minimum inhibitory 

concentration           > 

                                        NRG        

 

                          Erythromycin susceptibility test by minimum inhibitory

 concentration           >

                                        NRG        

 

                          Vancomycin susceptibility test by minimum inhibitory c

oncentration           1  

                                        NRG        

 

                          Levofloxacin susceptibility test by minimum inhibitory

 concentration           

<=                                      NRG        

 

                          Rifampin susceptibility test by minimum inhibitory con

centration           <=   

                                        NRG        

 

                          Cefazolin susceptibility test by minimum inhibitory co

ncentration           R   

                                        NRG        

 

                          Linezolid susceptibility test by minimum inhibitory co

ncentration           2   

                                        NRG        

 

                          Penicillin G susceptibility test by minimum inhibitory

 concentration           1

                                        NRG        

 

                          Moxifloxacin susceptibility test by minimum inhibitory

 concentration           S

                                        NRG        

 

                    Minocycline susc KODI           <=                  NRG      

  

 

                                        Complete blood count (CBC) with automate

d white blood cell (WBC) differential - 

10/23/19 13:50         

 

                          Blood leukocytes automated count (number/volume)      

     9.1 10*3/uL          

                                        4.3-11.0        

 

                          Blood erythrocytes automated count (number/volume)    

       3.81 10*6/uL       

                                        4.35-5.85        

 

                    Venous blood hemoglobin measurement (mass/volume)           

12.0 g/dL           

13.3-17.7        

 

                    Blood hematocrit (volume fraction)           38 %           

     40-54        

 

                    Automated erythrocyte mean corpuscular volume           98 [

foz_us]           

80-99        

 

                                        Automated erythrocyte mean corpuscular h

emoglobin (mass per erythrocyte)        

                          32 pg                     25-34        

 

                                        Automated erythrocyte mean corpuscular h

emoglobin concentration measurement 

(mass/volume)             32 g/dL                   32-36        

 

                    Automated erythrocyte distribution width ratio           16.

6 %              10.0-

14.5        

 

                    Automated blood platelet count (count/volume)           123 

10*3/uL           

130-400        

 

                          Automated blood platelet mean volume measurement      

     12.1 [foz_us]        

                                        7.4-10.4        

 

                    Automated blood neutrophils/100 leukocytes           76 %   

             42-75       

 

 

                    Automated blood lymphocytes/100 leukocytes           17 %   

             12-44       

 

 

                    Blood monocytes/100 leukocytes           4 %                

 0-12        

 

                    Automated blood eosinophils/100 leukocytes           2 %    

             0-10        

 

                    Automated blood basophils/100 leukocytes           1 %      

           0-10        

 

                    Blood neutrophils automated count (number/volume)           

7.0 10*3            

1.8-7.8        

 

                    Blood lymphocytes automated count (number/volume)           

1.5 10*3            

1.0-4.0        

 

                    Blood monocytes automated count (number/volume)           0.

4 10*3            

0.0-1.0        

 

                    Automated eosinophil count           0.2 10*3/uL           0

.0-0.3        

 

                    Automated blood basophil count (count/volume)           0.1 

10*3/uL           

0.0-0.1        

 

                                        Comprehensive metabolic panel - 10/23/19

 13:50         

 

                          Serum or plasma sodium measurement (moles/volume)     

      140 mmol/L          

                                        135-145        

 

                          Serum or plasma potassium measurement (moles/volume)  

         5.3 mmol/L       

                                        3.6-5.0        

 

                          Serum or plasma chloride measurement (moles/volume)   

        107 mmol/L        

                                                

 

                    Carbon dioxide           23 mmol/L           21-32        

 

                          Serum or plasma anion gap determination (moles/volume)

           10 mmol/L      

                                        5-14        

 

                          Serum or plasma urea nitrogen measurement (mass/volume

)           32 mg/dL      

                                        7-18        

 

                          Serum or plasma creatinine measurement (mass/volume)  

         1.83 mg/dL       

                                        0.60-1.30        

 

                    Serum or plasma urea nitrogen/creatinine mass ratio         

  17                  NRG 

       

 

                                        Serum or plasma creatinine measurement w

ith calculation of estimated glomerular 

filtration rate           37                        NRG        

 

                    Serum or plasma glucose measurement (mass/volume)           

135 mg/dL           

        

 

                    Serum or plasma calcium measurement (mass/volume)           

9.0 mg/dL           

8.5-10.1        

 

                          Serum or plasma total bilirubin measurement (mass/volu

me)           0.2 mg/dL   

                                        0.1-1.0        

 

                                        Serum or plasma alkaline phosphatase juarez

surement (enzymatic activity/volume)    

                          104 U/L                           

 

                                        Serum or plasma aspartate aminotransfera

se measurement (enzymatic 

activity/volume)           14 U/L                    5-34        

 

                                        Serum or plasma alanine aminotransferase

 measurement (enzymatic activity/volume)

                          15 U/L                    0-55        

 

                    Serum or plasma protein measurement (mass/volume)           

7.0 g/dL            

6.4-8.2        

 

                    Serum or plasma albumin measurement (mass/volume)           

4.0 g/dL            

3.2-4.5        

 

                    CALCIUM CORRECTED           9.0 mg/dL           8.5-10.1    

    

 

                                        Hemoglobin A1c measurement - 10/23/19 13

:50         

 

                    Blood hemoglobin A1C measurement (mass/volume)           6.4

 %               4.0-5.6

        

 

                    MEAN BLOOD GLUCOSE           137 %               <=126      

  

 

                                        Prealbumin - 10/23/19 13:50         

 

                    Serum or plasma prealbumin measurement (mass/volume)        

   21.2 %              

18.0-37.0        

 

                                        Complete blood count (CBC) with automate

d white blood cell (WBC) differential - 

19 13:35         

 

                          Blood leukocytes automated count (number/volume)      

     10.1 10*3/uL         

                                        4.3-11.0        

 

                          Blood erythrocytes automated count (number/volume)    

       3.74 10*6/uL       

                                        4.35-5.85        

 

                    Venous blood hemoglobin measurement (mass/volume)           

11.8 g/dL           

13.3-17.7        

 

                    Blood hematocrit (volume fraction)           37 %           

     40-54        

 

                    Automated erythrocyte mean corpuscular volume           99 [

foz_us]           

80-99        

 

                                        Automated erythrocyte mean corpuscular h

emoglobin (mass per erythrocyte)        

                          32 pg                     25-34        

 

                                        Automated erythrocyte mean corpuscular h

emoglobin concentration measurement 

(mass/volume)             32 g/dL                   32-36        

 

                    Automated erythrocyte distribution width ratio           16.

5 %              10.0-

14.5        

 

                    Automated blood platelet count (count/volume)           125 

10*3/uL           

130-400        

 

                          Automated blood platelet mean volume measurement      

     13.3 [foz_us]        

                                        7.4-10.4        

 

                    Automated blood neutrophils/100 leukocytes           73 %   

             42-75       

 

 

                    Automated blood lymphocytes/100 leukocytes           20 %   

             12-44       

 

 

                    Blood monocytes/100 leukocytes           5 %                

 0-12        

 

                    Automated blood eosinophils/100 leukocytes           2 %    

             0-10        

 

                    Automated blood basophils/100 leukocytes           1 %      

           0-10        

 

                    Blood neutrophils automated count (number/volume)           

7.4 10*3            

1.8-7.8        

 

                    Blood lymphocytes automated count (number/volume)           

2.0 10*3            

1.0-4.0        

 

                    Blood monocytes automated count (number/volume)           0.

5 10*3            

0.0-1.0        

 

                    Automated eosinophil count           0.2 10*3/uL           0

.0-0.3        

 

                    Automated blood basophil count (count/volume)           0.1 

10*3/uL           

0.0-0.1        

 

                                        Comprehensive metabolic panel - 19

 13:35         

 

                          Serum or plasma sodium measurement (moles/volume)     

      138 mmol/L          

                                        135-145        

 

                          Serum or plasma potassium measurement (moles/volume)  

         5.6 mmol/L       

                                        3.6-5.0        

 

                          Serum or plasma chloride measurement (moles/volume)   

        103 mmol/L        

                                                

 

                    Carbon dioxide           23 mmol/L           21-32        

 

                          Serum or plasma anion gap determination (moles/volume)

           12 mmol/L      

                                        5-14        

 

                          Serum or plasma urea nitrogen measurement (mass/volume

)           37 mg/dL      

                                        7-18        

 

                          Serum or plasma creatinine measurement (mass/volume)  

         1.49 mg/dL       

                                        0.60-1.30        

 

                    Serum or plasma urea nitrogen/creatinine mass ratio         

  25                  NRG 

       

 

                                        Serum or plasma creatinine measurement w

ith calculation of estimated glomerular 

filtration rate           47                        NRG        

 

                    Serum or plasma glucose measurement (mass/volume)           

131 mg/dL           

        

 

                    Serum or plasma calcium measurement (mass/volume)           

8.6 mg/dL           

8.5-10.1        

 

                          Serum or plasma total bilirubin measurement (mass/volu

me)           0.2 mg/dL   

                                        0.1-1.0        

 

                                        Serum or plasma alkaline phosphatase juarez

surement (enzymatic activity/volume)    

                          88 U/L                            

 

                                        Serum or plasma aspartate aminotransfera

se measurement (enzymatic 

activity/volume)           23 U/L                    5-34        

 

                                        Serum or plasma alanine aminotransferase

 measurement (enzymatic activity/volume)

                          15 U/L                    0-55        

 

                    Serum or plasma protein measurement (mass/volume)           

7.4 g/dL            

6.4-8.2        

 

                    Serum or plasma albumin measurement (mass/volume)           

4.1 g/dL            

3.2-4.5        

 

                    CALCIUM CORRECTED           8.5 mg/dL           8.5-10.1    

    

 

                                        Serum or plasma troponin i.cardiac measu

rement (mass/volume) - 19 13:35   

      

 

                          Serum or plasma troponin i.cardiac measurement (mass/v

olume)           < ng/mL  

                                        <0.028        

 

                                        Arterial blood gas measurement - 

9 13:36         

 

                    Blood pCO2           63 mm[Hg]           35-45        

 

                    Blood pO2           38 mm[Hg]           79-93        

 

                          Arterial blood bicarbonate measurement (moles/volume) 

          27 mmol/L       

                                        23-27        

 

                    Arterial blood base excess by calculation           -0.1 mmo

l/L           

-2.5-2.5        

 

                    Arterial blood oxygen saturation measurement           66 % 

                   

    

 

                    * Inhaled oxygen flow rate           3 L                 NRG

        

 

                          Arterial blood pH measurement with patient temperature

 correction           7.24

                                        7.37-7.43        

 

                          Arterial blood carbon dioxide, total measurement (mole

s/volume)           28.5 

mmol/L                                  21.0-31.0        

 

                    Body site           RIGHT RADIAL            NRG        

 

                          Assessment of wrist artery patency prior to arterial p

uncture           POSITIVE

                                        NRG        

 

                    Setting of ventilation mode           NO                  NR

G        

 

                    Measurement of body temperature           98                

  NRG        

 

                                        Gram stain microscopy - 19 13:07  

       

 

                    Gram stain microscopy           NO BACTERIA SEEN            

NRG        

 

                                        Bacteria identification in wound by cult

ure - 19 13:07         

 

                    Bacteria identification in wound by culture           [id number]

01            NRG   

     

 

                    FREE TEXT EXTERNAL           SUSCEPTIBILITY REPORTED 

9 13:40            

NRG        

 

                    QUANTITY OF GROWTH           Rare                NRG        

 

                                        Dirithromycin susceptibility test by dis

k diffusion - 19 13:07         

 

                          Gentamicin susceptibility test by minimum inhibitory c

oncentration           <= 

                                        NRG        

 

                          Levofloxacin susceptibility test by minimum inhibitory

 concentration           

<=                                      NRG        

 

                          Tobramycin susceptibility test by minimum inhibitory c

oncentration           <= 

                                        NRG        

 

                                        Piperacillin/tazobactam susceptibility t

est by minimum inhibitory concentration 

                          =                         NRG        

 

                          Ciprofloxacin susceptibility test by minimum inhibitor

y concentration           

<=                                      NRG        

 

                          Meropenem susceptibility test by minimum inhibitory co

ncentration           <=  

                                        NRG        

 

                          Aztreonam susceptibility test by minimum inhibitory co

ncentration           8   

                                        NRG        

 

                          Cefepime susceptibility test by minimum inhibitory con

centration           2    

                                        NRG        

 

                          Imipenem susceptibility test by minimum inhibitory con

centration           1    

                                        NRG        

 

                          Ceftazidime susceptibility test by minimum inhibitory 

concentration           <=

                                        NRG        

 

                                        Dirithromycin susceptibility test by dis

k diffusion - 19 13:07         

 

                          Oxacillin susceptibility test by minimum inhibitory co

ncentration           1   

                                        NRG        

 

                          Clindamycin susceptibility test by minimum inhibitory 

concentration           > 

                                        NRG        

 

                          Erythromycin susceptibility test by minimum inhibitory

 concentration           >

                                        NRG        

 

                          Vancomycin susceptibility test by minimum inhibitory c

oncentration           1  

                                        NRG        

 

                          Levofloxacin susceptibility test by minimum inhibitory

 concentration           

<=                                      NRG        

 

                          Rifampin susceptibility test by minimum inhibitory con

centration           <=   

                                        NRG        

 

                          Cefazolin susceptibility test by minimum inhibitory co

ncentration           R   

                                        NRG        

 

                          Linezolid susceptibility test by minimum inhibitory co

ncentration           <=  

                                        NRG        

 

                          Penicillin G susceptibility test by minimum inhibitory

 concentration           1

                                        NRG        

 

                          Moxifloxacin susceptibility test by minimum inhibitory

 concentration           S

                                        NRG        

 

                    Minocycline susc KODI           <=                  NRG      

  

 

                                        Gram stain microscopy - 20 12:56  

       

 

                    Gram stain microscopy           No bacteria seen            

NRG        

 

                                        Bacteria identification in wound by cult

ure - 20 12:56         

 

                    Bacteria identification in wound by culture           [id number]

04            NRG   

     

 

                    FREE TEXT EXTERNAL           SUSCEPTIBILITY REPORTED  17:

15            NRG   

     

 

                    QUANTITY OF GROWTH           Scant Growth            NRG    

    

 

                    FREE TEXT ENTRY 2           PRELIMIN RAPID ID TEST AT [facility]  7:40            

NRG        

 

                    FREE TEXT ENTRY 3           RML CONFIRMED ID  16:[id number]     

       NRG        

 

                          CALL POSITIVES (F1 HELP)           CALLED TO REBECCA/WD [facility]

RE  8:45 BY ST        

                                        NRG        

 

                    PBP2                PRESUMPTIVE MRSA; SCREENING AT [facility]      

      NRG        

 

                    FREE TEXT ENTRY 4           RML CONFIRMED ID  16:[id number]     

       NRG        

 

                                        Dirithromycin susceptibility test by dis

k diffusion - 20 12:56         

 

                          Oxacillin susceptibility test by minimum inhibitory co

ncentration           >   

                                        NRG        

 

                          Clindamycin susceptibility test by minimum inhibitory 

concentration           > 

                                        NRG        

 

                          Erythromycin susceptibility test by minimum inhibitory

 concentration           >

                                        NRG        

 

                                        Trimethoprim/sulfamethoxazole susceptibi

lity test by minimum 

inhibitoryconcentration           >                         NRG        

 

                          Vancomycin susceptibility test by minimum inhibitory c

oncentration           1  

                                        NRG        

 

                          Levofloxacin susceptibility test by minimum inhibitory

 concentration           4

                                        NRG        

 

                          Rifampin susceptibility test by minimum inhibitory con

centration           <=   

                                        NRG        

 

                          Cefazolin susceptibility test by minimum inhibitory co

ncentration           >   

                                        NRG        

 

                          Linezolid susceptibility test by minimum inhibitory co

ncentration           <=  

                                        NRG        

 

                          Penicillin G susceptibility test by minimum inhibitory

 concentration           >

                                        NRG        

 

                          Moxifloxacin susceptibility test by minimum inhibitory

 concentration           1

                                        NRG        

 

                    Minocycline susc KODI           <=                  NRG      

  

 

                                        Dirithromycin susceptibility test by dis

k diffusion - 20 12:56         

 

                          Gentamicin susceptibility test by minimum inhibitory c

oncentration           <= 

                                        NRG        

 

                          Levofloxacin susceptibility test by minimum inhibitory

 concentration           

<=                                      NRG        

 

                          Tobramycin susceptibility test by minimum inhibitory c

oncentration           <= 

                                        NRG        

 

                                        Piperacillin/tazobactam susceptibility t

est by minimum inhibitory concentration 

                          =                         NRG        

 

                          Ciprofloxacin susceptibility test by minimum inhibitor

y concentration           

<=                                      NRG        

 

                          Meropenem susceptibility test by minimum inhibitory co

ncentration           <=  

                                        NRG        

 

                          Aztreonam susceptibility test by minimum inhibitory co

ncentration           8   

                                        NRG        

 

                          Cefepime susceptibility test by minimum inhibitory con

centration           2    

                                        NRG        

 

                          Imipenem susceptibility test by minimum inhibitory con

centration           1    

                                        NRG        

 

                          Ceftazidime susceptibility test by minimum inhibitory 

concentration           <=

                                        NRG        

 

                                        Complete blood count (CBC) with automate

d white blood cell (WBC) differential - 

20 16:36         

 

                          Blood leukocytes automated count (number/volume)      

     14.0 10*3/uL         

                                        4.3-11.0        

 

                          Blood erythrocytes automated count (number/volume)    

       3.78 10*6/uL       

                                        4.35-5.85        

 

                    Venous blood hemoglobin measurement (mass/volume)           

11.8 g/dL           

13.3-17.7        

 

                    Blood hematocrit (volume fraction)           37 %           

     40-54        

 

                    Automated erythrocyte mean corpuscular volume           99 [

foz_us]           

80-99        

 

                                        Automated erythrocyte mean corpuscular h

emoglobin (mass per erythrocyte)        

                          31 pg                     25-34        

 

                                        Automated erythrocyte mean corpuscular h

emoglobin concentration measurement 

(mass/volume)             32 g/dL                   32-36        

 

                    Automated erythrocyte distribution width ratio           16.

1 %              10.0-

14.5        

 

                    Automated blood platelet count (count/volume)           129 

10*3/uL           

130-400        

 

                          Automated blood platelet mean volume measurement      

     12.3 [foz_us]        

                                        7.4-10.4        

 

                    Automated blood neutrophils/100 leukocytes           81 %   

             42-75       

 

 

                    Automated blood lymphocytes/100 leukocytes           11 %   

             12-44       

 

 

                    Blood monocytes/100 leukocytes           6 %                

 0-12        

 

                    Automated blood eosinophils/100 leukocytes           2 %    

             0-10        

 

                    Automated blood basophils/100 leukocytes           0 %      

           0-10        

 

                    Blood neutrophils automated count (number/volume)           

11.3 10*3           

1.8-7.8        

 

                    Blood lymphocytes automated count (number/volume)           

1.6 10*3            

1.0-4.0        

 

                    Blood monocytes automated count (number/volume)           0.

8 10*3            

0.0-1.0        

 

                    Automated eosinophil count           0.3 10*3/uL           0

.0-0.3        

 

                    Automated blood basophil count (count/volume)           0.1 

10*3/uL           

0.0-0.1        

 

                                        Blood lactic acid measurement (moles/vol

ume) - 20 16:36         

 

                    Blood lactic acid measurement (moles/volume)           1.48 

mmol/L           

0.50-2.00        

 

                                        Comprehensive metabolic panel - 20

 16:36         

 

                          Serum or plasma sodium measurement (moles/volume)     

      137 mmol/L          

                                        135-145        

 

                          Serum or plasma potassium measurement (moles/volume)  

         4.9 mmol/L       

                                        3.6-5.0        

 

                          Serum or plasma chloride measurement (moles/volume)   

        102 mmol/L        

                                                

 

                    Carbon dioxide           27 mmol/L           21-32        

 

                          Serum or plasma anion gap determination (moles/volume)

           8 mmol/L       

                                        5-14        

 

                          Serum or plasma urea nitrogen measurement (mass/volume

)           23 mg/dL      

                                        7-18        

 

                          Serum or plasma creatinine measurement (mass/volume)  

         1.15 mg/dL       

                                        0.60-1.30        

 

                    Serum or plasma urea nitrogen/creatinine mass ratio         

  20                  NRG 

       

 

                                        Serum or plasma creatinine measurement w

ith calculation of estimated glomerular 

filtration rate           >                         NRG        

 

                    Serum or plasma glucose measurement (mass/volume)           

177 mg/dL           

        

 

                    Serum or plasma calcium measurement (mass/volume)           

9.5 mg/dL           

8.5-10.1        

 

                          Serum or plasma total bilirubin measurement (mass/volu

me)           0.4 mg/dL   

                                        0.1-1.0        

 

                                        Serum or plasma alkaline phosphatase juarez

surement (enzymatic activity/volume)    

                          131 U/L                           

 

                                        Serum or plasma aspartate aminotransfera

se measurement (enzymatic 

activity/volume)           63 U/L                    5-34        

 

                                        Serum or plasma alanine aminotransferase

 measurement (enzymatic activity/volume)

                          73 U/L                    0-55        

 

                    Serum or plasma protein measurement (mass/volume)           

7.7 g/dL            

6.4-8.2        

 

                    Serum or plasma albumin measurement (mass/volume)           

3.8 g/dL            

3.2-4.5        

 

                    CALCIUM CORRECTED           9.7 mg/dL           8.5-10.1    

    

 

                                        Bacterial blood culture - 20 16:36

         

 

                    Bacterial blood culture           NG                  NRG   

     

 

                                        Bacterial blood culture - 20 16:43

         

 

                    Bacterial blood culture           NG                  NRG   

     

 

                                        Gram stain microscopy - 20 16:50  

       

 

                          Gram stain microscopy           Moderate Gram positive

 cocci in clusters        

                                        NRG        

 

                                        Bacteria identification in wound by cult

ure - 20 16:50         

 

                    Bacteria identification in wound by culture           [id number]

04            NRG   

     

 

                    FREE TEXT EXTERNAL           TESTING IN PROGRESS            

NRG        

 

                    QUANTITY OF GROWTH           Rare                NRG        

 

                    MRSA AGAR           ID CONFIRMED BY RML 20 14:05        

    NRG        

 

                    FREE TEXT ENTRY 2           RML REPORTED ID  11:[id number]       

     NRG        

 

                    FREE TEXT ENTRY 3           PRESUMPTIVE MRSA; SCREENING AT [facility]

CP            NRG   

     

 

                    CALL POSITIVES (F1 HELP)           07:45 BY EDIL FLORES        

    NRG        

 

                    PBP2                CALLED TO [facility] ON 4TH FLOOR       

      NRG        

 

                                        Dirithromycin susceptibility test by dis

k diffusion - 20 16:50         

 

                          Oxacillin susceptibility test by minimum inhibitory co

ncentration           >   

                                        NRG        

 

                          Clindamycin susceptibility test by minimum inhibitory 

concentration           > 

                                        NRG        

 

                          Erythromycin susceptibility test by minimum inhibitory

 concentration           >

                                        NRG        

 

                                        Trimethoprim/sulfamethoxazole susceptibi

lity test by minimum 

inhibitoryconcentration           >                         NRG        

 

                          Vancomycin susceptibility test by minimum inhibitory c

oncentration           1  

                                        NRG        

 

                          Levofloxacin susceptibility test by minimum inhibitory

 concentration           4

                                        NRG        

 

                          Rifampin susceptibility test by minimum inhibitory con

centration           <=   

                                        NRG        

 

                          Cefazolin susceptibility test by minimum inhibitory co

ncentration           >   

                                        NRG        

 

                          Linezolid susceptibility test by minimum inhibitory co

ncentration           2   

                                        NRG        

 

                          Penicillin G susceptibility test by minimum inhibitory

 concentration           >

                                        NRG        

 

                          Moxifloxacin susceptibility test by minimum inhibitory

 concentration           2

                                        NRG        

 

                    Minocycline susc KODI           <=                  NRG      

  

 

                                        Capillary blood glucose measurement by g

lucometer (mass/volume) - 20 20:43

         

 

                          Capillary blood glucose measurement by glucometer (mas

s/volume)           221 

mg/dL                                           

 

                                        Complete blood count (CBC) with automate

d white blood cell (WBC) differential - 

20 05:13         

 

                          Blood leukocytes automated count (number/volume)      

     11.5 10*3/uL         

                                        4.3-11.0        

 

                          Blood erythrocytes automated count (number/volume)    

       3.45 10*6/uL       

                                        4.35-5.85        

 

                    Venous blood hemoglobin measurement (mass/volume)           

10.8 g/dL           

13.3-17.7        

 

                    Blood hematocrit (volume fraction)           35 %           

     40-54        

 

                    Automated erythrocyte mean corpuscular volume           100 

[foz_us]           

80-99        

 

                                        Automated erythrocyte mean corpuscular h

emoglobin (mass per erythrocyte)        

                          31 pg                     25-34        

 

                                        Automated erythrocyte mean corpuscular h

emoglobin concentration measurement 

(mass/volume)             31 g/dL                   32-36        

 

                    Automated erythrocyte distribution width ratio           15.

8 %              10.0-

14.5        

 

                    Automated blood platelet count (count/volume)           130 

10*3/uL           

130-400        

 

                          Automated blood platelet mean volume measurement      

     12.5 [foz_us]        

                                        7.4-10.4        

 

                    Automated blood neutrophils/100 leukocytes           78 %   

             42-75       

 

 

                    Automated blood lymphocytes/100 leukocytes           14 %   

             12-44       

 

 

                    Blood monocytes/100 leukocytes           6 %                

 0-12        

 

                    Automated blood eosinophils/100 leukocytes           3 %    

             0-10        

 

                    Automated blood basophils/100 leukocytes           0 %      

           0-10        

 

                    Blood neutrophils automated count (number/volume)           

9.0 10*3            

1.8-7.8        

 

                    Blood lymphocytes automated count (number/volume)           

1.6 10*3            

1.0-4.0        

 

                    Blood monocytes automated count (number/volume)           0.

7 10*3            

0.0-1.0        

 

                    Automated eosinophil count           0.3 10*3/uL           0

.0-0.3        

 

                    Automated blood basophil count (count/volume)           0.0 

10*3/uL           

0.0-0.1        

 

                                        Comprehensive metabolic panel - 20

 05:13         

 

                          Serum or plasma sodium measurement (moles/volume)     

      137 mmol/L          

                                        135-145        

 

                          Serum or plasma potassium measurement (moles/volume)  

         5.2 mmol/L       

                                        3.6-5.0        

 

                          Serum or plasma chloride measurement (moles/volume)   

        103 mmol/L        

                                                

 

                    Carbon dioxide           25 mmol/L           21-32        

 

                          Serum or plasma anion gap determination (moles/volume)

           9 mmol/L       

                                        5-14        

 

                          Serum or plasma urea nitrogen measurement (mass/volume

)           26 mg/dL      

                                        7-18        

 

                          Serum or plasma creatinine measurement (mass/volume)  

         1.20 mg/dL       

                                        0.60-1.30        

 

                    Serum or plasma urea nitrogen/creatinine mass ratio         

  22                  NRG 

       

 

                                        Serum or plasma creatinine measurement w

ith calculation of estimated glomerular 

filtration rate           60                        NRG        

 

                    Serum or plasma glucose measurement (mass/volume)           

179 mg/dL           

        

 

                    Serum or plasma calcium measurement (mass/volume)           

9.0 mg/dL           

8.5-10.1        

 

                          Serum or plasma total bilirubin measurement (mass/volu

me)           0.3 mg/dL   

                                        0.1-1.0        

 

                                        Serum or plasma alkaline phosphatase juarez

surement (enzymatic activity/volume)    

                          116 U/L                           

 

                                        Serum or plasma aspartate aminotransfera

se measurement (enzymatic 

activity/volume)           43 U/L                    5-34        

 

                                        Serum or plasma alanine aminotransferase

 measurement (enzymatic activity/volume)

                          66 U/L                    0-55        

 

                    Serum or plasma protein measurement (mass/volume)           

6.8 g/dL            

6.4-8.2        

 

                    Serum or plasma albumin measurement (mass/volume)           

3.3 g/dL            

3.2-4.5        

 

                    CALCIUM CORRECTED           9.6 mg/dL           8.5-10.1    

    

 

                                        Capillary blood glucose measurement by g

lucometer (mass/volume) - 20 05:18

         

 

                          Capillary blood glucose measurement by glucometer (mas

s/volume)           193 

mg/dL                                           

 

                                        Capillary blood glucose measurement by g

lucometer (mass/volume) - 20 11:06

         

 

                          Capillary blood glucose measurement by glucometer (mas

s/volume)           181 

mg/dL                                           

 

                                        Bacteria identification in isolate by an

aerobe culture - 20 15:50         

 

                          Bacteria identification in isolate by anaerobe culture

           NOANA          

                                        NRG        

 

                                        Gram stain microscopy - 20 15:50  

       

 

                    Gram stain microscopy           Mixed Bacterial Kimberly       

     NRG        

 

                                        Bacteria identification in wound by cult

ure - 20 15:50         

 

                    Bacteria identification in wound by culture           SEE CO

MMEN            NRG 

       

 

                    FREE TEXT EXTERNAL           SUSCEPTIBILITY REPORTED 2/10 14

:30            NRG  

      

 

                    QUANTITY OF GROWTH           .                   NRG        

 

                    FREE TEXT ENTRY 2           PRELIM RAPID ID TEST AT [facility]  

12:15            NRG

        

 

                    FREE TEXT ENTRY 3           RML CONFIRMED ID  15:[id number]      

      NRG        

 

                    FREE TEXT ENTRY 4           RML CONFIRMED ID  12:[id number]      

      NRG        

 

                                        Dirithromycin susceptibility test by dis

k diffusion - 20 15:50         

 

                          Oxacillin susceptibility test by minimum inhibitory co

ncentration           >   

                                        NRG        

 

                          Clindamycin susceptibility test by minimum inhibitory 

concentration           > 

                                        NRG        

 

                          Erythromycin susceptibility test by minimum inhibitory

 concentration           >

                                        NRG        

 

                                        Trimethoprim/sulfamethoxazole susceptibi

lity test by minimum 

inhibitoryconcentration           >                         NRG        

 

                          Vancomycin susceptibility test by minimum inhibitory c

oncentration           1  

                                        NRG        

 

                          Levofloxacin susceptibility test by minimum inhibitory

 concentration           4

                                        NRG        

 

                          Rifampin susceptibility test by minimum inhibitory con

centration           <=   

                                        NRG        

 

                          Cefazolin susceptibility test by minimum inhibitory co

ncentration           >   

                                        NRG        

 

                          Linezolid susceptibility test by minimum inhibitory co

ncentration           <=  

                                        NRG        

 

                          Penicillin G susceptibility test by minimum inhibitory

 concentration           >

                                        NRG        

 

                          Moxifloxacin susceptibility test by minimum inhibitory

 concentration           1

                                        NRG        

 

                    Minocycline susc KODI           <=                  NRG      

  

 

                                        Dirithromycin susceptibility test by dis

k diffusion - 20 15:50         

 

                          Gentamicin susceptibility test by minimum inhibitory c

oncentration           <= 

                                        NRG        

 

                          Levofloxacin susceptibility test by minimum inhibitory

 concentration           

<=                                      NRG        

 

                          Tobramycin susceptibility test by minimum inhibitory c

oncentration           <= 

                                        NRG        

 

                                        Piperacillin/tazobactam susceptibility t

est by minimum inhibitory concentration 

                          =                         NRG        

 

                          Ciprofloxacin susceptibility test by minimum inhibitor

y concentration           

<=                                      NRG        

 

                          Meropenem susceptibility test by minimum inhibitory co

ncentration           <=  

                                        NRG        

 

                          Aztreonam susceptibility test by minimum inhibitory co

ncentration           8   

                                        NRG        

 

                          Cefepime susceptibility test by minimum inhibitory con

centration           4    

                                        NRG        

 

                          Imipenem susceptibility test by minimum inhibitory con

centration           2    

                                        NRG        

 

                          Ceftazidime susceptibility test by minimum inhibitory 

concentration           4 

                                        NRG        

 

                                        Capillary blood glucose measurement by g

lucometer (mass/volume) - 20 17:24

         

 

                          Capillary blood glucose measurement by glucometer (mas

s/volume)           166 

mg/dL                                           

 

                                        Capillary blood glucose measurement by g

lucometer (mass/volume) - 20 20:15

         

 

                          Capillary blood glucose measurement by glucometer (mas

s/volume)           236 

mg/dL                                           

 

                                        Capillary blood glucose measurement by g

lucometer (mass/volume) - 20 05:36

         

 

                          Capillary blood glucose measurement by glucometer (mas

s/volume)           157 

mg/dL                                           

 

                                        Complete blood count (CBC) with automate

d white blood cell (WBC) differential - 

20 07:39         

 

                          Blood leukocytes automated count (number/volume)      

     9.2 10*3/uL          

                                        4.3-11.0        

 

                          Blood erythrocytes automated count (number/volume)    

       3.40 10*6/uL       

                                        4.35-5.85        

 

                    Venous blood hemoglobin measurement (mass/volume)           

10.7 g/dL           

13.3-17.7        

 

                    Blood hematocrit (volume fraction)           34 %           

     40-54        

 

                    Automated erythrocyte mean corpuscular volume           101 

[foz_us]           

80-99        

 

                                        Automated erythrocyte mean corpuscular h

emoglobin (mass per erythrocyte)        

                          31 pg                     25-34        

 

                                        Automated erythrocyte mean corpuscular h

emoglobin concentration measurement 

(mass/volume)             31 g/dL                   32-36        

 

                    Automated erythrocyte distribution width ratio           16.

0 %              10.0-

14.5        

 

                    Automated blood platelet count (count/volume)           140 

10*3/uL           

130-400        

 

                          Automated blood platelet mean volume measurement      

     12.0 [foz_us]        

                                        7.4-10.4        

 

                    Automated blood neutrophils/100 leukocytes           72 %   

             42-75       

 

 

                    Automated blood lymphocytes/100 leukocytes           19 %   

             12-44       

 

 

                    Blood monocytes/100 leukocytes           5 %                

 0-12        

 

                    Automated blood eosinophils/100 leukocytes           3 %    

             0-10        

 

                    Automated blood basophils/100 leukocytes           1 %      

           0-10        

 

                    Blood neutrophils automated count (number/volume)           

6.6 10*3            

1.8-7.8        

 

                    Blood lymphocytes automated count (number/volume)           

1.8 10*3            

1.0-4.0        

 

                    Blood monocytes automated count (number/volume)           0.

5 10*3            

0.0-1.0        

 

                    Automated eosinophil count           0.3 10*3/uL           0

.0-0.3        

 

                    Automated blood basophil count (count/volume)           0.1 

10*3/uL           

0.0-0.1        

 

                                        Comprehensive metabolic panel - 20

 07:39         

 

                          Serum or plasma sodium measurement (moles/volume)     

      138 mmol/L          

                                        135-145        

 

                          Serum or plasma potassium measurement (moles/volume)  

         4.8 mmol/L       

                                        3.6-5.0        

 

                          Serum or plasma chloride measurement (moles/volume)   

        102 mmol/L        

                                                

 

                    Carbon dioxide           27 mmol/L           21-32        

 

                          Serum or plasma anion gap determination (moles/volume)

           9 mmol/L       

                                        5-14        

 

                          Serum or plasma urea nitrogen measurement (mass/volume

)           20 mg/dL      

                                        7-18        

 

                          Serum or plasma creatinine measurement (mass/volume)  

         1.17 mg/dL       

                                        0.60-1.30        

 

                    Serum or plasma urea nitrogen/creatinine mass ratio         

  17                  NRG 

       

 

                                        Serum or plasma creatinine measurement w

ith calculation of estimated glomerular 

filtration rate           >                         NRG        

 

                    Serum or plasma glucose measurement (mass/volume)           

147 mg/dL           

        

 

                    Serum or plasma calcium measurement (mass/volume)           

9.0 mg/dL           

8.5-10.1        

 

                          Serum or plasma total bilirubin measurement (mass/volu

me)           0.3 mg/dL   

                                        0.1-1.0        

 

                                        Serum or plasma alkaline phosphatase juarez

surement (enzymatic activity/volume)    

                          136 U/L                           

 

                                        Serum or plasma aspartate aminotransfera

se measurement (enzymatic 

activity/volume)           42 U/L                    5-34        

 

                                        Serum or plasma alanine aminotransferase

 measurement (enzymatic activity/volume)

                          65 U/L                    0-55        

 

                    Serum or plasma protein measurement (mass/volume)           

7.1 g/dL            

6.4-8.2        

 

                    Serum or plasma albumin measurement (mass/volume)           

3.5 g/dL            

3.2-4.5        

 

                    CALCIUM CORRECTED           9.4 mg/dL           8.5-10.1    

    

 

                                        Vancomycin trough - 20 07:39      

   

 

                    Vancomycin trough           22.4 ug/mL           10.0-20.0  

      

 

                                        Capillary blood glucose measurement by g

lucometer (mass/volume) - 20 10:58

         

 

                          Capillary blood glucose measurement by glucometer (mas

s/volume)           164 

mg/dL                                           

 

                                        Capillary blood glucose measurement by g

lucometer (mass/volume) - 20 15:55

         

 

                          Capillary blood glucose measurement by glucometer (mas

s/volume)           243 

mg/dL                                           

 

                                        Vancomycin trough - 20 19:03      

   

 

                    Vancomycin trough           15.6 ug/mL           10.0-20.0  

      

 

                                        Capillary blood glucose measurement by g

lucometer (mass/volume) - 20 20:27

         

 

                          Capillary blood glucose measurement by glucometer (mas

s/volume)           201 

mg/dL                                           

 

                                        Capillary blood glucose measurement by g

lucometer (mass/volume) - 20 05:31

         

 

                          Capillary blood glucose measurement by glucometer (mas

s/volume)           288 

mg/dL                                           

 

                                        Complete blood count (CBC) with automate

d white blood cell (WBC) differential - 

20 06:18         

 

                          Blood leukocytes automated count (number/volume)      

     9.4 10*3/uL          

                                        4.3-11.0        

 

                          Blood erythrocytes automated count (number/volume)    

       3.62 10*6/uL       

                                        4.35-5.85        

 

                    Venous blood hemoglobin measurement (mass/volume)           

11.3 g/dL           

13.3-17.7        

 

                    Blood hematocrit (volume fraction)           36 %           

     40-54        

 

                    Automated erythrocyte mean corpuscular volume           99 [

foz_us]           

80-99        

 

                                        Automated erythrocyte mean corpuscular h

emoglobin (mass per erythrocyte)        

                          31 pg                     25-34        

 

                                        Automated erythrocyte mean corpuscular h

emoglobin concentration measurement 

(mass/volume)             32 g/dL                   32-36        

 

                    Automated erythrocyte distribution width ratio           15.

4 %              10.0-

14.5        

 

                    Automated blood platelet count (count/volume)           146 

10*3/uL           

130-400        

 

                          Automated blood platelet mean volume measurement      

     12.5 [foz_us]        

                                        7.4-10.4        

 

                    Automated blood neutrophils/100 leukocytes           70 %   

             42-75       

 

 

                    Automated blood lymphocytes/100 leukocytes           21 %   

             12-44       

 

 

                    Blood monocytes/100 leukocytes           6 %                

 0-12        

 

                    Automated blood eosinophils/100 leukocytes           3 %    

             0-10        

 

                    Automated blood basophils/100 leukocytes           1 %      

           0-10        

 

                    Blood neutrophils automated count (number/volume)           

6.6 10*3            

1.8-7.8        

 

                    Blood lymphocytes automated count (number/volume)           

1.9 10*3            

1.0-4.0        

 

                    Blood monocytes automated count (number/volume)           0.

6 10*3            

0.0-1.0        

 

                    Automated eosinophil count           0.3 10*3/uL           0

.0-0.3        

 

                    Automated blood basophil count (count/volume)           0.1 

10*3/uL           

0.0-0.1        

 

                                        Comprehensive metabolic panel - 20

 06:18         

 

                          Serum or plasma sodium measurement (moles/volume)     

      134 mmol/L          

                                        135-145        

 

                          Serum or plasma potassium measurement (moles/volume)  

         4.7 mmol/L       

                                        3.6-5.0        

 

                          Serum or plasma chloride measurement (moles/volume)   

        100 mmol/L        

                                                

 

                    Carbon dioxide           24 mmol/L           21-32        

 

                          Serum or plasma anion gap determination (moles/volume)

           10 mmol/L      

                                        5-14        

 

                          Serum or plasma urea nitrogen measurement (mass/volume

)           18 mg/dL      

                                        7-18        

 

                          Serum or plasma creatinine measurement (mass/volume)  

         1.09 mg/dL       

                                        0.60-1.30        

 

                    Serum or plasma urea nitrogen/creatinine mass ratio         

  17                  NRG 

       

 

                                        Serum or plasma creatinine measurement w

ith calculation of estimated glomerular 

filtration rate           >                         NRG        

 

                    Serum or plasma glucose measurement (mass/volume)           

235 mg/dL           

        

 

                    Serum or plasma calcium measurement (mass/volume)           

9.3 mg/dL           

8.5-10.1        

 

                          Serum or plasma total bilirubin measurement (mass/volu

me)           0.2 mg/dL   

                                        0.1-1.0        

 

                                        Serum or plasma alkaline phosphatase juarez

surement (enzymatic activity/volume)    

                          122 U/L                           

 

                                        Serum or plasma aspartate aminotransfera

se measurement (enzymatic 

activity/volume)           24 U/L                    5-34        

 

                                        Serum or plasma alanine aminotransferase

 measurement (enzymatic activity/volume)

                          53 U/L                    0-55        

 

                    Serum or plasma protein measurement (mass/volume)           

7.2 g/dL            

6.4-8.2        

 

                    Serum or plasma albumin measurement (mass/volume)           

3.6 g/dL            

3.2-4.5        

 

                    CALCIUM CORRECTED           9.6 mg/dL           8.5-10.1    

    

 

                                        Capillary blood glucose measurement by g

lucometer (mass/volume) - 20 11:07

         

 

                          Capillary blood glucose measurement by glucometer (mas

s/volume)           120 

mg/dL                                           

 

                                        Capillary blood glucose measurement by g

lucometer (mass/volume) - 20 16:06

         

 

                          Capillary blood glucose measurement by glucometer (mas

s/volume)           177 

mg/dL                                           

 

                                        Capillary blood glucose measurement by g

lucometer (mass/volume) - 20 20:16

         

 

                          Capillary blood glucose measurement by glucometer (mas

s/volume)           195 

mg/dL                                           

 

                                        Capillary blood glucose measurement by g

lucometer (mass/volume) - 02/10/20 05:48

         

 

                          Capillary blood glucose measurement by glucometer (mas

s/volume)           177 

mg/dL                                           

 

                                        Complete blood count (CBC) with automate

d white blood cell (WBC) differential - 

02/10/20 07:20         

 

                          Blood leukocytes automated count (number/volume)      

     9.9 10*3/uL          

                                        4.3-11.0        

 

                          Blood erythrocytes automated count (number/volume)    

       3.89 10*6/uL       

                                        4.35-5.85        

 

                    Venous blood hemoglobin measurement (mass/volume)           

11.9 g/dL           

13.3-17.7        

 

                    Blood hematocrit (volume fraction)           38 %           

     40-54        

 

                    Automated erythrocyte mean corpuscular volume           98 [

foz_us]           

80-99        

 

                                        Automated erythrocyte mean corpuscular h

emoglobin (mass per erythrocyte)        

                          31 pg                     25-34        

 

                                        Automated erythrocyte mean corpuscular h

emoglobin concentration measurement 

(mass/volume)             31 g/dL                   32-36        

 

                    Automated erythrocyte distribution width ratio           15.

6 %              10.0-

14.5        

 

                    Automated blood platelet count (count/volume)           171 

10*3/uL           

130-400        

 

                          Automated blood platelet mean volume measurement      

     11.7 [foz_us]        

                                        7.4-10.4        

 

                    Automated blood neutrophils/100 leukocytes           75 %   

             42-75       

 

 

                    Automated blood lymphocytes/100 leukocytes           16 %   

             12-44       

 

 

                    Blood monocytes/100 leukocytes           6 %                

 0-12        

 

                    Automated blood eosinophils/100 leukocytes           2 %    

             0-10        

 

                    Automated blood basophils/100 leukocytes           1 %      

           0-10        

 

                    Blood neutrophils automated count (number/volume)           

7.5 10*3            

1.8-7.8        

 

                    Blood lymphocytes automated count (number/volume)           

1.6 10*3            

1.0-4.0        

 

                    Blood monocytes automated count (number/volume)           0.

6 10*3            

0.0-1.0        

 

                    Automated eosinophil count           0.2 10*3/uL           0

.0-0.3        

 

                    Automated blood basophil count (count/volume)           0.1 

10*3/uL           

0.0-0.1        

 

                                        Comprehensive metabolic panel - 02/10/20

 07:20         

 

                          Serum or plasma sodium measurement (moles/volume)     

      134 mmol/L          

                                        135-145        

 

                          Serum or plasma potassium measurement (moles/volume)  

         5.4 mmol/L       

                                        3.6-5.0        

 

                          Serum or plasma chloride measurement (moles/volume)   

        98 mmol/L         

                                                

 

                    Carbon dioxide           27 mmol/L           21-32        

 

                          Serum or plasma anion gap determination (moles/volume)

           9 mmol/L       

                                        5-14        

 

                          Serum or plasma urea nitrogen measurement (mass/volume

)           19 mg/dL      

                                        7-18        

 

                          Serum or plasma creatinine measurement (mass/volume)  

         1.14 mg/dL       

                                        0.60-1.30        

 

                    Serum or plasma urea nitrogen/creatinine mass ratio         

  17                  NRG 

       

 

                                        Serum or plasma creatinine measurement w

ith calculation of estimated glomerular 

filtration rate           >                         NRG        

 

                    Serum or plasma glucose measurement (mass/volume)           

159 mg/dL           

        

 

                    Serum or plasma calcium measurement (mass/volume)           

9.9 mg/dL           

8.5-10.1        

 

                          Serum or plasma total bilirubin measurement (mass/volu

me)           0.3 mg/dL   

                                        0.1-1.0        

 

                                        Serum or plasma alkaline phosphatase juarez

surement (enzymatic activity/volume)    

                          121 U/L                           

 

                                        Serum or plasma aspartate aminotransfera

se measurement (enzymatic 

activity/volume)           25 U/L                    5-34        

 

                                        Serum or plasma alanine aminotransferase

 measurement (enzymatic activity/volume)

                          48 U/L                    0-55        

 

                    Serum or plasma protein measurement (mass/volume)           

7.9 g/dL            

6.4-8.2        

 

                    Serum or plasma albumin measurement (mass/volume)           

3.8 g/dL            

3.2-4.5        

 

                    CALCIUM CORRECTED           10.1 mg/dL           8.5-10.1   

     

 

                                        Capillary blood glucose measurement by g

lucometer (mass/volume) - 02/10/20 11:35

         

 

                          Capillary blood glucose measurement by glucometer (mas

s/volume)           158 

mg/dL                                           

 

                                        Capillary blood glucose measurement by g

lucometer (mass/volume) - 02/10/20 17:06

         

 

                          Capillary blood glucose measurement by glucometer (mas

s/volume)           306 

mg/dL                                           

 

                                        Vancomycin trough - 02/10/20 18:00      

   

 

                    Vancomycin trough           18.5 ug/mL           10.0-20.0  

      

 

                                        Capillary blood glucose measurement by g

lucometer (mass/volume) - 02/10/20 20:01

         

 

                          Capillary blood glucose measurement by glucometer (mas

s/volume)           218 

mg/dL                                           

 

                                        Complete blood count (CBC) with automate

d white blood cell (WBC) differential - 

20 05:31         

 

                          Blood leukocytes automated count (number/volume)      

     10.7 10*3/uL         

                                        4.3-11.0        

 

                          Blood erythrocytes automated count (number/volume)    

       3.56 10*6/uL       

                                        4.35-5.85        

 

                    Venous blood hemoglobin measurement (mass/volume)           

11.1 g/dL           

13.3-17.7        

 

                    Blood hematocrit (volume fraction)           35 %           

     40-54        

 

                    Automated erythrocyte mean corpuscular volume           98 [

foz_us]           

80-99        

 

                                        Automated erythrocyte mean corpuscular h

emoglobin (mass per erythrocyte)        

                          31 pg                     25-34        

 

                                        Automated erythrocyte mean corpuscular h

emoglobin concentration measurement 

(mass/volume)             32 g/dL                   32-36        

 

                    Automated erythrocyte distribution width ratio           15.

7 %              10.0-

14.5        

 

                    Automated blood platelet count (count/volume)           161 

10*3/uL           

130-400        

 

                          Automated blood platelet mean volume measurement      

     11.7 [foz_us]        

                                        7.4-10.4        

 

                    Automated blood neutrophils/100 leukocytes           74 %   

             42-75       

 

 

                    Automated blood lymphocytes/100 leukocytes           18 %   

             12-44       

 

 

                    Blood monocytes/100 leukocytes           6 %                

 0-12        

 

                    Automated blood eosinophils/100 leukocytes           2 %    

             0-10        

 

                    Automated blood basophils/100 leukocytes           1 %      

           0-10        

 

                    Blood neutrophils automated count (number/volume)           

8.0 10*3            

1.8-7.8        

 

                    Blood lymphocytes automated count (number/volume)           

1.9 10*3            

1.0-4.0        

 

                    Blood monocytes automated count (number/volume)           0.

6 10*3            

0.0-1.0        

 

                    Automated eosinophil count           0.2 10*3/uL           0

.0-0.3        

 

                    Automated blood basophil count (count/volume)           0.1 

10*3/uL           

0.0-0.1        

 

                                        Comprehensive metabolic panel - 20

 05:31         

 

                          Serum or plasma sodium measurement (moles/volume)     

      134 mmol/L          

                                        135-145        

 

                          Serum or plasma potassium measurement (moles/volume)  

         5.2 mmol/L       

                                        3.6-5.0        

 

                          Serum or plasma chloride measurement (moles/volume)   

        98 mmol/L         

                                                

 

                    Carbon dioxide           26 mmol/L           21-32        

 

                          Serum or plasma anion gap determination (moles/volume)

           10 mmol/L      

                                        5-14        

 

                          Serum or plasma urea nitrogen measurement (mass/volume

)           22 mg/dL      

                                        7-18        

 

                          Serum or plasma creatinine measurement (mass/volume)  

         1.20 mg/dL       

                                        0.60-1.30        

 

                    Serum or plasma urea nitrogen/creatinine mass ratio         

  18                  NRG 

       

 

                                        Serum or plasma creatinine measurement w

ith calculation of estimated glomerular 

filtration rate           60                        NRG        

 

                    Serum or plasma glucose measurement (mass/volume)           

157 mg/dL           

        

 

                    Serum or plasma calcium measurement (mass/volume)           

9.5 mg/dL           

8.5-10.1        

 

                          Serum or plasma total bilirubin measurement (mass/volu

me)           0.3 mg/dL   

                                        0.1-1.0        

 

                                        Serum or plasma alkaline phosphatase juraez

surement (enzymatic activity/volume)    

                          99 U/L                            

 

                                        Serum or plasma aspartate aminotransfera

se measurement (enzymatic 

activity/volume)           20 U/L                    5-34        

 

                                        Serum or plasma alanine aminotransferase

 measurement (enzymatic activity/volume)

                          38 U/L                    0-55        

 

                    Serum or plasma protein measurement (mass/volume)           

7.3 g/dL            

6.4-8.2        

 

                    Serum or plasma albumin measurement (mass/volume)           

3.6 g/dL            

3.2-4.5        

 

                    CALCIUM CORRECTED           9.8 mg/dL           8.5-10.1    

    

 

                                        Capillary blood glucose measurement by g

lucometer (mass/volume) - 20 11:03

         

 

                          Capillary blood glucose measurement by glucometer (mas

s/volume)           129 

mg/dL                                           

 

                                        Capillary blood glucose measurement by g

lucometer (mass/volume) - 20 15:45

         

 

                          Capillary blood glucose measurement by glucometer (mas

s/volume)           156 

mg/dL                                           

 

                                        Capillary blood glucose measurement by g

lucometer (mass/volume) - 20 20:44

         

 

                          Capillary blood glucose measurement by glucometer (mas

s/volume)           206 

mg/dL                                           

 

                                        Complete blood count (CBC) with automate

d white blood cell (WBC) differential - 

20 05:40         

 

                          Blood leukocytes automated count (number/volume)      

     11.8 10*3/uL         

                                        4.3-11.0        

 

                          Blood erythrocytes automated count (number/volume)    

       3.49 10*6/uL       

                                        4.35-5.85        

 

                    Venous blood hemoglobin measurement (mass/volume)           

10.7 g/dL           

13.3-17.7        

 

                    Blood hematocrit (volume fraction)           34 %           

     40-54        

 

                    Automated erythrocyte mean corpuscular volume           97 [

foz_us]           

80-99        

 

                                        Automated erythrocyte mean corpuscular h

emoglobin (mass per erythrocyte)        

                          31 pg                     25-34        

 

                                        Automated erythrocyte mean corpuscular h

emoglobin concentration measurement 

(mass/volume)             32 g/dL                   32-36        

 

                    Automated erythrocyte distribution width ratio           15.

7 %              10.0-

14.5        

 

                    Automated blood platelet count (count/volume)           160 

10*3/uL           

130-400        

 

                          Automated blood platelet mean volume measurement      

     12.1 [foz_us]        

                                        7.4-10.4        

 

                    Automated blood neutrophils/100 leukocytes           73 %   

             42-75       

 

 

                    Automated blood lymphocytes/100 leukocytes           19 %   

             12-44       

 

 

                    Blood monocytes/100 leukocytes           5 %                

 0-12        

 

                    Automated blood eosinophils/100 leukocytes           2 %    

             0-10        

 

                    Automated blood basophils/100 leukocytes           1 %      

           0-10        

 

                    Blood neutrophils automated count (number/volume)           

8.6 10*3            

1.8-7.8        

 

                    Blood lymphocytes automated count (number/volume)           

2.3 10*3            

1.0-4.0        

 

                    Blood monocytes automated count (number/volume)           0.

6 10*3            

0.0-1.0        

 

                    Automated eosinophil count           0.2 10*3/uL           0

.0-0.3        

 

                    Automated blood basophil count (count/volume)           0.1 

10*3/uL           

0.0-0.1        

 

                                        Comprehensive metabolic panel - 20

 05:40         

 

                          Serum or plasma sodium measurement (moles/volume)     

      130 mmol/L          

                                        135-145        

 

                          Serum or plasma potassium measurement (moles/volume)  

         5.5 mmol/L       

                                        3.6-5.0        

 

                          Serum or plasma chloride measurement (moles/volume)   

        97 mmol/L         

                                                

 

                    Carbon dioxide           24 mmol/L           21-32        

 

                          Serum or plasma anion gap determination (moles/volume)

           9 mmol/L       

                                        5-14        

 

                          Serum or plasma urea nitrogen measurement (mass/volume

)           25 mg/dL      

                                        7-18        

 

                          Serum or plasma creatinine measurement (mass/volume)  

         1.22 mg/dL       

                                        0.60-1.30        

 

                    Serum or plasma urea nitrogen/creatinine mass ratio         

  20                  NRG 

       

 

                                        Serum or plasma creatinine measurement w

ith calculation of estimated glomerular 

filtration rate           59                        NRG        

 

                    Serum or plasma glucose measurement (mass/volume)           

131 mg/dL           

        

 

                    Serum or plasma calcium measurement (mass/volume)           

9.5 mg/dL           

8.5-10.1        

 

                          Serum or plasma total bilirubin measurement (mass/volu

me)           0.3 mg/dL   

                                        0.1-1.0        

 

                                        Serum or plasma alkaline phosphatase juarez

surement (enzymatic activity/volume)    

                          102 U/L                           

 

                                        Serum or plasma aspartate aminotransfera

se measurement (enzymatic 

activity/volume)           22 U/L                    5-34        

 

                                        Serum or plasma alanine aminotransferase

 measurement (enzymatic activity/volume)

                          29 U/L                    0-55        

 

                    Serum or plasma protein measurement (mass/volume)           

7.6 g/dL            

6.4-8.2        

 

                    Serum or plasma albumin measurement (mass/volume)           

3.8 g/dL            

3.2-4.5        

 

                    CALCIUM CORRECTED           9.7 mg/dL           8.5-10.1    

    

 

                                        Capillary blood glucose measurement by g

lucometer (mass/volume) - 20 06:43

         

 

                          Capillary blood glucose measurement by glucometer (mas

s/volume)           145 

mg/dL                                           

 

                                        Gram stain microscopy - 20 13:17  

       

 

                                        Bacteria identification in wound by cult

ure - 20 13:17         

 

                    Bacteria identification in wound by culture           [id number]

04            NRG   

     

 

                    FREE TEXT EXTERNAL           SUSCEPTIBILITIES REPORTED 3-7-

0, 1039            

NRG        

 

                    QUANTITY OF GROWTH           Many                NRG        

 

                    [facility] AGAR           CLINDAMYCIN RESISTANT WITHOUT INDUCTION 

           NRG      

  

 

                    FREE TEXT ENTRY 2           PRELIM RAPID ID TEST AT [facility] 3/5 

10:40            NRG

        

 

                    FREE TEXT ENTRY 3           RML CONFIRMED ID 3/5 14:05      

      NRG        

 

                          CALL POSITIVES (F1 HELP)           [facility] CALLED TO REBECCA/

WC 3-5-20 AT 1101/KD      

                                        NRG        

 

                    PBP2                RESISTANT ORGANISM/CONTACT PERCAUTIONS  

          NRG        

 

                    FREE TEXT ENTRY 4           RML CONFIRMED ID 3/5 14:05.[facility] 

3-7 [id number]            

NRG        

 

                                        Dirithromycin susceptibility test by dis

k diffusion - 20 13:17         

 

                          Oxacillin susceptibility test by minimum inhibitory co

ncentration           >   

                                        NRG        

 

                          Clindamycin susceptibility test by minimum inhibitory 

concentration           > 

                                        NRG        

 

                          Erythromycin susceptibility test by minimum inhibitory

 concentration           >

                                        NRG        

 

                                        Trimethoprim/sulfamethoxazole susceptibi

lity test by minimum 

inhibitoryconcentration           >                         NRG        

 

                          Vancomycin susceptibility test by minimum inhibitory c

oncentration           1  

                                        NRG        

 

                          Levofloxacin susceptibility test by minimum inhibitory

 concentration           >

                                        NRG        

 

                          Rifampin susceptibility test by minimum inhibitory con

centration           <=   

                                        NRG        

 

                          Cefazolin susceptibility test by minimum inhibitory co

ncentration           >   

                                        NRG        

 

                          Linezolid susceptibility test by minimum inhibitory co

ncentration           2   

                                        NRG        

 

                          Penicillin G susceptibility test by minimum inhibitory

 concentration           >

                                        NRG        

 

                          Moxifloxacin susceptibility test by minimum inhibitory

 concentration           2

                                        NRG        

 

                    Minocycline susc KODI           <=                  NRG      

  

 

                                        Dirithromycin susceptibility test by dis

k diffusion - 20 13:17         

 

                          Gentamicin susceptibility test by minimum inhibitory c

oncentration           <= 

                                        NRG        

 

                          Levofloxacin susceptibility test by minimum inhibitory

 concentration           

<=                                      NRG        

 

                          Tobramycin susceptibility test by minimum inhibitory c

oncentration           <= 

                                        NRG        

 

                                        Piperacillin/tazobactam susceptibility t

est by minimum inhibitory concentration 

                          =                         NRG        

 

                          Ciprofloxacin susceptibility test by minimum inhibitor

y concentration           

<=                                      NRG        

 

                          Meropenem susceptibility test by minimum inhibitory co

ncentration           <=  

                                        NRG        

 

                          Aztreonam susceptibility test by minimum inhibitory co

ncentration           8   

                                        NRG        

 

                          Cefepime susceptibility test by minimum inhibitory con

centration           4    

                                        NRG        

 

                          Imipenem susceptibility test by minimum inhibitory con

centration           1    

                                        NRG        

 

                          Ceftazidime susceptibility test by minimum inhibitory 

concentration           <=

                                        NRG        

 

                                        Complete blood count (CBC) with automate

d white blood cell (WBC) differential - 

20 20:14         

 

                          Blood leukocytes automated count (number/volume)      

     14.2 10*3/uL         

                                        4.3-11.0        

 

                          Blood erythrocytes automated count (number/volume)    

       3.23 10*6/uL       

                                        4.35-5.85        

 

                    Venous blood hemoglobin measurement (mass/volume)           

10.0 g/dL           

13.3-17.7        

 

                    Blood hematocrit (volume fraction)           33 %           

     40-54        

 

                    Automated erythrocyte mean corpuscular volume           101 

[foz_us]           

80-99        

 

                                        Automated erythrocyte mean corpuscular h

emoglobin (mass per erythrocyte)        

                          31 pg                     25-34        

 

                                        Automated erythrocyte mean corpuscular h

emoglobin concentration measurement 

(mass/volume)             31 g/dL                   32-36        

 

                    Automated erythrocyte distribution width ratio           16.

6 %              10.0-

14.5        

 

                    Automated blood platelet count (count/volume)           124 

10*3/uL           

130-400        

 

                          Automated blood platelet mean volume measurement      

     13.2 [foz_us]        

                                        7.4-10.4        

 

                    Automated blood neutrophils/100 leukocytes           85 %   

             42-75       

 

 

                    Automated blood lymphocytes/100 leukocytes           8 %    

             12-44        

 

                    Blood monocytes/100 leukocytes           6 %                

 0-12        

 

                    Automated blood eosinophils/100 leukocytes           0 %    

             0-10        

 

                    Automated blood basophils/100 leukocytes           1 %      

           0-10        

 

                    Blood neutrophils automated count (number/volume)           

12.0 10*3           

1.8-7.8        

 

                    Blood lymphocytes automated count (number/volume)           

1.1 10*3            

1.0-4.0        

 

                    Blood monocytes automated count (number/volume)           0.

8 10*3            

0.0-1.0        

 

                    Automated eosinophil count           0.0 10*3/uL           0

.0-0.3        

 

                    Automated blood basophil count (count/volume)           0.1 

10*3/uL           

0.0-0.1        

 

                                        Blood lactic acid measurement (moles/vol

ume) - 20 20:14         

 

                    Blood lactic acid measurement (moles/volume)           1.51 

mmol/L           

0.50-2.00        

 

                                        Comprehensive metabolic panel - 20

 20:14         

 

                          Serum or plasma sodium measurement (moles/volume)     

      135 mmol/L          

                                        135-145        

 

                          Serum or plasma potassium measurement (moles/volume)  

         5.6 mmol/L       

                                        3.6-5.0        

 

                          Serum or plasma chloride measurement (moles/volume)   

        102 mmol/L        

                                                

 

                    Carbon dioxide           19 mmol/L           21-32        

 

                          Serum or plasma anion gap determination (moles/volume)

           14 mmol/L      

                                        5-14        

 

                          Serum or plasma urea nitrogen measurement (mass/volume

)           48 mg/dL      

                                        7-18        

 

                          Serum or plasma creatinine measurement (mass/volume)  

         3.84 mg/dL       

                                        0.60-1.30        

 

                    Serum or plasma urea nitrogen/creatinine mass ratio         

  13                  NRG 

       

 

                                        Serum or plasma creatinine measurement w

ith calculation of estimated glomerular 

filtration rate           16                        NRG        

 

                    Serum or plasma glucose measurement (mass/volume)           

211 mg/dL           

        

 

                    Serum or plasma calcium measurement (mass/volume)           

8.0 mg/dL           

8.5-10.1        

 

                          Serum or plasma total bilirubin measurement (mass/volu

me)           0.2 mg/dL   

                                        0.1-1.0        

 

                                        Serum or plasma alkaline phosphatase juarez

surement (enzymatic activity/volume)    

                          91 U/L                            

 

                                        Serum or plasma aspartate aminotransfera

se measurement (enzymatic 

activity/volume)           28 U/L                    5-34        

 

                                        Serum or plasma alanine aminotransferase

 measurement (enzymatic activity/volume)

                          16 U/L                    0-55        

 

                    Serum or plasma protein measurement (mass/volume)           

6.8 g/dL            

6.4-8.2        

 

                    Serum or plasma albumin measurement (mass/volume)           

3.7 g/dL            

3.2-4.5        

 

                    CALCIUM CORRECTED           8.2 mg/dL           8.5-10.1    

    

 

                                        TROPONIN I FS - 20 20:14         

 

                    TROPONIN I FS           < 0.30              <0.30        

 

                                        Serum or plasma salicylates measurement 

(mass/volume) - 20 20:14         

 

                          Serum or plasma salicylates measurement (mass/volume) 

          < mg/dL         

                                        5.0-20.0        

 

                                        Serum or plasma acetaminophen measuremen

t (mass/volume) - 20 20:14        

 

 

                          Serum or plasma acetaminophen measurement (mass/volume

)           < ug/mL       

                                        10-30        

 

                                        Serum or plasma ethanol measurement (mas

s/volume) - 20 20:14         

 

                    Serum or plasma ethanol measurement (mass/volume)           

< mg/dL             

<10        

 

                                        Manual absolute plasma cell count -  20:14         

 

                    Blood monocytes/100 leukocytes           6 %                

 NRG        

 

                    Manual blood segmented neutrophils/100 leukocytes           

86 %                NRG  

      

 

                    Manual blood lymphocytes/100 leukocytes           4 %       

          NRG        

 

                    Blood lymphocytes variant/100 leukocytes           4 %      

           NRG        

 

                    Blood polychromasia detection by light microscopy           

SLIGHT              

NRG        

 

                    Blood anisocytosis detection by light microscopy           S

LIGHT              NRG

        

 

                    Blood ovalocytes detection by light microscopy           SLI

GHT              NRG  

      

 

                    Blood toxic granules detection by light microscopy          

 3+                  NRG  

      

 

                    Blood poikilocytosis detection by light microscopy          

 SLIGHT              

NRG        

 

                    Blood target cells detection by light microscopy           S

LIGHT              NRG

        

 

                    Blood stomatocytes detection by light microscopy           M

ODERATE            

NRG        

 

                          Blood basophilic stippling detection by light microsco

py           SLIGHT       

                                        NRG        

 

                                        Bacterial blood culture - 20 20:14

         

 

                    FREE TEXT EXTERNAL           NO SUSCEPTIBILITY TESTING      

      NRG        

 

                    QUANTITY OF GROWTH           Isolated            NRG        

 

                    Bacterial blood culture           [id number]            NRG  

      

 

                    SUSCEPTIBILITY           (FROM AEROBIC BOTTLE)            NR

G        

 

                    RAPID ID            PRELIM RAPID ID TEST AT [facility]  [id number]  

          NRG        

 

                    ID CONFIRMATION           [facility]ML CONFIRMED ID  [id number]       

     NRG        

 

                                        PROCALCITONIN (PCT) - 20 20:14    

     

 

                    PROCALCITONIN (PCT)           0.31 ng/mL           <0.10    

    

 

                                        Complete urinalysis with reflex to cultu

re - 20 20:25         

 

                    Urine color determination           YELLOW              NRG 

       

 

                    Urine clarity determination           SLIGHTLY CLOUDY       

     NRG        

 

                    Urine pH measurement by test strip           5.0            

     5-9        

 

                    Specific gravity of urine by test strip           >=        

          1.016-1.022     

   

 

                    Urine protein assay by test strip, semi-quantitative        

   1+                  

NEGATIVE        

 

                    Urine glucose detection by automated test strip           NE

GATIVE            

NEGATIVE        

 

                          Erythrocytes detection in urine sediment by light micr

oscopy           TRACE-I  

                                        NEGATIVE        

 

                    Urine ketones detection by automated test strip           TR

ACE               

NEGATIVE        

 

                    Urine nitrite detection by test strip           NEGATIVE    

        NEGATIVE    

    

 

                    Urine total bilirubin detection by test strip           1+  

                NEGATIVE  

      

 

                          Urine urobilinogen measurement by automated test strip

 (mass/volume)           

0.2 mg/dL                               < = 1.0        

 

                    Urine leukocyte esterase detection by dipstick           NEG

ATIVE            

NEGATIVE        

 

                                        Automated urine sediment erythrocyte cou

nt by microscopy (number/high power 

field)                    NONE                      NRG        

 

                                        Automated urine sediment leukocyte count

 by microscopy (number/high power field)

                           [HPF]                    NRG        

 

                          Bacteria detection in urine sediment by light microsco

py           FEW          

                                        NRG        

 

                                        Squamous epithelial cells detection in u

rine sediment by light microscopy       

                          NONE                      NRG        

 

                          Crystals detection in urine sediment by light microsco

py           NONE         

                                        NRG        

 

                          Casts detection in urine sediment by light microscopy 

          PRESENT         

                                        NRG        

 

                          Mucus detection in urine sediment by light microscopy 

          LARGE           

                                        NRG        

 

                    Complete urinalysis with reflex to culture           YES    

             NRG        

 

                          Hyaline casts detection in urine sediment by light kodi

roscopy           10-25   

                                        NRG        

 

                          Granular casts detection in urine sediment by light mi

croscopy           0-2    

                                        NRG        

 

                          Coarse granular casts detection in urine sediment by l

ight microscopy           

2-5                                     NRG        

 

                                        Urine drug screening test - 20 20:

25         

 

                    Urine phencyclidine detection by screening method           

NEGATIVE            

NEGATIVE        

 

                          Urine benzodiazepines detection by screening method   

        NEGATIVE          

                                        NEGATIVE        

 

                    Urine cocaine detection           NEGATIVE            NEGATI

VE        

 

                    Urine amphetamines detection by screening method           N

EGATIVE            

NEGATIVE        

 

                          Urine methamphetamine detection by screening method   

        POSITIVE          

                                        NEGATIVE        

 

                    Urine cannabinoids detection by screening method           N

EGATIVE            

NEGATIVE        

 

                    Urine opiates detection by screening method           NEGATI

VE            

NEGATIVE        

 

                    Urine barbiturates detection           NEGATIVE            N

EGATIVE        

 

                          Screening urine tricyclic antidepressants detection   

        POSITIVE          

                                        NEGATIVE        

 

                    Urine methadone detection by screening method           NEGA

TIVE            

NEGATIVE        

 

                    Urine oxycodone detection           POSITIVE            NEGA

TIVE        

 

                    Urine propoxyphene detection           NEGATIVE            N

EGATIVE        

 

                                        Bacterial urine culture - 20 20:25

         

 

                    Bacterial urine culture           NG                  NRG   

     

 

                                        Methicillin resistant Staphylococcus aur

eus (MRSA) screening culture - 20 

23:30         

 

                          Methicillin resistant Staphylococcus aureus (MRSA) scr

eening culture           

NEG                                     NRG        

 

                                        Arterial blood gas measurement - 

0 03:09         

 

                    Blood pCO2           62 mm[Hg]           35-45        

 

                    Blood pO2           43 mm[Hg]           79-93        

 

                          Arterial blood bicarbonate measurement (moles/volume) 

          21 mmol/L       

                                        23-27        

 

                    Arterial blood base excess by calculation           -6.8 mmo

l/L           

-2.5-2.5        

 

                    Arterial blood oxygen saturation measurement           70 % 

                   

    

 

                    * Inhaled oxygen flow rate           1                   NRG

        

 

                          Arterial blood pH measurement with patient temperature

 correction           7.15

                                        7.37-7.43        

 

                          Arterial blood carbon dioxide, total measurement (mole

s/volume)           22.7 

mmol/L                                  21.0-31.0        

 

                    Body site           RIGHT RADIAL            NRG        

 

                          Assessment of wrist artery patency prior to arterial p

uncture           POSITIVE

                                        NRG        

 

                    Setting of ventilation mode           NO                  NR

G        

 

                    Measurement of body temperature           36.9              

  NRG        

 

                                        Complete blood count (CBC) with automate

d white blood cell (WBC) differential - 

20 03:09         

 

                          Blood leukocytes automated count (number/volume)      

     12.0 10*3/uL         

                                        4.3-11.0        

 

                          Blood erythrocytes automated count (number/volume)    

       3.18 10*6/uL       

                                        4.35-5.85        

 

                    Venous blood hemoglobin measurement (mass/volume)           

9.7 g/dL            

13.3-17.7        

 

                    Blood hematocrit (volume fraction)           31 %           

     40-54        

 

                    Automated erythrocyte mean corpuscular volume           99 [

foz_us]           

80-99        

 

                                        Automated erythrocyte mean corpuscular h

emoglobin (mass per erythrocyte)        

                          31 pg                     25-34        

 

                                        Automated erythrocyte mean corpuscular h

emoglobin concentration measurement 

(mass/volume)             31 g/dL                   32-36        

 

                    Automated erythrocyte distribution width ratio           17.

0 %              10.0-

14.5        

 

                    Automated blood platelet count (count/volume)           123 

10*3/uL           

130-400        

 

                          Automated blood platelet mean volume measurement      

     12.8 [foz_us]        

                                        7.4-10.4        

 

                    Automated blood neutrophils/100 leukocytes           79 %   

             42-75       

 

 

                    Automated blood lymphocytes/100 leukocytes           14 %   

             12-44       

 

 

                    Blood monocytes/100 leukocytes           7 %                

 0-12        

 

                    Automated blood eosinophils/100 leukocytes           0 %    

             0-10        

 

                    Automated blood basophils/100 leukocytes           0 %      

           0-10        

 

                    Blood neutrophils automated count (number/volume)           

9.5 10*3            

1.8-7.8        

 

                    Blood lymphocytes automated count (number/volume)           

1.7 10*3            

1.0-4.0        

 

                    Blood monocytes automated count (number/volume)           0.

8 10*3            

0.0-1.0        

 

                    Automated eosinophil count           0.0 10*3/uL           0

.0-0.3        

 

                    Automated blood basophil count (count/volume)           0.0 

10*3/uL           

0.0-0.1        

 

                                        Comprehensive metabolic panel - 20

 03:09         

 

                          Serum or plasma sodium measurement (moles/volume)     

      138 mmol/L          

                                        135-145        

 

                          Serum or plasma potassium measurement (moles/volume)  

         5.9 mmol/L       

                                        3.6-5.0        

 

                          Serum or plasma chloride measurement (moles/volume)   

        108 mmol/L        

                                                

 

                    Carbon dioxide           18 mmol/L           21-32        

 

                          Serum or plasma anion gap determination (moles/volume)

           12 mmol/L      

                                        5-14        

 

                          Serum or plasma urea nitrogen measurement (mass/volume

)           47 mg/dL      

                                        7-18        

 

                          Serum or plasma creatinine measurement (mass/volume)  

         3.29 mg/dL       

                                        0.60-1.30        

 

                    Serum or plasma urea nitrogen/creatinine mass ratio         

  14                  NRG 

       

 

                                        Serum or plasma creatinine measurement w

ith calculation of estimated glomerular 

filtration rate           19                        NRG        

 

                    Serum or plasma glucose measurement (mass/volume)           

167 mg/dL           

        

 

                    Serum or plasma calcium measurement (mass/volume)           

7.2 mg/dL           

8.5-10.1        

 

                          Serum or plasma total bilirubin measurement (mass/volu

me)           0.3 mg/dL   

                                        0.1-1.0        

 

                                        Serum or plasma alkaline phosphatase juarez

surement (enzymatic activity/volume)    

                          79 U/L                            

 

                                        Serum or plasma aspartate aminotransfera

se measurement (enzymatic 

activity/volume)           34 U/L                    5-34        

 

                                        Serum or plasma alanine aminotransferase

 measurement (enzymatic activity/volume)

                          19 U/L                    0-55        

 

                    Serum or plasma protein measurement (mass/volume)           

6.8 g/dL            

6.4-8.2        

 

                    Serum or plasma albumin measurement (mass/volume)           

3.6 g/dL            

3.2-4.5        

 

                    CALCIUM CORRECTED           7.5 mg/dL           8.5-10.1    

    

 

                                        Serum or plasma phosphate measurement (m

ass/volume) - 20 03:09         

 

                          Serum or plasma phosphate measurement (mass/volume)   

        5.8 mg/dL         

                                        2.3-4.7        

 

                                        Magnesium - 20 03:09         

 

                    Magnesium           1.8 mg/dL           1.6-2.4        

 

                                        Serum or plasma triglyceride measurement

 (mass/volume) - 20 03:09         

 

                          Serum or plasma triglyceride measurement (mass/volume)

           367 mg/dL      

                                        <150        

 

                                        RESPIRATORY VIRUS PANEL - 20 05:51

         

 

                    Serum ragweed IgE antibody assay           TNP:Duplicate Ord

er            NRG   

     

 

                          Serum or plasma aripiprazole measurement (mass/volume)

           TNP:Duplicate 

Order                                   NRG        

 

                    PARAINFLU 3 PCR           TNP:Duplicate Order            NRG

        

 

                    METAPNEUMO PCR           TNP:Duplicate Order            NRG 

       

 

                    Adenovirus detection, CSF, PCR           TNP:Duplicate Order

            NRG     

   

 

                    INFLUENZA A PCR           TNP:Duplicate Order            NRG

        

 

                    INFLUENZA B PCR           TNP:Duplicate Order            NRG

        

 

                                        Influenza virus A and B antigen detectio

n - 20 05:58         

 

                    FLU RESULT           NEGATIVE FOR INFLUENZA A AND B ANTIGENS

 BY IA            

NRG        

 

                                        Serum or plasma ferritin measurement (ma

ss/volume) - 20 16:14         

 

                    Serum or plasma ferritin measurement (mass/volume)          

 769.8 %             

32.0-356.0        

 

                                        Bacterial blood culture - 20 06:34

         

 

                    Bacterial blood culture           NG                  NRG   

     

 

                                        Arterial blood gas measurement - 

0 06:40         

 

                    Blood pCO2           70 mm[Hg]           35-45        

 

                    Blood pO2           152 mm[Hg]           79-93        

 

                          Arterial blood bicarbonate measurement (moles/volume) 

          22 mmol/L       

                                        23-27        

 

                    Arterial blood base excess by calculation           -6.1 mmo

l/L           

-2.5-2.5        

 

                    Arterial blood oxygen saturation measurement           64 % 

                   

    

 

                    * Inhaled oxygen flow rate           40                  NRG

        

 

                          Arterial blood pH measurement with patient temperature

 correction           7.13

                                        7.37-7.43        

 

                          Arterial blood carbon dioxide, total measurement (mole

s/volume)           24.0 

mmol/L                                  21.0-31.0        

 

                    Body site           RIGHT RADIAL            NRG        

 

                          Assessment of wrist artery patency prior to arterial p

uncture           POSITIVE

                                        NRG        

 

                    Setting of ventilation mode           NO                  NR

G        

 

                    Measurement of body temperature           37.0              

  NRG        

 

                                        Blood lactic acid measurement (moles/vol

ume) - 20 06:40         

 

                    Blood lactic acid measurement (moles/volume)           0.60 

mmol/L           

0.50-2.00        

 

                                        PT panel in platelet poor plasma by coag

ulation assay - 20 06:40         

 

                          Prothrombin time (PT) in platelet poor plasma by coagu

lation assay           

14.1 s                                  12.2-14.7        

 

                          INR in platelet poor plasma or blood by coagulation as

say           1.1         

                                        0.8-1.4        

 

                                        Fibrin D-dimer FEU measurement in platel

et poor plasma (mass/volume) - 20 

06:40         

 

                          Fibrin D-dimer FEU measurement in platelet poor plasma

 (mass/volume)           

1.27 ug/mL                              0.00-0.49        

 

                                        Serum or plasma troponin i.cardiac measu

rement (mass/volume) - 20 06:40   

      

 

                          Serum or plasma troponin i.cardiac measurement (mass/v

olume)           0.328 

ng/mL                                   <0.028        

 

                                        Arterial blood gas measurement - 

0 09:09         

 

                    Blood pCO2           64 mm[Hg]           35-45        

 

                    Blood pO2           47 mm[Hg]           79-93        

 

                          Arterial blood bicarbonate measurement (moles/volume) 

          24 mmol/L       

                                        23-27        

 

                    Arterial blood base excess by calculation           -3.5 mmo

l/L           

-2.5-2.5        

 

                    Arterial blood oxygen saturation measurement           80 % 

                   

    

 

                    * Inhaled oxygen flow rate           25%                 NRG

        

 

                          Arterial blood pH measurement with patient temperature

 correction           7.19

                                        7.37-7.43        

 

                          Arterial blood carbon dioxide, total measurement (mole

s/volume)           25.5 

mmol/L                                  21.0-31.0        

 

                    Body site           NA                  NRG        

 

                          Assessment of wrist artery patency prior to arterial p

uncture           NA      

                                        NRG        

 

                    Setting of ventilation mode           NA                  NR

G        

 

                    Measurement of body temperature           37.0              

  NRG        

 

                                        Sputum Gram stain - 20 10:10      

   

 

                    Sputum Gram stain           No bacterial kimberly            NR

G        

 

                                        Bacterial sputum culture - 20 10:1

0         

 

                    Bacterial sputum culture           NG                  NRG  

      

 

                                        Arterial blood gas measurement - 

0 11:02         

 

                    Blood pCO2           44 mm[Hg]           35-45        

 

                    Blood pO2           55 mm[Hg]           79-93        

 

                          Arterial blood bicarbonate measurement (moles/volume) 

          21 mmol/L       

                                        23-27        

 

                    Arterial blood base excess by calculation           -4.5 mmo

l/L           

-2.5-2.5        

 

                    Arterial blood oxygen saturation measurement           91 % 

                   

    

 

                    * Inhaled oxygen flow rate           30%                 NRG

        

 

                          Arterial blood pH measurement with patient temperature

 correction           7.30

                                        7.37-7.43        

 

                          Arterial blood carbon dioxide, total measurement (mole

s/volume)           22.2 

mmol/L                                  21.0-31.0        

 

                    Body site           RT ART LINE            NRG        

 

                          Assessment of wrist artery patency prior to arterial p

uncture           YES-POS 

                                        NRG        

 

                    Setting of ventilation mode           YES                 NR

G        

 

                    Measurement of body temperature           37.0              

  NRG        

 

                                        Serum or plasma troponin i.cardiac measu

rement (mass/volume) - 20 12:48   

      

 

                          Serum or plasma troponin i.cardiac measurement (mass/v

olume)           0.555 

ng/mL                                   <0.028        

 

                                        PROCALCITONIN (PCT) - 20 12:48    

     

 

                    PROCALCITONIN (PCT)           0.53 ng/mL           <0.10    

    

 

                                        Bacterial catheter tip culture - 

0 15:03         

 

                    Bacterial catheter tip culture           NG                 

 NRG        

 

                                        Urine Legionella pneumophila antigen ass

ay - 20 16:14         

 

                    Urine Legionella pneumophila antigen assay           Negativ

e            NRG    

    

 

                                        Streptococcus pneumoniae antigen detecti

on - 20 16:14         

 

                    Streptococcus pneumoniae antigen detection           Negativ

e            NRG    

    

 

                                        RESPIRATORY VIRUS PANEL - 20 16:14

         

 

                    Serum ragweed IgE antibody assay           Not Detected     

       Not Detected 

       

 

                          Serum or plasma aripiprazole measurement (mass/volume)

           Footnote       

                                        Not Detected        

 

                    PARAINFLU 3 PCR           Footnote            Not Detected  

      

 

                    METAPNEUMO PCR           Not Detected            Not Detecte

d        

 

                    Adenovirus detection, CSF, PCR           Footnote           

 Not Detected       

 

 

                    INFLUENZA A PCR           Not Detected            Not Detect

ed        

 

                    INFLUENZA B PCR           Not Detected            Not Detect

ed        

 

                                        2019 Coronavirus SARS-CoV-2 SO - 

0 16:14         

 

                    Coronavirus Ab [Units/volume] in Serum           Negative   

         Negative   

     

 

                                        Capillary blood glucose measurement by g

lucometer (mass/volume) - 20 18:15

         

 

                          Capillary blood glucose measurement by glucometer (mas

s/volume)           165 

mg/dL                                           

 

                                        Serum or plasma troponin i.cardiac measu

rement (mass/volume) - 20 18:51   

      

 

                          Serum or plasma troponin i.cardiac measurement (mass/v

olume)           0.576 

ng/mL                                   <0.028        

 

                                        Capillary blood glucose measurement by g

lucometer (mass/volume) - 20 22:12

         

 

                          Capillary blood glucose measurement by glucometer (mas

s/volume)           181 

mg/dL                                           

 

                                        Capillary blood glucose measurement by g

lucometer (mass/volume) - 20 01:08

         

 

                          Capillary blood glucose measurement by glucometer (mas

s/volume)           214 

mg/dL                                           

 

                                        Arterial blood gas measurement - 

0 02:58         

 

                    Blood pCO2           32 mm[Hg]           35-45        

 

                    Blood pO2           165 mm[Hg]           79-93        

 

                          Arterial blood bicarbonate measurement (moles/volume) 

          21 mmol/L       

                                        23-27        

 

                    Arterial blood base excess by calculation           -2.1 mmo

l/L           

-2.5-2.5        

 

                    Arterial blood oxygen saturation measurement           84 % 

                   

    

 

                    * Inhaled oxygen flow rate           21                  NRG

        

 

                          Arterial blood pH measurement with patient temperature

 correction           7.44

                                        7.37-7.43        

 

                          Arterial blood carbon dioxide, total measurement (mole

s/volume)           22.2 

mmol/L                                  21.0-31.0        

 

                    Body site           RIGHT RADIAL            NRG        

 

                          Assessment of wrist artery patency prior to arterial p

uncture           POSITIVE

                                        NRG        

 

                    Setting of ventilation mode           NO                  NR

G        

 

                    Measurement of body temperature           38.0              

  NRG        

 

                                        Complete blood count (CBC) with automate

d white blood cell (WBC) differential - 

20 02:58         

 

                          Blood leukocytes automated count (number/volume)      

     11.2 10*3/uL         

                                        4.3-11.0        

 

                          Blood erythrocytes automated count (number/volume)    

       3.07 10*6/uL       

                                        4.35-5.85        

 

                    Venous blood hemoglobin measurement (mass/volume)           

9.3 g/dL            

13.3-17.7        

 

                    Blood hematocrit (volume fraction)           29 %           

     40-54        

 

                    Automated erythrocyte mean corpuscular volume           95 [

foz_us]           

80-99        

 

                                        Automated erythrocyte mean corpuscular h

emoglobin (mass per erythrocyte)        

                          30 pg                     25-34        

 

                                        Automated erythrocyte mean corpuscular h

emoglobin concentration measurement 

(mass/volume)             32 g/dL                   32-36        

 

                    Automated erythrocyte distribution width ratio           17.

1 %              10.0-

14.5        

 

                    Automated blood platelet count (count/volume)           108 

10*3/uL           

130-400        

 

                          Automated blood platelet mean volume measurement      

     13.1 [foz_us]        

                                        7.4-10.4        

 

                    Automated blood neutrophils/100 leukocytes           72 %   

             42-75       

 

 

                    Automated blood lymphocytes/100 leukocytes           19 %   

             12-44       

 

 

                    Blood monocytes/100 leukocytes           8 %                

 0-12        

 

                    Automated blood eosinophils/100 leukocytes           1 %    

             0-10        

 

                    Automated blood basophils/100 leukocytes           0 %      

           0-10        

 

                    Blood neutrophils automated count (number/volume)           

8.1 10*3            

1.8-7.8        

 

                    Blood lymphocytes automated count (number/volume)           

2.1 10*3            

1.0-4.0        

 

                    Blood monocytes automated count (number/volume)           0.

9 10*3            

0.0-1.0        

 

                    Automated eosinophil count           0.1 10*3/uL           0

.0-0.3        

 

                    Automated blood basophil count (count/volume)           0.1 

10*3/uL           

0.0-0.1        

 

                                        Whole blood basic metabolic panel -  02:58         

 

                          Serum or plasma sodium measurement (moles/volume)     

      139 mmol/L          

                                        135-145        

 

                          Serum or plasma potassium measurement (moles/volume)  

         4.3 mmol/L       

                                        3.6-5.0        

 

                          Serum or plasma chloride measurement (moles/volume)   

        110 mmol/L        

                                                

 

                    Carbon dioxide           19 mmol/L           21-32        

 

                          Serum or plasma anion gap determination (moles/volume)

           10 mmol/L      

                                        5-14        

 

                          Serum or plasma urea nitrogen measurement (mass/volume

)           38 mg/dL      

                                        7-18        

 

                          Serum or plasma creatinine measurement (mass/volume)  

         1.45 mg/dL       

                                        0.60-1.30        

 

                    Serum or plasma urea nitrogen/creatinine mass ratio         

  26                  NRG 

       

 

                                        Serum or plasma creatinine measurement w

ith calculation of estimated glomerular 

filtration rate           48                        NRG        

 

                    Serum or plasma glucose measurement (mass/volume)           

161 mg/dL           

        

 

                    Serum or plasma calcium measurement (mass/volume)           

7.8 mg/dL           

8.5-10.1        

 

                                        Serum or plasma phosphate measurement (m

ass/volume) - 20 02:58         

 

                          Serum or plasma phosphate measurement (mass/volume)   

        1.7 mg/dL         

                                        2.3-4.7        

 

                                        Magnesium - 20 02:58         

 

                    Magnesium           1.3 mg/dL           1.6-2.4        

 

                                        Comprehensive metabolic panel - 20

 02:58         

 

                          Serum or plasma sodium measurement (moles/volume)     

      139 mmol/L          

                                        135-145        

 

                          Serum or plasma potassium measurement (moles/volume)  

         4.3 mmol/L       

                                        3.6-5.0        

 

                          Serum or plasma chloride measurement (moles/volume)   

        110 mmol/L        

                                                

 

                    Carbon dioxide           18 mmol/L           -32        

 

                          Serum or plasma anion gap determination (moles/volume)

           11 mmol/L      

                                        5-14        

 

                          Serum or plasma urea nitrogen measurement (mass/volume

)           38 mg/dL      

                                        7-18        

 

                          Serum or plasma creatinine measurement (mass/volume)  

         1.43 mg/dL       

                                        0.60-1.30        

 

                    Serum or plasma urea nitrogen/creatinine mass ratio         

  27                  NRG 

       

 

                                        Serum or plasma creatinine measurement w

ith calculation of estimated glomerular 

filtration rate           49                        NRG        

 

                    Serum or plasma glucose measurement (mass/volume)           

160 mg/dL           

        

 

                    Serum or plasma calcium measurement (mass/volume)           

7.9 mg/dL           

8.5-10.1        

 

                          Serum or plasma total bilirubin measurement (mass/volu

me)           0.3 mg/dL   

                                        0.1-1.0        

 

                                        Serum or plasma alkaline phosphatase juarez

surement (enzymatic activity/volume)    

                          82 U/L                            

 

                                        Serum or plasma aspartate aminotransfera

se measurement (enzymatic 

activity/volume)           56 U/L                    5-34        

 

                                        Serum or plasma alanine aminotransferase

 measurement (enzymatic activity/volume)

                          29 U/L                    0-55        

 

                    Serum or plasma protein measurement (mass/volume)           

5.9 g/dL            

6.4-8.2        

 

                    Serum or plasma albumin measurement (mass/volume)           

3.0 g/dL            

3.2-4.5        

 

                    CALCIUM CORRECTED           8.7 mg/dL           8.5-10.1    

    

 

                                        PROCALCITONIN (PCT) - 20 02:58    

     

 

                    PROCALCITONIN (PCT)           1.33 ng/mL           <0.10    

    

 

                                        Capillary blood glucose measurement by g

lucometer (mass/volume) - 20 08:35

         

 

                          Capillary blood glucose measurement by glucometer (mas

s/volume)           234 

mg/dL                                           

 

                                        Capillary blood glucose measurement by g

lucometer (mass/volume) - 20 12:53

         

 

                          Capillary blood glucose measurement by glucometer (mas

s/volume)           201 

mg/dL                                           

 

                                        Capillary blood glucose measurement by g

lucometer (mass/volume) - 20 16:28

         

 

                          Capillary blood glucose measurement by glucometer (mas

s/volume)           132 

mg/dL                                           

 

                                        Vancomycin trough - 20 18:48      

   

 

                    Vancomycin trough           16.1 ug/mL           10.0-20.0  

      

 

                                        Capillary blood glucose measurement by g

lucometer (mass/volume) - 20 20:22

         

 

                          Capillary blood glucose measurement by glucometer (mas

s/volume)           162 

mg/dL                                           

 

                                        Capillary blood glucose measurement by g

lucometer (mass/volume) - 20 23:38

         

 

                          Capillary blood glucose measurement by glucometer (mas

s/volume)           171 

mg/dL                                           

 

                                        Arterial blood gas measurement - 04/15/2

0 03:20         

 

                    Blood pCO2           33 mm[Hg]           35-45        

 

                    Blood pO2           67 mm[Hg]           79-93        

 

                          Arterial blood bicarbonate measurement (moles/volume) 

          23 mmol/L       

                                        23-27        

 

                    Arterial blood base excess by calculation           -0.2 mmo

l/L           

-2.5-2.5        

 

                    Arterial blood oxygen saturation measurement           95 % 

                   

    

 

                    * Inhaled oxygen flow rate           40                  NRG

        

 

                          Arterial blood pH measurement with patient temperature

 correction           7.46

                                        7.37-7.43        

 

                          Arterial blood carbon dioxide, total measurement (mole

s/volume)           24.2 

mmol/L                                  21.0-31.0        

 

                    Body site           RIGHT RADIAL            NRG        

 

                          Assessment of wrist artery patency prior to arterial p

uncture           POSITIVE

                                        NRG        

 

                    Setting of ventilation mode           YES                 NR

G        

 

                    Measurement of body temperature           36.9              

  NRG        

 

                                        Complete blood count (CBC) with automate

d white blood cell (WBC) differential - 

04/15/20 03:20         

 

                          Blood leukocytes automated count (number/volume)      

     7.0 10*3/uL          

                                        4.3-11.0        

 

                          Blood erythrocytes automated count (number/volume)    

       2.57 10*6/uL       

                                        4.35-5.85        

 

                    Venous blood hemoglobin measurement (mass/volume)           

7.9 g/dL            

13.3-17.7        

 

                    Blood hematocrit (volume fraction)           25 %           

     40-54        

 

                    Automated erythrocyte mean corpuscular volume           95 [

foz_us]           

80-99        

 

                                        Automated erythrocyte mean corpuscular h

emoglobin (mass per erythrocyte)        

                          31 pg                     25-34        

 

                                        Automated erythrocyte mean corpuscular h

emoglobin concentration measurement 

(mass/volume)             32 g/dL                   32-36        

 

                    Automated erythrocyte distribution width ratio           17.

2 %              10.0-

14.5        

 

                    Automated blood platelet count (count/volume)           94 1

0*3/uL           

130-400        

 

                          Automated blood platelet mean volume measurement      

     12.1 [foz_us]        

                                        7.4-10.4        

 

                    Automated blood neutrophils/100 leukocytes           83 %   

             42-75       

 

 

                    Automated blood lymphocytes/100 leukocytes           12 %   

             12-44       

 

 

                    Blood monocytes/100 leukocytes           5 %                

 0-12        

 

                    Automated blood eosinophils/100 leukocytes           0 %    

             0-10        

 

                    Automated blood basophils/100 leukocytes           0 %      

           0-10        

 

                    Blood neutrophils automated count (number/volume)           

5.8 10*3            

1.8-7.8        

 

                    Blood lymphocytes automated count (number/volume)           

0.8 10*3            

1.0-4.0        

 

                    Blood monocytes automated count (number/volume)           0.

3 10*3            

0.0-1.0        

 

                    Automated eosinophil count           0.0 10*3/uL           0

.0-0.3        

 

                    Automated blood basophil count (count/volume)           0.0 

10*3/uL           

0.0-0.1        

 

                                        Whole blood basic metabolic panel -  03:20         

 

                          Serum or plasma sodium measurement (moles/volume)     

      140 mmol/L          

                                        135-145        

 

                          Serum or plasma potassium measurement (moles/volume)  

         3.6 mmol/L       

                                        3.6-5.0        

 

                          Serum or plasma chloride measurement (moles/volume)   

        107 mmol/L        

                                                

 

                    Carbon dioxide           20 mmol/L           21-32        

 

                          Serum or plasma anion gap determination (moles/volume)

           13 mmol/L      

                                        5-14        

 

                          Serum or plasma urea nitrogen measurement (mass/volume

)           28 mg/dL      

                                        7-18        

 

                          Serum or plasma creatinine measurement (mass/volume)  

         0.98 mg/dL       

                                        0.60-1.30        

 

                    Serum or plasma urea nitrogen/creatinine mass ratio         

  29                  NRG 

       

 

                                        Serum or plasma creatinine measurement w

ith calculation of estimated glomerular 

filtration rate           >                         NRG        

 

                    Serum or plasma glucose measurement (mass/volume)           

154 mg/dL           

        

 

                    Serum or plasma calcium measurement (mass/volume)           

7.5 mg/dL           

8.5-10.1        

 

                                        Serum or plasma phosphate measurement (m

ass/volume) - 04/15/20 03:20         

 

                          Serum or plasma phosphate measurement (mass/volume)   

        3.4 mg/dL         

                                        2.3-4.7        

 

                                        Magnesium - 04/15/20 03:20         

 

                    Magnesium           1.8 mg/dL           1.6-2.4        

 

                                        Arterial blood gas measurement - 04/15/2

0 06:45         

 

                    Blood pCO2           51 mm[Hg]           35-45        

 

                    Blood pO2           73 mm[Hg]           79-93        

 

                          Arterial blood bicarbonate measurement (moles/volume) 

          24 mmol/L       

                                        23-27        

 

                    Arterial blood base excess by calculation           -1.3 mmo

l/L           

-2.5-2.5        

 

                    Arterial blood oxygen saturation measurement           94 % 

                   

    

 

                    * Inhaled oxygen flow rate           40                  NRG

        

 

                          Arterial blood pH measurement with patient temperature

 correction           7.30

                                        7.37-7.43        

 

                          Arterial blood carbon dioxide, total measurement (mole

s/volume)           25.9 

mmol/L                                  21.0-31.0        

 

                    Body site           RIGHT RADIAL            NRG        

 

                          Assessment of wrist artery patency prior to arterial p

uncture           POSITIVE

                                        NRG        

 

                    Setting of ventilation mode           YES                 NR

G        

 

                    Measurement of body temperature           36.9              

  NRG        

 

                                        Capillary blood glucose measurement by g

lucometer (mass/volume) - 04/15/20 11:25

         

 

                          Capillary blood glucose measurement by glucometer (mas

s/volume)           179 

mg/dL                                           

 

                                        Capillary blood glucose measurement by g

lucometer (mass/volume) - 04/15/20 18:07

         

 

                          Capillary blood glucose measurement by glucometer (mas

s/volume)           162 

mg/dL                                           

 

                                        Capillary blood glucose measurement by g

lucometer (mass/volume) - 04/15/20 23:31

         

 

                          Capillary blood glucose measurement by glucometer (mas

s/volume)           145 

mg/dL                                           

 

                                        Arterial blood gas measurement - 

0 02:30         

 

                    Blood pCO2           38 mm[Hg]           35-45        

 

                    Blood pO2           82 mm[Hg]           79-93        

 

                          Arterial blood bicarbonate measurement (moles/volume) 

          24 mmol/L       

                                        23-27        

 

                    Arterial blood base excess by calculation           -0.1 mmo

l/L           

-2.5-2.5        

 

                    Arterial blood oxygen saturation measurement           97 % 

                   

    

 

                    * Inhaled oxygen flow rate           40%                 NRG

        

 

                          Arterial blood pH measurement with patient temperature

 correction           7.42

                                        7.37-7.43        

 

                          Arterial blood carbon dioxide, total measurement (mole

s/volume)           25.0 

mmol/L                                  21.0-31.0        

 

                    Body site           R RAD               NRG        

 

                          Assessment of wrist artery patency prior to arterial p

uncture           ART LINE

                                        NRG        

 

                    Setting of ventilation mode           YES                 NR

G        

 

                    Measurement of body temperature           37.0              

  NRG        

 

                                        Complete blood count (CBC) with automate

d white blood cell (WBC) differential - 

20 02:30         

 

                          Blood leukocytes automated count (number/volume)      

     6.7 10*3/uL          

                                        4.3-11.0        

 

                          Blood erythrocytes automated count (number/volume)    

       2.63 10*6/uL       

                                        4.35-5.85        

 

                    Venous blood hemoglobin measurement (mass/volume)           

7.9 g/dL            

13.3-17.7        

 

                    Blood hematocrit (volume fraction)           25 %           

     40-54        

 

                    Automated erythrocyte mean corpuscular volume           95 [

foz_us]           

80-99        

 

                                        Automated erythrocyte mean corpuscular h

emoglobin (mass per erythrocyte)        

                          30 pg                     25-34        

 

                                        Automated erythrocyte mean corpuscular h

emoglobin concentration measurement 

(mass/volume)             32 g/dL                   32-36        

 

                    Automated erythrocyte distribution width ratio           16.

6 %              10.0-

14.5        

 

                    Automated blood platelet count (count/volume)           112 

10*3/uL           

130-400        

 

                          Automated blood platelet mean volume measurement      

     12.5 [foz_us]        

                                        7.4-10.4        

 

                    Automated blood neutrophils/100 leukocytes           85 %   

             42-75       

 

 

                    Automated blood lymphocytes/100 leukocytes           11 %   

             12-44       

 

 

                    Blood monocytes/100 leukocytes           4 %                

 0-12        

 

                    Automated blood eosinophils/100 leukocytes           0 %    

             0-10        

 

                    Automated blood basophils/100 leukocytes           0 %      

           0-10        

 

                    Blood neutrophils automated count (number/volume)           

5.7 10*3            

1.8-7.8        

 

                    Blood lymphocytes automated count (number/volume)           

0.7 10*3            

1.0-4.0        

 

                    Blood monocytes automated count (number/volume)           0.

3 10*3            

0.0-1.0        

 

                    Automated eosinophil count           0.0 10*3/uL           0

.0-0.3        

 

                    Automated blood basophil count (count/volume)           0.0 

10*3/uL           

0.0-0.1        

 

                                        Whole blood basic metabolic panel -  02:30         

 

                          Serum or plasma sodium measurement (moles/volume)     

      137 mmol/L          

                                        135-145        

 

                          Serum or plasma potassium measurement (moles/volume)  

         3.7 mmol/L       

                                        3.6-5.0        

 

                          Serum or plasma chloride measurement (moles/volume)   

        105 mmol/L        

                                                

 

                    Carbon dioxide           21 mmol/L           21-32        

 

                          Serum or plasma anion gap determination (moles/volume)

           11 mmol/L      

                                        5-14        

 

                          Serum or plasma urea nitrogen measurement (mass/volume

)           20 mg/dL      

                                        7-18        

 

                          Serum or plasma creatinine measurement (mass/volume)  

         0.84 mg/dL       

                                        0.60-1.30        

 

                    Serum or plasma urea nitrogen/creatinine mass ratio         

  24                  NRG 

       

 

                                        Serum or plasma creatinine measurement w

ith calculation of estimated glomerular 

filtration rate           >                         NRG        

 

                    Serum or plasma glucose measurement (mass/volume)           

154 mg/dL           

        

 

                    Serum or plasma calcium measurement (mass/volume)           

7.6 mg/dL           

8.5-10.1        

 

                                        Serum or plasma phosphate measurement (m

ass/volume) - 20 02:30         

 

                          Serum or plasma phosphate measurement (mass/volume)   

        2.7 mg/dL         

                                        2.3-4.7        

 

                                        Magnesium - 20 02:30         

 

                    Magnesium           1.6 mg/dL           1.6-2.4        

 

                                        Serum or plasma lithium measurement (mol

es/volume) - 20 02:30         

 

                    BNP PT              305.3 pg/mL           <100.0        

 

                                        Serum iron and total iron binding capaci

ty panel - 20 02:30         

 

                    TIBC                180 %               280-380        

 

                    UIBC                161 %                       

 

                    Serum or plasma iron measurement (mass/volume)           19 

%                  

      

 

                          Total iron binding capacity and transferrin saturation

 measurement           11 

%                                       15-50        

 

                    Serum or plasma ferritin measurement (mass/volume)          

 374.4 %             

32.0-356.0        

 

                                        Capillary blood glucose measurement by g

lucometer (mass/volume) - 20 11:07

         

 

                          Capillary blood glucose measurement by glucometer (mas

s/volume)           218 

mg/dL                                           

 

                                        Capillary blood glucose measurement by g

lucometer (mass/volume) - 20 17:16

         

 

                          Capillary blood glucose measurement by glucometer (mas

s/volume)           179 

mg/dL                                           

 

                                        Capillary blood glucose measurement by g

lucometer (mass/volume) - 20 23:48

         

 

                          Capillary blood glucose measurement by glucometer (mas

s/volume)           190 

mg/dL                                           

 

                                        Arterial blood gas measurement - 

0 02:55         

 

                    Blood pCO2           41 mm[Hg]           35-45        

 

                    Blood pO2           77 mm[Hg]           79-93        

 

                          Arterial blood bicarbonate measurement (moles/volume) 

          27 mmol/L       

                                        23-27        

 

                    Arterial blood base excess by calculation           2.6 mmol

/L           

-2.5-2.5        

 

                    Arterial blood oxygen saturation measurement           96 % 

                   

    

 

                    * Inhaled oxygen flow rate           40%                 NRG

        

 

                          Arterial blood pH measurement with patient temperature

 correction           7.43

                                        7.37-7.43        

 

                          Arterial blood carbon dioxide, total measurement (mole

s/volume)           27.8 

mmol/L                                  21.0-31.0        

 

                    Body site           RIGHT RADIAL            NRG        

 

                          Assessment of wrist artery patency prior to arterial p

uncture           YES-POS 

                                        NRG        

 

                    Setting of ventilation mode           YES                 NR

G        

 

                    Measurement of body temperature           37.0              

  NRG        

 

                                        Whole blood basic metabolic panel -  02:55         

 

                          Serum or plasma sodium measurement (moles/volume)     

      139 mmol/L          

                                        135-145        

 

                          Serum or plasma potassium measurement (moles/volume)  

         3.6 mmol/L       

                                        3.6-5.0        

 

                          Serum or plasma chloride measurement (moles/volume)   

        103 mmol/L        

                                                

 

                    Carbon dioxide           23 mmol/L           21-32        

 

                          Serum or plasma anion gap determination (moles/volume)

           13 mmol/L      

                                        5-14        

 

                          Serum or plasma urea nitrogen measurement (mass/volume

)           21 mg/dL      

                                        7-18        

 

                          Serum or plasma creatinine measurement (mass/volume)  

         0.97 mg/dL       

                                        0.60-1.30        

 

                    Serum or plasma urea nitrogen/creatinine mass ratio         

  22                  NRG 

       

 

                                        Serum or plasma creatinine measurement w

ith calculation of estimated glomerular 

filtration rate           >                         NRG        

 

                    Serum or plasma glucose measurement (mass/volume)           

183 mg/dL           

        

 

                    Serum or plasma calcium measurement (mass/volume)           

7.8 mg/dL           

8.5-10.1        

 

                                        Complete blood count (CBC) with automate

d white blood cell (WBC) differential - 

20 02:55         

 

                          Blood leukocytes automated count (number/volume)      

     7.1 10*3/uL          

                                        4.3-11.0        

 

                          Blood erythrocytes automated count (number/volume)    

       2.78 10*6/uL       

                                        4.35-5.85        

 

                    Venous blood hemoglobin measurement (mass/volume)           

8.4 g/dL            

13.3-17.7        

 

                    Blood hematocrit (volume fraction)           26 %           

     40-54        

 

                    Automated erythrocyte mean corpuscular volume           95 [

foz_us]           

80-99        

 

                                        Automated erythrocyte mean corpuscular h

emoglobin (mass per erythrocyte)        

                          30 pg                     25-34        

 

                                        Automated erythrocyte mean corpuscular h

emoglobin concentration measurement 

(mass/volume)             32 g/dL                   32-36        

 

                    Automated erythrocyte distribution width ratio           16.

6 %              10.0-

14.5        

 

                    Automated blood platelet count (count/volume)           139 

10*3/uL           

130-400        

 

                          Automated blood platelet mean volume measurement      

     11.6 [foz_us]        

                                        7.4-10.4        

 

                    Automated blood neutrophils/100 leukocytes           83 %   

             42-75       

 

 

                    Automated blood lymphocytes/100 leukocytes           12 %   

             12-44       

 

 

                    Blood monocytes/100 leukocytes           5 %                

 0-12        

 

                    Automated blood eosinophils/100 leukocytes           0 %    

             0-10        

 

                    Automated blood basophils/100 leukocytes           0 %      

           0-10        

 

                    Blood neutrophils automated count (number/volume)           

5.9 10*3            

1.8-7.8        

 

                    Blood lymphocytes automated count (number/volume)           

0.8 10*3            

1.0-4.0        

 

                    Blood monocytes automated count (number/volume)           0.

3 10*3            

0.0-1.0        

 

                    Automated eosinophil count           0.0 10*3/uL           0

.0-0.3        

 

                    Automated blood basophil count (count/volume)           0.0 

10*3/uL           

0.0-0.1        

 

                                        Serum or plasma phosphate measurement (m

ass/volume) - 20 02:55         

 

                          Serum or plasma phosphate measurement (mass/volume)   

        2.6 mg/dL         

                                        2.3-4.7        

 

                                        Magnesium - 20 02:55         

 

                    Magnesium           1.7 mg/dL           1.6-2.4        

 

                                        Serum or plasma lithium measurement (mol

es/volume) - 20 02:55         

 

                    BNP PT              503.3 pg/mL           <100.0        

 

                                        Capillary blood glucose measurement by g

lucometer (mass/volume) - 20 11:23

         

 

                          Capillary blood glucose measurement by glucometer (mas

s/volume)           193 

mg/dL                                           

 

                                        Serum or plasma troponin i.cardiac measu

rement (mass/volume) - 20 12:00   

      

 

                          Serum or plasma troponin i.cardiac measurement (mass/v

olume)           0.064 

ng/mL                                   <0.028        

 

                                        Capillary blood glucose measurement by g

lucometer (mass/volume) - 20 17:16

         

 

                          Capillary blood glucose measurement by glucometer (mas

s/volume)           219 

mg/dL                                           

 

                                        Capillary blood glucose measurement by g

lucometer (mass/volume) - 20 23:53

         

 

                          Capillary blood glucose measurement by glucometer (mas

s/volume)           165 

mg/dL                                           

 

                                        Arterial blood gas measurement - 

0 03:05         

 

                    Blood pCO2           40 mm[Hg]           35-45        

 

                    Blood pO2           72 mm[Hg]           79-93        

 

                          Arterial blood bicarbonate measurement (moles/volume) 

          31 mmol/L       

                                        23-27        

 

                    Arterial blood base excess by calculation           7.2 mmol

/L           

-2.5-2.5        

 

                    Arterial blood oxygen saturation measurement           94 % 

                   

    

 

                    * Inhaled oxygen flow rate           60%                 NRG

        

 

                          Arterial blood pH measurement with patient temperature

 correction           7.50

                                        7.37-7.43        

 

                          Arterial blood carbon dioxide, total measurement (mole

s/volume)           31.9 

mmol/L                                  21.0-31.0        

 

                    Body site           RIGHT ART LINE            NRG        

 

                          Assessment of wrist artery patency prior to arterial p

uncture           ART LINE

                                        NRG        

 

                    Setting of ventilation mode           YES                 NR

G        

 

                    Measurement of body temperature           37.5              

  NRG        

 

                                        Complete blood count (CBC) with automate

d white blood cell (WBC) differential - 

20 03:05         

 

                          Blood leukocytes automated count (number/volume)      

     6.6 10*3/uL          

                                        4.3-11.0        

 

                          Blood erythrocytes automated count (number/volume)    

       2.75 10*6/uL       

                                        4.35-5.85        

 

                    Venous blood hemoglobin measurement (mass/volume)           

8.4 g/dL            

13.3-17.7        

 

                    Blood hematocrit (volume fraction)           26 %           

     40-54        

 

                    Automated erythrocyte mean corpuscular volume           95 [

foz_us]           

80-99        

 

                                        Automated erythrocyte mean corpuscular h

emoglobin (mass per erythrocyte)        

                          31 pg                     25-34        

 

                                        Automated erythrocyte mean corpuscular h

emoglobin concentration measurement 

(mass/volume)             32 g/dL                   32-36        

 

                    Automated erythrocyte distribution width ratio           16.

4 %              10.0-

14.5        

 

                    Automated blood platelet count (count/volume)           152 

10*3/uL           

130-400        

 

                          Automated blood platelet mean volume measurement      

     12.2 [foz_us]        

                                        7.4-10.4        

 

                    Automated blood neutrophils/100 leukocytes           78 %   

             42-75       

 

 

                    Automated blood lymphocytes/100 leukocytes           15 %   

             12-44       

 

 

                    Blood monocytes/100 leukocytes           6 %                

 0-12        

 

                    Automated blood eosinophils/100 leukocytes           1 %    

             0-10        

 

                    Automated blood basophils/100 leukocytes           0 %      

           0-10        

 

                    Blood neutrophils automated count (number/volume)           

5.1 10*3            

1.8-7.8        

 

                    Blood lymphocytes automated count (number/volume)           

1.0 10*3            

1.0-4.0        

 

                    Blood monocytes automated count (number/volume)           0.

4 10*3            

0.0-1.0        

 

                    Automated eosinophil count           0.0 10*3/uL           0

.0-0.3        

 

                    Automated blood basophil count (count/volume)           0.0 

10*3/uL           

0.0-0.1        

 

                                        Whole blood basic metabolic panel -  03:05         

 

                          Serum or plasma sodium measurement (moles/volume)     

      138 mmol/L          

                                        135-145        

 

                          Serum or plasma potassium measurement (moles/volume)  

         2.9 mmol/L       

                                        3.6-5.0        

 

                          Serum or plasma chloride measurement (moles/volume)   

        100 mmol/L        

                                                

 

                    Carbon dioxide           25 mmol/L           21-32        

 

                          Serum or plasma anion gap determination (moles/volume)

           13 mmol/L      

                                        5-14        

 

                          Serum or plasma urea nitrogen measurement (mass/volume

)           16 mg/dL      

                                        7-18        

 

                          Serum or plasma creatinine measurement (mass/volume)  

         0.86 mg/dL       

                                        0.60-1.30        

 

                    Serum or plasma urea nitrogen/creatinine mass ratio         

  19                  NRG 

       

 

                                        Serum or plasma creatinine measurement w

ith calculation of estimated glomerular 

filtration rate           >                         NRG        

 

                    Serum or plasma glucose measurement (mass/volume)           

171 mg/dL           

        

 

                    Serum or plasma calcium measurement (mass/volume)           

7.7 mg/dL           

8.5-10.1        

 

                                        Serum or plasma phosphate measurement (m

ass/volume) - 20 03:05         

 

                          Serum or plasma phosphate measurement (mass/volume)   

        3.1 mg/dL         

                                        2.3-4.7        

 

                                        Magnesium - 20 03:05         

 

                    Magnesium           1.4 mg/dL           1.6-2.4        

 

                                        Capillary blood glucose measurement by g

lucometer (mass/volume) - 20 11:25

         

 

                          Capillary blood glucose measurement by glucometer (mas

s/volume)           255 

mg/dL                                           

 

                                        Capillary blood glucose measurement by g

lucometer (mass/volume) - 20 15:42

         

 

                          Capillary blood glucose measurement by glucometer (mas

s/volume)           278 

mg/dL                                           

 

                                        Capillary blood glucose measurement by g

lucometer (mass/volume) - 20 17:49

         

 

                          Capillary blood glucose measurement by glucometer (mas

s/volume)           256 

mg/dL                                           

 

                                        Capillary blood glucose measurement by g

lucometer (mass/volume) - 20 22:55

         

 

                          Capillary blood glucose measurement by glucometer (mas

s/volume)           316 

mg/dL                                           

 

                                        Complete blood count (CBC) with automate

d white blood cell (WBC) differential - 

20 03:30         

 

                          Blood leukocytes automated count (number/volume)      

     7.0 10*3/uL          

                                        4.3-11.0        

 

                          Blood erythrocytes automated count (number/volume)    

       2.70 10*6/uL       

                                        4.35-5.85        

 

                    Venous blood hemoglobin measurement (mass/volume)           

8.1 g/dL            

13.3-17.7        

 

                    Blood hematocrit (volume fraction)           26 %           

     40-54        

 

                    Automated erythrocyte mean corpuscular volume           96 [

foz_us]           

80-99        

 

                                        Automated erythrocyte mean corpuscular h

emoglobin (mass per erythrocyte)        

                          30 pg                     25-34        

 

                                        Automated erythrocyte mean corpuscular h

emoglobin concentration measurement 

(mass/volume)             31 g/dL                   32-36        

 

                    Automated erythrocyte distribution width ratio           16.

7 %              10.0-

14.5        

 

                    Automated blood platelet count (count/volume)           144 

10*3/uL           

130-400        

 

                          Automated blood platelet mean volume measurement      

     11.4 [foz_us]        

                                        7.4-10.4        

 

                    Automated blood neutrophils/100 leukocytes           86 %   

             42-75       

 

 

                    Automated blood lymphocytes/100 leukocytes           8 %    

             12-44        

 

                    Blood monocytes/100 leukocytes           6 %                

 0-12        

 

                    Automated blood eosinophils/100 leukocytes           0 %    

             0-10        

 

                    Automated blood basophils/100 leukocytes           0 %      

           0-10        

 

                    Blood neutrophils automated count (number/volume)           

6.0 10*3            

1.8-7.8        

 

                    Blood lymphocytes automated count (number/volume)           

0.6 10*3            

1.0-4.0        

 

                    Blood monocytes automated count (number/volume)           0.

4 10*3            

0.0-1.0        

 

                    Automated eosinophil count           0.0 10*3/uL           0

.0-0.3        

 

                    Automated blood basophil count (count/volume)           0.0 

10*3/uL           

0.0-0.1        

 

                                        Arterial blood gas measurement - 

0 03:30         

 

                    Blood pCO2           46 mm[Hg]           35-45        

 

                    Blood pO2           91 mm[Hg]           79-93        

 

                          Arterial blood bicarbonate measurement (moles/volume) 

          33 mmol/L       

                                        23-27        

 

                    Arterial blood base excess by calculation           9.4 mmol

/L           

-2.5-2.5        

 

                    Arterial blood oxygen saturation measurement           98 % 

                   

    

 

                    * Inhaled oxygen flow rate           65%                 NRG

        

 

                          Arterial blood pH measurement with patient temperature

 correction           7.48

                                        7.37-7.43        

 

                          Arterial blood carbon dioxide, total measurement (mole

s/volume)           34.7 

mmol/L                                  21.0-31.0        

 

                    Body site           RIGHT ART LINE            NRG        

 

                          Assessment of wrist artery patency prior to arterial p

uncture           ART LINE

                                        NRG        

 

                    Setting of ventilation mode           YES                 NR

G        

 

                    Measurement of body temperature           37.4              

  NRG        

 

                                        Whole blood basic metabolic panel -  03:30         

 

                          Serum or plasma sodium measurement (moles/volume)     

      139 mmol/L          

                                        135-145        

 

                          Serum or plasma potassium measurement (moles/volume)  

         3.4 mmol/L       

                                        3.6-5.0        

 

                          Serum or plasma chloride measurement (moles/volume)   

        98 mmol/L         

                                                

 

                    Carbon dioxide           28 mmol/L           21-32        

 

                          Serum or plasma anion gap determination (moles/volume)

           13 mmol/L      

                                        5-14        

 

                          Serum or plasma urea nitrogen measurement (mass/volume

)           17 mg/dL      

                                        7-18        

 

                          Serum or plasma creatinine measurement (mass/volume)  

         0.94 mg/dL       

                                        0.60-1.30        

 

                    Serum or plasma urea nitrogen/creatinine mass ratio         

  18                  NRG 

       

 

                                        Serum or plasma creatinine measurement w

ith calculation of estimated glomerular 

filtration rate           >                         NRG        

 

                    Serum or plasma glucose measurement (mass/volume)           

252 mg/dL           

        

 

                    Serum or plasma calcium measurement (mass/volume)           

8.0 mg/dL           

8.5-10.1        

 

                                        Serum or plasma phosphate measurement (m

ass/volume) - 20 03:30         

 

                          Serum or plasma phosphate measurement (mass/volume)   

        3.0 mg/dL         

                                        2.3-4.7        

 

                                        Magnesium - 20 03:30         

 

                    Magnesium           1.8 mg/dL           1.6-2.4        

 

                                        Serum or plasma lithium measurement (mol

es/volume) - 20 03:30         

 

                    BNP PT              501.3 pg/mL           <100.0        

 

                                        Capillary blood glucose measurement by g

lucometer (mass/volume) - 20 11:00

         

 

                          Capillary blood glucose measurement by glucometer (mas

s/volume)           272 

mg/dL                                           

 

                                        Capillary blood glucose measurement by g

lucometer (mass/volume) - 20 17:46

         

 

                          Capillary blood glucose measurement by glucometer (mas

s/volume)           265 

mg/dL                                           

 

                                        Capillary blood glucose measurement by g

lucometer (mass/volume) - 20 01:28

         

 

                          Capillary blood glucose measurement by glucometer (mas

s/volume)           248 

mg/dL                                           

 

                                        Complete blood count (CBC) with automate

d white blood cell (WBC) differential - 

20 03:18         

 

                          Blood leukocytes automated count (number/volume)      

     7.7 10*3/uL          

                                        4.3-11.0        

 

                          Blood erythrocytes automated count (number/volume)    

       2.98 10*6/uL       

                                        4.35-5.85        

 

                    Venous blood hemoglobin measurement (mass/volume)           

8.9 g/dL            

13.3-17.7        

 

                    Blood hematocrit (volume fraction)           29 %           

     40-54        

 

                    Automated erythrocyte mean corpuscular volume           97 [

foz_us]           

80-99        

 

                                        Automated erythrocyte mean corpuscular h

emoglobin (mass per erythrocyte)        

                          30 pg                     25-34        

 

                                        Automated erythrocyte mean corpuscular h

emoglobin concentration measurement 

(mass/volume)             31 g/dL                   32-36        

 

                    Automated erythrocyte distribution width ratio           16.

5 %              10.0-

14.5        

 

                    Automated blood platelet count (count/volume)           174 

10*3/uL           

130-400        

 

                          Automated blood platelet mean volume measurement      

     11.7 [foz_us]        

                                        7.4-10.4        

 

                    Automated blood neutrophils/100 leukocytes           91 %   

             42-75       

 

 

                    Automated blood lymphocytes/100 leukocytes           6 %    

             12-44        

 

                    Blood monocytes/100 leukocytes           3 %                

 0-12        

 

                    Automated blood eosinophils/100 leukocytes           0 %    

             0-10        

 

                    Automated blood basophils/100 leukocytes           0 %      

           0-10        

 

                    Blood neutrophils automated count (number/volume)           

7.0 10*3            

1.8-7.8        

 

                    Blood lymphocytes automated count (number/volume)           

0.5 10*3            

1.0-4.0        

 

                    Blood monocytes automated count (number/volume)           0.

3 10*3            

0.0-1.0        

 

                    Automated eosinophil count           0.0 10*3/uL           0

.0-0.3        

 

                    Automated blood basophil count (count/volume)           0.0 

10*3/uL           

0.0-0.1        

 

                                        Whole blood basic metabolic panel -  03:18         

 

                          Serum or plasma sodium measurement (moles/volume)     

      136 mmol/L          

                                        135-145        

 

                          Serum or plasma potassium measurement (moles/volume)  

         4.1 mmol/L       

                                        3.6-5.0        

 

                          Serum or plasma chloride measurement (moles/volume)   

        95 mmol/L         

                                                

 

                    Carbon dioxide           28 mmol/L           21-32        

 

                          Serum or plasma anion gap determination (moles/volume)

           13 mmol/L      

                                        5-14        

 

                          Serum or plasma urea nitrogen measurement (mass/volume

)           19 mg/dL      

                                        7-18        

 

                          Serum or plasma creatinine measurement (mass/volume)  

         0.98 mg/dL       

                                        0.60-1.30        

 

                    Serum or plasma urea nitrogen/creatinine mass ratio         

  19                  NRG 

       

 

                                        Serum or plasma creatinine measurement w

ith calculation of estimated glomerular 

filtration rate           >                         NRG        

 

                    Serum or plasma glucose measurement (mass/volume)           

268 mg/dL           

        

 

                    Serum or plasma calcium measurement (mass/volume)           

8.2 mg/dL           

8.5-10.1        

 

                                        Serum or plasma phosphate measurement (m

ass/volume) - 20 03:18         

 

                          Serum or plasma phosphate measurement (mass/volume)   

        3.5 mg/dL         

                                        2.3-4.7        

 

                                        Magnesium - 20 03:18         

 

                    Magnesium           1.6 mg/dL           1.6-2.4        

 

                                        Arterial blood gas measurement - 

0 03:25         

 

                    Blood pCO2           46 mm[Hg]           35-45        

 

                    Blood pO2           81 mm[Hg]           79-93        

 

                          Arterial blood bicarbonate measurement (moles/volume) 

          34 mmol/L       

                                        23-27        

 

                    Arterial blood base excess by calculation           9.8 mmol

/L           

-2.5-2.5        

 

                    Arterial blood oxygen saturation measurement           96 % 

                   

    

 

                    * Inhaled oxygen flow rate           60%                 NRG

        

 

                          Arterial blood pH measurement with patient temperature

 correction           7.48

                                        7.37-7.43        

 

                          Arterial blood carbon dioxide, total measurement (mole

s/volume)           35.4 

mmol/L                                  21.0-31.0        

 

                    Body site           ART LINE            NRG        

 

                          Assessment of wrist artery patency prior to arterial p

uncture           ART LINE

                                        NRG        

 

                    Setting of ventilation mode           YES                 NR

G        

 

                    Measurement of body temperature           36.7              

  NRG        

 

                                        Capillary blood glucose measurement by g

lucometer (mass/volume) - 20 11:53

         

 

                          Capillary blood glucose measurement by glucometer (mas

s/volume)           350 

mg/dL                                           

 

                                        Capillary blood glucose measurement by g

lucometer (mass/volume) - 20 17:46

         

 

                          Capillary blood glucose measurement by glucometer (mas

s/volume)           321 

mg/dL                                           

 

                                        Capillary blood glucose measurement by g

lucometer (mass/volume) - 20 23:57

         

 

                          Capillary blood glucose measurement by glucometer (mas

s/volume)           320 

mg/dL                                           

 

                                        Arterial blood gas measurement - 

0 03:00         

 

                    Blood pCO2           53 mm[Hg]           35-45        

 

                    Blood pO2           89 mm[Hg]           79-93        

 

                          Arterial blood bicarbonate measurement (moles/volume) 

          37 mmol/L       

                                        23-27        

 

                    Arterial blood base excess by calculation           12.0 mmo

l/L           

-2.5-2.5        

 

                    Arterial blood oxygen saturation measurement           97 % 

                   

    

 

                    * Inhaled oxygen flow rate           40%                 NRG

        

 

                          Arterial blood pH measurement with patient temperature

 correction           7.45

                                        7.37-7.43        

 

                          Arterial blood carbon dioxide, total measurement (mole

s/volume)           38.3 

mmol/L                                  21.0-31.0        

 

                    Body site           RT ART LINE            NRG        

 

                          Assessment of wrist artery patency prior to arterial p

uncture           ART LINE

                                        NRG        

 

                    Setting of ventilation mode           YES                 NR

G        

 

                    Measurement of body temperature           36.9              

  NRG        

 

                                        Complete blood count (CBC) with automate

d white blood cell (WBC) differential - 

20 03:00         

 

                          Blood leukocytes automated count (number/volume)      

     6.3 10*3/uL          

                                        4.3-11.0        

 

                          Blood erythrocytes automated count (number/volume)    

       2.96 10*6/uL       

                                        4.35-5.85        

 

                    Venous blood hemoglobin measurement (mass/volume)           

8.8 g/dL            

13.3-17.7        

 

                    Blood hematocrit (volume fraction)           29 %           

     40-54        

 

                    Automated erythrocyte mean corpuscular volume           98 [

foz_us]           

80-99        

 

                                        Automated erythrocyte mean corpuscular h

emoglobin (mass per erythrocyte)        

                          30 pg                     25-34        

 

                                        Automated erythrocyte mean corpuscular h

emoglobin concentration measurement 

(mass/volume)             30 g/dL                   32-36        

 

                    Automated erythrocyte distribution width ratio           16.

5 %              10.0-

14.5        

 

                    Automated blood platelet count (count/volume)           166 

10*3/uL           

130-400        

 

                          Automated blood platelet mean volume measurement      

     11.6 [foz_us]        

                                        7.4-10.4        

 

                    Automated blood neutrophils/100 leukocytes           92 %   

             42-75       

 

 

                    Automated blood lymphocytes/100 leukocytes           5 %    

             12-44        

 

                    Blood monocytes/100 leukocytes           3 %                

 0-12        

 

                    Automated blood eosinophils/100 leukocytes           0 %    

             0-10        

 

                    Automated blood basophils/100 leukocytes           0 %      

           0-10        

 

                    Blood neutrophils automated count (number/volume)           

5.8 10*3            

1.8-7.8        

 

                    Blood lymphocytes automated count (number/volume)           

0.3 10*3            

1.0-4.0        

 

                    Blood monocytes automated count (number/volume)           0.

2 10*3            

0.0-1.0        

 

                    Automated eosinophil count           0.0 10*3/uL           0

.0-0.3        

 

                    Automated blood basophil count (count/volume)           0.0 

10*3/uL           

0.0-0.1        

 

                                        Whole blood basic metabolic panel -  03:00         

 

                          Serum or plasma sodium measurement (moles/volume)     

      136 mmol/L          

                                        135-145        

 

                          Serum or plasma potassium measurement (moles/volume)  

         3.8 mmol/L       

                                        3.6-5.0        

 

                          Serum or plasma chloride measurement (moles/volume)   

        94 mmol/L         

                                                

 

                    Carbon dioxide           30 mmol/L           21-32        

 

                          Serum or plasma anion gap determination (moles/volume)

           12 mmol/L      

                                        5-14        

 

                          Serum or plasma urea nitrogen measurement (mass/volume

)           27 mg/dL      

                                        7-18        

 

                          Serum or plasma creatinine measurement (mass/volume)  

         1.02 mg/dL       

                                        0.60-1.30        

 

                    Serum or plasma urea nitrogen/creatinine mass ratio         

  26                  NRG 

       

 

                                        Serum or plasma creatinine measurement w

ith calculation of estimated glomerular 

filtration rate           >                         NRG        

 

                    Serum or plasma glucose measurement (mass/volume)           

301 mg/dL           

        

 

                    Serum or plasma calcium measurement (mass/volume)           

8.3 mg/dL           

8.5-10.1        

 

                                        Serum or plasma phosphate measurement (m

ass/volume) - 20 03:00         

 

                          Serum or plasma phosphate measurement (mass/volume)   

        3.4 mg/dL         

                                        2.3-4.7        

 

                                        Magnesium - 20 03:00         

 

                    Magnesium           2.0 mg/dL           1.6-2.4        

 

                                        Capillary blood glucose measurement by g

lucometer (mass/volume) - 20 11:34

         

 

                          Capillary blood glucose measurement by glucometer (mas

s/volume)           259 

mg/dL                                           

 

                                        Capillary blood glucose measurement by g

lucometer (mass/volume) - 20 17:46

         

 

                          Capillary blood glucose measurement by glucometer (mas

s/volume)           297 

mg/dL                                           

 

                                        Capillary blood glucose measurement by g

lucometer (mass/volume) - 20 22:47

         

 

                          Capillary blood glucose measurement by glucometer (mas

s/volume)           259 

mg/dL                                           

 

                                        Arterial blood gas measurement - 

0 03:10         

 

                    Blood pCO2           52 mm[Hg]           35-45        

 

                    Blood pO2           73 mm[Hg]           79-93        

 

                          Arterial blood bicarbonate measurement (moles/volume) 

          36 mmol/L       

                                        23-27        

 

                    Arterial blood base excess by calculation           11.0 mmo

l/L           

-2.5-2.5        

 

                    Arterial blood oxygen saturation measurement           94 % 

                   

    

 

                    * Inhaled oxygen flow rate           40%                 NRG

        

 

                          Arterial blood pH measurement with patient temperature

 correction           7.45

                                        7.37-7.43        

 

                          Arterial blood carbon dioxide, total measurement (mole

s/volume)           37.2 

mmol/L                                  21.0-31.0        

 

                    Body site           RIGHT RADIAL            NRG        

 

                          Assessment of wrist artery patency prior to arterial p

uncture           ART LINE

                                        NRG        

 

                    Setting of ventilation mode           YES                 NR

G        

 

                    Measurement of body temperature           36.8              

  NRG        

 

                                        Complete blood count (CBC) with automate

d white blood cell (WBC) differential - 

20 03:13         

 

                          Blood leukocytes automated count (number/volume)      

     7.0 10*3/uL          

                                        4.3-11.0        

 

                          Blood erythrocytes automated count (number/volume)    

       3.00 10*6/uL       

                                        4.35-5.85        

 

                    Venous blood hemoglobin measurement (mass/volume)           

9.1 g/dL            

13.3-17.7        

 

                    Blood hematocrit (volume fraction)           29 %           

     40-54        

 

                    Automated erythrocyte mean corpuscular volume           98 [

foz_us]           

80-99        

 

                                        Automated erythrocyte mean corpuscular h

emoglobin (mass per erythrocyte)        

                          30 pg                     25-34        

 

                                        Automated erythrocyte mean corpuscular h

emoglobin concentration measurement 

(mass/volume)             31 g/dL                   32-36        

 

                    Automated erythrocyte distribution width ratio           16.

5 %              10.0-

14.5        

 

                    Automated blood platelet count (count/volume)           177 

10*3/uL           

130-400        

 

                          Automated blood platelet mean volume measurement      

     11.5 [foz_us]        

                                        7.4-10.4        

 

                    Automated blood neutrophils/100 leukocytes           92 %   

             42-75       

 

 

                    Automated blood lymphocytes/100 leukocytes           5 %    

             12-44        

 

                    Blood monocytes/100 leukocytes           4 %                

 0-12        

 

                    Automated blood eosinophils/100 leukocytes           0 %    

             0-10        

 

                    Automated blood basophils/100 leukocytes           0 %      

           0-10        

 

                    Blood neutrophils automated count (number/volume)           

6.4 10*3            

1.8-7.8        

 

                    Blood lymphocytes automated count (number/volume)           

0.3 10*3            

1.0-4.0        

 

                    Blood monocytes automated count (number/volume)           0.

3 10*3            

0.0-1.0        

 

                    Automated eosinophil count           0.0 10*3/uL           0

.0-0.3        

 

                    Automated blood basophil count (count/volume)           0.0 

10*3/uL           

0.0-0.1        

 

                                        Whole blood basic metabolic panel -  03:13         

 

                          Serum or plasma sodium measurement (moles/volume)     

      136 mmol/L          

                                        135-145        

 

                          Serum or plasma potassium measurement (moles/volume)  

         4.3 mmol/L       

                                        3.6-5.0        

 

                          Serum or plasma chloride measurement (moles/volume)   

        95 mmol/L         

                                                

 

                    Carbon dioxide           31 mmol/L           21-32        

 

                          Serum or plasma anion gap determination (moles/volume)

           10 mmol/L      

                                        5-14        

 

                          Serum or plasma urea nitrogen measurement (mass/volume

)           42 mg/dL      

                                        7-18        

 

                          Serum or plasma creatinine measurement (mass/volume)  

         1.03 mg/dL       

                                        0.60-1.30        

 

                    Serum or plasma urea nitrogen/creatinine mass ratio         

  41                  NRG 

       

 

                                        Serum or plasma creatinine measurement w

ith calculation of estimated glomerular 

filtration rate           >                         NRG        

 

                    Serum or plasma glucose measurement (mass/volume)           

265 mg/dL           

        

 

                    Serum or plasma calcium measurement (mass/volume)           

8.1 mg/dL           

8.5-10.1        

 

                                        Serum or plasma phosphate measurement (m

ass/volume) - 20 03:13         

 

                          Serum or plasma phosphate measurement (mass/volume)   

        3.7 mg/dL         

                                        2.3-4.7        

 

                                        Magnesium - 20 03:13         

 

                    Magnesium           1.9 mg/dL           1.6-2.4        

 

                                        Serum or plasma troponin i.cardiac measu

rement (mass/volume) - 20 03:13   

      

 

                          Serum or plasma troponin i.cardiac measurement (mass/v

olume)           0.033 

ng/mL                                   <0.028        

 

                                        Capillary blood glucose measurement by g

lucometer (mass/volume) - 20 06:18

         

 

                          Capillary blood glucose measurement by glucometer (mas

s/volume)           303 

mg/dL                                           

 

                                        Capillary blood glucose measurement by g

lucometer (mass/volume) - 20 12:17

         

 

                          Capillary blood glucose measurement by glucometer (mas

s/volume)           284 

mg/dL                                           

 

                                        Capillary blood glucose measurement by g

lucometer (mass/volume) - 20 17:35

         

 

                          Capillary blood glucose measurement by glucometer (mas

s/volume)           232 

mg/dL                                           

 

                                        Capillary blood glucose measurement by g

lucometer (mass/volume) - 20 00:29

         

 

                          Capillary blood glucose measurement by glucometer (mas

s/volume)           233 

mg/dL                                           

 

                                        Complete blood count (CBC) with automate

d white blood cell (WBC) differential - 

20 03:05         

 

                          Blood leukocytes automated count (number/volume)      

     6.8 10*3/uL          

                                        4.3-11.0        

 

                          Blood erythrocytes automated count (number/volume)    

       2.70 10*6/uL       

                                        4.35-5.85        

 

                    Venous blood hemoglobin measurement (mass/volume)           

8.4 g/dL            

13.3-17.7        

 

                    Blood hematocrit (volume fraction)           27 %           

     40-54        

 

                    Automated erythrocyte mean corpuscular volume           99 [

foz_us]           

80-99        

 

                                        Automated erythrocyte mean corpuscular h

emoglobin (mass per erythrocyte)        

                          31 pg                     25-34        

 

                                        Automated erythrocyte mean corpuscular h

emoglobin concentration measurement 

(mass/volume)             32 g/dL                   32-36        

 

                    Automated erythrocyte distribution width ratio           16.

7 %              10.0-

14.5        

 

                    Automated blood platelet count (count/volume)           168 

10*3/uL           

130-400        

 

                          Automated blood platelet mean volume measurement      

     11.5 [foz_us]        

                                        7.4-10.4        

 

                    Automated blood neutrophils/100 leukocytes           92 %   

             42-75       

 

 

                    Automated blood lymphocytes/100 leukocytes           3 %    

             12-44        

 

                    Blood monocytes/100 leukocytes           5 %                

 0-12        

 

                    Automated blood eosinophils/100 leukocytes           0 %    

             0-10        

 

                    Automated blood basophils/100 leukocytes           0 %      

           0-10        

 

                    Blood neutrophils automated count (number/volume)           

6.2 10*3            

1.8-7.8        

 

                    Blood lymphocytes automated count (number/volume)           

0.2 10*3            

1.0-4.0        

 

                    Blood monocytes automated count (number/volume)           0.

3 10*3            

0.0-1.0        

 

                    Automated eosinophil count           0.0 10*3/uL           0

.0-0.3        

 

                    Automated blood basophil count (count/volume)           0.0 

10*3/uL           

0.0-0.1        

 

                                        Arterial blood gas measurement - 

0 03:05         

 

                    Blood pCO2           57 mm[Hg]           35-45        

 

                    Blood pO2           72 mm[Hg]           79-93        

 

                          Arterial blood bicarbonate measurement (moles/volume) 

          36 mmol/L       

                                        23-27        

 

                    Arterial blood base excess by calculation           10.7 mmo

l/L           

-2.5-2.5        

 

                    Arterial blood oxygen saturation measurement           94 % 

                   

    

 

                    * Inhaled oxygen flow rate           AC VOLUME CONTROL      

      NRG        

 

                          Arterial blood pH measurement with patient temperature

 correction           7.41

                                        7.37-7.43        

 

                          Arterial blood carbon dioxide, total measurement (mole

s/volume)           37.5 

mmol/L                                  21.0-31.0        

 

                    Body site           RT ART LINE            NRG        

 

                          Assessment of wrist artery patency prior to arterial p

uncture           P       

                                        NRG        

 

                    Setting of ventilation mode           YES                 NR

G        

 

                    Measurement of body temperature           36.6              

  NRG        

 

                                        Whole blood basic metabolic panel - 04/2

3/20 03:05         

 

                          Serum or plasma sodium measurement (moles/volume)     

      137 mmol/L          

                                        135-145        

 

                          Serum or plasma potassium measurement (moles/volume)  

         4.4 mmol/L       

                                        3.6-5.0        

 

                          Serum or plasma chloride measurement (moles/volume)   

        97 mmol/L         

                                                

 

                    Carbon dioxide           30 mmol/L           21-32        

 

                          Serum or plasma anion gap determination (moles/volume)

           10 mmol/L      

                                        5-14        

 

                          Serum or plasma urea nitrogen measurement (mass/volume

)           51 mg/dL      

                                        7-18        

 

                          Serum or plasma creatinine measurement (mass/volume)  

         0.99 mg/dL       

                                        0.60-1.30        

 

                    Serum or plasma urea nitrogen/creatinine mass ratio         

  52                  NRG 

       

 

                                        Serum or plasma creatinine measurement w

ith calculation of estimated glomerular 

filtration rate           >                         NRG        

 

                    Serum or plasma glucose measurement (mass/volume)           

218 mg/dL           

        

 

                    Serum or plasma calcium measurement (mass/volume)           

8.0 mg/dL           

8.5-10.1        

 

                                        Manual absolute plasma cell count - 04/2

3/20 03:05         

 

                    Blood monocytes/100 leukocytes           5 %                

 NRG        

 

                    Manual blood segmented neutrophils/100 leukocytes           

85 %                NRG  

      

 

                    Blood band neutrophils/100 leukocytes           5 %         

        NRG        

 

                    Manual blood lymphocytes/100 leukocytes           5 %       

          NRG        

 

                    Blood anisocytosis detection by light microscopy           S

LIGHT              NRG

        

 

                    Blood hypochromia detection by light microscopy           SL

IGHT              NRG 

       

 

                                        Serum or plasma phosphate measurement (m

ass/volume) - 20 03:05         

 

                          Serum or plasma phosphate measurement (mass/volume)   

        3.7 mg/dL         

                                        2.3-4.7        

 

                                        Magnesium - 20 03:05         

 

                    Magnesium           2.0 mg/dL           1.6-2.4        

 

                                        Capillary blood glucose measurement by g

lucometer (mass/volume) - 20 11:21

         

 

                          Capillary blood glucose measurement by glucometer (mas

s/volume)           188 

mg/dL                                           

 

                                        Capillary blood glucose measurement by g

lucometer (mass/volume) - 20 17:50

         

 

                          Capillary blood glucose measurement by glucometer (mas

s/volume)           254 

mg/dL                                           

 

                                        Capillary blood glucose measurement by g

lucometer (mass/volume) - 20 23:09

         

 

                          Capillary blood glucose measurement by glucometer (mas

s/volume)           223 

mg/dL                                           

 

                                        Complete blood count (CBC) with automate

d white blood cell (WBC) differential - 

20 03:35         

 

                          Blood leukocytes automated count (number/volume)      

     7.3 10*3/uL          

                                        4.3-11.0        

 

                          Blood erythrocytes automated count (number/volume)    

       3.17 10*6/uL       

                                        4.35-5.85        

 

                    Venous blood hemoglobin measurement (mass/volume)           

9.6 g/dL            

13.3-17.7        

 

                    Blood hematocrit (volume fraction)           31 %           

     40-54        

 

                    Automated erythrocyte mean corpuscular volume           97 [

foz_us]           

80-99        

 

                                        Automated erythrocyte mean corpuscular h

emoglobin (mass per erythrocyte)        

                          30 pg                     25-34        

 

                                        Automated erythrocyte mean corpuscular h

emoglobin concentration measurement 

(mass/volume)             31 g/dL                   32-36        

 

                    Automated erythrocyte distribution width ratio           16.

3 %              10.0-

14.5        

 

                    Automated blood platelet count (count/volume)           173 

10*3/uL           

130-400        

 

                          Automated blood platelet mean volume measurement      

     11.7 [foz_us]        

                                        7.4-10.4        

 

                    Automated blood neutrophils/100 leukocytes           93 %   

             42-75       

 

 

                    Automated blood lymphocytes/100 leukocytes           4 %    

             12-44        

 

                    Blood monocytes/100 leukocytes           4 %                

 0-12        

 

                    Automated blood eosinophils/100 leukocytes           0 %    

             0-10        

 

                    Automated blood basophils/100 leukocytes           0 %      

           0-10        

 

                    Blood neutrophils automated count (number/volume)           

6.7 10*3            

1.8-7.8        

 

                    Blood lymphocytes automated count (number/volume)           

0.3 10*3            

1.0-4.0        

 

                    Blood monocytes automated count (number/volume)           0.

3 10*3            

0.0-1.0        

 

                    Automated eosinophil count           0.0 10*3/uL           0

.0-0.3        

 

                    Automated blood basophil count (count/volume)           0.0 

10*3/uL           

0.0-0.1        

 

                                        Arterial blood gas measurement - 

0 03:35         

 

                    Blood pCO2           54 mm[Hg]           35-45        

 

                    Blood pO2           142 mm[Hg]           79-93        

 

                          Arterial blood bicarbonate measurement (moles/volume) 

          36 mmol/L       

                                        23-27        

 

                    Arterial blood base excess by calculation           11.3 mmo

l/L           

-2.5-2.5        

 

                    Arterial blood oxygen saturation measurement           97 % 

                   

    

 

                    * Inhaled oxygen flow rate           12                  NRG

        

 

                          Arterial blood pH measurement with patient temperature

 correction           7.44

                                        7.37-7.43        

 

                          Arterial blood carbon dioxide, total measurement (mole

s/volume)           37.9 

mmol/L                                  21.0-31.0        

 

                    Body site           RIGHT RADIAL            NRG        

 

                          Assessment of wrist artery patency prior to arterial p

uncture           POSITIVE

                                        NRG        

 

                    Setting of ventilation mode           YES                 NR

G        

 

                    Measurement of body temperature           36.4              

  NRG        

 

                                        Whole blood basic metabolic panel -  03:35         

 

                          Serum or plasma sodium measurement (moles/volume)     

      135 mmol/L          

                                        135-145        

 

                          Serum or plasma potassium measurement (moles/volume)  

         4.9 mmol/L       

                                        3.6-5.0        

 

                          Serum or plasma chloride measurement (moles/volume)   

        95 mmol/L         

                                                

 

                    Carbon dioxide           32 mmol/L           21-32        

 

                          Serum or plasma anion gap determination (moles/volume)

           8 mmol/L       

                                        5-14        

 

                          Serum or plasma urea nitrogen measurement (mass/volume

)           41 mg/dL      

                                        7-18        

 

                          Serum or plasma creatinine measurement (mass/volume)  

         0.81 mg/dL       

                                        0.60-1.30        

 

                    Serum or plasma urea nitrogen/creatinine mass ratio         

  51                  NRG 

       

 

                                        Serum or plasma creatinine measurement w

ith calculation of estimated glomerular 

filtration rate           >                         NRG        

 

                    Serum or plasma glucose measurement (mass/volume)           

204 mg/dL           

        

 

                    Serum or plasma calcium measurement (mass/volume)           

8.2 mg/dL           

8.5-10.1        

 

                                        Serum or plasma phosphate measurement (m

ass/volume) - 20 03:35         

 

                          Serum or plasma phosphate measurement (mass/volume)   

        3.5 mg/dL         

                                        2.3-4.7        

 

                                        Magnesium - 20 03:35         

 

                    Magnesium           2.0 mg/dL           1.6-2.4        

 

                                        Serum or plasma lithium measurement (mol

es/volume) - 20 03:35         

 

                    BNP PT              311.9 pg/mL           <100.0        

 

                                        Capillary blood glucose measurement by g

lucometer (mass/volume) - 20 11:44

         

 

                          Capillary blood glucose measurement by glucometer (mas

s/volume)           251 

mg/dL                                           

 

                                        Capillary blood glucose measurement by g

lucometer (mass/volume) - 20 17:36

         

 

                          Capillary blood glucose measurement by glucometer (mas

s/volume)           263 

mg/dL                                           

 

                                        Capillary blood glucose measurement by g

lucometer (mass/volume) - 20 00:56

         

 

                          Capillary blood glucose measurement by glucometer (mas

s/volume)           252 

mg/dL                                           

 

                                        Arterial blood gas measurement - 

0 01:40         

 

                    Blood pCO2           53 mm[Hg]           35-45        

 

                    Blood pO2           81 mm[Hg]           79-93        

 

                          Arterial blood bicarbonate measurement (moles/volume) 

          36 mmol/L       

                                        23-27        

 

                    Arterial blood base excess by calculation           11.7 mmo

l/L           

-2.5-2.5        

 

                    Arterial blood oxygen saturation measurement           97 % 

                   

    

 

                    * Inhaled oxygen flow rate           85                  NRG

        

 

                          Arterial blood pH measurement with patient temperature

 correction           7.45

                                        7.37-7.43        

 

                          Arterial blood carbon dioxide, total measurement (mole

s/volume)           38.1 

mmol/L                                  21.0-31.0        

 

                    Body site           RIGHT RADIAL            NRG        

 

                          Assessment of wrist artery patency prior to arterial p

uncture           POSITIVE

                                        NRG        

 

                    Setting of ventilation mode           YES                 NR

G        

 

                    Measurement of body temperature           36.7              

  NRG        

 

                                        Complete blood count (CBC) with automate

d white blood cell (WBC) differential - 

20 01:40         

 

                          Blood leukocytes automated count (number/volume)      

     9.7 10*3/uL          

                                        4.3-11.0        

 

                          Blood erythrocytes automated count (number/volume)    

       3.13 10*6/uL       

                                        4.35-5.85        

 

                    Venous blood hemoglobin measurement (mass/volume)           

9.5 g/dL            

13.3-17.7        

 

                    Blood hematocrit (volume fraction)           31 %           

     40-54        

 

                    Automated erythrocyte mean corpuscular volume           98 [

foz_us]           

80-99        

 

                                        Automated erythrocyte mean corpuscular h

emoglobin (mass per erythrocyte)        

                          30 pg                     25-34        

 

                                        Automated erythrocyte mean corpuscular h

emoglobin concentration measurement 

(mass/volume)             31 g/dL                   32-36        

 

                    Automated erythrocyte distribution width ratio           16.

2 %              10.0-

14.5        

 

                    Automated blood platelet count (count/volume)           165 

10*3/uL           

130-400        

 

                          Automated blood platelet mean volume measurement      

     11.6 [foz_us]        

                                        7.4-10.4        

 

                    Automated blood neutrophils/100 leukocytes           94 %   

             42-75       

 

 

                    Automated blood lymphocytes/100 leukocytes           4 %    

             12-44        

 

                    Blood monocytes/100 leukocytes           3 %                

 0-12        

 

                    Automated blood eosinophils/100 leukocytes           0 %    

             0-10        

 

                    Automated blood basophils/100 leukocytes           0 %      

           0-10        

 

                    Blood neutrophils automated count (number/volume)           

9.1 10*3            

1.8-7.8        

 

                    Blood lymphocytes automated count (number/volume)           

0.3 10*3            

1.0-4.0        

 

                    Blood monocytes automated count (number/volume)           0.

3 10*3            

0.0-1.0        

 

                    Automated eosinophil count           0.0 10*3/uL           0

.0-0.3        

 

                    Automated blood basophil count (count/volume)           0.0 

10*3/uL           

0.0-0.1        

 

                                        Whole blood basic metabolic panel -  01:40         

 

                          Serum or plasma sodium measurement (moles/volume)     

      136 mmol/L          

                                        135-145        

 

                          Serum or plasma potassium measurement (moles/volume)  

         4.7 mmol/L       

                                        3.6-5.0        

 

                          Serum or plasma chloride measurement (moles/volume)   

        94 mmol/L         

                                                

 

                    Carbon dioxide           32 mmol/L           21-32        

 

                          Serum or plasma anion gap determination (moles/volume)

           10 mmol/L      

                                        5-14        

 

                          Serum or plasma urea nitrogen measurement (mass/volume

)           49 mg/dL      

                                        7-18        

 

                          Serum or plasma creatinine measurement (mass/volume)  

         0.89 mg/dL       

                                        0.60-1.30        

 

                    Serum or plasma urea nitrogen/creatinine mass ratio         

  55                  NRG 

       

 

                                        Serum or plasma creatinine measurement w

ith calculation of estimated glomerular 

filtration rate           >                         NRG        

 

                    Serum or plasma glucose measurement (mass/volume)           

248 mg/dL           

        

 

                    Serum or plasma calcium measurement (mass/volume)           

8.2 mg/dL           

8.5-10.1        

 

                                        Serum or plasma phosphate measurement (m

ass/volume) - 20 01:40         

 

                          Serum or plasma phosphate measurement (mass/volume)   

        3.6 mg/dL         

                                        2.3-4.7        

 

                                        Magnesium - 20 01:40         

 

                    Magnesium           1.9 mg/dL           1.6-2.4        

 

                                        Capillary blood glucose measurement by g

lucometer (mass/volume) - 20 11:35

         

 

                          Capillary blood glucose measurement by glucometer (mas

s/volume)           264 

mg/dL                                           

 

                                        Capillary blood glucose measurement by g

lucometer (mass/volume) - 20 17:50

         

 

                          Capillary blood glucose measurement by glucometer (mas

s/volume)           277 

mg/dL                                           

 

                                        Capillary blood glucose measurement by g

lucometer (mass/volume) - 20 23:04

         

 

                          Capillary blood glucose measurement by glucometer (mas

s/volume)           255 

mg/dL                                           

 

                                        Arterial blood gas measurement - 

0 02:22         

 

                    Blood pCO2           51 mm[Hg]           35-45        

 

                    Blood pO2           92 mm[Hg]           79-93        

 

                          Arterial blood bicarbonate measurement (moles/volume) 

          35 mmol/L       

                                        23-27        

 

                    Arterial blood base excess by calculation           10.0 mmo

l/L           

-2.5-2.5        

 

                    Arterial blood oxygen saturation measurement           97 % 

                   

    

 

                    * Inhaled oxygen flow rate           85                  NRG

        

 

                          Arterial blood pH measurement with patient temperature

 correction           7.45

                                        7.37-7.43        

 

                          Arterial blood carbon dioxide, total measurement (mole

s/volume)           36.1 

mmol/L                                  21.0-31.0        

 

                    Body site           RIGHT RADIAL            NRG        

 

                          Assessment of wrist artery patency prior to arterial p

uncture           POSITIVE

                                        NRG        

 

                    Setting of ventilation mode           YES                 NR

G        

 

                    Measurement of body temperature           36.8              

  NRG        

 

                                        Complete blood count (CBC) with automate

d white blood cell (WBC) differential - 

20 02:22         

 

                          Blood leukocytes automated count (number/volume)      

     8.2 10*3/uL          

                                        4.3-11.0        

 

                          Blood erythrocytes automated count (number/volume)    

       3.11 10*6/uL       

                                        4.35-5.85        

 

                    Venous blood hemoglobin measurement (mass/volume)           

9.4 g/dL            

13.3-17.7        

 

                    Blood hematocrit (volume fraction)           30 %           

     40-54        

 

                    Automated erythrocyte mean corpuscular volume           97 [

foz_us]           

80-99        

 

                                        Automated erythrocyte mean corpuscular h

emoglobin (mass per erythrocyte)        

                          30 pg                     25-34        

 

                                        Automated erythrocyte mean corpuscular h

emoglobin concentration measurement 

(mass/volume)             31 g/dL                   32-36        

 

                    Automated erythrocyte distribution width ratio           16.

6 %              10.0-

14.5        

 

                    Automated blood platelet count (count/volume)           156 

10*3/uL           

130-400        

 

                          Automated blood platelet mean volume measurement      

     11.4 [foz_us]        

                                        7.4-10.4        

 

                    Automated blood neutrophils/100 leukocytes           93 %   

             42-75       

 

 

                    Automated blood lymphocytes/100 leukocytes           3 %    

             12-44        

 

                    Blood monocytes/100 leukocytes           4 %                

 0-12        

 

                    Automated blood eosinophils/100 leukocytes           0 %    

             0-10        

 

                    Automated blood basophils/100 leukocytes           0 %      

           0-10        

 

                    Blood neutrophils automated count (number/volume)           

7.6 10*3            

1.8-7.8        

 

                    Blood lymphocytes automated count (number/volume)           

0.3 10*3            

1.0-4.0        

 

                    Blood monocytes automated count (number/volume)           0.

4 10*3            

0.0-1.0        

 

                    Automated eosinophil count           0.0 10*3/uL           0

.0-0.3        

 

                    Automated blood basophil count (count/volume)           0.0 

10*3/uL           

0.0-0.1        

 

                                        Whole blood basic metabolic panel -  02:22         

 

                          Serum or plasma sodium measurement (moles/volume)     

      134 mmol/L          

                                        135-145        

 

                          Serum or plasma potassium measurement (moles/volume)  

         4.3 mmol/L       

                                        3.6-5.0        

 

                          Serum or plasma chloride measurement (moles/volume)   

        96 mmol/L         

                                                

 

                    Carbon dioxide           30 mmol/L           21-32        

 

                          Serum or plasma anion gap determination (moles/volume)

           8 mmol/L       

                                        5-14        

 

                          Serum or plasma urea nitrogen measurement (mass/volume

)           48 mg/dL      

                                        7-18        

 

                          Serum or plasma creatinine measurement (mass/volume)  

         0.82 mg/dL       

                                        0.60-1.30        

 

                    Serum or plasma urea nitrogen/creatinine mass ratio         

  59                  NRG 

       

 

                                        Serum or plasma creatinine measurement w

ith calculation of estimated glomerular 

filtration rate           >                         NRG        

 

                    Serum or plasma glucose measurement (mass/volume)           

205 mg/dL           

        

 

                    Serum or plasma calcium measurement (mass/volume)           

8.0 mg/dL           

8.5-10.1        

 

                                        Serum or plasma phosphate measurement (m

ass/volume) - 20 02:22         

 

                          Serum or plasma phosphate measurement (mass/volume)   

        3.5 mg/dL         

                                        2.3-4.7        

 

                                        Magnesium - 20 02:22         

 

                    Magnesium           2.0 mg/dL           1.6-2.4        

 

                                        Capillary blood glucose measurement by g

lucometer (mass/volume) - 20 10:04

         

 

                          Capillary blood glucose measurement by glucometer (mas

s/volume)           245 

mg/dL                                           

 

                                        Capillary blood glucose measurement by g

lucometer (mass/volume) - 20 14:05

         

 

                          Capillary blood glucose measurement by glucometer (mas

s/volume)           230 

mg/dL                                           

 

                                        Capillary blood glucose measurement by g

lucometer (mass/volume) - 20 17:42

         

 

                          Capillary blood glucose measurement by glucometer (mas

s/volume)           190 

mg/dL                                           

 

                                        Capillary blood glucose measurement by g

lucometer (mass/volume) - 20 21:13

         

 

                          Capillary blood glucose measurement by glucometer (mas

s/volume)           171 

mg/dL                                           

 

                                        Capillary blood glucose measurement by g

lucometer (mass/volume) - 20 01:37

         

 

                          Capillary blood glucose measurement by glucometer (mas

s/volume)           239 

mg/dL                                           

 

                                        Complete blood count (CBC) with automate

d white blood cell (WBC) differential - 

20 03:18         

 

                          Blood leukocytes automated count (number/volume)      

     9.5 10*3/uL          

                                        4.3-11.0        

 

                          Blood erythrocytes automated count (number/volume)    

       2.97 10*6/uL       

                                        4.35-5.85        

 

                    Venous blood hemoglobin measurement (mass/volume)           

9.2 g/dL            

13.3-17.7        

 

                    Blood hematocrit (volume fraction)           29 %           

     40-54        

 

                    Automated erythrocyte mean corpuscular volume           96 [

foz_us]           

80-99        

 

                                        Automated erythrocyte mean corpuscular h

emoglobin (mass per erythrocyte)        

                          31 pg                     25-34        

 

                                        Automated erythrocyte mean corpuscular h

emoglobin concentration measurement 

(mass/volume)             32 g/dL                   32-36        

 

                    Automated erythrocyte distribution width ratio           16.

2 %              10.0-

14.5        

 

                    Automated blood platelet count (count/volume)           146 

10*3/uL           

130-400        

 

                          Automated blood platelet mean volume measurement      

     12.0 [foz_us]        

                                        7.4-10.4        

 

                    Automated blood neutrophils/100 leukocytes           93 %   

             42-75       

 

 

                    Automated blood lymphocytes/100 leukocytes           4 %    

             12-44        

 

                    Blood monocytes/100 leukocytes           3 %                

 0-12        

 

                    Automated blood eosinophils/100 leukocytes           0 %    

             0-10        

 

                    Automated blood basophils/100 leukocytes           0 %      

           0-10        

 

                    Blood neutrophils automated count (number/volume)           

8.9 10*3            

1.8-7.8        

 

                    Blood lymphocytes automated count (number/volume)           

0.3 10*3            

1.0-4.0        

 

                    Blood monocytes automated count (number/volume)           0.

3 10*3            

0.0-1.0        

 

                    Automated eosinophil count           0.0 10*3/uL           0

.0-0.3        

 

                    Automated blood basophil count (count/volume)           0.0 

10*3/uL           

0.0-0.1        

 

                                        Arterial blood gas measurement - 

0 03:18         

 

                    Blood pCO2           45 mm[Hg]           35-45        

 

                    Blood pO2           77 mm[Hg]           79-93        

 

                          Arterial blood bicarbonate measurement (moles/volume) 

          30 mmol/L       

                                        23-27        

 

                    Arterial blood base excess by calculation           5.9 mmol

/L           

-2.5-2.5        

 

                    Arterial blood oxygen saturation measurement           96 % 

                   

    

 

                    * Inhaled oxygen flow rate           60                  NRG

        

 

                          Arterial blood pH measurement with patient temperature

 correction           7.44

                                        7.37-7.43        

 

                          Arterial blood carbon dioxide, total measurement (mole

s/volume)           31.6 

mmol/L                                  21.0-31.0        

 

                    Body site           RIGHT ARTLINE            NRG        

 

                          Assessment of wrist artery patency prior to arterial p

uncture           POSITIVE

                                        NRG        

 

                    Setting of ventilation mode           YES                 NR

G        

 

                    Measurement of body temperature           36.4              

  NRG        

 

                                        Whole blood basic metabolic panel -  03:18         

 

                          Serum or plasma sodium measurement (moles/volume)     

      134 mmol/L          

                                        135-145        

 

                          Serum or plasma potassium measurement (moles/volume)  

         4.2 mmol/L       

                                        3.6-5.0        

 

                          Serum or plasma chloride measurement (moles/volume)   

        98 mmol/L         

                                                

 

                    Carbon dioxide           27 mmol/L           21-32        

 

                          Serum or plasma anion gap determination (moles/volume)

           9 mmol/L       

                                        5-14        

 

                          Serum or plasma urea nitrogen measurement (mass/volume

)           49 mg/dL      

                                        7-18        

 

                          Serum or plasma creatinine measurement (mass/volume)  

         0.85 mg/dL       

                                        0.60-1.30        

 

                    Serum or plasma urea nitrogen/creatinine mass ratio         

  58                  NRG 

       

 

                                        Serum or plasma creatinine measurement w

ith calculation of estimated glomerular 

filtration rate           >                         NRG        

 

                    Serum or plasma glucose measurement (mass/volume)           

224 mg/dL           

        

 

                    Serum or plasma calcium measurement (mass/volume)           

7.9 mg/dL           

8.5-10.1        

 

                                        Serum or plasma phosphate measurement (m

ass/volume) - 20 03:18         

 

                          Serum or plasma phosphate measurement (mass/volume)   

        3.5 mg/dL         

                                        2.3-4.7        

 

                                        Magnesium - 20 03:18         

 

                    Magnesium           2.0 mg/dL           1.6-2.4        

 

                                        Serum or plasma lithium measurement (mol

es/volume) - 20 03:18         

 

                    BNP PT              87.6 pg/mL           <100.0        

 

                                        Capillary blood glucose measurement by g

lucometer (mass/volume) - 20 06:11

         

 

                          Capillary blood glucose measurement by glucometer (mas

s/volume)           204 

mg/dL                                           

 

                                        Capillary blood glucose measurement by g

lucometer (mass/volume) - 20 10:36

         

 

                          Capillary blood glucose measurement by glucometer (mas

s/volume)           167 

mg/dL                                           

 

                                        Capillary blood glucose measurement by g

lucometer (mass/volume) - 20 14:33

         

 

                          Capillary blood glucose measurement by glucometer (mas

s/volume)           183 

mg/dL                                           

 

                                        Capillary blood glucose measurement by g

lucometer (mass/volume) - 20 17:41

         

 

                          Capillary blood glucose measurement by glucometer (mas

s/volume)           167 

mg/dL                                           

 

                                        Capillary blood glucose measurement by g

lucometer (mass/volume) - 20 20:10

         

 

                          Capillary blood glucose measurement by glucometer (mas

s/volume)           100 

mg/dL                                           

 

                                        Capillary blood glucose measurement by g

lucometer (mass/volume) - 20 01:50

         

 

                          Capillary blood glucose measurement by glucometer (mas

s/volume)           224 

mg/dL                                           

 

                                        Complete blood count (CBC) with automate

d white blood cell (WBC) differential - 

20 02:45         

 

                          Blood leukocytes automated count (number/volume)      

     8.7 10*3/uL          

                                        4.3-11.0        

 

                          Blood erythrocytes automated count (number/volume)    

       3.06 10*6/uL       

                                        4.35-5.85        

 

                    Venous blood hemoglobin measurement (mass/volume)           

9.3 g/dL            

13.3-17.7        

 

                    Blood hematocrit (volume fraction)           29 %           

     40-54        

 

                    Automated erythrocyte mean corpuscular volume           95 [

foz_us]           

80-99        

 

                                        Automated erythrocyte mean corpuscular h

emoglobin (mass per erythrocyte)        

                          30 pg                     25-34        

 

                                        Automated erythrocyte mean corpuscular h

emoglobin concentration measurement 

(mass/volume)             32 g/dL                   32-36        

 

                    Automated erythrocyte distribution width ratio           16.

6 %              10.0-

14.5        

 

                    Automated blood platelet count (count/volume)           131 

10*3/uL           

130-400        

 

                          Automated blood platelet mean volume measurement      

     11.8 [foz_us]        

                                        7.4-10.4        

 

                    Automated blood neutrophils/100 leukocytes           93 %   

             42-75       

 

 

                    Automated blood lymphocytes/100 leukocytes           4 %    

             12-44        

 

                    Blood monocytes/100 leukocytes           4 %                

 0-12        

 

                    Automated blood eosinophils/100 leukocytes           0 %    

             0-10        

 

                    Automated blood basophils/100 leukocytes           0 %      

           0-10        

 

                    Blood neutrophils automated count (number/volume)           

8.0 10*3            

1.8-7.8        

 

                    Blood lymphocytes automated count (number/volume)           

0.3 10*3            

1.0-4.0        

 

                    Blood monocytes automated count (number/volume)           0.

3 10*3            

0.0-1.0        

 

                    Automated eosinophil count           0.0 10*3/uL           0

.0-0.3        

 

                    Automated blood basophil count (count/volume)           0.0 

10*3/uL           

0.0-0.1        

 

                                        Whole blood basic metabolic panel -  02:45         

 

                          Serum or plasma sodium measurement (moles/volume)     

      133 mmol/L          

                                        135-145        

 

                          Serum or plasma potassium measurement (moles/volume)  

         4.1 mmol/L       

                                        3.6-5.0        

 

                          Serum or plasma chloride measurement (moles/volume)   

        98 mmol/L         

                                                

 

                    Carbon dioxide           27 mmol/L           21-32        

 

                          Serum or plasma anion gap determination (moles/volume)

           8 mmol/L       

                                        5-14        

 

                          Serum or plasma urea nitrogen measurement (mass/volume

)           50 mg/dL      

                                        7-18        

 

                          Serum or plasma creatinine measurement (mass/volume)  

         0.82 mg/dL       

                                        0.60-1.30        

 

                    Serum or plasma urea nitrogen/creatinine mass ratio         

  61                  NRG 

       

 

                                        Serum or plasma creatinine measurement w

ith calculation of estimated glomerular 

filtration rate           >                         NRG        

 

                    Serum or plasma glucose measurement (mass/volume)           

215 mg/dL           

        

 

                    Serum or plasma calcium measurement (mass/volume)           

8.0 mg/dL           

8.5-10.1        

 

                                        Serum or plasma phosphate measurement (m

ass/volume) - 20 02:45         

 

                          Serum or plasma phosphate measurement (mass/volume)   

        3.1 mg/dL         

                                        2.3-4.7        

 

                                        Magnesium - 20 02:45         

 

                    Magnesium           2.1 mg/dL           1.6-2.4        

 

                                        Arterial blood gas measurement - 

0 03:10         

 

                    Blood pCO2           46 mm[Hg]           35-45        

 

                    Blood pO2           70 mm[Hg]           79-93        

 

                          Arterial blood bicarbonate measurement (moles/volume) 

          31 mmol/L       

                                        23-27        

 

                    Arterial blood base excess by calculation           6.5 mmol

/L           

-2.5-2.5        

 

                    Arterial blood oxygen saturation measurement           95 % 

                   

    

 

                    * Inhaled oxygen flow rate           40                  NRG

        

 

                          Arterial blood pH measurement with patient temperature

 correction           7.44

                                        7.37-7.43        

 

                          Arterial blood carbon dioxide, total measurement (mole

s/volume)           32.2 

mmol/L                                  21.0-31.0        

 

                    Body site           RIGHT RADIAL            NRG        

 

                          Assessment of wrist artery patency prior to arterial p

uncture           POSITIVE

                                        NRG        

 

                    Setting of ventilation mode           YES                 NR

G        

 

                    Measurement of body temperature           36.6              

  NRG        

 

                                        Capillary blood glucose measurement by g

lucometer (mass/volume) - 20 09:34

         

 

                          Capillary blood glucose measurement by glucometer (mas

s/volume)           81 

mg/dL                                           

 

                                        Capillary blood glucose measurement by g

lucometer (mass/volume) - 20 13:54

         

 

                          Capillary blood glucose measurement by glucometer (mas

s/volume)           279 

mg/dL                                           

 

                                        Capillary blood glucose measurement by g

lucometer (mass/volume) - 20 17:52

         

 

                          Capillary blood glucose measurement by glucometer (mas

s/volume)           205 

mg/dL                                           

 

                                        Capillary blood glucose measurement by g

lucometer (mass/volume) - 20 20:51

         

 

                          Capillary blood glucose measurement by glucometer (mas

s/volume)           194 

mg/dL                                           

 

                                        Capillary blood glucose measurement by g

lucometer (mass/volume) - 20 02:07

         

 

                          Capillary blood glucose measurement by glucometer (mas

s/volume)           136 

mg/dL                                           

 

                                        Arterial blood gas measurement - 

0 02:50         

 

                    Blood pCO2           43 mm[Hg]           35-45        

 

                    Blood pO2           61 mm[Hg]           79-93        

 

                          Arterial blood bicarbonate measurement (moles/volume) 

          31 mmol/L       

                                        23-27        

 

                    Arterial blood base excess by calculation           7.1 mmol

/L           

-2.5-2.5        

 

                    Arterial blood oxygen saturation measurement           94 % 

                   

    

 

                    * Inhaled oxygen flow rate           35                  NRG

        

 

                          Arterial blood pH measurement with patient temperature

 correction           7.47

                                        7.37-7.43        

 

                          Arterial blood carbon dioxide, total measurement (mole

s/volume)           32.4 

mmol/L                                  21.0-31.0        

 

                    Body site           RIGHT RADIAL            NRG        

 

                          Assessment of wrist artery patency prior to arterial p

uncture           POSITIVE

                                        NRG        

 

                    Setting of ventilation mode           YES                 NR

G        

 

                    Measurement of body temperature           36.6              

  NRG        

 

                                        Complete blood count (CBC) with automate

d white blood cell (WBC) differential - 

20 02:50         

 

                          Blood leukocytes automated count (number/volume)      

     7.4 10*3/uL          

                                        4.3-11.0        

 

                          Blood erythrocytes automated count (number/volume)    

       3.12 10*6/uL       

                                        4.35-5.85        

 

                    Venous blood hemoglobin measurement (mass/volume)           

9.4 g/dL            

13.3-17.7        

 

                    Blood hematocrit (volume fraction)           30 %           

     40-54        

 

                    Automated erythrocyte mean corpuscular volume           95 [

foz_us]           

80-99        

 

                                        Automated erythrocyte mean corpuscular h

emoglobin (mass per erythrocyte)        

                          30 pg                     25-34        

 

                                        Automated erythrocyte mean corpuscular h

emoglobin concentration measurement 

(mass/volume)             32 g/dL                   32-36        

 

                    Automated erythrocyte distribution width ratio           16.

4 %              10.0-

14.5        

 

                    Automated blood platelet count (count/volume)           115 

10*3/uL           

130-400        

 

                          Automated blood platelet mean volume measurement      

     12.1 [foz_us]        

                                        7.4-10.4        

 

                    Automated blood neutrophils/100 leukocytes           94 %   

             42-75       

 

 

                    Automated blood lymphocytes/100 leukocytes           3 %    

             12-44        

 

                    Blood monocytes/100 leukocytes           3 %                

 0-12        

 

                    Automated blood eosinophils/100 leukocytes           0 %    

             0-10        

 

                    Automated blood basophils/100 leukocytes           0 %      

           0-10        

 

                    Blood neutrophils automated count (number/volume)           

7.0 10*3            

1.8-7.8        

 

                    Blood lymphocytes automated count (number/volume)           

0.2 10*3            

1.0-4.0        

 

                    Blood monocytes automated count (number/volume)           0.

3 10*3            

0.0-1.0        

 

                    Automated eosinophil count           0.0 10*3/uL           0

.0-0.3        

 

                    Automated blood basophil count (count/volume)           0.0 

10*3/uL           

0.0-0.1        

 

                                        Whole blood basic metabolic panel -  02:50         

 

                          Serum or plasma sodium measurement (moles/volume)     

      136 mmol/L          

                                        135-145        

 

                          Serum or plasma potassium measurement (moles/volume)  

         4.5 mmol/L       

                                        3.6-5.0        

 

                          Serum or plasma chloride measurement (moles/volume)   

        100 mmol/L        

                                                

 

                    Carbon dioxide           27 mmol/L           21-32        

 

                          Serum or plasma anion gap determination (moles/volume)

           9 mmol/L       

                                        5-14        

 

                          Serum or plasma urea nitrogen measurement (mass/volume

)           44 mg/dL      

                                        7-18        

 

                          Serum or plasma creatinine measurement (mass/volume)  

         0.76 mg/dL       

                                        0.60-1.30        

 

                    Serum or plasma urea nitrogen/creatinine mass ratio         

  58                  NRG 

       

 

                                        Serum or plasma creatinine measurement w

ith calculation of estimated glomerular 

filtration rate           >                         NRG        

 

                    Serum or plasma glucose measurement (mass/volume)           

137 mg/dL           

        

 

                    Serum or plasma calcium measurement (mass/volume)           

8.3 mg/dL           

8.5-10.1        

 

                                        Serum or plasma phosphate measurement (m

ass/volume) - 20 02:50         

 

                          Serum or plasma phosphate measurement (mass/volume)   

        3.2 mg/dL         

                                        2.3-4.7        

 

                                        Magnesium - 20 02:50         

 

                    Magnesium           2.2 mg/dL           1.6-2.4        

 

                                        Arterial blood gas measurement - 

0 04:42         

 

                    Blood pCO2           39 mm[Hg]           35-45        

 

                    Blood pO2           76 mm[Hg]           79-93        

 

                          Arterial blood bicarbonate measurement (moles/volume) 

          28 mmol/L       

                                        23-27        

 

                    Arterial blood base excess by calculation           4.6 mmol

/L           

-2.5-2.5        

 

                    Arterial blood oxygen saturation measurement           97 % 

                   

    

 

                    * Inhaled oxygen flow rate           35                  NRG

        

 

                          Arterial blood pH measurement with patient temperature

 correction           7.47

                                        7.37-7.43        

 

                          Arterial blood carbon dioxide, total measurement (mole

s/volume)           29.5 

mmol/L                                  21.0-31.0        

 

                    Body site           RT RADIAL ARTLINE            NRG        

 

                          Assessment of wrist artery patency prior to arterial p

uncture           POSITIVE

                                        NRG        

 

                    Setting of ventilation mode           YES                 NR

G        

 

                    Measurement of body temperature           36.7              

  NRG        

 

                                        Sputum Gram stain - 20 06:39      

   

 

                    Sputum Gram stain           Mixed Bacterial [place]           

 NRG        

 

                                        Bacterial sputum culture - 20 06:3

9         

 

                    FREE TEXT EXTERNAL           CLINDAMYCIN RESISTANT WITHOUT I

NDUCTION            

NRG        

 

                    QUANTITY OF GROWTH           Many                NRG        

 

                    FREE TEXT ENTRY 2           RESISTANT ORGANISM/CONTACT PRECA

UTIONS.            

NRG        

 

                    FREE TEXT ENTRY 3           REPORTED MRSA TO [facility]  

           NRG      

  

 

                    Bacterial sputum culture           [id number]             NRG  

      

 

                    FREE TEXT ENTRY 4            BY EDIL FLORES            NRG

        

 

                    SUSCEPTIBILITY           SUSCEPTIBILITY REPORTED  12:40  

          NRG       

 

 

                    MRSA SCREEN           MRSA SCREEN BY [facility] IS POSITIVE             NRG   

     

 

                    RAPID ID            PRELIM RAPID ID BY [facility]         

    NRG        

 

                    MRSA CONFIRMATION           MRSA CONFIRMATION ON  11:09  

          NRG       

 

 

                    ID CONFIRMATION           ID CONFIRMED 6            

NRG        

 

                                        Dirithromycin susceptibility test by dis

k diffusion - 20 06:39         

 

                          Oxacillin susceptibility test by minimum inhibitory co

ncentration           >   

                                        NRG        

 

                          Clindamycin susceptibility test by minimum inhibitory 

concentration           > 

                                        NRG        

 

                          Erythromycin susceptibility test by minimum inhibitory

 concentration           >

                                        NRG        

 

                                        Trimethoprim/sulfamethoxazole susceptibi

lity test by minimum 

inhibitoryconcentration           <=                        NRG        

 

                          Vancomycin susceptibility test by minimum inhibitory c

oncentration           1  

                                        NRG        

 

                          Levofloxacin susceptibility test by minimum inhibitory

 concentration           >

                                        NRG        

 

                          Rifampin susceptibility test by minimum inhibitory con

centration           <=   

                                        NRG        

 

                          Cefazolin susceptibility test by minimum inhibitory co

ncentration           >   

                                        NRG        

 

                          Linezolid susceptibility test by minimum inhibitory co

ncentration           2   

                                        NRG        

 

                          Penicillin G susceptibility test by minimum inhibitory

 concentration           >

                                        NRG        

 

                          Moxifloxacin susceptibility test by minimum inhibitory

 concentration           2

                                        NRG        

 

                    Minocycline susc KODI           <=                  NRG      

  

 

                                        Capillary blood glucose measurement by g

lucometer (mass/volume) - 20 09:59

         

 

                          Capillary blood glucose measurement by glucometer (mas

s/volume)           159 

mg/dL                                           

 

                                        Capillary blood glucose measurement by g

lucometer (mass/volume) - 20 14:36

         

 

                          Capillary blood glucose measurement by glucometer (mas

s/volume)           153 

mg/dL                                           

 

                                        Capillary blood glucose measurement by g

lucometer (mass/volume) - 20 17:29

         

 

                          Capillary blood glucose measurement by glucometer (mas

s/volume)           167 

mg/dL                                           

 

                                        Capillary blood glucose measurement by g

lucometer (mass/volume) - 20 21:14

         

 

                          Capillary blood glucose measurement by glucometer (mas

s/volume)           239 

mg/dL                                           

 

                                        Complete blood count (CBC) with automate

d white blood cell (WBC) differential - 

20 02:38         

 

                          Blood leukocytes automated count (number/volume)      

     11.0 10*3/uL         

                                        4.3-11.0        

 

                          Blood erythrocytes automated count (number/volume)    

       3.20 10*6/uL       

                                        4.35-5.85        

 

                    Venous blood hemoglobin measurement (mass/volume)           

9.8 g/dL            

13.3-17.7        

 

                    Blood hematocrit (volume fraction)           30 %           

     40-54        

 

                    Automated erythrocyte mean corpuscular volume           95 [

foz_us]           

80-99        

 

                                        Automated erythrocyte mean corpuscular h

emoglobin (mass per erythrocyte)        

                          31 pg                     25-34        

 

                                        Automated erythrocyte mean corpuscular h

emoglobin concentration measurement 

(mass/volume)             32 g/dL                   32-36        

 

                    Automated erythrocyte distribution width ratio           17.

1 %              10.0-

14.5        

 

                    Automated blood platelet count (count/volume)           126 

10*3/uL           

130-400        

 

                          Automated blood platelet mean volume measurement      

     12.0 [foz_us]        

                                        7.4-10.4        

 

                    Automated blood neutrophils/100 leukocytes           93 %   

             42-75       

 

 

                    Automated blood lymphocytes/100 leukocytes           4 %    

             12-44        

 

                    Blood monocytes/100 leukocytes           3 %                

 0-12        

 

                    Automated blood eosinophils/100 leukocytes           0 %    

             0-10        

 

                    Automated blood basophils/100 leukocytes           0 %      

           0-10        

 

                    Blood neutrophils automated count (number/volume)           

10.2 10*3           

1.8-7.8        

 

                    Blood lymphocytes automated count (number/volume)           

0.4 10*3            

1.0-4.0        

 

                    Blood monocytes automated count (number/volume)           0.

4 10*3            

0.0-1.0        

 

                    Automated eosinophil count           0.0 10*3/uL           0

.0-0.3        

 

                    Automated blood basophil count (count/volume)           0.0 

10*3/uL           

0.0-0.1        

 

                                        Manual absolute plasma cell count - 04/3

0/20 02:38         

 

                    Blood monocytes/100 leukocytes           2 %                

 NRG        

 

                    Manual blood segmented neutrophils/100 leukocytes           

88 %                NRG  

      

 

                    Blood band neutrophils/100 leukocytes           5 %         

        NRG        

 

                    Manual blood lymphocytes/100 leukocytes           4 %       

          NRG        

 

                    Manual eosinophils/100 leukocytes in nose           1 %     

            NRG        

 

                    Blood smudge cells detection by light microscopy           M

OD                 NRG   

     

 

                    Blood anisocytosis detection by light microscopy           M

ODERATE            

NRG        

 

                                        Whole blood basic metabolic panel - 04/3

0/20 02:38         

 

                          Serum or plasma sodium measurement (moles/volume)     

      135 mmol/L          

                                        135-145        

 

                          Serum or plasma potassium measurement (moles/volume)  

         4.4 mmol/L       

                                        3.6-5.0        

 

                          Serum or plasma chloride measurement (moles/volume)   

        99 mmol/L         

                                                

 

                    Carbon dioxide           27 mmol/L           21-32        

 

                          Serum or plasma anion gap determination (moles/volume)

           9 mmol/L       

                                        5-14        

 

                          Serum or plasma urea nitrogen measurement (mass/volume

)           34 mg/dL      

                                        7-18        

 

                          Serum or plasma creatinine measurement (mass/volume)  

         0.78 mg/dL       

                                        0.60-1.30        

 

                    Serum or plasma urea nitrogen/creatinine mass ratio         

  44                  NRG 

       

 

                                        Serum or plasma creatinine measurement w

ith calculation of estimated glomerular 

filtration rate           >                         NRG        

 

                    Serum or plasma glucose measurement (mass/volume)           

140 mg/dL           

        

 

                    Serum or plasma calcium measurement (mass/volume)           

8.4 mg/dL           

8.5-10.1        

 

                                        Serum or plasma phosphate measurement (m

ass/volume) - 20 02:38         

 

                          Serum or plasma phosphate measurement (mass/volume)   

        2.3 mg/dL         

                                        2.3-4.7        

 

                                        Magnesium - 20 02:38         

 

                    Magnesium           2.0 mg/dL           1.6-2.4        

 

                                        Capillary blood glucose measurement by g

lucometer (mass/volume) - 20 11:30

         

 

                          Capillary blood glucose measurement by glucometer (mas

s/volume)           291 

mg/dL                                           

 

                                        Capillary blood glucose measurement by g

lucometer (mass/volume) - 20 15:55

         

 

                          Capillary blood glucose measurement by glucometer (mas

s/volume)           189 

mg/dL                                           

 

                                        Capillary blood glucose measurement by g

lucometer (mass/volume) - 20 21:30

         

 

                          Capillary blood glucose measurement by glucometer (mas

s/volume)           128 

mg/dL                                           

 

                                        Complete blood count (CBC) with automate

d white blood cell (WBC) differential - 

20 05:28         

 

                          Blood leukocytes automated count (number/volume)      

     7.9 10*3/uL          

                                        4.3-11.0        

 

                          Blood erythrocytes automated count (number/volume)    

       3.24 10*6/uL       

                                        4.35-5.85        

 

                    Venous blood hemoglobin measurement (mass/volume)           

9.8 g/dL            

13.3-17.7        

 

                    Blood hematocrit (volume fraction)           31 %           

     40-54        

 

                    Automated erythrocyte mean corpuscular volume           94 [

foz_us]           

80-99        

 

                                        Automated erythrocyte mean corpuscular h

emoglobin (mass per erythrocyte)        

                          30 pg                     25-34        

 

                                        Automated erythrocyte mean corpuscular h

emoglobin concentration measurement 

(mass/volume)             32 g/dL                   32-36        

 

                    Automated erythrocyte distribution width ratio           16.

5 %              10.0-

14.5        

 

                    Automated blood platelet count (count/volume)           115 

10*3/uL           

130-400        

 

                          Automated blood platelet mean volume measurement      

     12.1 [foz_us]        

                                        7.4-10.4        

 

                    Automated blood neutrophils/100 leukocytes           91 %   

             42-75       

 

 

                    Automated blood lymphocytes/100 leukocytes           6 %    

             12-44        

 

                    Blood monocytes/100 leukocytes           4 %                

 0-12        

 

                    Automated blood eosinophils/100 leukocytes           0 %    

             0-10        

 

                    Automated blood basophils/100 leukocytes           0 %      

           0-10        

 

                    Blood neutrophils automated count (number/volume)           

7.2 10*3            

1.8-7.8        

 

                    Blood lymphocytes automated count (number/volume)           

0.4 10*3            

1.0-4.0        

 

                    Blood monocytes automated count (number/volume)           0.

3 10*3            

0.0-1.0        

 

                    Automated eosinophil count           0.0 10*3/uL           0

.0-0.3        

 

                    Automated blood basophil count (count/volume)           0.0 

10*3/uL           

0.0-0.1        

 

                                        Whole blood basic metabolic panel -  05:28         

 

                          Serum or plasma sodium measurement (moles/volume)     

      137 mmol/L          

                                        135-145        

 

                          Serum or plasma potassium measurement (moles/volume)  

         4.3 mmol/L       

                                        3.6-5.0        

 

                          Serum or plasma chloride measurement (moles/volume)   

        100 mmol/L        

                                                

 

                    Carbon dioxide           26 mmol/L           21-32        

 

                          Serum or plasma anion gap determination (moles/volume)

           11 mmol/L      

                                        5-14        

 

                          Serum or plasma urea nitrogen measurement (mass/volume

)           34 mg/dL      

                                        7-18        

 

                          Serum or plasma creatinine measurement (mass/volume)  

         0.76 mg/dL       

                                        0.60-1.30        

 

                    Serum or plasma urea nitrogen/creatinine mass ratio         

  45                  NRG 

       

 

                                        Serum or plasma creatinine measurement w

ith calculation of estimated glomerular 

filtration rate           >                         NRG        

 

                    Serum or plasma glucose measurement (mass/volume)           

179 mg/dL           

        

 

                    Serum or plasma calcium measurement (mass/volume)           

8.3 mg/dL           

8.5-10.1        

 

                                        Serum or plasma phosphate measurement (m

ass/volume) - 20 05:28         

 

                          Serum or plasma phosphate measurement (mass/volume)   

        3.2 mg/dL         

                                        2.3-4.7        

 

                                        Magnesium - 20 05:28         

 

                    Magnesium           1.8 mg/dL           1.6-2.4        

 

                                        Capillary blood glucose measurement by g

lucometer (mass/volume) - 20 12:26

         

 

                          Capillary blood glucose measurement by glucometer (mas

s/volume)           152 

mg/dL                                           

 

                                        Capillary blood glucose measurement by g

lucometer (mass/volume) - 20 15:52

         

 

                          Capillary blood glucose measurement by glucometer (mas

s/volume)           174 

mg/dL                                           

 

                                        Capillary blood glucose measurement by g

lucometer (mass/volume) - 20 20:48

         

 

                          Capillary blood glucose measurement by glucometer (mas

s/volume)           132 

mg/dL                                           

 

                                        Complete blood count (CBC) with automate

d white blood cell (WBC) differential - 

20 05:00         

 

                          Blood leukocytes automated count (number/volume)      

     8.8 10*3/uL          

                                        4.3-11.0        

 

                          Blood erythrocytes automated count (number/volume)    

       3.30 10*6/uL       

                                        4.35-5.85        

 

                    Venous blood hemoglobin measurement (mass/volume)           

9.9 g/dL            

13.3-17.7        

 

                    Blood hematocrit (volume fraction)           31 %           

     40-54        

 

                    Automated erythrocyte mean corpuscular volume           94 [

foz_us]           

80-99        

 

                                        Automated erythrocyte mean corpuscular h

emoglobin (mass per erythrocyte)        

                          30 pg                     25-34        

 

                                        Automated erythrocyte mean corpuscular h

emoglobin concentration measurement 

(mass/volume)             32 g/dL                   32-36        

 

                    Automated erythrocyte distribution width ratio           16.

4 %              10.0-

14.5        

 

                    Automated blood platelet count (count/volume)           102 

10*3/uL           

130-400        

 

                          Automated blood platelet mean volume measurement      

     12.3 [foz_us]        

                                        7.4-10.4        

 

                    Automated blood neutrophils/100 leukocytes           83 %   

             42-75       

 

 

                    Automated blood lymphocytes/100 leukocytes           12 %   

             12-44       

 

 

                    Blood monocytes/100 leukocytes           5 %                

 0-12        

 

                    Automated blood eosinophils/100 leukocytes           0 %    

             0-10        

 

                    Automated blood basophils/100 leukocytes           0 %      

           0-10        

 

                    Blood neutrophils automated count (number/volume)           

7.3 10*3            

1.8-7.8        

 

                    Blood lymphocytes automated count (number/volume)           

1.0 10*3            

1.0-4.0        

 

                    Blood monocytes automated count (number/volume)           0.

4 10*3            

0.0-1.0        

 

                    Automated eosinophil count           0.0 10*3/uL           0

.0-0.3        

 

                    Automated blood basophil count (count/volume)           0.0 

10*3/uL           

0.0-0.1        

 

                                        Whole blood basic metabolic panel -  05:00         

 

                          Serum or plasma sodium measurement (moles/volume)     

      135 mmol/L          

                                        135-145        

 

                          Serum or plasma potassium measurement (moles/volume)  

         4.1 mmol/L       

                                        3.6-5.0        

 

                          Serum or plasma chloride measurement (moles/volume)   

        100 mmol/L        

                                                

 

                    Carbon dioxide           25 mmol/L           21-32        

 

                          Serum or plasma anion gap determination (moles/volume)

           10 mmol/L      

                                        5-14        

 

                          Serum or plasma urea nitrogen measurement (mass/volume

)           29 mg/dL      

                                        7-18        

 

                          Serum or plasma creatinine measurement (mass/volume)  

         0.74 mg/dL       

                                        0.60-1.30        

 

                    Serum or plasma urea nitrogen/creatinine mass ratio         

  39                  NRG 

       

 

                                        Serum or plasma creatinine measurement w

ith calculation of estimated glomerular 

filtration rate           >                         NRG        

 

                    Serum or plasma glucose measurement (mass/volume)           

137 mg/dL           

        

 

                    Serum or plasma calcium measurement (mass/volume)           

8.2 mg/dL           

8.5-10.1        

 

                                        Serum or plasma phosphate measurement (m

ass/volume) - 20 05:00         

 

                          Serum or plasma phosphate measurement (mass/volume)   

        3.0 mg/dL         

                                        2.3-4.7        

 

                                        Magnesium - 20 05:00         

 

                    Magnesium           1.7 mg/dL           1.6-2.4        

 

                                        Capillary blood glucose measurement by g

lucometer (mass/volume) - 20 10:59

         

 

                          Capillary blood glucose measurement by glucometer (mas

s/volume)           94 

mg/dL                                           

 

                                        Capillary blood glucose measurement by g

lucometer (mass/volume) - 20 16:01

         

 

                          Capillary blood glucose measurement by glucometer (mas

s/volume)           232 

mg/dL                                           

 

                                        Capillary blood glucose measurement by g

lucometer (mass/volume) - 20 21:15

         

 

                          Capillary blood glucose measurement by glucometer (mas

s/volume)           97 

mg/dL                                           

 

                                        Complete blood count (CBC) with automate

d white blood cell (WBC) differential - 

20 05:20         

 

                          Blood leukocytes automated count (number/volume)      

     10.2 10*3/uL         

                                        4.3-11.0        

 

                          Blood erythrocytes automated count (number/volume)    

       3.23 10*6/uL       

                                        4.35-5.85        

 

                    Venous blood hemoglobin measurement (mass/volume)           

9.8 g/dL            

13.3-17.7        

 

                    Blood hematocrit (volume fraction)           30 %           

     40-54        

 

                    Automated erythrocyte mean corpuscular volume           94 [

foz_us]           

80-99        

 

                                        Automated erythrocyte mean corpuscular h

emoglobin (mass per erythrocyte)        

                          30 pg                     25-34        

 

                                        Automated erythrocyte mean corpuscular h

emoglobin concentration measurement 

(mass/volume)             32 g/dL                   32-36        

 

                    Automated erythrocyte distribution width ratio           16.

1 %              10.0-

14.5        

 

                    Automated blood platelet count (count/volume)           100 

10*3/uL           

130-400        

 

                          Automated blood platelet mean volume measurement      

     11.8 [foz_us]        

                                        7.4-10.4        

 

                    Automated blood neutrophils/100 leukocytes           81 %   

             42-75       

 

 

                    Automated blood lymphocytes/100 leukocytes           14 %   

             12-44       

 

 

                    Blood monocytes/100 leukocytes           4 %                

 0-12        

 

                    Automated blood eosinophils/100 leukocytes           1 %    

             0-10        

 

                    Automated blood basophils/100 leukocytes           0 %      

           0-10        

 

                    Blood neutrophils automated count (number/volume)           

8.2 10*3            

1.8-7.8        

 

                    Blood lymphocytes automated count (number/volume)           

1.4 10*3            

1.0-4.0        

 

                    Blood monocytes automated count (number/volume)           0.

5 10*3            

0.0-1.0        

 

                    Automated eosinophil count           0.1 10*3/uL           0

.0-0.3        

 

                    Automated blood basophil count (count/volume)           0.0 

10*3/uL           

0.0-0.1        

 

                                        Whole blood basic metabolic panel - 05/0

3/20 05:20         

 

                          Serum or plasma sodium measurement (moles/volume)     

      137 mmol/L          

                                        135-145        

 

                          Serum or plasma potassium measurement (moles/volume)  

         3.6 mmol/L       

                                        3.6-5.0        

 

                          Serum or plasma chloride measurement (moles/volume)   

        101 mmol/L        

                                                

 

                    Carbon dioxide           26 mmol/L           21-32        

 

                          Serum or plasma anion gap determination (moles/volume)

           10 mmol/L      

                                        5-14        

 

                          Serum or plasma urea nitrogen measurement (mass/volume

)           24 mg/dL      

                                        7-18        

 

                          Serum or plasma creatinine measurement (mass/volume)  

         0.65 mg/dL       

                                        0.60-1.30        

 

                    Serum or plasma urea nitrogen/creatinine mass ratio         

  37                  NRG 

       

 

                                        Serum or plasma creatinine measurement w

ith calculation of estimated glomerular 

filtration rate           >                         NRG        

 

                    Serum or plasma glucose measurement (mass/volume)           

49 mg/dL            

        

 

                    Serum or plasma calcium measurement (mass/volume)           

8.1 mg/dL           

8.5-10.1        

 

                                        Serum or plasma phosphate measurement (m

ass/volume) - 20 05:20         

 

                          Serum or plasma phosphate measurement (mass/volume)   

        2.7 mg/dL         

                                        2.3-4.7        

 

                                        Magnesium - 20 05:20         

 

                    Magnesium           1.7 mg/dL           1.6-2.4        

 

                                        Capillary blood glucose measurement by g

lucometer (mass/volume) - 20 06:02

         

 

                          Capillary blood glucose measurement by glucometer (mas

s/volume)           56 

mg/dL                                           

 

                                        Capillary blood glucose measurement by g

lucometer (mass/volume) - 20 06:20

         

 

                          Capillary blood glucose measurement by glucometer (mas

s/volume)           83 

mg/dL                                           

 

                                        Capillary blood glucose measurement by g

lucometer (mass/volume) - 20 06:49

         

 

                          Capillary blood glucose measurement by glucometer (mas

s/volume)           107 

mg/dL                                           

 

                                        Capillary blood glucose measurement by g

lucometer (mass/volume) - 20 11:27

         

 

                          Capillary blood glucose measurement by glucometer (mas

s/volume)           186 

mg/dL                                           

 

                                        Capillary blood glucose measurement by g

lucometer (mass/volume) - 20 16:16

         

 

                          Capillary blood glucose measurement by glucometer (mas

s/volume)           347 

mg/dL                                           

 

                                        Capillary blood glucose measurement by g

lucometer (mass/volume) - 20 20:15

         

 

                          Capillary blood glucose measurement by glucometer (mas

s/volume)           213 

mg/dL                                           

 

                                        Vancomycin trough - 20 21:00      

   

 

                    Vancomycin trough           26.5 ug/mL           10.0-20.0  

      

 

                                        Capillary blood glucose measurement by g

lucometer (mass/volume) - 20 03:20

         

 

                          Capillary blood glucose measurement by glucometer (mas

s/volume)           121 

mg/dL                                           

 

                                        Complete blood count (CBC) with automate

d white blood cell (WBC) differential - 

20 03:20         

 

                          Blood leukocytes automated count (number/volume)      

     9.2 10*3/uL          

                                        4.3-11.0        

 

                          Blood erythrocytes automated count (number/volume)    

       3.09 10*6/uL       

                                        4.35-5.85        

 

                    Venous blood hemoglobin measurement (mass/volume)           

9.3 g/dL            

13.3-17.7        

 

                    Blood hematocrit (volume fraction)           29 %           

     40-54        

 

                    Automated erythrocyte mean corpuscular volume           95 [

foz_us]           

80-99        

 

                                        Automated erythrocyte mean corpuscular h

emoglobin (mass per erythrocyte)        

                          30 pg                     25-34        

 

                                        Automated erythrocyte mean corpuscular h

emoglobin concentration measurement 

(mass/volume)             32 g/dL                   32-36        

 

                    Automated erythrocyte distribution width ratio           16.

5 %              10.0-

14.5        

 

                    Automated blood platelet count (count/volume)           93 1

0*3/uL           

130-400        

 

                          Automated blood platelet mean volume measurement      

     11.7 [foz_us]        

                                        7.4-10.4        

 

                    Automated blood neutrophils/100 leukocytes           80 %   

             42-75       

 

 

                    Automated blood lymphocytes/100 leukocytes           14 %   

             12-44       

 

 

                    Blood monocytes/100 leukocytes           5 %                

 0-12        

 

                    Automated blood eosinophils/100 leukocytes           1 %    

             0-10        

 

                    Automated blood basophils/100 leukocytes           0 %      

           0-10        

 

                    Blood neutrophils automated count (number/volume)           

7.3 10*3            

1.8-7.8        

 

                    Blood lymphocytes automated count (number/volume)           

1.3 10*3            

1.0-4.0        

 

                    Blood monocytes automated count (number/volume)           0.

5 10*3            

0.0-1.0        

 

                    Automated eosinophil count           0.1 10*3/uL           0

.0-0.3        

 

                    Automated blood basophil count (count/volume)           0.0 

10*3/uL           

0.0-0.1        

 

                                        Whole blood basic metabolic panel -  03:20         

 

                          Serum or plasma sodium measurement (moles/volume)     

      136 mmol/L          

                                        135-145        

 

                          Serum or plasma potassium measurement (moles/volume)  

         3.9 mmol/L       

                                        3.6-5.0        

 

                          Serum or plasma chloride measurement (moles/volume)   

        102 mmol/L        

                                                

 

                    Carbon dioxide           26 mmol/L           21-32        

 

                          Serum or plasma anion gap determination (moles/volume)

           8 mmol/L       

                                        5-14        

 

                          Serum or plasma urea nitrogen measurement (mass/volume

)           22 mg/dL      

                                        7-18        

 

                          Serum or plasma creatinine measurement (mass/volume)  

         0.71 mg/dL       

                                        0.60-1.30        

 

                    Serum or plasma urea nitrogen/creatinine mass ratio         

  31                  NRG 

       

 

                                        Serum or plasma creatinine measurement w

ith calculation of estimated glomerular 

filtration rate           >                         NRG        

 

                    Serum or plasma glucose measurement (mass/volume)           

124 mg/dL           

        

 

                    Serum or plasma calcium measurement (mass/volume)           

7.9 mg/dL           

8.5-10.1        

 

                                        Serum or plasma phosphate measurement (m

ass/volume) - 20 03:20         

 

                          Serum or plasma phosphate measurement (mass/volume)   

        2.3 mg/dL         

                                        2.3-4.7        

 

                                        Magnesium - 20 03:20         

 

                    Magnesium           2.5 mg/dL           1.6-2.4        

 

                                        Serum or plasma lithium measurement (mol

es/volume) - 20 03:30         

 

                    BNP PT              70.6 pg/mL           <100.0        

 

                                        Vancomycin trough - 20 03:30      

   

 

                    Vancomycin trough           18.4 ug/mL           10.0-20.0  

      

 

                                        Capillary blood glucose measurement by g

lucometer (mass/volume) - 20 10:56

         

 

                          Capillary blood glucose measurement by glucometer (mas

s/volume)           167 

mg/dL                                           



                                                                                
                                                                                
                                                                                
                                                                                
                                                                                
                                                                                
                                                                                
                                                                                
                                                                                
                                                                



Encounters

      



                ACCT No.           Visit Date/Time           Discharge          

 Status         

             Pt. Type           Provider           Facility           Loc./Unit 

          

Complaint        

 

             [id number]           2016 10:06:00                         

            

Document Registration                                                           

         

 

                    [id number]           2020 12:55:00            14:58:00        

                DIS             Outpatient           ELISE RODRIGUEZ MD        

   [facility]           CATH                      TYPICAL ATRIAL FLUTTER 

       

 

                    [id number]           2020 12:39:00            23:59:59        

                CLS             Outpatient           YULISA DENNIS MD        

   [facility]                          

 

                    [id number]           2020 21:51:00            12:10:00        

                DIS             Inpatient           MARIELY DELANEY MD         

  [address]                                

 

                    [id number]           2020 12:10:00            23:59:59        

                CLS             Outpatient           YULISA DENNIS MD        

   [facility]           [facility]                          

 

                    [id number]           2020 12:08:00            23:59:59        

                CLS             Outpatient           YULISA DENNIS MD        

   [facility]           [facility]                          

 

                    [id number]           2020 13:03:00            14:08:00        

                DIS             Outpatient           YULISA DENNIS MD        

   [facility]                       L97.416        

 

                    [id number]           2020 12:04:00            23:59:59        

                CLS             Outpatient           YULISA DENNIS MD        

   [facility]           [facility]                          

 

                    [id number]           2020 12:45:00            23:59:59        

                CLS             Outpatient           YULISA DENNIS MD        

   [facility]           [facility]                          

 

                    [id number]           2020 11:02:00            23:59:59        

                CLS             Outpatient           ELISE RODRIGUEZ MD        

   [facility]           CARD                      CAD,CRITICAL LOWER LIMB

 

ISCHEMIA,DIABETES        

 

                    [id number]           2020 12:32:00            23:59:59        

                CLS             Outpatient           THA ODOM MD           Via

 [facility]           [facility]                          

 

                    [id number]           2020 12:39:00            23:59:59        

                CLS             Outpatient           THA ODOM MD           Via

 [facility]           [facility]                          

 

                    [id number]           2020 14:15:00            14:40:00        

                DIS             Outpatient           JASEN DAY DO           

[facility]           4TH                       SEPSIS, RIGHT FOOT WOUN

D        

 

                    [id number]           2020 12:27:00            23:59:59        

                CLS             Outpatient           THA ODOM MD           Via

 [facility]           [facility]                          

 

                    [id number]           2020 12:32:00            23:59:59        

                CLS             Outpatient           THA ODOM MD           Via

 [facility]           [facility]                          

 

                    [id number]           2020 12:36:00            23:59:59        

                CLS             Outpatient           THA ODOM MD           Via

 [facility]           [facility]                          

 

                    [id number]           01/15/2020 12:32:00           01/15/2

020 23:59:59        

                CLS             Outpatient           THA ODOM MD           Via

 [facility]           [facility]                          

 

                    [id number]           2019 12:34:00            23:59:59        

                CLS             Outpatient           YULISA DENNIS MD        

   [facility]           [facility]                          

 

                    [id number]           2019 12:34:00            23:59:59        

                CLS             Outpatient           YULISA DENNIS MD        

   [facility]           [facility]                          

 

                    [id number]           2019 12:35:00            23:59:59        

                CLS             Outpatient           YULISA DENNIS MD        

   [facility]           [facility]                          

 

                    [id number]           2019 12:35:00            23:59:59        

                CLS             Outpatient           YULISA DENNIS MD        

   [facility]           [facility]                          

 

                    [id number]           2019 12:09:00            23:59:59        

                CLS             Outpatient           YULISA DENNIS MD        

   [facility]           RAD                       TYPE 2 DIABETES        

 

                    [id number]           2019 12:41:00            23:59:59        

                CLS             Outpatient           YULISA DENNIS MD        

   [facility]           [facility]                          

 

                    [id number]           2019 13:30:00            15:01:00        

                DIS             Emergency           ARASELI RICHARDSON         

  [facility]           ER                        LOW OXYGEN, AMS        

 

                    [id number]           2019 12:18:00            23:59:59        

                CLS             Outpatient           YULISA DENNIS MD        

   [facility]           [facility]                          

 

                    [id number]           10/30/2019 12:32:00           10/30/2

019 23:59:59        

                CLS             Outpatient           YULISA DENNIS MD        

   [facility]           [facility]                          

 

                    [id number]           10/23/2019 13:34:00           10/23/2

019 23:59:59        

                CLS             Outpatient           YULISA DENNIS MD        

   [facility]           LAB                       DM FOOT ULCER        

 

                    [id number]           10/23/2019 12:33:00           10/23/2

019 23:59:59        

                CLS             Outpatient           YULISA DENNIS MD        

   [facility]           [facility]                          

 

                    [id number]           10/09/2019 11:04:00           10/09/2

019 23:59:59        

                CLS             Outpatient           YULISA DENNIS MD        

   [facility]           [facility]                          

 

                    [id number]           10/02/2019 12:45:00           10/02/2

019 23:59:59        

                CLS             Outpatient           YULISA DENNIS MD        

   [facility]           [facility]                          

 

                    [id number]           2019 12:21:00            23:59:59        

                CLS             Outpatient           YULISA DENNIS MD        

   [facility]                          

 

                    [id number]           2019 09:50:00            23:59:59        

                CLS             Outpatient           YULISA DENNIS MD        

   [facility]                          

 

                    [id number]           2019 12:36:00            23:59:59        

                CLS             Outpatient           YULISA DENNIS MD        

   [facility]                          

 

                    [id number]           2019 09:46:00            11:27:00        

                DIS             Outpatient           ELISE RODRIGUEZ MD        

   [facility]           CATH                      CRITICAL LIMB ISCHEMIA 

       

 

                    [id number]           2019 12:32:00            23:59:59        

                CLS             Outpatient           YULISA DENNIS MD        

   [facility]                          

 

                    [id number]           2019 12:37:00            23:59:59        

                CLS             Outpatient           YULISA DENNIS MD        

   [facility]                          

 

                    [id number]           2019 10:30:00            23:59:59        

                CLS             Outpatient           ELISE RODRIGUEZ MD        

   [facility]           CARD                      CAD        

 

                    [id number]           2019 12:47:00            23:59:59        

                CLS             Outpatient           YULISA DENNIS MD        

   [facility]                          

 

                    [id number]           2019 13:01:00            23:59:59        

                CLS             Outpatient           YULISA DENNIS MD        

   [facility]           [facility]                          

 

                    [id number]           2019 12:57:00            23:59:59        

                CLS             Outpatient           YULISA DENNIS MD        

   [facility]           [facility]                          

 

                    [id number]           2019 13:01:00            23:59:59        

                CLS             Outpatient           YULISA DENNIS MD        

   [facility]                          

 

                    [id number]           2019 13:06:00            23:59:59        

                CLS             Outpatient           YULISA DENNIS MD        

   [facility]           [facility]                          

 

                    [id number]           2019 13:31:00           

019 15:33:00        

                DIS             Inpatient           YUE BEAUCHAMP MD          

 [facility]           4TH                       RT FOOT SWOLLEN AND RED

        

 

                    [id number]           2017 09:47:00           

017 13:22:00        

                DIS             Emergency           CHRISTINE MUKHERJEE MD    

       [facility]ia 

[facility]           ER                        POSS STROKE/FAC

IAL 

DROOPING/CANNOT LIFT LEFT ARM        

 

                    [id number]           2017 14:45:00           

017 23:59:59        

                CLS             Preadmit           TEOFILO REYNOLDS ARNALEXEY        

   [facility]           RAD                       CLAUDICATION I73.9     

   

 

                    [id number]           2017 11:00:00           

017 14:39:00        

                DIS             Outpatient           ARIADNE SALDIVAR FACC, JOHNNIE MERCEDES CC

DS           

[facility]           CATH                      CHEST PAIN 

       

 

             [id number]           2020 14:55:00                        A

CT           

Outpatient                YULISA DENNIS MD           [facility]                 [facility]                          

 

             [id number]           2017 07:29:00                         

            

Document Registration                                                           

         

 

             [id number]           2013 14:08:00                         

            

Document Registration                                                           

         

 

             [id number]           2012 10:35:00                         

            

Document Registration                                                           

         

 

             [id number]           2016 13:05:00                         

            

Document Registration                                                           

         

 

                [id number]           2014 08:56:00           2014 23:59:

59           CLS

                Outpatient           MK LÓPEZ MD                          

         

                                                 

 

                [id number]           2014 10:06:00           2014 23:59:

59           CLS

                Outpatient           MK LÓPEZ MD                          

         

                                                 

 

                [id number]           2014 13:19:00           2014 23:59:

59           CLS

                Outpatient           MK LÓPEZ MD                          

         

                                                 

 

                [id number]           2014 14:30:00           2014 23:59:

59           CLS

                Outpatient           MK LÓPEZ MD                          

         

                                                 

 

                [id number]           2014 11:12:00           2014 23:59:

59           CLS

                Outpatient           MK LÓPEZ MD                          

         

                                                 

 

                [id number]           10/22/2013 16:06:00           10/22/2013 23:59:

59           CLS

                Outpatient           MK LÓPEZ MD                          

         

                                                 

 

                [id number]           2013 13:33:00           2013 23:59:

59           CLS

                Outpatient           MK LÓPEZ MD                          

         

                                                 

 

                [id number]           2013 15:28:00           2013 23:59:

59           CLS

                Outpatient           MK LÓPEZ MD                          

         

                                                 

 

                [id number]           01/10/2013 11:03:00           01/10/2013 23:59:

59           CLS

             Outpatient                                                         

  

 

                [id number]           10/23/2012 14:55:00           10/23/2012 23:59:

59           CLS

                Outpatient           MK LÓPEZ MD                          

         

                                                 

 

                [id number]           10/23/2012 14:55:00           10/23/2012 23:59:5

9           CLS 

                Outpatient           MK LÓPEZ MD                          

          

                                                 

 

                [id number]           2020 11:40:00           2020 23:59:

59           CLS

                Outpatient           MK LÓPEZ MD                          

 [facility] 

[facility]                                   

 

             [id number]           2019 15:00:00                              

       Document

 Registration                                                                   

 

 

             [id number]           2018 11:20:00                              

       Document

 Registration                                                                   

 

 

             [id number]           2018 14:20:00                              

       Document

 Registration

## 2020-05-21 NOTE — DIAGNOSTIC IMAGING REPORT
INDICATION: Hyperglycemia with swelling in the legs.



COMPARISON: 05/01/2020.



FINDINGS: The heart size is stable. There is minimal blunting of

the left costophrenic angle which may be from some pleural

thickening or a tiny amount of pleural fluid. This appears

similar if not less pronounced than on the prior. There has been

no adverse change and no vascular congestion.



IMPRESSION: No adverse development. Blunting of the left

costophrenic angle slightly less pronounced than on prior,

otherwise negative.



Dictated by: 



  Dictated on workstation # [id number]

## 2020-05-22 ENCOUNTER — HOSPITAL ENCOUNTER (OUTPATIENT)
Dept: HOSPITAL 75 - LAB FS | Age: 70
End: 2020-05-22
Attending: SURGERY
Payer: MEDICAID

## 2020-05-22 DIAGNOSIS — N18.9: Primary | ICD-10-CM

## 2020-05-22 DIAGNOSIS — D63.1: ICD-10-CM

## 2020-05-22 PROCEDURE — 36415 COLL VENOUS BLD VENIPUNCTURE: CPT

## 2020-05-22 PROCEDURE — 84134 ASSAY OF PREALBUMIN: CPT

## 2020-05-22 NOTE — NUR
DR DENNIS IN TO SEE PATIENT.  RIGHT GREAT TOE EXPOSED AND INSPECTED.  WOUND REDRESSED PER DR DENNIS ORDER. [Follow-Up] : a follow-up visit [FreeTextEntry1] : F/U dementia, CAD, CHF [Family Member] : family member

## 2020-06-01 ENCOUNTER — HOSPITAL ENCOUNTER (OUTPATIENT)
Dept: HOSPITAL 75 - WOUNDCARE | Age: 70
End: 2020-06-01
Attending: SURGERY
Payer: MEDICAID

## 2020-06-01 DIAGNOSIS — E44.1: ICD-10-CM

## 2020-06-01 DIAGNOSIS — L97.412: ICD-10-CM

## 2020-06-01 DIAGNOSIS — T81.31XA: ICD-10-CM

## 2020-06-01 DIAGNOSIS — E11.621: ICD-10-CM

## 2020-06-01 DIAGNOSIS — I70.244: Primary | ICD-10-CM

## 2020-06-01 DIAGNOSIS — E11.42: ICD-10-CM

## 2020-06-01 DIAGNOSIS — E11.52: ICD-10-CM

## 2020-06-01 PROCEDURE — 11042 DBRDMT SUBQ TIS 1ST 20SQCM/<: CPT

## 2020-06-08 ENCOUNTER — HOSPITAL ENCOUNTER (OUTPATIENT)
Dept: HOSPITAL 75 - LABNPT | Age: 70
End: 2020-06-08
Attending: INTERNAL MEDICINE
Payer: MEDICAID

## 2020-06-08 DIAGNOSIS — Z01.812: Primary | ICD-10-CM

## 2020-06-08 DIAGNOSIS — Z53.8: ICD-10-CM

## 2020-06-11 ENCOUNTER — HOSPITAL ENCOUNTER (OUTPATIENT)
Dept: HOSPITAL 75 - WOUNDCARE | Age: 70
End: 2020-06-11
Attending: SURGERY
Payer: MEDICAID

## 2020-06-11 DIAGNOSIS — E11.42: ICD-10-CM

## 2020-06-11 DIAGNOSIS — T81.31XA: ICD-10-CM

## 2020-06-11 DIAGNOSIS — L97.412: ICD-10-CM

## 2020-06-11 DIAGNOSIS — E11.52: ICD-10-CM

## 2020-06-11 DIAGNOSIS — I70.234: ICD-10-CM

## 2020-06-11 DIAGNOSIS — E44.1: ICD-10-CM

## 2020-06-11 DIAGNOSIS — E11.621: Primary | ICD-10-CM

## 2020-06-11 PROCEDURE — 11042 DBRDMT SUBQ TIS 1ST 20SQCM/<: CPT

## 2020-06-15 ENCOUNTER — HOSPITAL ENCOUNTER (OUTPATIENT)
Dept: HOSPITAL 75 - CATH | Age: 70
LOS: 1 days | Discharge: HOME | End: 2020-06-16
Attending: INTERNAL MEDICINE
Payer: MEDICAID

## 2020-06-15 VITALS — DIASTOLIC BLOOD PRESSURE: 71 MMHG | SYSTOLIC BLOOD PRESSURE: 101 MMHG

## 2020-06-15 VITALS — WEIGHT: 200.4 LBS | HEIGHT: 67.72 IN | BODY MASS INDEX: 30.73 KG/M2

## 2020-06-15 VITALS — SYSTOLIC BLOOD PRESSURE: 109 MMHG | DIASTOLIC BLOOD PRESSURE: 68 MMHG

## 2020-06-15 VITALS — SYSTOLIC BLOOD PRESSURE: 113 MMHG | DIASTOLIC BLOOD PRESSURE: 73 MMHG

## 2020-06-15 VITALS — DIASTOLIC BLOOD PRESSURE: 73 MMHG | SYSTOLIC BLOOD PRESSURE: 115 MMHG

## 2020-06-15 VITALS — DIASTOLIC BLOOD PRESSURE: 72 MMHG | SYSTOLIC BLOOD PRESSURE: 124 MMHG

## 2020-06-15 VITALS — DIASTOLIC BLOOD PRESSURE: 70 MMHG | SYSTOLIC BLOOD PRESSURE: 111 MMHG

## 2020-06-15 VITALS — SYSTOLIC BLOOD PRESSURE: 109 MMHG | DIASTOLIC BLOOD PRESSURE: 70 MMHG

## 2020-06-15 VITALS — DIASTOLIC BLOOD PRESSURE: 68 MMHG | SYSTOLIC BLOOD PRESSURE: 111 MMHG

## 2020-06-15 VITALS — DIASTOLIC BLOOD PRESSURE: 78 MMHG | SYSTOLIC BLOOD PRESSURE: 111 MMHG

## 2020-06-15 VITALS — DIASTOLIC BLOOD PRESSURE: 75 MMHG | SYSTOLIC BLOOD PRESSURE: 122 MMHG

## 2020-06-15 VITALS — SYSTOLIC BLOOD PRESSURE: 134 MMHG | DIASTOLIC BLOOD PRESSURE: 83 MMHG

## 2020-06-15 VITALS — SYSTOLIC BLOOD PRESSURE: 111 MMHG | DIASTOLIC BLOOD PRESSURE: 75 MMHG

## 2020-06-15 VITALS — DIASTOLIC BLOOD PRESSURE: 87 MMHG | SYSTOLIC BLOOD PRESSURE: 132 MMHG

## 2020-06-15 VITALS — DIASTOLIC BLOOD PRESSURE: 75 MMHG | SYSTOLIC BLOOD PRESSURE: 116 MMHG

## 2020-06-15 VITALS — SYSTOLIC BLOOD PRESSURE: 119 MMHG | DIASTOLIC BLOOD PRESSURE: 77 MMHG

## 2020-06-15 VITALS — SYSTOLIC BLOOD PRESSURE: 112 MMHG | DIASTOLIC BLOOD PRESSURE: 73 MMHG

## 2020-06-15 DIAGNOSIS — I48.3: Primary | ICD-10-CM

## 2020-06-15 DIAGNOSIS — I11.0: ICD-10-CM

## 2020-06-15 DIAGNOSIS — Z79.899: ICD-10-CM

## 2020-06-15 DIAGNOSIS — E66.9: ICD-10-CM

## 2020-06-15 DIAGNOSIS — N17.9: ICD-10-CM

## 2020-06-15 DIAGNOSIS — K21.9: ICD-10-CM

## 2020-06-15 DIAGNOSIS — I99.8: ICD-10-CM

## 2020-06-15 DIAGNOSIS — Z79.82: ICD-10-CM

## 2020-06-15 DIAGNOSIS — I50.33: ICD-10-CM

## 2020-06-15 DIAGNOSIS — I44.1: ICD-10-CM

## 2020-06-15 DIAGNOSIS — F17.210: ICD-10-CM

## 2020-06-15 DIAGNOSIS — Z82.3: ICD-10-CM

## 2020-06-15 DIAGNOSIS — Z86.73: ICD-10-CM

## 2020-06-15 DIAGNOSIS — J96.00: ICD-10-CM

## 2020-06-15 DIAGNOSIS — E11.9: ICD-10-CM

## 2020-06-15 DIAGNOSIS — I25.10: ICD-10-CM

## 2020-06-15 DIAGNOSIS — Z88.5: ICD-10-CM

## 2020-06-15 DIAGNOSIS — I25.2: ICD-10-CM

## 2020-06-15 DIAGNOSIS — E78.5: ICD-10-CM

## 2020-06-15 DIAGNOSIS — Z79.84: ICD-10-CM

## 2020-06-15 LAB
ALBUMIN SERPL-MCNC: 3.4 GM/DL (ref 3.2–4.5)
ALP SERPL-CCNC: 100 U/L (ref 40–136)
ALT SERPL-CCNC: 7 U/L (ref 0–55)
APTT BLD: 28 SEC (ref 24–35)
BILIRUB SERPL-MCNC: 0.3 MG/DL (ref 0.1–1)
BUN/CREAT SERPL: 9
CALCIUM SERPL-MCNC: 8.7 MG/DL (ref 8.5–10.1)
CHLORIDE SERPL-SCNC: 101 MMOL/L (ref 98–107)
CO2 SERPL-SCNC: 29 MMOL/L (ref 21–32)
CREAT SERPL-MCNC: 0.95 MG/DL (ref 0.6–1.3)
ERYTHROCYTE [DISTWIDTH] IN BLOOD BY AUTOMATED COUNT: 19.2 % (ref 10–14.5)
GFR SERPLBLD BASED ON 1.73 SQ M-ARVRAT: > 60 ML/MIN
GLUCOSE SERPL-MCNC: 132 MG/DL (ref 70–105)
HCT VFR BLD CALC: 39 % (ref 40–54)
HGB BLD-MCNC: 12.5 G/DL (ref 13.3–17.7)
INR PPP: 0.9 (ref 0.8–1.4)
MCH RBC QN AUTO: 31 PG (ref 25–34)
MCHC RBC AUTO-ENTMCNC: 32 G/DL (ref 32–36)
MCV RBC AUTO: 96 FL (ref 80–99)
PLATELET # BLD: 205 10^3/UL (ref 130–400)
PMV BLD AUTO: 12.4 FL (ref 7.4–10.4)
POTASSIUM SERPL-SCNC: 3.7 MMOL/L (ref 3.6–5)
PROT SERPL-MCNC: 6.7 GM/DL (ref 6.4–8.2)
PROTHROMBIN TIME: 13 SEC (ref 12.2–14.7)
SODIUM SERPL-SCNC: 141 MMOL/L (ref 135–145)
WBC # BLD AUTO: 16.6 10^3/UL (ref 4.3–11)

## 2020-06-15 PROCEDURE — 80053 COMPREHEN METABOLIC PANEL: CPT

## 2020-06-15 PROCEDURE — 85730 THROMBOPLASTIN TIME PARTIAL: CPT

## 2020-06-15 PROCEDURE — 36415 COLL VENOUS BLD VENIPUNCTURE: CPT

## 2020-06-15 PROCEDURE — 87081 CULTURE SCREEN ONLY: CPT

## 2020-06-15 PROCEDURE — 85610 PROTHROMBIN TIME: CPT

## 2020-06-15 PROCEDURE — 85027 COMPLETE CBC AUTOMATED: CPT

## 2020-06-15 PROCEDURE — 80048 BASIC METABOLIC PNL TOTAL CA: CPT

## 2020-06-15 PROCEDURE — 93653 COMPRE EP EVAL TX SVT: CPT

## 2020-06-15 PROCEDURE — 93613 INTRACARDIAC EPHYS 3D MAPG: CPT

## 2020-06-15 PROCEDURE — 93005 ELECTROCARDIOGRAM TRACING: CPT

## 2020-06-15 PROCEDURE — 93623 PRGRMD STIMJ&PACG IV RX NFS: CPT

## 2020-06-15 RX ADMIN — APIXABAN SCH MG: 5 TABLET, FILM COATED ORAL at 21:50

## 2020-06-15 RX ADMIN — SODIUM CHLORIDE SCH MLS/HR: 900 INJECTION, SOLUTION INTRAVENOUS at 09:38

## 2020-06-15 RX ADMIN — SODIUM CHLORIDE SCH MLS/HR: 900 INJECTION, SOLUTION INTRAVENOUS at 16:44

## 2020-06-15 RX ADMIN — SODIUM CHLORIDE SCH MLS/HR: 900 INJECTION, SOLUTION INTRAVENOUS at 19:26

## 2020-06-15 NOTE — ELECTROPHYSIOLOGY PROCEDURE
EP Procedure


EP study and Typical Atrial Flutter ablation operative report





DATE OF SERVICE:6/15/20 





CARDIAC ELECTROPHYSIOLOGIST:


WOODY Dai MD, [facility]





INDICATION: Typical atrial flutter.





PREOPERATIVE DIAGNOSIS: Typical atrial flutter.





POSTOPERATIVE DIAGNOSES: Successful typical atrial flutter ablation.





HISTORY:


This is a 69-year-old gentleman who presented with hemodynamically significant 

atrial flutter with 2-1 AV block requiring cardioversion.  The patient is 

planned for comprehensive EP study and ablation.





PROCEDURE PERFORMED:


1. Comprehensive EP study with induction.


2. Fluoroscopy.


3. Left atrial pacing and recording.


4.  Drug infusion.


5. Ablation of typical atrial flutter.


6. Comprehensive 3D mapping with the carto system.





COMPLICATION:


None.





ESTIMATED BLOOD LOSS:


10 mL.





CONTRAST USED:


None.





FLUOROSCOPY TIME:


3.8 minutes.





FLUOROSCOPY DOSE:


94 mgy. 





SPECIMENS:


None.





ANESTHESIA:


Done by our anesthesia colleagues.





ANTICOAGULATION:


Not on anticoagulation.





PROCEDURE IN DETAIL:


After informed consent was taken, the patient was brought to the EP lab.


Anesthesia was provided by our anesthesia colleagues. The patient was draped


and prepped in the usual sterile fashion. The patient presented to the EP lab


in sinus rhythm.





Access was gained in the right femoral vein with a 6-Indonesian


and an 8-Indonesian sheath. Left access in left femoral vein was gained with


5-Indonesian and 6-Indonesian sheath respectively. High right atrial catheter was an


ablation catheter, right ventricular catheter was placed, his catheter and the


CS catheter were also placed.





A comprehensive EP study was done including left atrial pacing and 


recording which did not demonstrate a left lateral bypass tract.  


Dual AV aziza physiology was not demonstrated. Typical atrial flutter


was not induced with rapid atrial pacing with and without Isuprel infusion.


A 3D electroanatomic mapping was donewith the carto system.





Ablation was performed in the cavotricuspid isthmus.CS pacing and pacing from 

the


ablation catheter at different positions on the lateral side of the ablation


line were used to verify bidirectional block. We then waited for 30 minutes and


rechecked and confirmed bidirectional block.Isuprel was given post-procedure,


however, we could not induce atrial flutter.The patienttolerated the 

procedure


well and did not have any complication. The patientleft the lab in sinus 

rhythm.





Total ablation time was 5 minutes and 53 seconds.





MEASUREMENTS/EP STUDY:





AA interval 656 ms, neck sign VT interval 162 ms,


QT interval 330 ms,


QRS duration 82 ms,


R-R interval 654 ms,


AH interval 67 ms,


HV interval 68 ms,


AV Wenckebach when pacing at cycle length 330 ms,


RV pacing at 700 ms showed no retrograde conduction.


Atrial /240 ms,


Left atrial pacing and recording did not demonstrate left lateral bypass tract.


Atrial /220 ms during Isuprel infusion.





PLAN:


The patient will be observed overnight and will be discharged home tomorrow with


precise followup instructions.





WOODY Dai MD, [facility]


Cardiac Electrophysiology











ELISE DAI MD             Javier 15, 2020 14:05

## 2020-06-15 NOTE — HISTORY & PHYSICIAL-CARDIOLGY
HPI-Cardiology


Cardiology Consultation:


Date of Consultation


6/15/20


Date of Admission





Attending Physician


ELISE Rodriguez MD


Admitting Physician


Kenn Mackey MD


Consulting Physician


ELISE RODRIGUEZ MD





HPI:


Time Seen by a Provider:  09:00


Chief Complaint:


Palpitations


This is a 69-year-old gentleman with history of hemodynamically significant 

atrial flutter with 2-1 AV block.





Review of Systems-Cardiology


Review of Systems


Constitutional:  As described under HPI; No As described under HPI, No no 

symptoms reported, No chills, No fever, No lightheadedness


Eyes:  No As described under HPI, No no symptoms reported, No blindness, No 

blurred vision, No contact lenses, No drainage, No decreased acuity, No foreign 

body sensation, No pain, No vision change


Ears/Nose/Throat:  No As described under HPI, No no symptoms reported, No 

chronic hearing loss, No ear discharge, No ear pain, No nasal drainage, No 

ulcerations


Respiratory:  No no symptoms reported; As described under HPI; No As described 

under HPI, No cough, No orthopnea, No shortness of breath, No SOB with excertion


Cardiovascular:  No no symptoms reported; As described under HPI; No As 

described under HPI, No chest pain, No edema, No irregular heart rate, No li

ghtheadedness; palpitations


Gastrointestinal:  No no symptoms reported, No As described under HPI, No abdom

en distended, No abdominal pain, No blood streaked bowels, No constipation, No 

diarrhea, No nausea, No vomiting, No stool coloration changes


Genitourinary:  No As described under HPI, No burning, No dysuria, No discharge,

No frequency, No flank pain, No hematuria, No urgency


Skin:  No rash, No skin related problems, No ulcerations


Psychiatric/Neurological:  No anxiety, No depression, No seizure, No focal 

weakness, No syncope


Hematologic:  No bleeding abnormalities





PMH-Social-Family Hx


Patient Social History


Recreational Drug Use:  No


Smoking Status:  Current Everyday Smoker


Type Used:  Cigarettes


2nd Hand Smoke Exposure:  Yes


Recent Foreign Travel:  No





Immunizations Up To Date


Tetanus Booster (TDap):  Unknown


Date of Pneumonia Vaccine:  Aug 22, 2016


Date of Influenza Vaccine:  Dec 1, 2019





Past Medical History


PMH


As described under Assessment.





Family Medical History


Family History:  


Cardiovascular disease


  G8 SISTER


Cataracts


  19 MOTHER


Colon cancer


  19 MOTHER


Completed stroke


  19 MOTHER


Diabetes mellitus


  G8 SISTER


Drug abuse


  G8 BROTHER


Hypercholesterolemia


  G8 BROTHER


Hypertension


  G8 BROTHER


  G8 SISTER


Myocardial infarction


  19 FATHER


  19 MOTHER


Respiratory disorder


  19 FATHER


  19 MOTHER


  G8 BROTHER


  G8 BROTHER


  G8 SISTER


  G8 SISTER





Allergies and Home Medications


Allergies


Coded Allergies:  


     No Known Drug Allergies (Unverified , 1/17/17)





Home Medications


Apixaban 5 Mg Tablet, 5 MG PO Q12H


   Prescribed by: JASEN DAY on 5/4/20 1027


Fluticasone/Salmeterol 12 Gm Hfa.aer.ad, 1 PUFF IH RTBID


   Prescribed by: JASEN DAY on 5/4/20 1027


Glipizide 5 Mg Tablet, 2.5 MG PO DAILY


   Prescribed by: JASEN DAY on 5/4/20 1027


Ipratropium/Albuterol Sulfate 3 Ml Ampul.neb, 3 ML IH BID PRN for SHORTNESS OF 

BREATH, (Reported)


Linagliptin 5 Mg Tablet, 5 MG PO DAILY, (Reported)


   LAST FILLED 01- #30/30 DAY SUPPLY 


Metoprolol Tartrate 50 Mg Tablet, 12.5 MG PO BID


   Prescribed by: JASEN DAY on 5/4/20 1027


Multivitamin 1 Each Tablet, 1 TAB PO DAILY, (Reported)


Nitroglycerin 0.4 Mg Tab.subl, 0.4 MG SL UD PRN for CHEST PAIN, (Reported)


Nortriptyline HCl 50 Mg Capsule, 50 MG PO HS, (Reported)


Oxycodone HCl/Acetaminophen 1 Each Tablet, 1 EACH PO QID PRN for PAIN-MODERATE


   Prescribed by: JASEN DAY on 5/4/20 1028


Pregabalin 150 Mg Capsule, 150 MG PO TID, (Reported)


Sitagliptin Phosphate 100 Mg Tablet, 100 MG PO DAILY, (Reported)


Umeclidinium Bromide 62.5 Mcg Blst.w.dev, 1 INH IH DAILY@0800


   Prescribed by: JASEN DAY on 5/4/20 1027





Patient Home Medication List


Home Medication List Reviewed:  Yes





Physical Exam-Cardiology


Physical Exam


Vital Signs/I&O











 6/15/20





 09:26


 


Temp 37.7


 


Pulse 101


 


Resp 14


 


B/P (MAP) 134/83 (100)


 


Pulse Ox 93


 


O2 Delivery Room Air





Capillary Refill :


Constitutional:  appears stated age; No apparent distress; well-developed, well-

nourished


HEENT:  PERRL; No discharge; hearing is well preserved, oral hygience is good; 

No ulceration, No xanthelasmas are seen


Neck:  No carotid bruit; carotid pulses are 2 + bilaterally


Respiratory:  chest is bilaterally symmetric, lungs clear to percussion


Cardiovascular:  regular rate-rhythm, S1 and S2


Gastrointestinal:  soft, audible bowel sounds; No spleenomegaly


Rectal:  deferred


Extremities:  normal range of motion, non-tender, normal inspection; No 

clubbing, No cyanosis; no lower extremity edema bilateral; No significant edema


Neurologic/Psychiatric:  no motor/sensory deficits, alert, normal mood/affect, 

oriented x 3, power is 5/5 both on sides


Skin:  normal color, warm/dry; No rash, No ulcerations





Data Review


Labs


Laboratory Tests


6/15/20 09:35: 


White Blood Count 16.6H, Red Blood Count 4.10L, Hemoglobin 12.5L, Hematocrit 39L

, Mean Corpuscular Volume 96, Mean Corpuscular Hemoglobin 31, Mean Corpuscular 

Hemoglobin Concent 32, Red Cell Distribution Width 19.2H, Platelet Count 205, 

Mean Platelet Volume 12.4H, Prothrombin Time 13.0, INR Comment 0.9, Activated 

Partial Thromboplast Time 28, Sodium Level 141, Potassium Level 3.7, Chloride 

Level 101, Carbon Dioxide Level 29, Anion Gap 11, Blood Urea Nitrogen 9, 

Creatinine 0.95, Estimat Glomerular Filtration Rate > 60, BUN/Creatinine Ratio 

9, Glucose Level 132H, Calcium Level 8.7, Corrected Calcium 9.2, Total Bilirubin

0.3, Aspartate Amino Transf (AST/SGOT) 15, Alanine Aminotransferase (ALT/SGPT) 

7, Alkaline Phosphatase 100, Total Protein 6.7, Albumin 3.4








ECG Impression


ECG


Initial ECG Rhythm:  Normal Sinus





A/P-Cardiology


Assessment/Admission Diagnosis


Hemodynamically significant typical atrial flutter with 2-1 AV block.


Admission Status:  Observation





Plan


Typical atrial flutter ablation is recommended.











ELISE RODRIGUEZ MD             Javier 15, 2020 14:24

## 2020-06-15 NOTE — XMS REPORT
[facility]

                             Created on: 2020



Pradeep Fowler

External Reference #: [id number]

: 1950

Sex: Male



Demographics





                          Address                   [address] [place]  [address]

 

                          Preferred Language        Unknown

 

                          Marital Status            Unknown

 

                          Jew Affiliation     Unknown

 

                          Race                      Unknown

 

                          Ethnic Group              Unknown





Author





                          Author                    Pradeep SPENCE

 

                          Organization              [facility]

 

                          Address                   [address] [place]  [address]



 

                          Phone                     [phone]







Care Team Providers





                    Care Team Member Name Role                Phone

 

                    LINO SPENCE      Unavailable         [phone]







PROBLEMS





          Type      Condition ICD9-CM Code IJF49-TR Code Onset Dates Condition S

tatus SNOMED 

Code

 

          Problem   Spinal stenosis           M48.00              Active    7610

7001

 

          Problem   Peripheral neuropathy           G62.9               Active  

  43575025

 

          Problem   Hypertension           I10                 Active    3785210

3

 

          Problem   Paroxysmal atrial fibrillation           I48.0              

 Active    378468421

 

          Problem   Type 2 diabetes mellitus with foot ulcer           E11.621  

           Active    

[id number]

 

                          Problem                   Coronary artery disease of n

ative artery of native heart with stable 

angina pectoris              I25.118                   Active       726990220506

7

 

          Problem   Hyperlipidemia, unspecified hyperlipidemia type           E7

8.5               Active    

15739635

 

           Problem    Chronic obstructive pulmonary disease, unspecified COPD ty

pe            J44.9                 

Active                                  39104972

 

           Problem    Umbilical hernia without obstruction and without gangrene 

           K42.9                 

Active                                  [id number]







ALLERGIES

No Information



ENCOUNTERS





                Encounter       Location        Date            Diagnosis

 

                          [facility]     [address] [facility] [id number]

[address] [place] [address]

                          05 2020               

 

                          [facility]     [address] [facility] [id number]

[address] [place] [address]

                          19 May, 2020               

 

                          [facility]     [address] [facility] [id number]

[address] [place] [address]

                          19 May, 2020               

 

                          [facility]     [address] [facility] [id number]

[address] [place] [address]

                          15 May, 2020               

 

                          [facility]     [address] [facility] [id number]

[address] [place] [address]

                          12 May, 2020               

 

                          [facility]  [address] [facility] [id number]

[address] [place] [address] 

[address]                11 May, 2020              Blood pressure check Z01.30

 

                          [facility]     [address] [id number]

[address] [place] [address]

                          11 May, 2020               

 

                          [facility]     [address] [id number]

[address] [place] [address]

                          11 May, 2020              Type 2 diabetes mellitus wit

h foot ulcer E11.621 ; Paroxysmal 

atrial fibrillation I48.0 and Spinal stenosis M48.00

 

                          [facility]     [address] [id number]

[address] [place] [address]

                          07 May, 2020               

 

                          [facility]     [address] [id number]

[address] [place] [address]

                          06 May, 2020               

 

                          [facility]     [address] [id number]

[address] [place] [address]

                          05 May, 2020               

 

                          [facility]     [address] [id number]

[address] [place] [address]

                          05 May, 2020               

 

                          [facility]     [address] [id number]

[address] [place] [address]

                          04 May, 2020              Spinal stenosis M48.00

 

                          [facility]     [address] [id number]

[address] [place] [address]

                                        Spinal stenosis M48.00

 

                          [facility]     [address] [id number]

[address] [place] [address]

                          16 2020               

 

                          [facility]     [address] [id number]

[address] [place] [address]

                                        Spinal stenosis M48.00

 

                          [facility]     [address] [id number]

[address] [place] [address]

                                        Peripheral neuropathy G62.9 

and Diabetes E11.9

 

                          [facility]     [address] [id number]

[address] [place] [address]

                                         

 

                          [facility]     [address] [id number]

[address] [place] [address]

                          23 Mar, 2020              Spinal stenosis M48.00

 

                          [facility]     [address] [id number]

[address] [place] [address]

                          10 Mar, 2020               

 

                          [facility]     [address] [facility] [id number]

[address] [place] [address]

                                        Diabetes E11.9

 

                          [facility]     [address] [facility] [id number]

[address] [place] [address]

                                         

 

                          [facility]     [address] [facility] [id number]

[address] [place] [address]

                                         

 

                          [facility]     [address] [facility] [id number]

[address] [place] [address]

                                        Spinal stenosis M48.00

 

                          [facility]     [address] [facility] [id number]

[address] [place] [address]

                                         

 

                          [facility]     [address] [facility] [id number]

[address] [place] [address]

                                         

 

                          [facility]     [address] [id number]

[address] [place] [address]

                                        Type 2 diabetes mellitus wit

h foot ulcer E11.621 ; Non-pressure 

chronic ulcer of right heel and midfoot with unspecified severity L97.419 and 
Nail fungus B35.1

 

                          [facility]     [address] [facility] [id number]

[address] [place] [address]

                                         

 

                          [facility]     [address] [facility] [id number]

[address] [place] [address]

                                         

 

                          [facility]     [address] [facility] [id number]

[address] [place] [address]

                                        Spinal stenosis M48.00

 

                          [facility]     [address] [facility] [id number]

[address] [place] [address]

                                         

 

                          [facility]     [address] [facility] [id number]

[address] [place] [address]

                                        Diabetes E11.9 ; Spinal sten

osis M48.00 ; Hypertension I10 and 

Trochanteric bursitis of left hip M70.62

 

                          [facility]     [address] [facility] [id number]

[address] [place] [address]

                                        Spinal stenosis M48.00

 

                          [facility]     [address] [id number]

[address] [place] [address]

                                         

 

                          [facility] [place] [address]

[id number] 

[address]                17 Dec, 2019               

 

                          [facility] [place] [address]

[id number] [place] 

[address]                17 Dec, 2019               

 

                          [facility] [place] [address]

[id number] 

[address]                16 Dec, 2019               

 

                          [facility] [place] [address]

[id number] 

[address]                16 Dec, 2019               

 

                          [facility]     [address] [id number]

[address] [place] [address]

                          05 Dec, 2019              Spinal stenosis M48.00

 

                          [facility]     [address] [id number]

[address] [place] [address]

                                         

 

                          [facility]     [address] [id number]

[address] [place] [address]

                                        Spinal stenosis M48.00

 

                          [facility]     [address] [id number]

[address] [place] [address]

                          25 Oct, 2019              Diabetes E11.9 ; Spinal sten

osis M48.00 ; Encounter for 

immunization Z23 ; Coronary artery disease of native artery of native heart with
stable angina pectoris I25.118 ; Type 2 diabetes mellitus with foot ulcer 
E11.621 and Non-pressure chronic ulcer of other part of right foot with fat 
layer exposed L97.512

 

                          [facility]     [address] [id number]

[address] [place] [address]

                          11 Oct, 2019              Spinal stenosis M48.00

 

                          [facility]     [address] [id number]

[address] [place] [address]

                          11 Sep, 2019              Spinal stenosis M48.00

 

                          [facility]     [address] [id number]

[address] [place] [address]

                          23 Aug, 2019               

 

                          [facility]     [address] [id number]

[address] [place] [address]

                          15 Aug, 2019              Spinal stenosis M48.00

 

                          [facility]     [address] [id number]

[address] [place] [address]

                                        Diabetes E11.9 ; Coronary ar

federico disease of native artery of native

heart with stable angina pectoris I25.118 and Spinal stenosis M48.00

 

                          [facility]     [address] [id number]

[address] [place] [address]

                                         

 

                          [facility]     [address] [id number]

[address] [place] [address]

                                         

 

                          [facility]     [address] [id number]

[address] [place] [address]

                                        Spinal stenosis M48.00

 

                          [facility]     [address] [id number]

[address] [place] [address]

                                        Spinal stenosis M48.00

 

                          [facility]     [address] [id number]

[address] [place] [address]

                                        Right foot infection L08.9

 

                          [facility]     [address] [id number]

[address] [place] [address]

                                        Spinal stenosis M48.00

 

                          [address]

[id number] [place] [address] 

[address]                22 May, 2019              Spinal stenosis M48.00

 

                          [facility]     [address] [id number]

[address] [place] [address]

                                        Spinal stenosis M48.00

 

                          [facility]     [address] [id number]

[address] [place] [address]

                                        Diabetes E11.9 ; Spinal sten

osis M48.00 and Hypertension I10

 

                          [facility]     [address] [id number]

[address] [place] [address]

                          27 Mar, 2019              Spinal stenosis M48.00

 

                          [facility]     [address] [id number]

[address] [place] [address]

                          01 Mar, 2019              Spinal stenosis M48.00

 

                          [facility]     [address] [id number]

[address] [place] [address]

                                         

 

                          [facility]     [address] [id number]

[address] [place] [address]

                                        Spinal stenosis M48.00

 

                          [facility]     [address] [id number]

[address] [place] [address]

                                         

 

                          [facility]     [address] [id number]

[address] [place] [address]

                                        Spinal stenosis M48.00

 

                          [facility]     [address] [facility] [id number]

[address] [place] [address]

                          20 Dec, 2018              Diabetes E11.9 ; Spinal sten

osis M48.00 ; Bronchitis J40 and 

Encounter for immunization Z23

 

                          [facility]     [address] [id number]

[address] [place] [address]

                          05 Dec, 2018              Spinal stenosis M48.00

 

                          [facility]     [address] [id number]

[address] [place] [address]

                                         

 

                          [facility]     [address] [facility] [id number]

[address] [place] [address]

                                        Spinal stenosis M48.00

 

                          [facility]     [address] [id number]

[address] [place] [address]

                          22 Oct, 2018               

 

                          [facility]     [address] [facility] [id number]

[address] [place] [address]

                          10 Oct, 2018              Spinal stenosis M48.00

 

                          [facility]     [address] [id number]

[address] [place] [address]

                          28 Sep, 2018              Hypertension I10

 

                          [facility]     [address] [id number]

[address] [place] [address]

                          13 Sep, 2018              Spinal stenosis M48.00 and P

eripheral neuropathy G62.9

 

                          [facility]     [address] [id number]

[address] [place] [address]

                          12 Sep, 2018              Diabetes E11.9

 

                          [facility]     [address] [id number]

[address] [place] [address]

                          06 Sep, 2018               

 

                          [facility]     [address] [id number]

[address] [place] [address]

                          04 Sep, 2018              Diabetes E11.9 ; Hyperlipide

roseanna, unspecified hyperlipidemia type 

E78.5 ; Lightheadedness R42 ; Spinal stenosis M48.00 and Hypertension I10

 

                          [facility]     [address] [id number]

[address] [place] [address]

                          16 Aug, 2018              Spinal stenosis M48.00 and P

eripheral neuropathy G62.9

 

                          [facility]     [address] [id number]

[address] [place] [address]

                          15 Aug, 2018               

 

                          [facility]     [address] [id number]

[address] [place] [address]

                                        Peripheral neuropathy G62.9

 

                          [facility]     [address] [id number]

[address] [place] [address]

                                        Spinal stenosis M48.00

 

                          [facility]     [address] [id number]

[address] [place] [address]

                                        Spinal stenosis M48.00

 

                          [facility]     [address] [id number]

[address] [place] [address]

                                        Diabetes E11.9 ; Spinal sten

osis M48.00 and Peripheral neuropathy 

G62.9

 

                          [facility]     [address] [id number]

[address] [place] [address]

                          16 May, 2018              Spinal stenosis M48.00

 

                          [facility]     [address] [id number]

[address] [place] [address]

                                        Spinal stenosis M48.00

 

                          [facility]     [address] [id number]

[address] [place] [address]

                          26 Mar, 2018              Spinal stenosis M48.00

 

                          [facility]     [address] [id number]

[address] [place] [address]

                          26 Mar, 2018              Spinal stenosis M48.00 ; Amarilys

betes E11.9 ; Hypertension I10 ; 

Umbilical hernia without obstruction and without gangrene K42.9 and Peripheral 
neuropathy G62.9

 

                          [facility]     [address] [id number]

[address] [place] [address]

                                        Spinal stenosis M48.00

 

                          [facility]     [address] [id number]

[address] [place] [address]

                                        Spinal stenosis M48.00

 

                          [facility]     [address] [id number]

[address] [place] [address]

                          27 Dec, 2017              Spinal stenosis M48.00

 

                          [facility]     [address] [id number]

[address] [place] [address]

                          12 Dec, 2017              Diabetes E11.9 ; Encounter f

or immunization Z23 ; Spinal stenosis 

M48.00 and Trochanteric bursitis of right hip M70.61

 

                          [facility]     [address] [facility] [id number]

[address] [place] [address]

                          05 Dec, 2017              Spinal stenosis M48.00

 

                          [facility]     [address] [id number]

[address] [place] [address]

                                         

 

                          [facility]     [address] [facility] [id number]

[address] [place] [address]

                                        Spinal stenosis M48.00

 

                          [facility]     [address] [id number]

[address] [place] [address]

                          06 Oct, 2017              Spinal stenosis M48.00 and O

ther dorsalgia M54.89

 

                          [facility]     [address] [id number]

[address] [place] [address]

                          25 Sep, 2017               

 

                          [facility]     [address] [id number]

[address] [place] [address]

                          19 Sep, 2017              Diabetes E11.9 ; Spinal sten

osis M48.00 ; Peripheral neuropathy 

G62.9 and Umbilical hernia without obstruction and without gangrene K42.9

 

                          [facility]     [address] [id number]

[address] [place] [address]

                          07 Sep, 2017              Other dorsalgia M54.89

 

                          [facility]     [address] [id number]

[address] [place] [address]

                          16 Aug, 2017              Other dorsalgia M54.89

 

                          [facility]     [address] [id number]

[address] [place] [address]

                                        Other dorsalgia M54.89

 

                          [facility]     [address] [id number]

[address] [place] [address]

                                        Other dorsalgia M54.89

 

                          [facility]     [address] [id number]

[address] [place] [address]

                                        Post-procedural erectile dys

function, unspecified type N52.39

 

                          [facility]     [address] [id number]

[address] [place] [address]

                                        Diabetes E11.9 ; Spinal sten

osis M48.00 ; Peripheral neuropathy 

G62.9 and Umbilical hernia without obstruction and without gangrene K42.9

 

                          [facility]     [address] [id number]

[address] [place] [address]

                          24 May, 2017              Other dorsalgia M54.89

 

                          [facility]     [address] [id number]

[address] [place] [address]

                                        Spinal stenosis M48.00

 

                          [facility]     [address] [id number]

[address] [place] [address]

                          31 Mar, 2017              Post-procedural erectile dys

function, unspecified type N52.39

 

                          [facility]     [address] [id number]

[address] [place] [address]

                          29 Mar, 2017              Spinal stenosis M48.00

 

                          [facility]     [address] [id number]

[address] [place] [address]

                          15 Mar, 2017               

 

                          [facility]     [address] [id number]

[address] [place] [address]

                          14 Mar, 2017              Diabetes E11.9 ; Spinal sten

osis M48.00 and Peripheral neuropathy 

G62.9

 

                          [facility]     [address] [id number]

[address] [place] [address]

                          01 Mar, 2017              Spinal stenosis M48.00

 

                          [facility]     [address] [facility] [id number]

[address] [place] [address]

                                        Spinal stenosis M48.00

 

                          [facility]     [address] [facility] [id number]

[address] [place] [address]

                                        Chest pain, unspecified type

 R07.9 ; Dyspnea, unspecified type 

R06.00 ; Coronary artery disease of native artery of native heart with stable 
angina pectoris I25.118 ; Hyperlipidemia, unspecified hyperlipidemia type E78.5 
; Hypertension I10 ; Diabetes E11.9 ; Chronic obstructive pulmonary disease, 
unspecified COPD type J44.9 and Tobacco use Z72.0

 

                          [place] [address] [id number] [place] [address]

                                        Spinal stenosis M48.00

 

                          [place] [address] [address]

                          05 Dec, 2016              Diabetes E11.9 ; Spinal sten

osis M48.00 ; Other chest pain R07.89 ;

Coronary artery disease of native artery of native heart with stable angina 
pectoris I25.118 and Peripheral neuropathy G62.9

 

                          [place] [address] [id number]

[address] [place] [address]

                          02 Dec, 2016              Connective tissue and disc s

tenosis of intervertebral foramina of 

cervical region M99.71

 

                          [place] [address] [id number]

[address] [place] [address]

                                        Spinal stenosis M48.00

 

                          [place] [address] [facility] [id number]

[address] [place] [address]

                          07 Oct, 2016               

 

                          [place] [address] [place] [address] [id number]

[address] [place] [address]

                          12 Sep, 2016              Diabetes E11.9 and Spinal st

enosis M48.00

 

                          [facility]     [address] [id number]

[address] [place] [address]

                          09 Sep, 2016               

 

                          [place] [address] [place] [id number]

[address] [place] [address]

                          10 Aug, 2016               

 

                          [facility]     [address] [id number]

[address] [place] [address]

                                         

 

                          [facility]     [address] [id number]

[address] [place] [address]

                                         

 

                          [facility]     [address] [id number]

[address] [place] [address]

                                        Diabetes E11.9 ; Spinal sten

osis M48.00 and Diplopia H53.2

 

                          [facility]     [address] [id number]

[address] [place] [address]

                          26 May, 2016              Other dorsalgia M54.89

 

                          [facility]     [address] [id number]

[address] [place] [address]

                                        Other dorsalgia M54.89

 

                          [facility]     [address] [id number]

[address] [place] [address]

                          08 Mar, 2016              Diabetes E11.9 and Spinal st

enosis M48.00

 

                          [facility]     [address] [id number]

[address] [place] [address]

                                        Other dorsalgia M54.89

 

                          [facility]     [address] [id number]

[address] [place] [address]

                                        Post-op pain G89.18

 

                          [facility]     [address] [id number]

[address] [place] [address]

                                        Post surgical complication T

81.9XXA

 

                          [facility]     [address] [id number]

[address] [place] [address]

                                         

 

                          [facility]     [address] [id number]

[address] [place] [address]

                                        Spinal stenosis M48.00

 

                          [facility]     [address] [id number]

[address] [place] [address]

                          17 Dec, 2015               

 

                          [facility]     [address] [id number]

[address] [place] [address]

                          16 Dec, 2015               

 

                          [facility]     [address] [id number]

[address] [place] [address]

                          03 Dec, 2015              Diabetes E11.9 ; Hypertensio

n I10 ; Spinal stenosis M48.00 and 

Peripheral neuropathy G62.9

 

                          [facility]     [address] [id number]

[address] [place] [address]

                                         

 

                          [facility]     [address] [id number]

[address] [place] [address]

                          21 Oct, 2015               

 

                          [facility]     [address] [id number]

[address] [place] [address]

                          23 Sep, 2015               

 

                          [facility]     [address] [id number]

[address] [place] [address]

                          21 Sep, 2015               

 

                          [facility]     [address] [id number]

[address] [place] [address]

                          21 Sep, 2015               

 

                          [facility]     [address] [id number]

[address] [place] [address]

                          27 Aug, 2015               

 

                          [facility]     [address] [id number]

[address] [place] [address]

                          26 Aug, 2015               

 

                          [facility]     [address] [id number]

[address] [place] [address]

                          17 Aug, 2015               

 

                          [facility]     [address] [id number]

[address] [place] [address]

                          10 Aug, 2015              Unspecified essential hypert

ension 401.9 ; Spinal stenosis in 

cervical region 723.0 and Diabetes mellitus without mention of complication, 
type II or unspecified type, not stated as uncontrolled 250.00

 

                          [facility]     [address] [id number]

[address] [place] [address]

                                         

 

                          [facility]     [address] [id number]

[address] [place] [address]

                                         

 

                          [facility]     [address] [id number]

[address] [place] [address]

                                         

 

                          [facility]     [address] [id number]

[address] [place] [address]

                          11 May, 2015               

 

                          [facility]     [address] [id number]

[address] [place] [address]

                          11 May, 2015              Spinal stenosis in cervical 

region 723.0 ; Unspecified backache 

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 
without mention of complication, type II or unspecified type, not stated as 
uncontrolled 250.00

 

                          [facility]     [address] [id number]

[address] [place] [address]

                          04 May, 2015               

 

                          [facility]     [address] [id number]

[address] [place] [address]

                                         

 

                          [facility]     [address] [id number]

[address] [place] [address]

                                         

 

                          [facility]     [address] [id number]

[address] [place] [address]

                          23 Mar, 2015               

 

                          [facility]     [address] [id number]

[address] [place] [address]

                          23 Mar, 2015               

 

                          [facility]     [address] [id number]

[address] [place] [address]

                                         

 

                          [facility]     [address] [id number]

[address] [place] [address]

                                         

 

                          [facility]     [address] [id number]

[address] [place] [address]

                                         

 

                          [facility]     [address] [id number]

[address] [place] [address]

                                         

 

                          [facility]     [address] [id number]

[address] [place] [address]

                                         

 

                          [facility]     [address] [id number]

[address] [place] [address]

                                         

 

                          [facility]     [address] [id number]

[address] [place] [address]

                          30 Dec, 2014               

 

                          [facility]     [address] [id number]

[address] [place] [address], [place] [address]

                          30 Dec, 2014               

 

                          [facility]     [address] [id number]

[address] [place] [address], [place] [address]

                          29 Dec, 2014               

 

                          [facility]     [address] [id number]

[address] [place] [address], [place] [address]

                          29 Dec, 2014               

 

                          [facility]     [address] [id number]

[address] [place] [address], [place] [address]

                          12 Dec, 2014               

 

                          [facility]     [address] [id number]

[address] [place] [address], [place] [address]

                          12 Dec, 2014               

 

                          [facility]     [address] [id number]

[address] [place] [address], [place] [address]

                          01 Dec, 2014               

 

                          [facility]     [address] [id number]

[address] [place] [address], [place] [address]

                          01 Dec, 2014               

 

                          [facility]     [address] [id number]

[address] [place] [address], [place] [address]

                                         

 

                          [facility]     [address] [id number]

[address] [place] [address], [place] [address]

                                         

 

                          [facility]     [address] [id number]

[address] [place] [address], [place] [address]

                          06 Oct, 2014               

 

                          [facility]     [address] [id number]

[address] [place] [address], [place] [address]

                          06 Oct, 2014               

 

                          [facility]     [address] [id number]

[address] [place] [address], [place] [address]

                          08 Sep, 2014               

 

                          [facility]     [address] [id number]

[address] [place] [address], [place] [address]

                          08 Sep, 2014               

 

                          [facility]     [address] [id number]

[address] [place] [address], [place] [address]

                          08 Sep, 2014               

 

                          [facility]     [address] [id number]

[address] [place] [address], [place] [address]

                          08 Sep, 2014               

 

                          [facility]     [address] [id number]

[address] [place] [address], [place] [address]

                          28 Aug, 2014               

 

                          [facility]     [address] [id number]

[address] [place] [address], [place] [address]

                          28 Aug, 2014               

 

                          [facility]     [address] [id number]

[address] [place] [address], [place] [address]

                          18 Aug, 2014               

 

                          [facility]     [address] [id number]

[address] [place] [address], [place] [address]

                          14 Aug, 2014               

 

                          [facility]     [address] [id number]

[address] [place] [address], [place] [address]

                          14 Aug, 2014               

 

                          [facility]     [address] [id number]

[address] [place] [address], [place] [address]

                                         

 

                          [facility]     [address] [id number]

[address] [place] [address], [place] [address]

                                         

 

                          [facility]     [address] [id number]

[address] [place] [address], [place] [address]

                                         

 

                          [facility]     [address] [id number]

[address] [place] [address], [place] [address]

                                         

 

                          [facility]     [address] [id number]

[address] [place] [address], [place] [address]

                                         

 

                          [facility]     [address] [id number]

[address] [place] [address], [place] [address]

                                         

 

                          [facility]     [address] [id number]

[address] [place] [address], [place] [address]

                          30 May, 2014               

 

                          [facility]     [address] [id number]

[address] [place] [address], [place] [address]

                          30 May, 2014               

 

                          [facility]     [address] [id number]

[address] [place] [address], [place] [address]

                          29 May, 2014               

 

                          [facility]     [address] [id number]

[address] [place] [address], [place] [address]

                          29 May, 2014               

 

                          [facility]     [address] [id number]

[address] [place] [address], [place] [address]

                          22 May, 2014               

 

                          [facility]     [address] [id number]

[address] [place] [address], [place] [address]

                          22 May, 2014               

 

                          [facility]     [address] [id number]

[address] [place] [address], [place] [address]

                          21 May, 2014               

 

                          [facility]     [address] [id number]

[address] [place] [address], [place] [address]

                          07 May, 2014               

 

                          [facility]     [address] [id number]

[address] [place] [address], [place] [address]

                          07 May, 2014               

 

                          [facility]     [address] [id number]

[address] [place] [address], [place] [address]

                                         

 

                          [facility]     [address] [id number]

[address] [place] [address], [place] [address]

                                         

 

                          [facility]     [address] [id number]

[address] [place] [address], [place] [address]

                                         

 

                          [facility]     [address] [id number]

[address] [place] [address], [place] [address]

                                         

 

                          [facility]     [address] [id number]

[address] [place] [address], [place] [address]

                          27 Mar, 2014               

 

                          [facility]     [address] [id number]

[address] [place] [address], [place] [address]

                          27 Mar, 2014               

 

                          [facility]     [address] [id number]

[address] [place] [address], [place] [address]

                          03 Mar, 2014               

 

                          [facility]     [address] [id number]

[address] [place] [address], [place] [address]

                                         

 

                          [facility]     [address] [id number]

[address] [place] [address], [place] [address]

                                         

 

                          [facility]     [address] [id number]

[address] [place] [address], [place] [address]

                          10 Feb, 2014               

 

                          [facility]     [address] [id number]

[address] [place] [address], [place] [address]

                          10 Feb, 2014               

 

                          [facility]     [address] [id number]

[address] [place] [address], [place] [address]

                                         

 

                          [facility]     [address] [id number]

[address] [place] [address], [place] [address]

                                         

 

                          [facility]     [address] [id number]

[address] [place] [address], [place] [address]

                                         

 

                          [facility]     [address] [id number]

[address] [place] [address], [place] [address]

                                         

 

                          [facility]     [address] [id number]

[address] [place] [address], [place] [address]

                                         

 

                          [facility]     [address] [id number]

[address] [place] [address], [place] [address]

                                         

 

                          [facility]     [address] [id number]

[address] [place] [address], [place] [address]

                                         

 

                          [facility]     [address] [id number]

[address] [place] [address], [place] [address]

                                         

 

                          [facility]     [address] [id number]

[address] [place] [address], [place] [address]

                          06 Dec, 2013               

 

                          [facility]     [address] [id number]

[address] [place] [address], [place] [address]

                          06 Dec, 2013               

 

                          [facility]     [address] [id number]

[address] [place] [address], [place] [address]

                                         

 

                          [facility]     [address] [id number]

[address] [place] [address], [place] [address]

                                         

 

                          [facility]     [address] [id number]

[address] [place] [address], [place] [address]

                          22 Oct, 2013               

 

                          [facility]     [address] [id number]

[address] [place] [address], [place] [address]

                          22 Oct, 2013               

 

                          [facility]     [address] [id number]

[address] [place] [address], [place] [address]

                          10 Oct, 2013               

 

                          [facility]     [address] [id number]

[address] [place] [address], [place] [address]

                          10 Oct, 2013               

 

                          [facility]     [address] [id number]

[address] [place] [address], [place] [address]

                          12 Sep, 2013               

 

                          [facility]     [address] [id number]

[address] [place] [address], [place] [address]

                          15 Aug, 2013               

 

                          [facility]     [address] [id number]

[address] [place] [address], [place] [address]

                                         

 

                          [facility]     [address] [id number]

[address] [place] [address], [place] [address]

                                         

 

                          [facility]     [address] [id number]

[address] [place] [address], [place] [address]

                          23 May, 2013               

 

                          [facility]     [address] [id number]

[address] [place] [address], [place] [address]

                          13 May, 2013               

 

                          [facility]     [address] [id number]

[address] [place] [address], [place] [address]

                                         

 

                          [facility]     [address] [id number]

[address] [place] [address], [place] [address]

                          28 Mar, 2013               

 

                          [facility]     [address] [id number]

[address] [place] [address], [place] [address]

                                         

 

                          [facility]     [address] [id number]

[address] [place] [address], [place] [address]

                                         

 

                          [facility]     [address] [id number]

[address] [place] [address], [place] [address]

                                         

 

                          [facility]     [address] [id number]

[address] [place] [address], [place] [address]

                          10 Sarbjit, 2013               

 

                          [facility]     [address] [id number]

[address] [place] [address], [place] [address]

                                         

 

                          [facility]     [address] [id number]

[address] [place] [address], [place] [address]

                                         

 

                          [facility]     [address] [id number]

[address] [place] [address], [place] [address]

                          04 Dec, 2012               

 

                          [facility]     [address] [id number]

[address] [place] [address], [place] [address]

                          04 Dec, 2012               

 

                          [facility]     [address] [id number]

[address] [place] [address], [place] [address]

                                         

 

                          [facility]     [address] [id number]

[address] [place] [address], [place] [address]

                                         

 

                          [facility]     [address] [id number]

[address] [place] [address], [place] [address]

                          23 Oct, 2012               

 

                          [facility]     [address] [id number]

[address] [place] [address], [place] [address]

                          23 Oct, 2012               

 

                          [facility]     [address] [id number]

[address] [place] [address], [place] [address]

                          08 Oct, 2012               

 

                          [facility]     [address] [id number]

[address] [place] [address], [place] [address]

                          05 Oct, 2012               

 

                          [facility]     [address] [id number]

[address] [place] [address], [place] [address]

                          06 Sep, 2012               

 

                          [facility]     [address] [id number]

[address] [place] [address], [place] [address]

                          04 Sep, 2012               

 

                          [facility]     [address] [id number]

[address] [place] [address], [place] [address]

                          06 Aug, 2012               

 

                          [facility]     [address] [id number]

[address] [place] [address], [place] [address]

                          01 Aug, 2012               

 

                          [facility]     [address] [id number]

[address] [place] [address], [place] [address]

                          10 Jul, 2012               

 

                          [facility]     [address] [id number]

[address] [place] [address], [place] [address]

                          10 Jul, 2012               

 

                          [facility]     [address] [id number]

[address] [place] [address], [place] [address]

                                         

 

                          [facility]     [address] [id number]

[address] [place] [address], [place] [address]

                                         

 

                          [facility]     [address] [id number]

[address] [place] [address], [place] [address]

                          15 May, 2012               

 

                          [facility]     [address] [id number]

[address] [place] [address]

                                         

 

                          [facility]     [address] [facility] [id number]

[address] [place] [address]

                          15 Mar, 2012               

 

                          [facility]     [address] [facility] [id number]

[address] [place] [address]

                          09 Mar, 2012               

 

                          [facility]     [address] [facility] [id number]

[address] [place] [address]

                                         

 

                          [facility]     [address] [facility] [id number]

[address] [place] [address]

                                         







IMMUNIZATIONS

No Known Immunizations



SOCIAL HISTORY

Never Assessed



REASON FOR VISIT





PLAN OF CARE





VITAL SIGNS





MEDICATIONS

Unknown Medications



RESULTS

No Results



PROCEDURES

No Known procedures



INSTRUCTIONS





MEDICATIONS ADMINISTERED

No Known Medications



MEDICAL (GENERAL) HISTORY





                    Type                Description         Date

 

                    Medical History     hyperlipidemia       

 

                    Medical History     hypertension         

 

                    Medical History     chronic pain         

 

                    Medical History     diabetes mellitus    

 

                    Medical History     diabetic neuropathy  

 

                    Medical History     diabetic ulcer       

 

                    Medical History     heart attack         

 

                    Surgical History    cardiac stent        

 

                    Surgical History    cholecystectomy/surgery on liver  

 

                    Surgical History    bottom of foot was removed for ulcer and

 now on wound care 

 

                    Surgical History    right foot surgery  20

 

                    Hospitalization History emergency surgery on liver; [facility] 2016

 

                    Hospitalization History [facility]                 

 

                    Hospitalization History [facility] right foot surg 

 

                    Hospitalization History heart attack and pneumonia 

0

## 2020-06-15 NOTE — XMS REPORT
[facility]

                             Created on: 2020



Pradeep Fowler

External Reference #: [id number]

: 1950

Sex: Male



Demographics





                          Address                   [address] [place]  [address]

 

                          Preferred Language        Unknown

 

                          Marital Status            Unknown

 

                          Mu-ism Affiliation     Unknown

 

                          Race                      Unknown

 

                          Ethnic Group              Unknown





Author





                          Author                    Pradeep LÓPEZ

 

                          Organization              [facility]

 

                          Address                   [address] [place]  [address]



 

                          Phone                     [phone]







Care Team Providers





                    Care Team Member Name Role                Phone

 

                    MK LÓPEZ     Unavailable         [phone]







PROBLEMS





          Type      Condition ICD9-CM Code RKK39-ZF Code Onset Dates Condition S

tatus SNOMED 

Code

 

          Problem   Spinal stenosis           M48.00              Active    7610

7001

 

          Problem   Peripheral neuropathy           G62.9               Active  

  [id number]

 

          Problem   Hypertension           I10                 Active    9425669

3

 

          Problem   Paroxysmal atrial fibrillation           I48.0              

 Active    563825827

 

          Problem   Type 2 diabetes mellitus with foot ulcer           E11.621  

           Active    

[id number]

 

                          Problem                   Coronary artery disease of n

ative artery of native heart with stable 

angina pectoris              I25.118                   Active       [id number]

7

 

          Problem   Hyperlipidemia, unspecified hyperlipidemia type           E7

8.5               Active    

05138263

 

           Problem    Chronic obstructive pulmonary disease, unspecified COPD ty

pe            J44.9                 

Active                                  28155329

 

           Problem    Umbilical hernia without obstruction and without gangrene 

           K42.9                 

Active                                  2420062







ALLERGIES

No Information



ENCOUNTERS





                Encounter       Location        Date            Diagnosis

 

                          [facility]     [address] [facility] [id number]

[address] [place] [address]

                                         

 

                          [facility]     [address] [facility] [id number]

[address] [place] [address]

                          29 May, 2020               

 

                          [facility]     [address] [facility] [id number]

[address] [place] [address]

                          29 May, 2020               

 

                          [facility]     [address] [facility] [id number]

[address] [place] [address]

                          28 May, 2020               

 

                          [facility]     [address] [facility] [id number]

[address] [place] [address]

                          28 May, 2020               

 

                          [address]

[id number] [place] [address] 

[phone]                28 May, 2020               

 

                          [facility]     [address] [id number]

[address] [place] [address]

                          28 May, 2020               

 

                          [facility]     [address] [id number]

[address] [place] [address]

                          28 May, 2020              Spinal stenosis M48.00 and C

oronary artery disease of native artery

of native heart with stable angina pectoris I25.118

 

                          [facility]     [address] [id number]

[address] [place] [address]

                          26 May, 2020               

 

                          [facility]     [address] [id number]

[address] [place] [address]

                          19 May, 2020               

 

                          [facility]     [address] [id number]

[address] [place] [address]

                          19 May, 2020               

 

                          [facility]     [address] [id number]

[address] [place] [address]

                          15 May, 2020               

 

                          [facility]     [address] [id number]

[address] [place] [address]

                          12 May, 2020               

 

                          [facility]  [address] [id number]

[address] [place] [address] 

[address]                11 May, 2020              Blood pressure check Z01.30

 

                          [facility]     [address] [id number]

[address] [place] [address]

                          11 May, 2020               

 

                          [facility]     [address] [id number]

[address] [place] [address]

                          11 May, 2020              Type 2 diabetes mellitus wit

h foot ulcer E11.621 ; Paroxysmal 

atrial fibrillation I48.0 and Spinal stenosis M48.00

 

                          [facility]     [address] [id number]

[address] [place] [address]

                          07 May, 2020               

 

                          [facility]     [address] [id number]

[address] [place] [address]

                          06 May, 2020               

 

                          [facility]     [address] [id number]

[address] [place] [address]

                          05 May, 2020               

 

                          [facility]     [address] [id number]

[address] [place] [address]

                          05 May, 2020               

 

                          [facility]     [address] [id number]

[address] [place] [address]

                          04 May, 2020              Spinal stenosis M48.00

 

                          [facility]     [address] [id number]

[address] [place] [address]

                                        Spinal stenosis M48.00

 

                          [facility]     [address] [id number]

[address] [place] [address]

                          16 2020               

 

                          [facility]     [address] [id number]

[address] [place] [address]

                                        Spinal stenosis M48.00

 

                          [facility]     [address] [id number]

[address] [place] [address]

                          07 2020              Peripheral neuropathy G62.9 

and Diabetes E11.9

 

                          [facility]     [address] [id number]

[address] [place] [address]

                                         

 

                          [facility]     [address] [id number]

[address] [place] [address]

                          23 Mar, 2020              Spinal stenosis M48.00

 

                          [facility]     [address] [id number]

[address] [place] [address]

                          10 Mar, 2020               

 

                          [facility]     [address] [id number]

[address] [place] [address]

                                        Diabetes E11.9

 

                          [facility]     [address] [id number]

[address] [place] [address]

                                         

 

                          [facility]     [address] [id number]

[address] [place] [address]

                                         

 

                          [facility]     [address] [id number]

[address] [place] [address]

                                        Spinal stenosis M48.00

 

                          [facility]     [address] [id number]

[address] [place] [address]

                                         

 

                          [facility]     [address] [id number]

[address] [place] [address]

                                         

 

                          [facility]     [address] [id number]

[address] [place] [address]

                          14 2020              Type 2 diabetes mellitus wit

h foot ulcer E11.621 ; Non-pressure 

chronic ulcer of right heel and midfoot with unspecified severity L97.419 and 
Nail fungus B35.1

 

                          [facility]     [address] [id number]

[address] [place] [address]

                          13 2020               

 

                          [facility]     [address] [id number]

[address] [place] [address]

                                         

 

                          [facility]     [address] [id number]

[address] [place] [address]

                                        Spinal stenosis M48.00

 

                          [facility]     [address] [id number]

[address] [place] [address]

                                         

 

                          [facility]     [address] [facility] [id number]

[address] [place] [address]

                                        Diabetes E11.9 ; Spinal sten

osis M48.00 ; Hypertension I10 and 

Trochanteric bursitis of left hip M70.62

 

                          [place] [address] [id number]

[address] [place] [address]

                                        Spinal stenosis M48.00

 

                          [place] [address] [id number]

[address] [place] [address]

                                         

 

                          [address]

[id number] 

[address]                17 Dec, 2019               

 

                          [address]

[id number] 

[address]                17 Dec, 2019               

 

                          [address]

[id number] 

[address]                16 Dec, 2019               

 

                          [address]

[id number] 

[address]                16 Dec, 2019               

 

                          [place] [address] [facility] [id number]

[address] [place] [address]

                          05 Dec, 2019              Spinal stenosis M48.00

 

                          [facility]     [address] [id number]

[address] [place] [address]

                                         

 

                          [facility]     [address] [id number]

[address] [place] [address]

                                        Spinal stenosis M48.00

 

                          [facility]     [address] [id number]

[address] [place] [address]

                          25 Oct, 2019              Diabetes E11.9 ; Spinal sten

osis M48.00 ; Encounter for 

immunization Z23 ; Coronary artery disease of native artery of native heart with
stable angina pectoris I25.118 ; Type 2 diabetes mellitus with foot ulcer 
E11.621 and Non-pressure chronic ulcer of other part of right foot with fat 
layer exposed L97.512

 

                          [facility]     [address] [id number]

[address] [place] [address]

                          11 Oct, 2019              Spinal stenosis M48.00

 

                          [facility]     [address] [id number]

[address] [place] [address]

                          11 Sep, 2019              Spinal stenosis M48.00

 

                          [facility]     [address] [id number]

[address] [place] [address]

                          23 Aug, 2019               

 

                          [facility]     [address] [id number]

[address] [place] [address]

                          15 Aug, 2019              Spinal stenosis M48.00

 

                          [facility]     [address] [id number]

[address] [place] [address]

                                        Diabetes E11.9 ; Coronary ar

federico disease of native artery of native

heart with stable angina pectoris I25.118 and Spinal stenosis M48.00

 

                          [facility]     [address] [id number]

[address] [place] [address]

                                         

 

                          [facility]     [address] [id number]

[address] [place] [address]

                                         

 

                          [facility]     [address] [id number]

[address] [place] [address]

                                        Spinal stenosis M48.00

 

                          [facility]     [address] [id number]

[address] [place] [address]

                                        Spinal stenosis M48.00

 

                          [facility]     [address] [id number]

[address] [place] [address]

                                        Right foot infection L08.9

 

                          [facility]     [address] [id number]

[address] [place] [address]

                                        Spinal stenosis M48.00

 

                          [address]

[id number] [place] [address] 

[address]                22 May, 2019              Spinal stenosis M48.00

 

                          [facility]     [address] [id number]

[address] [place] [address]

                                        Spinal stenosis M48.00

 

                          [facility]     [address] [id number]

[address] [place] [address]

                                        Diabetes E11.9 ; Spinal sten

osis M48.00 and Hypertension I10

 

                          [facility]     [address] [id number]

[address] [place] [address]

                          27 Mar, 2019              Spinal stenosis M48.00

 

                          [facility]     [address] [id number]

[address] [place] [address]

                          01 Mar, 2019              Spinal stenosis M48.00

 

                          [facility]     [address] [id number]

[address] [place] [address]

                                         

 

                          [facility]     [address] [id number]

[address] [place] [address]

                                        Spinal stenosis M48.00

 

                          [facility]     [address] [id number]

[address] [place] [address]

                                         

 

                          [facility]     [address] [facility] [id number]

[address] [place] [address]

                                        Spinal stenosis M48.00

 

                          [facility]     [address] [id number]

[address] [place] [address]

                          20 Dec, 2018              Diabetes E11.9 ; Spinal sten

osis M48.00 ; Bronchitis J40 and 

Encounter for immunization Z23

 

                          [facility]     [address] [id number]

[address] [place] [address]

                          05 Dec, 2018              Spinal stenosis M48.00

 

                          [facility]     [address] [id number]

[address] [place] [address]

                                         

 

                          [facility]     [address] [id number]

[address] [place] [address]

                                        Spinal stenosis M48.00

 

                          [facility]     [address] [id number]

[address] [place] [address]

                          22 Oct, 2018               

 

                          [facility]     [address] [id number]

[address] [place] [address]

                          10 Oct, 2018              Spinal stenosis M48.00

 

                          [facility]     [address] [id number]

[address] [place] [address]

                          28 Sep, 2018              Hypertension I10

 

                          [facility]     [address] [id number]

[address] [place] [address]

                          13 Sep, 2018              Spinal stenosis M48.00 and P

eripheral neuropathy G62.9

 

                          [facility]     [address] [id number]

[address] [place] [address]

                          12 Sep, 2018              Diabetes E11.9

 

                          [facility]     [address] [id number]

[address] [place] [phone]

                          06 Sep, 2018               

 

                          [facility]     [address] [id number]

[address] [place] [address]

                          04 Sep, 2018              Diabetes E11.9 ; Hyperlipide

roseanna, unspecified hyperlipidemia type 

E78.5 ; Lightheadedness R42 ; Spinal stenosis M48.00 and Hypertension I10

 

                          [facility]     [address] [id number]

[address] [place] [address]

                          16 Aug, 2018              Spinal stenosis M48.00 and P

eripheral neuropathy G62.9

 

                          [facility]     [address] [id number]

[address] [place] [address]

                          15 Aug, 2018               

 

                          [facility]     [address] [id number]

[address] [place] [address]

                                        Peripheral neuropathy G62.9

 

                          [facility]     [address] [id number]

[address] [place] [address]

                                        Spinal stenosis M48.00

 

                          [facility]     [address] [id number]

[address] [place] [address]

                                        Spinal stenosis M48.00

 

                          [facility]     [address] [id number]

[address] [place] [address]

                                        Diabetes E11.9 ; Spinal sten

osis M48.00 and Peripheral neuropathy 

G62.9

 

                          [facility]     [address] [id number]

[address] [place] [address]

                          16 May, 2018              Spinal stenosis M48.00

 

                          [facility]     [address] [id number]

[address] [place] [address]

                                        Spinal stenosis M48.00

 

                          [facility]     [address] [id number]

[address] [place] [address]

                          26 Mar, 2018              Spinal stenosis M48.00

 

                          [facility]     [address] [id number]

[address] [place] [address]

                          26 Mar, 2018              Spinal stenosis M48.00 ; Amarilys

betes E11.9 ; Hypertension I10 ; 

Umbilical hernia without obstruction and without gangrene K42.9 and Peripheral 
neuropathy G62.9

 

                          [facility]     [address] [id number]

[address] [place] [address]

                                        Spinal stenosis M48.00

 

                          [facility]     [address] [id number]

[address] [place] [address]

                                        Spinal stenosis M48.00

 

                          [facility]     [address] [id number]

[address] [place] [address]

                          27 Dec, 2017              Spinal stenosis M48.00

 

                          [facility]     [address] [id number]

[address] [place] [address]

                          12 Dec, 2017              Diabetes E11.9 ; Encounter f

or immunization Z23 ; Spinal stenosis 

M48.00 and Trochanteric bursitis of right hip M70.61

 

                          [facility]     [address] [id number]

[address] [place] [address]

                          05 Dec, 2017              Spinal stenosis M48.00

 

                          [facility]     [address] [id number]

[address] [place] [address]

                                         

 

                          [facility]     [address] [id number]

[address] [place] [address]

                                        Spinal stenosis M48.00

 

                          [facility]     [address] [id number]

[address] [place] [address]

                          06 Oct, 2017              Spinal stenosis M48.00 and O

ther dorsalgia M54.89

 

                          [facility]     [address] [id number]

[address] [place] [address]

                          25 Sep, 2017               

 

                          [facility]     [address] [id number]

[address] [place] [address]

                          19 Sep, 2017              Diabetes E11.9 ; Spinal sten

osis M48.00 ; Peripheral neuropathy 

G62.9 and Umbilical hernia without obstruction and without gangrene K42.9

 

                          [facility]     [address] [id number]

[address] [place] [address]

                          07 Sep, 2017              Other dorsalgia M54.89

 

                          [facility]     [address] [id number]

[address] [place] [address]

                          16 Aug, 2017              Other dorsalgia M54.89

 

                          [facility]     [address] [id number]

[address] [place] [address]

                                        Other dorsalgia M54.89

 

                          [facility]     [address] [id number]

[address] [place] [address]

                                        Other dorsalgia M54.89

 

                          [facility]     [address] [id number]

[address] [place] [address]

                                        Post-procedural erectile dys

function, unspecified type N52.39

 

                          [facility]     [address] [id number]

[address] [place] [address]

                                        Diabetes E11.9 ; Spinal sten

osis M48.00 ; Peripheral neuropathy 

G62.9 and Umbilical hernia without obstruction and without gangrene K42.9

 

                          [place] [address] [id number]

[address] [place] [address]

                          24 May, 2017              Other dorsalgia M54.89

 

                          [place] [address] [id number]

[address] [place] [address]

                                        Spinal stenosis M48.00

 

                          [place] [address] [id number]

[address] [place] [address]

                          31 Mar, 2017              Post-procedural erectile dys

function, unspecified type N52.39

 

                          [place] [address] [place] [id number]

[address] [place] [address]

                          29 Mar, 2017              Spinal stenosis M48.00

 

                          [place] [address] [place] [id number]

[address] [place] [address]

                          15 Mar, 2017               

 

                          [place] [address] [place] [id number] [place] [address]

                          14 Mar, 2017              Diabetes E11.9 ; Spinal sten

osis M48.00 and Peripheral neuropathy 

G62.9

 

                          [place] [address] [id number]

[address] [place] [address]

                          01 Mar, 2017              Spinal stenosis M48.00

 

                          [place] [address] [facility] [id number]

[address] [place] [address]

                                        Spinal stenosis M48.00

 

                          [place] [address] [facility] [id number]

[address] [place] [address]

                                        Chest pain, unspecified type

 R07.9 ; Dyspnea, unspecified type 

R06.00 ; Coronary artery disease of native artery of native heart with stable 
angina pectoris I25.118 ; Hyperlipidemia, unspecified hyperlipidemia type E78.5 
; Hypertension I10 ; Diabetes E11.9 ; Chronic obstructive pulmonary disease, 
unspecified COPD type J44.9 and Tobacco use Z72.0

 

                          [place] [address] [facility] [id number]

[address] [place] [address]

                                        Spinal stenosis M48.00

 

                          [facility]     [address] [id number]

[address] [place] [address]

                          05 Dec, 2016              Diabetes E11.9 ; Spinal sten

osis M48.00 ; Other chest pain R07.89 ;

Coronary artery disease of native artery of native heart with stable angina 
pectoris I25.118 and Peripheral neuropathy G62.9

 

                          [facility]     [address] [id number]

[address] [place] [address]

                          02 Dec, 2016              Connective tissue and disc s

tenosis of intervertebral foramina of 

cervical region M99.71

 

                          [facility]     [address] [id number]

[address] [place] [address]

                                        Spinal stenosis M48.00

 

                          [facility]     [address] [id number]

[address] [place] [address]

                          07 Oct, 2016               

 

                          [facility]     [address] [id number]

[address] [place] [address]

                          12 Sep, 2016              Diabetes E11.9 and Spinal st

enosis M48.00

 

                          [facility]     [address] [id number]

[address] [place] [address]

                          09 Sep, 2016               

 

                          [facility]     [address] [id number]

[address] [place] [address]

                          10 Aug, 2016               

 

                          [facility]     [address] [id number]

[address] [place] [address]

                                         

 

                          [facility]     [address] [id number]

[address] [place] [address]

                                         

 

                          [facility]     [address] [id number]

[address] [place] [address]

                                        Diabetes E11.9 ; Spinal sten

osis M48.00 and Diplopia H53.2

 

                          [facility]     [address] [id number]

[address] [place] [address]

                          26 May, 2016              Other dorsalgia M54.89

 

                          [facility]     [address] [id number]

[address] [place] [address]

                                        Other dorsalgia M54.89

 

                          [facility]     [address] [id number]

[address] [place] [address]

                          08 Mar, 2016              Diabetes E11.9 and Spinal st

enosis M48.00

 

                          [facility]     [address] [id number]

[address] [place] [address]

                          04 2016              Other dorsalgia M54.89

 

                          [facility]     [address] [id number]

[address] [place] [address]

                                        Post-op pain G89.18

 

                          [facility]     [address] [id number]

[address] [place] [address]

                                        Post surgical complication T

81.9XXA

 

                          [facility]     [address] [id number]

[address] [place] [address]

                                         

 

                          [facility]     [address] [id number]

[address] [place] [address]

                                        Spinal stenosis M48.00

 

                          [facility]     [address] [id number]

[address] [place] [address]

                          17 Dec, 2015               

 

                          [facility]     [address] [id number]

[address] [place] [address]

                          16 Dec, 2015               

 

                          [facility]     [address] [id number]

[address] [place] [address]

                          03 Dec, 2015              Diabetes E11.9 ; Hypertensio

n I10 ; Spinal stenosis M48.00 and 

Peripheral neuropathy G62.9

 

                          [facility]     [address] [id number]

[address] [place] [address]

                                         

 

                          [facility]     [address] [id number]

[address] [place] [address]

                          21 Oct, 2015               

 

                          [facility]     [address] [id number]

[address] [place] [address]

                          23 Sep, 2015               

 

                          [facility]     [address] [id number]

[address] [place] [address]

                          21 Sep, 2015               

 

                          [facility]     [address] [id number]

[address] [place] [address]

                          21 Sep, 2015               

 

                          [facility]     [address] [id number]

[address] [place] [address]

                          27 Aug, 2015               

 

                          [facility]     [address] [id number]

[address] [place] [address]

                          26 Aug, 2015               

 

                          [facility]     [address] [id number]

[address] [place] [address]

                          17 Aug, 2015               

 

                          [facility]     [address] [id number]

[address] [place] [address]

                          10 Aug, 2015              Unspecified essential hypert

ension 401.9 ; Spinal stenosis in 

cervical region 723.0 and Diabetes mellitus without mention of complication, 
type II or unspecified type, not stated as uncontrolled 250.00

 

                          [facility]     [address] [id number]

[address] [place] [address]

                                         

 

                          [facility]     [address] [id number]

[address] [place] [address]

                                         

 

                          [facility]     [address] [id number]

[address] [place] [address]

                                         

 

                          [facility]     [address] [id number]

[address] [place] [address]

                          11 May, 2015               

 

                          [facility]     [address] [facility] [id number]

[address] [place] [address]

                          11 May, 2015              Spinal stenosis in cervical 

region 723.0 ; Unspecified backache 

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 
without mention of complication, type II or unspecified type, not stated as 
uncontrolled 250.00

 

                          [facility]     [address] [id number]

[address] [place] [address]

                          04 May, 2015               

 

                          [facility]     [address] [id number]

[address] [place] [address]

                                         

 

                          [facility]     [address] [id number]

[address] [place] [address]

                                         

 

                          [facility]     [address] [id number]

[address] [place] [address], [place] [address]

                          23 Mar, 2015               

 

                          [facility]     [address] [id number]

[address] [place] [address], [place] [address]

                          23 Mar, 2015               

 

                          [facility]     [address] [id number]

[address] [place] [address], [place] [address]

                                         

 

                          [facility]     [address] [id number]

[address] [place] [address], [place] [address]

                                         

 

                          [facility]     [address] [id number]

[address] [place] [address], [place] [address]

                                         

 

                          [facility]     [address] [id number]

[address] [place] [address], [place] [address]

                                         

 

                          [facility]     [address] [id number]

[address] [place] [address], [place] [address]

                                         

 

                          [facility]     [address] [id number]

[address] [place] [address], [place] [address]

                                         

 

                          [facility]     [address] [id number]

[address] [place] [address], [place] [address]

                          30 Dec, 2014               

 

                          [facility]     [address] [id number]

[address] [place] [address], [place] [address]

                          30 Dec, 2014               

 

                          [facility]     [address] [id number]

[address] [place] [address], [place] [address]

                          29 Dec, 2014               

 

                          [facility]     [address] [id number]

[address] [place] [address], [place] [address]

                          29 Dec, 2014               

 

                          [facility]     [address] [id number]

[address] [place] [address], [place] [address]

                          12 Dec, 2014               

 

                          [facility]     [address] [id number]

[address] [place] [address], [place] [address]

                          12 Dec, 2014               

 

                          [facility]     [address] [id number]

[address] [place] [address], [place] [address]

                          01 Dec, 2014               

 

                          [facility]     [address] [id number]

[address] [place] [address], [place] [address]

                          01 Dec, 2014               

 

                          [facility]     [address] [id number]

[address] [place] [address], [place] [address]

                                         

 

                          [facility]     [address] [id number]

[address] [place] [address], [place] [address]

                                         

 

                          [facility]     [address] [id number]

[address] [place] [address], [place] [address]

                          06 Oct, 2014               

 

                          [facility]     [address] [id number]

[address] [place] [address], [place] [address]

                          06 Oct, 2014               

 

                          [facility]     [address] [id number]

[address] [place] [address], [place] [address]

                          08 Sep, 2014               

 

                          [facility]     [address] [id number]

[address] [place] [address], [place] [address]

                          08 Sep, 2014               

 

                          [facility]     [address] [id number]

[address] [place] [address], [place] [address]

                          08 Sep, 2014               

 

                          [facility]     [address] [id number]

[address] [place] [address], [place] [address]

                          08 Sep, 2014               

 

                          [facility]     [address] [id number]

[address] [place] [address], [place] [address]

                          28 Aug, 2014               

 

                          [facility]     [address] [id number]

[address] [place] [address], [place] [address]

                          28 Aug, 2014               

 

                          [facility]     [address] [id number]

[address] [place] [address], [place] [address]

                          18 Aug, 2014               

 

                          [facility]     [address] [id number]

[address] [place] [address], [place] [address]

                          14 Aug, 2014               

 

                          [facility]     [address] [id number]

[address] [place] [address], [place] [address]

                          14 Aug, 2014               

 

                          [facility]     [address] [id number]

[address] [place] [address], [place] [address]

                                         

 

                          [facility]     [address] [id number]

[address] [place] [address], [place] [address]

                                         

 

                          [facility]     [address] [id number]

[address] [place] [address], [place] [address]

                                         

 

                          [facility]     [address] [id number]

[address] [place] [address], [place] [address]

                                         

 

                          [facility]     [address] [id number]

[address] [place] [address], [place] [address]

                                         

 

                          [facility]     [address] [id number]

[address] [place] [address], [place] [address]

                                         

 

                          [facility]     [address] [id number]

[address] [place] [address], [place] [address]

                          30 May, 2014               

 

                          [facility]     [address] [id number]

[address] [place] [address], [place] [address]

                          30 May, 2014               

 

                          [facility]     [address] [id number]

[address] [place] [address], [place] [address]

                          29 May, 2014               

 

                          [facility]     [address] [id number]

[address] [place] [address], [place] [address]

                          29 May, 2014               

 

                          [facility]     [address] [id number]

[address] [place] [address], [place] [address]

                          22 May, 2014               

 

                          [facility]     [address] [id number]

[address] [place] [address], [place] [address]

                          22 May, 2014               

 

                          [facility]     [address] [id number]

[address] [place] [address], [place] [address]

                          21 May, 2014               

 

                          [facility]     [address] [id number]

[address] [place] [address], [place] [address]

                          07 May, 2014               

 

                          [facility]     [address] [id number]

[address] [place] [address], [place] [address]

                          07 May, 2014               

 

                          [facility]     [address] [id number]

[address] [place] [address], [place] [address]

                                         

 

                          [facility]     [address] [id number]

[address] [place] [address], [place] [address]

                                         

 

                          [facility]     [address] [id number]

[address] [place] [address], [place] [address]

                                         

 

                          [facility]     [address] [id number]

[address] [place] [address], [place] [address]

                                         

 

                          [facility]     [address] [id number]

[address] [place] [address], [place] [address]

                          27 Mar, 2014               

 

                          [facility]     [address] [id number]

[address] [place] [address], [place] [address]

                          27 Mar, 2014               

 

                          [facility]     [address] [id number]

[address] [place] [address], [place] [address]

                          03 Mar, 2014               

 

                          [facility]     [address] [id number]

[address] [place] [address], [place] [address]

                                         

 

                          [facility]     [address] [id number]

[address] [place] [address], [place] [address]

                                         

 

                          [facility]     [address] [id number]

[address] [place] [address], [place] [address]

                          10 Feb, 2014               

 

                          [facility]     [address] [id number]

[address] [place] [address], [place] [address]

                          10 Feb, 2014               

 

                          [facility]     [address] [id number]

[address] [place] [address], [place] [address]

                                         

 

                          [facility]     [address] [id number]

[address] [place] [address], [place] [address]

                                         

 

                          [facility]     [address] [id number]

[address] [place] [address], [place] [address]

                                         

 

                          [facility]     [address] [id number]

[address] [place] [address], [place] [address]

                                         

 

                          [facility]     [address] [id number]

[address] [place] [address], [place] [address]

                                         

 

                          [facility]     [address] [id number]

[address] [place] [address], [place] [address]

                                         

 

                          [facility]     [address] [id number]

[address] [place] [address], [place] [address]

                                         

 

                          [facility]     [address] [id number]

[address] [place] [address], [place] [address]

                                         

 

                          [facility]     [address] [id number]

[address] [place] [address], [place] [address]

                          06 Dec, 2013               

 

                          [facility]     [address] [id number]

[address] [place] [address], [place] [address]

                          06 Dec, 2013               

 

                          [facility]     [address] [id number]

[address] [place] [address], [place] [address]

                                         

 

                          [facility]     [address] [id number]

[address] [place] [address], [place] [address]

                                         

 

                          [facility]     [address] [id number]

[address] [place] [address], [place] [address]

                          22 Oct, 2013               

 

                          [facility]     [address] [id number]

[address] [place] [address], [place] [address]

                          22 Oct, 2013               

 

                          [facility]     [address] [id number]

[address] [place] [address], [place] [id number]

                          10 Oct, 2013               

 

                          [facility]     [address] [id number]

[address] [place] [address], [place] [address]

                          10 Oct, 2013               

 

                          [facility]     [address] [id number]

[address] [place] [address], [place] [address]

                          12 Sep, 2013               

 

                          [facility]     [address] [id number]

[address] [place] [address], [place] [address]

                          15 Aug, 2013               

 

                          [facility]     [address] [id number]

[address] [place] [address], [place] [address]

                                         

 

                          [facility]     [address] [id number]

[address] [place] [address], [place] [address]

                                         

 

                          [facility]     [address] [id number]

[address] [place] [address], [place] [address]

                          23 May, 2013               

 

                          [facility]     [address] [id number]

[address] [place] [address], [place] [address]

                          13 May, 2013               

 

                          [facility]     [address] [id number]

[address] [place] [address], [place] [address]

                                         

 

                          [facility]     [address] [id number]

[address] [place] [address], [place] [address]

                          28 Mar, 2013               

 

                          [facility]     [address] [id number]

[address] [place] [address], [place] [address]

                                         

 

                          [facility]     [address] [id number]

[address] [place] [address], [place] [address]

                                         

 

                          [facility]     [address] [id number]

[address] [place] [address], [place] [address]

                                         

 

                          [facility]     [address] [id number]

[address] [place] [address], [place] [address]

                          10 Sarbjit, 2013               

 

                          [facility]     [address] [id number]

[address] [place] [address], [place] [address]

                                         

 

                          [facility]     [address] [id number]

[address] [place] [address], [place] [address]

                                         

 

                          [facility]     [address] [id number]

[address] [place] [phone]

                          04 Dec, 2012               

 

                          [facility]     [address] [id number]

[address] [place] [address], [place] [address]

                          04 Dec, 2012               

 

                          [facility]     [address] [id number]

[address] [place] [address]

                                         

 

                          [facility]     [address] [id number]

[address] [place] [phone]

                                         

 

                          [facility]     [address] [id number]

[address] [place] [phone]

                          23 Oct, 2012               

 

                          [facility]     [address] [id number]

[address] [place] [address], [place] [address]

                          23 Oct, 2012               

 

                          [facility]     [address] [id number]

[address] [place] [address]

                          08 Oct, 2012               

 

                          [facility]     [address] [id number]

[address] [place] [address]

                          05 Oct, 2012               

 

                          [facility]     [address] [id number]

[address] [place] [address]

                          06 Sep, 2012               

 

                          [facility]     [address] [id number]

[address] [place] [phone]

                          04 Sep, 2012               

 

                          [facility]     [address] [id number]

[address] [place] [address]

                          06 Aug, 2012               

 

                          [facility]     [address] [id number]

[address] [place] [address]

                          01 Aug, 2012               

 

                          [facility]     [address] [id number]

[address] [place] [address]

                          10 Jul, 2012               

 

                          [facility]     [address] [id number]

[address] [place] [phone]

                          10 Jul, 2012               

 

                          [facility]     [address] [id number]

[address] [place] [address]

                                         

 

                          [facility]     [address] [id number]

[address] [place] [address]

                                         

 

                          [facility]     [address] [id number]

[address] [place] [phone]

                          15 May, 2012               

 

                          [facility]     [address] [id number]

[address] [place] [address]

                                         

 

                          [facility]     [address] [id number]

[address] [place] [address]

                          15 Mar, 2012               

 

                          [facility]     [address] [facility] [id number]

[address] [place] [address]

                          09 Mar, 2012               

 

                          [facility]     [address] [id number]

[address] [place] [address]

                                         

 

                          [facility]     [address] [id number]

[address] [place] [address]

                                         







IMMUNIZATIONS

No Known Immunizations



SOCIAL HISTORY

Never Assessed



REASON FOR VISIT





PLAN OF CARE





VITAL SIGNS





MEDICATIONS

Unknown Medications



RESULTS

No Results



PROCEDURES

No Known procedures



INSTRUCTIONS





MEDICATIONS ADMINISTERED

No Known Medications



MEDICAL (GENERAL) HISTORY





                    Type                Description         Date

 

                    Medical History     hyperlipidemia       

 

                    Medical History     hypertension         

 

                    Medical History     chronic pain         

 

                    Medical History     diabetes mellitus    

 

                    Medical History     diabetic neuropathy  

 

                    Medical History     diabetic ulcer       

 

                    Medical History     heart attack         

 

                    Surgical History    cardiac stent        

 

                    Surgical History    cholecystectomy/surgery on liver  

 

                    Surgical History    bottom of foot was removed for ulcer and

 now on wound care 

 

                    Surgical History    right foot surgery  20

 

                    Hospitalization History emergency surgery on liver; [facility] 2016

 

                    Hospitalization History [facility]                 

 

                    Hospitalization History [facility] right foot surg 

 

                    Hospitalization History heart attack and pneumonia 

0

## 2020-06-15 NOTE — XMS REPORT
Continuity of Care Document

                             Created on: 06/15/2020



DAVID SMITH

External Reference #: [id number]

: 1950

Sex: Male



Demographics





                          Home Phone                [phone]

 

                          Preferred Language        Unknown

 

                          Marital Status            Unknown

 

                          Gnosticism Affiliation     Unknown

 

                          Race                      Unknown

 

                          Ethnic Group              Unknown





Author





                          Organization              Unknown

 

                          Address                   Unknown

 

                          Phone                     Unavailable



              



Allergies

      



             Active           Description           Code           Type         

  Severity   

                Reaction           Onset           Reported/Identified          

 

Relationship to Patient                 Clinical Status        

 

                Yes             No Known Drug Allergies           [id number]    

       Drug 

Allergy           Unknown           N/A                             2017  

      

                                                             



                  



Medications

      



There is no data.                  



Problems

      



             Date Dx Coded           Attending           Type           Code    

       

Diagnosis                               Diagnosed By        

 

                1407           YULISA DENNIS MD, Ot           

   B95.62          

                          METHICILLIN RESIS STAPH INFCT CAUSING DI              

      

 

             1407           YULISA DENNIS MD, Ot           B96

.5           

PSEUDOMONAS (MALLEI) CAUSING DISEASES CL                    

 

                1407           YULISA DENNIS MD, Ot           

   E11.42          

                          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              

      

 

                1407           YULISA DENNIS MD, Ot           

   E11.621         

                          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              

      

 

                1407           YULIAS DENNIS MD, Ot           

   I70.234         

                          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              

      

 

                1407           YULISA DENNIS MD, Ot           

   L97.416         

                          NON-PRS CHR ULC OF R HEEL/MIDFT W BNE IN              

      

 

             2012           MK LÓPEZ MD                        356.4

           

POLYNEUROPATHY IDIOPATHIC PROGRESSIVE                    

 

             2012                                     356.4           POLY

NEUROPATHY 

IDIOPATHIC PROGRESSIVE                           

 

             2012           MK LÓPEZ MD                        356.4

           

POLYNEUROPATHY IDIOPATHIC PROGRESSIVE                    

 

             2012           MK LÓPEZ MD                        356.4

           

POLYNEUROPATHY IDIOPATHIC PROGRESSIVE                    

 

             2012           MK LÓPEZ MD                        356.4

           

POLYNEUROPATHY IDIOPATHIC PROGRESSIVE                    

 

             2012           MK LÓPEZ MD                        356.4

           

POLYNEUROPATHY IDIOPATHIC PROGRESSIVE                    

 

             2012           MK LÓPEZ MD                        356.4

           

POLYNEUROPATHY IDIOPATHIC PROGRESSIVE                    

 

             2012           MK LÓPEZ MD                        356.4

           

POLYNEUROPATHY IDIOPATHIC PROGRESSIVE                    

 

             2012           MK LÓPEZ MD                        356.4

           

POLYNEUROPATHY IDIOPATHIC PROGRESSIVE                    

 

             2012           MK LÓPEZ MD                        356.4

           

POLYNEUROPATHY IDIOPATHIC PROGRESSIVE                    

 

             2012           MK LÓPEZ MD                        356.4

           

POLYNEUROPATHY IDIOPATHIC PROGRESSIVE                    

 

             2012           MK LÓPEZ MD                        723.0

           

SPINAL STENOSIS IN CERVICAL REGION                    

 

             2012                                     723.0           SPIN

AL STENOSIS IN 

CERVICAL REGION                                  

 

             2012           MK LÓPEZ MD                        723.0

           

SPINAL STENOSIS IN CERVICAL REGION                    

 

             2012           MK LÓPEZ MD                        723.0

           

SPINAL STENOSIS IN CERVICAL REGION                    

 

             2012           RENE SALDIVAR, MK                        723.0

           

SPINAL STENOSIS IN CERVICAL REGION                    

 

             2012           MK LÓPEZ MD                        723.0

           

SPINAL STENOSIS IN CERVICAL REGION                    

 

             2012           RENE SALDIVAR, MK                        723.0

           

SPINAL STENOSIS IN CERVICAL REGION                    

 

             2012           MK LÓPEZ MD                        723.0

           

SPINAL STENOSIS IN CERVICAL REGION                    

 

             2012           RENE SALDIVAR, MK                        723.0

           

SPINAL STENOSIS IN CERVICAL REGION                    

 

             2012           MK LÓPEZ MD                        723.0

           

SPINAL STENOSIS IN CERVICAL REGION                    

 

             2012           MK LÓPEZ MD                        723.0

           

SPINAL STENOSIS IN CERVICAL REGION                    

 

             2012           MK LÓPEZ MD                        250.0

0           

DIABETES MELLITUS WITHOUT MENTION OF COMPLICATION TYPE II OR UNSPECIFIED TYPE 
NOT STATED AS UNCONTROLLED                       

 

             2012                                     250.00           LYNNE

BETES MELLITUS 

WITHOUT MENTION OF COMPLICATION TYPE II OR UNSPECIFIED TYPE NOT STATED AS 
UNCONTROLLED                                     

 

             2012           MK LÓPEZ MD                        250.0

0           

DIABETES MELLITUS WITHOUT MENTION OF COMPLICATION TYPE II OR UNSPECIFIED TYPE 
NOT STATED AS UNCONTROLLED                       

 

             2012           MK LÓPEZ MD                        250.0

0           

DIABETES MELLITUS WITHOUT MENTION OF COMPLICATION TYPE II OR UNSPECIFIED TYPE 
NOT STATED AS UNCONTROLLED                       

 

             2012           MK LÓPEZ MD                        250.0

0           

DIABETES MELLITUS WITHOUT MENTION OF COMPLICATION TYPE II OR UNSPECIFIED TYPE 
NOT STATED AS UNCONTROLLED                       

 

             2012           MK LÓPEZ MD                        250.0

0           

DIABETES MELLITUS WITHOUT MENTION OF COMPLICATION TYPE II OR UNSPECIFIED TYPE 
NOT STATED AS UNCONTROLLED                       

 

             2012           MK LÓPEZ MD                        250.0

0           

DIABETES MELLITUS WITHOUT MENTION OF COMPLICATION TYPE II OR UNSPECIFIED TYPE 
NOT STATED AS UNCONTROLLED                       

 

             2012           MK LÓPEZ MD                        250.0

0           

DIABETES MELLITUS WITHOUT MENTION OF COMPLICATION TYPE II OR UNSPECIFIED TYPE 
NOT STATED AS UNCONTROLLED                       

 

             2012           MK LÓPEZ MD                        250.0

0           

DIABETES MELLITUS WITHOUT MENTION OF COMPLICATION TYPE II OR UNSPECIFIED TYPE 
NOT STATED AS UNCONTROLLED                       

 

             2012           MK LÓPEZ MD                        250.0

0           

DIABETES MELLITUS WITHOUT MENTION OF COMPLICATION TYPE II OR UNSPECIFIED TYPE 
NOT STATED AS UNCONTROLLED                       

 

             2012           MK LÓPEZ MD                        250.0

0           

DIABETES MELLITUS WITHOUT MENTION OF COMPLICATION TYPE II OR UNSPECIFIED TYPE 
NOT STATED AS UNCONTROLLED                       

 

             2012           MK LÓPEZ MD                        716.9

0           

UNSPECIFIED ARTHROPATHY SITE UNSPECIFIED                    

 

             2012                                     716.90           UNS

PECIFIED 

ARTHROPATHY SITE UNSPECIFIED                     

 

             2012           MK LÓPEZ MD                        716.9

0           

UNSPECIFIED ARTHROPATHY SITE UNSPECIFIED                    

 

             2012           MK LÓPEZ MD                        716.9

0           

UNSPECIFIED ARTHROPATHY SITE UNSPECIFIED                    

 

             2012           MK LÓPEZ MD                        716.9

0           

UNSPECIFIED ARTHROPATHY SITE UNSPECIFIED                    

 

             2012           MK LÓPEZ MD                        716.9

0           

UNSPECIFIED ARTHROPATHY SITE UNSPECIFIED                    

 

             2012           MK LÓPEZ MD                        716.9

0           

UNSPECIFIED ARTHROPATHY SITE UNSPECIFIED                    

 

             2012           MK LÓPEZ MD                        716.9

0           

UNSPECIFIED ARTHROPATHY SITE UNSPECIFIED                    

 

             2012           MK LÓPEZ MD                        716.9

0           

UNSPECIFIED ARTHROPATHY SITE UNSPECIFIED                    

 

             2012           MK LÓPEZ MD                        716.9

0           

UNSPECIFIED ARTHROPATHY SITE UNSPECIFIED                    

 

             2012           MK LÓPEZ MD                        716.9

0           

UNSPECIFIED ARTHROPATHY SITE UNSPECIFIED                    

 

             10/23/2012           MK LÓPEZ MD                        443.9

           

PERIPHERAL VASCULAR DISEASE UNSPECIFIED                    

 

             10/23/2012                                     443.9           LEROY

PHERAL VASCULAR

DISEASE UNSPECIFIED                              

 

             10/23/2012           MK LÓPEZ MD                        443.9

           

PERIPHERAL VASCULAR DISEASE UNSPECIFIED                    

 

             10/23/2012           MK LÓPEZ MD                        443.9

           

PERIPHERAL VASCULAR DISEASE UNSPECIFIED                    

 

             10/23/2012           MK LÓPEZ MD                        443.9

           

PERIPHERAL VASCULAR DISEASE UNSPECIFIED                    

 

             10/23/2012           MK LÓPEZ MD                        443.9

           

PERIPHERAL VASCULAR DISEASE UNSPECIFIED                    

 

             10/23/2012           MK LÓPEZ MD                        443.9

           

PERIPHERAL VASCULAR DISEASE UNSPECIFIED                    

 

             10/23/2012           MK LÓPEZ MD                        443.9

           

PERIPHERAL VASCULAR DISEASE UNSPECIFIED                    

 

             10/23/2012           MK LÓPEZ MD                        443.9

           

PERIPHERAL VASCULAR DISEASE UNSPECIFIED                    

 

             10/23/2012           MK LÓPEZ MD                        443.9

           

PERIPHERAL VASCULAR DISEASE UNSPECIFIED                    

 

             10/23/2012           MK LÓPEZ MD                        443.9

           

PERIPHERAL VASCULAR DISEASE UNSPECIFIED                    

 

             2013           MK LÓPEZ MD                        435.9

           

UNSPECIFIED TRANSIENT CEREBRAL ISCHEMIA                    

 

             2013           RENE SALDIVAR, MK                        435.9

           

UNSPECIFIED TRANSIENT CEREBRAL ISCHEMIA                    

 

             2013           RENE SALDIVAR, MK                        435.9

           

UNSPECIFIED TRANSIENT CEREBRAL ISCHEMIA                    

 

             2013           RENE SALDIVAR, MK                        435.9

           

UNSPECIFIED TRANSIENT CEREBRAL ISCHEMIA                    

 

             2013           RENE SALDIVAR, MK                        435.9

           

UNSPECIFIED TRANSIENT CEREBRAL ISCHEMIA                    

 

             2013           RENE SALDIVAR, MK                        435.9

           

UNSPECIFIED TRANSIENT CEREBRAL ISCHEMIA                    

 

             2013           RENE SALDIVAR, MK                        435.9

           

UNSPECIFIED TRANSIENT CEREBRAL ISCHEMIA                    

 

             2013           RENE SALDIVAR, MK                        435.9

           

UNSPECIFIED TRANSIENT CEREBRAL ISCHEMIA                    

 

             10/22/2013           RENE SALDIVAR, MK                        401.9

           

UNSPECIFIED ESSENTIAL HYPERTENSION                    

 

             10/22/2013           RENE SALDIVAR, MK                        401.9

           

UNSPECIFIED ESSENTIAL HYPERTENSION                    

 

             10/22/2013           RENE SALDIVAR, MK                        401.9

           

UNSPECIFIED ESSENTIAL HYPERTENSION                    

 

             10/22/2013           RENE SALDIVAR, MK                        401.9

           

UNSPECIFIED ESSENTIAL HYPERTENSION                    

 

             10/22/2013           RENE SALDIVAR, MK                        401.9

           

UNSPECIFIED ESSENTIAL HYPERTENSION                    

 

             10/22/2013           RENE SALDIVAR, MK                        401.9

           

UNSPECIFIED ESSENTIAL HYPERTENSION                    

 

             2014           RENE SALDIVAR, MK                        724.5

           

BACKACHE UNSPECIFIED                             

 

             2014           RENE SALDIVAR, MK                        724.5

           

BACKACHE UNSPECIFIED                             

 

             2014           RENE SALDIVAR, MK                        724.5

           

BACKACHE UNSPECIFIED                             

 

             2014           RENE SALDIVAR, MK                        724.5

           

BACKACHE UNSPECIFIED                             

 

             2014           MK LÓPEZ MD                        276.5

0           

VOLUME DEPLETION UNSPECIFIED                     

 

             2014           MK LÓPEZ MD                        276.5

0           

VOLUME DEPLETION UNSPECIFIED                     

 

             2014           MK LÓPEZ MD                        276.5

0           

VOLUME DEPLETION UNSPECIFIED                     

 

             2017                        Ot           723.0           CERV

ICAL SPINAL 

STENOSIS                                         

 

             2017                        Ot           433.10           CAR

OTID ARTERY 

OCCLUSION W O CEREBRAL IN                        

 

             2017                        Ot           723.0           CERV

ICAL SPINAL 

STENOSIS                                         

 

             2017                        Ot           433.10           CAR

OTID ARTERY 

OCCLUSION W O CEREBRAL IN                        

 

                2017           ARIADNE SALDIVAR FACC, JOHNNIE MERCEDES CCDS           Ot 

             E11.9 

                          TYPE 2 DIABETES MELLITUS WITHOUT COMPLIC              

      

 

                2017           ARIADNE SALDIVAR FACBO, JOHNNIE MERCEDES CCDS           Ot 

             E78.5 

                          HYPERLIPIDEMIA, UNSPECIFIED                    

 

                2017           ARIADNE SALDIVAR FACC, ALI FACP CCDS           Ot 

             I10   

                          ESSENTIAL (PRIMARY) HYPERTENSION                    

 

                2017           ARIADNE SALDIVAR FACC, ALI FACP CCDS           Ot 

             

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              

      

 

                2017           ARIADNE LUIC, ALI FACP CCDS           Ot 

             I70.0 

                          ATHEROSCLEROSIS OF AORTA                    

 

                2017           ARIADNE SALDIVAR FACC, ALI FACP CCDS           Ot 

             J44.9 

                          CHRONIC OBSTRUCTIVE PULMONARY DISEASE, U              

      

 

                2017           ARIADNE LUIC, ALI FACP CCDS           Ot 

             R07.89

                          OTHER CHEST PAIN                    

 

                2017           ARIADNE SALDIVAR FACC, ALI FACP CCDS           Ot 

             Z72.0 

                          TOBACCO USE                        

 

                2017           ARIADNE SALDIVAR FACC, ALI FACP CCDS           Ot 

             

Z79.899                   OTHER LONG TERM (CURRENT) DRUG THERAPY                

    

 

                2017           ARIADNE SALDIVAR FACC, ALI FACP CCDS           Ot 

             Z98.61

                          CORONARY ANGIOPLASTY STATUS                    

 

                2017           ARIADNE SALDIVAR FACC, ALI FACP CCDS           Ot 

             E11.9 

                          TYPE 2 DIABETES MELLITUS WITHOUT COMPLIC              

      

 

                2017           ARIADNE SALDIVAR FACC, ALI FACP CCDS           Ot 

             E78.5 

                          HYPERLIPIDEMIA, UNSPECIFIED                    

 

                2017           ARIADNE SALDIVAR FACC, ALI FACP CCDS           Ot 

             I10   

                          ESSENTIAL (PRIMARY) HYPERTENSION                    

 

                2017           ARIADNE SALDIVAR FACC, ALI FACP CCDS           Ot 

             

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              

      

 

                2017           ARIADNE SALDIVAR FACC, ALI FACP CCDS           Ot 

             I70.0 

                          ATHEROSCLEROSIS OF AORTA                    

 

                2017           ARIADNE SALDIVAR FACC, ALI FACP CCDS           Ot 

             J44.9 

                          CHRONIC OBSTRUCTIVE PULMONARY DISEASE, U              

      

 

                2017           ARIADNE SALDIVAR FACC, ALI FACP CCDS           Ot 

             R07.89

                          OTHER CHEST PAIN                    

 

                2017           ARIADNE SALDIVAR FACC, ALI FACP CCDS           Ot 

             Z72.0 

                          TOBACCO USE                        

 

                2017           ARIADNE SALDIVAR FACC, ALI FACP CCDS           Ot 

             

Z79.899                   OTHER LONG TERM (CURRENT) DRUG THERAPY                

    

 

                2017           ARIADNE SALDIVAR FACC, ALI FACP CCDS           Ot 

             Z98.61

                          CORONARY ANGIOPLASTY STATUS                    

 

             2017                        Ot           433.10           CAR

OTID ARTERY 

OCCLUSION W O CEREBRAL IN                        

 

                2017           YAZ SALDIVAR, CHRISTINE VARGAS           Ot      

        E86.1      

                          HYPOVOLEMIA                        

 

                2017           CHRISTINE MUKHERJEE MD           Ot      

        E87.5      

                          HYPERKALEMIA                       

 

                2017           CHRISTINE MUKHERJEE MD           Ot      

        N17.9      

                          ACUTE KIDNEY FAILURE, UNSPECIFIED                    

 

                2017           CHRISTINE MUKHERJEE MD           Ot      

        R29.707    

                          NIHSS SCORE 7                      

 

                2017           CHRISTINE MUKHERJEE MD, Ot      

        R47.1      

                          DYSARTHRIA AND ANARTHRIA                    

 

                2017           CHRISTINE MUKHERJEE MD           Ot      

        R53.1      

                          WEAKNESS                           

 

                2017           CHRISTINE MUKHERJEE MD           Ot      

        Z79.82     

                          LONG TERM (CURRENT) USE OF ASPIRIN                    

 

             2017                        Ot           433.10           CAR

OTID ARTERY 

OCCLUSION W O CEREBRAL IN                        

 

             2019           YUE BEAUCHAMP MD, Ot           E11.

40           

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                    

 

                2019           YUE BEAUCHAMP MD, Ot            

  E11.621          

                          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              

      

 

             2019           YUE BEAUCHAMP MD, Ot           E11.

65           

TYPE 2 DIABETES MELLITUS WITH HYPERGLYCE                    

 

             2019           YUE BEAUCHAMP MD, Ot           E11.

69           

TYPE 2 DIABETES MELLITUS WITH OTHER SPEC                    

 

             2019           YUE BEAUCHAMP MD, Ot           E78.

00           

PURE HYPERCHOLESTEROLEMIA, UNSPECIFIED                    

 

                2019           YUE BEAUCHAMP MD, Ot            

  F17.210          

                          NICOTINE DEPENDENCE, CIGARETTES, UNCOMPL              

      

 

             2019           YUE BEAUCHAMP MD           Ot           I10 

          

ESSENTIAL (PRIMARY) HYPERTENSION                    

 

             2019           YUE BEAUCHAMP MD, Ot           I25.

10           

ATHSCL HEART DISEASE OF NATIVE CORONARY                     

 

                2019           YUE BEAUCHAMP MD           Ot            

  L02.611          

                          CUTANEOUS ABSCESS OF RIGHT FOOT                    

 

                2019           YUE BEAUCHAMP MD, Ot            

  L03.031          

                          CELLULITIS OF RIGHT TOE                    

 

                2019           YUE BEAUCHAMP MD           Ot            

  L03.115          

                          CELLULITIS OF RIGHT LOWER LIMB                    

 

                2019           YUE BEAUCHAMP MD, Ot            

  L97.516          

                          NON-PRS CHR ULC OTH PRT R FOOT WITH BNE               

      

 

                2019           YUE BEAUCHAMP MD, Ot            

  L97.519          

                          NON-PRS CHRONIC ULCER OTH PRT RIGHT FOOT              

      

 

                2019           YUE BEAUCHAMP MD, Ot            

  L97.529          

                          NON-PRESSURE CHRONIC ULCER OTH PRT LEFT               

      

 

             2019           YUE BEAUCHAMP MD, Ot           Z85.

46           

PERSONAL HISTORY OF MALIGNANT NEOPLASM O                    

 

             2019           YUE BEAUCHAMP MD, Ot           Z95.

5           

PRESENCE OF CORONARY ANGIOPLASTY IMPLANT                    

 

             2019           YUE BEAUCHAMP MD           Ot           E11.

40           

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                    

 

                2019           YUE BEAUCHAMP MD, Ot            

  E11.621          

                          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              

      

 

             2019           YUE BEAUCHAMP MD, Ot           E11.

65           

TYPE 2 DIABETES MELLITUS WITH HYPERGLYCE                    

 

             2019           YUE BEAUCHAMP MD, Ot           E11.

69           

TYPE 2 DIABETES MELLITUS WITH OTHER SPEC                    

 

             2019           YUE BEAUCHAMP MD, Ot           E78.

00           

PURE HYPERCHOLESTEROLEMIA, UNSPECIFIED                    

 

                2019           YUE BEAUCHAMP MD, Ot            

  F17.210          

                          NICOTINE DEPENDENCE, CIGARETTES, UNCOMPL              

      

 

             2019           YUE BEAUCHAMP MD, Ot           G47.

33           

OBSTRUCTIVE SLEEP APNEA (ADULT) (PEDIATR                    

 

             2019           YUE BEAUCHAMP MD, Ot           I10 

          

ESSENTIAL (PRIMARY) HYPERTENSION                    

 

             2019           YUE BEAUCHAMP MD, Ot           I25.

10           

ATHSCL HEART DISEASE OF NATIVE CORONARY                     

 

                2019           YUE BEAUCHAMP MD, Ot            

  L02.611          

                          CUTANEOUS ABSCESS OF RIGHT FOOT                    

 

                2019           YUE BEAUCHAMP MD           Ot            

  L03.031          

                          CELLULITIS OF RIGHT TOE                    

 

                2019           YUE BEAUCHAMP MD           Ot            

  L03.115          

                          CELLULITIS OF RIGHT LOWER LIMB                    

 

                2019           YUE BEAUCHAMP MD, Ot            

  L97.514          

                          NON-PRS CHRONIC ULCER OTH PRT RIGHT FOOT              

      

 

                2019           YUE BEAUCHAMP MD, Ot            

  L97.516          

                          NON-PRS CHR ULC OTH PRT R FOOT WITH BNE               

      

 

                2019           YUE BEAUCHAMP MD, Ot            

  L97.519          

                          NON-PRS CHRONIC ULCER OTH PRT RIGHT FOOT              

      

 

                2019           YUE BEAUCHAMP MD, Ot            

  L97.529          

                          NON-PRESSURE CHRONIC ULCER OTH PRT LEFT               

      

 

             2019           YUE BEAUCHAMP MD, Ot           M86.

9           

OSTEOMYELITIS, UNSPECIFIED                       

 

             2019           YUE BEAUCHAMP MD           Ot           Z85.

46           

PERSONAL HISTORY OF MALIGNANT NEOPLASM O                    

 

             2019           YUE BEAUCHAMP MD           Ot           Z95.

5           

PRESENCE OF CORONARY ANGIOPLASTY IMPLANT                    

 

                2019           YULISA DENNIS MD, Ot           

   E11.42          

                          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              

      

 

                2019           YULISA DENNIS MD, Ot           

   E11.621         

                          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              

      

 

                2019           YULISA DENNIS MD, Ot           

   L03.115         

                          CELLULITIS OF RIGHT LOWER LIMB                    

 

                2019           YULISA DENNIS MD, Ot           

   L97.513         

                          NON-PRS CHRONIC ULCER OTH PRT RIGHT FOOT              

      

 

                2019           YULISA DENNIS MD, Ot           

   E11.42          

                          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              

      

 

                2019           YULISA DENNIS MD, Ot           

   E11.52          

                          TYPE 2 DIABETES W DIABETIC PERIPHERAL AN              

      

 

                2019           YULISA DENNIS MD, Ot           

   E11.621         

                          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              

      

 

             2019           YULISA DENNIS MD, Ot           I96

           

GANGRENE, NOT ELSEWHERE CLASSIFIED                    

 

                2019           YULISA DENNIS MD, Ot           

   L97.413         

                          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              

      

 

                2019           YULISA DENNIS MD, Ot           

   E11.42          

                          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              

      

 

                2019           YULISA DENNIS MD, Ot           

   E11.52          

                          TYPE 2 DIABETES W DIABETIC PERIPHERAL AN              

      

 

                2019           YULISA DENNIS MD, Ot           

   E11.621         

                          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              

      

 

             2019           YULISA DENNIS MD, Ot           I96

           

GANGRENE, NOT ELSEWHERE CLASSIFIED                    

 

                2019           YULISA DENNIS MD, Ot           

   L97.412         

                          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              

      

 

                2019           YULISA DENNIS MD, Ot           

   M65.071         

                          ABSCESS OF TENDON SHEATH, RIGHT ANKLE AN              

      

 

                2019           YULISA DENNIS MD, Ot           

   E11.42          

                          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              

      

 

                2019           YULISA DENNIS MD, Ot           

   E11.52          

                          TYPE 2 DIABETES W DIABETIC PERIPHERAL AN              

      

 

                2019           YULISA DENNIS MD, Ot           

   E11.621         

                          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              

      

 

                2019           YULISA DENNIS MD, Ot           

   I70.261         

                          ATHSCL NATIVE ARTERIES OF EXTREMITIES W               

      

 

                2019           YULISA DENNIS MD           Ot           

   L97.412         

                          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              

      

 

                2019           YULISA DENNIS MD, Ot           

   M65.071         

                          ABSCESS OF TENDON SHEATH, RIGHT ANKLE AN              

      

 

                2019           YULISA DENNIS MD, Ot           

   E11.42          

                          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              

      

 

                2019           YULISA DENNIS MD           Ot           

   E11.621         

                          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              

      

 

                2019           YULISA DENNIS MD           Ot           

   L03.115         

                          CELLULITIS OF RIGHT LOWER LIMB                    

 

                2019           YULISA DENNIS MD, Ot           

   L97.513         

                          NON-PRS CHRONIC ULCER OTH PRT RIGHT FOOT              

      

 

                2019           YULISA DENNIS MD, Ot           

   E11.42          

                          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              

      

 

                2019           YULISA DENNIS MD, Ot           

   E11.621         

                          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              

      

 

                2019           YULISA DENNIS MD           Ot           

   L03.115         

                          CELLULITIS OF RIGHT LOWER LIMB                    

 

                2019           YULISA DENNIS MD, Ot           

   L97.513         

                          NON-PRS CHRONIC ULCER OTH PRT RIGHT FOOT              

      

 

                2019           YULISA DENNIS MD, Ot           

   E11.42          

                          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              

      

 

                2019           YULISA DENNIS MD           Ot           

   E11.52          

                          TYPE 2 DIABETES W DIABETIC PERIPHERAL AN              

      

 

                2019           YULISA DENNIS MD, Ot           

   E11.621         

                          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              

      

 

             2019           YULISA DENNIS MD           Ot           I96

           

GANGRENE, NOT ELSEWHERE CLASSIFIED                    

 

                2019           YULISA DENNIS MD           Ot           

   L97.413         

                          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              

      

 

                2019           YULISA DENNIS MD, Ot           

   E11.42          

                          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              

      

 

                2019           YULISA DENNIS MD           Ot           

   E11.52          

                          TYPE 2 DIABETES W DIABETIC PERIPHERAL AN              

      

 

                2019           YULISA DENNIS MD, Ot           

   E11.621         

                          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              

      

 

             2019           YULISA DENNIS MD, Ot           I96

           

GANGRENE, NOT ELSEWHERE CLASSIFIED                    

 

                2019           YULISA DENNIS MD           Ot           

   L97.412         

                          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              

      

 

                2019           YULISA DENNIS MD, Ot           

   M65.071         

                          ABSCESS OF TENDON SHEATH, RIGHT ANKLE AN              

      

 

                2019           YULISA DENNIS MD           Ot           

   E11.42          

                          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              

      

 

                2019           YULISA DENNIS MD, Ot           

   E11.52          

                          TYPE 2 DIABETES W DIABETIC PERIPHERAL AN              

      

 

                2019           YULISA DENNIS MD, Ot           

   E11.621         

                          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              

      

 

                2019           YULISA DENNIS MD           Ot           

   I70.261         

                          ATHSCL NATIVE ARTERIES OF EXTREMITIES W               

      

 

                2019           YULISA DENNIS MD           Ot           

   L97.412         

                          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              

      

 

                2019           YULISA DENNIS MD, Ot           

   M65.071         

                          ABSCESS OF TENDON SHEATH, RIGHT ANKLE AN              

      

 

                2019           YULISA DENNIS MD, Ot           

   E11.42          

                          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              

      

 

                2019           YULISA DENNIS MD, Ot           

   E11.52          

                          TYPE 2 DIABETES W DIABETIC PERIPHERAL AN              

      

 

                2019           YULISA DENNIS MD, Ot           

   E11.621         

                          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              

      

 

                2019           YULISA DENNIS MD, Ot           

   I70.261         

                          ATHSCL NATIVE ARTERIES OF EXTREMITIES W               

      

 

                2019           YULISA DENNIS MD, Ot           

   L97.412         

                          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              

      

 

                2019           YULISA DENNIS MD, Ot           

   M65.071         

                          ABSCESS OF TENDON SHEATH, RIGHT ANKLE AN              

      

 

                2019           YULISA DENNIS MD           Ot           

   E11.42          

                          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              

      

 

                2019           YULISA DENNIS MD, Ot           

   E11.52          

                          TYPE 2 DIABETES W DIABETIC PERIPHERAL AN              

      

 

                2019           YULISA DENNIS MD, Ot           

   E11.621         

                          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              

      

 

                2019           YULISA DENNIS MD           Ot           

   I70.234         

                          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              

      

 

             2019           YULISA DENNIS MD           Ot           I96

           

GANGRENE, NOT ELSEWHERE CLASSIFIED                    

 

                2019           YULISA DENNIS MD, Ot           

   M65.071         

                          ABSCESS OF TENDON SHEATH, RIGHT ANKLE AN              

      

 

                2019           JENNIFER SALDIVAR, ELISE RAVI           Ot           

   E11.40          

                          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              

      

 

             2019           ELISE RODRIGUEZ MD           Ot           E78

.5           

HYPERLIPIDEMIA, UNSPECIFIED                      

 

                2019           ELISE RODRIGUEZ MD           Ot           

   F17.210         

                          NICOTINE DEPENDENCE, CIGARETTES, UNCOMPL              

      

 

             2019           ELISE RODRIGUEZ MD           Ot           I11

.9           

HYPERTENSIVE HEART DISEASE WITHOUT HEART                    

 

                2019           ELISE RODRIGUEZ MD, Ot           

   I25.10          

                          ATHSCL HEART DISEASE OF NATIVE CORONARY               

      

 

             2019           ELISE RODRIGUEZ MD, Ot           I71

.4           

ABDOMINAL AORTIC ANEURYSM, WITHOUT RUPTU                    

 

             2019           ELISE RODRIGUEZ MD           Ot           I99

.8           

OTHER DISORDER OF CIRCULATORY SYSTEM                    

 

                2019           ELISE RODRIGUEZ MD           Ot           

   L97.519         

                          NON-PRS CHRONIC ULCER OTH PRT RIGHT FOOT              

      

 

                2019           ELISE RODRIGUEZ MD, Ot           

   Z79.82          

                          LONG TERM (CURRENT) USE OF ASPIRIN                    

 

                2019           LEISE RODRIGUEZ MD, Ot           

   Z79.84          

                          LONG TERM (CURRENT) USE OF ORAL HYPOGLYC              

      

 

                2019           ELISE RODRIGUEZ MD, Ot           

   Z79.899         

                          OTHER LONG TERM (CURRENT) DRUG THERAPY                

    

 

             2019           ELISE RODRIGUEZ MD, Ot           Z82

.3           

FAMILY HISTORY OF STROKE                         

 

                2019           ELISE RODRIGUEZ MD, Ot           

   Z82.49          

                          FAMILY HX OF ISCHEM HEART DIS AND OTH DI              

      

 

             2019           ELISE RODRIGUEZ MD, Ot           Z83

.3           

FAMILY HISTORY OF DIABETES MELLITUS                    

 

                2019           ELISE RODRIGUEZ MD           Ot           

   Z86.73          

                          PRSNL HX OF TIA (TIA), AND CEREB INFRC W              

      

 

             2019           ELISE RODRIGUEZ MD           Ot           E11

.9           

TYPE 2 DIABETES MELLITUS WITHOUT COMPLIC                    

 

             2019           ELISE RODRIGUEZ MD           Ot           I11

.9           

HYPERTENSIVE HEART DISEASE WITHOUT HEART                    

 

                2019           ELISE RODRIGUEZ MD           Ot           

   I25.10          

                          ATHSCL HEART DISEASE OF NATIVE CORONARY               

      

 

             2019           ELISE RODRIGUEZ MD           Ot           I63

.9           

CEREBRAL INFARCTION, UNSPECIFIED                    

 

             2019           ELISE RODRIGUEZ MD, Ot           Z72

.0           

TOBACCO USE                                      

 

                2019           ELISE RODRIGUEZ MD           Ot           

   E11.40          

                          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              

      

 

             2019           KHALID MD, M BREONNA           Ot           E78

.5           

HYPERLIPIDEMIA, UNSPECIFIED                      

 

                2019           ELISE RODRIGUEZ MD, Ot           

   F17.210         

                          NICOTINE DEPENDENCE, CIGARETTES, UNCOMPL              

      

 

             2019           ELISE RODRIGUEZ MD, Ot           I11

.9           

HYPERTENSIVE HEART DISEASE WITHOUT HEART                    

 

                2019           ELISE RODRIGUEZ MD, Ot           

   I25.10          

                          ATHSCL HEART DISEASE OF NATIVE CORONARY               

      

 

             2019           ELISE RODRIGUEZ MD           Ot           I71

.4           

ABDOMINAL AORTIC ANEURYSM, WITHOUT RUPTU                    

 

             2019           ELISE RODRIGUEZ MD, Ot           I99

.8           

OTHER DISORDER OF CIRCULATORY SYSTEM                    

 

                2019           ELISE RODRIGUEZ MD, Ot           

   L97.519         

                          NON-PRS CHRONIC ULCER OTH PRT RIGHT FOOT              

      

 

                2019           ELISE RODRIGUEZ MD, Ot           

   Z79.82          

                          LONG TERM (CURRENT) USE OF ASPIRIN                    

 

                2019           ELISE RODRIGUEZ MD, Ot           

   Z79.84          

                          LONG TERM (CURRENT) USE OF ORAL HYPOGLYC              

      

 

                2019           ELISE RODRIGUEZ MD, Ot           

   Z79.899         

                          OTHER LONG TERM (CURRENT) DRUG THERAPY                

    

 

             2019           ELISE RODRIGUEZ MD, Ot           Z82

.3           

FAMILY HISTORY OF STROKE                         

 

                2019           ELISE RODRIGUEZ MD, Ot           

   Z82.49          

                          FAMILY HX OF ISCHEM HEART DIS AND OTH DI              

      

 

             2019           ELISE RODRIGUEZ MD, Ot           Z83

.3           

FAMILY HISTORY OF DIABETES MELLITUS                    

 

                2019           ELISE RODRIGUEZ MD           Ot           

   Z86.73          

                          PRSNL HX OF TIA (TIA), AND CEREB INFRC W              

      

 

                2019           YULISA DENNIS MD           Ot           

   E11.42          

                          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              

      

 

                2019           YULISA DENNIS MD           Ot           

   E11.52          

                          TYPE 2 DIABETES W DIABETIC PERIPHERAL AN              

      

 

                2019           YULISA DENNIS MD           Ot           

   E11.621         

                          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              

      

 

                2019           YULISA DENNIS MD           Ot           

   I70.234         

                          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              

      

 

             2019           YULISA DENNIS MD           Ot           I96

           

GANGRENE, NOT ELSEWHERE CLASSIFIED                    

 

                2019           YULISA DENNIS MD, Ot           

   L97.412         

                          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              

      

 

                2019           YULISA DENNIS MD, Ot           

   M65.071         

                          ABSCESS OF TENDON SHEATH, RIGHT ANKLE AN              

      

 

                2019           YULISA DENNIS MD, Ot           

   E11.42          

                          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              

      

 

                2019           YULISA DENNIS MD, Ot           

   E11.52          

                          TYPE 2 DIABETES W DIABETIC PERIPHERAL AN              

      

 

                2019           YULISA DENNIS MD, Ot           

   E11.621         

                          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              

      

 

                2019           YULISA DENNIS MD           Ot           

   I70.234         

                          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              

      

 

             2019           YULISA DENNIS MD           Ot           I96

           

GANGRENE, NOT ELSEWHERE CLASSIFIED                    

 

                2019           YULISA DENNIS MD, Ot           

   L97.412         

                          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              

      

 

                2019           YULISA DENNIS MD, Ot           

   M65.071         

                          ABSCESS OF TENDON SHEATH, RIGHT ANKLE AN              

      

 

                2019           YULISA DENNIS MD, Ot           

   E11.42          

                          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              

      

 

                2019           YULISA DENNIS MD           Ot           

   E11.52          

                          TYPE 2 DIABETES W DIABETIC PERIPHERAL AN              

      

 

                2019           YULISA DENNIS MD           Ot           

   E11.621         

                          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              

      

 

                2019           YULISA DENNIS MD           Ot           

   I70.234         

                          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              

      

 

             2019           YULISA DENNIS MD, Ot           I96

           

GANGRENE, NOT ELSEWHERE CLASSIFIED                    

 

                2019           YULISA DENNIS MD           Ot           

   L97.412         

                          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              

      

 

                2019           YULISA DENNIS MD, Ot           

   M65.071         

                          ABSCESS OF TENDON SHEATH, RIGHT ANKLE AN              

      

 

                2019           YULISA DENNIS MD           Ot           

   E11.42          

                          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              

      

 

                2019           YULISA DENNIS MD           Ot           

   E11.52          

                          TYPE 2 DIABETES W DIABETIC PERIPHERAL AN              

      

 

                2019           YULISA DENNIS MD           Ot           

   E11.621         

                          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              

      

 

                2019           YULISA DENNIS MD           Ot           

   I70.234         

                          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              

      

 

             2019           YULISA DENNIS MD           Ot           I96

           

GANGRENE, NOT ELSEWHERE CLASSIFIED                    

 

                2019           YULISA DENNIS MD, Ot           

   L97.412         

                          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              

      

 

                2019           YULISA DENNIS MD, Ot           

   M65.071         

                          ABSCESS OF TENDON SHEATH, RIGHT ANKLE AN              

      

 

                2019           YULISA DENNIS MD, Ot           

   E11.42          

                          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              

      

 

                2019           YULISA DENNIS MD, Ot           

   E11.621         

                          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              

      

 

                2019           YULISA DENNIS MD, Ot           

   L03.115         

                          CELLULITIS OF RIGHT LOWER LIMB                    

 

                2019           YULISA DENNIS MD, Ot           

   L97.513         

                          NON-PRS CHRONIC ULCER OTH PRT RIGHT FOOT              

      

 

                2019           YULISA DENNIS MD, Ot           

   E11.42          

                          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              

      

 

                2019           YULISA DENNIS MD, Ot           

   E11.52          

                          TYPE 2 DIABETES W DIABETIC PERIPHERAL AN              

      

 

                2019           YULISA DENNIS MD, Ot           

   E11.621         

                          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              

      

 

                2019           YULISA DENNIS MD, Ot           

   I70.261         

                          ATHSCL NATIVE ARTERIES OF EXTREMITIES W               

      

 

                2019           YULISA DENNIS MD, Ot           

   L97.412         

                          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              

      

 

                2019           YULISA DENNIS MD, Ot           

   M65.071         

                          ABSCESS OF TENDON SHEATH, RIGHT ANKLE AN              

      

 

                2019           YULISA DENNIS MD, Ot           

   E11.42          

                          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              

      

 

                2019           YULISA DENNIS MD, Ot           

   E11.52          

                          TYPE 2 DIABETES W DIABETIC PERIPHERAL AN              

      

 

                2019           YULISA DENNIS MD, Ot           

   E11.621         

                          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              

      

 

                2019           YULISA DENNIS MD, Ot           

   I70.234         

                          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              

      

 

             2019           YULISA DENNIS MD           Ot           I96

           

GANGRENE, NOT ELSEWHERE CLASSIFIED                    

 

                2019           YULISA DENNIS MD, Ot           

   M65.071         

                          ABSCESS OF TENDON SHEATH, RIGHT ANKLE AN              

      

 

                2019           YULISA DENNIS MD, Ot           

   E11.42          

                          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              

      

 

                2019           YULISA DENNIS MD, Ot           

   E11.52          

                          TYPE 2 DIABETES W DIABETIC PERIPHERAL AN              

      

 

                2019           YULISA DENNIS MD, Ot           

   E11.621         

                          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              

      

 

                2019           YULISA DENNIS MD, Ot           

   I70.234         

                          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              

      

 

             2019           YULISA DENNIS MD, Ot           I96

           

GANGRENE, NOT ELSEWHERE CLASSIFIED                    

 

                2019           YULISA DENNIS MD, Ot           

   L97.412         

                          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              

      

 

                2019           YULISA DENNIS MD, Ot           

   M65.071         

                          ABSCESS OF TENDON SHEATH, RIGHT ANKLE AN              

      

 

                2019           YULISA DENNIS MD, Ot           

   E11.42          

                          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              

      

 

                2019           YULISA DENNIS MD, Ot           

   E11.52          

                          TYPE 2 DIABETES W DIABETIC PERIPHERAL AN              

      

 

                2019           YULISA DENNIS MD, Ot           

   E11.621         

                          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              

      

 

             2019           YULISA DENNIS MD, Ot           I96

           

GANGRENE, NOT ELSEWHERE CLASSIFIED                    

 

                2019           YULISA DENNIS MD, Ot           

   L97.412         

                          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              

      

 

                2019           YULISA DENNIS MD, Ot           

   M65.071         

                          ABSCESS OF TENDON SHEATH, RIGHT ANKLE AN              

      

 

             2019           JENNIFER SALDIVAR, ELISE RAVI           Ot           E11

.9           

TYPE 2 DIABETES MELLITUS WITHOUT COMPLIC                    

 

             2019           ELISE RODRIGUEZ MD           Ot           I11

.9           

HYPERTENSIVE HEART DISEASE WITHOUT HEART                    

 

                2019           ELISE RODRIGUEZ MD           Ot           

   I25.10          

                          ATHSCL HEART DISEASE OF NATIVE CORONARY               

      

 

             2019           ELISE RODRIGUEZ MD           Ot           I63

.9           

CEREBRAL INFARCTION, UNSPECIFIED                    

 

             2019           ELISE RODRIGUEZ MD           Ot           Z72

.0           

TOBACCO USE                                      

 

                2019           YULISA DENNIS MD, Ot           

   E11.42          

                          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              

      

 

                2019           YULISA DENNIS MD, Ot           

   E11.52          

                          TYPE 2 DIABETES W DIABETIC PERIPHERAL AN              

      

 

                2019           YULISA DENNIS MD, Ot           

   E11.621         

                          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              

      

 

                2019           YULISA DENNIS MD, Ot           

   I70.261         

                          ATHSCL NATIVE ARTERIES OF EXTREMITIES W               

      

 

                2019           YULISA DENNIS MD, Ot           

   L97.412         

                          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              

      

 

                2019           YULISA DENNIS MD, Ot           

   M65.071         

                          ABSCESS OF TENDON SHEATH, RIGHT ANKLE AN              

      

 

                2019           YULISA DENNIS MD           Ot           

   E11.42          

                          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              

      

 

                2019           YULISA DENNIS MD, Ot           

   E11.621         

                          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              

      

 

                2019           YULISA DENNIS MD           Ot           

   L03.115         

                          CELLULITIS OF RIGHT LOWER LIMB                    

 

                2019           YULISA DENNIS MD           Ot           

   L97.513         

                          NON-PRS CHRONIC ULCER OTH PRT RIGHT FOOT              

      

 

                2019           YULISA DENNIS MD           Ot           

   E11.42          

                          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              

      

 

                2019           YULISA DENNIS MD           Ot           

   E11.52          

                          TYPE 2 DIABETES W DIABETIC PERIPHERAL AN              

      

 

                2019           YULISA DENNIS MD, Ot           

   E11.621         

                          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              

      

 

             2019           YULISA DENNIS MD           Ot           I96

           

GANGRENE, NOT ELSEWHERE CLASSIFIED                    

 

                2019           YULISA DENNIS MD           Ot           

   L97.413         

                          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              

      

 

                09/10/2019           YULISA DENNIS MD           Ot           

   E11.42          

                          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              

      

 

                09/10/2019           YULISA DENNIS MD           Ot           

   E11.621         

                          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              

      

 

                09/10/2019           YULISA DENNIS MD           Ot           

   L03.115         

                          CELLULITIS OF RIGHT LOWER LIMB                    

 

                09/10/2019           YULISA DENNIS MD, Ot           

   L97.513         

                          NON-PRS CHRONIC ULCER OTH PRT RIGHT FOOT              

      

 

                09/10/2019           YULISA DENNIS MD, Ot           

   E11.42          

                          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              

      

 

                09/10/2019           YULISA DENNIS MD           Ot           

   E11.52          

                          TYPE 2 DIABETES W DIABETIC PERIPHERAL AN              

      

 

                09/10/2019           YULISA DENNIS MD           Ot           

   E11.621         

                          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              

      

 

             09/10/2019           YULISA DENNIS MD           Ot           I96

           

GANGRENE, NOT ELSEWHERE CLASSIFIED                    

 

                09/10/2019           YULISA DENNIS MD           Ot           

   L97.413         

                          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              

      

 

                09/10/2019           YULISA DENNIS MD           Ot           

   E11.42          

                          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              

      

 

                09/10/2019           YULISA DENNIS MD           Ot           

   E11.52          

                          TYPE 2 DIABETES W DIABETIC PERIPHERAL AN              

      

 

                09/10/2019           YULISA DENNIS MD           Ot           

   E11.621         

                          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              

      

 

                09/10/2019           YULISA DENNIS MD           Ot           

   I70.234         

                          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              

      

 

             09/10/2019           YULISA DENNIS MD           Ot           I96

           

GANGRENE, NOT ELSEWHERE CLASSIFIED                    

 

                09/10/2019           YULISA DENNIS MD, Ot           

   M65.071         

                          ABSCESS OF TENDON SHEATH, RIGHT ANKLE AN              

      

 

                09/10/2019           YULISA DENNIS MD, Ot           

   E11.42          

                          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              

      

 

                09/10/2019           YULISA DENNIS MD           Ot           

   E11.52          

                          TYPE 2 DIABETES W DIABETIC PERIPHERAL AN              

      

 

                09/10/2019           YULISA DENNIS MD, Ot           

   E11.621         

                          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              

      

 

                09/10/2019           YULISA DENNIS MD, Ot           

   I70.234         

                          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              

      

 

             09/10/2019           YULISA DENNIS MD, Ot           I96

           

GANGRENE, NOT ELSEWHERE CLASSIFIED                    

 

                09/10/2019           YULISA DENNIS MD, Ot           

   L97.412         

                          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              

      

 

                09/10/2019           YULISA DENNIS MD, Ot           

   M65.071         

                          ABSCESS OF TENDON SHEATH, RIGHT ANKLE AN              

      

 

                2019           YULISA DENNIS MD, Ot           

   E11.42          

                          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              

      

 

                2019           YULISA DENNIS MD, Ot           

   E11.52          

                          TYPE 2 DIABETES W DIABETIC PERIPHERAL AN              

      

 

                2019           YULISA DENNIS MD, Ot           

   E11.621         

                          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              

      

 

                2019           YULISA DENNIS MD           Ot           

   I70.234         

                          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              

      

 

                2019           YULISA DENNIS MD, Ot           

   L97.412         

                          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              

      

 

                2019           YULISA DENNIS MD, Ot           

   M65.071         

                          ABSCESS OF TENDON SHEATH, RIGHT ANKLE AN              

      

 

                10/08/2019           YULISA DENNIS MD, Ot           

   E11.42          

                          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              

      

 

                10/08/2019           YULISA DENNIS MD           Ot           

   E11.52          

                          TYPE 2 DIABETES W DIABETIC PERIPHERAL AN              

      

 

                10/08/2019           YULISA DENNIS MD, Ot           

   E11.621         

                          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              

      

 

                10/08/2019           YULISA DENNIS MD           Ot           

   I70.261         

                          ATHSCL NATIVE ARTERIES OF EXTREMITIES W               

      

 

                10/08/2019           YULISA DENNIS MD           Ot           

   L97.413         

                          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              

      

 

                10/08/2019           YULISA DENNIS MD, Ot           

   M65.071         

                          ABSCESS OF TENDON SHEATH, RIGHT ANKLE AN              

      

 

                10/11/2019           YULISA DENNIS MD           Ot           

   E11.42          

                          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              

      

 

                10/11/2019           YULISA DENNIS MD, Ot           

   E11.52          

                          TYPE 2 DIABETES W DIABETIC PERIPHERAL AN              

      

 

                10/11/2019           YULISA DENNIS MD, Ot           

   E11.621         

                          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              

      

 

                10/11/2019           YULISA DENNIS MD, Ot           

   I70.234         

                          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              

      

 

                10/11/2019           YULISA DENNIS MD, Ot           

   L97.413         

                          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              

      

 

                10/11/2019           YULISA DENNIS MD, Ot           

   M65.071         

                          ABSCESS OF TENDON SHEATH, RIGHT ANKLE AN              

      

 

                10/23/2019           YULISA DENNIS MD, Ot           

   E11.42          

                          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              

      

 

                10/23/2019           YULISA DENNIS MD, Ot           

   E11.621         

                          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              

      

 

                10/23/2019           YULISA DENNIS MD           Ot           

   L03.115         

                          CELLULITIS OF RIGHT LOWER LIMB                    

 

                10/23/2019           YULISA DENNIS MD, Ot           

   L97.513         

                          NON-PRS CHRONIC ULCER OTH PRT RIGHT FOOT              

      

 

                10/23/2019           YULISA DENNIS MD, Ot           

   E11.42          

                          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              

      

 

                10/23/2019           YULISA DENNIS MD, Ot           

   E11.621         

                          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              

      

 

                10/23/2019           YULISA DENNIS MD           Ot           

   L03.115         

                          CELLULITIS OF RIGHT LOWER LIMB                    

 

                10/23/2019           YULISA DENNIS MD, Ot           

   L97.513         

                          NON-PRS CHRONIC ULCER OTH PRT RIGHT FOOT              

      

 

                10/23/2019           YULISA DENNIS MD, Ot           

   E11.42          

                          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              

      

 

                10/23/2019           YULISA DENNIS MD, Ot           

   E11.52          

                          TYPE 2 DIABETES W DIABETIC PERIPHERAL AN              

      

 

                10/23/2019           YULISA DENNIS MD, Ot           

   E11.621         

                          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              

      

 

             10/23/2019           YULISA DENNIS MD, Ot           I96

           

GANGRENE, NOT ELSEWHERE CLASSIFIED                    

 

                10/23/2019           YULISA DENNIS MD, Ot           

   L97.413         

                          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              

      

 

                10/23/2019           YULISA DENNIS MD           Ot           

   E11.42          

                          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              

      

 

                10/23/2019           YULISA DENNIS MD, Ot           

   E11.52          

                          TYPE 2 DIABETES W DIABETIC PERIPHERAL AN              

      

 

                10/23/2019           YULISA DENNIS MD, Ot           

   E11.621         

                          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              

      

 

             10/23/2019           YULISA DENNIS MD           Ot           I96

           

GANGRENE, NOT ELSEWHERE CLASSIFIED                    

 

                10/23/2019           YULISA DENNIS MD, Ot           

   L97.412         

                          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              

      

 

                10/23/2019           YULISA DENNIS MD, Ot           

   M65.071         

                          ABSCESS OF TENDON SHEATH, RIGHT ANKLE AN              

      

 

             10/23/2019           JENNIFER SADLIVAR, M BREONNA           Ot           E11

.9           

TYPE 2 DIABETES MELLITUS WITHOUT COMPLIC                    

 

             10/23/2019           JENNIFER SALDIVAR, ELISE RAVI           Ot           I11

.9           

HYPERTENSIVE HEART DISEASE WITHOUT HEART                    

 

                10/23/2019           JENNIFER SLADIVAR, ELISE RAVI           Ot           

   I25.10          

                          ATHSCL HEART DISEASE OF NATIVE CORONARY               

      

 

             10/23/2019           JENNIFER SALDIVAR, ELISE RAVI           Ot           I63

.9           

CEREBRAL INFARCTION, UNSPECIFIED                    

 

             10/23/2019           JENNIFER SALDIVAR, ELSIE RAVI           Ot           Z72

.0           

TOBACCO USE                                      

 

                10/23/2019           YULISA DENNIS MD           Ot           

   E11.42          

                          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              

      

 

                10/23/2019           YULISA DENNIS MD, Ot           

   E11.52          

                          TYPE 2 DIABETES W DIABETIC PERIPHERAL AN              

      

 

                10/23/2019           YULISA DENNIS MD, Ot           

   E11.621         

                          TYPE 2 DIABETES MELLITUS WITH FOOT ULCER              

      

 

                10/23/2019           YULISA DENNIS MD, Ot           

   I70.261         

                          ATHSCL NATIVE ARTERIES OF EXTREMITIES W               

      

 

                10/23/2019           YULISA DENNIS MD, Ot           

   L97.412         

                          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              

      

 

                10/23/2019           YULISA DENNIS MD, Ot           

   M65.071         

                          ABSCESS OF TENDON SHEATH, RIGHT ANKLE AN              

      

 

                10/23/2019           YULISA DENNIS MD, Ot           

   E11.42          

                          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              

      

 

                10/23/2019           YULISA DENNIS MD, Ot           

   E11.52          

                          TYPE 2 DIABETES W DIABETIC PERIPHERAL AN              

      

 

                10/23/2019           YULISA DENNIS MD, Ot           

   E11.621         

                          TYPE 2 DIABETES MELLITUS WITH FOOT ULCER              

      

 

                10/23/2019           YULISA DENNIS MD           Ot           

   I70.261         

                          ATHSCL NATIVE ARTERIES OF EXTREMITIES W               

      

 

                10/23/2019           YULISA DENNIS MD, Ot           

   L97.412         

                          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              

      

 

                10/23/2019           YULISA DENNIS MD, Ot           

   M65.071         

                          ABSCESS OF TENDON SHEATH, RIGHT ANKLE AN              

      

 

                10/23/2019           YUILSA DENNIS MD           Ot           

   E11.42          

                          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              

      

 

                10/23/2019           YULISA DENNIS MD           Ot           

   E11.52          

                          TYPE 2 DIABETES W DIABETIC PERIPHERAL AN              

      

 

                10/23/2019           YULISA DENNIS MD, Ot           

   E11.621         

                          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              

      

 

                10/23/2019           YULISA DENNIS MD           Ot           

   I70.234         

                          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              

      

 

             10/23/2019           YULISA DENNIS MD           Ot           I96

           

GANGRENE, NOT ELSEWHERE CLASSIFIED                    

 

                10/23/2019           YULISA DENNIS MD, Ot           

   M65.071         

                          ABSCESS OF TENDON SHEATH, RIGHT ANKLE AN              

      

 

                10/23/2019           YULISA DENNIS MD, Ot           

   E11.42          

                          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              

      

 

                10/23/2019           YULISA DENNIS MD, Ot           

   E11.52          

                          TYPE 2 DIABETES W DIABETIC PERIPHERAL AN              

      

 

                10/23/2019           YULISA DENNIS MD, Ot           

   E11.621         

                          TYPE 2 DIABETES MELLITUS WITH FOOT ULCER              

      

 

                10/23/2019           YULISA DENNIS MD           Ot           

   I70.234         

                          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              

      

 

             10/23/2019           YULISA DENNIS MD           Ot           I96

           

GANGRENE, NOT ELSEWHERE CLASSIFIED                    

 

                10/23/2019           YULISA DENNIS MD           Ot           

   L97.412         

                          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              

      

 

                10/23/2019           YULISA DENNIS MD, Ot           

   M65.071         

                          ABSCESS OF TENDON SHEATH, RIGHT ANKLE AN              

      

 

                10/23/2019           YULISA DENNIS MD           Ot           

   E11.42          

                          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              

      

 

                10/23/2019           YULISA DENNIS MD           Ot           

   E11.52          

                          TYPE 2 DIABETES W DIABETIC PERIPHERAL AN              

      

 

                10/23/2019           YULISA DENNIS MD           Ot           

   E11.621         

                          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              

      

 

                10/23/2019           YLUISA DENNIS MD           Ot           

   I70.234         

                          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              

      

 

             10/23/2019           YULISA DENNIS MD           Ot           I96

           

GANGRENE, NOT ELSEWHERE CLASSIFIED                    

 

                10/23/2019           YULISA DENNIS MD           Ot           

   L97.412         

                          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              

      

 

                10/23/2019           YULISA DENNIS MD           Ot           

   M65.071         

                          ABSCESS OF TENDON SHEATH, RIGHT ANKLE AN              

      

 

                10/23/2019           YULISA DENNIS MD, Ot           

   E11.42          

                          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              

      

 

                10/23/2019           YULISA DENNIS MD           Ot           

   E11.52          

                          TYPE 2 DIABETES W DIABETIC PERIPHERAL AN              

      

 

                10/23/2019           YULISA DENNIS MD, Ot           

   E11.621         

                          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              

      

 

                10/23/2019           YULISA DENNIS MD           Ot           

   I70.234         

                          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              

      

 

                10/23/2019           YULISA DENNIS MD           Ot           

   L97.412         

                          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              

      

 

                10/23/2019           YULISA DENNIS MD, Ot           

   M65.071         

                          ABSCESS OF TENDON SHEATH, RIGHT ANKLE AN              

      

 

                10/23/2019           YULISA DENNIS MD, Ot           

   E11.42          

                          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              

      

 

                10/23/2019           YULISA DENNIS MD, Ot           

   E11.52          

                          TYPE 2 DIABETES W DIABETIC PERIPHERAL AN              

      

 

                10/23/2019           YULISA DENNIS MD, Ot           

   E11.621         

                          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              

      

 

                10/23/2019           YULISA DENNIS MD           Ot           

   I70.261         

                          ATHSCL NATIVE ARTERIES OF EXTREMITIES W               

      

 

                10/23/2019           YULISA DENNIS MD           Ot           

   L97.413         

                          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              

      

 

                10/23/2019           YULISA DENNIS MD, Ot           

   M65.071         

                          ABSCESS OF TENDON SHEATH, RIGHT ANKLE AN              

      

 

                10/23/2019           YULISA DENNIS MD, Ot           

   E11.42          

                          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              

      

 

                10/23/2019           YULISA DENNIS MD, Ot           

   E11.52          

                          TYPE 2 DIABETES W DIABETIC PERIPHERAL AN              

      

 

                10/23/2019           YULISA DENNIS MD, Ot           

   E11.621         

                          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              

      

 

                10/23/2019           YULISA DENNIS MD           Ot           

   I70.234         

                          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              

      

 

                10/23/2019           YULISA DENNIS MD           Ot           

   L97.413         

                          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              

      

 

                10/23/2019           YULISA DENNIS MD, Ot           

   M65.071         

                          ABSCESS OF TENDON SHEATH, RIGHT ANKLE AN              

      

 

                10/23/2019           YULISA DENNIS MD           Ot           

   E11.42          

                          TYPE 2 DIABETES MELLITUS WITH DIABETIC P              

      

 

                10/23/2019           YULISA DENNIS MD           Ot           

   E11.52          

                          TYPE 2 DIABETES W DIABETIC PERIPHERAL AN              

      

 

                10/23/2019           YULISA DENNIS MD, Ot           

   E11.621         

                          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              

      

 

                10/23/2019           YULISA DENNIS MD, Ot           

   I70.234         

                          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              

      

 

                10/23/2019           YULISA DENNIS MD, Ot           

   L97.413         

                          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              

      

 

                10/23/2019           YULISA DENNIS MD, Ot           

   M65.071         

                          ABSCESS OF TENDON SHEATH, RIGHT ANKLE AN              

      

 

                10/25/2019           YULISA DENNIS MD, Ot           

   E11.621         

                          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              

      

 

                10/25/2019           YULISA DENNIS MD, Ot           

   L97.509         

                          NON-PRESSURE CHRONIC ULCER OTH PRT UNSP               

      

 

                10/29/2019           YULISA DENNIS MD, Ot           

   E11.42          

                          TYPE 2 DIABETES MELLITUS WITH DIABETIC P              

      

 

                10/29/2019           YULISA DENNIS MD, Ot           

   E11.52          

                          TYPE 2 DIABETES W DIABETIC PERIPHERAL AN              

      

 

                10/29/2019           YULISA DENNIS MD, Ot           

   E11.621         

                          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              

      

 

                10/29/2019           YULISA DENNIS MD, Ot           

   I70.261         

                          ATHSCL NATIVE ARTERIES OF EXTREMITIES W               

      

 

                10/29/2019           YULISA DENNIS MD, Ot           

   L97.413         

                          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              

      

 

                10/29/2019           YULISA DENNIS MD, Ot           

   M65.071         

                          ABSCESS OF TENDON SHEATH, RIGHT ANKLE AN              

      

 

                2019           YULISA DENNIS MD, Ot           

   E11.42          

                          TYPE 2 DIABETES MELLITUS WITH DIABETIC P              

      

 

                2019           YULISA DENNIS MD, Ot           

   E11.52          

                          TYPE 2 DIABETES W DIABETIC PERIPHERAL AN              

      

 

                2019           YULISA DENNIS MD, Ot           

   E11.621         

                          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              

      

 

                2019           YULISA DENNIS MD           Ot           

   I70.234         

                          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              

      

 

                2019           YULISA DENNIS MD, Ot           

   L97.412         

                          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              

      

 

                2019           YULISA DENNIS MD, Ot           

   M65.071         

                          ABSCESS OF TENDON SHEATH, RIGHT ANKLE AN              

      

 

                2019           YULISA DENNIS MD, Ot           

   E11.42          

                          TYPE 2 DIABETES MELLITUS WITH DIABETIC P              

      

 

                2019           YULISA DENNIS MD, Ot           

   E11.52          

                          TYPE 2 DIABETES W DIABETIC PERIPHERAL AN              

      

 

                2019           YULISA DENNIS MD, Ot           

   E11.621         

                          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              

      

 

                2019           YULISA DENNIS MD           Ot           

   I70.234         

                          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              

      

 

                2019           JEANNIE SALDIVAR, YULISA SEALS           Ot           

   L97.412         

                          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              

      

 

                2019           YULISA DENNIS MD           Ot           

   M65.071         

                          ABSCESS OF TENDON SHEATH, RIGHT ANKLE AN              

      

 

             2019           ARASELI RICHARDSON           Ot           I10

           

ESSENTIAL (PRIMARY) HYPERTENSION                    

 

                2019           ARASELI RICHARDSON           Ot           

   I25.10          

                          ATHSCL HEART DISEASE OF NATIVE CORONARY               

      

 

             2019           ARASELI RICHARDSON           Ot           I25

.2           

OLD MYOCARDIAL INFARCTION                        

 

             2019           ARASELI RICHARDSON           Ot           J44

.9           

CHRONIC OBSTRUCTIVE PULMONARY DISEASE, U                    

 

                2019           ARASELI RICHARDSON           Ot           

   R09.02          

                          HYPOXEMIA                          

 

                2019           ARASELI RICHARDSON           Ot           

   R41.82          

                          ALTERED MENTAL STATUS, UNSPECIFIED                    

 

                2019           ARASELI RICHARDSON           Ot           

   Z77.22          

                          CNTCT W AND EXPSR TO ENVIRON TOBACCO SMO              

      

 

                2019           ARASELI RICHARDSON           Ot           

   Z79.02          

                          LONG TERM (CURRENT) USE OF ANTITHROMBOTI              

      

 

                2019           ARASELI RICHARDSON           Ot           

   Z79.82          

                          LONG TERM (CURRENT) USE OF ASPIRIN                    

 

                2019           ARASELI RICHARDSON           Ot           

   Z85.46          

                          PERSONAL HISTORY OF MALIGNANT NEOPLASM O              

      

 

                2019           ARASELI RICHARDSON           Ot           

   Z86.73          

                          PRSNL HX OF TIA (TIA), AND CEREB INFRC W              

      

 

                2019           ARASELI RICHARDSON           Ot           

   Z90.49          

                          ACQUIRED ABSENCE OF OTHER SPECIFIED PART              

      

 

             2019           ARASELI RICHARDSON           Ot           Z95

.5           

PRESENCE OF CORONARY ANGIOPLASTY IMPLANT                    

 

             11/15/2019           ARASELI RICHARDSON           Ot           I10

           

ESSENTIAL (PRIMARY) HYPERTENSION                    

 

                11/15/2019           ARASELI RICHARDSON           Ot           

   I25.10          

                          ATHSCL HEART DISEASE OF NATIVE CORONARY               

      

 

             11/15/2019           ARASELI RICHARDSON           Ot           I25

.2           

OLD MYOCARDIAL INFARCTION                        

 

             11/15/2019           ARASELI RICHARDSON Ot           J44

.9           

CHRONIC OBSTRUCTIVE PULMONARY DISEASE, U                    

 

                11/15/2019           ARASELI RICHARDSON           Ot           

   R09.02          

                          HYPOXEMIA                          

 

                11/15/2019           ARASELI RICHARDSON           Ot           

   R41.82          

                          ALTERED MENTAL STATUS, UNSPECIFIED                    

 

                11/15/2019           ARASELI RICHARDSON           Ot           

   Z77.22          

                          CNTCT W AND EXPSR TO ENVIRON TOBACCO SMO              

      

 

                11/15/2019           ARASELI RICHARDSON           Ot           

   Z79.02          

                          LONG TERM (CURRENT) USE OF ANTITHROMBOTI              

      

 

                11/15/2019           ARASELI RICHARDSON           Ot           

   Z79.82          

                          LONG TERM (CURRENT) USE OF ASPIRIN                    

 

                11/15/2019           ARASELI RICHARDSON           Ot           

   Z85.46          

                          PERSONAL HISTORY OF MALIGNANT NEOPLASM O              

      

 

                11/15/2019           ARASELI RICHARDSON           Ot           

   Z86.73          

                          PRSNL HX OF TIA (TIA), AND CEREB INFRC W              

      

 

                11/15/2019           ARASELI RICHARDSON           Ot           

   Z90.49          

                          ACQUIRED ABSENCE OF OTHER SPECIFIED PART              

      

 

             11/15/2019           ARASELI RICHARDSON           Ot           Z95

.5           

PRESENCE OF CORONARY ANGIOPLASTY IMPLANT                    

 

                2019           YULISA DENNIS MD, Ot           

   E11.42          

                          TYPE 2 DIABETES MELLITUS WITH DIABETIC P              

      

 

                2019           YULISA DENNIS MD, Ot           

   E11.52          

                          TYPE 2 DIABETES W DIABETIC PERIPHERAL AN              

      

 

                2019           YULISA DENNIS MD, Ot           

   E11.621         

                          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              

      

 

                2019           YULISA DENNIS MD, Ot           

   I70.234         

                          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              

      

 

                2019           YULISA DENNIS MD, Ot           

   L97.413         

                          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              

      

 

                2019           YULISA DENNIS MD, Ot           

   M65.071         

                          ABSCESS OF TENDON SHEATH, RIGHT ANKLE AN              

      

 

                2019           YULISA DENNIS MD, Ot           

   E11.42          

                          TYPE 2 DIABETES MELLITUS WITH DIABETIC P              

      

 

                2019           YULISA DENNIS MD, Ot           

   E11.621         

                          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              

      

 

                2019           YULISA DENNIS MD           Ot           

   I70.234         

                          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              

      

 

                2019           YULISA DENNIS MD, Ot           

   L97.413         

                          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              

      

 

                2019           YULISA DENNIS MD, Ot           

   M65.071         

                          ABSCESS OF TENDON SHEATH, RIGHT ANKLE AN              

      

 

                2019           YULISA DENNIS MD, Ot           

   E11.42          

                          TYPE 2 DIABETES MELLITUS WITH DIABETIC P              

      

 

                2019           YULISA DENNIS MD, Ot           

   E11.52          

                          TYPE 2 DIABETES W DIABETIC PERIPHERAL AN              

      

 

                2019           YULISA DENNIS MD, Ot           

   E11.621         

                          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              

      

 

                2019           YULISA DENNIS MD, Ot           

   I70.234         

                          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              

      

 

                2019           YULISA DENNIS MD, Ot           

   L97.412         

                          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              

      

 

                2019           YULISA DENNIS MD, Ot           

   E11.42          

                          TYPE 2 DIABETES MELLITUS WITH DIABETIC P              

      

 

                2019           YULISA DENNIS MD, Ot           

   E11.52          

                          TYPE 2 DIABETES W DIABETIC PERIPHERAL AN              

      

 

                2019           YULISA DENNIS MD, Ot           

   E11.621         

                          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              

      

 

                2019           YULISA DENINS MD, Ot           

   I70.261         

                          ATHSCL NATIVE ARTERIES OF EXTREMITIES W               

      

 

                2019           YULISA DENNIS MD, Ot           

   L97.412         

                          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              

      

 

                2019           YULISA DENNIS MD, Ot           

   E11.42          

                          TYPE 2 DIABETES MELLITUS WITH DIABETIC P              

      

 

                2019           YULISA DENNIS MD, Ot           

   E11.52          

                          TYPE 2 DIABETES W DIABETIC PERIPHERAL AN              

      

 

                2019           YULISA DNENIS MD, Ot           

   E11.621         

                          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              

      

 

                2019           YULISA DENNIS MD, Ot           

   I70.234         

                          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              

      

 

                2019           YULISA DENNIS MD, Ot           

   L97.412         

                          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              

      

 

                2020           YULISA DENNIS MD, Ot           

   E11.42          

                          TYPE 2 DIABETES MELLITUS WITH DIABETIC P              

      

 

                2020           YULISA DENNIS MD, Ot           

   E11.621         

                          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              

      

 

                2020           YULISA DENNIS MD           Ot           

   L03.115         

                          CELLULITIS OF RIGHT LOWER LIMB                    

 

                2020           YULISA DENNIS MD, Ot           

   L97.513         

                          NON-PRS CHRONIC ULCER OTH PRT RIGHT FOOT              

      

 

                2020           YULISA DENNIS MD, Ot           

   E11.42          

                          TYPE 2 DIABETES MELLITUS WITH DIABETIC P              

      

 

                2020           YULISA DENNIS MD, Ot           

   E11.621         

                          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              

      

 

                2020           YULISA DENNIS MD           Ot           

   L03.115         

                          CELLULITIS OF RIGHT LOWER LIMB                    

 

                2020           YULISA DENNIS MD, Ot           

   L97.513         

                          NON-PRS CHRONIC ULCER OTH PRT RIGHT FOOT              

      

 

                2020           YULISA DENNIS MD, Ot           

   E11.42          

                          TYPE 2 DIABETES MELLITUS WITH DIABETIC P              

      

 

                2020           YULISA DENNIS MD, Ot           

   E11.52          

                          TYPE 2 DIABETES W DIABETIC PERIPHERAL AN              

      

 

                2020           YULISA DENNIS MD, Ot           

   E11.621         

                          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              

      

 

             2020           YULISA DENNIS MD, Ot           I96

           

GANGRENE, NOT ELSEWHERE CLASSIFIED                    

 

                2020           YULISA DENNIS MD, Ot           

   L97.413         

                          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              

      

 

                2020           YULISA DENNIS MD, Ot           

   E11.42          

                          TYPE 2 DIABETES MELLITUS WITH DIABETIC P              

      

 

                2020           YULISA DENNIS MD, Ot           

   E11.52          

                          TYPE 2 DIABETES W DIABETIC PERIPHERAL AN              

      

 

                2020           YULISA DENNIS MD, Ot           

   E11.621         

                          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              

      

 

             2020           YULISA DENNIS MD, Ot           I96

           

GANGRENE, NOT ELSEWHERE CLASSIFIED                    

 

                2020           YULISA DENNIS MD, Ot           

   L97.412         

                          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              

      

 

                2020           YULISA DENNIS MD           Ot           

   M65.071         

                          ABSCESS OF TENDON SHEATH, RIGHT ANKLE AN              

      

 

             2020           JENNIFER SALDIVAR, ELISE RAVI           Ot           E11

.9           

TYPE 2 DIABETES MELLITUS WITHOUT COMPLIC                    

 

             2020           ELISE RODRIGUEZ MD           Ot           I11

.9           

HYPERTENSIVE HEART DISEASE WITHOUT HEART                    

 

                2020           ELISE RODRIGUEZ MD, Ot           

   I25.10          

                          ATHSCL HEART DISEASE OF NATIVE CORONARY               

      

 

             2020           ELISE RODRIGUEZ MD           Ot           I63

.9           

CEREBRAL INFARCTION, UNSPECIFIED                    

 

             2020           ELISE RODRIGUEZ MD           Ot           Z72

.0           

TOBACCO USE                                      

 

                2020           YULISA DENNIS MD, Ot           

   E11.42          

                          TYPE 2 DIABETES MELLITUS WITH DIABETIC P              

      

 

                2020           YULISA DENNIS MD, Ot           

   E11.52          

                          TYPE 2 DIABETES W DIABETIC PERIPHERAL AN              

      

 

                2020           YULISA DENNIS MD, Ot           

   E11.621         

                          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              

      

 

                2020           YULISA DENNIS MD, Ot           

   I70.261         

                          ATHSCL NATIVE ARTERIES OF EXTREMITIES W               

      

 

                2020           YULISA DENNIS MD, Ot           

   L97.412         

                          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              

      

 

                2020           YULISA DENNIS MD, Ot           

   M65.071         

                          ABSCESS OF TENDON SHEATH, RIGHT ANKLE AN              

      

 

                2020           YULISA DENNIS MD, Ot           

   E11.42          

                          TYPE 2 DIABETES MELLITUS WITH DIABETIC P              

      

 

                2020           YULISA DENNIS MD, Ot           

   E11.52          

                          TYPE 2 DIABETES W DIABETIC PERIPHERAL AN              

      

 

                2020           YULISA DENNIS MD, Ot           

   E11.621         

                          TYPE 2 DIABETES MELLITUS WITH FOOT ULCER              

      

 

                2020           YULISA DENNIS MD           Ot           

   I70.261         

                          ATHSCL NATIVE ARTERIES OF EXTREMITIES W               

      

 

                2020           YULISA DENNIS MD, Ot           

   L97.412         

                          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              

      

 

                2020           YULISA DENNIS MD, Ot           

   M65.071         

                          ABSCESS OF TENDON SHEATH, RIGHT ANKLE AN              

      

 

                2020           YULISA DENNIS MD, Ot           

   E11.42          

                          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              

      

 

                2020           YULISA DENNIS MD, Ot           

   E11.52          

                          TYPE 2 DIABETES W DIABETIC PERIPHERAL AN              

      

 

                2020           YULISA DENNIS MD, Ot           

   E11.621         

                          TYPE 2 DIABETES MELLITUS WITH FOOT ULCER              

      

 

                2020           YULISA DENNIS MD, Ot           

   I70.234         

                          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              

      

 

             2020           YULISA DENNIS MD           Ot           I96

           

GANGRENE, NOT ELSEWHERE CLASSIFIED                    

 

                2020           YULISA DENNIS MD, Ot           

   M65.071         

                          ABSCESS OF TENDON SHEATH, RIGHT ANKLE AN              

      

 

                2020           YULISA DENNIS MD, Ot           

   E11.42          

                          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              

      

 

                2020           YULISA DENNIS MD, Ot           

   E11.52          

                          TYPE 2 DIABETES W DIABETIC PERIPHERAL AN              

      

 

                2020           YULISA DENNIS MD, Ot           

   E11.621         

                          TYPE 2 DIABETES MELLITUS WITH FOOT ULCER              

      

 

                2020           YULISA DENNIS MD           Ot           

   I70.234         

                          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              

      

 

             2020           YULISA DENNIS MD           Ot           I96

           

GANGRENE, NOT ELSEWHERE CLASSIFIED                    

 

                2020           YULISA DENNIS MD, Ot           

   L97.412         

                          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              

      

 

                2020           YULISA DENNIS MD, Ot           

   M65.071         

                          ABSCESS OF TENDON SHEATH, RIGHT ANKLE AN              

      

 

                2020           YULISA DENNIS MD           Ot           

   E11.42          

                          TYPE 2 DIABETES MELLITUS WITH DIABETIC P              

      

 

                2020           JEANNIE MD, YULISA G           Ot           

   E11.52          

                          TYPE 2 DIABETES W DIABETIC PERIPHERAL AN              

      

 

                2020           YULISA DENNIS MD, Ot           

   E11.621         

                          TYPE 2 DIABETES MELLITUS WITH FOOT ULCER              

      

 

                2020           YULISA DENNIS MD           Ot           

   I70.234         

                          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              

      

 

             2020           YULISA DENNIS MD           Ot           I96

           

GANGRENE, NOT ELSEWHERE CLASSIFIED                    

 

                2020           YULISA DENNIS MD           Ot           

   L97.412         

                          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              

      

 

                2020           YULISA DENNIS MD           Ot           

   M65.071         

                          ABSCESS OF TENDON SHEATH, RIGHT ANKLE AN              

      

 

                2020           YULISA DENNIS MD, Ot           

   E11.42          

                          TYPE 2 DIABETES MELLITUS WITH DIABETIC P              

      

 

                2020           YULISA DENNIS MD, Ot           

   E11.52          

                          TYPE 2 DIABETES W DIABETIC PERIPHERAL AN              

      

 

                2020           YULISA DENNIS MD, Ot           

   E11.621         

                          TYPE 2 DIABETES MELLITUS WITH FOOT ULCER              

      

 

                2020           YULISA DENNIS MD           Ot           

   I70.234         

                          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              

      

 

                2020           YULISA DENNIS MD           Ot           

   L97.412         

                          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              

      

 

                2020           YULISA DENNIS MD           Ot           

   M65.071         

                          ABSCESS OF TENDON SHEATH, RIGHT ANKLE AN              

      

 

                2020           YULISA DENNIS MD           Ot           

   E11.42          

                          TYPE 2 DIABETES MELLITUS WITH DIABETIC P              

      

 

                2020           YULISA DENNIS MD           Ot           

   E11.52          

                          TYPE 2 DIABETES W DIABETIC PERIPHERAL AN              

      

 

                2020           YULISA DENNIS MD           Ot           

   E11.621         

                          TYPE 2 DIABETES MELLITUS WITH FOOT ULCER              

      

 

                2020           YULISA DENNIS MD           Ot           

   I70.261         

                          ATHSCL NATIVE ARTERIES OF EXTREMITIES W               

      

 

                2020           YULISA DENNIS MD           Ot           

   L97.413         

                          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              

      

 

                2020           YULISA DENNIS MD, Ot           

   M65.071         

                          ABSCESS OF TENDON SHEATH, RIGHT ANKLE AN              

      

 

                2020           YULISA DENNIS MD           Ot           

   E11.42          

                          TYPE 2 DIABETES MELLITUS WITH DIABETIC P              

      

 

                2020           YULISA DENNIS MD           Ot           

   E11.52          

                          TYPE 2 DIABETES W DIABETIC PERIPHERAL AN              

      

 

                2020           YULISA DENNIS MD           Ot           

   E11.621         

                          TYPE 2 DIABETES MELLITUS WITH FOOT ULCER              

      

 

                2020           YULISA DENNIS MD, Ot           

   I70.234         

                          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              

      

 

                2020           YULISA DENNIS MD, Ot           

   L97.413         

                          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              

      

 

                2020           YULISA DENNIS MD, Ot           

   M65.071         

                          ABSCESS OF TENDON SHEATH, RIGHT ANKLE AN              

      

 

                2020           YULISA DENNIS MD, Ot           

   E11.42          

                          TYPE 2 DIABETES MELLITUS WITH DIABETIC P              

      

 

                2020           YULISA DENNIS MD, Ot           

   E11.52          

                          TYPE 2 DIABETES W DIABETIC PERIPHERAL AN              

      

 

                2020           YULISA DENNIS MD, Ot           

   E11.621         

                          TYPE 2 DIABETES MELLITUS WITH FOOT ULCER              

      

 

                2020           YULISA DENNIS MD           Ot           

   I70.261         

                          ATHSCL NATIVE ARTERIES OF EXTREMITIES W               

      

 

                2020           YULISA DENNIS MD, Ot           

   L97.413         

                          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              

      

 

                2020           YULISA DENNIS MD, Ot           

   M65.071         

                          ABSCESS OF TENDON SHEATH, RIGHT ANKLE AN              

      

 

                2020           YULISA DENNIS MD, Ot           

   E11.621         

                          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              

      

 

                2020           YULISA DENNIS MD, Ot           

   L97.509         

                          NON-PRESSURE CHRONIC ULCER OTH PRT UNSP               

      

 

                2020           YULISA DENNIS MD, Ot           

   E11.42          

                          TYPE 2 DIABETES MELLITUS WITH DIABETIC P              

      

 

                2020           YULISA DENNIS MD, Ot           

   E11.52          

                          TYPE 2 DIABETES W DIABETIC PERIPHERAL AN              

      

 

                2020           YULISA DENNIS MD, Ot           

   E11.621         

                          TYPE 2 DIABETES MELLITUS WITH FOOT ULCER              

      

 

                2020           YULISA DENNIS MD           Ot           

   I70.234         

                          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              

      

 

                2020           YULISA DENNIS MD, Ot           

   L97.412         

                          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              

      

 

                2020           YULISA DENNIS MD, Ot           

   M65.071         

                          ABSCESS OF TENDON SHEATH, RIGHT ANKLE AN              

      

 

                2020           YULISA DENNIS MD, Ot           

   E11.42          

                          TYPE 2 DIABETES MELLITUS WITH DIABETIC P              

      

 

                2020           YULISA DENNIS MD, Ot           

   E11.52          

                          TYPE 2 DIABETES W DIABETIC PERIPHERAL AN              

      

 

                2020           YULISA DENNIS MD, Ot           

   E11.621         

                          TYPE 2 DIABETES MELLITUS WITH FOOT ULCER              

      

 

                2020           YULISA DENNIS MD           Ot           

   I70.234         

                          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              

      

 

                2020           YULISA DENNIS MD           Ot           

   L97.412         

                          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              

      

 

                2020           YULISA DENNIS MD, Ot           

   M65.071         

                          ABSCESS OF TENDON SHEATH, RIGHT ANKLE AN              

      

 

                2020           YULISA DENNIS MD, Ot           

   E11.42          

                          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              

      

 

                2020           YULISA DENNIS MD, Ot           

   E11.52          

                          TYPE 2 DIABETES W DIABETIC PERIPHERAL AN              

      

 

                2020           YULISA DENNIS MD, Ot           

   E11.621         

                          TYPE 2 DIABETES MELLITUS WITH FOOT ULCER              

      

 

                2020           YULISA DENNIS MD           Ot           

   I70.234         

                          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              

      

 

                2020           YULISA DENNIS MD, Ot           

   L97.413         

                          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              

      

 

                2020           YULISA DENNIS MD, Ot           

   M65.071         

                          ABSCESS OF TENDON SHEATH, RIGHT ANKLE AN              

      

 

                2020           YULISA DENNIS MD, Ot           

   E11.42          

                          TYPE 2 DIABETES MELLITUS WITH DIABETIC P              

      

 

                2020           YULISA DENNIS MD, Ot           

   E11.52          

                          TYPE 2 DIABETES W DIABETIC PERIPHERAL AN              

      

 

                2020           YULISA DENNIS MD, Ot           

   E11.621         

                          TYPE 2 DIABETES MELLITUS WITH FOOT ULCER              

      

 

                2020           YULISA DENNIS MD           Ot           

   I70.261         

                          ATHSCL NATIVE ARTERIES OF EXTREMITIES W               

      

 

                2020           YULISA DENNIS MD           Ot           

   L97.416         

                          NON-PRS CHR ULC OF R HEEL/MIDFT W BNE IN              

      

 

                2020           YULISA DENNIS MD           Ot           

   E11.42          

                          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              

      

 

                2020           YULISA DENNIS MD           Ot           

   E11.621         

                          TYPE 2 DIABETES MELLITUS WITH FOOT ULCER              

      

 

                2020           YULISA DENNIS MD           Ot           

   I70.234         

                          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              

      

 

                2020           YULISA DENNIS MD           Ot           

   L97.413         

                          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              

      

 

                2020           YULISA DENNIS MD, Ot           

   M65.071         

                          ABSCESS OF TENDON SHEATH, RIGHT ANKLE AN              

      

 

                2020           YULISA DENNIS MD           Ot           

   E11.42          

                          TYPE 2 DIABETES MELLITUS WITH DIABETIC P              

      

 

                2020           YULISA DENNIS MD           Ot           

   E11.52          

                          TYPE 2 DIABETES W DIABETIC PERIPHERAL AN              

      

 

                2020           YULISA DENNIS MD, Ot           

   E11.621         

                          TYPE 2 DIABETES MELLITUS WITH FOOT ULCER              

      

 

                2020           YULISA DENNIS MD, Ot           

   I70.234         

                          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              

      

 

                2020           YULISA DENNIS MD, Ot           

   L97.412         

                          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              

      

 

                2020           YULISA DENNIS MD, Ot           

   E11.42          

                          TYPE 2 DIABETES MELLITUS WITH DIABETIC P              

      

 

                2020           YULISA DENNIS MD, Ot           

   E11.52          

                          TYPE 2 DIABETES W DIABETIC PERIPHERAL AN              

      

 

                2020           YULISA DENNIS MD, Ot           

   E11.621         

                          TYPE 2 DIABETES MELLITUS WITH FOOT ULCER              

      

 

                2020           YULISA DENNIS MD, Ot           

   I70.261         

                          ATHSCL NATIVE ARTERIES OF EXTREMITIES W               

      

 

                2020           YULISA DENNIS MD, Ot           

   L97.412         

                          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              

      

 

                2020           YULISA DENNIS MD, Ot           

   E11.42          

                          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              

      

 

                2020           YULISA DENNIS MD, Ot           

   E11.52          

                          TYPE 2 DIABETES W DIABETIC PERIPHERAL AN              

      

 

                2020           YULISA DENNIS MD, Ot           

   E11.621         

                          TYPE 2 DIABETES MELLITUS WITH FOOT ULCER              

      

 

                2020           YULISA DENNIS MD, Ot           

   I70.234         

                          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              

      

 

                2020           YULISA DENNIS MD, Ot           

   L97.412         

                          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              

      

 

                2020           YULISA DENNIS MD, Ot           

   E11.42          

                          TYPE 2 DIABETES MELLITUS WITH DIABETIC P              

      

 

                2020           YULISA DENNIS MD, Ot           

   E11.621         

                          TYPE 2 DIABETES MELLITUS WITH FOOT ULCER              

      

 

                2020           YULISA DENNIS MD           Ot           

   L03.115         

                          CELLULITIS OF RIGHT LOWER LIMB                    

 

                2020           YULISA DENNIS MD, Ot           

   L97.513         

                          NON-PRS CHRONIC ULCER OTH PRT RIGHT FOOT              

      

 

                2020           YULISA DENNIS MD, Ot           

   E11.42          

                          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              

      

 

                2020           YULISA DENNIS MD, Ot           

   E11.621         

                          TYPE 2 DIABETES MELLITUS WITH FOOT ULCER              

      

 

                2020           YULISA DENNIS MD           Ot           

   L03.115         

                          CELLULITIS OF RIGHT LOWER LIMB                    

 

                2020           YULISA DENNIS MD, Ot           

   L97.513         

                          NON-PRS CHRONIC ULCER OTH PRT RIGHT FOOT              

      

 

                2020           YULISA DENNIS MD, Ot           

   E11.42          

                          TYPE 2 DIABETES MELLITUS WITH DIABETIC P              

      

 

                2020           YULISA DENNIS MD, Ot           

   E11.52          

                          TYPE 2 DIABETES W DIABETIC PERIPHERAL AN              

      

 

                2020           YULISA DENNIS MD, Ot           

   E11.621         

                          TYPE 2 DIABETES MELLITUS WITH FOOT ULCER              

      

 

             2020           YULISA DENNIS MD, Ot           I96

           

GANGRENE, NOT ELSEWHERE CLASSIFIED                    

 

                2020           YULISA DENNIS MD, Ot           

   L97.413         

                          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              

      

 

                2020           YULISA DENNIS MD, Ot           

   E11.42          

                          TYPE 2 DIABETES MELLITUS WITH DIABETIC P              

      

 

                2020           YULISA DENNIS MD, Ot           

   E11.52          

                          TYPE 2 DIABETES W DIABETIC PERIPHERAL AN              

      

 

                2020           YULISA DENNIS MD, Ot           

   E11.621         

                          TYPE 2 DIABETES MELLITUS WITH FOOT ULCER              

      

 

             2020           YULISA DENNIS MD, Ot           I96

           

GANGRENE, NOT ELSEWHERE CLASSIFIED                    

 

                2020           YULISA DENNIS MD, Ot           

   L97.412         

                          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              

      

 

                2020           YULISA DENNIS MD           Ot           

   M65.071         

                          ABSCESS OF TENDON SHEATH, RIGHT ANKLE AN              

      

 

             2020           JENNIFER SALDIVAR, ELISE RAVI           Ot           E11

.9           

TYPE 2 DIABETES MELLITUS WITHOUT COMPLIC                    

 

             2020           ELISE RODRIGUEZ MD           Ot           I11

.9           

HYPERTENSIVE HEART DISEASE WITHOUT HEART                    

 

                2020           ELISE RODRIGUEZ MD, Ot           

   I25.10          

                          ATHSCL HEART DISEASE OF NATIVE CORONARY               

      

 

             2020           ELISE RODRIGUEZ MD           Ot           I63

.9           

CEREBRAL INFARCTION, UNSPECIFIED                    

 

             2020           ELISE RODRIGUEZ MD           Ot           Z72

.0           

TOBACCO USE                                      

 

                2020           YULISA DENNIS MD, Ot           

   E11.42          

                          TYPE 2 DIABETES MELLITUS WITH DIABETIC P              

      

 

                2020           YULISA DENNIS MD, Ot           

   E11.52          

                          TYPE 2 DIABETES W DIABETIC PERIPHERAL AN              

      

 

                2020           YULISA DENNIS MD, Ot           

   E11.621         

                          TYPE 2 DIABETES MELLITUS WITH FOOT ULCER              

      

 

                2020           YULISA DENNIS MD, Ot           

   I70.261         

                          ATHSCL NATIVE ARTERIES OF EXTREMITIES W               

      

 

                2020           YLUISA DENNIS MD, Ot           

   L97.412         

                          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              

      

 

                2020           YULISA DENNIS MD, Ot           

   M65.071         

                          ABSCESS OF TENDON SHEATH, RIGHT ANKLE AN              

      

 

                2020           YULISA DENNIS MD, Ot           

   E11.42          

                          TYPE 2 DIABETES MELLITUS WITH DIABETIC P              

      

 

                2020           YULISA DENNIS MD, Ot           

   E11.52          

                          TYPE 2 DIABETES W DIABETIC PERIPHERAL AN              

      

 

                2020           YULISA DENNIS MD, Ot           

   E11.621         

                          TYPE 2 DIABETES MELLITUS WITH FOOT ULCER              

      

 

                2020           YULISA DENNIS MD           Ot           

   I70.261         

                          ATHSCL NATIVE ARTERIES OF EXTREMITIES W               

      

 

                2020           YULISA DENNIS MD, Ot           

   L97.412         

                          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              

      

 

                2020           YULISA DENNIS MD, Ot           

   M65.071         

                          ABSCESS OF TENDON SHEATH, RIGHT ANKLE AN              

      

 

                2020           YULISA DENNIS MD, Ot           

   E11.42          

                          TYPE 2 DIABETES MELLITUS WITH DIABETIC P              

      

 

                2020           YULISA DENNIS MD, Ot           

   E11.52          

                          TYPE 2 DIABETES W DIABETIC PERIPHERAL AN              

      

 

                2020           YULISA DENNIS MD, Ot           

   E11.621         

                          TYPE 2 DIABETES MELLITUS WITH FOOT ULCER              

      

 

                2020           YULISA DENNIS MD           Ot           

   I70.234         

                          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              

      

 

             2020           YULISA DENNIS MD           Ot           I96

           

GANGRENE, NOT ELSEWHERE CLASSIFIED                    

 

                2020           YULISA DENNIS MD, Ot           

   M65.071         

                          ABSCESS OF TENDON SHEATH, RIGHT ANKLE AN              

      

 

                2020           YULISA DENNIS MD, Ot           

   E11.42          

                          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              

      

 

                2020           YULISA DENNIS MD           Ot           

   E11.52          

                          TYPE 2 DIABETES W DIABETIC PERIPHERAL AN              

      

 

                2020           YULISA DENNIS MD           Ot           

   E11.621         

                          TYPE 2 DIABETES MELLITUS WITH FOOT ULCER              

      

 

                2020           YULISA DENNIS MD           Ot           

   I70.234         

                          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              

      

 

             2020           YULISA DENNIS MD           Ot           I96

           

GANGRENE, NOT ELSEWHERE CLASSIFIED                    

 

                2020           YULISA DENNIS MD           Ot           

   L97.412         

                          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              

      

 

                2020           YULISA DENNIS MD, Ot           

   M65.071         

                          ABSCESS OF TENDON SHEATH, RIGHT ANKLE AN              

      

 

                2020           YULISA DENNIS MD           Ot           

   E11.42          

                          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              

      

 

                2020           YULISA DENNIS MD, Ot           

   E11.52          

                          TYPE 2 DIABETES W DIABETIC PERIPHERAL AN              

      

 

                2020           YULISA DENNIS MD, Ot           

   E11.621         

                          TYPE 2 DIABETES MELLITUS WITH FOOT ULCER              

      

 

                2020           YULISA DENNIS MD           Ot           

   I70.234         

                          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              

      

 

             2020           YULISA DENNIS MD           Ot           I96

           

GANGRENE, NOT ELSEWHERE CLASSIFIED                    

 

                2020           YULISA DENNIS MD, Ot           

   L97.412         

                          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              

      

 

                2020           YULISA DENNIS MD, Ot           

   M65.071         

                          ABSCESS OF TENDON SHEATH, RIGHT ANKLE AN              

      

 

                2020           YULISA DENNIS MD, Ot           

   E11.42          

                          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              

      

 

                2020           YULISA DENNIS MD, Ot           

   E11.52          

                          TYPE 2 DIABETES W DIABETIC PERIPHERAL AN              

      

 

                2020           YULISA DENNIS MD, Ot           

   E11.621         

                          TYPE 2 DIABETES MELLITUS WITH FOOT ULCER              

      

 

                2020           YULISA DENNIS MD, Ot           

   I70.234         

                          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              

      

 

                2020           YULISA DENNIS MD           Ot           

   L97.412         

                          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              

      

 

                2020           YULISA DENNIS MD, Ot           

   M65.071         

                          ABSCESS OF TENDON SHEATH, RIGHT ANKLE AN              

      

 

                2020           YULISA DENNIS MD, Ot           

   E11.42          

                          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              

      

 

                2020           YULISA DENNIS MD, Ot           

   E11.52          

                          TYPE 2 DIABETES W DIABETIC PERIPHERAL AN              

      

 

                2020           YULISA DENNIS MD, Ot           

   E11.621         

                          TYPE 2 DIABETES MELLITUS WITH FOOT ULCER              

      

 

                2020           YULISA DENNIS MD           Ot           

   I70.261         

                          ATHSCL NATIVE ARTERIES OF EXTREMITIES W               

      

 

                2020           YULISA DENNIS MD           Ot           

   L97.413         

                          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              

      

 

                2020           YULISA DENNIS MD, Ot           

   M65.071         

                          ABSCESS OF TENDON SHEATH, RIGHT ANKLE AN              

      

 

                2020           YULISA DENNIS MD           Ot           

   E11.42          

                          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              

      

 

                2020           YULISA DENNIS MD           Ot           

   E11.52          

                          TYPE 2 DIABETES W DIABETIC PERIPHERAL AN              

      

 

                2020           YULISA DENNIS MD, Ot           

   E11.621         

                          TYPE 2 DIABETES MELLITUS WITH FOOT ULCER              

      

 

                2020           YULISA DENNIS MD, Ot           

   I70.234         

                          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              

      

 

                2020           YULISA DENNIS MD, Ot           

   L97.413         

                          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              

      

 

                2020           YULISA DENNIS MD, Ot           

   M65.071         

                          ABSCESS OF TENDON SHEATH, RIGHT ANKLE AN              

      

 

                2020           YULISA DENNIS MD           Ot           

   E11.42          

                          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              

      

 

                2020           YULISA DENNIS MD, Ot           

   E11.52          

                          TYPE 2 DIABETES W DIABETIC PERIPHERAL AN              

      

 

                2020           YULISA DENNIS MD, Ot           

   E11.621         

                          TYPE 2 DIABETES MELLITUS WITH FOOT ULCER              

      

 

                2020           YULISA DENNIS MD           Ot           

   I70.261         

                          ATHSCL NATIVE ARTERIES OF EXTREMITIES W               

      

 

                2020           YULISA DENNIS MD, Ot           

   L97.413         

                          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              

      

 

                2020           YULISA DENNIS MD, Ot           

   M65.071         

                          ABSCESS OF TENDON SHEATH, RIGHT ANKLE AN              

      

 

                2020           YULISA DENNIS MD, Ot           

   E11.621         

                          TYPE 2 DIABETES MELLITUS WITH FOOT ULCER              

      

 

                2020           YULISA DENNIS MD, Ot           

   L97.509         

                          NON-PRESSURE CHRONIC ULCER OTH PRT UNSP               

      

 

                2020           YULISA DENNIS MD, Ot           

   E11.42          

                          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              

      

 

                2020           YULISA DENNIS MD, Ot           

   E11.52          

                          TYPE 2 DIABETES W DIABETIC PERIPHERAL AN              

      

 

                2020           YULISA DENNIS MD           Ot           

   E11.621         

                          TYPE 2 DIABETES MELLITUS WITH FOOT ULCER              

      

 

                2020           YULISA DENNIS MD           Ot           

   I70.234         

                          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              

      

 

                2020           YULISA DENNIS MD, Ot           

   L97.412         

                          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              

      

 

                2020           YULISA DENNIS MD, Ot           

   M65.071         

                          ABSCESS OF TENDON SHEATH, RIGHT ANKLE AN              

      

 

                2020           YULISA DENNIS MD, Ot           

   E11.42          

                          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              

      

 

                2020           YULISA DENNIS MD           Ot           

   E11.52          

                          TYPE 2 DIABETES W DIABETIC PERIPHERAL AN              

      

 

                2020           YULISA DENNIS MD           Ot           

   E11.621         

                          TYPE 2 DIABETES MELLITUS WITH FOOT ULCER              

      

 

                2020           YULISA DENNIS MD           Ot           

   I70.234         

                          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              

      

 

                2020           YULISA DENNIS MD, Ot           

   L97.412         

                          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              

      

 

                2020           YULISA DENNIS MD, Ot           

   M65.071         

                          ABSCESS OF TENDON SHEATH, RIGHT ANKLE AN              

      

 

                2020           YULISA DENNIS MD, Ot           

   E11.42          

                          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              

      

 

                2020           YULISA DENNIS MD, Ot           

   E11.52          

                          TYPE 2 DIABETES W DIABETIC PERIPHERAL AN              

      

 

                2020           YULISA DENNIS MD, Ot           

   E11.621         

                          TYPE 2 DIABETES MELLITUS WITH FOOT ULCER              

      

 

                2020           YULISA DENNIS MD, Ot           

   I70.234         

                          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              

      

 

                2020           YULISA DENNIS MD, Ot           

   L97.413         

                          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              

      

 

                2020           YULISA DENNIS MD, Ot           

   M65.071         

                          ABSCESS OF TENDON SHEATH, RIGHT ANKLE AN              

      

 

                2020           YULISA DENNIS MD, Ot           

   E11.42          

                          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              

      

 

                2020           YULISA DENNIS MD, Ot           

   E11.52          

                          TYPE 2 DIABETES W DIABETIC PERIPHERAL AN              

      

 

                2020           YULISA DENNIS MD, Ot           

   E11.621         

                          TYPE 2 DIABETES MELLITUS WITH FOOT ULCER              

      

 

                2020           YULISA DENNIS MD, Ot           

   I70.261         

                          ATHSCL NATIVE ARTERIES OF EXTREMITIES W               

      

 

                2020           YULISA DENNIS MD, Ot           

   L97.416         

                          NON-PRS CHR ULC OF R HEEL/MIDFT W BNE IN              

      

 

                2020           YULISA DENNIS MD, Ot           

   E11.42          

                          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              

      

 

                2020           YULISA DENNIS MD, Ot           

   E11.621         

                          TYPE 2 DIABETES MELLITUS WITH FOOT ULCER              

      

 

                2020           YULISA DENNIS MD, Ot           

   I70.234         

                          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              

      

 

                2020           YULISA DENNIS MD, Ot           

   L97.413         

                          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              

      

 

                2020           YULISA DENNIS MD, Ot           

   M65.071         

                          ABSCESS OF TENDON SHEATH, RIGHT ANKLE AN              

      

 

                2020           YULISA DENNIS MD, Ot           

   E11.42          

                          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              

      

 

                2020           YULISA DENNIS MD, Ot           

   E11.52          

                          TYPE 2 DIABETES W DIABETIC PERIPHERAL AN              

      

 

                2020           YULISA DENNIS MD, Ot           

   E11.621         

                          TYPE 2 DIABETES MELLITUS WITH FOOT ULCER              

      

 

                2020           YULISA DENNIS MD, Ot           

   I70.234         

                          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              

      

 

                2020           YULISA DENNIS MD, Ot           

   L97.412         

                          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              

      

 

                2020           YULISA DENNIS MD, Ot           

   E11.42          

                          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              

      

 

                2020           YULISA DENNIS MD, Ot           

   E11.52          

                          TYPE 2 DIABETES W DIABETIC PERIPHERAL AN              

      

 

                2020           YULISA DENNIS MD, Ot           

   E11.621         

                          TYPE 2 DIABETES MELLITUS WITH FOOT ULCER              

      

 

                2020           YULISA DENNIS MD, Ot           

   I70.261         

                          ATHSCL NATIVE ARTERIES OF EXTREMITIES W               

      

 

                2020           YULISA DENNIS MD, Ot           

   L97.412         

                          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              

      

 

                2020           YULISA DENNIS MD, Ot           

   E11.42          

                          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              

      

 

                2020           YULISA DENNIS MD, Ot           

   E11.52          

                          TYPE 2 DIABETES W DIABETIC PERIPHERAL AN              

      

 

                2020           YULISA DENNIS MD, Ot           

   E11.621         

                          TYPE 2 DIABETES MELLITUS WITH FOOT ULCER              

      

 

                2020           YULISA DENNIS MD, Ot           

   I70.234         

                          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              

      

 

                2020           YULISA DENNIS MD, Ot           

   L97.412         

                          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              

      

 

                2020           YULISA DENNIS MD, Ot           

   E11.42          

                          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              

      

 

                2020           YULISA DENNIS MD, Ot           

   E11.621         

                          TYPE 2 DIABETES MELLITUS WITH FOOT ULCER              

      

 

                2020           YULISA DENNIS MD           Ot           

   L03.115         

                          CELLULITIS OF RIGHT LOWER LIMB                    

 

                2020           YULISA DENNIS MD, Ot           

   L97.513         

                          NON-PRS CHRONIC ULCER OTH PRT RIGHT FOOT              

      

 

                2020           YULISA DENNIS MD, Ot           

   E11.42          

                          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              

      

 

                2020           YULISA DENNIS MD, Ot           

   E11.621         

                          TYPE 2 DIABETES MELLITUS WITH FOOT ULCER              

      

 

                2020           YULISA DENNIS MD           Ot           

   L03.115         

                          CELLULITIS OF RIGHT LOWER LIMB                    

 

                2020           YULISA DENNIS MD, Ot           

   L97.513         

                          NON-PRS CHRONIC ULCER OTH PRT RIGHT FOOT              

      

 

                2020           YULISA DENNIS MD, Ot           

   E11.42          

                          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              

      

 

                2020           YULISA DENNIS MD, Ot           

   E11.52          

                          TYPE 2 DIABETES W DIABETIC PERIPHERAL AN              

      

 

                2020           YULISA DENNIS MD, Ot           

   E11.621         

                          TYPE 2 DIABETES MELLITUS WITH FOOT ULCER              

      

 

             2020           YULISA DENNIS MD           Ot           I96

           

GANGRENE, NOT ELSEWHERE CLASSIFIED                    

 

                2020           YULISA DENNIS MD, Ot           

   L97.413         

                          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              

      

 

                2020           YULISA DENNIS MD, Ot           

   E11.42          

                          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              

      

 

                2020           YULISA DENNIS MD, Ot           

   E11.52          

                          TYPE 2 DIABETES W DIABETIC PERIPHERAL AN              

      

 

                2020           YULISA DENNIS MD, Ot           

   E11.621         

                          TYPE 2 DIABETES MELLITUS WITH FOOT ULCER              

      

 

             2020           YULISA DENNIS MD, Ot           I96

           

GANGRENE, NOT ELSEWHERE CLASSIFIED                    

 

                2020           YULISA DENNIS MD, Ot           

   L97.412         

                          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              

      

 

                2020           YULISA DENNIS MD, Ot           

   M65.071         

                          ABSCESS OF TENDON SHEATH, RIGHT ANKLE AN              

      

 

                2020           YULISA DENNIS MD, Ot           

   E11.42          

                          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              

      

 

                2020           YULISA DENNIS MD, Ot           

   E11.52          

                          TYPE 2 DIABETES W DIABETIC PERIPHERAL AN              

      

 

                2020           YULISA DENNIS MD, Ot           

   E11.621         

                          TYPE 2 DIABETES MELLITUS WITH FOOT ULCER              

      

 

                2020           YULISA DENNIS MD           Ot           

   I70.261         

                          ATHSCL NATIVE ARTERIES OF EXTREMITIES W               

      

 

                2020           YULISA DENNIS MD, Ot           

   L97.412         

                          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              

      

 

                2020           YULISA DENNIS MD, Ot           

   M65.071         

                          ABSCESS OF TENDON SHEATH, RIGHT ANKLE AN              

      

 

                2020           YULISA DENNIS MD, Ot           

   E11.42          

                          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              

      

 

                2020           YULISA DENNIS MD, Ot           

   E11.52          

                          TYPE 2 DIABETES W DIABETIC PERIPHERAL AN              

      

 

                2020           YULISA DENNIS MD, Ot           

   E11.621         

                          TYPE 2 DIABETES MELLITUS WITH FOOT ULCER              

      

 

                2020           YULISA DENNIS MD           Ot           

   I70.261         

                          ATHSCL NATIVE ARTERIES OF EXTREMITIES W               

      

 

                2020           YULISA DENNIS MD, Ot           

   L97.412         

                          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              

      

 

                2020           YULISA DENNIS MD, Ot           

   M65.071         

                          ABSCESS OF TENDON SHEATH, RIGHT ANKLE AN              

      

 

             2020           JENNIFER SALDIVAR, ELISE RAVI Ot           E11

.9           

TYPE 2 DIABETES MELLITUS WITHOUT COMPLIC                    

 

             2020           JENNIFER SALDIVAR, ELISE RAVI Ot           I11

.9           

HYPERTENSIVE HEART DISEASE WITHOUT HEART                    

 

                2020           JENNIFER SALDIVAR, ELISE RAVI Ot           

   I25.10          

                          ATHSCL HEART DISEASE OF NATIVE CORONARY               

      

 

             2020           JENNIFER SALDIVAR, ELISE RAVI Ot           I63

.9           

CEREBRAL INFARCTION, UNSPECIFIED                    

 

             2020           JENNIFER SALDIVAR, ELISE RAVI Ot           Z72

.0           

TOBACCO USE                                      

 

                2020           YULISA DENNIS MD, Ot           

   E11.42          

                          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              

      

 

                2020           YULISA DENNIS MD, Ot           

   E11.52          

                          TYPE 2 DIABETES W DIABETIC PERIPHERAL AN              

      

 

                2020           YULISA DENNIS MD, Ot           

   E11.621         

                          TYPE 2 DIABETES MELLITUS WITH FOOT ULCER              

      

 

                2020           YULISA DENNIS MD, Ot           

   I70.261         

                          ATHSCL NATIVE ARTERIES OF EXTREMITIES W               

      

 

                2020           YULISA DENNIS MD, Ot           

   L97.412         

                          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              

      

 

                2020           YULISA DENNIS MD, Ot           

   M65.071         

                          ABSCESS OF TENDON SHEATH, RIGHT ANKLE AN              

      

 

                2020           YULISA DENNIS MD, Ot           

   E11.42          

                          TYPE 2 DIABETES MELLITUS WITH DIABETIC P              

      

 

                2020           YULISA DENNIS MD, Ot           

   E11.52          

                          TYPE 2 DIABETES W DIABETIC PERIPHERAL AN              

      

 

                2020           YULISA DENNIS MD, Ot           

   E11.621         

                          TYPE 2 DIABETES MELLITUS WITH FOOT ULCER              

      

 

                2020           YULISA DENNIS MD, Ot           

   I70.234         

                          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              

      

 

             2020           YULISA DENNIS MD, Ot           I96

           

GANGRENE, NOT ELSEWHERE CLASSIFIED                    

 

                2020           YULISA DENNIS MD, Ot           

   M65.071         

                          ABSCESS OF TENDON SHEATH, RIGHT ANKLE AN              

      

 

                2020           YULISA DENNIS MD, Ot           

   E11.42          

                          TYPE 2 DIABETES MELLITUS WITH DIABETIC P              

      

 

                2020           YULISA DENNIS MD, Ot           

   E11.52          

                          TYPE 2 DIABETES W DIABETIC PERIPHERAL AN              

      

 

                2020           YULISA DENNIS MD           Ot           

   E11.621         

                          TYPE 2 DIABETES MELLITUS WITH FOOT ULCER              

      

 

                2020           YULISA DENNIS MD           Ot           

   I70.234         

                          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              

      

 

             2020           YULISA DENNIS MD           Ot           I96

           

GANGRENE, NOT ELSEWHERE CLASSIFIED                    

 

                2020           YULISA DENNIS MD           Ot           

   L97.412         

                          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              

      

 

                2020           YULISA DENNIS MD           Ot           

   M65.071         

                          ABSCESS OF TENDON SHEATH, RIGHT ANKLE AN              

      

 

                2020           YULISA DENNIS MD           Ot           

   E11.42          

                          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              

      

 

                2020           YULISA DENNIS MD           Ot           

   E11.52          

                          TYPE 2 DIABETES W DIABETIC PERIPHERAL AN              

      

 

                2020           YULISA DENNIS MD, Ot           

   E11.621         

                          TYPE 2 DIABETES MELLITUS WITH FOOT ULCER              

      

 

                2020           YULISA DENNIS MD           Ot           

   I70.234         

                          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              

      

 

             2020           YULISA DENNIS MD           Ot           I96

           

GANGRENE, NOT ELSEWHERE CLASSIFIED                    

 

                2020           YULISA DENNIS MD           Ot           

   L97.412         

                          NON-PRS CHR ULCER OF RIGHT HEEL AND MIDF              

      

 

                2020           YULISA DENNIS MD           Ot           

   M65.071         

                          ABSCESS OF TENDON SHEATH, RIGHT ANKLE AN              

      

 

                2020           YULISA DENNIS MD, Ot           

   E11.42          

                          TYPE 2 DIABETES MELLITUS WITH DIABETIC P              

      

 

                2020           YULISA DENNIS MD           Ot           

   E11.52          

                          TYPE 2 DIABETES W DIABETIC PERIPHERAL AN              

      

 

                2020           YULISA DENNIS MD           Ot           

   E11.621         

                          TYPE 2 DIABETES MELLITUS WITH FOOT ULCER              

      

 

                2020           YULISA DENNIS MD           Ot           

   I70.234         

                          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              

      

 

             2020           YULISA DENNIS MD           Ot           I96

           

GANGRENE, NOT ELSEWHERE CLASSIFIED                    

 

                2020           YULISA DENNIS MD           Ot           

   L97.412         

                          NON-PRS CHR ULCER OF RIGHT HEEL AND MIDF              

      

 

                2020           YULISA DENNIS MD           Ot           

   M65.071         

                          ABSCESS OF TENDON SHEATH, RIGHT ANKLE AN              

      

 

                2020           YULISA DENNIS MD           Ot           

   E11.42          

                          TYPE 2 DIABETES MELLITUS WITH DIABETIC P              

      

 

                2020           YULISA DENNIS MD           Ot           

   E11.52          

                          TYPE 2 DIABETES W DIABETIC PERIPHERAL AN              

      

 

                2020           YULISA DENNIS MD, Ot           

   E11.621         

                          TYPE 2 DIABETES MELLITUS WITH FOOT ULCER              

      

 

                2020           YULISA DENNIS MD           Ot           

   I70.261         

                          ATHSCL NATIVE ARTERIES OF EXTREMITIES W               

      

 

                2020           YULISA DENNIS MD, Ot           

   L97.413         

                          NON-PRS CHR ULCER OF RIGHT HEEL AND MIDF              

      

 

                2020           YULISA DENNIS MD, Ot           

   M65.071         

                          ABSCESS OF TENDON SHEATH, RIGHT ANKLE AN              

      

 

                2020           YULISA DENNIS MD, Ot           

   E11.42          

                          TYPE 2 DIABETES MELLITUS WITH DIABETIC P              

      

 

                2020           YULISA DENNIS MD, Ot           

   E11.52          

                          TYPE 2 DIABETES W DIABETIC PERIPHERAL AN              

      

 

                2020           YULISA DENNIS MD, Ot           

   E11.621         

                          TYPE 2 DIABETES MELLITUS WITH FOOT ULCER              

      

 

                2020           YULISA DENNIS MD, Ot           

   I70.261         

                          ATHSCL NATIVE ARTERIES OF EXTREMITIES W               

      

 

                2020           YULISA DENNIS MD, Ot           

   L97.413         

                          NON-PRS CHR ULCER OF RIGHT HEEL AND MIDF              

      

 

                2020           YULISA DENNIS MD, Ot           

   M65.071         

                          ABSCESS OF TENDON SHEATH, RIGHT ANKLE AN              

      

 

                2020           YULISA DENNIS MD, Ot           

   E11.621         

                          TYPE 2 DIABETES MELLITUS WITH FOOT ULCER              

      

 

                2020           YULISA DENNIS MD           Ot           

   L97.509         

                          NON-PRESSURE CHRONIC ULCER OTH PRT UNSP               

      

 

                2020           YULISA DENNIS MD, Ot           

   E11.42          

                          TYPE 2 DIABETES MELLITUS WITH DIABETIC P              

      

 

                2020           YULISA DENNIS MD           Ot           

   E11.52          

                          TYPE 2 DIABETES W DIABETIC PERIPHERAL AN              

      

 

                2020           YULISA DENNIS MD, Ot           

   E11.621         

                          TYPE 2 DIABETES MELLITUS WITH FOOT ULCER              

      

 

                2020           YULISA DENNIS MD           Ot           

   I70.234         

                          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              

      

 

                2020           YULISA DENNIS MD           Ot           

   L97.412         

                          NON-PRS CHR ULCER OF RIGHT HEEL AND MIDF              

      

 

                2020           YULISA DENNIS MD, Ot           

   M65.071         

                          ABSCESS OF TENDON SHEATH, RIGHT ANKLE AN              

      

 

                2020           YULISA DENNIS MD, Ot           

   E11.42          

                          TYPE 2 DIABETES MELLITUS WITH DIABETIC P              

      

 

                2020           YULISA DENNIS MD           Ot           

   E11.621         

                          TYPE 2 DIABETES MELLITUS WITH FOOT ULCER              

      

 

                2020           YULISA DENNIS MD           Ot           

   L03.115         

                          CELLULITIS OF RIGHT LOWER LIMB                    

 

                2020           YULISA DENNIS MD, Ot           

   L97.513         

                          NON-PRS CHRONIC ULCER OTH PRT RIGHT FOOT              

      

 

                2020           YULISA DENNIS MD, Ot           

   E11.42          

                          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              

      

 

                2020           YULISA DENNIS MD, Ot           

   E11.52          

                          TYPE 2 DIABETES W DIABETIC PERIPHERAL AN              

      

 

                2020           YULISA DENNIS MD, Ot           

   E11.621         

                          TYPE 2 DIABETES MELLITUS WITH FOOT ULCER              

      

 

             2020           YULISA DENNIS MD, Ot           I96

           

GANGRENE, NOT ELSEWHERE CLASSIFIED                    

 

                2020           YULISA DENNIS MD, Ot           

   L97.412         

                          NON-PRS CHR ULCER OF RIGHT HEEL AND MIDF              

      

 

                2020           YULISA DENNIS MD, Ot           

   M65.071         

                          ABSCESS OF TENDON SHEATH, RIGHT ANKLE AN              

      

 

             2020           JENNIFER SALDIVAR, ELISE RAVI           Ot           E11

.9           

TYPE 2 DIABETES MELLITUS WITHOUT COMPLIC                    

 

             2020           ELISE RODRIGUEZ MD           Ot           I11

.9           

HYPERTENSIVE HEART DISEASE WITHOUT HEART                    

 

                2020           ELISE RODRIGUEZ MD, Ot           

   I25.10          

                          ATHSCL HEART DISEASE OF NATIVE CORONARY               

      

 

             2020           JENNIFER SALDIVAR, ELISE RAVI           Ot           I63

.9           

CEREBRAL INFARCTION, UNSPECIFIED                    

 

             2020           ELISE RODRIGUEZ MD, Ot           Z72

.0           

TOBACCO USE                                      

 

                2020           YULISA DENNIS MD, Ot           

   E11.42          

                          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              

      

 

                2020           YULISA DENNIS MD, Ot           

   E11.52          

                          TYPE 2 DIABETES W DIABETIC PERIPHERAL AN              

      

 

                2020           YULISA DENNIS MD, Ot           

   E11.621         

                          TYPE 2 DIABETES MELLITUS WITH FOOT ULCER              

      

 

                2020           YULISA DENNIS MD           Ot           

   I70.234         

                          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              

      

 

             2020           YULISA DENNIS MD           Ot           I96

           

GANGRENE, NOT ELSEWHERE CLASSIFIED                    

 

                2020           YULISA DENNIS MD, Ot           

   M65.071         

                          ABSCESS OF TENDON SHEATH, RIGHT ANKLE AN              

      

 

                2020           YULISA DENNIS MD, Ot           

   E11.42          

                          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              

      

 

                2020           YULISA DENNIS MD           Ot           

   E11.52          

                          TYPE 2 DIABETES W DIABETIC PERIPHERAL AN              

      

 

                2020           YULISA DENNIS MD, Ot           

   E11.621         

                          TYPE 2 DIABETES MELLITUS WITH FOOT ULCER              

      

 

                2020           YULISA DENNIS MD           Ot           

   I70.234         

                          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              

      

 

                2020           YULISA DENNIS MD, Ot           

   L97.412         

                          NON-PRS CHR ULCER OF RIGHT HEEL AND MIDF              

      

 

                2020           YULISA DENNIS MD, Ot           

   M65.071         

                          ABSCESS OF TENDON SHEATH, RIGHT ANKLE AN              

      

 

                2020           YULISA DENNIS MD           Ot           

   E11.42          

                          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              

      

 

                2020           YULISA DENNIS MD, Ot           

   E11.52          

                          TYPE 2 DIABETES W DIABETIC PERIPHERAL AN              

      

 

                2020           YULISA DENNIS MD, Ot           

   E11.621         

                          TYPE 2 DIABETES MELLITUS WITH FOOT ULCER              

      

 

                2020           YULISA DENNIS MD, Ot           

   I70.234         

                          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              

      

 

                2020           YULISA DENNIS MD, Ot           

   L97.412         

                          NON-PRS CHR ULCER OF RIGHT HEEL AND MIDF              

      

 

                2020           YULISA DENNIS MD, Ot           

   E11.42          

                          TYPE 2 DIABETES MELLITUS WITH DIABETIC P              

      

 

                2020           YULISA DENNIS MD, Ot           

   E11.52          

                          TYPE 2 DIABETES W DIABETIC PERIPHERAL AN              

      

 

                2020           YULISA DENNIS MD, Ot           

   E11.621         

                          TYPE 2 DIABETES MELLITUS WITH FOOT ULCER              

      

 

                2020           YULISA DENNIS MD, Ot           

   I70.234         

                          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              

      

 

                2020           YULISA DENNIS MD, Ot           

   L97.412         

                          NON-PRS CHR ULCER OF RIGHT HEEL AND MIDF              

      

 

                2020           YULISA DENNIS MD, Ot           

   E11.42          

                          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              

      

 

                2020           YULISA DENNIS MD           Ot           

   E11.52          

                          TYPE 2 DIABETES W DIABETIC PERIPHERAL AN              

      

 

                2020           YULISA DENNIS MD, Ot           

   E11.621         

                          TYPE 2 DIABETES MELLITUS WITH FOOT ULCER              

      

 

                2020           YULISA DENNIS MD           Ot           

   I70.234         

                          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              

      

 

                2020           YULISA DENNIS MD, Ot           

   L97.413         

                          NON-PRS CHR ULCER OF RIGHT HEEL AND MIDF              

      

 

                2020           YULISA DENNIS MD, Ot           

   M65.071         

                          ABSCESS OF TENDON SHEATH, RIGHT ANKLE AN              

      

 

                2020           YULISA DENNIS MD           Ot           

   E11.42          

                          TYPE 2 DIABETES MELLITUS WITH DIABETIC P              

      

 

                2020           YULISA DENNIS MD, Ot           

   E11.52          

                          TYPE 2 DIABETES W DIABETIC PERIPHERAL AN              

      

 

                2020           YULISA DENNIS MD, Ot           

   E11.621         

                          TYPE 2 DIABETES MELLITUS WITH FOOT ULCER              

      

 

                2020           YULISA DENNIS MD           Ot           

   I70.234         

                          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              

      

 

                2020           YULISA DENNIS MD, Ot           

   L97.413         

                          NON-PRS CHR ULCER OF RIGHT HEEL AND MIDF              

      

 

                2020           YULISA DENNIS MD, Ot           

   M65.071         

                          ABSCESS OF TENDON SHEATH, RIGHT ANKLE AN              

      

 

                2020           YULISA DENNIS MD, Ot           

   E11.42          

                          TYPE 2 DIABETES MELLITUS WITH DIABETIC P              

      

 

                2020           YULISA DENNIS MD, Ot           

   E11.52          

                          TYPE 2 DIABETES W DIABETIC PERIPHERAL AN              

      

 

                2020           YULISA DENNIS MD, Ot           

   E11.621         

                          TYPE 2 DIABETES MELLITUS WITH FOOT ULCER              

      

 

                2020           YULISA DENNIS MD, Ot           

   I70.261         

                          ATHSCL NATIVE ARTERIES OF EXTREMITIES W               

      

 

                2020           YULISA DENNIS MD, Ot           

   L97.412         

                          NON-PRS CHR ULCER OF RIGHT HEEL AND MIDF              

      

 

                2020           YULISA DENNIS MD, Ot           

   E11.42          

                          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              

      

 

                2020           YULISA DENNIS MD, Ot           

   E11.621         

                          TYPE 2 DIABETES MELLITUS WITH FOOT ULCER              

      

 

                2020           YULISA DENNIS MD, Ot           

   I70.234         

                          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              

      

 

                2020           YULISA DENNIS MD, Ot           

   L97.413         

                          NON-PRS CHR ULCER OF RIGHT HEEL AND MIDF              

      

 

                2020           YULISA DENNIS MD, Ot           

   M65.071         

                          ABSCESS OF TENDON SHEATH, RIGHT ANKLE AN              

      

 

                2020           YULISA DENNIS MD, Ot           

   E11.42          

                          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              

      

 

                2020           YULISA DENNIS MD           Ot           

   E11.621         

                          TYPE 2 DIABETES MELLITUS WITH FOOT ULCER              

      

 

                2020           YULISA DENNIS MD           Ot           

   I70.234         

                          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              

      

 

                2020           YULISA DENNIS MD           Ot           

   L97.413         

                          NON-PRS CHR ULCER OF RIGHT HEEL AND MIDF              

      

 

                2020           YULISA DENNIS MD, Ot           

   M65.071         

                          ABSCESS OF TENDON SHEATH, RIGHT ANKLE AN              

      

 

                2020           YULISA DENNIS MD           Ot           

   E11.42          

                          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              

      

 

                2020           YULISA DENNIS MD           Ot           

   E11.52          

                          TYPE 2 DIABETES W DIABETIC PERIPHERAL AN              

      

 

                2020           YULISA DENNIS MD, Ot           

   E11.621         

                          TYPE 2 DIABETES MELLITUS WITH FOOT ULCER              

      

 

                2020           YULISA DENNIS MD           Ot           

   I70.234         

                          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              

      

 

                2020           YULISA DENNIS MD           Ot           

   L97.412         

                          NON-PRS CHR ULCER OF RIGHT HEEL AND MIDF              

      

 

                2020           YULISA DENNIS MD           Ot           

   M65.071         

                          ABSCESS OF TENDON SHEATH, RIGHT ANKLE AN              

      

 

                2020           YULISA DENNIS MD, Ot           

   E11.42          

                          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              

      

 

                2020           YULISA DENNIS MD, Ot           

   E11.52          

                          TYPE 2 DIABETES W DIABETIC PERIPHERAL AN              

      

 

                2020           YULISA DENNIS MD, Ot           

   E11.621         

                          TYPE 2 DIABETES MELLITUS WITH FOOT ULCER              

      

 

                2020           YULISA DENNIS MD, Ot           

   I70.234         

                          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              

      

 

                2020           YULISA DENNIS MD, Ot           

   L97.413         

                          NON-PRS CHR ULCER OF RIGHT HEEL AND MIDF              

      

 

                2020           YULISA DENNIS MD, Ot           

   M65.071         

                          ABSCESS OF TENDON SHEATH, RIGHT ANKLE AN              

      

 

                2020           YULISA DENNIS MD, Ot           

   E11.42          

                          TYPE 2 DIABETES MELLITUS WITH DIABETIC P              

      

 

                2020           YULISA DENNIS MD, Ot           

   E11.52          

                          TYPE 2 DIABETES W DIABETIC PERIPHERAL AN              

      

 

                2020           YULISA DENNIS MD, Ot           

   E11.621         

                          TYPE 2 DIABETES MELLITUS WITH FOOT ULCER              

      

 

                2020           YULISA DENNIS MD           Ot           

   I70.261         

                          ATHSCL NATIVE ARTERIES OF EXTREMITIES W               

      

 

                2020           YULISA DENNIS MD           Ot           

   L97.416         

                          NON-PRS CHR ULC OF R HEEL/MIDFT W BNE IN              

      

 

                2020           YULISA DENNIS MD           Ot           

   E11.42          

                          TYPE 2 DIABETES MELLITUS WITH DIABETIC P              

      

 

                2020           YULISA DENNIS MD, Ot           

   E11.52          

                          TYPE 2 DIABETES W DIABETIC PERIPHERAL AN              

      

 

                2020           YULISA DENNIS MD, Ot           

   E11.621         

                          TYPE 2 DIABETES MELLITUS WITH FOOT ULCER              

      

 

                2020           YULISA DENNIS MD           Ot           

   I70.234         

                          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              

      

 

                2020           YULISA DENNIS MD, Ot           

   L97.412         

                          NON-PRS CHR ULCER OF RIGHT HEEL AND MIDF              

      

 

                2020           YULISA DENNIS MD           Ot           

   E11.42          

                          TYPE 2 DIABETES MELLITUS WITH DIABETIC P              

      

 

                2020           YULISA DENNIS MD, Ot           

   E11.52          

                          TYPE 2 DIABETES W DIABETIC PERIPHERAL AN              

      

 

                2020           YULISA DENNIS MD, Ot           

   E11.621         

                          TYPE 2 DIABETES MELLITUS WITH FOOT ULCER              

      

 

                2020           YULISA DENNIS MD           Ot           

   I70.234         

                          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              

      

 

                2020           YULISA DENNIS MD, Ot           

   L97.412         

                          NON-PRS CHR ULCER OF RIGHT HEEL AND MIDF              

      

 

             2020           THA ODOM MD, Ot           E11.42   

        TYPE 

2 DIABETES MELLITUS WITH DIABETIC P                    

 

             2020           THA ODOM MD, Ot           E11.621  

         TYPE

2 DIABETES MELLITUS WITH FOOT ULCER                    

 

             2020           THA ODOM MD, Ot           I70.234  

         

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                    

 

             2020           THA ODOM MD, Ot           L97.413  

         NON-

PRS CHR ULCER OF RIGHT HEEL AND MIDF                    

 

                2020           YULISA DENNIS MD, Ot           

   E11.42          

                          TYPE 2 DIABETES MELLITUS WITH DIABETIC P              

      

 

                2020           YULISA DENNIS MD, Ot           

   E11.52          

                          TYPE 2 DIABETES W DIABETIC PERIPHERAL AN              

      

 

                2020           YULISA DENNIS MD, Ot           

   E11.621         

                          TYPE 2 DIABETES MELLITUS WITH FOOT ULCER              

      

 

                2020           YULISA DENNIS MD           Ot           

   I70.261         

                          ATHSCL NATIVE ARTERIES OF EXTREMITIES W               

      

 

                2020           YULISA DENNIS MD           Ot           

   L97.416         

                          NON-PRS CHR ULC OF R HEEL/MIDFT W BNE IN              

      

 

                2020           YULISA DENNIS MD, Ot           

   E11.42          

                          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              

      

 

                2020           YULISA DENNIS MD           Ot           

   E11.52          

                          TYPE 2 DIABETES W DIABETIC PERIPHERAL AN              

      

 

                2020           YULISA DENNIS MD, Ot           

   E11.621         

                          TYPE 2 DIABETES MELLITUS WITH FOOT ULCER              

      

 

                2020           YULISA DENNIS MD           Ot           

   I70.234         

                          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              

      

 

                2020           YULISA DENNIS MD           Ot           

   L97.412         

                          NON-PRS CHR ULCER OF RIGHT HEEL AND MIDF              

      

 

                2020           YULISA DENNIS MD, Ot           

   E11.42          

                          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              

      

 

                2020           YULISA DENNIS MD           Ot           

   E11.52          

                          TYPE 2 DIABETES W DIABETIC PERIPHERAL AN              

      

 

                2020           YULISA DENNIS MD, Ot           

   E11.621         

                          TYPE 2 DIABETES MELLITUS WITH FOOT ULCER              

      

 

                2020           YULISA DENNIS MD           Ot           

   I70.234         

                          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              

      

 

                2020           YULISA DENNIS MD           Ot           

   L97.413         

                          NON-PRS CHR ULCER OF RIGHT HEEL AND MIDF              

      

 

                2020           YULISA DENNIS MD           Ot           

   M65.071         

                          ABSCESS OF TENDON SHEATH, RIGHT ANKLE AN              

      

 

                2020           YULISA DENNIS MD           Ot           

   E11.42          

                          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              

      

 

                2020           YULISA DENNIS MD           Ot           

   E11.52          

                          TYPE 2 DIABETES W DIABETIC PERIPHERAL AN              

      

 

                2020           YULISA DENNIS MD, Ot           

   E11.621         

                          TYPE 2 DIABETES MELLITUS WITH FOOT ULCER              

      

 

                2020           YULISA DENNIS MD           Ot           

   I70.234         

                          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              

      

 

                2020           YULISA DENNIS MD           Ot           

   L97.412         

                          NON-PRS CHR ULCER OF RIGHT HEEL AND MIDF              

      

 

                2020           YULISA DENNIS MD, Ot           

   M65.071         

                          ABSCESS OF TENDON SHEATH, RIGHT ANKLE AN              

      

 

                2020           YULISA DENNIS MD           Ot           

   E11.42          

                          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              

      

 

                2020           YULISA DENNIS MD           Ot           

   E11.52          

                          TYPE 2 DIABETES W DIABETIC PERIPHERAL AN              

      

 

                2020           YULISA DENNIS MD           Ot           

   E11.621         

                          TYPE 2 DIABETES MELLITUS WITH FOOT ULCER              

      

 

                2020           YULISA DENNIS MD           Ot           

   I70.234         

                          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              

      

 

                2020           YULISA DENNIS MD           Ot           

   L97.412         

                          NON-PRS CHR ULCER OF RIGHT HEEL AND MIDF              

      

 

             2020           ARASELI RICHARDSON           Ot           I10

           

ESSENTIAL (PRIMARY) HYPERTENSION                    

 

                2020           ARASELI RICHARDSON           Ot           

   I25.10          

                          ATHSCL HEART DISEASE OF NATIVE CORONARY               

      

 

             2020           ARASELI RICHARDSON           Ot           I25

.2           

OLD MYOCARDIAL INFARCTION                        

 

             2020           ARASELI RICHARDSON           Ot           J44

.9           

CHRONIC OBSTRUCTIVE PULMONARY DISEASE, U                    

 

                2020           ARASELI RICHARDSON           Ot           

   R09.02          

                          HYPOXEMIA                          

 

                2020           ARASELI RICHARDSON           Ot           

   R41.82          

                          ALTERED MENTAL STATUS, UNSPECIFIED                    

 

                2020           ARASELI RICHARDSON           Ot           

   Z77.22          

                          CNTCT W AND EXPSR TO ENVIRON TOBACCO SMO              

      

 

                2020           ARASELI RICHARDSON           Ot           

   Z79.02          

                          LONG TERM (CURRENT) USE OF ANTITHROMBOTI              

      

 

                2020           ARASELI RICHARDSON           Ot           

   Z79.82          

                          LONG TERM (CURRENT) USE OF ASPIRIN                    

 

                2020           ARASELI RICHARDSON           Ot           

   Z85.46          

                          PERSONAL HISTORY OF MALIGNANT NEOPLASM O              

      

 

                2020           ARASELI RICHARDSON           Ot           

   Z86.73          

                          PRSNL HX OF TIA (TIA), AND CEREB INFRC W              

      

 

                2020           ARASELI RICHARDSON           Ot           

   Z90.49          

                          ACQUIRED ABSENCE OF OTHER SPECIFIED PART              

      

 

             2020           ARASELI RICHARDSON           Ot           Z95

.5           

PRESENCE OF CORONARY ANGIOPLASTY IMPLANT                    

 

             2020           THA ODOM MD           Ot           E11.42   

        TYPE 

2 DIABETES MELLITUS WITH DIABETIC P                    

 

             2020           THA ODOM MD           Ot           E11.52   

        TYPE 

2 DIABETES W DIABETIC PERIPHERAL AN                    

 

             2020           THA ODOM MD, Ot           E11.621  

         TYPE

2 DIABETES MELLITUS WITH FOOT ULCER                    

 

             2020           THA ODOM MD           Ot           I70.234  

         

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                    

 

             2020           THA ODOM MD           Ot           L97.412  

         NON-

PRS CHR ULCER OF RIGHT HEEL AND MIDF                    

 

             2020           THA ODOM MD           Ot           E11.42   

        TYPE 

2 DIABETES MELLITUS WITH DIABETIC P                    

 

             2020           THA ODOM MD           Ot           E11.621  

         TYPE

2 DIABETES MELLITUS WITH FOOT ULCER                    

 

             2020           THA ODOM MD           Ot           I70.234  

         

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                    

 

             2020           THA ODOM MD           Ot           I70.261  

         

ATHSCL NATIVE ARTERIES OF EXTREMITIES W                     

 

             2020           THA ODOM MD           Ot           L97.413  

         NON-

PRS CHR ULCER OF RIGHT HEEL AND MIDF                    

 

             2020           THA ODOM MD, Ot           E11.42   

        TYPE 

2 DIABETES MELLITUS WITH DIABETIC P                    

 

             2020           THA ODOM MD           Ot           E11.621  

         TYPE

2 DIABETES MELLITUS WITH FOOT ULCER                    

 

             2020           THA ODOM MD           Ot           I70.234  

         

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                    

 

             2020           THA ODOM MD           Ot           I70.261  

         

ATHSCL NATIVE ARTERIES OF EXTREMITIES W                     

 

             2020           THA ODOM MD, Ot           L97.413  

         NON-

PRS CHR ULCER OF RIGHT HEEL AND MIDF                    

 

             2020           THA ODOM MD, Ot           E11.42   

        TYPE 

2 DIABETES MELLITUS WITH DIABETIC P                    

 

             2020           THA ODOM MD, Ot           E11.621  

         TYPE

2 DIABETES MELLITUS WITH FOOT ULCER                    

 

             2020           THA ODOM MD, Ot           I70.234  

         

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                    

 

             2020           THA ODOM MD, Ot           I70.261  

         

ATHSCL NATIVE ARTERIES OF EXTREMITIES W                     

 

             2020           THA ODOM MD, Ot           L97.413  

         NON-

PRS CHR ULCER OF RIGHT HEEL AND MIDF                    

 

             2020           THA ODOM MD, Ot           B95.62   

        

METHICILLIN RESIS STAPH INFCT CAUSING DI                    

 

             2020           THA ODOM MD, Ot           B96.5    

       

PSEUDOMONAS (MALLEI) CAUSING DISEASES CL                    

 

             2020           THA ODOM MD, Ot           E11.42   

        TYPE 

2 DIABETES MELLITUS WITH DIABETIC P                    

 

             2020           THA ODOM MD, Ot           E11.621  

         TYPE

2 DIABETES MELLITUS WITH FOOT ULCER                    

 

             2020           THA ODOM MD, Ot           I70.234  

         

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                    

 

             2020           THA ODOM MD, Ot           L97.413  

         NON-

PRS CHR ULCER OF RIGHT HEEL AND MIDF                    

 

             2020           THA ODOM MD, Ot           M86.171  

         

OTHER ACUTE OSTEOMYELITIS, RIGHT ANKLE A                    

 

             2020           DAY DO, JASEN           Ot           A41.02

           

SEPSIS DUE TO METHICILLIN RESISTANT STAP                    

 

             2020           DAY DO JASEN           Ot           A41.52

           

SEPSIS DUE TO PSEUDOMONAS                        

 

             2020           DAY DO JASEN           Ot           D69.6 

          

THROMBOCYTOPENIA, UNSPECIFIED                    

 

             2020           DAY DO JASEN           Ot           E11.52

           

TYPE 2 DIABETES W DIABETIC PERIPHERAL AN                    

 

             2020           BARB DO JASEN           Ot           E11.62

1           

TYPE 2 DIABETES MELLITUS WITH FOOT ULCER                    

 

             2020           DAY DO JASEN           Ot           E11.65

           

TYPE 2 DIABETES MELLITUS WITH HYPERGLYCE                    

 

             2020           BARB DO JASEN           Ot           E78.00

           

PURE HYPERCHOLESTEROLEMIA, UNSPECIFIED                    

 

             2020           BARB DO JASEN           Ot           E87.5 

          

HYPERKALEMIA                                     

 

             2020           BARB PANCHAL JASEN           Ot           F17.21

0           

NICOTINE DEPENDENCE, CIGARETTES, UNCOMPL                    

 

             2020           BARB PANCHAL JASEN           Ot           I10   

        

ESSENTIAL (PRIMARY) HYPERTENSION                    

 

             2020           BARB PANCHAL JASEN           Ot           I25.10

           

ATHSCL HEART DISEASE OF NATIVE CORONARY                     

 

             2020           BARB PANCHAL JASEN           Ot           I70.20

1           

UNSP ATHSCL NATIVE ARTERIES OF EXTREMITI                    

 

             2020           BARB PANCHAL JASEN           Ot           I71.4 

          

ABDOMINAL AORTIC ANEURYSM, WITHOUT RUPTU                    

 

             2020           BARB PANCHAL JASEN           Ot           I96   

        

GANGRENE, NOT ELSEWHERE CLASSIFIED                    

 

             2020           BARB PANCHAL JASEN           Ot           J44.9 

          

CHRONIC OBSTRUCTIVE PULMONARY DISEASE, U                    

 

             2020           BARB PANCHAL JASEN           Ot           L03.03

1           

CELLULITIS OF RIGHT TOE                          

 

             2020           BARB PANCHAL JASEN           Ot           L03.11

5           

CELLULITIS OF RIGHT LOWER LIMB                    

 

             2020           BARB PANCHAL JASEN           Ot           M19.91

           

PRIMARY OSTEOARTHRITIS, UNSPECIFIED SITE                    

 

             2020           BARB PANCHAL JASEN           Ot           M72.6 

          

NECROTIZING FASCIITIS                            

 

             2020           BARB PANCHAL JASEN           Ot           R74.0 

          

NONSPEC ELEV OF LEVELS OF TRANSAMNS   LA                    

 

             2020           BARB PANCHAL JASEN           Ot           Z85.46

           

PERSONAL HISTORY OF MALIGNANT NEOPLASM O                    

 

             2020           BARB PANCHAL JASEN           Ot           Z86.73

           

PRSNL HX OF TIA (TIA), AND CEREB INFRC W                    

 

             2020           BARB PANCHAL JASEN           Ot           Z91.81

           

HISTORY OF FALLING                               

 

             2020           BARB PANCHAL JASEN           Ot           Z95.5 

          

PRESENCE OF CORONARY ANGIOPLASTY IMPLANT                    

 

             2020           BARB PANCHAL JASEN           Ot           A41.02

           

SEPSIS DUE TO METHICILLIN RESISTANT STAP                    

 

             2020           BARB PANCHAL JASEN           Ot           A41.52

           

SEPSIS DUE TO PSEUDOMONAS                        

 

             2020           BARB PANCHAL JASEN           Ot           A41.9 

          

SEPSIS, UNSPECIFIED ORGANISM                     

 

             2020           BARB PANCHAL JASEN           Ot           B95.62

           

METHICILLIN RESIS STAPH INFCT CAUSING DI                    

 

             2020           BARB PANCHAL JASEN           Ot           B96.5 

          

PSEUDOMONAS (MALLEI) CAUSING DISEASES CL                    

 

             2020           BARB PANCHAL JASEN           Ot           D69.6 

          

THROMBOCYTOPENIA, UNSPECIFIED                    

 

             2020           DAY DO, JASEN           Ot           E11.52

           

TYPE 2 DIABETES W DIABETIC PERIPHERAL AN                    

 

             2020           DAY DO, JASEN           Ot           E11.62

1           

TYPE 2 DIABETES MELLITUS WITH FOOT ULCER                    

 

             2020           DAY DO, JASEN           Ot           E11.65

           

TYPE 2 DIABETES MELLITUS WITH HYPERGLYCE                    

 

             2020           DAY DO, JASEN           Ot           E78.00

           

PURE HYPERCHOLESTEROLEMIA, UNSPECIFIED                    

 

             2020           DAY DO, JASEN           Ot           E87.5 

          

HYPERKALEMIA                                     

 

             2020           DAY DO, JASEN           Ot           F17.21

0           

NICOTINE DEPENDENCE, CIGARETTES, UNCOMPL                    

 

             2020           DAY DO, JASEN           Ot           I10   

        

ESSENTIAL (PRIMARY) HYPERTENSION                    

 

             2020           DAY DO, JASEN           Ot           I25.10

           

ATHSCL HEART DISEASE OF NATIVE CORONARY                     

 

             2020           DAY DO, JASEN           Ot           I70.20

1           

UNSP ATHSCL NATIVE ARTERIES OF EXTREMITI                    

 

             2020           DAY DO, JASEN           Ot           I71.4 

          

ABDOMINAL AORTIC ANEURYSM, WITHOUT RUPTU                    

 

             2020           DAY DO, JASEN           Ot           I96   

        

GANGRENE, NOT ELSEWHERE CLASSIFIED                    

 

             2020           DAY DO, JASEN           Ot           J44.9 

          

CHRONIC OBSTRUCTIVE PULMONARY DISEASE, U                    

 

             2020           DAY DO, JASEN           Ot           K42.0 

          

UMBILICAL HERNIA WITH OBSTRUCTION, WITHO                    

 

             2020           DAY DO, JASEN           Ot           L03.03

1           

CELLULITIS OF RIGHT TOE                          

 

             2020           DAY DO, JASEN           Ot           L03.11

5           

CELLULITIS OF RIGHT LOWER LIMB                    

 

             2020           DAY DO, JASEN           Ot           L97.51

9           

NON-PRS CHRONIC ULCER OTH PRT RIGHT FOOT                    

 

             2020           DAY DO, JASEN           Ot           M19.90

           

UNSPECIFIED OSTEOARTHRITIS, UNSPECIFIED                     

 

             2020           DAY DO, JASEN           Ot           M19.91

           

PRIMARY OSTEOARTHRITIS, UNSPECIFIED SITE                    

 

             2020           DAY DO, JASEN           Ot           M72.6 

          

NECROTIZING FASCIITIS                            

 

             2020           DAY DO, JASEN           Ot           R74.0 

          

NONSPEC ELEV OF LEVELS OF TRANSAMNS   LA                    

 

             2020           DAY DO, JASEN           Ot           Z85.46

           

PERSONAL HISTORY OF MALIGNANT NEOPLASM O                    

 

             2020           DAY DO, JASEN           Ot           Z86.73

           

PRSNL HX OF TIA (TIA), AND CEREB INFRC W                    

 

             2020           JASEN DAY DO           Ot           Z91.81

           

HISTORY OF FALLING                               

 

             2020           JASEN DAY DO           Ot           Z95.5 

          

PRESENCE OF CORONARY ANGIOPLASTY IMPLANT                    

 

                2020           YULISA DENNIS MD, Ot           

   E11.42          

                          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              

      

 

                2020           YULISA DENNIS MD, Ot           

   E11.621         

                          TYPE 2 DIABETES MELLITUS WITH FOOT ULCER              

      

 

                2020           YULISA DENNIS MD, Ot           

   L03.115         

                          CELLULITIS OF RIGHT LOWER LIMB                    

 

                2020           YULISA DENNIS MD, Ot           

   L97.513         

                          NON-PRS CHRONIC ULCER OTH PRT RIGHT FOOT              

      

 

                2020           YULISA DENNIS MD, Ot           

   E11.42          

                          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              

      

 

                2020           YULISA DENNIS MD, Ot           

   E11.52          

                          TYPE 2 DIABETES W DIABETIC PERIPHERAL AN              

      

 

                2020           YULISA DENNIS MD, Ot           

   E11.621         

                          TYPE 2 DIABETES MELLITUS WITH FOOT ULCER              

      

 

                2020           YULISA DENNIS MD, Ot           

   I70.234         

                          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              

      

 

                2020           YULISA DENNIS MD, Ot           

   L97.413         

                          NON-PRS CHR ULCER OF RIGHT HEEL AND MIDF              

      

 

                2020           YULISA DENNIS MD, Ot           

   M65.071         

                          ABSCESS OF TENDON SHEATH, RIGHT ANKLE AN              

      

 

                2020           YULISA DENNIS MD, Ot           

   E11.42          

                          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              

      

 

                2020           YULISA DENNIS MD, Ot           

   E11.52          

                          TYPE 2 DIABETES W DIABETIC PERIPHERAL AN              

      

 

                2020           YULISA DENNIS MD, Ot           

   E11.621         

                          TYPE 2 DIABETES MELLITUS WITH FOOT ULCER              

      

 

                2020           YULISA DENNIS MD, Ot           

   I70.261         

                          ATHSCL NATIVE ARTERIES OF EXTREMITIES W               

      

 

                2020           YULISA DENNIS MD, Ot           

   L97.413         

                          NON-PRS CHR ULCER OF RIGHT HEEL AND MIDF              

      

 

                2020           YULISA DENNIS MD, Ot           

   M65.071         

                          ABSCESS OF TENDON SHEATH, RIGHT ANKLE AN              

      

 

                2020           YULISA DENNIS MD, Ot           

   E11.621         

                          TYPE 2 DIABETES MELLITUS WITH FOOT ULCER              

      

 

                2020           YULISA DENNIS MD, Ot           

   L97.509         

                          NON-PRESSURE CHRONIC ULCER OTH PRT UNSP               

      

 

                2020           YULISA DENNIS MD, Ot           

   E11.42          

                          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              

      

 

                2020           YULISA DENNIS MD           Ot           

   E11.52          

                          TYPE 2 DIABETES W DIABETIC PERIPHERAL AN              

      

 

                2020           YULISA DENNIS MD           Ot           

   E11.621         

                          TYPE 2 DIABETES MELLITUS WITH FOOT ULCER              

      

 

                2020           YULISA DENNIS MD           Ot           

   I70.234         

                          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              

      

 

                2020           YULISA DENNIS MD           Ot           

   L97.412         

                          NON-PRS CHR ULCER OF RIGHT HEEL AND MIDF              

      

 

                2020           YULISA DENNIS MD           Ot           

   M65.071         

                          ABSCESS OF TENDON SHEATH, RIGHT ANKLE AN              

      

 

                2020           YULISA DENNIS MD           Ot           

   E11.42          

                          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              

      

 

                2020           YULISA DENNIS MD           Ot           

   E11.52          

                          TYPE 2 DIABETES W DIABETIC PERIPHERAL AN              

      

 

                2020           YULISA DENNIS MD, Ot           

   E11.621         

                          TYPE 2 DIABETES MELLITUS WITH FOOT ULCER              

      

 

                2020           YULISA DENNIS MD           Ot           

   I70.234         

                          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              

      

 

                2020           YULISA DENNIS MD           Ot           

   L97.412         

                          NON-PRS CHR ULCER OF RIGHT HEEL AND MIDF              

      

 

                2020           YULISA DENNIS MD           Ot           

   M65.071         

                          ABSCESS OF TENDON SHEATH, RIGHT ANKLE AN              

      

 

                2020           YULISA DENNIS MD           Ot           

   E11.42          

                          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              

      

 

                2020           YULISA DENNIS MD           Ot           

   E11.52          

                          TYPE 2 DIABETES W DIABETIC PERIPHERAL AN              

      

 

                2020           YULISA DENNIS MD           Ot           

   E11.621         

                          TYPE 2 DIABETES MELLITUS WITH FOOT ULCER              

      

 

                2020           YULISA DENNIS MD           Ot           

   I70.234         

                          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              

      

 

                2020           YULISA DENNIS MD           Ot           

   L97.413         

                          NON-PRS CHR ULCER OF RIGHT HEEL AND MIDF              

      

 

                2020           YULISA DENNIS MD           Ot           

   M65.071         

                          ABSCESS OF TENDON SHEATH, RIGHT ANKLE AN              

      

 

                2020           YULISA DENNIS MD           Ot           

   E11.42          

                          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              

      

 

                2020           YULISA DENNIS MD           Ot           

   E11.52          

                          TYPE 2 DIABETES W DIABETIC PERIPHERAL AN              

      

 

                2020           YULISA DENNIS MD           Ot           

   E11.621         

                          TYPE 2 DIABETES MELLITUS WITH FOOT ULCER              

      

 

                2020           YULISA DENNIS MD           Ot           

   I70.261         

                          ATHSCL NATIVE ARTERIES OF EXTREMITIES W               

      

 

                2020           YULISA DENNIS MD           Ot           

   L97.416         

                          NON-PRS CHR ULC OF R HEEL/MIDFT W BNE IN              

      

 

                2020           YULISA DENNIS MD           Ot           

   E11.42          

                          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              

      

 

                2020           YULISA DENNIS MD           Ot           

   E11.621         

                          TYPE 2 DIABETES MELLITUS WITH FOOT ULCER              

      

 

                2020           YULISA DENNIS MD           Ot           

   I70.234         

                          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              

      

 

                2020           YULISA DENNIS MD           Ot           

   L97.413         

                          NON-PRS CHR ULCER OF RIGHT HEEL AND MIDF              

      

 

                2020           YULISA DENNIS MD           Ot           

   M65.071         

                          ABSCESS OF TENDON SHEATH, RIGHT ANKLE AN              

      

 

                2020           YULISA DENNIS MD, Ot           

   E11.42          

                          TYPE 2 DIABETES MELLITUS WITH DIABETIC P              

      

 

                2020           YULISA DENNIS MD, Ot           

   E11.52          

                          TYPE 2 DIABETES W DIABETIC PERIPHERAL AN              

      

 

                2020           YULISA DENNIS MD, Ot           

   E11.621         

                          TYPE 2 DIABETES MELLITUS WITH FOOT ULCER              

      

 

                2020           YULISA DENNIS MD           Ot           

   I70.234         

                          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              

      

 

                2020           YULISA DENNIS MD           Ot           

   L97.412         

                          NON-PRS CHR ULCER OF RIGHT HEEL AND MIDF              

      

 

                2020           YULISA DENNIS MD           Ot           

   E11.42          

                          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              

      

 

                2020           YULISA DENNIS MD           Ot           

   E11.52          

                          TYPE 2 DIABETES W DIABETIC PERIPHERAL AN              

      

 

                2020           YULISA DENNIS MD, Ot           

   E11.621         

                          TYPE 2 DIABETES MELLITUS WITH FOOT ULCER              

      

 

                2020           YULISA DENNIS MD           Ot           

   I70.261         

                          ATHSCL NATIVE ARTERIES OF EXTREMITIES W               

      

 

                2020           YULISA DENNIS MD           Ot           

   L97.412         

                          NON-PRS CHR ULCER OF RIGHT HEEL AND MIDF              

      

 

                2020           YULISA DENNIS MD           Ot           

   E11.42          

                          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              

      

 

                2020           YULISA DENNIS MD           Ot           

   E11.52          

                          TYPE 2 DIABETES W DIABETIC PERIPHERAL AN              

      

 

                2020           YULISA DENNIS MD           Ot           

   E11.621         

                          TYPE 2 DIABETES MELLITUS WITH FOOT ULCER              

      

 

                2020           YULISA DENNIS MD           Ot           

   I70.234         

                          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              

      

 

                2020           YULISA DENNIS MD           Ot           

   L97.412         

                          NON-PRS CHR ULCER OF RIGHT HEEL AND MIDF              

      

 

                2020           YULISA DENNIS MD           Ot           

   E11.42          

                          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              

      

 

                2020           YULISA DENNIS MD, Ot           

   E11.621         

                          TYPE 2 DIABETES MELLITUS WITH FOOT ULCER              

      

 

                2020           YULISA DENNIS MD           Ot           

   L03.115         

                          CELLULITIS OF RIGHT LOWER LIMB                    

 

                2020           YULISA DENNIS MD           Ot           

   L97.513         

                          NON-PRS CHRONIC ULCER OTH PRT RIGHT FOOT              

      

 

                2020           YULISA DENNIS MD           Ot           

   E11.42          

                          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              

      

 

                2020           YULISA DENNIS MD           Ot           

   E11.621         

                          TYPE 2 DIABETES MELLITUS WITH FOOT ULCER              

      

 

                2020           YULISA DENNIS MD           Ot           

   L03.115         

                          CELLULITIS OF RIGHT LOWER LIMB                    

 

                2020           YULISA DENNIS MD           Ot           

   L97.513         

                          NON-PRS CHRONIC ULCER OTH PRT RIGHT FOOT              

      

 

                2020           YULISA DENNIS MD           Ot           

   E11.42          

                          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              

      

 

                2020           YULISA DENNIS MD           Ot           

   E11.52          

                          TYPE 2 DIABETES W DIABETIC PERIPHERAL AN              

      

 

                2020           YULISA DENNIS MD           Ot           

   E11.621         

                          TYPE 2 DIABETES MELLITUS WITH FOOT ULCER              

      

 

             2020           YULISA DENNIS MD           Ot           I96

           

GANGRENE, NOT ELSEWHERE CLASSIFIED                    

 

                2020           YULISA DENNIS MD           Ot           

   L97.413         

                          NON-PRS CHR ULCER OF RIGHT HEEL AND MIDF              

      

 

                2020           YULISA DENNIS MD           Ot           

   E11.42          

                          TYPE 2 DIABETES MELLITUS WITH DIABETIC P              

      

 

                2020           YULISA DENNIS MD           Ot           

   E11.52          

                          TYPE 2 DIABETES W DIABETIC PERIPHERAL AN              

      

 

                2020           YULISA DENNIS MD           Ot           

   E11.621         

                          TYPE 2 DIABETES MELLITUS WITH FOOT ULCER              

      

 

             2020           YULISA DENNIS MD           Ot           I96

           

GANGRENE, NOT ELSEWHERE CLASSIFIED                    

 

                2020           YULISA DENNIS MD           Ot           

   L97.412         

                          NON-PRS CHR ULCER OF RIGHT HEEL AND MIDF              

      

 

                2020           YULISA DENNIS MD           Ot           

   M65.071         

                          ABSCESS OF TENDON SHEATH, RIGHT ANKLE AN              

      

 

             2020           ELISE RODRIGUEZ MD           Ot           E11

.9           

TYPE 2 DIABETES MELLITUS WITHOUT COMPLIC                    

 

             2020           ELISE RODRIGUEZ MDWAN           Ot           I11

.9           

HYPERTENSIVE HEART DISEASE WITHOUT HEART                    

 

                2020           ELISE RODRIGUEZ MD, Ot           

   I25.10          

                          ATHSCL HEART DISEASE OF NATIVE CORONARY               

      

 

             2020           JENNIFER SALDIVAR, ELISE RAVI           Ot           I63

.9           

CEREBRAL INFARCTION, UNSPECIFIED                    

 

             2020           JENNIFER SALDIVAR, ELISE RAVI           Ot           Z72

.0           

TOBACCO USE                                      

 

                2020           YULISA DENNIS MD, Ot           

   E11.42          

                          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              

      

 

                2020           YULISA DENNIS MD, Ot           

   E11.52          

                          TYPE 2 DIABETES W DIABETIC PERIPHERAL AN              

      

 

                2020           YULISA DENNIS MD, Ot           

   E11.621         

                          TYPE 2 DIABETES MELLITUS WITH FOOT ULCER              

      

 

                2020           YULISA DENNIS MD, Ot           

   I70.261         

                          ATHSCL NATIVE ARTERIES OF EXTREMITIES W               

      

 

                2020           YULISA DENNIS MD, Ot           

   L97.412         

                          NON-PRS CHR ULCER OF RIGHT HEEL AND MIDF              

      

 

                2020           YULISA DENNIS MD, Ot           

   M65.071         

                          ABSCESS OF TENDON SHEATH, RIGHT ANKLE AN              

      

 

                2020           YULISA DENNIS MD           Ot           

   E11.42          

                          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              

      

 

                2020           YULISA DENNIS MD           Ot           

   E11.52          

                          TYPE 2 DIABETES W DIABETIC PERIPHERAL AN              

      

 

                2020           YULISA DENNIS MD, Ot           

   E11.621         

                          TYPE 2 DIABETES MELLITUS WITH FOOT ULCER              

      

 

                2020           YULISA DENNIS MD           Ot           

   I70.261         

                          ATHSCL NATIVE ARTERIES OF EXTREMITIES W               

      

 

                2020           YULISA DENNIS MD, Ot           

   L97.412         

                          NON-PRS CHR ULCER OF RIGHT HEEL AND MIDF              

      

 

                2020           YULISA DENNIS MD, Ot           

   M65.071         

                          ABSCESS OF TENDON SHEATH, RIGHT ANKLE AN              

      

 

                2020           YULISA DENNIS MD           Ot           

   E11.42          

                          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              

      

 

                2020           YULISA DENNIS MD, Ot           

   E11.52          

                          TYPE 2 DIABETES W DIABETIC PERIPHERAL AN              

      

 

                2020           YULISA DENNIS MD, Ot           

   E11.621         

                          TYPE 2 DIABETES MELLITUS WITH FOOT ULCER              

      

 

                2020           YULISA DENNIS MD           Ot           

   I70.234         

                          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              

      

 

             2020           YULISA DENNIS MD           Ot           I96

           

GANGRENE, NOT ELSEWHERE CLASSIFIED                    

 

                2020           YULISA DENNIS MD, Ot           

   M65.071         

                          ABSCESS OF TENDON SHEATH, RIGHT ANKLE AN              

      

 

                2020           YULISA DENNIS MD, Ot           

   E11.42          

                          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              

      

 

                2020           YULISA DENNIS MD, Ot           

   E11.52          

                          TYPE 2 DIABETES W DIABETIC PERIPHERAL AN              

      

 

                2020           YULISA DENNIS MD, Ot           

   E11.621         

                          TYPE 2 DIABETES MELLITUS WITH FOOT ULCER              

      

 

                2020           YULISA DENNIS MD           Ot           

   I70.234         

                          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              

      

 

             2020           YULISA DENNIS MD           Ot           I96

           

GANGRENE, NOT ELSEWHERE CLASSIFIED                    

 

                2020           YULISA DENNIS MD, Ot           

   L97.412         

                          NON-PRS CHR ULCER OF RIGHT HEEL AND MIDF              

      

 

                2020           YULISA DENNIS MD, Ot           

   M65.071         

                          ABSCESS OF TENDON SHEATH, RIGHT ANKLE AN              

      

 

                2020           YULISA DENNIS MD, Ot           

   E11.42          

                          TYPE 2 DIABETES MELLITUS WITH DIABETIC P              

      

 

                2020           YULISA DENNIS MD, Ot           

   E11.52          

                          TYPE 2 DIABETES W DIABETIC PERIPHERAL AN              

      

 

                2020           YULISA DENNIS MD, Ot           

   E11.621         

                          TYPE 2 DIABETES MELLITUS WITH FOOT ULCER              

      

 

                2020           YULISA DENNIS MD           Ot           

   I70.234         

                          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              

      

 

                2020           YULISA DENNIS MD, Ot           

   L97.412         

                          NON-PRS CHR ULCER OF RIGHT HEEL AND MIDF              

      

 

                2020           YULISA DENNIS MD, Ot           

   M65.071         

                          ABSCESS OF TENDON SHEATH, RIGHT ANKLE AN              

      

 

                2020           YULISA DENNIS MD           Ot           

   E11.42          

                          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              

      

 

                2020           YULISA DENNIS MD, Ot           

   E11.52          

                          TYPE 2 DIABETES W DIABETIC PERIPHERAL AN              

      

 

                2020           YULISA DENNIS MD, Ot           

   E11.621         

                          TYPE 2 DIABETES MELLITUS WITH FOOT ULCER              

      

 

                2020           YULISA DENNIS MD           Ot           

   I70.261         

                          ATHSCL NATIVE ARTERIES OF EXTREMITIES W               

      

 

                2020           YULISA DENNIS MD, Ot           

   L97.413         

                          NON-PRS CHR ULCER OF RIGHT HEEL AND MIDF              

      

 

                2020           YULISA DENNIS MD, Ot           

   M65.071         

                          ABSCESS OF TENDON SHEATH, RIGHT ANKLE AN              

      

 

                2020           YULISA DENNIS MD, Ot           

   E11.42          

                          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              

      

 

                2020           YULISA DENNIS MD, Ot           

   E11.52          

                          TYPE 2 DIABETES W DIABETIC PERIPHERAL AN              

      

 

                2020           YULISA DENNIS MD           Ot           

   E11.621         

                          TYPE 2 DIABETES MELLITUS WITH FOOT ULCER              

      

 

                2020           YULISA DENNIS MD           Ot           

   I70.261         

                          ATHSCL NATIVE ARTERIES OF EXTREMITIES W               

      

 

                2020           YULISA DENNIS MD, Ot           

   L97.413         

                          NON-PRS CHR ULCER OF RIGHT HEEL AND MIDF              

      

 

                2020           YULISA DENNIS MD, Ot           

   M65.071         

                          ABSCESS OF TENDON SHEATH, RIGHT ANKLE AN              

      

 

             2020           THA ODOM MD, Ot           B95.62   

        

METHICILLIN RESIS STAPH INFCT CAUSING DI                    

 

             2020           THA ODOM MD           Ot           B96.5    

       

PSEUDOMONAS (MALLEI) CAUSING DISEASES CL                    

 

             2020           THA ODOM MD, Ot           E11.42   

        TYPE 

2 DIABETES MELLITUS WITH DIABETIC P                    

 

             2020           THA ODOM MD, Ot           E11.621  

         TYPE

2 DIABETES MELLITUS WITH FOOT ULCER                    

 

             2020           THA ODOM MD           Ot           I70.234  

         

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                    

 

             2020           THA ODOM MD           Ot           L97.414  

         NON-

PRS CHR ULCER OF RIGHT HEEL AND MIDF                    

 

             2020           THA ODOM MD           Ot           M86.171  

         

OTHER ACUTE OSTEOMYELITIS, RIGHT ANKLE A                    

 

             2020           THA ODOM MD, Ot           B95.62   

        

METHICILLIN RESIS STAPH INFCT CAUSING DI                    

 

             2020           THA ODOM MD, Ot           B96.5    

       

PSEUDOMONAS (MALLEI) CAUSING DISEASES CL                    

 

             2020           THA ODOM MD, Ot           E11.42   

        TYPE 

2 DIABETES MELLITUS WITH DIABETIC P                    

 

             2020           THA ODOM MD, Ot           E11.621  

         TYPE

2 DIABETES MELLITUS WITH FOOT ULCER                    

 

             2020           THA ODOM MD           Ot           I70.234  

         

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                    

 

             2020           THA ODOM MD           Ot           L97.414  

         NON-

PRS CHR ULCER OF RIGHT HEEL AND MIDF                    

 

             2020           THA ODOM MD           Ot           M86.171  

         

OTHER ACUTE OSTEOMYELITIS, RIGHT ANKLE A                    

 

             2020           ELISE RODRIGUEZ MD           Ot           E11

.9           

TYPE 2 DIABETES MELLITUS WITHOUT COMPLIC                    

 

             2020           ELISE RODRIGUEZ MD, Ot           E66

.9           

OBESITY, UNSPECIFIED                             

 

             2020           ELISE RODRIGUEZ MD           Ot           I10

           

ESSENTIAL (PRIMARY) HYPERTENSION                    

 

                2020           ELISE RODRIGUEZ MD           Ot           

   I25.10          

                          ATHSCL HEART DISEASE OF NATIVE CORONARY               

      

 

             2020           ELISE RODRIGUEZ MD           Ot           I99

.8           

OTHER DISORDER OF CIRCULATORY SYSTEM                    

 

                2020           ELISE RODRIGUEZ MD           Ot           

   Z01.810         

                          ENCOUNTER FOR PREPROCEDURAL CARDIOVASCUL              

      

 

             2020           ELISE RODRIGUEZ MD           Ot           Z72

.0           

TOBACCO USE                                      

 

             2020           ELISE RODRIGUEZ MD           Ot           E11

.9           

TYPE 2 DIABETES MELLITUS WITHOUT COMPLIC                    

 

             2020           ELISE RODRIGUEZ MD           Ot           E66

.9           

OBESITY, UNSPECIFIED                             

 

             2020           ELISE RODRIGUEZ MD           Ot           I10

           

ESSENTIAL (PRIMARY) HYPERTENSION                    

 

                2020           ELISE RODRIGUEZ MD           Ot           

   I25.10          

                          ATHSCL HEART DISEASE OF NATIVE CORONARY               

      

 

             2020           ELISE RODRIGUEZ MD           Ot           I99

.8           

OTHER DISORDER OF CIRCULATORY SYSTEM                    

 

                2020           ELISE RODRIGUEZ MD           Ot           

   Z01.810         

                          ENCOUNTER FOR PREPROCEDURAL CARDIOVASCUL              

      

 

             2020           ELISE RODRIGUEZ MD           Ot           Z72

.0           

TOBACCO USE                                      

 

                2020           YULISA DENNIS MD           Ot           

   E11.42          

                          TYPE 2 DIABETES MELLITUS WITH DIABETIC P              

      

 

                2020           YULISA DENNIS MD, Ot           

   E11.52          

                          TYPE 2 DIABETES W DIABETIC PERIPHERAL AN              

      

 

                2020           YULISA DENNIS MD, Ot           

   E11.621         

                          TYPE 2 DIABETES MELLITUS WITH FOOT ULCER              

      

 

                2020           YULISA DENNIS MD, Ot           

   I70.234         

                          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              

      

 

             2020           YULISA DENNIS MD           Ot           I96

           

GANGRENE, NOT ELSEWHERE CLASSIFIED                    

 

                2020           YULISA DENNIS MD, Ot           

   L97.412         

                          NON-PRS CHR ULCER OF RIGHT HEEL AND MIDF              

      

 

                2020           YULISA DENNIS MD, Ot           

   M65.071         

                          ABSCESS OF TENDON SHEATH, RIGHT ANKLE AN              

      

 

                2020           YULISA DENNIS MD           Ot           

   B95.62          

                          METHICILLIN RESIS STAPH INFCT CAUSING DI              

      

 

             2020           YULISA DENNIS MD, Ot           B96

.5           

PSEUDOMONAS (MALLEI) CAUSING DISEASES CL                    

 

                2020           YULISA DENNIS MD           Ot           

   E11.42          

                          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              

      

 

                2020           YULISA DENNIS MD, Ot           

   E11.621         

                          TYPE 2 DIABETES MELLITUS WITH FOOT ULCER              

      

 

                2020           YULISA DENNIS MD           Ot           

   I70.234         

                          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              

      

 

                2020           YULISA DENNIS MD           Ot           

   L97.416         

                          NON-PRS CHR ULC OF R HEEL/MIDFT W BNE IN              

      

 

                2020           YULISA DENNIS MD, Ot           

   B95.62          

                          METHICILLIN RESIS STAPH INFCT CAUSING DI              

      

 

             2020           YULISA DENNIS MD, Ot           B96

.5           

PSEUDOMONAS (MALLEI) CAUSING DISEASES CL                    

 

                2020           YULISA DENNIS MD, Ot           

   E11.42          

                          TYPE 2 DIABETES MELLITUS WITH DIABETIC P              

      

 

                2020           YULISA DENNIS MD, Ot           

   E11.621         

                          TYPE 2 DIABETES MELLITUS WITH FOOT ULCER              

      

 

                2020           YULISA DENNIS MD           Ot           

   I70.234         

                          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              

      

 

                2020           YULISA DENNIS MD           Ot           

   L97.416         

                          NON-PRS CHR ULC OF R HEEL/MIDFT W BNE IN              

      

 

                2020           YULISA DENNIS MD, Ot           

   B95.62          

                          METHICILLIN RESIS STAPH INFCT CAUSING DI              

      

 

             2020           YULISA DENNIS MD           Ot           B96

.5           

PSEUDOMONAS (MALLEI) CAUSING DISEASES CL                    

 

                2020           YULISA DENNIS MD           Ot           

   E11.42          

                          TYPE 2 DIABETES MELLITUS WITH DIABETIC P              

      

 

                2020           YULISA DENNIS MD           Ot           

   E11.621         

                          TYPE 2 DIABETES MELLITUS WITH FOOT ULCER              

      

 

                2020           YULISA DENNIS MD           Ot           

   I70.234         

                          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              

      

 

                2020           YULISA DENNIS MD           Ot           

   L97.416         

                          NON-PRS CHR ULC OF R HEEL/MIDFT W BNE IN              

      

 

             2020           MARIELY DELANEY MD           Ot           E86

.0           

DEHYDRATION                                      

 

             2020           MARIELY DELANEY MD           Ot           N17

.9           

ACUTE KIDNEY FAILURE, UNSPECIFIED                    

 

                2020           MARIELY DELANEY MD           Ot           

   R41.82          

                          ALTERED MENTAL STATUS, UNSPECIFIED                    

 

             2020           RHETT MD, MARIELY N           Ot           E86

.0           

DEHYDRATION                                      

 

             2020           MARIELY DELANEY MD           Ot           N17

.9           

ACUTE KIDNEY FAILURE, UNSPECIFIED                    

 

                2020           MARIELY DELANEY MD           Ot           

   R41.82          

                          ALTERED MENTAL STATUS, UNSPECIFIED                    

 

             04/15/2020           MARIELY DELANEY MD           Ot           E86

.0           

DEHYDRATION                                      

 

             04/15/2020           MARIELY DELANEY MD           Ot           N17

.9           

ACUTE KIDNEY FAILURE, UNSPECIFIED                    

 

                04/15/2020           MARIELY DELANEY MD           Ot           

   R41.82          

                          ALTERED MENTAL STATUS, UNSPECIFIED                    

 

             2020           MARIELY DELANEY MD           Ot           E11

.9           

TYPE 2 DIABETES MELLITUS WITHOUT COMPLIC                    

 

             2020           MARIELY DELANEY MD           Ot           E66

.9           

OBESITY, UNSPECIFIED                             

 

                2020           MARIELY DELANEY MD           Ot           

   E78.00          

                          PURE HYPERCHOLESTEROLEMIA, UNSPECIFIED                

    

 

                2020           MARIELY DELANEY MD           Ot           

   E83.42          

                          HYPOMAGNESEMIA                     

 

             2020           MARIELY DELANEY MD           Ot           E86

.0           

DEHYDRATION                                      

 

             2020           MARIELY DELANEY MD           Ot           E87

.5           

HYPERKALEMIA                                     

 

                2020           MARIELY DELANEY MD           Ot           

   F17.210         

                          NICOTINE DEPENDENCE, CIGARETTES, UNCOMPL              

      

 

             2020           MARIELY DELANEY MD           Ot           I07

.1           

RHEUMATIC TRICUSPID INSUFFICIENCY                    

 

             2020           MARIELY DELANEY MD           Ot           I12

.9           

HYPERTENSIVE CHRONIC KIDNEY DISEASE W ST                    

 

                2020           MARIELY DELANEY MD           Ot           

   I21.A1          

                          MYOCARDIAL INFARCTION TYPE 2                    

 

                2020           MARIELY DELANEY MD           Ot           

   I25.10          

                          ATHSCL HEART DISEASE OF NATIVE CORONARY               

      

 

                2020           MARIELY DELANEY MD           Ot           

   I45.10          

                          UNSPECIFIED RIGHT BUNDLE-BRANCH BLOCK                 

   

 

             2020           MARIELY DELANEY MD           Ot           I73

.9           

PERIPHERAL VASCULAR DISEASE, UNSPECIFIED                    

 

             2020           MARIELY DELANEY MD           Ot           J44

.9           

CHRONIC OBSTRUCTIVE PULMONARY DISEASE, U                    

 

             2020           MARIELY DELANEY MD           Ot           J69

.0           

PNEUMONITIS DUE TO INHALATION OF FOOD AN                    

 

                2020           MARIELY DELANEY MD           Ot           

   J96.01          

                          ACUTE RESPIRATORY FAILURE WITH HYPOXIA                

    

 

                2020           MARIELY DELANEY MD, Ot           

   J96.02          

                          ACUTE RESPIRATORY FAILURE WITH HYPERCAPN              

      

 

                2020           MARIELY DELANEY MD, Ot           

   M19.91          

                          PRIMARY OSTEOARTHRITIS, UNSPECIFIED SITE              

      

 

             2020           MARIELY DELANEY MD, Ot           N17

.9           

ACUTE KIDNEY FAILURE, UNSPECIFIED                    

 

             2020           MARIELY DELANEY MD, Ot           N18

.9           

CHRONIC KIDNEY DISEASE, UNSPECIFIED                    

 

                2020           MARIELY DELANEY MD, Ot           

   R41.82          

                          ALTERED MENTAL STATUS, UNSPECIFIED                    

 

             2020           MARIELY DELANEY MD, Ot           R57

.9           

SHOCK, UNSPECIFIED                               

 

                2020           MARIELY DELANEY MD, Ot           

   Z68.34          

                          BODY MASS INDEX (BMI) 34.0-34.9, ADULT                

    

 

                2020           MARIELY DELANEY MD, Ot           

   Z79.84          

                          LONG TERM (CURRENT) USE OF ORAL HYPOGLYC              

      

 

                2020           MARILEY DELANEY MD, Ot           

   Z85.46          

                          PERSONAL HISTORY OF MALIGNANT NEOPLASM O              

      

 

                2020           MARIELY DELANEY MD, Ot           

   Z86.73          

                          PRSNL HX OF TIA (TIA), AND CEREB INFRC W              

      

 

                2020           MARIELY DELANEY MD, Ot           

   Z89.431         

                          ACQUIRED ABSENCE OF RIGHT FOOT                    

 

             2020           MARIELY DELANEY MD           Ot           Z95

.5           

PRESENCE OF CORONARY ANGIOPLASTY IMPLANT                    

 

             2020           MARIELY DELANEY MD, Ot           E11

.9           

TYPE 2 DIABETES MELLITUS WITHOUT COMPLIC                    

 

             2020           MARIELY DELANEY MD, Ot           E66

.9           

OBESITY, UNSPECIFIED                             

 

                2020           MARIELY DELANEY MD           Ot           

   E78.00          

                          PURE HYPERCHOLESTEROLEMIA, UNSPECIFIED                

    

 

                2020           MARIELY DELANEY MD           Ot           

   E83.42          

                          HYPOMAGNESEMIA                     

 

             2020           MARIELY DELANEY MD, Ot           E86

.0           

DEHYDRATION                                      

 

             2020           MARIELY DELANEY MD           Ot           E87

.5           

HYPERKALEMIA                                     

 

                2020           MARIELY DELANEY MD           Ot           

   F17.210         

                          NICOTINE DEPENDENCE, CIGARETTES, UNCOMPL              

      

 

             2020           MARIELY DELANEY MD           Ot           I07

.1           

RHEUMATIC TRICUSPID INSUFFICIENCY                    

 

             2020           MARIELY DELANEY MD           Ot           I12

.9           

HYPERTENSIVE CHRONIC KIDNEY DISEASE W ST                    

 

                2020           MARIELY DELANEY MD, Ot           

   I21.A1          

                          MYOCARDIAL INFARCTION TYPE 2                    

 

                2020           MARIELY DELANEY MD, Ot           

   I25.10          

                          ATHSCL HEART DISEASE OF NATIVE CORONARY               

      

 

                2020           MARIELY DELANEY MD, Ot           

   I45.10          

                          UNSPECIFIED RIGHT BUNDLE-BRANCH BLOCK                 

   

 

             2020           MARIELY DELANEY MD, Ot           I73

.9           

PERIPHERAL VASCULAR DISEASE, UNSPECIFIED                    

 

             2020           MARIELY DELANEY MD, Ot           J44

.9           

CHRONIC OBSTRUCTIVE PULMONARY DISEASE, U                    

 

             2020           MARIELY DELANEY MD, Ot           J69

.0           

PNEUMONITIS DUE TO INHALATION OF FOOD AN                    

 

                2020           MARIELY DELANEY MD, Ot           

   J96.01          

                          ACUTE RESPIRATORY FAILURE WITH HYPOXIA                

    

 

                2020           MARIELY DELANEY MD, Ot           

   J96.02          

                          ACUTE RESPIRATORY FAILURE WITH HYPERCAPN              

      

 

                2020           MARIELY DELANEY MD, Ot           

   M19.91          

                          PRIMARY OSTEOARTHRITIS, UNSPECIFIED SITE              

      

 

             2020           MARIELY DELANEY MD, Ot           N17

.9           

ACUTE KIDNEY FAILURE, UNSPECIFIED                    

 

             2020           MARIELY DELANEY MD, Ot           N18

.9           

CHRONIC KIDNEY DISEASE, UNSPECIFIED                    

 

                2020           MARIELY DELANEY MD, Ot           

   R41.82          

                          ALTERED MENTAL STATUS, UNSPECIFIED                    

 

             2020           MARIELY DELANEY MD, Ot           R57

.9           

SHOCK, UNSPECIFIED                               

 

                2020           MARIELY DELANEY MD, Ot           

   Z68.34          

                          BODY MASS INDEX (BMI) 34.0-34.9, ADULT                

    

 

                2020           MARIELY DELANEY MD, Ot           

   Z79.84          

                          LONG TERM (CURRENT) USE OF ORAL HYPOGLYC              

      

 

                2020           MARIELY DELANEY MD, Ot           

   Z85.46          

                          PERSONAL HISTORY OF MALIGNANT NEOPLASM O              

      

 

                2020           MARIELY DELANEY MD, Ot           

   Z86.73          

                          PRSNL HX OF TIA (TIA), AND CEREB INFRC W              

      

 

                2020           MARIELY DELANEY MD, Ot           

   Z89.431         

                          ACQUIRED ABSENCE OF RIGHT FOOT                    

 

             2020           MARIELY DELANEY MD, Ot           Z95

.5           

PRESENCE OF CORONARY ANGIOPLASTY IMPLANT                    

 

             2020           MARIELY DELANEY MD, Ot           E11

.9           

TYPE 2 DIABETES MELLITUS WITHOUT COMPLIC                    

 

             2020           MARIELY DELANEY MD           Ot           E66

.9           

OBESITY, UNSPECIFIED                             

 

                2020           MARIELY DELANEY MD           Ot           

   E78.00          

                          PURE HYPERCHOLESTEROLEMIA, UNSPECIFIED                

    

 

                2020           MARIELY DELANEY MD           Ot           

   E83.42          

                          HYPOMAGNESEMIA                     

 

             2020           MARIELY DELANEY MD           Ot           E86

.0           

DEHYDRATION                                      

 

             2020           MARIELY DELANEY MD           Ot           E87

.5           

HYPERKALEMIA                                     

 

                2020           MARIELY DELANEY MD           Ot           

   F17.210         

                          NICOTINE DEPENDENCE, CIGARETTES, UNCOMPL              

      

 

             2020           MARIELY DELANEY MD           Ot           I07

.1           

RHEUMATIC TRICUSPID INSUFFICIENCY                    

 

             2020           MARIELY DELANEY MD           Ot           I12

.9           

HYPERTENSIVE CHRONIC KIDNEY DISEASE W ST                    

 

                2020           MARIELY DELANEY MD           Ot           

   I21.A1          

                          MYOCARDIAL INFARCTION TYPE 2                    

 

                2020           MARIELY DELANEY MD           Ot           

   I25.10          

                          ATHSCL HEART DISEASE OF NATIVE CORONARY               

      

 

                2020           MARIELY DELANEY MD           Ot           

   I45.10          

                          UNSPECIFIED RIGHT BUNDLE-BRANCH BLOCK                 

   

 

             2020           MARIELY DELANEY MD           Ot           I73

.9           

PERIPHERAL VASCULAR DISEASE, UNSPECIFIED                    

 

             2020           MARIELY DELANEY MD           Ot           J44

.9           

CHRONIC OBSTRUCTIVE PULMONARY DISEASE, U                    

 

             2020           MARIELY DELANEY MD           Ot           J69

.0           

PNEUMONITIS DUE TO INHALATION OF FOOD AN                    

 

                2020           MARIELY DELANEY MD           Ot           

   J96.01          

                          ACUTE RESPIRATORY FAILURE WITH HYPOXIA                

    

 

                2020           MARIELY DELANEY MD           Ot           

   J96.02          

                          ACUTE RESPIRATORY FAILURE WITH HYPERCAPN              

      

 

                2020           MARIELY DELANEY MD           Ot           

   M19.91          

                          PRIMARY OSTEOARTHRITIS, UNSPECIFIED SITE              

      

 

             2020           MARIELY DELANEY MD           Ot           N17

.9           

ACUTE KIDNEY FAILURE, UNSPECIFIED                    

 

             2020           MARIELY DELANEY MD           Ot           N18

.9           

CHRONIC KIDNEY DISEASE, UNSPECIFIED                    

 

                2020           MARIELY DELANEY MD           Ot           

   R41.82          

                          ALTERED MENTAL STATUS, UNSPECIFIED                    

 

             2020           MARIELY DELANEY MD           Ot           R57

.9           

SHOCK, UNSPECIFIED                               

 

                2020           MARIELY DELANEY MD, Ot           

   Z68.34          

                          BODY MASS INDEX (BMI) 34.0-34.9, ADULT                

    

 

                2020           MARIELY DELANEY MD, Ot           

   Z79.84          

                          LONG TERM (CURRENT) USE OF ORAL HYPOGLYC              

      

 

                2020           MARIELY DELANEY MD, Ot           

   Z85.46          

                          PERSONAL HISTORY OF MALIGNANT NEOPLASM O              

      

 

                2020           MARIELY DELANEY MD, Ot           

   Z86.73          

                          PRSNL HX OF TIA (TIA), AND CEREB INFRC W              

      

 

                2020           MARIELY DELANEY MD, Ot           

   Z89.431         

                          ACQUIRED ABSENCE OF RIGHT FOOT                    

 

             2020           MARIELY DELANEY MD, Ot           Z95

.5           

PRESENCE OF CORONARY ANGIOPLASTY IMPLANT                    

 

             2020           MARIELY DELANEY MD, Ot           E11

.9           

TYPE 2 DIABETES MELLITUS WITHOUT COMPLIC                    

 

             2020           MARIELY DELANEY MD, Ot           E66

.9           

OBESITY, UNSPECIFIED                             

 

                2020           MARIELY DELANEY MD, Ot           

   E78.00          

                          PURE HYPERCHOLESTEROLEMIA, UNSPECIFIED                

    

 

                2020           MARIELY DELANEY MD, Ot           

   E83.42          

                          HYPOMAGNESEMIA                     

 

             2020           MARIELY DELANEY MD, Ot           E86

.0           

DEHYDRATION                                      

 

             2020           MARIELY DELANEY MD, Ot           E87

.5           

HYPERKALEMIA                                     

 

                2020           MARIELY DELANEY MD, Ot           

   F17.210         

                          NICOTINE DEPENDENCE, CIGARETTES, UNCOMPL              

      

 

             2020           MARIELY DELANEY MD, Ot           I07

.1           

RHEUMATIC TRICUSPID INSUFFICIENCY                    

 

             2020           MARIELY DELANEY MD, Ot           I12

.9           

HYPERTENSIVE CHRONIC KIDNEY DISEASE W ST                    

 

                2020           MARIELY DELANEY MD, Ot           

   I21.A1          

                          MYOCARDIAL INFARCTION TYPE 2                    

 

                2020           MARIELY DELANEY MD, Ot           

   I25.10          

                          ATHSCL HEART DISEASE OF NATIVE CORONARY               

      

 

                2020           MARIELY DELANEY MD, Ot           

   I45.10          

                          UNSPECIFIED RIGHT BUNDLE-BRANCH BLOCK                 

   

 

             2020           MARIELY DELANEY MD, Ot           I73

.9           

PERIPHERAL VASCULAR DISEASE, UNSPECIFIED                    

 

             2020           MARIELY DELANEY MD, Ot           J44

.9           

CHRONIC OBSTRUCTIVE PULMONARY DISEASE, U                    

 

             2020           MARIELY DELANEY MD, Ot           J69

.0           

PNEUMONITIS DUE TO INHALATION OF FOOD AN                    

 

                2020           MARIELY DELANEY MD, Ot           

   J96.01          

                          ACUTE RESPIRATORY FAILURE WITH HYPOXIA                

    

 

                2020           MARIELY DELANEY MD, Ot           

   J96.02          

                          ACUTE RESPIRATORY FAILURE WITH HYPERCAPN              

      

 

                2020           MARIELY DELANEY MD, Ot           

   M19.91          

                          PRIMARY OSTEOARTHRITIS, UNSPECIFIED SITE              

      

 

             2020           MARIELY DELANEY MD, Ot           N17

.9           

ACUTE KIDNEY FAILURE, UNSPECIFIED                    

 

             2020           MARIELY DELANEY MD, Ot           N18

.9           

CHRONIC KIDNEY DISEASE, UNSPECIFIED                    

 

                2020           MARIELY DELANEY MD, Ot           

   R41.82          

                          ALTERED MENTAL STATUS, UNSPECIFIED                    

 

             2020           MARIELY DELANEY MD, Ot           R57

.9           

SHOCK, UNSPECIFIED                               

 

                2020           MARIELY DELANEY MD, Ot           

   Z68.34          

                          BODY MASS INDEX (BMI) 34.0-34.9, ADULT                

    

 

                2020           MARIELY DELANEY MD, Ot           

   Z79.84          

                          LONG TERM (CURRENT) USE OF ORAL HYPOGLYC              

      

 

                2020           MARIELY DELANEY MD, Ot           

   Z85.46          

                          PERSONAL HISTORY OF MALIGNANT NEOPLASM O              

      

 

                2020           MARIELY DELANEY MD, Ot           

   Z86.73          

                          PRSNL HX OF TIA (TIA), AND CEREB INFRC W              

      

 

                2020           AMRIELY DELANEY MD, Ot           

   Z89.431         

                          ACQUIRED ABSENCE OF RIGHT FOOT                    

 

             2020           MARIELY DELANEY MD, Ot           Z95

.5           

PRESENCE OF CORONARY ANGIOPLASTY IMPLANT                    

 

             2020           MARIELY DELANEY MD, Ot           E11

.9           

TYPE 2 DIABETES MELLITUS WITHOUT COMPLIC                    

 

             2020           MARIELY DELANEY MD           Ot           E66

.9           

OBESITY, UNSPECIFIED                             

 

                2020           MARIELY DELANEY MD           Ot           

   E78.00          

                          PURE HYPERCHOLESTEROLEMIA, UNSPECIFIED                

    

 

                2020           MARIELY DELANEY MD, Ot           

   E83.42          

                          HYPOMAGNESEMIA                     

 

             2020           MARIELY DELANEY MD           Ot           E86

.0           

DEHYDRATION                                      

 

             2020           MARIELY DELANEY MD, Ot           E87

.5           

HYPERKALEMIA                                     

 

                2020           MARIELY DELANEY MD           Ot           

   F17.210         

                          NICOTINE DEPENDENCE, CIGARETTES, UNCOMPL              

      

 

             2020           MARIELY DELANEY MD, Ot           I07

.1           

RHEUMATIC TRICUSPID INSUFFICIENCY                    

 

             2020           MARIELY DELANEY MD, Ot           I12

.9           

HYPERTENSIVE CHRONIC KIDNEY DISEASE W ST                    

 

                2020           MARIELY DELANEY MD, Ot           

   I21.A1          

                          MYOCARDIAL INFARCTION TYPE 2                    

 

                2020           MARIELY DELANEY MD, Ot           

   I25.10          

                          ATHSCL HEART DISEASE OF NATIVE CORONARY               

      

 

                2020           MARIELY DELANEY MD, Ot           

   I45.10          

                          UNSPECIFIED RIGHT BUNDLE-BRANCH BLOCK                 

   

 

             2020           MARIELY DELANEY MD, Ot           I73

.9           

PERIPHERAL VASCULAR DISEASE, UNSPECIFIED                    

 

             2020           MARIELY DELANEY MD, Ot           J44

.9           

CHRONIC OBSTRUCTIVE PULMONARY DISEASE, U                    

 

             2020           MARIELY DELANEY MD, Ot           J69

.0           

PNEUMONITIS DUE TO INHALATION OF FOOD AN                    

 

                2020           MARIELY DELANEY MD, Ot           

   J96.01          

                          ACUTE RESPIRATORY FAILURE WITH HYPOXIA                

    

 

                2020           MARIELY DELANEY MD, Ot           

   J96.02          

                          ACUTE RESPIRATORY FAILURE WITH HYPERCAPN              

      

 

                2020           MARIELY DELANEY MD, Ot           

   M19.91          

                          PRIMARY OSTEOARTHRITIS, UNSPECIFIED SITE              

      

 

             2020           MARIELY DELANEY MD, Ot           N17

.9           

ACUTE KIDNEY FAILURE, UNSPECIFIED                    

 

             2020           MARIELY DELANEY MD, Ot           N18

.9           

CHRONIC KIDNEY DISEASE, UNSPECIFIED                    

 

                2020           MARIELY DELANEY MD, Ot           

   R41.82          

                          ALTERED MENTAL STATUS, UNSPECIFIED                    

 

             2020           MARIELY DELANEY MD, Ot           R57

.9           

SHOCK, UNSPECIFIED                               

 

                2020           MARIELY DELANEY MD, Ot           

   Z68.34          

                          BODY MASS INDEX (BMI) 34.0-34.9, ADULT                

    

 

                2020           MARIELY DELANEY MD, Ot           

   Z79.84          

                          LONG TERM (CURRENT) USE OF ORAL HYPOGLYC              

      

 

                2020           MARIELY DELANEY MD, Ot           

   Z85.46          

                          PERSONAL HISTORY OF MALIGNANT NEOPLASM O              

      

 

                2020           MARIELY DELANEY MD, Ot           

   Z86.73          

                          PRSNL HX OF TIA (TIA), AND CEREB INFRC W              

      

 

                2020           MARIELY DELANEY MD, Ot           

   Z89.431         

                          ACQUIRED ABSENCE OF RIGHT FOOT                    

 

             2020           RHETT MD, MARIELY N           Ot           Z95

.5           

PRESENCE OF CORONARY ANGIOPLASTY IMPLANT                    

 

             2020           MARIELY DELANEY MD           Ot           E11

.9           

TYPE 2 DIABETES MELLITUS WITHOUT COMPLIC                    

 

             2020           MARIELY DELANEY MD           Ot           E66

.9           

OBESITY, UNSPECIFIED                             

 

                2020           MARIELY DELANEY MD           Ot           

   E78.00          

                          PURE HYPERCHOLESTEROLEMIA, UNSPECIFIED                

    

 

                2020           MARIELY DELANEY MD           Ot           

   E83.42          

                          HYPOMAGNESEMIA                     

 

             2020           MARIELY DELANEY MD           Ot           E86

.0           

DEHYDRATION                                      

 

             2020           MARIELY DELANEY MD           Ot           E87

.5           

HYPERKALEMIA                                     

 

                2020           MARIELY DELANEY MD           Ot           

   F17.210         

                          NICOTINE DEPENDENCE, CIGARETTES, UNCOMPL              

      

 

             2020           MARIELY DELANEY MD           Ot           I07

.1           

RHEUMATIC TRICUSPID INSUFFICIENCY                    

 

             2020           MARIELY DELANEY MD           Ot           I12

.9           

HYPERTENSIVE CHRONIC KIDNEY DISEASE W ST                    

 

                2020           MARIELY DELANEY MD           Ot           

   I21.A1          

                          MYOCARDIAL INFARCTION TYPE 2                    

 

                2020           MARIELY DELANEY MD           Ot           

   I25.10          

                          ATHSCL HEART DISEASE OF NATIVE CORONARY               

      

 

                2020           MARIELY DELANEY MD           Ot           

   I45.10          

                          UNSPECIFIED RIGHT BUNDLE-BRANCH BLOCK                 

   

 

             2020           MARIELY DELANEY MD           Ot           I73

.9           

PERIPHERAL VASCULAR DISEASE, UNSPECIFIED                    

 

             2020           MARIELY DELANEY MD           Ot           J44

.9           

CHRONIC OBSTRUCTIVE PULMONARY DISEASE, U                    

 

             2020           MARIELY DELANEY MD           Ot           J69

.0           

PNEUMONITIS DUE TO INHALATION OF FOOD AN                    

 

                2020           MARIELY DELANEY MD           Ot           

   J96.01          

                          ACUTE RESPIRATORY FAILURE WITH HYPOXIA                

    

 

                2020           MARIELY DELANEY MD           Ot           

   J96.02          

                          ACUTE RESPIRATORY FAILURE WITH HYPERCAPN              

      

 

                2020           MARIELY DELANEY MD           Ot           

   M19.91          

                          PRIMARY OSTEOARTHRITIS, UNSPECIFIED SITE              

      

 

             2020           MARIELY DELANEY MD           Ot           N17

.9           

ACUTE KIDNEY FAILURE, UNSPECIFIED                    

 

             2020           MARIELY DELANEY MD           Ot           N18

.9           

CHRONIC KIDNEY DISEASE, UNSPECIFIED                    

 

                2020           MARIELY DELANEY MD           Ot           

   R41.82          

                          ALTERED MENTAL STATUS, UNSPECIFIED                    

 

             2020           MARIELY DELANEY MD, Ot           R57

.9           

SHOCK, UNSPECIFIED                               

 

                2020           MARIELY DELANEY MD, Ot           

   Z68.34          

                          BODY MASS INDEX (BMI) 34.0-34.9, ADULT                

    

 

                2020           MARIELY DELANEY MD, Ot           

   Z79.84          

                          LONG TERM (CURRENT) USE OF ORAL HYPOGLYC              

      

 

                2020           MARIELY DEALNEY MD, Ot           

   Z85.46          

                          PERSONAL HISTORY OF MALIGNANT NEOPLASM O              

      

 

                2020           MARIELY DELANEY MD, Ot           

   Z86.73          

                          PRSNL HX OF TIA (TIA), AND CEREB INFRC W              

      

 

                2020           MARIELY DELANEY MD, Ot           

   Z89.431         

                          ACQUIRED ABSENCE OF RIGHT FOOT                    

 

             2020           MARIELY DELANEY MD, Ot           Z95

.5           

PRESENCE OF CORONARY ANGIOPLASTY IMPLANT                    

 

             2020           MARIELY DELANEY MD, Ot           E11

.9           

TYPE 2 DIABETES MELLITUS WITHOUT COMPLIC                    

 

             2020           MARIELY DELANEY MD           Ot           E66

.9           

OBESITY, UNSPECIFIED                             

 

                2020           MARIELY DELANEY MD           Ot           

   E78.00          

                          PURE HYPERCHOLESTEROLEMIA, UNSPECIFIED                

    

 

                2020           MARIELY DELANEY MD, Ot           

   E83.42          

                          HYPOMAGNESEMIA                     

 

             2020           MARIELY DELANEY MD           Ot           E86

.0           

DEHYDRATION                                      

 

             2020           MARIELY DELANEY MD           Ot           E87

.5           

HYPERKALEMIA                                     

 

                2020           MARIELY DELANEY MD           Ot           

   F17.210         

                          NICOTINE DEPENDENCE, CIGARETTES, UNCOMPL              

      

 

             2020           MARIELY DELANEY MD           Ot           I07

.1           

RHEUMATIC TRICUSPID INSUFFICIENCY                    

 

             2020           MARIELY DELANEY MD           Ot           I12

.9           

HYPERTENSIVE CHRONIC KIDNEY DISEASE W ST                    

 

                2020           MARIELY DELANEY MD, Ot           

   I21.A1          

                          MYOCARDIAL INFARCTION TYPE 2                    

 

                2020           MARIELY DELANEY MD, Ot           

   I25.10          

                          ATHSCL HEART DISEASE OF NATIVE CORONARY               

      

 

                2020           MARIELY DELANEY MD, Ot           

   I45.10          

                          UNSPECIFIED RIGHT BUNDLE-BRANCH BLOCK                 

   

 

             2020           MAIRELY DELANEY MD, Ot           I73

.9           

PERIPHERAL VASCULAR DISEASE, UNSPECIFIED                    

 

             2020           MARIELY DELANEY MD, Ot           J44

.9           

CHRONIC OBSTRUCTIVE PULMONARY DISEASE, U                    

 

             2020           MARIELY DELANEY MD, Ot           J69

.0           

PNEUMONITIS DUE TO INHALATION OF FOOD AN                    

 

                2020           MARIELY DELANEY MD, Ot           

   J96.01          

                          ACUTE RESPIRATORY FAILURE WITH HYPOXIA                

    

 

                2020           MARIELY DELANEY MD, Ot           

   J96.02          

                          ACUTE RESPIRATORY FAILURE WITH HYPERCAPN              

      

 

                2020           MARIELY DELANEY MD, Ot           

   M19.91          

                          PRIMARY OSTEOARTHRITIS, UNSPECIFIED SITE              

      

 

             2020           MARIELY DELANEY MD, Ot           N17

.9           

ACUTE KIDNEY FAILURE, UNSPECIFIED                    

 

             2020           MARIELY DELANEY MD, Ot           N18

.9           

CHRONIC KIDNEY DISEASE, UNSPECIFIED                    

 

                2020           MARIELY DELANEY MD, Ot           

   R41.82          

                          ALTERED MENTAL STATUS, UNSPECIFIED                    

 

             2020           MARIELY DELANEY MD, Ot           R57

.9           

SHOCK, UNSPECIFIED                               

 

                2020           MARIELY DELANEY MD, Ot           

   Z68.34          

                          BODY MASS INDEX (BMI) 34.0-34.9, ADULT                

    

 

                2020           MARIELY DELANEY MD, Ot           

   Z79.84          

                          LONG TERM (CURRENT) USE OF ORAL HYPOGLYC              

      

 

                2020           MARIELY DELANEY MD, Ot           

   Z85.46          

                          PERSONAL HISTORY OF MALIGNANT NEOPLASM O              

      

 

                2020           MARIELY DELANEY MD, Ot           

   Z86.73          

                          PRSNL HX OF TIA (TIA), AND CEREB INFRC W              

      

 

                2020           MARIELY DELANEY MD, Ot           

   Z89.431         

                          ACQUIRED ABSENCE OF RIGHT FOOT                    

 

             2020           MARIELY DELANEY MD, Ot           Z95

.5           

PRESENCE OF CORONARY ANGIOPLASTY IMPLANT                    

 

             2020           MARIELY DELANEY MD, Ot           E11

.9           

TYPE 2 DIABETES MELLITUS WITHOUT COMPLIC                    

 

             2020           MARIELY DELANEY MD, Ot           E66

.9           

OBESITY, UNSPECIFIED                             

 

                2020           MARIELY DELANEY MD, Ot           

   E78.00          

                          PURE HYPERCHOLESTEROLEMIA, UNSPECIFIED                

    

 

                2020           MARIELY DELANEY MD, Ot           

   E83.42          

                          HYPOMAGNESEMIA                     

 

             2020           MARIELY DELANEY MD           Ot           E86

.0           

DEHYDRATION                                      

 

             2020           MARIELY DELANEY MD, Ot           E87

.5           

HYPERKALEMIA                                     

 

                2020           MARIELY DELANEY MD, Ot           

   F17.210         

                          NICOTINE DEPENDENCE, CIGARETTES, UNCOMPL              

      

 

             2020           MARIELY DELANEY MD, Ot           I07

.1           

RHEUMATIC TRICUSPID INSUFFICIENCY                    

 

             2020           MARIELY DELANEY MD, Ot           I12

.9           

HYPERTENSIVE CHRONIC KIDNEY DISEASE W ST                    

 

                2020           MARIELY DELANEY MD, Ot           

   I21.A1          

                          MYOCARDIAL INFARCTION TYPE 2                    

 

                2020           MARIELY DELANEY MD, Ot           

   I25.10          

                          ATHSCL HEART DISEASE OF NATIVE CORONARY               

      

 

                2020           MARIELY DELANEY MD, Ot           

   I45.10          

                          UNSPECIFIED RIGHT BUNDLE-BRANCH BLOCK                 

   

 

             2020           MARIELY DELANEY MD, Ot           I73

.9           

PERIPHERAL VASCULAR DISEASE, UNSPECIFIED                    

 

             2020           MARIELY DELANEY MD, Ot           J44

.9           

CHRONIC OBSTRUCTIVE PULMONARY DISEASE, U                    

 

             2020           MARIELY DELANEY MD, Ot           J69

.0           

PNEUMONITIS DUE TO INHALATION OF FOOD AN                    

 

                2020           MARIELY DELANEY MD, Ot           

   J96.01          

                          ACUTE RESPIRATORY FAILURE WITH HYPOXIA                

    

 

                2020           MARIELY DELANEY MD, Ot           

   J96.02          

                          ACUTE RESPIRATORY FAILURE WITH HYPERCAPN              

      

 

                2020           MARIELY DELANEY MD, Ot           

   M19.91          

                          PRIMARY OSTEOARTHRITIS, UNSPECIFIED SITE              

      

 

             2020           MARIELY DELANEY MD, Ot           N17

.9           

ACUTE KIDNEY FAILURE, UNSPECIFIED                    

 

             2020           MARIELY DELANEY MD, Ot           N18

.9           

CHRONIC KIDNEY DISEASE, UNSPECIFIED                    

 

                2020           MARIELY DELANEY MD, Ot           

   R41.82          

                          ALTERED MENTAL STATUS, UNSPECIFIED                    

 

             2020           MARIELY DELANEY MD, Ot           R57

.9           

SHOCK, UNSPECIFIED                               

 

                2020           MARIELY DELANEY MD, Ot           

   Z68.34          

                          BODY MASS INDEX (BMI) 34.0-34.9, ADULT                

    

 

                2020           MARIELY DELANEY MD, Ot           

   Z79.84          

                          LONG TERM (CURRENT) USE OF ORAL HYPOGLYC              

      

 

                2020           MARIELY DELANEY MD, Ot           

   Z85.46          

                          PERSONAL HISTORY OF MALIGNANT NEOPLASM O              

      

 

                2020           MARIELY DELANEY MD, Ot           

   Z86.73          

                          PRSNL HX OF TIA (TIA), AND CEREB INFRC W              

      

 

                2020           MARIELY DELANEY MD, Ot           

   Z89.431         

                          ACQUIRED ABSENCE OF RIGHT FOOT                    

 

             2020           MARIELY DELANEY MD           Ot           Z95

.5           

PRESENCE OF CORONARY ANGIOPLASTY IMPLANT                    

 

             2020           MARIELY DELANEY MD           Ot           E11

.9           

TYPE 2 DIABETES MELLITUS WITHOUT COMPLIC                    

 

             2020           MARIELY DELANEY MD           Ot           E66

.9           

OBESITY, UNSPECIFIED                             

 

                2020           MARIELY DELANEY MD           Ot           

   E78.00          

                          PURE HYPERCHOLESTEROLEMIA, UNSPECIFIED                

    

 

                2020           MARIELY DELANEY MD           Ot           

   E83.42          

                          HYPOMAGNESEMIA                     

 

             2020           MARIELY DELANEY MD           Ot           E86

.0           

DEHYDRATION                                      

 

             2020           MARIELY DELANEY MD           Ot           E87

.5           

HYPERKALEMIA                                     

 

                2020           MARIELY DELANEY MD           Ot           

   F17.210         

                          NICOTINE DEPENDENCE, CIGARETTES, UNCOMPL              

      

 

             2020           MARIELY DELANEY MD           Ot           I07

.1           

RHEUMATIC TRICUSPID INSUFFICIENCY                    

 

             2020           MARIELY DELANEY MD           Ot           I12

.9           

HYPERTENSIVE CHRONIC KIDNEY DISEASE W ST                    

 

                2020           MARIELY DELANEY MD, Ot           

   I21.A1          

                          MYOCARDIAL INFARCTION TYPE 2                    

 

                2020           MARIELY DELANEY MD, Ot           

   I25.10          

                          ATHSCL HEART DISEASE OF NATIVE CORONARY               

      

 

                2020           MARIELY DELANEY MD, Ot           

   I45.10          

                          UNSPECIFIED RIGHT BUNDLE-BRANCH BLOCK                 

   

 

             2020           MARIELY DELANEY MD, Ot           I73

.9           

PERIPHERAL VASCULAR DISEASE, UNSPECIFIED                    

 

             2020           MARIELY DELANEY MD, Ot           J44

.9           

CHRONIC OBSTRUCTIVE PULMONARY DISEASE, U                    

 

             2020           MARIELY DELANEY MD, Ot           J69

.0           

PNEUMONITIS DUE TO INHALATION OF FOOD AN                    

 

                2020           MARIELY DELANEY MD           Ot           

   J96.01          

                          ACUTE RESPIRATORY FAILURE WITH HYPOXIA                

    

 

                2020           MARIELY DELANEY MD, Ot           

   J96.02          

                          ACUTE RESPIRATORY FAILURE WITH HYPERCAPN              

      

 

                2020           MARIELY DELANEY MD, Ot           

   M19.91          

                          PRIMARY OSTEOARTHRITIS, UNSPECIFIED SITE              

      

 

             2020           MARIELY DELANEY MD           Ot           N17

.9           

ACUTE KIDNEY FAILURE, UNSPECIFIED                    

 

             2020           MARIELY DELANEY MD           Ot           N18

.9           

CHRONIC KIDNEY DISEASE, UNSPECIFIED                    

 

                2020           MARIELY DELANEY MD, Ot           

   R41.82          

                          ALTERED MENTAL STATUS, UNSPECIFIED                    

 

             2020           MARIELY DELANEY MD, Ot           R57

.9           

SHOCK, UNSPECIFIED                               

 

                2020           MARIELY DELANEY MD, Ot           

   Z68.34          

                          BODY MASS INDEX (BMI) 34.0-34.9, ADULT                

    

 

                2020           MARIELY DELANEY MD, Ot           

   Z79.84          

                          LONG TERM (CURRENT) USE OF ORAL HYPOGLYC              

      

 

                2020           MARIELY DELANEY MD, Ot           

   Z85.46          

                          PERSONAL HISTORY OF MALIGNANT NEOPLASM O              

      

 

                2020           MARIELY DELANEY MD, Ot           

   Z86.73          

                          PRSNL HX OF TIA (TIA), AND CEREB INFRC W              

      

 

                2020           MARIELY DELANEY MD, Ot           

   Z89.431         

                          ACQUIRED ABSENCE OF RIGHT FOOT                    

 

             2020           MARIELY DELANEY MD, Ot           Z95

.5           

PRESENCE OF CORONARY ANGIOPLASTY IMPLANT                    

 

             2020           MARIELY DELANEY MD, Ot           E11

.9           

TYPE 2 DIABETES MELLITUS WITHOUT COMPLIC                    

 

             2020           MARIELY DELANEY MD, Ot           E66

.9           

OBESITY, UNSPECIFIED                             

 

                2020           MARIELY DELANEY MD, Ot           

   E78.00          

                          PURE HYPERCHOLESTEROLEMIA, UNSPECIFIED                

    

 

                2020           MARIELY DELANEY MD, Ot           

   E83.42          

                          HYPOMAGNESEMIA                     

 

             2020           MAIRELY DELANEY MD, Ot           E86

.0           

DEHYDRATION                                      

 

             2020           MARIELY DELANEY MD, Ot           E87

.5           

HYPERKALEMIA                                     

 

                2020           MARIELY DELANEY MD, Ot           

   F17.210         

                          NICOTINE DEPENDENCE, CIGARETTES, UNCOMPL              

      

 

             2020           MARIELY DELANEY MD, Ot           I07

.1           

RHEUMATIC TRICUSPID INSUFFICIENCY                    

 

             2020           MARIELY DELANEY MD, Ot           I12

.9           

HYPERTENSIVE CHRONIC KIDNEY DISEASE W ST                    

 

                2020           MARIELY DELANEY MD, Ot           

   I21.A1          

                          MYOCARDIAL INFARCTION TYPE 2                    

 

                2020           MARIELY DELANEY MD, Ot           

   I25.10          

                          ATHSCL HEART DISEASE OF NATIVE CORONARY               

      

 

                2020           MARIELY DELANEY MD, Ot           

   I45.10          

                          UNSPECIFIED RIGHT BUNDLE-BRANCH BLOCK                 

   

 

             2020           MARIELY DELANEY MD, Ot           I73

.9           

PERIPHERAL VASCULAR DISEASE, UNSPECIFIED                    

 

             2020           MARIELY DELANEY MD, Ot           J44

.9           

CHRONIC OBSTRUCTIVE PULMONARY DISEASE, U                    

 

             2020           MARIELY DELANEY MD, Ot           J69

.0           

PNEUMONITIS DUE TO INHALATION OF FOOD AN                    

 

                2020           MARIELY DELANEY MD, Ot           

   J96.01          

                          ACUTE RESPIRATORY FAILURE WITH HYPOXIA                

    

 

                2020           MARIELY DELANEY MD, Ot           

   J96.02          

                          ACUTE RESPIRATORY FAILURE WITH HYPERCAPN              

      

 

                2020           MARIELY DELANEY MD, Ot           

   M19.91          

                          PRIMARY OSTEOARTHRITIS, UNSPECIFIED SITE              

      

 

             2020           MARIELY DELANEY MD, Ot           N17

.9           

ACUTE KIDNEY FAILURE, UNSPECIFIED                    

 

             2020           MARIELY DELANEY MD, Ot           N18

.9           

CHRONIC KIDNEY DISEASE, UNSPECIFIED                    

 

                2020           MARIELY DELANEY MD, Ot           

   R41.82          

                          ALTERED MENTAL STATUS, UNSPECIFIED                    

 

             2020           MARIELY DELANEY MD, Ot           R57

.9           

SHOCK, UNSPECIFIED                               

 

                2020           MARIELY DELANEY MD, Ot           

   Z68.34          

                          BODY MASS INDEX (BMI) 34.0-34.9, ADULT                

    

 

                2020           MARIELY DELANEY MD, Ot           

   Z79.84          

                          LONG TERM (CURRENT) USE OF ORAL HYPOGLYC              

      

 

                2020           MARIELY DELANEY MD, Ot           

   Z85.46          

                          PERSONAL HISTORY OF MALIGNANT NEOPLASM O              

      

 

                2020           MARIELY DELANEY MD, Ot           

   Z86.73          

                          PRSNL HX OF TIA (TIA), AND CEREB INFRC W              

      

 

                2020           MARIELY DELANEY MD, Ot           

   Z89.431         

                          ACQUIRED ABSENCE OF RIGHT FOOT                    

 

             2020           MARIELY DELANEY MD, Ot           Z95

.5           

PRESENCE OF CORONARY ANGIOPLASTY IMPLANT                    

 

             2020           MARIELY DELANEY MD, Ot           E11

.9           

TYPE 2 DIABETES MELLITUS WITHOUT COMPLIC                    

 

             2020           MARIELY DELANEY MD, Ot           E66

.9           

OBESITY, UNSPECIFIED                             

 

                2020           MARIELY DELANEY MD, Ot           

   E78.00          

                          PURE HYPERCHOLESTEROLEMIA, UNSPECIFIED                

    

 

                2020           MARIELY DELANEY MD, Ot           

   E83.42          

                          HYPOMAGNESEMIA                     

 

             2020           MARIELY DELANEY MD           Ot           E86

.0           

DEHYDRATION                                      

 

             2020           MARIELY DELANEY MD           Ot           E87

.5           

HYPERKALEMIA                                     

 

                2020           MARIELY DELANEY MD           Ot           

   F17.210         

                          NICOTINE DEPENDENCE, CIGARETTES, UNCOMPL              

      

 

             2020           MARIELY DELANEY MD           Ot           I07

.1           

RHEUMATIC TRICUSPID INSUFFICIENCY                    

 

             2020           MARIELY DELANEY MD           Ot           I12

.9           

HYPERTENSIVE CHRONIC KIDNEY DISEASE W ST                    

 

                2020           MARIELY DELANEY MD, Ot           

   I21.A1          

                          MYOCARDIAL INFARCTION TYPE 2                    

 

                2020           MARIELY DELANEY MD, Ot           

   I25.10          

                          ATHSCL HEART DISEASE OF NATIVE CORONARY               

      

 

                2020           MARIELY DELANEY MD, Ot           

   I45.10          

                          UNSPECIFIED RIGHT BUNDLE-BRANCH BLOCK                 

   

 

             2020           MARIELY DELANEY MD, Ot           I73

.9           

PERIPHERAL VASCULAR DISEASE, UNSPECIFIED                    

 

             2020           MARIELY DELANEY MD, Ot           J44

.9           

CHRONIC OBSTRUCTIVE PULMONARY DISEASE, U                    

 

             2020           MARIELY DELANEY MD, Ot           J69

.0           

PNEUMONITIS DUE TO INHALATION OF FOOD AN                    

 

                2020           MARIELY DELANEY MD           Ot           

   J96.01          

                          ACUTE RESPIRATORY FAILURE WITH HYPOXIA                

    

 

                2020           MARIELY DELANEY MD, Ot           

   J96.02          

                          ACUTE RESPIRATORY FAILURE WITH HYPERCAPN              

      

 

                2020           MARIELY DELANEY MD           Ot           

   M19.91          

                          PRIMARY OSTEOARTHRITIS, UNSPECIFIED SITE              

      

 

             2020           MARIELY DELANEY MD, Ot           N17

.9           

ACUTE KIDNEY FAILURE, UNSPECIFIED                    

 

             2020           MARIELY DELANEY MD, Ot           N18

.9           

CHRONIC KIDNEY DISEASE, UNSPECIFIED                    

 

                2020           MARIELY DELANEY MD           Ot           

   R41.82          

                          ALTERED MENTAL STATUS, UNSPECIFIED                    

 

             2020           MARIELY DELANEY MD, Ot           R57

.9           

SHOCK, UNSPECIFIED                               

 

                2020           MARIELY DELANEY MD, Ot           

   Z68.34          

                          BODY MASS INDEX (BMI) 34.0-34.9, ADULT                

    

 

                2020           MARIELY DELANEY MD, Ot           

   Z79.84          

                          LONG TERM (CURRENT) USE OF ORAL HYPOGLYC              

      

 

                2020           MARIELY DELANEY MD           Ot           

   Z85.46          

                          PERSONAL HISTORY OF MALIGNANT NEOPLASM O              

      

 

                2020           MARIELY DELANEY MD, Ot           

   Z86.73          

                          PRSNL HX OF TIA (TIA), AND CEREB INFRC W              

      

 

                2020           MARIELY DELANEY MD, Ot           

   Z89.431         

                          ACQUIRED ABSENCE OF RIGHT FOOT                    

 

             2020           MARIELY DELANEY MD           Ot           Z95

.5           

PRESENCE OF CORONARY ANGIOPLASTY IMPLANT                    

 

             2020           MARIELY DELANEY MD           Ot           A41

.9           

SEPSIS, UNSPECIFIED ORGANISM                     

 

                2020           MARIELY DELANEY MD           Ot           

   E11.65          

                          TYPE 2 DIABETES MELLITUS WITH HYPERGLYCE              

      

 

             2020           MARIELY DELANEY MD           Ot           E66

.9           

OBESITY, UNSPECIFIED                             

 

                2020           MARIELY DELANEY MD           Ot           

   E78.00          

                          PURE HYPERCHOLESTEROLEMIA, UNSPECIFIED                

    

 

                2020           MARIELY DELANEY MD           Ot           

   E83.42          

                          HYPOMAGNESEMIA                     

 

             2020           MARIELY DELANEY MD           Ot           E86

.0           

DEHYDRATION                                      

 

             2020           MARIELY DELANEY MD           Ot           E87

.5           

HYPERKALEMIA                                     

 

                2020           MARIELY DELANEY MD           Ot           

   F17.210         

                          NICOTINE DEPENDENCE, CIGARETTES, UNCOMPL              

      

 

             2020           MARIELY DELANEY MD           Ot           I07

.1           

RHEUMATIC TRICUSPID INSUFFICIENCY                    

 

             2020           MARIELY DELANEY MD           Ot           I12

.9           

HYPERTENSIVE CHRONIC KIDNEY DISEASE W ST                    

 

                2020           MARIELY DELANEY MD, Ot           

   I21.A1          

                          MYOCARDIAL INFARCTION TYPE 2                    

 

                2020           MARIELY DELANEY MD           Ot           

   I25.10          

                          ATHSCL HEART DISEASE OF NATIVE CORONARY               

      

 

                2020           MARIELY DELANEY MD           Ot           

   I45.10          

                          UNSPECIFIED RIGHT BUNDLE-BRANCH BLOCK                 

   

 

                2020           MARIELY DELANEY MD, Ot           

   I50.33          

                          ACUTE ON CHRONIC DIASTOLIC (CONGESTIVE)               

      

 

             2020           MARIELY DELANEY MD, Ot           I73

.9           

PERIPHERAL VASCULAR DISEASE, UNSPECIFIED                    

 

             2020           MARIELY DELANEY MD, Ot           J18

.9           

PNEUMONIA, UNSPECIFIED ORGANISM                    

 

             2020           MARIELY DELANEY MD, Ot           J44

.9           

CHRONIC OBSTRUCTIVE PULMONARY DISEASE, U                    

 

                2020           MARIELY DELANEY MD           Ot           

   J96.01          

                          ACUTE RESPIRATORY FAILURE WITH HYPOXIA                

    

 

                2020           RHETTMARIELY SCHWARTZ MD, Ot           

   J96.02          

                          ACUTE RESPIRATORY FAILURE WITH HYPERCAPN              

      

 

                2020           MARIELY DELANEY MD, Ot           

   M19.91          

                          PRIMARY OSTEOARTHRITIS, UNSPECIFIED SITE              

      

 

             2020           MARIELY DELANEY MD, Ot           N17

.9           

ACUTE KIDNEY FAILURE, UNSPECIFIED                    

 

             2020           MARIELY DELANEY MD, Ot           N18

.9           

CHRONIC KIDNEY DISEASE, UNSPECIFIED                    

 

                2020           MARIELY DELANEY MD, Ot           

   R65.21          

                          SEVERE SEPSIS WITH SEPTIC SHOCK                    

 

                2020           MARIELY DELANEY MD, Ot           

   Z68.34          

                          BODY MASS INDEX (BMI) 34.0-34.9, ADULT                

    

 

                2020           MARIELY DELANEY MD, Ot           

   Z79.84          

                          LONG TERM (CURRENT) USE OF ORAL HYPOGLYC              

      

 

                2020           MARIELY DELANEY MD, Ot           

   Z85.46          

                          PERSONAL HISTORY OF MALIGNANT NEOPLASM O              

      

 

                2020           MARIELY DELANEY MD, Ot           

   Z86.73          

                          PRSNL HX OF TIA (TIA), AND CEREB INFRC W              

      

 

                2020           MARIELY DELANEY MD, Ot           

   Z89.431         

                          ACQUIRED ABSENCE OF RIGHT FOOT                    

 

             2020           MARIELY DELANEY MD, Ot           Z95

.5           

PRESENCE OF CORONARY ANGIOPLASTY IMPLANT                    

 

             2020           MARIELY DELANEY MD, Ot           A41

.9           

SEPSIS, UNSPECIFIED ORGANISM                     

 

                2020           MARIELY DELANEY MD, Ot           

   E11.65          

                          TYPE 2 DIABETES MELLITUS WITH HYPERGLYCE              

      

 

             2020           MARIELY DELANEY MD, Ot           E66

.9           

OBESITY, UNSPECIFIED                             

 

                2020           MARIELY DELANEY MD, Ot           

   E78.00          

                          PURE HYPERCHOLESTEROLEMIA, UNSPECIFIED                

    

 

                2020           MARIELY DELANEY MD           Ot           

   E83.42          

                          HYPOMAGNESEMIA                     

 

             2020           MARIELY DELANEY MD           Ot           E86

.0           

DEHYDRATION                                      

 

             2020           MARIELY DELANEY MD           Ot           E87

.5           

HYPERKALEMIA                                     

 

                2020           MARIELY DELANEY MD           Ot           

   F17.210         

                          NICOTINE DEPENDENCE, CIGARETTES, UNCOMPL              

      

 

             2020           MARIELY DELANEY MD           Ot           I07

.1           

RHEUMATIC TRICUSPID INSUFFICIENCY                    

 

             2020           MARIELY DELANEY MD           Ot           I12

.9           

HYPERTENSIVE CHRONIC KIDNEY DISEASE W ST                    

 

                2020           MARIELY DELANEY MD, Ot           

   I21.A1          

                          MYOCARDIAL INFARCTION TYPE 2                    

 

                2020           MARIELY DELANEY MD, Ot           

   I25.10          

                          ATHSCL HEART DISEASE OF NATIVE CORONARY               

      

 

                2020           MARIELY DELANEY MD, Ot           

   I45.10          

                          UNSPECIFIED RIGHT BUNDLE-BRANCH BLOCK                 

   

 

                2020           MARIELY DELANEY MD, Ot           

   I50.33          

                          ACUTE ON CHRONIC DIASTOLIC (CONGESTIVE)               

      

 

             2020           MARIELY DELANEY MD, Ot           I73

.9           

PERIPHERAL VASCULAR DISEASE, UNSPECIFIED                    

 

             2020           MARIELY DELANEY MD, Ot           J18

.9           

PNEUMONIA, UNSPECIFIED ORGANISM                    

 

             2020           MARIELY DELANEY MD, Ot           J44

.9           

CHRONIC OBSTRUCTIVE PULMONARY DISEASE, U                    

 

                2020           MARIELY DELANEY MD, Ot           

   J96.01          

                          ACUTE RESPIRATORY FAILURE WITH HYPOXIA                

    

 

                2020           MARIELY DELANEY MD, Ot           

   J96.02          

                          ACUTE RESPIRATORY FAILURE WITH HYPERCAPN              

      

 

                2020           MARIELY DELANEY MD, Ot           

   M19.91          

                          PRIMARY OSTEOARTHRITIS, UNSPECIFIED SITE              

      

 

             2020           MARIELY DELANEY MD, Ot           N17

.9           

ACUTE KIDNEY FAILURE, UNSPECIFIED                    

 

             2020           MARIELY DELANEY MD, Ot           N18

.9           

CHRONIC KIDNEY DISEASE, UNSPECIFIED                    

 

                2020           MARIELY DELANEY MD, Ot           

   R65.21          

                          SEVERE SEPSIS WITH SEPTIC SHOCK                    

 

                2020           MARIELY DELANEY MD, Ot           

   Z68.34          

                          BODY MASS INDEX (BMI) 34.0-34.9, ADULT                

    

 

                2020           MARIELY DELANEY MD, Ot           

   Z79.84          

                          LONG TERM (CURRENT) USE OF ORAL HYPOGLYC              

      

 

                2020           MARIELY DELANEY MD, Ot           

   Z85.46          

                          PERSONAL HISTORY OF MALIGNANT NEOPLASM O              

      

 

                2020           MARIELY DELANEY MD, Ot           

   Z86.73          

                          PRSNL HX OF TIA (TIA), AND CEREB INFRC W              

      

 

                2020           MARIELY DELANEY MD, Ot           

   Z89.431         

                          ACQUIRED ABSENCE OF RIGHT FOOT                    

 

             2020           MARIELY DELANEY MD, Ot           Z95

.5           

PRESENCE OF CORONARY ANGIOPLASTY IMPLANT                    

 

             2020           MARIELY DELANEY MD, Ot           A41

.9           

SEPSIS, UNSPECIFIED ORGANISM                     

 

                2020           MARIELY DELANEY MD           Ot           

   E11.65          

                          TYPE 2 DIABETES MELLITUS WITH HYPERGLYCE              

      

 

             2020           MARIELY DELANEY MD           Ot           E66

.9           

OBESITY, UNSPECIFIED                             

 

                2020           MARIELY DELANEY MD           Ot           

   E78.00          

                          PURE HYPERCHOLESTEROLEMIA, UNSPECIFIED                

    

 

                2020           MARIELY DELANEY MD           Ot           

   E83.42          

                          HYPOMAGNESEMIA                     

 

             2020           MARIELY DELANEY MD           Ot           E86

.0           

DEHYDRATION                                      

 

             2020           MARIELY DELANEY MD           Ot           E87

.5           

HYPERKALEMIA                                     

 

                2020           MARIELY DELANEY MD           Ot           

   F17.210         

                          NICOTINE DEPENDENCE, CIGARETTES, UNCOMPL              

      

 

             2020           MARIELY DELANEY MD           Ot           I07

.1           

RHEUMATIC TRICUSPID INSUFFICIENCY                    

 

             2020           MARIELY DELANEY MD           Ot           I12

.9           

HYPERTENSIVE CHRONIC KIDNEY DISEASE W ST                    

 

                2020           MARIELY DELANEY MD           Ot           

   I21.A1          

                          MYOCARDIAL INFARCTION TYPE 2                    

 

                2020           MARIELY DELANEY MD           Ot           

   I25.10          

                          ATHSCL HEART DISEASE OF NATIVE CORONARY               

      

 

                2020           MARIELY DELANEY MD           Ot           

   I45.10          

                          UNSPECIFIED RIGHT BUNDLE-BRANCH BLOCK                 

   

 

                2020           MARIELY DELANEY MD           Ot           

   I50.33          

                          ACUTE ON CHRONIC DIASTOLIC (CONGESTIVE)               

      

 

             2020           MARIELY DELANEY MD           Ot           I73

.9           

PERIPHERAL VASCULAR DISEASE, UNSPECIFIED                    

 

             2020           MARIELY DELANEY MD           Ot           J18

.9           

PNEUMONIA, UNSPECIFIED ORGANISM                    

 

             2020           MARIELY DELANEY MD           Ot           J44

.9           

CHRONIC OBSTRUCTIVE PULMONARY DISEASE, U                    

 

                2020           MARIELY DELANEY MD           Ot           

   J96.01          

                          ACUTE RESPIRATORY FAILURE WITH HYPOXIA                

    

 

                2020           MARIELY DELANEY MD           Ot           

   J96.02          

                          ACUTE RESPIRATORY FAILURE WITH HYPERCAPN              

      

 

                2020           MARIELY DELANEY MD           Ot           

   M19.91          

                          PRIMARY OSTEOARTHRITIS, UNSPECIFIED SITE              

      

 

             2020           MARIELY DELANEY MD           Ot           N17

.9           

ACUTE KIDNEY FAILURE, UNSPECIFIED                    

 

             2020           MARIELY DELANEY MD           Ot           N18

.9           

CHRONIC KIDNEY DISEASE, UNSPECIFIED                    

 

                2020           MARIELY DELANEY MD           Ot           

   R65.21          

                          SEVERE SEPSIS WITH SEPTIC SHOCK                    

 

                2020           MARIELY DELANEY MD, Ot           

   Z68.34          

                          BODY MASS INDEX (BMI) 34.0-34.9, ADULT                

    

 

                2020           MARIELY DELANEY MD, Ot           

   Z79.84          

                          LONG TERM (CURRENT) USE OF ORAL HYPOGLYC              

      

 

                2020           MARIELY DELANEY MD, Ot           

   Z85.46          

                          PERSONAL HISTORY OF MALIGNANT NEOPLASM O              

      

 

                2020           MARIELY DELANEY MD, Ot           

   Z86.73          

                          PRSNL HX OF TIA (TIA), AND CEREB INFRC W              

      

 

                2020           MARIELY DELANEY MD, Ot           

   Z89.431         

                          ACQUIRED ABSENCE OF RIGHT FOOT                    

 

             2020           MARIELY DELANEY MD, Ot           Z95

.5           

PRESENCE OF CORONARY ANGIOPLASTY IMPLANT                    

 

             2020           MARIELY DELANEY MD           Ot           A41

.9           

SEPSIS, UNSPECIFIED ORGANISM                     

 

                2020           MARIELY DELANEY MD           Ot           

   E11.65          

                          TYPE 2 DIABETES MELLITUS WITH HYPERGLYCE              

      

 

             2020           MARIELY DELANEY MD           Ot           E66

.9           

OBESITY, UNSPECIFIED                             

 

                2020           MARIELY DELANEY MD           Ot           

   E78.00          

                          PURE HYPERCHOLESTEROLEMIA, UNSPECIFIED                

    

 

                2020           MARIELY DELANEY MD           Ot           

   E83.42          

                          HYPOMAGNESEMIA                     

 

             2020           MARIELY DELANEY MD           Ot           E86

.0           

DEHYDRATION                                      

 

             2020           MARIELY DELANEY MD           Ot           E87

.5           

HYPERKALEMIA                                     

 

                2020           MARIELY DELANEY MD           Ot           

   F17.210         

                          NICOTINE DEPENDENCE, CIGARETTES, UNCOMPL              

      

 

             2020           MARIELY DELANEY MD           Ot           I07

.1           

RHEUMATIC TRICUSPID INSUFFICIENCY                    

 

             2020           MARIELY DELANEY MD           Ot           I12

.9           

HYPERTENSIVE CHRONIC KIDNEY DISEASE W ST                    

 

                2020           MARIELY DELANEY MD, Ot           

   I21.A1          

                          MYOCARDIAL INFARCTION TYPE 2                    

 

                2020           MARIELY DELANEY MD, Ot           

   I25.10          

                          ATHSCL HEART DISEASE OF NATIVE CORONARY               

      

 

                2020           MARIELY DELANEY MD, Ot           

   I45.10          

                          UNSPECIFIED RIGHT BUNDLE-BRANCH BLOCK                 

   

 

                2020           MARIELY DELANEY MD, Ot           

   I50.33          

                          ACUTE ON CHRONIC DIASTOLIC (CONGESTIVE)               

      

 

             2020           MARIELY DELANEY MD, Ot           I73

.9           

PERIPHERAL VASCULAR DISEASE, UNSPECIFIED                    

 

             2020           MARIELY DELANEY MD, Ot           J18

.9           

PNEUMONIA, UNSPECIFIED ORGANISM                    

 

             2020           MARIELY DELANEY MD, Ot           J44

.9           

CHRONIC OBSTRUCTIVE PULMONARY DISEASE, U                    

 

                2020           MARIELY DELANEY MD, Ot           

   J96.01          

                          ACUTE RESPIRATORY FAILURE WITH HYPOXIA                

    

 

                2020           MARIELY DELANEY MD, Ot           

   J96.02          

                          ACUTE RESPIRATORY FAILURE WITH HYPERCAPN              

      

 

                2020           MARIELY DELANEY MD           Ot           

   M19.91          

                          PRIMARY OSTEOARTHRITIS, UNSPECIFIED SITE              

      

 

             2020           MARIELY DELANEY MD, Ot           N17

.9           

ACUTE KIDNEY FAILURE, UNSPECIFIED                    

 

             2020           MARIELY DELANEY MD, Ot           N18

.9           

CHRONIC KIDNEY DISEASE, UNSPECIFIED                    

 

                2020           MARIELY DELANEY MD           Ot           

   R65.21          

                          SEVERE SEPSIS WITH SEPTIC SHOCK                    

 

                2020           MARIELY DELANEY MD, Ot           

   Z68.34          

                          BODY MASS INDEX (BMI) 34.0-34.9, ADULT                

    

 

                2020           MARIELY DELANEY MD, Ot           

   Z79.84          

                          LONG TERM (CURRENT) USE OF ORAL HYPOGLYC              

      

 

                2020           MARIELY DELANEY MD, Ot           

   Z85.46          

                          PERSONAL HISTORY OF MALIGNANT NEOPLASM O              

      

 

                2020           MARIELY DELANEY MD, Ot           

   Z86.73          

                          PRSNL HX OF TIA (TIA), AND CEREB INFRC W              

      

 

                2020           MARIELY DELANEY MD, Ot           

   Z89.431         

                          ACQUIRED ABSENCE OF RIGHT FOOT                    

 

             2020           MARIELY DELANEY MD           Ot           Z95

.5           

PRESENCE OF CORONARY ANGIOPLASTY IMPLANT                    

 

             2020           MARIELY DELANEY MD, Ot           A41

.9           

SEPSIS, UNSPECIFIED ORGANISM                     

 

                2020           MARIELY DELANEY MD           Ot           

   E11.65          

                          TYPE 2 DIABETES MELLITUS WITH HYPERGLYCE              

      

 

             2020           MARIELY DELANEY MD           Ot           E66

.9           

OBESITY, UNSPECIFIED                             

 

                2020           MARIELY DELANEY MD           Ot           

   E78.00          

                          PURE HYPERCHOLESTEROLEMIA, UNSPECIFIED                

    

 

                2020           MARIELY DELANEY MD           Ot           

   E83.42          

                          HYPOMAGNESEMIA                     

 

             2020           MARIELY DELANEY MD           Ot           E86

.0           

DEHYDRATION                                      

 

             2020           MARIELY DELANEY MD, Ot           E87

.5           

HYPERKALEMIA                                     

 

                2020           MARIELY DELANEY MD           Ot           

   F17.210         

                          NICOTINE DEPENDENCE, CIGARETTES, UNCOMPL              

      

 

             2020           MARIELY DELANEY MD           Ot           I07

.1           

RHEUMATIC TRICUSPID INSUFFICIENCY                    

 

             2020           MARIELY DELANEY MD           Ot           I12

.9           

HYPERTENSIVE CHRONIC KIDNEY DISEASE W ST                    

 

                2020           MARIELY DELANEY MD, Ot           

   I21.A1          

                          MYOCARDIAL INFARCTION TYPE 2                    

 

                2020           MARIELY DELANEY MD, Ot           

   I25.10          

                          ATHSCL HEART DISEASE OF NATIVE CORONARY               

      

 

                2020           MARIELY DELANEY MD, Ot           

   I45.10          

                          UNSPECIFIED RIGHT BUNDLE-BRANCH BLOCK                 

   

 

                2020           MARIELY DELANEY MD, Ot           

   I50.33          

                          ACUTE ON CHRONIC DIASTOLIC (CONGESTIVE)               

      

 

             2020           MARIELY DELANEY MD           Ot           I73

.9           

PERIPHERAL VASCULAR DISEASE, UNSPECIFIED                    

 

             2020           MARIELY DELANEY MD, Ot           J18

.9           

PNEUMONIA, UNSPECIFIED ORGANISM                    

 

             2020           MARIELY DELANEY MD, Ot           J44

.9           

CHRONIC OBSTRUCTIVE PULMONARY DISEASE, U                    

 

                2020           MARIELY DELANEY MD, Ot           

   J96.01          

                          ACUTE RESPIRATORY FAILURE WITH HYPOXIA                

    

 

                2020           MARIELY DELANEY MD, Ot           

   J96.02          

                          ACUTE RESPIRATORY FAILURE WITH HYPERCAPN              

      

 

                2020           MARIELY DELANEY MD           Ot           

   M19.91          

                          PRIMARY OSTEOARTHRITIS, UNSPECIFIED SITE              

      

 

             2020           MARIELY DELANEY MD           Ot           N17

.9           

ACUTE KIDNEY FAILURE, UNSPECIFIED                    

 

             2020           MARIELY DELANEY MD           Ot           N18

.9           

CHRONIC KIDNEY DISEASE, UNSPECIFIED                    

 

                2020           MARIELY DELANEY MD           Ot           

   R65.21          

                          SEVERE SEPSIS WITH SEPTIC SHOCK                    

 

                2020           MARIELY DELANEY MD, Ot           

   Z68.34          

                          BODY MASS INDEX (BMI) 34.0-34.9, ADULT                

    

 

                2020           MARIELY DELANEY MD, Ot           

   Z79.84          

                          LONG TERM (CURRENT) USE OF ORAL HYPOGLYC              

      

 

                2020           MARIELY DELANEY MD, Ot           

   Z85.46          

                          PERSONAL HISTORY OF MALIGNANT NEOPLASM O              

      

 

                2020           MARIELY DELANEY MD, Ot           

   Z86.73          

                          PRSNL HX OF TIA (TIA), AND CEREB INFRC W              

      

 

                2020           MARIELY DELANEY MD, Ot           

   Z89.431         

                          ACQUIRED ABSENCE OF RIGHT FOOT                    

 

             2020           MARIELY DELANEY MD           Ot           Z95

.5           

PRESENCE OF CORONARY ANGIOPLASTY IMPLANT                    

 

             2020           MARIELY DELANEY MD           Ot           A41

.9           

SEPSIS, UNSPECIFIED ORGANISM                     

 

                2020           MARIELY DELANEY MD           Ot           

   E11.65          

                          TYPE 2 DIABETES MELLITUS WITH HYPERGLYCE              

      

 

             2020           MARIELY DELANEY MD           Ot           E66

.9           

OBESITY, UNSPECIFIED                             

 

                2020           MARIELY DELANEY MD           Ot           

   E78.00          

                          PURE HYPERCHOLESTEROLEMIA, UNSPECIFIED                

    

 

                2020           MARIELY DELANEY MD           Ot           

   E83.42          

                          HYPOMAGNESEMIA                     

 

             2020           MARIELY DELANEY MD           Ot           E86

.0           

DEHYDRATION                                      

 

             2020           MARIELY DELANEY MD           Ot           E87

.5           

HYPERKALEMIA                                     

 

                2020           MARIELY DELANEY MD           Ot           

   F17.210         

                          NICOTINE DEPENDENCE, CIGARETTES, UNCOMPL              

      

 

             2020           MARIELY DELANEY MD           Ot           I07

.1           

RHEUMATIC TRICUSPID INSUFFICIENCY                    

 

             2020           MARIELY DELANEY MD           Ot           I12

.9           

HYPERTENSIVE CHRONIC KIDNEY DISEASE W ST                    

 

                2020           MARIELY DELANEY MD           Ot           

   I21.A1          

                          MYOCARDIAL INFARCTION TYPE 2                    

 

                2020           MARIELY DELANEY MD           Ot           

   I25.10          

                          ATHSCL HEART DISEASE OF NATIVE CORONARY               

      

 

                2020           MARIELY DELANEY MD           Ot           

   I45.10          

                          UNSPECIFIED RIGHT BUNDLE-BRANCH BLOCK                 

   

 

                2020           MARIELY DELANEY MD           Ot           

   I50.33          

                          ACUTE ON CHRONIC DIASTOLIC (CONGESTIVE)               

      

 

             2020           MARIELY DELANEY MD           Ot           I73

.9           

PERIPHERAL VASCULAR DISEASE, UNSPECIFIED                    

 

             2020           MARIELY DELANEY MD           Ot           J18

.9           

PNEUMONIA, UNSPECIFIED ORGANISM                    

 

             2020           MARIELY DELANEY MD, Ot           J44

.9           

CHRONIC OBSTRUCTIVE PULMONARY DISEASE, U                    

 

                2020           MARIELY DELANEY MD           Ot           

   J96.01          

                          ACUTE RESPIRATORY FAILURE WITH HYPOXIA                

    

 

                2020           MARIELY DELANEY MD           Ot           

   J96.02          

                          ACUTE RESPIRATORY FAILURE WITH HYPERCAPN              

      

 

                2020           MARIELY DELANEY MD, Ot           

   M19.91          

                          PRIMARY OSTEOARTHRITIS, UNSPECIFIED SITE              

      

 

             2020           MARIELY DELANEY MD, Ot           N17

.9           

ACUTE KIDNEY FAILURE, UNSPECIFIED                    

 

             2020           MARIELY DELANEY MD, Ot           N18

.9           

CHRONIC KIDNEY DISEASE, UNSPECIFIED                    

 

                2020           MARIELY DELANEY MD, Ot           

   R65.21          

                          SEVERE SEPSIS WITH SEPTIC SHOCK                    

 

                2020           MARIELY DELANEY MD, Ot           

   Z68.34          

                          BODY MASS INDEX (BMI) 34.0-34.9, ADULT                

    

 

                2020           MARIELY DELANEY MD, Ot           

   Z79.84          

                          LONG TERM (CURRENT) USE OF ORAL HYPOGLYC              

      

 

                2020           MARIELY DELANEY MD, Ot           

   Z85.46          

                          PERSONAL HISTORY OF MALIGNANT NEOPLASM O              

      

 

                2020           MARIELY DELANEY MD, Ot           

   Z86.73          

                          PRSNL HX OF TIA (TIA), AND CEREB INFRC W              

      

 

                2020           MARIELY DELANEY MD, Ot           

   Z89.431         

                          ACQUIRED ABSENCE OF RIGHT FOOT                    

 

             2020           MARIELY DELANEY MD, Ot           Z95

.5           

PRESENCE OF CORONARY ANGIOPLASTY IMPLANT                    

 

             2020           MARIELY DELANEY MD, Ot           A41

.9           

SEPSIS, UNSPECIFIED ORGANISM                     

 

                2020           MARIELY DELANEY MD, Ot           

   E11.65          

                          TYPE 2 DIABETES MELLITUS WITH HYPERGLYCE              

      

 

             2020           MARIELY DELANEY MD, Ot           E66

.9           

OBESITY, UNSPECIFIED                             

 

                2020           MARIELY DELANEY MD           Ot           

   E78.00          

                          PURE HYPERCHOLESTEROLEMIA, UNSPECIFIED                

    

 

                2020           MARIELY DELANEY MD           Ot           

   E83.42          

                          HYPOMAGNESEMIA                     

 

             2020           MARIELY DELANEY MD           Ot           E86

.0           

DEHYDRATION                                      

 

             2020           MARIELY DELANEY MD           Ot           E87

.5           

HYPERKALEMIA                                     

 

                2020           MARIELY DELANEY MD           Ot           

   F17.210         

                          NICOTINE DEPENDENCE, CIGARETTES, UNCOMPL              

      

 

             2020           MARIELY DELANEY MD           Ot           I07

.1           

RHEUMATIC TRICUSPID INSUFFICIENCY                    

 

             2020           MARIELY DELANEY MD, Ot           I12

.9           

HYPERTENSIVE CHRONIC KIDNEY DISEASE W ST                    

 

                2020           MARIELY DELANEY MD, Ot           

   I21.A1          

                          MYOCARDIAL INFARCTION TYPE 2                    

 

                2020           MARIELY DELANEY MD, Ot           

   I25.10          

                          ATHSCL HEART DISEASE OF NATIVE CORONARY               

      

 

                2020           MARIELY DELANEY MD, Ot           

   I45.10          

                          UNSPECIFIED RIGHT BUNDLE-BRANCH BLOCK                 

   

 

                2020           MARIELY DELANEY MD, Ot           

   I50.33          

                          ACUTE ON CHRONIC DIASTOLIC (CONGESTIVE)               

      

 

             2020           MARIELY DELANEY MD, Ot           I73

.9           

PERIPHERAL VASCULAR DISEASE, UNSPECIFIED                    

 

             2020           MARIELY DELANEY MD, Ot           J18

.9           

PNEUMONIA, UNSPECIFIED ORGANISM                    

 

             2020           MARIELY DELANEY MD, Ot           J44

.9           

CHRONIC OBSTRUCTIVE PULMONARY DISEASE, U                    

 

                2020           MARIELY DELANEY MD, Ot           

   J96.01          

                          ACUTE RESPIRATORY FAILURE WITH HYPOXIA                

    

 

                2020           MARIELY DELANEY MD, Ot           

   J96.02          

                          ACUTE RESPIRATORY FAILURE WITH HYPERCAPN              

      

 

                2020           MARIELY DELANEY MD, Ot           

   M19.91          

                          PRIMARY OSTEOARTHRITIS, UNSPECIFIED SITE              

      

 

             2020           MARIELY DELANEY MD, Ot           N17

.9           

ACUTE KIDNEY FAILURE, UNSPECIFIED                    

 

             2020           MARIELY DELANEY MD, Ot           N18

.9           

CHRONIC KIDNEY DISEASE, UNSPECIFIED                    

 

                2020           MARIELY DELANEY MD, Ot           

   R65.21          

                          SEVERE SEPSIS WITH SEPTIC SHOCK                    

 

                2020           MARIELY DELANEY MD, Ot           

   Z68.34          

                          BODY MASS INDEX (BMI) 34.0-34.9, ADULT                

    

 

                2020           MARIELY DELANEY MD, Ot           

   Z79.84          

                          LONG TERM (CURRENT) USE OF ORAL HYPOGLYC              

      

 

                2020           MARIELY DELANEY MD, Ot           

   Z85.46          

                          PERSONAL HISTORY OF MALIGNANT NEOPLASM O              

      

 

                2020           MARIELY DELANEY MD, Ot           

   Z86.73          

                          PRSNL HX OF TIA (TIA), AND CEREB INFRC W              

      

 

                2020           MARIELY DELANEY MD, Ot           

   Z89.431         

                          ACQUIRED ABSENCE OF RIGHT FOOT                    

 

             2020           MARIELY DELANEY MD, Ot           Z95

.5           

PRESENCE OF CORONARY ANGIOPLASTY IMPLANT                    

 

             2020           MARIELY DELANEY MD, Ot           A41

.9           

SEPSIS, UNSPECIFIED ORGANISM                     

 

                2020           RHETT MD, MARIELY N           Ot           

   E11.65          

                          TYPE 2 DIABETES MELLITUS WITH HYPERGLYCE              

      

 

             2020           MARIELY DELANEY MD           Ot           E66

.9           

OBESITY, UNSPECIFIED                             

 

                2020           MARIELY DELANEY MD           Ot           

   E78.00          

                          PURE HYPERCHOLESTEROLEMIA, UNSPECIFIED                

    

 

                2020           MARIELY DELANEY MD           Ot           

   E83.42          

                          HYPOMAGNESEMIA                     

 

             2020           MARIELY DELANEY MD           Ot           E86

.0           

DEHYDRATION                                      

 

             2020           MARIELY DELANEY MD           Ot           E87

.5           

HYPERKALEMIA                                     

 

                2020           MARIELY DELANEY MD           Ot           

   F17.210         

                          NICOTINE DEPENDENCE, CIGARETTES, UNCOMPL              

      

 

             2020           MARIELY DELANEY MD           Ot           I07

.1           

RHEUMATIC TRICUSPID INSUFFICIENCY                    

 

             2020           MARIELY DELANEY MD           Ot           I12

.9           

HYPERTENSIVE CHRONIC KIDNEY DISEASE W ST                    

 

                2020           MARIELY DELANEY MD           Ot           

   I21.A1          

                          MYOCARDIAL INFARCTION TYPE 2                    

 

                2020           MARIELY DELANEY MD           Ot           

   I25.10          

                          ATHSCL HEART DISEASE OF NATIVE CORONARY               

      

 

                2020           MARIELY DELANEY MD           Ot           

   I45.10          

                          UNSPECIFIED RIGHT BUNDLE-BRANCH BLOCK                 

   

 

                2020           MARIELY DELANEY MD           Ot           

   I50.33          

                          ACUTE ON CHRONIC DIASTOLIC (CONGESTIVE)               

      

 

             2020           MARIELY DELANEY MD           Ot           I73

.9           

PERIPHERAL VASCULAR DISEASE, UNSPECIFIED                    

 

             2020           MARIELY DELANEY MD           Ot           J18

.9           

PNEUMONIA, UNSPECIFIED ORGANISM                    

 

             2020           MARIELY DELANEY MD           Ot           J44

.9           

CHRONIC OBSTRUCTIVE PULMONARY DISEASE, U                    

 

                2020           MARIELY DELANEY MD           Ot           

   J96.01          

                          ACUTE RESPIRATORY FAILURE WITH HYPOXIA                

    

 

                2020           MARIELY DELANEY MD           Ot           

   J96.02          

                          ACUTE RESPIRATORY FAILURE WITH HYPERCAPN              

      

 

                2020           MARIELY DELANEY MD           Ot           

   M19.91          

                          PRIMARY OSTEOARTHRITIS, UNSPECIFIED SITE              

      

 

             2020           MARIELY DELANEY MD           Ot           N17

.9           

ACUTE KIDNEY FAILURE, UNSPECIFIED                    

 

             2020           MARIELY DELANEY MD           Ot           N18

.9           

CHRONIC KIDNEY DISEASE, UNSPECIFIED                    

 

                2020           MARIELY DELANEY MD           Ot           

   R65.21          

                          SEVERE SEPSIS WITH SEPTIC SHOCK                    

 

                2020           MARIELY DELANEY MD           Ot           

   Z68.34          

                          BODY MASS INDEX (BMI) 34.0-34.9, ADULT                

    

 

                2020           MARIELY DELANEY MD           Ot           

   Z79.84          

                          LONG TERM (CURRENT) USE OF ORAL HYPOGLYC              

      

 

                2020           MARIELY DELANEY MD, Ot           

   Z85.46          

                          PERSONAL HISTORY OF MALIGNANT NEOPLASM O              

      

 

                2020           MARIELY DELANEY MD, Ot           

   Z86.73          

                          PRSNL HX OF TIA (TIA), AND CEREB INFRC W              

      

 

                2020           MARIELY DELANEY MD, Ot           

   Z89.431         

                          ACQUIRED ABSENCE OF RIGHT FOOT                    

 

             2020           MARIELY DELANEY MD, Ot           Z95

.5           

PRESENCE OF CORONARY ANGIOPLASTY IMPLANT                    

 

             2020           MARIELY DELANEY MD, Ot           A41

.9           

SEPSIS, UNSPECIFIED ORGANISM                     

 

                2020           MARIELY DELANEY MD           Ot           

   E11.65          

                          TYPE 2 DIABETES MELLITUS WITH HYPERGLYCE              

      

 

             2020           MARIELY DELANEY MD           Ot           E66

.9           

OBESITY, UNSPECIFIED                             

 

                2020           MARIELY DELANEY MD           Ot           

   E78.00          

                          PURE HYPERCHOLESTEROLEMIA, UNSPECIFIED                

    

 

                2020           MARIELY DELANEY MD           Ot           

   E83.42          

                          HYPOMAGNESEMIA                     

 

             2020           MARIELY DELANEY MD           Ot           E86

.0           

DEHYDRATION                                      

 

             2020           MARIELY DELANEY MD           Ot           E87

.5           

HYPERKALEMIA                                     

 

                2020           MARIELY DELANEY MD           Ot           

   F17.210         

                          NICOTINE DEPENDENCE, CIGARETTES, UNCOMPL              

      

 

             2020           MARIELY DELANEY MD           Ot           I07

.1           

RHEUMATIC TRICUSPID INSUFFICIENCY                    

 

             2020           MARIELY DELANEY MD           Ot           I12

.9           

HYPERTENSIVE CHRONIC KIDNEY DISEASE W ST                    

 

                2020           MARIELY DELANEY MD           Ot           

   I21.A1          

                          MYOCARDIAL INFARCTION TYPE 2                    

 

                2020           MARIELY DELANEY MD           Ot           

   I25.10          

                          ATHSCL HEART DISEASE OF NATIVE CORONARY               

      

 

                2020           MARIELY DELANEY MD           Ot           

   I45.10          

                          UNSPECIFIED RIGHT BUNDLE-BRANCH BLOCK                 

   

 

                2020           MARIELY DELANEY MD           Ot           

   I50.33          

                          ACUTE ON CHRONIC DIASTOLIC (CONGESTIVE)               

      

 

             2020           MARIELY DELANEY MD           Ot           I73

.9           

PERIPHERAL VASCULAR DISEASE, UNSPECIFIED                    

 

             2020           MARIELY DELANEY MD, Ot           J18

.9           

PNEUMONIA, UNSPECIFIED ORGANISM                    

 

             2020           MARIELY DELANEY MD, Ot           J44

.9           

CHRONIC OBSTRUCTIVE PULMONARY DISEASE, U                    

 

                2020           MARIELY DELANEY MD, Ot           

   J96.01          

                          ACUTE RESPIRATORY FAILURE WITH HYPOXIA                

    

 

                2020           MARIELY DELANEY MD           Ot           

   J96.02          

                          ACUTE RESPIRATORY FAILURE WITH HYPERCAPN              

      

 

                2020           MARIELY DELANEY MD           Ot           

   M19.91          

                          PRIMARY OSTEOARTHRITIS, UNSPECIFIED SITE              

      

 

             2020           MARIELY DELANEY MD, Ot           N17

.9           

ACUTE KIDNEY FAILURE, UNSPECIFIED                    

 

             2020           MARIELY DELANEY MD, Ot           N18

.9           

CHRONIC KIDNEY DISEASE, UNSPECIFIED                    

 

                2020           MARIELY DELANEY MD           Ot           

   R65.21          

                          SEVERE SEPSIS WITH SEPTIC SHOCK                    

 

                2020           MARIELY DELANEY MD, Ot           

   Z68.34          

                          BODY MASS INDEX (BMI) 34.0-34.9, ADULT                

    

 

                2020           MARIELY DELANEY MD, Ot           

   Z79.84          

                          LONG TERM (CURRENT) USE OF ORAL HYPOGLYC              

      

 

                2020           MARIELY DELANEY MD, Ot           

   Z85.46          

                          PERSONAL HISTORY OF MALIGNANT NEOPLASM O              

      

 

                2020           MARIELY DELANEY MD, Ot           

   Z86.73          

                          PRSNL HX OF TIA (TIA), AND CEREB INFRC W              

      

 

                2020           MARIELY DELANEY MD, Ot           

   Z89.431         

                          ACQUIRED ABSENCE OF RIGHT FOOT                    

 

             2020           MARIELY DELANEY MD           Ot           Z95

.5           

PRESENCE OF CORONARY ANGIOPLASTY IMPLANT                    

 

             2020           MARIELY DELANEY MD           Ot           A41

.9           

SEPSIS, UNSPECIFIED ORGANISM                     

 

                2020           MARIELY DELANEY MD           Ot           

   E11.65          

                          TYPE 2 DIABETES MELLITUS WITH HYPERGLYCE              

      

 

             2020           MARIELY DELANEY MD           Ot           E66

.9           

OBESITY, UNSPECIFIED                             

 

                2020           MARIELY DELANEY MD           Ot           

   E78.00          

                          PURE HYPERCHOLESTEROLEMIA, UNSPECIFIED                

    

 

                2020           MARIELY DELANEY MD           Ot           

   E83.42          

                          HYPOMAGNESEMIA                     

 

             2020           MARIELY DELANEY MD           Ot           E86

.0           

DEHYDRATION                                      

 

             2020           MARIELY DELANEY MD           Ot           E87

.5           

HYPERKALEMIA                                     

 

                2020           MARIELY DELANEY MD           Ot           

   F17.210         

                          NICOTINE DEPENDENCE, CIGARETTES, UNCOMPL              

      

 

             2020           MARIELY DELANEY MD, Ot           I07

.1           

RHEUMATIC TRICUSPID INSUFFICIENCY                    

 

             2020           MARIELY DELANEY MD, Ot           I12

.9           

HYPERTENSIVE CHRONIC KIDNEY DISEASE W ST                    

 

                2020           MARIELY DELANEY MD, Ot           

   I21.A1          

                          MYOCARDIAL INFARCTION TYPE 2                    

 

                2020           MARIELY DELANEY MD, Ot           

   I25.10          

                          ATHSCL HEART DISEASE OF NATIVE CORONARY               

      

 

                2020           MARIELY DELANEY MD, Ot           

   I45.10          

                          UNSPECIFIED RIGHT BUNDLE-BRANCH BLOCK                 

   

 

                2020           MARIELY DELANEY MD, Ot           

   I50.33          

                          ACUTE ON CHRONIC DIASTOLIC (CONGESTIVE)               

      

 

             2020           MARIELY DELANEY MD, Ot           I73

.9           

PERIPHERAL VASCULAR DISEASE, UNSPECIFIED                    

 

             2020           MARIELY DELANEY MD, Ot           J18

.9           

PNEUMONIA, UNSPECIFIED ORGANISM                    

 

             2020           MARIELY DELANEY MD, Ot           J44

.9           

CHRONIC OBSTRUCTIVE PULMONARY DISEASE, U                    

 

                2020           MARIELY DELANEY MD, Ot           

   J96.01          

                          ACUTE RESPIRATORY FAILURE WITH HYPOXIA                

    

 

                2020           MARIELY DELANEY MD, Ot           

   J96.02          

                          ACUTE RESPIRATORY FAILURE WITH HYPERCAPN              

      

 

                2020           MARIELY DELANEY MD, Ot           

   M19.91          

                          PRIMARY OSTEOARTHRITIS, UNSPECIFIED SITE              

      

 

             2020           MARIELY DELANEY MD, Ot           N17

.9           

ACUTE KIDNEY FAILURE, UNSPECIFIED                    

 

             2020           MARIELY DELANEY MD, Ot           N18

.9           

CHRONIC KIDNEY DISEASE, UNSPECIFIED                    

 

                2020           MARIELY DELANEY MD, Ot           

   R65.21          

                          SEVERE SEPSIS WITH SEPTIC SHOCK                    

 

                2020           MARIELY DELANEY MD, Ot           

   Z68.34          

                          BODY MASS INDEX (BMI) 34.0-34.9, ADULT                

    

 

                2020           MARIELY DELANEY MD, Ot           

   Z79.84          

                          LONG TERM (CURRENT) USE OF ORAL HYPOGLYC              

      

 

                2020           MARIELY DELANEY MD, Ot           

   Z85.46          

                          PERSONAL HISTORY OF MALIGNANT NEOPLASM O              

      

 

                2020           RHETT MD, MARIELY N           Ot           

   Z86.73          

                          PRSNL HX OF TIA (TIA), AND CEREB INFRC W              

      

 

                2020           MARIELY DELANEY MD, Ot           

   Z89.431         

                          ACQUIRED ABSENCE OF RIGHT FOOT                    

 

             2020           MARIELY DELANEY MD           Ot           Z95

.5           

PRESENCE OF CORONARY ANGIOPLASTY IMPLANT                    

 

             2020           MARIELY DELANEY MD           Ot           A41

.9           

SEPSIS, UNSPECIFIED ORGANISM                     

 

                2020           MARIELY DELANEY MD           Ot           

   E11.65          

                          TYPE 2 DIABETES MELLITUS WITH HYPERGLYCE              

      

 

             2020           MARIELY DELANEY MD           Ot           E66

.9           

OBESITY, UNSPECIFIED                             

 

                2020           MARIELY DELANEY MD           Ot           

   E78.00          

                          PURE HYPERCHOLESTEROLEMIA, UNSPECIFIED                

    

 

                2020           MARIELY DELANEY MD           Ot           

   E83.42          

                          HYPOMAGNESEMIA                     

 

             2020           MARIELY DELANEY MD           Ot           E86

.0           

DEHYDRATION                                      

 

             2020           MARIELY DELANEY MD           Ot           E87

.5           

HYPERKALEMIA                                     

 

                2020           MARIELY DELANEY MD           Ot           

   F17.210         

                          NICOTINE DEPENDENCE, CIGARETTES, UNCOMPL              

      

 

             2020           MARIELY DELANEY MD           Ot           I07

.1           

RHEUMATIC TRICUSPID INSUFFICIENCY                    

 

             2020           MARIELY DELANEY MD           Ot           I12

.9           

HYPERTENSIVE CHRONIC KIDNEY DISEASE W ST                    

 

                2020           MARIELY DELANEY MD           Ot           

   I21.A1          

                          MYOCARDIAL INFARCTION TYPE 2                    

 

                2020           MARIELY DELANEY MD           Ot           

   I25.10          

                          ATHSCL HEART DISEASE OF NATIVE CORONARY               

      

 

                2020           MARIELY DELANEY MD           Ot           

   I45.10          

                          UNSPECIFIED RIGHT BUNDLE-BRANCH BLOCK                 

   

 

                2020           MARIELY DELANEY MD           Ot           

   I50.33          

                          ACUTE ON CHRONIC DIASTOLIC (CONGESTIVE)               

      

 

             2020           MARIELY DELANEY MD           Ot           I73

.9           

PERIPHERAL VASCULAR DISEASE, UNSPECIFIED                    

 

             2020           MARIELY DELANEY MD           Ot           J18

.9           

PNEUMONIA, UNSPECIFIED ORGANISM                    

 

             2020           MARIELY DELANEY MD           Ot           J44

.9           

CHRONIC OBSTRUCTIVE PULMONARY DISEASE, U                    

 

                2020           MARIELY DELANEY MD           Ot           

   J96.01          

                          ACUTE RESPIRATORY FAILURE WITH HYPOXIA                

    

 

                2020           MARIELY DELANEY MD           Ot           

   J96.02          

                          ACUTE RESPIRATORY FAILURE WITH HYPERCAPN              

      

 

                2020           MARIELY DELANEY MD, Ot           

   M19.91          

                          PRIMARY OSTEOARTHRITIS, UNSPECIFIED SITE              

      

 

             2020           MARIELY DELANEY MD, Ot           N17

.9           

ACUTE KIDNEY FAILURE, UNSPECIFIED                    

 

             2020           MARIELY DELANEY MD, Ot           N18

.9           

CHRONIC KIDNEY DISEASE, UNSPECIFIED                    

 

                2020           MARIELY DELANEY MD, Ot           

   R65.21          

                          SEVERE SEPSIS WITH SEPTIC SHOCK                    

 

                2020           MARIELY DELANEY MD, Ot           

   Z68.34          

                          BODY MASS INDEX (BMI) 34.0-34.9, ADULT                

    

 

                2020           MARIELY DELANEY MD, Ot           

   Z79.84          

                          LONG TERM (CURRENT) USE OF ORAL HYPOGLYC              

      

 

                2020           MARIELY DELANEY MD, Ot           

   Z85.46          

                          PERSONAL HISTORY OF MALIGNANT NEOPLASM O              

      

 

                2020           MARIELY DELANEY MD, Ot           

   Z86.73          

                          PRSNL HX OF TIA (TIA), AND CEREB INFRC W              

      

 

                2020           MARIELY DELANEY MD, Ot           

   Z89.431         

                          ACQUIRED ABSENCE OF RIGHT FOOT                    

 

             2020           MARIELY DELANEY MD           Ot           Z95

.5           

PRESENCE OF CORONARY ANGIOPLASTY IMPLANT                    

 

             2020           MARIELY DELANEY MD, Ot           A41

.9           

SEPSIS, UNSPECIFIED ORGANISM                     

 

                2020           MARIELY DELANEY MD, Ot           

   E11.65          

                          TYPE 2 DIABETES MELLITUS WITH HYPERGLYCE              

      

 

             2020           MARIELY DELANEY MD, Ot           E66

.9           

OBESITY, UNSPECIFIED                             

 

                2020           MARIELY DELANEY MD, Ot           

   E78.00          

                          PURE HYPERCHOLESTEROLEMIA, UNSPECIFIED                

    

 

                2020           MARIELY DELANEY MD           Ot           

   E83.42          

                          HYPOMAGNESEMIA                     

 

             2020           MARIELY DELANEY MD           Ot           E86

.0           

DEHYDRATION                                      

 

             2020           MARIELY DELANEY MD, Ot           E87

.5           

HYPERKALEMIA                                     

 

                2020           MARIELY DELANEY MD           Ot           

   F17.210         

                          NICOTINE DEPENDENCE, CIGARETTES, UNCOMPL              

      

 

             2020           MARIELY DELANEY MD, Ot           I07

.1           

RHEUMATIC TRICUSPID INSUFFICIENCY                    

 

             2020           MARIELY DELANEY MD           Ot           I12

.9           

HYPERTENSIVE CHRONIC KIDNEY DISEASE W ST                    

 

                2020           MARIELY DELANEY MD, Ot           

   I21.A1          

                          MYOCARDIAL INFARCTION TYPE 2                    

 

                2020           MARIELY DELANEY MD, Ot           

   I25.10          

                          ATHSCL HEART DISEASE OF NATIVE CORONARY               

      

 

                2020           MARIELY DELANEY MD, Ot           

   I45.10          

                          UNSPECIFIED RIGHT BUNDLE-BRANCH BLOCK                 

   

 

                2020           MARIELY DELANEY MD, Ot           

   I50.33          

                          ACUTE ON CHRONIC DIASTOLIC (CONGESTIVE)               

      

 

             2020           MARIELY DELANEY MD, Ot           I73

.9           

PERIPHERAL VASCULAR DISEASE, UNSPECIFIED                    

 

             2020           MARIELY DELANEY MD, Ot           J18

.9           

PNEUMONIA, UNSPECIFIED ORGANISM                    

 

             2020           MARIELY DELANEY MD, Ot           J44

.9           

CHRONIC OBSTRUCTIVE PULMONARY DISEASE, U                    

 

                2020           MARIELY DELANEY MD, Ot           

   J96.01          

                          ACUTE RESPIRATORY FAILURE WITH HYPOXIA                

    

 

                2020           MARIELY DELANEY MD, Ot           

   J96.02          

                          ACUTE RESPIRATORY FAILURE WITH HYPERCAPN              

      

 

                2020           MARIELY DELANEY MD, Ot           

   M19.91          

                          PRIMARY OSTEOARTHRITIS, UNSPECIFIED SITE              

      

 

             2020           MARIELY DELANEY MD, Ot           N17

.9           

ACUTE KIDNEY FAILURE, UNSPECIFIED                    

 

             2020           MARIELY DELANEY MD, Ot           N18

.9           

CHRONIC KIDNEY DISEASE, UNSPECIFIED                    

 

                2020           MARIELY DELANEY MD, Ot           

   R65.21          

                          SEVERE SEPSIS WITH SEPTIC SHOCK                    

 

                2020           MARIELY DELANEY MD, Ot           

   Z68.34          

                          BODY MASS INDEX (BMI) 34.0-34.9, ADULT                

    

 

                2020           MARIELY DELANEY MD, Ot           

   Z79.84          

                          LONG TERM (CURRENT) USE OF ORAL HYPOGLYC              

      

 

                2020           MARIELY DELANEY MD, Ot           

   Z85.46          

                          PERSONAL HISTORY OF MALIGNANT NEOPLASM O              

      

 

                2020           MARIELY DELANEY MD, Ot           

   Z86.73          

                          PRSNL HX OF TIA (TIA), AND CEREB INFRC W              

      

 

                2020           MARIELY DELANEY MD, Ot           

   Z89.431         

                          ACQUIRED ABSENCE OF RIGHT FOOT                    

 

             2020           MARIELY DELANEY MD, Ot           Z95

.5           

PRESENCE OF CORONARY ANGIOPLASTY IMPLANT                    

 

             2020           MARIELY DELANEY MD, Ot           A41

.9           

SEPSIS, UNSPECIFIED ORGANISM                     

 

                2020           RHETT MD, MARIELY N           Ot           

   E11.65          

                          TYPE 2 DIABETES MELLITUS WITH HYPERGLYCE              

      

 

             2020           MARIELY DELANEY MD           Ot           E66

.9           

OBESITY, UNSPECIFIED                             

 

                2020           MARIELY DELANEY MD           Ot           

   E78.00          

                          PURE HYPERCHOLESTEROLEMIA, UNSPECIFIED                

    

 

                2020           MARIELY DELANEY MD           Ot           

   E83.42          

                          HYPOMAGNESEMIA                     

 

             2020           MARIELY DELANEY MD           Ot           E86

.0           

DEHYDRATION                                      

 

             2020           MARIELY DELANEY MD           Ot           E87

.5           

HYPERKALEMIA                                     

 

                2020           MARIELY DELANEY MD           Ot           

   F17.210         

                          NICOTINE DEPENDENCE, CIGARETTES, UNCOMPL              

      

 

             2020           MARIELY DELANEY MD           Ot           I07

.1           

RHEUMATIC TRICUSPID INSUFFICIENCY                    

 

             2020           MARIELY DELANEY MD           Ot           I12

.9           

HYPERTENSIVE CHRONIC KIDNEY DISEASE W ST                    

 

                2020           MARIELY DELANEY MD           Ot           

   I21.A1          

                          MYOCARDIAL INFARCTION TYPE 2                    

 

                2020           MARIELY DELANEY MD           Ot           

   I25.10          

                          ATHSCL HEART DISEASE OF NATIVE CORONARY               

      

 

                2020           MARIELY DELANEY MD           Ot           

   I45.10          

                          UNSPECIFIED RIGHT BUNDLE-BRANCH BLOCK                 

   

 

                2020           MARIELY DELANEY MD           Ot           

   I50.33          

                          ACUTE ON CHRONIC DIASTOLIC (CONGESTIVE)               

      

 

             2020           MARIELY DELANEY MD           Ot           I73

.9           

PERIPHERAL VASCULAR DISEASE, UNSPECIFIED                    

 

             2020           MARIELY DELANEY MD           Ot           J18

.9           

PNEUMONIA, UNSPECIFIED ORGANISM                    

 

             2020           MARIELY DELANEY MD           Ot           J44

.9           

CHRONIC OBSTRUCTIVE PULMONARY DISEASE, U                    

 

                2020           MARIELY DELANEY MD           Ot           

   J96.01          

                          ACUTE RESPIRATORY FAILURE WITH HYPOXIA                

    

 

                2020           MARIELY DELANEY MD           Ot           

   J96.02          

                          ACUTE RESPIRATORY FAILURE WITH HYPERCAPN              

      

 

                2020           MARIELY DELANEY MD           Ot           

   M19.91          

                          PRIMARY OSTEOARTHRITIS, UNSPECIFIED SITE              

      

 

             2020           MARIELY DELANEY MD           Ot           N17

.9           

ACUTE KIDNEY FAILURE, UNSPECIFIED                    

 

             2020           MARIELY DLEANEY MD           Ot           N18

.9           

CHRONIC KIDNEY DISEASE, UNSPECIFIED                    

 

                2020           MARIELY DELANEY MD           Ot           

   R65.21          

                          SEVERE SEPSIS WITH SEPTIC SHOCK                    

 

                2020           MARIELY DELANEY MD, Ot           

   Z68.34          

                          BODY MASS INDEX (BMI) 34.0-34.9, ADULT                

    

 

                2020           MARIELY DELANEY MD, Ot           

   Z79.84          

                          LONG TERM (CURRENT) USE OF ORAL HYPOGLYC              

      

 

                2020           MARIELY DELANEY MD, Ot           

   Z85.46          

                          PERSONAL HISTORY OF MALIGNANT NEOPLASM O              

      

 

                2020           MARIELY DELANEY MD, Ot           

   Z86.73          

                          PRSNL HX OF TIA (TIA), AND CEREB INFRC W              

      

 

                2020           MARIELY DELANEY MD, Ot           

   Z89.431         

                          ACQUIRED ABSENCE OF RIGHT FOOT                    

 

             2020           MARIELY DELANEY MD, Ot           Z95

.5           

PRESENCE OF CORONARY ANGIOPLASTY IMPLANT                    

 

             2020           MARIELY DELANEY MD, Ot           A41

.9           

SEPSIS, UNSPECIFIED ORGANISM                     

 

                2020           MARIELY DELANEY MD           Ot           

   E11.65          

                          TYPE 2 DIABETES MELLITUS WITH HYPERGLYCE              

      

 

             2020           MARIELY DELANEY MD           Ot           E66

.9           

OBESITY, UNSPECIFIED                             

 

                2020           MARIELY DELANEY MD           Ot           

   E78.00          

                          PURE HYPERCHOLESTEROLEMIA, UNSPECIFIED                

    

 

                2020           MARIELY DELANEY MD           Ot           

   E83.42          

                          HYPOMAGNESEMIA                     

 

             2020           MARIELY DELANEY MD           Ot           E86

.0           

DEHYDRATION                                      

 

             2020           MARIELY DELANEY MD           Ot           E87

.5           

HYPERKALEMIA                                     

 

                2020           MARIELY DELANEY MD           Ot           

   F17.210         

                          NICOTINE DEPENDENCE, CIGARETTES, UNCOMPL              

      

 

             2020           MARIELY DELANEY MD           Ot           I07

.1           

RHEUMATIC TRICUSPID INSUFFICIENCY                    

 

             2020           MARIELY DELANEY MD           Ot           I12

.9           

HYPERTENSIVE CHRONIC KIDNEY DISEASE W ST                    

 

                2020           MARIELY DELANEY MD, Ot           

   I21.A1          

                          MYOCARDIAL INFARCTION TYPE 2                    

 

                2020           MARIELY DELANEY MD, Ot           

   I25.10          

                          ATHSCL HEART DISEASE OF NATIVE CORONARY               

      

 

                2020           MARIELY DELANEY MD           Ot           

   I45.10          

                          UNSPECIFIED RIGHT BUNDLE-BRANCH BLOCK                 

   

 

                2020           MARIELY DELANEY MD           Ot           

   I50.33          

                          ACUTE ON CHRONIC DIASTOLIC (CONGESTIVE)               

      

 

             2020           MARIELY DELANEY MD, Ot           I73

.9           

PERIPHERAL VASCULAR DISEASE, UNSPECIFIED                    

 

             2020           MARIELY DELANEY MD, Ot           J18

.9           

PNEUMONIA, UNSPECIFIED ORGANISM                    

 

             2020           MARIEYL DELANEY MD, Ot           J44

.9           

CHRONIC OBSTRUCTIVE PULMONARY DISEASE, U                    

 

                2020           MARIELY DELANEY MD, Ot           

   J96.01          

                          ACUTE RESPIRATORY FAILURE WITH HYPOXIA                

    

 

                2020           MARIELY DELANEY MD, Ot           

   J96.02          

                          ACUTE RESPIRATORY FAILURE WITH HYPERCAPN              

      

 

                2020           MARIELY DELANEY MD, Ot           

   M19.91          

                          PRIMARY OSTEOARTHRITIS, UNSPECIFIED SITE              

      

 

             2020           MARIELY DELANEY MD, Ot           N17

.9           

ACUTE KIDNEY FAILURE, UNSPECIFIED                    

 

             2020           MARIELY DELANEY MD, Ot           N18

.9           

CHRONIC KIDNEY DISEASE, UNSPECIFIED                    

 

                2020           MARIELY DELANEY MD           Ot           

   R65.21          

                          SEVERE SEPSIS WITH SEPTIC SHOCK                    

 

                2020           MARIELY DELANEY MD, Ot           

   Z68.34          

                          BODY MASS INDEX (BMI) 34.0-34.9, ADULT                

    

 

                2020           MARIELY DELANEY MD           Ot           

   Z79.84          

                          LONG TERM (CURRENT) USE OF ORAL HYPOGLYC              

      

 

                2020           MARIELY DELANEY MD, Ot           

   Z85.46          

                          PERSONAL HISTORY OF MALIGNANT NEOPLASM O              

      

 

                2020           MARIELY DELANEY MD, Ot           

   Z86.73          

                          PRSNL HX OF TIA (TIA), AND CEREB INFRC W              

      

 

                2020           MARIELY DELANEY MD           Ot           

   Z89.431         

                          ACQUIRED ABSENCE OF RIGHT FOOT                    

 

             2020           MARIELY DELANEY MD           Ot           Z95

.5           

PRESENCE OF CORONARY ANGIOPLASTY IMPLANT                    

 

             2020           MARIELY DELANEY MD           Ot           A41

.9           

SEPSIS, UNSPECIFIED ORGANISM                     

 

                2020           MARIELY DELANEY MD           Ot           

   E11.65          

                          TYPE 2 DIABETES MELLITUS WITH HYPERGLYCE              

      

 

             2020           MARIELY DELANEY MD           Ot           E66

.9           

OBESITY, UNSPECIFIED                             

 

                2020           MARIELY DELANEY MD           Ot           

   E78.00          

                          PURE HYPERCHOLESTEROLEMIA, UNSPECIFIED                

    

 

                2020           MARIELY DELANEY MD           Ot           

   E83.39          

                          OTHER DISORDERS OF PHOSPHORUS METABOLISM              

      

 

                2020           MARIELY DELANEY MD           Ot           

   E83.42          

                          HYPOMAGNESEMIA                     

 

             2020           MARIELY DELANEY MD           Ot           E86

.0           

DEHYDRATION                                      

 

             2020           MARIELY DELANEY MD, Ot           E87

.5           

HYPERKALEMIA                                     

 

                2020           MARIELY DELANEY MD           Ot           

   F15.10          

                          OTHER STIMULANT ABUSE, UNCOMPLICATED                  

  

 

                2020           MARIELY DELANEY MD, Ot           

   F17.210         

                          NICOTINE DEPENDENCE, CIGARETTES, UNCOMPL              

      

 

             2020           MARIELY DELANEY MD, Ot           I07

.1           

RHEUMATIC TRICUSPID INSUFFICIENCY                    

 

             2020           MARIELY DELANEY MD           Ot           I13

.0           

HYP HRT   CHR KDNY DIS W HRT FAIL AND ST                    

 

                2020           MARIELY DELANEY MD, Ot           

   I21.A1          

                          MYOCARDIAL INFARCTION TYPE 2                    

 

                2020           MARIELY DELANEY MD, Ot           

   I25.10          

                          ATHSCL HEART DISEASE OF NATIVE CORONARY               

      

 

             2020           MARIELY DELANEY MD, Ot           I44

.1           

ATRIOVENTRICULAR BLOCK, SECOND DEGREE                    

 

                2020           MARIELY DELANEY MD, Ot           

   I45.10          

                          UNSPECIFIED RIGHT BUNDLE-BRANCH BLOCK                 

   

 

                2020           MARIELY DELANEY MD           Ot           

   I48.91          

                          UNSPECIFIED ATRIAL FIBRILLATION                    

 

                2020           MARIELY DELANEY MD           Ot           

   I50.33          

                          ACUTE ON CHRONIC DIASTOLIC (CONGESTIVE)               

      

 

             2020           MARIELY DELANEY MD, Ot           I73

.9           

PERIPHERAL VASCULAR DISEASE, UNSPECIFIED                    

 

                2020           MARIELY DELANEY MD, Ot           

   J15.212         

                          PNEUMONIA DUE TO METHICILLIN RESISTANT S              

      

 

             2020           MARIELY DELANEY MD, Ot           J44

.1           

CHRONIC OBSTRUCTIVE PULMONARY DISEASE W                     

 

                2020           MARIELY DELANEY MD           Ot           

   J96.01          

                          ACUTE RESPIRATORY FAILURE WITH HYPOXIA                

    

 

                2020           MARIELY DELANEY MD, Ot           

   J96.02          

                          ACUTE RESPIRATORY FAILURE WITH HYPERCAPN              

      

 

             2020           MARIELY DELANEY MD, Ot           K92

.2           

GASTROINTESTINAL HEMORRHAGE, UNSPECIFIED                    

 

                2020           MARIELY DELANEY MD, Ot           

   M19.91          

                          PRIMARY OSTEOARTHRITIS, UNSPECIFIED SITE              

      

 

             2020           MARIELY DELANEY MD           Ot           N17

.9           

ACUTE KIDNEY FAILURE, UNSPECIFIED                    

 

             2020           MARIELY DELANEY MD           Ot           N18

.9           

CHRONIC KIDNEY DISEASE, UNSPECIFIED                    

 

                2020           MARIELY DELANEY MD, Ot           

   R65.21          

                          SEVERE SEPSIS WITH SEPTIC SHOCK                    

 

                2020           MARIELY DELANEY MD, Ot           

   Z20.828         

                          CONTACT W AND EXPOSURE TO OTH VIRAL COMM              

      

 

                2020           MARIELY DELANEY MD, Ot           

   Z89.431         

                          ACQUIRED ABSENCE OF RIGHT FOOT                    

 

                2020           YULISA DENNIS MD, Ot           

   E11.42          

                          TYPE 2 DIABETES MELLITUS WITH DIABETIC P              

      

 

                2020           YULISA DENNIS MD, Ot           

   E11.51          

                          TYPE 2 DIABETES W DIABETIC PERIPHERAL AN              

      

 

                2020           YULISA DENNIS MD, Ot           

   E11.621         

                          TYPE 2 DIABETES MELLITUS WITH FOOT ULCER              

      

 

             2020           YULISA DENNIS MD, Ot           I10

           

ESSENTIAL (PRIMARY) HYPERTENSION                    

 

                2020           YULISA DENNIS MD, Ot           

   I25.10          

                          ATHSCL HEART DISEASE OF NATIVE CORONARY               

      

 

             2020           YULISA DENNIS MD, Ot           I47

.1           

SUPRAVENTRICULAR TACHYCARDIA                     

 

                2020           YULISA DENNIS MD, Ot           

   I70.234         

                          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              

      

 

             2020           YULISA DENNIS MD, Ot           J44

.9           

CHRONIC OBSTRUCTIVE PULMONARY DISEASE, U                    

 

                2020           YULISA DENNIS MD, Ot           

   L97.412         

                          NON-PRS CHR ULCER OF RIGHT HEEL AND MIDF              

      

 

                2020           YULISA DENNIS MD, Ot           

   M19.91          

                          PRIMARY OSTEOARTHRITIS, UNSPECIFIED SITE              

      

 

                2020           YULISA DENNIS MD, Ot           

   T81.31XA        

                          DISRUPTION OF EXTERNAL OPERATION (SURGIC              

      

 

                2020           YULISA DENNIS MD, Ot           

   Z85.46          

                          PERSONAL HISTORY OF MALIGNANT NEOPLASM O              

      

 

                2020           YULISA DENNIS MD, Ot           

   Z89.411         

                          ACQUIRED ABSENCE OF RIGHT GREAT TOE                   

 

 

                2020           YULISA DENNIS MD, Ot           

   Z92.21          

                          PERSONAL HISTORY OF ANTINEOPLASTIC CHEMO              

      

 

             2020           YULISA DENNIS MD, Ot           Z95

.5           

PRESENCE OF CORONARY ANGIOPLASTY IMPLANT                    

 

                2020           ELISE RODRIGUEZ MD, Ot           

   F17.210         

                          NICOTINE DEPENDENCE, CIGARETTES, UNCOMPL              

      

 

             2020           ELISE RODRIGUEZ MD, Ot           I11

.0           

HYPERTENSIVE HEART DISEASE WITH HEART FA                    

 

                2020           ELISE RODRIGUEZ MD, Ot           

   I25.10          

                          ATHSCL HEART DISEASE OF NATIVE CORONARY               

      

 

             2020           ELISE RODRIGUEZ MD, Ot           I25

.2           

OLD MYOCARDIAL INFARCTION                        

 

             2020           JENNIFER SALDIVAR, ELISE RAVI Ot           I44

.1           

ATRIOVENTRICULAR BLOCK, SECOND DEGREE                    

 

             2020           ELISE RODRIGUEZ MD, Ot           I48

.3           

TYPICAL ATRIAL FLUTTER                           

 

                2020           ELISE RODRIGUEZ MD, Ot           

   I50.33          

                          ACUTE ON CHRONIC DIASTOLIC (CONGESTIVE)               

      

 

             2020           ELISE RODRIGUEZ MD, Ot           I63

.9           

CEREBRAL INFARCTION, UNSPECIFIED                    

 

             2020           ELISE RODRIGUEZ MD, Ot           I99

.8           

OTHER DISORDER OF CIRCULATORY SYSTEM                    

 

                2020           ELISE RODRIGUEZ MD, Ot           

   J96.90          

                          RESPIRATORY FAILURE, UNSP, UNSP W HYPOXI              

      

 

             2020           ELISE RODRIGUEZ MD, Ot           N17

.9           

ACUTE KIDNEY FAILURE, UNSPECIFIED                    

 

                2020           ELISE RODRIGUEZ MD, Ot           

   Z79.02          

                          LONG TERM (CURRENT) USE OF ANTITHROMBOTI              

      

 

                2020           ELISE RODRIGUEZ MD, Ot           

   Z79.82          

                          LONG TERM (CURRENT) USE OF ASPIRIN                    

 

                2020           ELISE RODRIGUEZ MD, Ot           

   Z79.84          

                          LONG TERM (CURRENT) USE OF ORAL HYPOGLYC              

      

 

                2020           ELISE RODRIGUEZ MD, Ot           

   Z79.899         

                          OTHER LONG TERM (CURRENT) DRUG THERAPY                

    

 

             2020           ELISE RODRIGUEZ MD, Ot           Z82

.3           

FAMILY HISTORY OF STROKE                         

 

                2020           YULISA DENNIS MD           Ot           

   E11.42          

                          TYPE 2 DIABETES MELLITUS WITH DIABETIC P              

      

 

                2020           YULISA DENNIS MD, Ot           

   E11.51          

                          TYPE 2 DIABETES W DIABETIC PERIPHERAL AN              

      

 

                2020           YULISA DENNIS MD, Ot           

   E11.621         

                          TYPE 2 DIABETES MELLITUS WITH FOOT ULCER              

      

 

             2020           YULISA DENNIS MD, Ot           E78

.5           

HYPERLIPIDEMIA, UNSPECIFIED                      

 

             2020           YULISA DENNIS MD, Ot           I10

           

ESSENTIAL (PRIMARY) HYPERTENSION                    

 

                2020           YULISA DENNIS MD, Ot           

   I25.10          

                          ATHSCL HEART DISEASE OF NATIVE CORONARY               

      

 

             2020           YULISA DENNIS MD, Ot           I47

.1           

SUPRAVENTRICULAR TACHYCARDIA                     

 

                2020           YULISA DENNIS MD, Ot           

   I70.234         

                          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              

      

 

             2020           YULISA DENNIS MD, Ot           J44

.9           

CHRONIC OBSTRUCTIVE PULMONARY DISEASE, U                    

 

                2020           YULISA DENNIS MD, Ot           

   L97.412         

                          NON-PRS CHR ULCER OF RIGHT HEEL AND MIDF              

      

 

                2020           YULISA DENNIS MD, Ot           

   M19.91          

                          PRIMARY OSTEOARTHRITIS, UNSPECIFIED SITE              

      

 

                2020           YULISA DENNIS MD, Ot           

   T81.31XA        

                          DISRUPTION OF EXTERNAL OPERATION (SURGIC              

      

 

                2020           YULISA DENNIS MD, Ot           

   Z85.46          

                          PERSONAL HISTORY OF MALIGNANT NEOPLASM O              

      

 

                2020           YULISA DENNIS MD, Ot           

   Z87.891         

                          PERSONAL HISTORY OF NICOTINE DEPENDENCE               

     

 

                2020           YULISA DENNIS MD, Ot           

   Z89.411         

                          ACQUIRED ABSENCE OF RIGHT GREAT TOE                   

 

 

                2020           YULISA DENNIS MD, Ot           

   Z92.21          

                          PERSONAL HISTORY OF ANTINEOPLASTIC CHEMO              

      

 

             2020           YULISA DENNIS MD, Ot           Z95

.5           

PRESENCE OF CORONARY ANGIOPLASTY IMPLANT                    

 

                2020           ELISE RODRIGUEZ MD, Ot           

   F17.210         

                          NICOTINE DEPENDENCE, CIGARETTES, UNCOMPL              

      

 

             2020           ELISE RODRIGUEZ MD, Ot           I11

.0           

HYPERTENSIVE HEART DISEASE WITH HEART FA                    

 

                2020           ELISE RODRIGUEZ MD, Ot           

   I25.10          

                          ATHSCL HEART DISEASE OF NATIVE CORONARY               

      

 

             2020           ELISE RODRIGUEZ MD, Ot           I25

.2           

OLD MYOCARDIAL INFARCTION                        

 

             2020           ELISE RODRIGUEZ MD, Ot           I44

.1           

ATRIOVENTRICULAR BLOCK, SECOND DEGREE                    

 

             2020           ELISE RODRIGUEZ MD, Ot           I48

.3           

TYPICAL ATRIAL FLUTTER                           

 

                2020           ELISE RODRIGUEZ MD, Ot           

   I50.33          

                          ACUTE ON CHRONIC DIASTOLIC (CONGESTIVE)               

      

 

             2020           ELISE RODRIGUEZ MD, Ot           I63

.9           

CEREBRAL INFARCTION, UNSPECIFIED                    

 

             2020           ELISE RODRIGUEZ MD, Ot           I99

.8           

OTHER DISORDER OF CIRCULATORY SYSTEM                    

 

                2020           ELISE RODRIGUEZ MD, Ot           

   J96.90          

                          RESPIRATORY FAILURE, UNSP, UNSP W HYPOXI              

      

 

             2020           ELISE RODRIGUEZ MD, Ot           N17

.9           

ACUTE KIDNEY FAILURE, UNSPECIFIED                    

 

                2020           ELISE RODRIGUEZ MD           Ot           

   Z79.02          

                          LONG TERM (CURRENT) USE OF ANTITHROMBOTI              

      

 

                2020           ELISE RODRIGUEZ MD           Ot           

   Z79.82          

                          LONG TERM (CURRENT) USE OF ASPIRIN                    

 

                2020           ELISE RODRIGUEZ MD, Ot           

   Z79.84          

                          LONG TERM (CURRENT) USE OF ORAL HYPOGLYC              

      

 

                2020           ELISE RODRIGUEZ MD, Ot           

   Z79.899         

                          OTHER LONG TERM (CURRENT) DRUG THERAPY                

    

 

             2020           ELISE RODRIGUEZ MD, Ot           Z82

.3           

FAMILY HISTORY OF STROKE                         

 

                2020           ELISE RODRIGUEZ MD           Ot           

   F17.210         

                          NICOTINE DEPENDENCE, CIGARETTES, UNCOMPL              

      

 

             2020           ELISE RODRIGUEZ MD, Ot           I11

.0           

HYPERTENSIVE HEART DISEASE WITH HEART FA                    

 

                2020           ELISE RODRIGUEZ MD, Ot           

   I25.10          

                          ATHSCL HEART DISEASE OF NATIVE CORONARY               

      

 

             2020           ELISE RODRIGUEZ MD           Ot           I25

.2           

OLD MYOCARDIAL INFARCTION                        

 

             2020           ELISE RODRIGUEZ MD           Ot           I44

.1           

ATRIOVENTRICULAR BLOCK, SECOND DEGREE                    

 

             2020           ELISE RODRIGUEZ MD, Ot           I48

.3           

TYPICAL ATRIAL FLUTTER                           

 

                2020           ELISE RODRIGUEZ MD           Ot           

   I50.33          

                          ACUTE ON CHRONIC DIASTOLIC (CONGESTIVE)               

      

 

             2020           ELISE RODRIGUEZ MD           Ot           I63

.9           

CEREBRAL INFARCTION, UNSPECIFIED                    

 

             2020           ELISE RODRIGUEZ MD           Ot           I99

.8           

OTHER DISORDER OF CIRCULATORY SYSTEM                    

 

                2020           ELISE RODRIGUEZ MD           Ot           

   J96.90          

                          RESPIRATORY FAILURE, UNSP, UNSP W HYPOXI              

      

 

             2020           ELISE RODRIGUEZ MD, Ot           N17

.9           

ACUTE KIDNEY FAILURE, UNSPECIFIED                    

 

                2020           ELISE RODRIGUEZ MD, Ot           

   Z79.02          

                          LONG TERM (CURRENT) USE OF ANTITHROMBOTI              

      

 

                2020           ELISE RODRIGUEZ MD           Ot           

   Z79.82          

                          LONG TERM (CURRENT) USE OF ASPIRIN                    

 

                2020           ELISE RODRIGUEZ MD, Ot           

   Z79.84          

                          LONG TERM (CURRENT) USE OF ORAL HYPOGLYC              

      

 

                2020           ELISE RODRIGUEZ MD, Ot           

   Z79.899         

                          OTHER LONG TERM (CURRENT) DRUG THERAPY                

    

 

             2020           ELISE RODRIGUEZ MD, Ot           Z82

.3           

FAMILY HISTORY OF STROKE                         

 

             2020           JEANNIE SALDIVAR, YULISA SEALS           Ot           D63

.1           

ANEMIA IN CHRONIC KIDNEY DISEASE                    

 

             2020           YULISA DENNIS MD           Ot           N18

.9           

CHRONIC KIDNEY DISEASE, UNSPECIFIED                    

 

             2020           ARASELI RICHARDSON           Ot           E11

.9           

TYPE 2 DIABETES MELLITUS WITHOUT COMPLIC                    

 

                2020           ARASELI RICHARDSON           Ot           

   E83.42          

                          HYPOMAGNESEMIA                     

 

             2020           ARASELI RICHARDSON           Ot           E86

.9           

VOLUME DEPLETION, UNSPECIFIED                    

 

                2020           ARASELI RICHARDSON           Ot           

   F17.210         

                          NICOTINE DEPENDENCE, CIGARETTES, UNCOMPL              

      

 

             2020           ARASELI RICHARDSON           Ot           I10

           

ESSENTIAL (PRIMARY) HYPERTENSION                    

 

                2020           ARASELI RICHARDSON           Ot           

   I25.10          

                          ATHSCL HEART DISEASE OF NATIVE CORONARY               

      

 

             2020           ARASELI RICHARDSON Ot           J44

.9           

CHRONIC OBSTRUCTIVE PULMONARY DISEASE, U                    

 

             2020           ARASELI RICHARDSON           Ot           R60

.0           

LOCALIZED EDEMA                                  

 

                2020           ARASELI RICHARDSON           Ot           

   Z79.01          

                          LONG TERM (CURRENT) USE OF ANTICOAGULANT              

      

 

                2020           ARASELI RICHARDSON           Ot           

   Z79.51          

                          LONG TERM (CURRENT) USE OF INHALED STERO              

      

 

                2020           ARASELI RICHARDSON           Ot           

   Z79.84          

                          LONG TERM (CURRENT) USE OF ORAL HYPOGLYC              

      

 

             2020           ARASELI RICHARDSON           Ot           Z80

.0           

FAMILY HISTORY OF MALIGNANT NEOPLASM OF                     

 

                2020           ARASELI RICHARDSON           Ot           

   Z82.49          

                          FAMILY HX OF ISCHEM HEART DIS AND OTH DI              

      

 

                2020           ARASELI RICHARDSON Ot           

   Z85.46          

                          PERSONAL HISTORY OF MALIGNANT NEOPLASM O              

      

 

                2020           ARASELI RICHARDSON Ot           

   Z86.73          

                          PRSNL HX OF TIA (TIA), AND CEREB INFRC W              

      

 

             2020           ARASELI RICHARDSON           Ot           Z95

.5           

PRESENCE OF CORONARY ANGIOPLASTY IMPLANT                    

 

                2020           MK LÓPEZ MD           Ot           

   Z01.89          

                          ENCOUNTER FOR OTHER SPECIFIED SPECIAL EX              

      

 

             2020           THA ODOM MD, Ot           B95.62   

        

METHICILLIN RESIS STAPH INFCT CAUSING DI                    

 

             2020           THA ODOM MD, Ot           B96.5    

       

PSEUDOMONAS (MALLEI) CAUSING DISEASES CL                    

 

             2020           THA ODOM MD, Ot           E11.42   

        TYPE 

2 DIABETES MELLITUS WITH DIABETIC P                    

 

             2020           THA ODOM MD, Ot           E11.621  

         TYPE

2 DIABETES MELLITUS WITH FOOT ULCER                    

 

             2020           THA ODOM MD, Ot           I70.234  

         

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                    

 

             2020           THA ODOM MD, Ot           L97.413  

         NON-

PRS CHR ULCER OF RIGHT HEEL AND MIDF                    

 

             2020           THA ODOM MD, Ot           M86.171  

         

OTHER ACUTE OSTEOMYELITIS, RIGHT ANKLE A                    

 

                2020           ELISE RODRIGUEZ MD, Ot           

   F17.210         

                          NICOTINE DEPENDENCE, CIGARETTES, UNCOMPL              

      

 

             2020           ELISE RODRIGUEZ MD, Ot           I11

.0           

HYPERTENSIVE HEART DISEASE WITH HEART FA                    

 

                2020           ELISE RODRIGUEZ MD, Ot           

   I25.10          

                          ATHSCL HEART DISEASE OF NATIVE CORONARY               

      

 

             2020           ELISE RODRIGUEZ MD, Ot           I25

.2           

OLD MYOCARDIAL INFARCTION                        

 

             2020           ELISE RODRIGUEZ MD, Ot           I44

.1           

ATRIOVENTRICULAR BLOCK, SECOND DEGREE                    

 

             2020           ELISE RODRIGUEZ MD, Ot           I48

.3           

TYPICAL ATRIAL FLUTTER                           

 

                2020           ELISE RODRIGUEZ MD, Ot           

   I50.33          

                          ACUTE ON CHRONIC DIASTOLIC (CONGESTIVE)               

      

 

             2020           ELISE RODRIGUEZ MD, Ot           I63

.9           

CEREBRAL INFARCTION, UNSPECIFIED                    

 

             2020           ELISE RODRIGUEZ MD, Ot           I99

.8           

OTHER DISORDER OF CIRCULATORY SYSTEM                    

 

                2020           ELISE RODRIGUEZ MD, Ot           

   J96.90          

                          RESPIRATORY FAILURE, UNSP, UNSP W HYPOXI              

      

 

             2020           ELISE RODRIGUEZ MD, Ot           N17

.9           

ACUTE KIDNEY FAILURE, UNSPECIFIED                    

 

                2020           ELISE RODRIGUEZ MD, Ot           

   Z79.02          

                          LONG TERM (CURRENT) USE OF ANTITHROMBOTI              

      

 

                2020           ELISE RODRIGUEZ MD, Ot           

   Z79.82          

                          LONG TERM (CURRENT) USE OF ASPIRIN                    

 

                2020           ELISE RODRIGUEZ MD, Ot           

   Z79.84          

                          LONG TERM (CURRENT) USE OF ORAL HYPOGLYC              

      

 

                2020           ELISE RODRIGUEZ MD, Ot           

   Z79.899         

                          OTHER LONG TERM (CURRENT) DRUG THERAPY                

    

 

             2020           ELISE RODRIGUEZ MD, Ot           Z82

.3           

FAMILY HISTORY OF STROKE                         

 

                2020           YULISA DENNIS MD, Ot           

   E11.42          

                          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              

      

 

                2020           YULISA DENNIS MD, Ot           

   E11.52          

                          TYPE 2 DIABETES W DIABETIC PERIPHERAL AN              

      

 

                2020           YULISA DENNIS MD, Ot           

   E11.621         

                          TYPE 2 DIABETES MELLITUS WITH FOOT ULCER              

      

 

             2020           YULISA DENNIS MD, Ot           E44

.1           

MILD PROTEIN-CALORIE MALNUTRITION                    

 

                2020           YULISA DENNIS MD, Ot           

   I70.244         

                          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              

      

 

                2020           YULISA DENNIS MD           Ot           

   L97.412         

                          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              

      

 

                2020           YULISA DENNIS MD           Ot           

   T81.31XA        

                          DISRUPTION OF EXTERNAL OPERATION (SURGIC              

      

 

                2020           YULISA DENNIS MD, Ot           

   E11.42          

                          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              

      

 

                2020           YULISA DENNIS MD, Ot           

   E11.52          

                          TYPE 2 DIABETES W DIABETIC PERIPHERAL AN              

      

 

                2020           YULISA DENNIS MD, Ot           

   E11.621         

                          TYPE 2 DIABETES MELLITUS WITH FOOT ULCER              

      

 

             2020           YULISA DENNIS MD           Ot           E44

.1           

MILD PROTEIN-CALORIE MALNUTRITION                    

 

                2020           YULISA DENNIS MD           Ot           

   I70.244         

                          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              

      

 

                2020           YULISA DENNIS MD, Ot           

   L97.412         

                          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              

      

 

                2020           YULISA DENNIS MD, Ot           

   T81.31XA        

                          DISRUPTION OF EXTERNAL OPERATION (SURGIC              

      



                                                                                
                                                                                
                                                                                
                                                                                
                                                                                
                                                                                
                                                                                
                                                                                
                                                                                
                                                                                
                                                                                
                                                                                
                                                                                
                                                                                
                                                                                
                                                                                
                                                                                
                                                                                
                                                                                
                                                                                
                                                                                
                                                                                
                                                                                
                                                                                
                                                                                
                                                                                
                                                                                
                                                                                
                                                                                
                                                                                
                                                                                
                                                                                
                                                                                
                                                                                
                                                                                
                                                                                
                                                                                
                                                                                
                                                                                
                                                                                
                                                                                
                                                                                
                                                                                
                                                                                
                                                                                
                                                                                
                                                                                
                                                                                
                                                                                
                                                                                
                                                                                
                                                                                
                                                                              



Procedures

      



                Code            Description           Performed By           Per

larry On        

 

                                      [id number]                                 A1C 

(IN-HOUSE)                

                                                    01/10/2013        

 

                                      53690                                 URIN

E DRUG SCREEN  (IN-HOUSE) 

                                                    01/10/2013        

 

                                      [id number]                                 MICR

O ALBUMIN-IN HOUSE        

                                                    01/10/2013        

 

                                      [id number]                                 MICR

OALBUMIN                  

                                                    2013        

 

                                      [id number]                                  C

AROTID DOPPLER            

                                                    2013        

 

                                      75009                                 A1C 

(IN-HOUSE)                

                                                    2013        

 

                                      54107                                 A1C 

(IN-HOUSE)                

                                                    10/22/2013        

 

                                      [id number]                                 ROUT

INE VENIPUNCTURE          

                                                    2014        

 

                                      [id number]                                 URIN

E DRUG SCREEN  (IN-HOUSE) 

                                                    2014        

 

                                      44348                                 A1C 

(IN-HOUSE)                

                                                    2014        

 

                                      [id number]                                 GF

R CALC (RESULT ONLY)      

                                                    2014        

 

                                [id number]                                 CMP       

                    

                                        2014        

 

                                      09414                                 LIPI

D PANEL                   

                                                    2014        

 

                                      28559                                 INJ 

TRIGGER POINT 1/2 MUSCL   

                                                    2014        

 

                                      32813                                 A1C 

(IN-HOUSE)                

                                                    2014        

 

                                      [id number]                                 MICR

O ALBUMIN-IN HOUSE        

                                                    2014        

 

                                66111                                 BMP       

                    

                                        2014        

 

                                      [id number]                                 GF

R CALC (RESULT ONLY)      

                                                    2014        

 

                                      17052                                 A1C 

(IN-HOUSE)                

                                                    2014        

 

                                      89447                                 A1C 

(IN-HOUSE)                

                                                    2014        

 

                                76702                                 AMERITOX  

                    

                                        2014        

 

                                      71186                                 A1C 

(IN-HOUSE)                

                                                    2014        

 

                                      [id number]                                 EX

CISION OF RIGHT FOOT 

TENDON, OPEN APPR                                               2019      

  

 

                                      [id number]                                 EX

CISION OF RIGHT FOOT 

TENDON, OPEN APPR                                               2020      

  

 

                                      [id number]                                 EX

CISION OF RIGHT FOOT 

TENDON, OPEN APPR                                               2020      

  

 

                                      [id number]                                 IN

SERTION OF ENDOTRACHEAL 

AIRWAY INTO TR                                               2020        

 

                                      [id number]                                 RE

SPIRATORY VENTILATION, 24-

96 CONSECUTI                                               2020        

 

                                      [id number]                                 RE

SPIRATORY VENTILATION, 

GREATER THAN 96                                               2020        

 

                                      [id number]                                 RE

STORATION OF CARDIAC 

RHYTHM, SINGLE                                               2020        



                                                                          



Results

      



                    Test                Result              Range        

 

                                        Prothrombin Time (PT) - 16 10:45  

       

 

                    INR                 0.9                 0.8-1.2        

 

                    Prothrombin Time           10.0 sec            9.1-12.0     

   

 

                                        Comp. Metabolic Panel (14) - 16 10

:45         

 

                    Glucose, Serum           153 mg/dL           65-99        

 

                    BUN                 34 mg/dL            8-27        

 

                    Creatinine, Serum           1.19 mg/dL           0.76-1.27  

      

 

                    eGFR If NonAfricn Am           63 mL/min/1.73               

>59        

 

                    eGFR If Africn Am           73 mL/min/1.73               >59

        

 

                    BUN/Creatinine Ratio           29                  10-22    

    

 

                    Sodium, Serum           142 mmol/L           136-144        

 

                    Potassium, Serum           4.8 mmol/L           3.5-5.2     

   

 

                    Chloride, Serum           100 mmol/L                  

 

 

                    Carbon Dioxide, Total           26 mmol/L           18-29   

     

 

                    Calcium, Serum           9.4 mg/dL           8.6-10.2       

 

 

                    Protein, Total, Serum           7.5 g/dL            6.0-8.5 

       

 

                    Albumin, Serum           4.6 g/dL            3.6-4.8        

 

                    Globulin, Total           2.9 g/dL            1.5-4.5       

 

 

                    A/G Ratio           1.6                 1.1-2.5        

 

                    Bilirubin, Total           0.2 mg/dL           0.0-1.2      

  

 

                    Alkaline Phosphatase, S           88 IU/L             

        

 

                    AST (SGOT)           22 IU/L             0-40        

 

                    ALT (SGPT)           18 IU/L             0-44        

 

                                        Lipid Panel - 16 10:45         

 

                    Cholesterol, Total           193 mg/dL           100-199    

    

 

                    Triglycerides           283 mg/dL           0-149        

 

                    HDL Cholesterol           38 mg/dL            >39        

 

                    VLDL Cholesterol Julio           57 mg/dL            5-40     

   

 

                    LDL Cholesterol Calc           98 mg/dL            0-99     

   

 

                                        C-Reactive Protein, Cardiac - 16 1

0:45         

 

                    C-Reactive Protein, Cardiac           5.19 mg/L           0.

00-3.00        

 

                                        Magnesium, Serum - 16 10:45       

  

 

                    Magnesium, Serum           1.8 mg/dL           1.6-2.3      

  

 

                                        Automated blood complete blood count (

mogram) panel - 17 08:11         

 

                          Blood leukocytes automated count (number/volume)      

     10.9 10*3/uL         

                                        4.3-11.0        

 

                          Blood erythrocytes automated count (number/volume)    

       4.17 10*6/uL       

                                        4.35-5.85        

 

                    Venous blood hemoglobin measurement (mass/volume)           

13.5 g/dL           

13.3-17.7        

 

                    Blood hematocrit (volume fraction)           41 %           

     40-54        

 

                    Automated erythrocyte mean corpuscular volume           97 [

foz_us]           

80-99        

 

                                        Automated erythrocyte mean corpuscular h

emoglobin (mass per erythrocyte)        

                          32 pg                     25-34        

 

                                        Automated erythrocyte mean corpuscular h

emoglobin concentration measurement 

(mass/volume)             33 g/dL                   32-36        

 

                    Automated erythrocyte distribution width ratio           14.

4 %              10.0-

14.5        

 

                    Automated blood platelet count (count/volume)           135 

10*3/uL           

130-400        

 

                          Automated blood platelet mean volume measurement      

     11.9 [foz_us]        

                                        7.4-10.4        

 

                                        PT panel in platelet poor plasma by coag

ulation assay - 17 08:11         

 

                          Prothrombin time (PT) in platelet poor plasma by coagu

lation assay           

11.6 s                                  12.2-14.7        

 

                          INR in platelet poor plasma or blood by coagulation as

say           0.9         

                                        0.8-1.4        

 

                                        Activated partial thromboplastin time (a

PTT) in platelet poor plasma 

bycoagulation assay - 17 08:11         

 

                                        Activated partial thromboplastin time (a

PTT) in platelet poor plasma 

bycoagulation assay           27 s                      24-35        

 

                                        Comprehensive metabolic panel - 17

 08:11         

 

                          Serum or plasma sodium measurement (moles/volume)     

      136 mmol/L          

                                        135-145        

 

                          Serum or plasma potassium measurement (moles/volume)  

         4.2 mmol/L       

                                        3.6-5.0        

 

                          Serum or plasma chloride measurement (moles/volume)   

        98 mmol/L         

                                                

 

                    Carbon dioxide           25 mmol/L           21-32        

 

                          Serum or plasma anion gap determination (moles/volume)

           13 mmol/L      

                                        5-14        

 

                          Serum or plasma urea nitrogen measurement (mass/volume

)           29 mg/dL      

                                        7-18        

 

                          Serum or plasma creatinine measurement (mass/volume)  

         1.29 mg/dL       

                                        0.60-1.30        

 

                    Serum or plasma urea nitrogen/creatinine mass ratio         

  22                  NRG 

       

 

                                        Serum or plasma creatinine measurement w

ith calculation of estimated glomerular 

filtration rate           56                        NRG        

 

                    Serum or plasma glucose measurement (mass/volume)           

154 mg/dL           

        

 

                    Serum or plasma calcium measurement (mass/volume)           

9.4 mg/dL           

8.5-10.1        

 

                          Serum or plasma total bilirubin measurement (mass/volu

me)           0.3 mg/dL   

                                        0.1-1.0        

 

                                        Serum or plasma alkaline phosphatase juarez

surement (enzymatic activity/volume)    

                          111 U/L                           

 

                                        Serum or plasma aspartate aminotransfera

se measurement (enzymatic 

activity/volume)           17 U/L                    5-34        

 

                                        Serum or plasma alanine aminotransferase

 measurement (enzymatic activity/volume)

                          17 U/L                    0-55        

 

                    Serum or plasma protein measurement (mass/volume)           

7.6 g/dL            

6.4-8.2        

 

                    Serum or plasma albumin measurement (mass/volume)           

4.1 g/dL            

3.2-4.5        

 

                                        Lipid 1996 panel - 17 08:11       

  

 

                          Serum or plasma triglyceride measurement (mass/volume)

           769 mg/dL      

                                        <150        

 

                          Serum or plasma cholesterol measurement (mass/volume) 

          160 mg/dL       

                                        < 200        

 

                          Serum or plasma cholesterol in HDL measurement (mass/v

olume)           22 mg/dL 

                                        40-60        

 

                          Cholesterol in LDL [mass/volume] in serum or plasma by

 direct assay           41

 mg/dL                                  1-129        

 

                          Serum or plasma cholesterol in VLDL measurement (mass/

volume)           154 

mg/dL                                   5-40        

 

                                        Methicillin resistant Staphylococcus aur

eus (MRSA) screening culture - 17 

08:11         

 

                          Methicillin resistant Staphylococcus aureus (MRSA) scr

eening culture           

NEG                                     NRG        

 

                                        Complete blood count (CBC) with automate

d white blood cell (WBC) differential - 

17 10:08         

 

                          Blood leukocytes automated count (number/volume)      

     9.8 10*3/uL          

                                        4.3-11.0        

 

                          Blood erythrocytes automated count (number/volume)    

       3.44 10*6/uL       

                                        4.35-5.85        

 

                    Venous blood hemoglobin measurement (mass/volume)           

11.2 g/dL           

13.3-17.7        

 

                    Blood hematocrit (volume fraction)           35 %           

     40-54        

 

                    Automated erythrocyte mean corpuscular volume           103 

[foz_us]           

80-99        

 

                                        Automated erythrocyte mean corpuscular h

emoglobin (mass per erythrocyte)        

                          33 pg                     25-34        

 

                                        Automated erythrocyte mean corpuscular h

emoglobin concentration measurement 

(mass/volume)             32 g/dL                   32-36        

 

                    Automated erythrocyte distribution width ratio           15.

1 %              10.0-

14.5        

 

                    Automated blood platelet count (count/volume)           147 

10*3/uL           

130-400        

 

                          Automated blood platelet mean volume measurement      

     11.3 [foz_us]        

                                        7.4-10.4        

 

                    Automated blood neutrophils/100 leukocytes           73 %   

             42-75       

 

 

                    Automated blood lymphocytes/100 leukocytes           17 %   

             12-44       

 

 

                    Blood monocytes/100 leukocytes           6 %                

 0-12        

 

                    Automated blood eosinophils/100 leukocytes           3 %    

             0-10        

 

                    Automated blood basophils/100 leukocytes           1 %      

           0-10        

 

                    Blood neutrophils automated count (number/volume)           

7.2 10*3            

1.8-7.8        

 

                    Blood lymphocytes automated count (number/volume)           

1.7 10*3            

1.0-4.0        

 

                    Blood monocytes automated count (number/volume)           0.

6 10*3            

0.0-1.0        

 

                    Automated eosinophil count           0.3 10*3/uL           0

.0-0.3        

 

                    Automated blood basophil count (count/volume)           0.1 

10*3/uL           

0.0-0.1        

 

                                        Comprehensive metabolic panel - 17

 10:08         

 

                          Serum or plasma sodium measurement (moles/volume)     

      135 mmol/L          

                                        135-145        

 

                          Serum or plasma potassium measurement (moles/volume)  

         6.3 mmol/L       

                                        3.6-5.0        

 

                          Serum or plasma chloride measurement (moles/volume)   

        103 mmol/L        

                                                

 

                    Carbon dioxide           20 mmol/L           21-32        

 

                          Serum or plasma anion gap determination (moles/volume)

           12 mmol/L      

                                        5-14        

 

                          Serum or plasma urea nitrogen measurement (mass/volume

)           71 mg/dL      

                                        7-18        

 

                          Serum or plasma creatinine measurement (mass/volume)  

         6.62 mg/dL       

                                        0.60-1.30        

 

                    Serum or plasma urea nitrogen/creatinine mass ratio         

  11                  NRG 

       

 

                                        Serum or plasma creatinine measurement w

ith calculation of estimated glomerular 

filtration rate           8                         NRG        

 

                    Serum or plasma glucose measurement (mass/volume)           

213 mg/dL           

        

 

                    Serum or plasma calcium measurement (mass/volume)           

8.3 mg/dL           

8.5-10.1        

 

                          Serum or plasma total bilirubin measurement (mass/volu

me)           0.4 mg/dL   

                                        0.1-1.0        

 

                                        Serum or plasma alkaline phosphatase juarez

surement (enzymatic activity/volume)    

                          83 U/L                            

 

                                        Serum or plasma aspartate aminotransfera

se measurement (enzymatic 

activity/volume)           16 U/L                    5-34        

 

                                        Serum or plasma alanine aminotransferase

 measurement (enzymatic activity/volume)

                          16 U/L                    0-55        

 

                    Serum or plasma protein measurement (mass/volume)           

7.6 g/dL            

6.4-8.2        

 

                    Serum or plasma albumin measurement (mass/volume)           

3.9 g/dL            

3.2-4.5        

 

                                        Magnesium - 17 10:08         

 

                    Magnesium           2.1 mg/dL           1.8-2.4        

 

                                        PT panel in platelet poor plasma by coag

ulation assay - 17 10:08         

 

                          Prothrombin time (PT) in platelet poor plasma by coagu

lation assay           

13.1 s                                  12.2-14.7        

 

                          INR in platelet poor plasma or blood by coagulation as

say           1.0         

                                        0.8-1.4        

 

                                        Activated partial thromboplastin time (a

PTT) in platelet poor plasma 

bycoagulation assay - 17 10:08         

 

                                        Activated partial thromboplastin time (a

PTT) in platelet poor plasma 

bycoagulation assay           28 s                      24-35        

 

                                        Serum or plasma troponin i.cardiac measu

rement (mass/volume) - 17 10:08   

      

 

                          Serum or plasma troponin i.cardiac measurement (mass/v

olume)           < ng/mL  

                                        <0.30        

 

                                        Complete urinalysis with reflex to cultu

re - 17 12:34         

 

                    Urine color determination           YELLOW              NRG 

       

 

                    Urine clarity determination           CLEAR               NR

G        

 

                    Urine pH measurement by test strip           5              

     5-9        

 

                    Specific gravity of urine by test strip           1.020     

          1.016-1.022  

      

 

                    Urine protein assay by test strip, semi-quantitative        

   2+                  

NEGATIVE        

 

                    Urine glucose detection by automated test strip           NE

GATIVE            

NEGATIVE        

 

                          Erythrocytes detection in urine sediment by light micr

oscopy           2+       

                                        NEGATIVE        

 

                    Urine ketones detection by automated test strip           NE

GATIVE            

NEGATIVE        

 

                    Urine nitrite detection by test strip           NEGATIVE    

        NEGATIVE    

    

 

                    Urine total bilirubin detection by test strip           NEGA

TIVE            

NEGATIVE        

 

                          Urine urobilinogen measurement by automated test strip

 (mass/volume)           

NORMAL                                  NORMAL        

 

                    Urine leukocyte esterase detection by dipstick           1+ 

                 NEGATIVE 

       

 

                                        Automated urine sediment erythrocyte cou

nt by microscopy (number/high power 

field)                    RARE                      NRG        

 

                                        Automated urine sediment leukocyte count

 by microscopy (number/high power field)

                           [HPF]                    NRG        

 

                          Bacteria detection in urine sediment by light microsco

py           NEGATIVE     

                                        NRG        

 

                                        Squamous epithelial cells detection in u

rine sediment by light microscopy       

                          NONE                      NRG        

 

                          Crystals detection in urine sediment by light microsco

py           NONE         

                                        NRG        

 

                    Casts detection in urine sediment by light microscopy       

    NONE                

NRG        

 

                          Mucus detection in urine sediment by light microscopy 

          NEGATIVE        

                                        NRG        

 

                    Complete urinalysis with reflex to culture           YES    

             NRG        

 

                                        Bacterial urine culture - 17 12:34

         

 

                    Bacterial urine culture           NG                  NRG   

     

 

                                        PDM - AMPHETAMINES W/ REFLEX d/l ISOMERS

 - 18 14:55         

 

                    Prescribed Drug 1           Oxycodone            NRG        

 

                    COMMENT                                 NRG        

 

                    Amphetamine           NEGATIVE ng/mL           <250        

 

                    medMATCH Amphetamine           CONSISTENT            NRG    

    

 

                    Methamphetamine           NEGATIVE ng/mL           <250     

   

 

                    medMATCH Methamphetamine           CONSISTENT            NRG

        

 

                                        CBC - 18 11:54         

 

                    WHITE BLOOD CELL COUNT           10.0 Thousand/uL           

3.8-10.8        

 

                    RED BLOOD CELL COUNT           3.77 Million/uL           4.2

0-5.80        

 

                    HEMOGLOBIN           12.4 g/dL           13.2-17.1        

 

                    HEMATOCRIT           35.6 %              38.5-50.0        

 

                    MCV                 94.4 fL             80.0-100.0        

 

                    MCH                 32.9 pg             27.0-33.0        

 

                    MCHC                34.8 g/dL           32.0-36.0        

 

                    RDW                 14.3 %              11.0-15.0        

 

                    PLATELET COUNT           117 Thousand/uL           140-400  

      

 

                    MPV                 13.6 fL             7.5-12.5        

 

                    ABSOLUTE NEUTROPHILS           6720 cells/uL           1500-

7800        

 

                    ABSOLUTE LYMPHOCYTES           2470 cells/uL           850-3

900        

 

                    ABSOLUTE MONOCYTES           510 cells/uL           200-950 

       

 

                    ABSOLUTE EOSINOPHILS           210 cells/uL           

        

 

                    ABSOLUTE BASOPHILS           90 cells/uL           0-200    

    

 

                    NEUTROPHILS           67.2 %              NRG        

 

                    LYMPHOCYTES           24.7 %              NRG        

 

                    MONOCYTES           5.1 %               NRG        

 

                    EOSINOPHILS           2.1 %               NRG        

 

                    BASOPHILS           0.9 %               NRG        

 

                                        Complete blood count (CBC) with automate

d white blood cell (WBC) differential - 

19 11:56         

 

                          Blood leukocytes automated count (number/volume)      

     14.6 10*3/uL         

                                        4.3-11.0        

 

                          Blood erythrocytes automated count (number/volume)    

       3.92 10*6/uL       

                                        4.35-5.85        

 

                    Venous blood hemoglobin measurement (mass/volume)           

12.6 g/dL           

13.3-17.7        

 

                    Blood hematocrit (volume fraction)           40 %           

     40-54        

 

                    Automated erythrocyte mean corpuscular volume           101 

[foz_us]           

80-99        

 

                                        Automated erythrocyte mean corpuscular h

emoglobin (mass per erythrocyte)        

                          32 pg                     25-34        

 

                                        Automated erythrocyte mean corpuscular h

emoglobin concentration measurement 

(mass/volume)             32 g/dL                   32-36        

 

                    Automated erythrocyte distribution width ratio           16.

8 %              10.0-

14.5        

 

                    Automated blood platelet count (count/volume)           120 

10*3/uL           

130-400        

 

                          Automated blood platelet mean volume measurement      

     12.8 [foz_us]        

                                        7.4-10.4        

 

                    Automated blood neutrophils/100 leukocytes           81 %   

             42-75       

 

 

                    Automated blood lymphocytes/100 leukocytes           11 %   

             12-44       

 

 

                    Blood monocytes/100 leukocytes           6 %                

 0-12        

 

                    Automated blood eosinophils/100 leukocytes           1 %    

             0-10        

 

                    Automated blood basophils/100 leukocytes           1 %      

           0-10        

 

                    Blood neutrophils automated count (number/volume)           

11.8 10*3           

1.8-7.8        

 

                    Blood lymphocytes automated count (number/volume)           

1.6 10*3            

1.0-4.0        

 

                    Blood monocytes automated count (number/volume)           0.

8 10*3            

0.0-1.0        

 

                    Automated eosinophil count           0.2 10*3/uL           0

.0-0.3        

 

                    Automated blood basophil count (count/volume)           0.1 

10*3/uL           

0.0-0.1        

 

                                        Manual absolute plasma cell count -  11:56         

 

                    Blood monocytes/100 leukocytes           3 %                

 NRG        

 

                    Manual blood segmented neutrophils/100 leukocytes           

65 %                NRG  

      

 

                    Blood band neutrophils/100 leukocytes           17 %        

        NRG        

 

                    Manual blood lymphocytes/100 leukocytes           15 %      

          NRG        

 

                    Blood erythrocyte morphology finding identification         

  NORMAL              

NRG        

 

                                        Comprehensive metabolic panel - 19

 11:56         

 

                          Serum or plasma sodium measurement (moles/volume)     

      141 mmol/L          

                                        135-145        

 

                          Serum or plasma potassium measurement (moles/volume)  

         4.7 mmol/L       

                                        3.6-5.0        

 

                          Serum or plasma chloride measurement (moles/volume)   

        100 mmol/L        

                                                

 

                    Carbon dioxide           26 mmol/L           21-32        

 

                          Serum or plasma anion gap determination (moles/volume)

           15 mmol/L      

                                        5-14        

 

                          Serum or plasma urea nitrogen measurement (mass/volume

)           21 mg/dL      

                                        7-18        

 

                          Serum or plasma creatinine measurement (mass/volume)  

         0.96 mg/dL       

                                        0.60-1.30        

 

                    Serum or plasma urea nitrogen/creatinine mass ratio         

  22                  NRG 

       

 

                                        Serum or plasma creatinine measurement w

ith calculation of estimated glomerular 

filtration rate           >                         NRG        

 

                    Serum or plasma glucose measurement (mass/volume)           

192 mg/dL           

        

 

                    Serum or plasma calcium measurement (mass/volume)           

9.0 mg/dL           

8.5-10.1        

 

                          Serum or plasma total bilirubin measurement (mass/volu

me)           0.3 mg/dL   

                                        0.1-1.0        

 

                                        Serum or plasma alkaline phosphatase juarez

surement (enzymatic activity/volume)    

                          92 U/L                            

 

                                        Serum or plasma aspartate aminotransfera

se measurement (enzymatic 

activity/volume)           23 U/L                    5-34        

 

                                        Serum or plasma alanine aminotransferase

 measurement (enzymatic activity/volume)

                          16 U/L                    0-55        

 

                    Serum or plasma protein measurement (mass/volume)           

7.6 g/dL            

6.4-8.2        

 

                    Serum or plasma albumin measurement (mass/volume)           

3.7 g/dL            

3.2-4.5        

 

                    CALCIUM CORRECTED           9.2 mg/dL           8.5-10.1    

    

 

                                        Gram stain microscopy - 19 11:59  

       

 

                    Gram stain microscopy           Many Gram positive cocci in 

clusters            

NR        

 

                                        Bacteria identification in wound by cult

ure - 19 11:59         

 

                    Bacteria identification in wound by culture           [id number]

09            NRG   

     

 

                    FREE TEXT EXTERNAL           SUSCEPTIBILITY REPORTED  12

:15            NRG  

      

 

                    QUANTITY OF GROWTH           Many                NRG        

 

                    FREE TEXT ENTRY 2           ID REPORTED 19 16:05       

     NRG        

 

                                        Dirithromycin susceptibility test by dis

k diffusion - 19 11:59         

 

                          Oxacillin susceptibility test by minimum inhibitory co

ncentration           >   

                                        NRG        

 

                          Clindamycin susceptibility test by minimum inhibitory 

concentration           <=

                                        NRG        

 

                          Erythromycin susceptibility test by minimum inhibitory

 concentration           >

                                        NRG        

 

                                        Trimethoprim/sulfamethoxazole susceptibi

lity test by minimum 

inhibitoryconcentration           <=                        NRG        

 

                          Vancomycin susceptibility test by minimum inhibitory c

oncentration           1  

                                        NRG        

 

                          Levofloxacin susceptibility test by minimum inhibitory

 concentration           

<=                                      NRG        

 

                          Rifampin susceptibility test by minimum inhibitory con

centration           <=   

                                        NRG        

 

                          Cefazolin susceptibility test by minimum inhibitory co

ncentration           >   

                                        NRG        

 

                          Linezolid susceptibility test by minimum inhibitory co

ncentration           2   

                                        NRG        

 

                          Penicillin G susceptibility test by minimum inhibitory

 concentration           >

                                        NRG        

 

                          Moxifloxacin susceptibility test by minimum inhibitory

 concentration           

<=                                      NRG        

 

                    Minocycline susc KODI           <=                  NRG      

  

 

                                        Capillary blood glucose measurement by g

lucometer (mass/volume) - 19 21:22

         

 

                          Capillary blood glucose measurement by glucometer (mas

s/volume)           156 

mg/dL                                           

 

                                        Capillary blood glucose measurement by g

lucometer (mass/volume) - 19 06:07

         

 

                          Capillary blood glucose measurement by glucometer (mas

s/volume)           195 

mg/dL                                           

 

                                        Complete blood count (CBC) with automate

d white blood cell (WBC) differential - 

19 09:55         

 

                          Blood leukocytes automated count (number/volume)      

     12.5 10*3/uL         

                                        4.3-11.0        

 

                          Blood erythrocytes automated count (number/volume)    

       3.51 10*6/uL       

                                        4.35-5.85        

 

                    Venous blood hemoglobin measurement (mass/volume)           

11.1 g/dL           

13.3-17.7        

 

                    Blood hematocrit (volume fraction)           36 %           

     40-54        

 

                    Automated erythrocyte mean corpuscular volume           101 

[foz_us]           

80-99        

 

                                        Automated erythrocyte mean corpuscular h

emoglobin (mass per erythrocyte)        

                          32 pg                     25-34        

 

                                        Automated erythrocyte mean corpuscular h

emoglobin concentration measurement 

(mass/volume)             31 g/dL                   32-36        

 

                    Automated erythrocyte distribution width ratio           16.

5 %              10.0-

14.5        

 

                    Automated blood platelet count (count/volume)           106 

10*3/uL           

130-400        

 

                          Automated blood platelet mean volume measurement      

     12.5 [foz_us]        

                                        7.4-10.4        

 

                    Automated blood neutrophils/100 leukocytes           83 %   

             42-75       

 

 

                    Automated blood lymphocytes/100 leukocytes           11 %   

             12-44       

 

 

                    Blood monocytes/100 leukocytes           5 %                

 0-12        

 

                    Automated blood eosinophils/100 leukocytes           1 %    

             0-10        

 

                    Automated blood basophils/100 leukocytes           0 %      

           0-10        

 

                    Blood neutrophils automated count (number/volume)           

10.3 10*3           

1.8-7.8        

 

                    Blood lymphocytes automated count (number/volume)           

1.4 10*3            

1.0-4.0        

 

                    Blood monocytes automated count (number/volume)           0.

6 10*3            

0.0-1.0        

 

                    Automated eosinophil count           0.1 10*3/uL           0

.0-0.3        

 

                    Automated blood basophil count (count/volume)           0.0 

10*3/uL           

0.0-0.1        

 

                                        Comprehensive metabolic panel - 19

 09:55         

 

                          Serum or plasma sodium measurement (moles/volume)     

      135 mmol/L          

                                        135-145        

 

                          Serum or plasma potassium measurement (moles/volume)  

         4.4 mmol/L       

                                        3.6-5.0        

 

                          Serum or plasma chloride measurement (moles/volume)   

        100 mmol/L        

                                                

 

                    Carbon dioxide           26 mmol/L           21-32        

 

                          Serum or plasma anion gap determination (moles/volume)

           9 mmol/L       

                                        5-14        

 

                          Serum or plasma urea nitrogen measurement (mass/volume

)           21 mg/dL      

                                        7-18        

 

                          Serum or plasma creatinine measurement (mass/volume)  

         1.11 mg/dL       

                                        0.60-1.30        

 

                    Serum or plasma urea nitrogen/creatinine mass ratio         

  19                  NRG 

       

 

                                        Serum or plasma creatinine measurement w

ith calculation of estimated glomerular 

filtration rate           >                         NRG        

 

                    Serum or plasma glucose measurement (mass/volume)           

196 mg/dL           

        

 

                    Serum or plasma calcium measurement (mass/volume)           

8.5 mg/dL           

8.5-10.1        

 

                          Serum or plasma total bilirubin measurement (mass/volu

me)           0.6 mg/dL   

                                        0.1-1.0        

 

                                        Serum or plasma alkaline phosphatase juarez

surement (enzymatic activity/volume)    

                          76 U/L                            

 

                                        Serum or plasma aspartate aminotransfera

se measurement (enzymatic 

activity/volume)           14 U/L                    5-34        

 

                                        Serum or plasma alanine aminotransferase

 measurement (enzymatic activity/volume)

                          16 U/L                    0-55        

 

                    Serum or plasma protein measurement (mass/volume)           

6.6 g/dL            

6.4-8.2        

 

                    Serum or plasma albumin measurement (mass/volume)           

3.3 g/dL            

3.2-4.5        

 

                    CALCIUM CORRECTED           9.1 mg/dL           8.5-10.1    

    

 

                                        Capillary blood glucose measurement by g

lucometer (mass/volume) - 19 11:10

         

 

                          Capillary blood glucose measurement by glucometer (mas

s/volume)           176 

mg/dL                                           

 

                                        Capillary blood glucose measurement by g

lucometer (mass/volume) - 19 16:04

         

 

                          Capillary blood glucose measurement by glucometer (mas

s/volume)           266 

mg/dL                                           

 

                                        Capillary blood glucose measurement by g

lucometer (mass/volume) - 19 20:54

         

 

                          Capillary blood glucose measurement by glucometer (mas

s/volume)           261 

mg/dL                                           

 

                                        Methicillin resistant Staphylococcus aur

eus (MRSA) screening culture - 19 

21:40         

 

                          Methicillin resistant Staphylococcus aureus (MRSA) scr

eening culture           

NEG                                     NRG        

 

                                        Complete blood count (CBC) with automate

d white blood cell (WBC) differential - 

19 05:30         

 

                          Blood leukocytes automated count (number/volume)      

     10.5 10*3/uL         

                                        4.3-11.0        

 

                          Blood erythrocytes automated count (number/volume)    

       3.30 10*6/uL       

                                        4.35-5.85        

 

                    Venous blood hemoglobin measurement (mass/volume)           

10.4 g/dL           

13.3-17.7        

 

                    Blood hematocrit (volume fraction)           34 %           

     40-54        

 

                    Automated erythrocyte mean corpuscular volume           102 

[foz_us]           

80-99        

 

                                        Automated erythrocyte mean corpuscular h

emoglobin (mass per erythrocyte)        

                          32 pg                     25-34        

 

                                        Automated erythrocyte mean corpuscular h

emoglobin concentration measurement 

(mass/volume)             31 g/dL                   32-36        

 

                    Automated erythrocyte distribution width ratio           16.

9 %              10.0-

14.5        

 

                    Automated blood platelet count (count/volume)           116 

10*3/uL           

130-400        

 

                          Automated blood platelet mean volume measurement      

     12.2 [foz_us]        

                                        7.4-10.4        

 

                    Automated blood neutrophils/100 leukocytes           78 %   

             42-75       

 

 

                    Automated blood lymphocytes/100 leukocytes           14 %   

             12-44       

 

 

                    Blood monocytes/100 leukocytes           7 %                

 0-12        

 

                    Automated blood eosinophils/100 leukocytes           1 %    

             0-10        

 

                    Automated blood basophils/100 leukocytes           0 %      

           0-10        

 

                    Blood neutrophils automated count (number/volume)           

8.2 10*3            

1.8-7.8        

 

                    Blood lymphocytes automated count (number/volume)           

1.5 10*3            

1.0-4.0        

 

                    Blood monocytes automated count (number/volume)           0.

7 10*3            

0.0-1.0        

 

                    Automated eosinophil count           0.1 10*3/uL           0

.0-0.3        

 

                    Automated blood basophil count (count/volume)           0.0 

10*3/uL           

0.0-0.1        

 

                                        Comprehensive metabolic panel - 19

 05:30         

 

                          Serum or plasma sodium measurement (moles/volume)     

      137 mmol/L          

                                        135-145        

 

                          Serum or plasma potassium measurement (moles/volume)  

         4.8 mmol/L       

                                        3.6-5.0        

 

                          Serum or plasma chloride measurement (moles/volume)   

        100 mmol/L        

                                                

 

                    Carbon dioxide           27 mmol/L           21-32        

 

                          Serum or plasma anion gap determination (moles/volume)

           10 mmol/L      

                                        5-14        

 

                          Serum or plasma urea nitrogen measurement (mass/volume

)           22 mg/dL      

                                        7-18        

 

                          Serum or plasma creatinine measurement (mass/volume)  

         1.17 mg/dL       

                                        0.60-1.30        

 

                    Serum or plasma urea nitrogen/creatinine mass ratio         

  19                  NRG 

       

 

                                        Serum or plasma creatinine measurement w

ith calculation of estimated glomerular 

filtration rate           >                         NRG        

 

                    Serum or plasma glucose measurement (mass/volume)           

183 mg/dL           

        

 

                    Serum or plasma calcium measurement (mass/volume)           

8.7 mg/dL           

8.5-10.1        

 

                          Serum or plasma total bilirubin measurement (mass/volu

me)           0.3 mg/dL   

                                        0.1-1.0        

 

                                        Serum or plasma alkaline phosphatase juarez

surement (enzymatic activity/volume)    

                          85 U/L                            

 

                                        Serum or plasma aspartate aminotransfera

se measurement (enzymatic 

activity/volume)           13 U/L                    5-34        

 

                                        Serum or plasma alanine aminotransferase

 measurement (enzymatic activity/volume)

                          13 U/L                    0-55        

 

                    Serum or plasma protein measurement (mass/volume)           

6.7 g/dL            

6.4-8.2        

 

                    Serum or plasma albumin measurement (mass/volume)           

3.2 g/dL            

3.2-4.5        

 

                    CALCIUM CORRECTED           9.3 mg/dL           8.5-10.1    

    

 

                                        Capillary blood glucose measurement by g

lucometer (mass/volume) - 19 05:58

         

 

                          Capillary blood glucose measurement by glucometer (mas

s/volume)           188 

mg/dL                                           

 

                                        Capillary blood glucose measurement by g

lucometer (mass/volume) - 19 11:04

         

 

                          Capillary blood glucose measurement by glucometer (mas

s/volume)           228 

mg/dL                                           

 

                                        Bacteria identification in isolate by an

aerobe culture - 19 13:38         

 

                          Bacteria identification in isolate by anaerobe culture

           NOANA          

                                        NRG        

 

                                        Gram stain microscopy - 19 13:38  

       

 

                    Gram stain microscopy           Many Gram positive cocci in 

pairs            NRG

        

 

                                        Bacteria identification in wound by cult

ure - 19 13:38         

 

                    Bacteria identification in wound by culture           SEE CO

MMEN            NRG 

       

 

                    FREE TEXT EXTERNAL           SUSCEPTIBILITY TO FOLLOW       

     NRG        

 

                    QUANTITY OF GROWTH           .                   NRG        

 

                    MRSA AGAR           RESISTANT ORGANISM/CONTACT PRECAUTIONS  

          NRG       

 

 

                          CALL POSITIVES (F1 HELP)           REPORT FAXED TO DR BEACUHAMP 19/KD          

                                        NRG        

 

                    PBP2                METHICILLIN-RESISTANT STAPH AUREUS      

      NRG        

 

                                        Dirithromycin susceptibility test by dis

k diffusion - 19 13:38         

 

                          Oxacillin susceptibility test by minimum inhibitory co

ncentration           >   

                                        NRG        

 

                          Clindamycin susceptibility test by minimum inhibitory 

concentration           <=

                                        NRG        

 

                          Erythromycin susceptibility test by minimum inhibitory

 concentration           >

                                        NRG        

 

                                        Trimethoprim/sulfamethoxazole susceptibi

lity test by minimum 

inhibitoryconcentration           <=                        NRG        

 

                          Vancomycin susceptibility test by minimum inhibitory c

oncentration           1  

                                        NRG        

 

                          Levofloxacin susceptibility test by minimum inhibitory

 concentration           

<=                                      NRG        

 

                          Rifampin susceptibility test by minimum inhibitory con

centration           <=   

                                        NRG        

 

                          Cefazolin susceptibility test by minimum inhibitory co

ncentration           >   

                                        NRG        

 

                          Linezolid susceptibility test by minimum inhibitory co

ncentration           2   

                                        NRG        

 

                          Penicillin G susceptibility test by minimum inhibitory

 concentration           >

                                        NRG        

 

                          Moxifloxacin susceptibility test by minimum inhibitory

 concentration           

<=                                      NRG        

 

                    Minocycline susc KODI           <=                  NRG      

  

 

                                        Dirithromycin susceptibility test by dis

k diffusion - 19 13:38         

 

                          Vancomycin susceptibility test by minimum inhibitory c

oncentration           1  

                                        NRG        

 

                          Ampicillin susceptibility test by minimum inhibitory c

oncentration           1  

                                        NRG        

 

                          Linezolid susceptibility test by minimum inhibitory co

ncentration           <=  

                                        NRG        

 

                    Daptomycin susc KODI           4                   NRG       

 

 

                                        Capillary blood glucose measurement by g

lucometer (mass/volume) - 19 15:33

         

 

                          Capillary blood glucose measurement by glucometer (mas

s/volume)           201 

mg/dL                                           

 

                                        Vancomycin trough - 19 18:00      

   

 

                    Vancomycin trough           22.8 ug/mL           10.0-20.0  

      

 

                                        Capillary blood glucose measurement by g

lucometer (mass/volume) - 19 20:55

         

 

                          Capillary blood glucose measurement by glucometer (mas

s/volume)           210 

mg/dL                                           

 

                                        Capillary blood glucose measurement by g

lucometer (mass/volume) - 19 05:17

         

 

                          Capillary blood glucose measurement by glucometer (mas

s/volume)           173 

mg/dL                                           

 

                                        Complete blood count (CBC) with automate

d white blood cell (WBC) differential - 

19 05:31         

 

                          Blood leukocytes automated count (number/volume)      

     10.7 10*3/uL         

                                        4.3-11.0        

 

                          Blood erythrocytes automated count (number/volume)    

       3.11 10*6/uL       

                                        4.35-5.85        

 

                    Venous blood hemoglobin measurement (mass/volume)           

9.8 g/dL            

13.3-17.7        

 

                    Blood hematocrit (volume fraction)           32 %           

     40-54        

 

                    Automated erythrocyte mean corpuscular volume           104 

[foz_us]           

80-99        

 

                                        Automated erythrocyte mean corpuscular h

emoglobin (mass per erythrocyte)        

                          32 pg                     25-34        

 

                                        Automated erythrocyte mean corpuscular h

emoglobin concentration measurement 

(mass/volume)             30 g/dL                   32-36        

 

                    Automated erythrocyte distribution width ratio           17.

1 %              10.0-

14.5        

 

                    Automated blood platelet count (count/volume)           129 

10*3/uL           

130-400        

 

                          Automated blood platelet mean volume measurement      

     12.4 [foz_us]        

                                        7.4-10.4        

 

                    Automated blood neutrophils/100 leukocytes           75 %   

             42-75       

 

 

                    Automated blood lymphocytes/100 leukocytes           16 %   

             12-44       

 

 

                    Blood monocytes/100 leukocytes           8 %                

 0-12        

 

                    Automated blood eosinophils/100 leukocytes           1 %    

             0-10        

 

                    Automated blood basophils/100 leukocytes           0 %      

           0-10        

 

                    Blood neutrophils automated count (number/volume)           

8.1 10*3            

1.8-7.8        

 

                    Blood lymphocytes automated count (number/volume)           

1.7 10*3            

1.0-4.0        

 

                    Blood monocytes automated count (number/volume)           0.

8 10*3            

0.0-1.0        

 

                    Automated eosinophil count           0.1 10*3/uL           0

.0-0.3        

 

                    Automated blood basophil count (count/volume)           0.0 

10*3/uL           

0.0-0.1        

 

                                        Comprehensive metabolic panel - 19

 05:31         

 

                          Serum or plasma sodium measurement (moles/volume)     

      138 mmol/L          

                                        135-145        

 

                          Serum or plasma potassium measurement (moles/volume)  

         4.6 mmol/L       

                                        3.6-5.0        

 

                          Serum or plasma chloride measurement (moles/volume)   

        102 mmol/L        

                                                

 

                    Carbon dioxide           25 mmol/L           21-32        

 

                          Serum or plasma anion gap determination (moles/volume)

           11 mmol/L      

                                        5-14        

 

                          Serum or plasma urea nitrogen measurement (mass/volume

)           29 mg/dL      

                                        7-18        

 

                          Serum or plasma creatinine measurement (mass/volume)  

         1.39 mg/dL       

                                        0.60-1.30        

 

                    Serum or plasma urea nitrogen/creatinine mass ratio         

  21                  NRG 

       

 

                                        Serum or plasma creatinine measurement w

ith calculation of estimated glomerular 

filtration rate           51                        NRG        

 

                    Serum or plasma glucose measurement (mass/volume)           

161 mg/dL           

        

 

                    Serum or plasma calcium measurement (mass/volume)           

8.5 mg/dL           

8.5-10.1        

 

                          Serum or plasma total bilirubin measurement (mass/volu

me)           0.3 mg/dL   

                                        0.1-1.0        

 

                                        Serum or plasma alkaline phosphatase juarez

surement (enzymatic activity/volume)    

                          111 U/L                           

 

                                        Serum or plasma aspartate aminotransfera

se measurement (enzymatic 

activity/volume)           15 U/L                    5-34        

 

                                        Serum or plasma alanine aminotransferase

 measurement (enzymatic activity/volume)

                          15 U/L                    0-55        

 

                    Serum or plasma protein measurement (mass/volume)           

6.7 g/dL            

6.4-8.2        

 

                    Serum or plasma albumin measurement (mass/volume)           

3.2 g/dL            

3.2-4.5        

 

                    CALCIUM CORRECTED           9.1 mg/dL           8.5-10.1    

    

 

                                        Vancomycin trough - 19 05:31      

   

 

                    Vancomycin trough           15.2 ug/mL           10.0-20.0  

      

 

                                        Capillary blood glucose measurement by g

lucometer (mass/volume) - 19 11:05

         

 

                          Capillary blood glucose measurement by glucometer (mas

s/volume)           273 

mg/dL                                           

 

                                        Capillary blood glucose measurement by g

lucometer (mass/volume) - 19 16:03

         

 

                          Capillary blood glucose measurement by glucometer (mas

s/volume)           110 

mg/dL                                           

 

                                        Capillary blood glucose measurement by g

lucometer (mass/volume) - 19 20:41

         

 

                          Capillary blood glucose measurement by glucometer (mas

s/volume)           260 

mg/dL                                           

 

                                        Complete blood count (CBC) with automate

d white blood cell (WBC) differential - 

19 05:25         

 

                          Blood leukocytes automated count (number/volume)      

     10.1 10*3/uL         

                                        4.3-11.0        

 

                          Blood erythrocytes automated count (number/volume)    

       3.44 10*6/uL       

                                        4.35-5.85        

 

                    Venous blood hemoglobin measurement (mass/volume)           

10.8 g/dL           

13.3-17.7        

 

                    Blood hematocrit (volume fraction)           36 %           

     40-54        

 

                    Automated erythrocyte mean corpuscular volume           103 

[foz_us]           

80-99        

 

                                        Automated erythrocyte mean corpuscular h

emoglobin (mass per erythrocyte)        

                          31 pg                     25-34        

 

                                        Automated erythrocyte mean corpuscular h

emoglobin concentration measurement 

(mass/volume)             30 g/dL                   32-36        

 

                    Automated erythrocyte distribution width ratio           16.

8 %              10.0-

14.5        

 

                    Automated blood platelet count (count/volume)           162 

10*3/uL           

130-400        

 

                          Automated blood platelet mean volume measurement      

     11.8 [foz_us]        

                                        7.4-10.4        

 

                    Automated blood neutrophils/100 leukocytes           77 %   

             42-75       

 

 

                    Automated blood lymphocytes/100 leukocytes           15 %   

             12-44       

 

 

                    Blood monocytes/100 leukocytes           6 %                

 0-12        

 

                    Automated blood eosinophils/100 leukocytes           2 %    

             0-10        

 

                    Automated blood basophils/100 leukocytes           0 %      

           0-10        

 

                    Blood neutrophils automated count (number/volume)           

7.7 10*3            

1.8-7.8        

 

                    Blood lymphocytes automated count (number/volume)           

1.5 10*3            

1.0-4.0        

 

                    Blood monocytes automated count (number/volume)           0.

6 10*3            

0.0-1.0        

 

                    Automated eosinophil count           0.2 10*3/uL           0

.0-0.3        

 

                    Automated blood basophil count (count/volume)           0.0 

10*3/uL           

0.0-0.1        

 

                                        Comprehensive metabolic panel - 19

 05:25         

 

                          Serum or plasma sodium measurement (moles/volume)     

      137 mmol/L          

                                        135-145        

 

                          Serum or plasma potassium measurement (moles/volume)  

         4.4 mmol/L       

                                        3.6-5.0        

 

                          Serum or plasma chloride measurement (moles/volume)   

        101 mmol/L        

                                                

 

                    Carbon dioxide           27 mmol/L           21-32        

 

                          Serum or plasma anion gap determination (moles/volume)

           9 mmol/L       

                                        5-14        

 

                          Serum or plasma urea nitrogen measurement (mass/volume

)           23 mg/dL      

                                        7-18        

 

                          Serum or plasma creatinine measurement (mass/volume)  

         1.11 mg/dL       

                                        0.60-1.30        

 

                    Serum or plasma urea nitrogen/creatinine mass ratio         

  21                  NRG 

       

 

                                        Serum or plasma creatinine measurement w

ith calculation of estimated glomerular 

filtration rate           >                         NRG        

 

                    Serum or plasma glucose measurement (mass/volume)           

160 mg/dL           

        

 

                    Serum or plasma calcium measurement (mass/volume)           

9.3 mg/dL           

8.5-10.1        

 

                          Serum or plasma total bilirubin measurement (mass/volu

me)           0.3 mg/dL   

                                        0.1-1.0        

 

                                        Serum or plasma alkaline phosphatase juarez

surement (enzymatic activity/volume)    

                          120 U/L                           

 

                                        Serum or plasma aspartate aminotransfera

se measurement (enzymatic 

activity/volume)           17 U/L                    5-34        

 

                                        Serum or plasma alanine aminotransferase

 measurement (enzymatic activity/volume)

                          17 U/L                    0-55        

 

                    Serum or plasma protein measurement (mass/volume)           

7.2 g/dL            

6.4-8.2        

 

                    Serum or plasma albumin measurement (mass/volume)           

3.4 g/dL            

3.2-4.5        

 

                    CALCIUM CORRECTED           9.8 mg/dL           8.5-10.1    

    

 

                                        Capillary blood glucose measurement by g

lucometer (mass/volume) - 19 05:35

         

 

                          Capillary blood glucose measurement by glucometer (mas

s/volume)           174 

mg/dL                                           

 

                                        Capillary blood glucose measurement by g

lucometer (mass/volume) - 19 10:55

         

 

                          Capillary blood glucose measurement by glucometer (mas

s/volume)           277 

mg/dL                                           

 

                                        PDM - 09 PANEL (PROFILE 1) - 19 16

:01         

 

                    Creatinine           110.3 mg/dL           > or = 20.0      

  

 

                    pH                  6.10                4.5 - 9.0        

 

                    Oxidant             NEGATIVE mcg/mL           <200        

 

                    Amphetamines           NEGATIVE ng/mL           <500        

 

                    medMATCH Amphetamines           CONSISTENT            NRG   

     

 

                    Benzodiazepines           NEGATIVE ng/mL           <100     

   

 

                    medMATCH Benzodiazepines           CONSISTENT            NRG

        

 

                    Marijuana Metabolite           NEGATIVE ng/mL           <20 

       

 

                    medMATCH Marijuana Metab           CONSISTENT            NRG

        

 

                    Cocaine Metabolite           NEGATIVE ng/mL           <150  

      

 

                    medMATCH Cocaine Metab           CONSISTENT            NRG  

      

 

                    Opiates             NEGATIVE CONFIRMED ng/mL           <100 

       

 

                    Oxycodone           POSITIVE ng/mL           <100        

 

                    COMMENT                                 NRG        

 

                      Codeine           NEGATIVE ng/mL           <50        

 

                    medMATCH Codeine           CONSISTENT            NRG        

 

                      Hydrocodone           NEGATIVE ng/mL           <50        

 

                    medMATCH Hydrocodone           CONSISTENT            NRG    

    

 

                      Hydromorphone           NEGATIVE ng/mL           <50      

  

 

                    medMATCH Hydromorphone           CONSISTENT            NRG  

      

 

                      Morphine           NEGATIVE ng/mL           <50        

 

                    medMATCH Morphine           CONSISTENT            NRG       

 

 

                      Norhydrocodone           NEGATIVE ng/mL           <50     

   

 

                    medMATCH Norhydrocodone           CONSISTENT            NRG 

       

 

                      Noroxycodone           5817 ng/mL           <50        

 

                    medMATCH Noroxycodone           INCONSISTENT            NRG 

       

 

                      Oxycodone           5878 ng/mL           <50        

 

                    medMATCH Oxycodone           INCONSISTENT            NRG    

    

 

                      Oxymorphone           6204 ng/mL           <50        

 

                    medMATCH Oxymorphone           INCONSISTENT            NRG  

      

 

                    Barbiturates           NEGATIVE ng/mL           <300        

 

                    medMATCH Barbiturates           CONSISTENT            NRG   

     

 

                    Methadone Metabolite           NEGATIVE ng/mL           <100

        

 

                    medMATCH Methadone Metab           CONSISTENT            NRG

        

 

                    Phencyclidine           NEGATIVE ng/mL           <25        

 

                    medMATCH Phencyclidine           CONSISTENT            NRG  

      

 

                                        Gram stain microscopy - 19 13:29  

       

 

                    Gram stain microscopy           No bacteria seen            

NRG        

 

                                        Bacteria identification in wound by cult

ure - 19 13:29         

 

                    Bacteria identification in wound by culture           [id number]

8             NRG    

    

 

                    FREE TEXT EXTERNAL           SUSCEPTIBILITY REPORTED 19 

12:05            NRG

        

 

                    QUANTITY OF GROWTH           Rare                NRG        

 

                    FREE TEXT ENTRY 2           ID REPORTED 19 14:05        

    NRG        

 

                    FREE TEXT ENTRY 3           CALLED TO REBECCA/ CARE  12:30 

BY ST            NRG

        

 

                                        Dirithromycin susceptibility test by dis

k diffusion - 19 13:29         

 

                          Oxacillin susceptibility test by minimum inhibitory co

ncentration           >   

                                        NRG        

 

                          Clindamycin susceptibility test by minimum inhibitory 

concentration           <=

                                        NRG        

 

                          Erythromycin susceptibility test by minimum inhibitory

 concentration           >

                                        NRG        

 

                                        Trimethoprim/sulfamethoxazole susceptibi

lity test by minimum 

inhibitoryconcentration           <=                        NRG        

 

                          Vancomycin susceptibility test by minimum inhibitory c

oncentration           1  

                                        NRG        

 

                          Levofloxacin susceptibility test by minimum inhibitory

 concentration           

<=                                      NRG        

 

                          Rifampin susceptibility test by minimum inhibitory con

centration           <=   

                                        NRG        

 

                          Cefazolin susceptibility test by minimum inhibitory co

ncentration           >   

                                        NRG        

 

                          Linezolid susceptibility test by minimum inhibitory co

ncentration           2   

                                        NRG        

 

                          Penicillin G susceptibility test by minimum inhibitory

 concentration           >

                                        NRG        

 

                          Moxifloxacin susceptibility test by minimum inhibitory

 concentration           

<=                                      NRG        

 

                    Minocycline susc KODI           <=                  NRG      

  

 

                                        Automated blood complete blood count (he

mogram) panel - 19 10:20         

 

                          Blood leukocytes automated count (number/volume)      

     10.6 10*3/uL         

                                        4.3-11.0        

 

                          Blood erythrocytes automated count (number/volume)    

       3.94 10*6/uL       

                                        4.35-5.85        

 

                    Venous blood hemoglobin measurement (mass/volume)           

12.1 g/dL           

13.3-17.7        

 

                    Blood hematocrit (volume fraction)           39 %           

     40-54        

 

                    Automated erythrocyte mean corpuscular volume           100 

[foz_us]           

80-99        

 

                                        Automated erythrocyte mean corpuscular h

emoglobin (mass per erythrocyte)        

                          31 pg                     25-34        

 

                                        Automated erythrocyte mean corpuscular h

emoglobin concentration measurement 

(mass/volume)             31 g/dL                   32-36        

 

                    Automated erythrocyte distribution width ratio           15.

6 %              10.0-

14.5        

 

                    Automated blood platelet count (count/volume)           140 

10*3/uL           

130-400        

 

                          Automated blood platelet mean volume measurement      

     11.9 [foz_us]        

                                        7.4-10.4        

 

                                        PT panel in platelet poor plasma by coag

ulation assay - 19 10:20         

 

                          Prothrombin time (PT) in platelet poor plasma by coagu

lation assay           

13.0 s                                  12.2-14.7        

 

                          INR in platelet poor plasma or blood by coagulation as

say           1.0         

                                        0.8-1.4        

 

                                        Activated partial thromboplastin time (a

PTT) in platelet poor plasma 

bycoagulation assay - 19 10:20         

 

                                        Activated partial thromboplastin time (a

PTT) in platelet poor plasma 

bycoagulation assay           31 s                      24-35        

 

                                        Comprehensive metabolic panel - 19

 10:20         

 

                          Serum or plasma sodium measurement (moles/volume)     

      138 mmol/L          

                                        135-145        

 

                          Serum or plasma potassium measurement (moles/volume)  

         5.7 mmol/L       

                                        3.6-5.0        

 

                          Serum or plasma chloride measurement (moles/volume)   

        105 mmol/L        

                                                

 

                    Carbon dioxide           23 mmol/L           21-32        

 

                          Serum or plasma anion gap determination (moles/volume)

           10 mmol/L      

                                        5-14        

 

                          Serum or plasma urea nitrogen measurement (mass/volume

)           20 mg/dL      

                                        7-18        

 

                          Serum or plasma creatinine measurement (mass/volume)  

         1.15 mg/dL       

                                        0.60-1.30        

 

                    Serum or plasma urea nitrogen/creatinine mass ratio         

  17                  NRG 

       

 

                                        Serum or plasma creatinine measurement w

ith calculation of estimated glomerular 

filtration rate           >                         NRG        

 

                    Serum or plasma glucose measurement (mass/volume)           

175 mg/dL           

        

 

                    Serum or plasma calcium measurement (mass/volume)           

9.4 mg/dL           

8.5-10.1        

 

                          Serum or plasma total bilirubin measurement (mass/volu

me)           0.2 mg/dL   

                                        0.1-1.0        

 

                                        Serum or plasma alkaline phosphatase juarez

surement (enzymatic activity/volume)    

                          88 U/L                            

 

                                        Serum or plasma aspartate aminotransfera

se measurement (enzymatic 

activity/volume)           15 U/L                    5-34        

 

                                        Serum or plasma alanine aminotransferase

 measurement (enzymatic activity/volume)

                          17 U/L                    0-55        

 

                    Serum or plasma protein measurement (mass/volume)           

7.9 g/dL            

6.4-8.2        

 

                    Serum or plasma albumin measurement (mass/volume)           

4.3 g/dL            

3.2-4.5        

 

                    CALCIUM CORRECTED           9.2 mg/dL           8.5-10.1    

    

 

                                        Lipid 1996 panel - 19 10:20       

  

 

                          Serum or plasma triglyceride measurement (mass/volume)

           297 mg/dL      

                                        <150        

 

                          Serum or plasma cholesterol measurement (mass/volume) 

          129 mg/dL       

                                        < 200        

 

                          Serum or plasma cholesterol in HDL measurement (mass/v

olume)           35 mg/dL 

                                        40-60        

 

                          Cholesterol in LDL [mass/volume] in serum or plasma by

 direct assay           40

 mg/dL                                  1-129        

 

                          Serum or plasma cholesterol in VLDL measurement (mass/

volume)           59 mg/dL

                                        5-40        

 

                                        Methicillin resistant Staphylococcus aur

eus (MRSA) screening culture - 19 

10:20         

 

                          Methicillin resistant Staphylococcus aureus (MRSA) scr

eening culture           

NEG                                     NRG        

 

                                        Automated blood complete blood count (he

mogram) panel - 19 03:55         

 

                          Blood leukocytes automated count (number/volume)      

     7.8 10*3/uL          

                                        4.3-11.0        

 

                          Blood erythrocytes automated count (number/volume)    

       3.54 10*6/uL       

                                        4.35-5.85        

 

                    Venous blood hemoglobin measurement (mass/volume)           

10.9 g/dL           

13.3-17.7        

 

                    Blood hematocrit (volume fraction)           35 %           

     40-54        

 

                    Automated erythrocyte mean corpuscular volume           100 

[foz_us]           

80-99        

 

                                        Automated erythrocyte mean corpuscular h

emoglobin (mass per erythrocyte)        

                          31 pg                     25-34        

 

                                        Automated erythrocyte mean corpuscular h

emoglobin concentration measurement 

(mass/volume)             31 g/dL                   32-36        

 

                    Automated erythrocyte distribution width ratio           15.

7 %              10.0-

14.5        

 

                    Automated blood platelet count (count/volume)           107 

10*3/uL           

130-400        

 

                          Automated blood platelet mean volume measurement      

     11.6 [foz_us]        

                                        7.4-10.4        

 

                                        Whole blood basic metabolic panel - 08/2

3/19 03:55         

 

                          Serum or plasma sodium measurement (moles/volume)     

      137 mmol/L          

                                        135-145        

 

                          Serum or plasma potassium measurement (moles/volume)  

         6.5 mmol/L       

                                        3.6-5.0        

 

                          Serum or plasma chloride measurement (moles/volume)   

        106 mmol/L        

                                                

 

                    Carbon dioxide           23 mmol/L           21-32        

 

                          Serum or plasma anion gap determination (moles/volume)

           8 mmol/L       

                                        5-14        

 

                          Serum or plasma urea nitrogen measurement (mass/volume

)           17 mg/dL      

                                        7-18        

 

                          Serum or plasma creatinine measurement (mass/volume)  

         0.86 mg/dL       

                                        0.60-1.30        

 

                    Serum or plasma urea nitrogen/creatinine mass ratio         

  20                  NRG 

       

 

                                        Serum or plasma creatinine measurement w

ith calculation of estimated glomerular 

filtration rate           >                         NRG        

 

                    Serum or plasma glucose measurement (mass/volume)           

123 mg/dL           

        

 

                    Serum or plasma calcium measurement (mass/volume)           

8.5 mg/dL           

8.5-10.1        

 

                                        Whole blood basic metabolic panel - 08/2

3/19 07:50         

 

                          Serum or plasma sodium measurement (moles/volume)     

      139 mmol/L          

                                        135-145        

 

                          Serum or plasma potassium measurement (moles/volume)  

         5.5 mmol/L       

                                        3.6-5.0        

 

                          Serum or plasma chloride measurement (moles/volume)   

        106 mmol/L        

                                                

 

                    Carbon dioxide           25 mmol/L           21-32        

 

                          Serum or plasma anion gap determination (moles/volume)

           8 mmol/L       

                                        5-14        

 

                          Serum or plasma urea nitrogen measurement (mass/volume

)           16 mg/dL      

                                        7-18        

 

                          Serum or plasma creatinine measurement (mass/volume)  

         0.94 mg/dL       

                                        0.60-1.30        

 

                    Serum or plasma urea nitrogen/creatinine mass ratio         

  17                  NRG 

       

 

                                        Serum or plasma creatinine measurement w

ith calculation of estimated glomerular 

filtration rate           >                         NRG        

 

                    Serum or plasma glucose measurement (mass/volume)           

133 mg/dL           

        

 

                    Serum or plasma calcium measurement (mass/volume)           

8.7 mg/dL           

8.5-10.1        

 

                                        Gram stain microscopy - 19 13:28  

       

 

                    Gram stain microscopy           No bacteria seen            

NRG        

 

                                        Bacteria identification in wound by cult

ure - 19 13:28         

 

                    Bacteria identification in wound by culture           [id number]

08            NRG   

     

 

                    FREE TEXT EXTERNAL           NO SUSCEPTIBILITIES SET UP     

       NRG        

 

                    QUANTITY OF GROWTH           Isolated            NRG        

 

                                        Dirithromycin susceptibility test by dis

k diffusion - 19 13:28         

 

                          Oxacillin susceptibility test by minimum inhibitory co

ncentration           1   

                                        NRG        

 

                          Clindamycin susceptibility test by minimum inhibitory 

concentration           > 

                                        NRG        

 

                          Erythromycin susceptibility test by minimum inhibitory

 concentration           >

                                        NRG        

 

                          Vancomycin susceptibility test by minimum inhibitory c

oncentration           1  

                                        NRG        

 

                          Levofloxacin susceptibility test by minimum inhibitory

 concentration           

<=                                      NRG        

 

                          Rifampin susceptibility test by minimum inhibitory con

centration           <=   

                                        NRG        

 

                          Cefazolin susceptibility test by minimum inhibitory co

ncentration           R   

                                        NRG        

 

                          Linezolid susceptibility test by minimum inhibitory co

ncentration           2   

                                        NRG        

 

                          Penicillin G susceptibility test by minimum inhibitory

 concentration           1

                                        NRG        

 

                          Moxifloxacin susceptibility test by minimum inhibitory

 concentration           S

                                        NRG        

 

                    Minocycline susc KODI           <=                  NRG      

  

 

                                        Complete blood count (CBC) with automate

d white blood cell (WBC) differential - 

10/23/19 13:50         

 

                          Blood leukocytes automated count (number/volume)      

     9.1 10*3/uL          

                                        4.3-11.0        

 

                          Blood erythrocytes automated count (number/volume)    

       3.81 10*6/uL       

                                        4.35-5.85        

 

                    Venous blood hemoglobin measurement (mass/volume)           

12.0 g/dL           

13.3-17.7        

 

                    Blood hematocrit (volume fraction)           38 %           

     40-54        

 

                    Automated erythrocyte mean corpuscular volume           98 [

foz_us]           

80-99        

 

                                        Automated erythrocyte mean corpuscular h

emoglobin (mass per erythrocyte)        

                          32 pg                     25-34        

 

                                        Automated erythrocyte mean corpuscular h

emoglobin concentration measurement 

(mass/volume)             32 g/dL                   32-36        

 

                    Automated erythrocyte distribution width ratio           16.

6 %              10.0-

14.5        

 

                    Automated blood platelet count (count/volume)           123 

10*3/uL           

130-400        

 

                          Automated blood platelet mean volume measurement      

     12.1 [foz_us]        

                                        7.4-10.4        

 

                    Automated blood neutrophils/100 leukocytes           76 %   

             42-75       

 

 

                    Automated blood lymphocytes/100 leukocytes           17 %   

             12-44       

 

 

                    Blood monocytes/100 leukocytes           4 %                

 0-12        

 

                    Automated blood eosinophils/100 leukocytes           2 %    

             0-10        

 

                    Automated blood basophils/100 leukocytes           1 %      

           0-10        

 

                    Blood neutrophils automated count (number/volume)           

7.0 10*3            

1.8-7.8        

 

                    Blood lymphocytes automated count (number/volume)           

1.5 10*3            

1.0-4.0        

 

                    Blood monocytes automated count (number/volume)           0.

4 10*3            

0.0-1.0        

 

                    Automated eosinophil count           0.2 10*3/uL           0

.0-0.3        

 

                    Automated blood basophil count (count/volume)           0.1 

10*3/uL           

0.0-0.1        

 

                                        Comprehensive metabolic panel - 10/23/19

 13:50         

 

                          Serum or plasma sodium measurement (moles/volume)     

      140 mmol/L          

                                        135-145        

 

                          Serum or plasma potassium measurement (moles/volume)  

         5.3 mmol/L       

                                        3.6-5.0        

 

                          Serum or plasma chloride measurement (moles/volume)   

        107 mmol/L        

                                                

 

                    Carbon dioxide           23 mmol/L           21-32        

 

                          Serum or plasma anion gap determination (moles/volume)

           10 mmol/L      

                                        5-14        

 

                          Serum or plasma urea nitrogen measurement (mass/volume

)           32 mg/dL      

                                        7-18        

 

                          Serum or plasma creatinine measurement (mass/volume)  

         1.83 mg/dL       

                                        0.60-1.30        

 

                    Serum or plasma urea nitrogen/creatinine mass ratio         

  17                  NRG 

       

 

                                        Serum or plasma creatinine measurement w

ith calculation of estimated glomerular 

filtration rate           37                        NRG        

 

                    Serum or plasma glucose measurement (mass/volume)           

135 mg/dL           

        

 

                    Serum or plasma calcium measurement (mass/volume)           

9.0 mg/dL           

8.5-10.1        

 

                          Serum or plasma total bilirubin measurement (mass/volu

me)           0.2 mg/dL   

                                        0.1-1.0        

 

                                        Serum or plasma alkaline phosphatase juarez

surement (enzymatic activity/volume)    

                          104 U/L                           

 

                                        Serum or plasma aspartate aminotransfera

se measurement (enzymatic 

activity/volume)           14 U/L                    5-34        

 

                                        Serum or plasma alanine aminotransferase

 measurement (enzymatic activity/volume)

                          15 U/L                    0-55        

 

                    Serum or plasma protein measurement (mass/volume)           

7.0 g/dL            

6.4-8.2        

 

                    Serum or plasma albumin measurement (mass/volume)           

4.0 g/dL            

3.2-4.5        

 

                    CALCIUM CORRECTED           9.0 mg/dL           8.5-10.1    

    

 

                                        Hemoglobin A1c measurement - 10/23/19 13

:50         

 

                    Blood hemoglobin A1C measurement (mass/volume)           6.4

 %               4.0-5.6

        

 

                    MEAN BLOOD GLUCOSE           137 %               <=126      

  

 

                                        Prealbumin - 10/23/19 13:50         

 

                    Serum or plasma prealbumin measurement (mass/volume)        

   21.2 %              

18.0-37.0        

 

                                        Complete blood count (CBC) with automate

d white blood cell (WBC) differential - 

19 13:35         

 

                          Blood leukocytes automated count (number/volume)      

     10.1 10*3/uL         

                                        4.3-11.0        

 

                          Blood erythrocytes automated count (number/volume)    

       3.74 10*6/uL       

                                        4.35-5.85        

 

                    Venous blood hemoglobin measurement (mass/volume)           

11.8 g/dL           

13.3-17.7        

 

                    Blood hematocrit (volume fraction)           37 %           

     40-54        

 

                    Automated erythrocyte mean corpuscular volume           99 [

foz_us]           

80-99        

 

                                        Automated erythrocyte mean corpuscular h

emoglobin (mass per erythrocyte)        

                          32 pg                     25-34        

 

                                        Automated erythrocyte mean corpuscular h

emoglobin concentration measurement 

(mass/volume)             32 g/dL                   32-36        

 

                    Automated erythrocyte distribution width ratio           16.

5 %              10.0-

14.5        

 

                    Automated blood platelet count (count/volume)           125 

10*3/uL           

130-400        

 

                          Automated blood platelet mean volume measurement      

     13.3 [foz_us]        

                                        7.4-10.4        

 

                    Automated blood neutrophils/100 leukocytes           73 %   

             42-75       

 

 

                    Automated blood lymphocytes/100 leukocytes           20 %   

             12-44       

 

 

                    Blood monocytes/100 leukocytes           5 %                

 0-12        

 

                    Automated blood eosinophils/100 leukocytes           2 %    

             0-10        

 

                    Automated blood basophils/100 leukocytes           1 %      

           0-10        

 

                    Blood neutrophils automated count (number/volume)           

7.4 10*3            

1.8-7.8        

 

                    Blood lymphocytes automated count (number/volume)           

2.0 10*3            

1.0-4.0        

 

                    Blood monocytes automated count (number/volume)           0.

5 10*3            

0.0-1.0        

 

                    Automated eosinophil count           0.2 10*3/uL           0

.0-0.3        

 

                    Automated blood basophil count (count/volume)           0.1 

10*3/uL           

0.0-0.1        

 

                                        Comprehensive metabolic panel - 19

 13:35         

 

                          Serum or plasma sodium measurement (moles/volume)     

      138 mmol/L          

                                        135-145        

 

                          Serum or plasma potassium measurement (moles/volume)  

         5.6 mmol/L       

                                        3.6-5.0        

 

                          Serum or plasma chloride measurement (moles/volume)   

        103 mmol/L        

                                                

 

                    Carbon dioxide           23 mmol/L           21-32        

 

                          Serum or plasma anion gap determination (moles/volume)

           12 mmol/L      

                                        5-14        

 

                          Serum or plasma urea nitrogen measurement (mass/volume

)           37 mg/dL      

                                        7-18        

 

                          Serum or plasma creatinine measurement (mass/volume)  

         1.49 mg/dL       

                                        0.60-1.30        

 

                    Serum or plasma urea nitrogen/creatinine mass ratio         

  25                  NRG 

       

 

                                        Serum or plasma creatinine measurement w

ith calculation of estimated glomerular 

filtration rate           47                        NRG        

 

                    Serum or plasma glucose measurement (mass/volume)           

131 mg/dL           

        

 

                    Serum or plasma calcium measurement (mass/volume)           

8.6 mg/dL           

8.5-10.1        

 

                          Serum or plasma total bilirubin measurement (mass/volu

me)           0.2 mg/dL   

                                        0.1-1.0        

 

                                        Serum or plasma alkaline phosphatase juarez

surement (enzymatic activity/volume)    

                          88 U/L                            

 

                                        Serum or plasma aspartate aminotransfera

se measurement (enzymatic 

activity/volume)           23 U/L                    5-34        

 

                                        Serum or plasma alanine aminotransferase

 measurement (enzymatic activity/volume)

                          15 U/L                    0-55        

 

                    Serum or plasma protein measurement (mass/volume)           

7.4 g/dL            

6.4-8.2        

 

                    Serum or plasma albumin measurement (mass/volume)           

4.1 g/dL            

3.2-4.5        

 

                    CALCIUM CORRECTED           8.5 mg/dL           8.5-10.1    

    

 

                                        Serum or plasma troponin i.cardiac measu

rement (mass/volume) - 19 13:35   

      

 

                          Serum or plasma troponin i.cardiac measurement (mass/v

olume)           < ng/mL  

                                        <0.028        

 

                                        Arterial blood gas measurement - 

9 13:36         

 

                    Blood pCO2           63 mm[Hg]           35-45        

 

                    Blood pO2           38 mm[Hg]           79-93        

 

                          Arterial blood bicarbonate measurement (moles/volume) 

          27 mmol/L       

                                        23-27        

 

                    Arterial blood base excess by calculation           -0.1 mmo

l/L           

-2.5-2.5        

 

                    Arterial blood oxygen saturation measurement           66 % 

                   

    

 

                    * Inhaled oxygen flow rate           3 L                 NRG

        

 

                          Arterial blood pH measurement with patient temperature

 correction           7.24

                                        7.37-7.43        

 

                          Arterial blood carbon dioxide, total measurement (mole

s/volume)           28.5 

mmol/L                                  21.0-31.0        

 

                    Body site           RIGHT RADIAL            NRG        

 

                          Assessment of wrist artery patency prior to arterial p

uncture           POSITIVE

                                        NRG        

 

                    Setting of ventilation mode           NO                  NR

G        

 

                    Measurement of body temperature           98                

  NRG        

 

                                        Gram stain microscopy - 19 13:07  

       

 

                    Gram stain microscopy           NO BACTERIA SEEN            

NRG        

 

                                        Bacteria identification in wound by cult

ure - 19 13:07         

 

                    Bacteria identification in wound by culture           [id number]

01            NRG   

     

 

                    FREE TEXT EXTERNAL           SUSCEPTIBILITY REPORTED 

9 13:40            

NRG        

 

                    QUANTITY OF GROWTH           Rare                NRG        

 

                                        Dirithromycin susceptibility test by dis

k diffusion - 19 13:07         

 

                          Gentamicin susceptibility test by minimum inhibitory c

oncentration           <= 

                                        NRG        

 

                          Levofloxacin susceptibility test by minimum inhibitory

 concentration           

<=                                      NRG        

 

                          Tobramycin susceptibility test by minimum inhibitory c

oncentration           <= 

                                        NRG        

 

                                        Piperacillin/tazobactam susceptibility t

est by minimum inhibitory concentration 

                          =                         NRG        

 

                          Ciprofloxacin susceptibility test by minimum inhibitor

y concentration           

<=                                      NRG        

 

                          Meropenem susceptibility test by minimum inhibitory co

ncentration           <=  

                                        NRG        

 

                          Aztreonam susceptibility test by minimum inhibitory co

ncentration           8   

                                        NRG        

 

                          Cefepime susceptibility test by minimum inhibitory con

centration           2    

                                        NRG        

 

                          Imipenem susceptibility test by minimum inhibitory con

centration           1    

                                        NRG        

 

                          Ceftazidime susceptibility test by minimum inhibitory 

concentration           <=

                                        NRG        

 

                                        Dirithromycin susceptibility test by dis

k diffusion - 19 13:07         

 

                          Oxacillin susceptibility test by minimum inhibitory co

ncentration           1   

                                        NRG        

 

                          Clindamycin susceptibility test by minimum inhibitory 

concentration           > 

                                        NRG        

 

                          Erythromycin susceptibility test by minimum inhibitory

 concentration           >

                                        NRG        

 

                          Vancomycin susceptibility test by minimum inhibitory c

oncentration           1  

                                        NRG        

 

                          Levofloxacin susceptibility test by minimum inhibitory

 concentration           

<=                                      NRG        

 

                          Rifampin susceptibility test by minimum inhibitory con

centration           <=   

                                        NRG        

 

                          Cefazolin susceptibility test by minimum inhibitory co

ncentration           R   

                                        NRG        

 

                          Linezolid susceptibility test by minimum inhibitory co

ncentration           <=  

                                        NRG        

 

                          Penicillin G susceptibility test by minimum inhibitory

 concentration           1

                                        NRG        

 

                          Moxifloxacin susceptibility test by minimum inhibitory

 concentration           S

                                        NRG        

 

                    Minocycline susc KODI           <=                  NRG      

  

 

                                        Gram stain microscopy - 20 12:56  

       

 

                    Gram stain microscopy           No bacteria seen            

NRG        

 

                                        Bacteria identification in wound by cult

ure - 20 12:56         

 

                    Bacteria identification in wound by culture           [id number]

04            NRG   

     

 

                    FREE TEXT EXTERNAL           SUSCEPTIBILITY REPORTED  17:

15            NRG   

     

 

                    QUANTITY OF GROWTH           Scant Growth            NRG    

    

 

                    FREE TEXT ENTRY 2           PRELIMIN RAPID ID TEST AT [facility]  7:40            

NRG        

 

                    FREE TEXT ENTRY 3           RML CONFIRMED ID  [id number]     

       NRG        

 

                          CALL POSITIVES (F1 HELP)           CALLED TO REBECCA/WD [facility]

RE  8:45 BY ST        

                                        NRG        

 

                    PBP2                PRESUMPTIVE MRSA; SCREENING AT [facility]      

      NRG        

 

                    FREE TEXT ENTRY 4           RML CONFIRMED ID  [id number]     

       NRG        

 

                                        Dirithromycin susceptibility test by dis

k diffusion - 20 12:56         

 

                          Oxacillin susceptibility test by minimum inhibitory co

ncentration           >   

                                        NRG        

 

                          Clindamycin susceptibility test by minimum inhibitory 

concentration           > 

                                        NRG        

 

                          Erythromycin susceptibility test by minimum inhibitory

 concentration           >

                                        NRG        

 

                                        Trimethoprim/sulfamethoxazole susceptibi

lity test by minimum 

inhibitoryconcentration           >                         NRG        

 

                          Vancomycin susceptibility test by minimum inhibitory c

oncentration           1  

                                        NRG        

 

                          Levofloxacin susceptibility test by minimum inhibitory

 concentration           4

                                        NRG        

 

                          Rifampin susceptibility test by minimum inhibitory con

centration           <=   

                                        NRG        

 

                          Cefazolin susceptibility test by minimum inhibitory co

ncentration           >   

                                        NRG        

 

                          Linezolid susceptibility test by minimum inhibitory co

ncentration           <=  

                                        NRG        

 

                          Penicillin G susceptibility test by minimum inhibitory

 concentration           >

                                        NRG        

 

                          Moxifloxacin susceptibility test by minimum inhibitory

 concentration           1

                                        NRG        

 

                    Minocycline susc KODI           <=                  NRG      

  

 

                                        Dirithromycin susceptibility test by dis

k diffusion - 20 12:56         

 

                          Gentamicin susceptibility test by minimum inhibitory c

oncentration           <= 

                                        NRG        

 

                          Levofloxacin susceptibility test by minimum inhibitory

 concentration           

<=                                      NRG        

 

                          Tobramycin susceptibility test by minimum inhibitory c

oncentration           <= 

                                        NRG        

 

                                        Piperacillin/tazobactam susceptibility t

est by minimum inhibitory concentration 

                          =                         NRG        

 

                          Ciprofloxacin susceptibility test by minimum inhibitor

y concentration           

<=                                      NRG        

 

                          Meropenem susceptibility test by minimum inhibitory co

ncentration           <=  

                                        NRG        

 

                          Aztreonam susceptibility test by minimum inhibitory co

ncentration           8   

                                        NRG        

 

                          Cefepime susceptibility test by minimum inhibitory con

centration           2    

                                        NRG        

 

                          Imipenem susceptibility test by minimum inhibitory con

centration           1    

                                        NRG        

 

                          Ceftazidime susceptibility test by minimum inhibitory 

concentration           <=

                                        NRG        

 

                                        Complete blood count (CBC) with automate

d white blood cell (WBC) differential - 

20 16:36         

 

                          Blood leukocytes automated count (number/volume)      

     14.0 10*3/uL         

                                        4.3-11.0        

 

                          Blood erythrocytes automated count (number/volume)    

       3.78 10*6/uL       

                                        4.35-5.85        

 

                    Venous blood hemoglobin measurement (mass/volume)           

11.8 g/dL           

13.3-17.7        

 

                    Blood hematocrit (volume fraction)           37 %           

     40-54        

 

                    Automated erythrocyte mean corpuscular volume           99 [

foz_us]           

80-99        

 

                                        Automated erythrocyte mean corpuscular h

emoglobin (mass per erythrocyte)        

                          31 pg                     25-34        

 

                                        Automated erythrocyte mean corpuscular h

emoglobin concentration measurement 

(mass/volume)             32 g/dL                   32-36        

 

                    Automated erythrocyte distribution width ratio           16.

1 %              10.0-

14.5        

 

                    Automated blood platelet count (count/volume)           129 

10*3/uL           

130-400        

 

                          Automated blood platelet mean volume measurement      

     12.3 [foz_us]        

                                        7.4-10.4        

 

                    Automated blood neutrophils/100 leukocytes           81 %   

             42-75       

 

 

                    Automated blood lymphocytes/100 leukocytes           11 %   

             12-44       

 

 

                    Blood monocytes/100 leukocytes           6 %                

 0-12        

 

                    Automated blood eosinophils/100 leukocytes           2 %    

             0-10        

 

                    Automated blood basophils/100 leukocytes           0 %      

           0-10        

 

                    Blood neutrophils automated count (number/volume)           

11.3 10*3           

1.8-7.8        

 

                    Blood lymphocytes automated count (number/volume)           

1.6 10*3            

1.0-4.0        

 

                    Blood monocytes automated count (number/volume)           0.

8 10*3            

0.0-1.0        

 

                    Automated eosinophil count           0.3 10*3/uL           0

.0-0.3        

 

                    Automated blood basophil count (count/volume)           0.1 

10*3/uL           

0.0-0.1        

 

                                        Blood lactic acid measurement (moles/vol

ume) - 20 16:36         

 

                    Blood lactic acid measurement (moles/volume)           1.48 

mmol/L           

0.50-2.00        

 

                                        Comprehensive metabolic panel - 20

 16:36         

 

                          Serum or plasma sodium measurement (moles/volume)     

      137 mmol/L          

                                        135-145        

 

                          Serum or plasma potassium measurement (moles/volume)  

         4.9 mmol/L       

                                        3.6-5.0        

 

                          Serum or plasma chloride measurement (moles/volume)   

        102 mmol/L        

                                                

 

                    Carbon dioxide           27 mmol/L           21-32        

 

                          Serum or plasma anion gap determination (moles/volume)

           8 mmol/L       

                                        5-14        

 

                          Serum or plasma urea nitrogen measurement (mass/volume

)           23 mg/dL      

                                        7-18        

 

                          Serum or plasma creatinine measurement (mass/volume)  

         1.15 mg/dL       

                                        0.60-1.30        

 

                    Serum or plasma urea nitrogen/creatinine mass ratio         

  20                  NRG 

       

 

                                        Serum or plasma creatinine measurement w

ith calculation of estimated glomerular 

filtration rate           >                         NRG        

 

                    Serum or plasma glucose measurement (mass/volume)           

177 mg/dL           

        

 

                    Serum or plasma calcium measurement (mass/volume)           

9.5 mg/dL           

8.5-10.1        

 

                          Serum or plasma total bilirubin measurement (mass/volu

me)           0.4 mg/dL   

                                        0.1-1.0        

 

                                        Serum or plasma alkaline phosphatase juarez

surement (enzymatic activity/volume)    

                          131 U/L                           

 

                                        Serum or plasma aspartate aminotransfera

se measurement (enzymatic 

activity/volume)           63 U/L                    5-34        

 

                                        Serum or plasma alanine aminotransferase

 measurement (enzymatic activity/volume)

                          73 U/L                    0-55        

 

                    Serum or plasma protein measurement (mass/volume)           

7.7 g/dL            

6.4-8.2        

 

                    Serum or plasma albumin measurement (mass/volume)           

3.8 g/dL            

3.2-4.5        

 

                    CALCIUM CORRECTED           9.7 mg/dL           8.5-10.1    

    

 

                                        Bacterial blood culture - 20 16:36

         

 

                    Bacterial blood culture           NG                  NRG   

     

 

                                        Bacterial blood culture - 20 16:43

         

 

                    Bacterial blood culture           NG                  NRG   

     

 

                                        Gram stain microscopy - 20 16:50  

       

 

                          Gram stain microscopy           Moderate Gram positive

 cocci in clusters        

                                        NRG        

 

                                        Bacteria identification in wound by cult

ure - 20 16:50         

 

                    Bacteria identification in wound by culture           [id number]

04            NRG   

     

 

                    FREE TEXT EXTERNAL           TESTING IN PROGRESS            

NRG        

 

                    QUANTITY OF GROWTH           Rare                NRG        

 

                    MRSA AGAR           ID CONFIRMED BY [facility] 20 14:05        

    NRG        

 

                    FREE TEXT ENTRY 2           [facility] REPORTED ID  [id number]       

     NRG        

 

                    FREE TEXT ENTRY 3           PRESUMPTIVE MRSA; SCREENING AT V

CP            NRG   

     

 

                    CALL POSITIVES (F1 HELP)           07:45 BY EDIL FLORES        

    NRG        

 

                    PBP2                CALLED TO [facility]SI[facility] ON 4TH FLOOR       

      NRG        

 

                                        Dirithromycin susceptibility test by dis

k diffusion - 20 16:50         

 

                          Oxacillin susceptibility test by minimum inhibitory co

ncentration           >   

                                        NRG        

 

                          Clindamycin susceptibility test by minimum inhibitory 

concentration           > 

                                        NRG        

 

                          Erythromycin susceptibility test by minimum inhibitory

 concentration           >

                                        NRG        

 

                                        Trimethoprim/sulfamethoxazole susceptibi

lity test by minimum 

inhibitoryconcentration           >                         NRG        

 

                          Vancomycin susceptibility test by minimum inhibitory c

oncentration           1  

                                        NRG        

 

                          Levofloxacin susceptibility test by minimum inhibitory

 concentration           4

                                        NRG        

 

                          Rifampin susceptibility test by minimum inhibitory con

centration           <=   

                                        NRG        

 

                          Cefazolin susceptibility test by minimum inhibitory co

ncentration           >   

                                        NRG        

 

                          Linezolid susceptibility test by minimum inhibitory co

ncentration           2   

                                        NRG        

 

                          Penicillin G susceptibility test by minimum inhibitory

 concentration           >

                                        NRG        

 

                          Moxifloxacin susceptibility test by minimum inhibitory

 concentration           2

                                        NRG        

 

                    Minocycline susc KODI           <=                  NRG      

  

 

                                        Capillary blood glucose measurement by g

lucometer (mass/volume) - 20 20:43

         

 

                          Capillary blood glucose measurement by glucometer (mas

s/volume)           221 

mg/dL                                           

 

                                        Complete blood count (CBC) with automate

d white blood cell (WBC) differential - 

20 05:13         

 

                          Blood leukocytes automated count (number/volume)      

     11.5 10*3/uL         

                                        4.3-11.0        

 

                          Blood erythrocytes automated count (number/volume)    

       3.45 10*6/uL       

                                        4.35-5.85        

 

                    Venous blood hemoglobin measurement (mass/volume)           

10.8 g/dL           

13.3-17.7        

 

                    Blood hematocrit (volume fraction)           35 %           

     40-54        

 

                    Automated erythrocyte mean corpuscular volume           100 

[foz_us]           

80-99        

 

                                        Automated erythrocyte mean corpuscular h

emoglobin (mass per erythrocyte)        

                          31 pg                     25-34        

 

                                        Automated erythrocyte mean corpuscular h

emoglobin concentration measurement 

(mass/volume)             31 g/dL                   32-36        

 

                    Automated erythrocyte distribution width ratio           15.

8 %              10.0-

14.5        

 

                    Automated blood platelet count (count/volume)           130 

10*3/uL           

130-400        

 

                          Automated blood platelet mean volume measurement      

     12.5 [foz_us]        

                                        7.4-10.4        

 

                    Automated blood neutrophils/100 leukocytes           78 %   

             42-75       

 

 

                    Automated blood lymphocytes/100 leukocytes           14 %   

             12-44       

 

 

                    Blood monocytes/100 leukocytes           6 %                

 0-12        

 

                    Automated blood eosinophils/100 leukocytes           3 %    

             0-10        

 

                    Automated blood basophils/100 leukocytes           0 %      

           0-10        

 

                    Blood neutrophils automated count (number/volume)           

9.0 10*3            

1.8-7.8        

 

                    Blood lymphocytes automated count (number/volume)           

1.6 10*3            

1.0-4.0        

 

                    Blood monocytes automated count (number/volume)           0.

7 10*3            

0.0-1.0        

 

                    Automated eosinophil count           0.3 10*3/uL           0

.0-0.3        

 

                    Automated blood basophil count (count/volume)           0.0 

10*3/uL           

0.0-0.1        

 

                                        Comprehensive metabolic panel - 20

 05:13         

 

                          Serum or plasma sodium measurement (moles/volume)     

      137 mmol/L          

                                        135-145        

 

                          Serum or plasma potassium measurement (moles/volume)  

         5.2 mmol/L       

                                        3.6-5.0        

 

                          Serum or plasma chloride measurement (moles/volume)   

        103 mmol/L        

                                                

 

                    Carbon dioxide           25 mmol/L           21-32        

 

                          Serum or plasma anion gap determination (moles/volume)

           9 mmol/L       

                                        5-14        

 

                          Serum or plasma urea nitrogen measurement (mass/volume

)           26 mg/dL      

                                        7-18        

 

                          Serum or plasma creatinine measurement (mass/volume)  

         1.20 mg/dL       

                                        0.60-1.30        

 

                    Serum or plasma urea nitrogen/creatinine mass ratio         

  22                  NRG 

       

 

                                        Serum or plasma creatinine measurement w

ith calculation of estimated glomerular 

filtration rate           60                        NRG        

 

                    Serum or plasma glucose measurement (mass/volume)           

179 mg/dL           

        

 

                    Serum or plasma calcium measurement (mass/volume)           

9.0 mg/dL           

8.5-10.1        

 

                          Serum or plasma total bilirubin measurement (mass/volu

me)           0.3 mg/dL   

                                        0.1-1.0        

 

                                        Serum or plasma alkaline phosphatase juarez

surement (enzymatic activity/volume)    

                          116 U/L                           

 

                                        Serum or plasma aspartate aminotransfera

se measurement (enzymatic 

activity/volume)           43 U/L                    5-34        

 

                                        Serum or plasma alanine aminotransferase

 measurement (enzymatic activity/volume)

                          66 U/L                    0-55        

 

                    Serum or plasma protein measurement (mass/volume)           

6.8 g/dL            

6.4-8.2        

 

                    Serum or plasma albumin measurement (mass/volume)           

3.3 g/dL            

3.2-4.5        

 

                    CALCIUM CORRECTED           9.6 mg/dL           8.5-10.1    

    

 

                                        Capillary blood glucose measurement by g

lucometer (mass/volume) - 20 05:18

         

 

                          Capillary blood glucose measurement by glucometer (mas

s/volume)           193 

mg/dL                                           

 

                                        Capillary blood glucose measurement by g

lucometer (mass/volume) - 20 11:06

         

 

                          Capillary blood glucose measurement by glucometer (mas

s/volume)           181 

mg/dL                                           

 

                                        Bacteria identification in isolate by an

aerobe culture - 20 15:50         

 

                          Bacteria identification in isolate by anaerobe culture

           NOANA          

                                        NRG        

 

                                        Gram stain microscopy - 20 15:50  

       

 

                    Gram stain microscopy           Mixed Bacterial Kimberly       

     NRG        

 

                                        Bacteria identification in wound by cult

ure - 20 15:50         

 

                    Bacteria identification in wound by culture           SEE CO

MMEN            NRG 

       

 

                    FREE TEXT EXTERNAL           SUSCEPTIBILITY REPORTED 2/10 14

:30            NRG  

      

 

                    QUANTITY OF GROWTH           .                   NRG        

 

                    FREE TEXT ENTRY 2           PRELIM RAPID ID TEST AT [facility]  

12:15            NRG

        

 

                    FREE TEXT ENTRY 3           [facility] CONFIRMED ID  15:05      

      NRG        

 

                    FREE TEXT ENTRY 4           [facility] CONFIRMED ID  12:[id number]      

      NRG        

 

                                        Dirithromycin susceptibility test by dis

k diffusion - 20 15:50         

 

                          Oxacillin susceptibility test by minimum inhibitory co

ncentration           >   

                                        NRG        

 

                          Clindamycin susceptibility test by minimum inhibitory 

concentration           > 

                                        NRG        

 

                          Erythromycin susceptibility test by minimum inhibitory

 concentration           >

                                        NRG        

 

                                        Trimethoprim/sulfamethoxazole susceptibi

lity test by minimum 

inhibitoryconcentration           >                         NRG        

 

                          Vancomycin susceptibility test by minimum inhibitory c

oncentration           1  

                                        NRG        

 

                          Levofloxacin susceptibility test by minimum inhibitory

 concentration           4

                                        NRG        

 

                          Rifampin susceptibility test by minimum inhibitory con

centration           <=   

                                        NRG        

 

                          Cefazolin susceptibility test by minimum inhibitory co

ncentration           >   

                                        NRG        

 

                          Linezolid susceptibility test by minimum inhibitory co

ncentration           <=  

                                        NRG        

 

                          Penicillin G susceptibility test by minimum inhibitory

 concentration           >

                                        NRG        

 

                          Moxifloxacin susceptibility test by minimum inhibitory

 concentration           1

                                        NRG        

 

                    Minocycline susc KODI           <=                  NRG      

  

 

                                        Dirithromycin susceptibility test by dis

k diffusion - 20 15:50         

 

                          Gentamicin susceptibility test by minimum inhibitory c

oncentration           <= 

                                        NRG        

 

                          Levofloxacin susceptibility test by minimum inhibitory

 concentration           

<=                                      NRG        

 

                          Tobramycin susceptibility test by minimum inhibitory c

oncentration           <= 

                                        NRG        

 

                                        Piperacillin/tazobactam susceptibility t

est by minimum inhibitory concentration 

                          =                         NRG        

 

                          Ciprofloxacin susceptibility test by minimum inhibitor

y concentration           

<=                                      NRG        

 

                          Meropenem susceptibility test by minimum inhibitory co

ncentration           <=  

                                        NRG        

 

                          Aztreonam susceptibility test by minimum inhibitory co

ncentration           8   

                                        NRG        

 

                          Cefepime susceptibility test by minimum inhibitory con

centration           4    

                                        NRG        

 

                          Imipenem susceptibility test by minimum inhibitory con

centration           2    

                                        NRG        

 

                          Ceftazidime susceptibility test by minimum inhibitory 

concentration           4 

                                        NRG        

 

                                        Capillary blood glucose measurement by g

lucometer (mass/volume) - 20 17:24

         

 

                          Capillary blood glucose measurement by glucometer (mas

s/volume)           166 

mg/dL                                           

 

                                        Capillary blood glucose measurement by g

lucometer (mass/volume) - 20 20:15

         

 

                          Capillary blood glucose measurement by glucometer (mas

s/volume)           236 

mg/dL                                           

 

                                        Capillary blood glucose measurement by g

lucometer (mass/volume) - 20 05:36

         

 

                          Capillary blood glucose measurement by glucometer (mas

s/volume)           157 

mg/dL                                           

 

                                        Complete blood count (CBC) with automate

d white blood cell (WBC) differential - 

20 07:39         

 

                          Blood leukocytes automated count (number/volume)      

     9.2 10*3/uL          

                                        4.3-11.0        

 

                          Blood erythrocytes automated count (number/volume)    

       3.40 10*6/uL       

                                        4.35-5.85        

 

                    Venous blood hemoglobin measurement (mass/volume)           

10.7 g/dL           

13.3-17.7        

 

                    Blood hematocrit (volume fraction)           34 %           

     40-54        

 

                    Automated erythrocyte mean corpuscular volume           101 

[foz_us]           

80-99        

 

                                        Automated erythrocyte mean corpuscular h

emoglobin (mass per erythrocyte)        

                          31 pg                     25-34        

 

                                        Automated erythrocyte mean corpuscular h

emoglobin concentration measurement 

(mass/volume)             31 g/dL                   32-36        

 

                    Automated erythrocyte distribution width ratio           16.

0 %              10.0-

14.5        

 

                    Automated blood platelet count (count/volume)           140 

10*3/uL           

130-400        

 

                          Automated blood platelet mean volume measurement      

     12.0 [foz_us]        

                                        7.4-10.4        

 

                    Automated blood neutrophils/100 leukocytes           72 %   

             42-75       

 

 

                    Automated blood lymphocytes/100 leukocytes           19 %   

             12-44       

 

 

                    Blood monocytes/100 leukocytes           5 %                

 0-12        

 

                    Automated blood eosinophils/100 leukocytes           3 %    

             0-10        

 

                    Automated blood basophils/100 leukocytes           1 %      

           0-10        

 

                    Blood neutrophils automated count (number/volume)           

6.6 10*3            

1.8-7.8        

 

                    Blood lymphocytes automated count (number/volume)           

1.8 10*3            

1.0-4.0        

 

                    Blood monocytes automated count (number/volume)           0.

5 10*3            

0.0-1.0        

 

                    Automated eosinophil count           0.3 10*3/uL           0

.0-0.3        

 

                    Automated blood basophil count (count/volume)           0.1 

10*3/uL           

0.0-0.1        

 

                                        Comprehensive metabolic panel - 20

 07:39         

 

                          Serum or plasma sodium measurement (moles/volume)     

      138 mmol/L          

                                        135-145        

 

                          Serum or plasma potassium measurement (moles/volume)  

         4.8 mmol/L       

                                        3.6-5.0        

 

                          Serum or plasma chloride measurement (moles/volume)   

        102 mmol/L        

                                                

 

                    Carbon dioxide           27 mmol/L           21-32        

 

                          Serum or plasma anion gap determination (moles/volume)

           9 mmol/L       

                                        5-14        

 

                          Serum or plasma urea nitrogen measurement (mass/volume

)           20 mg/dL      

                                        7-18        

 

                          Serum or plasma creatinine measurement (mass/volume)  

         1.17 mg/dL       

                                        0.60-1.30        

 

                    Serum or plasma urea nitrogen/creatinine mass ratio         

  17                  NRG 

       

 

                                        Serum or plasma creatinine measurement w

ith calculation of estimated glomerular 

filtration rate           >                         NRG        

 

                    Serum or plasma glucose measurement (mass/volume)           

147 mg/dL           

        

 

                    Serum or plasma calcium measurement (mass/volume)           

9.0 mg/dL           

8.5-10.1        

 

                          Serum or plasma total bilirubin measurement (mass/volu

me)           0.3 mg/dL   

                                        0.1-1.0        

 

                                        Serum or plasma alkaline phosphatase juarez

surement (enzymatic activity/volume)    

                          136 U/L                           

 

                                        Serum or plasma aspartate aminotransfera

se measurement (enzymatic 

activity/volume)           42 U/L                    5-34        

 

                                        Serum or plasma alanine aminotransferase

 measurement (enzymatic activity/volume)

                          65 U/L                    0-55        

 

                    Serum or plasma protein measurement (mass/volume)           

7.1 g/dL            

6.4-8.2        

 

                    Serum or plasma albumin measurement (mass/volume)           

3.5 g/dL            

3.2-4.5        

 

                    CALCIUM CORRECTED           9.4 mg/dL           8.5-10.1    

    

 

                                        Vancomycin trough - 20 07:39      

   

 

                    Vancomycin trough           22.4 ug/mL           10.0-20.0  

      

 

                                        Capillary blood glucose measurement by g

lucometer (mass/volume) - 20 10:58

         

 

                          Capillary blood glucose measurement by glucometer (mas

s/volume)           164 

mg/dL                                           

 

                                        Capillary blood glucose measurement by g

lucometer (mass/volume) - 20 15:55

         

 

                          Capillary blood glucose measurement by glucometer (mas

s/volume)           243 

mg/dL                                           

 

                                        Vancomycin trough - 20 19:03      

   

 

                    Vancomycin trough           15.6 ug/mL           10.0-20.0  

      

 

                                        Capillary blood glucose measurement by g

lucometer (mass/volume) - 20 20:27

         

 

                          Capillary blood glucose measurement by glucometer (mas

s/volume)           201 

mg/dL                                           

 

                                        Capillary blood glucose measurement by g

lucometer (mass/volume) - 20 05:31

         

 

                          Capillary blood glucose measurement by glucometer (mas

s/volume)           288 

mg/dL                                           

 

                                        Complete blood count (CBC) with automate

d white blood cell (WBC) differential - 

20 06:18         

 

                          Blood leukocytes automated count (number/volume)      

     9.4 10*3/uL          

                                        4.3-11.0        

 

                          Blood erythrocytes automated count (number/volume)    

       3.62 10*6/uL       

                                        4.35-5.85        

 

                    Venous blood hemoglobin measurement (mass/volume)           

11.3 g/dL           

13.3-17.7        

 

                    Blood hematocrit (volume fraction)           36 %           

     40-54        

 

                    Automated erythrocyte mean corpuscular volume           99 [

foz_us]           

80-99        

 

                                        Automated erythrocyte mean corpuscular h

emoglobin (mass per erythrocyte)        

                          31 pg                     25-34        

 

                                        Automated erythrocyte mean corpuscular h

emoglobin concentration measurement 

(mass/volume)             32 g/dL                   32-36        

 

                    Automated erythrocyte distribution width ratio           15.

4 %              10.0-

14.5        

 

                    Automated blood platelet count (count/volume)           146 

10*3/uL           

130-400        

 

                          Automated blood platelet mean volume measurement      

     12.5 [foz_us]        

                                        7.4-10.4        

 

                    Automated blood neutrophils/100 leukocytes           70 %   

             42-75       

 

 

                    Automated blood lymphocytes/100 leukocytes           21 %   

             12-44       

 

 

                    Blood monocytes/100 leukocytes           6 %                

 0-12        

 

                    Automated blood eosinophils/100 leukocytes           3 %    

             0-10        

 

                    Automated blood basophils/100 leukocytes           1 %      

           0-10        

 

                    Blood neutrophils automated count (number/volume)           

6.6 10*3            

1.8-7.8        

 

                    Blood lymphocytes automated count (number/volume)           

1.9 10*3            

1.0-4.0        

 

                    Blood monocytes automated count (number/volume)           0.

6 10*3            

0.0-1.0        

 

                    Automated eosinophil count           0.3 10*3/uL           0

.0-0.3        

 

                    Automated blood basophil count (count/volume)           0.1 

10*3/uL           

0.0-0.1        

 

                                        Comprehensive metabolic panel - 20

 06:18         

 

                          Serum or plasma sodium measurement (moles/volume)     

      134 mmol/L          

                                        135-145        

 

                          Serum or plasma potassium measurement (moles/volume)  

         4.7 mmol/L       

                                        3.6-5.0        

 

                          Serum or plasma chloride measurement (moles/volume)   

        100 mmol/L        

                                                

 

                    Carbon dioxide           24 mmol/L           21-32        

 

                          Serum or plasma anion gap determination (moles/volume)

           10 mmol/L      

                                        5-14        

 

                          Serum or plasma urea nitrogen measurement (mass/volume

)           18 mg/dL      

                                        7-18        

 

                          Serum or plasma creatinine measurement (mass/volume)  

         1.09 mg/dL       

                                        0.60-1.30        

 

                    Serum or plasma urea nitrogen/creatinine mass ratio         

  17                  NRG 

       

 

                                        Serum or plasma creatinine measurement w

ith calculation of estimated glomerular 

filtration rate           >                         NRG        

 

                    Serum or plasma glucose measurement (mass/volume)           

235 mg/dL           

        

 

                    Serum or plasma calcium measurement (mass/volume)           

9.3 mg/dL           

8.5-10.1        

 

                          Serum or plasma total bilirubin measurement (mass/volu

me)           0.2 mg/dL   

                                        0.1-1.0        

 

                                        Serum or plasma alkaline phosphatase juarez

surement (enzymatic activity/volume)    

                          122 U/L                           

 

                                        Serum or plasma aspartate aminotransfera

se measurement (enzymatic 

activity/volume)           24 U/L                    5-34        

 

                                        Serum or plasma alanine aminotransferase

 measurement (enzymatic activity/volume)

                          53 U/L                    0-55        

 

                    Serum or plasma protein measurement (mass/volume)           

7.2 g/dL            

6.4-8.2        

 

                    Serum or plasma albumin measurement (mass/volume)           

3.6 g/dL            

3.2-4.5        

 

                    CALCIUM CORRECTED           9.6 mg/dL           8.5-10.1    

    

 

                                        Capillary blood glucose measurement by g

lucometer (mass/volume) - 20 11:07

         

 

                          Capillary blood glucose measurement by glucometer (mas

s/volume)           120 

mg/dL                                           

 

                                        Capillary blood glucose measurement by g

lucometer (mass/volume) - 20 16:06

         

 

                          Capillary blood glucose measurement by glucometer (mas

s/volume)           177 

mg/dL                                           

 

                                        Capillary blood glucose measurement by g

lucometer (mass/volume) - 20 20:16

         

 

                          Capillary blood glucose measurement by glucometer (mas

s/volume)           195 

mg/dL                                           

 

                                        Capillary blood glucose measurement by g

lucometer (mass/volume) - 02/10/20 05:48

         

 

                          Capillary blood glucose measurement by glucometer (mas

s/volume)           177 

mg/dL                                           

 

                                        Complete blood count (CBC) with automate

d white blood cell (WBC) differential - 

02/10/20 07:20         

 

                          Blood leukocytes automated count (number/volume)      

     9.9 10*3/uL          

                                        4.3-11.0        

 

                          Blood erythrocytes automated count (number/volume)    

       3.89 10*6/uL       

                                        4.35-5.85        

 

                    Venous blood hemoglobin measurement (mass/volume)           

11.9 g/dL           

13.3-17.7        

 

                    Blood hematocrit (volume fraction)           38 %           

     40-54        

 

                    Automated erythrocyte mean corpuscular volume           98 [

foz_us]           

80-99        

 

                                        Automated erythrocyte mean corpuscular h

emoglobin (mass per erythrocyte)        

                          31 pg                     25-34        

 

                                        Automated erythrocyte mean corpuscular h

emoglobin concentration measurement 

(mass/volume)             31 g/dL                   32-36        

 

                    Automated erythrocyte distribution width ratio           15.

6 %              10.0-

14.5        

 

                    Automated blood platelet count (count/volume)           171 

10*3/uL           

130-400        

 

                          Automated blood platelet mean volume measurement      

     11.7 [foz_us]        

                                        7.4-10.4        

 

                    Automated blood neutrophils/100 leukocytes           75 %   

             42-75       

 

 

                    Automated blood lymphocytes/100 leukocytes           16 %   

             12-44       

 

 

                    Blood monocytes/100 leukocytes           6 %                

 0-12        

 

                    Automated blood eosinophils/100 leukocytes           2 %    

             0-10        

 

                    Automated blood basophils/100 leukocytes           1 %      

           0-10        

 

                    Blood neutrophils automated count (number/volume)           

7.5 10*3            

1.8-7.8        

 

                    Blood lymphocytes automated count (number/volume)           

1.6 10*3            

1.0-4.0        

 

                    Blood monocytes automated count (number/volume)           0.

6 10*3            

0.0-1.0        

 

                    Automated eosinophil count           0.2 10*3/uL           0

.0-0.3        

 

                    Automated blood basophil count (count/volume)           0.1 

10*3/uL           

0.0-0.1        

 

                                        Comprehensive metabolic panel - 02/10/20

 07:20         

 

                          Serum or plasma sodium measurement (moles/volume)     

      134 mmol/L          

                                        135-145        

 

                          Serum or plasma potassium measurement (moles/volume)  

         5.4 mmol/L       

                                        3.6-5.0        

 

                          Serum or plasma chloride measurement (moles/volume)   

        98 mmol/L         

                                                

 

                    Carbon dioxide           27 mmol/L           21-32        

 

                          Serum or plasma anion gap determination (moles/volume)

           9 mmol/L       

                                        5-14        

 

                          Serum or plasma urea nitrogen measurement (mass/volume

)           19 mg/dL      

                                        7-18        

 

                          Serum or plasma creatinine measurement (mass/volume)  

         1.14 mg/dL       

                                        0.60-1.30        

 

                    Serum or plasma urea nitrogen/creatinine mass ratio         

  17                  NRG 

       

 

                                        Serum or plasma creatinine measurement w

ith calculation of estimated glomerular 

filtration rate           >                         NRG        

 

                    Serum or plasma glucose measurement (mass/volume)           

159 mg/dL           

        

 

                    Serum or plasma calcium measurement (mass/volume)           

9.9 mg/dL           

8.5-10.1        

 

                          Serum or plasma total bilirubin measurement (mass/volu

me)           0.3 mg/dL   

                                        0.1-1.0        

 

                                        Serum or plasma alkaline phosphatase juarez

surement (enzymatic activity/volume)    

                          121 U/L                           

 

                                        Serum or plasma aspartate aminotransfera

se measurement (enzymatic 

activity/volume)           25 U/L                    5-34        

 

                                        Serum or plasma alanine aminotransferase

 measurement (enzymatic activity/volume)

                          48 U/L                    0-55        

 

                    Serum or plasma protein measurement (mass/volume)           

7.9 g/dL            

6.4-8.2        

 

                    Serum or plasma albumin measurement (mass/volume)           

3.8 g/dL            

3.2-4.5        

 

                    CALCIUM CORRECTED           10.1 mg/dL           8.5-10.1   

     

 

                                        Capillary blood glucose measurement by g

lucometer (mass/volume) - 02/10/20 11:35

         

 

                          Capillary blood glucose measurement by glucometer (mas

s/volume)           158 

mg/dL                                           

 

                                        Capillary blood glucose measurement by g

lucometer (mass/volume) - 02/10/20 17:06

         

 

                          Capillary blood glucose measurement by glucometer (mas

s/volume)           306 

mg/dL                                           

 

                                        Vancomycin trough - 02/10/20 18:00      

   

 

                    Vancomycin trough           18.5 ug/mL           10.0-20.0  

      

 

                                        Capillary blood glucose measurement by g

lucometer (mass/volume) - 02/10/20 20:01

         

 

                          Capillary blood glucose measurement by glucometer (mas

s/volume)           218 

mg/dL                                           

 

                                        Complete blood count (CBC) with automate

d white blood cell (WBC) differential - 

20 05:31         

 

                          Blood leukocytes automated count (number/volume)      

     10.7 10*3/uL         

                                        4.3-11.0        

 

                          Blood erythrocytes automated count (number/volume)    

       3.56 10*6/uL       

                                        4.35-5.85        

 

                    Venous blood hemoglobin measurement (mass/volume)           

11.1 g/dL           

13.3-17.7        

 

                    Blood hematocrit (volume fraction)           35 %           

     40-54        

 

                    Automated erythrocyte mean corpuscular volume           98 [

foz_us]           

80-99        

 

                                        Automated erythrocyte mean corpuscular h

emoglobin (mass per erythrocyte)        

                          31 pg                     25-34        

 

                                        Automated erythrocyte mean corpuscular h

emoglobin concentration measurement 

(mass/volume)             32 g/dL                   32-36        

 

                    Automated erythrocyte distribution width ratio           15.

7 %              10.0-

14.5        

 

                    Automated blood platelet count (count/volume)           161 

10*3/uL           

130-400        

 

                          Automated blood platelet mean volume measurement      

     11.7 [foz_us]        

                                        7.4-10.4        

 

                    Automated blood neutrophils/100 leukocytes           74 %   

             42-75       

 

 

                    Automated blood lymphocytes/100 leukocytes           18 %   

             12-44       

 

 

                    Blood monocytes/100 leukocytes           6 %                

 0-12        

 

                    Automated blood eosinophils/100 leukocytes           2 %    

             0-10        

 

                    Automated blood basophils/100 leukocytes           1 %      

           0-10        

 

                    Blood neutrophils automated count (number/volume)           

8.0 10*3            

1.8-7.8        

 

                    Blood lymphocytes automated count (number/volume)           

1.9 10*3            

1.0-4.0        

 

                    Blood monocytes automated count (number/volume)           0.

6 10*3            

0.0-1.0        

 

                    Automated eosinophil count           0.2 10*3/uL           0

.0-0.3        

 

                    Automated blood basophil count (count/volume)           0.1 

10*3/uL           

0.0-0.1        

 

                                        Comprehensive metabolic panel - 20

 05:31         

 

                          Serum or plasma sodium measurement (moles/volume)     

      134 mmol/L          

                                        135-145        

 

                          Serum or plasma potassium measurement (moles/volume)  

         5.2 mmol/L       

                                        3.6-5.0        

 

                          Serum or plasma chloride measurement (moles/volume)   

        98 mmol/L         

                                                

 

                    Carbon dioxide           26 mmol/L           21-32        

 

                          Serum or plasma anion gap determination (moles/volume)

           10 mmol/L      

                                        5-14        

 

                          Serum or plasma urea nitrogen measurement (mass/volume

)           22 mg/dL      

                                        7-18        

 

                          Serum or plasma creatinine measurement (mass/volume)  

         1.20 mg/dL       

                                        0.60-1.30        

 

                    Serum or plasma urea nitrogen/creatinine mass ratio         

  18                  NRG 

       

 

                                        Serum or plasma creatinine measurement w

ith calculation of estimated glomerular 

filtration rate           60                        NRG        

 

                    Serum or plasma glucose measurement (mass/volume)           

157 mg/dL           

        

 

                    Serum or plasma calcium measurement (mass/volume)           

9.5 mg/dL           

8.5-10.1        

 

                          Serum or plasma total bilirubin measurement (mass/volu

me)           0.3 mg/dL   

                                        0.1-1.0        

 

                                        Serum or plasma alkaline phosphatase juarez

surement (enzymatic activity/volume)    

                          99 U/L                            

 

                                        Serum or plasma aspartate aminotransfera

se measurement (enzymatic 

activity/volume)           20 U/L                    5-34        

 

                                        Serum or plasma alanine aminotransferase

 measurement (enzymatic activity/volume)

                          38 U/L                    0-55        

 

                    Serum or plasma protein measurement (mass/volume)           

7.3 g/dL            

6.4-8.2        

 

                    Serum or plasma albumin measurement (mass/volume)           

3.6 g/dL            

3.2-4.5        

 

                    CALCIUM CORRECTED           9.8 mg/dL           8.5-10.1    

    

 

                                        Capillary blood glucose measurement by g

lucometer (mass/volume) - 20 11:03

         

 

                          Capillary blood glucose measurement by glucometer (mas

s/volume)           129 

mg/dL                                           

 

                                        Capillary blood glucose measurement by g

lucometer (mass/volume) - 20 15:45

         

 

                          Capillary blood glucose measurement by glucometer (mas

s/volume)           156 

mg/dL                                           

 

                                        Capillary blood glucose measurement by g

lucometer (mass/volume) - 20 20:44

         

 

                          Capillary blood glucose measurement by glucometer (mas

s/volume)           206 

mg/dL                                           

 

                                        Complete blood count (CBC) with automate

d white blood cell (WBC) differential - 

20 05:40         

 

                          Blood leukocytes automated count (number/volume)      

     11.8 10*3/uL         

                                        4.3-11.0        

 

                          Blood erythrocytes automated count (number/volume)    

       3.49 10*6/uL       

                                        4.35-5.85        

 

                    Venous blood hemoglobin measurement (mass/volume)           

10.7 g/dL           

13.3-17.7        

 

                    Blood hematocrit (volume fraction)           34 %           

     40-54        

 

                    Automated erythrocyte mean corpuscular volume           97 [

foz_us]           

80-99        

 

                                        Automated erythrocyte mean corpuscular h

emoglobin (mass per erythrocyte)        

                          31 pg                     25-34        

 

                                        Automated erythrocyte mean corpuscular h

emoglobin concentration measurement 

(mass/volume)             32 g/dL                   32-36        

 

                    Automated erythrocyte distribution width ratio           15.

7 %              10.0-

14.5        

 

                    Automated blood platelet count (count/volume)           160 

10*3/uL           

130-400        

 

                          Automated blood platelet mean volume measurement      

     12.1 [foz_us]        

                                        7.4-10.4        

 

                    Automated blood neutrophils/100 leukocytes           73 %   

             42-75       

 

 

                    Automated blood lymphocytes/100 leukocytes           19 %   

             12-44       

 

 

                    Blood monocytes/100 leukocytes           5 %                

 0-12        

 

                    Automated blood eosinophils/100 leukocytes           2 %    

             0-10        

 

                    Automated blood basophils/100 leukocytes           1 %      

           0-10        

 

                    Blood neutrophils automated count (number/volume)           

8.6 10*3            

1.8-7.8        

 

                    Blood lymphocytes automated count (number/volume)           

2.3 10*3            

1.0-4.0        

 

                    Blood monocytes automated count (number/volume)           0.

6 10*3            

0.0-1.0        

 

                    Automated eosinophil count           0.2 10*3/uL           0

.0-0.3        

 

                    Automated blood basophil count (count/volume)           0.1 

10*3/uL           

0.0-0.1        

 

                                        Comprehensive metabolic panel - 20

 05:40         

 

                          Serum or plasma sodium measurement (moles/volume)     

      130 mmol/L          

                                        135-145        

 

                          Serum or plasma potassium measurement (moles/volume)  

         5.5 mmol/L       

                                        3.6-5.0        

 

                          Serum or plasma chloride measurement (moles/volume)   

        97 mmol/L         

                                                

 

                    Carbon dioxide           24 mmol/L           21-32        

 

                          Serum or plasma anion gap determination (moles/volume)

           9 mmol/L       

                                        5-14        

 

                          Serum or plasma urea nitrogen measurement (mass/volume

)           25 mg/dL      

                                        7-18        

 

                          Serum or plasma creatinine measurement (mass/volume)  

         1.22 mg/dL       

                                        0.60-1.30        

 

                    Serum or plasma urea nitrogen/creatinine mass ratio         

  20                  NRG 

       

 

                                        Serum or plasma creatinine measurement w

ith calculation of estimated glomerular 

filtration rate           59                        NRG        

 

                    Serum or plasma glucose measurement (mass/volume)           

131 mg/dL           

        

 

                    Serum or plasma calcium measurement (mass/volume)           

9.5 mg/dL           

8.5-10.1        

 

                          Serum or plasma total bilirubin measurement (mass/volu

me)           0.3 mg/dL   

                                        0.1-1.0        

 

                                        Serum or plasma alkaline phosphatase juarez

surement (enzymatic activity/volume)    

                          102 U/L                           

 

                                        Serum or plasma aspartate aminotransfera

se measurement (enzymatic 

activity/volume)           22 U/L                    5-34        

 

                                        Serum or plasma alanine aminotransferase

 measurement (enzymatic activity/volume)

                          29 U/L                    0-55        

 

                    Serum or plasma protein measurement (mass/volume)           

7.6 g/dL            

6.4-8.2        

 

                    Serum or plasma albumin measurement (mass/volume)           

3.8 g/dL            

3.2-4.5        

 

                    CALCIUM CORRECTED           9.7 mg/dL           8.5-10.1    

    

 

                                        Capillary blood glucose measurement by g

lucometer (mass/volume) - 20 06:43

         

 

                          Capillary blood glucose measurement by glucometer (mas

s/volume)           145 

mg/dL                                           

 

                                        Gram stain microscopy - 20 13:17  

       

 

                                        Bacteria identification in wound by cult

ure - 20 13:17         

 

                    Bacteria identification in wound by culture           [id number]

04            NRG   

     

 

                    FREE TEXT EXTERNAL           SUSCEPTIBILITIES REPORTED 3-7-2

0, 1039            

NRG        

 

                    QUANTITY OF GROWTH           Many                NRG        

 

                    [facility] AGAR           CLINDAMYCIN RESISTANT WITHOUT INDUCTION 

           NRG      

  

 

                    FREE TEXT ENTRY 2           PRELIM RAPID ID TEST AT [facility] 3/5 

10:40            NRG

        

 

                    FREE TEXT ENTRY 3           [facility]ML CONFIRMED ID 3/5 14:05      

      NRG        

 

                          CALL POSITIVES (F1 HELP)           [facility] CALLED TO [facility]/

 3-5-20 AT 1101/KD      

                                        NRG        

 

                    PBP2                RESISTANT ORGANISM/CONTACT PERCAUTIONS  

          NRG        

 

                    FREE TEXT ENTRY 4           RML CONFIRMED ID 3/5 14:05.[facility]SA 

3-7 [id number]            

NRG        

 

                                        Dirithromycin susceptibility test by dis

k diffusion - 20 13:17         

 

                          Oxacillin susceptibility test by minimum inhibitory co

ncentration           >   

                                        NRG        

 

                          Clindamycin susceptibility test by minimum inhibitory 

concentration           > 

                                        NRG        

 

                          Erythromycin susceptibility test by minimum inhibitory

 concentration           >

                                        NRG        

 

                                        Trimethoprim/sulfamethoxazole susceptibi

lity test by minimum 

inhibitoryconcentration           >                         NRG        

 

                          Vancomycin susceptibility test by minimum inhibitory c

oncentration           1  

                                        NRG        

 

                          Levofloxacin susceptibility test by minimum inhibitory

 concentration           >

                                        NRG        

 

                          Rifampin susceptibility test by minimum inhibitory con

centration           <=   

                                        NRG        

 

                          Cefazolin susceptibility test by minimum inhibitory co

ncentration           >   

                                        NRG        

 

                          Linezolid susceptibility test by minimum inhibitory co

ncentration           2   

                                        NRG        

 

                          Penicillin G susceptibility test by minimum inhibitory

 concentration           >

                                        NRG        

 

                          Moxifloxacin susceptibility test by minimum inhibitory

 concentration           2

                                        NRG        

 

                    Minocycline susc KOID           <=                  NRG      

  

 

                                        Dirithromycin susceptibility test by dis

k diffusion - 20 13:17         

 

                          Gentamicin susceptibility test by minimum inhibitory c

oncentration           <= 

                                        NRG        

 

                          Levofloxacin susceptibility test by minimum inhibitory

 concentration           

<=                                      NRG        

 

                          Tobramycin susceptibility test by minimum inhibitory c

oncentration           <= 

                                        NRG        

 

                                        Piperacillin/tazobactam susceptibility t

est by minimum inhibitory concentration 

                          =                         NRG        

 

                          Ciprofloxacin susceptibility test by minimum inhibitor

y concentration           

<=                                      NRG        

 

                          Meropenem susceptibility test by minimum inhibitory co

ncentration           <=  

                                        NRG        

 

                          Aztreonam susceptibility test by minimum inhibitory co

ncentration           8   

                                        NRG        

 

                          Cefepime susceptibility test by minimum inhibitory con

centration           4    

                                        NRG        

 

                          Imipenem susceptibility test by minimum inhibitory con

centration           1    

                                        NRG        

 

                          Ceftazidime susceptibility test by minimum inhibitory 

concentration           <=

                                        NRG        

 

                                        Complete blood count (CBC) with automate

d white blood cell (WBC) differential - 

20 20:14         

 

                          Blood leukocytes automated count (number/volume)      

     14.2 10*3/uL         

                                        4.3-11.0        

 

                          Blood erythrocytes automated count (number/volume)    

       3.23 10*6/uL       

                                        4.35-5.85        

 

                    Venous blood hemoglobin measurement (mass/volume)           

10.0 g/dL           

13.3-17.7        

 

                    Blood hematocrit (volume fraction)           33 %           

     40-54        

 

                    Automated erythrocyte mean corpuscular volume           101 

[foz_us]           

80-99        

 

                                        Automated erythrocyte mean corpuscular h

emoglobin (mass per erythrocyte)        

                          31 pg                     25-34        

 

                                        Automated erythrocyte mean corpuscular h

emoglobin concentration measurement 

(mass/volume)             31 g/dL                   32-36        

 

                    Automated erythrocyte distribution width ratio           16.

6 %              10.0-

14.5        

 

                    Automated blood platelet count (count/volume)           124 

10*3/uL           

130-400        

 

                          Automated blood platelet mean volume measurement      

     13.2 [foz_us]        

                                        7.4-10.4        

 

                    Automated blood neutrophils/100 leukocytes           85 %   

             42-75       

 

 

                    Automated blood lymphocytes/100 leukocytes           8 %    

             12-44        

 

                    Blood monocytes/100 leukocytes           6 %                

 0-12        

 

                    Automated blood eosinophils/100 leukocytes           0 %    

             0-10        

 

                    Automated blood basophils/100 leukocytes           1 %      

           0-10        

 

                    Blood neutrophils automated count (number/volume)           

12.0 10*3           

1.8-7.8        

 

                    Blood lymphocytes automated count (number/volume)           

1.1 10*3            

1.0-4.0        

 

                    Blood monocytes automated count (number/volume)           0.

8 10*3            

0.0-1.0        

 

                    Automated eosinophil count           0.0 10*3/uL           0

.0-0.3        

 

                    Automated blood basophil count (count/volume)           0.1 

10*3/uL           

0.0-0.1        

 

                                        Blood lactic acid measurement (moles/vol

ume) - 20 20:14         

 

                    Blood lactic acid measurement (moles/volume)           1.51 

mmol/L           

0.50-2.00        

 

                                        Comprehensive metabolic panel - 20

 20:14         

 

                          Serum or plasma sodium measurement (moles/volume)     

      135 mmol/L          

                                        135-145        

 

                          Serum or plasma potassium measurement (moles/volume)  

         5.6 mmol/L       

                                        3.6-5.0        

 

                          Serum or plasma chloride measurement (moles/volume)   

        102 mmol/L        

                                                

 

                    Carbon dioxide           19 mmol/L           21-32        

 

                          Serum or plasma anion gap determination (moles/volume)

           14 mmol/L      

                                        5-14        

 

                          Serum or plasma urea nitrogen measurement (mass/volume

)           48 mg/dL      

                                        7-18        

 

                          Serum or plasma creatinine measurement (mass/volume)  

         3.84 mg/dL       

                                        0.60-1.30        

 

                    Serum or plasma urea nitrogen/creatinine mass ratio         

  13                  NRG 

       

 

                                        Serum or plasma creatinine measurement w

ith calculation of estimated glomerular 

filtration rate           16                        NRG        

 

                    Serum or plasma glucose measurement (mass/volume)           

211 mg/dL           

        

 

                    Serum or plasma calcium measurement (mass/volume)           

8.0 mg/dL           

8.5-10.1        

 

                          Serum or plasma total bilirubin measurement (mass/volu

me)           0.2 mg/dL   

                                        0.1-1.0        

 

                                        Serum or plasma alkaline phosphatase juarez

surement (enzymatic activity/volume)    

                          91 U/L                            

 

                                        Serum or plasma aspartate aminotransfera

se measurement (enzymatic 

activity/volume)           28 U/L                    5-34        

 

                                        Serum or plasma alanine aminotransferase

 measurement (enzymatic activity/volume)

                          16 U/L                    0-55        

 

                    Serum or plasma protein measurement (mass/volume)           

6.8 g/dL            

6.4-8.2        

 

                    Serum or plasma albumin measurement (mass/volume)           

3.7 g/dL            

3.2-4.5        

 

                    CALCIUM CORRECTED           8.2 mg/dL           8.5-10.1    

    

 

                                        TROPONIN I FS - 20 20:14         

 

                    TROPONIN I FS           < 0.30              <0.30        

 

                                        Serum or plasma salicylates measurement 

(mass/volume) - 20 20:14         

 

                          Serum or plasma salicylates measurement (mass/volume) 

          < mg/dL         

                                        5.0-20.0        

 

                                        Serum or plasma acetaminophen measuremen

t (mass/volume) - 20 20:14        

 

 

                          Serum or plasma acetaminophen measurement (mass/volume

)           < ug/mL       

                                        10-30        

 

                                        Serum or plasma ethanol measurement (mas

s/volume) - 20 20:14         

 

                    Serum or plasma ethanol measurement (mass/volume)           

< mg/dL             

<10        

 

                                        Manual absolute plasma cell count -  20:14         

 

                    Blood monocytes/100 leukocytes           6 %                

 NRG        

 

                    Manual blood segmented neutrophils/100 leukocytes           

86 %                NRG  

      

 

                    Manual blood lymphocytes/100 leukocytes           4 %       

          NRG        

 

                    Blood lymphocytes variant/100 leukocytes           4 %      

           NRG        

 

                    Blood polychromasia detection by light microscopy           

SLIGHT              

NRG        

 

                    Blood anisocytosis detection by light microscopy           S

LIGHT              NRG

        

 

                    Blood ovalocytes detection by light microscopy           SLI

GHT              NRG  

      

 

                    Blood toxic granules detection by light microscopy          

 3+                  NRG  

      

 

                    Blood poikilocytosis detection by light microscopy          

 SLIGHT              

NRG        

 

                    Blood target cells detection by light microscopy           S

LIGHT              NRG

        

 

                    Blood stomatocytes detection by light microscopy           M

ODERATE            

NRG        

 

                          Blood basophilic stippling detection by light microsco

py           SLIGHT       

                                        NRG        

 

                                        Bacterial blood culture - 04/12/20 20:14

         

 

                    FREE TEXT EXTERNAL           NO SUSCEPTIBILITY TESTING      

      NRG        

 

                    QUANTITY OF GROWTH           Isolated            NRG        

 

                    Bacterial blood culture           [id number]            NRG  

      

 

                    SUSCEPTIBILITY           (FROM AEROBIC BOTTLE)            NR

G        

 

                    RAPID ID            PRELIM RAPID ID TEST AT [facility]  11:35  

          NRG        

 

                    ID CONFIRMATION           RML CONFIRMED ID  15:35       

     NRG        

 

                                        PROCALCITONIN (PCT) - 20 20:14    

     

 

                    PROCALCITONIN (PCT)           0.31 ng/mL           <0.10    

    

 

                                        Complete urinalysis with reflex to cultu

re - 20 20:25         

 

                    Urine color determination           YELLOW              NRG 

       

 

                    Urine clarity determination           SLIGHTLY CLOUDY       

     NRG        

 

                    Urine pH measurement by test strip           5.0            

     5-9        

 

                    Specific gravity of urine by test strip           >=        

          1.016-1.022     

   

 

                    Urine protein assay by test strip, semi-quantitative        

   1+                  

NEGATIVE        

 

                    Urine glucose detection by automated test strip           NE

GATIVE            

NEGATIVE        

 

                          Erythrocytes detection in urine sediment by light micr

oscopy           TRACE-I  

                                        NEGATIVE        

 

                    Urine ketones detection by automated test strip           TR

ACE               

NEGATIVE        

 

                    Urine nitrite detection by test strip           NEGATIVE    

        NEGATIVE    

    

 

                    Urine total bilirubin detection by test strip           1+  

                NEGATIVE  

      

 

                          Urine urobilinogen measurement by automated test strip

 (mass/volume)           

0.2 mg/dL                               < = 1.0        

 

                    Urine leukocyte esterase detection by dipstick           NEG

ATIVE            

NEGATIVE        

 

                                        Automated urine sediment erythrocyte cou

nt by microscopy (number/high power 

field)                    NONE                      NRG        

 

                                        Automated urine sediment leukocyte count

 by microscopy (number/high power field)

                           [HPF]                    NRG        

 

                          Bacteria detection in urine sediment by light microsco

py           FEW          

                                        NRG        

 

                                        Squamous epithelial cells detection in u

rine sediment by light microscopy       

                          NONE                      NRG        

 

                          Crystals detection in urine sediment by light microsco

py           NONE         

                                        NRG        

 

                          Casts detection in urine sediment by light microscopy 

          PRESENT         

                                        NRG        

 

                          Mucus detection in urine sediment by light microscopy 

          LARGE           

                                        NRG        

 

                    Complete urinalysis with reflex to culture           YES    

             NRG        

 

                          Hyaline casts detection in urine sediment by light kodi

roscopy           10-25   

                                        NRG        

 

                          Granular casts detection in urine sediment by light mi

croscopy           0-2    

                                        NRG        

 

                          Coarse granular casts detection in urine sediment by l

ight microscopy           

2-5                                     NRG        

 

                                        Urine drug screening test - 20 20:

25         

 

                    Urine phencyclidine detection by screening method           

NEGATIVE            

NEGATIVE        

 

                          Urine benzodiazepines detection by screening method   

        NEGATIVE          

                                        NEGATIVE        

 

                    Urine cocaine detection           NEGATIVE            NEGATI

VE        

 

                    Urine amphetamines detection by screening method           N

EGATIVE            

NEGATIVE        

 

                          Urine methamphetamine detection by screening method   

        POSITIVE          

                                        NEGATIVE        

 

                    Urine cannabinoids detection by screening method           N

EGATIVE            

NEGATIVE        

 

                    Urine opiates detection by screening method           NEGATI

VE            

NEGATIVE        

 

                    Urine barbiturates detection           NEGATIVE            N

EGATIVE        

 

                          Screening urine tricyclic antidepressants detection   

        POSITIVE          

                                        NEGATIVE        

 

                    Urine methadone detection by screening method           NEGA

TIVE            

NEGATIVE        

 

                    Urine oxycodone detection           POSITIVE            NEGA

TIVE        

 

                    Urine propoxyphene detection           NEGATIVE            N

EGATIVE        

 

                                        Bacterial urine culture - 20 20:25

         

 

                    Bacterial urine culture           NG                  NRG   

     

 

                                        Methicillin resistant Staphylococcus aur

eus (MRSA) screening culture - 20 

23:30         

 

                          Methicillin resistant Staphylococcus aureus (MRSA) scr

eening culture           

NEG                                     NRG        

 

                                        Arterial blood gas measurement - 

0 03:09         

 

                    Blood pCO2           62 mm[Hg]           35-45        

 

                    Blood pO2           43 mm[Hg]           79-93        

 

                          Arterial blood bicarbonate measurement (moles/volume) 

          21 mmol/L       

                                        23-27        

 

                    Arterial blood base excess by calculation           -6.8 mmo

l/L           

-2.5-2.5        

 

                    Arterial blood oxygen saturation measurement           70 % 

                   

    

 

                    * Inhaled oxygen flow rate           1                   NRG

        

 

                          Arterial blood pH measurement with patient temperature

 correction           7.15

                                        7.37-7.43        

 

                          Arterial blood carbon dioxide, total measurement (mole

s/volume)           22.7 

mmol/L                                  21.0-31.0        

 

                    Body site           RIGHT RADIAL            NRG        

 

                          Assessment of wrist artery patency prior to arterial p

uncture           POSITIVE

                                        NRG        

 

                    Setting of ventilation mode           NO                  NR

G        

 

                    Measurement of body temperature           36.9              

  NRG        

 

                                        Complete blood count (CBC) with automate

d white blood cell (WBC) differential - 

20 03:09         

 

                          Blood leukocytes automated count (number/volume)      

     12.0 10*3/uL         

                                        4.3-11.0        

 

                          Blood erythrocytes automated count (number/volume)    

       3.18 10*6/uL       

                                        4.35-5.85        

 

                    Venous blood hemoglobin measurement (mass/volume)           

9.7 g/dL            

13.3-17.7        

 

                    Blood hematocrit (volume fraction)           31 %           

     40-54        

 

                    Automated erythrocyte mean corpuscular volume           99 [

foz_us]           

80-99        

 

                                        Automated erythrocyte mean corpuscular h

emoglobin (mass per erythrocyte)        

                          31 pg                     25-34        

 

                                        Automated erythrocyte mean corpuscular h

emoglobin concentration measurement 

(mass/volume)             31 g/dL                   32-36        

 

                    Automated erythrocyte distribution width ratio           17.

0 %              10.0-

14.5        

 

                    Automated blood platelet count (count/volume)           123 

10*3/uL           

130-400        

 

                          Automated blood platelet mean volume measurement      

     12.8 [foz_us]        

                                        7.4-10.4        

 

                    Automated blood neutrophils/100 leukocytes           79 %   

             42-75       

 

 

                    Automated blood lymphocytes/100 leukocytes           14 %   

             12-44       

 

 

                    Blood monocytes/100 leukocytes           7 %                

 0-12        

 

                    Automated blood eosinophils/100 leukocytes           0 %    

             0-10        

 

                    Automated blood basophils/100 leukocytes           0 %      

           0-10        

 

                    Blood neutrophils automated count (number/volume)           

9.5 10*3            

1.8-7.8        

 

                    Blood lymphocytes automated count (number/volume)           

1.7 10*3            

1.0-4.0        

 

                    Blood monocytes automated count (number/volume)           0.

8 10*3            

0.0-1.0        

 

                    Automated eosinophil count           0.0 10*3/uL           0

.0-0.3        

 

                    Automated blood basophil count (count/volume)           0.0 

10*3/uL           

0.0-0.1        

 

                                        Comprehensive metabolic panel - 20

 03:09         

 

                          Serum or plasma sodium measurement (moles/volume)     

      138 mmol/L          

                                        135-145        

 

                          Serum or plasma potassium measurement (moles/volume)  

         5.9 mmol/L       

                                        3.6-5.0        

 

                          Serum or plasma chloride measurement (moles/volume)   

        108 mmol/L        

                                                

 

                    Carbon dioxide           18 mmol/L           21-32        

 

                          Serum or plasma anion gap determination (moles/volume)

           12 mmol/L      

                                        5-14        

 

                          Serum or plasma urea nitrogen measurement (mass/volume

)           47 mg/dL      

                                        7-18        

 

                          Serum or plasma creatinine measurement (mass/volume)  

         3.29 mg/dL       

                                        0.60-1.30        

 

                    Serum or plasma urea nitrogen/creatinine mass ratio         

  14                  NRG 

       

 

                                        Serum or plasma creatinine measurement w

ith calculation of estimated glomerular 

filtration rate           19                        NRG        

 

                    Serum or plasma glucose measurement (mass/volume)           

167 mg/dL           

        

 

                    Serum or plasma calcium measurement (mass/volume)           

7.2 mg/dL           

8.5-10.1        

 

                          Serum or plasma total bilirubin measurement (mass/volu

me)           0.3 mg/dL   

                                        0.1-1.0        

 

                                        Serum or plasma alkaline phosphatase juarez

surement (enzymatic activity/volume)    

                          79 U/L                            

 

                                        Serum or plasma aspartate aminotransfera

se measurement (enzymatic 

activity/volume)           34 U/L                    5-34        

 

                                        Serum or plasma alanine aminotransferase

 measurement (enzymatic activity/volume)

                          19 U/L                    0-55        

 

                    Serum or plasma protein measurement (mass/volume)           

6.8 g/dL            

6.4-8.2        

 

                    Serum or plasma albumin measurement (mass/volume)           

3.6 g/dL            

3.2-4.5        

 

                    CALCIUM CORRECTED           7.5 mg/dL           8.5-10.1    

    

 

                                        Serum or plasma phosphate measurement (m

ass/volume) - 20 03:09         

 

                          Serum or plasma phosphate measurement (mass/volume)   

        5.8 mg/dL         

                                        2.3-4.7        

 

                                        Magnesium - 20 03:09         

 

                    Magnesium           1.8 mg/dL           1.6-2.4        

 

                                        Serum or plasma triglyceride measurement

 (mass/volume) - 20 03:09         

 

                          Serum or plasma triglyceride measurement (mass/volume)

           367 mg/dL      

                                        <150        

 

                                        RESPIRATORY VIRUS PANEL - 20 05:51

         

 

                    Serum ragweed IgE antibody assay           TNP:Duplicate Ord

er            NRG   

     

 

                          Serum or plasma aripiprazole measurement (mass/volume)

           TNP:Duplicate 

Order                                   NRG        

 

                    PARAINFLU 3 PCR           TNP:Duplicate Order            NRG

        

 

                    METAPNEUMO PCR           TNP:Duplicate Order            NRG 

       

 

                    Adenovirus detection, CSF, PCR           TNP:Duplicate Order

            NRG     

   

 

                    INFLUENZA A PCR           TNP:Duplicate Order            NRG

        

 

                    INFLUENZA B PCR           TNP:Duplicate Order            NRG

        

 

                                        Influenza virus A and B antigen detectio

n - 20 05:58         

 

                    FLU RESULT           NEGATIVE FOR INFLUENZA A AND B ANTIGENS

 BY IA            

NRG        

 

                                        Serum or plasma ferritin measurement (ma

ss/volume) - 20 16:14         

 

                    Serum or plasma ferritin measurement (mass/volume)          

 769.8 %             

32.0-356.0        

 

                                        Bacterial blood culture - 20 06:34

         

 

                    Bacterial blood culture           NG                  NRG   

     

 

                                        Arterial blood gas measurement - 

0 06:40         

 

                    Blood pCO2           70 mm[Hg]           35-45        

 

                    Blood pO2           152 mm[Hg]           79-93        

 

                          Arterial blood bicarbonate measurement (moles/volume) 

          22 mmol/L       

                                        23-27        

 

                    Arterial blood base excess by calculation           -6.1 mmo

l/L           

-2.5-2.5        

 

                    Arterial blood oxygen saturation measurement           64 % 

                   

    

 

                    * Inhaled oxygen flow rate           40                  NRG

        

 

                          Arterial blood pH measurement with patient temperature

 correction           7.13

                                        7.37-7.43        

 

                          Arterial blood carbon dioxide, total measurement (mole

s/volume)           24.0 

mmol/L                                  21.0-31.0        

 

                    Body site           RIGHT RADIAL            NRG        

 

                          Assessment of wrist artery patency prior to arterial p

uncture           POSITIVE

                                        NRG        

 

                    Setting of ventilation mode           NO                  NR

G        

 

                    Measurement of body temperature           37.0              

  NRG        

 

                                        Blood lactic acid measurement (moles/vol

ume) - 20 06:40         

 

                    Blood lactic acid measurement (moles/volume)           0.60 

mmol/L           

0.50-2.00        

 

                                        PT panel in platelet poor plasma by coag

ulation assay - 20 06:40         

 

                          Prothrombin time (PT) in platelet poor plasma by coagu

lation assay           

14.1 s                                  12.2-14.7        

 

                          INR in platelet poor plasma or blood by coagulation as

say           1.1         

                                        0.8-1.4        

 

                                        Fibrin D-dimer FEU measurement in platel

et poor plasma (mass/volume) - 20 

06:40         

 

                          Fibrin D-dimer FEU measurement in platelet poor plasma

 (mass/volume)           

1.27 ug/mL                              0.00-0.49        

 

                                        Serum or plasma troponin i.cardiac measu

rement (mass/volume) - 20 06:40   

      

 

                          Serum or plasma troponin i.cardiac measurement (mass/v

olume)           0.328 

ng/mL                                   <0.028        

 

                                        Arterial blood gas measurement - 

0 09:09         

 

                    Blood pCO2           64 mm[Hg]           35-45        

 

                    Blood pO2           47 mm[Hg]           79-93        

 

                          Arterial blood bicarbonate measurement (moles/volume) 

          24 mmol/L       

                                        23-27        

 

                    Arterial blood base excess by calculation           -3.5 mmo

l/L           

-2.5-2.5        

 

                    Arterial blood oxygen saturation measurement           80 % 

                   

    

 

                    * Inhaled oxygen flow rate           25%                 NRG

        

 

                          Arterial blood pH measurement with patient temperature

 correction           7.19

                                        7.37-7.43        

 

                          Arterial blood carbon dioxide, total measurement (mole

s/volume)           25.5 

mmol/L                                  21.0-31.0        

 

                    Body site           NA                  NRG        

 

                          Assessment of wrist artery patency prior to arterial p

uncture           NA      

                                        NRG        

 

                    Setting of ventilation mode           NA                  NR

G        

 

                    Measurement of body temperature           37.0              

  NRG        

 

                                        Sputum Gram stain - 20 10:10      

   

 

                    Sputum Gram stain           No bacterial kimberly            NR

G        

 

                                        Bacterial sputum culture - 20 10:1

0         

 

                    Bacterial sputum culture           NG                  NRG  

      

 

                                        Arterial blood gas measurement - 

0 11:02         

 

                    Blood pCO2           44 mm[Hg]           35-45        

 

                    Blood pO2           55 mm[Hg]           79-93        

 

                          Arterial blood bicarbonate measurement (moles/volume) 

          21 mmol/L       

                                        23-27        

 

                    Arterial blood base excess by calculation           -4.5 mmo

l/L           

-2.5-2.5        

 

                    Arterial blood oxygen saturation measurement           91 % 

                   

    

 

                    * Inhaled oxygen flow rate           30%                 NRG

        

 

                          Arterial blood pH measurement with patient temperature

 correction           7.30

                                        7.37-7.43        

 

                          Arterial blood carbon dioxide, total measurement (mole

s/volume)           22.2 

mmol/L                                  21.0-31.0        

 

                    Body site           RT ART LINE            NRG        

 

                          Assessment of wrist artery patency prior to arterial p

uncture           YES-POS 

                                        NRG        

 

                    Setting of ventilation mode           YES                 NR

G        

 

                    Measurement of body temperature           37.0              

  NRG        

 

                                        Serum or plasma troponin i.cardiac measu

rement (mass/volume) - 20 12:48   

      

 

                          Serum or plasma troponin i.cardiac measurement (mass/v

olume)           0.555 

ng/mL                                   <0.028        

 

                                        PROCALCITONIN (PCT) - 20 12:48    

     

 

                    PROCALCITONIN (PCT)           0.53 ng/mL           <0.10    

    

 

                                        Bacterial catheter tip culture - 

0 15:03         

 

                    Bacterial catheter tip culture           NG                 

 NRG        

 

                                        Urine Legionella pneumophila antigen ass

ay - 20 16:14         

 

                    Urine Legionella pneumophila antigen assay           Negativ

e            NRG    

    

 

                                        Streptococcus pneumoniae antigen detecti

on - 20 16:14         

 

                    Streptococcus pneumoniae antigen detection           Negativ

e            NRG    

    

 

                                        RESPIRATORY VIRUS PANEL - 20 16:14

         

 

                    Serum ragweed IgE antibody assay           Not Detected     

       Not Detected 

       

 

                          Serum or plasma aripiprazole measurement (mass/volume)

           Footnote       

                                        Not Detected        

 

                    PARAINFLU 3 PCR           Footnote            Not Detected  

      

 

                    METAPNEUMO PCR           Not Detected            Not Detecte

d        

 

                    Adenovirus detection, CSF, PCR           Footnote           

 Not Detected       

 

 

                    INFLUENZA A PCR           Not Detected            Not Detect

ed        

 

                    INFLUENZA B PCR           Not Detected            Not Detect

ed        

 

                                        2019 Coronavirus SARS-CoV-2 SO - 

0 16:14         

 

                    Coronavirus Ab [Units/volume] in Serum           Negative   

         Negative   

     

 

                                        Capillary blood glucose measurement by g

lucometer (mass/volume) - 20 18:15

         

 

                          Capillary blood glucose measurement by glucometer (mas

s/volume)           165 

mg/dL                                           

 

                                        Serum or plasma troponin i.cardiac measu

rement (mass/volume) - 20 18:51   

      

 

                          Serum or plasma troponin i.cardiac measurement (mass/v

olume)           0.576 

ng/mL                                   <0.028        

 

                                        Capillary blood glucose measurement by g

lucometer (mass/volume) - 20 22:12

         

 

                          Capillary blood glucose measurement by glucometer (mas

s/volume)           181 

mg/dL                                           

 

                                        Capillary blood glucose measurement by g

lucometer (mass/volume) - 20 01:08

         

 

                          Capillary blood glucose measurement by glucometer (mas

s/volume)           214 

mg/dL                                           

 

                                        Arterial blood gas measurement - 

0 02:58         

 

                    Blood pCO2           32 mm[Hg]           35-45        

 

                    Blood pO2           165 mm[Hg]           79-93        

 

                          Arterial blood bicarbonate measurement (moles/volume) 

          21 mmol/L       

                                        23-27        

 

                    Arterial blood base excess by calculation           -2.1 mmo

l/L           

-2.5-2.5        

 

                    Arterial blood oxygen saturation measurement           84 % 

                   

    

 

                    * Inhaled oxygen flow rate           21                  NRG

        

 

                          Arterial blood pH measurement with patient temperature

 correction           7.44

                                        7.37-7.43        

 

                          Arterial blood carbon dioxide, total measurement (mole

s/volume)           22.2 

mmol/L                                  21.0-31.0        

 

                    Body site           RIGHT RADIAL            NRG        

 

                          Assessment of wrist artery patency prior to arterial p

uncture           POSITIVE

                                        NRG        

 

                    Setting of ventilation mode           NO                  NR

G        

 

                    Measurement of body temperature           38.0              

  NRG        

 

                                        Complete blood count (CBC) with automate

d white blood cell (WBC) differential - 

20 02:58         

 

                          Blood leukocytes automated count (number/volume)      

     11.2 10*3/uL         

                                        4.3-11.0        

 

                          Blood erythrocytes automated count (number/volume)    

       3.07 10*6/uL       

                                        4.35-5.85        

 

                    Venous blood hemoglobin measurement (mass/volume)           

9.3 g/dL            

13.3-17.7        

 

                    Blood hematocrit (volume fraction)           29 %           

     40-54        

 

                    Automated erythrocyte mean corpuscular volume           95 [

foz_us]           

80-99        

 

                                        Automated erythrocyte mean corpuscular h

emoglobin (mass per erythrocyte)        

                          30 pg                     25-34        

 

                                        Automated erythrocyte mean corpuscular h

emoglobin concentration measurement 

(mass/volume)             32 g/dL                   32-36        

 

                    Automated erythrocyte distribution width ratio           17.

1 %              10.0-

14.5        

 

                    Automated blood platelet count (count/volume)           108 

10*3/uL           

130-400        

 

                          Automated blood platelet mean volume measurement      

     13.1 [foz_us]        

                                        7.4-10.4        

 

                    Automated blood neutrophils/100 leukocytes           72 %   

             42-75       

 

 

                    Automated blood lymphocytes/100 leukocytes           19 %   

             12-44       

 

 

                    Blood monocytes/100 leukocytes           8 %                

 0-12        

 

                    Automated blood eosinophils/100 leukocytes           1 %    

             0-10        

 

                    Automated blood basophils/100 leukocytes           0 %      

           0-10        

 

                    Blood neutrophils automated count (number/volume)           

8.1 10*3            

1.8-7.8        

 

                    Blood lymphocytes automated count (number/volume)           

2.1 10*3            

1.0-4.0        

 

                    Blood monocytes automated count (number/volume)           0.

9 10*3            

0.0-1.0        

 

                    Automated eosinophil count           0.1 10*3/uL           0

.0-0.3        

 

                    Automated blood basophil count (count/volume)           0.1 

10*3/uL           

0.0-0.1        

 

                                        Whole blood basic metabolic panel -  02:58         

 

                          Serum or plasma sodium measurement (moles/volume)     

      139 mmol/L          

                                        135-145        

 

                          Serum or plasma potassium measurement (moles/volume)  

         4.3 mmol/L       

                                        3.6-5.0        

 

                          Serum or plasma chloride measurement (moles/volume)   

        110 mmol/L        

                                                

 

                    Carbon dioxide           19 mmol/L           21-32        

 

                          Serum or plasma anion gap determination (moles/volume)

           10 mmol/L      

                                        5-14        

 

                          Serum or plasma urea nitrogen measurement (mass/volume

)           38 mg/dL      

                                        7-18        

 

                          Serum or plasma creatinine measurement (mass/volume)  

         1.45 mg/dL       

                                        0.60-1.30        

 

                    Serum or plasma urea nitrogen/creatinine mass ratio         

  26                  NRG 

       

 

                                        Serum or plasma creatinine measurement w

ith calculation of estimated glomerular 

filtration rate           48                        NRG        

 

                    Serum or plasma glucose measurement (mass/volume)           

161 mg/dL           

        

 

                    Serum or plasma calcium measurement (mass/volume)           

7.8 mg/dL           

8.5-10.1        

 

                                        Serum or plasma phosphate measurement (m

ass/volume) - 20 02:58         

 

                          Serum or plasma phosphate measurement (mass/volume)   

        1.7 mg/dL         

                                        2.3-4.7        

 

                                        Magnesium - 20 02:58         

 

                    Magnesium           1.3 mg/dL           1.6-2.4        

 

                                        Comprehensive metabolic panel - 20

 02:58         

 

                          Serum or plasma sodium measurement (moles/volume)     

      139 mmol/L          

                                        135-145        

 

                          Serum or plasma potassium measurement (moles/volume)  

         4.3 mmol/L       

                                        3.6-5.0        

 

                          Serum or plasma chloride measurement (moles/volume)   

        110 mmol/L        

                                                

 

                    Carbon dioxide           18 mmol/L           21-32        

 

                          Serum or plasma anion gap determination (moles/volume)

           11 mmol/L      

                                        5-14        

 

                          Serum or plasma urea nitrogen measurement (mass/volume

)           38 mg/dL      

                                        7-18        

 

                          Serum or plasma creatinine measurement (mass/volume)  

         1.43 mg/dL       

                                        0.60-1.30        

 

                    Serum or plasma urea nitrogen/creatinine mass ratio         

  27                  NRG 

       

 

                                        Serum or plasma creatinine measurement w

ith calculation of estimated glomerular 

filtration rate           49                        NRG        

 

                    Serum or plasma glucose measurement (mass/volume)           

160 mg/dL           

        

 

                    Serum or plasma calcium measurement (mass/volume)           

7.9 mg/dL           

8.5-10.1        

 

                          Serum or plasma total bilirubin measurement (mass/volu

me)           0.3 mg/dL   

                                        0.1-1.0        

 

                                        Serum or plasma alkaline phosphatase juarez

surement (enzymatic activity/volume)    

                          82 U/L                            

 

                                        Serum or plasma aspartate aminotransfera

se measurement (enzymatic 

activity/volume)           56 U/L                    5-34        

 

                                        Serum or plasma alanine aminotransferase

 measurement (enzymatic activity/volume)

                          29 U/L                    0-55        

 

                    Serum or plasma protein measurement (mass/volume)           

5.9 g/dL            

6.4-8.2        

 

                    Serum or plasma albumin measurement (mass/volume)           

3.0 g/dL            

3.2-4.5        

 

                    CALCIUM CORRECTED           8.7 mg/dL           8.5-10.1    

    

 

                                        PROCALCITONIN (PCT) - 20 02:58    

     

 

                    PROCALCITONIN (PCT)           1.33 ng/mL           <0.10    

    

 

                                        Capillary blood glucose measurement by g

lucometer (mass/volume) - 20 08:35

         

 

                          Capillary blood glucose measurement by glucometer (mas

s/volume)           234 

mg/dL                                           

 

                                        Capillary blood glucose measurement by g

lucometer (mass/volume) - 20 12:53

         

 

                          Capillary blood glucose measurement by glucometer (mas

s/volume)           201 

mg/dL                                           

 

                                        Capillary blood glucose measurement by g

lucometer (mass/volume) - 20 16:28

         

 

                          Capillary blood glucose measurement by glucometer (mas

s/volume)           132 

mg/dL                                           

 

                                        Vancomycin trough - 20 18:48      

   

 

                    Vancomycin trough           16.1 ug/mL           10.0-20.0  

      

 

                                        Capillary blood glucose measurement by g

lucometer (mass/volume) - 20 20:22

         

 

                          Capillary blood glucose measurement by glucometer (mas

s/volume)           162 

mg/dL                                           

 

                                        Capillary blood glucose measurement by g

lucometer (mass/volume) - 20 23:38

         

 

                          Capillary blood glucose measurement by glucometer (mas

s/volume)           171 

mg/dL                                           

 

                                        Arterial blood gas measurement - 04/15/2

0 03:20         

 

                    Blood pCO2           33 mm[Hg]           35-45        

 

                    Blood pO2           67 mm[Hg]           79-93        

 

                          Arterial blood bicarbonate measurement (moles/volume) 

          23 mmol/L       

                                        23-27        

 

                    Arterial blood base excess by calculation           -0.2 mmo

l/L           

-2.5-2.5        

 

                    Arterial blood oxygen saturation measurement           95 % 

                   

    

 

                    * Inhaled oxygen flow rate           40                  NRG

        

 

                          Arterial blood pH measurement with patient temperature

 correction           7.46

                                        7.37-7.43        

 

                          Arterial blood carbon dioxide, total measurement (mole

s/volume)           24.2 

mmol/L                                  21.0-31.0        

 

                    Body site           RIGHT RADIAL            NRG        

 

                          Assessment of wrist artery patency prior to arterial p

uncture           POSITIVE

                                        NRG        

 

                    Setting of ventilation mode           YES                 NR

G        

 

                    Measurement of body temperature           36.9              

  NRG        

 

                                        Complete blood count (CBC) with automate

d white blood cell (WBC) differential - 

04/15/20 03:20         

 

                          Blood leukocytes automated count (number/volume)      

     7.0 10*3/uL          

                                        4.3-11.0        

 

                          Blood erythrocytes automated count (number/volume)    

       2.57 10*6/uL       

                                        4.35-5.85        

 

                    Venous blood hemoglobin measurement (mass/volume)           

7.9 g/dL            

13.3-17.7        

 

                    Blood hematocrit (volume fraction)           25 %           

     40-54        

 

                    Automated erythrocyte mean corpuscular volume           95 [

foz_us]           

80-99        

 

                                        Automated erythrocyte mean corpuscular h

emoglobin (mass per erythrocyte)        

                          31 pg                     25-34        

 

                                        Automated erythrocyte mean corpuscular h

emoglobin concentration measurement 

(mass/volume)             32 g/dL                   32-36        

 

                    Automated erythrocyte distribution width ratio           17.

2 %              10.0-

14.5        

 

                    Automated blood platelet count (count/volume)           94 1

0*3/uL           

130-400        

 

                          Automated blood platelet mean volume measurement      

     12.1 [foz_us]        

                                        7.4-10.4        

 

                    Automated blood neutrophils/100 leukocytes           83 %   

             42-75       

 

 

                    Automated blood lymphocytes/100 leukocytes           12 %   

             12-44       

 

 

                    Blood monocytes/100 leukocytes           5 %                

 0-12        

 

                    Automated blood eosinophils/100 leukocytes           0 %    

             0-10        

 

                    Automated blood basophils/100 leukocytes           0 %      

           0-10        

 

                    Blood neutrophils automated count (number/volume)           

5.8 10*3            

1.8-7.8        

 

                    Blood lymphocytes automated count (number/volume)           

0.8 10*3            

1.0-4.0        

 

                    Blood monocytes automated count (number/volume)           0.

3 10*3            

0.0-1.0        

 

                    Automated eosinophil count           0.0 10*3/uL           0

.0-0.3        

 

                    Automated blood basophil count (count/volume)           0.0 

10*3/uL           

0.0-0.1        

 

                                        Whole blood basic metabolic panel -  03:20         

 

                          Serum or plasma sodium measurement (moles/volume)     

      140 mmol/L          

                                        135-145        

 

                          Serum or plasma potassium measurement (moles/volume)  

         3.6 mmol/L       

                                        3.6-5.0        

 

                          Serum or plasma chloride measurement (moles/volume)   

        107 mmol/L        

                                                

 

                    Carbon dioxide           20 mmol/L           21-32        

 

                          Serum or plasma anion gap determination (moles/volume)

           13 mmol/L      

                                        5-14        

 

                          Serum or plasma urea nitrogen measurement (mass/volume

)           28 mg/dL      

                                        7-18        

 

                          Serum or plasma creatinine measurement (mass/volume)  

         0.98 mg/dL       

                                        0.60-1.30        

 

                    Serum or plasma urea nitrogen/creatinine mass ratio         

  29                  NRG 

       

 

                                        Serum or plasma creatinine measurement w

ith calculation of estimated glomerular 

filtration rate           >                         NRG        

 

                    Serum or plasma glucose measurement (mass/volume)           

154 mg/dL           

        

 

                    Serum or plasma calcium measurement (mass/volume)           

7.5 mg/dL           

8.5-10.1        

 

                                        Serum or plasma phosphate measurement (m

ass/volume) - 04/15/20 03:20         

 

                          Serum or plasma phosphate measurement (mass/volume)   

        3.4 mg/dL         

                                        2.3-4.7        

 

                                        Magnesium - 04/15/20 03:20         

 

                    Magnesium           1.8 mg/dL           1.6-2.4        

 

                                        Arterial blood gas measurement - 04/15/2

0 06:45         

 

                    Blood pCO2           51 mm[Hg]           35-45        

 

                    Blood pO2           73 mm[Hg]           79-93        

 

                          Arterial blood bicarbonate measurement (moles/volume) 

          24 mmol/L       

                                        23-27        

 

                    Arterial blood base excess by calculation           -1.3 mmo

l/L           

-2.5-2.5        

 

                    Arterial blood oxygen saturation measurement           94 % 

                   

    

 

                    * Inhaled oxygen flow rate           40                  NRG

        

 

                          Arterial blood pH measurement with patient temperature

 correction           7.30

                                        7.37-7.43        

 

                          Arterial blood carbon dioxide, total measurement (mole

s/volume)           25.9 

mmol/L                                  21.0-31.0        

 

                    Body site           RIGHT RADIAL            NRG        

 

                          Assessment of wrist artery patency prior to arterial p

uncture           POSITIVE

                                        NRG        

 

                    Setting of ventilation mode           YES                 NR

G        

 

                    Measurement of body temperature           36.9              

  NRG        

 

                                        Capillary blood glucose measurement by g

lucometer (mass/volume) - 04/15/20 11:25

         

 

                          Capillary blood glucose measurement by glucometer (mas

s/volume)           179 

mg/dL                                           

 

                                        Capillary blood glucose measurement by g

lucometer (mass/volume) - 04/15/20 18:07

         

 

                          Capillary blood glucose measurement by glucometer (mas

s/volume)           162 

mg/dL                                           

 

                                        Capillary blood glucose measurement by g

lucometer (mass/volume) - 04/15/20 23:31

         

 

                          Capillary blood glucose measurement by glucometer (mas

s/volume)           145 

mg/dL                                           

 

                                        Arterial blood gas measurement - 

0 02:30         

 

                    Blood pCO2           38 mm[Hg]           35-45        

 

                    Blood pO2           82 mm[Hg]           79-93        

 

                          Arterial blood bicarbonate measurement (moles/volume) 

          24 mmol/L       

                                        23-27        

 

                    Arterial blood base excess by calculation           -0.1 mmo

l/L           

-2.5-2.5        

 

                    Arterial blood oxygen saturation measurement           97 % 

                   

    

 

                    * Inhaled oxygen flow rate           40%                 NRG

        

 

                          Arterial blood pH measurement with patient temperature

 correction           7.42

                                        7.37-7.43        

 

                          Arterial blood carbon dioxide, total measurement (mole

s/volume)           25.0 

mmol/L                                  21.0-31.0        

 

                    Body site           R RAD               NRG        

 

                          Assessment of wrist artery patency prior to arterial p

uncture           ART LINE

                                        NRG        

 

                    Setting of ventilation mode           YES                 NR

G        

 

                    Measurement of body temperature           37.0              

  NRG        

 

                                        Complete blood count (CBC) with automate

d white blood cell (WBC) differential - 

20 02:30         

 

                          Blood leukocytes automated count (number/volume)      

     6.7 10*3/uL          

                                        4.3-11.0        

 

                          Blood erythrocytes automated count (number/volume)    

       2.63 10*6/uL       

                                        4.35-5.85        

 

                    Venous blood hemoglobin measurement (mass/volume)           

7.9 g/dL            

13.3-17.7        

 

                    Blood hematocrit (volume fraction)           25 %           

     40-54        

 

                    Automated erythrocyte mean corpuscular volume           95 [

foz_us]           

80-99        

 

                                        Automated erythrocyte mean corpuscular h

emoglobin (mass per erythrocyte)        

                          30 pg                     25-34        

 

                                        Automated erythrocyte mean corpuscular h

emoglobin concentration measurement 

(mass/volume)             32 g/dL                   32-36        

 

                    Automated erythrocyte distribution width ratio           16.

6 %              10.0-

14.5        

 

                    Automated blood platelet count (count/volume)           112 

10*3/uL           

130-400        

 

                          Automated blood platelet mean volume measurement      

     12.5 [foz_us]        

                                        7.4-10.4        

 

                    Automated blood neutrophils/100 leukocytes           85 %   

             42-75       

 

 

                    Automated blood lymphocytes/100 leukocytes           11 %   

             12-44       

 

 

                    Blood monocytes/100 leukocytes           4 %                

 0-12        

 

                    Automated blood eosinophils/100 leukocytes           0 %    

             0-10        

 

                    Automated blood basophils/100 leukocytes           0 %      

           0-10        

 

                    Blood neutrophils automated count (number/volume)           

5.7 10*3            

1.8-7.8        

 

                    Blood lymphocytes automated count (number/volume)           

0.7 10*3            

1.0-4.0        

 

                    Blood monocytes automated count (number/volume)           0.

3 10*3            

0.0-1.0        

 

                    Automated eosinophil count           0.0 10*3/uL           0

.0-0.3        

 

                    Automated blood basophil count (count/volume)           0.0 

10*3/uL           

0.0-0.1        

 

                                        Whole blood basic metabolic panel -  02:30         

 

                          Serum or plasma sodium measurement (moles/volume)     

      137 mmol/L          

                                        135-145        

 

                          Serum or plasma potassium measurement (moles/volume)  

         3.7 mmol/L       

                                        3.6-5.0        

 

                          Serum or plasma chloride measurement (moles/volume)   

        105 mmol/L        

                                                

 

                    Carbon dioxide           21 mmol/L           21-32        

 

                          Serum or plasma anion gap determination (moles/volume)

           11 mmol/L      

                                        5-14        

 

                          Serum or plasma urea nitrogen measurement (mass/volume

)           20 mg/dL      

                                        7-18        

 

                          Serum or plasma creatinine measurement (mass/volume)  

         0.84 mg/dL       

                                        0.60-1.30        

 

                    Serum or plasma urea nitrogen/creatinine mass ratio         

  24                  NRG 

       

 

                                        Serum or plasma creatinine measurement w

ith calculation of estimated glomerular 

filtration rate           >                         NRG        

 

                    Serum or plasma glucose measurement (mass/volume)           

154 mg/dL           

        

 

                    Serum or plasma calcium measurement (mass/volume)           

7.6 mg/dL           

8.5-10.1        

 

                                        Serum or plasma phosphate measurement (m

ass/volume) - 20 02:30         

 

                          Serum or plasma phosphate measurement (mass/volume)   

        2.7 mg/dL         

                                        2.3-4.7        

 

                                        Magnesium - 20 02:30         

 

                    Magnesium           1.6 mg/dL           1.6-2.4        

 

                                        Serum or plasma lithium measurement (mol

es/volume) - 20 02:30         

 

                    BNP PT              305.3 pg/mL           <100.0        

 

                                        Serum iron and total iron binding capaci

ty panel - 20 02:30         

 

                    TIBC                180 %               280-380        

 

                    UIBC                161 %                       

 

                    Serum or plasma iron measurement (mass/volume)           19 

%                  

      

 

                          Total iron binding capacity and transferrin saturation

 measurement           11 

%                                       15-50        

 

                    Serum or plasma ferritin measurement (mass/volume)          

 374.4 %             

32.0-356.0        

 

                                        Capillary blood glucose measurement by g

lucometer (mass/volume) - 20 11:07

         

 

                          Capillary blood glucose measurement by glucometer (mas

s/volume)           218 

mg/dL                                           

 

                                        Capillary blood glucose measurement by g

lucometer (mass/volume) - 20 17:16

         

 

                          Capillary blood glucose measurement by glucometer (mas

s/volume)           179 

mg/dL                                           

 

                                        Capillary blood glucose measurement by g

lucometer (mass/volume) - 20 23:48

         

 

                          Capillary blood glucose measurement by glucometer (mas

s/volume)           190 

mg/dL                                           

 

                                        Arterial blood gas measurement - 

0 02:55         

 

                    Blood pCO2           41 mm[Hg]           35-45        

 

                    Blood pO2           77 mm[Hg]           79-93        

 

                          Arterial blood bicarbonate measurement (moles/volume) 

          27 mmol/L       

                                        23-27        

 

                    Arterial blood base excess by calculation           2.6 mmol

/L           

-2.5-2.5        

 

                    Arterial blood oxygen saturation measurement           96 % 

                   

    

 

                    * Inhaled oxygen flow rate           40%                 NRG

        

 

                          Arterial blood pH measurement with patient temperature

 correction           7.43

                                        7.37-7.43        

 

                          Arterial blood carbon dioxide, total measurement (mole

s/volume)           27.8 

mmol/L                                  21.0-31.0        

 

                    Body site           RIGHT RADIAL            NRG        

 

                          Assessment of wrist artery patency prior to arterial p

uncture           YES-POS 

                                        NRG        

 

                    Setting of ventilation mode           YES                 NR

G        

 

                    Measurement of body temperature           37.0              

  NRG        

 

                                        Whole blood basic metabolic panel -  02:55         

 

                          Serum or plasma sodium measurement (moles/volume)     

      139 mmol/L          

                                        135-145        

 

                          Serum or plasma potassium measurement (moles/volume)  

         3.6 mmol/L       

                                        3.6-5.0        

 

                          Serum or plasma chloride measurement (moles/volume)   

        103 mmol/L        

                                                

 

                    Carbon dioxide           23 mmol/L           21-32        

 

                          Serum or plasma anion gap determination (moles/volume)

           13 mmol/L      

                                        5-14        

 

                          Serum or plasma urea nitrogen measurement (mass/volume

)           21 mg/dL      

                                        7-18        

 

                          Serum or plasma creatinine measurement (mass/volume)  

         0.97 mg/dL       

                                        0.60-1.30        

 

                    Serum or plasma urea nitrogen/creatinine mass ratio         

  22                  NRG 

       

 

                                        Serum or plasma creatinine measurement w

ith calculation of estimated glomerular 

filtration rate           >                         NRG        

 

                    Serum or plasma glucose measurement (mass/volume)           

183 mg/dL           

        

 

                    Serum or plasma calcium measurement (mass/volume)           

7.8 mg/dL           

8.5-10.1        

 

                                        Complete blood count (CBC) with automate

d white blood cell (WBC) differential - 

20 02:55         

 

                          Blood leukocytes automated count (number/volume)      

     7.1 10*3/uL          

                                        4.3-11.0        

 

                          Blood erythrocytes automated count (number/volume)    

       2.78 10*6/uL       

                                        4.35-5.85        

 

                    Venous blood hemoglobin measurement (mass/volume)           

8.4 g/dL            

13.3-17.7        

 

                    Blood hematocrit (volume fraction)           26 %           

     40-54        

 

                    Automated erythrocyte mean corpuscular volume           95 [

foz_us]           

80-99        

 

                                        Automated erythrocyte mean corpuscular h

emoglobin (mass per erythrocyte)        

                          30 pg                     25-34        

 

                                        Automated erythrocyte mean corpuscular h

emoglobin concentration measurement 

(mass/volume)             32 g/dL                   32-36        

 

                    Automated erythrocyte distribution width ratio           16.

6 %              10.0-

14.5        

 

                    Automated blood platelet count (count/volume)           139 

10*3/uL           

130-400        

 

                          Automated blood platelet mean volume measurement      

     11.6 [foz_us]        

                                        7.4-10.4        

 

                    Automated blood neutrophils/100 leukocytes           83 %   

             42-75       

 

 

                    Automated blood lymphocytes/100 leukocytes           12 %   

             12-44       

 

 

                    Blood monocytes/100 leukocytes           5 %                

 0-12        

 

                    Automated blood eosinophils/100 leukocytes           0 %    

             0-10        

 

                    Automated blood basophils/100 leukocytes           0 %      

           0-10        

 

                    Blood neutrophils automated count (number/volume)           

5.9 10*3            

1.8-7.8        

 

                    Blood lymphocytes automated count (number/volume)           

0.8 10*3            

1.0-4.0        

 

                    Blood monocytes automated count (number/volume)           0.

3 10*3            

0.0-1.0        

 

                    Automated eosinophil count           0.0 10*3/uL           0

.0-0.3        

 

                    Automated blood basophil count (count/volume)           0.0 

10*3/uL           

0.0-0.1        

 

                                        Serum or plasma phosphate measurement (m

ass/volume) - 20 02:55         

 

                          Serum or plasma phosphate measurement (mass/volume)   

        2.6 mg/dL         

                                        2.3-4.7        

 

                                        Magnesium - 20 02:55         

 

                    Magnesium           1.7 mg/dL           1.6-2.4        

 

                                        Serum or plasma lithium measurement (mol

es/volume) - 20 02:55         

 

                    BNP PT              503.3 pg/mL           <100.0        

 

                                        Capillary blood glucose measurement by g

lucometer (mass/volume) - 20 11:23

         

 

                          Capillary blood glucose measurement by glucometer (mas

s/volume)           193 

mg/dL                                           

 

                                        Serum or plasma troponin i.cardiac measu

rement (mass/volume) - 20 12:00   

      

 

                          Serum or plasma troponin i.cardiac measurement (mass/v

olume)           0.064 

ng/mL                                   <0.028        

 

                                        Capillary blood glucose measurement by g

lucometer (mass/volume) - 20 17:16

         

 

                          Capillary blood glucose measurement by glucometer (mas

s/volume)           219 

mg/dL                                           

 

                                        Capillary blood glucose measurement by g

lucometer (mass/volume) - 20 23:53

         

 

                          Capillary blood glucose measurement by glucometer (mas

s/volume)           165 

mg/dL                                           

 

                                        Arterial blood gas measurement - 

0 03:05         

 

                    Blood pCO2           40 mm[Hg]           35-45        

 

                    Blood pO2           72 mm[Hg]           79-93        

 

                          Arterial blood bicarbonate measurement (moles/volume) 

          31 mmol/L       

                                        23-27        

 

                    Arterial blood base excess by calculation           7.2 mmol

/L           

-2.5-2.5        

 

                    Arterial blood oxygen saturation measurement           94 % 

                   

    

 

                    * Inhaled oxygen flow rate           60%                 NRG

        

 

                          Arterial blood pH measurement with patient temperature

 correction           7.50

                                        7.37-7.43        

 

                          Arterial blood carbon dioxide, total measurement (mole

s/volume)           31.9 

mmol/L                                  21.0-31.0        

 

                    Body site           RIGHT ART LINE            NRG        

 

                          Assessment of wrist artery patency prior to arterial p

uncture           ART LINE

                                        NRG        

 

                    Setting of ventilation mode           YES                 NR

G        

 

                    Measurement of body temperature           37.5              

  NRG        

 

                                        Complete blood count (CBC) with automate

d white blood cell (WBC) differential - 

20 03:05         

 

                          Blood leukocytes automated count (number/volume)      

     6.6 10*3/uL          

                                        4.3-11.0        

 

                          Blood erythrocytes automated count (number/volume)    

       2.75 10*6/uL       

                                        4.35-5.85        

 

                    Venous blood hemoglobin measurement (mass/volume)           

8.4 g/dL            

13.3-17.7        

 

                    Blood hematocrit (volume fraction)           26 %           

     40-54        

 

                    Automated erythrocyte mean corpuscular volume           95 [

foz_us]           

80-99        

 

                                        Automated erythrocyte mean corpuscular h

emoglobin (mass per erythrocyte)        

                          31 pg                     25-34        

 

                                        Automated erythrocyte mean corpuscular h

emoglobin concentration measurement 

(mass/volume)             32 g/dL                   32-36        

 

                    Automated erythrocyte distribution width ratio           16.

4 %              10.0-

14.5        

 

                    Automated blood platelet count (count/volume)           152 

10*3/uL           

130-400        

 

                          Automated blood platelet mean volume measurement      

     12.2 [foz_us]        

                                        7.4-10.4        

 

                    Automated blood neutrophils/100 leukocytes           78 %   

             42-75       

 

 

                    Automated blood lymphocytes/100 leukocytes           15 %   

             12-44       

 

 

                    Blood monocytes/100 leukocytes           6 %                

 0-12        

 

                    Automated blood eosinophils/100 leukocytes           1 %    

             0-10        

 

                    Automated blood basophils/100 leukocytes           0 %      

           0-10        

 

                    Blood neutrophils automated count (number/volume)           

5.1 10*3            

1.8-7.8        

 

                    Blood lymphocytes automated count (number/volume)           

1.0 10*3            

1.0-4.0        

 

                    Blood monocytes automated count (number/volume)           0.

4 10*3            

0.0-1.0        

 

                    Automated eosinophil count           0.0 10*3/uL           0

.0-0.3        

 

                    Automated blood basophil count (count/volume)           0.0 

10*3/uL           

0.0-0.1        

 

                                        Whole blood basic metabolic panel -  03:05         

 

                          Serum or plasma sodium measurement (moles/volume)     

      138 mmol/L          

                                        135-145        

 

                          Serum or plasma potassium measurement (moles/volume)  

         2.9 mmol/L       

                                        3.6-5.0        

 

                          Serum or plasma chloride measurement (moles/volume)   

        100 mmol/L        

                                                

 

                    Carbon dioxide           25 mmol/L           21-32        

 

                          Serum or plasma anion gap determination (moles/volume)

           13 mmol/L      

                                        5-14        

 

                          Serum or plasma urea nitrogen measurement (mass/volume

)           16 mg/dL      

                                        7-18        

 

                          Serum or plasma creatinine measurement (mass/volume)  

         0.86 mg/dL       

                                        0.60-1.30        

 

                    Serum or plasma urea nitrogen/creatinine mass ratio         

  19                  NRG 

       

 

                                        Serum or plasma creatinine measurement w

ith calculation of estimated glomerular 

filtration rate           >                         NRG        

 

                    Serum or plasma glucose measurement (mass/volume)           

171 mg/dL           

        

 

                    Serum or plasma calcium measurement (mass/volume)           

7.7 mg/dL           

8.5-10.1        

 

                                        Serum or plasma phosphate measurement (m

ass/volume) - 20 03:05         

 

                          Serum or plasma phosphate measurement (mass/volume)   

        3.1 mg/dL         

                                        2.3-4.7        

 

                                        Magnesium - 20 03:05         

 

                    Magnesium           1.4 mg/dL           1.6-2.4        

 

                                        Capillary blood glucose measurement by g

lucometer (mass/volume) - 20 11:25

         

 

                          Capillary blood glucose measurement by glucometer (mas

s/volume)           255 

mg/dL                                           

 

                                        Capillary blood glucose measurement by g

lucometer (mass/volume) - 20 15:42

         

 

                          Capillary blood glucose measurement by glucometer (mas

s/volume)           278 

mg/dL                                           

 

                                        Capillary blood glucose measurement by g

lucometer (mass/volume) - 20 17:49

         

 

                          Capillary blood glucose measurement by glucometer (mas

s/volume)           256 

mg/dL                                           

 

                                        Capillary blood glucose measurement by g

lucometer (mass/volume) - 20 22:55

         

 

                          Capillary blood glucose measurement by glucometer (mas

s/volume)           316 

mg/dL                                           

 

                                        Complete blood count (CBC) with automate

d white blood cell (WBC) differential - 

20 03:30         

 

                          Blood leukocytes automated count (number/volume)      

     7.0 10*3/uL          

                                        4.3-11.0        

 

                          Blood erythrocytes automated count (number/volume)    

       2.70 10*6/uL       

                                        4.35-5.85        

 

                    Venous blood hemoglobin measurement (mass/volume)           

8.1 g/dL            

13.3-17.7        

 

                    Blood hematocrit (volume fraction)           26 %           

     40-54        

 

                    Automated erythrocyte mean corpuscular volume           96 [

foz_us]           

80-99        

 

                                        Automated erythrocyte mean corpuscular h

emoglobin (mass per erythrocyte)        

                          30 pg                     25-34        

 

                                        Automated erythrocyte mean corpuscular h

emoglobin concentration measurement 

(mass/volume)             31 g/dL                   32-36        

 

                    Automated erythrocyte distribution width ratio           16.

7 %              10.0-

14.5        

 

                    Automated blood platelet count (count/volume)           144 

10*3/uL           

130-400        

 

                          Automated blood platelet mean volume measurement      

     11.4 [foz_us]        

                                        7.4-10.4        

 

                    Automated blood neutrophils/100 leukocytes           86 %   

             42-75       

 

 

                    Automated blood lymphocytes/100 leukocytes           8 %    

             12-44        

 

                    Blood monocytes/100 leukocytes           6 %                

 0-12        

 

                    Automated blood eosinophils/100 leukocytes           0 %    

             0-10        

 

                    Automated blood basophils/100 leukocytes           0 %      

           0-10        

 

                    Blood neutrophils automated count (number/volume)           

6.0 10*3            

1.8-7.8        

 

                    Blood lymphocytes automated count (number/volume)           

0.6 10*3            

1.0-4.0        

 

                    Blood monocytes automated count (number/volume)           0.

4 10*3            

0.0-1.0        

 

                    Automated eosinophil count           0.0 10*3/uL           0

.0-0.3        

 

                    Automated blood basophil count (count/volume)           0.0 

10*3/uL           

0.0-0.1        

 

                                        Arterial blood gas measurement - /

0 03:30         

 

                    Blood pCO2           46 mm[Hg]           35-45        

 

                    Blood pO2           91 mm[Hg]           79-93        

 

                          Arterial blood bicarbonate measurement (moles/volume) 

          33 mmol/L       

                                        23-27        

 

                    Arterial blood base excess by calculation           9.4 mmol

/L           

-2.5-2.5        

 

                    Arterial blood oxygen saturation measurement           98 % 

                   

    

 

                    * Inhaled oxygen flow rate           65%                 NRG

        

 

                          Arterial blood pH measurement with patient temperature

 correction           7.48

                                        7.37-7.43        

 

                          Arterial blood carbon dioxide, total measurement (mole

s/volume)           34.7 

mmol/L                                  21.0-31.0        

 

                    Body site           RIGHT ART LINE            NRG        

 

                          Assessment of wrist artery patency prior to arterial p

uncture           ART LINE

                                        NRG        

 

                    Setting of ventilation mode           YES                 NR

G        

 

                    Measurement of body temperature           37.4              

  NRG        

 

                                        Whole blood basic metabolic panel -  03:30         

 

                          Serum or plasma sodium measurement (moles/volume)     

      139 mmol/L          

                                        135-145        

 

                          Serum or plasma potassium measurement (moles/volume)  

         3.4 mmol/L       

                                        3.6-5.0        

 

                          Serum or plasma chloride measurement (moles/volume)   

        98 mmol/L         

                                                

 

                    Carbon dioxide           28 mmol/L           21-32        

 

                          Serum or plasma anion gap determination (moles/volume)

           13 mmol/L      

                                        5-14        

 

                          Serum or plasma urea nitrogen measurement (mass/volume

)           17 mg/dL      

                                        7-18        

 

                          Serum or plasma creatinine measurement (mass/volume)  

         0.94 mg/dL       

                                        0.60-1.30        

 

                    Serum or plasma urea nitrogen/creatinine mass ratio         

  18                  NRG 

       

 

                                        Serum or plasma creatinine measurement w

ith calculation of estimated glomerular 

filtration rate           >                         NRG        

 

                    Serum or plasma glucose measurement (mass/volume)           

252 mg/dL           

        

 

                    Serum or plasma calcium measurement (mass/volume)           

8.0 mg/dL           

8.5-10.1        

 

                                        Serum or plasma phosphate measurement (m

ass/volume) - 20 03:30         

 

                          Serum or plasma phosphate measurement (mass/volume)   

        3.0 mg/dL         

                                        2.3-4.7        

 

                                        Magnesium - 20 03:30         

 

                    Magnesium           1.8 mg/dL           1.6-2.4        

 

                                        Serum or plasma lithium measurement (mol

es/volume) - 20 03:30         

 

                    BNP PT              501.3 pg/mL           <100.0        

 

                                        Capillary blood glucose measurement by g

lucometer (mass/volume) - 20 11:00

         

 

                          Capillary blood glucose measurement by glucometer (mas

s/volume)           272 

mg/dL                                           

 

                                        Capillary blood glucose measurement by g

lucometer (mass/volume) - 20 17:46

         

 

                          Capillary blood glucose measurement by glucometer (mas

s/volume)           265 

mg/dL                                           

 

                                        Capillary blood glucose measurement by g

lucometer (mass/volume) - 20 01:28

         

 

                          Capillary blood glucose measurement by glucometer (mas

s/volume)           248 

mg/dL                                           

 

                                        Complete blood count (CBC) with automate

d white blood cell (WBC) differential - 

20 03:18         

 

                          Blood leukocytes automated count (number/volume)      

     7.7 10*3/uL          

                                        4.3-11.0        

 

                          Blood erythrocytes automated count (number/volume)    

       2.98 10*6/uL       

                                        4.35-5.85        

 

                    Venous blood hemoglobin measurement (mass/volume)           

8.9 g/dL            

13.3-17.7        

 

                    Blood hematocrit (volume fraction)           29 %           

     40-54        

 

                    Automated erythrocyte mean corpuscular volume           97 [

foz_us]           

80-99        

 

                                        Automated erythrocyte mean corpuscular h

emoglobin (mass per erythrocyte)        

                          30 pg                     25-34        

 

                                        Automated erythrocyte mean corpuscular h

emoglobin concentration measurement 

(mass/volume)             31 g/dL                   32-36        

 

                    Automated erythrocyte distribution width ratio           16.

5 %              10.0-

14.5        

 

                    Automated blood platelet count (count/volume)           174 

10*3/uL           

130-400        

 

                          Automated blood platelet mean volume measurement      

     11.7 [foz_us]        

                                        7.4-10.4        

 

                    Automated blood neutrophils/100 leukocytes           91 %   

             42-75       

 

 

                    Automated blood lymphocytes/100 leukocytes           6 %    

             12-44        

 

                    Blood monocytes/100 leukocytes           3 %                

 0-12        

 

                    Automated blood eosinophils/100 leukocytes           0 %    

             0-10        

 

                    Automated blood basophils/100 leukocytes           0 %      

           0-10        

 

                    Blood neutrophils automated count (number/volume)           

7.0 10*3            

1.8-7.8        

 

                    Blood lymphocytes automated count (number/volume)           

0.5 10*3            

1.0-4.0        

 

                    Blood monocytes automated count (number/volume)           0.

3 10*3            

0.0-1.0        

 

                    Automated eosinophil count           0.0 10*3/uL           0

.0-0.3        

 

                    Automated blood basophil count (count/volume)           0.0 

10*3/uL           

0.0-0.1        

 

                                        Whole blood basic metabolic panel -  03:18         

 

                          Serum or plasma sodium measurement (moles/volume)     

      136 mmol/L          

                                        135-145        

 

                          Serum or plasma potassium measurement (moles/volume)  

         4.1 mmol/L       

                                        3.6-5.0        

 

                          Serum or plasma chloride measurement (moles/volume)   

        95 mmol/L         

                                                

 

                    Carbon dioxide           28 mmol/L           21-32        

 

                          Serum or plasma anion gap determination (moles/volume)

           13 mmol/L      

                                        5-14        

 

                          Serum or plasma urea nitrogen measurement (mass/volume

)           19 mg/dL      

                                        7-18        

 

                          Serum or plasma creatinine measurement (mass/volume)  

         0.98 mg/dL       

                                        0.60-1.30        

 

                    Serum or plasma urea nitrogen/creatinine mass ratio         

  19                  NRG 

       

 

                                        Serum or plasma creatinine measurement w

ith calculation of estimated glomerular 

filtration rate           >                         NRG        

 

                    Serum or plasma glucose measurement (mass/volume)           

268 mg/dL           

        

 

                    Serum or plasma calcium measurement (mass/volume)           

8.2 mg/dL           

8.5-10.1        

 

                                        Serum or plasma phosphate measurement (m

ass/volume) - 20 03:18         

 

                          Serum or plasma phosphate measurement (mass/volume)   

        3.5 mg/dL         

                                        2.3-4.7        

 

                                        Magnesium - 20 03:18         

 

                    Magnesium           1.6 mg/dL           1.6-2.4        

 

                                        Arterial blood gas measurement - 

0 03:25         

 

                    Blood pCO2           46 mm[Hg]           35-45        

 

                    Blood pO2           81 mm[Hg]           79-93        

 

                          Arterial blood bicarbonate measurement (moles/volume) 

          34 mmol/L       

                                        23-27        

 

                    Arterial blood base excess by calculation           9.8 mmol

/L           

-2.5-2.5        

 

                    Arterial blood oxygen saturation measurement           96 % 

                   

    

 

                    * Inhaled oxygen flow rate           60%                 NRG

        

 

                          Arterial blood pH measurement with patient temperature

 correction           7.48

                                        7.37-7.43        

 

                          Arterial blood carbon dioxide, total measurement (mole

s/volume)           35.4 

mmol/L                                  21.0-31.0        

 

                    Body site           ART LINE            NRG        

 

                          Assessment of wrist artery patency prior to arterial p

uncture           ART LINE

                                        NRG        

 

                    Setting of ventilation mode           YES                 NR

G        

 

                    Measurement of body temperature           36.7              

  NRG        

 

                                        Capillary blood glucose measurement by g

lucometer (mass/volume) - 20 11:53

         

 

                          Capillary blood glucose measurement by glucometer (mas

s/volume)           350 

mg/dL                                           

 

                                        Capillary blood glucose measurement by g

lucometer (mass/volume) - 20 17:46

         

 

                          Capillary blood glucose measurement by glucometer (mas

s/volume)           321 

mg/dL                                           

 

                                        Capillary blood glucose measurement by g

lucometer (mass/volume) - 20 23:57

         

 

                          Capillary blood glucose measurement by glucometer (mas

s/volume)           320 

mg/dL                                           

 

                                        Arterial blood gas measurement - 

0 03:00         

 

                    Blood pCO2           53 mm[Hg]           35-45        

 

                    Blood pO2           89 mm[Hg]           79-93        

 

                          Arterial blood bicarbonate measurement (moles/volume) 

          37 mmol/L       

                                        23-27        

 

                    Arterial blood base excess by calculation           12.0 mmo

l/L           

-2.5-2.5        

 

                    Arterial blood oxygen saturation measurement           97 % 

                   

    

 

                    * Inhaled oxygen flow rate           40%                 NRG

        

 

                          Arterial blood pH measurement with patient temperature

 correction           7.45

                                        7.37-7.43        

 

                          Arterial blood carbon dioxide, total measurement (mole

s/volume)           38.3 

mmol/L                                  21.0-31.0        

 

                    Body site           RT ART LINE            NRG        

 

                          Assessment of wrist artery patency prior to arterial p

uncture           ART LINE

                                        NRG        

 

                    Setting of ventilation mode           YES                 NR

G        

 

                    Measurement of body temperature           36.9              

  NRG        

 

                                        Complete blood count (CBC) with automate

d white blood cell (WBC) differential - 

20 03:00         

 

                          Blood leukocytes automated count (number/volume)      

     6.3 10*3/uL          

                                        4.3-11.0        

 

                          Blood erythrocytes automated count (number/volume)    

       2.96 10*6/uL       

                                        4.35-5.85        

 

                    Venous blood hemoglobin measurement (mass/volume)           

8.8 g/dL            

13.3-17.7        

 

                    Blood hematocrit (volume fraction)           29 %           

     40-54        

 

                    Automated erythrocyte mean corpuscular volume           98 [

foz_us]           

80-99        

 

                                        Automated erythrocyte mean corpuscular h

emoglobin (mass per erythrocyte)        

                          30 pg                     25-34        

 

                                        Automated erythrocyte mean corpuscular h

emoglobin concentration measurement 

(mass/volume)             30 g/dL                   32-36        

 

                    Automated erythrocyte distribution width ratio           16.

5 %              10.0-

14.5        

 

                    Automated blood platelet count (count/volume)           166 

10*3/uL           

130-400        

 

                          Automated blood platelet mean volume measurement      

     11.6 [foz_us]        

                                        7.4-10.4        

 

                    Automated blood neutrophils/100 leukocytes           92 %   

             42-75       

 

 

                    Automated blood lymphocytes/100 leukocytes           5 %    

             12-44        

 

                    Blood monocytes/100 leukocytes           3 %                

 0-12        

 

                    Automated blood eosinophils/100 leukocytes           0 %    

             0-10        

 

                    Automated blood basophils/100 leukocytes           0 %      

           0-10        

 

                    Blood neutrophils automated count (number/volume)           

5.8 10*3            

1.8-7.8        

 

                    Blood lymphocytes automated count (number/volume)           

0.3 10*3            

1.0-4.0        

 

                    Blood monocytes automated count (number/volume)           0.

2 10*3            

0.0-1.0        

 

                    Automated eosinophil count           0.0 10*3/uL           0

.0-0.3        

 

                    Automated blood basophil count (count/volume)           0.0 

10*3/uL           

0.0-0.1        

 

                                        Whole blood basic metabolic panel -  03:00         

 

                          Serum or plasma sodium measurement (moles/volume)     

      136 mmol/L          

                                        135-145        

 

                          Serum or plasma potassium measurement (moles/volume)  

         3.8 mmol/L       

                                        3.6-5.0        

 

                          Serum or plasma chloride measurement (moles/volume)   

        94 mmol/L         

                                                

 

                    Carbon dioxide           30 mmol/L           21-32        

 

                          Serum or plasma anion gap determination (moles/volume)

           12 mmol/L      

                                        5-14        

 

                          Serum or plasma urea nitrogen measurement (mass/volume

)           27 mg/dL      

                                        7-18        

 

                          Serum or plasma creatinine measurement (mass/volume)  

         1.02 mg/dL       

                                        0.60-1.30        

 

                    Serum or plasma urea nitrogen/creatinine mass ratio         

  26                  NRG 

       

 

                                        Serum or plasma creatinine measurement w

ith calculation of estimated glomerular 

filtration rate           >                         NRG        

 

                    Serum or plasma glucose measurement (mass/volume)           

301 mg/dL           

        

 

                    Serum or plasma calcium measurement (mass/volume)           

8.3 mg/dL           

8.5-10.1        

 

                                        Serum or plasma phosphate measurement (m

ass/volume) - 20 03:00         

 

                          Serum or plasma phosphate measurement (mass/volume)   

        3.4 mg/dL         

                                        2.3-4.7        

 

                                        Magnesium - 20 03:00         

 

                    Magnesium           2.0 mg/dL           1.6-2.4        

 

                                        Capillary blood glucose measurement by g

lucometer (mass/volume) - 20 11:34

         

 

                          Capillary blood glucose measurement by glucometer (mas

s/volume)           259 

mg/dL                                           

 

                                        Capillary blood glucose measurement by g

lucometer (mass/volume) - 20 17:46

         

 

                          Capillary blood glucose measurement by glucometer (mas

s/volume)           297 

mg/dL                                           

 

                                        Capillary blood glucose measurement by g

lucometer (mass/volume) - 20 22:47

         

 

                          Capillary blood glucose measurement by glucometer (mas

s/volume)           259 

mg/dL                                           

 

                                        Arterial blood gas measurement - 

0 03:10         

 

                    Blood pCO2           52 mm[Hg]           35-45        

 

                    Blood pO2           73 mm[Hg]           79-93        

 

                          Arterial blood bicarbonate measurement (moles/volume) 

          36 mmol/L       

                                        23-27        

 

                    Arterial blood base excess by calculation           11.0 mmo

l/L           

-2.5-2.5        

 

                    Arterial blood oxygen saturation measurement           94 % 

                   

    

 

                    * Inhaled oxygen flow rate           40%                 NRG

        

 

                          Arterial blood pH measurement with patient temperature

 correction           7.45

                                        7.37-7.43        

 

                          Arterial blood carbon dioxide, total measurement (mole

s/volume)           37.2 

mmol/L                                  21.0-31.0        

 

                    Body site           RIGHT RADIAL            NRG        

 

                          Assessment of wrist artery patency prior to arterial p

uncture           ART LINE

                                        NRG        

 

                    Setting of ventilation mode           YES                 NR

G        

 

                    Measurement of body temperature           36.8              

  NRG        

 

                                        Complete blood count (CBC) with automate

d white blood cell (WBC) differential - 

20 03:13         

 

                          Blood leukocytes automated count (number/volume)      

     7.0 10*3/uL          

                                        4.3-11.0        

 

                          Blood erythrocytes automated count (number/volume)    

       3.00 10*6/uL       

                                        4.35-5.85        

 

                    Venous blood hemoglobin measurement (mass/volume)           

9.1 g/dL            

13.3-17.7        

 

                    Blood hematocrit (volume fraction)           29 %           

     40-54        

 

                    Automated erythrocyte mean corpuscular volume           98 [

foz_us]           

80-99        

 

                                        Automated erythrocyte mean corpuscular h

emoglobin (mass per erythrocyte)        

                          30 pg                     25-34        

 

                                        Automated erythrocyte mean corpuscular h

emoglobin concentration measurement 

(mass/volume)             31 g/dL                   32-36        

 

                    Automated erythrocyte distribution width ratio           16.

5 %              10.0-

14.5        

 

                    Automated blood platelet count (count/volume)           177 

10*3/uL           

130-400        

 

                          Automated blood platelet mean volume measurement      

     11.5 [foz_us]        

                                        7.4-10.4        

 

                    Automated blood neutrophils/100 leukocytes           92 %   

             42-75       

 

 

                    Automated blood lymphocytes/100 leukocytes           5 %    

             12-44        

 

                    Blood monocytes/100 leukocytes           4 %                

 0-12        

 

                    Automated blood eosinophils/100 leukocytes           0 %    

             0-10        

 

                    Automated blood basophils/100 leukocytes           0 %      

           0-10        

 

                    Blood neutrophils automated count (number/volume)           

6.4 10*3            

1.8-7.8        

 

                    Blood lymphocytes automated count (number/volume)           

0.3 10*3            

1.0-4.0        

 

                    Blood monocytes automated count (number/volume)           0.

3 10*3            

0.0-1.0        

 

                    Automated eosinophil count           0.0 10*3/uL           0

.0-0.3        

 

                    Automated blood basophil count (count/volume)           0.0 

10*3/uL           

0.0-0.1        

 

                                        Whole blood basic metabolic panel -  03:13         

 

                          Serum or plasma sodium measurement (moles/volume)     

      136 mmol/L          

                                        135-145        

 

                          Serum or plasma potassium measurement (moles/volume)  

         4.3 mmol/L       

                                        3.6-5.0        

 

                          Serum or plasma chloride measurement (moles/volume)   

        95 mmol/L         

                                                

 

                    Carbon dioxide           31 mmol/L           21-32        

 

                          Serum or plasma anion gap determination (moles/volume)

           10 mmol/L      

                                        5-14        

 

                          Serum or plasma urea nitrogen measurement (mass/volume

)           42 mg/dL      

                                        7-18        

 

                          Serum or plasma creatinine measurement (mass/volume)  

         1.03 mg/dL       

                                        0.60-1.30        

 

                    Serum or plasma urea nitrogen/creatinine mass ratio         

  41                  NRG 

       

 

                                        Serum or plasma creatinine measurement w

ith calculation of estimated glomerular 

filtration rate           >                         NRG        

 

                    Serum or plasma glucose measurement (mass/volume)           

265 mg/dL           

        

 

                    Serum or plasma calcium measurement (mass/volume)           

8.1 mg/dL           

8.5-10.1        

 

                                        Serum or plasma phosphate measurement (m

ass/volume) - 20 03:13         

 

                          Serum or plasma phosphate measurement (mass/volume)   

        3.7 mg/dL         

                                        2.3-4.7        

 

                                        Magnesium - 20 03:13         

 

                    Magnesium           1.9 mg/dL           1.6-2.4        

 

                                        Serum or plasma troponin i.cardiac measu

rement (mass/volume) - 20 03:13   

      

 

                          Serum or plasma troponin i.cardiac measurement (mass/v

olume)           0.033 

ng/mL                                   <0.028        

 

                                        Capillary blood glucose measurement by g

lucometer (mass/volume) - 20 06:18

         

 

                          Capillary blood glucose measurement by glucometer (mas

s/volume)           303 

mg/dL                                           

 

                                        Capillary blood glucose measurement by g

lucometer (mass/volume) - 20 12:17

         

 

                          Capillary blood glucose measurement by glucometer (mas

s/volume)           284 

mg/dL                                           

 

                                        Capillary blood glucose measurement by g

lucometer (mass/volume) - 20 17:35

         

 

                          Capillary blood glucose measurement by glucometer (mas

s/volume)           232 

mg/dL                                           

 

                                        Capillary blood glucose measurement by g

lucometer (mass/volume) - 20 00:29

         

 

                          Capillary blood glucose measurement by glucometer (mas

s/volume)           233 

mg/dL                                           

 

                                        Complete blood count (CBC) with automate

d white blood cell (WBC) differential - 

20 03:05         

 

                          Blood leukocytes automated count (number/volume)      

     6.8 10*3/uL          

                                        4.3-11.0        

 

                          Blood erythrocytes automated count (number/volume)    

       2.70 10*6/uL       

                                        4.35-5.85        

 

                    Venous blood hemoglobin measurement (mass/volume)           

8.4 g/dL            

13.3-17.7        

 

                    Blood hematocrit (volume fraction)           27 %           

     40-54        

 

                    Automated erythrocyte mean corpuscular volume           99 [

foz_us]           

80-99        

 

                                        Automated erythrocyte mean corpuscular h

emoglobin (mass per erythrocyte)        

                          31 pg                     25-34        

 

                                        Automated erythrocyte mean corpuscular h

emoglobin concentration measurement 

(mass/volume)             32 g/dL                   32-36        

 

                    Automated erythrocyte distribution width ratio           16.

7 %              10.0-

14.5        

 

                    Automated blood platelet count (count/volume)           168 

10*3/uL           

130-400        

 

                          Automated blood platelet mean volume measurement      

     11.5 [foz_us]        

                                        7.4-10.4        

 

                    Automated blood neutrophils/100 leukocytes           92 %   

             42-75       

 

 

                    Automated blood lymphocytes/100 leukocytes           3 %    

             12-44        

 

                    Blood monocytes/100 leukocytes           5 %                

 0-12        

 

                    Automated blood eosinophils/100 leukocytes           0 %    

             0-10        

 

                    Automated blood basophils/100 leukocytes           0 %      

           0-10        

 

                    Blood neutrophils automated count (number/volume)           

6.2 10*3            

1.8-7.8        

 

                    Blood lymphocytes automated count (number/volume)           

0.2 10*3            

1.0-4.0        

 

                    Blood monocytes automated count (number/volume)           0.

3 10*3            

0.0-1.0        

 

                    Automated eosinophil count           0.0 10*3/uL           0

.0-0.3        

 

                    Automated blood basophil count (count/volume)           0.0 

10*3/uL           

0.0-0.1        

 

                                        Arterial blood gas measurement - 

0 03:05         

 

                    Blood pCO2           57 mm[Hg]           35-45        

 

                    Blood pO2           72 mm[Hg]           79-93        

 

                          Arterial blood bicarbonate measurement (moles/volume) 

          36 mmol/L       

                                        23-27        

 

                    Arterial blood base excess by calculation           10.7 mmo

l/L           

-2.5-2.5        

 

                    Arterial blood oxygen saturation measurement           94 % 

                   

    

 

                    * Inhaled oxygen flow rate           AC VOLUME CONTROL      

      NRG        

 

                          Arterial blood pH measurement with patient temperature

 correction           7.41

                                        7.37-7.43        

 

                          Arterial blood carbon dioxide, total measurement (mole

s/volume)           37.5 

mmol/L                                  21.0-31.0        

 

                    Body site           RT ART LINE            NRG        

 

                          Assessment of wrist artery patency prior to arterial p

uncture           P       

                                        NRG        

 

                    Setting of ventilation mode           YES                 NR

G        

 

                    Measurement of body temperature           36.6              

  NRG        

 

                                        Whole blood basic metabolic panel - 04/2

3/20 03:05         

 

                          Serum or plasma sodium measurement (moles/volume)     

      137 mmol/L          

                                        135-145        

 

                          Serum or plasma potassium measurement (moles/volume)  

         4.4 mmol/L       

                                        3.6-5.0        

 

                          Serum or plasma chloride measurement (moles/volume)   

        97 mmol/L         

                                                

 

                    Carbon dioxide           30 mmol/L           21-32        

 

                          Serum or plasma anion gap determination (moles/volume)

           10 mmol/L      

                                        5-14        

 

                          Serum or plasma urea nitrogen measurement (mass/volume

)           51 mg/dL      

                                        7-18        

 

                          Serum or plasma creatinine measurement (mass/volume)  

         0.99 mg/dL       

                                        0.60-1.30        

 

                    Serum or plasma urea nitrogen/creatinine mass ratio         

  52                  NRG 

       

 

                                        Serum or plasma creatinine measurement w

ith calculation of estimated glomerular 

filtration rate           >                         NRG        

 

                    Serum or plasma glucose measurement (mass/volume)           

218 mg/dL           

        

 

                    Serum or plasma calcium measurement (mass/volume)           

8.0 mg/dL           

8.5-10.1        

 

                                        Manual absolute plasma cell count - 04/2

3/20 03:05         

 

                    Blood monocytes/100 leukocytes           5 %                

 NRG        

 

                    Manual blood segmented neutrophils/100 leukocytes           

85 %                NRG  

      

 

                    Blood band neutrophils/100 leukocytes           5 %         

        NRG        

 

                    Manual blood lymphocytes/100 leukocytes           5 %       

          NRG        

 

                    Blood anisocytosis detection by light microscopy           S

LIGHT              NRG

        

 

                    Blood hypochromia detection by light microscopy           SL

IGHT              NRG 

       

 

                                        Serum or plasma phosphate measurement (m

ass/volume) - 20 03:05         

 

                          Serum or plasma phosphate measurement (mass/volume)   

        3.7 mg/dL         

                                        2.3-4.7        

 

                                        Magnesium - 20 03:05         

 

                    Magnesium           2.0 mg/dL           1.6-2.4        

 

                                        Capillary blood glucose measurement by g

lucometer (mass/volume) - 20 11:21

         

 

                          Capillary blood glucose measurement by glucometer (mas

s/volume)           188 

mg/dL                                           

 

                                        Capillary blood glucose measurement by g

lucometer (mass/volume) - 20 17:50

         

 

                          Capillary blood glucose measurement by glucometer (mas

s/volume)           254 

mg/dL                                           

 

                                        Capillary blood glucose measurement by g

lucometer (mass/volume) - 20 23:09

         

 

                          Capillary blood glucose measurement by glucometer (mas

s/volume)           223 

mg/dL                                           

 

                                        Complete blood count (CBC) with automate

d white blood cell (WBC) differential - 

20 03:35         

 

                          Blood leukocytes automated count (number/volume)      

     7.3 10*3/uL          

                                        4.3-11.0        

 

                          Blood erythrocytes automated count (number/volume)    

       3.17 10*6/uL       

                                        4.35-5.85        

 

                    Venous blood hemoglobin measurement (mass/volume)           

9.6 g/dL            

13.3-17.7        

 

                    Blood hematocrit (volume fraction)           31 %           

     40-54        

 

                    Automated erythrocyte mean corpuscular volume           97 [

foz_us]           

80-99        

 

                                        Automated erythrocyte mean corpuscular h

emoglobin (mass per erythrocyte)        

                          30 pg                     25-34        

 

                                        Automated erythrocyte mean corpuscular h

emoglobin concentration measurement 

(mass/volume)             31 g/dL                   32-36        

 

                    Automated erythrocyte distribution width ratio           16.

3 %              10.0-

14.5        

 

                    Automated blood platelet count (count/volume)           173 

10*3/uL           

130-400        

 

                          Automated blood platelet mean volume measurement      

     11.7 [foz_us]        

                                        7.4-10.4        

 

                    Automated blood neutrophils/100 leukocytes           93 %   

             42-75       

 

 

                    Automated blood lymphocytes/100 leukocytes           4 %    

             12-44        

 

                    Blood monocytes/100 leukocytes           4 %                

 0-12        

 

                    Automated blood eosinophils/100 leukocytes           0 %    

             0-10        

 

                    Automated blood basophils/100 leukocytes           0 %      

           0-10        

 

                    Blood neutrophils automated count (number/volume)           

6.7 10*3            

1.8-7.8        

 

                    Blood lymphocytes automated count (number/volume)           

0.3 10*3            

1.0-4.0        

 

                    Blood monocytes automated count (number/volume)           0.

3 10*3            

0.0-1.0        

 

                    Automated eosinophil count           0.0 10*3/uL           0

.0-0.3        

 

                    Automated blood basophil count (count/volume)           0.0 

10*3/uL           

0.0-0.1        

 

                                        Arterial blood gas measurement - 

0 03:35         

 

                    Blood pCO2           54 mm[Hg]           35-45        

 

                    Blood pO2           142 mm[Hg]           79-93        

 

                          Arterial blood bicarbonate measurement (moles/volume) 

          36 mmol/L       

                                        23-27        

 

                    Arterial blood base excess by calculation           11.3 mmo

l/L           

-2.5-2.5        

 

                    Arterial blood oxygen saturation measurement           97 % 

                   

    

 

                    * Inhaled oxygen flow rate           12                  NRG

        

 

                          Arterial blood pH measurement with patient temperature

 correction           7.44

                                        7.37-7.43        

 

                          Arterial blood carbon dioxide, total measurement (mole

s/volume)           37.9 

mmol/L                                  21.0-31.0        

 

                    Body site           RIGHT RADIAL            NRG        

 

                          Assessment of wrist artery patency prior to arterial p

uncture           POSITIVE

                                        NRG        

 

                    Setting of ventilation mode           YES                 NR

G        

 

                    Measurement of body temperature           36.4              

  NRG        

 

                                        Whole blood basic metabolic panel -  03:35         

 

                          Serum or plasma sodium measurement (moles/volume)     

      135 mmol/L          

                                        135-145        

 

                          Serum or plasma potassium measurement (moles/volume)  

         4.9 mmol/L       

                                        3.6-5.0        

 

                          Serum or plasma chloride measurement (moles/volume)   

        95 mmol/L         

                                                

 

                    Carbon dioxide           32 mmol/L           21-32        

 

                          Serum or plasma anion gap determination (moles/volume)

           8 mmol/L       

                                        5-14        

 

                          Serum or plasma urea nitrogen measurement (mass/volume

)           41 mg/dL      

                                        7-18        

 

                          Serum or plasma creatinine measurement (mass/volume)  

         0.81 mg/dL       

                                        0.60-1.30        

 

                    Serum or plasma urea nitrogen/creatinine mass ratio         

  51                  NRG 

       

 

                                        Serum or plasma creatinine measurement w

ith calculation of estimated glomerular 

filtration rate           >                         NRG        

 

                    Serum or plasma glucose measurement (mass/volume)           

204 mg/dL           

        

 

                    Serum or plasma calcium measurement (mass/volume)           

8.2 mg/dL           

8.5-10.1        

 

                                        Serum or plasma phosphate measurement (m

ass/volume) - 20 03:35         

 

                          Serum or plasma phosphate measurement (mass/volume)   

        3.5 mg/dL         

                                        2.3-4.7        

 

                                        Magnesium - 20 03:35         

 

                    Magnesium           2.0 mg/dL           1.6-2.4        

 

                                        Serum or plasma lithium measurement (mol

es/volume) - 20 03:35         

 

                    BNP PT              311.9 pg/mL           <100.0        

 

                                        Capillary blood glucose measurement by g

lucometer (mass/volume) - 20 11:44

         

 

                          Capillary blood glucose measurement by glucometer (mas

s/volume)           251 

mg/dL                                           

 

                                        Capillary blood glucose measurement by g

lucometer (mass/volume) - 20 17:36

         

 

                          Capillary blood glucose measurement by glucometer (mas

s/volume)           263 

mg/dL                                           

 

                                        Capillary blood glucose measurement by g

lucometer (mass/volume) - 20 00:56

         

 

                          Capillary blood glucose measurement by glucometer (mas

s/volume)           252 

mg/dL                                           

 

                                        Arterial blood gas measurement - 

0 01:40         

 

                    Blood pCO2           53 mm[Hg]           35-45        

 

                    Blood pO2           81 mm[Hg]           79-93        

 

                          Arterial blood bicarbonate measurement (moles/volume) 

          36 mmol/L       

                                        23-27        

 

                    Arterial blood base excess by calculation           11.7 mmo

l/L           

-2.5-2.5        

 

                    Arterial blood oxygen saturation measurement           97 % 

                   

    

 

                    * Inhaled oxygen flow rate           85                  NRG

        

 

                          Arterial blood pH measurement with patient temperature

 correction           7.45

                                        7.37-7.43        

 

                          Arterial blood carbon dioxide, total measurement (mole

s/volume)           38.1 

mmol/L                                  21.0-31.0        

 

                    Body site           RIGHT RADIAL            NRG        

 

                          Assessment of wrist artery patency prior to arterial p

uncture           POSITIVE

                                        NRG        

 

                    Setting of ventilation mode           YES                 NR

G        

 

                    Measurement of body temperature           36.7              

  NRG        

 

                                        Complete blood count (CBC) with automate

d white blood cell (WBC) differential - 

20 01:40         

 

                          Blood leukocytes automated count (number/volume)      

     9.7 10*3/uL          

                                        4.3-11.0        

 

                          Blood erythrocytes automated count (number/volume)    

       3.13 10*6/uL       

                                        4.35-5.85        

 

                    Venous blood hemoglobin measurement (mass/volume)           

9.5 g/dL            

13.3-17.7        

 

                    Blood hematocrit (volume fraction)           31 %           

     40-54        

 

                    Automated erythrocyte mean corpuscular volume           98 [

foz_us]           

80-99        

 

                                        Automated erythrocyte mean corpuscular h

emoglobin (mass per erythrocyte)        

                          30 pg                     25-34        

 

                                        Automated erythrocyte mean corpuscular h

emoglobin concentration measurement 

(mass/volume)             31 g/dL                   32-36        

 

                    Automated erythrocyte distribution width ratio           16.

2 %              10.0-

14.5        

 

                    Automated blood platelet count (count/volume)           165 

10*3/uL           

130-400        

 

                          Automated blood platelet mean volume measurement      

     11.6 [foz_us]        

                                        7.4-10.4        

 

                    Automated blood neutrophils/100 leukocytes           94 %   

             42-75       

 

 

                    Automated blood lymphocytes/100 leukocytes           4 %    

             12-44        

 

                    Blood monocytes/100 leukocytes           3 %                

 0-12        

 

                    Automated blood eosinophils/100 leukocytes           0 %    

             0-10        

 

                    Automated blood basophils/100 leukocytes           0 %      

           0-10        

 

                    Blood neutrophils automated count (number/volume)           

9.1 10*3            

1.8-7.8        

 

                    Blood lymphocytes automated count (number/volume)           

0.3 10*3            

1.0-4.0        

 

                    Blood monocytes automated count (number/volume)           0.

3 10*3            

0.0-1.0        

 

                    Automated eosinophil count           0.0 10*3/uL           0

.0-0.3        

 

                    Automated blood basophil count (count/volume)           0.0 

10*3/uL           

0.0-0.1        

 

                                        Whole blood basic metabolic panel -  01:40         

 

                          Serum or plasma sodium measurement (moles/volume)     

      136 mmol/L          

                                        135-145        

 

                          Serum or plasma potassium measurement (moles/volume)  

         4.7 mmol/L       

                                        3.6-5.0        

 

                          Serum or plasma chloride measurement (moles/volume)   

        94 mmol/L         

                                                

 

                    Carbon dioxide           32 mmol/L           21-32        

 

                          Serum or plasma anion gap determination (moles/volume)

           10 mmol/L      

                                        5-14        

 

                          Serum or plasma urea nitrogen measurement (mass/volume

)           49 mg/dL      

                                        7-18        

 

                          Serum or plasma creatinine measurement (mass/volume)  

         0.89 mg/dL       

                                        0.60-1.30        

 

                    Serum or plasma urea nitrogen/creatinine mass ratio         

  55                  NRG 

       

 

                                        Serum or plasma creatinine measurement w

ith calculation of estimated glomerular 

filtration rate           >                         NRG        

 

                    Serum or plasma glucose measurement (mass/volume)           

248 mg/dL           

        

 

                    Serum or plasma calcium measurement (mass/volume)           

8.2 mg/dL           

8.5-10.1        

 

                                        Serum or plasma phosphate measurement (m

ass/volume) - 20 01:40         

 

                          Serum or plasma phosphate measurement (mass/volume)   

        3.6 mg/dL         

                                        2.3-4.7        

 

                                        Magnesium - 20 01:40         

 

                    Magnesium           1.9 mg/dL           1.6-2.4        

 

                                        Capillary blood glucose measurement by g

lucometer (mass/volume) - 20 11:35

         

 

                          Capillary blood glucose measurement by glucometer (mas

s/volume)           264 

mg/dL                                           

 

                                        Capillary blood glucose measurement by g

lucometer (mass/volume) - 20 17:50

         

 

                          Capillary blood glucose measurement by glucometer (mas

s/volume)           277 

mg/dL                                           

 

                                        Capillary blood glucose measurement by g

lucometer (mass/volume) - 20 23:04

         

 

                          Capillary blood glucose measurement by glucometer (mas

s/volume)           255 

mg/dL                                           

 

                                        Arterial blood gas measurement - 

0 02:22         

 

                    Blood pCO2           51 mm[Hg]           35-45        

 

                    Blood pO2           92 mm[Hg]           79-93        

 

                          Arterial blood bicarbonate measurement (moles/volume) 

          35 mmol/L       

                                        23-27        

 

                    Arterial blood base excess by calculation           10.0 mmo

l/L           

-2.5-2.5        

 

                    Arterial blood oxygen saturation measurement           97 % 

                   

    

 

                    * Inhaled oxygen flow rate           85                  NRG

        

 

                          Arterial blood pH measurement with patient temperature

 correction           7.45

                                        7.37-7.43        

 

                          Arterial blood carbon dioxide, total measurement (mole

s/volume)           36.1 

mmol/L                                  21.0-31.0        

 

                    Body site           RIGHT RADIAL            NRG        

 

                          Assessment of wrist artery patency prior to arterial p

uncture           POSITIVE

                                        NRG        

 

                    Setting of ventilation mode           YES                 NR

G        

 

                    Measurement of body temperature           36.8              

  NRG        

 

                                        Complete blood count (CBC) with automate

d white blood cell (WBC) differential - 

20 02:22         

 

                          Blood leukocytes automated count (number/volume)      

     8.2 10*3/uL          

                                        4.3-11.0        

 

                          Blood erythrocytes automated count (number/volume)    

       3.11 10*6/uL       

                                        4.35-5.85        

 

                    Venous blood hemoglobin measurement (mass/volume)           

9.4 g/dL            

13.3-17.7        

 

                    Blood hematocrit (volume fraction)           30 %           

     40-54        

 

                    Automated erythrocyte mean corpuscular volume           97 [

foz_us]           

80-99        

 

                                        Automated erythrocyte mean corpuscular h

emoglobin (mass per erythrocyte)        

                          30 pg                     25-34        

 

                                        Automated erythrocyte mean corpuscular h

emoglobin concentration measurement 

(mass/volume)             31 g/dL                   32-36        

 

                    Automated erythrocyte distribution width ratio           16.

6 %              10.0-

14.5        

 

                    Automated blood platelet count (count/volume)           156 

10*3/uL           

130-400        

 

                          Automated blood platelet mean volume measurement      

     11.4 [foz_us]        

                                        7.4-10.4        

 

                    Automated blood neutrophils/100 leukocytes           93 %   

             42-75       

 

 

                    Automated blood lymphocytes/100 leukocytes           3 %    

             12-44        

 

                    Blood monocytes/100 leukocytes           4 %                

 0-12        

 

                    Automated blood eosinophils/100 leukocytes           0 %    

             0-10        

 

                    Automated blood basophils/100 leukocytes           0 %      

           0-10        

 

                    Blood neutrophils automated count (number/volume)           

7.6 10*3            

1.8-7.8        

 

                    Blood lymphocytes automated count (number/volume)           

0.3 10*3            

1.0-4.0        

 

                    Blood monocytes automated count (number/volume)           0.

4 10*3            

0.0-1.0        

 

                    Automated eosinophil count           0.0 10*3/uL           0

.0-0.3        

 

                    Automated blood basophil count (count/volume)           0.0 

10*3/uL           

0.0-0.1        

 

                                        Whole blood basic metabolic panel -  02:22         

 

                          Serum or plasma sodium measurement (moles/volume)     

      134 mmol/L          

                                        135-145        

 

                          Serum or plasma potassium measurement (moles/volume)  

         4.3 mmol/L       

                                        3.6-5.0        

 

                          Serum or plasma chloride measurement (moles/volume)   

        96 mmol/L         

                                                

 

                    Carbon dioxide           30 mmol/L           21-32        

 

                          Serum or plasma anion gap determination (moles/volume)

           8 mmol/L       

                                        5-14        

 

                          Serum or plasma urea nitrogen measurement (mass/volume

)           48 mg/dL      

                                        7-18        

 

                          Serum or plasma creatinine measurement (mass/volume)  

         0.82 mg/dL       

                                        0.60-1.30        

 

                    Serum or plasma urea nitrogen/creatinine mass ratio         

  59                  NRG 

       

 

                                        Serum or plasma creatinine measurement w

ith calculation of estimated glomerular 

filtration rate           >                         NRG        

 

                    Serum or plasma glucose measurement (mass/volume)           

205 mg/dL           

        

 

                    Serum or plasma calcium measurement (mass/volume)           

8.0 mg/dL           

8.5-10.1        

 

                                        Serum or plasma phosphate measurement (m

ass/volume) - 20 02:22         

 

                          Serum or plasma phosphate measurement (mass/volume)   

        3.5 mg/dL         

                                        2.3-4.7        

 

                                        Magnesium - 20 02:22         

 

                    Magnesium           2.0 mg/dL           1.6-2.4        

 

                                        Capillary blood glucose measurement by g

lucometer (mass/volume) - 20 10:04

         

 

                          Capillary blood glucose measurement by glucometer (mas

s/volume)           245 

mg/dL                                           

 

                                        Capillary blood glucose measurement by g

lucometer (mass/volume) - 20 14:05

         

 

                          Capillary blood glucose measurement by glucometer (mas

s/volume)           230 

mg/dL                                           

 

                                        Capillary blood glucose measurement by g

lucometer (mass/volume) - 20 17:42

         

 

                          Capillary blood glucose measurement by glucometer (mas

s/volume)           190 

mg/dL                                           

 

                                        Capillary blood glucose measurement by g

lucometer (mass/volume) - 20 21:13

         

 

                          Capillary blood glucose measurement by glucometer (mas

s/volume)           171 

mg/dL                                           

 

                                        Capillary blood glucose measurement by g

lucometer (mass/volume) - 20 01:37

         

 

                          Capillary blood glucose measurement by glucometer (mas

s/volume)           239 

mg/dL                                           

 

                                        Complete blood count (CBC) with automate

d white blood cell (WBC) differential - 

20 03:18         

 

                          Blood leukocytes automated count (number/volume)      

     9.5 10*3/uL          

                                        4.3-11.0        

 

                          Blood erythrocytes automated count (number/volume)    

       2.97 10*6/uL       

                                        4.35-5.85        

 

                    Venous blood hemoglobin measurement (mass/volume)           

9.2 g/dL            

13.3-17.7        

 

                    Blood hematocrit (volume fraction)           29 %           

     40-54        

 

                    Automated erythrocyte mean corpuscular volume           96 [

foz_us]           

80-99        

 

                                        Automated erythrocyte mean corpuscular h

emoglobin (mass per erythrocyte)        

                          31 pg                     25-34        

 

                                        Automated erythrocyte mean corpuscular h

emoglobin concentration measurement 

(mass/volume)             32 g/dL                   32-36        

 

                    Automated erythrocyte distribution width ratio           16.

2 %              10.0-

14.5        

 

                    Automated blood platelet count (count/volume)           146 

10*3/uL           

130-400        

 

                          Automated blood platelet mean volume measurement      

     12.0 [foz_us]        

                                        7.4-10.4        

 

                    Automated blood neutrophils/100 leukocytes           93 %   

             42-75       

 

 

                    Automated blood lymphocytes/100 leukocytes           4 %    

             12-44        

 

                    Blood monocytes/100 leukocytes           3 %                

 0-12        

 

                    Automated blood eosinophils/100 leukocytes           0 %    

             0-10        

 

                    Automated blood basophils/100 leukocytes           0 %      

           0-10        

 

                    Blood neutrophils automated count (number/volume)           

8.9 10*3            

1.8-7.8        

 

                    Blood lymphocytes automated count (number/volume)           

0.3 10*3            

1.0-4.0        

 

                    Blood monocytes automated count (number/volume)           0.

3 10*3            

0.0-1.0        

 

                    Automated eosinophil count           0.0 10*3/uL           0

.0-0.3        

 

                    Automated blood basophil count (count/volume)           0.0 

10*3/uL           

0.0-0.1        

 

                                        Arterial blood gas measurement - 

0 03:18         

 

                    Blood pCO2           45 mm[Hg]           35-45        

 

                    Blood pO2           77 mm[Hg]           79-93        

 

                          Arterial blood bicarbonate measurement (moles/volume) 

          30 mmol/L       

                                        23-27        

 

                    Arterial blood base excess by calculation           5.9 mmol

/L           

-2.5-2.5        

 

                    Arterial blood oxygen saturation measurement           96 % 

                   

    

 

                    * Inhaled oxygen flow rate           60                  NRG

        

 

                          Arterial blood pH measurement with patient temperature

 correction           7.44

                                        7.37-7.43        

 

                          Arterial blood carbon dioxide, total measurement (mole

s/volume)           31.6 

mmol/L                                  21.0-31.0        

 

                    Body site           RIGHT ARTLINE            NRG        

 

                          Assessment of wrist artery patency prior to arterial p

uncture           POSITIVE

                                        NRG        

 

                    Setting of ventilation mode           YES                 NR

G        

 

                    Measurement of body temperature           36.4              

  NRG        

 

                                        Whole blood basic metabolic panel -  03:18         

 

                          Serum or plasma sodium measurement (moles/volume)     

      134 mmol/L          

                                        135-145        

 

                          Serum or plasma potassium measurement (moles/volume)  

         4.2 mmol/L       

                                        3.6-5.0        

 

                          Serum or plasma chloride measurement (moles/volume)   

        98 mmol/L         

                                                

 

                    Carbon dioxide           27 mmol/L           21-32        

 

                          Serum or plasma anion gap determination (moles/volume)

           9 mmol/L       

                                        5-14        

 

                          Serum or plasma urea nitrogen measurement (mass/volume

)           49 mg/dL      

                                        7-18        

 

                          Serum or plasma creatinine measurement (mass/volume)  

         0.85 mg/dL       

                                        0.60-1.30        

 

                    Serum or plasma urea nitrogen/creatinine mass ratio         

  58                  NRG 

       

 

                                        Serum or plasma creatinine measurement w

ith calculation of estimated glomerular 

filtration rate           >                         NRG        

 

                    Serum or plasma glucose measurement (mass/volume)           

224 mg/dL           

        

 

                    Serum or plasma calcium measurement (mass/volume)           

7.9 mg/dL           

8.5-10.1        

 

                                        Serum or plasma phosphate measurement (m

ass/volume) - 20 03:18         

 

                          Serum or plasma phosphate measurement (mass/volume)   

        3.5 mg/dL         

                                        2.3-4.7        

 

                                        Magnesium - 20 03:18         

 

                    Magnesium           2.0 mg/dL           1.6-2.4        

 

                                        Serum or plasma lithium measurement (mol

es/volume) - 20 03:18         

 

                    BNP PT              87.6 pg/mL           <100.0        

 

                                        Capillary blood glucose measurement by g

lucometer (mass/volume) - 20 06:11

         

 

                          Capillary blood glucose measurement by glucometer (mas

s/volume)           204 

mg/dL                                           

 

                                        Capillary blood glucose measurement by g

lucometer (mass/volume) - 20 10:36

         

 

                          Capillary blood glucose measurement by glucometer (mas

s/volume)           167 

mg/dL                                           

 

                                        Capillary blood glucose measurement by g

lucometer (mass/volume) - 20 14:33

         

 

                          Capillary blood glucose measurement by glucometer (mas

s/volume)           183 

mg/dL                                           

 

                                        Capillary blood glucose measurement by g

lucometer (mass/volume) - 20 17:41

         

 

                          Capillary blood glucose measurement by glucometer (mas

s/volume)           167 

mg/dL                                           

 

                                        Capillary blood glucose measurement by g

lucometer (mass/volume) - 20 20:10

         

 

                          Capillary blood glucose measurement by glucometer (mas

s/volume)           100 

mg/dL                                           

 

                                        Capillary blood glucose measurement by g

lucometer (mass/volume) - 20 01:50

         

 

                          Capillary blood glucose measurement by glucometer (mas

s/volume)           224 

mg/dL                                           

 

                                        Complete blood count (CBC) with automate

d white blood cell (WBC) differential - 

20 02:45         

 

                          Blood leukocytes automated count (number/volume)      

     8.7 10*3/uL          

                                        4.3-11.0        

 

                          Blood erythrocytes automated count (number/volume)    

       3.06 10*6/uL       

                                        4.35-5.85        

 

                    Venous blood hemoglobin measurement (mass/volume)           

9.3 g/dL            

13.3-17.7        

 

                    Blood hematocrit (volume fraction)           29 %           

     40-54        

 

                    Automated erythrocyte mean corpuscular volume           95 [

foz_us]           

80-99        

 

                                        Automated erythrocyte mean corpuscular h

emoglobin (mass per erythrocyte)        

                          30 pg                     25-34        

 

                                        Automated erythrocyte mean corpuscular h

emoglobin concentration measurement 

(mass/volume)             32 g/dL                   32-36        

 

                    Automated erythrocyte distribution width ratio           16.

6 %              10.0-

14.5        

 

                    Automated blood platelet count (count/volume)           131 

10*3/uL           

130-400        

 

                          Automated blood platelet mean volume measurement      

     11.8 [foz_us]        

                                        7.4-10.4        

 

                    Automated blood neutrophils/100 leukocytes           93 %   

             42-75       

 

 

                    Automated blood lymphocytes/100 leukocytes           4 %    

             12-44        

 

                    Blood monocytes/100 leukocytes           4 %                

 0-12        

 

                    Automated blood eosinophils/100 leukocytes           0 %    

             0-10        

 

                    Automated blood basophils/100 leukocytes           0 %      

           0-10        

 

                    Blood neutrophils automated count (number/volume)           

8.0 10*3            

1.8-7.8        

 

                    Blood lymphocytes automated count (number/volume)           

0.3 10*3            

1.0-4.0        

 

                    Blood monocytes automated count (number/volume)           0.

3 10*3            

0.0-1.0        

 

                    Automated eosinophil count           0.0 10*3/uL           0

.0-0.3        

 

                    Automated blood basophil count (count/volume)           0.0 

10*3/uL           

0.0-0.1        

 

                                        Whole blood basic metabolic panel -  02:45         

 

                          Serum or plasma sodium measurement (moles/volume)     

      133 mmol/L          

                                        135-145        

 

                          Serum or plasma potassium measurement (moles/volume)  

         4.1 mmol/L       

                                        3.6-5.0        

 

                          Serum or plasma chloride measurement (moles/volume)   

        98 mmol/L         

                                                

 

                    Carbon dioxide           27 mmol/L           21-32        

 

                          Serum or plasma anion gap determination (moles/volume)

           8 mmol/L       

                                        5-14        

 

                          Serum or plasma urea nitrogen measurement (mass/volume

)           50 mg/dL      

                                        7-18        

 

                          Serum or plasma creatinine measurement (mass/volume)  

         0.82 mg/dL       

                                        0.60-1.30        

 

                    Serum or plasma urea nitrogen/creatinine mass ratio         

  61                  NRG 

       

 

                                        Serum or plasma creatinine measurement w

ith calculation of estimated glomerular 

filtration rate           >                         NRG        

 

                    Serum or plasma glucose measurement (mass/volume)           

215 mg/dL           

        

 

                    Serum or plasma calcium measurement (mass/volume)           

8.0 mg/dL           

8.5-10.1        

 

                                        Serum or plasma phosphate measurement (m

ass/volume) - 20 02:45         

 

                          Serum or plasma phosphate measurement (mass/volume)   

        3.1 mg/dL         

                                        2.3-4.7        

 

                                        Magnesium - 20 02:45         

 

                    Magnesium           2.1 mg/dL           1.6-2.4        

 

                                        Arterial blood gas measurement - 

0 03:10         

 

                    Blood pCO2           46 mm[Hg]           35-45        

 

                    Blood pO2           70 mm[Hg]           79-93        

 

                          Arterial blood bicarbonate measurement (moles/volume) 

          31 mmol/L       

                                        23-27        

 

                    Arterial blood base excess by calculation           6.5 mmol

/L           

-2.5-2.5        

 

                    Arterial blood oxygen saturation measurement           95 % 

                   

    

 

                    * Inhaled oxygen flow rate           40                  NRG

        

 

                          Arterial blood pH measurement with patient temperature

 correction           7.44

                                        7.37-7.43        

 

                          Arterial blood carbon dioxide, total measurement (mole

s/volume)           32.2 

mmol/L                                  21.0-31.0        

 

                    Body site           RIGHT RADIAL            NRG        

 

                          Assessment of wrist artery patency prior to arterial p

uncture           POSITIVE

                                        NRG        

 

                    Setting of ventilation mode           YES                 NR

G        

 

                    Measurement of body temperature           36.6              

  NRG        

 

                                        Capillary blood glucose measurement by g

lucometer (mass/volume) - 20 09:34

         

 

                          Capillary blood glucose measurement by glucometer (mas

s/volume)           81 

mg/dL                                           

 

                                        Capillary blood glucose measurement by g

lucometer (mass/volume) - 20 13:54

         

 

                          Capillary blood glucose measurement by glucometer (mas

s/volume)           279 

mg/dL                                           

 

                                        Capillary blood glucose measurement by g

lucometer (mass/volume) - 20 17:52

         

 

                          Capillary blood glucose measurement by glucometer (mas

s/volume)           205 

mg/dL                                           

 

                                        Capillary blood glucose measurement by g

lucometer (mass/volume) - 20 20:51

         

 

                          Capillary blood glucose measurement by glucometer (mas

s/volume)           194 

mg/dL                                           

 

                                        Capillary blood glucose measurement by g

lucometer (mass/volume) - 20 02:07

         

 

                          Capillary blood glucose measurement by glucometer (mas

s/volume)           136 

mg/dL                                           

 

                                        Arterial blood gas measurement - 

0 02:50         

 

                    Blood pCO2           43 mm[Hg]           35-45        

 

                    Blood pO2           61 mm[Hg]           79-93        

 

                          Arterial blood bicarbonate measurement (moles/volume) 

          31 mmol/L       

                                        23-27        

 

                    Arterial blood base excess by calculation           7.1 mmol

/L           

-2.5-2.5        

 

                    Arterial blood oxygen saturation measurement           94 % 

                   

    

 

                    * Inhaled oxygen flow rate           35                  NRG

        

 

                          Arterial blood pH measurement with patient temperature

 correction           7.47

                                        7.37-7.43        

 

                          Arterial blood carbon dioxide, total measurement (mole

s/volume)           32.4 

mmol/L                                  21.0-31.0        

 

                    Body site           RIGHT RADIAL            NRG        

 

                          Assessment of wrist artery patency prior to arterial p

uncture           POSITIVE

                                        NRG        

 

                    Setting of ventilation mode           YES                 NR

G        

 

                    Measurement of body temperature           36.6              

  NRG        

 

                                        Complete blood count (CBC) with automate

d white blood cell (WBC) differential - 

20 02:50         

 

                          Blood leukocytes automated count (number/volume)      

     7.4 10*3/uL          

                                        4.3-11.0        

 

                          Blood erythrocytes automated count (number/volume)    

       3.12 10*6/uL       

                                        4.35-5.85        

 

                    Venous blood hemoglobin measurement (mass/volume)           

9.4 g/dL            

13.3-17.7        

 

                    Blood hematocrit (volume fraction)           30 %           

     40-54        

 

                    Automated erythrocyte mean corpuscular volume           95 [

foz_us]           

80-99        

 

                                        Automated erythrocyte mean corpuscular h

emoglobin (mass per erythrocyte)        

                          30 pg                     25-34        

 

                                        Automated erythrocyte mean corpuscular h

emoglobin concentration measurement 

(mass/volume)             32 g/dL                   32-36        

 

                    Automated erythrocyte distribution width ratio           16.

4 %              10.0-

14.5        

 

                    Automated blood platelet count (count/volume)           115 

10*3/uL           

130-400        

 

                          Automated blood platelet mean volume measurement      

     12.1 [foz_us]        

                                        7.4-10.4        

 

                    Automated blood neutrophils/100 leukocytes           94 %   

             42-75       

 

 

                    Automated blood lymphocytes/100 leukocytes           3 %    

             12-44        

 

                    Blood monocytes/100 leukocytes           3 %                

 0-12        

 

                    Automated blood eosinophils/100 leukocytes           0 %    

             0-10        

 

                    Automated blood basophils/100 leukocytes           0 %      

           0-10        

 

                    Blood neutrophils automated count (number/volume)           

7.0 10*3            

1.8-7.8        

 

                    Blood lymphocytes automated count (number/volume)           

0.2 10*3            

1.0-4.0        

 

                    Blood monocytes automated count (number/volume)           0.

3 10*3            

0.0-1.0        

 

                    Automated eosinophil count           0.0 10*3/uL           0

.0-0.3        

 

                    Automated blood basophil count (count/volume)           0.0 

10*3/uL           

0.0-0.1        

 

                                        Whole blood basic metabolic panel -  02:50         

 

                          Serum or plasma sodium measurement (moles/volume)     

      136 mmol/L          

                                        135-145        

 

                          Serum or plasma potassium measurement (moles/volume)  

         4.5 mmol/L       

                                        3.6-5.0        

 

                          Serum or plasma chloride measurement (moles/volume)   

        100 mmol/L        

                                                

 

                    Carbon dioxide           27 mmol/L           21-32        

 

                          Serum or plasma anion gap determination (moles/volume)

           9 mmol/L       

                                        5-14        

 

                          Serum or plasma urea nitrogen measurement (mass/volume

)           44 mg/dL      

                                        7-18        

 

                          Serum or plasma creatinine measurement (mass/volume)  

         0.76 mg/dL       

                                        0.60-1.30        

 

                    Serum or plasma urea nitrogen/creatinine mass ratio         

  58                  NRG 

       

 

                                        Serum or plasma creatinine measurement w

ith calculation of estimated glomerular 

filtration rate           >                         NRG        

 

                    Serum or plasma glucose measurement (mass/volume)           

137 mg/dL           

        

 

                    Serum or plasma calcium measurement (mass/volume)           

8.3 mg/dL           

8.5-10.1        

 

                                        Serum or plasma phosphate measurement (m

ass/volume) - 20 02:50         

 

                          Serum or plasma phosphate measurement (mass/volume)   

        3.2 mg/dL         

                                        2.3-4.7        

 

                                        Magnesium - 20 02:50         

 

                    Magnesium           2.2 mg/dL           1.6-2.4        

 

                                        Arterial blood gas measurement - 

0 04:42         

 

                    Blood pCO2           39 mm[Hg]           35-45        

 

                    Blood pO2           76 mm[Hg]           79-93        

 

                          Arterial blood bicarbonate measurement (moles/volume) 

          28 mmol/L       

                                        23-27        

 

                    Arterial blood base excess by calculation           4.6 mmol

/L           

-2.5-2.5        

 

                    Arterial blood oxygen saturation measurement           97 % 

                   

    

 

                    * Inhaled oxygen flow rate           35                  NRG

        

 

                          Arterial blood pH measurement with patient temperature

 correction           7.47

                                        7.37-7.43        

 

                          Arterial blood carbon dioxide, total measurement (mole

s/volume)           29.5 

mmol/L                                  21.0-31.0        

 

                    Body site           RT RADIAL ARTLINE            NRG        

 

                          Assessment of wrist artery patency prior to arterial p

uncture           POSITIVE

                                        NRG        

 

                    Setting of ventilation mode           YES                 NR

G        

 

                    Measurement of body temperature           36.7              

  NRG        

 

                                        Sputum Gram stain - 20 06:39      

   

 

                    Sputum Gram stain           Mixed Bacterial [place]           

 NRG        

 

                                        Bacterial sputum culture - 20 06:3

9         

 

                    FREE TEXT EXTERNAL           CLINDAMYCIN RESISTANT WITHOUT I

NDUCTION            

NRG        

 

                    QUANTITY OF GROWTH           Many                NRG        

 

                    FREE TEXT ENTRY 2           RESISTANT ORGANISM/CONTACT PRECA

UTIONS.            

NRG        

 

                    FREE TEXT ENTRY 3           REPORTED MRSA TO [facility]  

           NRG      

  

 

                    Bacterial sputum culture           [id number]             NRG  

      

 

                    FREE TEXT ENTRY 4            BY EDIL FLORES            NRG

        

 

                    SUSCEPTIBILITY           SUSCEPTIBILITY REPORTED  12:40  

          NRG       

 

 

                    MRSA SCREEN           MRSA SCREEN BY VCP IS POSITIVE             NRG   

     

 

                    RAPID ID            PRELIM RAPID ID BY VCP         

    NRG        

 

                    MRSA CONFIRMATION           MRSA CONFIRMATION ON  11:09  

          NRG       

 

 

                    ID CONFIRMATION           ID CONFIRMED             

NRG        

 

                                        Dirithromycin susceptibility test by dis

k diffusion - 20 06:39         

 

                          Oxacillin susceptibility test by minimum inhibitory co

ncentration           >   

                                        NRG        

 

                          Clindamycin susceptibility test by minimum inhibitory 

concentration           > 

                                        NRG        

 

                          Erythromycin susceptibility test by minimum inhibitory

 concentration           >

                                        NRG        

 

                                        Trimethoprim/sulfamethoxazole susceptibi

lity test by minimum 

inhibitoryconcentration           <=                        NRG        

 

                          Vancomycin susceptibility test by minimum inhibitory c

oncentration           1  

                                        NRG        

 

                          Levofloxacin susceptibility test by minimum inhibitory

 concentration           >

                                        NRG        

 

                          Rifampin susceptibility test by minimum inhibitory con

centration           <=   

                                        NRG        

 

                          Cefazolin susceptibility test by minimum inhibitory co

ncentration           >   

                                        NRG        

 

                          Linezolid susceptibility test by minimum inhibitory co

ncentration           2   

                                        NRG        

 

                          Penicillin G susceptibility test by minimum inhibitory

 concentration           >

                                        NRG        

 

                          Moxifloxacin susceptibility test by minimum inhibitory

 concentration           2

                                        NRG        

 

                    Minocycline susc KODI           <=                  NRG      

  

 

                                        Capillary blood glucose measurement by g

lucometer (mass/volume) - 20 09:59

         

 

                          Capillary blood glucose measurement by glucometer (mas

s/volume)           159 

mg/dL                                           

 

                                        Capillary blood glucose measurement by g

lucometer (mass/volume) - 20 14:36

         

 

                          Capillary blood glucose measurement by glucometer (mas

s/volume)           153 

mg/dL                                           

 

                                        Capillary blood glucose measurement by g

lucometer (mass/volume) - 20 17:29

         

 

                          Capillary blood glucose measurement by glucometer (mas

s/volume)           167 

mg/dL                                           

 

                                        Capillary blood glucose measurement by g

lucometer (mass/volume) - 20 21:14

         

 

                          Capillary blood glucose measurement by glucometer (mas

s/volume)           239 

mg/dL                                           

 

                                        Complete blood count (CBC) with automate

d white blood cell (WBC) differential - 

20 02:38         

 

                          Blood leukocytes automated count (number/volume)      

     11.0 10*3/uL         

                                        4.3-11.0        

 

                          Blood erythrocytes automated count (number/volume)    

       3.20 10*6/uL       

                                        4.35-5.85        

 

                    Venous blood hemoglobin measurement (mass/volume)           

9.8 g/dL            

13.3-17.7        

 

                    Blood hematocrit (volume fraction)           30 %           

     40-54        

 

                    Automated erythrocyte mean corpuscular volume           95 [

foz_us]           

80-99        

 

                                        Automated erythrocyte mean corpuscular h

emoglobin (mass per erythrocyte)        

                          31 pg                     25-34        

 

                                        Automated erythrocyte mean corpuscular h

emoglobin concentration measurement 

(mass/volume)             32 g/dL                   32-36        

 

                    Automated erythrocyte distribution width ratio           17.

1 %              10.0-

14.5        

 

                    Automated blood platelet count (count/volume)           126 

10*3/uL           

130-400        

 

                          Automated blood platelet mean volume measurement      

     12.0 [foz_us]        

                                        7.4-10.4        

 

                    Automated blood neutrophils/100 leukocytes           93 %   

             42-75       

 

 

                    Automated blood lymphocytes/100 leukocytes           4 %    

             12-44        

 

                    Blood monocytes/100 leukocytes           3 %                

 0-12        

 

                    Automated blood eosinophils/100 leukocytes           0 %    

             0-10        

 

                    Automated blood basophils/100 leukocytes           0 %      

           0-10        

 

                    Blood neutrophils automated count (number/volume)           

10.2 10*3           

1.8-7.8        

 

                    Blood lymphocytes automated count (number/volume)           

0.4 10*3            

1.0-4.0        

 

                    Blood monocytes automated count (number/volume)           0.

4 10*3            

0.0-1.0        

 

                    Automated eosinophil count           0.0 10*3/uL           0

.0-0.3        

 

                    Automated blood basophil count (count/volume)           0.0 

10*3/uL           

0.0-0.1        

 

                                        Manual absolute plasma cell count - 04/3

0/20 02:38         

 

                    Blood monocytes/100 leukocytes           2 %                

 NRG        

 

                    Manual blood segmented neutrophils/100 leukocytes           

88 %                NRG  

      

 

                    Blood band neutrophils/100 leukocytes           5 %         

        NRG        

 

                    Manual blood lymphocytes/100 leukocytes           4 %       

          NRG        

 

                    Manual eosinophils/100 leukocytes in nose           1 %     

            NRG        

 

                    Blood smudge cells detection by light microscopy           M

OD                 NRG   

     

 

                    Blood anisocytosis detection by light microscopy           M

ODERATE            

NRG        

 

                                        Whole blood basic metabolic panel - 04/3

0/20 02:38         

 

                          Serum or plasma sodium measurement (moles/volume)     

      135 mmol/L          

                                        135-145        

 

                          Serum or plasma potassium measurement (moles/volume)  

         4.4 mmol/L       

                                        3.6-5.0        

 

                          Serum or plasma chloride measurement (moles/volume)   

        99 mmol/L         

                                                

 

                    Carbon dioxide           27 mmol/L           21-32        

 

                          Serum or plasma anion gap determination (moles/volume)

           9 mmol/L       

                                        5-14        

 

                          Serum or plasma urea nitrogen measurement (mass/volume

)           34 mg/dL      

                                        7-18        

 

                          Serum or plasma creatinine measurement (mass/volume)  

         0.78 mg/dL       

                                        0.60-1.30        

 

                    Serum or plasma urea nitrogen/creatinine mass ratio         

  44                  NRG 

       

 

                                        Serum or plasma creatinine measurement w

ith calculation of estimated glomerular 

filtration rate           >                         NRG        

 

                    Serum or plasma glucose measurement (mass/volume)           

140 mg/dL           

        

 

                    Serum or plasma calcium measurement (mass/volume)           

8.4 mg/dL           

8.5-10.1        

 

                                        Serum or plasma phosphate measurement (m

ass/volume) - 20 02:38         

 

                          Serum or plasma phosphate measurement (mass/volume)   

        2.3 mg/dL         

                                        2.3-4.7        

 

                                        Magnesium - 20 02:38         

 

                    Magnesium           2.0 mg/dL           1.6-2.4        

 

                                        Capillary blood glucose measurement by g

lucometer (mass/volume) - 20 11:30

         

 

                          Capillary blood glucose measurement by glucometer (mas

s/volume)           291 

mg/dL                                           

 

                                        Capillary blood glucose measurement by g

lucometer (mass/volume) - 20 15:55

         

 

                          Capillary blood glucose measurement by glucometer (mas

s/volume)           189 

mg/dL                                           

 

                                        Capillary blood glucose measurement by g

lucometer (mass/volume) - 20 21:30

         

 

                          Capillary blood glucose measurement by glucometer (mas

s/volume)           128 

mg/dL                                           

 

                                        Complete blood count (CBC) with automate

d white blood cell (WBC) differential - 

20 05:28         

 

                          Blood leukocytes automated count (number/volume)      

     7.9 10*3/uL          

                                        4.3-11.0        

 

                          Blood erythrocytes automated count (number/volume)    

       3.24 10*6/uL       

                                        4.35-5.85        

 

                    Venous blood hemoglobin measurement (mass/volume)           

9.8 g/dL            

13.3-17.7        

 

                    Blood hematocrit (volume fraction)           31 %           

     40-54        

 

                    Automated erythrocyte mean corpuscular volume           94 [

foz_us]           

80-99        

 

                                        Automated erythrocyte mean corpuscular h

emoglobin (mass per erythrocyte)        

                          30 pg                     25-34        

 

                                        Automated erythrocyte mean corpuscular h

emoglobin concentration measurement 

(mass/volume)             32 g/dL                   32-36        

 

                    Automated erythrocyte distribution width ratio           16.

5 %              10.0-

14.5        

 

                    Automated blood platelet count (count/volume)           115 

10*3/uL           

130-400        

 

                          Automated blood platelet mean volume measurement      

     12.1 [foz_us]        

                                        7.4-10.4        

 

                    Automated blood neutrophils/100 leukocytes           91 %   

             42-75       

 

 

                    Automated blood lymphocytes/100 leukocytes           6 %    

             12-44        

 

                    Blood monocytes/100 leukocytes           4 %                

 0-12        

 

                    Automated blood eosinophils/100 leukocytes           0 %    

             0-10        

 

                    Automated blood basophils/100 leukocytes           0 %      

           0-10        

 

                    Blood neutrophils automated count (number/volume)           

7.2 10*3            

1.8-7.8        

 

                    Blood lymphocytes automated count (number/volume)           

0.4 10*3            

1.0-4.0        

 

                    Blood monocytes automated count (number/volume)           0.

3 10*3            

0.0-1.0        

 

                    Automated eosinophil count           0.0 10*3/uL           0

.0-0.3        

 

                    Automated blood basophil count (count/volume)           0.0 

10*3/uL           

0.0-0.1        

 

                                        Whole blood basic metabolic panel - 05/0

 05:28         

 

                          Serum or plasma sodium measurement (moles/volume)     

      137 mmol/L          

                                        135-145        

 

                          Serum or plasma potassium measurement (moles/volume)  

         4.3 mmol/L       

                                        3.6-5.0        

 

                          Serum or plasma chloride measurement (moles/volume)   

        100 mmol/L        

                                                

 

                    Carbon dioxide           26 mmol/L           21-32        

 

                          Serum or plasma anion gap determination (moles/volume)

           11 mmol/L      

                                        5-14        

 

                          Serum or plasma urea nitrogen measurement (mass/volume

)           34 mg/dL      

                                        7-18        

 

                          Serum or plasma creatinine measurement (mass/volume)  

         0.76 mg/dL       

                                        0.60-1.30        

 

                    Serum or plasma urea nitrogen/creatinine mass ratio         

  45                  NRG 

       

 

                                        Serum or plasma creatinine measurement w

ith calculation of estimated glomerular 

filtration rate           >                         NRG        

 

                    Serum or plasma glucose measurement (mass/volume)           

179 mg/dL           

        

 

                    Serum or plasma calcium measurement (mass/volume)           

8.3 mg/dL           

8.5-10.1        

 

                                        Serum or plasma phosphate measurement (m

ass/volume) - 20 05:28         

 

                          Serum or plasma phosphate measurement (mass/volume)   

        3.2 mg/dL         

                                        2.3-4.7        

 

                                        Magnesium - 20 05:28         

 

                    Magnesium           1.8 mg/dL           1.6-2.4        

 

                                        Capillary blood glucose measurement by g

lucometer (mass/volume) - 20 12:26

         

 

                          Capillary blood glucose measurement by glucometer (mas

s/volume)           152 

mg/dL                                           

 

                                        Capillary blood glucose measurement by g

lucometer (mass/volume) - 20 15:52

         

 

                          Capillary blood glucose measurement by glucometer (mas

s/volume)           174 

mg/dL                                           

 

                                        Capillary blood glucose measurement by g

lucometer (mass/volume) - 20 20:48

         

 

                          Capillary blood glucose measurement by glucometer (mas

s/volume)           132 

mg/dL                                           

 

                                        Complete blood count (CBC) with automate

d white blood cell (WBC) differential - 

20 05:00         

 

                          Blood leukocytes automated count (number/volume)      

     8.8 10*3/uL          

                                        4.3-11.0        

 

                          Blood erythrocytes automated count (number/volume)    

       3.30 10*6/uL       

                                        4.35-5.85        

 

                    Venous blood hemoglobin measurement (mass/volume)           

9.9 g/dL            

13.3-17.7        

 

                    Blood hematocrit (volume fraction)           31 %           

     40-54        

 

                    Automated erythrocyte mean corpuscular volume           94 [

foz_us]           

80-99        

 

                                        Automated erythrocyte mean corpuscular h

emoglobin (mass per erythrocyte)        

                          30 pg                     25-34        

 

                                        Automated erythrocyte mean corpuscular h

emoglobin concentration measurement 

(mass/volume)             32 g/dL                   32-36        

 

                    Automated erythrocyte distribution width ratio           16.

4 %              10.0-

14.5        

 

                    Automated blood platelet count (count/volume)           102 

10*3/uL           

130-400        

 

                          Automated blood platelet mean volume measurement      

     12.3 [foz_us]        

                                        7.4-10.4        

 

                    Automated blood neutrophils/100 leukocytes           83 %   

             42-75       

 

 

                    Automated blood lymphocytes/100 leukocytes           12 %   

             12-44       

 

 

                    Blood monocytes/100 leukocytes           5 %                

 0-12        

 

                    Automated blood eosinophils/100 leukocytes           0 %    

             0-10        

 

                    Automated blood basophils/100 leukocytes           0 %      

           0-10        

 

                    Blood neutrophils automated count (number/volume)           

7.3 10*3            

1.8-7.8        

 

                    Blood lymphocytes automated count (number/volume)           

1.0 10*3            

1.0-4.0        

 

                    Blood monocytes automated count (number/volume)           0.

4 10*3            

0.0-1.0        

 

                    Automated eosinophil count           0.0 10*3/uL           0

.0-0.3        

 

                    Automated blood basophil count (count/volume)           0.0 

10*3/uL           

0.0-0.1        

 

                                        Whole blood basic metabolic panel -  05:00         

 

                          Serum or plasma sodium measurement (moles/volume)     

      135 mmol/L          

                                        135-145        

 

                          Serum or plasma potassium measurement (moles/volume)  

         4.1 mmol/L       

                                        3.6-5.0        

 

                          Serum or plasma chloride measurement (moles/volume)   

        100 mmol/L        

                                                

 

                    Carbon dioxide           25 mmol/L           21-32        

 

                          Serum or plasma anion gap determination (moles/volume)

           10 mmol/L      

                                        5-14        

 

                          Serum or plasma urea nitrogen measurement (mass/volume

)           29 mg/dL      

                                        7-18        

 

                          Serum or plasma creatinine measurement (mass/volume)  

         0.74 mg/dL       

                                        0.60-1.30        

 

                    Serum or plasma urea nitrogen/creatinine mass ratio         

  39                  NRG 

       

 

                                        Serum or plasma creatinine measurement w

ith calculation of estimated glomerular 

filtration rate           >                         NRG        

 

                    Serum or plasma glucose measurement (mass/volume)           

137 mg/dL           

        

 

                    Serum or plasma calcium measurement (mass/volume)           

8.2 mg/dL           

8.5-10.1        

 

                                        Serum or plasma phosphate measurement (m

ass/volume) - 20 05:00         

 

                          Serum or plasma phosphate measurement (mass/volume)   

        3.0 mg/dL         

                                        2.3-4.7        

 

                                        Magnesium - 20 05:00         

 

                    Magnesium           1.7 mg/dL           1.6-2.4        

 

                                        Capillary blood glucose measurement by g

lucometer (mass/volume) - 20 10:59

         

 

                          Capillary blood glucose measurement by glucometer (mas

s/volume)           94 

mg/dL                                           

 

                                        Capillary blood glucose measurement by g

lucometer (mass/volume) - 20 16:01

         

 

                          Capillary blood glucose measurement by glucometer (mas

s/volume)           232 

mg/dL                                           

 

                                        Capillary blood glucose measurement by g

lucometer (mass/volume) - 20 21:15

         

 

                          Capillary blood glucose measurement by glucometer (mas

s/volume)           97 

mg/dL                                           

 

                                        Complete blood count (CBC) with automate

d white blood cell (WBC) differential - 

20 05:20         

 

                          Blood leukocytes automated count (number/volume)      

     10.2 10*3/uL         

                                        4.3-11.0        

 

                          Blood erythrocytes automated count (number/volume)    

       3.23 10*6/uL       

                                        4.35-5.85        

 

                    Venous blood hemoglobin measurement (mass/volume)           

9.8 g/dL            

13.3-17.7        

 

                    Blood hematocrit (volume fraction)           30 %           

     40-54        

 

                    Automated erythrocyte mean corpuscular volume           94 [

foz_us]           

80-99        

 

                                        Automated erythrocyte mean corpuscular h

emoglobin (mass per erythrocyte)        

                          30 pg                     25-34        

 

                                        Automated erythrocyte mean corpuscular h

emoglobin concentration measurement 

(mass/volume)             32 g/dL                   32-36        

 

                    Automated erythrocyte distribution width ratio           16.

1 %              10.0-

14.5        

 

                    Automated blood platelet count (count/volume)           100 

10*3/uL           

130-400        

 

                          Automated blood platelet mean volume measurement      

     11.8 [foz_us]        

                                        7.4-10.4        

 

                    Automated blood neutrophils/100 leukocytes           81 %   

             42-75       

 

 

                    Automated blood lymphocytes/100 leukocytes           14 %   

             12-44       

 

 

                    Blood monocytes/100 leukocytes           4 %                

 0-12        

 

                    Automated blood eosinophils/100 leukocytes           1 %    

             0-10        

 

                    Automated blood basophils/100 leukocytes           0 %      

           0-10        

 

                    Blood neutrophils automated count (number/volume)           

8.2 10*3            

1.8-7.8        

 

                    Blood lymphocytes automated count (number/volume)           

1.4 10*3            

1.0-4.0        

 

                    Blood monocytes automated count (number/volume)           0.

5 10*3            

0.0-1.0        

 

                    Automated eosinophil count           0.1 10*3/uL           0

.0-0.3        

 

                    Automated blood basophil count (count/volume)           0.0 

10*3/uL           

0.0-0.1        

 

                                        Whole blood basic metabolic panel - 05/0

3/20 05:20         

 

                          Serum or plasma sodium measurement (moles/volume)     

      137 mmol/L          

                                        135-145        

 

                          Serum or plasma potassium measurement (moles/volume)  

         3.6 mmol/L       

                                        3.6-5.0        

 

                          Serum or plasma chloride measurement (moles/volume)   

        101 mmol/L        

                                                

 

                    Carbon dioxide           26 mmol/L           21-32        

 

                          Serum or plasma anion gap determination (moles/volume)

           10 mmol/L      

                                        5-14        

 

                          Serum or plasma urea nitrogen measurement (mass/volume

)           24 mg/dL      

                                        7-18        

 

                          Serum or plasma creatinine measurement (mass/volume)  

         0.65 mg/dL       

                                        0.60-1.30        

 

                    Serum or plasma urea nitrogen/creatinine mass ratio         

  37                  NRG 

       

 

                                        Serum or plasma creatinine measurement w

ith calculation of estimated glomerular 

filtration rate           >                         NRG        

 

                    Serum or plasma glucose measurement (mass/volume)           

49 mg/dL            

        

 

                    Serum or plasma calcium measurement (mass/volume)           

8.1 mg/dL           

8.5-10.1        

 

                                        Serum or plasma phosphate measurement (m

ass/volume) - 20 05:20         

 

                          Serum or plasma phosphate measurement (mass/volume)   

        2.7 mg/dL         

                                        2.3-4.7        

 

                                        Magnesium - 20 05:20         

 

                    Magnesium           1.7 mg/dL           1.6-2.4        

 

                                        Capillary blood glucose measurement by g

lucometer (mass/volume) - 20 06:02

         

 

                          Capillary blood glucose measurement by glucometer (mas

s/volume)           56 

mg/dL                                           

 

                                        Capillary blood glucose measurement by g

lucometer (mass/volume) - 20 06:20

         

 

                          Capillary blood glucose measurement by glucometer (mas

s/volume)           83 

mg/dL                                           

 

                                        Capillary blood glucose measurement by g

lucometer (mass/volume) - 20 06:49

         

 

                          Capillary blood glucose measurement by glucometer (mas

s/volume)           107 

mg/dL                                           

 

                                        Capillary blood glucose measurement by g

lucometer (mass/volume) - 20 11:27

         

 

                          Capillary blood glucose measurement by glucometer (mas

s/volume)           186 

mg/dL                                           

 

                                        Capillary blood glucose measurement by g

lucometer (mass/volume) - 20 16:16

         

 

                          Capillary blood glucose measurement by glucometer (mas

s/volume)           347 

mg/dL                                           

 

                                        Capillary blood glucose measurement by g

lucometer (mass/volume) - 20 20:15

         

 

                          Capillary blood glucose measurement by glucometer (mas

s/volume)           213 

mg/dL                                           

 

                                        Vancomycin trough - 20 21:00      

   

 

                    Vancomycin trough           26.5 ug/mL           10.0-20.0  

      

 

                                        Capillary blood glucose measurement by g

lucometer (mass/volume) - 20 03:20

         

 

                          Capillary blood glucose measurement by glucometer (mas

s/volume)           121 

mg/dL                                           

 

                                        Complete blood count (CBC) with automate

d white blood cell (WBC) differential - 

20 03:20         

 

                          Blood leukocytes automated count (number/volume)      

     9.2 10*3/uL          

                                        4.3-11.0        

 

                          Blood erythrocytes automated count (number/volume)    

       3.09 10*6/uL       

                                        4.35-5.85        

 

                    Venous blood hemoglobin measurement (mass/volume)           

9.3 g/dL            

13.3-17.7        

 

                    Blood hematocrit (volume fraction)           29 %           

     40-54        

 

                    Automated erythrocyte mean corpuscular volume           95 [

foz_us]           

80-99        

 

                                        Automated erythrocyte mean corpuscular h

emoglobin (mass per erythrocyte)        

                          30 pg                     25-34        

 

                                        Automated erythrocyte mean corpuscular h

emoglobin concentration measurement 

(mass/volume)             32 g/dL                   32-36        

 

                    Automated erythrocyte distribution width ratio           16.

5 %              10.0-

14.5        

 

                    Automated blood platelet count (count/volume)           93 1

0*3/uL           

130-400        

 

                          Automated blood platelet mean volume measurement      

     11.7 [foz_us]        

                                        7.4-10.4        

 

                    Automated blood neutrophils/100 leukocytes           80 %   

             42-75       

 

 

                    Automated blood lymphocytes/100 leukocytes           14 %   

             12-44       

 

 

                    Blood monocytes/100 leukocytes           5 %                

 0-12        

 

                    Automated blood eosinophils/100 leukocytes           1 %    

             0-10        

 

                    Automated blood basophils/100 leukocytes           0 %      

           0-10        

 

                    Blood neutrophils automated count (number/volume)           

7.3 10*3            

1.8-7.8        

 

                    Blood lymphocytes automated count (number/volume)           

1.3 10*3            

1.0-4.0        

 

                    Blood monocytes automated count (number/volume)           0.

5 10*3            

0.0-1.0        

 

                    Automated eosinophil count           0.1 10*3/uL           0

.0-0.3        

 

                    Automated blood basophil count (count/volume)           0.0 

10*3/uL           

0.0-0.1        

 

                                        Whole blood basic metabolic panel -  03:20         

 

                          Serum or plasma sodium measurement (moles/volume)     

      136 mmol/L          

                                        135-145        

 

                          Serum or plasma potassium measurement (moles/volume)  

         3.9 mmol/L       

                                        3.6-5.0        

 

                          Serum or plasma chloride measurement (moles/volume)   

        102 mmol/L        

                                                

 

                    Carbon dioxide           26 mmol/L           21-32        

 

                          Serum or plasma anion gap determination (moles/volume)

           8 mmol/L       

                                        5-14        

 

                          Serum or plasma urea nitrogen measurement (mass/volume

)           22 mg/dL      

                                        7-18        

 

                          Serum or plasma creatinine measurement (mass/volume)  

         0.71 mg/dL       

                                        0.60-1.30        

 

                    Serum or plasma urea nitrogen/creatinine mass ratio         

  31                  NRG 

       

 

                                        Serum or plasma creatinine measurement w

ith calculation of estimated glomerular 

filtration rate           >                         NRG        

 

                    Serum or plasma glucose measurement (mass/volume)           

124 mg/dL           

        

 

                    Serum or plasma calcium measurement (mass/volume)           

7.9 mg/dL           

8.5-10.1        

 

                                        Serum or plasma phosphate measurement (m

ass/volume) - 20 03:20         

 

                          Serum or plasma phosphate measurement (mass/volume)   

        2.3 mg/dL         

                                        2.3-4.7        

 

                                        Magnesium - 20 03:20         

 

                    Magnesium           2.5 mg/dL           1.6-2.4        

 

                                        Serum or plasma lithium measurement (mol

es/volume) - 20 03:30         

 

                    BNP PT              70.6 pg/mL           <100.0        

 

                                        Vancomycin trough - 20 03:30      

   

 

                    Vancomycin trough           18.4 ug/mL           10.0-20.0  

      

 

                                        Capillary blood glucose measurement by g

lucometer (mass/volume) - 20 10:56

         

 

                          Capillary blood glucose measurement by glucometer (mas

s/volume)           167 

mg/dL                                           

 

                                        Comprehensive metabolic panel - 20

 00:00         

 

                          Serum or plasma sodium measurement (moles/volume)     

      148 mmol/L          

                                        135-145        

 

                          Serum or plasma potassium measurement (moles/volume)  

         3.7 mmol/L       

                                        3.6-5.0        

 

                          Serum or plasma chloride measurement (moles/volume)   

        112 mmol/L        

                                                

 

                    Carbon dioxide           20 mmol/L           21-32        

 

                          Serum or plasma anion gap determination (moles/volume)

           16 mmol/L      

                                        5-14        

 

                          Serum or plasma urea nitrogen measurement (mass/volume

)           30 mg/dL      

                                        7-18        

 

                          Serum or plasma creatinine measurement (mass/volume)  

         1.00 mg/dL       

                                        0.60-1.30        

 

                    Serum or plasma urea nitrogen/creatinine mass ratio         

  30                  NRG 

       

 

                                        Serum or plasma creatinine measurement w

ith calculation of estimated glomerular 

filtration rate           >                         NRG        

 

                    Serum or plasma glucose measurement (mass/volume)           

110 mg/dL           

        

 

                    Serum or plasma calcium measurement (mass/volume)           

7.8 mg/dL           

8.5-10.1        

 

                          Serum or plasma total bilirubin measurement (mass/volu

me)           0.2 mg/dL   

                                        0.1-1.0        

 

                                        Serum or plasma alkaline phosphatase juarez

surement (enzymatic activity/volume)    

                          100 U/L                           

 

                                        Serum or plasma aspartate aminotransfera

se measurement (enzymatic 

activity/volume)           16 U/L                    5-34        

 

                                        Serum or plasma alanine aminotransferase

 measurement (enzymatic activity/volume)

                          23 U/L                    0-55        

 

                    Serum or plasma protein measurement (mass/volume)           

5.7 g/dL            

6.4-8.2        

 

                    Serum or plasma albumin measurement (mass/volume)           

3.1 g/dL            

3.2-4.5        

 

                    CALCIUM CORRECTED           8.5 mg/dL           8.5-10.1    

    

 

                                        Complete blood count (CBC) with automate

d white blood cell (WBC) differential - 

20 00:00         

 

                          Blood leukocytes automated count (number/volume)      

     7.8 10*3/uL          

                                        4.3-11.0        

 

                          Blood erythrocytes automated count (number/volume)    

       2.73 10*6/uL       

                                        4.35-5.85        

 

                    Venous blood hemoglobin measurement (mass/volume)           

8.4 g/dL            

13.3-17.7        

 

                    Blood hematocrit (volume fraction)           28 %           

     40-54        

 

                    Automated erythrocyte mean corpuscular volume           102 

[foz_us]           

80-99        

 

                                        Automated erythrocyte mean corpuscular h

emoglobin (mass per erythrocyte)        

                          31 pg                     25-34        

 

                                        Automated erythrocyte mean corpuscular h

emoglobin concentration measurement 

(mass/volume)             30 g/dL                   32-36        

 

                    Automated erythrocyte distribution width ratio           20.

4 %              10.0-

14.5        

 

                    Automated blood platelet count (count/volume)           115 

10*3/uL           

130-400        

 

                          Automated blood platelet mean volume measurement      

     14.1 [foz_us]        

                                        7.4-10.4        

 

                    Automated blood neutrophils/100 leukocytes           69 %   

             42-75       

 

 

                    Automated blood lymphocytes/100 leukocytes           21 %   

             12-44       

 

 

                    Blood monocytes/100 leukocytes           7 %                

 0-12        

 

                    Automated blood eosinophils/100 leukocytes           1 %    

             0-10        

 

                    Automated blood basophils/100 leukocytes           1 %      

           0-10        

 

                    Blood neutrophils automated count (number/volume)           

5.4 10*3            

1.8-7.8        

 

                    Blood lymphocytes automated count (number/volume)           

1.6 10*3            

1.0-4.0        

 

                    Blood monocytes automated count (number/volume)           0.

6 10*3            

0.0-1.0        

 

                    Automated eosinophil count           0.1 10*3/uL           0

.0-0.3        

 

                    Automated blood basophil count (count/volume)           0.0 

10*3/uL           

0.0-0.1        

 

                                        Hemoglobin A1c measurement - 20 00

:00         

 

                    Blood hemoglobin A1C measurement (mass/volume)           7.2

 %               4.0-5.6

        

 

                    MEAN BLOOD GLUCOSE           160 %               <=126      

  

 

                                        Capillary blood glucose measurement by g

lucometer (mass/volume) - 20 13:09

         

 

                          Capillary blood glucose measurement by glucometer (mas

s/volume)           77 

mg/dL                                           

 

                                        Comprehensive metabolic panel - 20

 13:13         

 

                          Serum or plasma sodium measurement (moles/volume)     

      146 mmol/L          

                                        135-145        

 

                          Serum or plasma potassium measurement (moles/volume)  

         3.5 mmol/L       

                                        3.6-5.0        

 

                          Serum or plasma chloride measurement (moles/volume)   

        115 mmol/L        

                                                

 

                    Carbon dioxide           20 mmol/L           21-32        

 

                          Serum or plasma anion gap determination (moles/volume)

           11 mmol/L      

                                        5-14        

 

                          Serum or plasma urea nitrogen measurement (mass/volume

)           29 mg/dL      

                                        7-18        

 

                          Serum or plasma creatinine measurement (mass/volume)  

         0.93 mg/dL       

                                        0.60-1.30        

 

                    Serum or plasma urea nitrogen/creatinine mass ratio         

  31                  NRG 

       

 

                                        Serum or plasma creatinine measurement w

ith calculation of estimated glomerular 

filtration rate           >                         NRG        

 

                    Serum or plasma glucose measurement (mass/volume)           

63 mg/dL            

        

 

                    Serum or plasma calcium measurement (mass/volume)           

7.8 mg/dL           

8.5-10.1        

 

                          Serum or plasma total bilirubin measurement (mass/volu

me)           0.2 mg/dL   

                                        0.1-1.0        

 

                                        Serum or plasma alkaline phosphatase juarez

surement (enzymatic activity/volume)    

                          90 U/L                            

 

                                        Serum or plasma aspartate aminotransfera

se measurement (enzymatic 

activity/volume)           16 U/L                    5-34        

 

                                        Serum or plasma alanine aminotransferase

 measurement (enzymatic activity/volume)

                          26 U/L                    0-55        

 

                    Serum or plasma protein measurement (mass/volume)           

6.0 g/dL            

6.4-8.2        

 

                    Serum or plasma albumin measurement (mass/volume)           

3.3 g/dL            

3.2-4.5        

 

                    CALCIUM CORRECTED           8.4 mg/dL           8.5-10.1    

    

 

                                        Complete blood count (CBC) with automate

d white blood cell (WBC) differential - 

20 13:13         

 

                          Blood leukocytes automated count (number/volume)      

     8.6 10*3/uL          

                                        4.3-11.0        

 

                          Blood erythrocytes automated count (number/volume)    

       2.92 10*6/uL       

                                        4.35-5.85        

 

                    Venous blood hemoglobin measurement (mass/volume)           

8.9 g/dL            

13.3-17.7        

 

                    Blood hematocrit (volume fraction)           29 %           

     40-54        

 

                    Automated erythrocyte mean corpuscular volume           100 

[foz_us]           

80-99        

 

                                        Automated erythrocyte mean corpuscular h

emoglobin (mass per erythrocyte)        

                          31 pg                     25-34        

 

                                        Automated erythrocyte mean corpuscular h

emoglobin concentration measurement 

(mass/volume)             31 g/dL                   32-36        

 

                    Automated erythrocyte distribution width ratio           20.

6 %              10.0-

14.5        

 

                    Automated blood platelet count (count/volume)           129 

10*3/uL           

130-400        

 

                    Automated blood platelet mean volume measurement           T

NP                 7.4-

10.4        

 

                    Automated blood neutrophils/100 leukocytes           68 %   

             42-75       

 

 

                    Automated blood lymphocytes/100 leukocytes           23 %   

             12-44       

 

 

                    Blood monocytes/100 leukocytes           7 %                

 0-12        

 

                    Automated blood eosinophils/100 leukocytes           1 %    

             0-10        

 

                    Automated blood basophils/100 leukocytes           0 %      

           0-10        

 

                    Blood neutrophils automated count (number/volume)           

5.8 10*3            

1.8-7.8        

 

                    Blood lymphocytes automated count (number/volume)           

2.0 10*3            

1.0-4.0        

 

                    Blood monocytes automated count (number/volume)           0.

6 10*3            

0.0-1.0        

 

                    Automated eosinophil count           0.1 10*3/uL           0

.0-0.3        

 

                    Automated blood basophil count (count/volume)           0.0 

10*3/uL           

0.0-0.1        

 

                                        Magnesium - 20 13:13         

 

                    Magnesium           1.3 mg/dL           1.6-2.4        

 

                                        THYROID STIMULATING HORMONE - 20 1

3:13         

 

                    THYROID STIMULATING HORMONE           2.39 u[iU]/mL         

  0.35-4.94        

 

                                        Serum or plasma thyroxine (T4) free kathleen

urement (mass/volume) - 20 13:13  

       

 

                          Serum or plasma thyroxine (T4) free measurement (mass/

volume)           0.88 

ng/dL                                   0.70-1.48        

 

                                        Serum or plasma lithium measurement (mol

es/volume) - 20 13:13         

 

                    BNP PT              936.9 pg/mL           <100.0        

 

                                        Complete urinalysis with reflex to cultu

re - 20 13:47         

 

                    Urine color determination           YELLOW              NRG 

       

 

                    Urine clarity determination           CLEAR               NR

G        

 

                    Urine pH measurement by test strip           5.0            

     5-9        

 

                    Specific gravity of urine by test strip           1.015     

          1.016-1.022  

      

 

                          Urine protein assay by test strip, semi-quantitative  

         NEGATIVE         

                                        NEGATIVE        

 

                    Urine glucose detection by automated test strip           NE

GATIVE            

NEGATIVE        

 

                          Erythrocytes detection in urine sediment by light micr

oscopy           NEGATIVE 

                                        NEGATIVE        

 

                    Urine ketones detection by automated test strip           NE

GATIVE            

NEGATIVE        

 

                    Urine nitrite detection by test strip           NEGATIVE    

        NEGATIVE    

    

 

                    Urine total bilirubin detection by test strip           NEGA

TIVE            

NEGATIVE        

 

                          Urine urobilinogen measurement by automated test strip

 (mass/volume)           

0.2 mg/dL                               < = 1.0        

 

                    Urine leukocyte esterase detection by dipstick           NEG

ATIVE            

NEGATIVE        

 

                                        Automated urine sediment erythrocyte cou

nt by microscopy (number/high power 

field)                    NONE                      NRG        

 

                                        Automated urine sediment leukocyte count

 by microscopy (number/high power field)

                          NONE                      NRG        

 

                          Bacteria detection in urine sediment by light microsco

py           NEGATIVE     

                                        NRG        

 

                          Crystals detection in urine sediment by light microsco

py           NONE         

                                        NRG        

 

                          Casts detection in urine sediment by light microscopy 

          PRESENT         

                                        NRG        

 

                          Mucus detection in urine sediment by light microscopy 

          NEGATIVE        

                                        NRG        

 

                    Complete urinalysis with reflex to culture           NO     

             NRG        

 

                          Hyaline casts detection in urine sediment by light kodi

roscopy           0-2     

                                        NRG        

 

                                        Urine drug screening test - 20 13:

47         

 

                    Urine phencyclidine detection by screening method           

NEGATIVE            

NEGATIVE        

 

                          Urine benzodiazepines detection by screening method   

        NEGATIVE          

                                        NEGATIVE        

 

                    Urine cocaine detection           NEGATIVE            NEGATI

VE        

 

                    Urine amphetamines detection by screening method           N

EGATIVE            

NEGATIVE        

 

                          Urine methamphetamine detection by screening method   

        NEGATIVE          

                                        NEGATIVE        

 

                    Urine cannabinoids detection by screening method           N

EGATIVE            

NEGATIVE        

 

                    Urine opiates detection by screening method           NEGATI

VE            

NEGATIVE        

 

                    Urine barbiturates detection           NEGATIVE            N

EGATIVE        

 

                          Screening urine tricyclic antidepressants detection   

        NEGATIVE          

                                        NEGATIVE        

 

                    Urine methadone detection by screening method           NEGA

TIVE            

NEGATIVE        

 

                    Urine oxycodone detection           NEGATIVE            NEGA

TIVE        

 

                    Urine propoxyphene detection           NEGATIVE            N

EGATIVE        

 

                                        Prealbumin - 20 12:50         

 

                    Serum or plasma prealbumin measurement (mass/volume)        

   13.6 %              

18.0-37.0        

 

                                        Coronavirus SARS-CoV-2 SO  - 

0 08:42         

 

                    Coronavirus Ab [Units/volume] in Serum           Negative   

         Negative   

     

 

                                        Automated blood complete blood count (he

mogram) panel - 06/15/20 09:35         

 

                          Blood leukocytes automated count (number/volume)      

     16.6 10*3/uL         

                                        4.3-11.0        

 

                          Blood erythrocytes automated count (number/volume)    

       4.10 10*6/uL       

                                        4.35-5.85        

 

                    Venous blood hemoglobin measurement (mass/volume)           

12.5 g/dL           

13.3-17.7        

 

                    Blood hematocrit (volume fraction)           39 %           

     40-54        

 

                    Automated erythrocyte mean corpuscular volume           96 [

foz_us]           

80-99        

 

                                        Automated erythrocyte mean corpuscular h

emoglobin (mass per erythrocyte)        

                          31 pg                     25-34        

 

                                        Automated erythrocyte mean corpuscular h

emoglobin concentration measurement 

(mass/volume)             32 g/dL                   32-36        

 

                    Automated erythrocyte distribution width ratio           19.

2 %              10.0-

14.5        

 

                    Automated blood platelet count (count/volume)           205 

10*3/uL           

130-400        

 

                          Automated blood platelet mean volume measurement      

     12.4 [foz_us]        

                                        7.4-10.4        



                                                                                
                                                                                
                                                                                
                                                                                
                                                                                
                                                                                
                                                                                
                                                                                
                                                                                
                                                                                
              



Encounters

      



                ACCT No.           Visit Date/Time           Discharge          

 Status         

             Pt. Type           Provider           Facility           Loc./Unit 

          

Complaint        

 

             [id number]           2016 10:06:00                         

            

Document Registration                                                           

         

 

                    [id number]           2020 08:15:00            23:59:59        

                CLS             Preadmit           ELISE RODRIGUEZ MD          

 Via [facility]           CATH                      TYPICAL ATRIAL FLUTTER 

       

 

                    [id number]           2020 07:57:00            23:59:59        

                CLS             Outpatient           YULISA DENNIS MD        

   [facility]           [facility]                          

 

                    [id number]           2020 07:18:00            23:59:59        

                CLS             Outpatient           ELISE RODRIGUEZ MD        

   [facility]           LABNPT                             

 

                    [id number]           2020 14:00:00            23:59:59        

                CLS             Outpatient           YULISA DENNIS MD        

   [facility]           [facility]                          

 

                    [id number]           2020 16:13:00            23:59:59        

                CLS             Outpatient           YULISA DENNIS MD        

   [facility]           LAB FS                    D63.1        

 

                    [id number]           2020 13:28:00           05/21/2

020 23:59:59        

                CLS             Outpatient           MK LÓPEZ MD        

   [facility]           LAB FS                    E11.22         

 

                    [id number]           2020 12:53:00            16:40:00        

                DIS             Outpatient           ARASELI RICHARDSON        

   [facility]           ER                        BLOOD SUGAR ISSUES,LEG 

SWELLING     

   

 

                    [id number]           2020 14:55:00            23:59:59        

                CLS             Outpatient           YULISA DENNSI MD        

   [facility]           [facility]                          

 

                    [id number]           2020 12:55:00            14:58:00        

                DIS             Outpatient           ELISE RODRIGUEZ MD        

   [facility]           CATH                      TYPICAL ATRIAL FLUTTER 

       

 

                    [id number]           2020 12:39:00            23:59:59        

                CLS             Outpatient           YULISA DENNIS MD        

   [facility]           [facility]                          

 

                    [id number]           2020 21:51:00            12:10:00        

                DIS             Inpatient           MARIELY DELANEY MD         

  [address]                                

 

                    [id number]           2020 12:10:00            23:59:59        

                CLS             Outpatient           YULISA DENNIS MD        

   [facility]           [facility]                          

 

                    [id number]           2020 12:08:00            23:59:59        

                CLS             Outpatient           YULISA DENNIS MD        

   [facility]           [facility]                          

 

                    [id number]           2020 13:03:00            14:08:00        

                DIS             Outpatient           YULISA DENNIS MD        

   [facility]                       L97.416        

 

                    [id number]           2020 12:04:00            23:59:59        

                CLS             Outpatient           YULISA DENNIS MD        

   [facility]           [facility]                          

 

                    [id number]           2020 12:45:00            23:59:59        

                CLS             Outpatient           YULISA DENNIS MD        

   [facility]           [facility]                          

 

                    [id number]           2020 11:02:00            23:59:59        

                CLS             Outpatient           ELISE RODRIGUEZ MD        

   [facility]           CARD                      CAD,CRITICAL LOWER LIMB

 

ISCHEMIA,DIABETES        

 

                    [id number]           2020 12:32:00            23:59:59        

                CLS             Outpatient           THA ODOM MD           Via

 [facility]           [facility]                          

 

                    [id number]           2020 12:39:00            23:59:59        

                CLS             Outpatient           THA ODOM MD           Via

 [facility]           [facility]                          

 

                    [id number]           2020 14:15:00            14:40:00        

                DIS             Outpatient           JASEN DAY DO           

[facility]           4TH                       SEPSIS, RIGHT FOOT WOUN

D        

 

                    [id number]           2020 12:27:00            23:59:59        

                CLS             Outpatient           THA ODOM MD           Via

 [facility]           [facility]                          

 

                    [id number]           2020 12:32:00            23:59:59        

                CLS             Outpatient           THA ODOM MD           Via

 [facility]           [facility]                          

 

                    [id number]           2020 12:36:00            23:59:59        

                CLS             Outpatient           THA ODOM MD           Via

 [facility]           [facility]                          

 

                    [id number]           01/15/2020 12:32:00           01/15/2

020 23:59:59        

                CLS             Outpatient           THA ODOM MD           Via

 [facility]           [facility]                          

 

                    [id number]           2019 12:34:00            23:59:59        

                CLS             Outpatient           YULISA DENNIS MD        

   [facility]           [facility]                          

 

                    [id number]           2019 12:34:00            23:59:59        

                CLS             Outpatient           YULISA DENNIS MD        

   [facility]           [facility]                          

 

                    [id number]           2019 12:35:00            23:59:59        

                CLS             Outpatient           YULISA DENNIS MD        

   [facility]           [facility]                          

 

                    [id number]           2019 12:35:00            23:59:59        

                CLS             Outpatient           YULISA DENNIS MD        

   [facility]           [facility]                          

 

                    [id number]           2019 12:09:00            23:59:59        

                CLS             Outpatient           YULISA DENNIS MD        

   [facility]           RAD                       TYPE 2 DIABETES        

 

                    [id number]           2019 12:41:00            23:59:59        

                CLS             Outpatient           YULISA DENNIS MD        

   [facility]           [facility]                          

 

                    [id number]           2019 13:30:00            15:01:00        

                DIS             Emergency           ARASELI RICHARDSON NARENDRA         

  [facility]           ER                        LOW OXYGEN, AMS        

 

                    [id number]           2019 12:18:00            23:59:59        

                CLS             Outpatient           YULISA DENNIS MD        

   [facility]           [facility]                          

 

                    [id number]           10/30/2019 12:32:00           10/30/2

019 23:59:59        

                CLS             Outpatient           YULISA DENNIS MD        

   [facility]           WOUNDCARE                          

 

                    [id number]           10/23/2019 13:34:00           10/23/2

019 23:59:59        

                CLS             Outpatient           YULISA DENNIS MD        

   [facility]           LAB                       DM FOOT ULCER        

 

                    [id number]           10/23/2019 12:33:00           10/23/2

019 23:59:59        

                CLS             Outpatient           YULISA DENNIS MD        

   [facility]           WOUNDCARE                          

 

                    [id number]           10/09/2019 11:04:00           10/09/2

019 23:59:59        

                CLS             Outpatient           YULISA DENNIS MD        

   [facility]           WOUNDCARE                          

 

                    [id number]           10/02/2019 12:45:00           10/02/2

019 23:59:59        

                CLS             Outpatient           YULISA DENNIS MD        

   [facility]           WOUNDCARE                          

 

                    [id number]           2019 12:21:00            23:59:59        

                CLS             Outpatient           YULISA DENNIS MD        

   [facility]           WOUNDCARE                          

 

                    [id number]           2019 09:50:00            23:59:59        

                CLS             Outpatient           YULISA DENNIS MD        

   [facility]           [facility]                          

 

                    [id number]           2019 12:36:00            23:59:59        

                CLS             Outpatient           YULISA DENNIS MD        

   [facility]           [facility]                          

 

                    [id number]           2019 09:46:00            11:27:00        

                DIS             Outpatient           ELISE RODRIGUEZ MD        

   [facility]           CATH                      CRITICAL [facility]SCHEMIA 

       

 

                    [id number]           2019 12:32:00            23:59:59        

                CLS             Outpatient           YULISA DENNIS MD        

   [facility]           [facility]                          

 

                    [id number]           2019 12:37:00            23:59:59        

                CLS             Outpatient           YULISA DENNIS MD        

   [facility]           [facility]                          

 

                    [id number]           2019 10:30:00            23:59:59        

                CLS             Outpatient           ELISE RODRIGUEZ MD        

   [facility]           CARD                      CAD        

 

                    [id number]           2019 12:47:00            23:59:59        

                CLS             Outpatient           YULISA DENNIS MD        

   [facility]           [facility]                          

 

                    [id number]           2019 13:01:00            23:59:59        

                CLS             Outpatient           YULISA DENNIS MD        

   [facility]           [facility]                          

 

                    [id number]           2019 12:57:00            23:59:59        

                CLS             Outpatient           YULISA DENNIS MD        

   [facility]           [facility]                          

 

                    [id number]           2019 13:01:00            23:59:59        

                CLS             Outpatient           YULISA DENNIS MD        

   [facility]           [facility]                          

 

                    [id number]           2019 13:06:00            23:59:59        

                CLS             Outpatient           YULISA DENNIS MD        

   [facility]           [facility]                          

 

                    [id number]           2019 13:31:00            15:33:00        

                DIS             Inpatient           NITO SALDIVAR, YUE ALMANZAR          

 [facility]           4TH                       RT FOOT SWOLLEN AND RED

        

 

                    [id number]           2017 09:47:00           

017 13:22:00        

                DIS             Emergency           CHRISTINE MUKHERJEE MD    

       [facility] 

[facility]           ER                        POSS STROKE/FAC

IAL 

DROOPING/CANNOT LIFT LEFT ARM        

 

                    [id number]           2017 14:45:00           

017 23:59:59        

                CLS             Preadmit           TEOFILO REYNOLDS        

   [facility]           RAD                       CLAUDICATION I73.9     

   

 

                    [id number]           2017 11:00:00           

017 14:39:00        

                DIS             Outpatient           ARIADNE SALDIVAR FACC, JOHNNIE MERCEDES CC

DS           

[facility]           CATH                      CHEST PAIN 

       

 

             [id number]           2017 07:29:00                         

            

Document Registration                                                           

         

 

             [id number]           2013 14:08:00                         

            

Document Registration                                                           

         

 

             [id number]           2012 10:35:00                         

            

Document Registration                                                           

         

 

             [id number]           2016 13:05:00                         

            

Document Registration                                                           

         

 

                [id number]           2014 08:56:00           2014 23:59:

59           CLS

                Outpatient           MK LÓPEZ MD                          

         

                                                 

 

                [id number]           2014 10:06:00           2014 23:59:

59           CLS

                Outpatient           MK LÓPEZ MD                          

         

                                                 

 

                [id number]           2014 13:19:00           2014 23:59:

59           CLS

                Outpatient           MK LÓPEZ MD                          

         

                                                 

 

                [id number]           2014 14:30:00           2014 23:59:

59           CLS

                Outpatient           MK LÓPEZ MD                          

         

                                                 

 

                [id number]           2014 11:12:00           2014 23:59:

59           CLS

                Outpatient           MK LÓPEZ MD                          

         

                                                 

 

                [id number]           10/22/2013 16:06:00           10/22/2013 23:59:

59           CLS

                Outpatient           MK LÓPEZ MD                          

         

                                                 

 

                [id number]           2013 13:33:00           2013 23:59:

59           CLS

                Outpatient           MK LÓPEZ MD                          

         

                                                 

 

                [id number]           2013 15:28:00           2013 23:59:

59           CLS

                Outpatient           MK LÓPEZ MD                          

         

                                                 

 

                [id number]           01/10/2013 11:03:00           01/10/2013 23:59:

59           CLS

             Outpatient                                                         

  

 

                [id number]           10/23/2012 14:55:00           10/23/2012 23:59:

59           CLS

                Outpatient           MK LÓPEZ MD                          

         

                                                 

 

                [id number]           10/23/2012 14:55:00           10/23/2012 23:59:5

9           CLS 

                Outpatient           MK LÓPEZ MD                          

          

                                                 

 

                [id number]           2020 11:40:00           2020 23:59:

59           CLS

                Outpatient           MK LÓPEZ MD                          

 [facility] 

[facility]                                   

 

             [id number]           2019 15:00:00                              

       Document

 Registration                                                                   

 

 

             [id number]           2018 11:20:00                              

       Document

 Registration                                                                   

 

 

             [id number]           2018 14:20:00                              

       Document

 Registration

## 2020-06-16 VITALS — SYSTOLIC BLOOD PRESSURE: 127 MMHG | DIASTOLIC BLOOD PRESSURE: 79 MMHG

## 2020-06-16 VITALS — SYSTOLIC BLOOD PRESSURE: 110 MMHG | DIASTOLIC BLOOD PRESSURE: 74 MMHG

## 2020-06-16 VITALS — DIASTOLIC BLOOD PRESSURE: 69 MMHG | SYSTOLIC BLOOD PRESSURE: 109 MMHG

## 2020-06-16 VITALS — SYSTOLIC BLOOD PRESSURE: 129 MMHG | DIASTOLIC BLOOD PRESSURE: 81 MMHG

## 2020-06-16 VITALS — SYSTOLIC BLOOD PRESSURE: 144 MMHG | DIASTOLIC BLOOD PRESSURE: 88 MMHG

## 2020-06-16 VITALS — DIASTOLIC BLOOD PRESSURE: 72 MMHG | SYSTOLIC BLOOD PRESSURE: 109 MMHG

## 2020-06-16 VITALS — DIASTOLIC BLOOD PRESSURE: 69 MMHG | SYSTOLIC BLOOD PRESSURE: 107 MMHG

## 2020-06-16 VITALS — SYSTOLIC BLOOD PRESSURE: 121 MMHG | DIASTOLIC BLOOD PRESSURE: 74 MMHG

## 2020-06-16 LAB
BUN/CREAT SERPL: 11
CALCIUM SERPL-MCNC: 8.1 MG/DL (ref 8.5–10.1)
CHLORIDE SERPL-SCNC: 101 MMOL/L (ref 98–107)
CO2 SERPL-SCNC: 26 MMOL/L (ref 21–32)
CREAT SERPL-MCNC: 0.96 MG/DL (ref 0.6–1.3)
ERYTHROCYTE [DISTWIDTH] IN BLOOD BY AUTOMATED COUNT: 19 % (ref 10–14.5)
GFR SERPLBLD BASED ON 1.73 SQ M-ARVRAT: > 60 ML/MIN
GLUCOSE SERPL-MCNC: 213 MG/DL (ref 70–105)
HCT VFR BLD CALC: 38 % (ref 40–54)
HGB BLD-MCNC: 11.8 G/DL (ref 13.3–17.7)
MCH RBC QN AUTO: 30 PG (ref 25–34)
MCHC RBC AUTO-ENTMCNC: 31 G/DL (ref 32–36)
MCV RBC AUTO: 97 FL (ref 80–99)
PLATELET # BLD: 167 10^3/UL (ref 130–400)
PMV BLD AUTO: 12.6 FL (ref 7.4–10.4)
POTASSIUM SERPL-SCNC: 4.7 MMOL/L (ref 3.6–5)
SODIUM SERPL-SCNC: 137 MMOL/L (ref 135–145)
WBC # BLD AUTO: 10.7 10^3/UL (ref 4.3–11)

## 2020-06-16 RX ADMIN — SODIUM CHLORIDE SCH MLS/HR: 900 INJECTION, SOLUTION INTRAVENOUS at 00:08

## 2020-06-16 RX ADMIN — SODIUM CHLORIDE SCH MLS/HR: 900 INJECTION, SOLUTION INTRAVENOUS at 04:29

## 2020-06-16 RX ADMIN — APIXABAN SCH MG: 5 TABLET, FILM COATED ORAL at 08:36

## 2020-06-16 NOTE — CARDIOLOGY DISCHARGE SUMMARY
Diagnosis/Chief Complaint


Date of Admission


6/15/2020


Date of Discharge


6/16/2020


Admission Diagnosis


Typical atrial flutter





Final/Discharge Diagnosis


Successful typical atrial flutter ablation.





Chief Complaint/HPI


Chief Complaint/HPI


This is a 69-year-old gentleman with history of hemodynamically significant 

atrial flutter with 2-1 AV block.





Discharge Summary


Procedures


Typical atrial flutter ablation


Discharge Physical Examination


Normal cardiovascular examination





Hospital Course


Was the Problem List Reviewed?:  Yes


Unremarkable.





Discussion & Recommendations


Discussion


Discharge took over 30 minutes to complete.  The procedure were discussed at 

length with the patient.  Restrictions were discussed.  Patient will continue on

all the same medications including Eliquis.


Follow up appt.:  


Dr. Dai in 2-3 weeks.


Dicharge Diet:  Cardiac Diet


Activity as Tolerated:  Yes


Home Medications


Reviewed patient Home Medication Reconciliation performed by pharmacy medication

reconciliations technician and/or nursing.


Patients Allergies have been reviewed.





Discharge


Home Medications:


Reviewed and agree with Discharge Medication list on patient's Discharge 

Instruction sheet


Condition at discharge


Stable.


Instructions to patient/family


Discussed at length with the patient.











ELISE DAI MD             Jun 16, 2020 14:02

## 2020-06-16 NOTE — NUR
Yee RN went through patients discharge paperwork and went over discharge packet with 
patient. 

This nurse removed patients IV, tip was intact, gauze and tape applied to site. 

This nurse asked patient if he had any questions regarding discharge instructions. Patient 
states that he does not have any further questions. 

Patients ride is on his way. 

1017 patient taken down to entrance where is ride is at via wheelchair by Yoli FISH

## 2020-06-16 NOTE — ANESTHESIA-GENERAL POST-OP
General


Patient Condition


Mental Status/LOC:  Same as Preop


Cardiovascular:  Satisfactory


Nausea/Vomiting:  Absent


Respiratory:  Satisfactory


Pain:  Controlled


Complications:  Absent





Post Op Complications


Complications


None





Follow Up Care/Instructions


Patient Instructions


None needed.





Anesthesia/Patient Condition


Patient Condition


Patient is doing well, no complaints, stable vital signs, no apparent adverse 

anesthesia problems.   


No complications reported per nursing.


D/C home per [facility] Criteria:  Yes











PETER MICHEL CRNA           Jun 16, 2020 07:05

## 2020-06-22 ENCOUNTER — HOSPITAL ENCOUNTER (OUTPATIENT)
Dept: HOSPITAL 75 - WOUNDCARE | Age: 70
End: 2020-06-22
Attending: SURGERY
Payer: MEDICAID

## 2020-06-22 DIAGNOSIS — I70.234: ICD-10-CM

## 2020-06-22 DIAGNOSIS — E11.621: Primary | ICD-10-CM

## 2020-06-22 DIAGNOSIS — E11.42: ICD-10-CM

## 2020-06-22 DIAGNOSIS — E44.1: ICD-10-CM

## 2020-06-22 DIAGNOSIS — L97.412: ICD-10-CM

## 2020-06-22 DIAGNOSIS — T81.31XA: ICD-10-CM

## 2020-06-22 PROCEDURE — 11042 DBRDMT SUBQ TIS 1ST 20SQCM/<: CPT

## 2020-07-03 ENCOUNTER — HOSPITAL ENCOUNTER (EMERGENCY)
Dept: HOSPITAL 75 - ER FS | Age: 70
Discharge: ANOTHER HOSPITAL | End: 2020-07-03
Payer: MEDICAID

## 2020-07-03 VITALS — SYSTOLIC BLOOD PRESSURE: 107 MMHG | DIASTOLIC BLOOD PRESSURE: 68 MMHG

## 2020-07-03 VITALS — HEIGHT: 67.99 IN | BODY MASS INDEX: 26.36 KG/M2 | WEIGHT: 173.94 LBS

## 2020-07-03 DIAGNOSIS — Z79.899: ICD-10-CM

## 2020-07-03 DIAGNOSIS — Z86.73: ICD-10-CM

## 2020-07-03 DIAGNOSIS — E78.5: ICD-10-CM

## 2020-07-03 DIAGNOSIS — I95.89: Primary | ICD-10-CM

## 2020-07-03 DIAGNOSIS — F17.210: ICD-10-CM

## 2020-07-03 DIAGNOSIS — I25.10: ICD-10-CM

## 2020-07-03 DIAGNOSIS — N17.9: ICD-10-CM

## 2020-07-03 DIAGNOSIS — M19.90: ICD-10-CM

## 2020-07-03 DIAGNOSIS — Z79.84: ICD-10-CM

## 2020-07-03 DIAGNOSIS — J44.9: ICD-10-CM

## 2020-07-03 DIAGNOSIS — I48.92: ICD-10-CM

## 2020-07-03 DIAGNOSIS — E78.00: ICD-10-CM

## 2020-07-03 DIAGNOSIS — Z85.46: ICD-10-CM

## 2020-07-03 DIAGNOSIS — E11.9: ICD-10-CM

## 2020-07-03 DIAGNOSIS — G93.40: ICD-10-CM

## 2020-07-03 DIAGNOSIS — I10: ICD-10-CM

## 2020-07-03 DIAGNOSIS — I48.91: ICD-10-CM

## 2020-07-03 LAB
ALBUMIN SERPL-MCNC: 3.5 GM/DL (ref 3.2–4.5)
ALP SERPL-CCNC: 80 U/L (ref 40–136)
ALT SERPL-CCNC: 9 U/L (ref 0–55)
AMORPH SED URNS QL MICRO: (no result) /LPF
APTT BLD: 30 SEC (ref 24–35)
APTT PPP: (no result) S
BACTERIA #/AREA URNS HPF: (no result) /HPF
BASOPHILS # BLD AUTO: 0.1 10^3/UL (ref 0–0.1)
BASOPHILS NFR BLD AUTO: 1 % (ref 0–10)
BILIRUB SERPL-MCNC: 0.2 MG/DL (ref 0.1–1)
BILIRUB UR QL STRIP: (no result)
BUN/CREAT SERPL: 11
CALCIUM SERPL-MCNC: 8.2 MG/DL (ref 8.5–10.1)
CHLORIDE SERPL-SCNC: 95 MMOL/L (ref 98–107)
CO2 SERPL-SCNC: 19 MMOL/L (ref 21–32)
CREAT SERPL-MCNC: 5.64 MG/DL (ref 0.6–1.3)
EOSINOPHIL # BLD AUTO: 0.3 10^3/UL (ref 0–0.3)
EOSINOPHIL NFR BLD AUTO: 2 % (ref 0–10)
ERYTHROCYTE [DISTWIDTH] IN BLOOD BY AUTOMATED COUNT: 17.5 % (ref 10–14.5)
FIBRINOGEN PPP-MCNC: CLEAR MG/DL
GFR SERPLBLD BASED ON 1.73 SQ M-ARVRAT: 10 ML/MIN
GLUCOSE SERPL-MCNC: 87 MG/DL (ref 70–105)
GLUCOSE UR STRIP-MCNC: NEGATIVE MG/DL
HCT VFR BLD CALC: 35 % (ref 40–54)
HGB BLD-MCNC: 11 G/DL (ref 13.3–17.7)
HYALINE CASTS #/AREA URNS LPF: (no result) /LPF
INR PPP: 1 (ref 0.8–1.4)
KETONES UR QL STRIP: (no result)
LEUKOCYTE ESTERASE UR QL STRIP: NEGATIVE
LYMPHOCYTES # BLD AUTO: 2.8 X 10^3 (ref 1–4)
LYMPHOCYTES NFR BLD AUTO: 21 % (ref 12–44)
MANUAL DIFFERENTIAL PERFORMED BLD QL: NO
MCH RBC QN AUTO: 30 PG (ref 25–34)
MCHC RBC AUTO-ENTMCNC: 32 G/DL (ref 32–36)
MCV RBC AUTO: 95 FL (ref 80–99)
MONOCYTES # BLD AUTO: 0.9 X 10^3 (ref 0–1)
MONOCYTES NFR BLD AUTO: 7 % (ref 0–12)
NEUTROPHILS # BLD AUTO: 9.5 X 10^3 (ref 1.8–7.8)
NEUTROPHILS NFR BLD AUTO: 69 % (ref 42–75)
NITRITE UR QL STRIP: NEGATIVE
PH UR STRIP: 5 [PH] (ref 5–9)
PLATELET # BLD: 153 10^3/UL (ref 130–400)
PMV BLD AUTO: 13 FL (ref 7.4–10.4)
POTASSIUM SERPL-SCNC: 4.6 MMOL/L (ref 3.6–5)
PROT SERPL-MCNC: 5.9 GM/DL (ref 6.4–8.2)
PROT UR QL STRIP: (no result)
PROTHROMBIN TIME: 13.6 SEC (ref 12.2–14.7)
RBC #/AREA URNS HPF: (no result) /HPF
SODIUM SERPL-SCNC: 133 MMOL/L (ref 135–145)
SP GR UR STRIP: >=1.03 (ref 1.02–1.02)
SQUAMOUS #/AREA URNS HPF: (no result) /HPF
WBC # BLD AUTO: 13.7 10^3/UL (ref 4.3–11)
WBC #/AREA URNS HPF: (no result) /HPF

## 2020-07-03 PROCEDURE — 85025 COMPLETE CBC W/AUTO DIFF WBC: CPT

## 2020-07-03 PROCEDURE — 81000 URINALYSIS NONAUTO W/SCOPE: CPT

## 2020-07-03 PROCEDURE — 83605 ASSAY OF LACTIC ACID: CPT

## 2020-07-03 PROCEDURE — 84484 ASSAY OF TROPONIN QUANT: CPT

## 2020-07-03 PROCEDURE — 87040 BLOOD CULTURE FOR BACTERIA: CPT

## 2020-07-03 PROCEDURE — 85730 THROMBOPLASTIN TIME PARTIAL: CPT

## 2020-07-03 PROCEDURE — 96365 THER/PROPH/DIAG IV INF INIT: CPT

## 2020-07-03 PROCEDURE — 96367 TX/PROPH/DG ADDL SEQ IV INF: CPT

## 2020-07-03 PROCEDURE — 96366 THER/PROPH/DIAG IV INF ADDON: CPT

## 2020-07-03 PROCEDURE — 85610 PROTHROMBIN TIME: CPT

## 2020-07-03 PROCEDURE — 70450 CT HEAD/BRAIN W/O DYE: CPT

## 2020-07-03 PROCEDURE — 80053 COMPREHEN METABOLIC PANEL: CPT

## 2020-07-03 PROCEDURE — 36415 COLL VENOUS BLD VENIPUNCTURE: CPT

## 2020-07-03 PROCEDURE — 96368 THER/DIAG CONCURRENT INF: CPT

## 2020-07-03 PROCEDURE — 86141 C-REACTIVE PROTEIN HS: CPT

## 2020-07-03 PROCEDURE — 93005 ELECTROCARDIOGRAM TRACING: CPT

## 2020-07-03 PROCEDURE — 71045 X-RAY EXAM CHEST 1 VIEW: CPT

## 2020-07-03 NOTE — XMS REPORT
Continuity of Care Document

                             Created on: 2020



DAVID SMITH

External Reference #: [id number]

: 1950

Sex: Male



Demographics





                          Home Phone                [phone]

 

                          Preferred Language        Unknown

 

                          Marital Status            Unknown

 

                          Latter-day Affiliation     Unknown

 

                          Race                      Unknown

 

                          Ethnic Group              Unknown





Author





                          Organization              Unknown

 

                          Address                   Unknown

 

                          Phone                     Unavailable



              



Allergies

      



             Active           Description           Code           Type         

  Severity   

                Reaction           Onset           Reported/Identified          

 

Relationship to Patient                 Clinical Status        

 

                Yes             No Known Drug Allergies           [id number]    

       Drug 

Allergy           Unknown           N/A                             2017  

      

                                                             



                  



Medications

      



There is no data.                  



Problems

      



             Date Dx Coded           Attending           Type           Code    

       

Diagnosis                               Diagnosed By        

 

                1407           YULISA DENNIS MD, Ot           

   B95.62          

                          METHICILLIN RESIS STAPH INFCT CAUSING DI              

      

 

             1407           YULISA DENNIS MD, Ot           B96

.5           

PSEUDOMONAS (MALLEI) CAUSING DISEASES CL                    

 

                1407           YULISA DENNIS MD, Ot           

   E11.42          

                          TYPE 2 DIABETES MELLITUS WITH DIABETIC P              

      

 

                1407           YULISA DENNIS MD, Ot           

   E11.621         

                          TYPE 2 DIABETES MELLITUS WITH FOOT ULCER              

      

 

                1407           YULISA DENNIS MD, Ot           

   I70.234         

                          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              

      

 

                1407           YULISA DENNIS MD, Ot           

   L97.416         

                          NON-PRS CHR ULC OF R HEEL/MIDFT W BNE IN              

      

 

             2012           KM LÓPEZ MD                        356.4

           

POLYNEUROPATHY IDIOPATHIC PROGRESSIVE                    

 

             2012                                     356.4           POLY

NEUROPATHY 

IDIOPATHIC PROGRESSIVE                           

 

             2012           MK LÓPEZ MD                        356.4

           

POLYNEUROPATHY IDIOPATHIC PROGRESSIVE                    

 

             2012           MK LÓPEZ MD                        356.4

           

POLYNEUROPATHY IDIOPATHIC PROGRESSIVE                    

 

             2012           MK LÓPEZ MD                        356.4

           

POLYNEUROPATHY IDIOPATHIC PROGRESSIVE                    

 

             2012           MK LÓPEZ MD                        356.4

           

POLYNEUROPATHY IDIOPATHIC PROGRESSIVE                    

 

             2012           MK LÓPEZ MD                        356.4

           

POLYNEUROPATHY IDIOPATHIC PROGRESSIVE                    

 

             2012           MK LÓPEZ MD                        356.4

           

POLYNEUROPATHY IDIOPATHIC PROGRESSIVE                    

 

             2012           MK LÓPEZ MD                        356.4

           

POLYNEUROPATHY IDIOPATHIC PROGRESSIVE                    

 

             2012           MK LÓPEZ MD                        356.4

           

POLYNEUROPATHY IDIOPATHIC PROGRESSIVE                    

 

             2012           MK LÓPEZ MD                        356.4

           

POLYNEUROPATHY IDIOPATHIC PROGRESSIVE                    

 

             2012           MK LÓPEZ MD                        723.0

           

SPINAL STENOSIS IN CERVICAL REGION                    

 

             2012                                     723.0           SPIN

AL STENOSIS IN 

CERVICAL REGION                                  

 

             2012           MK LÓPEZ MD                        723.0

           

SPINAL STENOSIS IN CERVICAL REGION                    

 

             2012           MK LÓPEZ MD                        723.0

           

SPINAL STENOSIS IN CERVICAL REGION                    

 

             2012           RENE SALDIVAR, MK                        723.0

           

SPINAL STENOSIS IN CERVICAL REGION                    

 

             2012           MK LÓPEZ MD                        723.0

           

SPINAL STENOSIS IN CERVICAL REGION                    

 

             2012           RENE SALDIVAR, MK                        723.0

           

SPINAL STENOSIS IN CERVICAL REGION                    

 

             2012           MK LÓPEZ MD                        723.0

           

SPINAL STENOSIS IN CERVICAL REGION                    

 

             2012           RENE SALDIVAR, MK                        723.0

           

SPINAL STENOSIS IN CERVICAL REGION                    

 

             2012           MK LÓPEZ MD                        723.0

           

SPINAL STENOSIS IN CERVICAL REGION                    

 

             2012           MK LÓPEZ MD                        723.0

           

SPINAL STENOSIS IN CERVICAL REGION                    

 

             2012           MK LÓPEZ MD                        250.0

0           

DIABETES MELLITUS WITHOUT MENTION OF COMPLICATION TYPE II OR UNSPECIFIED TYPE 
NOT STATED AS UNCONTROLLED                       

 

             2012                                     250.00           LYNNE

BETES MELLITUS 

WITHOUT MENTION OF COMPLICATION TYPE II OR UNSPECIFIED TYPE NOT STATED AS 
UNCONTROLLED                                     

 

             2012           MK LÓPEZ MD                        250.0

0           

DIABETES MELLITUS WITHOUT MENTION OF COMPLICATION TYPE II OR UNSPECIFIED TYPE 
NOT STATED AS UNCONTROLLED                       

 

             2012           MK LÓPEZ MD                        250.0

0           

DIABETES MELLITUS WITHOUT MENTION OF COMPLICATION TYPE II OR UNSPECIFIED TYPE 
NOT STATED AS UNCONTROLLED                       

 

             2012           MK LÓPEZ MD                        250.0

0           

DIABETES MELLITUS WITHOUT MENTION OF COMPLICATION TYPE II OR UNSPECIFIED TYPE 
NOT STATED AS UNCONTROLLED                       

 

             2012           MK LÓPEZ MD                        250.0

0           

DIABETES MELLITUS WITHOUT MENTION OF COMPLICATION TYPE II OR UNSPECIFIED TYPE 
NOT STATED AS UNCONTROLLED                       

 

             2012           MK LÓPEZ MD                        250.0

0           

DIABETES MELLITUS WITHOUT MENTION OF COMPLICATION TYPE II OR UNSPECIFIED TYPE 
NOT STATED AS UNCONTROLLED                       

 

             2012           MK LÓPEZ MD                        250.0

0           

DIABETES MELLITUS WITHOUT MENTION OF COMPLICATION TYPE II OR UNSPECIFIED TYPE 
NOT STATED AS UNCONTROLLED                       

 

             2012           MK LÓPEZ MD                        250.0

0           

DIABETES MELLITUS WITHOUT MENTION OF COMPLICATION TYPE II OR UNSPECIFIED TYPE 
NOT STATED AS UNCONTROLLED                       

 

             2012           MK LÓPEZ MD                        250.0

0           

DIABETES MELLITUS WITHOUT MENTION OF COMPLICATION TYPE II OR UNSPECIFIED TYPE 
NOT STATED AS UNCONTROLLED                       

 

             2012           MK LÓPEZ MD                        250.0

0           

DIABETES MELLITUS WITHOUT MENTION OF COMPLICATION TYPE II OR UNSPECIFIED TYPE 
NOT STATED AS UNCONTROLLED                       

 

             2012           MK LÓPEZ MD                        716.9

0           

UNSPECIFIED ARTHROPATHY SITE UNSPECIFIED                    

 

             2012                                     716.90           UNS

PECIFIED 

ARTHROPATHY SITE UNSPECIFIED                     

 

             2012           MK LÓPEZ MD                        716.9

0           

UNSPECIFIED ARTHROPATHY SITE UNSPECIFIED                    

 

             2012           MK LÓPEZ MD                        716.9

0           

UNSPECIFIED ARTHROPATHY SITE UNSPECIFIED                    

 

             2012           MK LÓPEZ MD                        716.9

0           

UNSPECIFIED ARTHROPATHY SITE UNSPECIFIED                    

 

             2012           MK LÓPEZ MD                        716.9

0           

UNSPECIFIED ARTHROPATHY SITE UNSPECIFIED                    

 

             2012           MK LÓPEZ MD                        716.9

0           

UNSPECIFIED ARTHROPATHY SITE UNSPECIFIED                    

 

             2012           MK LÓPEZ MD                        716.9

0           

UNSPECIFIED ARTHROPATHY SITE UNSPECIFIED                    

 

             2012           MK LÓPEZ MD                        716.9

0           

UNSPECIFIED ARTHROPATHY SITE UNSPECIFIED                    

 

             2012           MK LÓPEZ MD                        716.9

0           

UNSPECIFIED ARTHROPATHY SITE UNSPECIFIED                    

 

             2012           MK LÓPEZ MD                        716.9

0           

UNSPECIFIED ARTHROPATHY SITE UNSPECIFIED                    

 

             10/23/2012           MK LÓPEZ MD                        443.9

           

PERIPHERAL VASCULAR DISEASE UNSPECIFIED                    

 

             10/23/2012                                     443.9           LEROY

PHERAL VASCULAR

DISEASE UNSPECIFIED                              

 

             10/23/2012           MK LÓPEZ MD                        443.9

           

PERIPHERAL VASCULAR DISEASE UNSPECIFIED                    

 

             10/23/2012           MK LÓPEZ MD                        443.9

           

PERIPHERAL VASCULAR DISEASE UNSPECIFIED                    

 

             10/23/2012           MK LÓPEZ MD                        443.9

           

PERIPHERAL VASCULAR DISEASE UNSPECIFIED                    

 

             10/23/2012           MK LÓPEZ MD                        443.9

           

PERIPHERAL VASCULAR DISEASE UNSPECIFIED                    

 

             10/23/2012           MK LÓPEZ MD                        443.9

           

PERIPHERAL VASCULAR DISEASE UNSPECIFIED                    

 

             10/23/2012           MK LÓPEZ MD                        443.9

           

PERIPHERAL VASCULAR DISEASE UNSPECIFIED                    

 

             10/23/2012           MK LÓPEZ MD                        443.9

           

PERIPHERAL VASCULAR DISEASE UNSPECIFIED                    

 

             10/23/2012           MK LÓPEZ MD                        443.9

           

PERIPHERAL VASCULAR DISEASE UNSPECIFIED                    

 

             10/23/2012           MK LÓPEZ MD                        443.9

           

PERIPHERAL VASCULAR DISEASE UNSPECIFIED                    

 

             2013           MK LÓPEZ MD                        435.9

           

UNSPECIFIED TRANSIENT CEREBRAL ISCHEMIA                    

 

             2013           RENE SALDIVAR, MK                        435.9

           

UNSPECIFIED TRANSIENT CEREBRAL ISCHEMIA                    

 

             2013           RENE SALDIVAR, MK                        435.9

           

UNSPECIFIED TRANSIENT CEREBRAL ISCHEMIA                    

 

             2013           RENE SALDIVAR, MK                        435.9

           

UNSPECIFIED TRANSIENT CEREBRAL ISCHEMIA                    

 

             2013           RENE SALDIVAR, MK                        435.9

           

UNSPECIFIED TRANSIENT CEREBRAL ISCHEMIA                    

 

             2013           RENE SALDIVAR, KM                        435.9

           

UNSPECIFIED TRANSIENT CEREBRAL ISCHEMIA                    

 

             2013           RENE SALDIVAR, MK                        435.9

           

UNSPECIFIED TRANSIENT CEREBRAL ISCHEMIA                    

 

             2013           RENE SALDIVAR, MK                        435.9

           

UNSPECIFIED TRANSIENT CEREBRAL ISCHEMIA                    

 

             10/22/2013           RENE SALDIVAR, MK                        401.9

           

UNSPECIFIED ESSENTIAL HYPERTENSION                    

 

             10/22/2013           RNEE SALDIVAR, MK                        401.9

           

UNSPECIFIED ESSENTIAL HYPERTENSION                    

 

             10/22/2013           RENE SALDIVAR, MK                        401.9

           

UNSPECIFIED ESSENTIAL HYPERTENSION                    

 

             10/22/2013           RENE SALDIVAR, MK                        401.9

           

UNSPECIFIED ESSENTIAL HYPERTENSION                    

 

             10/22/2013           RENE SALDIVAR, MK                        401.9

           

UNSPECIFIED ESSENTIAL HYPERTENSION                    

 

             10/22/2013           RENE SALDIVAR, MK                        401.9

           

UNSPECIFIED ESSENTIAL HYPERTENSION                    

 

             2014           RENE SALDIVAR, MK                        724.5

           

BACKACHE UNSPECIFIED                             

 

             2014           RENE SALDIVAR, MK                        724.5

           

BACKACHE UNSPECIFIED                             

 

             2014           RENE SALDIVAR, MK                        724.5

           

BACKACHE UNSPECIFIED                             

 

             2014           RENE SALDIVAR, MK                        724.5

           

BACKACHE UNSPECIFIED                             

 

             2014           MK LÓPEZ MD                        276.5

0           

VOLUME DEPLETION UNSPECIFIED                     

 

             2014           MK LÓPEZ MD                        276.5

0           

VOLUME DEPLETION UNSPECIFIED                     

 

             2014           MK LÓPEZ MD                        276.5

0           

VOLUME DEPLETION UNSPECIFIED                     

 

             2017                        Ot           723.0           CERV

ICAL SPINAL 

STENOSIS                                         

 

             2017                        Ot           433.10           CAR

OTID ARTERY 

OCCLUSION W O CEREBRAL IN                        

 

             2017                        Ot           723.0           CERV

ICAL SPINAL 

STENOSIS                                         

 

             2017                        Ot           433.10           CAR

OTID ARTERY 

OCCLUSION W O CEREBRAL IN                        

 

                2017           ARIADNE SALDIVAR FACC, JOHNNIE MERCEDES CCDS           Ot 

             E11.9 

                          TYPE 2 DIABETES MELLITUS WITHOUT COMPLIC              

      

 

                2017           ARIADNE SALDIVAR FACBO, JOHNNIE MERCEDES CCDS           Ot 

             E78.5 

                          HYPERLIPIDEMIA, UNSPECIFIED                    

 

                2017           ARIADNE SALDIVAR FACC, ALI FACP CCDS           Ot 

             I10   

                          ESSENTIAL (PRIMARY) HYPERTENSION                    

 

                2017           ARIADNE SALDIVAR FACC, ALI FACP CCDS           Ot 

             

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              

      

 

                2017           ARIADNE LUIC, ALI FACP CCDS           Ot 

             I70.0 

                          ATHEROSCLEROSIS OF AORTA                    

 

                2017           ARIADNE SALDIVAR FACC, ALI FACP CCDS           Ot 

             J44.9 

                          CHRONIC OBSTRUCTIVE PULMONARY DISEASE, U              

      

 

                2017           ARIADNE LUIC, ALI FACP CCDS           Ot 

             R07.89

                          OTHER CHEST PAIN                    

 

                2017           ARIADNE SALDIVAR FACC, ALI FACP CCDS           Ot 

             Z72.0 

                          TOBACCO USE                        

 

                2017           ARIADNE SALDIVAR FACC, ALI FACP CCDS           Ot 

             

Z79.899                   OTHER LONG TERM (CURRENT) DRUG THERAPY                

    

 

                2017           ARIADNE SALDIVAR FACC, ALI FACP CCDS           Ot 

             Z98.61

                          CORONARY ANGIOPLASTY STATUS                    

 

                2017           ARIADNE SALDIVAR FACC, ALI FACP CCDS           Ot 

             E11.9 

                          TYPE 2 DIABETES MELLITUS WITHOUT COMPLIC              

      

 

                2017           ARIADNE SALDIVAR FACC, ALI FACP CCDS           Ot 

             E78.5 

                          HYPERLIPIDEMIA, UNSPECIFIED                    

 

                2017           ARIADNE SALDIVAR FACC, ALI FACP CCDS           Ot 

             I10   

                          ESSENTIAL (PRIMARY) HYPERTENSION                    

 

                2017           ARIADNE SALDIVAR FACC, ALI FACP CCDS           Ot 

             

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              

      

 

                2017           ARIADNE SALDIVAR FACC, ALI FACP CCDS           Ot 

             I70.0 

                          ATHEROSCLEROSIS OF AORTA                    

 

                2017           ARIADNE SALDIVAR FACC, ALI FACP CCDS           Ot 

             J44.9 

                          CHRONIC OBSTRUCTIVE PULMONARY DISEASE, U              

      

 

                2017           ARIADNE SALDIVAR FACC, ALI FACP CCDS           Ot 

             R07.89

                          OTHER CHEST PAIN                    

 

                2017           ARIADNE SALDIVAR FACC, ALI FACP CCDS           Ot 

             Z72.0 

                          TOBACCO USE                        

 

                2017           ARIADNE SALDIVAR FACC, ALI FACP CCDS           Ot 

             

Z79.899                   OTHER LONG TERM (CURRENT) DRUG THERAPY                

    

 

                2017           ARIADNE SALDIVAR FACC, ALI FACP CCDS           Ot 

             Z98.61

                          CORONARY ANGIOPLASTY STATUS                    

 

             2017                        Ot           433.10           CAR

OTID ARTERY 

OCCLUSION W O CEREBRAL IN                        

 

                2017           YAZ SALDIVAR, CHRISTINE VARGAS           Ot      

        E86.1      

                          HYPOVOLEMIA                        

 

                2017           CHRISTINE MUKHERJEE MD           Ot      

        E87.5      

                          HYPERKALEMIA                       

 

                2017           CHRISTINE MUKHERJEE MD           Ot      

        N17.9      

                          ACUTE KIDNEY FAILURE, UNSPECIFIED                    

 

                2017           CHRISTINE MUKHERJEE MD           Ot      

        R29.707    

                          NIHSS SCORE 7                      

 

                2017           CHRISTINE MUKHERJEE MD, Ot      

        R47.1      

                          DYSARTHRIA AND ANARTHRIA                    

 

                2017           CHRISTINE MUKHERJEE MD           Ot      

        R53.1      

                          WEAKNESS                           

 

                2017           CHRISTINE MUKHERJEE MD           Ot      

        Z79.82     

                          LONG TERM (CURRENT) USE OF ASPIRIN                    

 

             2017                        Ot           433.10           CAR

OTID ARTERY 

OCCLUSION W O CEREBRAL IN                        

 

             2019           YUE BEAUCHAMP MD, Ot           E11.

40           

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                    

 

                2019           YUE BEAUCHAMP MD, Ot            

  E11.621          

                          TYPE 2 DIABETES MELLITUS WITH FOOT ULCER              

      

 

             2019           YUE BEAUCHAMP MD, Ot           E11.

65           

TYPE 2 DIABETES MELLITUS WITH HYPERGLYCE                    

 

             2019           YUE BEAUCHAMP MD, Ot           E11.

69           

TYPE 2 DIABETES MELLITUS WITH OTHER SPEC                    

 

             2019           YUE BEAUCHAMP MD, Ot           E78.

00           

PURE HYPERCHOLESTEROLEMIA, UNSPECIFIED                    

 

                2019           YUE BEAUCHAMP MD, Ot            

  F17.210          

                          NICOTINE DEPENDENCE, CIGARETTES, UNCOMPL              

      

 

             2019           YUE BEAUCHAMP MD           Ot           I10 

          

ESSENTIAL (PRIMARY) HYPERTENSION                    

 

             2019           YUE BEAUCHAMP MD, Ot           I25.

10           

ATHSCL HEART DISEASE OF NATIVE CORONARY                     

 

                2019           YUE BEAUCHAMP MD           Ot            

  L02.611          

                          CUTANEOUS ABSCESS OF RIGHT FOOT                    

 

                2019           YUE BEAUCHAMP MD, Ot            

  L03.031          

                          CELLULITIS OF RIGHT TOE                    

 

                2019           YUE BEAUCHAMP MD           Ot            

  L03.115          

                          CELLULITIS OF RIGHT LOWER LIMB                    

 

                2019           YUE BEAUCHAMP MD, Ot            

  L97.516          

                          NON-PRS CHR ULC OTH PRT R FOOT WITH BNE               

      

 

                2019           YUE BEAUCHAMP MD, Ot            

  L97.519          

                          NON-PRS CHRONIC ULCER OTH PRT RIGHT FOOT              

      

 

                2019           YUE BEAUCHAMP MD, Ot            

  L97.529          

                          NON-PRESSURE CHRONIC ULCER OTH PRT LEFT               

      

 

             2019           YUE BEAUCHAMP MD, Ot           Z85.

46           

PERSONAL HISTORY OF MALIGNANT NEOPLASM O                    

 

             2019           YUE BEAUCHAMP MD, Ot           Z95.

5           

PRESENCE OF CORONARY ANGIOPLASTY IMPLANT                    

 

             2019           YUE BEAUCHAMP MD           Ot           E11.

40           

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                    

 

                2019           YUE BEAUCHAMP MD, Ot            

  E11.621          

                          TYPE 2 DIABETES MELLITUS WITH FOOT ULCER              

      

 

             2019           YUE BEAUCHAMP MD, Ot           E11.

65           

TYPE 2 DIABETES MELLITUS WITH HYPERGLYCE                    

 

             2019           YUE BEAUCHAMP MD, Ot           E11.

69           

TYPE 2 DIABETES MELLITUS WITH OTHER SPEC                    

 

             2019           YUE BEAUCHAMP MD, Ot           E78.

00           

PURE HYPERCHOLESTEROLEMIA, UNSPECIFIED                    

 

                2019           YUE BEAUCHAMP MD, Ot            

  F17.210          

                          NICOTINE DEPENDENCE, CIGARETTES, UNCOMPL              

      

 

             2019           YUE BEAUCHAMP MD, Ot           G47.

33           

OBSTRUCTIVE SLEEP APNEA (ADULT) (PEDIATR                    

 

             2019           YUE BEAUCHAMP MD, Ot           I10 

          

ESSENTIAL (PRIMARY) HYPERTENSION                    

 

             2019           YUE BEAUCHAMP MD, Ot           I25.

10           

ATHSCL HEART DISEASE OF NATIVE CORONARY                     

 

                2019           YUE BEAUCHAMP MD, Ot            

  L02.611          

                          CUTANEOUS ABSCESS OF RIGHT FOOT                    

 

                2019           YUE BEAUCHAMP MD           Ot            

  L03.031          

                          CELLULITIS OF RIGHT TOE                    

 

                2019           YUE BEAUCHAMP MD           Ot            

  L03.115          

                          CELLULITIS OF RIGHT LOWER LIMB                    

 

                2019           YUE BEAUCHAMP MD, Ot            

  L97.514          

                          NON-PRS CHRONIC ULCER OTH PRT RIGHT FOOT              

      

 

                2019           YUE BEAUCHAMP MD, Ot            

  L97.516          

                          NON-PRS CHR ULC OTH PRT R FOOT WITH BNE               

      

 

                2019           YUE BEAUCHAMP MD, Ot            

  L97.519          

                          NON-PRS CHRONIC ULCER OTH PRT RIGHT FOOT              

      

 

                2019           YUE BEAUCHAMP MD, Ot            

  L97.529          

                          NON-PRESSURE CHRONIC ULCER OTH PRT LEFT               

      

 

             2019           YUE BEAUCHAMP MD, Ot           M86.

9           

OSTEOMYELITIS, UNSPECIFIED                       

 

             2019           YUE BEAUCHAMP MD           Ot           Z85.

46           

PERSONAL HISTORY OF MALIGNANT NEOPLASM O                    

 

             2019           YUE BEAUCHAMP MD           Ot           Z95.

5           

PRESENCE OF CORONARY ANGIOPLASTY IMPLANT                    

 

                2019           YULISA DENNIS MD, Ot           

   E11.42          

                          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              

      

 

                2019           YULISA DENNIS MD, Ot           

   E11.621         

                          TYPE 2 DIABETES MELLITUS WITH FOOT ULCER              

      

 

                2019           YULISA DENNIS MD, Ot           

   L03.115         

                          CELLULITIS OF RIGHT LOWER LIMB                    

 

                2019           YULISA DENNIS MD, Ot           

   L97.513         

                          NON-PRS CHRONIC ULCER OTH PRT RIGHT FOOT              

      

 

                2019           YULISA DENNIS MD, Ot           

   E11.42          

                          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              

      

 

                2019           YULISA DENNIS MD, Ot           

   E11.52          

                          TYPE 2 DIABETES W DIABETIC PERIPHERAL AN              

      

 

                2019           YULISA DENNIS MD, Ot           

   E11.621         

                          TYPE 2 DIABETES MELLITUS WITH FOOT ULCER              

      

 

             2019           YULISA DENNIS MD, Ot           I96

           

GANGRENE, NOT ELSEWHERE CLASSIFIED                    

 

                2019           YULISA DENNIS MD, Ot           

   L97.413         

                          NON-PRS CHR ULCER OF RIGHT HEEL AND MIDF              

      

 

                2019           YULISA DENNIS MD, Ot           

   E11.42          

                          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              

      

 

                2019           YULISA DENNIS MD, Ot           

   E11.52          

                          TYPE 2 DIABETES W DIABETIC PERIPHERAL AN              

      

 

                2019           YULISA DENNIS MD, Ot           

   E11.621         

                          TYPE 2 DIABETES MELLITUS WITH FOOT ULCER              

      

 

             2019           YULISA DENNIS MD, Ot           I96

           

GANGRENE, NOT ELSEWHERE CLASSIFIED                    

 

                2019           YULISA DENNIS MD, Ot           

   L97.412         

                          NON-PRS CHR ULCER OF RIGHT HEEL AND MIDF              

      

 

                2019           YULISA DENNIS MD, Ot           

   M65.071         

                          ABSCESS OF TENDON SHEATH, RIGHT ANKLE AN              

      

 

                2019           YULISA DENNIS MD, Ot           

   E11.42          

                          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              

      

 

                2019           YULISA DENNIS MD, Ot           

   E11.52          

                          TYPE 2 DIABETES W DIABETIC PERIPHERAL AN              

      

 

                2019           YULISA DENNIS MD, Ot           

   E11.621         

                          TYPE 2 DIABETES MELLITUS WITH FOOT ULCER              

      

 

                2019           YULISA DENNIS MD, Ot           

   I70.261         

                          ATHSCL NATIVE ARTERIES OF EXTREMITIES W               

      

 

                2019           YULISA DENNIS MD           Ot           

   L97.412         

                          NON-PRS CHR ULCER OF RIGHT HEEL AND MIDF              

      

 

                2019           YULISA DENNIS MD, Ot           

   M65.071         

                          ABSCESS OF TENDON SHEATH, RIGHT ANKLE AN              

      

 

                2019           YULISA DENNIS MD, Ot           

   E11.42          

                          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              

      

 

                2019           YULISA DENNIS MD           Ot           

   E11.621         

                          TYPE 2 DIABETES MELLITUS WITH FOOT ULCER              

      

 

                2019           YULISA DENNIS MD           Ot           

   L03.115         

                          CELLULITIS OF RIGHT LOWER LIMB                    

 

                2019           YULISA DENNIS MD, Ot           

   L97.513         

                          NON-PRS CHRONIC ULCER OTH PRT RIGHT FOOT              

      

 

                2019           YULISA DENNIS MD, Ot           

   E11.42          

                          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              

      

 

                2019           YULISA DENNIS MD, Ot           

   E11.621         

                          TYPE 2 DIABETES MELLITUS WITH FOOT ULCER              

      

 

                2019           YULISA DENNIS MD           Ot           

   L03.115         

                          CELLULITIS OF RIGHT LOWER LIMB                    

 

                2019           YULISA DENNIS MD, Ot           

   L97.513         

                          NON-PRS CHRONIC ULCER OTH PRT RIGHT FOOT              

      

 

                2019           YULISA DENNIS MD, Ot           

   E11.42          

                          TYPE 2 DIABETES MELLITUS WITH DIABETIC P              

      

 

                2019           YULISA DENNIS MD           Ot           

   E11.52          

                          TYPE 2 DIABETES W DIABETIC PERIPHERAL AN              

      

 

                2019           YULISA DENNIS MD, Ot           

   E11.621         

                          TYPE 2 DIABETES MELLITUS WITH FOOT ULCER              

      

 

             2019           YULISA DENNIS MD           Ot           I96

           

GANGRENE, NOT ELSEWHERE CLASSIFIED                    

 

                2019           YULISA DENNIS MD           Ot           

   L97.413         

                          NON-PRS CHR ULCER OF RIGHT HEEL AND MIDF              

      

 

                2019           YULISA DENNIS MD, Ot           

   E11.42          

                          TYPE 2 DIABETES MELLITUS WITH DIABETIC P              

      

 

                2019           YULISA DENNIS MD           Ot           

   E11.52          

                          TYPE 2 DIABETES W DIABETIC PERIPHERAL AN              

      

 

                2019           YULISA DENNIS MD, Ot           

   E11.621         

                          TYPE 2 DIABETES MELLITUS WITH FOOT ULCER              

      

 

             2019           YULISA DENNIS MD, Ot           I96

           

GANGRENE, NOT ELSEWHERE CLASSIFIED                    

 

                2019           YULISA DENNIS MD           Ot           

   L97.412         

                          NON-PRS CHR ULCER OF RIGHT HEEL AND MIDF              

      

 

                2019           YULISA DENNIS MD, Ot           

   M65.071         

                          ABSCESS OF TENDON SHEATH, RIGHT ANKLE AN              

      

 

                2019           YULISA DENNIS MD           Ot           

   E11.42          

                          TYPE 2 DIABETES MELLITUS WITH DIABETIC P              

      

 

                2019           YULISA DENNIS MD, Ot           

   E11.52          

                          TYPE 2 DIABETES W DIABETIC PERIPHERAL AN              

      

 

                2019           YULISA DENNIS MD, Ot           

   E11.621         

                          TYPE 2 DIABETES MELLITUS WITH FOOT ULCER              

      

 

                2019           YULISA DENNIS MD           Ot           

   I70.261         

                          ATHSCL NATIVE ARTERIES OF EXTREMITIES W               

      

 

                2019           YULISA DENNIS MD           Ot           

   L97.412         

                          NON-PRS CHR ULCER OF RIGHT HEEL AND MIDF              

      

 

                2019           YULISA DENNIS MD, Ot           

   M65.071         

                          ABSCESS OF TENDON SHEATH, RIGHT ANKLE AN              

      

 

                2019           YULISA DENNIS MD, Ot           

   E11.42          

                          TYPE 2 DIABETES MELLITUS WITH DIABETIC P              

      

 

                2019           YULISA DENNIS MD, Ot           

   E11.52          

                          TYPE 2 DIABETES W DIABETIC PERIPHERAL AN              

      

 

                2019           YULISA DENNIS MD, Ot           

   E11.621         

                          TYPE 2 DIABETES MELLITUS WITH FOOT ULCER              

      

 

                2019           YULISA DENNIS MD, Ot           

   I70.261         

                          ATHSCL NATIVE ARTERIES OF EXTREMITIES W               

      

 

                2019           YULISA DENNIS MD, Ot           

   L97.412         

                          NON-PRS CHR ULCER OF RIGHT HEEL AND MIDF              

      

 

                2019           YULISA DENNIS MD, Ot           

   M65.071         

                          ABSCESS OF TENDON SHEATH, RIGHT ANKLE AN              

      

 

                2019           YULISA DENNIS MD           Ot           

   E11.42          

                          TYPE 2 DIABETES MELLITUS WITH DIABETIC P              

      

 

                2019           YULISA DENNIS MD, Ot           

   E11.52          

                          TYPE 2 DIABETES W DIABETIC PERIPHERAL AN              

      

 

                2019           YULISA DENNIS MD, Ot           

   E11.621         

                          TYPE 2 DIABETES MELLITUS WITH FOOT ULCER              

      

 

                2019           YULISA DENNIS MD           Ot           

   I70.234         

                          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              

      

 

             2019           YULISA DENNIS MD           Ot           I96

           

GANGRENE, NOT ELSEWHERE CLASSIFIED                    

 

                2019           YULISA DENNIS MD, Ot           

   M65.071         

                          ABSCESS OF TENDON SHEATH, RIGHT ANKLE AN              

      

 

                2019           JENNIFER SALDIVAR, ELISE RAVI           Ot           

   E11.40          

                          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              

      

 

             2019           ELISE RODRIGUEZ MD           Ot           E78

.5           

HYPERLIPIDEMIA, UNSPECIFIED                      

 

                2019           ELISE RODRIGUEZ MD           Ot           

   F17.210         

                          NICOTINE DEPENDENCE, CIGARETTES, UNCOMPL              

      

 

             2019           ELISE RODRIGUEZ MD           Ot           I11

.9           

HYPERTENSIVE HEART DISEASE WITHOUT HEART                    

 

                2019           ELISE RODRIGUEZ MD, Ot           

   I25.10          

                          ATHSCL HEART DISEASE OF NATIVE CORONARY               

      

 

             2019           ELISE RODRIGUEZ MD, Ot           I71

.4           

ABDOMINAL AORTIC ANEURYSM, WITHOUT RUPTU                    

 

             2019           ELISE RODRIGUEZ MD           Ot           I99

.8           

OTHER DISORDER OF CIRCULATORY SYSTEM                    

 

                2019           ELISE RODRIGUEZ MD           Ot           

   L97.519         

                          NON-PRS CHRONIC ULCER OTH PRT RIGHT FOOT              

      

 

                2019           ELISE RODRIGUEZ MD, Ot           

   Z79.82          

                          LONG TERM (CURRENT) USE OF ASPIRIN                    

 

                2019           ELISE RODRIGUEZ MD, Ot           

   Z79.84          

                          LONG TERM (CURRENT) USE OF ORAL HYPOGLYC              

      

 

                2019           ELISE RODRIGUEZ MD, Ot           

   Z79.899         

                          OTHER LONG TERM (CURRENT) DRUG THERAPY                

    

 

             2019           ELISE RODRIGUEZ MD, Ot           Z82

.3           

FAMILY HISTORY OF STROKE                         

 

                2019           ELISE RODRIGUEZ MD, Ot           

   Z82.49          

                          FAMILY HX OF ISCHEM HEART DIS AND OTH DI              

      

 

             2019           ELISE RODRIGUEZ MD, Ot           Z83

.3           

FAMILY HISTORY OF DIABETES MELLITUS                    

 

                2019           ELISE RODRIGUEZ MD           Ot           

   Z86.73          

                          PRSNL HX OF TIA (TIA), AND CEREB INFRC W              

      

 

             2019           ELISE RODRIGUEZ MD           Ot           E11

.9           

TYPE 2 DIABETES MELLITUS WITHOUT COMPLIC                    

 

             2019           ELISE RODRIGUEZ MD           Ot           I11

.9           

HYPERTENSIVE HEART DISEASE WITHOUT HEART                    

 

                2019           ELISE RODRIGUEZ MD           Ot           

   I25.10          

                          ATHSCL HEART DISEASE OF NATIVE CORONARY               

      

 

             2019           ELISE RODRIGUEZ MD           Ot           I63

.9           

CEREBRAL INFARCTION, UNSPECIFIED                    

 

             2019           ELISE RODRIGUEZ MD, Ot           Z72

.0           

TOBACCO USE                                      

 

                2019           ELISE RODRIGUEZ MD           Ot           

   E11.40          

                          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              

      

 

             2019           KHALID MD, M BREONNA           Ot           E78

.5           

HYPERLIPIDEMIA, UNSPECIFIED                      

 

                2019           ELISE RODRIGUEZ MD, Ot           

   F17.210         

                          NICOTINE DEPENDENCE, CIGARETTES, UNCOMPL              

      

 

             2019           ELISE RODRIGUEZ MD, Ot           I11

.9           

HYPERTENSIVE HEART DISEASE WITHOUT HEART                    

 

                2019           ELISE RODRIGUEZ MD, Ot           

   I25.10          

                          ATHSCL HEART DISEASE OF NATIVE CORONARY               

      

 

             2019           ELISE RODRIGUEZ MD           Ot           I71

.4           

ABDOMINAL AORTIC ANEURYSM, WITHOUT RUPTU                    

 

             2019           ELISE RODRIGUEZ MD, Ot           I99

.8           

OTHER DISORDER OF CIRCULATORY SYSTEM                    

 

                2019           ELISE RODRIGUEZ MD, Ot           

   L97.519         

                          NON-PRS CHRONIC ULCER OTH PRT RIGHT FOOT              

      

 

                2019           ELISE RODRIGUEZ MD, Ot           

   Z79.82          

                          LONG TERM (CURRENT) USE OF ASPIRIN                    

 

                2019           ELISE RODRIGUEZ MD, Ot           

   Z79.84          

                          LONG TERM (CURRENT) USE OF ORAL HYPOGLYC              

      

 

                2019           ELISE RODRIGUEZ MD, Ot           

   Z79.899         

                          OTHER LONG TERM (CURRENT) DRUG THERAPY                

    

 

             2019           ELISE RODRIGUEZ MD, Ot           Z82

.3           

FAMILY HISTORY OF STROKE                         

 

                2019           ELISE RODRIGUEZ MD, Ot           

   Z82.49          

                          FAMILY HX OF ISCHEM HEART DIS AND OTH DI              

      

 

             2019           ELISE RODRIGUEZ MD, Ot           Z83

.3           

FAMILY HISTORY OF DIABETES MELLITUS                    

 

                2019           ELISE RODRIGUEZ MD           Ot           

   Z86.73          

                          PRSNL HX OF TIA (TIA), AND CEREB INFRC W              

      

 

                2019           YULISA DENNIS MD           Ot           

   E11.42          

                          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              

      

 

                2019           YULISA DENNIS MD           Ot           

   E11.52          

                          TYPE 2 DIABETES W DIABETIC PERIPHERAL AN              

      

 

                2019           YULISA DENNIS MD           Ot           

   E11.621         

                          TYPE 2 DIABETES MELLITUS WITH FOOT ULCER              

      

 

                2019           YULISA DENNIS MD           Ot           

   I70.234         

                          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              

      

 

             2019           YULISA DENNIS MD           Ot           I96

           

GANGRENE, NOT ELSEWHERE CLASSIFIED                    

 

                2019           YULISA DENNIS MD, Ot           

   L97.412         

                          NON-PRS CHR ULCER OF RIGHT HEEL AND MIDF              

      

 

                2019           YULISA DENNIS MD, Ot           

   M65.071         

                          ABSCESS OF TENDON SHEATH, RIGHT ANKLE AN              

      

 

                2019           YULISA DENNIS MD, Ot           

   E11.42          

                          TYPE 2 DIABETES MELLITUS WITH DIABETIC P              

      

 

                2019           YULISA DENNIS MD, Ot           

   E11.52          

                          TYPE 2 DIABETES W DIABETIC PERIPHERAL AN              

      

 

                2019           YULISA DENNIS MD, Ot           

   E11.621         

                          TYPE 2 DIABETES MELLITUS WITH FOOT ULCER              

      

 

                2019           YULISA DENNIS MD           Ot           

   I70.234         

                          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              

      

 

             2019           YULISA DENNIS MD           Ot           I96

           

GANGRENE, NOT ELSEWHERE CLASSIFIED                    

 

                2019           YULISA DENNIS MD, Ot           

   L97.412         

                          NON-PRS CHR ULCER OF RIGHT HEEL AND MIDF              

      

 

                2019           YULISA DENNIS MD, Ot           

   M65.071         

                          ABSCESS OF TENDON SHEATH, RIGHT ANKLE AN              

      

 

                2019           YULISA DENNIS MD, Ot           

   E11.42          

                          TYPE 2 DIABETES MELLITUS WITH DIABETIC P              

      

 

                2019           YULISA DENNIS MD           Ot           

   E11.52          

                          TYPE 2 DIABETES W DIABETIC PERIPHERAL AN              

      

 

                2019           YULISA DENNIS MD           Ot           

   E11.621         

                          TYPE 2 DIABETES MELLITUS WITH FOOT ULCER              

      

 

                2019           YULISA DENNIS MD           Ot           

   I70.234         

                          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              

      

 

             2019           YULISA DENNIS MD, Ot           I96

           

GANGRENE, NOT ELSEWHERE CLASSIFIED                    

 

                2019           YULISA DENNIS MD           Ot           

   L97.412         

                          NON-PRS CHR ULCER OF RIGHT HEEL AND MIDF              

      

 

                2019           YULISA DENNIS MD, Ot           

   M65.071         

                          ABSCESS OF TENDON SHEATH, RIGHT ANKLE AN              

      

 

                2019           YULISA DENNIS MD           Ot           

   E11.42          

                          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              

      

 

                2019           YULISA DENNIS MD           Ot           

   E11.52          

                          TYPE 2 DIABETES W DIABETIC PERIPHERAL AN              

      

 

                2019           YULISA DENNIS MD           Ot           

   E11.621         

                          TYPE 2 DIABETES MELLITUS WITH FOOT ULCER              

      

 

                2019           YULISA DENNIS MD           Ot           

   I70.234         

                          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              

      

 

             2019           YULISA DENNIS MD           Ot           I96

           

GANGRENE, NOT ELSEWHERE CLASSIFIED                    

 

                2019           YULISA DENNIS MD, Ot           

   L97.412         

                          NON-PRS CHR ULCER OF RIGHT HEEL AND MIDF              

      

 

                2019           YULISA DENNIS MD, Ot           

   M65.071         

                          ABSCESS OF TENDON SHEATH, RIGHT ANKLE AN              

      

 

                2019           YULISA DENNIS MD, Ot           

   E11.42          

                          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              

      

 

                2019           YULISA DENNIS MD, Ot           

   E11.621         

                          TYPE 2 DIABETES MELLITUS WITH FOOT ULCER              

      

 

                2019           YULISA DENNIS MD, Ot           

   L03.115         

                          CELLULITIS OF RIGHT LOWER LIMB                    

 

                2019           YULISA DENNIS MD, Ot           

   L97.513         

                          NON-PRS CHRONIC ULCER OTH PRT RIGHT FOOT              

      

 

                2019           YULISA DENNIS MD, Ot           

   E11.42          

                          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              

      

 

                2019           YULISA DENNIS MD, Ot           

   E11.52          

                          TYPE 2 DIABETES W DIABETIC PERIPHERAL AN              

      

 

                2019           YULISA DENNIS MD, Ot           

   E11.621         

                          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              

      

 

                2019           YULISA DENNIS MD, Ot           

   I70.261         

                          ATHSCL NATIVE ARTERIES OF EXTREMITIES W               

      

 

                2019           YULISA DENNIS MD, Ot           

   L97.412         

                          NON-PRS CHR ULCER OF RIGHT HEEL AND MIDF              

      

 

                2019           YULISA DENNIS MD, Ot           

   M65.071         

                          ABSCESS OF TENDON SHEATH, RIGHT ANKLE AN              

      

 

                2019           YULISA DENNIS MD, Ot           

   E11.42          

                          TYPE 2 DIABETES MELLITUS WITH DIABETIC P              

      

 

                2019           YULISA DENNIS MD, Ot           

   E11.52          

                          TYPE 2 DIABETES W DIABETIC PERIPHERAL AN              

      

 

                2019           YULISA DENNIS MD, Ot           

   E11.621         

                          TYPE 2 DIABETES MELLITUS WITH FOOT ULCER              

      

 

                2019           YULISA DENNIS MD, Ot           

   I70.234         

                          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              

      

 

             2019           YULISA DENNIS MD           Ot           I96

           

GANGRENE, NOT ELSEWHERE CLASSIFIED                    

 

                2019           YULISA DENNIS MD, Ot           

   M65.071         

                          ABSCESS OF TENDON SHEATH, RIGHT ANKLE AN              

      

 

                2019           YULISA DENNIS MD, Ot           

   E11.42          

                          TYPE 2 DIABETES MELLITUS WITH DIABETIC P              

      

 

                2019           YULISA DENNIS MD, Ot           

   E11.52          

                          TYPE 2 DIABETES W DIABETIC PERIPHERAL AN              

      

 

                2019           YULISA DENNIS MD, Ot           

   E11.621         

                          TYPE 2 DIABETES MELLITUS WITH FOOT ULCER              

      

 

                2019           YULISA DENNIS MD, Ot           

   I70.234         

                          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              

      

 

             2019           YULISA DENNIS MD, Ot           I96

           

GANGRENE, NOT ELSEWHERE CLASSIFIED                    

 

                2019           UYLISA DENNIS MD, Ot           

   L97.412         

                          NON-PRS CHR ULCER OF RIGHT HEEL AND MIDF              

      

 

                2019           YULISA DENNIS MD, Ot           

   M65.071         

                          ABSCESS OF TENDON SHEATH, RIGHT ANKLE AN              

      

 

                2019           YULISA DENNIS MD, Ot           

   E11.42          

                          TYPE 2 DIABETES MELLITUS WITH DIABETIC P              

      

 

                2019           YULISA DENNIS MD, Ot           

   E11.52          

                          TYPE 2 DIABETES W DIABETIC PERIPHERAL AN              

      

 

                2019           YULISA DENNIS MD, Ot           

   E11.621         

                          TYPE 2 DIABETES MELLITUS WITH FOOT ULCER              

      

 

             2019           YULISA DENNIS MD, Ot           I96

           

GANGRENE, NOT ELSEWHERE CLASSIFIED                    

 

                2019           YULISA DENNIS MD, Ot           

   L97.412         

                          NON-PRS CHR ULCER OF RIGHT HEEL AND MIDF              

      

 

                2019           YULISA DENNIS MD, Ot           

   M65.071         

                          ABSCESS OF TENDON SHEATH, RIGHT ANKLE AN              

      

 

             2019           JENNIFER SALDIVAR, ELISE RAVI           Ot           E11

.9           

TYPE 2 DIABETES MELLITUS WITHOUT COMPLIC                    

 

             2019           ELISE RODRIGUEZ MD           Ot           I11

.9           

HYPERTENSIVE HEART DISEASE WITHOUT HEART                    

 

                2019           ELISE RODRIGUEZ MD           Ot           

   I25.10          

                          ATHSCL HEART DISEASE OF NATIVE CORONARY               

      

 

             2019           ELISE RODRIGUEZ MD           Ot           I63

.9           

CEREBRAL INFARCTION, UNSPECIFIED                    

 

             2019           ELISE RODRIGUEZ MD           Ot           Z72

.0           

TOBACCO USE                                      

 

                2019           YULISA DENNIS MD, Ot           

   E11.42          

                          TYPE 2 DIABETES MELLITUS WITH DIABETIC P              

      

 

                2019           YULISA DENNIS MD, Ot           

   E11.52          

                          TYPE 2 DIABETES W DIABETIC PERIPHERAL AN              

      

 

                2019           YULISA DENNIS MD, Ot           

   E11.621         

                          TYPE 2 DIABETES MELLITUS WITH FOOT ULCER              

      

 

                2019           YULISA DENNIS MD, Ot           

   I70.261         

                          ATHSCL NATIVE ARTERIES OF EXTREMITIES W               

      

 

                2019           YULISA DENNIS MD, Ot           

   L97.412         

                          NON-PRS CHR ULCER OF RIGHT HEEL AND MIDF              

      

 

                2019           YULISA DENNIS MD, Ot           

   M65.071         

                          ABSCESS OF TENDON SHEATH, RIGHT ANKLE AN              

      

 

                2019           YULISA DENNIS MD           Ot           

   E11.42          

                          TYPE 2 DIABETES MELLITUS WITH DIABETIC P              

      

 

                2019           YULISA DENNIS MD, Ot           

   E11.621         

                          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              

      

 

                2019           YULISA DENNIS MD           Ot           

   L03.115         

                          CELLULITIS OF RIGHT LOWER LIMB                    

 

                2019           YULISA DENNIS MD           Ot           

   L97.513         

                          NON-PRS CHRONIC ULCER OTH PRT RIGHT FOOT              

      

 

                2019           YULISA DENNIS MD           Ot           

   E11.42          

                          TYPE 2 DIABETES MELLITUS WITH DIABETIC P              

      

 

                2019           YULISA DENNIS MD           Ot           

   E11.52          

                          TYPE 2 DIABETES W DIABETIC PERIPHERAL AN              

      

 

                2019           YULISA DENNIS MD, Ot           

   E11.621         

                          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              

      

 

             2019           YULISA DENNIS MD           Ot           I96

           

GANGRENE, NOT ELSEWHERE CLASSIFIED                    

 

                2019           YULISA DENNIS MD           Ot           

   L97.413         

                          NON-PRS CHR ULCER OF RIGHT HEEL AND MIDF              

      

 

                09/10/2019           YULISA DENNIS MD           Ot           

   E11.42          

                          TYPE 2 DIABETES MELLITUS WITH DIABETIC P              

      

 

                09/10/2019           YULISA DENNIS MD           Ot           

   E11.621         

                          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              

      

 

                09/10/2019           YULISA DENNIS MD           Ot           

   L03.115         

                          CELLULITIS OF RIGHT LOWER LIMB                    

 

                09/10/2019           YULISA DENNIS MD, Ot           

   L97.513         

                          NON-PRS CHRONIC ULCER OTH PRT RIGHT FOOT              

      

 

                09/10/2019           YULISA DENNIS MD, Ot           

   E11.42          

                          TYPE 2 DIABETES MELLITUS WITH DIABETIC P              

      

 

                09/10/2019           YULISA DENNIS MD           Ot           

   E11.52          

                          TYPE 2 DIABETES W DIABETIC PERIPHERAL AN              

      

 

                09/10/2019           YULISA DENNIS MD           Ot           

   E11.621         

                          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              

      

 

             09/10/2019           YULISA DENNIS MD           Ot           I96

           

GANGRENE, NOT ELSEWHERE CLASSIFIED                    

 

                09/10/2019           YULISA DENNIS MD           Ot           

   L97.413         

                          NON-PRS CHR ULCER OF RIGHT HEEL AND MIDF              

      

 

                09/10/2019           YULISA DENNIS MD           Ot           

   E11.42          

                          TYPE 2 DIABETES MELLITUS WITH DIABETIC P              

      

 

                09/10/2019           YULISA DENNIS MD           Ot           

   E11.52          

                          TYPE 2 DIABETES W DIABETIC PERIPHERAL AN              

      

 

                09/10/2019           YULISA DENNIS MD           Ot           

   E11.621         

                          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              

      

 

                09/10/2019           YULISA DENNIS MD           Ot           

   I70.234         

                          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              

      

 

             09/10/2019           YULISA DENNIS MD           Ot           I96

           

GANGRENE, NOT ELSEWHERE CLASSIFIED                    

 

                09/10/2019           YULISA DENNIS MD, Ot           

   M65.071         

                          ABSCESS OF TENDON SHEATH, RIGHT ANKLE AN              

      

 

                09/10/2019           YULISA DENNIS MD, Ot           

   E11.42          

                          TYPE 2 DIABETES MELLITUS WITH DIABETIC P              

      

 

                09/10/2019           YULISA DENNIS MD           Ot           

   E11.52          

                          TYPE 2 DIABETES W DIABETIC PERIPHERAL AN              

      

 

                09/10/2019           YULISA DENNIS MD, Ot           

   E11.621         

                          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              

      

 

                09/10/2019           YULISA DENNIS MD, Ot           

   I70.234         

                          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              

      

 

             09/10/2019           YULISA DENNIS MD, Ot           I96

           

GANGRENE, NOT ELSEWHERE CLASSIFIED                    

 

                09/10/2019           YULISA DENNIS MD, Ot           

   L97.412         

                          NON-PRS CHR ULCER OF RIGHT HEEL AND MIDF              

      

 

                09/10/2019           YULISA DENNIS MD, Ot           

   M65.071         

                          ABSCESS OF TENDON SHEATH, RIGHT ANKLE AN              

      

 

                2019           YULISA DENNIS MD, Ot           

   E11.42          

                          TYPE 2 DIABETES MELLITUS WITH DIABETIC P              

      

 

                2019           YULISA DENNIS MD, Ot           

   E11.52          

                          TYPE 2 DIABETES W DIABETIC PERIPHERAL AN              

      

 

                2019           YULISA DENNIS MD, Ot           

   E11.621         

                          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              

      

 

                2019           YULISA DENNIS MD           Ot           

   I70.234         

                          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              

      

 

                2019           YULISA DENNIS MD, Ot           

   L97.412         

                          NON-PRS CHR ULCER OF RIGHT HEEL AND MIDF              

      

 

                2019           YULISA DENNIS MD, Ot           

   M65.071         

                          ABSCESS OF TENDON SHEATH, RIGHT ANKLE AN              

      

 

                10/08/2019           YULISA DENNIS MD, Ot           

   E11.42          

                          TYPE 2 DIABETES MELLITUS WITH DIABETIC P              

      

 

                10/08/2019           YULISA DENNIS MD           Ot           

   E11.52          

                          TYPE 2 DIABETES W DIABETIC PERIPHERAL AN              

      

 

                10/08/2019           YULISA DENNIS MD, Ot           

   E11.621         

                          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              

      

 

                10/08/2019           YULISA DENNIS MD           Ot           

   I70.261         

                          ATHSCL NATIVE ARTERIES OF EXTREMITIES W               

      

 

                10/08/2019           YULISA DENNIS MD           Ot           

   L97.413         

                          NON-PRS CHR ULCER OF RIGHT HEEL AND MIDF              

      

 

                10/08/2019           YULISA DENNIS MD, Ot           

   M65.071         

                          ABSCESS OF TENDON SHEATH, RIGHT ANKLE AN              

      

 

                10/11/2019           YULISA DENNIS MD           Ot           

   E11.42          

                          TYPE 2 DIABETES MELLITUS WITH DIABETIC P              

      

 

                10/11/2019           YULISA DENNIS MD, Ot           

   E11.52          

                          TYPE 2 DIABETES W DIABETIC PERIPHERAL AN              

      

 

                10/11/2019           YULISA DENNIS MD, Ot           

   E11.621         

                          TYPE 2 DIABETES MELLITUS WITH FOOT ULCER              

      

 

                10/11/2019           YULISA DENNIS MD, Ot           

   I70.234         

                          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              

      

 

                10/11/2019           YULISA DENNIS MD, Ot           

   L97.413         

                          NON-PRS CHR ULCER OF RIGHT HEEL AND MIDF              

      

 

                10/11/2019           YULISA DENNIS MD, Ot           

   M65.071         

                          ABSCESS OF TENDON SHEATH, RIGHT ANKLE AN              

      

 

                10/23/2019           YULISA DENNIS MD, Ot           

   E11.42          

                          TYPE 2 DIABETES MELLITUS WITH DIABETIC P              

      

 

                10/23/2019           YULISA DENNIS MD, Ot           

   E11.621         

                          TYPE 2 DIABETES MELLITUS WITH FOOT ULCER              

      

 

                10/23/2019           YULISA DENNIS MD           Ot           

   L03.115         

                          CELLULITIS OF RIGHT LOWER LIMB                    

 

                10/23/2019           YULISA DENNIS MD, Ot           

   L97.513         

                          NON-PRS CHRONIC ULCER OTH PRT RIGHT FOOT              

      

 

                10/23/2019           YULISA DENNIS MD, Ot           

   E11.42          

                          TYPE 2 DIABETES MELLITUS WITH DIABETIC P              

      

 

                10/23/2019           YULISA DENNIS MD, Ot           

   E11.621         

                          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              

      

 

                10/23/2019           YULISA DENNIS MD           Ot           

   L03.115         

                          CELLULITIS OF RIGHT LOWER LIMB                    

 

                10/23/2019           YULISA DENNIS MD, Ot           

   L97.513         

                          NON-PRS CHRONIC ULCER OTH PRT RIGHT FOOT              

      

 

                10/23/2019           YULISA DENNIS MD, Ot           

   E11.42          

                          TYPE 2 DIABETES MELLITUS WITH DIABETIC P              

      

 

                10/23/2019           YULISA DENNIS MD, Ot           

   E11.52          

                          TYPE 2 DIABETES W DIABETIC PERIPHERAL AN              

      

 

                10/23/2019           YULISA DENNIS MD, Ot           

   E11.621         

                          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              

      

 

             10/23/2019           YULISA DENNIS MD, Ot           I96

           

GANGRENE, NOT ELSEWHERE CLASSIFIED                    

 

                10/23/2019           YULISA DENNIS MD, Ot           

   L97.413         

                          NON-PRS CHR ULCER OF RIGHT HEEL AND MIDF              

      

 

                10/23/2019           YULISA DENNIS MD           Ot           

   E11.42          

                          TYPE 2 DIABETES MELLITUS WITH DIABETIC P              

      

 

                10/23/2019           YULISA DENNIS MD, Ot           

   E11.52          

                          TYPE 2 DIABETES W DIABETIC PERIPHERAL AN              

      

 

                10/23/2019           YULISA DENNIS MD, Ot           

   E11.621         

                          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              

      

 

             10/23/2019           YULISA DENNIS MD           Ot           I96

           

GANGRENE, NOT ELSEWHERE CLASSIFIED                    

 

                10/23/2019           YULISA DENNIS MD, Ot           

   L97.412         

                          NON-PRS CHR ULCER OF RIGHT HEEL AND MIDF              

      

 

                10/23/2019           YULISA DENNIS MD, Ot           

   M65.071         

                          ABSCESS OF TENDON SHEATH, RIGHT ANKLE AN              

      

 

             10/23/2019           JENNIFER SALDIVAR, M BREONNA           Ot           E11

.9           

TYPE 2 DIABETES MELLITUS WITHOUT COMPLIC                    

 

             10/23/2019           JENNIFER SALDIVAR, ELISE RAVI           Ot           I11

.9           

HYPERTENSIVE HEART DISEASE WITHOUT HEART                    

 

                10/23/2019           JENNIFER SALDIVAR, ELISE RAVI           Ot           

   I25.10          

                          ATHSCL HEART DISEASE OF NATIVE CORONARY               

      

 

             10/23/2019           JENNIFER SALDIVAR, ELISE RAVI           Ot           I63

.9           

CEREBRAL INFARCTION, UNSPECIFIED                    

 

             10/23/2019           JENNIFER SALDIVAR, ELISE RAVI           Ot           Z72

.0           

TOBACCO USE                                      

 

                10/23/2019           YULISA DENNIS MD           Ot           

   E11.42          

                          TYPE 2 DIABETES MELLITUS WITH DIABETIC P              

      

 

                10/23/2019           YULISA DENNIS MD, Ot           

   E11.52          

                          TYPE 2 DIABETES W DIABETIC PERIPHERAL AN              

      

 

                10/23/2019           YULISA DENNIS MD, Ot           

   E11.621         

                          TYPE 2 DIABETES MELLITUS WITH FOOT ULCER              

      

 

                10/23/2019           YULISA DENNIS MD, Ot           

   I70.261         

                          ATHSCL NATIVE ARTERIES OF EXTREMITIES W               

      

 

                10/23/2019           YULISA DENNIS MD, Ot           

   L97.412         

                          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              

      

 

                10/23/2019           YULISA DENNIS MD, Ot           

   M65.071         

                          ABSCESS OF TENDON SHEATH, RIGHT ANKLE AN              

      

 

                10/23/2019           YULISA DENNIS MD, Ot           

   E11.42          

                          TYPE 2 DIABETES MELLITUS WITH DIABETIC P              

      

 

                10/23/2019           YULISA DENNIS MD, Ot           

   E11.52          

                          TYPE 2 DIABETES W DIABETIC PERIPHERAL AN              

      

 

                10/23/2019           YULISA DENNIS MD, Ot           

   E11.621         

                          TYPE 2 DIABETES MELLITUS WITH FOOT ULCER              

      

 

                10/23/2019           YULISA DENNIS MD           Ot           

   I70.261         

                          ATHSCL NATIVE ARTERIES OF EXTREMITIES W               

      

 

                10/23/2019           YULISA DENNIS MD, Ot           

   L97.412         

                          NON-PRS CHR ULCER OF RIGHT HEEL AND MIDF              

      

 

                10/23/2019           YULISA DENNIS MD, Ot           

   M65.071         

                          ABSCESS OF TENDON SHEATH, RIGHT ANKLE AN              

      

 

                10/23/2019           YULISA DENNIS MD           Ot           

   E11.42          

                          TYPE 2 DIABETES MELLITUS WITH DIABETIC P              

      

 

                10/23/2019           YULISA DENNIS MD           Ot           

   E11.52          

                          TYPE 2 DIABETES W DIABETIC PERIPHERAL AN              

      

 

                10/23/2019           YULISA DENNIS MD, Ot           

   E11.621         

                          TYPE 2 DIABETES MELLITUS WITH FOOT ULCER              

      

 

                10/23/2019           YULISA DENNIS MD           Ot           

   I70.234         

                          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              

      

 

             10/23/2019           YULISA DENNIS MD           Ot           I96

           

GANGRENE, NOT ELSEWHERE CLASSIFIED                    

 

                10/23/2019           YULISA DENNIS MD, Ot           

   M65.071         

                          ABSCESS OF TENDON SHEATH, RIGHT ANKLE AN              

      

 

                10/23/2019           YULISA DENNIS MD, Ot           

   E11.42          

                          TYPE 2 DIABETES MELLITUS WITH DIABETIC P              

      

 

                10/23/2019           YULISA DENNIS MD, Ot           

   E11.52          

                          TYPE 2 DIABETES W DIABETIC PERIPHERAL AN              

      

 

                10/23/2019           YULISA DENNIS MD, Ot           

   E11.621         

                          TYPE 2 DIABETES MELLITUS WITH FOOT ULCER              

      

 

                10/23/2019           YULISA DENNIS MD           Ot           

   I70.234         

                          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              

      

 

             10/23/2019           YULISA DENNIS MD           Ot           I96

           

GANGRENE, NOT ELSEWHERE CLASSIFIED                    

 

                10/23/2019           YULISA DENNIS MD           Ot           

   L97.412         

                          NON-PRS CHR ULCER OF RIGHT HEEL AND MIDF              

      

 

                10/23/2019           YULISA DENNIS MD, Ot           

   M65.071         

                          ABSCESS OF TENDON SHEATH, RIGHT ANKLE AN              

      

 

                10/23/2019           YULISA DENNIS MD           Ot           

   E11.42          

                          TYPE 2 DIABETES MELLITUS WITH DIABETIC P              

      

 

                10/23/2019           YULISA DENNIS MD           Ot           

   E11.52          

                          TYPE 2 DIABETES W DIABETIC PERIPHERAL AN              

      

 

                10/23/2019           YULISA DENNIS MD           Ot           

   E11.621         

                          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              

      

 

                10/23/2019           YULISA DENNIS MD           Ot           

   I70.234         

                          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              

      

 

             10/23/2019           YULISA DENNIS MD           Ot           I96

           

GANGRENE, NOT ELSEWHERE CLASSIFIED                    

 

                10/23/2019           YULISA DENNIS MD           Ot           

   L97.412         

                          NON-PRS CHR ULCER OF RIGHT HEEL AND MIDF              

      

 

                10/23/2019           YULISA DENNIS MD           Ot           

   M65.071         

                          ABSCESS OF TENDON SHEATH, RIGHT ANKLE AN              

      

 

                10/23/2019           YULISA DENNIS MD, Ot           

   E11.42          

                          TYPE 2 DIABETES MELLITUS WITH DIABETIC P              

      

 

                10/23/2019           YULISA DENNIS MD           Ot           

   E11.52          

                          TYPE 2 DIABETES W DIABETIC PERIPHERAL AN              

      

 

                10/23/2019           YULISA DENNIS MD, Ot           

   E11.621         

                          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              

      

 

                10/23/2019           YULISA DENNIS MD           Ot           

   I70.234         

                          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              

      

 

                10/23/2019           YULISA DENNIS MD           Ot           

   L97.412         

                          NON-PRS CHR ULCER OF RIGHT HEEL AND MIDF              

      

 

                10/23/2019           YULISA DENNIS MD, Ot           

   M65.071         

                          ABSCESS OF TENDON SHEATH, RIGHT ANKLE AN              

      

 

                10/23/2019           YULISA DENNIS MD, Ot           

   E11.42          

                          TYPE 2 DIABETES MELLITUS WITH DIABETIC P              

      

 

                10/23/2019           YULISA DENNIS MD, Ot           

   E11.52          

                          TYPE 2 DIABETES W DIABETIC PERIPHERAL AN              

      

 

                10/23/2019           YULISA DENNIS MD, Ot           

   E11.621         

                          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              

      

 

                10/23/2019           YULISA DENNIS MD           Ot           

   I70.261         

                          ATHSCL NATIVE ARTERIES OF EXTREMITIES W               

      

 

                10/23/2019           YULISA DENNIS MD           Ot           

   L97.413         

                          NON-PRS CHR ULCER OF RIGHT HEEL AND MIDF              

      

 

                10/23/2019           YULISA DENNIS MD, Ot           

   M65.071         

                          ABSCESS OF TENDON SHEATH, RIGHT ANKLE AN              

      

 

                10/23/2019           YULISA DENNIS MD, Ot           

   E11.42          

                          TYPE 2 DIABETES MELLITUS WITH DIABETIC P              

      

 

                10/23/2019           YULISA DENNIS MD, Ot           

   E11.52          

                          TYPE 2 DIABETES W DIABETIC PERIPHERAL AN              

      

 

                10/23/2019           YULISA DENNIS MD, Ot           

   E11.621         

                          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              

      

 

                10/23/2019           YULISA DENNIS MD           Ot           

   I70.234         

                          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              

      

 

                10/23/2019           YULISA DENNIS MD           Ot           

   L97.413         

                          NON-PRS CHR ULCER OF RIGHT HEEL AND MIDF              

      

 

                10/23/2019           YULISA DENNIS MD, Ot           

   M65.071         

                          ABSCESS OF TENDON SHEATH, RIGHT ANKLE AN              

      

 

                10/23/2019           YULISA DENNIS MD           Ot           

   E11.42          

                          TYPE 2 DIABETES MELLITUS WITH DIABETIC P              

      

 

                10/23/2019           YULISA DENNIS MD           Ot           

   E11.52          

                          TYPE 2 DIABETES W DIABETIC PERIPHERAL AN              

      

 

                10/23/2019           YULISA DENNIS MD, Ot           

   E11.621         

                          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              

      

 

                10/23/2019           YULISA DENNIS MD, Ot           

   I70.234         

                          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              

      

 

                10/23/2019           YULISA DENNIS MD, Ot           

   L97.413         

                          NON-PRS CHR ULCER OF RIGHT HEEL AND MIDF              

      

 

                10/23/2019           YULISA DENNIS MD, Ot           

   M65.071         

                          ABSCESS OF TENDON SHEATH, RIGHT ANKLE AN              

      

 

                10/25/2019           YULISA DENNIS MD, Ot           

   E11.621         

                          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              

      

 

                10/25/2019           YULISA DENNIS MD, Ot           

   L97.509         

                          NON-PRESSURE CHRONIC ULCER OTH PRT UNSP               

      

 

                10/29/2019           YULISA DENNIS MD, Ot           

   E11.42          

                          TYPE 2 DIABETES MELLITUS WITH DIABETIC P              

      

 

                10/29/2019           YULISA DENNIS MD, Ot           

   E11.52          

                          TYPE 2 DIABETES W DIABETIC PERIPHERAL AN              

      

 

                10/29/2019           YULISA DENNIS MD, Ot           

   E11.621         

                          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              

      

 

                10/29/2019           YULISA DENNIS MD, Ot           

   I70.261         

                          ATHSCL NATIVE ARTERIES OF EXTREMITIES W               

      

 

                10/29/2019           YULISA DENNIS MD, Ot           

   L97.413         

                          NON-PRS CHR ULCER OF RIGHT HEEL AND MIDF              

      

 

                10/29/2019           YULISA DENNIS MD, Ot           

   M65.071         

                          ABSCESS OF TENDON SHEATH, RIGHT ANKLE AN              

      

 

                2019           YULISA DENNIS MD, Ot           

   E11.42          

                          TYPE 2 DIABETES MELLITUS WITH DIABETIC P              

      

 

                2019           YULISA DENNIS MD, Ot           

   E11.52          

                          TYPE 2 DIABETES W DIABETIC PERIPHERAL AN              

      

 

                2019           YULISA DENNIS MD, Ot           

   E11.621         

                          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              

      

 

                2019           YULISA DENNIS MD           Ot           

   I70.234         

                          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              

      

 

                2019           YULISA DENNIS MD, Ot           

   L97.412         

                          NON-PRS CHR ULCER OF RIGHT HEEL AND MIDF              

      

 

                2019           YULISA DENNIS MD, Ot           

   M65.071         

                          ABSCESS OF TENDON SHEATH, RIGHT ANKLE AN              

      

 

                2019           YULISA DENNIS MD, Ot           

   E11.42          

                          TYPE 2 DIABETES MELLITUS WITH DIABETIC P              

      

 

                2019           YULISA DENNIS MD, Ot           

   E11.52          

                          TYPE 2 DIABETES W DIABETIC PERIPHERAL AN              

      

 

                2019           YULISA DENNIS MD, Ot           

   E11.621         

                          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              

      

 

                2019           YULISA DENNIS MD           Ot           

   I70.234         

                          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              

      

 

                2019           JEANNIE SALDIVAR, YULISA SEALS           Ot           

   L97.412         

                          NON-PRS CHR ULCER OF RIGHT HEEL AND MIDF              

      

 

                2019           YULISA DENNIS MD           Ot           

   M65.071         

                          ABSCESS OF TENDON SHEATH, RIGHT ANKLE AN              

      

 

             2019           ARASELI RICHARDSON           Ot           I10

           

ESSENTIAL (PRIMARY) HYPERTENSION                    

 

                2019           ARASELI RICHARDSON           Ot           

   I25.10          

                          ATHSCL HEART DISEASE OF NATIVE CORONARY               

      

 

             2019           ARASELI RICHARDSON           Ot           I25

.2           

OLD MYOCARDIAL INFARCTION                        

 

             2019           ARASELI RICHARDSON           Ot           J44

.9           

CHRONIC OBSTRUCTIVE PULMONARY DISEASE, U                    

 

                2019           ARASELI RICHARDSON           Ot           

   R09.02          

                          HYPOXEMIA                          

 

                2019           ARASELI RICHARDSON           Ot           

   R41.82          

                          ALTERED MENTAL STATUS, UNSPECIFIED                    

 

                2019           ARASELI RICHARDSON           Ot           

   Z77.22          

                          CNTCT W AND EXPSR TO ENVIRON TOBACCO SMO              

      

 

                2019           ARASELI RICHARDSON           Ot           

   Z79.02          

                          LONG TERM (CURRENT) USE OF ANTITHROMBOTI              

      

 

                2019           ARASELI RICHARDSON           Ot           

   Z79.82          

                          LONG TERM (CURRENT) USE OF ASPIRIN                    

 

                2019           ARASELI RICHARDSON           Ot           

   Z85.46          

                          PERSONAL HISTORY OF MALIGNANT NEOPLASM O              

      

 

                2019           ARASELI RICHARDSON           Ot           

   Z86.73          

                          PRSNL HX OF TIA (TIA), AND CEREB INFRC W              

      

 

                2019           ARASELI RICHARDSON           Ot           

   Z90.49          

                          ACQUIRED ABSENCE OF OTHER SPECIFIED PART              

      

 

             2019           ARASELI RICHARDSON           Ot           Z95

.5           

PRESENCE OF CORONARY ANGIOPLASTY IMPLANT                    

 

             11/15/2019           ARASELI RICHARDSON           Ot           I10

           

ESSENTIAL (PRIMARY) HYPERTENSION                    

 

                11/15/2019           ARASELI RICHARDSON           Ot           

   I25.10          

                          ATHSCL HEART DISEASE OF NATIVE CORONARY               

      

 

             11/15/2019           ARASELI RICHARDSON           Ot           I25

.2           

OLD MYOCARDIAL INFARCTION                        

 

             11/15/2019           ARASELI RICHARDSON Ot           J44

.9           

CHRONIC OBSTRUCTIVE PULMONARY DISEASE, U                    

 

                11/15/2019           ARASELI RICHARDSON           Ot           

   R09.02          

                          HYPOXEMIA                          

 

                11/15/2019           ARASELI RICHARDSON           Ot           

   R41.82          

                          ALTERED MENTAL STATUS, UNSPECIFIED                    

 

                11/15/2019           ARASELI RICHARDSON           Ot           

   Z77.22          

                          CNTCT W AND EXPSR TO ENVIRON TOBACCO SMO              

      

 

                11/15/2019           ARASELI RICHARDSON           Ot           

   Z79.02          

                          LONG TERM (CURRENT) USE OF ANTITHROMBOTI              

      

 

                11/15/2019           ARASELI RICHARDSON           Ot           

   Z79.82          

                          LONG TERM (CURRENT) USE OF ASPIRIN                    

 

                11/15/2019           ARASELI RICHARDSON           Ot           

   Z85.46          

                          PERSONAL HISTORY OF MALIGNANT NEOPLASM O              

      

 

                11/15/2019           ARASELI RICHARDSON           Ot           

   Z86.73          

                          PRSNL HX OF TIA (TIA), AND CEREB INFRC W              

      

 

                11/15/2019           ARASELI RICHARDSON           Ot           

   Z90.49          

                          ACQUIRED ABSENCE OF OTHER SPECIFIED PART              

      

 

             11/15/2019           ARASELI RICHARDSON           Ot           Z95

.5           

PRESENCE OF CORONARY ANGIOPLASTY IMPLANT                    

 

                2019           YULISA DENNIS MD, Ot           

   E11.42          

                          TYPE 2 DIABETES MELLITUS WITH DIABETIC P              

      

 

                2019           YULISA DENNIS MD, Ot           

   E11.52          

                          TYPE 2 DIABETES W DIABETIC PERIPHERAL AN              

      

 

                2019           YULISA DENNIS MD, Ot           

   E11.621         

                          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              

      

 

                2019           YULISA DENNIS MD, Ot           

   I70.234         

                          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              

      

 

                2019           YULISA DENNIS MD, Ot           

   L97.413         

                          NON-PRS CHR ULCER OF RIGHT HEEL AND MIDF              

      

 

                2019           YULISA DENNIS MD, Ot           

   M65.071         

                          ABSCESS OF TENDON SHEATH, RIGHT ANKLE AN              

      

 

                2019           YULISA DENNIS MD, Ot           

   E11.42          

                          TYPE 2 DIABETES MELLITUS WITH DIABETIC P              

      

 

                2019           YULISA DENNIS MD, Ot           

   E11.621         

                          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              

      

 

                2019           YULISA DENNIS MD           Ot           

   I70.234         

                          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              

      

 

                2019           YULISA DENNIS MD, Ot           

   L97.413         

                          NON-PRS CHR ULCER OF RIGHT HEEL AND MIDF              

      

 

                2019           YULISA DENNIS MD, Ot           

   M65.071         

                          ABSCESS OF TENDON SHEATH, RIGHT ANKLE AN              

      

 

                2019           YULISA DENNIS MD, Ot           

   E11.42          

                          TYPE 2 DIABETES MELLITUS WITH DIABETIC P              

      

 

                2019           YULISA DENNIS MD, Ot           

   E11.52          

                          TYPE 2 DIABETES W DIABETIC PERIPHERAL AN              

      

 

                2019           YULISA DENNIS MD, Ot           

   E11.621         

                          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              

      

 

                2019           YULISA DENNIS MD, Ot           

   I70.234         

                          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              

      

 

                2019           YULISA DENNIS MD, Ot           

   L97.412         

                          NON-PRS CHR ULCER OF RIGHT HEEL AND MIDF              

      

 

                2019           YULISA DENNIS MD, Ot           

   E11.42          

                          TYPE 2 DIABETES MELLITUS WITH DIABETIC P              

      

 

                2019           YULISA DENNIS MD, Ot           

   E11.52          

                          TYPE 2 DIABETES W DIABETIC PERIPHERAL AN              

      

 

                2019           YULISA DENNIS MD, Ot           

   E11.621         

                          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              

      

 

                2019           YULISA DENNIS MD, Ot           

   I70.261         

                          ATHSCL NATIVE ARTERIES OF EXTREMITIES W               

      

 

                2019           YULISA DENNIS MD, Ot           

   L97.412         

                          NON-PRS CHR ULCER OF RIGHT HEEL AND MIDF              

      

 

                2019           YULISA DENNIS MD, Ot           

   E11.42          

                          TYPE 2 DIABETES MELLITUS WITH DIABETIC P              

      

 

                2019           YULISA DENNIS MD, Ot           

   E11.52          

                          TYPE 2 DIABETES W DIABETIC PERIPHERAL AN              

      

 

                2019           YULISA DENNIS MD, Ot           

   E11.621         

                          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              

      

 

                2019           YULISA DENNIS MD, Ot           

   I70.234         

                          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              

      

 

                2019           UYLISA DENNIS MD, Ot           

   L97.412         

                          NON-PRS CHR ULCER OF RIGHT HEEL AND MIDF              

      

 

                2020           YULISA DENNIS MD, Ot           

   E11.42          

                          TYPE 2 DIABETES MELLITUS WITH DIABETIC P              

      

 

                2020           YULISA DENNIS MD, Ot           

   E11.621         

                          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              

      

 

                2020           YULISA DENNIS MD           Ot           

   L03.115         

                          CELLULITIS OF RIGHT LOWER LIMB                    

 

                2020           YULISA DENNIS MD, Ot           

   L97.513         

                          NON-PRS CHRONIC ULCER OTH PRT RIGHT FOOT              

      

 

                2020           YULISA DENNIS MD, Ot           

   E11.42          

                          TYPE 2 DIABETES MELLITUS WITH DIABETIC P              

      

 

                2020           YUILSA DENNIS MD, Ot           

   E11.621         

                          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              

      

 

                2020           YULISA DENNIS MD           Ot           

   L03.115         

                          CELLULITIS OF RIGHT LOWER LIMB                    

 

                2020           YULISA DENNIS MD, Ot           

   L97.513         

                          NON-PRS CHRONIC ULCER OTH PRT RIGHT FOOT              

      

 

                2020           YULISA DENNIS MD, Ot           

   E11.42          

                          TYPE 2 DIABETES MELLITUS WITH DIABETIC P              

      

 

                2020           YULISA DENNIS MD, Ot           

   E11.52          

                          TYPE 2 DIABETES W DIABETIC PERIPHERAL AN              

      

 

                2020           YULISA DENNIS MD, Ot           

   E11.621         

                          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              

      

 

             2020           YULISA DENNIS MD, Ot           I96

           

GANGRENE, NOT ELSEWHERE CLASSIFIED                    

 

                2020           YULISA DENNIS MD, Ot           

   L97.413         

                          NON-PRS CHR ULCER OF RIGHT HEEL AND MIDF              

      

 

                2020           YULISA DENNIS MD, Ot           

   E11.42          

                          TYPE 2 DIABETES MELLITUS WITH DIABETIC P              

      

 

                2020           YULISA DENNIS MD, Ot           

   E11.52          

                          TYPE 2 DIABETES W DIABETIC PERIPHERAL AN              

      

 

                2020           YULISA DENNIS MD, Ot           

   E11.621         

                          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              

      

 

             2020           YULISA DENNIS MD, Ot           I96

           

GANGRENE, NOT ELSEWHERE CLASSIFIED                    

 

                2020           YULISA DENNIS MD, Ot           

   L97.412         

                          NON-PRS CHR ULCER OF RIGHT HEEL AND MIDF              

      

 

                2020           YULISA DENNIS MD           Ot           

   M65.071         

                          ABSCESS OF TENDON SHEATH, RIGHT ANKLE AN              

      

 

             2020           JENNIFER SALDIVAR, ELISE RAVI           Ot           E11

.9           

TYPE 2 DIABETES MELLITUS WITHOUT COMPLIC                    

 

             2020           ELISE RODRIGUEZ MD           Ot           I11

.9           

HYPERTENSIVE HEART DISEASE WITHOUT HEART                    

 

                2020           ELISE RODRIGUEZ MD, Ot           

   I25.10          

                          ATHSCL HEART DISEASE OF NATIVE CORONARY               

      

 

             2020           ELISE RODRIGUEZ MD           Ot           I63

.9           

CEREBRAL INFARCTION, UNSPECIFIED                    

 

             2020           ELISE RODRIGUEZ MD           Ot           Z72

.0           

TOBACCO USE                                      

 

                2020           YULISA DENNIS MD, Ot           

   E11.42          

                          TYPE 2 DIABETES MELLITUS WITH DIABETIC P              

      

 

                2020           YULISA DENNIS MD, Ot           

   E11.52          

                          TYPE 2 DIABETES W DIABETIC PERIPHERAL AN              

      

 

                2020           YULISA DENNIS MD, Ot           

   E11.621         

                          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              

      

 

                2020           YULISA DENNIS MD, Ot           

   I70.261         

                          ATHSCL NATIVE ARTERIES OF EXTREMITIES W               

      

 

                2020           YULISA DENNIS MD, Ot           

   L97.412         

                          NON-PRS CHR ULCER OF RIGHT HEEL AND MIDF              

      

 

                2020           YULISA DENNIS MD, Ot           

   M65.071         

                          ABSCESS OF TENDON SHEATH, RIGHT ANKLE AN              

      

 

                2020           YULISA DENNIS MD, Ot           

   E11.42          

                          TYPE 2 DIABETES MELLITUS WITH DIABETIC P              

      

 

                2020           YULISA DENNIS MD, Ot           

   E11.52          

                          TYPE 2 DIABETES W DIABETIC PERIPHERAL AN              

      

 

                2020           YULISA DENNIS MD, Ot           

   E11.621         

                          TYPE 2 DIABETES MELLITUS WITH FOOT ULCER              

      

 

                2020           YULISA DENNIS MD           Ot           

   I70.261         

                          ATHSCL NATIVE ARTERIES OF EXTREMITIES W               

      

 

                2020           YULISA DENNIS MD, Ot           

   L97.412         

                          NON-PRS CHR ULCER OF RIGHT HEEL AND MIDF              

      

 

                2020           YULISA DENNIS MD, Ot           

   M65.071         

                          ABSCESS OF TENDON SHEATH, RIGHT ANKLE AN              

      

 

                2020           YULISA DENNIS MD, Ot           

   E11.42          

                          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              

      

 

                2020           YULISA DENNIS MD, Ot           

   E11.52          

                          TYPE 2 DIABETES W DIABETIC PERIPHERAL AN              

      

 

                2020           YULISA DENNIS MD, Ot           

   E11.621         

                          TYPE 2 DIABETES MELLITUS WITH FOOT ULCER              

      

 

                2020           YULISA DENNIS MD, Ot           

   I70.234         

                          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              

      

 

             2020           YULISA DENNIS MD           Ot           I96

           

GANGRENE, NOT ELSEWHERE CLASSIFIED                    

 

                2020           YULISA DENNIS MD, Ot           

   M65.071         

                          ABSCESS OF TENDON SHEATH, RIGHT ANKLE AN              

      

 

                2020           YULISA DENNIS MD, Ot           

   E11.42          

                          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              

      

 

                2020           YULISA DENNIS MD, Ot           

   E11.52          

                          TYPE 2 DIABETES W DIABETIC PERIPHERAL AN              

      

 

                2020           YULISA DENNIS MD, Ot           

   E11.621         

                          TYPE 2 DIABETES MELLITUS WITH FOOT ULCER              

      

 

                2020           YULISA DENNIS MD           Ot           

   I70.234         

                          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              

      

 

             2020           YULISA DENNIS MD           Ot           I96

           

GANGRENE, NOT ELSEWHERE CLASSIFIED                    

 

                2020           YULISA DENNIS MD, Ot           

   L97.412         

                          NON-PRS CHR ULCER OF RIGHT HEEL AND MIDF              

      

 

                2020           YULISA DENNIS MD, Ot           

   M65.071         

                          ABSCESS OF TENDON SHEATH, RIGHT ANKLE AN              

      

 

                2020           YULISA DENNIS MD           Ot           

   E11.42          

                          TYPE 2 DIABETES MELLITUS WITH DIABETIC P              

      

 

                2020           JEANNIE MD, YULISA G           Ot           

   E11.52          

                          TYPE 2 DIABETES W DIABETIC PERIPHERAL AN              

      

 

                2020           YULISA DENNIS MD, Ot           

   E11.621         

                          TYPE 2 DIABETES MELLITUS WITH FOOT ULCER              

      

 

                2020           YULISA DENNIS MD           Ot           

   I70.234         

                          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              

      

 

             2020           YULISA DENNIS MD           Ot           I96

           

GANGRENE, NOT ELSEWHERE CLASSIFIED                    

 

                2020           YULISA DENNIS MD           Ot           

   L97.412         

                          NON-PRS CHR ULCER OF RIGHT HEEL AND MIDF              

      

 

                2020           YULISA DENNIS MD           Ot           

   M65.071         

                          ABSCESS OF TENDON SHEATH, RIGHT ANKLE AN              

      

 

                2020           YULISA DENNIS MD, Ot           

   E11.42          

                          TYPE 2 DIABETES MELLITUS WITH DIABETIC P              

      

 

                2020           YULISA DENNIS MD, Ot           

   E11.52          

                          TYPE 2 DIABETES W DIABETIC PERIPHERAL AN              

      

 

                2020           YULISA DENNIS MD, Ot           

   E11.621         

                          TYPE 2 DIABETES MELLITUS WITH FOOT ULCER              

      

 

                2020           YULISA DENNIS MD           Ot           

   I70.234         

                          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              

      

 

                2020           YULISA DENNIS MD           Ot           

   L97.412         

                          NON-PRS CHR ULCER OF RIGHT HEEL AND MIDF              

      

 

                2020           YULISA DENNIS MD           Ot           

   M65.071         

                          ABSCESS OF TENDON SHEATH, RIGHT ANKLE AN              

      

 

                2020           YULISA DENNIS MD           Ot           

   E11.42          

                          TYPE 2 DIABETES MELLITUS WITH DIABETIC P              

      

 

                2020           YULISA DENNIS MD           Ot           

   E11.52          

                          TYPE 2 DIABETES W DIABETIC PERIPHERAL AN              

      

 

                2020           YULISA DENNIS MD           Ot           

   E11.621         

                          TYPE 2 DIABETES MELLITUS WITH FOOT ULCER              

      

 

                2020           YULISA DENNIS MD           Ot           

   I70.261         

                          ATHSCL NATIVE ARTERIES OF EXTREMITIES W               

      

 

                2020           YULISA DENNIS MD           Ot           

   L97.413         

                          NON-PRS CHR ULCER OF RIGHT HEEL AND MIDF              

      

 

                2020           YULISA DENNIS MD, Ot           

   M65.071         

                          ABSCESS OF TENDON SHEATH, RIGHT ANKLE AN              

      

 

                2020           YULISA DENNIS MD           Ot           

   E11.42          

                          TYPE 2 DIABETES MELLITUS WITH DIABETIC P              

      

 

                2020           YULISA DENNIS MD           Ot           

   E11.52          

                          TYPE 2 DIABETES W DIABETIC PERIPHERAL AN              

      

 

                2020           YULISA DENNIS MD           Ot           

   E11.621         

                          TYPE 2 DIABETES MELLITUS WITH FOOT ULCER              

      

 

                2020           YULISA DENNIS MD, Ot           

   I70.234         

                          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              

      

 

                2020           YULISA DENNIS MD, Ot           

   L97.413         

                          NON-PRS CHR ULCER OF RIGHT HEEL AND MIDF              

      

 

                2020           YULISA DENNIS MD, Ot           

   M65.071         

                          ABSCESS OF TENDON SHEATH, RIGHT ANKLE AN              

      

 

                2020           YULISA DENNIS MD, Ot           

   E11.42          

                          TYPE 2 DIABETES MELLITUS WITH DIABETIC P              

      

 

                2020           YULISA DENNIS MD, Ot           

   E11.52          

                          TYPE 2 DIABETES W DIABETIC PERIPHERAL AN              

      

 

                2020           YULISA DENNIS MD, Ot           

   E11.621         

                          TYPE 2 DIABETES MELLITUS WITH FOOT ULCER              

      

 

                2020           YULISA DENNIS MD           Ot           

   I70.261         

                          ATHSCL NATIVE ARTERIES OF EXTREMITIES W               

      

 

                2020           YULISA DENNIS MD, Ot           

   L97.413         

                          NON-PRS CHR ULCER OF RIGHT HEEL AND MIDF              

      

 

                2020           YULISA DENNIS MD, Ot           

   M65.071         

                          ABSCESS OF TENDON SHEATH, RIGHT ANKLE AN              

      

 

                2020           YULISA DENNIS MD, Ot           

   E11.621         

                          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              

      

 

                2020           YULISA DENNIS MD, Ot           

   L97.509         

                          NON-PRESSURE CHRONIC ULCER OTH PRT UNSP               

      

 

                2020           YULISA DENNIS MD, Ot           

   E11.42          

                          TYPE 2 DIABETES MELLITUS WITH DIABETIC P              

      

 

                2020           YULISA DENNIS MD, Ot           

   E11.52          

                          TYPE 2 DIABETES W DIABETIC PERIPHERAL AN              

      

 

                2020           YULISA DENNIS MD, Ot           

   E11.621         

                          TYPE 2 DIABETES MELLITUS WITH FOOT ULCER              

      

 

                2020           YULISA DENNIS MD           Ot           

   I70.234         

                          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              

      

 

                2020           YULISA DENNIS MD, Ot           

   L97.412         

                          NON-PRS CHR ULCER OF RIGHT HEEL AND MIDF              

      

 

                2020           YULISA DENNIS MD, Ot           

   M65.071         

                          ABSCESS OF TENDON SHEATH, RIGHT ANKLE AN              

      

 

                2020           YULISA DENNIS MD, Ot           

   E11.42          

                          TYPE 2 DIABETES MELLITUS WITH DIABETIC P              

      

 

                2020           YULISA DENNIS MD, Ot           

   E11.52          

                          TYPE 2 DIABETES W DIABETIC PERIPHERAL AN              

      

 

                2020           YULISA DENNIS MD, Ot           

   E11.621         

                          TYPE 2 DIABETES MELLITUS WITH FOOT ULCER              

      

 

                2020           YULISA DENNIS MD           Ot           

   I70.234         

                          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              

      

 

                2020           YULISA DENNIS MD           Ot           

   L97.412         

                          NON-PRS CHR ULCER OF RIGHT HEEL AND MIDF              

      

 

                2020           YULISA DENNIS MD, Ot           

   M65.071         

                          ABSCESS OF TENDON SHEATH, RIGHT ANKLE AN              

      

 

                2020           YULISA DENNIS MD, Ot           

   E11.42          

                          TYPE 2 DIABETES MELLITUS WITH DIABETIC P              

      

 

                2020           YULISA DENNIS MD, Ot           

   E11.52          

                          TYPE 2 DIABETES W DIABETIC PERIPHERAL AN              

      

 

                2020           YULISA DENNIS MD, Ot           

   E11.621         

                          TYPE 2 DIABETES MELLITUS WITH FOOT ULCER              

      

 

                2020           YULISA DENNIS MD           Ot           

   I70.234         

                          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              

      

 

                2020           YULISA DENNIS MD, Ot           

   L97.413         

                          NON-PRS CHR ULCER OF RIGHT HEEL AND MIDF              

      

 

                2020           YULISA DENNIS MD, Ot           

   M65.071         

                          ABSCESS OF TENDON SHEATH, RIGHT ANKLE AN              

      

 

                2020           YULIAS DENNIS MD, Ot           

   E11.42          

                          TYPE 2 DIABETES MELLITUS WITH DIABETIC P              

      

 

                2020           YULISA DENNIS MD, Ot           

   E11.52          

                          TYPE 2 DIABETES W DIABETIC PERIPHERAL AN              

      

 

                2020           YULISA DENNIS MD, Ot           

   E11.621         

                          TYPE 2 DIABETES MELLITUS WITH FOOT ULCER              

      

 

                2020           YULISA DENNIS MD           Ot           

   I70.261         

                          ATHSCL NATIVE ARTERIES OF EXTREMITIES W               

      

 

                2020           YULISA DENNIS MD           Ot           

   L97.416         

                          NON-PRS CHR ULC OF R HEEL/MIDFT W BNE IN              

      

 

                2020           YULISA DENNIS MD           Ot           

   E11.42          

                          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              

      

 

                2020           YULISA DENNIS MD           Ot           

   E11.621         

                          TYPE 2 DIABETES MELLITUS WITH FOOT ULCER              

      

 

                2020           YULISA DENNIS MD           Ot           

   I70.234         

                          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              

      

 

                2020           YULISA DENNIS MD           Ot           

   L97.413         

                          NON-PRS CHR ULCER OF RIGHT HEEL AND MIDF              

      

 

                2020           YULISA DENNIS MD, Ot           

   M65.071         

                          ABSCESS OF TENDON SHEATH, RIGHT ANKLE AN              

      

 

                2020           YULISA DENNIS MD           Ot           

   E11.42          

                          TYPE 2 DIABETES MELLITUS WITH DIABETIC P              

      

 

                2020           YULISA DENNIS MD           Ot           

   E11.52          

                          TYPE 2 DIABETES W DIABETIC PERIPHERAL AN              

      

 

                2020           YULISA DENNIS MD, Ot           

   E11.621         

                          TYPE 2 DIABETES MELLITUS WITH FOOT ULCER              

      

 

                2020           YULISA DENNIS MD, Ot           

   I70.234         

                          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              

      

 

                2020           YULISA DENNIS MD, Ot           

   L97.412         

                          NON-PRS CHR ULCER OF RIGHT HEEL AND MIDF              

      

 

                2020           YULISA DENNIS MD, Ot           

   E11.42          

                          TYPE 2 DIABETES MELLITUS WITH DIABETIC P              

      

 

                2020           YULISA DENNIS MD, Ot           

   E11.52          

                          TYPE 2 DIABETES W DIABETIC PERIPHERAL AN              

      

 

                2020           YULISA DENINS MD, Ot           

   E11.621         

                          TYPE 2 DIABETES MELLITUS WITH FOOT ULCER              

      

 

                2020           YULISA DENNIS MD, Ot           

   I70.261         

                          ATHSCL NATIVE ARTERIES OF EXTREMITIES W               

      

 

                2020           YULISA DENNIS MD, Ot           

   L97.412         

                          NON-PRS CHR ULCER OF RIGHT HEEL AND MIDF              

      

 

                2020           YULISA DENNIS MD, Ot           

   E11.42          

                          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              

      

 

                2020           YULISA DENNIS MD, Ot           

   E11.52          

                          TYPE 2 DIABETES W DIABETIC PERIPHERAL AN              

      

 

                2020           YULISA DENNIS MD, Ot           

   E11.621         

                          TYPE 2 DIABETES MELLITUS WITH FOOT ULCER              

      

 

                2020           YULISA DENNIS MD, Ot           

   I70.234         

                          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              

      

 

                2020           YULISA DENNIS MD, Ot           

   L97.412         

                          NON-PRS CHR ULCER OF RIGHT HEEL AND MIDF              

      

 

                2020           YULISA DENNIS MD, Ot           

   E11.42          

                          TYPE 2 DIABETES MELLITUS WITH DIABETIC P              

      

 

                2020           YULISA DENNIS MD, Ot           

   E11.621         

                          TYPE 2 DIABETES MELLITUS WITH FOOT ULCER              

      

 

                2020           YULISA DENNIS MD           Ot           

   L03.115         

                          CELLULITIS OF RIGHT LOWER LIMB                    

 

                2020           YULISA DENNIS MD, Ot           

   L97.513         

                          NON-PRS CHRONIC ULCER OTH PRT RIGHT FOOT              

      

 

                2020           YULISA DENNIS MD, Ot           

   E11.42          

                          TYPE 2 DIABETES MELLITUS WITH DIABETIC P              

      

 

                2020           YULISA DENNIS MD, Ot           

   E11.621         

                          TYPE 2 DIABETES MELLITUS WITH FOOT ULCER              

      

 

                2020           YULISA DENNIS MD           Ot           

   L03.115         

                          CELLULITIS OF RIGHT LOWER LIMB                    

 

                2020           YULISA DENNIS MD, Ot           

   L97.513         

                          NON-PRS CHRONIC ULCER OTH PRT RIGHT FOOT              

      

 

                2020           YULISA DENNIS MD, Ot           

   E11.42          

                          TYPE 2 DIABETES MELLITUS WITH DIABETIC P              

      

 

                2020           YULISA DENNIS MD, Ot           

   E11.52          

                          TYPE 2 DIABETES W DIABETIC PERIPHERAL AN              

      

 

                2020           YULISA DENNIS MD, Ot           

   E11.621         

                          TYPE 2 DIABETES MELLITUS WITH FOOT ULCER              

      

 

             2020           YULISA DENNIS MD, Ot           I96

           

GANGRENE, NOT ELSEWHERE CLASSIFIED                    

 

                2020           YULISA DENNIS MD, Ot           

   L97.413         

                          NON-PRS CHR ULCER OF RIGHT HEEL AND MIDF              

      

 

                2020           YULISA DENNIS MD, Ot           

   E11.42          

                          TYPE 2 DIABETES MELLITUS WITH DIABETIC P              

      

 

                2020           YULISA DENNIS MD, Ot           

   E11.52          

                          TYPE 2 DIABETES W DIABETIC PERIPHERAL AN              

      

 

                2020           YULISA DENNIS MD, Ot           

   E11.621         

                          TYPE 2 DIABETES MELLITUS WITH FOOT ULCER              

      

 

             2020           YULISA DENNIS MD, Ot           I96

           

GANGRENE, NOT ELSEWHERE CLASSIFIED                    

 

                2020           YULISA DENNIS MD, Ot           

   L97.412         

                          NON-PRS CHR ULCER OF RIGHT HEEL AND MIDF              

      

 

                2020           YULISA DENNIS MD           Ot           

   M65.071         

                          ABSCESS OF TENDON SHEATH, RIGHT ANKLE AN              

      

 

             2020           JENNIFER SALDIVAR, ELISE RAVI           Ot           E11

.9           

TYPE 2 DIABETES MELLITUS WITHOUT COMPLIC                    

 

             2020           ELISE RODRIGUEZ MD           Ot           I11

.9           

HYPERTENSIVE HEART DISEASE WITHOUT HEART                    

 

                2020           ELISE RODRIGUEZ MD, Ot           

   I25.10          

                          ATHSCL HEART DISEASE OF NATIVE CORONARY               

      

 

             2020           ELISE RODRIGUEZ MD           Ot           I63

.9           

CEREBRAL INFARCTION, UNSPECIFIED                    

 

             2020           ELISE RODRIGUEZ MD           Ot           Z72

.0           

TOBACCO USE                                      

 

                2020           YULISA DENNIS MD, Ot           

   E11.42          

                          TYPE 2 DIABETES MELLITUS WITH DIABETIC P              

      

 

                2020           YULISA DENNIS MD, Ot           

   E11.52          

                          TYPE 2 DIABETES W DIABETIC PERIPHERAL AN              

      

 

                2020           YULISA DENNIS MD, Ot           

   E11.621         

                          TYPE 2 DIABETES MELLITUS WITH FOOT ULCER              

      

 

                2020           YULISA DENNIS MD, Ot           

   I70.261         

                          ATHSCL NATIVE ARTERIES OF EXTREMITIES W               

      

 

                2020           YULISA DENNIS MD, Ot           

   L97.412         

                          NON-PRS CHR ULCER OF RIGHT HEEL AND MIDF              

      

 

                2020           YULISA DENNIS MD, Ot           

   M65.071         

                          ABSCESS OF TENDON SHEATH, RIGHT ANKLE AN              

      

 

                2020           YULISA DENNIS MD, Ot           

   E11.42          

                          TYPE 2 DIABETES MELLITUS WITH DIABETIC P              

      

 

                2020           YULISA DENNIS MD, Ot           

   E11.52          

                          TYPE 2 DIABETES W DIABETIC PERIPHERAL AN              

      

 

                2020           YULISA DENNIS MD, Ot           

   E11.621         

                          TYPE 2 DIABETES MELLITUS WITH FOOT ULCER              

      

 

                2020           YULISA DENNIS MD           Ot           

   I70.261         

                          ATHSCL NATIVE ARTERIES OF EXTREMITIES W               

      

 

                2020           YULISA DENNIS MD, Ot           

   L97.412         

                          NON-PRS CHR ULCER OF RIGHT HEEL AND MIDF              

      

 

                2020           YULISA EDNNIS MD, Ot           

   M65.071         

                          ABSCESS OF TENDON SHEATH, RIGHT ANKLE AN              

      

 

                2020           YULISA DENNIS MD, Ot           

   E11.42          

                          TYPE 2 DIABETES MELLITUS WITH DIABETIC P              

      

 

                2020           YULISA DENNIS MD, Ot           

   E11.52          

                          TYPE 2 DIABETES W DIABETIC PERIPHERAL AN              

      

 

                2020           YULISA DENNIS MD, Ot           

   E11.621         

                          TYPE 2 DIABETES MELLITUS WITH FOOT ULCER              

      

 

                2020           YULISA DENNIS MD           Ot           

   I70.234         

                          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              

      

 

             2020           YULISA DENNIS MD           Ot           I96

           

GANGRENE, NOT ELSEWHERE CLASSIFIED                    

 

                2020           YULISA DENNIS MD, Ot           

   M65.071         

                          ABSCESS OF TENDON SHEATH, RIGHT ANKLE AN              

      

 

                2020           YULISA DENNIS MD, Ot           

   E11.42          

                          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              

      

 

                2020           YULISA DENNIS MD           Ot           

   E11.52          

                          TYPE 2 DIABETES W DIABETIC PERIPHERAL AN              

      

 

                2020           YULISA DENNIS MD           Ot           

   E11.621         

                          TYPE 2 DIABETES MELLITUS WITH FOOT ULCER              

      

 

                2020           YULISA DENNIS MD           Ot           

   I70.234         

                          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              

      

 

             2020           YULISA DENNIS MD           Ot           I96

           

GANGRENE, NOT ELSEWHERE CLASSIFIED                    

 

                2020           YULISA DENNIS MD           Ot           

   L97.412         

                          NON-PRS CHR ULCER OF RIGHT HEEL AND MIDF              

      

 

                2020           YULISA DENNIS MD, Ot           

   M65.071         

                          ABSCESS OF TENDON SHEATH, RIGHT ANKLE AN              

      

 

                2020           YULISA DENNIS MD           Ot           

   E11.42          

                          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              

      

 

                2020           YULSIA DENNIS MD, Ot           

   E11.52          

                          TYPE 2 DIABETES W DIABETIC PERIPHERAL AN              

      

 

                2020           YULISA DENNIS MD, Ot           

   E11.621         

                          TYPE 2 DIABETES MELLITUS WITH FOOT ULCER              

      

 

                2020           YULISA DENNIS MD           Ot           

   I70.234         

                          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              

      

 

             2020           YULISA DENNIS MD           Ot           I96

           

GANGRENE, NOT ELSEWHERE CLASSIFIED                    

 

                2020           YULISA DENNIS MD, Ot           

   L97.412         

                          NON-PRS CHR ULCER OF RIGHT HEEL AND MIDF              

      

 

                2020           YULISA DENNIS MD, Ot           

   M65.071         

                          ABSCESS OF TENDON SHEATH, RIGHT ANKLE AN              

      

 

                2020           YULISA DENNIS MD, Ot           

   E11.42          

                          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              

      

 

                2020           YULISA DENNIS MD, Ot           

   E11.52          

                          TYPE 2 DIABETES W DIABETIC PERIPHERAL AN              

      

 

                2020           YULISA DENNIS MD, Ot           

   E11.621         

                          TYPE 2 DIABETES MELLITUS WITH FOOT ULCER              

      

 

                2020           YULISA DENNIS MD, Ot           

   I70.234         

                          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              

      

 

                2020           YULISA DENNIS MD           Ot           

   L97.412         

                          NON-PRS CHR ULCER OF RIGHT HEEL AND MIDF              

      

 

                2020           YULISA DENNIS MD, Ot           

   M65.071         

                          ABSCESS OF TENDON SHEATH, RIGHT ANKLE AN              

      

 

                2020           YULISA DENNIS MD, Ot           

   E11.42          

                          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              

      

 

                2020           YULISA DENNIS MD, Ot           

   E11.52          

                          TYPE 2 DIABETES W DIABETIC PERIPHERAL AN              

      

 

                2020           YULISA DENNIS MD, Ot           

   E11.621         

                          TYPE 2 DIABETES MELLITUS WITH FOOT ULCER              

      

 

                2020           YULISA DENNIS MD           Ot           

   I70.261         

                          ATHSCL NATIVE ARTERIES OF EXTREMITIES W               

      

 

                2020           YULISA DENNIS MD           Ot           

   L97.413         

                          NON-PRS CHR ULCER OF RIGHT HEEL AND MIDF              

      

 

                2020           YULISA DENNIS MD, Ot           

   M65.071         

                          ABSCESS OF TENDON SHEATH, RIGHT ANKLE AN              

      

 

                2020           YULISA DENNIS MD           Ot           

   E11.42          

                          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              

      

 

                2020           YULISA DENNIS MD           Ot           

   E11.52          

                          TYPE 2 DIABETES W DIABETIC PERIPHERAL AN              

      

 

                2020           YULISA DENNIS MD, Ot           

   E11.621         

                          TYPE 2 DIABETES MELLITUS WITH FOOT ULCER              

      

 

                2020           YULISA DENNIS MD, Ot           

   I70.234         

                          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              

      

 

                2020           YULISA DENNIS MD, Ot           

   L97.413         

                          NON-PRS CHR ULCER OF RIGHT HEEL AND MIDF              

      

 

                2020           YULISA DENNIS MD, Ot           

   M65.071         

                          ABSCESS OF TENDON SHEATH, RIGHT ANKLE AN              

      

 

                2020           YULISA DENNIS MD           Ot           

   E11.42          

                          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              

      

 

                2020           YULISA DENNIS MD, Ot           

   E11.52          

                          TYPE 2 DIABETES W DIABETIC PERIPHERAL AN              

      

 

                2020           YULISA DENNIS MD, Ot           

   E11.621         

                          TYPE 2 DIABETES MELLITUS WITH FOOT ULCER              

      

 

                2020           YULISA DENNIS MD           Ot           

   I70.261         

                          ATHSCL NATIVE ARTERIES OF EXTREMITIES W               

      

 

                2020           YULISA DENNIS MD, Ot           

   L97.413         

                          NON-PRS CHR ULCER OF RIGHT HEEL AND MIDF              

      

 

                2020           YULISA DENNIS MD, Ot           

   M65.071         

                          ABSCESS OF TENDON SHEATH, RIGHT ANKLE AN              

      

 

                2020           YULISA DENNIS MD, Ot           

   E11.621         

                          TYPE 2 DIABETES MELLITUS WITH FOOT ULCER              

      

 

                2020           YULISA DENNIS MD, Ot           

   L97.509         

                          NON-PRESSURE CHRONIC ULCER OTH PRT UNSP               

      

 

                2020           YULISA DENNIS MD, Ot           

   E11.42          

                          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              

      

 

                2020           YULISA DENNIS MD, Ot           

   E11.52          

                          TYPE 2 DIABETES W DIABETIC PERIPHERAL AN              

      

 

                2020           YULISA DENNIS MD           Ot           

   E11.621         

                          TYPE 2 DIABETES MELLITUS WITH FOOT ULCER              

      

 

                2020           YULISA DENNIS MD           Ot           

   I70.234         

                          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              

      

 

                2020           YULISA DENNIS MD, Ot           

   L97.412         

                          NON-PRS CHR ULCER OF RIGHT HEEL AND MIDF              

      

 

                2020           YULISA DENNIS MD, Ot           

   M65.071         

                          ABSCESS OF TENDON SHEATH, RIGHT ANKLE AN              

      

 

                2020           YULISA DENNIS MD, Ot           

   E11.42          

                          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              

      

 

                2020           YULISA DENNIS MD           Ot           

   E11.52          

                          TYPE 2 DIABETES W DIABETIC PERIPHERAL AN              

      

 

                2020           YULISA DENNIS MD           Ot           

   E11.621         

                          TYPE 2 DIABETES MELLITUS WITH FOOT ULCER              

      

 

                2020           YULISA DENNIS MD           Ot           

   I70.234         

                          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              

      

 

                2020           YULISA DENNIS MD, Ot           

   L97.412         

                          NON-PRS CHR ULCER OF RIGHT HEEL AND MIDF              

      

 

                2020           YULISA DENNIS MD, Ot           

   M65.071         

                          ABSCESS OF TENDON SHEATH, RIGHT ANKLE AN              

      

 

                2020           YULISA DENNIS MD, Ot           

   E11.42          

                          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              

      

 

                2020           YULISA DENNIS MD, Ot           

   E11.52          

                          TYPE 2 DIABETES W DIABETIC PERIPHERAL AN              

      

 

                2020           YULISA DENNIS MD, Ot           

   E11.621         

                          TYPE 2 DIABETES MELLITUS WITH FOOT ULCER              

      

 

                2020           YULISA DENNIS MD, Ot           

   I70.234         

                          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              

      

 

                2020           YULISA DENNIS MD, Ot           

   L97.413         

                          NON-PRS CHR ULCER OF RIGHT HEEL AND MIDF              

      

 

                2020           YULISA DENNIS MD, Ot           

   M65.071         

                          ABSCESS OF TENDON SHEATH, RIGHT ANKLE AN              

      

 

                2020           YULISA DENNIS MD, Ot           

   E11.42          

                          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              

      

 

                2020           YULISA DENNIS MD, Ot           

   E11.52          

                          TYPE 2 DIABETES W DIABETIC PERIPHERAL AN              

      

 

                2020           YULISA DENNIS MD, Ot           

   E11.621         

                          TYPE 2 DIABETES MELLITUS WITH FOOT ULCER              

      

 

                2020           YULISA DENNIS MD, Ot           

   I70.261         

                          ATHSCL NATIVE ARTERIES OF EXTREMITIES W               

      

 

                2020           YULISA DENNIS MD, Ot           

   L97.416         

                          NON-PRS CHR ULC OF R HEEL/MIDFT W BNE IN              

      

 

                2020           YULISA DENNIS MD, Ot           

   E11.42          

                          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              

      

 

                2020           YULISA DENNIS MD, Ot           

   E11.621         

                          TYPE 2 DIABETES MELLITUS WITH FOOT ULCER              

      

 

                2020           YULISA DENNIS MD, Ot           

   I70.234         

                          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              

      

 

                2020           YULISA DENNIS MD, Ot           

   L97.413         

                          NON-PRS CHR ULCER OF RIGHT HEEL AND MIDF              

      

 

                2020           YULISA DENNIS MD, Ot           

   M65.071         

                          ABSCESS OF TENDON SHEATH, RIGHT ANKLE AN              

      

 

                2020           YULISA DENNIS MD, Ot           

   E11.42          

                          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              

      

 

                2020           YULISA DENNIS MD, Ot           

   E11.52          

                          TYPE 2 DIABETES W DIABETIC PERIPHERAL AN              

      

 

                2020           YULISA DENNIS MD, Ot           

   E11.621         

                          TYPE 2 DIABETES MELLITUS WITH FOOT ULCER              

      

 

                2020           YULISA DENNIS MD, Ot           

   I70.234         

                          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              

      

 

                2020           YULISA DENNIS MD, Ot           

   L97.412         

                          NON-PRS CHR ULCER OF RIGHT HEEL AND MIDF              

      

 

                2020           YULIAS DENNIS MD, Ot           

   E11.42          

                          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              

      

 

                2020           YULISA DENNIS MD, Ot           

   E11.52          

                          TYPE 2 DIABETES W DIABETIC PERIPHERAL AN              

      

 

                2020           YULISA DENNIS MD, Ot           

   E11.621         

                          TYPE 2 DIABETES MELLITUS WITH FOOT ULCER              

      

 

                2020           YULISA DENNIS MD, Ot           

   I70.261         

                          ATHSCL NATIVE ARTERIES OF EXTREMITIES W               

      

 

                2020           YULISA DENNIS MD, Ot           

   L97.412         

                          NON-PRS CHR ULCER OF RIGHT HEEL AND MIDF              

      

 

                2020           YULISA DENNIS MD, Ot           

   E11.42          

                          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              

      

 

                2020           YULISA DENNIS MD, Ot           

   E11.52          

                          TYPE 2 DIABETES W DIABETIC PERIPHERAL AN              

      

 

                2020           YULISA DENNIS MD, Ot           

   E11.621         

                          TYPE 2 DIABETES MELLITUS WITH FOOT ULCER              

      

 

                2020           YULISA DENNIS MD, Ot           

   I70.234         

                          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              

      

 

                2020           YULISA DENNIS MD, Ot           

   L97.412         

                          NON-PRS CHR ULCER OF RIGHT HEEL AND MIDF              

      

 

                2020           YULISA DENNIS MD, Ot           

   E11.42          

                          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              

      

 

                2020           YULISA DENNIS MD, Ot           

   E11.621         

                          TYPE 2 DIABETES MELLITUS WITH FOOT ULCER              

      

 

                2020           YULISA DENNIS MD           Ot           

   L03.115         

                          CELLULITIS OF RIGHT LOWER LIMB                    

 

                2020           YULISA DENNIS MD, Ot           

   L97.513         

                          NON-PRS CHRONIC ULCER OTH PRT RIGHT FOOT              

      

 

                2020           YULISA DENNIS MD, Ot           

   E11.42          

                          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              

      

 

                2020           YULISA DENNIS MD, Ot           

   E11.621         

                          TYPE 2 DIABETES MELLITUS WITH FOOT ULCER              

      

 

                2020           YULISA DENNIS MD           Ot           

   L03.115         

                          CELLULITIS OF RIGHT LOWER LIMB                    

 

                2020           YULISA DENNIS MD, Ot           

   L97.513         

                          NON-PRS CHRONIC ULCER OTH PRT RIGHT FOOT              

      

 

                2020           YULISA DENNIS MD, Ot           

   E11.42          

                          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              

      

 

                2020           YULISA DENNIS MD, Ot           

   E11.52          

                          TYPE 2 DIABETES W DIABETIC PERIPHERAL AN              

      

 

                2020           YULISA DENNIS MD, Ot           

   E11.621         

                          TYPE 2 DIABETES MELLITUS WITH FOOT ULCER              

      

 

             2020           YULISA DENNIS MD           Ot           I96

           

GANGRENE, NOT ELSEWHERE CLASSIFIED                    

 

                2020           YULISA DENNIS MD, Ot           

   L97.413         

                          NON-PRS CHR ULCER OF RIGHT HEEL AND MIDF              

      

 

                2020           YULISA DENNIS MD, Ot           

   E11.42          

                          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              

      

 

                2020           YULISA DENNIS MD, Ot           

   E11.52          

                          TYPE 2 DIABETES W DIABETIC PERIPHERAL AN              

      

 

                2020           YULISA DENNIS MD, Ot           

   E11.621         

                          TYPE 2 DIABETES MELLITUS WITH FOOT ULCER              

      

 

             2020           YULISA DENNIS MD, Ot           I96

           

GANGRENE, NOT ELSEWHERE CLASSIFIED                    

 

                2020           YULISA DENNIS MD, Ot           

   L97.412         

                          NON-PRS CHR ULCER OF RIGHT HEEL AND MIDF              

      

 

                2020           YULISA DENNIS MD, Ot           

   M65.071         

                          ABSCESS OF TENDON SHEATH, RIGHT ANKLE AN              

      

 

                2020           YULISA DENNIS MD, Ot           

   E11.42          

                          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              

      

 

                2020           YULISA DENNIS MD, Ot           

   E11.52          

                          TYPE 2 DIABETES W DIABETIC PERIPHERAL AN              

      

 

                2020           YULISA DENNIS MD, Ot           

   E11.621         

                          TYPE 2 DIABETES MELLITUS WITH FOOT ULCER              

      

 

                2020           YULISA DENNIS MD           Ot           

   I70.261         

                          ATHSCL NATIVE ARTERIES OF EXTREMITIES W               

      

 

                2020           YULISA DENNIS MD, Ot           

   L97.412         

                          NON-PRS CHR ULCER OF RIGHT HEEL AND MIDF              

      

 

                2020           YULISA DENNIS MD, Ot           

   M65.071         

                          ABSCESS OF TENDON SHEATH, RIGHT ANKLE AN              

      

 

                2020           YULISA DENNIS MD, Ot           

   E11.42          

                          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              

      

 

                2020           YULISA DENNIS MD, Ot           

   E11.52          

                          TYPE 2 DIABETES W DIABETIC PERIPHERAL AN              

      

 

                2020           YULISA DENNIS MD, Ot           

   E11.621         

                          TYPE 2 DIABETES MELLITUS WITH FOOT ULCER              

      

 

                2020           YULISA DENNIS MD           Ot           

   I70.261         

                          ATHSCL NATIVE ARTERIES OF EXTREMITIES W               

      

 

                2020           YULISA DENNIS MD, Ot           

   L97.412         

                          NON-PRS CHR ULCER OF RIGHT HEEL AND MIDF              

      

 

                2020           YULISA DENNIS MD, Ot           

   M65.071         

                          ABSCESS OF TENDON SHEATH, RIGHT ANKLE AN              

      

 

             2020           JENNIFER SALDIVAR, ELISE RAVI Ot           E11

.9           

TYPE 2 DIABETES MELLITUS WITHOUT COMPLIC                    

 

             2020           JENNIFER SALDIVAR, ELISE RAVI Ot           I11

.9           

HYPERTENSIVE HEART DISEASE WITHOUT HEART                    

 

                2020           JENNIFER SALDIVAR, ELISE RAVI Ot           

   I25.10          

                          ATHSCL HEART DISEASE OF NATIVE CORONARY               

      

 

             2020           JENNIFER SALDIVAR, ELISE RAVI Ot           I63

.9           

CEREBRAL INFARCTION, UNSPECIFIED                    

 

             2020           JENNIFER SALDIVAR, ELISE RAVI Ot           Z72

.0           

TOBACCO USE                                      

 

                2020           YULISA DENNIS MD, Ot           

   E11.42          

                          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              

      

 

                2020           YULISA DENNIS MD, Ot           

   E11.52          

                          TYPE 2 DIABETES W DIABETIC PERIPHERAL AN              

      

 

                2020           YULISA DENNIS MD, Ot           

   E11.621         

                          TYPE 2 DIABETES MELLITUS WITH FOOT ULCER              

      

 

                2020           YULISA DENNIS MD, Ot           

   I70.261         

                          ATHSCL NATIVE ARTERIES OF EXTREMITIES W               

      

 

                2020           YULISA DENNIS MD, Ot           

   L97.412         

                          NON-PRS CHR ULCER OF RIGHT HEEL AND MIDF              

      

 

                2020           YULISA DENNIS MD, Ot           

   M65.071         

                          ABSCESS OF TENDON SHEATH, RIGHT ANKLE AN              

      

 

                2020           YULISA DENNIS MD, Ot           

   E11.42          

                          TYPE 2 DIABETES MELLITUS WITH DIABETIC P              

      

 

                2020           YULISA DENNIS MD, Ot           

   E11.52          

                          TYPE 2 DIABETES W DIABETIC PERIPHERAL AN              

      

 

                2020           YULISA DENNIS MD, Ot           

   E11.621         

                          TYPE 2 DIABETES MELLITUS WITH FOOT ULCER              

      

 

                2020           YULISA DENNIS MD, Ot           

   I70.234         

                          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              

      

 

             2020           YULISA DENNIS MD, Ot           I96

           

GANGRENE, NOT ELSEWHERE CLASSIFIED                    

 

                2020           YULISA DENNIS MD, Ot           

   M65.071         

                          ABSCESS OF TENDON SHEATH, RIGHT ANKLE AN              

      

 

                2020           YULISA DENNIS MD, Ot           

   E11.42          

                          TYPE 2 DIABETES MELLITUS WITH DIABETIC P              

      

 

                2020           YULISA DENNIS MD, Ot           

   E11.52          

                          TYPE 2 DIABETES W DIABETIC PERIPHERAL AN              

      

 

                2020           YULISA DENNIS MD           Ot           

   E11.621         

                          TYPE 2 DIABETES MELLITUS WITH FOOT ULCER              

      

 

                2020           YULISA DENNIS MD           Ot           

   I70.234         

                          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              

      

 

             2020           YULISA DENNIS MD           Ot           I96

           

GANGRENE, NOT ELSEWHERE CLASSIFIED                    

 

                2020           YULISA DENNIS MD           Ot           

   L97.412         

                          NON-PRS CHR ULCER OF RIGHT HEEL AND MIDF              

      

 

                2020           YULISA DENNIS MD           Ot           

   M65.071         

                          ABSCESS OF TENDON SHEATH, RIGHT ANKLE AN              

      

 

                2020           YULISA DENNIS MD           Ot           

   E11.42          

                          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              

      

 

                2020           YULISA DENNIS MD           Ot           

   E11.52          

                          TYPE 2 DIABETES W DIABETIC PERIPHERAL AN              

      

 

                2020           YULISA DENNIS MD, Ot           

   E11.621         

                          TYPE 2 DIABETES MELLITUS WITH FOOT ULCER              

      

 

                2020           YULISA DENNIS MD           Ot           

   I70.234         

                          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              

      

 

             2020           YULISA DENNIS MD           Ot           I96

           

GANGRENE, NOT ELSEWHERE CLASSIFIED                    

 

                2020           YULISA DENNIS MD           Ot           

   L97.412         

                          NON-PRS CHR ULCER OF RIGHT HEEL AND MIDF              

      

 

                2020           YULISA DENNIS MD           Ot           

   M65.071         

                          ABSCESS OF TENDON SHEATH, RIGHT ANKLE AN              

      

 

                2020           YULISA DENNIS MD, Ot           

   E11.42          

                          TYPE 2 DIABETES MELLITUS WITH DIABETIC P              

      

 

                2020           YULISA DENNIS MD           Ot           

   E11.52          

                          TYPE 2 DIABETES W DIABETIC PERIPHERAL AN              

      

 

                2020           YULISA DENNIS MD           Ot           

   E11.621         

                          TYPE 2 DIABETES MELLITUS WITH FOOT ULCER              

      

 

                2020           YULISA DENNIS MD           Ot           

   I70.234         

                          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              

      

 

             2020           YULISA DENNIS MD           Ot           I96

           

GANGRENE, NOT ELSEWHERE CLASSIFIED                    

 

                2020           YULISA DENNIS MD           Ot           

   L97.412         

                          NON-PRS CHR ULCER OF RIGHT HEEL AND MIDF              

      

 

                2020           YULISA DENNIS MD           Ot           

   M65.071         

                          ABSCESS OF TENDON SHEATH, RIGHT ANKLE AN              

      

 

                2020           YULISA DENNIS MD           Ot           

   E11.42          

                          TYPE 2 DIABETES MELLITUS WITH DIABETIC P              

      

 

                2020           YULISA DENNIS MD           Ot           

   E11.52          

                          TYPE 2 DIABETES W DIABETIC PERIPHERAL AN              

      

 

                2020           YULISA DENNIS MD, Ot           

   E11.621         

                          TYPE 2 DIABETES MELLITUS WITH FOOT ULCER              

      

 

                2020           YULISA DENNIS MD           Ot           

   I70.261         

                          ATHSCL NATIVE ARTERIES OF EXTREMITIES W               

      

 

                2020           YULISA DENNIS MD, Ot           

   L97.413         

                          NON-PRS CHR ULCER OF RIGHT HEEL AND MIDF              

      

 

                2020           YULISA DENNIS MD, Ot           

   M65.071         

                          ABSCESS OF TENDON SHEATH, RIGHT ANKLE AN              

      

 

                2020           YULISA DENNIS MD, Ot           

   E11.42          

                          TYPE 2 DIABETES MELLITUS WITH DIABETIC P              

      

 

                2020           YULISA DENNIS MD, Ot           

   E11.52          

                          TYPE 2 DIABETES W DIABETIC PERIPHERAL AN              

      

 

                2020           YULISA DENNIS MD, Ot           

   E11.621         

                          TYPE 2 DIABETES MELLITUS WITH FOOT ULCER              

      

 

                2020           YULISA DENNIS MD, Ot           

   I70.261         

                          ATHSCL NATIVE ARTERIES OF EXTREMITIES W               

      

 

                2020           YULISA DENNIS MD, Ot           

   L97.413         

                          NON-PRS CHR ULCER OF RIGHT HEEL AND MIDF              

      

 

                2020           YULISA DENNIS MD, Ot           

   M65.071         

                          ABSCESS OF TENDON SHEATH, RIGHT ANKLE AN              

      

 

                2020           YULISA DENNIS MD, Ot           

   E11.621         

                          TYPE 2 DIABETES MELLITUS WITH FOOT ULCER              

      

 

                2020           YULISA DENNIS MD           Ot           

   L97.509         

                          NON-PRESSURE CHRONIC ULCER OTH PRT UNSP               

      

 

                2020           YULISA DENNIS MD, Ot           

   E11.42          

                          TYPE 2 DIABETES MELLITUS WITH DIABETIC P              

      

 

                2020           YULISA DENNIS MD           Ot           

   E11.52          

                          TYPE 2 DIABETES W DIABETIC PERIPHERAL AN              

      

 

                2020           YULISA DENNIS MD, Ot           

   E11.621         

                          TYPE 2 DIABETES MELLITUS WITH FOOT ULCER              

      

 

                2020           YULISA DENNIS MD           Ot           

   I70.234         

                          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              

      

 

                2020           YULISA DENNIS MD           Ot           

   L97.412         

                          NON-PRS CHR ULCER OF RIGHT HEEL AND MIDF              

      

 

                2020           YULISA DENNIS MD, Ot           

   M65.071         

                          ABSCESS OF TENDON SHEATH, RIGHT ANKLE AN              

      

 

                2020           YULISA DENNIS MD, Ot           

   E11.42          

                          TYPE 2 DIABETES MELLITUS WITH DIABETIC P              

      

 

                2020           YULISA DENNIS MD           Ot           

   E11.621         

                          TYPE 2 DIABETES MELLITUS WITH FOOT ULCER              

      

 

                2020           YULISA DENNIS MD           Ot           

   L03.115         

                          CELLULITIS OF RIGHT LOWER LIMB                    

 

                2020           YULISA DENNIS MD, Ot           

   L97.513         

                          NON-PRS CHRONIC ULCER OTH PRT RIGHT FOOT              

      

 

                2020           YULISA DENNIS MD, Ot           

   E11.42          

                          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              

      

 

                2020           YULISA DENNIS MD, Ot           

   E11.52          

                          TYPE 2 DIABETES W DIABETIC PERIPHERAL AN              

      

 

                2020           YULISA DENNIS MD, Ot           

   E11.621         

                          TYPE 2 DIABETES MELLITUS WITH FOOT ULCER              

      

 

             2020           YULISA DENNIS MD, Ot           I96

           

GANGRENE, NOT ELSEWHERE CLASSIFIED                    

 

                2020           YULISA DENNIS MD, Ot           

   L97.412         

                          NON-PRS CHR ULCER OF RIGHT HEEL AND MIDF              

      

 

                2020           YULISA DENNIS MD, Ot           

   M65.071         

                          ABSCESS OF TENDON SHEATH, RIGHT ANKLE AN              

      

 

             2020           JENNIFER SALDIVAR, ELISE RAVI           Ot           E11

.9           

TYPE 2 DIABETES MELLITUS WITHOUT COMPLIC                    

 

             2020           ELISE RODRIGUEZ MD           Ot           I11

.9           

HYPERTENSIVE HEART DISEASE WITHOUT HEART                    

 

                2020           ELISE RODRIGUEZ MD, Ot           

   I25.10          

                          ATHSCL HEART DISEASE OF NATIVE CORONARY               

      

 

             2020           JENNIFER SALDIVAR, ELISE RAIV           Ot           I63

.9           

CEREBRAL INFARCTION, UNSPECIFIED                    

 

             2020           ELISE RODRIGUEZ MD, Ot           Z72

.0           

TOBACCO USE                                      

 

                2020           YULISA DENNIS MD, Ot           

   E11.42          

                          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              

      

 

                2020           YULISA DENNIS MD, Ot           

   E11.52          

                          TYPE 2 DIABETES W DIABETIC PERIPHERAL AN              

      

 

                2020           YULISA DENNIS MD, Ot           

   E11.621         

                          TYPE 2 DIABETES MELLITUS WITH FOOT ULCER              

      

 

                2020           YULISA DENNIS MD           Ot           

   I70.234         

                          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              

      

 

             2020           YULISA DENNIS MD           Ot           I96

           

GANGRENE, NOT ELSEWHERE CLASSIFIED                    

 

                2020           YULISA DENNIS MD, Ot           

   M65.071         

                          ABSCESS OF TENDON SHEATH, RIGHT ANKLE AN              

      

 

                2020           YULISA DENNIS MD, Ot           

   E11.42          

                          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              

      

 

                2020           YULISA DENNIS MD           Ot           

   E11.52          

                          TYPE 2 DIABETES W DIABETIC PERIPHERAL AN              

      

 

                2020           YULISA DENNIS MD, Ot           

   E11.621         

                          TYPE 2 DIABETES MELLITUS WITH FOOT ULCER              

      

 

                2020           YULISA DENNIS MD           Ot           

   I70.234         

                          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              

      

 

                2020           YULISA DENNIS MD, Ot           

   L97.412         

                          NON-PRS CHR ULCER OF RIGHT HEEL AND MIDF              

      

 

                2020           YULISA DENNIS MD, Ot           

   M65.071         

                          ABSCESS OF TENDON SHEATH, RIGHT ANKLE AN              

      

 

                2020           YULISA DENNIS MD           Ot           

   E11.42          

                          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              

      

 

                2020           YULISA DENNIS MD, Ot           

   E11.52          

                          TYPE 2 DIABETES W DIABETIC PERIPHERAL AN              

      

 

                2020           YULISA DENNIS MD, Ot           

   E11.621         

                          TYPE 2 DIABETES MELLITUS WITH FOOT ULCER              

      

 

                2020           YULISA DENNIS MD, Ot           

   I70.234         

                          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              

      

 

                2020           YULISA DENNIS MD, Ot           

   L97.412         

                          NON-PRS CHR ULCER OF RIGHT HEEL AND MIDF              

      

 

                2020           YULISA DENNIS MD, Ot           

   E11.42          

                          TYPE 2 DIABETES MELLITUS WITH DIABETIC P              

      

 

                2020           YULISA DENNIS MD, Ot           

   E11.52          

                          TYPE 2 DIABETES W DIABETIC PERIPHERAL AN              

      

 

                2020           YULISA DENNIS MD, Ot           

   E11.621         

                          TYPE 2 DIABETES MELLITUS WITH FOOT ULCER              

      

 

                2020           YULISA DENNIS MD, Ot           

   I70.234         

                          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              

      

 

                2020           YULISA DENNIS MD, Ot           

   L97.412         

                          NON-PRS CHR ULCER OF RIGHT HEEL AND MIDF              

      

 

                2020           YULISA DENNIS MD, Ot           

   E11.42          

                          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              

      

 

                2020           YULISA DENNIS MD           Ot           

   E11.52          

                          TYPE 2 DIABETES W DIABETIC PERIPHERAL AN              

      

 

                2020           YULISA DENNIS MD, Ot           

   E11.621         

                          TYPE 2 DIABETES MELLITUS WITH FOOT ULCER              

      

 

                2020           YULISA DENNIS MD           Ot           

   I70.234         

                          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              

      

 

                2020           YULISA DENNIS MD, Ot           

   L97.413         

                          NON-PRS CHR ULCER OF RIGHT HEEL AND MIDF              

      

 

                2020           YULISA DENNIS MD, Ot           

   M65.071         

                          ABSCESS OF TENDON SHEATH, RIGHT ANKLE AN              

      

 

                2020           YULISA DENNIS MD           Ot           

   E11.42          

                          TYPE 2 DIABETES MELLITUS WITH DIABETIC P              

      

 

                2020           YULISA DENNIS MD, Ot           

   E11.52          

                          TYPE 2 DIABETES W DIABETIC PERIPHERAL AN              

      

 

                2020           YULISA DENNIS MD, Ot           

   E11.621         

                          TYPE 2 DIABETES MELLITUS WITH FOOT ULCER              

      

 

                2020           YULISA DENNIS MD           Ot           

   I70.234         

                          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              

      

 

                2020           YULISA DENNIS MD, Ot           

   L97.413         

                          NON-PRS CHR ULCER OF RIGHT HEEL AND MIDF              

      

 

                2020           YULISA DENNIS MD, Ot           

   M65.071         

                          ABSCESS OF TENDON SHEATH, RIGHT ANKLE AN              

      

 

                2020           YULISA DENNIS MD, Ot           

   E11.42          

                          TYPE 2 DIABETES MELLITUS WITH DIABETIC P              

      

 

                2020           YULISA DENNIS MD, Ot           

   E11.52          

                          TYPE 2 DIABETES W DIABETIC PERIPHERAL AN              

      

 

                2020           YULISA DENNIS MD, Ot           

   E11.621         

                          TYPE 2 DIABETES MELLITUS WITH FOOT ULCER              

      

 

                2020           YULISA DENNIS MD, Ot           

   I70.261         

                          ATHSCL NATIVE ARTERIES OF EXTREMITIES W               

      

 

                2020           YULISA DENNIS MD, Ot           

   L97.412         

                          NON-PRS CHR ULCER OF RIGHT HEEL AND MIDF              

      

 

                2020           YULISA DENNIS MD, Ot           

   E11.42          

                          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              

      

 

                2020           YULISA DENNIS MD, Ot           

   E11.621         

                          TYPE 2 DIABETES MELLITUS WITH FOOT ULCER              

      

 

                2020           YULISA DENNIS MD, Ot           

   I70.234         

                          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              

      

 

                2020           YULISA DENNIS MD, Ot           

   L97.413         

                          NON-PRS CHR ULCER OF RIGHT HEEL AND MIDF              

      

 

                2020           YULISA DENNIS MD, Ot           

   M65.071         

                          ABSCESS OF TENDON SHEATH, RIGHT ANKLE AN              

      

 

                2020           YULISA DENNIS MD, Ot           

   E11.42          

                          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              

      

 

                2020           YULISA DENNIS MD           Ot           

   E11.621         

                          TYPE 2 DIABETES MELLITUS WITH FOOT ULCER              

      

 

                2020           YULISA DENNIS MD           Ot           

   I70.234         

                          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              

      

 

                2020           YULISA DENNIS MD           Ot           

   L97.413         

                          NON-PRS CHR ULCER OF RIGHT HEEL AND MIDF              

      

 

                2020           YULISA DENNIS MD, Ot           

   M65.071         

                          ABSCESS OF TENDON SHEATH, RIGHT ANKLE AN              

      

 

                2020           YULISA DENNIS MD           Ot           

   E11.42          

                          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              

      

 

                2020           YULISA DENNIS MD           Ot           

   E11.52          

                          TYPE 2 DIABETES W DIABETIC PERIPHERAL AN              

      

 

                2020           YULISA DENNIS MD, Ot           

   E11.621         

                          TYPE 2 DIABETES MELLITUS WITH FOOT ULCER              

      

 

                2020           YULISA DENNIS MD           Ot           

   I70.234         

                          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              

      

 

                2020           YULISA DENNIS MD           Ot           

   L97.412         

                          NON-PRS CHR ULCER OF RIGHT HEEL AND MIDF              

      

 

                2020           YULISA DENNIS MD           Ot           

   M65.071         

                          ABSCESS OF TENDON SHEATH, RIGHT ANKLE AN              

      

 

                2020           YULISA DENNIS MD, Ot           

   E11.42          

                          TYPE 2 DIABETES MELLITUS WITH DIABETIC P              

      

 

                2020           YULISA DENNIS MD, Ot           

   E11.52          

                          TYPE 2 DIABETES W DIABETIC PERIPHERAL AN              

      

 

                2020           YULISA DENNIS MD, Ot           

   E11.621         

                          TYPE 2 DIABETES MELLITUS WITH FOOT ULCER              

      

 

                2020           YULISA DENNIS MD, Ot           

   I70.234         

                          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              

      

 

                2020           YULISA DENNIS MD, Ot           

   L97.413         

                          NON-PRS CHR ULCER OF RIGHT HEEL AND MIDF              

      

 

                2020           YULISA DENNIS MD, Ot           

   M65.071         

                          ABSCESS OF TENDON SHEATH, RIGHT ANKLE AN              

      

 

                2020           YULISA DENNIS MD, Ot           

   E11.42          

                          TYPE 2 DIABETES MELLITUS WITH DIABETIC P              

      

 

                2020           YULISA DENNIS MD, Ot           

   E11.52          

                          TYPE 2 DIABETES W DIABETIC PERIPHERAL AN              

      

 

                2020           YULISA DENNIS MD, Ot           

   E11.621         

                          TYPE 2 DIABETES MELLITUS WITH FOOT ULCER              

      

 

                2020           YULISA DENNIS MD           Ot           

   I70.261         

                          ATHSCL NATIVE ARTERIES OF EXTREMITIES W               

      

 

                2020           YULISA DENNIS MD           Ot           

   L97.416         

                          NON-PRS CHR ULC OF R HEEL/MIDFT W BNE IN              

      

 

                2020           YULISA DENNIS MD           Ot           

   E11.42          

                          TYPE 2 DIABETES MELLITUS WITH DIABETIC P              

      

 

                2020           YULISA DENNIS MD, Ot           

   E11.52          

                          TYPE 2 DIABETES W DIABETIC PERIPHERAL AN              

      

 

                2020           YULISA DENNIS MD, Ot           

   E11.621         

                          TYPE 2 DIABETES MELLITUS WITH FOOT ULCER              

      

 

                2020           YULISA DENNSI MD           Ot           

   I70.234         

                          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              

      

 

                2020           YULISA DENNIS MD, Ot           

   L97.412         

                          NON-PRS CHR ULCER OF RIGHT HEEL AND MIDF              

      

 

                2020           YULISA DENNIS MD           Ot           

   E11.42          

                          TYPE 2 DIABETES MELLITUS WITH DIABETIC P              

      

 

                2020           YULISA DENNIS MD, Ot           

   E11.52          

                          TYPE 2 DIABETES W DIABETIC PERIPHERAL AN              

      

 

                2020           YULISA DENNIS MD, Ot           

   E11.621         

                          TYPE 2 DIABETES MELLITUS WITH FOOT ULCER              

      

 

                2020           YULISA DENNIS MD           Ot           

   I70.234         

                          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              

      

 

                2020           YULISA DENNIS MD, Ot           

   L97.412         

                          NON-PRS CHR ULCER OF RIGHT HEEL AND MIDF              

      

 

             2020           THA ODOM MD, Ot           E11.42   

        TYPE 

2 DIABETES MELLITUS WITH DIABETIC P                    

 

             2020           THA ODOM MD, Ot           E11.621  

         TYPE

2 DIABETES MELLITUS WITH FOOT ULCER                    

 

             2020           THA ODOM MD, Ot           I70.234  

         

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                    

 

             2020           THA ODOM MD, Ot           L97.413  

         NON-

PRS CHR ULCER OF RIGHT HEEL AND MIDF                    

 

                2020           YULISA DENNIS MD, Ot           

   E11.42          

                          TYPE 2 DIABETES MELLITUS WITH DIABETIC P              

      

 

                2020           YULISA DENNIS MD, Ot           

   E11.52          

                          TYPE 2 DIABETES W DIABETIC PERIPHERAL AN              

      

 

                2020           YULISA DENNIS MD, Ot           

   E11.621         

                          TYPE 2 DIABETES MELLITUS WITH FOOT ULCER              

      

 

                2020           YULISA DENNIS MD           Ot           

   I70.261         

                          ATHSCL NATIVE ARTERIES OF EXTREMITIES W               

      

 

                2020           YULISA DENNIS MD           Ot           

   L97.416         

                          NON-PRS CHR ULC OF R HEEL/MIDFT W BNE IN              

      

 

                2020           YULISA DENNIS MD, Ot           

   E11.42          

                          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              

      

 

                2020           YULISA DENNIS MD           Ot           

   E11.52          

                          TYPE 2 DIABETES W DIABETIC PERIPHERAL AN              

      

 

                2020           YULISA DENNIS MD, Ot           

   E11.621         

                          TYPE 2 DIABETES MELLITUS WITH FOOT ULCER              

      

 

                2020           YULISA DENNIS MD           Ot           

   I70.234         

                          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              

      

 

                2020           YULISA DENNIS MD           Ot           

   L97.412         

                          NON-PRS CHR ULCER OF RIGHT HEEL AND MIDF              

      

 

                2020           YULISA DENNIS MD, Ot           

   E11.42          

                          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              

      

 

                2020           YULISA DENNIS MD           Ot           

   E11.52          

                          TYPE 2 DIABETES W DIABETIC PERIPHERAL AN              

      

 

                2020           YULISA DENNIS MD, Ot           

   E11.621         

                          TYPE 2 DIABETES MELLITUS WITH FOOT ULCER              

      

 

                2020           YULISA DENNIS MD           Ot           

   I70.234         

                          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              

      

 

                2020           YULISA DENNIS MD           Ot           

   L97.413         

                          NON-PRS CHR ULCER OF RIGHT HEEL AND MIDF              

      

 

                2020           YULISA DENNIS MD           Ot           

   M65.071         

                          ABSCESS OF TENDON SHEATH, RIGHT ANKLE AN              

      

 

                2020           YULISA DENNIS MD           Ot           

   E11.42          

                          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              

      

 

                2020           YULISA DENNIS MD           Ot           

   E11.52          

                          TYPE 2 DIABETES W DIABETIC PERIPHERAL AN              

      

 

                2020           YULISA DENNIS MD, Ot           

   E11.621         

                          TYPE 2 DIABETES MELLITUS WITH FOOT ULCER              

      

 

                2020           YULISA DENNIS MD           Ot           

   I70.234         

                          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              

      

 

                2020           YULISA DENNIS MD           Ot           

   L97.412         

                          NON-PRS CHR ULCER OF RIGHT HEEL AND MIDF              

      

 

                2020           YULISA DENNIS MD, Ot           

   M65.071         

                          ABSCESS OF TENDON SHEATH, RIGHT ANKLE AN              

      

 

                2020           YULISA DENNIS MD           Ot           

   E11.42          

                          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              

      

 

                2020           YULISA DENNIS MD           Ot           

   E11.52          

                          TYPE 2 DIABETES W DIABETIC PERIPHERAL AN              

      

 

                2020           YULISA DENNIS MD           Ot           

   E11.621         

                          TYPE 2 DIABETES MELLITUS WITH FOOT ULCER              

      

 

                2020           YULISA DENNIS MD           Ot           

   I70.234         

                          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              

      

 

                2020           YULISA DENNIS MD           Ot           

   L97.412         

                          NON-PRS CHR ULCER OF RIGHT HEEL AND MIDF              

      

 

             2020           ARASELI RICHARDSON           Ot           I10

           

ESSENTIAL (PRIMARY) HYPERTENSION                    

 

                2020           ARASELI RICHARDSON           Ot           

   I25.10          

                          ATHSCL HEART DISEASE OF NATIVE CORONARY               

      

 

             2020           ARASELI RICHARDSON           Ot           I25

.2           

OLD MYOCARDIAL INFARCTION                        

 

             2020           ARASELI RICHARDSON           Ot           J44

.9           

CHRONIC OBSTRUCTIVE PULMONARY DISEASE, U                    

 

                2020           ARASELI RICHARDSON           Ot           

   R09.02          

                          HYPOXEMIA                          

 

                2020           ARASELI RICHARDSON           Ot           

   R41.82          

                          ALTERED MENTAL STATUS, UNSPECIFIED                    

 

                2020           ARASELI RICHARDSON           Ot           

   Z77.22          

                          CNTCT W AND EXPSR TO ENVIRON TOBACCO SMO              

      

 

                2020           ARASELI RICHARDSON           Ot           

   Z79.02          

                          LONG TERM (CURRENT) USE OF ANTITHROMBOTI              

      

 

                2020           ARASELI RICHARDSON           Ot           

   Z79.82          

                          LONG TERM (CURRENT) USE OF ASPIRIN                    

 

                2020           ARASELI RICHARDSON           Ot           

   Z85.46          

                          PERSONAL HISTORY OF MALIGNANT NEOPLASM O              

      

 

                2020           ARASELI RICHARDSON           Ot           

   Z86.73          

                          PRSNL HX OF TIA (TIA), AND CEREB INFRC W              

      

 

                2020           ARASELI RICHARDSON           Ot           

   Z90.49          

                          ACQUIRED ABSENCE OF OTHER SPECIFIED PART              

      

 

             2020           ARASELI RICHARDSON           Ot           Z95

.5           

PRESENCE OF CORONARY ANGIOPLASTY IMPLANT                    

 

             2020           THA ODOM MD           Ot           E11.42   

        TYPE 

2 DIABETES MELLITUS WITH DIABETIC P                    

 

             2020           THA ODOM MD           Ot           E11.52   

        TYPE 

2 DIABETES W DIABETIC PERIPHERAL AN                    

 

             2020           THA ODOM MD, Ot           E11.621  

         TYPE

2 DIABETES MELLITUS WITH FOOT ULCER                    

 

             2020           THA ODOM MD           Ot           I70.234  

         

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                    

 

             2020           THA ODOM MD           Ot           L97.412  

         NON-

PRS CHR ULCER OF RIGHT HEEL AND MIDF                    

 

             2020           THA ODOM MD           Ot           E11.42   

        TYPE 

2 DIABETES MELLITUS WITH DIABETIC P                    

 

             2020           THA ODOM MD           Ot           E11.621  

         TYPE

2 DIABETES MELLITUS WITH FOOT ULCER                    

 

             2020           THA ODOM MD           Ot           I70.234  

         

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                    

 

             2020           THA ODOM MD           Ot           I70.261  

         

ATHSCL NATIVE ARTERIES OF EXTREMITIES W                     

 

             2020           THA ODOM MD           Ot           L97.413  

         NON-

PRS CHR ULCER OF RIGHT HEEL AND MIDF                    

 

             2020           THA ODOM MD, Ot           E11.42   

        TYPE 

2 DIABETES MELLITUS WITH DIABETIC P                    

 

             2020           THA ODOM MD           Ot           E11.621  

         TYPE

2 DIABETES MELLITUS WITH FOOT ULCER                    

 

             2020           THA ODOM MD           Ot           I70.234  

         

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                    

 

             2020           THA ODOM MD           Ot           I70.261  

         

ATHSCL NATIVE ARTERIES OF EXTREMITIES W                     

 

             2020           THA ODOM MD, Ot           L97.413  

         NON-

PRS CHR ULCER OF RIGHT HEEL AND MIDF                    

 

             2020           THA ODOM MD, Ot           E11.42   

        TYPE 

2 DIABETES MELLITUS WITH DIABETIC P                    

 

             2020           THA ODOM MD, Ot           E11.621  

         TYPE

2 DIABETES MELLITUS WITH FOOT ULCER                    

 

             2020           THA ODOM MD, Ot           I70.234  

         

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                    

 

             2020           THA ODOM MD, Ot           I70.261  

         

ATHSCL NATIVE ARTERIES OF EXTREMITIES W                     

 

             2020           THA ODOM MD, Ot           L97.413  

         NON-

PRS CHR ULCER OF RIGHT HEEL AND MIDF                    

 

             2020           THA ODOM MD, Ot           B95.62   

        

METHICILLIN RESIS STAPH INFCT CAUSING DI                    

 

             2020           THA ODOM MD, Ot           B96.5    

       

PSEUDOMONAS (MALLEI) CAUSING DISEASES CL                    

 

             2020           THA ODOM MD, Ot           E11.42   

        TYPE 

2 DIABETES MELLITUS WITH DIABETIC P                    

 

             2020           THA ODOM MD, Ot           E11.621  

         TYPE

2 DIABETES MELLITUS WITH FOOT ULCER                    

 

             2020           THA ODOM MD, Ot           I70.234  

         

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                    

 

             2020           THA ODOM MD, Ot           L97.413  

         NON-

PRS CHR ULCER OF RIGHT HEEL AND MIDF                    

 

             2020           THA ODOM MD, Ot           M86.171  

         

OTHER ACUTE OSTEOMYELITIS, RIGHT ANKLE A                    

 

             2020           DAY DO, JASEN           Ot           A41.02

           

SEPSIS DUE TO METHICILLIN RESISTANT STAP                    

 

             2020           DAY DO JASEN           Ot           A41.52

           

SEPSIS DUE TO PSEUDOMONAS                        

 

             2020           DAY DO JASEN           Ot           D69.6 

          

THROMBOCYTOPENIA, UNSPECIFIED                    

 

             2020           DAY DO JASEN           Ot           E11.52

           

TYPE 2 DIABETES W DIABETIC PERIPHERAL AN                    

 

             2020           BARB DO JASEN           Ot           E11.62

1           

TYPE 2 DIABETES MELLITUS WITH FOOT ULCER                    

 

             2020           DAY DO JASEN           Ot           E11.65

           

TYPE 2 DIABETES MELLITUS WITH HYPERGLYCE                    

 

             2020           BARB DO JASEN           Ot           E78.00

           

PURE HYPERCHOLESTEROLEMIA, UNSPECIFIED                    

 

             2020           BARB DO JASEN           Ot           E87.5 

          

HYPERKALEMIA                                     

 

             2020           BARB PANCHAL JASEN           Ot           F17.21

0           

NICOTINE DEPENDENCE, CIGARETTES, UNCOMPL                    

 

             2020           BARB PANCHAL JASEN           Ot           I10   

        

ESSENTIAL (PRIMARY) HYPERTENSION                    

 

             2020           BARB PANCHAL JASEN           Ot           I25.10

           

ATHSCL HEART DISEASE OF NATIVE CORONARY                     

 

             2020           BARB PANCHAL JASEN           Ot           I70.20

1           

UNSP ATHSCL NATIVE ARTERIES OF EXTREMITI                    

 

             2020           BARB PANCHAL JASEN           Ot           I71.4 

          

ABDOMINAL AORTIC ANEURYSM, WITHOUT RUPTU                    

 

             2020           BARB PANCHAL JASEN           Ot           I96   

        

GANGRENE, NOT ELSEWHERE CLASSIFIED                    

 

             2020           BARB PANCHAL JASEN           Ot           J44.9 

          

CHRONIC OBSTRUCTIVE PULMONARY DISEASE, U                    

 

             2020           BARB PANCHAL JASEN           Ot           L03.03

1           

CELLULITIS OF RIGHT TOE                          

 

             2020           BARB PANCHAL JASEN           Ot           L03.11

5           

CELLULITIS OF RIGHT LOWER LIMB                    

 

             2020           BARB PANCHAL JASEN           Ot           M19.91

           

PRIMARY OSTEOARTHRITIS, UNSPECIFIED SITE                    

 

             2020           BARB PANCHAL JASEN           Ot           M72.6 

          

NECROTIZING FASCIITIS                            

 

             2020           BARB PANCHAL JASEN           Ot           R74.0 

          

NONSPEC ELEV OF LEVELS OF TRANSAMNS   LA                    

 

             2020           BARB PANCHAL JASEN           Ot           Z85.46

           

PERSONAL HISTORY OF MALIGNANT NEOPLASM O                    

 

             2020           BARB PANCHAL JASEN           Ot           Z86.73

           

PRSNL HX OF TIA (TIA), AND CEREB INFRC W                    

 

             2020           BARB PANCHAL JASEN           Ot           Z91.81

           

HISTORY OF FALLING                               

 

             2020           BARB PANCHAL JASEN           Ot           Z95.5 

          

PRESENCE OF CORONARY ANGIOPLASTY IMPLANT                    

 

             2020           BARB PANCHAL JASEN           Ot           A41.02

           

SEPSIS DUE TO METHICILLIN RESISTANT STAP                    

 

             2020           BARB PANCHAL JASEN           Ot           A41.52

           

SEPSIS DUE TO PSEUDOMONAS                        

 

             2020           ABRB PANCHAL JASEN           Ot           A41.9 

          

SEPSIS, UNSPECIFIED ORGANISM                     

 

             2020           BARB PANCHAL JASEN           Ot           B95.62

           

METHICILLIN RESIS STAPH INFCT CAUSING DI                    

 

             2020           BARB PANCHAL JASEN           Ot           B96.5 

          

PSEUDOMONAS (MALLEI) CAUSING DISEASES CL                    

 

             2020           BARB PANCHAL JASEN           Ot           D69.6 

          

THROMBOCYTOPENIA, UNSPECIFIED                    

 

             2020           DAY DO, JASEN           Ot           E11.52

           

TYPE 2 DIABETES W DIABETIC PERIPHERAL AN                    

 

             2020           DAY DO, JASEN           Ot           E11.62

1           

TYPE 2 DIABETES MELLITUS WITH FOOT ULCER                    

 

             2020           DAY DO, JASEN           Ot           E11.65

           

TYPE 2 DIABETES MELLITUS WITH HYPERGLYCE                    

 

             2020           DAY DO, JASEN           Ot           E78.00

           

PURE HYPERCHOLESTEROLEMIA, UNSPECIFIED                    

 

             2020           DAY DO, JASEN           Ot           E87.5 

          

HYPERKALEMIA                                     

 

             2020           DAY DO, JASEN           Ot           F17.21

0           

NICOTINE DEPENDENCE, CIGARETTES, UNCOMPL                    

 

             2020           DAY DO, JASEN           Ot           I10   

        

ESSENTIAL (PRIMARY) HYPERTENSION                    

 

             2020           DAY DO, JASEN           Ot           I25.10

           

ATHSCL HEART DISEASE OF NATIVE CORONARY                     

 

             2020           DAY DO, JASEN           Ot           I70.20

1           

UNSP ATHSCL NATIVE ARTERIES OF EXTREMITI                    

 

             2020           DAY DO, JASEN           Ot           I71.4 

          

ABDOMINAL AORTIC ANEURYSM, WITHOUT RUPTU                    

 

             2020           DAY DO, JASEN           Ot           I96   

        

GANGRENE, NOT ELSEWHERE CLASSIFIED                    

 

             2020           DAY DO, JASEN           Ot           J44.9 

          

CHRONIC OBSTRUCTIVE PULMONARY DISEASE, U                    

 

             2020           DAY DO, JASEN           Ot           K42.0 

          

UMBILICAL HERNIA WITH OBSTRUCTION, WITHO                    

 

             2020           DAY DO, JASEN           Ot           L03.03

1           

CELLULITIS OF RIGHT TOE                          

 

             2020           DAY DO, JASEN           Ot           L03.11

5           

CELLULITIS OF RIGHT LOWER LIMB                    

 

             2020           DAY DO, JASEN           Ot           L97.51

9           

NON-PRS CHRONIC ULCER OTH PRT RIGHT FOOT                    

 

             2020           DAY DO, JASEN           Ot           M19.90

           

UNSPECIFIED OSTEOARTHRITIS, UNSPECIFIED                     

 

             2020           DAY DO, JASEN           Ot           M19.91

           

PRIMARY OSTEOARTHRITIS, UNSPECIFIED SITE                    

 

             2020           DAY DO, JASEN           Ot           M72.6 

          

NECROTIZING FASCIITIS                            

 

             2020           DAY DO, JASEN           Ot           R74.0 

          

NONSPEC ELEV OF LEVELS OF TRANSAMNS   LA                    

 

             2020           DAY DO, JASEN           Ot           Z85.46

           

PERSONAL HISTORY OF MALIGNANT NEOPLASM O                    

 

             2020           DAY DO, JASEN           Ot           Z86.73

           

PRSNL HX OF TIA (TIA), AND CEREB INFRC W                    

 

             2020           JASEN DAY DO           Ot           Z91.81

           

HISTORY OF FALLING                               

 

             2020           JASEN DAY DO           Ot           Z95.5 

          

PRESENCE OF CORONARY ANGIOPLASTY IMPLANT                    

 

                2020           YULISA DENNIS MD, Ot           

   E11.42          

                          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              

      

 

                2020           YULISA DENNIS MD, Ot           

   E11.621         

                          TYPE 2 DIABETES MELLITUS WITH FOOT ULCER              

      

 

                2020           YULISA DENNIS MD, Ot           

   L03.115         

                          CELLULITIS OF RIGHT LOWER LIMB                    

 

                2020           YULISA DENNIS MD, Ot           

   L97.513         

                          NON-PRS CHRONIC ULCER OTH PRT RIGHT FOOT              

      

 

                2020           YULISA DENNIS MD, Ot           

   E11.42          

                          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              

      

 

                2020           YULISA DENNIS MD, Ot           

   E11.52          

                          TYPE 2 DIABETES W DIABETIC PERIPHERAL AN              

      

 

                2020           YULISA DENNIS MD, Ot           

   E11.621         

                          TYPE 2 DIABETES MELLITUS WITH FOOT ULCER              

      

 

                2020           YULISA DENNIS MD, Ot           

   I70.234         

                          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              

      

 

                2020           YULISA DENNIS MD, Ot           

   L97.413         

                          NON-PRS CHR ULCER OF RIGHT HEEL AND MIDF              

      

 

                2020           YULISA DENNIS MD, Ot           

   M65.071         

                          ABSCESS OF TENDON SHEATH, RIGHT ANKLE AN              

      

 

                2020           YULISA DENNIS MD, Ot           

   E11.42          

                          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              

      

 

                2020           YULISA DENNIS MD, Ot           

   E11.52          

                          TYPE 2 DIABETES W DIABETIC PERIPHERAL AN              

      

 

                2020           YULISA DENNIS MD, Ot           

   E11.621         

                          TYPE 2 DIABETES MELLITUS WITH FOOT ULCER              

      

 

                2020           YULISA DENNIS MD, Ot           

   I70.261         

                          ATHSCL NATIVE ARTERIES OF EXTREMITIES W               

      

 

                2020           YULISA DENNIS MD, Ot           

   L97.413         

                          NON-PRS CHR ULCER OF RIGHT HEEL AND MIDF              

      

 

                2020           YULISA DENNIS MD, Ot           

   M65.071         

                          ABSCESS OF TENDON SHEATH, RIGHT ANKLE AN              

      

 

                2020           YULISA DENNIS MD, Ot           

   E11.621         

                          TYPE 2 DIABETES MELLITUS WITH FOOT ULCER              

      

 

                2020           YULISA DENNIS MD, Ot           

   L97.509         

                          NON-PRESSURE CHRONIC ULCER OTH PRT UNSP               

      

 

                2020           YULISA DENNIS MD, Ot           

   E11.42          

                          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              

      

 

                2020           YULISA DENNIS MD           Ot           

   E11.52          

                          TYPE 2 DIABETES W DIABETIC PERIPHERAL AN              

      

 

                2020           YULISA DENNIS MD           Ot           

   E11.621         

                          TYPE 2 DIABETES MELLITUS WITH FOOT ULCER              

      

 

                2020           YULISA DENNIS MD           Ot           

   I70.234         

                          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              

      

 

                2020           YULISA DENNIS MD           Ot           

   L97.412         

                          NON-PRS CHR ULCER OF RIGHT HEEL AND MIDF              

      

 

                2020           YULISA DENNIS MD           Ot           

   M65.071         

                          ABSCESS OF TENDON SHEATH, RIGHT ANKLE AN              

      

 

                2020           YULISA DENNIS MD           Ot           

   E11.42          

                          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              

      

 

                2020           YULISA DENNIS MD           Ot           

   E11.52          

                          TYPE 2 DIABETES W DIABETIC PERIPHERAL AN              

      

 

                2020           YULISA DENNIS MD, Ot           

   E11.621         

                          TYPE 2 DIABETES MELLITUS WITH FOOT ULCER              

      

 

                2020           YULISA DENNIS MD           Ot           

   I70.234         

                          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              

      

 

                2020           YULISA DENNIS MD           Ot           

   L97.412         

                          NON-PRS CHR ULCER OF RIGHT HEEL AND MIDF              

      

 

                2020           YULISA DENNIS MD           Ot           

   M65.071         

                          ABSCESS OF TENDON SHEATH, RIGHT ANKLE AN              

      

 

                2020           YULISA DENNIS MD           Ot           

   E11.42          

                          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              

      

 

                2020           YULISA DENNIS MD           Ot           

   E11.52          

                          TYPE 2 DIABETES W DIABETIC PERIPHERAL AN              

      

 

                2020           YULISA DENNIS MD           Ot           

   E11.621         

                          TYPE 2 DIABETES MELLITUS WITH FOOT ULCER              

      

 

                2020           YULISA DENNIS MD           Ot           

   I70.234         

                          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              

      

 

                2020           YULISA DENNIS MD           Ot           

   L97.413         

                          NON-PRS CHR ULCER OF RIGHT HEEL AND MIDF              

      

 

                2020           YULISA DENNIS MD           Ot           

   M65.071         

                          ABSCESS OF TENDON SHEATH, RIGHT ANKLE AN              

      

 

                2020           YULISA DENNIS MD           Ot           

   E11.42          

                          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              

      

 

                2020           YULISA DENNIS MD           Ot           

   E11.52          

                          TYPE 2 DIABETES W DIABETIC PERIPHERAL AN              

      

 

                2020           YULISA DENNIS MD           Ot           

   E11.621         

                          TYPE 2 DIABETES MELLITUS WITH FOOT ULCER              

      

 

                2020           YULISA DENNIS MD           Ot           

   I70.261         

                          ATHSCL NATIVE ARTERIES OF EXTREMITIES W               

      

 

                2020           YULISA DENNIS MD           Ot           

   L97.416         

                          NON-PRS CHR ULC OF R HEEL/MIDFT W BNE IN              

      

 

                2020           YULISA DENNIS MD           Ot           

   E11.42          

                          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              

      

 

                2020           YULISA DENNIS MD           Ot           

   E11.621         

                          TYPE 2 DIABETES MELLITUS WITH FOOT ULCER              

      

 

                2020           YULISA DENNIS MD           Ot           

   I70.234         

                          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              

      

 

                2020           YULISA DENNIS MD           Ot           

   L97.413         

                          NON-PRS CHR ULCER OF RIGHT HEEL AND MIDF              

      

 

                2020           YULISA DENNIS MD           Ot           

   M65.071         

                          ABSCESS OF TENDON SHEATH, RIGHT ANKLE AN              

      

 

                2020           YULISA DENNIS MD, Ot           

   E11.42          

                          TYPE 2 DIABETES MELLITUS WITH DIABETIC P              

      

 

                2020           YULISA DENNIS MD, Ot           

   E11.52          

                          TYPE 2 DIABETES W DIABETIC PERIPHERAL AN              

      

 

                2020           YULISA DENNIS MD, Ot           

   E11.621         

                          TYPE 2 DIABETES MELLITUS WITH FOOT ULCER              

      

 

                2020           YULISA DENNIS MD           Ot           

   I70.234         

                          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              

      

 

                2020           YULISA DENNIS MD           Ot           

   L97.412         

                          NON-PRS CHR ULCER OF RIGHT HEEL AND MIDF              

      

 

                2020           YULISA DENNIS MD           Ot           

   E11.42          

                          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              

      

 

                2020           YULISA DENNIS MD           Ot           

   E11.52          

                          TYPE 2 DIABETES W DIABETIC PERIPHERAL AN              

      

 

                2020           YULISA DENNIS MD, Ot           

   E11.621         

                          TYPE 2 DIABETES MELLITUS WITH FOOT ULCER              

      

 

                2020           YULISA DENNIS MD           Ot           

   I70.261         

                          ATHSCL NATIVE ARTERIES OF EXTREMITIES W               

      

 

                2020           YULISA DENNIS MD           Ot           

   L97.412         

                          NON-PRS CHR ULCER OF RIGHT HEEL AND MIDF              

      

 

                2020           YULISA DENNIS MD           Ot           

   E11.42          

                          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              

      

 

                2020           YULISA DENNIS MD           Ot           

   E11.52          

                          TYPE 2 DIABETES W DIABETIC PERIPHERAL AN              

      

 

                2020           YULISA DENNIS MD           Ot           

   E11.621         

                          TYPE 2 DIABETES MELLITUS WITH FOOT ULCER              

      

 

                2020           YULISA DENNIS MD           Ot           

   I70.234         

                          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              

      

 

                2020           YULISA DENNIS MD           Ot           

   L97.412         

                          NON-PRS CHR ULCER OF RIGHT HEEL AND MIDF              

      

 

                2020           YULISA DENNIS MD           Ot           

   E11.42          

                          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              

      

 

                2020           YULISA DENNIS MD, Ot           

   E11.621         

                          TYPE 2 DIABETES MELLITUS WITH FOOT ULCER              

      

 

                2020           YULISA DENNIS MD           Ot           

   L03.115         

                          CELLULITIS OF RIGHT LOWER LIMB                    

 

                2020           YULISA DENNIS MD           Ot           

   L97.513         

                          NON-PRS CHRONIC ULCER OTH PRT RIGHT FOOT              

      

 

                2020           YULISA DENNIS MD           Ot           

   E11.42          

                          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              

      

 

                2020           YULISA DENNIS MD           Ot           

   E11.621         

                          TYPE 2 DIABETES MELLITUS WITH FOOT ULCER              

      

 

                2020           YULISA DENNIS MD           Ot           

   L03.115         

                          CELLULITIS OF RIGHT LOWER LIMB                    

 

                2020           YULISA DENNIS MD           Ot           

   L97.513         

                          NON-PRS CHRONIC ULCER OTH PRT RIGHT FOOT              

      

 

                2020           YULISA DENNIS MD           Ot           

   E11.42          

                          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              

      

 

                2020           YULISA DENNIS MD           Ot           

   E11.52          

                          TYPE 2 DIABETES W DIABETIC PERIPHERAL AN              

      

 

                2020           YULISA DENNIS MD           Ot           

   E11.621         

                          TYPE 2 DIABETES MELLITUS WITH FOOT ULCER              

      

 

             2020           YULISA DENNIS MD           Ot           I96

           

GANGRENE, NOT ELSEWHERE CLASSIFIED                    

 

                2020           YULISA DENNIS MD           Ot           

   L97.413         

                          NON-PRS CHR ULCER OF RIGHT HEEL AND MIDF              

      

 

                2020           YULISA DENNIS MD           Ot           

   E11.42          

                          TYPE 2 DIABETES MELLITUS WITH DIABETIC P              

      

 

                2020           YULISA DENNIS MD           Ot           

   E11.52          

                          TYPE 2 DIABETES W DIABETIC PERIPHERAL AN              

      

 

                2020           YULISA DENNIS MD           Ot           

   E11.621         

                          TYPE 2 DIABETES MELLITUS WITH FOOT ULCER              

      

 

             2020           YULISA DENNIS MD           Ot           I96

           

GANGRENE, NOT ELSEWHERE CLASSIFIED                    

 

                2020           YULISA DENNIS MD           Ot           

   L97.412         

                          NON-PRS CHR ULCER OF RIGHT HEEL AND MIDF              

      

 

                2020           YULISA DENNIS MD           Ot           

   M65.071         

                          ABSCESS OF TENDON SHEATH, RIGHT ANKLE AN              

      

 

             2020           ELISE RODRIGUEZ MD           Ot           E11

.9           

TYPE 2 DIABETES MELLITUS WITHOUT COMPLIC                    

 

             2020           ELISE RODRIGUEZ MDWAN           Ot           I11

.9           

HYPERTENSIVE HEART DISEASE WITHOUT HEART                    

 

                2020           ELISE RODRIGUEZ MD, Ot           

   I25.10          

                          ATHSCL HEART DISEASE OF NATIVE CORONARY               

      

 

             2020           JENNIFER SALDIVAR, ELISE RAVI           Ot           I63

.9           

CEREBRAL INFARCTION, UNSPECIFIED                    

 

             2020           JENNIFER SALDIVAR, ELISE RAVI           Ot           Z72

.0           

TOBACCO USE                                      

 

                2020           YULISA DENNIS MD, Ot           

   E11.42          

                          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              

      

 

                2020           YULISA DENNIS MD, Ot           

   E11.52          

                          TYPE 2 DIABETES W DIABETIC PERIPHERAL AN              

      

 

                2020           YULISA DENNIS MD, Ot           

   E11.621         

                          TYPE 2 DIABETES MELLITUS WITH FOOT ULCER              

      

 

                2020           YULISA DENNIS MD, Ot           

   I70.261         

                          ATHSCL NATIVE ARTERIES OF EXTREMITIES W               

      

 

                2020           YULISA DENNIS MD, Ot           

   L97.412         

                          NON-PRS CHR ULCER OF RIGHT HEEL AND MIDF              

      

 

                2020           YULISA DENNIS MD, Ot           

   M65.071         

                          ABSCESS OF TENDON SHEATH, RIGHT ANKLE AN              

      

 

                2020           YULISA DENNIS MD           Ot           

   E11.42          

                          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              

      

 

                2020           YULISA DENNIS MD           Ot           

   E11.52          

                          TYPE 2 DIABETES W DIABETIC PERIPHERAL AN              

      

 

                2020           YULISA DENNIS MD, Ot           

   E11.621         

                          TYPE 2 DIABETES MELLITUS WITH FOOT ULCER              

      

 

                2020           YULISA DENNIS MD           Ot           

   I70.261         

                          ATHSCL NATIVE ARTERIES OF EXTREMITIES W               

      

 

                2020           YULISA DENNIS MD, Ot           

   L97.412         

                          NON-PRS CHR ULCER OF RIGHT HEEL AND MIDF              

      

 

                2020           YULISA DENNIS MD, Ot           

   M65.071         

                          ABSCESS OF TENDON SHEATH, RIGHT ANKLE AN              

      

 

                2020           YULISA DENNIS MD           Ot           

   E11.42          

                          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              

      

 

                2020           YULISA DENNIS MD, Ot           

   E11.52          

                          TYPE 2 DIABETES W DIABETIC PERIPHERAL AN              

      

 

                2020           YULISA DENNIS MD, Ot           

   E11.621         

                          TYPE 2 DIABETES MELLITUS WITH FOOT ULCER              

      

 

                2020           YULISA DENNIS MD           Ot           

   I70.234         

                          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              

      

 

             2020           YULISA DENNIS MD           Ot           I96

           

GANGRENE, NOT ELSEWHERE CLASSIFIED                    

 

                2020           YULISA DENNIS MD, Ot           

   M65.071         

                          ABSCESS OF TENDON SHEATH, RIGHT ANKLE AN              

      

 

                2020           YULISA DENNIS MD, Ot           

   E11.42          

                          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              

      

 

                2020           YULISA DENNIS MD, Ot           

   E11.52          

                          TYPE 2 DIABETES W DIABETIC PERIPHERAL AN              

      

 

                2020           YULISA DENNIS MD, Ot           

   E11.621         

                          TYPE 2 DIABETES MELLITUS WITH FOOT ULCER              

      

 

                2020           YULISA DENNIS MD           Ot           

   I70.234         

                          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              

      

 

             2020           YULISA DENNIS MD           Ot           I96

           

GANGRENE, NOT ELSEWHERE CLASSIFIED                    

 

                2020           YULISA DENNIS MD, Ot           

   L97.412         

                          NON-PRS CHR ULCER OF RIGHT HEEL AND MIDF              

      

 

                2020           YULISA DENNIS MD, Ot           

   M65.071         

                          ABSCESS OF TENDON SHEATH, RIGHT ANKLE AN              

      

 

                2020           YULISA DENNIS MD, Ot           

   E11.42          

                          TYPE 2 DIABETES MELLITUS WITH DIABETIC P              

      

 

                2020           YULISA DENNIS MD, Ot           

   E11.52          

                          TYPE 2 DIABETES W DIABETIC PERIPHERAL AN              

      

 

                2020           YULISA DENNIS MD, Ot           

   E11.621         

                          TYPE 2 DIABETES MELLITUS WITH FOOT ULCER              

      

 

                2020           YULISA DENNIS MD           Ot           

   I70.234         

                          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              

      

 

                2020           YULISA DENNIS MD, Ot           

   L97.412         

                          NON-PRS CHR ULCER OF RIGHT HEEL AND MIDF              

      

 

                2020           YULISA DENNIS MD, Ot           

   M65.071         

                          ABSCESS OF TENDON SHEATH, RIGHT ANKLE AN              

      

 

                2020           YULISA DENNIS MD           Ot           

   E11.42          

                          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              

      

 

                2020           YULISA DENNIS MD, Ot           

   E11.52          

                          TYPE 2 DIABETES W DIABETIC PERIPHERAL AN              

      

 

                2020           YULISA DENNIS MD, Ot           

   E11.621         

                          TYPE 2 DIABETES MELLITUS WITH FOOT ULCER              

      

 

                2020           YULISA DENNIS MD           Ot           

   I70.261         

                          ATHSCL NATIVE ARTERIES OF EXTREMITIES W               

      

 

                2020           YULISA DENNIS MD, Ot           

   L97.413         

                          NON-PRS CHR ULCER OF RIGHT HEEL AND MIDF              

      

 

                2020           YULISA DENNIS MD, Ot           

   M65.071         

                          ABSCESS OF TENDON SHEATH, RIGHT ANKLE AN              

      

 

                2020           YULISA DENNIS MD, Ot           

   E11.42          

                          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              

      

 

                2020           YULISA DENNIS MD, Ot           

   E11.52          

                          TYPE 2 DIABETES W DIABETIC PERIPHERAL AN              

      

 

                2020           YULISA DENNIS MD           Ot           

   E11.621         

                          TYPE 2 DIABETES MELLITUS WITH FOOT ULCER              

      

 

                2020           YULISA DENNIS MD           Ot           

   I70.261         

                          ATHSCL NATIVE ARTERIES OF EXTREMITIES W               

      

 

                2020           YULISA DENNIS MD, Ot           

   L97.413         

                          NON-PRS CHR ULCER OF RIGHT HEEL AND MIDF              

      

 

                2020           YULISA DENNIS MD, Ot           

   M65.071         

                          ABSCESS OF TENDON SHEATH, RIGHT ANKLE AN              

      

 

             2020           THA ODOM MD, Ot           B95.62   

        

METHICILLIN RESIS STAPH INFCT CAUSING DI                    

 

             2020           THA ODOM MD           Ot           B96.5    

       

PSEUDOMONAS (MALLEI) CAUSING DISEASES CL                    

 

             2020           THA ODOM MD, Ot           E11.42   

        TYPE 

2 DIABETES MELLITUS WITH DIABETIC P                    

 

             2020           THA ODOM MD, Ot           E11.621  

         TYPE

2 DIABETES MELLITUS WITH FOOT ULCER                    

 

             2020           THA ODOM MD           Ot           I70.234  

         

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                    

 

             2020           THA ODOM MD           Ot           L97.414  

         NON-

PRS CHR ULCER OF RIGHT HEEL AND MIDF                    

 

             2020           THA ODOM MD           Ot           M86.171  

         

OTHER ACUTE OSTEOMYELITIS, RIGHT ANKLE A                    

 

             2020           THA ODOM MD, Ot           B95.62   

        

METHICILLIN RESIS STAPH INFCT CAUSING DI                    

 

             2020           THA ODOM MD, Ot           B96.5    

       

PSEUDOMONAS (MALLEI) CAUSING DISEASES CL                    

 

             2020           THA ODOM MD, Ot           E11.42   

        TYPE 

2 DIABETES MELLITUS WITH DIABETIC P                    

 

             2020           THA ODOM MD, Ot           E11.621  

         TYPE

2 DIABETES MELLITUS WITH FOOT ULCER                    

 

             2020           THA ODOM MD           Ot           I70.234  

         

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                    

 

             2020           THA ODOM MD           Ot           L97.414  

         NON-

PRS CHR ULCER OF RIGHT HEEL AND MIDF                    

 

             2020           THA ODOM MD           Ot           M86.171  

         

OTHER ACUTE OSTEOMYELITIS, RIGHT ANKLE A                    

 

             2020           ELISE RODRIGUEZ MD           Ot           E11

.9           

TYPE 2 DIABETES MELLITUS WITHOUT COMPLIC                    

 

             2020           ELISE RODRIGUEZ MD, Ot           E66

.9           

OBESITY, UNSPECIFIED                             

 

             2020           ELISE RODRIGUEZ MD           Ot           I10

           

ESSENTIAL (PRIMARY) HYPERTENSION                    

 

                2020           ELISE RODRIGUEZ MD           Ot           

   I25.10          

                          ATHSCL HEART DISEASE OF NATIVE CORONARY               

      

 

             2020           ELISE RODRIGUEZ MD           Ot           I99

.8           

OTHER DISORDER OF CIRCULATORY SYSTEM                    

 

                2020           ELISE RODRIGUEZ MD           Ot           

   Z01.810         

                          ENCOUNTER FOR PREPROCEDURAL CARDIOVASCUL              

      

 

             2020           ELISE RODRIGUEZ MD           Ot           Z72

.0           

TOBACCO USE                                      

 

             2020           ELISE RODRIGUEZ MD           Ot           E11

.9           

TYPE 2 DIABETES MELLITUS WITHOUT COMPLIC                    

 

             2020           ELISE RODRIGUEZ MD           Ot           E66

.9           

OBESITY, UNSPECIFIED                             

 

             2020           ELISE RODRIGUEZ MD           Ot           I10

           

ESSENTIAL (PRIMARY) HYPERTENSION                    

 

                2020           ELISE RODRIGUEZ MD           Ot           

   I25.10          

                          ATHSCL HEART DISEASE OF NATIVE CORONARY               

      

 

             2020           ELISE RODRIGUEZ MD           Ot           I99

.8           

OTHER DISORDER OF CIRCULATORY SYSTEM                    

 

                2020           ELISE RODRIGUEZ MD           Ot           

   Z01.810         

                          ENCOUNTER FOR PREPROCEDURAL CARDIOVASCUL              

      

 

             2020           ELISE RODRIGUEZ MD           Ot           Z72

.0           

TOBACCO USE                                      

 

                2020           YULISA DENNIS MD           Ot           

   E11.42          

                          TYPE 2 DIABETES MELLITUS WITH DIABETIC P              

      

 

                2020           YULISA DENNIS MD, Ot           

   E11.52          

                          TYPE 2 DIABETES W DIABETIC PERIPHERAL AN              

      

 

                2020           YULISA DENNIS MD, Ot           

   E11.621         

                          TYPE 2 DIABETES MELLITUS WITH FOOT ULCER              

      

 

                2020           YULISA DENNIS MD, Ot           

   I70.234         

                          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              

      

 

             2020           YULISA DENNIS MD           Ot           I96

           

GANGRENE, NOT ELSEWHERE CLASSIFIED                    

 

                2020           YULISA DENNIS MD, Ot           

   L97.412         

                          NON-PRS CHR ULCER OF RIGHT HEEL AND MIDF              

      

 

                2020           YULISA DENNIS MD, Ot           

   M65.071         

                          ABSCESS OF TENDON SHEATH, RIGHT ANKLE AN              

      

 

                2020           YULISA DENNIS MD           Ot           

   B95.62          

                          METHICILLIN RESIS STAPH INFCT CAUSING DI              

      

 

             2020           YULISA DENNIS MD, Ot           B96

.5           

PSEUDOMONAS (MALLEI) CAUSING DISEASES CL                    

 

                2020           YULISA DENNIS MD           Ot           

   E11.42          

                          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              

      

 

                2020           YULISA DENNIS MD, Ot           

   E11.621         

                          TYPE 2 DIABETES MELLITUS WITH FOOT ULCER              

      

 

                2020           YULISA DENNIS MD           Ot           

   I70.234         

                          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              

      

 

                2020           YULISA DENNIS MD           Ot           

   L97.416         

                          NON-PRS CHR ULC OF R HEEL/MIDFT W BNE IN              

      

 

                2020           YULISA DENNIS MD, Ot           

   B95.62          

                          METHICILLIN RESIS STAPH INFCT CAUSING DI              

      

 

             2020           YULISA DENNIS MD, Ot           B96

.5           

PSEUDOMONAS (MALLEI) CAUSING DISEASES CL                    

 

                2020           YULISA DENINS MD, Ot           

   E11.42          

                          TYPE 2 DIABETES MELLITUS WITH DIABETIC P              

      

 

                2020           YULISA DENNIS MD, Ot           

   E11.621         

                          TYPE 2 DIABETES MELLITUS WITH FOOT ULCER              

      

 

                2020           YULISA DENNIS MD           Ot           

   I70.234         

                          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              

      

 

                2020           YULISA DENNIS MD           Ot           

   L97.416         

                          NON-PRS CHR ULC OF R HEEL/MIDFT W BNE IN              

      

 

                2020           YULISA DENNIS MD, Ot           

   B95.62          

                          METHICILLIN RESIS STAPH INFCT CAUSING DI              

      

 

             2020           YULISA DENNIS MD           Ot           B96

.5           

PSEUDOMONAS (MALLEI) CAUSING DISEASES CL                    

 

                2020           YULISA DENNIS MD           Ot           

   E11.42          

                          TYPE 2 DIABETES MELLITUS WITH DIABETIC P              

      

 

                2020           YULISA DENINS MD           Ot           

   E11.621         

                          TYPE 2 DIABETES MELLITUS WITH FOOT ULCER              

      

 

                2020           YULISA DENNIS MD           Ot           

   I70.234         

                          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              

      

 

                2020           YULISA DENNIS MD           Ot           

   L97.416         

                          NON-PRS CHR ULC OF R HEEL/MIDFT W BNE IN              

      

 

             2020           MARIELY DELANEY MD           Ot           E86

.0           

DEHYDRATION                                      

 

             2020           MARIELY DELANEY MD           Ot           N17

.9           

ACUTE KIDNEY FAILURE, UNSPECIFIED                    

 

                2020           MARIELY DELANEY MD           Ot           

   R41.82          

                          ALTERED MENTAL STATUS, UNSPECIFIED                    

 

             2020           RHETT MD, MARIELY N           Ot           E86

.0           

DEHYDRATION                                      

 

             2020           MARIELY DELANEY MD           Ot           N17

.9           

ACUTE KIDNEY FAILURE, UNSPECIFIED                    

 

                2020           MARIELY DELAENY MD           Ot           

   R41.82          

                          ALTERED MENTAL STATUS, UNSPECIFIED                    

 

             04/15/2020           MARIELY DELANEY MD           Ot           E86

.0           

DEHYDRATION                                      

 

             04/15/2020           MARIELY DELANEY MD           Ot           N17

.9           

ACUTE KIDNEY FAILURE, UNSPECIFIED                    

 

                04/15/2020           MARIELY DELANEY MD           Ot           

   R41.82          

                          ALTERED MENTAL STATUS, UNSPECIFIED                    

 

             2020           MARIELY DELANEY MD           Ot           E11

.9           

TYPE 2 DIABETES MELLITUS WITHOUT COMPLIC                    

 

             2020           MARIELY DELANEY MD           Ot           E66

.9           

OBESITY, UNSPECIFIED                             

 

                2020           MARIELY DELANEY MD           Ot           

   E78.00          

                          PURE HYPERCHOLESTEROLEMIA, UNSPECIFIED                

    

 

                2020           MARIELY DELANEY MD           Ot           

   E83.42          

                          HYPOMAGNESEMIA                     

 

             2020           MARIELY DELANEY MD           Ot           E86

.0           

DEHYDRATION                                      

 

             2020           MARIELY DELANEY MD           Ot           E87

.5           

HYPERKALEMIA                                     

 

                2020           MARIELY DELANEY MD           Ot           

   F17.210         

                          NICOTINE DEPENDENCE, CIGARETTES, UNCOMPL              

      

 

             2020           MARIELY DELANEY MD           Ot           I07

.1           

RHEUMATIC TRICUSPID INSUFFICIENCY                    

 

             2020           MARIELY DELANEY MD           Ot           I12

.9           

HYPERTENSIVE CHRONIC KIDNEY DISEASE W ST                    

 

                2020           MARIELY DELANEY MD           Ot           

   I21.A1          

                          MYOCARDIAL INFARCTION TYPE 2                    

 

                2020           MARIELY DELANEY MD           Ot           

   I25.10          

                          ATHSCL HEART DISEASE OF NATIVE CORONARY               

      

 

                2020           MARIELY DELANEY MD           Ot           

   I45.10          

                          UNSPECIFIED RIGHT BUNDLE-BRANCH BLOCK                 

   

 

             2020           MARIELY DELANEY MD           Ot           I73

.9           

PERIPHERAL VASCULAR DISEASE, UNSPECIFIED                    

 

             2020           MARIELY DELANEY MD           Ot           J44

.9           

CHRONIC OBSTRUCTIVE PULMONARY DISEASE, U                    

 

             2020           MARIELY DELANEY MD           Ot           J69

.0           

PNEUMONITIS DUE TO INHALATION OF FOOD AN                    

 

                2020           MARIELY DELANEY MD           Ot           

   J96.01          

                          ACUTE RESPIRATORY FAILURE WITH HYPOXIA                

    

 

                2020           MARIELY DELANEY MD, Ot           

   J96.02          

                          ACUTE RESPIRATORY FAILURE WITH HYPERCAPN              

      

 

                2020           MARIELY DELANEY MD, Ot           

   M19.91          

                          PRIMARY OSTEOARTHRITIS, UNSPECIFIED SITE              

      

 

             2020           MARIELY DELANEY MD, Ot           N17

.9           

ACUTE KIDNEY FAILURE, UNSPECIFIED                    

 

             2020           MARIELY DELANEY MD, Ot           N18

.9           

CHRONIC KIDNEY DISEASE, UNSPECIFIED                    

 

                2020           MARIELY DELANEY MD, Ot           

   R41.82          

                          ALTERED MENTAL STATUS, UNSPECIFIED                    

 

             2020           MARIELY DELANEY MD, Ot           R57

.9           

SHOCK, UNSPECIFIED                               

 

                2020           MARIELY DELANEY MD, Ot           

   Z68.34          

                          BODY MASS INDEX (BMI) 34.0-34.9, ADULT                

    

 

                2020           MARIELY DELANEY MD, Ot           

   Z79.84          

                          LONG TERM (CURRENT) USE OF ORAL HYPOGLYC              

      

 

                2020           MARIELY DELANEY MD, Ot           

   Z85.46          

                          PERSONAL HISTORY OF MALIGNANT NEOPLASM O              

      

 

                2020           MARIELY DELANEY MD, Ot           

   Z86.73          

                          PRSNL HX OF TIA (TIA), AND CEREB INFRC W              

      

 

                2020           MARIELY DELANEY MD, Ot           

   Z89.431         

                          ACQUIRED ABSENCE OF RIGHT FOOT                    

 

             2020           MARIELY DELANEY MD           Ot           Z95

.5           

PRESENCE OF CORONARY ANGIOPLASTY IMPLANT                    

 

             2020           MARIELY DELANEY MD, Ot           E11

.9           

TYPE 2 DIABETES MELLITUS WITHOUT COMPLIC                    

 

             2020           MARIELY DELANEY MD, Ot           E66

.9           

OBESITY, UNSPECIFIED                             

 

                2020           MARIELY DELANEY MD           Ot           

   E78.00          

                          PURE HYPERCHOLESTEROLEMIA, UNSPECIFIED                

    

 

                2020           MARIELY DELANEY MD           Ot           

   E83.42          

                          HYPOMAGNESEMIA                     

 

             2020           MARIELY DELANEY MD, Ot           E86

.0           

DEHYDRATION                                      

 

             2020           MARIELY DELANEY MD           Ot           E87

.5           

HYPERKALEMIA                                     

 

                2020           MARIELY DELANEY MD           Ot           

   F17.210         

                          NICOTINE DEPENDENCE, CIGARETTES, UNCOMPL              

      

 

             2020           MARIELY DELANEY MD           Ot           I07

.1           

RHEUMATIC TRICUSPID INSUFFICIENCY                    

 

             2020           MARIELY DELANEY MD           Ot           I12

.9           

HYPERTENSIVE CHRONIC KIDNEY DISEASE W ST                    

 

                2020           MARIELY DELANEY MD, Ot           

   I21.A1          

                          MYOCARDIAL INFARCTION TYPE 2                    

 

                2020           MARIELY DELANEY MD, Ot           

   I25.10          

                          ATHSCL HEART DISEASE OF NATIVE CORONARY               

      

 

                2020           MARIELY DELANEY MD, Ot           

   I45.10          

                          UNSPECIFIED RIGHT BUNDLE-BRANCH BLOCK                 

   

 

             2020           MARIELY DELANEY MD, Ot           I73

.9           

PERIPHERAL VASCULAR DISEASE, UNSPECIFIED                    

 

             2020           MARIELY DELANEY MD, Ot           J44

.9           

CHRONIC OBSTRUCTIVE PULMONARY DISEASE, U                    

 

             2020           MARIEYL DELANEY MD, Ot           J69

.0           

PNEUMONITIS DUE TO INHALATION OF FOOD AN                    

 

                2020           MARIELY DELANEY MD, Ot           

   J96.01          

                          ACUTE RESPIRATORY FAILURE WITH HYPOXIA                

    

 

                2020           MARIELY DELANEY MD, Ot           

   J96.02          

                          ACUTE RESPIRATORY FAILURE WITH HYPERCAPN              

      

 

                2020           MARIELY DELANEY MD, Ot           

   M19.91          

                          PRIMARY OSTEOARTHRITIS, UNSPECIFIED SITE              

      

 

             2020           MARIELY DELANEY MD, Ot           N17

.9           

ACUTE KIDNEY FAILURE, UNSPECIFIED                    

 

             2020           MARIELY DELANEY MD, Ot           N18

.9           

CHRONIC KIDNEY DISEASE, UNSPECIFIED                    

 

                2020           MARIELY DELANEY MD, Ot           

   R41.82          

                          ALTERED MENTAL STATUS, UNSPECIFIED                    

 

             2020           MARIELY DELANEY MD, Ot           R57

.9           

SHOCK, UNSPECIFIED                               

 

                2020           MARIELY DELANEY MD, Ot           

   Z68.34          

                          BODY MASS INDEX (BMI) 34.0-34.9, ADULT                

    

 

                2020           MARIELY DELANEY MD, Ot           

   Z79.84          

                          LONG TERM (CURRENT) USE OF ORAL HYPOGLYC              

      

 

                2020           MARIELY DELANEY MD, Ot           

   Z85.46          

                          PERSONAL HISTORY OF MALIGNANT NEOPLASM O              

      

 

                2020           MARIELY DELANEY MD, Ot           

   Z86.73          

                          PRSNL HX OF TIA (TIA), AND CEREB INFRC W              

      

 

                2020           MARIELY DELANEY MD, Ot           

   Z89.431         

                          ACQUIRED ABSENCE OF RIGHT FOOT                    

 

             2020           MARIELY DELANEY MD, Ot           Z95

.5           

PRESENCE OF CORONARY ANGIOPLASTY IMPLANT                    

 

             2020           MARIELY DELANEY MD, Ot           E11

.9           

TYPE 2 DIABETES MELLITUS WITHOUT COMPLIC                    

 

             2020           MARIELY DELANEY MD           Ot           E66

.9           

OBESITY, UNSPECIFIED                             

 

                2020           MARIELY DELANEY MD           Ot           

   E78.00          

                          PURE HYPERCHOLESTEROLEMIA, UNSPECIFIED                

    

 

                2020           MARIELY DELANEY MD           Ot           

   E83.42          

                          HYPOMAGNESEMIA                     

 

             2020           MARIELY DELANEY MD           Ot           E86

.0           

DEHYDRATION                                      

 

             2020           MARIELY DELANEY MD           Ot           E87

.5           

HYPERKALEMIA                                     

 

                2020           MARIELY DELANEY MD           Ot           

   F17.210         

                          NICOTINE DEPENDENCE, CIGARETTES, UNCOMPL              

      

 

             2020           MARIELY DELANEY MD           Ot           I07

.1           

RHEUMATIC TRICUSPID INSUFFICIENCY                    

 

             2020           MARIELY DELANEY MD           Ot           I12

.9           

HYPERTENSIVE CHRONIC KIDNEY DISEASE W ST                    

 

                2020           MARIELY DELANEY MD           Ot           

   I21.A1          

                          MYOCARDIAL INFARCTION TYPE 2                    

 

                2020           MARIELY DELANEY MD           Ot           

   I25.10          

                          ATHSCL HEART DISEASE OF NATIVE CORONARY               

      

 

                2020           MARIELY DELANEY MD           Ot           

   I45.10          

                          UNSPECIFIED RIGHT BUNDLE-BRANCH BLOCK                 

   

 

             2020           MARIELY DELANEY MD           Ot           I73

.9           

PERIPHERAL VASCULAR DISEASE, UNSPECIFIED                    

 

             2020           MARIELY DELANEY MD           Ot           J44

.9           

CHRONIC OBSTRUCTIVE PULMONARY DISEASE, U                    

 

             2020           MARIELY DELANEY MD           Ot           J69

.0           

PNEUMONITIS DUE TO INHALATION OF FOOD AN                    

 

                2020           MARIELY DELANEY MD           Ot           

   J96.01          

                          ACUTE RESPIRATORY FAILURE WITH HYPOXIA                

    

 

                2020           MARIELY DELANEY MD           Ot           

   J96.02          

                          ACUTE RESPIRATORY FAILURE WITH HYPERCAPN              

      

 

                2020           MARIELY DELANEY MD           Ot           

   M19.91          

                          PRIMARY OSTEOARTHRITIS, UNSPECIFIED SITE              

      

 

             2020           MARIELY DELANEY MD           Ot           N17

.9           

ACUTE KIDNEY FAILURE, UNSPECIFIED                    

 

             2020           MARIELY DELANEY MD           Ot           N18

.9           

CHRONIC KIDNEY DISEASE, UNSPECIFIED                    

 

                2020           MARIELY DELANEY MD           Ot           

   R41.82          

                          ALTERED MENTAL STATUS, UNSPECIFIED                    

 

             2020           MARIELY DELANEY MD           Ot           R57

.9           

SHOCK, UNSPECIFIED                               

 

                2020           MARIELY DELANEY MD, Ot           

   Z68.34          

                          BODY MASS INDEX (BMI) 34.0-34.9, ADULT                

    

 

                2020           MARIELY DELANEY MD, Ot           

   Z79.84          

                          LONG TERM (CURRENT) USE OF ORAL HYPOGLYC              

      

 

                2020           MARIELY DELANEY MD, Ot           

   Z85.46          

                          PERSONAL HISTORY OF MALIGNANT NEOPLASM O              

      

 

                2020           MARIELY DELANEY MD, Ot           

   Z86.73          

                          PRSNL HX OF TIA (TIA), AND CEREB INFRC W              

      

 

                2020           MARIELY DELANEY MD, Ot           

   Z89.431         

                          ACQUIRED ABSENCE OF RIGHT FOOT                    

 

             2020           MARIELY DELANEY MD, Ot           Z95

.5           

PRESENCE OF CORONARY ANGIOPLASTY IMPLANT                    

 

             2020           MARIELY DELANEY MD, Ot           E11

.9           

TYPE 2 DIABETES MELLITUS WITHOUT COMPLIC                    

 

             2020           MARIELY DELANEY MD, Ot           E66

.9           

OBESITY, UNSPECIFIED                             

 

                2020           MARIELY DELANEY MD, Ot           

   E78.00          

                          PURE HYPERCHOLESTEROLEMIA, UNSPECIFIED                

    

 

                2020           MARIELY DELANEY MD, Ot           

   E83.42          

                          HYPOMAGNESEMIA                     

 

             2020           MARIELY DELANEY MD, Ot           E86

.0           

DEHYDRATION                                      

 

             2020           MARIELY DELANEY MD, Ot           E87

.5           

HYPERKALEMIA                                     

 

                2020           MARIELY DELANEY MD, Ot           

   F17.210         

                          NICOTINE DEPENDENCE, CIGARETTES, UNCOMPL              

      

 

             2020           MARIELY DELANEY MD, Ot           I07

.1           

RHEUMATIC TRICUSPID INSUFFICIENCY                    

 

             2020           MARIELY DELANEY MD, Ot           I12

.9           

HYPERTENSIVE CHRONIC KIDNEY DISEASE W ST                    

 

                2020           MARIELY DELANEY MD, Ot           

   I21.A1          

                          MYOCARDIAL INFARCTION TYPE 2                    

 

                2020           MARIELY DELANEY MD, Ot           

   I25.10          

                          ATHSCL HEART DISEASE OF NATIVE CORONARY               

      

 

                2020           MARIELY DELANEY MD, Ot           

   I45.10          

                          UNSPECIFIED RIGHT BUNDLE-BRANCH BLOCK                 

   

 

             2020           MARIELY DELANEY MD, Ot           I73

.9           

PERIPHERAL VASCULAR DISEASE, UNSPECIFIED                    

 

             2020           MARIELY DELANEY MD, Ot           J44

.9           

CHRONIC OBSTRUCTIVE PULMONARY DISEASE, U                    

 

             2020           MARIELY DELANEY MD, Ot           J69

.0           

PNEUMONITIS DUE TO INHALATION OF FOOD AN                    

 

                2020           MARIELY DELANEY MD, Ot           

   J96.01          

                          ACUTE RESPIRATORY FAILURE WITH HYPOXIA                

    

 

                2020           MARIELY DELANEY MD, Ot           

   J96.02          

                          ACUTE RESPIRATORY FAILURE WITH HYPERCAPN              

      

 

                2020           MARIELY DELANEY MD, Ot           

   M19.91          

                          PRIMARY OSTEOARTHRITIS, UNSPECIFIED SITE              

      

 

             2020           MARIELY DELANEY MD, Ot           N17

.9           

ACUTE KIDNEY FAILURE, UNSPECIFIED                    

 

             2020           MARIELY DELANEY MD, Ot           N18

.9           

CHRONIC KIDNEY DISEASE, UNSPECIFIED                    

 

                2020           MARIELY DELANEY MD, Ot           

   R41.82          

                          ALTERED MENTAL STATUS, UNSPECIFIED                    

 

             2020           MARIELY DELANEY MD, Ot           R57

.9           

SHOCK, UNSPECIFIED                               

 

                2020           MARIELY DELANEY MD, Ot           

   Z68.34          

                          BODY MASS INDEX (BMI) 34.0-34.9, ADULT                

    

 

                2020           MARIELY DELANEY MD, Ot           

   Z79.84          

                          LONG TERM (CURRENT) USE OF ORAL HYPOGLYC              

      

 

                2020           MARIELY DELANEY MD, Ot           

   Z85.46          

                          PERSONAL HISTORY OF MALIGNANT NEOPLASM O              

      

 

                2020           MARIELY DELANEY MD, Ot           

   Z86.73          

                          PRSNL HX OF TIA (TIA), AND CEREB INFRC W              

      

 

                2020           MARIELY DELANEY MD, Ot           

   Z89.431         

                          ACQUIRED ABSENCE OF RIGHT FOOT                    

 

             2020           MARIELY DELANEY MD, Ot           Z95

.5           

PRESENCE OF CORONARY ANGIOPLASTY IMPLANT                    

 

             2020           MARIELY DELANEY MD, Ot           E11

.9           

TYPE 2 DIABETES MELLITUS WITHOUT COMPLIC                    

 

             2020           MARIELY DELANEY MD           Ot           E66

.9           

OBESITY, UNSPECIFIED                             

 

                2020           MARIELY DELANEY MD           Ot           

   E78.00          

                          PURE HYPERCHOLESTEROLEMIA, UNSPECIFIED                

    

 

                2020           MARIELY DELANEY MD, Ot           

   E83.42          

                          HYPOMAGNESEMIA                     

 

             2020           MARIELY DELANEY MD           Ot           E86

.0           

DEHYDRATION                                      

 

             2020           MARIELY DELANEY MD, Ot           E87

.5           

HYPERKALEMIA                                     

 

                2020           MARIELY DELANEY MD           Ot           

   F17.210         

                          NICOTINE DEPENDENCE, CIGARETTES, UNCOMPL              

      

 

             2020           MARIELY DELANEY MD, Ot           I07

.1           

RHEUMATIC TRICUSPID INSUFFICIENCY                    

 

             2020           MARIELY DELANEY MD, Ot           I12

.9           

HYPERTENSIVE CHRONIC KIDNEY DISEASE W ST                    

 

                2020           MARIELY DELANEY MD, Ot           

   I21.A1          

                          MYOCARDIAL INFARCTION TYPE 2                    

 

                2020           MARIELY DELANEY MD, Ot           

   I25.10          

                          ATHSCL HEART DISEASE OF NATIVE CORONARY               

      

 

                2020           MARIELY DELANEY MD, Ot           

   I45.10          

                          UNSPECIFIED RIGHT BUNDLE-BRANCH BLOCK                 

   

 

             2020           MARIELY DELANEY MD, Ot           I73

.9           

PERIPHERAL VASCULAR DISEASE, UNSPECIFIED                    

 

             2020           MARIELY DELANEY MD, Ot           J44

.9           

CHRONIC OBSTRUCTIVE PULMONARY DISEASE, U                    

 

             2020           MAIRELY DELANEY MD, Ot           J69

.0           

PNEUMONITIS DUE TO INHALATION OF FOOD AN                    

 

                2020           MARIELY DELANEY MD, Ot           

   J96.01          

                          ACUTE RESPIRATORY FAILURE WITH HYPOXIA                

    

 

                2020           MARIELY DELANEY MD, Ot           

   J96.02          

                          ACUTE RESPIRATORY FAILURE WITH HYPERCAPN              

      

 

                2020           MARIELY DELANEY MD, Ot           

   M19.91          

                          PRIMARY OSTEOARTHRITIS, UNSPECIFIED SITE              

      

 

             2020           MARIELY DELANEY MD, Ot           N17

.9           

ACUTE KIDNEY FAILURE, UNSPECIFIED                    

 

             2020           MARIELY DELANEY MD, Ot           N18

.9           

CHRONIC KIDNEY DISEASE, UNSPECIFIED                    

 

                2020           MARIELY DELANEY MD, Ot           

   R41.82          

                          ALTERED MENTAL STATUS, UNSPECIFIED                    

 

             2020           MARIELY DELANEY MD, Ot           R57

.9           

SHOCK, UNSPECIFIED                               

 

                2020           MARIELY DELANEY MD, Ot           

   Z68.34          

                          BODY MASS INDEX (BMI) 34.0-34.9, ADULT                

    

 

                2020           MARIELY DELANEY MD, Ot           

   Z79.84          

                          LONG TERM (CURRENT) USE OF ORAL HYPOGLYC              

      

 

                2020           MARIELY DELANEY MD, Ot           

   Z85.46          

                          PERSONAL HISTORY OF MALIGNANT NEOPLASM O              

      

 

                2020           MARIELY DELANEY MD, Ot           

   Z86.73          

                          PRSNL HX OF TIA (TIA), AND CEREB INFRC W              

      

 

                2020           MARIELY DELANEY MD, Ot           

   Z89.431         

                          ACQUIRED ABSENCE OF RIGHT FOOT                    

 

             2020           RHETT MD, MARIELY N           Ot           Z95

.5           

PRESENCE OF CORONARY ANGIOPLASTY IMPLANT                    

 

             2020           MARIELY DELANEY MD           Ot           E11

.9           

TYPE 2 DIABETES MELLITUS WITHOUT COMPLIC                    

 

             2020           MARIELY DELANEY MD           Ot           E66

.9           

OBESITY, UNSPECIFIED                             

 

                2020           MARIELY DELANEY MD           Ot           

   E78.00          

                          PURE HYPERCHOLESTEROLEMIA, UNSPECIFIED                

    

 

                2020           MARIELY DELANEY MD           Ot           

   E83.42          

                          HYPOMAGNESEMIA                     

 

             2020           MARIELY DELANEY MD           Ot           E86

.0           

DEHYDRATION                                      

 

             2020           MARIELY DELANEY MD           Ot           E87

.5           

HYPERKALEMIA                                     

 

                2020           MARIELY DELANEY MD           Ot           

   F17.210         

                          NICOTINE DEPENDENCE, CIGARETTES, UNCOMPL              

      

 

             2020           MARIELY DELANEY MD           Ot           I07

.1           

RHEUMATIC TRICUSPID INSUFFICIENCY                    

 

             2020           MARIELY DELANEY MD           Ot           I12

.9           

HYPERTENSIVE CHRONIC KIDNEY DISEASE W ST                    

 

                2020           MARIELY DELANEY MD           Ot           

   I21.A1          

                          MYOCARDIAL INFARCTION TYPE 2                    

 

                2020           MARIELY DELANEY MD           Ot           

   I25.10          

                          ATHSCL HEART DISEASE OF NATIVE CORONARY               

      

 

                2020           MARIELY DELANEY MD           Ot           

   I45.10          

                          UNSPECIFIED RIGHT BUNDLE-BRANCH BLOCK                 

   

 

             2020           MARIELY DELANEY MD           Ot           I73

.9           

PERIPHERAL VASCULAR DISEASE, UNSPECIFIED                    

 

             2020           MARIELY DELANEY MD           Ot           J44

.9           

CHRONIC OBSTRUCTIVE PULMONARY DISEASE, U                    

 

             2020           MARIELY DELANEY MD           Ot           J69

.0           

PNEUMONITIS DUE TO INHALATION OF FOOD AN                    

 

                2020           MARIELY DELANEY MD           Ot           

   J96.01          

                          ACUTE RESPIRATORY FAILURE WITH HYPOXIA                

    

 

                2020           MARIELY DELANEY MD           Ot           

   J96.02          

                          ACUTE RESPIRATORY FAILURE WITH HYPERCAPN              

      

 

                2020           MARIELY DELANEY MD           Ot           

   M19.91          

                          PRIMARY OSTEOARTHRITIS, UNSPECIFIED SITE              

      

 

             2020           MARIELY DELANEY MD           Ot           N17

.9           

ACUTE KIDNEY FAILURE, UNSPECIFIED                    

 

             2020           MARIELY DELANEY MD           Ot           N18

.9           

CHRONIC KIDNEY DISEASE, UNSPECIFIED                    

 

                2020           MARIELY DELANEY MD           Ot           

   R41.82          

                          ALTERED MENTAL STATUS, UNSPECIFIED                    

 

             2020           MARIELY DELANEY MD, Ot           R57

.9           

SHOCK, UNSPECIFIED                               

 

                2020           MARIELY DELANEY MD, Ot           

   Z68.34          

                          BODY MASS INDEX (BMI) 34.0-34.9, ADULT                

    

 

                2020           MARIELY DELANEY MD, Ot           

   Z79.84          

                          LONG TERM (CURRENT) USE OF ORAL HYPOGLYC              

      

 

                2020           MARIELY DELANEY MD, Ot           

   Z85.46          

                          PERSONAL HISTORY OF MALIGNANT NEOPLASM O              

      

 

                2020           MARIELY DELANEY MD, Ot           

   Z86.73          

                          PRSNL HX OF TIA (TIA), AND CEREB INFRC W              

      

 

                2020           MARIELY DELANEY MD, Ot           

   Z89.431         

                          ACQUIRED ABSENCE OF RIGHT FOOT                    

 

             2020           MARIELY DELANEY MD, Ot           Z95

.5           

PRESENCE OF CORONARY ANGIOPLASTY IMPLANT                    

 

             2020           MARIELY DELANEY MD, Ot           E11

.9           

TYPE 2 DIABETES MELLITUS WITHOUT COMPLIC                    

 

             2020           MARIELY DELANEY MD           Ot           E66

.9           

OBESITY, UNSPECIFIED                             

 

                2020           MARIELY DELANEY MD           Ot           

   E78.00          

                          PURE HYPERCHOLESTEROLEMIA, UNSPECIFIED                

    

 

                2020           MARIELY DELANEY MD, Ot           

   E83.42          

                          HYPOMAGNESEMIA                     

 

             2020           MARIELY DELANEY MD           Ot           E86

.0           

DEHYDRATION                                      

 

             2020           MARIELY DELANEY MD           Ot           E87

.5           

HYPERKALEMIA                                     

 

                2020           MARIELY DELANEY MD           Ot           

   F17.210         

                          NICOTINE DEPENDENCE, CIGARETTES, UNCOMPL              

      

 

             2020           MARIELY DELANEY MD           Ot           I07

.1           

RHEUMATIC TRICUSPID INSUFFICIENCY                    

 

             2020           MARIELY DELANEY MD           Ot           I12

.9           

HYPERTENSIVE CHRONIC KIDNEY DISEASE W ST                    

 

                2020           MARIELY DELANEY MD, Ot           

   I21.A1          

                          MYOCARDIAL INFARCTION TYPE 2                    

 

                2020           MARIELY DELANEY MD, Ot           

   I25.10          

                          ATHSCL HEART DISEASE OF NATIVE CORONARY               

      

 

                2020           MARIELY DELANEY MD, Ot           

   I45.10          

                          UNSPECIFIED RIGHT BUNDLE-BRANCH BLOCK                 

   

 

             2020           MARIELY DELANEY MD, Ot           I73

.9           

PERIPHERAL VASCULAR DISEASE, UNSPECIFIED                    

 

             2020           MARIELY DELANEY MD, Ot           J44

.9           

CHRONIC OBSTRUCTIVE PULMONARY DISEASE, U                    

 

             2020           MARIELY DELANEY MD, Ot           J69

.0           

PNEUMONITIS DUE TO INHALATION OF FOOD AN                    

 

                2020           MARIELY DELANEY MD, Ot           

   J96.01          

                          ACUTE RESPIRATORY FAILURE WITH HYPOXIA                

    

 

                2020           MARIELY DELANEY MD, Ot           

   J96.02          

                          ACUTE RESPIRATORY FAILURE WITH HYPERCAPN              

      

 

                2020           MARIELY DELANEY MD, Ot           

   M19.91          

                          PRIMARY OSTEOARTHRITIS, UNSPECIFIED SITE              

      

 

             2020           MARIELY DELANEY MD, Ot           N17

.9           

ACUTE KIDNEY FAILURE, UNSPECIFIED                    

 

             2020           MARIELY DELANEY MD, Ot           N18

.9           

CHRONIC KIDNEY DISEASE, UNSPECIFIED                    

 

                2020           MARIELY DELANEY MD, Ot           

   R41.82          

                          ALTERED MENTAL STATUS, UNSPECIFIED                    

 

             2020           MARIELY DELANEY MD, Ot           R57

.9           

SHOCK, UNSPECIFIED                               

 

                2020           MARIELY DELANEY MD, Ot           

   Z68.34          

                          BODY MASS INDEX (BMI) 34.0-34.9, ADULT                

    

 

                2020           MARIELY DELANEY MD, Ot           

   Z79.84          

                          LONG TERM (CURRENT) USE OF ORAL HYPOGLYC              

      

 

                2020           MARIELY DELANEY MD, Ot           

   Z85.46          

                          PERSONAL HISTORY OF MALIGNANT NEOPLASM O              

      

 

                2020           MARIELY DELANEY MD, Ot           

   Z86.73          

                          PRSNL HX OF TIA (TIA), AND CEREB INFRC W              

      

 

                2020           MARIELY DELANEY MD, Ot           

   Z89.431         

                          ACQUIRED ABSENCE OF RIGHT FOOT                    

 

             2020           MARIELY DELANEY MD, Ot           Z95

.5           

PRESENCE OF CORONARY ANGIOPLASTY IMPLANT                    

 

             2020           MARIELY DELANEY MD, Ot           E11

.9           

TYPE 2 DIABETES MELLITUS WITHOUT COMPLIC                    

 

             2020           MARIELY DELANEY MD, Ot           E66

.9           

OBESITY, UNSPECIFIED                             

 

                2020           MARIELY DELANEY MD, Ot           

   E78.00          

                          PURE HYPERCHOLESTEROLEMIA, UNSPECIFIED                

    

 

                2020           MARIELY DELANEY MD, Ot           

   E83.42          

                          HYPOMAGNESEMIA                     

 

             2020           MARIELY DELANEY MD           Ot           E86

.0           

DEHYDRATION                                      

 

             2020           MARIELY DELANEY MD, Ot           E87

.5           

HYPERKALEMIA                                     

 

                2020           MARIELY DELANEY MD, Ot           

   F17.210         

                          NICOTINE DEPENDENCE, CIGARETTES, UNCOMPL              

      

 

             2020           MARIELY DELANEY MD, Ot           I07

.1           

RHEUMATIC TRICUSPID INSUFFICIENCY                    

 

             2020           MARIELY DELANEY MD, Ot           I12

.9           

HYPERTENSIVE CHRONIC KIDNEY DISEASE W ST                    

 

                2020           MARIELY DELANEY MD, Ot           

   I21.A1          

                          MYOCARDIAL INFARCTION TYPE 2                    

 

                2020           MARIELY DELANEY MD, Ot           

   I25.10          

                          ATHSCL HEART DISEASE OF NATIVE CORONARY               

      

 

                2020           MARIELY DELANEY MD, Ot           

   I45.10          

                          UNSPECIFIED RIGHT BUNDLE-BRANCH BLOCK                 

   

 

             2020           MARIELY DELANEY MD, Ot           I73

.9           

PERIPHERAL VASCULAR DISEASE, UNSPECIFIED                    

 

             2020           MARIELY DELANEY MD, Ot           J44

.9           

CHRONIC OBSTRUCTIVE PULMONARY DISEASE, U                    

 

             2020           MARIELY DELANEY MD, Ot           J69

.0           

PNEUMONITIS DUE TO INHALATION OF FOOD AN                    

 

                2020           MARIELY DELANEY MD, Ot           

   J96.01          

                          ACUTE RESPIRATORY FAILURE WITH HYPOXIA                

    

 

                2020           MARIELY DELANEY MD, Ot           

   J96.02          

                          ACUTE RESPIRATORY FAILURE WITH HYPERCAPN              

      

 

                2020           MARIELY DELANEY MD, Ot           

   M19.91          

                          PRIMARY OSTEOARTHRITIS, UNSPECIFIED SITE              

      

 

             2020           MARIELY DELANEY MD, Ot           N17

.9           

ACUTE KIDNEY FAILURE, UNSPECIFIED                    

 

             2020           MARIELY DELANEY MD, Ot           N18

.9           

CHRONIC KIDNEY DISEASE, UNSPECIFIED                    

 

                2020           MARIELY DELANEY MD, Ot           

   R41.82          

                          ALTERED MENTAL STATUS, UNSPECIFIED                    

 

             2020           MARIELY DELANEY MD, Ot           R57

.9           

SHOCK, UNSPECIFIED                               

 

                2020           MARIELY DELANEY MD, Ot           

   Z68.34          

                          BODY MASS INDEX (BMI) 34.0-34.9, ADULT                

    

 

                2020           MARIELY DELANEY MD, Ot           

   Z79.84          

                          LONG TERM (CURRENT) USE OF ORAL HYPOGLYC              

      

 

                2020           MARIELY DELANEY MD, Ot           

   Z85.46          

                          PERSONAL HISTORY OF MALIGNANT NEOPLASM O              

      

 

                2020           MARIELY DELANEY MD, Ot           

   Z86.73          

                          PRSNL HX OF TIA (TIA), AND CEREB INFRC W              

      

 

                2020           MARIELY DELANEY MD, Ot           

   Z89.431         

                          ACQUIRED ABSENCE OF RIGHT FOOT                    

 

             2020           MARIELY DELANEY MD           Ot           Z95

.5           

PRESENCE OF CORONARY ANGIOPLASTY IMPLANT                    

 

             2020           MARIELY DELANEY MD           Ot           E11

.9           

TYPE 2 DIABETES MELLITUS WITHOUT COMPLIC                    

 

             2020           MARIELY DELANEY MD           Ot           E66

.9           

OBESITY, UNSPECIFIED                             

 

                2020           MARIELY DELANEY MD           Ot           

   E78.00          

                          PURE HYPERCHOLESTEROLEMIA, UNSPECIFIED                

    

 

                2020           MARIELY DELANEY MD           Ot           

   E83.42          

                          HYPOMAGNESEMIA                     

 

             2020           MARIELY DELANEY MD           Ot           E86

.0           

DEHYDRATION                                      

 

             2020           MARIELY DELANEY MD           Ot           E87

.5           

HYPERKALEMIA                                     

 

                2020           MARIELY DELANEY MD           Ot           

   F17.210         

                          NICOTINE DEPENDENCE, CIGARETTES, UNCOMPL              

      

 

             2020           MARIELY DELANEY MD           Ot           I07

.1           

RHEUMATIC TRICUSPID INSUFFICIENCY                    

 

             2020           MARIELY DELANEY MD           Ot           I12

.9           

HYPERTENSIVE CHRONIC KIDNEY DISEASE W ST                    

 

                2020           MARIELY DELANEY MD, Ot           

   I21.A1          

                          MYOCARDIAL INFARCTION TYPE 2                    

 

                2020           MARIELY DELANEY MD, Ot           

   I25.10          

                          ATHSCL HEART DISEASE OF NATIVE CORONARY               

      

 

                2020           MARIELY DELANEY MD, Ot           

   I45.10          

                          UNSPECIFIED RIGHT BUNDLE-BRANCH BLOCK                 

   

 

             2020           MARIELY DELANEY MD, Ot           I73

.9           

PERIPHERAL VASCULAR DISEASE, UNSPECIFIED                    

 

             2020           MARIELY DELANEY MD, Ot           J44

.9           

CHRONIC OBSTRUCTIVE PULMONARY DISEASE, U                    

 

             2020           MARIELY DELANEY MD, Ot           J69

.0           

PNEUMONITIS DUE TO INHALATION OF FOOD AN                    

 

                2020           MARIELY DELANEY MD           Ot           

   J96.01          

                          ACUTE RESPIRATORY FAILURE WITH HYPOXIA                

    

 

                2020           MARIELY DELANEY MD, Ot           

   J96.02          

                          ACUTE RESPIRATORY FAILURE WITH HYPERCAPN              

      

 

                2020           MARIELY DELANEY MD, Ot           

   M19.91          

                          PRIMARY OSTEOARTHRITIS, UNSPECIFIED SITE              

      

 

             2020           MARIELY DELANEY MD           Ot           N17

.9           

ACUTE KIDNEY FAILURE, UNSPECIFIED                    

 

             2020           MARIELY DELANEY MD           Ot           N18

.9           

CHRONIC KIDNEY DISEASE, UNSPECIFIED                    

 

                2020           MARIELY DELANEY MD, Ot           

   R41.82          

                          ALTERED MENTAL STATUS, UNSPECIFIED                    

 

             2020           MARIELY DELANEY MD, Ot           R57

.9           

SHOCK, UNSPECIFIED                               

 

                2020           MARIELY DELANEY MD, Ot           

   Z68.34          

                          BODY MASS INDEX (BMI) 34.0-34.9, ADULT                

    

 

                2020           MARIELY DELANEY MD, Ot           

   Z79.84          

                          LONG TERM (CURRENT) USE OF ORAL HYPOGLYC              

      

 

                2020           MARIELY DELANEY MD, Ot           

   Z85.46          

                          PERSONAL HISTORY OF MALIGNANT NEOPLASM O              

      

 

                2020           MARIELY DELANEY MD, Ot           

   Z86.73          

                          PRSNL HX OF TIA (TIA), AND CEREB INFRC W              

      

 

                2020           MARIELY DELANEY MD, Ot           

   Z89.431         

                          ACQUIRED ABSENCE OF RIGHT FOOT                    

 

             2020           MARIELY DELANEY MD, Ot           Z95

.5           

PRESENCE OF CORONARY ANGIOPLASTY IMPLANT                    

 

             2020           MRAIELY DELANEY MD, Ot           E11

.9           

TYPE 2 DIABETES MELLITUS WITHOUT COMPLIC                    

 

             2020           MARIELY DELANEY MD, Ot           E66

.9           

OBESITY, UNSPECIFIED                             

 

                2020           MARIELY DELANEY MD, Ot           

   E78.00          

                          PURE HYPERCHOLESTEROLEMIA, UNSPECIFIED                

    

 

                2020           MARIELY DELANEY MD, Ot           

   E83.42          

                          HYPOMAGNESEMIA                     

 

             2020           MARIELY DELANEY MD, Ot           E86

.0           

DEHYDRATION                                      

 

             2020           MARIELY DELANEY MD, Ot           E87

.5           

HYPERKALEMIA                                     

 

                2020           MARIELY DELANEY MD, Ot           

   F17.210         

                          NICOTINE DEPENDENCE, CIGARETTES, UNCOMPL              

      

 

             2020           MARILEY DELANEY MD, Ot           I07

.1           

RHEUMATIC TRICUSPID INSUFFICIENCY                    

 

             2020           MARIELY DELANEY MD, Ot           I12

.9           

HYPERTENSIVE CHRONIC KIDNEY DISEASE W ST                    

 

                2020           MARIELY DELANEY MD, Ot           

   I21.A1          

                          MYOCARDIAL INFARCTION TYPE 2                    

 

                2020           MARIELY DELANEY MD, Ot           

   I25.10          

                          ATHSCL HEART DISEASE OF NATIVE CORONARY               

      

 

                2020           MARIELY DELANEY MD, Ot           

   I45.10          

                          UNSPECIFIED RIGHT BUNDLE-BRANCH BLOCK                 

   

 

             2020           MARIELY DELANEY MD, Ot           I73

.9           

PERIPHERAL VASCULAR DISEASE, UNSPECIFIED                    

 

             2020           MARIELY DELANEY MD, Ot           J44

.9           

CHRONIC OBSTRUCTIVE PULMONARY DISEASE, U                    

 

             2020           MARIELY DELANEY MD, Ot           J69

.0           

PNEUMONITIS DUE TO INHALATION OF FOOD AN                    

 

                2020           MARIELY DELANEY MD, Ot           

   J96.01          

                          ACUTE RESPIRATORY FAILURE WITH HYPOXIA                

    

 

                2020           MARIELY DELANEY MD, Ot           

   J96.02          

                          ACUTE RESPIRATORY FAILURE WITH HYPERCAPN              

      

 

                2020           MARIELY DELANEY MD, Ot           

   M19.91          

                          PRIMARY OSTEOARTHRITIS, UNSPECIFIED SITE              

      

 

             2020           MARIELY DELANEY MD, Ot           N17

.9           

ACUTE KIDNEY FAILURE, UNSPECIFIED                    

 

             2020           MARIELY DELANEY MD, Ot           N18

.9           

CHRONIC KIDNEY DISEASE, UNSPECIFIED                    

 

                2020           MARIELY DELANEY MD, Ot           

   R41.82          

                          ALTERED MENTAL STATUS, UNSPECIFIED                    

 

             2020           MARIELY DELANEY MD, Ot           R57

.9           

SHOCK, UNSPECIFIED                               

 

                2020           MARIELY DELANEY MD, Ot           

   Z68.34          

                          BODY MASS INDEX (BMI) 34.0-34.9, ADULT                

    

 

                2020           MARIELY DELANEY MD, Ot           

   Z79.84          

                          LONG TERM (CURRENT) USE OF ORAL HYPOGLYC              

      

 

                2020           MARIELY DELANEY MD, Ot           

   Z85.46          

                          PERSONAL HISTORY OF MALIGNANT NEOPLASM O              

      

 

                2020           MARIELY DELANEY MD, Ot           

   Z86.73          

                          PRSNL HX OF TIA (TIA), AND CEREB INFRC W              

      

 

                2020           MARIELY DELANEY MD, Ot           

   Z89.431         

                          ACQUIRED ABSENCE OF RIGHT FOOT                    

 

             2020           MARIELY DELANEY MD, Ot           Z95

.5           

PRESENCE OF CORONARY ANGIOPLASTY IMPLANT                    

 

             2020           MARIELY DELANEY MD, Ot           E11

.9           

TYPE 2 DIABETES MELLITUS WITHOUT COMPLIC                    

 

             2020           MARIELY DELANEY MD, Ot           E66

.9           

OBESITY, UNSPECIFIED                             

 

                2020           MARIELY DELANEY MD, Ot           

   E78.00          

                          PURE HYPERCHOLESTEROLEMIA, UNSPECIFIED                

    

 

                2020           MARIELY DELANEY MD, Ot           

   E83.42          

                          HYPOMAGNESEMIA                     

 

             2020           MARIELY DELANEY MD           Ot           E86

.0           

DEHYDRATION                                      

 

             2020           MARIELY DELANEY MD           Ot           E87

.5           

HYPERKALEMIA                                     

 

                2020           MARIELY DELANEY MD           Ot           

   F17.210         

                          NICOTINE DEPENDENCE, CIGARETTES, UNCOMPL              

      

 

             2020           MARIELY DELANEY MD           Ot           I07

.1           

RHEUMATIC TRICUSPID INSUFFICIENCY                    

 

             2020           MARIELY DELANEY MD           Ot           I12

.9           

HYPERTENSIVE CHRONIC KIDNEY DISEASE W ST                    

 

                2020           MARIELY DELANEY MD, Ot           

   I21.A1          

                          MYOCARDIAL INFARCTION TYPE 2                    

 

                2020           MARIELY DELANEY MD, Ot           

   I25.10          

                          ATHSCL HEART DISEASE OF NATIVE CORONARY               

      

 

                2020           MARIELY DELANEY MD, Ot           

   I45.10          

                          UNSPECIFIED RIGHT BUNDLE-BRANCH BLOCK                 

   

 

             2020           MARIELY DELANEY MD, Ot           I73

.9           

PERIPHERAL VASCULAR DISEASE, UNSPECIFIED                    

 

             2020           MARIELY DELANEY MD, Ot           J44

.9           

CHRONIC OBSTRUCTIVE PULMONARY DISEASE, U                    

 

             2020           MARIELY DELNAEY MD, Ot           J69

.0           

PNEUMONITIS DUE TO INHALATION OF FOOD AN                    

 

                2020           MARIELY DELANEY MD           Ot           

   J96.01          

                          ACUTE RESPIRATORY FAILURE WITH HYPOXIA                

    

 

                2020           MARIELY DELANEY MD, Ot           

   J96.02          

                          ACUTE RESPIRATORY FAILURE WITH HYPERCAPN              

      

 

                2020           MARIELY DELANEY MD           Ot           

   M19.91          

                          PRIMARY OSTEOARTHRITIS, UNSPECIFIED SITE              

      

 

             2020           MARIELY DELANEY MD, Ot           N17

.9           

ACUTE KIDNEY FAILURE, UNSPECIFIED                    

 

             2020           MARIELY DELANEY MD, Ot           N18

.9           

CHRONIC KIDNEY DISEASE, UNSPECIFIED                    

 

                2020           MARIELY EDLANEY MD           Ot           

   R41.82          

                          ALTERED MENTAL STATUS, UNSPECIFIED                    

 

             2020           MARIELY DELANEY MD, Ot           R57

.9           

SHOCK, UNSPECIFIED                               

 

                2020           MARIELY DELANEY MD, Ot           

   Z68.34          

                          BODY MASS INDEX (BMI) 34.0-34.9, ADULT                

    

 

                2020           MARIELY DELANEY MD, Ot           

   Z79.84          

                          LONG TERM (CURRENT) USE OF ORAL HYPOGLYC              

      

 

                2020           MARIELY DELANEY MD           Ot           

   Z85.46          

                          PERSONAL HISTORY OF MALIGNANT NEOPLASM O              

      

 

                2020           MARIELY DELANEY MD, Ot           

   Z86.73          

                          PRSNL HX OF TIA (TIA), AND CEREB INFRC W              

      

 

                2020           MARIELY DELANEY MD, Ot           

   Z89.431         

                          ACQUIRED ABSENCE OF RIGHT FOOT                    

 

             2020           MARIELY DELANEY MD           Ot           Z95

.5           

PRESENCE OF CORONARY ANGIOPLASTY IMPLANT                    

 

             2020           MARIELY DELANEY MD           Ot           A41

.9           

SEPSIS, UNSPECIFIED ORGANISM                     

 

                2020           MARIELY DELANEY MD           Ot           

   E11.65          

                          TYPE 2 DIABETES MELLITUS WITH HYPERGLYCE              

      

 

             2020           MARIELY DELANEY MD           Ot           E66

.9           

OBESITY, UNSPECIFIED                             

 

                2020           MARIELY DELANEY MD           Ot           

   E78.00          

                          PURE HYPERCHOLESTEROLEMIA, UNSPECIFIED                

    

 

                2020           MARIELY DELANEY MD           Ot           

   E83.42          

                          HYPOMAGNESEMIA                     

 

             2020           MARIELY DELANEY MD           Ot           E86

.0           

DEHYDRATION                                      

 

             2020           MARIELY DELANEY MD           Ot           E87

.5           

HYPERKALEMIA                                     

 

                2020           MARIELY DELANEY MD           Ot           

   F17.210         

                          NICOTINE DEPENDENCE, CIGARETTES, UNCOMPL              

      

 

             2020           MARIELY DELANEY MD           Ot           I07

.1           

RHEUMATIC TRICUSPID INSUFFICIENCY                    

 

             2020           MARIELY DELANEY MD           Ot           I12

.9           

HYPERTENSIVE CHRONIC KIDNEY DISEASE W ST                    

 

                2020           MARIELY DELANEY MD, Ot           

   I21.A1          

                          MYOCARDIAL INFARCTION TYPE 2                    

 

                2020           MARIELY DELANEY MD           Ot           

   I25.10          

                          ATHSCL HEART DISEASE OF NATIVE CORONARY               

      

 

                2020           MARIELY DELANEY MD           Ot           

   I45.10          

                          UNSPECIFIED RIGHT BUNDLE-BRANCH BLOCK                 

   

 

                2020           MARIELY DELANEY MD, Ot           

   I50.33          

                          ACUTE ON CHRONIC DIASTOLIC (CONGESTIVE)               

      

 

             2020           MARIELY DELANEY MD, Ot           I73

.9           

PERIPHERAL VASCULAR DISEASE, UNSPECIFIED                    

 

             2020           MARIELY DELANEY MD, Ot           J18

.9           

PNEUMONIA, UNSPECIFIED ORGANISM                    

 

             2020           MARIELY DELANEY MD, Ot           J44

.9           

CHRONIC OBSTRUCTIVE PULMONARY DISEASE, U                    

 

                2020           MARIELY DELANEY MD           Ot           

   J96.01          

                          ACUTE RESPIRATORY FAILURE WITH HYPOXIA                

    

 

                2020           RHETTMARIELY SCHWARTZ MD, Ot           

   J96.02          

                          ACUTE RESPIRATORY FAILURE WITH HYPERCAPN              

      

 

                2020           MARIELY DELANEY MD, Ot           

   M19.91          

                          PRIMARY OSTEOARTHRITIS, UNSPECIFIED SITE              

      

 

             2020           MARIELY DELANEY MD, Ot           N17

.9           

ACUTE KIDNEY FAILURE, UNSPECIFIED                    

 

             2020           MARIELY DELANEY MD, Ot           N18

.9           

CHRONIC KIDNEY DISEASE, UNSPECIFIED                    

 

                2020           MARIELY DELANEY MD, Ot           

   R65.21          

                          SEVERE SEPSIS WITH SEPTIC SHOCK                    

 

                2020           MARIELY DELANEY MD, Ot           

   Z68.34          

                          BODY MASS INDEX (BMI) 34.0-34.9, ADULT                

    

 

                2020           MARIELY DELANEY MD, Ot           

   Z79.84          

                          LONG TERM (CURRENT) USE OF ORAL HYPOGLYC              

      

 

                2020           MARIELY DELANEY MD, Ot           

   Z85.46          

                          PERSONAL HISTORY OF MALIGNANT NEOPLASM O              

      

 

                2020           MARIELY DELANEY MD, Ot           

   Z86.73          

                          PRSNL HX OF TIA (TIA), AND CEREB INFRC W              

      

 

                2020           MARIELY DELANEY MD, Ot           

   Z89.431         

                          ACQUIRED ABSENCE OF RIGHT FOOT                    

 

             2020           MARIELY DELANEY MD, Ot           Z95

.5           

PRESENCE OF CORONARY ANGIOPLASTY IMPLANT                    

 

             2020           MARIELY DELANEY MD, Ot           A41

.9           

SEPSIS, UNSPECIFIED ORGANISM                     

 

                2020           MARIELY DELANEY MD, Ot           

   E11.65          

                          TYPE 2 DIABETES MELLITUS WITH HYPERGLYCE              

      

 

             2020           MARIELY DELANEY MD, Ot           E66

.9           

OBESITY, UNSPECIFIED                             

 

                2020           MARIELY DELANEY MD, Ot           

   E78.00          

                          PURE HYPERCHOLESTEROLEMIA, UNSPECIFIED                

    

 

                2020           MARIELY DELANEY MD           Ot           

   E83.42          

                          HYPOMAGNESEMIA                     

 

             2020           MARIELY DELANEY MD           Ot           E86

.0           

DEHYDRATION                                      

 

             2020           MARIELY DELANEY MD           Ot           E87

.5           

HYPERKALEMIA                                     

 

                2020           MARIELY DELANEY MD           Ot           

   F17.210         

                          NICOTINE DEPENDENCE, CIGARETTES, UNCOMPL              

      

 

             2020           MARIELY DELANEY MD           Ot           I07

.1           

RHEUMATIC TRICUSPID INSUFFICIENCY                    

 

             2020           MARIELY DELANEY MD           Ot           I12

.9           

HYPERTENSIVE CHRONIC KIDNEY DISEASE W ST                    

 

                2020           MARIELY DELANEY MD, Ot           

   I21.A1          

                          MYOCARDIAL INFARCTION TYPE 2                    

 

                2020           MARIELY DELANEY MD, Ot           

   I25.10          

                          ATHSCL HEART DISEASE OF NATIVE CORONARY               

      

 

                2020           MARIELY DELANEY MD, Ot           

   I45.10          

                          UNSPECIFIED RIGHT BUNDLE-BRANCH BLOCK                 

   

 

                2020           MARIELY DELANEY MD, Ot           

   I50.33          

                          ACUTE ON CHRONIC DIASTOLIC (CONGESTIVE)               

      

 

             2020           MARIELY DELANEY MD, Ot           I73

.9           

PERIPHERAL VASCULAR DISEASE, UNSPECIFIED                    

 

             2020           MARIELY DELANEY MD, Ot           J18

.9           

PNEUMONIA, UNSPECIFIED ORGANISM                    

 

             2020           MARIELY DELANEY MD, Ot           J44

.9           

CHRONIC OBSTRUCTIVE PULMONARY DISEASE, U                    

 

                2020           MARIELY DELANEY MD, Ot           

   J96.01          

                          ACUTE RESPIRATORY FAILURE WITH HYPOXIA                

    

 

                2020           MARIELY DELANEY MD, Ot           

   J96.02          

                          ACUTE RESPIRATORY FAILURE WITH HYPERCAPN              

      

 

                2020           MARIELY DELANEY MD, Ot           

   M19.91          

                          PRIMARY OSTEOARTHRITIS, UNSPECIFIED SITE              

      

 

             2020           MARIELY DELANEY MD, Ot           N17

.9           

ACUTE KIDNEY FAILURE, UNSPECIFIED                    

 

             2020           MARIELY DELANEY MD, Ot           N18

.9           

CHRONIC KIDNEY DISEASE, UNSPECIFIED                    

 

                2020           MARIELY DELANEY MD, Ot           

   R65.21          

                          SEVERE SEPSIS WITH SEPTIC SHOCK                    

 

                2020           MARIELY DELANEY MD, Ot           

   Z68.34          

                          BODY MASS INDEX (BMI) 34.0-34.9, ADULT                

    

 

                2020           MARIELY DELANEY MD, Ot           

   Z79.84          

                          LONG TERM (CURRENT) USE OF ORAL HYPOGLYC              

      

 

                2020           MARIELY DELANEY MD, Ot           

   Z85.46          

                          PERSONAL HISTORY OF MALIGNANT NEOPLASM O              

      

 

                2020           MARIELY DELANEY MD, Ot           

   Z86.73          

                          PRSNL HX OF TIA (TIA), AND CEREB INFRC W              

      

 

                2020           MARIELY DELANEY MD, Ot           

   Z89.431         

                          ACQUIRED ABSENCE OF RIGHT FOOT                    

 

             2020           MARIELY DELANEY MD, Ot           Z95

.5           

PRESENCE OF CORONARY ANGIOPLASTY IMPLANT                    

 

             2020           MARIELY DELANEY MD, Ot           A41

.9           

SEPSIS, UNSPECIFIED ORGANISM                     

 

                2020           MARIELY DELANEY MD           Ot           

   E11.65          

                          TYPE 2 DIABETES MELLITUS WITH HYPERGLYCE              

      

 

             2020           MARIELY DELANEY MD           Ot           E66

.9           

OBESITY, UNSPECIFIED                             

 

                2020           MARIELY DELANEY MD           Ot           

   E78.00          

                          PURE HYPERCHOLESTEROLEMIA, UNSPECIFIED                

    

 

                2020           MARIELY DELANEY MD           Ot           

   E83.42          

                          HYPOMAGNESEMIA                     

 

             2020           MARIELY DELANEY MD           Ot           E86

.0           

DEHYDRATION                                      

 

             2020           MARIELY DELANEY MD           Ot           E87

.5           

HYPERKALEMIA                                     

 

                2020           MARIELY DELANEY MD           Ot           

   F17.210         

                          NICOTINE DEPENDENCE, CIGARETTES, UNCOMPL              

      

 

             2020           MARIELY DELANEY MD           Ot           I07

.1           

RHEUMATIC TRICUSPID INSUFFICIENCY                    

 

             2020           MARIELY DELANEY MD           Ot           I12

.9           

HYPERTENSIVE CHRONIC KIDNEY DISEASE W ST                    

 

                2020           MARIELY DELANEY MD           Ot           

   I21.A1          

                          MYOCARDIAL INFARCTION TYPE 2                    

 

                2020           MARIELY DELANEY MD           Ot           

   I25.10          

                          ATHSCL HEART DISEASE OF NATIVE CORONARY               

      

 

                2020           MARIELY DELANEY MD           Ot           

   I45.10          

                          UNSPECIFIED RIGHT BUNDLE-BRANCH BLOCK                 

   

 

                2020           MARIELY DELANEY MD           Ot           

   I50.33          

                          ACUTE ON CHRONIC DIASTOLIC (CONGESTIVE)               

      

 

             2020           MARIELY DELANEY MD           Ot           I73

.9           

PERIPHERAL VASCULAR DISEASE, UNSPECIFIED                    

 

             2020           MARIELY DELANEY MD           Ot           J18

.9           

PNEUMONIA, UNSPECIFIED ORGANISM                    

 

             2020           MARIELY DELANEY MD           Ot           J44

.9           

CHRONIC OBSTRUCTIVE PULMONARY DISEASE, U                    

 

                2020           MARIELY DELANEY MD           Ot           

   J96.01          

                          ACUTE RESPIRATORY FAILURE WITH HYPOXIA                

    

 

                2020           MARIELY DELANEY MD           Ot           

   J96.02          

                          ACUTE RESPIRATORY FAILURE WITH HYPERCAPN              

      

 

                2020           MARIELY DELANEY MD           Ot           

   M19.91          

                          PRIMARY OSTEOARTHRITIS, UNSPECIFIED SITE              

      

 

             2020           MARIELY DELANEY MD           Ot           N17

.9           

ACUTE KIDNEY FAILURE, UNSPECIFIED                    

 

             2020           MARIELY DELANEY MD           Ot           N18

.9           

CHRONIC KIDNEY DISEASE, UNSPECIFIED                    

 

                2020           MARIELY DELANEY MD           Ot           

   R65.21          

                          SEVERE SEPSIS WITH SEPTIC SHOCK                    

 

                2020           MARIELY DELANEY MD, Ot           

   Z68.34          

                          BODY MASS INDEX (BMI) 34.0-34.9, ADULT                

    

 

                2020           MARIELY DELANEY MD, Ot           

   Z79.84          

                          LONG TERM (CURRENT) USE OF ORAL HYPOGLYC              

      

 

                2020           MARIELY DELANEY MD, Ot           

   Z85.46          

                          PERSONAL HISTORY OF MALIGNANT NEOPLASM O              

      

 

                2020           MARIELY DELANEY MD, Ot           

   Z86.73          

                          PRSNL HX OF TIA (TIA), AND CEREB INFRC W              

      

 

                2020           MARIELY DELANEY MD, Ot           

   Z89.431         

                          ACQUIRED ABSENCE OF RIGHT FOOT                    

 

             2020           MARIELY DELANEY MD, Ot           Z95

.5           

PRESENCE OF CORONARY ANGIOPLASTY IMPLANT                    

 

             2020           MARIELY DELANEY MD           Ot           A41

.9           

SEPSIS, UNSPECIFIED ORGANISM                     

 

                2020           MARIELY DELANEY MD           Ot           

   E11.65          

                          TYPE 2 DIABETES MELLITUS WITH HYPERGLYCE              

      

 

             2020           MARIELY DELANEY MD           Ot           E66

.9           

OBESITY, UNSPECIFIED                             

 

                2020           MARIELY DELANEY MD           Ot           

   E78.00          

                          PURE HYPERCHOLESTEROLEMIA, UNSPECIFIED                

    

 

                2020           MARIELY DELANEY MD           Ot           

   E83.42          

                          HYPOMAGNESEMIA                     

 

             2020           MARIELY DELANEY MD           Ot           E86

.0           

DEHYDRATION                                      

 

             2020           MARIELY DLEANEY MD           Ot           E87

.5           

HYPERKALEMIA                                     

 

                2020           MARIELY DELANEY MD           Ot           

   F17.210         

                          NICOTINE DEPENDENCE, CIGARETTES, UNCOMPL              

      

 

             2020           MARIELY DELANEY MD           Ot           I07

.1           

RHEUMATIC TRICUSPID INSUFFICIENCY                    

 

             2020           MARIELY DELANEY MD           Ot           I12

.9           

HYPERTENSIVE CHRONIC KIDNEY DISEASE W ST                    

 

                2020           MARIELY DELANEY MD, Ot           

   I21.A1          

                          MYOCARDIAL INFARCTION TYPE 2                    

 

                2020           MARIELY DELANEY MD, Ot           

   I25.10          

                          ATHSCL HEART DISEASE OF NATIVE CORONARY               

      

 

                2020           MARIELY EDLANEY MD, Ot           

   I45.10          

                          UNSPECIFIED RIGHT BUNDLE-BRANCH BLOCK                 

   

 

                2020           MARIELY DELANEY MD, Ot           

   I50.33          

                          ACUTE ON CHRONIC DIASTOLIC (CONGESTIVE)               

      

 

             2020           MARIELY DELANEY MD, Ot           I73

.9           

PERIPHERAL VASCULAR DISEASE, UNSPECIFIED                    

 

             2020           MARIELY DELANEY MD, Ot           J18

.9           

PNEUMONIA, UNSPECIFIED ORGANISM                    

 

             2020           MARIELY DELANEY MD, Ot           J44

.9           

CHRONIC OBSTRUCTIVE PULMONARY DISEASE, U                    

 

                2020           MARIELY DELANEY MD, Ot           

   J96.01          

                          ACUTE RESPIRATORY FAILURE WITH HYPOXIA                

    

 

                2020           MARIELY DELANEY MD, Ot           

   J96.02          

                          ACUTE RESPIRATORY FAILURE WITH HYPERCAPN              

      

 

                2020           MARIELY DELANEY MD           Ot           

   M19.91          

                          PRIMARY OSTEOARTHRITIS, UNSPECIFIED SITE              

      

 

             2020           MARIELY DELANEY MD, Ot           N17

.9           

ACUTE KIDNEY FAILURE, UNSPECIFIED                    

 

             2020           MARIELY DELANEY MD, Ot           N18

.9           

CHRONIC KIDNEY DISEASE, UNSPECIFIED                    

 

                2020           MARIELY DELANEY MD           Ot           

   R65.21          

                          SEVERE SEPSIS WITH SEPTIC SHOCK                    

 

                2020           MARIELY DELANEY MD, Ot           

   Z68.34          

                          BODY MASS INDEX (BMI) 34.0-34.9, ADULT                

    

 

                2020           MARIELY DELANEY MD, Ot           

   Z79.84          

                          LONG TERM (CURRENT) USE OF ORAL HYPOGLYC              

      

 

                2020           MARIELY DELANEY MD, Ot           

   Z85.46          

                          PERSONAL HISTORY OF MALIGNANT NEOPLASM O              

      

 

                2020           MARIELY DELANEY MD, Ot           

   Z86.73          

                          PRSNL HX OF TIA (TIA), AND CEREB INFRC W              

      

 

                2020           MARIELY DELANEY MD, Ot           

   Z89.431         

                          ACQUIRED ABSENCE OF RIGHT FOOT                    

 

             2020           MARIELY DELANEY MD           Ot           Z95

.5           

PRESENCE OF CORONARY ANGIOPLASTY IMPLANT                    

 

             2020           MARIELY DELANEY MD, Ot           A41

.9           

SEPSIS, UNSPECIFIED ORGANISM                     

 

                2020           MARIELY DELANEY MD           Ot           

   E11.65          

                          TYPE 2 DIABETES MELLITUS WITH HYPERGLYCE              

      

 

             2020           MARIELY DELANEY MD           Ot           E66

.9           

OBESITY, UNSPECIFIED                             

 

                2020           MARIELY DELANEY MD           Ot           

   E78.00          

                          PURE HYPERCHOLESTEROLEMIA, UNSPECIFIED                

    

 

                2020           MARIELY DELANEY MD           Ot           

   E83.42          

                          HYPOMAGNESEMIA                     

 

             2020           MARIELY DELANEY MD           Ot           E86

.0           

DEHYDRATION                                      

 

             2020           MARIELY DELANEY MD, Ot           E87

.5           

HYPERKALEMIA                                     

 

                2020           MARIELY DELANEY MD           Ot           

   F17.210         

                          NICOTINE DEPENDENCE, CIGARETTES, UNCOMPL              

      

 

             2020           MARIELY DELANEY MD           Ot           I07

.1           

RHEUMATIC TRICUSPID INSUFFICIENCY                    

 

             2020           MARIELY DELANEY MD           Ot           I12

.9           

HYPERTENSIVE CHRONIC KIDNEY DISEASE W ST                    

 

                2020           MARIELY DELANEY MD, Ot           

   I21.A1          

                          MYOCARDIAL INFARCTION TYPE 2                    

 

                2020           MARIELY DELANEY MD, Ot           

   I25.10          

                          ATHSCL HEART DISEASE OF NATIVE CORONARY               

      

 

                2020           MARIELY DELANEY MD, Ot           

   I45.10          

                          UNSPECIFIED RIGHT BUNDLE-BRANCH BLOCK                 

   

 

                2020           MARIELY DELANEY MD, Ot           

   I50.33          

                          ACUTE ON CHRONIC DIASTOLIC (CONGESTIVE)               

      

 

             2020           MARIELY DELANEY MD           Ot           I73

.9           

PERIPHERAL VASCULAR DISEASE, UNSPECIFIED                    

 

             2020           MARIELY DELANEY MD, Ot           J18

.9           

PNEUMONIA, UNSPECIFIED ORGANISM                    

 

             2020           MARIELY DELANEY MD, Ot           J44

.9           

CHRONIC OBSTRUCTIVE PULMONARY DISEASE, U                    

 

                2020           MARIELY DELANEY MD, Ot           

   J96.01          

                          ACUTE RESPIRATORY FAILURE WITH HYPOXIA                

    

 

                2020           MARIELY DELANEY MD, Ot           

   J96.02          

                          ACUTE RESPIRATORY FAILURE WITH HYPERCAPN              

      

 

                2020           MARIELY DELANEY MD           Ot           

   M19.91          

                          PRIMARY OSTEOARTHRITIS, UNSPECIFIED SITE              

      

 

             2020           MARIELY DELANEY MD           Ot           N17

.9           

ACUTE KIDNEY FAILURE, UNSPECIFIED                    

 

             2020           MARIELY DELANEY MD           Ot           N18

.9           

CHRONIC KIDNEY DISEASE, UNSPECIFIED                    

 

                2020           MARIELY DELANEY MD           Ot           

   R65.21          

                          SEVERE SEPSIS WITH SEPTIC SHOCK                    

 

                2020           MARIELY DELANEY MD, Ot           

   Z68.34          

                          BODY MASS INDEX (BMI) 34.0-34.9, ADULT                

    

 

                2020           MARIELY DELANEY MD, Ot           

   Z79.84          

                          LONG TERM (CURRENT) USE OF ORAL HYPOGLYC              

      

 

                2020           MARIELY DELANEY MD, Ot           

   Z85.46          

                          PERSONAL HISTORY OF MALIGNANT NEOPLASM O              

      

 

                2020           MARIELY DELANEY MD, Ot           

   Z86.73          

                          PRSNL HX OF TIA (TIA), AND CEREB INFRC W              

      

 

                2020           MARIELY DELANEY MD, Ot           

   Z89.431         

                          ACQUIRED ABSENCE OF RIGHT FOOT                    

 

             2020           MARIELY DELANEY MD           Ot           Z95

.5           

PRESENCE OF CORONARY ANGIOPLASTY IMPLANT                    

 

             2020           MARIELY DELANEY MD           Ot           A41

.9           

SEPSIS, UNSPECIFIED ORGANISM                     

 

                2020           MARIELY DELANEY MD           Ot           

   E11.65          

                          TYPE 2 DIABETES MELLITUS WITH HYPERGLYCE              

      

 

             2020           MARIELY DELANEY MD           Ot           E66

.9           

OBESITY, UNSPECIFIED                             

 

                2020           MARIELY DELANEY MD           Ot           

   E78.00          

                          PURE HYPERCHOLESTEROLEMIA, UNSPECIFIED                

    

 

                2020           MARIELY DELANEY MD           Ot           

   E83.42          

                          HYPOMAGNESEMIA                     

 

             2020           MARIELY DELANEY MD           Ot           E86

.0           

DEHYDRATION                                      

 

             2020           MARIELY DELANEY MD           Ot           E87

.5           

HYPERKALEMIA                                     

 

                2020           MARIELY DELANEY MD           Ot           

   F17.210         

                          NICOTINE DEPENDENCE, CIGARETTES, UNCOMPL              

      

 

             2020           MARIELY DELANEY MD           Ot           I07

.1           

RHEUMATIC TRICUSPID INSUFFICIENCY                    

 

             2020           MARIELY DELANEY MD           Ot           I12

.9           

HYPERTENSIVE CHRONIC KIDNEY DISEASE W ST                    

 

                2020           MARIELY DELANEY MD           Ot           

   I21.A1          

                          MYOCARDIAL INFARCTION TYPE 2                    

 

                2020           MARIELY DELANEY MD           Ot           

   I25.10          

                          ATHSCL HEART DISEASE OF NATIVE CORONARY               

      

 

                2020           MARIELY DELANEY MD           Ot           

   I45.10          

                          UNSPECIFIED RIGHT BUNDLE-BRANCH BLOCK                 

   

 

                2020           MARIELY DELANEY MD           Ot           

   I50.33          

                          ACUTE ON CHRONIC DIASTOLIC (CONGESTIVE)               

      

 

             2020           MARIELY DELANEY MD           Ot           I73

.9           

PERIPHERAL VASCULAR DISEASE, UNSPECIFIED                    

 

             2020           MARIELY DELANEY MD           Ot           J18

.9           

PNEUMONIA, UNSPECIFIED ORGANISM                    

 

             2020           MARIELY DELANEY MD, Ot           J44

.9           

CHRONIC OBSTRUCTIVE PULMONARY DISEASE, U                    

 

                2020           MARIELY DELANEY MD           Ot           

   J96.01          

                          ACUTE RESPIRATORY FAILURE WITH HYPOXIA                

    

 

                2020           MARIELY DELANEY MD           Ot           

   J96.02          

                          ACUTE RESPIRATORY FAILURE WITH HYPERCAPN              

      

 

                2020           MARIELY DEALNEY MD, Ot           

   M19.91          

                          PRIMARY OSTEOARTHRITIS, UNSPECIFIED SITE              

      

 

             2020           MARIELY DELANEY MD, Ot           N17

.9           

ACUTE KIDNEY FAILURE, UNSPECIFIED                    

 

             2020           MARIELY DELANEY MD, Ot           N18

.9           

CHRONIC KIDNEY DISEASE, UNSPECIFIED                    

 

                2020           MARIELY DELANEY MD, Ot           

   R65.21          

                          SEVERE SEPSIS WITH SEPTIC SHOCK                    

 

                2020           MARIELY DELANEY MD, Ot           

   Z68.34          

                          BODY MASS INDEX (BMI) 34.0-34.9, ADULT                

    

 

                2020           MARIELY DELANEY MD, Ot           

   Z79.84          

                          LONG TERM (CURRENT) USE OF ORAL HYPOGLYC              

      

 

                2020           MARIELY DELANEY MD, Ot           

   Z85.46          

                          PERSONAL HISTORY OF MALIGNANT NEOPLASM O              

      

 

                2020           MARIELY DELANEY MD, Ot           

   Z86.73          

                          PRSNL HX OF TIA (TIA), AND CEREB INFRC W              

      

 

                2020           MARIELY DELANEY MD, Ot           

   Z89.431         

                          ACQUIRED ABSENCE OF RIGHT FOOT                    

 

             2020           MARIELY DELANEY MD, Ot           Z95

.5           

PRESENCE OF CORONARY ANGIOPLASTY IMPLANT                    

 

             2020           MARIELY DEALNEY MD, Ot           A41

.9           

SEPSIS, UNSPECIFIED ORGANISM                     

 

                2020           MARIELY DELANEY MD, Ot           

   E11.65          

                          TYPE 2 DIABETES MELLITUS WITH HYPERGLYCE              

      

 

             2020           MARIELY DELANEY MD, Ot           E66

.9           

OBESITY, UNSPECIFIED                             

 

                2020           MARIELY DELANEY MD           Ot           

   E78.00          

                          PURE HYPERCHOLESTEROLEMIA, UNSPECIFIED                

    

 

                2020           MARIELY DELANEY MD           Ot           

   E83.42          

                          HYPOMAGNESEMIA                     

 

             2020           MARIELY DELANEY MD           Ot           E86

.0           

DEHYDRATION                                      

 

             2020           MARIELY DELANEY MD           Ot           E87

.5           

HYPERKALEMIA                                     

 

                2020           MARIELY DELANEY MD           Ot           

   F17.210         

                          NICOTINE DEPENDENCE, CIGARETTES, UNCOMPL              

      

 

             2020           MARIELY DELANEY MD           Ot           I07

.1           

RHEUMATIC TRICUSPID INSUFFICIENCY                    

 

             2020           MARIELY DELANEY MD, Ot           I12

.9           

HYPERTENSIVE CHRONIC KIDNEY DISEASE W ST                    

 

                2020           MARIELY DELANEY MD, Ot           

   I21.A1          

                          MYOCARDIAL INFARCTION TYPE 2                    

 

                2020           MARIELY DELANEY MD, Ot           

   I25.10          

                          ATHSCL HEART DISEASE OF NATIVE CORONARY               

      

 

                2020           MARIELY DELANEY MD, Ot           

   I45.10          

                          UNSPECIFIED RIGHT BUNDLE-BRANCH BLOCK                 

   

 

                2020           MARIELY DELANEY MD, Ot           

   I50.33          

                          ACUTE ON CHRONIC DIASTOLIC (CONGESTIVE)               

      

 

             2020           MARIELY DELANEY MD, Ot           I73

.9           

PERIPHERAL VASCULAR DISEASE, UNSPECIFIED                    

 

             2020           MARIELY DELANEY MD, Ot           J18

.9           

PNEUMONIA, UNSPECIFIED ORGANISM                    

 

             2020           MARIELY DELANEY MD, Ot           J44

.9           

CHRONIC OBSTRUCTIVE PULMONARY DISEASE, U                    

 

                2020           MARIELY DELANEY MD, Ot           

   J96.01          

                          ACUTE RESPIRATORY FAILURE WITH HYPOXIA                

    

 

                2020           MARIELY DELANEY MD, Ot           

   J96.02          

                          ACUTE RESPIRATORY FAILURE WITH HYPERCAPN              

      

 

                2020           MARIELY DELANEY MD, Ot           

   M19.91          

                          PRIMARY OSTEOARTHRITIS, UNSPECIFIED SITE              

      

 

             2020           MARIELY DELANEY MD, Ot           N17

.9           

ACUTE KIDNEY FAILURE, UNSPECIFIED                    

 

             2020           MARIELY DELANEY MD, Ot           N18

.9           

CHRONIC KIDNEY DISEASE, UNSPECIFIED                    

 

                2020           MARIELY DELANEY MD, Ot           

   R65.21          

                          SEVERE SEPSIS WITH SEPTIC SHOCK                    

 

                2020           MARIELY DELANEY MD, Ot           

   Z68.34          

                          BODY MASS INDEX (BMI) 34.0-34.9, ADULT                

    

 

                2020           MARIELY DELANEY MD, Ot           

   Z79.84          

                          LONG TERM (CURRENT) USE OF ORAL HYPOGLYC              

      

 

                2020           MARIELY DELANEY MD, Ot           

   Z85.46          

                          PERSONAL HISTORY OF MALIGNANT NEOPLASM O              

      

 

                2020           MARIELY DELANEY MD, Ot           

   Z86.73          

                          PRSNL HX OF TIA (TIA), AND CEREB INFRC W              

      

 

                2020           MARIELY DELANEY MD, Ot           

   Z89.431         

                          ACQUIRED ABSENCE OF RIGHT FOOT                    

 

             2020           MARIELY DELANEY MD, Ot           Z95

.5           

PRESENCE OF CORONARY ANGIOPLASTY IMPLANT                    

 

             2020           MARIELY DELANEY MD, Ot           A41

.9           

SEPSIS, UNSPECIFIED ORGANISM                     

 

                2020           RHETT MD, MARIELY N           Ot           

   E11.65          

                          TYPE 2 DIABETES MELLITUS WITH HYPERGLYCE              

      

 

             2020           MARIELY DELANEY MD           Ot           E66

.9           

OBESITY, UNSPECIFIED                             

 

                2020           MARIELY DELANEY MD           Ot           

   E78.00          

                          PURE HYPERCHOLESTEROLEMIA, UNSPECIFIED                

    

 

                2020           MARIELY DELANEY MD           Ot           

   E83.42          

                          HYPOMAGNESEMIA                     

 

             2020           MARIELY DELANEY MD           Ot           E86

.0           

DEHYDRATION                                      

 

             2020           MARIELY DELANEY MD           Ot           E87

.5           

HYPERKALEMIA                                     

 

                2020           MARIELY DELANEY MD           Ot           

   F17.210         

                          NICOTINE DEPENDENCE, CIGARETTES, UNCOMPL              

      

 

             2020           MARIELY DELANEY MD           Ot           I07

.1           

RHEUMATIC TRICUSPID INSUFFICIENCY                    

 

             2020           MARIELY DELANEY MD           Ot           I12

.9           

HYPERTENSIVE CHRONIC KIDNEY DISEASE W ST                    

 

                2020           MARIELY DELANEY MD           Ot           

   I21.A1          

                          MYOCARDIAL INFARCTION TYPE 2                    

 

                2020           MARIELY DELANEY MD           Ot           

   I25.10          

                          ATHSCL HEART DISEASE OF NATIVE CORONARY               

      

 

                2020           MARIELY DELANEY MD           Ot           

   I45.10          

                          UNSPECIFIED RIGHT BUNDLE-BRANCH BLOCK                 

   

 

                2020           MARIELY DELANEY MD           Ot           

   I50.33          

                          ACUTE ON CHRONIC DIASTOLIC (CONGESTIVE)               

      

 

             2020           MARIELY DELANEY MD           Ot           I73

.9           

PERIPHERAL VASCULAR DISEASE, UNSPECIFIED                    

 

             2020           MARIELY DELANEY MD           Ot           J18

.9           

PNEUMONIA, UNSPECIFIED ORGANISM                    

 

             2020           MARIELY DELANEY MD           Ot           J44

.9           

CHRONIC OBSTRUCTIVE PULMONARY DISEASE, U                    

 

                2020           MARIELY DELANEY MD           Ot           

   J96.01          

                          ACUTE RESPIRATORY FAILURE WITH HYPOXIA                

    

 

                2020           MARIELY DELANEY MD           Ot           

   J96.02          

                          ACUTE RESPIRATORY FAILURE WITH HYPERCAPN              

      

 

                2020           MARIELY DELANEY MD           Ot           

   M19.91          

                          PRIMARY OSTEOARTHRITIS, UNSPECIFIED SITE              

      

 

             2020           MARIELY DELANEY MD           Ot           N17

.9           

ACUTE KIDNEY FAILURE, UNSPECIFIED                    

 

             2020           MARIELY DELANEY MD           Ot           N18

.9           

CHRONIC KIDNEY DISEASE, UNSPECIFIED                    

 

                2020           MARIELY DELANEY MD           Ot           

   R65.21          

                          SEVERE SEPSIS WITH SEPTIC SHOCK                    

 

                2020           MARIELY DELANEY MD           Ot           

   Z68.34          

                          BODY MASS INDEX (BMI) 34.0-34.9, ADULT                

    

 

                2020           MARIELY DELANEY MD           Ot           

   Z79.84          

                          LONG TERM (CURRENT) USE OF ORAL HYPOGLYC              

      

 

                2020           MARIELY DELANEY MD, Ot           

   Z85.46          

                          PERSONAL HISTORY OF MALIGNANT NEOPLASM O              

      

 

                2020           MARIELY DELANEY MD, Ot           

   Z86.73          

                          PRSNL HX OF TIA (TIA), AND CEREB INFRC W              

      

 

                2020           MARIELY DELANEY MD, Ot           

   Z89.431         

                          ACQUIRED ABSENCE OF RIGHT FOOT                    

 

             2020           MARIELY DELANEY MD, Ot           Z95

.5           

PRESENCE OF CORONARY ANGIOPLASTY IMPLANT                    

 

             2020           MARIELY DELANEY MD, Ot           A41

.9           

SEPSIS, UNSPECIFIED ORGANISM                     

 

                2020           MARIELY DELANEY MD           Ot           

   E11.65          

                          TYPE 2 DIABETES MELLITUS WITH HYPERGLYCE              

      

 

             2020           MARIELY DELANEY MD           Ot           E66

.9           

OBESITY, UNSPECIFIED                             

 

                2020           MARILEY DELANEY MD           Ot           

   E78.00          

                          PURE HYPERCHOLESTEROLEMIA, UNSPECIFIED                

    

 

                2020           MARIELY DELANEY MD           Ot           

   E83.42          

                          HYPOMAGNESEMIA                     

 

             2020           MARIELY DELANEY MD           Ot           E86

.0           

DEHYDRATION                                      

 

             2020           MARIELY DELANEY MD           Ot           E87

.5           

HYPERKALEMIA                                     

 

                2020           MARIELY DELANEY MD           Ot           

   F17.210         

                          NICOTINE DEPENDENCE, CIGARETTES, UNCOMPL              

      

 

             2020           MARIELY DELANEY MD           Ot           I07

.1           

RHEUMATIC TRICUSPID INSUFFICIENCY                    

 

             2020           MARIELY DELANEY MD           Ot           I12

.9           

HYPERTENSIVE CHRONIC KIDNEY DISEASE W ST                    

 

                2020           MARIELY DELANEY MD           Ot           

   I21.A1          

                          MYOCARDIAL INFARCTION TYPE 2                    

 

                2020           MARIELY DELANEY MD           Ot           

   I25.10          

                          ATHSCL HEART DISEASE OF NATIVE CORONARY               

      

 

                2020           MARIELY DELANEY MD           Ot           

   I45.10          

                          UNSPECIFIED RIGHT BUNDLE-BRANCH BLOCK                 

   

 

                2020           MARIELY DELANEY MD           Ot           

   I50.33          

                          ACUTE ON CHRONIC DIASTOLIC (CONGESTIVE)               

      

 

             2020           MARIELY DELANEY MD           Ot           I73

.9           

PERIPHERAL VASCULAR DISEASE, UNSPECIFIED                    

 

             2020           MARIELY DELANEY MD, Ot           J18

.9           

PNEUMONIA, UNSPECIFIED ORGANISM                    

 

             2020           MARIELY DELANEY MD, Ot           J44

.9           

CHRONIC OBSTRUCTIVE PULMONARY DISEASE, U                    

 

                2020           MARIELY DELANEY MD, Ot           

   J96.01          

                          ACUTE RESPIRATORY FAILURE WITH HYPOXIA                

    

 

                2020           MARIELY DELANEY MD           Ot           

   J96.02          

                          ACUTE RESPIRATORY FAILURE WITH HYPERCAPN              

      

 

                2020           MARIELY DELANEY MD           Ot           

   M19.91          

                          PRIMARY OSTEOARTHRITIS, UNSPECIFIED SITE              

      

 

             2020           MARIELY DELANEY MD, Ot           N17

.9           

ACUTE KIDNEY FAILURE, UNSPECIFIED                    

 

             2020           MARIELY DELANEY MD, Ot           N18

.9           

CHRONIC KIDNEY DISEASE, UNSPECIFIED                    

 

                2020           MARIELY DELANEY MD           Ot           

   R65.21          

                          SEVERE SEPSIS WITH SEPTIC SHOCK                    

 

                2020           MARIELY DELANEY MD, Ot           

   Z68.34          

                          BODY MASS INDEX (BMI) 34.0-34.9, ADULT                

    

 

                2020           MARIELY DELANEY MD, Ot           

   Z79.84          

                          LONG TERM (CURRENT) USE OF ORAL HYPOGLYC              

      

 

                2020           MARIELY DELANEY MD, Ot           

   Z85.46          

                          PERSONAL HISTORY OF MALIGNANT NEOPLASM O              

      

 

                2020           MARIELY DELANEY MD, Ot           

   Z86.73          

                          PRSNL HX OF TIA (TIA), AND CEREB INFRC W              

      

 

                2020           MARIELY DELANEY MD, Ot           

   Z89.431         

                          ACQUIRED ABSENCE OF RIGHT FOOT                    

 

             2020           MARIELY DELANEY MD           Ot           Z95

.5           

PRESENCE OF CORONARY ANGIOPLASTY IMPLANT                    

 

             2020           MARIELY DELANEY MD           Ot           A41

.9           

SEPSIS, UNSPECIFIED ORGANISM                     

 

                2020           MARIELY DELANEY MD           Ot           

   E11.65          

                          TYPE 2 DIABETES MELLITUS WITH HYPERGLYCE              

      

 

             2020           MARIELY DELANEY MD           Ot           E66

.9           

OBESITY, UNSPECIFIED                             

 

                2020           MARIELY DELANEY MD           Ot           

   E78.00          

                          PURE HYPERCHOLESTEROLEMIA, UNSPECIFIED                

    

 

                2020           MARIELY DELANEY MD           Ot           

   E83.42          

                          HYPOMAGNESEMIA                     

 

             2020           MARIELY DELANEY MD           Ot           E86

.0           

DEHYDRATION                                      

 

             2020           MARIELY DELANEY MD           Ot           E87

.5           

HYPERKALEMIA                                     

 

                2020           MARIELY DELANEY MD           Ot           

   F17.210         

                          NICOTINE DEPENDENCE, CIGARETTES, UNCOMPL              

      

 

             2020           MARIELY DELANEY MD, Ot           I07

.1           

RHEUMATIC TRICUSPID INSUFFICIENCY                    

 

             2020           MARIELY DELANEY MD, Ot           I12

.9           

HYPERTENSIVE CHRONIC KIDNEY DISEASE W ST                    

 

                2020           MARIELY DELANEY MD, Ot           

   I21.A1          

                          MYOCARDIAL INFARCTION TYPE 2                    

 

                2020           MARIELY DELANEY MD, Ot           

   I25.10          

                          ATHSCL HEART DISEASE OF NATIVE CORONARY               

      

 

                2020           MARIELY DELANEY MD, Ot           

   I45.10          

                          UNSPECIFIED RIGHT BUNDLE-BRANCH BLOCK                 

   

 

                2020           MARIELY DELANEY MD, Ot           

   I50.33          

                          ACUTE ON CHRONIC DIASTOLIC (CONGESTIVE)               

      

 

             2020           MARIELY DELANEY MD, Ot           I73

.9           

PERIPHERAL VASCULAR DISEASE, UNSPECIFIED                    

 

             2020           MARIELY DELANEY MD, Ot           J18

.9           

PNEUMONIA, UNSPECIFIED ORGANISM                    

 

             2020           MARIELY DELANEY MD, Ot           J44

.9           

CHRONIC OBSTRUCTIVE PULMONARY DISEASE, U                    

 

                2020           MARIELY DELANEY MD, Ot           

   J96.01          

                          ACUTE RESPIRATORY FAILURE WITH HYPOXIA                

    

 

                2020           MARIELY DELANEY MD, Ot           

   J96.02          

                          ACUTE RESPIRATORY FAILURE WITH HYPERCAPN              

      

 

                2020           MARIELY DELANEY MD, Ot           

   M19.91          

                          PRIMARY OSTEOARTHRITIS, UNSPECIFIED SITE              

      

 

             2020           MARIELY DELANEY MD, Ot           N17

.9           

ACUTE KIDNEY FAILURE, UNSPECIFIED                    

 

             2020           MARIELY DELANEY MD, Ot           N18

.9           

CHRONIC KIDNEY DISEASE, UNSPECIFIED                    

 

                2020           MARIELY DELANEY MD, Ot           

   R65.21          

                          SEVERE SEPSIS WITH SEPTIC SHOCK                    

 

                2020           MARIELY DELANEY MD, Ot           

   Z68.34          

                          BODY MASS INDEX (BMI) 34.0-34.9, ADULT                

    

 

                2020           MARIELY DELANEY MD, Ot           

   Z79.84          

                          LONG TERM (CURRENT) USE OF ORAL HYPOGLYC              

      

 

                2020           MARIELY DELANEY MD, Ot           

   Z85.46          

                          PERSONAL HISTORY OF MALIGNANT NEOPLASM O              

      

 

                2020           RHETT MD, MARIELY N           Ot           

   Z86.73          

                          PRSNL HX OF TIA (TIA), AND CEREB INFRC W              

      

 

                2020           MARIELY DELANEY MD, Ot           

   Z89.431         

                          ACQUIRED ABSENCE OF RIGHT FOOT                    

 

             2020           AMRIELY DELANEY MD           Ot           Z95

.5           

PRESENCE OF CORONARY ANGIOPLASTY IMPLANT                    

 

             2020           MARIELY DELANEY MD           Ot           A41

.9           

SEPSIS, UNSPECIFIED ORGANISM                     

 

                2020           MARIELY DELANEY MD           Ot           

   E11.65          

                          TYPE 2 DIABETES MELLITUS WITH HYPERGLYCE              

      

 

             2020           MARIELY DELANEY MD           Ot           E66

.9           

OBESITY, UNSPECIFIED                             

 

                2020           MARIELY DELANEY MD           Ot           

   E78.00          

                          PURE HYPERCHOLESTEROLEMIA, UNSPECIFIED                

    

 

                2020           MARIELY DELANEY MD           Ot           

   E83.42          

                          HYPOMAGNESEMIA                     

 

             2020           MARIELY DELANEY MD           Ot           E86

.0           

DEHYDRATION                                      

 

             2020           MARIELY DELANEY MD           Ot           E87

.5           

HYPERKALEMIA                                     

 

                2020           MARIELY DELANEY MD           Ot           

   F17.210         

                          NICOTINE DEPENDENCE, CIGARETTES, UNCOMPL              

      

 

             2020           MARIELY DELANEY MD           Ot           I07

.1           

RHEUMATIC TRICUSPID INSUFFICIENCY                    

 

             2020           MARIELY DELANEY MD           Ot           I12

.9           

HYPERTENSIVE CHRONIC KIDNEY DISEASE W ST                    

 

                2020           MARIELY DELANEY MD           Ot           

   I21.A1          

                          MYOCARDIAL INFARCTION TYPE 2                    

 

                2020           MARIELY DELANEY MD           Ot           

   I25.10          

                          ATHSCL HEART DISEASE OF NATIVE CORONARY               

      

 

                2020           MARIELY DELANEY MD           Ot           

   I45.10          

                          UNSPECIFIED RIGHT BUNDLE-BRANCH BLOCK                 

   

 

                2020           MARIELY DELANEY MD           Ot           

   I50.33          

                          ACUTE ON CHRONIC DIASTOLIC (CONGESTIVE)               

      

 

             2020           MARIELY DELANEY MD           Ot           I73

.9           

PERIPHERAL VASCULAR DISEASE, UNSPECIFIED                    

 

             2020           MARIELY DELANEY MD           Ot           J18

.9           

PNEUMONIA, UNSPECIFIED ORGANISM                    

 

             2020           MARIELY DELANEY MD           Ot           J44

.9           

CHRONIC OBSTRUCTIVE PULMONARY DISEASE, U                    

 

                2020           MARIELY DELANEY MD           Ot           

   J96.01          

                          ACUTE RESPIRATORY FAILURE WITH HYPOXIA                

    

 

                2020           MARIELY DELANEY MD           Ot           

   J96.02          

                          ACUTE RESPIRATORY FAILURE WITH HYPERCAPN              

      

 

                2020           MARIELY DELANEY MD, Ot           

   M19.91          

                          PRIMARY OSTEOARTHRITIS, UNSPECIFIED SITE              

      

 

             2020           MARIELY DELANEY MD, Ot           N17

.9           

ACUTE KIDNEY FAILURE, UNSPECIFIED                    

 

             2020           MARIELY DELANEY MD, Ot           N18

.9           

CHRONIC KIDNEY DISEASE, UNSPECIFIED                    

 

                2020           MARIELY DELANEY MD, Ot           

   R65.21          

                          SEVERE SEPSIS WITH SEPTIC SHOCK                    

 

                2020           MARIELY DELANEY MD, Ot           

   Z68.34          

                          BODY MASS INDEX (BMI) 34.0-34.9, ADULT                

    

 

                2020           MARIELY DELANEY MD, Ot           

   Z79.84          

                          LONG TERM (CURRENT) USE OF ORAL HYPOGLYC              

      

 

                2020           MARIELY DELANEY MD, Ot           

   Z85.46          

                          PERSONAL HISTORY OF MALIGNANT NEOPLASM O              

      

 

                2020           MARIELY DELANEY MD, Ot           

   Z86.73          

                          PRSNL HX OF TIA (TIA), AND CEREB INFRC W              

      

 

                2020           MARIELY DELANEY MD, Ot           

   Z89.431         

                          ACQUIRED ABSENCE OF RIGHT FOOT                    

 

             2020           MARIELY DELANEY MD           Ot           Z95

.5           

PRESENCE OF CORONARY ANGIOPLASTY IMPLANT                    

 

             2020           MARIELY DELANEY MD, Ot           A41

.9           

SEPSIS, UNSPECIFIED ORGANISM                     

 

                2020           MARIELY DELANEY MD, Ot           

   E11.65          

                          TYPE 2 DIABETES MELLITUS WITH HYPERGLYCE              

      

 

             2020           MARIELY DELANEY MD, Ot           E66

.9           

OBESITY, UNSPECIFIED                             

 

                2020           MARIELY DELANEY MD, Ot           

   E78.00          

                          PURE HYPERCHOLESTEROLEMIA, UNSPECIFIED                

    

 

                2020           MARIELY DELANEY MD           Ot           

   E83.42          

                          HYPOMAGNESEMIA                     

 

             2020           MARIELY DELANEY MD           Ot           E86

.0           

DEHYDRATION                                      

 

             2020           MARIELY DELANEY MD, Ot           E87

.5           

HYPERKALEMIA                                     

 

                2020           MARIELY DELANEY MD           Ot           

   F17.210         

                          NICOTINE DEPENDENCE, CIGARETTES, UNCOMPL              

      

 

             2020           MARIELY DLEANEY MD, Ot           I07

.1           

RHEUMATIC TRICUSPID INSUFFICIENCY                    

 

             2020           MARIELY DELANEY MD           Ot           I12

.9           

HYPERTENSIVE CHRONIC KIDNEY DISEASE W ST                    

 

                2020           MARIELY DELANEY MD, Ot           

   I21.A1          

                          MYOCARDIAL INFARCTION TYPE 2                    

 

                2020           MARIELY DELANEY MD, Ot           

   I25.10          

                          ATHSCL HEART DISEASE OF NATIVE CORONARY               

      

 

                2020           MARIELY DELANEY MD, Ot           

   I45.10          

                          UNSPECIFIED RIGHT BUNDLE-BRANCH BLOCK                 

   

 

                2020           MARIELY DELANEY MD, Ot           

   I50.33          

                          ACUTE ON CHRONIC DIASTOLIC (CONGESTIVE)               

      

 

             2020           MARIELY DELANEY MD, Ot           I73

.9           

PERIPHERAL VASCULAR DISEASE, UNSPECIFIED                    

 

             2020           MARIELY DELANYE MD, Ot           J18

.9           

PNEUMONIA, UNSPECIFIED ORGANISM                    

 

             2020           MARIELY DELANEY MD, Ot           J44

.9           

CHRONIC OBSTRUCTIVE PULMONARY DISEASE, U                    

 

                2020           MARIELY DELANEY MD, Ot           

   J96.01          

                          ACUTE RESPIRATORY FAILURE WITH HYPOXIA                

    

 

                2020           MARIELY DELANEY MD, Ot           

   J96.02          

                          ACUTE RESPIRATORY FAILURE WITH HYPERCAPN              

      

 

                2020           MARIELY DELANEY MD, Ot           

   M19.91          

                          PRIMARY OSTEOARTHRITIS, UNSPECIFIED SITE              

      

 

             2020           MARIELY DELANEY MD, Ot           N17

.9           

ACUTE KIDNEY FAILURE, UNSPECIFIED                    

 

             2020           MARIELY DELANEY MD, Ot           N18

.9           

CHRONIC KIDNEY DISEASE, UNSPECIFIED                    

 

                2020           MARIELY DELANEY MD, Ot           

   R65.21          

                          SEVERE SEPSIS WITH SEPTIC SHOCK                    

 

                2020           MARIELY DELANEY MD, Ot           

   Z68.34          

                          BODY MASS INDEX (BMI) 34.0-34.9, ADULT                

    

 

                2020           MARIELY DELANEY MD, Ot           

   Z79.84          

                          LONG TERM (CURRENT) USE OF ORAL HYPOGLYC              

      

 

                2020           MARIELY DELANEY MD, Ot           

   Z85.46          

                          PERSONAL HISTORY OF MALIGNANT NEOPLASM O              

      

 

                2020           MARIELY DELANEY MD, Ot           

   Z86.73          

                          PRSNL HX OF TIA (TIA), AND CEREB INFRC W              

      

 

                2020           MARIELY DELANEY MD, Ot           

   Z89.431         

                          ACQUIRED ABSENCE OF RIGHT FOOT                    

 

             2020           MARIELY DELANEY MD, Ot           Z95

.5           

PRESENCE OF CORONARY ANGIOPLASTY IMPLANT                    

 

             2020           MARIELY DELANEY MD, Ot           A41

.9           

SEPSIS, UNSPECIFIED ORGANISM                     

 

                2020           RHETT MD, MARIELY N           Ot           

   E11.65          

                          TYPE 2 DIABETES MELLITUS WITH HYPERGLYCE              

      

 

             2020           MARIELY DELANEY MD           Ot           E66

.9           

OBESITY, UNSPECIFIED                             

 

                2020           MARIELY DELANEY MD           Ot           

   E78.00          

                          PURE HYPERCHOLESTEROLEMIA, UNSPECIFIED                

    

 

                2020           MARIELY DELANEY MD           Ot           

   E83.42          

                          HYPOMAGNESEMIA                     

 

             2020           MARIELY DELANEY MD           Ot           E86

.0           

DEHYDRATION                                      

 

             2020           MARIELY DELANEY MD           Ot           E87

.5           

HYPERKALEMIA                                     

 

                2020           MARIELY DELANEY MD           Ot           

   F17.210         

                          NICOTINE DEPENDENCE, CIGARETTES, UNCOMPL              

      

 

             2020           MARIELY DELANEY MD           Ot           I07

.1           

RHEUMATIC TRICUSPID INSUFFICIENCY                    

 

             2020           MARIELY DELANEY MD           Ot           I12

.9           

HYPERTENSIVE CHRONIC KIDNEY DISEASE W ST                    

 

                2020           MARIELY DELANEY MD           Ot           

   I21.A1          

                          MYOCARDIAL INFARCTION TYPE 2                    

 

                2020           MARIELY DELANEY MD           Ot           

   I25.10          

                          ATHSCL HEART DISEASE OF NATIVE CORONARY               

      

 

                2020           MARIELY DELANEY MD           Ot           

   I45.10          

                          UNSPECIFIED RIGHT BUNDLE-BRANCH BLOCK                 

   

 

                2020           MARIELY DELANEY MD           Ot           

   I50.33          

                          ACUTE ON CHRONIC DIASTOLIC (CONGESTIVE)               

      

 

             2020           MARIELY DELANEY MD           Ot           I73

.9           

PERIPHERAL VASCULAR DISEASE, UNSPECIFIED                    

 

             2020           MARIELY DELANEY MD           Ot           J18

.9           

PNEUMONIA, UNSPECIFIED ORGANISM                    

 

             2020           MARIELY DELANEY MD           Ot           J44

.9           

CHRONIC OBSTRUCTIVE PULMONARY DISEASE, U                    

 

                2020           MARIELY DELANEY MD           Ot           

   J96.01          

                          ACUTE RESPIRATORY FAILURE WITH HYPOXIA                

    

 

                2020           MARIELY DELANEY MD           Ot           

   J96.02          

                          ACUTE RESPIRATORY FAILURE WITH HYPERCAPN              

      

 

                2020           MARIELY DELANEY MD           Ot           

   M19.91          

                          PRIMARY OSTEOARTHRITIS, UNSPECIFIED SITE              

      

 

             2020           MARIELY DELANEY MD           Ot           N17

.9           

ACUTE KIDNEY FAILURE, UNSPECIFIED                    

 

             2020           MARIELY DELANEY MD           Ot           N18

.9           

CHRONIC KIDNEY DISEASE, UNSPECIFIED                    

 

                2020           MARIELY DELANEY MD           Ot           

   R65.21          

                          SEVERE SEPSIS WITH SEPTIC SHOCK                    

 

                2020           MARIELY DELANEY MD, Ot           

   Z68.34          

                          BODY MASS INDEX (BMI) 34.0-34.9, ADULT                

    

 

                2020           MARIELY DELANEY MD, Ot           

   Z79.84          

                          LONG TERM (CURRENT) USE OF ORAL HYPOGLYC              

      

 

                2020           MARIELY DELANEY MD, Ot           

   Z85.46          

                          PERSONAL HISTORY OF MALIGNANT NEOPLASM O              

      

 

                2020           MARIELY DELANEY MD, Ot           

   Z86.73          

                          PRSNL HX OF TIA (TIA), AND CEREB INFRC W              

      

 

                2020           MARIELY DELANEY MD, Ot           

   Z89.431         

                          ACQUIRED ABSENCE OF RIGHT FOOT                    

 

             2020           MARIELY DELANEY MD, Ot           Z95

.5           

PRESENCE OF CORONARY ANGIOPLASTY IMPLANT                    

 

             2020           MARIELY DELANEY MD, Ot           A41

.9           

SEPSIS, UNSPECIFIED ORGANISM                     

 

                2020           MARIELY DELANEY MD           Ot           

   E11.65          

                          TYPE 2 DIABETES MELLITUS WITH HYPERGLYCE              

      

 

             2020           MARIELY DELANEY MD           Ot           E66

.9           

OBESITY, UNSPECIFIED                             

 

                2020           MARIELY DELANEY MD           Ot           

   E78.00          

                          PURE HYPERCHOLESTEROLEMIA, UNSPECIFIED                

    

 

                2020           MARIELY DELANEY MD           Ot           

   E83.42          

                          HYPOMAGNESEMIA                     

 

             2020           MARIELY DELANEY MD           Ot           E86

.0           

DEHYDRATION                                      

 

             2020           MARIELY DELANEY MD           Ot           E87

.5           

HYPERKALEMIA                                     

 

                2020           MARIELY DELANEY MD           Ot           

   F17.210         

                          NICOTINE DEPENDENCE, CIGARETTES, UNCOMPL              

      

 

             2020           MARIELY DELANEY MD           Ot           I07

.1           

RHEUMATIC TRICUSPID INSUFFICIENCY                    

 

             2020           MARIELY DELANEY MD           Ot           I12

.9           

HYPERTENSIVE CHRONIC KIDNEY DISEASE W ST                    

 

                2020           MARIELY DELANEY MD, Ot           

   I21.A1          

                          MYOCARDIAL INFARCTION TYPE 2                    

 

                2020           MARIELY DELANEY MD, Ot           

   I25.10          

                          ATHSCL HEART DISEASE OF NATIVE CORONARY               

      

 

                2020           MARIELY DELANEY MD           Ot           

   I45.10          

                          UNSPECIFIED RIGHT BUNDLE-BRANCH BLOCK                 

   

 

                2020           MARIELY DELANEY MD           Ot           

   I50.33          

                          ACUTE ON CHRONIC DIASTOLIC (CONGESTIVE)               

      

 

             2020           MARIELY DELANEY MD, Ot           I73

.9           

PERIPHERAL VASCULAR DISEASE, UNSPECIFIED                    

 

             2020           MARIELY DELANEY MD, Ot           J18

.9           

PNEUMONIA, UNSPECIFIED ORGANISM                    

 

             2020           MARIELY DELANEY MD, Ot           J44

.9           

CHRONIC OBSTRUCTIVE PULMONARY DISEASE, U                    

 

                2020           MARIELY DELANEY MD, Ot           

   J96.01          

                          ACUTE RESPIRATORY FAILURE WITH HYPOXIA                

    

 

                2020           MARIELY DELANEY MD, Ot           

   J96.02          

                          ACUTE RESPIRATORY FAILURE WITH HYPERCAPN              

      

 

                2020           MARIELY DELANEY MD, Ot           

   M19.91          

                          PRIMARY OSTEOARTHRITIS, UNSPECIFIED SITE              

      

 

             2020           MARIELY DELANEY MD, Ot           N17

.9           

ACUTE KIDNEY FAILURE, UNSPECIFIED                    

 

             2020           MARIELY DELANEY MD, Ot           N18

.9           

CHRONIC KIDNEY DISEASE, UNSPECIFIED                    

 

                2020           MARIELY DELANEY MD           Ot           

   R65.21          

                          SEVERE SEPSIS WITH SEPTIC SHOCK                    

 

                2020           MARIELY DELANEY MD, Ot           

   Z68.34          

                          BODY MASS INDEX (BMI) 34.0-34.9, ADULT                

    

 

                2020           MARIELY DELANEY MD           Ot           

   Z79.84          

                          LONG TERM (CURRENT) USE OF ORAL HYPOGLYC              

      

 

                2020           MARIELY DELANEY MD, Ot           

   Z85.46          

                          PERSONAL HISTORY OF MALIGNANT NEOPLASM O              

      

 

                2020           MARIELY DELANEY MD, Ot           

   Z86.73          

                          PRSNL HX OF TIA (TIA), AND CEREB INFRC W              

      

 

                2020           MARIELY DELANEY MD           Ot           

   Z89.431         

                          ACQUIRED ABSENCE OF RIGHT FOOT                    

 

             2020           MARIELY DELANEY MD           Ot           Z95

.5           

PRESENCE OF CORONARY ANGIOPLASTY IMPLANT                    

 

             2020           MARIELY DELANEY MD           Ot           A41

.9           

SEPSIS, UNSPECIFIED ORGANISM                     

 

                2020           MARIELY DELANEY MD           Ot           

   E11.65          

                          TYPE 2 DIABETES MELLITUS WITH HYPERGLYCE              

      

 

             2020           MARIELY DELANEY MD           Ot           E66

.9           

OBESITY, UNSPECIFIED                             

 

                2020           MARIELY DELANEY MD           Ot           

   E78.00          

                          PURE HYPERCHOLESTEROLEMIA, UNSPECIFIED                

    

 

                2020           MARIELY DELANEY MD           Ot           

   E83.39          

                          OTHER DISORDERS OF PHOSPHORUS METABOLISM              

      

 

                2020           MARIELY DELANEY MD           Ot           

   E83.42          

                          HYPOMAGNESEMIA                     

 

             2020           MARIELY DELANEY MD           Ot           E86

.0           

DEHYDRATION                                      

 

             2020           MARIELY DELANEY MD, Ot           E87

.5           

HYPERKALEMIA                                     

 

                2020           MARIELY DELANEY MD           Ot           

   F15.10          

                          OTHER STIMULANT ABUSE, UNCOMPLICATED                  

  

 

                2020           MARIELY DELANEY MD, Ot           

   F17.210         

                          NICOTINE DEPENDENCE, CIGARETTES, UNCOMPL              

      

 

             2020           MARIELY DELANEY MD, Ot           I07

.1           

RHEUMATIC TRICUSPID INSUFFICIENCY                    

 

             2020           MARIELY DELANEY MD           Ot           I13

.0           

HYP HRT   CHR KDNY DIS W HRT FAIL AND ST                    

 

                2020           MARIELY DELANEY MD, Ot           

   I21.A1          

                          MYOCARDIAL INFARCTION TYPE 2                    

 

                2020           MARIELY DELANEY MD, Ot           

   I25.10          

                          ATHSCL HEART DISEASE OF NATIVE CORONARY               

      

 

             2020           MARIELY DELANEY MD, Ot           I44

.1           

ATRIOVENTRICULAR BLOCK, SECOND DEGREE                    

 

                2020           MARIELY DELANEY MD, Ot           

   I45.10          

                          UNSPECIFIED RIGHT BUNDLE-BRANCH BLOCK                 

   

 

                2020           MARIELY DELANEY MD           Ot           

   I48.91          

                          UNSPECIFIED ATRIAL FIBRILLATION                    

 

                2020           MARIELY DELANEY MD           Ot           

   I50.33          

                          ACUTE ON CHRONIC DIASTOLIC (CONGESTIVE)               

      

 

             2020           MARIELY DELANEY MD, Ot           I73

.9           

PERIPHERAL VASCULAR DISEASE, UNSPECIFIED                    

 

                2020           MARIELY DELANEY MD, Ot           

   J15.212         

                          PNEUMONIA DUE TO METHICILLIN RESISTANT S              

      

 

             2020           MARIELY DELANEY MD, Ot           J44

.1           

CHRONIC OBSTRUCTIVE PULMONARY DISEASE W                     

 

                2020           MARIELY DELANEY MD           Ot           

   J96.01          

                          ACUTE RESPIRATORY FAILURE WITH HYPOXIA                

    

 

                2020           MARIELY DELANEY MD, Ot           

   J96.02          

                          ACUTE RESPIRATORY FAILURE WITH HYPERCAPN              

      

 

             2020           MARIELY DELANEY MD, Ot           K92

.2           

GASTROINTESTINAL HEMORRHAGE, UNSPECIFIED                    

 

                2020           MARIELY DELANEY MD, Ot           

   M19.91          

                          PRIMARY OSTEOARTHRITIS, UNSPECIFIED SITE              

      

 

             2020           MARIELY DELANEY MD           Ot           N17

.9           

ACUTE KIDNEY FAILURE, UNSPECIFIED                    

 

             2020           MARIELY DELANEY MD           Ot           N18

.9           

CHRONIC KIDNEY DISEASE, UNSPECIFIED                    

 

                2020           MARIELY DELANEY MD, Ot           

   R65.21          

                          SEVERE SEPSIS WITH SEPTIC SHOCK                    

 

                2020           MARIELY DELANEY MD, Ot           

   Z20.828         

                          CONTACT W AND EXPOSURE TO OTH VIRAL COMM              

      

 

                2020           MARIELY DELANEY MD, Ot           

   Z89.431         

                          ACQUIRED ABSENCE OF RIGHT FOOT                    

 

                2020           YULISA DENNIS MD, Ot           

   E11.42          

                          TYPE 2 DIABETES MELLITUS WITH DIABETIC P              

      

 

                2020           YULISA DENNIS MD, Ot           

   E11.51          

                          TYPE 2 DIABETES W DIABETIC PERIPHERAL AN              

      

 

                2020           YULISA DENNIS MD, Ot           

   E11.621         

                          TYPE 2 DIABETES MELLITUS WITH FOOT ULCER              

      

 

             2020           YULISA DENNIS MD, Ot           I10

           

ESSENTIAL (PRIMARY) HYPERTENSION                    

 

                2020           YULISA DENNIS MD, Ot           

   I25.10          

                          ATHSCL HEART DISEASE OF NATIVE CORONARY               

      

 

             2020           YULISA DENNIS MD, Ot           I47

.1           

SUPRAVENTRICULAR TACHYCARDIA                     

 

                2020           YULISA DENNIS MD, Ot           

   I70.234         

                          ATHSCL NATIVE ART OF RIGHT LEG W ULCER O              

      

 

             2020           YULISA DENNIS MD, Ot           J44

.9           

CHRONIC OBSTRUCTIVE PULMONARY DISEASE, U                    

 

                2020           YULISA DENNIS MD, Ot           

   L97.412         

                          NON-PRS CHR ULCER OF RIGHT HEEL AND MIDF              

      

 

                2020           YULISA DENNIS MD, Ot           

   M19.91          

                          PRIMARY OSTEOARTHRITIS, UNSPECIFIED SITE              

      

 

                2020           YULISA DENNIS MD, Ot           

   T81.31XA        

                          DISRUPTION OF EXTERNAL OPERATION (SURGIC              

      

 

                2020           YULISA DENNIS MD, Ot           

   Z85.46          

                          PERSONAL HISTORY OF MALIGNANT NEOPLASM O              

      

 

                2020           YULISA DENNIS MD, Ot           

   Z89.411         

                          ACQUIRED ABSENCE OF RIGHT GREAT TOE                   

 

 

                2020           YULISA DENNIS MD, Ot           

   Z92.21          

                          PERSONAL HISTORY OF ANTINEOPLASTIC CHEMO              

      

 

             2020           YULISA DENNIS MD, Ot           Z95

.5           

PRESENCE OF CORONARY ANGIOPLASTY IMPLANT                    

 

                2020           ELISE RODRIGUEZ MD, Ot           

   F17.210         

                          NICOTINE DEPENDENCE, CIGARETTES, UNCOMPL              

      

 

             2020           ELISE RODRIGUEZ MD, Ot           I11

.0           

HYPERTENSIVE HEART DISEASE WITH HEART FA                    

 

                2020           ELISE RODRIGUEZ MD, Ot           

   I25.10          

                          ATHSCL HEART DISEASE OF NATIVE CORONARY               

      

 

             2020           ELISE RODRIGUEZ MD, Ot           I25

.2           

OLD MYOCARDIAL INFARCTION                        

 

             2020           JENNIFER SALDIVAR, ELISE RAVI Ot           I44

.1           

ATRIOVENTRICULAR BLOCK, SECOND DEGREE                    

 

             2020           ELISE RODRIGUEZ MD, Ot           I48

.3           

TYPICAL ATRIAL FLUTTER                           

 

                2020           ELISE RODRIGUEZ MD, Ot           

   I50.33          

                          ACUTE ON CHRONIC DIASTOLIC (CONGESTIVE)               

      

 

             2020           ELISE RODRIGUEZ MD, Ot           I63

.9           

CEREBRAL INFARCTION, UNSPECIFIED                    

 

             2020           ELISE RODRIGUEZ MD, Ot           I99

.8           

OTHER DISORDER OF CIRCULATORY SYSTEM                    

 

                2020           ELISE RODRIGUEZ MD, Ot           

   J96.90          

                          RESPIRATORY FAILURE, UNSP, UNSP W HYPOXI              

      

 

             2020           ELISE RODRIGUEZ MD, Ot           N17

.9           

ACUTE KIDNEY FAILURE, UNSPECIFIED                    

 

                2020           ELISE RODRIGUEZ MD, Ot           

   Z79.02          

                          LONG TERM (CURRENT) USE OF ANTITHROMBOTI              

      

 

                2020           ELISE RODRIGUEZ MD, Ot           

   Z79.82          

                          LONG TERM (CURRENT) USE OF ASPIRIN                    

 

                2020           ELISE RODRIGUEZ MD, Ot           

   Z79.84          

                          LONG TERM (CURRENT) USE OF ORAL HYPOGLYC              

      

 

                2020           ELISE RODRIGUEZ MD, Ot           

   Z79.899         

                          OTHER LONG TERM (CURRENT) DRUG THERAPY                

    

 

             2020           ELISE RODRIGUEZ MD, Ot           Z82

.3           

FAMILY HISTORY OF STROKE                         

 

                2020           YULISA DENNIS MD           Ot           

   E11.42          

                          TYPE 2 DIABETES MELLITUS WITH DIABETIC P              

      

 

                2020           YULISA DENNIS MD, Ot           

   E11.51          

                          TYPE 2 DIABETES W DIABETIC PERIPHERAL AN              

      

 

                2020           YULISA DENNIS MD, Ot           

   E11.621         

                          TYPE 2 DIABETES MELLITUS WITH FOOT ULCER              

      

 

             2020           YULISA DENNIS MD, Ot           E78

.5           

HYPERLIPIDEMIA, UNSPECIFIED                      

 

             2020           YULISA DENNIS MD, Ot           I10

           

ESSENTIAL (PRIMARY) HYPERTENSION                    

 

                2020           YULISA DENNIS MD, Ot           

   I25.10          

                          ATHSCL HEART DISEASE OF NATIVE CORONARY               

      

 

             2020           YULISA DENNIS MD, Ot           I47

.1           

SUPRAVENTRICULAR TACHYCARDIA                     

 

                2020           YULISA DENNIS MD, Ot           

   I70.234         

                          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              

      

 

             2020           YULISA DENNIS MD, Ot           J44

.9           

CHRONIC OBSTRUCTIVE PULMONARY DISEASE, U                    

 

                2020           YULISA DENNIS MD, Ot           

   L97.412         

                          NON-PRS CHR ULCER OF RIGHT HEEL AND MIDF              

      

 

                2020           YULISA DENNIS MD, Ot           

   M19.91          

                          PRIMARY OSTEOARTHRITIS, UNSPECIFIED SITE              

      

 

                2020           YULISA DENNIS MD, Ot           

   T81.31XA        

                          DISRUPTION OF EXTERNAL OPERATION (SURGIC              

      

 

                2020           YULISA DENNIS MD, Ot           

   Z85.46          

                          PERSONAL HISTORY OF MALIGNANT NEOPLASM O              

      

 

                2020           YULISA DENNIS MD, Ot           

   Z87.891         

                          PERSONAL HISTORY OF NICOTINE DEPENDENCE               

     

 

                2020           YULISA DENNIS MD, Ot           

   Z89.411         

                          ACQUIRED ABSENCE OF RIGHT GREAT TOE                   

 

 

                2020           YULISA DENNIS MD, Ot           

   Z92.21          

                          PERSONAL HISTORY OF ANTINEOPLASTIC CHEMO              

      

 

             2020           YULISA DENNIS MD, Ot           Z95

.5           

PRESENCE OF CORONARY ANGIOPLASTY IMPLANT                    

 

                2020           ELISE RODRIGUEZ MD, Ot           

   F17.210         

                          NICOTINE DEPENDENCE, CIGARETTES, UNCOMPL              

      

 

             2020           ELISE RODRIGUEZ MD, Ot           I11

.0           

HYPERTENSIVE HEART DISEASE WITH HEART FA                    

 

                2020           ELISE RODRIGUEZ MD, Ot           

   I25.10          

                          ATHSCL HEART DISEASE OF NATIVE CORONARY               

      

 

             2020           ELISE RODRIGUEZ MD, Ot           I25

.2           

OLD MYOCARDIAL INFARCTION                        

 

             2020           ELISE RODRIGUEZ MD, Ot           I44

.1           

ATRIOVENTRICULAR BLOCK, SECOND DEGREE                    

 

             2020           ELISE RODRIGUEZ MD, Ot           I48

.3           

TYPICAL ATRIAL FLUTTER                           

 

                2020           ELISE RODRIGUEZ MD, Ot           

   I50.33          

                          ACUTE ON CHRONIC DIASTOLIC (CONGESTIVE)               

      

 

             2020           ELISE RODRIGUEZ MD, Ot           I63

.9           

CEREBRAL INFARCTION, UNSPECIFIED                    

 

             2020           ELISE RODRIGUEZ MD, Ot           I99

.8           

OTHER DISORDER OF CIRCULATORY SYSTEM                    

 

                2020           ELISE RODRIGUEZ MD, Ot           

   J96.90          

                          RESPIRATORY FAILURE, UNSP, UNSP W HYPOXI              

      

 

             2020           ELISE RODRIGUEZ MD, Ot           N17

.9           

ACUTE KIDNEY FAILURE, UNSPECIFIED                    

 

                2020           ELISE RODRIGUEZ MD           Ot           

   Z79.02          

                          LONG TERM (CURRENT) USE OF ANTITHROMBOTI              

      

 

                2020           ELISE RODRIGUEZ MD           Ot           

   Z79.82          

                          LONG TERM (CURRENT) USE OF ASPIRIN                    

 

                2020           ELISE RODRIGUEZ MD, Ot           

   Z79.84          

                          LONG TERM (CURRENT) USE OF ORAL HYPOGLYC              

      

 

                2020           ELISE RODRIGUEZ MD, Ot           

   Z79.899         

                          OTHER LONG TERM (CURRENT) DRUG THERAPY                

    

 

             2020           ELISE RODRIGUEZ MD, Ot           Z82

.3           

FAMILY HISTORY OF STROKE                         

 

                2020           ELISE RODRIGUEZ MD           Ot           

   F17.210         

                          NICOTINE DEPENDENCE, CIGARETTES, UNCOMPL              

      

 

             2020           ELISE RODRIGUEZ MD, Ot           I11

.0           

HYPERTENSIVE HEART DISEASE WITH HEART FA                    

 

                2020           ELISE RODRIGUEZ MD, Ot           

   I25.10          

                          ATHSCL HEART DISEASE OF NATIVE CORONARY               

      

 

             2020           ELISE RODRIGUEZ MD           Ot           I25

.2           

OLD MYOCARDIAL INFARCTION                        

 

             2020           ELISE RODRIGUEZ MD           Ot           I44

.1           

ATRIOVENTRICULAR BLOCK, SECOND DEGREE                    

 

             2020           ELISE RODRIGUEZ MD, Ot           I48

.3           

TYPICAL ATRIAL FLUTTER                           

 

                2020           ELISE RODRIGUEZ MD           Ot           

   I50.33          

                          ACUTE ON CHRONIC DIASTOLIC (CONGESTIVE)               

      

 

             2020           ELISE RODRIGUEZ MD           Ot           I63

.9           

CEREBRAL INFARCTION, UNSPECIFIED                    

 

             2020           ELISE RODRIGUEZ MD           Ot           I99

.8           

OTHER DISORDER OF CIRCULATORY SYSTEM                    

 

                2020           ELISE RODRIGUEZ MD           Ot           

   J96.90          

                          RESPIRATORY FAILURE, UNSP, UNSP W HYPOXI              

      

 

             2020           ELISE RODRIGUEZ MD, Ot           N17

.9           

ACUTE KIDNEY FAILURE, UNSPECIFIED                    

 

                2020           ELISE RODRIGUEZ MD, Ot           

   Z79.02          

                          LONG TERM (CURRENT) USE OF ANTITHROMBOTI              

      

 

                2020           ELISE RODRIGUEZ MD           Ot           

   Z79.82          

                          LONG TERM (CURRENT) USE OF ASPIRIN                    

 

                2020           ELISE RODRIGUEZ MD, Ot           

   Z79.84          

                          LONG TERM (CURRENT) USE OF ORAL HYPOGLYC              

      

 

                2020           ELISE RODRIGUEZ MD, Ot           

   Z79.899         

                          OTHER LONG TERM (CURRENT) DRUG THERAPY                

    

 

             2020           ELISE RODRIGUEZ MD, Ot           Z82

.3           

FAMILY HISTORY OF STROKE                         

 

             2020           JEANNIE SALDIVAR, YULISA SEALS           Ot           D63

.1           

ANEMIA IN CHRONIC KIDNEY DISEASE                    

 

             2020           YULISA DENNIS MD           Ot           N18

.9           

CHRONIC KIDNEY DISEASE, UNSPECIFIED                    

 

             2020           ARASELI RICHARDSON           Ot           E11

.9           

TYPE 2 DIABETES MELLITUS WITHOUT COMPLIC                    

 

                2020           ARASELI RICHARDSON           Ot           

   E83.42          

                          HYPOMAGNESEMIA                     

 

             2020           ARASELI RICHARDSON           Ot           E86

.9           

VOLUME DEPLETION, UNSPECIFIED                    

 

                2020           ARASELI RICHARDSON           Ot           

   F17.210         

                          NICOTINE DEPENDENCE, CIGARETTES, UNCOMPL              

      

 

             2020           ARASELI RICHARDSON           Ot           I10

           

ESSENTIAL (PRIMARY) HYPERTENSION                    

 

                2020           ARASELI RICHARDSON           Ot           

   I25.10          

                          ATHSCL HEART DISEASE OF NATIVE CORONARY               

      

 

             2020           ARASELI RICHARDSON Ot           J44

.9           

CHRONIC OBSTRUCTIVE PULMONARY DISEASE, U                    

 

             2020           ARASELI RICHARDSON           Ot           R60

.0           

LOCALIZED EDEMA                                  

 

                2020           ARASELI RICHARDSON           Ot           

   Z79.01          

                          LONG TERM (CURRENT) USE OF ANTICOAGULANT              

      

 

                2020           ARASELI RICHARDSON           Ot           

   Z79.51          

                          LONG TERM (CURRENT) USE OF INHALED STERO              

      

 

                2020           ARASELI RICHARDSON           Ot           

   Z79.84          

                          LONG TERM (CURRENT) USE OF ORAL HYPOGLYC              

      

 

             2020           ARASELI RICHARDSON           Ot           Z80

.0           

FAMILY HISTORY OF MALIGNANT NEOPLASM OF                     

 

                2020           ARASELI RICHARDSON           Ot           

   Z82.49          

                          FAMILY HX OF ISCHEM HEART DIS AND OTH DI              

      

 

                2020           ARASELI RICHARDSON Ot           

   Z85.46          

                          PERSONAL HISTORY OF MALIGNANT NEOPLASM O              

      

 

                2020           ARASELI RICHARDSON Ot           

   Z86.73          

                          PRSNL HX OF TIA (TIA), AND CEREB INFRC W              

      

 

             2020           ARASELI RICHARDSON           Ot           Z95

.5           

PRESENCE OF CORONARY ANGIOPLASTY IMPLANT                    

 

                2020           MK LÓPEZ MD           Ot           

   Z01.89          

                          ENCOUNTER FOR OTHER SPECIFIED SPECIAL EX              

      

 

             2020           THA ODOM MD, Ot           B95.62   

        

METHICILLIN RESIS STAPH INFCT CAUSING DI                    

 

             2020           THA ODOM MD, Ot           B96.5    

       

PSEUDOMONAS (MALLEI) CAUSING DISEASES CL                    

 

             2020           THA ODOM MD, Ot           E11.42   

        TYPE 

2 DIABETES MELLITUS WITH DIABETIC P                    

 

             2020           THA ODOM MD, Ot           E11.621  

         TYPE

2 DIABETES MELLITUS WITH FOOT ULCER                    

 

             2020           THA ODOM MD, Ot           I70.234  

         

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                    

 

             2020           THA ODOM MD, Ot           L97.413  

         NON-

PRS CHR ULCER OF RIGHT HEEL AND MIDF                    

 

             2020           THA ODOM MD, Ot           M86.171  

         

OTHER ACUTE OSTEOMYELITIS, RIGHT ANKLE A                    

 

                2020           ELISE RODRIGUEZ MD, Ot           

   F17.210         

                          NICOTINE DEPENDENCE, CIGARETTES, UNCOMPL              

      

 

             2020           ELISE RODRIGUEZ MD, Ot           I11

.0           

HYPERTENSIVE HEART DISEASE WITH HEART FA                    

 

                2020           ELISE RODRIGUEZ MD, Ot           

   I25.10          

                          ATHSCL HEART DISEASE OF NATIVE CORONARY               

      

 

             2020           ELISE RODRIGUEZ MD, Ot           I25

.2           

OLD MYOCARDIAL INFARCTION                        

 

             2020           ELISE RODRIGUEZ MD, Ot           I44

.1           

ATRIOVENTRICULAR BLOCK, SECOND DEGREE                    

 

             2020           ELISE RODRIGUEZ MD, Ot           I48

.3           

TYPICAL ATRIAL FLUTTER                           

 

                2020           ELISE RODRIGUEZ MD, Ot           

   I50.33          

                          ACUTE ON CHRONIC DIASTOLIC (CONGESTIVE)               

      

 

             2020           ELISE RODRIGUEZ MD, Ot           I63

.9           

CEREBRAL INFARCTION, UNSPECIFIED                    

 

             2020           ELISE RODRIGUEZ MD, Ot           I99

.8           

OTHER DISORDER OF CIRCULATORY SYSTEM                    

 

                2020           ELISE RODRIGUEZ MD, Ot           

   J96.90          

                          RESPIRATORY FAILURE, UNSP, UNSP W HYPOXI              

      

 

             2020           ELISE RODRIGUEZ MD, Ot           N17

.9           

ACUTE KIDNEY FAILURE, UNSPECIFIED                    

 

                2020           ELISE RODRIGUEZ MD, Ot           

   Z79.02          

                          LONG TERM (CURRENT) USE OF ANTITHROMBOTI              

      

 

                2020           ELISE RODRIGUEZ MD, Ot           

   Z79.82          

                          LONG TERM (CURRENT) USE OF ASPIRIN                    

 

                2020           ELISE RODRIGUEZ MD, Ot           

   Z79.84          

                          LONG TERM (CURRENT) USE OF ORAL HYPOGLYC              

      

 

                2020           ELISE RODRIGUEZ MD, Ot           

   Z79.899         

                          OTHER LONG TERM (CURRENT) DRUG THERAPY                

    

 

             2020           ELISE RODRIGUEZ MD, Ot           Z82

.3           

FAMILY HISTORY OF STROKE                         

 

                2020           YULISA DENNIS MD           Ot           

   E11.42          

                          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              

      

 

                2020           YULISA DENNIS MD           Ot           

   E11.52          

                          TYPE 2 DIABETES W DIABETIC PERIPHERAL AN              

      

 

                2020           YULISA DENNIS MD, Ot           

   E11.621         

                          TYPE 2 DIABETES MELLITUS WITH FOOT ULCER              

      

 

             2020           YULISA DENNIS MD           Ot           E44

.1           

MILD PROTEIN-CALORIE MALNUTRITION                    

 

                2020           YULISA DENNIS MD           Ot           

   I70.244         

                          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              

      

 

                2020           YULISA DENNIS MD           Ot           

   L97.412         

                          NON-PRS CHR ULCER OF RIGHT HEEL AND MIDF              

      

 

                2020           YULISA DENNIS MD           Ot           

   T81.31XA        

                          DISRUPTION OF EXTERNAL OPERATION (SURGIC              

      

 

                2020           YULISA DENNIS MD           Ot           

   E11.42          

                          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              

      

 

                2020           YULISA DENNIS MD           Ot           

   E11.52          

                          TYPE 2 DIABETES W DIABETIC PERIPHERAL AN              

      

 

                2020           YULISA DENNIS MD           Ot           

   E11.621         

                          TYPE 2 DIABETES MELLITUS WITH FOOT ULCER              

      

 

             2020           YULISA DENNIS MD           Ot           E44

.1           

MILD PROTEIN-CALORIE MALNUTRITION                    

 

                2020           YULISA DENNIS MD           Ot           

   I70.244         

                          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              

      

 

                2020           YULISA DENNIS MD           Ot           

   L97.412         

                          NON-PRS CHR ULCER OF RIGHT HEEL AND MIDF              

      

 

                2020           YULISA DENNIS MD           Ot           

   T81.31XA        

                          DISRUPTION OF EXTERNAL OPERATION (SURGIC              

      

 

                06/15/2020           ELISE RODRIGUEZ MD, Ot           

   Z01.812         

                          ENCOUNTER FOR PREPROCEDURAL LABORATORY E              

      

 

             06/15/2020           ELISE RODRIGUEZ MD, Ot           Z53

.8           

PROCEDURE AND TREATMENT NOT CARRIED OUT                     

 

                2020           YULISA DENNIS MD, Ot           

   E11.42          

                          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              

      

 

                2020           YULISA DENNIS MD, Ot           

   E11.52          

                          TYPE 2 DIABETES W DIABETIC PERIPHERAL AN              

      

 

                2020           YULISA DENNIS MD, Ot           

   E11.621         

                          TYPE 2 DIABETES MELLITUS WITH FOOT ULCER              

      

 

             2020           YULISA DENNIS MD, Ot           E44

.1           

MILD PROTEIN-CALORIE MALNUTRITION                    

 

                2020           YULISA DENNIS MD, Ot           

   I70.234         

                          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              

      

 

                2020           YULISA DENNIS MD, Ot           

   L97.412         

                          NON-PRS CHR ULCER OF RIGHT HEEL AND MIDF              

      

 

                2020           YULISA DENNIS MD, Ot           

   T81.31XA        

                          DISRUPTION OF EXTERNAL OPERATION (SURGIC              

      

 

             2020           ELISE RODRIGUEZ MD, Ot           E11

.9           

TYPE 2 DIABETES MELLITUS WITHOUT COMPLIC                    

 

             2020           ELISE RODRIGUEZ MD           Ot           E66

.9           

OBESITY, UNSPECIFIED                             

 

             2020           ELISE RODRIGUEZ MD, Ot           E78

.5           

HYPERLIPIDEMIA, UNSPECIFIED                      

 

                2020           ELISE RODRIGUEZ MD, Ot           

   F17.210         

                          NICOTINE DEPENDENCE, CIGARETTES, UNCOMPL              

      

 

             2020           ELISE RODRIGUEZ MD, Ot           I11

.0           

HYPERTENSIVE HEART DISEASE WITH HEART FA                    

 

                2020           ELISE RODRIGUEZ MD, Ot           

   I25.10          

                          ATHSCL HEART DISEASE OF NATIVE CORONARY               

      

 

             2020           ELISE RODRIGUEZ MD, Ot           I25

.2           

OLD MYOCARDIAL INFARCTION                        

 

             2020           ELISE RODRIGUEZ MD           Ot           I44

.1           

ATRIOVENTRICULAR BLOCK, SECOND DEGREE                    

 

             2020           ELISE RODRIGUEZ MD, Ot           I48

.3           

TYPICAL ATRIAL FLUTTER                           

 

                2020           ELISE RODRIGUEZ MD, Ot           

   I50.33          

                          ACUTE ON CHRONIC DIASTOLIC (CONGESTIVE)               

      

 

             2020           ELISE RODRIGUEZ MD, Ot           I99

.8           

OTHER DISORDER OF CIRCULATORY SYSTEM                    

 

                2020           ELISE RODRIGUEZ MD           Ot           

   J96.00          

                          ACUTE RESPIRATORY FAILURE, UNSP W HYPOXI              

      

 

             2020           ELISE RODRIGUEZ MD, Ot           K21

.9           

GASTRO-ESOPHAGEAL REFLUX DISEASE WITHOUT                    

 

             2020           ELISE RODRIGUEZ MD, Ot           N17

.9           

ACUTE KIDNEY FAILURE, UNSPECIFIED                    

 

                2020           ELISE RODRIGUEZ MD, Ot           

   Z68.30          

                          BODY MASS INDEX (BMI) 30.0-30.9, ADULT                

    

 

                2020           ELISE RODRIGUEZ MD, Ot           

   Z79.82          

                          LONG TERM (CURRENT) USE OF ASPIRIN                    

 

                2020           ELISE RODRIGUEZ MD, Ot           

   Z79.84          

                          LONG TERM (CURRENT) USE OF ORAL HYPOGLYC              

      

 

                2020           ELISE RODRIGUEZ MD, Ot           

   Z79.899         

                          OTHER LONG TERM (CURRENT) DRUG THERAPY                

    

 

             2020           ELISE RDORIGUEZ MD, Ot           Z82

.3           

FAMILY HISTORY OF STROKE                         

 

                2020           ELISE RODRIGUEZ MD, Ot           

   Z86.73          

                          PRSNL HX OF TIA (TIA), AND CEREB INFRC W              

      

 

             2020           ELISE RODRIGUEZ MD, Ot           Z88

.5           

ALLERGY STATUS TO NARCOTIC AGENT STATUS                    

 

             2020           ELISE RODRIGUEZ MD           Ot           E11

.9           

TYPE 2 DIABETES MELLITUS WITHOUT COMPLIC                    

 

             2020           ELSIE RODRIGUEZ MD, Ot           E66

.9           

OBESITY, UNSPECIFIED                             

 

             2020           ELISE RODRIGUEZ MD, Ot           E78

.5           

HYPERLIPIDEMIA, UNSPECIFIED                      

 

                2020           ELISE RODRIGUEZ MD, Ot           

   F17.210         

                          NICOTINE DEPENDENCE, CIGARETTES, UNCOMPL              

      

 

             2020           ELISE RODRIGUEZ MD, Ot           I11

.0           

HYPERTENSIVE HEART DISEASE WITH HEART FA                    

 

                2020           ELISE RODRIGUEZ MD, Ot           

   I25.10          

                          ATHSCL HEART DISEASE OF NATIVE CORONARY               

      

 

             2020           ELISE RODRIGUEZ MD, Ot           I25

.2           

OLD MYOCARDIAL INFARCTION                        

 

             2020           ELISE RODRIGUEZ MD, Ot           I44

.1           

ATRIOVENTRICULAR BLOCK, SECOND DEGREE                    

 

             2020           ELISE RODRIGUEZ MD, Ot           I48

.3           

TYPICAL ATRIAL FLUTTER                           

 

                2020           ELISE RODRIGUEZ MD, Ot           

   I50.33          

                          ACUTE ON CHRONIC DIASTOLIC (CONGESTIVE)               

      

 

             2020           ELISE RODRIGUEZ MD, Ot           I99

.8           

OTHER DISORDER OF CIRCULATORY SYSTEM                    

 

                2020           ELISE RODRIGUEZ MD, Ot           

   J96.00          

                          ACUTE RESPIRATORY FAILURE, UNSP W HYPOXI              

      

 

             2020           ELISE RODRIGUEZ MD, Ot           K21

.9           

GASTRO-ESOPHAGEAL REFLUX DISEASE WITHOUT                    

 

             2020           ELISE RODRIGUEZ MD, Ot           N17

.9           

ACUTE KIDNEY FAILURE, UNSPECIFIED                    

 

                2020           ELISE RODRIGUEZ MD, Ot           

   Z68.30          

                          BODY MASS INDEX (BMI) 30.0-30.9, ADULT                

    

 

                2020           ELISE RODRIGUEZ MD, Ot           

   Z79.82          

                          LONG TERM (CURRENT) USE OF ASPIRIN                    

 

                2020           ELISE RODRIGUEZ MD, Ot           

   Z79.84          

                          LONG TERM (CURRENT) USE OF ORAL HYPOGLYC              

      

 

                2020           ELISE RODRIGUEZ MD, Ot           

   Z79.899         

                          OTHER LONG TERM (CURRENT) DRUG THERAPY                

    

 

             2020           ELISE RODRIGUEZ MD, Ot           Z82

.3           

FAMILY HISTORY OF STROKE                         

 

                2020           ELISE RODRIGUEZ MD, Ot           

   Z86.73          

                          PRSNL HX OF TIA (TIA), AND CEREB INFRC W              

      

 

             2020           ELISE RODRIGUEZ MD, Ot           Z88

.5           

ALLERGY STATUS TO NARCOTIC AGENT STATUS                    

 

             2020           THA ODOM MD, Ot           B95.62   

        

METHICILLIN RESIS STAPH INFCT CAUSING DI                    

 

             2020           THA ODOM MD, Ot           B96.5    

       

PSEUDOMONAS (MALLEI) CAUSING DISEASES CL                    

 

             2020           THA ODOM MD, Ot           E11.42   

        TYPE 

2 DIABETES MELLITUS WITH DIABETIC P                    

 

             2020           THA ODOM MD, Ot           E11.621  

         TYPE

2 DIABETES MELLITUS WITH FOOT ULCER                    

 

             2020           THA ODOM MD, Ot           I70.234  

         

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                    

 

             2020           THA ODOM MD, Ot           L97.413  

         NON-

PRS CHR ULCER OF RIGHT HEEL AND MIDF                    

 

             2020           THA ODOM MD, Ot           M86.171  

         

OTHER ACUTE OSTEOMYELITIS, RIGHT ANKLE A                    



                                                                                
                                                                                
                                                                                
                                                                                
                                                                                
                                                                                
                                                                                
                                                                                
                                                                                
                                                                                
                                                                                
                                                                                
                                                                                
                                                                                
                                                                                
                                                                                
                                                                                
                                                                                
                                                                                
                                                                                
                                                                                
                                                                                
                                                                                
                                                                                
                                                                                
                                                                                
                                                                                
                                                                                
                                                                                
                                                                                
                                                                                
                                                                                
                                                                                
                                                                                
                                                                                
                                                                                
                                                                                
                                                                                
                                                                                
                                                                                
                                                                                
                                                                                
                                                                                
                                                                                
                                                                                
                                                                                
                                                                                
                                                                                
                                                                                
                                                                                
                                                                                
                                                                                
                                                                                
                                                                                
                                



Procedures

      



                Code            Description           Performed By           Per

formed On        

 

                                      [id number]                                 A1C 

(IN-HOUSE)                

                                                    01/10/2013        

 

                                      [id number]                                 URIN

E DRUG SCREEN  (IN-HOUSE) 

                                                    01/10/2013        

 

                                      [id number]                                 MICR

O ALBUMIN-IN HOUSE        

                                                    01/10/2013        

 

                                      [id number]                                 MICR

OALBUMIN                  

                                                    2013        

 

                                      [id number]                                 US C

AROTID DOPPLER            

                                                    2013        

 

                                      [id number]                                 A1C 

(IN-HOUSE)                

                                                    2013        

 

                                      [id number]                                 A1C 

(IN-HOUSE)                

                                                    10/22/2013        

 

                                      [id number]                                 ROUT

INE VENIPUNCTURE          

                                                    2014        

 

                                      [id number]                                 URIN

E DRUG SCREEN  (IN-HOUSE) 

                                                    2014        

 

                                      [id number]                                 A1C 

(IN-HOUSE)                

                                                    2014        

 

                                      [id number]                                 GF

R CALC (RESULT ONLY)      

                                                    2014        

 

                                [id number]                                 CMP       

                    

                                        2014        

 

                                      [id number]                                 LIPI

D PANEL                   

                                                    2014        

 

                                      [id number]                                 INJ 

TRIGGER POINT 1/2 MUSCL   

                                                    2014        

 

                                      [id number]                                 A1C 

(IN-HOUSE)                

                                                    2014        

 

                                      [id number]                                 MICR

O ALBUMIN-IN HOUSE        

                                                    2014        

 

                                [id number]                                 BMP       

                    

                                        2014        

 

                                      [id number]                                 GF

R CALC (RESULT ONLY)      

                                                    2014        

 

                                      [id number]                                 A1C 

(IN-HOUSE)                

                                                    2014        

 

                                      [id number]                                 A1C 

(IN-HOUSE)                

                                                    2014        

 

                                [id number]                                 AMERITOX  

                    

                                        2014        

 

                                      [id number]                                 A1C 

(IN-HOUSE)                

                                                    2014        

 

                                      [id number]                                 EX

CISION OF RIGHT FOOT 

TENDON, OPEN APPR                                               2019      

  

 

                                      [id number]                                 EX

CISION OF RIGHT FOOT 

TENDON, OPEN APPR                                               2020      

  

 

                                      [id number]                                 EX

CISION OF RIGHT FOOT 

TENDON, OPEN APPR                                               2020      

  

 

                                      [id number]                                 IN

SERTION OF ENDOTRACHEAL 

AIRWAY INTO TR                                               2020        

 

                                      [id number]                                 RE

SPIRATORY VENTILATION, 24-

96 CONSECUTI                                               2020        

 

                                      [id number]                                 RE

SPIRATORY VENTILATION, 

GREATER THAN 96                                               2020        

 

                                      [id number]                                 RE

STORATION OF CARDIAC 

RHYTHM, SINGLE                                               2020        



                                                                          



Results

      



                    Test                Result              Range        

 

                                        Prothrombin Time (PT) - 16 10:45  

       

 

                    INR                 0.9                 0.8-1.2        

 

                    Prothrombin Time           10.0 sec            9.1-12.0     

   

 

                                        Comp. Metabolic Panel (14) - 16 10

:45         

 

                    Glucose, Serum           153 mg/dL           65-99        

 

                    BUN                 34 mg/dL            8-27        

 

                    Creatinine, Serum           1.19 mg/dL           0.76-1.27  

      

 

                    eGFR If NonAfricn Am           63 mL/min/1.73               

>59        

 

                    eGFR If Africn Am           73 mL/min/1.73               >59

        

 

                    BUN/Creatinine Ratio           29                  10-22    

    

 

                    Sodium, Serum           142 mmol/L           136-144        

 

                    Potassium, Serum           4.8 mmol/L           3.5-5.2     

   

 

                    Chloride, Serum           100 mmol/L                  

 

 

                    Carbon Dioxide, Total           26 mmol/L           18-29   

     

 

                    Calcium, Serum           9.4 mg/dL           8.6-10.2       

 

 

                    Protein, Total, Serum           7.5 g/dL            6.0-8.5 

       

 

                    Albumin, Serum           4.6 g/dL            3.6-4.8        

 

                    Globulin, Total           2.9 g/dL            1.5-4.5       

 

 

                    A/G Ratio           1.6                 1.1-2.5        

 

                    Bilirubin, Total           0.2 mg/dL           0.0-1.2      

  

 

                    Alkaline Phosphatase, S           88 IU/L             

        

 

                    AST (SGOT)           22 IU/L             0-40        

 

                    ALT (SGPT)           18 IU/L             0-44        

 

                                        Lipid Panel - 16 10:45         

 

                    Cholesterol, Total           193 mg/dL           100-199    

    

 

                    Triglycerides           283 mg/dL           0-149        

 

                    HDL Cholesterol           38 mg/dL            >39        

 

                    VLDL Cholesterol Julio           57 mg/dL            5-40     

   

 

                    LDL Cholesterol Calc           98 mg/dL            0-99     

   

 

                                        C-Reactive Protein, Cardiac - 16 1

0:45         

 

                    C-Reactive Protein, Cardiac           5.19 mg/L           0.

00-3.00        

 

                                        Magnesium, Serum - 16 10:45       

  

 

                    Magnesium, Serum           1.8 mg/dL           1.6-2.3      

  

 

                                        Automated blood complete blood count (he

mogram) panel - 17 08:11         

 

                          Blood leukocytes automated count (number/volume)      

     10.9 10*3/uL         

                                        4.3-11.0        

 

                          Blood erythrocytes automated count (number/volume)    

       4.17 10*6/uL       

                                        4.35-5.85        

 

                    Venous blood hemoglobin measurement (mass/volume)           

13.5 g/dL           

13.3-17.7        

 

                    Blood hematocrit (volume fraction)           41 %           

     40-54        

 

                    Automated erythrocyte mean corpuscular volume           97 [

foz_us]           

80-99        

 

                                        Automated erythrocyte mean corpuscular h

emoglobin (mass per erythrocyte)        

                          32 pg                     25-34        

 

                                        Automated erythrocyte mean corpuscular h

emoglobin concentration measurement 

(mass/volume)             33 g/dL                   32-36        

 

                    Automated erythrocyte distribution width ratio           14.

4 %              10.0-

14.5        

 

                    Automated blood platelet count (count/volume)           135 

10*3/uL           

130-400        

 

                          Automated blood platelet mean volume measurement      

     11.9 [foz_us]        

                                        7.4-10.4        

 

                                        PT panel in platelet poor plasma by coag

ulation assay - 17 08:11         

 

                          Prothrombin time (PT) in platelet poor plasma by coagu

lation assay           

11.6 s                                  12.2-14.7        

 

                          INR in platelet poor plasma or blood by coagulation as

say           0.9         

                                        0.8-1.4        

 

                                        Activated partial thromboplastin time (a

PTT) in platelet poor plasma 

bycoagulation assay - 17 08:11         

 

                                        Activated partial thromboplastin time (a

PTT) in platelet poor plasma 

bycoagulation assay           27 s                      24-35        

 

                                        Comprehensive metabolic panel - 17

 08:11         

 

                          Serum or plasma sodium measurement (moles/volume)     

      136 mmol/L          

                                        135-145        

 

                          Serum or plasma potassium measurement (moles/volume)  

         4.2 mmol/L       

                                        3.6-5.0        

 

                          Serum or plasma chloride measurement (moles/volume)   

        98 mmol/L         

                                                

 

                    Carbon dioxide           25 mmol/L           21-32        

 

                          Serum or plasma anion gap determination (moles/volume)

           13 mmol/L      

                                        5-14        

 

                          Serum or plasma urea nitrogen measurement (mass/volume

)           29 mg/dL      

                                        7-18        

 

                          Serum or plasma creatinine measurement (mass/volume)  

         1.29 mg/dL       

                                        0.60-1.30        

 

                    Serum or plasma urea nitrogen/creatinine mass ratio         

  22                  NRG 

       

 

                                        Serum or plasma creatinine measurement w

ith calculation of estimated glomerular 

filtration rate           56                        NRG        

 

                    Serum or plasma glucose measurement (mass/volume)           

154 mg/dL           

        

 

                    Serum or plasma calcium measurement (mass/volume)           

9.4 mg/dL           

8.5-10.1        

 

                          Serum or plasma total bilirubin measurement (mass/volu

me)           0.3 mg/dL   

                                        0.1-1.0        

 

                                        Serum or plasma alkaline phosphatase juarez

surement (enzymatic activity/volume)    

                          111 U/L                           

 

                                        Serum or plasma aspartate aminotransfera

se measurement (enzymatic 

activity/volume)           17 U/L                    5-34        

 

                                        Serum or plasma alanine aminotransferase

 measurement (enzymatic activity/volume)

                          17 U/L                    0-55        

 

                    Serum or plasma protein measurement (mass/volume)           

7.6 g/dL            

6.4-8.2        

 

                    Serum or plasma albumin measurement (mass/volume)           

4.1 g/dL            

3.2-4.5        

 

                                        Lipid 1996 panel - 17 08:11       

  

 

                          Serum or plasma triglyceride measurement (mass/volume)

           769 mg/dL      

                                        <150        

 

                          Serum or plasma cholesterol measurement (mass/volume) 

          160 mg/dL       

                                        < 200        

 

                          Serum or plasma cholesterol in HDL measurement (mass/v

olume)           22 mg/dL 

                                        40-60        

 

                          Cholesterol in LDL [mass/volume] in serum or plasma by

 direct assay           41

 mg/dL                                  1-129        

 

                          Serum or plasma cholesterol in VLDL measurement (mass/

volume)           154 

mg/dL                                   5-40        

 

                                        Methicillin resistant Staphylococcus aur

eus (MRSA) screening culture - 17 

08:11         

 

                          Methicillin resistant Staphylococcus aureus (MRSA) scr

eening culture           

NEG                                     NRG        

 

                                        Complete blood count (CBC) with automate

d white blood cell (WBC) differential - 

17 10:08         

 

                          Blood leukocytes automated count (number/volume)      

     9.8 10*3/uL          

                                        4.3-11.0        

 

                          Blood erythrocytes automated count (number/volume)    

       3.44 10*6/uL       

                                        4.35-5.85        

 

                    Venous blood hemoglobin measurement (mass/volume)           

11.2 g/dL           

13.3-17.7        

 

                    Blood hematocrit (volume fraction)           35 %           

     40-54        

 

                    Automated erythrocyte mean corpuscular volume           103 

[foz_us]           

80-99        

 

                                        Automated erythrocyte mean corpuscular h

emoglobin (mass per erythrocyte)        

                          33 pg                     25-34        

 

                                        Automated erythrocyte mean corpuscular h

emoglobin concentration measurement 

(mass/volume)             32 g/dL                   32-36        

 

                    Automated erythrocyte distribution width ratio           15.

1 %              10.0-

14.5        

 

                    Automated blood platelet count (count/volume)           147 

10*3/uL           

130-400        

 

                          Automated blood platelet mean volume measurement      

     11.3 [foz_us]        

                                        7.4-10.4        

 

                    Automated blood neutrophils/100 leukocytes           73 %   

             42-75       

 

 

                    Automated blood lymphocytes/100 leukocytes           17 %   

             12-44       

 

 

                    Blood monocytes/100 leukocytes           6 %                

 0-12        

 

                    Automated blood eosinophils/100 leukocytes           3 %    

             0-10        

 

                    Automated blood basophils/100 leukocytes           1 %      

           0-10        

 

                    Blood neutrophils automated count (number/volume)           

7.2 10*3            

1.8-7.8        

 

                    Blood lymphocytes automated count (number/volume)           

1.7 10*3            

1.0-4.0        

 

                    Blood monocytes automated count (number/volume)           0.

6 10*3            

0.0-1.0        

 

                    Automated eosinophil count           0.3 10*3/uL           0

.0-0.3        

 

                    Automated blood basophil count (count/volume)           0.1 

10*3/uL           

0.0-0.1        

 

                                        Comprehensive metabolic panel - 17

 10:08         

 

                          Serum or plasma sodium measurement (moles/volume)     

      135 mmol/L          

                                        135-145        

 

                          Serum or plasma potassium measurement (moles/volume)  

         6.3 mmol/L       

                                        3.6-5.0        

 

                          Serum or plasma chloride measurement (moles/volume)   

        103 mmol/L        

                                                

 

                    Carbon dioxide           20 mmol/L           21-32        

 

                          Serum or plasma anion gap determination (moles/volume)

           12 mmol/L      

                                        5-14        

 

                          Serum or plasma urea nitrogen measurement (mass/volume

)           71 mg/dL      

                                        7-18        

 

                          Serum or plasma creatinine measurement (mass/volume)  

         6.62 mg/dL       

                                        0.60-1.30        

 

                    Serum or plasma urea nitrogen/creatinine mass ratio         

  11                  NRG 

       

 

                                        Serum or plasma creatinine measurement w

ith calculation of estimated glomerular 

filtration rate           8                         NRG        

 

                    Serum or plasma glucose measurement (mass/volume)           

213 mg/dL           

        

 

                    Serum or plasma calcium measurement (mass/volume)           

8.3 mg/dL           

8.5-10.1        

 

                          Serum or plasma total bilirubin measurement (mass/volu

me)           0.4 mg/dL   

                                        0.1-1.0        

 

                                        Serum or plasma alkaline phosphatase juarez

surement (enzymatic activity/volume)    

                          83 U/L                            

 

                                        Serum or plasma aspartate aminotransfera

se measurement (enzymatic 

activity/volume)           16 U/L                    5-34        

 

                                        Serum or plasma alanine aminotransferase

 measurement (enzymatic activity/volume)

                          16 U/L                    0-55        

 

                    Serum or plasma protein measurement (mass/volume)           

7.6 g/dL            

6.4-8.2        

 

                    Serum or plasma albumin measurement (mass/volume)           

3.9 g/dL            

3.2-4.5        

 

                                        Magnesium - 17 10:08         

 

                    Magnesium           2.1 mg/dL           1.8-2.4        

 

                                        PT panel in platelet poor plasma by coag

ulation assay - 17 10:08         

 

                          Prothrombin time (PT) in platelet poor plasma by coagu

lation assay           

13.1 s                                  12.2-14.7        

 

                          INR in platelet poor plasma or blood by coagulation as

say           1.0         

                                        0.8-1.4        

 

                                        Activated partial thromboplastin time (a

PTT) in platelet poor plasma 

bycoagulation assay - 17 10:08         

 

                                        Activated partial thromboplastin time (a

PTT) in platelet poor plasma 

bycoagulation assay           28 s                      24-35        

 

                                        Serum or plasma troponin i.cardiac measu

rement (mass/volume) - 17 10:08   

      

 

                          Serum or plasma troponin i.cardiac measurement (mass/v

olume)           < ng/mL  

                                        <0.30        

 

                                        Complete urinalysis with reflex to cultu

re - 17 12:34         

 

                    Urine color determination           YELLOW              NRG 

       

 

                    Urine clarity determination           CLEAR               NR

G        

 

                    Urine pH measurement by test strip           5              

     5-9        

 

                    Specific gravity of urine by test strip           1.020     

          1.016-1.022  

      

 

                    Urine protein assay by test strip, semi-quantitative        

   2+                  

NEGATIVE        

 

                    Urine glucose detection by automated test strip           NE

GATIVE            

NEGATIVE        

 

                          Erythrocytes detection in urine sediment by light micr

oscopy           2+       

                                        NEGATIVE        

 

                    Urine ketones detection by automated test strip           NE

GATIVE            

NEGATIVE        

 

                    Urine nitrite detection by test strip           NEGATIVE    

        NEGATIVE    

    

 

                    Urine total bilirubin detection by test strip           NEGA

TIVE            

NEGATIVE        

 

                          Urine urobilinogen measurement by automated test strip

 (mass/volume)           

NORMAL                                  NORMAL        

 

                    Urine leukocyte esterase detection by dipstick           1+ 

                 NEGATIVE 

       

 

                                        Automated urine sediment erythrocyte cou

nt by microscopy (number/high power 

field)                    RARE                      NRG        

 

                                        Automated urine sediment leukocyte count

 by microscopy (number/high power field)

                           [HPF]                    NRG        

 

                          Bacteria detection in urine sediment by light microsco

py           NEGATIVE     

                                        NRG        

 

                                        Squamous epithelial cells detection in u

rine sediment by light microscopy       

                          NONE                      NRG        

 

                          Crystals detection in urine sediment by light microsco

py           NONE         

                                        NRG        

 

                    Casts detection in urine sediment by light microscopy       

    NONE                

NRG        

 

                          Mucus detection in urine sediment by light microscopy 

          NEGATIVE        

                                        NRG        

 

                    Complete urinalysis with reflex to culture           YES    

             NRG        

 

                                        Bacterial urine culture - 17 12:34

         

 

                    Bacterial urine culture           NG                  NRG   

     

 

                                        PDM - AMPHETAMINES W/ REFLEX d/l ISOMERS

 - 18 14:55         

 

                    Prescribed Drug 1           Oxycodone            NRG        

 

                    COMMENT                                 NRG        

 

                    Amphetamine           NEGATIVE ng/mL           <250        

 

                    medMATCH Amphetamine           CONSISTENT            NRG    

    

 

                    Methamphetamine           NEGATIVE ng/mL           <250     

   

 

                    medMATCH Methamphetamine           CONSISTENT            NRG

        

 

                                        CBC - 18 11:54         

 

                    WHITE BLOOD CELL COUNT           10.0 Thousand/uL           

3.8-10.8        

 

                    RED BLOOD CELL COUNT           3.77 Million/uL           4.2

0-5.80        

 

                    HEMOGLOBIN           12.4 g/dL           13.2-17.1        

 

                    HEMATOCRIT           35.6 %              38.5-50.0        

 

                    MCV                 94.4 fL             80.0-100.0        

 

                    MCH                 32.9 pg             27.0-33.0        

 

                    MCHC                34.8 g/dL           32.0-36.0        

 

                    RDW                 14.3 %              11.0-15.0        

 

                    PLATELET COUNT           117 Thousand/uL           140-400  

      

 

                    MPV                 13.6 fL             7.5-12.5        

 

                    ABSOLUTE NEUTROPHILS           6720 cells/uL           1500-

7800        

 

                    ABSOLUTE LYMPHOCYTES           2470 cells/uL           850-3

900        

 

                    ABSOLUTE MONOCYTES           510 cells/uL           200-950 

       

 

                    ABSOLUTE EOSINOPHILS           210 cells/uL           

        

 

                    ABSOLUTE BASOPHILS           90 cells/uL           0-200    

    

 

                    NEUTROPHILS           67.2 %              NRG        

 

                    LYMPHOCYTES           24.7 %              NRG        

 

                    MONOCYTES           5.1 %               NRG        

 

                    EOSINOPHILS           2.1 %               NRG        

 

                    BASOPHILS           0.9 %               NRG        

 

                                        Complete blood count (CBC) with automate

d white blood cell (WBC) differential - 

19 11:56         

 

                          Blood leukocytes automated count (number/volume)      

     14.6 10*3/uL         

                                        4.3-11.0        

 

                          Blood erythrocytes automated count (number/volume)    

       3.92 10*6/uL       

                                        4.35-5.85        

 

                    Venous blood hemoglobin measurement (mass/volume)           

12.6 g/dL           

13.3-17.7        

 

                    Blood hematocrit (volume fraction)           40 %           

     40-54        

 

                    Automated erythrocyte mean corpuscular volume           101 

[foz_us]           

80-99        

 

                                        Automated erythrocyte mean corpuscular h

emoglobin (mass per erythrocyte)        

                          32 pg                     25-34        

 

                                        Automated erythrocyte mean corpuscular h

emoglobin concentration measurement 

(mass/volume)             32 g/dL                   32-36        

 

                    Automated erythrocyte distribution width ratio           16.

8 %              10.0-

14.5        

 

                    Automated blood platelet count (count/volume)           120 

10*3/uL           

130-400        

 

                          Automated blood platelet mean volume measurement      

     12.8 [foz_us]        

                                        7.4-10.4        

 

                    Automated blood neutrophils/100 leukocytes           81 %   

             42-75       

 

 

                    Automated blood lymphocytes/100 leukocytes           11 %   

             12-44       

 

 

                    Blood monocytes/100 leukocytes           6 %                

 0-12        

 

                    Automated blood eosinophils/100 leukocytes           1 %    

             0-10        

 

                    Automated blood basophils/100 leukocytes           1 %      

           0-10        

 

                    Blood neutrophils automated count (number/volume)           

11.8 10*3           

1.8-7.8        

 

                    Blood lymphocytes automated count (number/volume)           

1.6 10*3            

1.0-4.0        

 

                    Blood monocytes automated count (number/volume)           0.

8 10*3            

0.0-1.0        

 

                    Automated eosinophil count           0.2 10*3/uL           0

.0-0.3        

 

                    Automated blood basophil count (count/volume)           0.1 

10*3/uL           

0.0-0.1        

 

                                        Manual absolute plasma cell count -  11:56         

 

                    Blood monocytes/100 leukocytes           3 %                

 NRG        

 

                    Manual blood segmented neutrophils/100 leukocytes           

65 %                NRG  

      

 

                    Blood band neutrophils/100 leukocytes           17 %        

        NRG        

 

                    Manual blood lymphocytes/100 leukocytes           15 %      

          NRG        

 

                    Blood erythrocyte morphology finding identification         

  NORMAL              

NRG        

 

                                        Comprehensive metabolic panel - 19

 11:56         

 

                          Serum or plasma sodium measurement (moles/volume)     

      141 mmol/L          

                                        135-145        

 

                          Serum or plasma potassium measurement (moles/volume)  

         4.7 mmol/L       

                                        3.6-5.0        

 

                          Serum or plasma chloride measurement (moles/volume)   

        100 mmol/L        

                                                

 

                    Carbon dioxide           26 mmol/L           21-32        

 

                          Serum or plasma anion gap determination (moles/volume)

           15 mmol/L      

                                        5-14        

 

                          Serum or plasma urea nitrogen measurement (mass/volume

)           21 mg/dL      

                                        7-18        

 

                          Serum or plasma creatinine measurement (mass/volume)  

         0.96 mg/dL       

                                        0.60-1.30        

 

                    Serum or plasma urea nitrogen/creatinine mass ratio         

  22                  NRG 

       

 

                                        Serum or plasma creatinine measurement w

ith calculation of estimated glomerular 

filtration rate           >                         NRG        

 

                    Serum or plasma glucose measurement (mass/volume)           

192 mg/dL           

        

 

                    Serum or plasma calcium measurement (mass/volume)           

9.0 mg/dL           

8.5-10.1        

 

                          Serum or plasma total bilirubin measurement (mass/volu

me)           0.3 mg/dL   

                                        0.1-1.0        

 

                                        Serum or plasma alkaline phosphatase juarez

surement (enzymatic activity/volume)    

                          92 U/L                            

 

                                        Serum or plasma aspartate aminotransfera

se measurement (enzymatic 

activity/volume)           23 U/L                    5-34        

 

                                        Serum or plasma alanine aminotransferase

 measurement (enzymatic activity/volume)

                          16 U/L                    0-55        

 

                    Serum or plasma protein measurement (mass/volume)           

7.6 g/dL            

6.4-8.2        

 

                    Serum or plasma albumin measurement (mass/volume)           

3.7 g/dL            

3.2-4.5        

 

                    CALCIUM CORRECTED           9.2 mg/dL           8.5-10.1    

    

 

                                        Gram stain microscopy - 19 11:59  

       

 

                    Gram stain microscopy           Many Gram positive cocci in 

clusters            

NR        

 

                                        Bacteria identification in wound by cult

ure - 19 11:59         

 

                    Bacteria identification in wound by culture           [id number]

09            NRG   

     

 

                    FREE TEXT EXTERNAL           SUSCEPTIBILITY REPORTED  12

:15            NRG  

      

 

                    QUANTITY OF GROWTH           Many                NRG        

 

                    FREE TEXT ENTRY 2           ID REPORTED 19 16:05       

     NRG        

 

                                        Dirithromycin susceptibility test by dis

k diffusion - 19 11:59         

 

                          Oxacillin susceptibility test by minimum inhibitory co

ncentration           >   

                                        NRG        

 

                          Clindamycin susceptibility test by minimum inhibitory 

concentration           <=

                                        NRG        

 

                          Erythromycin susceptibility test by minimum inhibitory

 concentration           >

                                        NRG        

 

                                        Trimethoprim/sulfamethoxazole susceptibi

lity test by minimum 

inhibitoryconcentration           <=                        NRG        

 

                          Vancomycin susceptibility test by minimum inhibitory c

oncentration           1  

                                        NRG        

 

                          Levofloxacin susceptibility test by minimum inhibitory

 concentration           

<=                                      NRG        

 

                          Rifampin susceptibility test by minimum inhibitory con

centration           <=   

                                        NRG        

 

                          Cefazolin susceptibility test by minimum inhibitory co

ncentration           >   

                                        NRG        

 

                          Linezolid susceptibility test by minimum inhibitory co

ncentration           2   

                                        NRG        

 

                          Penicillin G susceptibility test by minimum inhibitory

 concentration           >

                                        NRG        

 

                          Moxifloxacin susceptibility test by minimum inhibitory

 concentration           

<=                                      NRG        

 

                    Minocycline susc KODI           <=                  NRG      

  

 

                                        Capillary blood glucose measurement by g

lucometer (mass/volume) - 19 21:22

         

 

                          Capillary blood glucose measurement by glucometer (mas

s/volume)           156 

mg/dL                                           

 

                                        Capillary blood glucose measurement by g

lucometer (mass/volume) - 19 06:07

         

 

                          Capillary blood glucose measurement by glucometer (mas

s/volume)           195 

mg/dL                                           

 

                                        Complete blood count (CBC) with automate

d white blood cell (WBC) differential - 

19 09:55         

 

                          Blood leukocytes automated count (number/volume)      

     12.5 10*3/uL         

                                        4.3-11.0        

 

                          Blood erythrocytes automated count (number/volume)    

       3.51 10*6/uL       

                                        4.35-5.85        

 

                    Venous blood hemoglobin measurement (mass/volume)           

11.1 g/dL           

13.3-17.7        

 

                    Blood hematocrit (volume fraction)           36 %           

     40-54        

 

                    Automated erythrocyte mean corpuscular volume           101 

[foz_us]           

80-99        

 

                                        Automated erythrocyte mean corpuscular h

emoglobin (mass per erythrocyte)        

                          32 pg                     25-34        

 

                                        Automated erythrocyte mean corpuscular h

emoglobin concentration measurement 

(mass/volume)             31 g/dL                   32-36        

 

                    Automated erythrocyte distribution width ratio           16.

5 %              10.0-

14.5        

 

                    Automated blood platelet count (count/volume)           106 

10*3/uL           

130-400        

 

                          Automated blood platelet mean volume measurement      

     12.5 [foz_us]        

                                        7.4-10.4        

 

                    Automated blood neutrophils/100 leukocytes           83 %   

             42-75       

 

 

                    Automated blood lymphocytes/100 leukocytes           11 %   

             12-44       

 

 

                    Blood monocytes/100 leukocytes           5 %                

 0-12        

 

                    Automated blood eosinophils/100 leukocytes           1 %    

             0-10        

 

                    Automated blood basophils/100 leukocytes           0 %      

           0-10        

 

                    Blood neutrophils automated count (number/volume)           

10.3 10*3           

1.8-7.8        

 

                    Blood lymphocytes automated count (number/volume)           

1.4 10*3            

1.0-4.0        

 

                    Blood monocytes automated count (number/volume)           0.

6 10*3            

0.0-1.0        

 

                    Automated eosinophil count           0.1 10*3/uL           0

.0-0.3        

 

                    Automated blood basophil count (count/volume)           0.0 

10*3/uL           

0.0-0.1        

 

                                        Comprehensive metabolic panel - 19

 09:55         

 

                          Serum or plasma sodium measurement (moles/volume)     

      135 mmol/L          

                                        135-145        

 

                          Serum or plasma potassium measurement (moles/volume)  

         4.4 mmol/L       

                                        3.6-5.0        

 

                          Serum or plasma chloride measurement (moles/volume)   

        100 mmol/L        

                                                

 

                    Carbon dioxide           26 mmol/L           21-32        

 

                          Serum or plasma anion gap determination (moles/volume)

           9 mmol/L       

                                        5-14        

 

                          Serum or plasma urea nitrogen measurement (mass/volume

)           21 mg/dL      

                                        7-18        

 

                          Serum or plasma creatinine measurement (mass/volume)  

         1.11 mg/dL       

                                        0.60-1.30        

 

                    Serum or plasma urea nitrogen/creatinine mass ratio         

  19                  NRG 

       

 

                                        Serum or plasma creatinine measurement w

ith calculation of estimated glomerular 

filtration rate           >                         NRG        

 

                    Serum or plasma glucose measurement (mass/volume)           

196 mg/dL           

        

 

                    Serum or plasma calcium measurement (mass/volume)           

8.5 mg/dL           

8.5-10.1        

 

                          Serum or plasma total bilirubin measurement (mass/volu

me)           0.6 mg/dL   

                                        0.1-1.0        

 

                                        Serum or plasma alkaline phosphatase juarez

surement (enzymatic activity/volume)    

                          76 U/L                            

 

                                        Serum or plasma aspartate aminotransfera

se measurement (enzymatic 

activity/volume)           14 U/L                    5-34        

 

                                        Serum or plasma alanine aminotransferase

 measurement (enzymatic activity/volume)

                          16 U/L                    0-55        

 

                    Serum or plasma protein measurement (mass/volume)           

6.6 g/dL            

6.4-8.2        

 

                    Serum or plasma albumin measurement (mass/volume)           

3.3 g/dL            

3.2-4.5        

 

                    CALCIUM CORRECTED           9.1 mg/dL           8.5-10.1    

    

 

                                        Capillary blood glucose measurement by g

lucometer (mass/volume) - 19 11:10

         

 

                          Capillary blood glucose measurement by glucometer (mas

s/volume)           176 

mg/dL                                           

 

                                        Capillary blood glucose measurement by g

lucometer (mass/volume) - 19 16:04

         

 

                          Capillary blood glucose measurement by glucometer (mas

s/volume)           266 

mg/dL                                           

 

                                        Capillary blood glucose measurement by g

lucometer (mass/volume) - 19 20:54

         

 

                          Capillary blood glucose measurement by glucometer (mas

s/volume)           261 

mg/dL                                           

 

                                        Methicillin resistant Staphylococcus aur

eus (MRSA) screening culture - 19 

21:40         

 

                          Methicillin resistant Staphylococcus aureus (MRSA) scr

eening culture           

NEG                                     NRG        

 

                                        Complete blood count (CBC) with automate

d white blood cell (WBC) differential - 

19 05:30         

 

                          Blood leukocytes automated count (number/volume)      

     10.5 10*3/uL         

                                        4.3-11.0        

 

                          Blood erythrocytes automated count (number/volume)    

       3.30 10*6/uL       

                                        4.35-5.85        

 

                    Venous blood hemoglobin measurement (mass/volume)           

10.4 g/dL           

13.3-17.7        

 

                    Blood hematocrit (volume fraction)           34 %           

     40-54        

 

                    Automated erythrocyte mean corpuscular volume           102 

[foz_us]           

80-99        

 

                                        Automated erythrocyte mean corpuscular h

emoglobin (mass per erythrocyte)        

                          32 pg                     25-34        

 

                                        Automated erythrocyte mean corpuscular h

emoglobin concentration measurement 

(mass/volume)             31 g/dL                   32-36        

 

                    Automated erythrocyte distribution width ratio           16.

9 %              10.0-

14.5        

 

                    Automated blood platelet count (count/volume)           116 

10*3/uL           

130-400        

 

                          Automated blood platelet mean volume measurement      

     12.2 [foz_us]        

                                        7.4-10.4        

 

                    Automated blood neutrophils/100 leukocytes           78 %   

             42-75       

 

 

                    Automated blood lymphocytes/100 leukocytes           14 %   

             12-44       

 

 

                    Blood monocytes/100 leukocytes           7 %                

 0-12        

 

                    Automated blood eosinophils/100 leukocytes           1 %    

             0-10        

 

                    Automated blood basophils/100 leukocytes           0 %      

           0-10        

 

                    Blood neutrophils automated count (number/volume)           

8.2 10*3            

1.8-7.8        

 

                    Blood lymphocytes automated count (number/volume)           

1.5 10*3            

1.0-4.0        

 

                    Blood monocytes automated count (number/volume)           0.

7 10*3            

0.0-1.0        

 

                    Automated eosinophil count           0.1 10*3/uL           0

.0-0.3        

 

                    Automated blood basophil count (count/volume)           0.0 

10*3/uL           

0.0-0.1        

 

                                        Comprehensive metabolic panel - 19

 05:30         

 

                          Serum or plasma sodium measurement (moles/volume)     

      137 mmol/L          

                                        135-145        

 

                          Serum or plasma potassium measurement (moles/volume)  

         4.8 mmol/L       

                                        3.6-5.0        

 

                          Serum or plasma chloride measurement (moles/volume)   

        100 mmol/L        

                                                

 

                    Carbon dioxide           27 mmol/L           21-32        

 

                          Serum or plasma anion gap determination (moles/volume)

           10 mmol/L      

                                        5-14        

 

                          Serum or plasma urea nitrogen measurement (mass/volume

)           22 mg/dL      

                                        7-18        

 

                          Serum or plasma creatinine measurement (mass/volume)  

         1.17 mg/dL       

                                        0.60-1.30        

 

                    Serum or plasma urea nitrogen/creatinine mass ratio         

  19                  NRG 

       

 

                                        Serum or plasma creatinine measurement w

ith calculation of estimated glomerular 

filtration rate           >                         NRG        

 

                    Serum or plasma glucose measurement (mass/volume)           

183 mg/dL           

        

 

                    Serum or plasma calcium measurement (mass/volume)           

8.7 mg/dL           

8.5-10.1        

 

                          Serum or plasma total bilirubin measurement (mass/volu

me)           0.3 mg/dL   

                                        0.1-1.0        

 

                                        Serum or plasma alkaline phosphatase juarez

surement (enzymatic activity/volume)    

                          85 U/L                            

 

                                        Serum or plasma aspartate aminotransfera

se measurement (enzymatic 

activity/volume)           13 U/L                    5-34        

 

                                        Serum or plasma alanine aminotransferase

 measurement (enzymatic activity/volume)

                          13 U/L                    0-55        

 

                    Serum or plasma protein measurement (mass/volume)           

6.7 g/dL            

6.4-8.2        

 

                    Serum or plasma albumin measurement (mass/volume)           

3.2 g/dL            

3.2-4.5        

 

                    CALCIUM CORRECTED           9.3 mg/dL           8.5-10.1    

    

 

                                        Capillary blood glucose measurement by g

lucometer (mass/volume) - 19 05:58

         

 

                          Capillary blood glucose measurement by glucometer (mas

s/volume)           188 

mg/dL                                           

 

                                        Capillary blood glucose measurement by g

lucometer (mass/volume) - 19 11:04

         

 

                          Capillary blood glucose measurement by glucometer (mas

s/volume)           228 

mg/dL                                           

 

                                        Bacteria identification in isolate by an

aerobe culture - 19 13:38         

 

                          Bacteria identification in isolate by anaerobe culture

           NOANA          

                                        NRG        

 

                                        Gram stain microscopy - 19 13:38  

       

 

                    Gram stain microscopy           Many Gram positive cocci in 

pairs            NRG

        

 

                                        Bacteria identification in wound by cult

ure - 19 13:38         

 

                    Bacteria identification in wound by culture           SEE CO

MMEN            NRG 

       

 

                    FREE TEXT EXTERNAL           SUSCEPTIBILITY TO FOLLOW       

     NRG        

 

                    QUANTITY OF GROWTH           .                   NRG        

 

                    MRSA AGAR           RESISTANT ORGANISM/CONTACT PRECAUTIONS  

          NRG       

 

 

                          CALL POSITIVES (F1 HELP)           REPORT FAXED TO DR BEAUCHAMP 19/KD          

                                        NRG        

 

                    PBP2                METHICILLIN-RESISTANT STAPH AUREUS      

      NRG        

 

                                        Dirithromycin susceptibility test by dis

k diffusion - 19 13:38         

 

                          Oxacillin susceptibility test by minimum inhibitory co

ncentration           >   

                                        NRG        

 

                          Clindamycin susceptibility test by minimum inhibitory 

concentration           <=

                                        NRG        

 

                          Erythromycin susceptibility test by minimum inhibitory

 concentration           >

                                        NRG        

 

                                        Trimethoprim/sulfamethoxazole susceptibi

lity test by minimum 

inhibitoryconcentration           <=                        NRG        

 

                          Vancomycin susceptibility test by minimum inhibitory c

oncentration           1  

                                        NRG        

 

                          Levofloxacin susceptibility test by minimum inhibitory

 concentration           

<=                                      NRG        

 

                          Rifampin susceptibility test by minimum inhibitory con

centration           <=   

                                        NRG        

 

                          Cefazolin susceptibility test by minimum inhibitory co

ncentration           >   

                                        NRG        

 

                          Linezolid susceptibility test by minimum inhibitory co

ncentration           2   

                                        NRG        

 

                          Penicillin G susceptibility test by minimum inhibitory

 concentration           >

                                        NRG        

 

                          Moxifloxacin susceptibility test by minimum inhibitory

 concentration           

<=                                      NRG        

 

                    Minocycline susc KODI           <=                  NRG      

  

 

                                        Dirithromycin susceptibility test by dis

k diffusion - 19 13:38         

 

                          Vancomycin susceptibility test by minimum inhibitory c

oncentration           1  

                                        NRG        

 

                          Ampicillin susceptibility test by minimum inhibitory c

oncentration           1  

                                        NRG        

 

                          Linezolid susceptibility test by minimum inhibitory co

ncentration           <=  

                                        NRG        

 

                    Daptomycin susc KODI           4                   NRG       

 

 

                                        Capillary blood glucose measurement by g

lucometer (mass/volume) - 19 15:33

         

 

                          Capillary blood glucose measurement by glucometer (mas

s/volume)           201 

mg/dL                                           

 

                                        Vancomycin trough - 19 18:00      

   

 

                    Vancomycin trough           22.8 ug/mL           10.0-20.0  

      

 

                                        Capillary blood glucose measurement by g

lucometer (mass/volume) - 19 20:55

         

 

                          Capillary blood glucose measurement by glucometer (mas

s/volume)           210 

mg/dL                                           

 

                                        Capillary blood glucose measurement by g

lucometer (mass/volume) - 19 05:17

         

 

                          Capillary blood glucose measurement by glucometer (mas

s/volume)           173 

mg/dL                                           

 

                                        Complete blood count (CBC) with automate

d white blood cell (WBC) differential - 

19 05:31         

 

                          Blood leukocytes automated count (number/volume)      

     10.7 10*3/uL         

                                        4.3-11.0        

 

                          Blood erythrocytes automated count (number/volume)    

       3.11 10*6/uL       

                                        4.35-5.85        

 

                    Venous blood hemoglobin measurement (mass/volume)           

9.8 g/dL            

13.3-17.7        

 

                    Blood hematocrit (volume fraction)           32 %           

     40-54        

 

                    Automated erythrocyte mean corpuscular volume           104 

[foz_us]           

80-99        

 

                                        Automated erythrocyte mean corpuscular h

emoglobin (mass per erythrocyte)        

                          32 pg                     25-34        

 

                                        Automated erythrocyte mean corpuscular h

emoglobin concentration measurement 

(mass/volume)             30 g/dL                   32-36        

 

                    Automated erythrocyte distribution width ratio           17.

1 %              10.0-

14.5        

 

                    Automated blood platelet count (count/volume)           129 

10*3/uL           

130-400        

 

                          Automated blood platelet mean volume measurement      

     12.4 [foz_us]        

                                        7.4-10.4        

 

                    Automated blood neutrophils/100 leukocytes           75 %   

             42-75       

 

 

                    Automated blood lymphocytes/100 leukocytes           16 %   

             12-44       

 

 

                    Blood monocytes/100 leukocytes           8 %                

 0-12        

 

                    Automated blood eosinophils/100 leukocytes           1 %    

             0-10        

 

                    Automated blood basophils/100 leukocytes           0 %      

           0-10        

 

                    Blood neutrophils automated count (number/volume)           

8.1 10*3            

1.8-7.8        

 

                    Blood lymphocytes automated count (number/volume)           

1.7 10*3            

1.0-4.0        

 

                    Blood monocytes automated count (number/volume)           0.

8 10*3            

0.0-1.0        

 

                    Automated eosinophil count           0.1 10*3/uL           0

.0-0.3        

 

                    Automated blood basophil count (count/volume)           0.0 

10*3/uL           

0.0-0.1        

 

                                        Comprehensive metabolic panel - 19

 05:31         

 

                          Serum or plasma sodium measurement (moles/volume)     

      138 mmol/L          

                                        135-145        

 

                          Serum or plasma potassium measurement (moles/volume)  

         4.6 mmol/L       

                                        3.6-5.0        

 

                          Serum or plasma chloride measurement (moles/volume)   

        102 mmol/L        

                                                

 

                    Carbon dioxide           25 mmol/L           21-32        

 

                          Serum or plasma anion gap determination (moles/volume)

           11 mmol/L      

                                        5-14        

 

                          Serum or plasma urea nitrogen measurement (mass/volume

)           29 mg/dL      

                                        7-18        

 

                          Serum or plasma creatinine measurement (mass/volume)  

         1.39 mg/dL       

                                        0.60-1.30        

 

                    Serum or plasma urea nitrogen/creatinine mass ratio         

  21                  NRG 

       

 

                                        Serum or plasma creatinine measurement w

ith calculation of estimated glomerular 

filtration rate           51                        NRG        

 

                    Serum or plasma glucose measurement (mass/volume)           

161 mg/dL           

        

 

                    Serum or plasma calcium measurement (mass/volume)           

8.5 mg/dL           

8.5-10.1        

 

                          Serum or plasma total bilirubin measurement (mass/volu

me)           0.3 mg/dL   

                                        0.1-1.0        

 

                                        Serum or plasma alkaline phosphatase juarez

surement (enzymatic activity/volume)    

                          111 U/L                           

 

                                        Serum or plasma aspartate aminotransfera

se measurement (enzymatic 

activity/volume)           15 U/L                    5-34        

 

                                        Serum or plasma alanine aminotransferase

 measurement (enzymatic activity/volume)

                          15 U/L                    0-55        

 

                    Serum or plasma protein measurement (mass/volume)           

6.7 g/dL            

6.4-8.2        

 

                    Serum or plasma albumin measurement (mass/volume)           

3.2 g/dL            

3.2-4.5        

 

                    CALCIUM CORRECTED           9.1 mg/dL           8.5-10.1    

    

 

                                        Vancomycin trough - 19 05:31      

   

 

                    Vancomycin trough           15.2 ug/mL           10.0-20.0  

      

 

                                        Capillary blood glucose measurement by g

lucometer (mass/volume) - 19 11:05

         

 

                          Capillary blood glucose measurement by glucometer (mas

s/volume)           273 

mg/dL                                           

 

                                        Capillary blood glucose measurement by g

lucometer (mass/volume) - 19 16:03

         

 

                          Capillary blood glucose measurement by glucometer (mas

s/volume)           110 

mg/dL                                           

 

                                        Capillary blood glucose measurement by g

lucometer (mass/volume) - 19 20:41

         

 

                          Capillary blood glucose measurement by glucometer (mas

s/volume)           260 

mg/dL                                           

 

                                        Complete blood count (CBC) with automate

d white blood cell (WBC) differential - 

19 05:25         

 

                          Blood leukocytes automated count (number/volume)      

     10.1 10*3/uL         

                                        4.3-11.0        

 

                          Blood erythrocytes automated count (number/volume)    

       3.44 10*6/uL       

                                        4.35-5.85        

 

                    Venous blood hemoglobin measurement (mass/volume)           

10.8 g/dL           

13.3-17.7        

 

                    Blood hematocrit (volume fraction)           36 %           

     40-54        

 

                    Automated erythrocyte mean corpuscular volume           103 

[foz_us]           

80-99        

 

                                        Automated erythrocyte mean corpuscular h

emoglobin (mass per erythrocyte)        

                          31 pg                     25-34        

 

                                        Automated erythrocyte mean corpuscular h

emoglobin concentration measurement 

(mass/volume)             30 g/dL                   32-36        

 

                    Automated erythrocyte distribution width ratio           16.

8 %              10.0-

14.5        

 

                    Automated blood platelet count (count/volume)           162 

10*3/uL           

130-400        

 

                          Automated blood platelet mean volume measurement      

     11.8 [foz_us]        

                                        7.4-10.4        

 

                    Automated blood neutrophils/100 leukocytes           77 %   

             42-75       

 

 

                    Automated blood lymphocytes/100 leukocytes           15 %   

             12-44       

 

 

                    Blood monocytes/100 leukocytes           6 %                

 0-12        

 

                    Automated blood eosinophils/100 leukocytes           2 %    

             0-10        

 

                    Automated blood basophils/100 leukocytes           0 %      

           0-10        

 

                    Blood neutrophils automated count (number/volume)           

7.7 10*3            

1.8-7.8        

 

                    Blood lymphocytes automated count (number/volume)           

1.5 10*3            

1.0-4.0        

 

                    Blood monocytes automated count (number/volume)           0.

6 10*3            

0.0-1.0        

 

                    Automated eosinophil count           0.2 10*3/uL           0

.0-0.3        

 

                    Automated blood basophil count (count/volume)           0.0 

10*3/uL           

0.0-0.1        

 

                                        Comprehensive metabolic panel - 19

 05:25         

 

                          Serum or plasma sodium measurement (moles/volume)     

      137 mmol/L          

                                        135-145        

 

                          Serum or plasma potassium measurement (moles/volume)  

         4.4 mmol/L       

                                        3.6-5.0        

 

                          Serum or plasma chloride measurement (moles/volume)   

        101 mmol/L        

                                                

 

                    Carbon dioxide           27 mmol/L           21-32        

 

                          Serum or plasma anion gap determination (moles/volume)

           9 mmol/L       

                                        5-14        

 

                          Serum or plasma urea nitrogen measurement (mass/volume

)           23 mg/dL      

                                        7-18        

 

                          Serum or plasma creatinine measurement (mass/volume)  

         1.11 mg/dL       

                                        0.60-1.30        

 

                    Serum or plasma urea nitrogen/creatinine mass ratio         

  21                  NRG 

       

 

                                        Serum or plasma creatinine measurement w

ith calculation of estimated glomerular 

filtration rate           >                         NRG        

 

                    Serum or plasma glucose measurement (mass/volume)           

160 mg/dL           

        

 

                    Serum or plasma calcium measurement (mass/volume)           

9.3 mg/dL           

8.5-10.1        

 

                          Serum or plasma total bilirubin measurement (mass/volu

me)           0.3 mg/dL   

                                        0.1-1.0        

 

                                        Serum or plasma alkaline phosphatase juarez

surement (enzymatic activity/volume)    

                          120 U/L                           

 

                                        Serum or plasma aspartate aminotransfera

se measurement (enzymatic 

activity/volume)           17 U/L                    5-34        

 

                                        Serum or plasma alanine aminotransferase

 measurement (enzymatic activity/volume)

                          17 U/L                    0-55        

 

                    Serum or plasma protein measurement (mass/volume)           

7.2 g/dL            

6.4-8.2        

 

                    Serum or plasma albumin measurement (mass/volume)           

3.4 g/dL            

3.2-4.5        

 

                    CALCIUM CORRECTED           9.8 mg/dL           8.5-10.1    

    

 

                                        Capillary blood glucose measurement by g

lucometer (mass/volume) - 19 05:35

         

 

                          Capillary blood glucose measurement by glucometer (mas

s/volume)           174 

mg/dL                                           

 

                                        Capillary blood glucose measurement by g

lucometer (mass/volume) - 19 10:55

         

 

                          Capillary blood glucose measurement by glucometer (mas

s/volume)           277 

mg/dL                                           

 

                                        PDM - 09 PANEL (PROFILE 1) - 19 16

:01         

 

                    Creatinine           110.3 mg/dL           > or = 20.0      

  

 

                    pH                  6.10                4.5 - 9.0        

 

                    Oxidant             NEGATIVE mcg/mL           <200        

 

                    Amphetamines           NEGATIVE ng/mL           <500        

 

                    medMATCH Amphetamines           CONSISTENT            NRG   

     

 

                    Benzodiazepines           NEGATIVE ng/mL           <100     

   

 

                    medMATCH Benzodiazepines           CONSISTENT            NRG

        

 

                    Marijuana Metabolite           NEGATIVE ng/mL           <20 

       

 

                    medMATCH Marijuana Metab           CONSISTENT            NRG

        

 

                    Cocaine Metabolite           NEGATIVE ng/mL           <150  

      

 

                    medMATCH Cocaine Metab           CONSISTENT            NRG  

      

 

                    Opiates             NEGATIVE CONFIRMED ng/mL           <100 

       

 

                    Oxycodone           POSITIVE ng/mL           <100        

 

                    COMMENT                                 NRG        

 

                      Codeine           NEGATIVE ng/mL           <50        

 

                    medMATCH Codeine           CONSISTENT            NRG        

 

                      Hydrocodone           NEGATIVE ng/mL           <50        

 

                    medMATCH Hydrocodone           CONSISTENT            NRG    

    

 

                      Hydromorphone           NEGATIVE ng/mL           <50      

  

 

                    medMATCH Hydromorphone           CONSISTENT            NRG  

      

 

                      Morphine           NEGATIVE ng/mL           <50        

 

                    medMATCH Morphine           CONSISTENT            NRG       

 

 

                      Norhydrocodone           NEGATIVE ng/mL           <50     

   

 

                    medMATCH Norhydrocodone           CONSISTENT            NRG 

       

 

                      Noroxycodone           5817 ng/mL           <50        

 

                    medMATCH Noroxycodone           INCONSISTENT            NRG 

       

 

                      Oxycodone           5878 ng/mL           <50        

 

                    medMATCH Oxycodone           INCONSISTENT            NRG    

    

 

                      Oxymorphone           6204 ng/mL           <50        

 

                    medMATCH Oxymorphone           INCONSISTENT            NRG  

      

 

                    Barbiturates           NEGATIVE ng/mL           <300        

 

                    medMATCH Barbiturates           CONSISTENT            NRG   

     

 

                    Methadone Metabolite           NEGATIVE ng/mL           <100

        

 

                    medMATCH Methadone Metab           CONSISTENT            NRG

        

 

                    Phencyclidine           NEGATIVE ng/mL           <25        

 

                    medMATCH Phencyclidine           CONSISTENT            NRG  

      

 

                                        Gram stain microscopy - 19 13:29  

       

 

                    Gram stain microscopy           No bacteria seen            

NRG        

 

                                        Bacteria identification in wound by cult

ure - 19 13:29         

 

                    Bacteria identification in wound by culture           [id number]

8             NRG    

    

 

                    FREE TEXT EXTERNAL           SUSCEPTIBILITY REPORTED 19 

12:05            NRG

        

 

                    QUANTITY OF GROWTH           Rare                NRG        

 

                    FREE TEXT ENTRY 2           ID REPORTED 19 14:05        

    NRG        

 

                    FREE TEXT ENTRY 3           CALLED TO [facility]  12:30 

BY ST            NRG

        

 

                                        Dirithromycin susceptibility test by dis

k diffusion - 19 13:29         

 

                          Oxacillin susceptibility test by minimum inhibitory co

ncentration           >   

                                        NRG        

 

                          Clindamycin susceptibility test by minimum inhibitory 

concentration           <=

                                        NRG        

 

                          Erythromycin susceptibility test by minimum inhibitory

 concentration           >

                                        NRG        

 

                                        Trimethoprim/sulfamethoxazole susceptibi

lity test by minimum 

inhibitoryconcentration           <=                        NRG        

 

                          Vancomycin susceptibility test by minimum inhibitory c

oncentration           1  

                                        NRG        

 

                          Levofloxacin susceptibility test by minimum inhibitory

 concentration           

<=                                      NRG        

 

                          Rifampin susceptibility test by minimum inhibitory con

centration           <=   

                                        NRG        

 

                          Cefazolin susceptibility test by minimum inhibitory co

ncentration           >   

                                        NRG        

 

                          Linezolid susceptibility test by minimum inhibitory co

ncentration           2   

                                        NRG        

 

                          Penicillin G susceptibility test by minimum inhibitory

 concentration           >

                                        NRG        

 

                          Moxifloxacin susceptibility test by minimum inhibitory

 concentration           

<=                                      NRG        

 

                    Minocycline susc KODI           <=                  NRG      

  

 

                                        Automated blood complete blood count (he

mogram) panel - 19 10:20         

 

                          Blood leukocytes automated count (number/volume)      

     10.6 10*3/uL         

                                        4.3-11.0        

 

                          Blood erythrocytes automated count (number/volume)    

       3.94 10*6/uL       

                                        4.35-5.85        

 

                    Venous blood hemoglobin measurement (mass/volume)           

12.1 g/dL           

13.3-17.7        

 

                    Blood hematocrit (volume fraction)           39 %           

     40-54        

 

                    Automated erythrocyte mean corpuscular volume           100 

[foz_us]           

80-99        

 

                                        Automated erythrocyte mean corpuscular h

emoglobin (mass per erythrocyte)        

                          31 pg                     25-34        

 

                                        Automated erythrocyte mean corpuscular h

emoglobin concentration measurement 

(mass/volume)             31 g/dL                   32-36        

 

                    Automated erythrocyte distribution width ratio           15.

6 %              10.0-

14.5        

 

                    Automated blood platelet count (count/volume)           140 

10*3/uL           

130-400        

 

                          Automated blood platelet mean volume measurement      

     11.9 [foz_us]        

                                        7.4-10.4        

 

                                        PT panel in platelet poor plasma by coag

ulation assay - 19 10:20         

 

                          Prothrombin time (PT) in platelet poor plasma by coagu

lation assay           

13.0 s                                  12.2-14.7        

 

                          INR in platelet poor plasma or blood by coagulation as

say           1.0         

                                        0.8-1.4        

 

                                        Activated partial thromboplastin time (a

PTT) in platelet poor plasma 

bycoagulation assay - 19 10:20         

 

                                        Activated partial thromboplastin time (a

PTT) in platelet poor plasma 

bycoagulation assay           31 s                      24-35        

 

                                        Comprehensive metabolic panel - 19

 10:20         

 

                          Serum or plasma sodium measurement (moles/volume)     

      138 mmol/L          

                                        135-145        

 

                          Serum or plasma potassium measurement (moles/volume)  

         5.7 mmol/L       

                                        3.6-5.0        

 

                          Serum or plasma chloride measurement (moles/volume)   

        105 mmol/L        

                                                

 

                    Carbon dioxide           23 mmol/L           21-32        

 

                          Serum or plasma anion gap determination (moles/volume)

           10 mmol/L      

                                        5-14        

 

                          Serum or plasma urea nitrogen measurement (mass/volume

)           20 mg/dL      

                                        7-18        

 

                          Serum or plasma creatinine measurement (mass/volume)  

         1.15 mg/dL       

                                        0.60-1.30        

 

                    Serum or plasma urea nitrogen/creatinine mass ratio         

  17                  NRG 

       

 

                                        Serum or plasma creatinine measurement w

ith calculation of estimated glomerular 

filtration rate           >                         NRG        

 

                    Serum or plasma glucose measurement (mass/volume)           

175 mg/dL           

        

 

                    Serum or plasma calcium measurement (mass/volume)           

9.4 mg/dL           

8.5-10.1        

 

                          Serum or plasma total bilirubin measurement (mass/volu

me)           0.2 mg/dL   

                                        0.1-1.0        

 

                                        Serum or plasma alkaline phosphatase juarez

surement (enzymatic activity/volume)    

                          88 U/L                            

 

                                        Serum or plasma aspartate aminotransfera

se measurement (enzymatic 

activity/volume)           15 U/L                    5-34        

 

                                        Serum or plasma alanine aminotransferase

 measurement (enzymatic activity/volume)

                          17 U/L                    0-55        

 

                    Serum or plasma protein measurement (mass/volume)           

7.9 g/dL            

6.4-8.2        

 

                    Serum or plasma albumin measurement (mass/volume)           

4.3 g/dL            

3.2-4.5        

 

                    CALCIUM CORRECTED           9.2 mg/dL           8.5-10.1    

    

 

                                        Lipid 1996 panel - 19 10:20       

  

 

                          Serum or plasma triglyceride measurement (mass/volume)

           297 mg/dL      

                                        <150        

 

                          Serum or plasma cholesterol measurement (mass/volume) 

          129 mg/dL       

                                        < 200        

 

                          Serum or plasma cholesterol in HDL measurement (mass/v

olume)           35 mg/dL 

                                        40-60        

 

                          Cholesterol in LDL [mass/volume] in serum or plasma by

 direct assay           40

 mg/dL                                  1-129        

 

                          Serum or plasma cholesterol in VLDL measurement (mass/

volume)           59 mg/dL

                                        5-40        

 

                                        Methicillin resistant Staphylococcus aur

eus (MRSA) screening culture - 19 

10:20         

 

                          Methicillin resistant Staphylococcus aureus (MRSA) scr

eening culture           

NEG                                     NRG        

 

                                        Automated blood complete blood count (he

mogram) panel - 19 03:55         

 

                          Blood leukocytes automated count (number/volume)      

     7.8 10*3/uL          

                                        4.3-11.0        

 

                          Blood erythrocytes automated count (number/volume)    

       3.54 10*6/uL       

                                        4.35-5.85        

 

                    Venous blood hemoglobin measurement (mass/volume)           

10.9 g/dL           

13.3-17.7        

 

                    Blood hematocrit (volume fraction)           35 %           

     40-54        

 

                    Automated erythrocyte mean corpuscular volume           100 

[foz_us]           

80-99        

 

                                        Automated erythrocyte mean corpuscular h

emoglobin (mass per erythrocyte)        

                          31 pg                     25-34        

 

                                        Automated erythrocyte mean corpuscular h

emoglobin concentration measurement 

(mass/volume)             31 g/dL                   32-36        

 

                    Automated erythrocyte distribution width ratio           15.

7 %              10.0-

14.5        

 

                    Automated blood platelet count (count/volume)           107 

10*3/uL           

130-400        

 

                          Automated blood platelet mean volume measurement      

     11.6 [foz_us]        

                                        7.4-10.4        

 

                                        Whole blood basic metabolic panel - 08/2

3/19 03:55         

 

                          Serum or plasma sodium measurement (moles/volume)     

      137 mmol/L          

                                        135-145        

 

                          Serum or plasma potassium measurement (moles/volume)  

         6.5 mmol/L       

                                        3.6-5.0        

 

                          Serum or plasma chloride measurement (moles/volume)   

        106 mmol/L        

                                                

 

                    Carbon dioxide           23 mmol/L           21-32        

 

                          Serum or plasma anion gap determination (moles/volume)

           8 mmol/L       

                                        5-14        

 

                          Serum or plasma urea nitrogen measurement (mass/volume

)           17 mg/dL      

                                        7-18        

 

                          Serum or plasma creatinine measurement (mass/volume)  

         0.86 mg/dL       

                                        0.60-1.30        

 

                    Serum or plasma urea nitrogen/creatinine mass ratio         

  20                  NRG 

       

 

                                        Serum or plasma creatinine measurement w

ith calculation of estimated glomerular 

filtration rate           >                         NRG        

 

                    Serum or plasma glucose measurement (mass/volume)           

123 mg/dL           

        

 

                    Serum or plasma calcium measurement (mass/volume)           

8.5 mg/dL           

8.5-10.1        

 

                                        Whole blood basic metabolic panel - 08/2

3/19 07:50         

 

                          Serum or plasma sodium measurement (moles/volume)     

      139 mmol/L          

                                        135-145        

 

                          Serum or plasma potassium measurement (moles/volume)  

         5.5 mmol/L       

                                        3.6-5.0        

 

                          Serum or plasma chloride measurement (moles/volume)   

        106 mmol/L        

                                                

 

                    Carbon dioxide           25 mmol/L           21-32        

 

                          Serum or plasma anion gap determination (moles/volume)

           8 mmol/L       

                                        5-14        

 

                          Serum or plasma urea nitrogen measurement (mass/volume

)           16 mg/dL      

                                        7-18        

 

                          Serum or plasma creatinine measurement (mass/volume)  

         0.94 mg/dL       

                                        0.60-1.30        

 

                    Serum or plasma urea nitrogen/creatinine mass ratio         

  17                  NRG 

       

 

                                        Serum or plasma creatinine measurement w

ith calculation of estimated glomerular 

filtration rate           >                         NRG        

 

                    Serum or plasma glucose measurement (mass/volume)           

133 mg/dL           

        

 

                    Serum or plasma calcium measurement (mass/volume)           

8.7 mg/dL           

8.5-10.1        

 

                                        Gram stain microscopy - 19 13:28  

       

 

                    Gram stain microscopy           No bacteria seen            

NRG        

 

                                        Bacteria identification in wound by cult

ure - 19 13:28         

 

                    Bacteria identification in wound by culture           [id number]

[id number]            NRG   

     

 

                    FREE TEXT EXTERNAL           NO SUSCEPTIBILITIES SET UP     

       NRG        

 

                    QUANTITY OF GROWTH           Isolated            NRG        

 

                                        Dirithromycin susceptibility test by dis

k diffusion - 19 13:28         

 

                          Oxacillin susceptibility test by minimum inhibitory co

ncentration           1   

                                        NRG        

 

                          Clindamycin susceptibility test by minimum inhibitory 

concentration           > 

                                        NRG        

 

                          Erythromycin susceptibility test by minimum inhibitory

 concentration           >

                                        NRG        

 

                          Vancomycin susceptibility test by minimum inhibitory c

oncentration           1  

                                        NRG        

 

                          Levofloxacin susceptibility test by minimum inhibitory

 concentration           

<=                                      NRG        

 

                          Rifampin susceptibility test by minimum inhibitory con

centration           <=   

                                        NRG        

 

                          Cefazolin susceptibility test by minimum inhibitory co

ncentration           R   

                                        NRG        

 

                          Linezolid susceptibility test by minimum inhibitory co

ncentration           2   

                                        NRG        

 

                          Penicillin G susceptibility test by minimum inhibitory

 concentration           1

                                        NRG        

 

                          Moxifloxacin susceptibility test by minimum inhibitory

 concentration           S

                                        NRG        

 

                    Minocycline susc KODI           <=                  NRG      

  

 

                                        Complete blood count (CBC) with automate

d white blood cell (WBC) differential - 

10/23/19 13:50         

 

                          Blood leukocytes automated count (number/volume)      

     9.1 10*3/uL          

                                        4.3-11.0        

 

                          Blood erythrocytes automated count (number/volume)    

       3.81 10*6/uL       

                                        4.35-5.85        

 

                    Venous blood hemoglobin measurement (mass/volume)           

12.0 g/dL           

13.3-17.7        

 

                    Blood hematocrit (volume fraction)           38 %           

     40-54        

 

                    Automated erythrocyte mean corpuscular volume           98 [

foz_us]           

80-99        

 

                                        Automated erythrocyte mean corpuscular h

emoglobin (mass per erythrocyte)        

                          32 pg                     25-34        

 

                                        Automated erythrocyte mean corpuscular h

emoglobin concentration measurement 

(mass/volume)             32 g/dL                   32-36        

 

                    Automated erythrocyte distribution width ratio           16.

6 %              10.0-

14.5        

 

                    Automated blood platelet count (count/volume)           123 

10*3/uL           

130-400        

 

                          Automated blood platelet mean volume measurement      

     12.1 [foz_us]        

                                        7.4-10.4        

 

                    Automated blood neutrophils/100 leukocytes           76 %   

             42-75       

 

 

                    Automated blood lymphocytes/100 leukocytes           17 %   

             12-44       

 

 

                    Blood monocytes/100 leukocytes           4 %                

 0-12        

 

                    Automated blood eosinophils/100 leukocytes           2 %    

             0-10        

 

                    Automated blood basophils/100 leukocytes           1 %      

           0-10        

 

                    Blood neutrophils automated count (number/volume)           

7.0 10*3            

1.8-7.8        

 

                    Blood lymphocytes automated count (number/volume)           

1.5 10*3            

1.0-4.0        

 

                    Blood monocytes automated count (number/volume)           0.

4 10*3            

0.0-1.0        

 

                    Automated eosinophil count           0.2 10*3/uL           0

.0-0.3        

 

                    Automated blood basophil count (count/volume)           0.1 

10*3/uL           

0.0-0.1        

 

                                        Comprehensive metabolic panel - 10/23/19

 13:50         

 

                          Serum or plasma sodium measurement (moles/volume)     

      140 mmol/L          

                                        135-145        

 

                          Serum or plasma potassium measurement (moles/volume)  

         5.3 mmol/L       

                                        3.6-5.0        

 

                          Serum or plasma chloride measurement (moles/volume)   

        107 mmol/L        

                                                

 

                    Carbon dioxide           23 mmol/L           21-32        

 

                          Serum or plasma anion gap determination (moles/volume)

           10 mmol/L      

                                        5-14        

 

                          Serum or plasma urea nitrogen measurement (mass/volume

)           32 mg/dL      

                                        7-18        

 

                          Serum or plasma creatinine measurement (mass/volume)  

         1.83 mg/dL       

                                        0.60-1.30        

 

                    Serum or plasma urea nitrogen/creatinine mass ratio         

  17                  NRG 

       

 

                                        Serum or plasma creatinine measurement w

ith calculation of estimated glomerular 

filtration rate           37                        NRG        

 

                    Serum or plasma glucose measurement (mass/volume)           

135 mg/dL           

        

 

                    Serum or plasma calcium measurement (mass/volume)           

9.0 mg/dL           

8.5-10.1        

 

                          Serum or plasma total bilirubin measurement (mass/volu

me)           0.2 mg/dL   

                                        0.1-1.0        

 

                                        Serum or plasma alkaline phosphatase juarez

surement (enzymatic activity/volume)    

                          104 U/L                           

 

                                        Serum or plasma aspartate aminotransfera

se measurement (enzymatic 

activity/volume)           14 U/L                    5-34        

 

                                        Serum or plasma alanine aminotransferase

 measurement (enzymatic activity/volume)

                          15 U/L                    0-55        

 

                    Serum or plasma protein measurement (mass/volume)           

7.0 g/dL            

6.4-8.2        

 

                    Serum or plasma albumin measurement (mass/volume)           

4.0 g/dL            

3.2-4.5        

 

                    CALCIUM CORRECTED           9.0 mg/dL           8.5-10.1    

    

 

                                        Hemoglobin A1c measurement - 10/23/19 13

:50         

 

                    Blood hemoglobin A1C measurement (mass/volume)           6.4

 %               4.0-5.6

        

 

                    MEAN BLOOD GLUCOSE           137 %               <=126      

  

 

                                        Prealbumin - 10/23/19 13:50         

 

                    Serum or plasma prealbumin measurement (mass/volume)        

   21.2 %              

18.0-37.0        

 

                                        Complete blood count (CBC) with automate

d white blood cell (WBC) differential - 

19 13:35         

 

                          Blood leukocytes automated count (number/volume)      

     10.1 10*3/uL         

                                        4.3-11.0        

 

                          Blood erythrocytes automated count (number/volume)    

       3.74 10*6/uL       

                                        4.35-5.85        

 

                    Venous blood hemoglobin measurement (mass/volume)           

11.8 g/dL           

13.3-17.7        

 

                    Blood hematocrit (volume fraction)           37 %           

     40-54        

 

                    Automated erythrocyte mean corpuscular volume           99 [

foz_us]           

80-99        

 

                                        Automated erythrocyte mean corpuscular h

emoglobin (mass per erythrocyte)        

                          32 pg                     25-34        

 

                                        Automated erythrocyte mean corpuscular h

emoglobin concentration measurement 

(mass/volume)             32 g/dL                   32-36        

 

                    Automated erythrocyte distribution width ratio           16.

5 %              10.0-

14.5        

 

                    Automated blood platelet count (count/volume)           125 

10*3/uL           

130-400        

 

                          Automated blood platelet mean volume measurement      

     13.3 [foz_us]        

                                        7.4-10.4        

 

                    Automated blood neutrophils/100 leukocytes           73 %   

             42-75       

 

 

                    Automated blood lymphocytes/100 leukocytes           20 %   

             12-44       

 

 

                    Blood monocytes/100 leukocytes           5 %                

 0-12        

 

                    Automated blood eosinophils/100 leukocytes           2 %    

             0-10        

 

                    Automated blood basophils/100 leukocytes           1 %      

           0-10        

 

                    Blood neutrophils automated count (number/volume)           

7.4 10*3            

1.8-7.8        

 

                    Blood lymphocytes automated count (number/volume)           

2.0 10*3            

1.0-4.0        

 

                    Blood monocytes automated count (number/volume)           0.

5 10*3            

0.0-1.0        

 

                    Automated eosinophil count           0.2 10*3/uL           0

.0-0.3        

 

                    Automated blood basophil count (count/volume)           0.1 

10*3/uL           

0.0-0.1        

 

                                        Comprehensive metabolic panel - 19

 13:35         

 

                          Serum or plasma sodium measurement (moles/volume)     

      138 mmol/L          

                                        135-145        

 

                          Serum or plasma potassium measurement (moles/volume)  

         5.6 mmol/L       

                                        3.6-5.0        

 

                          Serum or plasma chloride measurement (moles/volume)   

        103 mmol/L        

                                                

 

                    Carbon dioxide           23 mmol/L           21-32        

 

                          Serum or plasma anion gap determination (moles/volume)

           12 mmol/L      

                                        5-14        

 

                          Serum or plasma urea nitrogen measurement (mass/volume

)           37 mg/dL      

                                        7-18        

 

                          Serum or plasma creatinine measurement (mass/volume)  

         1.49 mg/dL       

                                        0.60-1.30        

 

                    Serum or plasma urea nitrogen/creatinine mass ratio         

  25                  NRG 

       

 

                                        Serum or plasma creatinine measurement w

ith calculation of estimated glomerular 

filtration rate           47                        NRG        

 

                    Serum or plasma glucose measurement (mass/volume)           

131 mg/dL           

        

 

                    Serum or plasma calcium measurement (mass/volume)           

8.6 mg/dL           

8.5-10.1        

 

                          Serum or plasma total bilirubin measurement (mass/volu

me)           0.2 mg/dL   

                                        0.1-1.0        

 

                                        Serum or plasma alkaline phosphatase juarez

surement (enzymatic activity/volume)    

                          88 U/L                            

 

                                        Serum or plasma aspartate aminotransfera

se measurement (enzymatic 

activity/volume)           23 U/L                    5-34        

 

                                        Serum or plasma alanine aminotransferase

 measurement (enzymatic activity/volume)

                          15 U/L                    0-55        

 

                    Serum or plasma protein measurement (mass/volume)           

7.4 g/dL            

6.4-8.2        

 

                    Serum or plasma albumin measurement (mass/volume)           

4.1 g/dL            

3.2-4.5        

 

                    CALCIUM CORRECTED           8.5 mg/dL           8.5-10.1    

    

 

                                        Serum or plasma troponin i.cardiac measu

rement (mass/volume) - 19 13:35   

      

 

                          Serum or plasma troponin i.cardiac measurement (mass/v

olume)           < ng/mL  

                                        <0.028        

 

                                        Arterial blood gas measurement - 

9 13:36         

 

                    Blood pCO2           63 mm[Hg]           35-45        

 

                    Blood pO2           38 mm[Hg]           79-93        

 

                          Arterial blood bicarbonate measurement (moles/volume) 

          27 mmol/L       

                                        23-27        

 

                    Arterial blood base excess by calculation           -0.1 mmo

l/L           

-2.5-2.5        

 

                    Arterial blood oxygen saturation measurement           66 % 

                   

    

 

                    * Inhaled oxygen flow rate           3 L                 NRG

        

 

                          Arterial blood pH measurement with patient temperature

 correction           7.24

                                        7.37-7.43        

 

                          Arterial blood carbon dioxide, total measurement (mole

s/volume)           28.5 

mmol/L                                  21.0-31.0        

 

                    Body site           RIGHT RADIAL            NRG        

 

                          Assessment of wrist artery patency prior to arterial p

uncture           POSITIVE

                                        NRG        

 

                    Setting of ventilation mode           NO                  NR

G        

 

                    Measurement of body temperature           98                

  NRG        

 

                                        Gram stain microscopy - 19 13:07  

       

 

                    Gram stain microscopy           NO BACTERIA SEEN            

NRG        

 

                                        Bacteria identification in wound by cult

ure - 19 13:07         

 

                    Bacteria identification in wound by culture           [id number]

[id number]            NRG   

     

 

                    FREE TEXT EXTERNAL           SUSCEPTIBILITY REPORTED 

9 13:40            

NRG        

 

                    QUANTITY OF GROWTH           Rare                NRG        

 

                                        Dirithromycin susceptibility test by dis

k diffusion - 19 13:07         

 

                          Gentamicin susceptibility test by minimum inhibitory c

oncentration           <= 

                                        NRG        

 

                          Levofloxacin susceptibility test by minimum inhibitory

 concentration           

<=                                      NRG        

 

                          Tobramycin susceptibility test by minimum inhibitory c

oncentration           <= 

                                        NRG        

 

                                        Piperacillin/tazobactam susceptibility t

est by minimum inhibitory concentration 

                          =                         NRG        

 

                          Ciprofloxacin susceptibility test by minimum inhibitor

y concentration           

<=                                      NRG        

 

                          Meropenem susceptibility test by minimum inhibitory co

ncentration           <=  

                                        NRG        

 

                          Aztreonam susceptibility test by minimum inhibitory co

ncentration           8   

                                        NRG        

 

                          Cefepime susceptibility test by minimum inhibitory con

centration           2    

                                        NRG        

 

                          Imipenem susceptibility test by minimum inhibitory con

centration           1    

                                        NRG        

 

                          Ceftazidime susceptibility test by minimum inhibitory 

concentration           <=

                                        NRG        

 

                                        Dirithromycin susceptibility test by dis

k diffusion - 19 13:07         

 

                          Oxacillin susceptibility test by minimum inhibitory co

ncentration           1   

                                        NRG        

 

                          Clindamycin susceptibility test by minimum inhibitory 

concentration           > 

                                        NRG        

 

                          Erythromycin susceptibility test by minimum inhibitory

 concentration           >

                                        NRG        

 

                          Vancomycin susceptibility test by minimum inhibitory c

oncentration           1  

                                        NRG        

 

                          Levofloxacin susceptibility test by minimum inhibitory

 concentration           

<=                                      NRG        

 

                          Rifampin susceptibility test by minimum inhibitory con

centration           <=   

                                        NRG        

 

                          Cefazolin susceptibility test by minimum inhibitory co

ncentration           R   

                                        NRG        

 

                          Linezolid susceptibility test by minimum inhibitory co

ncentration           <=  

                                        NRG        

 

                          Penicillin G susceptibility test by minimum inhibitory

 concentration           1

                                        NRG        

 

                          Moxifloxacin susceptibility test by minimum inhibitory

 concentration           S

                                        NRG        

 

                    Minocycline susc KODI           <=                  NRG      

  

 

                                        Gram stain microscopy - 20 12:56  

       

 

                    Gram stain microscopy           No bacteria seen            

NRG        

 

                                        Bacteria identification in wound by cult

ure - 20 12:56         

 

                    Bacteria identification in wound by culture           [id number]

04            NRG   

     

 

                    FREE TEXT EXTERNAL           SUSCEPTIBILITY REPORTED  17:

15            NRG   

     

 

                    QUANTITY OF GROWTH           Scant Growth            NRG    

    

 

                    FREE TEXT ENTRY 2           PRELIMIN RAPID ID TEST AT [facility]  7:[id number]            

NRG        

 

                    FREE TEXT ENTRY 3           [facility]ML CONFIRMED ID  16:[id number]     

       NRG        

 

                          CALL POSITIVES (F1 HELP)           CALLED TO REBECCA/WD [facility]

RE  8:45 BY ST        

                                        NRG        

 

                    PBP2                PRESUMPTIVE MRSA; SCREENING AT [facility]      

      NRG        

 

                    FREE TEXT ENTRY 4           RML CONFIRMED ID  16:[id number]     

       NRG        

 

                                        Dirithromycin susceptibility test by dis

k diffusion - 20 12:56         

 

                          Oxacillin susceptibility test by minimum inhibitory co

ncentration           >   

                                        NRG        

 

                          Clindamycin susceptibility test by minimum inhibitory 

concentration           > 

                                        NRG        

 

                          Erythromycin susceptibility test by minimum inhibitory

 concentration           >

                                        NRG        

 

                                        Trimethoprim/sulfamethoxazole susceptibi

lity test by minimum 

inhibitoryconcentration           >                         NRG        

 

                          Vancomycin susceptibility test by minimum inhibitory c

oncentration           1  

                                        NRG        

 

                          Levofloxacin susceptibility test by minimum inhibitory

 concentration           4

                                        NRG        

 

                          Rifampin susceptibility test by minimum inhibitory con

centration           <=   

                                        NRG        

 

                          Cefazolin susceptibility test by minimum inhibitory co

ncentration           >   

                                        NRG        

 

                          Linezolid susceptibility test by minimum inhibitory co

ncentration           <=  

                                        NRG        

 

                          Penicillin G susceptibility test by minimum inhibitory

 concentration           >

                                        NRG        

 

                          Moxifloxacin susceptibility test by minimum inhibitory

 concentration           1

                                        NRG        

 

                    Minocycline susc KODI           <=                  NRG      

  

 

                                        Dirithromycin susceptibility test by dis

k diffusion - 20 12:56         

 

                          Gentamicin susceptibility test by minimum inhibitory c

oncentration           <= 

                                        NRG        

 

                          Levofloxacin susceptibility test by minimum inhibitory

 concentration           

<=                                      NRG        

 

                          Tobramycin susceptibility test by minimum inhibitory c

oncentration           <= 

                                        NRG        

 

                                        Piperacillin/tazobactam susceptibility t

est by minimum inhibitory concentration 

                          =                         NRG        

 

                          Ciprofloxacin susceptibility test by minimum inhibitor

y concentration           

<=                                      NRG        

 

                          Meropenem susceptibility test by minimum inhibitory co

ncentration           <=  

                                        NRG        

 

                          Aztreonam susceptibility test by minimum inhibitory co

ncentration           8   

                                        NRG        

 

                          Cefepime susceptibility test by minimum inhibitory con

centration           2    

                                        NRG        

 

                          Imipenem susceptibility test by minimum inhibitory con

centration           1    

                                        NRG        

 

                          Ceftazidime susceptibility test by minimum inhibitory 

concentration           <=

                                        NRG        

 

                                        Complete blood count (CBC) with automate

d white blood cell (WBC) differential - 

20 16:36         

 

                          Blood leukocytes automated count (number/volume)      

     14.0 10*3/uL         

                                        4.3-11.0        

 

                          Blood erythrocytes automated count (number/volume)    

       3.78 10*6/uL       

                                        4.35-5.85        

 

                    Venous blood hemoglobin measurement (mass/volume)           

11.8 g/dL           

13.3-17.7        

 

                    Blood hematocrit (volume fraction)           37 %           

     40-54        

 

                    Automated erythrocyte mean corpuscular volume           99 [

foz_us]           

80-99        

 

                                        Automated erythrocyte mean corpuscular h

emoglobin (mass per erythrocyte)        

                          31 pg                     25-34        

 

                                        Automated erythrocyte mean corpuscular h

emoglobin concentration measurement 

(mass/volume)             32 g/dL                   32-36        

 

                    Automated erythrocyte distribution width ratio           16.

1 %              10.0-

14.5        

 

                    Automated blood platelet count (count/volume)           129 

10*3/uL           

130-400        

 

                          Automated blood platelet mean volume measurement      

     12.3 [foz_us]        

                                        7.4-10.4        

 

                    Automated blood neutrophils/100 leukocytes           81 %   

             42-75       

 

 

                    Automated blood lymphocytes/100 leukocytes           11 %   

             12-44       

 

 

                    Blood monocytes/100 leukocytes           6 %                

 0-12        

 

                    Automated blood eosinophils/100 leukocytes           2 %    

             0-10        

 

                    Automated blood basophils/100 leukocytes           0 %      

           0-10        

 

                    Blood neutrophils automated count (number/volume)           

11.3 10*3           

1.8-7.8        

 

                    Blood lymphocytes automated count (number/volume)           

1.6 10*3            

1.0-4.0        

 

                    Blood monocytes automated count (number/volume)           0.

8 10*3            

0.0-1.0        

 

                    Automated eosinophil count           0.3 10*3/uL           0

.0-0.3        

 

                    Automated blood basophil count (count/volume)           0.1 

10*3/uL           

0.0-0.1        

 

                                        Blood lactic acid measurement (moles/vol

ume) - 20 16:36         

 

                    Blood lactic acid measurement (moles/volume)           1.48 

mmol/L           

0.50-2.00        

 

                                        Comprehensive metabolic panel - 20

 16:36         

 

                          Serum or plasma sodium measurement (moles/volume)     

      137 mmol/L          

                                        135-145        

 

                          Serum or plasma potassium measurement (moles/volume)  

         4.9 mmol/L       

                                        3.6-5.0        

 

                          Serum or plasma chloride measurement (moles/volume)   

        102 mmol/L        

                                                

 

                    Carbon dioxide           27 mmol/L           21-32        

 

                          Serum or plasma anion gap determination (moles/volume)

           8 mmol/L       

                                        5-14        

 

                          Serum or plasma urea nitrogen measurement (mass/volume

)           23 mg/dL      

                                        7-18        

 

                          Serum or plasma creatinine measurement (mass/volume)  

         1.15 mg/dL       

                                        0.60-1.30        

 

                    Serum or plasma urea nitrogen/creatinine mass ratio         

  20                  NRG 

       

 

                                        Serum or plasma creatinine measurement w

ith calculation of estimated glomerular 

filtration rate           >                         NRG        

 

                    Serum or plasma glucose measurement (mass/volume)           

177 mg/dL           

        

 

                    Serum or plasma calcium measurement (mass/volume)           

9.5 mg/dL           

8.5-10.1        

 

                          Serum or plasma total bilirubin measurement (mass/volu

me)           0.4 mg/dL   

                                        0.1-1.0        

 

                                        Serum or plasma alkaline phosphatase juarez

surement (enzymatic activity/volume)    

                          131 U/L                           

 

                                        Serum or plasma aspartate aminotransfera

se measurement (enzymatic 

activity/volume)           63 U/L                    5-34        

 

                                        Serum or plasma alanine aminotransferase

 measurement (enzymatic activity/volume)

                          73 U/L                    0-55        

 

                    Serum or plasma protein measurement (mass/volume)           

7.7 g/dL            

6.4-8.2        

 

                    Serum or plasma albumin measurement (mass/volume)           

3.8 g/dL            

3.2-4.5        

 

                    CALCIUM CORRECTED           9.7 mg/dL           8.5-10.1    

    

 

                                        Bacterial blood culture - 20 16:36

         

 

                    Bacterial blood culture           NG                  NRG   

     

 

                                        Bacterial blood culture - 20 16:43

         

 

                    Bacterial blood culture           NG                  NRG   

     

 

                                        Gram stain microscopy - 20 16:50  

       

 

                          Gram stain microscopy           Moderate Gram positive

 cocci in clusters        

                                        NRG        

 

                                        Bacteria identification in wound by cult

ure - 20 16:50         

 

                    Bacteria identification in wound by culture           [id number]

04            NRG   

     

 

                    FREE TEXT EXTERNAL           TESTING IN PROGRESS            

NRG        

 

                    QUANTITY OF GROWTH           Rare                NRG        

 

                    MRSA AGAR           ID CONFIRMED BY RML 20 14:05        

    NRG        

 

                    FREE TEXT ENTRY 2           RML REPORTED ID  11:[id number]       

     NRG        

 

                    FREE TEXT ENTRY 3           PRESUMPTIVE MRSA; SCREENING AT [facility]

CP            NRG   

     

 

                    CALL POSITIVES (F1 HELP)           07:45 BY EDIL FLORES        

    NRG        

 

                    PBP2                CALLED TO [facility] ON 4TH FLOOR       

      NRG        

 

                                        Dirithromycin susceptibility test by dis

k diffusion - 20 16:50         

 

                          Oxacillin susceptibility test by minimum inhibitory co

ncentration           >   

                                        NRG        

 

                          Clindamycin susceptibility test by minimum inhibitory 

concentration           > 

                                        NRG        

 

                          Erythromycin susceptibility test by minimum inhibitory

 concentration           >

                                        NRG        

 

                                        Trimethoprim/sulfamethoxazole susceptibi

lity test by minimum 

inhibitoryconcentration           >                         NRG        

 

                          Vancomycin susceptibility test by minimum inhibitory c

oncentration           1  

                                        NRG        

 

                          Levofloxacin susceptibility test by minimum inhibitory

 concentration           4

                                        NRG        

 

                          Rifampin susceptibility test by minimum inhibitory con

centration           <=   

                                        NRG        

 

                          Cefazolin susceptibility test by minimum inhibitory co

ncentration           >   

                                        NRG        

 

                          Linezolid susceptibility test by minimum inhibitory co

ncentration           2   

                                        NRG        

 

                          Penicillin G susceptibility test by minimum inhibitory

 concentration           >

                                        NRG        

 

                          Moxifloxacin susceptibility test by minimum inhibitory

 concentration           2

                                        NRG        

 

                    Minocycline susc KODI           <=                  NRG      

  

 

                                        Capillary blood glucose measurement by g

lucometer (mass/volume) - 20 20:43

         

 

                          Capillary blood glucose measurement by glucometer (mas

s/volume)           221 

mg/dL                                           

 

                                        Complete blood count (CBC) with automate

d white blood cell (WBC) differential - 

20 05:13         

 

                          Blood leukocytes automated count (number/volume)      

     11.5 10*3/uL         

                                        4.3-11.0        

 

                          Blood erythrocytes automated count (number/volume)    

       3.45 10*6/uL       

                                        4.35-5.85        

 

                    Venous blood hemoglobin measurement (mass/volume)           

10.8 g/dL           

13.3-17.7        

 

                    Blood hematocrit (volume fraction)           35 %           

     40-54        

 

                    Automated erythrocyte mean corpuscular volume           100 

[foz_us]           

80-99        

 

                                        Automated erythrocyte mean corpuscular h

emoglobin (mass per erythrocyte)        

                          31 pg                     25-34        

 

                                        Automated erythrocyte mean corpuscular h

emoglobin concentration measurement 

(mass/volume)             31 g/dL                   32-36        

 

                    Automated erythrocyte distribution width ratio           15.

8 %              10.0-

14.5        

 

                    Automated blood platelet count (count/volume)           130 

10*3/uL           

130-400        

 

                          Automated blood platelet mean volume measurement      

     12.5 [foz_us]        

                                        7.4-10.4        

 

                    Automated blood neutrophils/100 leukocytes           78 %   

             42-75       

 

 

                    Automated blood lymphocytes/100 leukocytes           14 %   

             12-44       

 

 

                    Blood monocytes/100 leukocytes           6 %                

 0-12        

 

                    Automated blood eosinophils/100 leukocytes           3 %    

             0-10        

 

                    Automated blood basophils/100 leukocytes           0 %      

           0-10        

 

                    Blood neutrophils automated count (number/volume)           

9.0 10*3            

1.8-7.8        

 

                    Blood lymphocytes automated count (number/volume)           

1.6 10*3            

1.0-4.0        

 

                    Blood monocytes automated count (number/volume)           0.

7 10*3            

0.0-1.0        

 

                    Automated eosinophil count           0.3 10*3/uL           0

.0-0.3        

 

                    Automated blood basophil count (count/volume)           0.0 

10*3/uL           

0.0-0.1        

 

                                        Comprehensive metabolic panel - 20

 05:13         

 

                          Serum or plasma sodium measurement (moles/volume)     

      137 mmol/L          

                                        135-145        

 

                          Serum or plasma potassium measurement (moles/volume)  

         5.2 mmol/L       

                                        3.6-5.0        

 

                          Serum or plasma chloride measurement (moles/volume)   

        103 mmol/L        

                                                

 

                    Carbon dioxide           25 mmol/L           21-32        

 

                          Serum or plasma anion gap determination (moles/volume)

           9 mmol/L       

                                        5-14        

 

                          Serum or plasma urea nitrogen measurement (mass/volume

)           26 mg/dL      

                                        7-18        

 

                          Serum or plasma creatinine measurement (mass/volume)  

         1.20 mg/dL       

                                        0.60-1.30        

 

                    Serum or plasma urea nitrogen/creatinine mass ratio         

  22                  NRG 

       

 

                                        Serum or plasma creatinine measurement w

ith calculation of estimated glomerular 

filtration rate           60                        NRG        

 

                    Serum or plasma glucose measurement (mass/volume)           

179 mg/dL           

        

 

                    Serum or plasma calcium measurement (mass/volume)           

9.0 mg/dL           

8.5-10.1        

 

                          Serum or plasma total bilirubin measurement (mass/volu

me)           0.3 mg/dL   

                                        0.1-1.0        

 

                                        Serum or plasma alkaline phosphatase juarez

surement (enzymatic activity/volume)    

                          116 U/L                           

 

                                        Serum or plasma aspartate aminotransfera

se measurement (enzymatic 

activity/volume)           43 U/L                    5-34        

 

                                        Serum or plasma alanine aminotransferase

 measurement (enzymatic activity/volume)

                          66 U/L                    0-55        

 

                    Serum or plasma protein measurement (mass/volume)           

6.8 g/dL            

6.4-8.2        

 

                    Serum or plasma albumin measurement (mass/volume)           

3.3 g/dL            

3.2-4.5        

 

                    CALCIUM CORRECTED           9.6 mg/dL           8.5-10.1    

    

 

                                        Capillary blood glucose measurement by g

lucometer (mass/volume) - 20 05:18

         

 

                          Capillary blood glucose measurement by glucometer (mas

s/volume)           193 

mg/dL                                           

 

                                        Capillary blood glucose measurement by g

lucometer (mass/volume) - 20 11:06

         

 

                          Capillary blood glucose measurement by glucometer (mas

s/volume)           181 

mg/dL                                           

 

                                        Bacteria identification in isolate by an

aerobe culture - 20 15:50         

 

                          Bacteria identification in isolate by anaerobe culture

           NOANA          

                                        NRG        

 

                                        Gram stain microscopy - 20 15:50  

       

 

                    Gram stain microscopy           Mixed Bacterial [place]       

     NRG        

 

                                        Bacteria identification in wound by cult

ure - 20 15:50         

 

                    Bacteria identification in wound by culture           SEE CO

MMEN            NRG 

       

 

                    FREE TEXT EXTERNAL           SUSCEPTIBILITY REPORTED 2/10 14

:30            NRG  

      

 

                    QUANTITY OF GROWTH           .                   NRG        

 

                    FREE TEXT ENTRY 2           PRELIM RAPID ID TEST AT [facility]  

12:15            NRG

        

 

                    FREE TEXT ENTRY 3           RML CONFIRMED ID  15:05      

      NRG        

 

                    FREE TEXT ENTRY 4           RML CONFIRMED ID 2/8 12:05      

      NRG        

 

                                        Dirithromycin susceptibility test by dis

k diffusion - 20 15:50         

 

                          Oxacillin susceptibility test by minimum inhibitory co

ncentration           >   

                                        NRG        

 

                          Clindamycin susceptibility test by minimum inhibitory 

concentration           > 

                                        NRG        

 

                          Erythromycin susceptibility test by minimum inhibitory

 concentration           >

                                        NRG        

 

                                        Trimethoprim/sulfamethoxazole susceptibi

lity test by minimum 

inhibitoryconcentration           >                         NRG        

 

                          Vancomycin susceptibility test by minimum inhibitory c

oncentration           1  

                                        NRG        

 

                          Levofloxacin susceptibility test by minimum inhibitory

 concentration           4

                                        NRG        

 

                          Rifampin susceptibility test by minimum inhibitory con

centration           <=   

                                        NRG        

 

                          Cefazolin susceptibility test by minimum inhibitory co

ncentration           >   

                                        NRG        

 

                          Linezolid susceptibility test by minimum inhibitory co

ncentration           <=  

                                        NRG        

 

                          Penicillin G susceptibility test by minimum inhibitory

 concentration           >

                                        NRG        

 

                          Moxifloxacin susceptibility test by minimum inhibitory

 concentration           1

                                        NRG        

 

                    Minocycline susc KODI           <=                  NRG      

  

 

                                        Dirithromycin susceptibility test by dis

k diffusion - 20 15:50         

 

                          Gentamicin susceptibility test by minimum inhibitory c

oncentration           <= 

                                        NRG        

 

                          Levofloxacin susceptibility test by minimum inhibitory

 concentration           

<=                                      NRG        

 

                          Tobramycin susceptibility test by minimum inhibitory c

oncentration           <= 

                                        NRG        

 

                                        Piperacillin/tazobactam susceptibility t

est by minimum inhibitory concentration 

                          =                         NRG        

 

                          Ciprofloxacin susceptibility test by minimum inhibitor

y concentration           

<=                                      NRG        

 

                          Meropenem susceptibility test by minimum inhibitory co

ncentration           <=  

                                        NRG        

 

                          Aztreonam susceptibility test by minimum inhibitory co

ncentration           8   

                                        NRG        

 

                          Cefepime susceptibility test by minimum inhibitory con

centration           4    

                                        NRG        

 

                          Imipenem susceptibility test by minimum inhibitory con

centration           2    

                                        NRG        

 

                          Ceftazidime susceptibility test by minimum inhibitory 

concentration           4 

                                        NRG        

 

                                        Capillary blood glucose measurement by g

lucometer (mass/volume) - 20 17:24

         

 

                          Capillary blood glucose measurement by glucometer (mas

s/volume)           166 

mg/dL                                           

 

                                        Capillary blood glucose measurement by g

lucometer (mass/volume) - 20 20:15

         

 

                          Capillary blood glucose measurement by glucometer (mas

s/volume)           236 

mg/dL                                           

 

                                        Capillary blood glucose measurement by g

lucometer (mass/volume) - 20 05:36

         

 

                          Capillary blood glucose measurement by glucometer (mas

s/volume)           157 

mg/dL                                           

 

                                        Complete blood count (CBC) with automate

d white blood cell (WBC) differential - 

20 07:39         

 

                          Blood leukocytes automated count (number/volume)      

     9.2 10*3/uL          

                                        4.3-11.0        

 

                          Blood erythrocytes automated count (number/volume)    

       3.40 10*6/uL       

                                        4.35-5.85        

 

                    Venous blood hemoglobin measurement (mass/volume)           

10.7 g/dL           

13.3-17.7        

 

                    Blood hematocrit (volume fraction)           34 %           

     40-54        

 

                    Automated erythrocyte mean corpuscular volume           101 

[foz_us]           

80-99        

 

                                        Automated erythrocyte mean corpuscular h

emoglobin (mass per erythrocyte)        

                          31 pg                     25-34        

 

                                        Automated erythrocyte mean corpuscular h

emoglobin concentration measurement 

(mass/volume)             31 g/dL                   32-36        

 

                    Automated erythrocyte distribution width ratio           16.

0 %              10.0-

14.5        

 

                    Automated blood platelet count (count/volume)           140 

10*3/uL           

130-400        

 

                          Automated blood platelet mean volume measurement      

     12.0 [foz_us]        

                                        7.4-10.4        

 

                    Automated blood neutrophils/100 leukocytes           72 %   

             42-75       

 

 

                    Automated blood lymphocytes/100 leukocytes           19 %   

             12-44       

 

 

                    Blood monocytes/100 leukocytes           5 %                

 0-12        

 

                    Automated blood eosinophils/100 leukocytes           3 %    

             0-10        

 

                    Automated blood basophils/100 leukocytes           1 %      

           0-10        

 

                    Blood neutrophils automated count (number/volume)           

6.6 10*3            

1.8-7.8        

 

                    Blood lymphocytes automated count (number/volume)           

1.8 10*3            

1.0-4.0        

 

                    Blood monocytes automated count (number/volume)           0.

5 10*3            

0.0-1.0        

 

                    Automated eosinophil count           0.3 10*3/uL           0

.0-0.3        

 

                    Automated blood basophil count (count/volume)           0.1 

10*3/uL           

0.0-0.1        

 

                                        Comprehensive metabolic panel - 20

 07:39         

 

                          Serum or plasma sodium measurement (moles/volume)     

      138 mmol/L          

                                        135-145        

 

                          Serum or plasma potassium measurement (moles/volume)  

         4.8 mmol/L       

                                        3.6-5.0        

 

                          Serum or plasma chloride measurement (moles/volume)   

        102 mmol/L        

                                                

 

                    Carbon dioxide           27 mmol/L           21-32        

 

                          Serum or plasma anion gap determination (moles/volume)

           9 mmol/L       

                                        5-14        

 

                          Serum or plasma urea nitrogen measurement (mass/volume

)           20 mg/dL      

                                        7-18        

 

                          Serum or plasma creatinine measurement (mass/volume)  

         1.17 mg/dL       

                                        0.60-1.30        

 

                    Serum or plasma urea nitrogen/creatinine mass ratio         

  17                  NRG 

       

 

                                        Serum or plasma creatinine measurement w

ith calculation of estimated glomerular 

filtration rate           >                         NRG        

 

                    Serum or plasma glucose measurement (mass/volume)           

147 mg/dL           

        

 

                    Serum or plasma calcium measurement (mass/volume)           

9.0 mg/dL           

8.5-10.1        

 

                          Serum or plasma total bilirubin measurement (mass/volu

me)           0.3 mg/dL   

                                        0.1-1.0        

 

                                        Serum or plasma alkaline phosphatase juarez

surement (enzymatic activity/volume)    

                          136 U/L                           

 

                                        Serum or plasma aspartate aminotransfera

se measurement (enzymatic 

activity/volume)           42 U/L                    5-34        

 

                                        Serum or plasma alanine aminotransferase

 measurement (enzymatic activity/volume)

                          65 U/L                    0-55        

 

                    Serum or plasma protein measurement (mass/volume)           

7.1 g/dL            

6.4-8.2        

 

                    Serum or plasma albumin measurement (mass/volume)           

3.5 g/dL            

3.2-4.5        

 

                    CALCIUM CORRECTED           9.4 mg/dL           8.5-10.1    

    

 

                                        Vancomycin trough - 20 07:39      

   

 

                    Vancomycin trough           22.4 ug/mL           10.0-20.0  

      

 

                                        Capillary blood glucose measurement by g

lucometer (mass/volume) - 20 10:58

         

 

                          Capillary blood glucose measurement by glucometer (mas

s/volume)           164 

mg/dL                                           

 

                                        Capillary blood glucose measurement by g

lucometer (mass/volume) - 20 15:55

         

 

                          Capillary blood glucose measurement by glucometer (mas

s/volume)           243 

mg/dL                                           

 

                                        Vancomycin trough - 20 19:03      

   

 

                    Vancomycin trough           15.6 ug/mL           10.0-20.0  

      

 

                                        Capillary blood glucose measurement by g

lucometer (mass/volume) - 20 20:27

         

 

                          Capillary blood glucose measurement by glucometer (mas

s/volume)           201 

mg/dL                                           

 

                                        Capillary blood glucose measurement by g

lucometer (mass/volume) - 20 05:31

         

 

                          Capillary blood glucose measurement by glucometer (mas

s/volume)           288 

mg/dL                                           

 

                                        Complete blood count (CBC) with automate

d white blood cell (WBC) differential - 

20 06:18         

 

                          Blood leukocytes automated count (number/volume)      

     9.4 10*3/uL          

                                        4.3-11.0        

 

                          Blood erythrocytes automated count (number/volume)    

       3.62 10*6/uL       

                                        4.35-5.85        

 

                    Venous blood hemoglobin measurement (mass/volume)           

11.3 g/dL           

13.3-17.7        

 

                    Blood hematocrit (volume fraction)           36 %           

     40-54        

 

                    Automated erythrocyte mean corpuscular volume           99 [

foz_us]           

80-99        

 

                                        Automated erythrocyte mean corpuscular h

emoglobin (mass per erythrocyte)        

                          31 pg                     25-34        

 

                                        Automated erythrocyte mean corpuscular h

emoglobin concentration measurement 

(mass/volume)             32 g/dL                   32-36        

 

                    Automated erythrocyte distribution width ratio           15.

4 %              10.0-

14.5        

 

                    Automated blood platelet count (count/volume)           146 

10*3/uL           

130-400        

 

                          Automated blood platelet mean volume measurement      

     12.5 [foz_us]        

                                        7.4-10.4        

 

                    Automated blood neutrophils/100 leukocytes           70 %   

             42-75       

 

 

                    Automated blood lymphocytes/100 leukocytes           21 %   

             12-44       

 

 

                    Blood monocytes/100 leukocytes           6 %                

 0-12        

 

                    Automated blood eosinophils/100 leukocytes           3 %    

             0-10        

 

                    Automated blood basophils/100 leukocytes           1 %      

           0-10        

 

                    Blood neutrophils automated count (number/volume)           

6.6 10*3            

1.8-7.8        

 

                    Blood lymphocytes automated count (number/volume)           

1.9 10*3            

1.0-4.0        

 

                    Blood monocytes automated count (number/volume)           0.

6 10*3            

0.0-1.0        

 

                    Automated eosinophil count           0.3 10*3/uL           0

.0-0.3        

 

                    Automated blood basophil count (count/volume)           0.1 

10*3/uL           

0.0-0.1        

 

                                        Comprehensive metabolic panel - 20

 06:18         

 

                          Serum or plasma sodium measurement (moles/volume)     

      134 mmol/L          

                                        135-145        

 

                          Serum or plasma potassium measurement (moles/volume)  

         4.7 mmol/L       

                                        3.6-5.0        

 

                          Serum or plasma chloride measurement (moles/volume)   

        100 mmol/L        

                                                

 

                    Carbon dioxide           24 mmol/L           21-32        

 

                          Serum or plasma anion gap determination (moles/volume)

           10 mmol/L      

                                        5-14        

 

                          Serum or plasma urea nitrogen measurement (mass/volume

)           18 mg/dL      

                                        7-18        

 

                          Serum or plasma creatinine measurement (mass/volume)  

         1.09 mg/dL       

                                        0.60-1.30        

 

                    Serum or plasma urea nitrogen/creatinine mass ratio         

  17                  NRG 

       

 

                                        Serum or plasma creatinine measurement w

ith calculation of estimated glomerular 

filtration rate           >                         NRG        

 

                    Serum or plasma glucose measurement (mass/volume)           

235 mg/dL           

        

 

                    Serum or plasma calcium measurement (mass/volume)           

9.3 mg/dL           

8.5-10.1        

 

                          Serum or plasma total bilirubin measurement (mass/volu

me)           0.2 mg/dL   

                                        0.1-1.0        

 

                                        Serum or plasma alkaline phosphatase juarez

surement (enzymatic activity/volume)    

                          122 U/L                           

 

                                        Serum or plasma aspartate aminotransfera

se measurement (enzymatic 

activity/volume)           24 U/L                    5-34        

 

                                        Serum or plasma alanine aminotransferase

 measurement (enzymatic activity/volume)

                          53 U/L                    0-55        

 

                    Serum or plasma protein measurement (mass/volume)           

7.2 g/dL            

6.4-8.2        

 

                    Serum or plasma albumin measurement (mass/volume)           

3.6 g/dL            

3.2-4.5        

 

                    CALCIUM CORRECTED           9.6 mg/dL           8.5-10.1    

    

 

                                        Capillary blood glucose measurement by g

lucometer (mass/volume) - 20 11:07

         

 

                          Capillary blood glucose measurement by glucometer (mas

s/volume)           120 

mg/dL                                           

 

                                        Capillary blood glucose measurement by g

lucometer (mass/volume) - 20 16:06

         

 

                          Capillary blood glucose measurement by glucometer (mas

s/volume)           177 

mg/dL                                           

 

                                        Capillary blood glucose measurement by g

lucometer (mass/volume) - 20 20:16

         

 

                          Capillary blood glucose measurement by glucometer (mas

s/volume)           195 

mg/dL                                           

 

                                        Capillary blood glucose measurement by g

lucometer (mass/volume) - 02/10/20 05:48

         

 

                          Capillary blood glucose measurement by glucometer (mas

s/volume)           177 

mg/dL                                           

 

                                        Complete blood count (CBC) with automate

d white blood cell (WBC) differential - 

02/10/20 07:20         

 

                          Blood leukocytes automated count (number/volume)      

     9.9 10*3/uL          

                                        4.3-11.0        

 

                          Blood erythrocytes automated count (number/volume)    

       3.89 10*6/uL       

                                        4.35-5.85        

 

                    Venous blood hemoglobin measurement (mass/volume)           

11.9 g/dL           

13.3-17.7        

 

                    Blood hematocrit (volume fraction)           38 %           

     40-54        

 

                    Automated erythrocyte mean corpuscular volume           98 [

foz_us]           

80-99        

 

                                        Automated erythrocyte mean corpuscular h

emoglobin (mass per erythrocyte)        

                          31 pg                     25-34        

 

                                        Automated erythrocyte mean corpuscular h

emoglobin concentration measurement 

(mass/volume)             31 g/dL                   32-36        

 

                    Automated erythrocyte distribution width ratio           15.

6 %              10.0-

14.5        

 

                    Automated blood platelet count (count/volume)           171 

10*3/uL           

130-400        

 

                          Automated blood platelet mean volume measurement      

     11.7 [foz_us]        

                                        7.4-10.4        

 

                    Automated blood neutrophils/100 leukocytes           75 %   

             42-75       

 

 

                    Automated blood lymphocytes/100 leukocytes           16 %   

             12-44       

 

 

                    Blood monocytes/100 leukocytes           6 %                

 0-12        

 

                    Automated blood eosinophils/100 leukocytes           2 %    

             0-10        

 

                    Automated blood basophils/100 leukocytes           1 %      

           0-10        

 

                    Blood neutrophils automated count (number/volume)           

7.5 10*3            

1.8-7.8        

 

                    Blood lymphocytes automated count (number/volume)           

1.6 10*3            

1.0-4.0        

 

                    Blood monocytes automated count (number/volume)           0.

6 10*3            

0.0-1.0        

 

                    Automated eosinophil count           0.2 10*3/uL           0

.0-0.3        

 

                    Automated blood basophil count (count/volume)           0.1 

10*3/uL           

0.0-0.1        

 

                                        Comprehensive metabolic panel - 02/10/20

 07:20         

 

                          Serum or plasma sodium measurement (moles/volume)     

      134 mmol/L          

                                        135-145        

 

                          Serum or plasma potassium measurement (moles/volume)  

         5.4 mmol/L       

                                        3.6-5.0        

 

                          Serum or plasma chloride measurement (moles/volume)   

        98 mmol/L         

                                                

 

                    Carbon dioxide           27 mmol/L           21-32        

 

                          Serum or plasma anion gap determination (moles/volume)

           9 mmol/L       

                                        5-14        

 

                          Serum or plasma urea nitrogen measurement (mass/volume

)           19 mg/dL      

                                        7-18        

 

                          Serum or plasma creatinine measurement (mass/volume)  

         1.14 mg/dL       

                                        0.60-1.30        

 

                    Serum or plasma urea nitrogen/creatinine mass ratio         

  17                  NRG 

       

 

                                        Serum or plasma creatinine measurement w

ith calculation of estimated glomerular 

filtration rate           >                         NRG        

 

                    Serum or plasma glucose measurement (mass/volume)           

159 mg/dL           

        

 

                    Serum or plasma calcium measurement (mass/volume)           

9.9 mg/dL           

8.5-10.1        

 

                          Serum or plasma total bilirubin measurement (mass/volu

me)           0.3 mg/dL   

                                        0.1-1.0        

 

                                        Serum or plasma alkaline phosphatase juarez

surement (enzymatic activity/volume)    

                          121 U/L                           

 

                                        Serum or plasma aspartate aminotransfera

se measurement (enzymatic 

activity/volume)           25 U/L                    5-34        

 

                                        Serum or plasma alanine aminotransferase

 measurement (enzymatic activity/volume)

                          48 U/L                    0-55        

 

                    Serum or plasma protein measurement (mass/volume)           

7.9 g/dL            

6.4-8.2        

 

                    Serum or plasma albumin measurement (mass/volume)           

3.8 g/dL            

3.2-4.5        

 

                    CALCIUM CORRECTED           10.1 mg/dL           8.5-10.1   

     

 

                                        Capillary blood glucose measurement by g

lucometer (mass/volume) - 02/10/20 11:35

         

 

                          Capillary blood glucose measurement by glucometer (mas

s/volume)           158 

mg/dL                                           

 

                                        Capillary blood glucose measurement by g

lucometer (mass/volume) - 02/10/20 17:06

         

 

                          Capillary blood glucose measurement by glucometer (mas

s/volume)           306 

mg/dL                                           

 

                                        Vancomycin trough - 02/10/20 18:00      

   

 

                    Vancomycin trough           18.5 ug/mL           10.0-20.0  

      

 

                                        Capillary blood glucose measurement by g

lucometer (mass/volume) - 02/10/20 20:01

         

 

                          Capillary blood glucose measurement by glucometer (mas

s/volume)           218 

mg/dL                                           

 

                                        Complete blood count (CBC) with automate

d white blood cell (WBC) differential - 

20 05:31         

 

                          Blood leukocytes automated count (number/volume)      

     10.7 10*3/uL         

                                        4.3-11.0        

 

                          Blood erythrocytes automated count (number/volume)    

       3.56 10*6/uL       

                                        4.35-5.85        

 

                    Venous blood hemoglobin measurement (mass/volume)           

11.1 g/dL           

13.3-17.7        

 

                    Blood hematocrit (volume fraction)           35 %           

     40-54        

 

                    Automated erythrocyte mean corpuscular volume           98 [

foz_us]           

80-99        

 

                                        Automated erythrocyte mean corpuscular h

emoglobin (mass per erythrocyte)        

                          31 pg                     25-34        

 

                                        Automated erythrocyte mean corpuscular h

emoglobin concentration measurement 

(mass/volume)             32 g/dL                   32-36        

 

                    Automated erythrocyte distribution width ratio           15.

7 %              10.0-

14.5        

 

                    Automated blood platelet count (count/volume)           161 

10*3/uL           

130-400        

 

                          Automated blood platelet mean volume measurement      

     11.7 [foz_us]        

                                        7.4-10.4        

 

                    Automated blood neutrophils/100 leukocytes           74 %   

             42-75       

 

 

                    Automated blood lymphocytes/100 leukocytes           18 %   

             12-44       

 

 

                    Blood monocytes/100 leukocytes           6 %                

 0-12        

 

                    Automated blood eosinophils/100 leukocytes           2 %    

             0-10        

 

                    Automated blood basophils/100 leukocytes           1 %      

           0-10        

 

                    Blood neutrophils automated count (number/volume)           

8.0 10*3            

1.8-7.8        

 

                    Blood lymphocytes automated count (number/volume)           

1.9 10*3            

1.0-4.0        

 

                    Blood monocytes automated count (number/volume)           0.

6 10*3            

0.0-1.0        

 

                    Automated eosinophil count           0.2 10*3/uL           0

.0-0.3        

 

                    Automated blood basophil count (count/volume)           0.1 

10*3/uL           

0.0-0.1        

 

                                        Comprehensive metabolic panel - 20

 05:31         

 

                          Serum or plasma sodium measurement (moles/volume)     

      134 mmol/L          

                                        135-145        

 

                          Serum or plasma potassium measurement (moles/volume)  

         5.2 mmol/L       

                                        3.6-5.0        

 

                          Serum or plasma chloride measurement (moles/volume)   

        98 mmol/L         

                                                

 

                    Carbon dioxide           26 mmol/L           21-32        

 

                          Serum or plasma anion gap determination (moles/volume)

           10 mmol/L      

                                        5-14        

 

                          Serum or plasma urea nitrogen measurement (mass/volume

)           22 mg/dL      

                                        7-18        

 

                          Serum or plasma creatinine measurement (mass/volume)  

         1.20 mg/dL       

                                        0.60-1.30        

 

                    Serum or plasma urea nitrogen/creatinine mass ratio         

  18                  NRG 

       

 

                                        Serum or plasma creatinine measurement w

ith calculation of estimated glomerular 

filtration rate           60                        NRG        

 

                    Serum or plasma glucose measurement (mass/volume)           

157 mg/dL           

        

 

                    Serum or plasma calcium measurement (mass/volume)           

9.5 mg/dL           

8.5-10.1        

 

                          Serum or plasma total bilirubin measurement (mass/volu

me)           0.3 mg/dL   

                                        0.1-1.0        

 

                                        Serum or plasma alkaline phosphatase juarez

surement (enzymatic activity/volume)    

                          99 U/L                            

 

                                        Serum or plasma aspartate aminotransfera

se measurement (enzymatic 

activity/volume)           20 U/L                    5-34        

 

                                        Serum or plasma alanine aminotransferase

 measurement (enzymatic activity/volume)

                          38 U/L                    0-55        

 

                    Serum or plasma protein measurement (mass/volume)           

7.3 g/dL            

6.4-8.2        

 

                    Serum or plasma albumin measurement (mass/volume)           

3.6 g/dL            

3.2-4.5        

 

                    CALCIUM CORRECTED           9.8 mg/dL           8.5-10.1    

    

 

                                        Capillary blood glucose measurement by g

lucometer (mass/volume) - 20 11:03

         

 

                          Capillary blood glucose measurement by glucometer (mas

s/volume)           129 

mg/dL                                           

 

                                        Capillary blood glucose measurement by g

lucometer (mass/volume) - 20 15:45

         

 

                          Capillary blood glucose measurement by glucometer (mas

s/volume)           156 

mg/dL                                           

 

                                        Capillary blood glucose measurement by g

lucometer (mass/volume) - 20 20:44

         

 

                          Capillary blood glucose measurement by glucometer (mas

s/volume)           206 

mg/dL                                           

 

                                        Complete blood count (CBC) with automate

d white blood cell (WBC) differential - 

20 05:40         

 

                          Blood leukocytes automated count (number/volume)      

     11.8 10*3/uL         

                                        4.3-11.0        

 

                          Blood erythrocytes automated count (number/volume)    

       3.49 10*6/uL       

                                        4.35-5.85        

 

                    Venous blood hemoglobin measurement (mass/volume)           

10.7 g/dL           

13.3-17.7        

 

                    Blood hematocrit (volume fraction)           34 %           

     40-54        

 

                    Automated erythrocyte mean corpuscular volume           97 [

foz_us]           

80-99        

 

                                        Automated erythrocyte mean corpuscular h

emoglobin (mass per erythrocyte)        

                          31 pg                     25-34        

 

                                        Automated erythrocyte mean corpuscular h

emoglobin concentration measurement 

(mass/volume)             32 g/dL                   32-36        

 

                    Automated erythrocyte distribution width ratio           15.

7 %              10.0-

14.5        

 

                    Automated blood platelet count (count/volume)           160 

10*3/uL           

130-400        

 

                          Automated blood platelet mean volume measurement      

     12.1 [foz_us]        

                                        7.4-10.4        

 

                    Automated blood neutrophils/100 leukocytes           73 %   

             42-75       

 

 

                    Automated blood lymphocytes/100 leukocytes           19 %   

             12-44       

 

 

                    Blood monocytes/100 leukocytes           5 %                

 0-12        

 

                    Automated blood eosinophils/100 leukocytes           2 %    

             0-10        

 

                    Automated blood basophils/100 leukocytes           1 %      

           0-10        

 

                    Blood neutrophils automated count (number/volume)           

8.6 10*3            

1.8-7.8        

 

                    Blood lymphocytes automated count (number/volume)           

2.3 10*3            

1.0-4.0        

 

                    Blood monocytes automated count (number/volume)           0.

6 10*3            

0.0-1.0        

 

                    Automated eosinophil count           0.2 10*3/uL           0

.0-0.3        

 

                    Automated blood basophil count (count/volume)           0.1 

10*3/uL           

0.0-0.1        

 

                                        Comprehensive metabolic panel - 20

 05:40         

 

                          Serum or plasma sodium measurement (moles/volume)     

      130 mmol/L          

                                        135-145        

 

                          Serum or plasma potassium measurement (moles/volume)  

         5.5 mmol/L       

                                        3.6-5.0        

 

                          Serum or plasma chloride measurement (moles/volume)   

        97 mmol/L         

                                                

 

                    Carbon dioxide           24 mmol/L           21-32        

 

                          Serum or plasma anion gap determination (moles/volume)

           9 mmol/L       

                                        5-14        

 

                          Serum or plasma urea nitrogen measurement (mass/volume

)           25 mg/dL      

                                        7-18        

 

                          Serum or plasma creatinine measurement (mass/volume)  

         1.22 mg/dL       

                                        0.60-1.30        

 

                    Serum or plasma urea nitrogen/creatinine mass ratio         

  20                  NRG 

       

 

                                        Serum or plasma creatinine measurement w

ith calculation of estimated glomerular 

filtration rate           59                        NRG        

 

                    Serum or plasma glucose measurement (mass/volume)           

131 mg/dL           

        

 

                    Serum or plasma calcium measurement (mass/volume)           

9.5 mg/dL           

8.5-10.1        

 

                          Serum or plasma total bilirubin measurement (mass/volu

me)           0.3 mg/dL   

                                        0.1-1.0        

 

                                        Serum or plasma alkaline phosphatase juarez

surement (enzymatic activity/volume)    

                          102 U/L                           

 

                                        Serum or plasma aspartate aminotransfera

se measurement (enzymatic 

activity/volume)           22 U/L                    5-34        

 

                                        Serum or plasma alanine aminotransferase

 measurement (enzymatic activity/volume)

                          29 U/L                    0-55        

 

                    Serum or plasma protein measurement (mass/volume)           

7.6 g/dL            

6.4-8.2        

 

                    Serum or plasma albumin measurement (mass/volume)           

3.8 g/dL            

3.2-4.5        

 

                    CALCIUM CORRECTED           9.7 mg/dL           8.5-10.1    

    

 

                                        Capillary blood glucose measurement by g

lucometer (mass/volume) - 20 06:43

         

 

                          Capillary blood glucose measurement by glucometer (mas

s/volume)           145 

mg/dL                                           

 

                                        Gram stain microscopy - 20 13:17  

       

 

                                        Bacteria identification in wound by cult

ure - 20 13:17         

 

                    Bacteria identification in wound by culture           [id number]

04            NRG   

     

 

                    FREE TEXT EXTERNAL           SUSCEPTIBILITIES REPORTED 3-7-2

0, 1039            

NRG        

 

                    QUANTITY OF GROWTH           Many                NRG        

 

                    [facility] AGAR           CLINDAMYCIN RESISTANT WITHOUT INDUCTION 

           NRG      

  

 

                    FREE TEXT ENTRY 2           PRELIM RAPID ID TEST AT [facility] 3/5 

10:40            NRG

        

 

                    FREE TEXT ENTRY 3           [facility] CONFIRMED ID 3/5 14:05      

      NRG        

 

                          CALL POSITIVES (F1 HELP)           [facility] CALLED TO REBECCA/

WC 3-5-20 AT 1101/KD      

                                        NRG        

 

                    PBP2                RESISTANT ORGANISM/CONTACT PERCAUTIONS  

          NRG        

 

                    FREE TEXT ENTRY 4           [facility] CONFIRMED ID 3/5 14:05.[facility] 

3-7 [id number]            

NRG        

 

                                        Dirithromycin susceptibility test by dis

k diffusion - 20 13:17         

 

                          Oxacillin susceptibility test by minimum inhibitory co

ncentration           >   

                                        NRG        

 

                          Clindamycin susceptibility test by minimum inhibitory 

concentration           > 

                                        NRG        

 

                          Erythromycin susceptibility test by minimum inhibitory

 concentration           >

                                        NRG        

 

                                        Trimethoprim/sulfamethoxazole susceptibi

lity test by minimum 

inhibitoryconcentration           >                         NRG        

 

                          Vancomycin susceptibility test by minimum inhibitory c

oncentration           1  

                                        NRG        

 

                          Levofloxacin susceptibility test by minimum inhibitory

 concentration           >

                                        NRG        

 

                          Rifampin susceptibility test by minimum inhibitory con

centration           <=   

                                        NRG        

 

                          Cefazolin susceptibility test by minimum inhibitory co

ncentration           >   

                                        NRG        

 

                          Linezolid susceptibility test by minimum inhibitory co

ncentration           2   

                                        NRG        

 

                          Penicillin G susceptibility test by minimum inhibitory

 concentration           >

                                        NRG        

 

                          Moxifloxacin susceptibility test by minimum inhibitory

 concentration           2

                                        NRG        

 

                    Minocycline susc KODI           <=                  NRG      

  

 

                                        Dirithromycin susceptibility test by dis

k diffusion - 20 13:17         

 

                          Gentamicin susceptibility test by minimum inhibitory c

oncentration           <= 

                                        NRG        

 

                          Levofloxacin susceptibility test by minimum inhibitory

 concentration           

<=                                      NRG        

 

                          Tobramycin susceptibility test by minimum inhibitory c

oncentration           <= 

                                        NRG        

 

                                        Piperacillin/tazobactam susceptibility t

est by minimum inhibitory concentration 

                          =                         NRG        

 

                          Ciprofloxacin susceptibility test by minimum inhibitor

y concentration           

<=                                      NRG        

 

                          Meropenem susceptibility test by minimum inhibitory co

ncentration           <=  

                                        NRG        

 

                          Aztreonam susceptibility test by minimum inhibitory co

ncentration           8   

                                        NRG        

 

                          Cefepime susceptibility test by minimum inhibitory con

centration           4    

                                        NRG        

 

                          Imipenem susceptibility test by minimum inhibitory con

centration           1    

                                        NRG        

 

                          Ceftazidime susceptibility test by minimum inhibitory 

concentration           <=

                                        NRG        

 

                                        Complete blood count (CBC) with automate

d white blood cell (WBC) differential - 

20 20:14         

 

                          Blood leukocytes automated count (number/volume)      

     14.2 10*3/uL         

                                        4.3-11.0        

 

                          Blood erythrocytes automated count (number/volume)    

       3.23 10*6/uL       

                                        4.35-5.85        

 

                    Venous blood hemoglobin measurement (mass/volume)           

10.0 g/dL           

13.3-17.7        

 

                    Blood hematocrit (volume fraction)           33 %           

     40-54        

 

                    Automated erythrocyte mean corpuscular volume           101 

[foz_us]           

80-99        

 

                                        Automated erythrocyte mean corpuscular h

emoglobin (mass per erythrocyte)        

                          31 pg                     25-34        

 

                                        Automated erythrocyte mean corpuscular h

emoglobin concentration measurement 

(mass/volume)             31 g/dL                   32-36        

 

                    Automated erythrocyte distribution width ratio           16.

6 %              10.0-

14.5        

 

                    Automated blood platelet count (count/volume)           124 

10*3/uL           

130-400        

 

                          Automated blood platelet mean volume measurement      

     13.2 [foz_us]        

                                        7.4-10.4        

 

                    Automated blood neutrophils/100 leukocytes           85 %   

             42-75       

 

 

                    Automated blood lymphocytes/100 leukocytes           8 %    

             12-44        

 

                    Blood monocytes/100 leukocytes           6 %                

 0-12        

 

                    Automated blood eosinophils/100 leukocytes           0 %    

             0-10        

 

                    Automated blood basophils/100 leukocytes           1 %      

           0-10        

 

                    Blood neutrophils automated count (number/volume)           

12.0 10*3           

1.8-7.8        

 

                    Blood lymphocytes automated count (number/volume)           

1.1 10*3            

1.0-4.0        

 

                    Blood monocytes automated count (number/volume)           0.

8 10*3            

0.0-1.0        

 

                    Automated eosinophil count           0.0 10*3/uL           0

.0-0.3        

 

                    Automated blood basophil count (count/volume)           0.1 

10*3/uL           

0.0-0.1        

 

                                        Blood lactic acid measurement (moles/vol

ume) - 20 20:14         

 

                    Blood lactic acid measurement (moles/volume)           1.51 

mmol/L           

0.50-2.00        

 

                                        Comprehensive metabolic panel - 20

 20:14         

 

                          Serum or plasma sodium measurement (moles/volume)     

      135 mmol/L          

                                        135-145        

 

                          Serum or plasma potassium measurement (moles/volume)  

         5.6 mmol/L       

                                        3.6-5.0        

 

                          Serum or plasma chloride measurement (moles/volume)   

        102 mmol/L        

                                                

 

                    Carbon dioxide           19 mmol/L           21-32        

 

                          Serum or plasma anion gap determination (moles/volume)

           14 mmol/L      

                                        5-14        

 

                          Serum or plasma urea nitrogen measurement (mass/volume

)           48 mg/dL      

                                        7-18        

 

                          Serum or plasma creatinine measurement (mass/volume)  

         3.84 mg/dL       

                                        0.60-1.30        

 

                    Serum or plasma urea nitrogen/creatinine mass ratio         

  13                  NRG 

       

 

                                        Serum or plasma creatinine measurement w

ith calculation of estimated glomerular 

filtration rate           16                        NRG        

 

                    Serum or plasma glucose measurement (mass/volume)           

211 mg/dL           

        

 

                    Serum or plasma calcium measurement (mass/volume)           

8.0 mg/dL           

8.5-10.1        

 

                          Serum or plasma total bilirubin measurement (mass/volu

me)           0.2 mg/dL   

                                        0.1-1.0        

 

                                        Serum or plasma alkaline phosphatase juarez

surement (enzymatic activity/volume)    

                          91 U/L                            

 

                                        Serum or plasma aspartate aminotransfera

se measurement (enzymatic 

activity/volume)           28 U/L                    5-34        

 

                                        Serum or plasma alanine aminotransferase

 measurement (enzymatic activity/volume)

                          16 U/L                    0-55        

 

                    Serum or plasma protein measurement (mass/volume)           

6.8 g/dL            

6.4-8.2        

 

                    Serum or plasma albumin measurement (mass/volume)           

3.7 g/dL            

3.2-4.5        

 

                    CALCIUM CORRECTED           8.2 mg/dL           8.5-10.1    

    

 

                                        TROPONIN I FS - 20 20:14         

 

                    TROPONIN I FS           < 0.30              <0.30        

 

                                        Serum or plasma salicylates measurement 

(mass/volume) - 20 20:14         

 

                          Serum or plasma salicylates measurement (mass/volume) 

          < mg/dL         

                                        5.0-20.0        

 

                                        Serum or plasma acetaminophen measuremen

t (mass/volume) - 20 20:14        

 

 

                          Serum or plasma acetaminophen measurement (mass/volume

)           < ug/mL       

                                        10-30        

 

                                        Serum or plasma ethanol measurement (mas

s/volume) - 20 20:14         

 

                    Serum or plasma ethanol measurement (mass/volume)           

< mg/dL             

<10        

 

                                        Manual absolute plasma cell count -  20:14         

 

                    Blood monocytes/100 leukocytes           6 %                

 NRG        

 

                    Manual blood segmented neutrophils/100 leukocytes           

86 %                NRG  

      

 

                    Manual blood lymphocytes/100 leukocytes           4 %       

          NRG        

 

                    Blood lymphocytes variant/100 leukocytes           4 %      

           NRG        

 

                    Blood polychromasia detection by light microscopy           

SLIGHT              

NRG        

 

                    Blood anisocytosis detection by light microscopy           S

LIGHT              NRG

        

 

                    Blood ovalocytes detection by light microscopy           SLI

GHT              NRG  

      

 

                    Blood toxic granules detection by light microscopy          

 3+                  NRG  

      

 

                    Blood poikilocytosis detection by light microscopy          

 SLIGHT              

NRG        

 

                    Blood target cells detection by light microscopy           S

LIGHT              NRG

        

 

                    Blood stomatocytes detection by light microscopy           M

ODERATE            

NRG        

 

                          Blood basophilic stippling detection by light microsco

py           SLIGHT       

                                        NRG        

 

                                        Bacterial blood culture - 20 20:14

         

 

                    FREE TEXT EXTERNAL           NO SUSCEPTIBILITY TESTING      

      NRG        

 

                    QUANTITY OF GROWTH           Isolated            NRG        

 

                    Bacterial blood culture           [id number]            NRG  

      

 

                    SUSCEPTIBILITY           (FROM AEROBIC BOTTLE)            NR

G        

 

                    RAPID ID            PRELIM RAPID ID TEST AT [facility]  [id number]  

          NRG        

 

                    ID CONFIRMATION           RML CONFIRMED ID  [id number]       

     NRG        

 

                                        PROCALCITONIN (PCT) - 20 20:14    

     

 

                    PROCALCITONIN (PCT)           0.31 ng/mL           <0.10    

    

 

                                        Complete urinalysis with reflex to cultu

re - 20 20:25         

 

                    Urine color determination           YELLOW              NRG 

       

 

                    Urine clarity determination           SLIGHTLY CLOUDY       

     NRG        

 

                    Urine pH measurement by test strip           5.0            

     5-9        

 

                    Specific gravity of urine by test strip           >=        

          1.016-1.022     

   

 

                    Urine protein assay by test strip, semi-quantitative        

   1+                  

NEGATIVE        

 

                    Urine glucose detection by automated test strip           NE

GATIVE            

NEGATIVE        

 

                          Erythrocytes detection in urine sediment by light micr

oscopy           TRACE-I  

                                        NEGATIVE        

 

                    Urine ketones detection by automated test strip           TR

ACE               

NEGATIVE        

 

                    Urine nitrite detection by test strip           NEGATIVE    

        NEGATIVE    

    

 

                    Urine total bilirubin detection by test strip           1+  

                NEGATIVE  

      

 

                          Urine urobilinogen measurement by automated test strip

 (mass/volume)           

0.2 mg/dL                               < = 1.0        

 

                    Urine leukocyte esterase detection by dipstick           NEG

ATIVE            

NEGATIVE        

 

                                        Automated urine sediment erythrocyte cou

nt by microscopy (number/high power 

field)                    NONE                      NRG        

 

                                        Automated urine sediment leukocyte count

 by microscopy (number/high power field)

                           [HPF]                    NRG        

 

                          Bacteria detection in urine sediment by light microsco

py           FEW          

                                        NRG        

 

                                        Squamous epithelial cells detection in u

rine sediment by light microscopy       

                          NONE                      NRG        

 

                          Crystals detection in urine sediment by light microsco

py           NONE         

                                        NRG        

 

                          Casts detection in urine sediment by light microscopy 

          PRESENT         

                                        NRG        

 

                          Mucus detection in urine sediment by light microscopy 

          LARGE           

                                        NRG        

 

                    Complete urinalysis with reflex to culture           YES    

             NRG        

 

                          Hyaline casts detection in urine sediment by light kodi

roscopy           10-25   

                                        NRG        

 

                          Granular casts detection in urine sediment by light mi

croscopy           0-2    

                                        NRG        

 

                          Coarse granular casts detection in urine sediment by l

ight microscopy           

2-5                                     NRG        

 

                                        Urine drug screening test - 20 20:

25         

 

                    Urine phencyclidine detection by screening method           

NEGATIVE            

NEGATIVE        

 

                          Urine benzodiazepines detection by screening method   

        NEGATIVE          

                                        NEGATIVE        

 

                    Urine cocaine detection           NEGATIVE            NEGATI

VE        

 

                    Urine amphetamines detection by screening method           N

EGATIVE            

NEGATIVE        

 

                          Urine methamphetamine detection by screening method   

        POSITIVE          

                                        NEGATIVE        

 

                    Urine cannabinoids detection by screening method           N

EGATIVE            

NEGATIVE        

 

                    Urine opiates detection by screening method           NEGATI

VE            

NEGATIVE        

 

                    Urine barbiturates detection           NEGATIVE            N

EGATIVE        

 

                          Screening urine tricyclic antidepressants detection   

        POSITIVE          

                                        NEGATIVE        

 

                    Urine methadone detection by screening method           NEGA

TIVE            

NEGATIVE        

 

                    Urine oxycodone detection           POSITIVE            NEGA

TIVE        

 

                    Urine propoxyphene detection           NEGATIVE            N

EGATIVE        

 

                                        Bacterial urine culture - 20 20:25

         

 

                    Bacterial urine culture           NG                  NRG   

     

 

                                        Methicillin resistant Staphylococcus aur

eus (MRSA) screening culture - 20 

23:30         

 

                          Methicillin resistant Staphylococcus aureus (MRSA) scr

eening culture           

NEG                                     NRG        

 

                                        Arterial blood gas measurement - 

0 03:09         

 

                    Blood pCO2           62 mm[Hg]           35-45        

 

                    Blood pO2           43 mm[Hg]           79-93        

 

                          Arterial blood bicarbonate measurement (moles/volume) 

          21 mmol/L       

                                        23-27        

 

                    Arterial blood base excess by calculation           -6.8 mmo

l/L           

-2.5-2.5        

 

                    Arterial blood oxygen saturation measurement           70 % 

                   

    

 

                    * Inhaled oxygen flow rate           1                   NRG

        

 

                          Arterial blood pH measurement with patient temperature

 correction           7.15

                                        7.37-7.43        

 

                          Arterial blood carbon dioxide, total measurement (mole

s/volume)           22.7 

mmol/L                                  21.0-31.0        

 

                    Body site           RIGHT RADIAL            NRG        

 

                          Assessment of wrist artery patency prior to arterial p

uncture           POSITIVE

                                        NRG        

 

                    Setting of ventilation mode           NO                  NR

G        

 

                    Measurement of body temperature           36.9              

  NRG        

 

                                        Complete blood count (CBC) with automate

d white blood cell (WBC) differential - 

20 03:09         

 

                          Blood leukocytes automated count (number/volume)      

     12.0 10*3/uL         

                                        4.3-11.0        

 

                          Blood erythrocytes automated count (number/volume)    

       3.18 10*6/uL       

                                        4.35-5.85        

 

                    Venous blood hemoglobin measurement (mass/volume)           

9.7 g/dL            

13.3-17.7        

 

                    Blood hematocrit (volume fraction)           31 %           

     40-54        

 

                    Automated erythrocyte mean corpuscular volume           99 [

foz_us]           

80-99        

 

                                        Automated erythrocyte mean corpuscular h

emoglobin (mass per erythrocyte)        

                          31 pg                     25-34        

 

                                        Automated erythrocyte mean corpuscular h

emoglobin concentration measurement 

(mass/volume)             31 g/dL                   32-36        

 

                    Automated erythrocyte distribution width ratio           17.

0 %              10.0-

14.5        

 

                    Automated blood platelet count (count/volume)           123 

10*3/uL           

130-400        

 

                          Automated blood platelet mean volume measurement      

     12.8 [foz_us]        

                                        7.4-10.4        

 

                    Automated blood neutrophils/100 leukocytes           79 %   

             42-75       

 

 

                    Automated blood lymphocytes/100 leukocytes           14 %   

             12-44       

 

 

                    Blood monocytes/100 leukocytes           7 %                

 0-12        

 

                    Automated blood eosinophils/100 leukocytes           0 %    

             0-10        

 

                    Automated blood basophils/100 leukocytes           0 %      

           0-10        

 

                    Blood neutrophils automated count (number/volume)           

9.5 10*3            

1.8-7.8        

 

                    Blood lymphocytes automated count (number/volume)           

1.7 10*3            

1.0-4.0        

 

                    Blood monocytes automated count (number/volume)           0.

8 10*3            

0.0-1.0        

 

                    Automated eosinophil count           0.0 10*3/uL           0

.0-0.3        

 

                    Automated blood basophil count (count/volume)           0.0 

10*3/uL           

0.0-0.1        

 

                                        Comprehensive metabolic panel - 20

 03:09         

 

                          Serum or plasma sodium measurement (moles/volume)     

      138 mmol/L          

                                        135-145        

 

                          Serum or plasma potassium measurement (moles/volume)  

         5.9 mmol/L       

                                        3.6-5.0        

 

                          Serum or plasma chloride measurement (moles/volume)   

        108 mmol/L        

                                                

 

                    Carbon dioxide           18 mmol/L           21-32        

 

                          Serum or plasma anion gap determination (moles/volume)

           12 mmol/L      

                                        5-14        

 

                          Serum or plasma urea nitrogen measurement (mass/volume

)           47 mg/dL      

                                        7-18        

 

                          Serum or plasma creatinine measurement (mass/volume)  

         3.29 mg/dL       

                                        0.60-1.30        

 

                    Serum or plasma urea nitrogen/creatinine mass ratio         

  14                  NRG 

       

 

                                        Serum or plasma creatinine measurement w

ith calculation of estimated glomerular 

filtration rate           19                        NRG        

 

                    Serum or plasma glucose measurement (mass/volume)           

167 mg/dL           

        

 

                    Serum or plasma calcium measurement (mass/volume)           

7.2 mg/dL           

8.5-10.1        

 

                          Serum or plasma total bilirubin measurement (mass/volu

me)           0.3 mg/dL   

                                        0.1-1.0        

 

                                        Serum or plasma alkaline phosphatase juarez

surement (enzymatic activity/volume)    

                          79 U/L                            

 

                                        Serum or plasma aspartate aminotransfera

se measurement (enzymatic 

activity/volume)           34 U/L                    5-34        

 

                                        Serum or plasma alanine aminotransferase

 measurement (enzymatic activity/volume)

                          19 U/L                    0-55        

 

                    Serum or plasma protein measurement (mass/volume)           

6.8 g/dL            

6.4-8.2        

 

                    Serum or plasma albumin measurement (mass/volume)           

3.6 g/dL            

3.2-4.5        

 

                    CALCIUM CORRECTED           7.5 mg/dL           8.5-10.1    

    

 

                                        Serum or plasma phosphate measurement (m

ass/volume) - 20 03:09         

 

                          Serum or plasma phosphate measurement (mass/volume)   

        5.8 mg/dL         

                                        2.3-4.7        

 

                                        Magnesium - 20 03:09         

 

                    Magnesium           1.8 mg/dL           1.6-2.4        

 

                                        Serum or plasma triglyceride measurement

 (mass/volume) - 20 03:09         

 

                          Serum or plasma triglyceride measurement (mass/volume)

           367 mg/dL      

                                        <150        

 

                                        RESPIRATORY VIRUS PANEL - 20 05:51

         

 

                    Serum ragweed IgE antibody assay           TNP:Duplicate Ord

er            NRG   

     

 

                          Serum or plasma aripiprazole measurement (mass/volume)

           TNP:Duplicate 

Order                                   NRG        

 

                    PARAINFLU 3 PCR           TNP:Duplicate Order            NRG

        

 

                    METAPNEUMO PCR           TNP:Duplicate Order            NRG 

       

 

                    Adenovirus detection, CSF, PCR           TNP:Duplicate Order

            NRG     

   

 

                    INFLUENZA A PCR           TNP:Duplicate Order            NRG

        

 

                    INFLUENZA B PCR           TNP:Duplicate Order            NRG

        

 

                                        Influenza virus A and B antigen detectio

n - 20 05:58         

 

                    FLU RESULT           NEGATIVE FOR INFLUENZA A AND B ANTIGENS

 BY IA            

NRG        

 

                                        Serum or plasma ferritin measurement (ma

ss/volume) - 20 16:14         

 

                    Serum or plasma ferritin measurement (mass/volume)          

 769.8 %             

32.0-356.0        

 

                                        Bacterial blood culture - 20 06:34

         

 

                    Bacterial blood culture           NG                  NRG   

     

 

                                        Arterial blood gas measurement - 

0 06:40         

 

                    Blood pCO2           70 mm[Hg]           35-45        

 

                    Blood pO2           152 mm[Hg]           79-93        

 

                          Arterial blood bicarbonate measurement (moles/volume) 

          22 mmol/L       

                                        23-27        

 

                    Arterial blood base excess by calculation           -6.1 mmo

l/L           

-2.5-2.5        

 

                    Arterial blood oxygen saturation measurement           64 % 

                   

    

 

                    * Inhaled oxygen flow rate           40                  NRG

        

 

                          Arterial blood pH measurement with patient temperature

 correction           7.13

                                        7.37-7.43        

 

                          Arterial blood carbon dioxide, total measurement (mole

s/volume)           24.0 

mmol/L                                  21.0-31.0        

 

                    Body site           RIGHT RADIAL            NRG        

 

                          Assessment of wrist artery patency prior to arterial p

uncture           POSITIVE

                                        NRG        

 

                    Setting of ventilation mode           NO                  NR

G        

 

                    Measurement of body temperature           37.0              

  NRG        

 

                                        Blood lactic acid measurement (moles/vol

ume) - 20 06:40         

 

                    Blood lactic acid measurement (moles/volume)           0.60 

mmol/L           

0.50-2.00        

 

                                        PT panel in platelet poor plasma by coag

ulation assay - 20 06:40         

 

                          Prothrombin time (PT) in platelet poor plasma by coagu

lation assay           

14.1 s                                  12.2-14.7        

 

                          INR in platelet poor plasma or blood by coagulation as

say           1.1         

                                        0.8-1.4        

 

                                        Fibrin D-dimer FEU measurement in platel

et poor plasma (mass/volume) - 20 

06:40         

 

                          Fibrin D-dimer FEU measurement in platelet poor plasma

 (mass/volume)           

1.27 ug/mL                              0.00-0.49        

 

                                        Serum or plasma troponin i.cardiac measu

rement (mass/volume) - 20 06:40   

      

 

                          Serum or plasma troponin i.cardiac measurement (mass/v

olume)           0.328 

ng/mL                                   <0.028        

 

                                        Arterial blood gas measurement - 

0 09:09         

 

                    Blood pCO2           64 mm[Hg]           35-45        

 

                    Blood pO2           47 mm[Hg]           79-93        

 

                          Arterial blood bicarbonate measurement (moles/volume) 

          24 mmol/L       

                                        23-27        

 

                    Arterial blood base excess by calculation           -3.5 mmo

l/L           

-2.5-2.5        

 

                    Arterial blood oxygen saturation measurement           80 % 

                   

    

 

                    * Inhaled oxygen flow rate           25%                 NRG

        

 

                          Arterial blood pH measurement with patient temperature

 correction           7.19

                                        7.37-7.43        

 

                          Arterial blood carbon dioxide, total measurement (mole

s/volume)           25.5 

mmol/L                                  21.0-31.0        

 

                    Body site           NA                  NRG        

 

                          Assessment of wrist artery patency prior to arterial p

uncture           NA      

                                        NRG        

 

                    Setting of ventilation mode           NA                  NR

G        

 

                    Measurement of body temperature           37.0              

  NRG        

 

                                        Sputum Gram stain - 20 10:10      

   

 

                    Sputum Gram stain           No bacterial kimberly            NR

G        

 

                                        Bacterial sputum culture - 20 10:1

0         

 

                    Bacterial sputum culture           NG                  NRG  

      

 

                                        Arterial blood gas measurement - 

0 11:02         

 

                    Blood pCO2           44 mm[Hg]           35-45        

 

                    Blood pO2           55 mm[Hg]           79-93        

 

                          Arterial blood bicarbonate measurement (moles/volume) 

          21 mmol/L       

                                        23-27        

 

                    Arterial blood base excess by calculation           -4.5 mmo

l/L           

-2.5-2.5        

 

                    Arterial blood oxygen saturation measurement           91 % 

                   

    

 

                    * Inhaled oxygen flow rate           30%                 NRG

        

 

                          Arterial blood pH measurement with patient temperature

 correction           7.30

                                        7.37-7.43        

 

                          Arterial blood carbon dioxide, total measurement (mole

s/volume)           22.2 

mmol/L                                  21.0-31.0        

 

                    Body site           RT ART LINE            NRG        

 

                          Assessment of wrist artery patency prior to arterial p

uncture           YES-POS 

                                        NRG        

 

                    Setting of ventilation mode           YES                 NR

G        

 

                    Measurement of body temperature           37.0              

  NRG        

 

                                        Serum or plasma troponin i.cardiac measu

rement (mass/volume) - 20 12:48   

      

 

                          Serum or plasma troponin i.cardiac measurement (mass/v

olume)           0.555 

ng/mL                                   <0.028        

 

                                        PROCALCITONIN (PCT) - 20 12:48    

     

 

                    PROCALCITONIN (PCT)           0.53 ng/mL           <0.10    

    

 

                                        Bacterial catheter tip culture - 

0 15:03         

 

                    Bacterial catheter tip culture           NG                 

 NRG        

 

                                        Urine Legionella pneumophila antigen ass

ay - 20 16:14         

 

                    Urine Legionella pneumophila antigen assay           Negativ

e            NRG    

    

 

                                        Streptococcus pneumoniae antigen detecti

on - 20 16:14         

 

                    Streptococcus pneumoniae antigen detection           Negativ

e            NRG    

    

 

                                        RESPIRATORY VIRUS PANEL - 20 16:14

         

 

                    Serum ragweed IgE antibody assay           Not Detected     

       Not Detected 

       

 

                          Serum or plasma aripiprazole measurement (mass/volume)

           Footnote       

                                        Not Detected        

 

                    PARAINFLU 3 PCR           Footnote            Not Detected  

      

 

                    METAPNEUMO PCR           Not Detected            Not Detecte

d        

 

                    Adenovirus detection, CSF, PCR           Footnote           

 Not Detected       

 

 

                    INFLUENZA A PCR           Not Detected            Not Detect

ed        

 

                    INFLUENZA B PCR           Not Detected            Not Detect

ed        

 

                                        2019 Coronavirus SARS-CoV-2 SO - 

0 16:14         

 

                    Coronavirus Ab [Units/volume] in Serum           Negative   

         Negative   

     

 

                                        Capillary blood glucose measurement by g

lucometer (mass/volume) - 20 18:15

         

 

                          Capillary blood glucose measurement by glucometer (mas

s/volume)           165 

mg/dL                                           

 

                                        Serum or plasma troponin i.cardiac measu

rement (mass/volume) - 20 18:51   

      

 

                          Serum or plasma troponin i.cardiac measurement (mass/v

olume)           0.576 

ng/mL                                   <0.028        

 

                                        Capillary blood glucose measurement by g

lucometer (mass/volume) - 20 22:12

         

 

                          Capillary blood glucose measurement by glucometer (mas

s/volume)           181 

mg/dL                                           

 

                                        Capillary blood glucose measurement by g

lucometer (mass/volume) - 20 01:08

         

 

                          Capillary blood glucose measurement by glucometer (mas

s/volume)           214 

mg/dL                                           

 

                                        Arterial blood gas measurement - 

0 02:58         

 

                    Blood pCO2           32 mm[Hg]           35-45        

 

                    Blood pO2           165 mm[Hg]           79-93        

 

                          Arterial blood bicarbonate measurement (moles/volume) 

          21 mmol/L       

                                        23-27        

 

                    Arterial blood base excess by calculation           -2.1 mmo

l/L           

-2.5-2.5        

 

                    Arterial blood oxygen saturation measurement           84 % 

                   

    

 

                    * Inhaled oxygen flow rate           21                  NRG

        

 

                          Arterial blood pH measurement with patient temperature

 correction           7.44

                                        7.37-7.43        

 

                          Arterial blood carbon dioxide, total measurement (mole

s/volume)           22.2 

mmol/L                                  21.0-31.0        

 

                    Body site           RIGHT RADIAL            NRG        

 

                          Assessment of wrist artery patency prior to arterial p

uncture           POSITIVE

                                        NRG        

 

                    Setting of ventilation mode           NO                  NR

G        

 

                    Measurement of body temperature           38.0              

  NRG        

 

                                        Complete blood count (CBC) with automate

d white blood cell (WBC) differential - 

20 02:58         

 

                          Blood leukocytes automated count (number/volume)      

     11.2 10*3/uL         

                                        4.3-11.0        

 

                          Blood erythrocytes automated count (number/volume)    

       3.07 10*6/uL       

                                        4.35-5.85        

 

                    Venous blood hemoglobin measurement (mass/volume)           

9.3 g/dL            

13.3-17.7        

 

                    Blood hematocrit (volume fraction)           29 %           

     40-54        

 

                    Automated erythrocyte mean corpuscular volume           95 [

foz_us]           

80-99        

 

                                        Automated erythrocyte mean corpuscular h

emoglobin (mass per erythrocyte)        

                          30 pg                     25-34        

 

                                        Automated erythrocyte mean corpuscular h

emoglobin concentration measurement 

(mass/volume)             32 g/dL                   32-36        

 

                    Automated erythrocyte distribution width ratio           17.

1 %              10.0-

14.5        

 

                    Automated blood platelet count (count/volume)           108 

10*3/uL           

130-400        

 

                          Automated blood platelet mean volume measurement      

     13.1 [foz_us]        

                                        7.4-10.4        

 

                    Automated blood neutrophils/100 leukocytes           72 %   

             42-75       

 

 

                    Automated blood lymphocytes/100 leukocytes           19 %   

             12-44       

 

 

                    Blood monocytes/100 leukocytes           8 %                

 0-12        

 

                    Automated blood eosinophils/100 leukocytes           1 %    

             0-10        

 

                    Automated blood basophils/100 leukocytes           0 %      

           0-10        

 

                    Blood neutrophils automated count (number/volume)           

8.1 10*3            

1.8-7.8        

 

                    Blood lymphocytes automated count (number/volume)           

2.1 10*3            

1.0-4.0        

 

                    Blood monocytes automated count (number/volume)           0.

9 10*3            

0.0-1.0        

 

                    Automated eosinophil count           0.1 10*3/uL           0

.0-0.3        

 

                    Automated blood basophil count (count/volume)           0.1 

10*3/uL           

0.0-0.1        

 

                                        Whole blood basic metabolic panel -  02:58         

 

                          Serum or plasma sodium measurement (moles/volume)     

      139 mmol/L          

                                        135-145        

 

                          Serum or plasma potassium measurement (moles/volume)  

         4.3 mmol/L       

                                        3.6-5.0        

 

                          Serum or plasma chloride measurement (moles/volume)   

        110 mmol/L        

                                                

 

                    Carbon dioxide           19 mmol/L           21-32        

 

                          Serum or plasma anion gap determination (moles/volume)

           10 mmol/L      

                                        5-14        

 

                          Serum or plasma urea nitrogen measurement (mass/volume

)           38 mg/dL      

                                        7-18        

 

                          Serum or plasma creatinine measurement (mass/volume)  

         1.45 mg/dL       

                                        0.60-1.30        

 

                    Serum or plasma urea nitrogen/creatinine mass ratio         

  26                  NRG 

       

 

                                        Serum or plasma creatinine measurement w

ith calculation of estimated glomerular 

filtration rate           48                        NRG        

 

                    Serum or plasma glucose measurement (mass/volume)           

161 mg/dL           

        

 

                    Serum or plasma calcium measurement (mass/volume)           

7.8 mg/dL           

8.5-10.1        

 

                                        Serum or plasma phosphate measurement (m

ass/volume) - 20 02:58         

 

                          Serum or plasma phosphate measurement (mass/volume)   

        1.7 mg/dL         

                                        2.3-4.7        

 

                                        Magnesium - 20 02:58         

 

                    Magnesium           1.3 mg/dL           1.6-2.4        

 

                                        Comprehensive metabolic panel - 20

 02:58         

 

                          Serum or plasma sodium measurement (moles/volume)     

      139 mmol/L          

                                        135-145        

 

                          Serum or plasma potassium measurement (moles/volume)  

         4.3 mmol/L       

                                        3.6-5.0        

 

                          Serum or plasma chloride measurement (moles/volume)   

        110 mmol/L        

                                                

 

                    Carbon dioxide           18 mmol/L           21-32        

 

                          Serum or plasma anion gap determination (moles/volume)

           11 mmol/L      

                                        5-14        

 

                          Serum or plasma urea nitrogen measurement (mass/volume

)           38 mg/dL      

                                        7-18        

 

                          Serum or plasma creatinine measurement (mass/volume)  

         1.43 mg/dL       

                                        0.60-1.30        

 

                    Serum or plasma urea nitrogen/creatinine mass ratio         

  27                  NRG 

       

 

                                        Serum or plasma creatinine measurement w

ith calculation of estimated glomerular 

filtration rate           49                        NRG        

 

                    Serum or plasma glucose measurement (mass/volume)           

160 mg/dL           

        

 

                    Serum or plasma calcium measurement (mass/volume)           

7.9 mg/dL           

8.5-10.1        

 

                          Serum or plasma total bilirubin measurement (mass/volu

me)           0.3 mg/dL   

                                        0.1-1.0        

 

                                        Serum or plasma alkaline phosphatase juarez

surement (enzymatic activity/volume)    

                          82 U/L                            

 

                                        Serum or plasma aspartate aminotransfera

se measurement (enzymatic 

activity/volume)           56 U/L                    5-34        

 

                                        Serum or plasma alanine aminotransferase

 measurement (enzymatic activity/volume)

                          29 U/L                    0-55        

 

                    Serum or plasma protein measurement (mass/volume)           

5.9 g/dL            

6.4-8.2        

 

                    Serum or plasma albumin measurement (mass/volume)           

3.0 g/dL            

3.2-4.5        

 

                    CALCIUM CORRECTED           8.7 mg/dL           8.5-10.1    

    

 

                                        PROCALCITONIN (PCT) - 20 02:58    

     

 

                    PROCALCITONIN (PCT)           1.33 ng/mL           <0.10    

    

 

                                        Capillary blood glucose measurement by g

lucometer (mass/volume) - 20 08:35

         

 

                          Capillary blood glucose measurement by glucometer (mas

s/volume)           234 

mg/dL                                           

 

                                        Capillary blood glucose measurement by g

lucometer (mass/volume) - 20 12:53

         

 

                          Capillary blood glucose measurement by glucometer (mas

s/volume)           201 

mg/dL                                           

 

                                        Capillary blood glucose measurement by g

lucometer (mass/volume) - 20 16:28

         

 

                          Capillary blood glucose measurement by glucometer (mas

s/volume)           132 

mg/dL                                           

 

                                        Vancomycin trough - 20 18:48      

   

 

                    Vancomycin trough           16.1 ug/mL           10.0-20.0  

      

 

                                        Capillary blood glucose measurement by g

lucometer (mass/volume) - 20 20:22

         

 

                          Capillary blood glucose measurement by glucometer (mas

s/volume)           162 

mg/dL                                           

 

                                        Capillary blood glucose measurement by g

lucometer (mass/volume) - 20 23:38

         

 

                          Capillary blood glucose measurement by glucometer (mas

s/volume)           171 

mg/dL                                           

 

                                        Arterial blood gas measurement - 04/15/2

0 03:20         

 

                    Blood pCO2           33 mm[Hg]           35-45        

 

                    Blood pO2           67 mm[Hg]           79-93        

 

                          Arterial blood bicarbonate measurement (moles/volume) 

          23 mmol/L       

                                        23-27        

 

                    Arterial blood base excess by calculation           -0.2 mmo

l/L           

-2.5-2.5        

 

                    Arterial blood oxygen saturation measurement           95 % 

                   

    

 

                    * Inhaled oxygen flow rate           40                  NRG

        

 

                          Arterial blood pH measurement with patient temperature

 correction           7.46

                                        7.37-7.43        

 

                          Arterial blood carbon dioxide, total measurement (mole

s/volume)           24.2 

mmol/L                                  21.0-31.0        

 

                    Body site           RIGHT RADIAL            NRG        

 

                          Assessment of wrist artery patency prior to arterial p

uncture           POSITIVE

                                        NRG        

 

                    Setting of ventilation mode           YES                 NR

G        

 

                    Measurement of body temperature           36.9              

  NRG        

 

                                        Complete blood count (CBC) with automate

d white blood cell (WBC) differential - 

04/15/20 03:20         

 

                          Blood leukocytes automated count (number/volume)      

     7.0 10*3/uL          

                                        4.3-11.0        

 

                          Blood erythrocytes automated count (number/volume)    

       2.57 10*6/uL       

                                        4.35-5.85        

 

                    Venous blood hemoglobin measurement (mass/volume)           

7.9 g/dL            

13.3-17.7        

 

                    Blood hematocrit (volume fraction)           25 %           

     40-54        

 

                    Automated erythrocyte mean corpuscular volume           95 [

foz_us]           

80-99        

 

                                        Automated erythrocyte mean corpuscular h

emoglobin (mass per erythrocyte)        

                          31 pg                     25-34        

 

                                        Automated erythrocyte mean corpuscular h

emoglobin concentration measurement 

(mass/volume)             32 g/dL                   32-36        

 

                    Automated erythrocyte distribution width ratio           17.

2 %              10.0-

14.5        

 

                    Automated blood platelet count (count/volume)           94 1

0*3/uL           

130-400        

 

                          Automated blood platelet mean volume measurement      

     12.1 [foz_us]        

                                        7.4-10.4        

 

                    Automated blood neutrophils/100 leukocytes           83 %   

             42-75       

 

 

                    Automated blood lymphocytes/100 leukocytes           12 %   

             12-44       

 

 

                    Blood monocytes/100 leukocytes           5 %                

 0-12        

 

                    Automated blood eosinophils/100 leukocytes           0 %    

             0-10        

 

                    Automated blood basophils/100 leukocytes           0 %      

           0-10        

 

                    Blood neutrophils automated count (number/volume)           

5.8 10*3            

1.8-7.8        

 

                    Blood lymphocytes automated count (number/volume)           

0.8 10*3            

1.0-4.0        

 

                    Blood monocytes automated count (number/volume)           0.

3 10*3            

0.0-1.0        

 

                    Automated eosinophil count           0.0 10*3/uL           0

.0-0.3        

 

                    Automated blood basophil count (count/volume)           0.0 

10*3/uL           

0.0-0.1        

 

                                        Whole blood basic metabolic panel -  03:20         

 

                          Serum or plasma sodium measurement (moles/volume)     

      140 mmol/L          

                                        135-145        

 

                          Serum or plasma potassium measurement (moles/volume)  

         3.6 mmol/L       

                                        3.6-5.0        

 

                          Serum or plasma chloride measurement (moles/volume)   

        107 mmol/L        

                                                

 

                    Carbon dioxide           20 mmol/L           21-32        

 

                          Serum or plasma anion gap determination (moles/volume)

           13 mmol/L      

                                        5-14        

 

                          Serum or plasma urea nitrogen measurement (mass/volume

)           28 mg/dL      

                                        7-18        

 

                          Serum or plasma creatinine measurement (mass/volume)  

         0.98 mg/dL       

                                        0.60-1.30        

 

                    Serum or plasma urea nitrogen/creatinine mass ratio         

  29                  NRG 

       

 

                                        Serum or plasma creatinine measurement w

ith calculation of estimated glomerular 

filtration rate           >                         NRG        

 

                    Serum or plasma glucose measurement (mass/volume)           

154 mg/dL           

        

 

                    Serum or plasma calcium measurement (mass/volume)           

7.5 mg/dL           

8.5-10.1        

 

                                        Serum or plasma phosphate measurement (m

ass/volume) - 04/15/20 03:20         

 

                          Serum or plasma phosphate measurement (mass/volume)   

        3.4 mg/dL         

                                        2.3-4.7        

 

                                        Magnesium - 04/15/20 03:20         

 

                    Magnesium           1.8 mg/dL           1.6-2.4        

 

                                        Arterial blood gas measurement - 04/15/2

0 06:45         

 

                    Blood pCO2           51 mm[Hg]           35-45        

 

                    Blood pO2           73 mm[Hg]           79-93        

 

                          Arterial blood bicarbonate measurement (moles/volume) 

          24 mmol/L       

                                        23-27        

 

                    Arterial blood base excess by calculation           -1.3 mmo

l/L           

-2.5-2.5        

 

                    Arterial blood oxygen saturation measurement           94 % 

                   

    

 

                    * Inhaled oxygen flow rate           40                  NRG

        

 

                          Arterial blood pH measurement with patient temperature

 correction           7.30

                                        7.37-7.43        

 

                          Arterial blood carbon dioxide, total measurement (mole

s/volume)           25.9 

mmol/L                                  21.0-31.0        

 

                    Body site           RIGHT RADIAL            NRG        

 

                          Assessment of wrist artery patency prior to arterial p

uncture           POSITIVE

                                        NRG        

 

                    Setting of ventilation mode           YES                 NR

G        

 

                    Measurement of body temperature           36.9              

  NRG        

 

                                        Capillary blood glucose measurement by g

lucometer (mass/volume) - 04/15/20 11:25

         

 

                          Capillary blood glucose measurement by glucometer (mas

s/volume)           179 

mg/dL                                           

 

                                        Capillary blood glucose measurement by g

lucometer (mass/volume) - 04/15/20 18:07

         

 

                          Capillary blood glucose measurement by glucometer (mas

s/volume)           162 

mg/dL                                           

 

                                        Capillary blood glucose measurement by g

lucometer (mass/volume) - 04/15/20 23:31

         

 

                          Capillary blood glucose measurement by glucometer (mas

s/volume)           145 

mg/dL                                           

 

                                        Arterial blood gas measurement - 

0 02:30         

 

                    Blood pCO2           38 mm[Hg]           35-45        

 

                    Blood pO2           82 mm[Hg]           79-93        

 

                          Arterial blood bicarbonate measurement (moles/volume) 

          24 mmol/L       

                                        23-27        

 

                    Arterial blood base excess by calculation           -0.1 mmo

l/L           

-2.5-2.5        

 

                    Arterial blood oxygen saturation measurement           97 % 

                   

    

 

                    * Inhaled oxygen flow rate           40%                 NRG

        

 

                          Arterial blood pH measurement with patient temperature

 correction           7.42

                                        7.37-7.43        

 

                          Arterial blood carbon dioxide, total measurement (mole

s/volume)           25.0 

mmol/L                                  21.0-31.0        

 

                    Body site           R RAD               NRG        

 

                          Assessment of wrist artery patency prior to arterial p

uncture           ART LINE

                                        NRG        

 

                    Setting of ventilation mode           YES                 NR

G        

 

                    Measurement of body temperature           37.0              

  NRG        

 

                                        Complete blood count (CBC) with automate

d white blood cell (WBC) differential - 

20 02:30         

 

                          Blood leukocytes automated count (number/volume)      

     6.7 10*3/uL          

                                        4.3-11.0        

 

                          Blood erythrocytes automated count (number/volume)    

       2.63 10*6/uL       

                                        4.35-5.85        

 

                    Venous blood hemoglobin measurement (mass/volume)           

7.9 g/dL            

13.3-17.7        

 

                    Blood hematocrit (volume fraction)           25 %           

     40-54        

 

                    Automated erythrocyte mean corpuscular volume           95 [

foz_us]           

80-99        

 

                                        Automated erythrocyte mean corpuscular h

emoglobin (mass per erythrocyte)        

                          30 pg                     25-34        

 

                                        Automated erythrocyte mean corpuscular h

emoglobin concentration measurement 

(mass/volume)             32 g/dL                   32-36        

 

                    Automated erythrocyte distribution width ratio           16.

6 %              10.0-

14.5        

 

                    Automated blood platelet count (count/volume)           112 

10*3/uL           

130-400        

 

                          Automated blood platelet mean volume measurement      

     12.5 [foz_us]        

                                        7.4-10.4        

 

                    Automated blood neutrophils/100 leukocytes           85 %   

             42-75       

 

 

                    Automated blood lymphocytes/100 leukocytes           11 %   

             12-44       

 

 

                    Blood monocytes/100 leukocytes           4 %                

 0-12        

 

                    Automated blood eosinophils/100 leukocytes           0 %    

             0-10        

 

                    Automated blood basophils/100 leukocytes           0 %      

           0-10        

 

                    Blood neutrophils automated count (number/volume)           

5.7 10*3            

1.8-7.8        

 

                    Blood lymphocytes automated count (number/volume)           

0.7 10*3            

1.0-4.0        

 

                    Blood monocytes automated count (number/volume)           0.

3 10*3            

0.0-1.0        

 

                    Automated eosinophil count           0.0 10*3/uL           0

.0-0.3        

 

                    Automated blood basophil count (count/volume)           0.0 

10*3/uL           

0.0-0.1        

 

                                        Whole blood basic metabolic panel -  02:30         

 

                          Serum or plasma sodium measurement (moles/volume)     

      137 mmol/L          

                                        135-145        

 

                          Serum or plasma potassium measurement (moles/volume)  

         3.7 mmol/L       

                                        3.6-5.0        

 

                          Serum or plasma chloride measurement (moles/volume)   

        105 mmol/L        

                                                

 

                    Carbon dioxide           21 mmol/L           21-32        

 

                          Serum or plasma anion gap determination (moles/volume)

           11 mmol/L      

                                        5-14        

 

                          Serum or plasma urea nitrogen measurement (mass/volume

)           20 mg/dL      

                                        7-18        

 

                          Serum or plasma creatinine measurement (mass/volume)  

         0.84 mg/dL       

                                        0.60-1.30        

 

                    Serum or plasma urea nitrogen/creatinine mass ratio         

  24                  NRG 

       

 

                                        Serum or plasma creatinine measurement w

ith calculation of estimated glomerular 

filtration rate           >                         NRG        

 

                    Serum or plasma glucose measurement (mass/volume)           

154 mg/dL           

        

 

                    Serum or plasma calcium measurement (mass/volume)           

7.6 mg/dL           

8.5-10.1        

 

                                        Serum or plasma phosphate measurement (m

ass/volume) - 20 02:30         

 

                          Serum or plasma phosphate measurement (mass/volume)   

        2.7 mg/dL         

                                        2.3-4.7        

 

                                        Magnesium - 20 02:30         

 

                    Magnesium           1.6 mg/dL           1.6-2.4        

 

                                        Serum or plasma lithium measurement (mol

es/volume) - 20 02:30         

 

                    BNP PT              305.3 pg/mL           <100.0        

 

                                        Serum iron and total iron binding capaci

ty panel - 20 02:30         

 

                    TIBC                180 %               280-380        

 

                    UIBC                161 %                       

 

                    Serum or plasma iron measurement (mass/volume)           19 

%                  

      

 

                          Total iron binding capacity and transferrin saturation

 measurement           11 

%                                       15-50        

 

                    Serum or plasma ferritin measurement (mass/volume)          

 374.4 %             

32.0-356.0        

 

                                        Capillary blood glucose measurement by g

lucometer (mass/volume) - 20 11:07

         

 

                          Capillary blood glucose measurement by glucometer (mas

s/volume)           218 

mg/dL                                           

 

                                        Capillary blood glucose measurement by g

lucometer (mass/volume) - 20 17:16

         

 

                          Capillary blood glucose measurement by glucometer (mas

s/volume)           179 

mg/dL                                           

 

                                        Capillary blood glucose measurement by g

lucometer (mass/volume) - 20 23:48

         

 

                          Capillary blood glucose measurement by glucometer (mas

s/volume)           190 

mg/dL                                           

 

                                        Arterial blood gas measurement - 

0 02:55         

 

                    Blood pCO2           41 mm[Hg]           35-45        

 

                    Blood pO2           77 mm[Hg]           79-93        

 

                          Arterial blood bicarbonate measurement (moles/volume) 

          27 mmol/L       

                                        23-27        

 

                    Arterial blood base excess by calculation           2.6 mmol

/L           

-2.5-2.5        

 

                    Arterial blood oxygen saturation measurement           96 % 

                   

    

 

                    * Inhaled oxygen flow rate           40%                 NRG

        

 

                          Arterial blood pH measurement with patient temperature

 correction           7.43

                                        7.37-7.43        

 

                          Arterial blood carbon dioxide, total measurement (mole

s/volume)           27.8 

mmol/L                                  21.0-31.0        

 

                    Body site           RIGHT RADIAL            NRG        

 

                          Assessment of wrist artery patency prior to arterial p

uncture           YES-POS 

                                        NRG        

 

                    Setting of ventilation mode           YES                 NR

G        

 

                    Measurement of body temperature           37.0              

  NRG        

 

                                        Whole blood basic metabolic panel -  02:55         

 

                          Serum or plasma sodium measurement (moles/volume)     

      139 mmol/L          

                                        135-145        

 

                          Serum or plasma potassium measurement (moles/volume)  

         3.6 mmol/L       

                                        3.6-5.0        

 

                          Serum or plasma chloride measurement (moles/volume)   

        103 mmol/L        

                                                

 

                    Carbon dioxide           23 mmol/L           21-32        

 

                          Serum or plasma anion gap determination (moles/volume)

           13 mmol/L      

                                        5-14        

 

                          Serum or plasma urea nitrogen measurement (mass/volume

)           21 mg/dL      

                                        7-18        

 

                          Serum or plasma creatinine measurement (mass/volume)  

         0.97 mg/dL       

                                        0.60-1.30        

 

                    Serum or plasma urea nitrogen/creatinine mass ratio         

  22                  NRG 

       

 

                                        Serum or plasma creatinine measurement w

ith calculation of estimated glomerular 

filtration rate           >                         NRG        

 

                    Serum or plasma glucose measurement (mass/volume)           

183 mg/dL           

        

 

                    Serum or plasma calcium measurement (mass/volume)           

7.8 mg/dL           

8.5-10.1        

 

                                        Complete blood count (CBC) with automate

d white blood cell (WBC) differential - 

20 02:55         

 

                          Blood leukocytes automated count (number/volume)      

     7.1 10*3/uL          

                                        4.3-11.0        

 

                          Blood erythrocytes automated count (number/volume)    

       2.78 10*6/uL       

                                        4.35-5.85        

 

                    Venous blood hemoglobin measurement (mass/volume)           

8.4 g/dL            

13.3-17.7        

 

                    Blood hematocrit (volume fraction)           26 %           

     40-54        

 

                    Automated erythrocyte mean corpuscular volume           95 [

foz_us]           

80-99        

 

                                        Automated erythrocyte mean corpuscular h

emoglobin (mass per erythrocyte)        

                          30 pg                     25-34        

 

                                        Automated erythrocyte mean corpuscular h

emoglobin concentration measurement 

(mass/volume)             32 g/dL                   32-36        

 

                    Automated erythrocyte distribution width ratio           16.

6 %              10.0-

14.5        

 

                    Automated blood platelet count (count/volume)           139 

10*3/uL           

130-400        

 

                          Automated blood platelet mean volume measurement      

     11.6 [foz_us]        

                                        7.4-10.4        

 

                    Automated blood neutrophils/100 leukocytes           83 %   

             42-75       

 

 

                    Automated blood lymphocytes/100 leukocytes           12 %   

             12-44       

 

 

                    Blood monocytes/100 leukocytes           5 %                

 0-12        

 

                    Automated blood eosinophils/100 leukocytes           0 %    

             0-10        

 

                    Automated blood basophils/100 leukocytes           0 %      

           0-10        

 

                    Blood neutrophils automated count (number/volume)           

5.9 10*3            

1.8-7.8        

 

                    Blood lymphocytes automated count (number/volume)           

0.8 10*3            

1.0-4.0        

 

                    Blood monocytes automated count (number/volume)           0.

3 10*3            

0.0-1.0        

 

                    Automated eosinophil count           0.0 10*3/uL           0

.0-0.3        

 

                    Automated blood basophil count (count/volume)           0.0 

10*3/uL           

0.0-0.1        

 

                                        Serum or plasma phosphate measurement (m

ass/volume) - 20 02:55         

 

                          Serum or plasma phosphate measurement (mass/volume)   

        2.6 mg/dL         

                                        2.3-4.7        

 

                                        Magnesium - 20 02:55         

 

                    Magnesium           1.7 mg/dL           1.6-2.4        

 

                                        Serum or plasma lithium measurement (mol

es/volume) - 20 02:55         

 

                    BNP PT              503.3 pg/mL           <100.0        

 

                                        Capillary blood glucose measurement by g

lucometer (mass/volume) - 20 11:23

         

 

                          Capillary blood glucose measurement by glucometer (mas

s/volume)           193 

mg/dL                                           

 

                                        Serum or plasma troponin i.cardiac measu

rement (mass/volume) - 20 12:00   

      

 

                          Serum or plasma troponin i.cardiac measurement (mass/v

olume)           0.064 

ng/mL                                   <0.028        

 

                                        Capillary blood glucose measurement by g

lucometer (mass/volume) - 20 17:16

         

 

                          Capillary blood glucose measurement by glucometer (mas

s/volume)           219 

mg/dL                                           

 

                                        Capillary blood glucose measurement by g

lucometer (mass/volume) - 20 23:53

         

 

                          Capillary blood glucose measurement by glucometer (mas

s/volume)           165 

mg/dL                                           

 

                                        Arterial blood gas measurement - 

0 03:05         

 

                    Blood pCO2           40 mm[Hg]           35-45        

 

                    Blood pO2           72 mm[Hg]           79-93        

 

                          Arterial blood bicarbonate measurement (moles/volume) 

          31 mmol/L       

                                        23-27        

 

                    Arterial blood base excess by calculation           7.2 mmol

/L           

-2.5-2.5        

 

                    Arterial blood oxygen saturation measurement           94 % 

                   

    

 

                    * Inhaled oxygen flow rate           60%                 NRG

        

 

                          Arterial blood pH measurement with patient temperature

 correction           7.50

                                        7.37-7.43        

 

                          Arterial blood carbon dioxide, total measurement (mole

s/volume)           31.9 

mmol/L                                  21.0-31.0        

 

                    Body site           RIGHT ART LINE            NRG        

 

                          Assessment of wrist artery patency prior to arterial p

uncture           ART LINE

                                        NRG        

 

                    Setting of ventilation mode           YES                 NR

G        

 

                    Measurement of body temperature           37.5              

  NRG        

 

                                        Complete blood count (CBC) with automate

d white blood cell (WBC) differential - 

20 03:05         

 

                          Blood leukocytes automated count (number/volume)      

     6.6 10*3/uL          

                                        4.3-11.0        

 

                          Blood erythrocytes automated count (number/volume)    

       2.75 10*6/uL       

                                        4.35-5.85        

 

                    Venous blood hemoglobin measurement (mass/volume)           

8.4 g/dL            

13.3-17.7        

 

                    Blood hematocrit (volume fraction)           26 %           

     40-54        

 

                    Automated erythrocyte mean corpuscular volume           95 [

foz_us]           

80-99        

 

                                        Automated erythrocyte mean corpuscular h

emoglobin (mass per erythrocyte)        

                          31 pg                     25-34        

 

                                        Automated erythrocyte mean corpuscular h

emoglobin concentration measurement 

(mass/volume)             32 g/dL                   32-36        

 

                    Automated erythrocyte distribution width ratio           16.

4 %              10.0-

14.5        

 

                    Automated blood platelet count (count/volume)           152 

10*3/uL           

130-400        

 

                          Automated blood platelet mean volume measurement      

     12.2 [foz_us]        

                                        7.4-10.4        

 

                    Automated blood neutrophils/100 leukocytes           78 %   

             42-75       

 

 

                    Automated blood lymphocytes/100 leukocytes           15 %   

             12-44       

 

 

                    Blood monocytes/100 leukocytes           6 %                

 0-12        

 

                    Automated blood eosinophils/100 leukocytes           1 %    

             0-10        

 

                    Automated blood basophils/100 leukocytes           0 %      

           0-10        

 

                    Blood neutrophils automated count (number/volume)           

5.1 10*3            

1.8-7.8        

 

                    Blood lymphocytes automated count (number/volume)           

1.0 10*3            

1.0-4.0        

 

                    Blood monocytes automated count (number/volume)           0.

4 10*3            

0.0-1.0        

 

                    Automated eosinophil count           0.0 10*3/uL           0

.0-0.3        

 

                    Automated blood basophil count (count/volume)           0.0 

10*3/uL           

0.0-0.1        

 

                                        Whole blood basic metabolic panel -  03:05         

 

                          Serum or plasma sodium measurement (moles/volume)     

      138 mmol/L          

                                        135-145        

 

                          Serum or plasma potassium measurement (moles/volume)  

         2.9 mmol/L       

                                        3.6-5.0        

 

                          Serum or plasma chloride measurement (moles/volume)   

        100 mmol/L        

                                                

 

                    Carbon dioxide           25 mmol/L           21-32        

 

                          Serum or plasma anion gap determination (moles/volume)

           13 mmol/L      

                                        5-14        

 

                          Serum or plasma urea nitrogen measurement (mass/volume

)           16 mg/dL      

                                        7-18        

 

                          Serum or plasma creatinine measurement (mass/volume)  

         0.86 mg/dL       

                                        0.60-1.30        

 

                    Serum or plasma urea nitrogen/creatinine mass ratio         

  19                  NRG 

       

 

                                        Serum or plasma creatinine measurement w

ith calculation of estimated glomerular 

filtration rate           >                         NRG        

 

                    Serum or plasma glucose measurement (mass/volume)           

171 mg/dL           

        

 

                    Serum or plasma calcium measurement (mass/volume)           

7.7 mg/dL           

8.5-10.1        

 

                                        Serum or plasma phosphate measurement (m

ass/volume) - 20 03:05         

 

                          Serum or plasma phosphate measurement (mass/volume)   

        3.1 mg/dL         

                                        2.3-4.7        

 

                                        Magnesium - 20 03:05         

 

                    Magnesium           1.4 mg/dL           1.6-2.4        

 

                                        Capillary blood glucose measurement by g

lucometer (mass/volume) - 20 11:25

         

 

                          Capillary blood glucose measurement by glucometer (mas

s/volume)           255 

mg/dL                                           

 

                                        Capillary blood glucose measurement by g

lucometer (mass/volume) - 20 15:42

         

 

                          Capillary blood glucose measurement by glucometer (mas

s/volume)           278 

mg/dL                                           

 

                                        Capillary blood glucose measurement by g

lucometer (mass/volume) - 20 17:49

         

 

                          Capillary blood glucose measurement by glucometer (mas

s/volume)           256 

mg/dL                                           

 

                                        Capillary blood glucose measurement by g

lucometer (mass/volume) - 20 22:55

         

 

                          Capillary blood glucose measurement by glucometer (mas

s/volume)           316 

mg/dL                                           

 

                                        Complete blood count (CBC) with automate

d white blood cell (WBC) differential - 

20 03:30         

 

                          Blood leukocytes automated count (number/volume)      

     7.0 10*3/uL          

                                        4.3-11.0        

 

                          Blood erythrocytes automated count (number/volume)    

       2.70 10*6/uL       

                                        4.35-5.85        

 

                    Venous blood hemoglobin measurement (mass/volume)           

8.1 g/dL            

13.3-17.7        

 

                    Blood hematocrit (volume fraction)           26 %           

     40-54        

 

                    Automated erythrocyte mean corpuscular volume           96 [

foz_us]           

80-99        

 

                                        Automated erythrocyte mean corpuscular h

emoglobin (mass per erythrocyte)        

                          30 pg                     25-34        

 

                                        Automated erythrocyte mean corpuscular h

emoglobin concentration measurement 

(mass/volume)             31 g/dL                   32-36        

 

                    Automated erythrocyte distribution width ratio           16.

7 %              10.0-

14.5        

 

                    Automated blood platelet count (count/volume)           144 

10*3/uL           

130-400        

 

                          Automated blood platelet mean volume measurement      

     11.4 [foz_us]        

                                        7.4-10.4        

 

                    Automated blood neutrophils/100 leukocytes           86 %   

             42-75       

 

 

                    Automated blood lymphocytes/100 leukocytes           8 %    

             12-44        

 

                    Blood monocytes/100 leukocytes           6 %                

 0-12        

 

                    Automated blood eosinophils/100 leukocytes           0 %    

             0-10        

 

                    Automated blood basophils/100 leukocytes           0 %      

           0-10        

 

                    Blood neutrophils automated count (number/volume)           

6.0 10*3            

1.8-7.8        

 

                    Blood lymphocytes automated count (number/volume)           

0.6 10*3            

1.0-4.0        

 

                    Blood monocytes automated count (number/volume)           0.

4 10*3            

0.0-1.0        

 

                    Automated eosinophil count           0.0 10*3/uL           0

.0-0.3        

 

                    Automated blood basophil count (count/volume)           0.0 

10*3/uL           

0.0-0.1        

 

                                        Arterial blood gas measurement - 

0 03:30         

 

                    Blood pCO2           46 mm[Hg]           35-45        

 

                    Blood pO2           91 mm[Hg]           79-93        

 

                          Arterial blood bicarbonate measurement (moles/volume) 

          33 mmol/L       

                                        23-27        

 

                    Arterial blood base excess by calculation           9.4 mmol

/L           

-2.5-2.5        

 

                    Arterial blood oxygen saturation measurement           98 % 

                   

    

 

                    * Inhaled oxygen flow rate           65%                 NRG

        

 

                          Arterial blood pH measurement with patient temperature

 correction           7.48

                                        7.37-7.43        

 

                          Arterial blood carbon dioxide, total measurement (mole

s/volume)           34.7 

mmol/L                                  21.0-31.0        

 

                    Body site           RIGHT ART LINE            NRG        

 

                          Assessment of wrist artery patency prior to arterial p

uncture           ART LINE

                                        NRG        

 

                    Setting of ventilation mode           YES                 NR

G        

 

                    Measurement of body temperature           37.4              

  NRG        

 

                                        Whole blood basic metabolic panel -  03:30         

 

                          Serum or plasma sodium measurement (moles/volume)     

      139 mmol/L          

                                        135-145        

 

                          Serum or plasma potassium measurement (moles/volume)  

         3.4 mmol/L       

                                        3.6-5.0        

 

                          Serum or plasma chloride measurement (moles/volume)   

        98 mmol/L         

                                                

 

                    Carbon dioxide           28 mmol/L           21-32        

 

                          Serum or plasma anion gap determination (moles/volume)

           13 mmol/L      

                                        5-14        

 

                          Serum or plasma urea nitrogen measurement (mass/volume

)           17 mg/dL      

                                        7-18        

 

                          Serum or plasma creatinine measurement (mass/volume)  

         0.94 mg/dL       

                                        0.60-1.30        

 

                    Serum or plasma urea nitrogen/creatinine mass ratio         

  18                  NRG 

       

 

                                        Serum or plasma creatinine measurement w

ith calculation of estimated glomerular 

filtration rate           >                         NRG        

 

                    Serum or plasma glucose measurement (mass/volume)           

252 mg/dL           

        

 

                    Serum or plasma calcium measurement (mass/volume)           

8.0 mg/dL           

8.5-10.1        

 

                                        Serum or plasma phosphate measurement (m

ass/volume) - 20 03:30         

 

                          Serum or plasma phosphate measurement (mass/volume)   

        3.0 mg/dL         

                                        2.3-4.7        

 

                                        Magnesium - 20 03:30         

 

                    Magnesium           1.8 mg/dL           1.6-2.4        

 

                                        Serum or plasma lithium measurement (mol

es/volume) - 20 03:30         

 

                    BNP PT              501.3 pg/mL           <100.0        

 

                                        Capillary blood glucose measurement by g

lucometer (mass/volume) - 20 11:00

         

 

                          Capillary blood glucose measurement by glucometer (mas

s/volume)           272 

mg/dL                                           

 

                                        Capillary blood glucose measurement by g

lucometer (mass/volume) - 20 17:46

         

 

                          Capillary blood glucose measurement by glucometer (mas

s/volume)           265 

mg/dL                                           

 

                                        Capillary blood glucose measurement by g

lucometer (mass/volume) - 20 01:28

         

 

                          Capillary blood glucose measurement by glucometer (mas

s/volume)           248 

mg/dL                                           

 

                                        Complete blood count (CBC) with automate

d white blood cell (WBC) differential - 

20 03:18         

 

                          Blood leukocytes automated count (number/volume)      

     7.7 10*3/uL          

                                        4.3-11.0        

 

                          Blood erythrocytes automated count (number/volume)    

       2.98 10*6/uL       

                                        4.35-5.85        

 

                    Venous blood hemoglobin measurement (mass/volume)           

8.9 g/dL            

13.3-17.7        

 

                    Blood hematocrit (volume fraction)           29 %           

     40-54        

 

                    Automated erythrocyte mean corpuscular volume           97 [

foz_us]           

80-99        

 

                                        Automated erythrocyte mean corpuscular h

emoglobin (mass per erythrocyte)        

                          30 pg                     25-34        

 

                                        Automated erythrocyte mean corpuscular h

emoglobin concentration measurement 

(mass/volume)             31 g/dL                   32-36        

 

                    Automated erythrocyte distribution width ratio           16.

5 %              10.0-

14.5        

 

                    Automated blood platelet count (count/volume)           174 

10*3/uL           

130-400        

 

                          Automated blood platelet mean volume measurement      

     11.7 [foz_us]        

                                        7.4-10.4        

 

                    Automated blood neutrophils/100 leukocytes           91 %   

             42-75       

 

 

                    Automated blood lymphocytes/100 leukocytes           6 %    

             12-44        

 

                    Blood monocytes/100 leukocytes           3 %                

 0-12        

 

                    Automated blood eosinophils/100 leukocytes           0 %    

             0-10        

 

                    Automated blood basophils/100 leukocytes           0 %      

           0-10        

 

                    Blood neutrophils automated count (number/volume)           

7.0 10*3            

1.8-7.8        

 

                    Blood lymphocytes automated count (number/volume)           

0.5 10*3            

1.0-4.0        

 

                    Blood monocytes automated count (number/volume)           0.

3 10*3            

0.0-1.0        

 

                    Automated eosinophil count           0.0 10*3/uL           0

.0-0.3        

 

                    Automated blood basophil count (count/volume)           0.0 

10*3/uL           

0.0-0.1        

 

                                        Whole blood basic metabolic panel -  03:18         

 

                          Serum or plasma sodium measurement (moles/volume)     

      136 mmol/L          

                                        135-145        

 

                          Serum or plasma potassium measurement (moles/volume)  

         4.1 mmol/L       

                                        3.6-5.0        

 

                          Serum or plasma chloride measurement (moles/volume)   

        95 mmol/L         

                                                

 

                    Carbon dioxide           28 mmol/L           21-32        

 

                          Serum or plasma anion gap determination (moles/volume)

           13 mmol/L      

                                        5-14        

 

                          Serum or plasma urea nitrogen measurement (mass/volume

)           19 mg/dL      

                                        7-18        

 

                          Serum or plasma creatinine measurement (mass/volume)  

         0.98 mg/dL       

                                        0.60-1.30        

 

                    Serum or plasma urea nitrogen/creatinine mass ratio         

  19                  NRG 

       

 

                                        Serum or plasma creatinine measurement w

ith calculation of estimated glomerular 

filtration rate           >                         NRG        

 

                    Serum or plasma glucose measurement (mass/volume)           

268 mg/dL           

        

 

                    Serum or plasma calcium measurement (mass/volume)           

8.2 mg/dL           

8.5-10.1        

 

                                        Serum or plasma phosphate measurement (m

ass/volume) - 20 03:18         

 

                          Serum or plasma phosphate measurement (mass/volume)   

        3.5 mg/dL         

                                        2.3-4.7        

 

                                        Magnesium - 20 03:18         

 

                    Magnesium           1.6 mg/dL           1.6-2.4        

 

                                        Arterial blood gas measurement - 

0 03:25         

 

                    Blood pCO2           46 mm[Hg]           35-45        

 

                    Blood pO2           81 mm[Hg]           79-93        

 

                          Arterial blood bicarbonate measurement (moles/volume) 

          34 mmol/L       

                                        23-27        

 

                    Arterial blood base excess by calculation           9.8 mmol

/L           

-2.5-2.5        

 

                    Arterial blood oxygen saturation measurement           96 % 

                   

    

 

                    * Inhaled oxygen flow rate           60%                 NRG

        

 

                          Arterial blood pH measurement with patient temperature

 correction           7.48

                                        7.37-7.43        

 

                          Arterial blood carbon dioxide, total measurement (mole

s/volume)           35.4 

mmol/L                                  21.0-31.0        

 

                    Body site           ART LINE            NRG        

 

                          Assessment of wrist artery patency prior to arterial p

uncture           ART LINE

                                        NRG        

 

                    Setting of ventilation mode           YES                 NR

G        

 

                    Measurement of body temperature           36.7              

  NRG        

 

                                        Capillary blood glucose measurement by g

lucometer (mass/volume) - 20 11:53

         

 

                          Capillary blood glucose measurement by glucometer (mas

s/volume)           350 

mg/dL                                           

 

                                        Capillary blood glucose measurement by g

lucometer (mass/volume) - 20 17:46

         

 

                          Capillary blood glucose measurement by glucometer (mas

s/volume)           321 

mg/dL                                           

 

                                        Capillary blood glucose measurement by g

lucometer (mass/volume) - 20 23:57

         

 

                          Capillary blood glucose measurement by glucometer (mas

s/volume)           320 

mg/dL                                           

 

                                        Arterial blood gas measurement - 

0 03:00         

 

                    Blood pCO2           53 mm[Hg]           35-45        

 

                    Blood pO2           89 mm[Hg]           79-93        

 

                          Arterial blood bicarbonate measurement (moles/volume) 

          37 mmol/L       

                                        23-27        

 

                    Arterial blood base excess by calculation           12.0 mmo

l/L           

-2.5-2.5        

 

                    Arterial blood oxygen saturation measurement           97 % 

                   

    

 

                    * Inhaled oxygen flow rate           40%                 NRG

        

 

                          Arterial blood pH measurement with patient temperature

 correction           7.45

                                        7.37-7.43        

 

                          Arterial blood carbon dioxide, total measurement (mole

s/volume)           38.3 

mmol/L                                  21.0-31.0        

 

                    Body site           RT ART LINE            NRG        

 

                          Assessment of wrist artery patency prior to arterial p

uncture           ART LINE

                                        NRG        

 

                    Setting of ventilation mode           YES                 NR

G        

 

                    Measurement of body temperature           36.9              

  NRG        

 

                                        Complete blood count (CBC) with automate

d white blood cell (WBC) differential - 

20 03:00         

 

                          Blood leukocytes automated count (number/volume)      

     6.3 10*3/uL          

                                        4.3-11.0        

 

                          Blood erythrocytes automated count (number/volume)    

       2.96 10*6/uL       

                                        4.35-5.85        

 

                    Venous blood hemoglobin measurement (mass/volume)           

8.8 g/dL            

13.3-17.7        

 

                    Blood hematocrit (volume fraction)           29 %           

     40-54        

 

                    Automated erythrocyte mean corpuscular volume           98 [

foz_us]           

80-99        

 

                                        Automated erythrocyte mean corpuscular h

emoglobin (mass per erythrocyte)        

                          30 pg                     25-34        

 

                                        Automated erythrocyte mean corpuscular h

emoglobin concentration measurement 

(mass/volume)             30 g/dL                   32-36        

 

                    Automated erythrocyte distribution width ratio           16.

5 %              10.0-

14.5        

 

                    Automated blood platelet count (count/volume)           166 

10*3/uL           

130-400        

 

                          Automated blood platelet mean volume measurement      

     11.6 [foz_us]        

                                        7.4-10.4        

 

                    Automated blood neutrophils/100 leukocytes           92 %   

             42-75       

 

 

                    Automated blood lymphocytes/100 leukocytes           5 %    

             12-44        

 

                    Blood monocytes/100 leukocytes           3 %                

 0-12        

 

                    Automated blood eosinophils/100 leukocytes           0 %    

             0-10        

 

                    Automated blood basophils/100 leukocytes           0 %      

           0-10        

 

                    Blood neutrophils automated count (number/volume)           

5.8 10*3            

1.8-7.8        

 

                    Blood lymphocytes automated count (number/volume)           

0.3 10*3            

1.0-4.0        

 

                    Blood monocytes automated count (number/volume)           0.

2 10*3            

0.0-1.0        

 

                    Automated eosinophil count           0.0 10*3/uL           0

.0-0.3        

 

                    Automated blood basophil count (count/volume)           0.0 

10*3/uL           

0.0-0.1        

 

                                        Whole blood basic metabolic panel - / 03:00         

 

                          Serum or plasma sodium measurement (moles/volume)     

      136 mmol/L          

                                        135-145        

 

                          Serum or plasma potassium measurement (moles/volume)  

         3.8 mmol/L       

                                        3.6-5.0        

 

                          Serum or plasma chloride measurement (moles/volume)   

        94 mmol/L         

                                                

 

                    Carbon dioxide           30 mmol/L           21-32        

 

                          Serum or plasma anion gap determination (moles/volume)

           12 mmol/L      

                                        5-14        

 

                          Serum or plasma urea nitrogen measurement (mass/volume

)           27 mg/dL      

                                        7-18        

 

                          Serum or plasma creatinine measurement (mass/volume)  

         1.02 mg/dL       

                                        0.60-1.30        

 

                    Serum or plasma urea nitrogen/creatinine mass ratio         

  26                  NRG 

       

 

                                        Serum or plasma creatinine measurement w

ith calculation of estimated glomerular 

filtration rate           >                         NRG        

 

                    Serum or plasma glucose measurement (mass/volume)           

301 mg/dL           

        

 

                    Serum or plasma calcium measurement (mass/volume)           

8.3 mg/dL           

8.5-10.1        

 

                                        Serum or plasma phosphate measurement (m

ass/volume) - 20 03:00         

 

                          Serum or plasma phosphate measurement (mass/volume)   

        3.4 mg/dL         

                                        2.3-4.7        

 

                                        Magnesium - 20 03:00         

 

                    Magnesium           2.0 mg/dL           1.6-2.4        

 

                                        Capillary blood glucose measurement by g

lucometer (mass/volume) - 20 11:34

         

 

                          Capillary blood glucose measurement by glucometer (mas

s/volume)           259 

mg/dL                                           

 

                                        Capillary blood glucose measurement by g

lucometer (mass/volume) - 20 17:46

         

 

                          Capillary blood glucose measurement by glucometer (mas

s/volume)           297 

mg/dL                                           

 

                                        Capillary blood glucose measurement by g

lucometer (mass/volume) - 20 22:47

         

 

                          Capillary blood glucose measurement by glucometer (mas

s/volume)           259 

mg/dL                                           

 

                                        Arterial blood gas measurement - 

0 03:10         

 

                    Blood pCO2           52 mm[Hg]           35-45        

 

                    Blood pO2           73 mm[Hg]           79-93        

 

                          Arterial blood bicarbonate measurement (moles/volume) 

          36 mmol/L       

                                        23-27        

 

                    Arterial blood base excess by calculation           11.0 mmo

l/L           

-2.5-2.5        

 

                    Arterial blood oxygen saturation measurement           94 % 

                   

    

 

                    * Inhaled oxygen flow rate           40%                 NRG

        

 

                          Arterial blood pH measurement with patient temperature

 correction           7.45

                                        7.37-7.43        

 

                          Arterial blood carbon dioxide, total measurement (mole

s/volume)           37.2 

mmol/L                                  21.0-31.0        

 

                    Body site           RIGHT RADIAL            NRG        

 

                          Assessment of wrist artery patency prior to arterial p

uncture           ART LINE

                                        NRG        

 

                    Setting of ventilation mode           YES                 NR

G        

 

                    Measurement of body temperature           36.8              

  NRG        

 

                                        Complete blood count (CBC) with automate

d white blood cell (WBC) differential - 

20 03:13         

 

                          Blood leukocytes automated count (number/volume)      

     7.0 10*3/uL          

                                        4.3-11.0        

 

                          Blood erythrocytes automated count (number/volume)    

       3.00 10*6/uL       

                                        4.35-5.85        

 

                    Venous blood hemoglobin measurement (mass/volume)           

9.1 g/dL            

13.3-17.7        

 

                    Blood hematocrit (volume fraction)           29 %           

     40-54        

 

                    Automated erythrocyte mean corpuscular volume           98 [

foz_us]           

80-99        

 

                                        Automated erythrocyte mean corpuscular h

emoglobin (mass per erythrocyte)        

                          30 pg                     25-34        

 

                                        Automated erythrocyte mean corpuscular h

emoglobin concentration measurement 

(mass/volume)             31 g/dL                   32-36        

 

                    Automated erythrocyte distribution width ratio           16.

5 %              10.0-

14.5        

 

                    Automated blood platelet count (count/volume)           177 

10*3/uL           

130-400        

 

                          Automated blood platelet mean volume measurement      

     11.5 [foz_us]        

                                        7.4-10.4        

 

                    Automated blood neutrophils/100 leukocytes           92 %   

             42-75       

 

 

                    Automated blood lymphocytes/100 leukocytes           5 %    

             12-44        

 

                    Blood monocytes/100 leukocytes           4 %                

 0-12        

 

                    Automated blood eosinophils/100 leukocytes           0 %    

             0-10        

 

                    Automated blood basophils/100 leukocytes           0 %      

           0-10        

 

                    Blood neutrophils automated count (number/volume)           

6.4 10*3            

1.8-7.8        

 

                    Blood lymphocytes automated count (number/volume)           

0.3 10*3            

1.0-4.0        

 

                    Blood monocytes automated count (number/volume)           0.

3 10*3            

0.0-1.0        

 

                    Automated eosinophil count           0.0 10*3/uL           0

.0-0.3        

 

                    Automated blood basophil count (count/volume)           0.0 

10*3/uL           

0.0-0.1        

 

                                        Whole blood basic metabolic panel -  03:13         

 

                          Serum or plasma sodium measurement (moles/volume)     

      136 mmol/L          

                                        135-145        

 

                          Serum or plasma potassium measurement (moles/volume)  

         4.3 mmol/L       

                                        3.6-5.0        

 

                          Serum or plasma chloride measurement (moles/volume)   

        95 mmol/L         

                                                

 

                    Carbon dioxide           31 mmol/L           21-32        

 

                          Serum or plasma anion gap determination (moles/volume)

           10 mmol/L      

                                        5-14        

 

                          Serum or plasma urea nitrogen measurement (mass/volume

)           42 mg/dL      

                                        7-18        

 

                          Serum or plasma creatinine measurement (mass/volume)  

         1.03 mg/dL       

                                        0.60-1.30        

 

                    Serum or plasma urea nitrogen/creatinine mass ratio         

  41                  NRG 

       

 

                                        Serum or plasma creatinine measurement w

ith calculation of estimated glomerular 

filtration rate           >                         NRG        

 

                    Serum or plasma glucose measurement (mass/volume)           

265 mg/dL           

        

 

                    Serum or plasma calcium measurement (mass/volume)           

8.1 mg/dL           

8.5-10.1        

 

                                        Serum or plasma phosphate measurement (m

ass/volume) - 20 03:13         

 

                          Serum or plasma phosphate measurement (mass/volume)   

        3.7 mg/dL         

                                        2.3-4.7        

 

                                        Magnesium - 20 03:13         

 

                    Magnesium           1.9 mg/dL           1.6-2.4        

 

                                        Serum or plasma troponin i.cardiac measu

rement (mass/volume) - 20 03:13   

      

 

                          Serum or plasma troponin i.cardiac measurement (mass/v

olume)           0.033 

ng/mL                                   <0.028        

 

                                        Capillary blood glucose measurement by g

lucometer (mass/volume) - 20 06:18

         

 

                          Capillary blood glucose measurement by glucometer (mas

s/volume)           303 

mg/dL                                           

 

                                        Capillary blood glucose measurement by g

lucometer (mass/volume) - 20 12:17

         

 

                          Capillary blood glucose measurement by glucometer (mas

s/volume)           284 

mg/dL                                           

 

                                        Capillary blood glucose measurement by g

lucometer (mass/volume) - 20 17:35

         

 

                          Capillary blood glucose measurement by glucometer (mas

s/volume)           232 

mg/dL                                           

 

                                        Capillary blood glucose measurement by g

lucometer (mass/volume) - 20 00:29

         

 

                          Capillary blood glucose measurement by glucometer (mas

s/volume)           233 

mg/dL                                           

 

                                        Complete blood count (CBC) with automate

d white blood cell (WBC) differential - 

20 03:05         

 

                          Blood leukocytes automated count (number/volume)      

     6.8 10*3/uL          

                                        4.3-11.0        

 

                          Blood erythrocytes automated count (number/volume)    

       2.70 10*6/uL       

                                        4.35-5.85        

 

                    Venous blood hemoglobin measurement (mass/volume)           

8.4 g/dL            

13.3-17.7        

 

                    Blood hematocrit (volume fraction)           27 %           

     40-54        

 

                    Automated erythrocyte mean corpuscular volume           99 [

foz_us]           

80-99        

 

                                        Automated erythrocyte mean corpuscular h

emoglobin (mass per erythrocyte)        

                          31 pg                     25-34        

 

                                        Automated erythrocyte mean corpuscular h

emoglobin concentration measurement 

(mass/volume)             32 g/dL                   32-36        

 

                    Automated erythrocyte distribution width ratio           16.

7 %              10.0-

14.5        

 

                    Automated blood platelet count (count/volume)           168 

10*3/uL           

130-400        

 

                          Automated blood platelet mean volume measurement      

     11.5 [foz_us]        

                                        7.4-10.4        

 

                    Automated blood neutrophils/100 leukocytes           92 %   

             42-75       

 

 

                    Automated blood lymphocytes/100 leukocytes           3 %    

             12-44        

 

                    Blood monocytes/100 leukocytes           5 %                

 0-12        

 

                    Automated blood eosinophils/100 leukocytes           0 %    

             0-10        

 

                    Automated blood basophils/100 leukocytes           0 %      

           0-10        

 

                    Blood neutrophils automated count (number/volume)           

6.2 10*3            

1.8-7.8        

 

                    Blood lymphocytes automated count (number/volume)           

0.2 10*3            

1.0-4.0        

 

                    Blood monocytes automated count (number/volume)           0.

3 10*3            

0.0-1.0        

 

                    Automated eosinophil count           0.0 10*3/uL           0

.0-0.3        

 

                    Automated blood basophil count (count/volume)           0.0 

10*3/uL           

0.0-0.1        

 

                                        Arterial blood gas measurement - 

0 03:05         

 

                    Blood pCO2           57 mm[Hg]           35-45        

 

                    Blood pO2           72 mm[Hg]           79-93        

 

                          Arterial blood bicarbonate measurement (moles/volume) 

          36 mmol/L       

                                        23-27        

 

                    Arterial blood base excess by calculation           10.7 mmo

l/L           

-2.5-2.5        

 

                    Arterial blood oxygen saturation measurement           94 % 

                   

    

 

                    * Inhaled oxygen flow rate           AC VOLUME CONTROL      

      NRG        

 

                          Arterial blood pH measurement with patient temperature

 correction           7.41

                                        7.37-7.43        

 

                          Arterial blood carbon dioxide, total measurement (mole

s/volume)           37.5 

mmol/L                                  21.0-31.0        

 

                    Body site           RT ART LINE            NRG        

 

                          Assessment of wrist artery patency prior to arterial p

uncture           P       

                                        NRG        

 

                    Setting of ventilation mode           YES                 NR

G        

 

                    Measurement of body temperature           36.6              

  NRG        

 

                                        Whole blood basic metabolic panel - 04/2

3/20 03:05         

 

                          Serum or plasma sodium measurement (moles/volume)     

      137 mmol/L          

                                        135-145        

 

                          Serum or plasma potassium measurement (moles/volume)  

         4.4 mmol/L       

                                        3.6-5.0        

 

                          Serum or plasma chloride measurement (moles/volume)   

        97 mmol/L         

                                                

 

                    Carbon dioxide           30 mmol/L           21-32        

 

                          Serum or plasma anion gap determination (moles/volume)

           10 mmol/L      

                                        5-14        

 

                          Serum or plasma urea nitrogen measurement (mass/volume

)           51 mg/dL      

                                        7-18        

 

                          Serum or plasma creatinine measurement (mass/volume)  

         0.99 mg/dL       

                                        0.60-1.30        

 

                    Serum or plasma urea nitrogen/creatinine mass ratio         

  52                  NRG 

       

 

                                        Serum or plasma creatinine measurement w

ith calculation of estimated glomerular 

filtration rate           >                         NRG        

 

                    Serum or plasma glucose measurement (mass/volume)           

218 mg/dL           

        

 

                    Serum or plasma calcium measurement (mass/volume)           

8.0 mg/dL           

8.5-10.1        

 

                                        Manual absolute plasma cell count - 04/2

3/20 03:05         

 

                    Blood monocytes/100 leukocytes           5 %                

 NRG        

 

                    Manual blood segmented neutrophils/100 leukocytes           

85 %                NRG  

      

 

                    Blood band neutrophils/100 leukocytes           5 %         

        NRG        

 

                    Manual blood lymphocytes/100 leukocytes           5 %       

          NRG        

 

                    Blood anisocytosis detection by light microscopy           S

LIGHT              NRG

        

 

                    Blood hypochromia detection by light microscopy           SL

IGHT              NRG 

       

 

                                        Serum or plasma phosphate measurement (m

ass/volume) - 20 03:05         

 

                          Serum or plasma phosphate measurement (mass/volume)   

        3.7 mg/dL         

                                        2.3-4.7        

 

                                        Magnesium - 20 03:05         

 

                    Magnesium           2.0 mg/dL           1.6-2.4        

 

                                        Capillary blood glucose measurement by g

lucometer (mass/volume) - 20 11:21

         

 

                          Capillary blood glucose measurement by glucometer (mas

s/volume)           188 

mg/dL                                           

 

                                        Capillary blood glucose measurement by g

lucometer (mass/volume) - 20 17:50

         

 

                          Capillary blood glucose measurement by glucometer (mas

s/volume)           254 

mg/dL                                           

 

                                        Capillary blood glucose measurement by g

lucometer (mass/volume) - 20 23:09

         

 

                          Capillary blood glucose measurement by glucometer (mas

s/volume)           223 

mg/dL                                           

 

                                        Complete blood count (CBC) with automate

d white blood cell (WBC) differential - 

20 03:35         

 

                          Blood leukocytes automated count (number/volume)      

     7.3 10*3/uL          

                                        4.3-11.0        

 

                          Blood erythrocytes automated count (number/volume)    

       3.17 10*6/uL       

                                        4.35-5.85        

 

                    Venous blood hemoglobin measurement (mass/volume)           

9.6 g/dL            

13.3-17.7        

 

                    Blood hematocrit (volume fraction)           31 %           

     40-54        

 

                    Automated erythrocyte mean corpuscular volume           97 [

foz_us]           

80-99        

 

                                        Automated erythrocyte mean corpuscular h

emoglobin (mass per erythrocyte)        

                          30 pg                     25-34        

 

                                        Automated erythrocyte mean corpuscular h

emoglobin concentration measurement 

(mass/volume)             31 g/dL                   32-36        

 

                    Automated erythrocyte distribution width ratio           16.

3 %              10.0-

14.5        

 

                    Automated blood platelet count (count/volume)           173 

10*3/uL           

130-400        

 

                          Automated blood platelet mean volume measurement      

     11.7 [foz_us]        

                                        7.4-10.4        

 

                    Automated blood neutrophils/100 leukocytes           93 %   

             42-75       

 

 

                    Automated blood lymphocytes/100 leukocytes           4 %    

             12-44        

 

                    Blood monocytes/100 leukocytes           4 %                

 0-12        

 

                    Automated blood eosinophils/100 leukocytes           0 %    

             0-10        

 

                    Automated blood basophils/100 leukocytes           0 %      

           0-10        

 

                    Blood neutrophils automated count (number/volume)           

6.7 10*3            

1.8-7.8        

 

                    Blood lymphocytes automated count (number/volume)           

0.3 10*3            

1.0-4.0        

 

                    Blood monocytes automated count (number/volume)           0.

3 10*3            

0.0-1.0        

 

                    Automated eosinophil count           0.0 10*3/uL           0

.0-0.3        

 

                    Automated blood basophil count (count/volume)           0.0 

10*3/uL           

0.0-0.1        

 

                                        Arterial blood gas measurement - 

0 03:35         

 

                    Blood pCO2           54 mm[Hg]           35-45        

 

                    Blood pO2           142 mm[Hg]           79-93        

 

                          Arterial blood bicarbonate measurement (moles/volume) 

          36 mmol/L       

                                        23-27        

 

                    Arterial blood base excess by calculation           11.3 mmo

l/L           

-2.5-2.5        

 

                    Arterial blood oxygen saturation measurement           97 % 

                   

    

 

                    * Inhaled oxygen flow rate           12                  NRG

        

 

                          Arterial blood pH measurement with patient temperature

 correction           7.44

                                        7.37-7.43        

 

                          Arterial blood carbon dioxide, total measurement (mole

s/volume)           37.9 

mmol/L                                  21.0-31.0        

 

                    Body site           RIGHT RADIAL            NRG        

 

                          Assessment of wrist artery patency prior to arterial p

uncture           POSITIVE

                                        NRG        

 

                    Setting of ventilation mode           YES                 NR

G        

 

                    Measurement of body temperature           36.4              

  NRG        

 

                                        Whole blood basic metabolic panel -  03:35         

 

                          Serum or plasma sodium measurement (moles/volume)     

      135 mmol/L          

                                        135-145        

 

                          Serum or plasma potassium measurement (moles/volume)  

         4.9 mmol/L       

                                        3.6-5.0        

 

                          Serum or plasma chloride measurement (moles/volume)   

        95 mmol/L         

                                                

 

                    Carbon dioxide           32 mmol/L           21-32        

 

                          Serum or plasma anion gap determination (moles/volume)

           8 mmol/L       

                                        5-14        

 

                          Serum or plasma urea nitrogen measurement (mass/volume

)           41 mg/dL      

                                        7-18        

 

                          Serum or plasma creatinine measurement (mass/volume)  

         0.81 mg/dL       

                                        0.60-1.30        

 

                    Serum or plasma urea nitrogen/creatinine mass ratio         

  51                  NRG 

       

 

                                        Serum or plasma creatinine measurement w

ith calculation of estimated glomerular 

filtration rate           >                         NRG        

 

                    Serum or plasma glucose measurement (mass/volume)           

204 mg/dL           

        

 

                    Serum or plasma calcium measurement (mass/volume)           

8.2 mg/dL           

8.5-10.1        

 

                                        Serum or plasma phosphate measurement (m

ass/volume) - 20 03:35         

 

                          Serum or plasma phosphate measurement (mass/volume)   

        3.5 mg/dL         

                                        2.3-4.7        

 

                                        Magnesium - 20 03:35         

 

                    Magnesium           2.0 mg/dL           1.6-2.4        

 

                                        Serum or plasma lithium measurement (mol

es/volume) - 20 03:35         

 

                    BNP PT              311.9 pg/mL           <100.0        

 

                                        Capillary blood glucose measurement by g

lucometer (mass/volume) - 20 11:44

         

 

                          Capillary blood glucose measurement by glucometer (mas

s/volume)           251 

mg/dL                                           

 

                                        Capillary blood glucose measurement by g

lucometer (mass/volume) - 20 17:36

         

 

                          Capillary blood glucose measurement by glucometer (mas

s/volume)           263 

mg/dL                                           

 

                                        Capillary blood glucose measurement by g

lucometer (mass/volume) - 20 00:56

         

 

                          Capillary blood glucose measurement by glucometer (mas

s/volume)           252 

mg/dL                                           

 

                                        Arterial blood gas measurement - 

0 01:40         

 

                    Blood pCO2           53 mm[Hg]           35-45        

 

                    Blood pO2           81 mm[Hg]           79-93        

 

                          Arterial blood bicarbonate measurement (moles/volume) 

          36 mmol/L       

                                        23-27        

 

                    Arterial blood base excess by calculation           11.7 mmo

l/L           

-2.5-2.5        

 

                    Arterial blood oxygen saturation measurement           97 % 

                   

    

 

                    * Inhaled oxygen flow rate           85                  NRG

        

 

                          Arterial blood pH measurement with patient temperature

 correction           7.45

                                        7.37-7.43        

 

                          Arterial blood carbon dioxide, total measurement (mole

s/volume)           38.1 

mmol/L                                  21.0-31.0        

 

                    Body site           RIGHT RADIAL            NRG        

 

                          Assessment of wrist artery patency prior to arterial p

uncture           POSITIVE

                                        NRG        

 

                    Setting of ventilation mode           YES                 NR

G        

 

                    Measurement of body temperature           36.7              

  NRG        

 

                                        Complete blood count (CBC) with automate

d white blood cell (WBC) differential - 

20 01:40         

 

                          Blood leukocytes automated count (number/volume)      

     9.7 10*3/uL          

                                        4.3-11.0        

 

                          Blood erythrocytes automated count (number/volume)    

       3.13 10*6/uL       

                                        4.35-5.85        

 

                    Venous blood hemoglobin measurement (mass/volume)           

9.5 g/dL            

13.3-17.7        

 

                    Blood hematocrit (volume fraction)           31 %           

     40-54        

 

                    Automated erythrocyte mean corpuscular volume           98 [

foz_us]           

80-99        

 

                                        Automated erythrocyte mean corpuscular h

emoglobin (mass per erythrocyte)        

                          30 pg                     25-34        

 

                                        Automated erythrocyte mean corpuscular h

emoglobin concentration measurement 

(mass/volume)             31 g/dL                   32-36        

 

                    Automated erythrocyte distribution width ratio           16.

2 %              10.0-

14.5        

 

                    Automated blood platelet count (count/volume)           165 

10*3/uL           

130-400        

 

                          Automated blood platelet mean volume measurement      

     11.6 [foz_us]        

                                        7.4-10.4        

 

                    Automated blood neutrophils/100 leukocytes           94 %   

             42-75       

 

 

                    Automated blood lymphocytes/100 leukocytes           4 %    

             12-44        

 

                    Blood monocytes/100 leukocytes           3 %                

 0-12        

 

                    Automated blood eosinophils/100 leukocytes           0 %    

             0-10        

 

                    Automated blood basophils/100 leukocytes           0 %      

           0-10        

 

                    Blood neutrophils automated count (number/volume)           

9.1 10*3            

1.8-7.8        

 

                    Blood lymphocytes automated count (number/volume)           

0.3 10*3            

1.0-4.0        

 

                    Blood monocytes automated count (number/volume)           0.

3 10*3            

0.0-1.0        

 

                    Automated eosinophil count           0.0 10*3/uL           0

.0-0.3        

 

                    Automated blood basophil count (count/volume)           0.0 

10*3/uL           

0.0-0.1        

 

                                        Whole blood basic metabolic panel -  01:40         

 

                          Serum or plasma sodium measurement (moles/volume)     

      136 mmol/L          

                                        135-145        

 

                          Serum or plasma potassium measurement (moles/volume)  

         4.7 mmol/L       

                                        3.6-5.0        

 

                          Serum or plasma chloride measurement (moles/volume)   

        94 mmol/L         

                                                

 

                    Carbon dioxide           32 mmol/L           21-32        

 

                          Serum or plasma anion gap determination (moles/volume)

           10 mmol/L      

                                        5-14        

 

                          Serum or plasma urea nitrogen measurement (mass/volume

)           49 mg/dL      

                                        7-18        

 

                          Serum or plasma creatinine measurement (mass/volume)  

         0.89 mg/dL       

                                        0.60-1.30        

 

                    Serum or plasma urea nitrogen/creatinine mass ratio         

  55                  NRG 

       

 

                                        Serum or plasma creatinine measurement w

ith calculation of estimated glomerular 

filtration rate           >                         NRG        

 

                    Serum or plasma glucose measurement (mass/volume)           

248 mg/dL           

        

 

                    Serum or plasma calcium measurement (mass/volume)           

8.2 mg/dL           

8.5-10.1        

 

                                        Serum or plasma phosphate measurement (m

ass/volume) - 20 01:40         

 

                          Serum or plasma phosphate measurement (mass/volume)   

        3.6 mg/dL         

                                        2.3-4.7        

 

                                        Magnesium - 20 01:40         

 

                    Magnesium           1.9 mg/dL           1.6-2.4        

 

                                        Capillary blood glucose measurement by g

lucometer (mass/volume) - 20 11:35

         

 

                          Capillary blood glucose measurement by glucometer (mas

s/volume)           264 

mg/dL                                           

 

                                        Capillary blood glucose measurement by g

lucometer (mass/volume) - 20 17:50

         

 

                          Capillary blood glucose measurement by glucometer (mas

s/volume)           277 

mg/dL                                           

 

                                        Capillary blood glucose measurement by g

lucometer (mass/volume) - 20 23:04

         

 

                          Capillary blood glucose measurement by glucometer (mas

s/volume)           255 

mg/dL                                           

 

                                        Arterial blood gas measurement - 

0 02:22         

 

                    Blood pCO2           51 mm[Hg]           35-45        

 

                    Blood pO2           92 mm[Hg]           79-93        

 

                          Arterial blood bicarbonate measurement (moles/volume) 

          35 mmol/L       

                                        23-27        

 

                    Arterial blood base excess by calculation           10.0 mmo

l/L           

-2.5-2.5        

 

                    Arterial blood oxygen saturation measurement           97 % 

                   

    

 

                    * Inhaled oxygen flow rate           85                  NRG

        

 

                          Arterial blood pH measurement with patient temperature

 correction           7.45

                                        7.37-7.43        

 

                          Arterial blood carbon dioxide, total measurement (mole

s/volume)           36.1 

mmol/L                                  21.0-31.0        

 

                    Body site           RIGHT RADIAL            NRG        

 

                          Assessment of wrist artery patency prior to arterial p

uncture           POSITIVE

                                        NRG        

 

                    Setting of ventilation mode           YES                 NR

G        

 

                    Measurement of body temperature           36.8              

  NRG        

 

                                        Complete blood count (CBC) with automate

d white blood cell (WBC) differential - 

20 02:22         

 

                          Blood leukocytes automated count (number/volume)      

     8.2 10*3/uL          

                                        4.3-11.0        

 

                          Blood erythrocytes automated count (number/volume)    

       3.11 10*6/uL       

                                        4.35-5.85        

 

                    Venous blood hemoglobin measurement (mass/volume)           

9.4 g/dL            

13.3-17.7        

 

                    Blood hematocrit (volume fraction)           30 %           

     40-54        

 

                    Automated erythrocyte mean corpuscular volume           97 [

foz_us]           

80-99        

 

                                        Automated erythrocyte mean corpuscular h

emoglobin (mass per erythrocyte)        

                          30 pg                     25-34        

 

                                        Automated erythrocyte mean corpuscular h

emoglobin concentration measurement 

(mass/volume)             31 g/dL                   32-36        

 

                    Automated erythrocyte distribution width ratio           16.

6 %              10.0-

14.5        

 

                    Automated blood platelet count (count/volume)           156 

10*3/uL           

130-400        

 

                          Automated blood platelet mean volume measurement      

     11.4 [foz_us]        

                                        7.4-10.4        

 

                    Automated blood neutrophils/100 leukocytes           93 %   

             42-75       

 

 

                    Automated blood lymphocytes/100 leukocytes           3 %    

             12-44        

 

                    Blood monocytes/100 leukocytes           4 %                

 0-12        

 

                    Automated blood eosinophils/100 leukocytes           0 %    

             0-10        

 

                    Automated blood basophils/100 leukocytes           0 %      

           0-10        

 

                    Blood neutrophils automated count (number/volume)           

7.6 10*3            

1.8-7.8        

 

                    Blood lymphocytes automated count (number/volume)           

0.3 10*3            

1.0-4.0        

 

                    Blood monocytes automated count (number/volume)           0.

4 10*3            

0.0-1.0        

 

                    Automated eosinophil count           0.0 10*3/uL           0

.0-0.3        

 

                    Automated blood basophil count (count/volume)           0.0 

10*3/uL           

0.0-0.1        

 

                                        Whole blood basic metabolic panel -  02:22         

 

                          Serum or plasma sodium measurement (moles/volume)     

      134 mmol/L          

                                        135-145        

 

                          Serum or plasma potassium measurement (moles/volume)  

         4.3 mmol/L       

                                        3.6-5.0        

 

                          Serum or plasma chloride measurement (moles/volume)   

        96 mmol/L         

                                                

 

                    Carbon dioxide           30 mmol/L           21-32        

 

                          Serum or plasma anion gap determination (moles/volume)

           8 mmol/L       

                                        5-14        

 

                          Serum or plasma urea nitrogen measurement (mass/volume

)           48 mg/dL      

                                        7-18        

 

                          Serum or plasma creatinine measurement (mass/volume)  

         0.82 mg/dL       

                                        0.60-1.30        

 

                    Serum or plasma urea nitrogen/creatinine mass ratio         

  59                  NRG 

       

 

                                        Serum or plasma creatinine measurement w

ith calculation of estimated glomerular 

filtration rate           >                         NRG        

 

                    Serum or plasma glucose measurement (mass/volume)           

205 mg/dL           

        

 

                    Serum or plasma calcium measurement (mass/volume)           

8.0 mg/dL           

8.5-10.1        

 

                                        Serum or plasma phosphate measurement (m

ass/volume) - 20 02:22         

 

                          Serum or plasma phosphate measurement (mass/volume)   

        3.5 mg/dL         

                                        2.3-4.7        

 

                                        Magnesium - 20 02:22         

 

                    Magnesium           2.0 mg/dL           1.6-2.4        

 

                                        Capillary blood glucose measurement by g

lucometer (mass/volume) - 20 10:04

         

 

                          Capillary blood glucose measurement by glucometer (mas

s/volume)           245 

mg/dL                                           

 

                                        Capillary blood glucose measurement by g

lucometer (mass/volume) - 20 14:05

         

 

                          Capillary blood glucose measurement by glucometer (mas

s/volume)           230 

mg/dL                                           

 

                                        Capillary blood glucose measurement by g

lucometer (mass/volume) - 20 17:42

         

 

                          Capillary blood glucose measurement by glucometer (mas

s/volume)           190 

mg/dL                                           

 

                                        Capillary blood glucose measurement by g

lucometer (mass/volume) - 20 21:13

         

 

                          Capillary blood glucose measurement by glucometer (mas

s/volume)           171 

mg/dL                                           

 

                                        Capillary blood glucose measurement by g

lucometer (mass/volume) - 20 01:37

         

 

                          Capillary blood glucose measurement by glucometer (mas

s/volume)           239 

mg/dL                                           

 

                                        Complete blood count (CBC) with automate

d white blood cell (WBC) differential - 

20 03:18         

 

                          Blood leukocytes automated count (number/volume)      

     9.5 10*3/uL          

                                        4.3-11.0        

 

                          Blood erythrocytes automated count (number/volume)    

       2.97 10*6/uL       

                                        4.35-5.85        

 

                    Venous blood hemoglobin measurement (mass/volume)           

9.2 g/dL            

13.3-17.7        

 

                    Blood hematocrit (volume fraction)           29 %           

     40-54        

 

                    Automated erythrocyte mean corpuscular volume           96 [

foz_us]           

80-99        

 

                                        Automated erythrocyte mean corpuscular h

emoglobin (mass per erythrocyte)        

                          31 pg                     25-34        

 

                                        Automated erythrocyte mean corpuscular h

emoglobin concentration measurement 

(mass/volume)             32 g/dL                   32-36        

 

                    Automated erythrocyte distribution width ratio           16.

2 %              10.0-

14.5        

 

                    Automated blood platelet count (count/volume)           146 

10*3/uL           

130-400        

 

                          Automated blood platelet mean volume measurement      

     12.0 [foz_us]        

                                        7.4-10.4        

 

                    Automated blood neutrophils/100 leukocytes           93 %   

             42-75       

 

 

                    Automated blood lymphocytes/100 leukocytes           4 %    

             12-44        

 

                    Blood monocytes/100 leukocytes           3 %                

 0-12        

 

                    Automated blood eosinophils/100 leukocytes           0 %    

             0-10        

 

                    Automated blood basophils/100 leukocytes           0 %      

           0-10        

 

                    Blood neutrophils automated count (number/volume)           

8.9 10*3            

1.8-7.8        

 

                    Blood lymphocytes automated count (number/volume)           

0.3 10*3            

1.0-4.0        

 

                    Blood monocytes automated count (number/volume)           0.

3 10*3            

0.0-1.0        

 

                    Automated eosinophil count           0.0 10*3/uL           0

.0-0.3        

 

                    Automated blood basophil count (count/volume)           0.0 

10*3/uL           

0.0-0.1        

 

                                        Arterial blood gas measurement - 

0 03:18         

 

                    Blood pCO2           45 mm[Hg]           35-45        

 

                    Blood pO2           77 mm[Hg]           79-93        

 

                          Arterial blood bicarbonate measurement (moles/volume) 

          30 mmol/L       

                                        23-27        

 

                    Arterial blood base excess by calculation           5.9 mmol

/L           

-2.5-2.5        

 

                    Arterial blood oxygen saturation measurement           96 % 

                   

    

 

                    * Inhaled oxygen flow rate           60                  NRG

        

 

                          Arterial blood pH measurement with patient temperature

 correction           7.44

                                        7.37-7.43        

 

                          Arterial blood carbon dioxide, total measurement (mole

s/volume)           31.6 

mmol/L                                  21.0-31.0        

 

                    Body site           RIGHT ARTLINE            NRG        

 

                          Assessment of wrist artery patency prior to arterial p

uncture           POSITIVE

                                        NRG        

 

                    Setting of ventilation mode           YES                 NR

G        

 

                    Measurement of body temperature           36.4              

  NRG        

 

                                        Whole blood basic metabolic panel -  03:18         

 

                          Serum or plasma sodium measurement (moles/volume)     

      134 mmol/L          

                                        135-145        

 

                          Serum or plasma potassium measurement (moles/volume)  

         4.2 mmol/L       

                                        3.6-5.0        

 

                          Serum or plasma chloride measurement (moles/volume)   

        98 mmol/L         

                                                

 

                    Carbon dioxide           27 mmol/L           21-32        

 

                          Serum or plasma anion gap determination (moles/volume)

           9 mmol/L       

                                        5-14        

 

                          Serum or plasma urea nitrogen measurement (mass/volume

)           49 mg/dL      

                                        7-18        

 

                          Serum or plasma creatinine measurement (mass/volume)  

         0.85 mg/dL       

                                        0.60-1.30        

 

                    Serum or plasma urea nitrogen/creatinine mass ratio         

  58                  NRG 

       

 

                                        Serum or plasma creatinine measurement w

ith calculation of estimated glomerular 

filtration rate           >                         NRG        

 

                    Serum or plasma glucose measurement (mass/volume)           

224 mg/dL           

        

 

                    Serum or plasma calcium measurement (mass/volume)           

7.9 mg/dL           

8.5-10.1        

 

                                        Serum or plasma phosphate measurement (m

ass/volume) - 20 03:18         

 

                          Serum or plasma phosphate measurement (mass/volume)   

        3.5 mg/dL         

                                        2.3-4.7        

 

                                        Magnesium - 20 03:18         

 

                    Magnesium           2.0 mg/dL           1.6-2.4        

 

                                        Serum or plasma lithium measurement (mol

es/volume) - 20 03:18         

 

                    BNP PT              87.6 pg/mL           <100.0        

 

                                        Capillary blood glucose measurement by g

lucometer (mass/volume) - 20 06:11

         

 

                          Capillary blood glucose measurement by glucometer (mas

s/volume)           204 

mg/dL                                           

 

                                        Capillary blood glucose measurement by g

lucometer (mass/volume) - 20 10:36

         

 

                          Capillary blood glucose measurement by glucometer (mas

s/volume)           167 

mg/dL                                           

 

                                        Capillary blood glucose measurement by g

lucometer (mass/volume) - 20 14:33

         

 

                          Capillary blood glucose measurement by glucometer (mas

s/volume)           183 

mg/dL                                           

 

                                        Capillary blood glucose measurement by g

lucometer (mass/volume) - 20 17:41

         

 

                          Capillary blood glucose measurement by glucometer (mas

s/volume)           167 

mg/dL                                           

 

                                        Capillary blood glucose measurement by g

lucometer (mass/volume) - 20 20:10

         

 

                          Capillary blood glucose measurement by glucometer (mas

s/volume)           100 

mg/dL                                           

 

                                        Capillary blood glucose measurement by g

lucometer (mass/volume) - 20 01:50

         

 

                          Capillary blood glucose measurement by glucometer (mas

s/volume)           224 

mg/dL                                           

 

                                        Complete blood count (CBC) with automate

d white blood cell (WBC) differential - 

20 02:45         

 

                          Blood leukocytes automated count (number/volume)      

     8.7 10*3/uL          

                                        4.3-11.0        

 

                          Blood erythrocytes automated count (number/volume)    

       3.06 10*6/uL       

                                        4.35-5.85        

 

                    Venous blood hemoglobin measurement (mass/volume)           

9.3 g/dL            

13.3-17.7        

 

                    Blood hematocrit (volume fraction)           29 %           

     40-54        

 

                    Automated erythrocyte mean corpuscular volume           95 [

foz_us]           

80-99        

 

                                        Automated erythrocyte mean corpuscular h

emoglobin (mass per erythrocyte)        

                          30 pg                     25-34        

 

                                        Automated erythrocyte mean corpuscular h

emoglobin concentration measurement 

(mass/volume)             32 g/dL                   32-36        

 

                    Automated erythrocyte distribution width ratio           16.

6 %              10.0-

14.5        

 

                    Automated blood platelet count (count/volume)           131 

10*3/uL           

130-400        

 

                          Automated blood platelet mean volume measurement      

     11.8 [foz_us]        

                                        7.4-10.4        

 

                    Automated blood neutrophils/100 leukocytes           93 %   

             42-75       

 

 

                    Automated blood lymphocytes/100 leukocytes           4 %    

             12-44        

 

                    Blood monocytes/100 leukocytes           4 %                

 0-12        

 

                    Automated blood eosinophils/100 leukocytes           0 %    

             0-10        

 

                    Automated blood basophils/100 leukocytes           0 %      

           0-10        

 

                    Blood neutrophils automated count (number/volume)           

8.0 10*3            

1.8-7.8        

 

                    Blood lymphocytes automated count (number/volume)           

0.3 10*3            

1.0-4.0        

 

                    Blood monocytes automated count (number/volume)           0.

3 10*3            

0.0-1.0        

 

                    Automated eosinophil count           0.0 10*3/uL           0

.0-0.3        

 

                    Automated blood basophil count (count/volume)           0.0 

10*3/uL           

0.0-0.1        

 

                                        Whole blood basic metabolic panel -  02:45         

 

                          Serum or plasma sodium measurement (moles/volume)     

      133 mmol/L          

                                        135-145        

 

                          Serum or plasma potassium measurement (moles/volume)  

         4.1 mmol/L       

                                        3.6-5.0        

 

                          Serum or plasma chloride measurement (moles/volume)   

        98 mmol/L         

                                                

 

                    Carbon dioxide           27 mmol/L           21-32        

 

                          Serum or plasma anion gap determination (moles/volume)

           8 mmol/L       

                                        5-14        

 

                          Serum or plasma urea nitrogen measurement (mass/volume

)           50 mg/dL      

                                        7-18        

 

                          Serum or plasma creatinine measurement (mass/volume)  

         0.82 mg/dL       

                                        0.60-1.30        

 

                    Serum or plasma urea nitrogen/creatinine mass ratio         

  61                  NRG 

       

 

                                        Serum or plasma creatinine measurement w

ith calculation of estimated glomerular 

filtration rate           >                         NRG        

 

                    Serum or plasma glucose measurement (mass/volume)           

215 mg/dL           

        

 

                    Serum or plasma calcium measurement (mass/volume)           

8.0 mg/dL           

8.5-10.1        

 

                                        Serum or plasma phosphate measurement (m

ass/volume) - 20 02:45         

 

                          Serum or plasma phosphate measurement (mass/volume)   

        3.1 mg/dL         

                                        2.3-4.7        

 

                                        Magnesium - 20 02:45         

 

                    Magnesium           2.1 mg/dL           1.6-2.4        

 

                                        Arterial blood gas measurement - 

0 03:10         

 

                    Blood pCO2           46 mm[Hg]           35-45        

 

                    Blood pO2           70 mm[Hg]           79-93        

 

                          Arterial blood bicarbonate measurement (moles/volume) 

          31 mmol/L       

                                        23-27        

 

                    Arterial blood base excess by calculation           6.5 mmol

/L           

-2.5-2.5        

 

                    Arterial blood oxygen saturation measurement           95 % 

                   

    

 

                    * Inhaled oxygen flow rate           40                  NRG

        

 

                          Arterial blood pH measurement with patient temperature

 correction           7.44

                                        7.37-7.43        

 

                          Arterial blood carbon dioxide, total measurement (mole

s/volume)           32.2 

mmol/L                                  21.0-31.0        

 

                    Body site           RIGHT RADIAL            NRG        

 

                          Assessment of wrist artery patency prior to arterial p

uncture           POSITIVE

                                        NRG        

 

                    Setting of ventilation mode           YES                 NR

G        

 

                    Measurement of body temperature           36.6              

  NRG        

 

                                        Capillary blood glucose measurement by g

lucometer (mass/volume) - 20 09:34

         

 

                          Capillary blood glucose measurement by glucometer (mas

s/volume)           81 

mg/dL                                           

 

                                        Capillary blood glucose measurement by g

lucometer (mass/volume) - 20 13:54

         

 

                          Capillary blood glucose measurement by glucometer (mas

s/volume)           279 

mg/dL                                           

 

                                        Capillary blood glucose measurement by g

lucometer (mass/volume) - 20 17:52

         

 

                          Capillary blood glucose measurement by glucometer (mas

s/volume)           205 

mg/dL                                           

 

                                        Capillary blood glucose measurement by g

lucometer (mass/volume) - 20 20:51

         

 

                          Capillary blood glucose measurement by glucometer (mas

s/volume)           194 

mg/dL                                           

 

                                        Capillary blood glucose measurement by g

lucometer (mass/volume) - 20 02:07

         

 

                          Capillary blood glucose measurement by glucometer (mas

s/volume)           136 

mg/dL                                           

 

                                        Arterial blood gas measurement - 

0 02:50         

 

                    Blood pCO2           43 mm[Hg]           35-45        

 

                    Blood pO2           61 mm[Hg]           79-93        

 

                          Arterial blood bicarbonate measurement (moles/volume) 

          31 mmol/L       

                                        23-27        

 

                    Arterial blood base excess by calculation           7.1 mmol

/L           

-2.5-2.5        

 

                    Arterial blood oxygen saturation measurement           94 % 

                   

    

 

                    * Inhaled oxygen flow rate           35                  NRG

        

 

                          Arterial blood pH measurement with patient temperature

 correction           7.47

                                        7.37-7.43        

 

                          Arterial blood carbon dioxide, total measurement (mole

s/volume)           32.4 

mmol/L                                  21.0-31.0        

 

                    Body site           RIGHT RADIAL            NRG        

 

                          Assessment of wrist artery patency prior to arterial p

uncture           POSITIVE

                                        NRG        

 

                    Setting of ventilation mode           YES                 NR

G        

 

                    Measurement of body temperature           36.6              

  NRG        

 

                                        Complete blood count (CBC) with automate

d white blood cell (WBC) differential - 

20 02:50         

 

                          Blood leukocytes automated count (number/volume)      

     7.4 10*3/uL          

                                        4.3-11.0        

 

                          Blood erythrocytes automated count (number/volume)    

       3.12 10*6/uL       

                                        4.35-5.85        

 

                    Venous blood hemoglobin measurement (mass/volume)           

9.4 g/dL            

13.3-17.7        

 

                    Blood hematocrit (volume fraction)           30 %           

     40-54        

 

                    Automated erythrocyte mean corpuscular volume           95 [

foz_us]           

80-99        

 

                                        Automated erythrocyte mean corpuscular h

emoglobin (mass per erythrocyte)        

                          30 pg                     25-34        

 

                                        Automated erythrocyte mean corpuscular h

emoglobin concentration measurement 

(mass/volume)             32 g/dL                   32-36        

 

                    Automated erythrocyte distribution width ratio           16.

4 %              10.0-

14.5        

 

                    Automated blood platelet count (count/volume)           115 

10*3/uL           

130-400        

 

                          Automated blood platelet mean volume measurement      

     12.1 [foz_us]        

                                        7.4-10.4        

 

                    Automated blood neutrophils/100 leukocytes           94 %   

             42-75       

 

 

                    Automated blood lymphocytes/100 leukocytes           3 %    

             12-44        

 

                    Blood monocytes/100 leukocytes           3 %                

 0-12        

 

                    Automated blood eosinophils/100 leukocytes           0 %    

             0-10        

 

                    Automated blood basophils/100 leukocytes           0 %      

           0-10        

 

                    Blood neutrophils automated count (number/volume)           

7.0 10*3            

1.8-7.8        

 

                    Blood lymphocytes automated count (number/volume)           

0.2 10*3            

1.0-4.0        

 

                    Blood monocytes automated count (number/volume)           0.

3 10*3            

0.0-1.0        

 

                    Automated eosinophil count           0.0 10*3/uL           0

.0-0.3        

 

                    Automated blood basophil count (count/volume)           0.0 

10*3/uL           

0.0-0.1        

 

                                        Whole blood basic metabolic panel -  02:50         

 

                          Serum or plasma sodium measurement (moles/volume)     

      136 mmol/L          

                                        135-145        

 

                          Serum or plasma potassium measurement (moles/volume)  

         4.5 mmol/L       

                                        3.6-5.0        

 

                          Serum or plasma chloride measurement (moles/volume)   

        100 mmol/L        

                                                

 

                    Carbon dioxide           27 mmol/L           21-32        

 

                          Serum or plasma anion gap determination (moles/volume)

           9 mmol/L       

                                        5-14        

 

                          Serum or plasma urea nitrogen measurement (mass/volume

)           44 mg/dL      

                                        7-18        

 

                          Serum or plasma creatinine measurement (mass/volume)  

         0.76 mg/dL       

                                        0.60-1.30        

 

                    Serum or plasma urea nitrogen/creatinine mass ratio         

  58                  NRG 

       

 

                                        Serum or plasma creatinine measurement w

ith calculation of estimated glomerular 

filtration rate           >                         NRG        

 

                    Serum or plasma glucose measurement (mass/volume)           

137 mg/dL           

        

 

                    Serum or plasma calcium measurement (mass/volume)           

8.3 mg/dL           

8.5-10.1        

 

                                        Serum or plasma phosphate measurement (m

ass/volume) - 20 02:50         

 

                          Serum or plasma phosphate measurement (mass/volume)   

        3.2 mg/dL         

                                        2.3-4.7        

 

                                        Magnesium - 20 02:50         

 

                    Magnesium           2.2 mg/dL           1.6-2.4        

 

                                        Arterial blood gas measurement - 

0 04:42         

 

                    Blood pCO2           39 mm[Hg]           35-45        

 

                    Blood pO2           76 mm[Hg]           79-93        

 

                          Arterial blood bicarbonate measurement (moles/volume) 

          28 mmol/L       

                                        23-27        

 

                    Arterial blood base excess by calculation           4.6 mmol

/L           

-2.5-2.5        

 

                    Arterial blood oxygen saturation measurement           97 % 

                   

    

 

                    * Inhaled oxygen flow rate           35                  NRG

        

 

                          Arterial blood pH measurement with patient temperature

 correction           7.47

                                        7.37-7.43        

 

                          Arterial blood carbon dioxide, total measurement (mole

s/volume)           29.5 

mmol/L                                  21.0-31.0        

 

                    Body site           RT RADIAL ARTLINE            NRG        

 

                          Assessment of wrist artery patency prior to arterial p

uncture           POSITIVE

                                        NRG        

 

                    Setting of ventilation mode           YES                 NR

G        

 

                    Measurement of body temperature           36.7              

  NRG        

 

                                        Sputum Gram stain - 20 06:39      

   

 

                    Sputum Gram stain           Mixed Bacterial [place]           

 NRG        

 

                                        Bacterial sputum culture - 20 06:3

9         

 

                    FREE TEXT EXTERNAL           CLINDAMYCIN RESISTANT WITHOUT I

NDUCTION            

NRG        

 

                    QUANTITY OF GROWTH           Many                NRG        

 

                    FREE TEXT ENTRY 2           RESISTANT ORGANISM/CONTACT PRECA

UTIONS.            

NRG        

 

                    FREE TEXT ENTRY 3           REPORTED MRSA TO [facility]  

           NRG      

  

 

                    Bacterial sputum culture           [id number]             NRG  

      

 

                    FREE TEXT ENTRY 4            BY EDIL FLORES            NRG

        

 

                    SUSCEPTIBILITY           SUSCEPTIBILITY REPORTED  12:40  

          NRG       

 

 

                    MRSA SCREEN           MRSA SCREEN BY [facility] IS POSITIVE             NRG   

     

 

                    RAPID ID            PRELIM RAPID ID BY [facility]         

    NRG        

 

                    MRSA CONFIRMATION           MRSA CONFIRMATION ON  11:09  

          NRG       

 

 

                    ID CONFIRMATION           ID CONFIRMED             

NRG        

 

                                        Dirithromycin susceptibility test by dis

k diffusion - 20 06:39         

 

                          Oxacillin susceptibility test by minimum inhibitory co

ncentration           >   

                                        NRG        

 

                          Clindamycin susceptibility test by minimum inhibitory 

concentration           > 

                                        NRG        

 

                          Erythromycin susceptibility test by minimum inhibitory

 concentration           >

                                        NRG        

 

                                        Trimethoprim/sulfamethoxazole susceptibi

lity test by minimum 

inhibitoryconcentration           <=                        NRG        

 

                          Vancomycin susceptibility test by minimum inhibitory c

oncentration           1  

                                        NRG        

 

                          Levofloxacin susceptibility test by minimum inhibitory

 concentration           >

                                        NRG        

 

                          Rifampin susceptibility test by minimum inhibitory con

centration           <=   

                                        NRG        

 

                          Cefazolin susceptibility test by minimum inhibitory co

ncentration           >   

                                        NRG        

 

                          Linezolid susceptibility test by minimum inhibitory co

ncentration           2   

                                        NRG        

 

                          Penicillin G susceptibility test by minimum inhibitory

 concentration           >

                                        NRG        

 

                          Moxifloxacin susceptibility test by minimum inhibitory

 concentration           2

                                        NRG        

 

                    Minocycline susc KODI           <=                  NRG      

  

 

                                        Capillary blood glucose measurement by g

lucometer (mass/volume) - 20 09:59

         

 

                          Capillary blood glucose measurement by glucometer (mas

s/volume)           159 

mg/dL                                           

 

                                        Capillary blood glucose measurement by g

lucometer (mass/volume) - 20 14:36

         

 

                          Capillary blood glucose measurement by glucometer (mas

s/volume)           153 

mg/dL                                           

 

                                        Capillary blood glucose measurement by g

lucometer (mass/volume) - 20 17:29

         

 

                          Capillary blood glucose measurement by glucometer (mas

s/volume)           167 

mg/dL                                           

 

                                        Capillary blood glucose measurement by g

lucometer (mass/volume) - 20 21:14

         

 

                          Capillary blood glucose measurement by glucometer (mas

s/volume)           239 

mg/dL                                           

 

                                        Complete blood count (CBC) with automate

d white blood cell (WBC) differential - 

20 02:38         

 

                          Blood leukocytes automated count (number/volume)      

     11.0 10*3/uL         

                                        4.3-11.0        

 

                          Blood erythrocytes automated count (number/volume)    

       3.20 10*6/uL       

                                        4.35-5.85        

 

                    Venous blood hemoglobin measurement (mass/volume)           

9.8 g/dL            

13.3-17.7        

 

                    Blood hematocrit (volume fraction)           30 %           

     40-54        

 

                    Automated erythrocyte mean corpuscular volume           95 [

foz_us]           

80-99        

 

                                        Automated erythrocyte mean corpuscular h

emoglobin (mass per erythrocyte)        

                          31 pg                     25-34        

 

                                        Automated erythrocyte mean corpuscular h

emoglobin concentration measurement 

(mass/volume)             32 g/dL                   32-36        

 

                    Automated erythrocyte distribution width ratio           17.

1 %              10.0-

14.5        

 

                    Automated blood platelet count (count/volume)           126 

10*3/uL           

130-400        

 

                          Automated blood platelet mean volume measurement      

     12.0 [foz_us]        

                                        7.4-10.4        

 

                    Automated blood neutrophils/100 leukocytes           93 %   

             42-75       

 

 

                    Automated blood lymphocytes/100 leukocytes           4 %    

             12-44        

 

                    Blood monocytes/100 leukocytes           3 %                

 0-12        

 

                    Automated blood eosinophils/100 leukocytes           0 %    

             0-10        

 

                    Automated blood basophils/100 leukocytes           0 %      

           0-10        

 

                    Blood neutrophils automated count (number/volume)           

10.2 10*3           

1.8-7.8        

 

                    Blood lymphocytes automated count (number/volume)           

0.4 10*3            

1.0-4.0        

 

                    Blood monocytes automated count (number/volume)           0.

4 10*3            

0.0-1.0        

 

                    Automated eosinophil count           0.0 10*3/uL           0

.0-0.3        

 

                    Automated blood basophil count (count/volume)           0.0 

10*3/uL           

0.0-0.1        

 

                                        Manual absolute plasma cell count - 04/3

0/20 02:38         

 

                    Blood monocytes/100 leukocytes           2 %                

 NRG        

 

                    Manual blood segmented neutrophils/100 leukocytes           

88 %                NRG  

      

 

                    Blood band neutrophils/100 leukocytes           5 %         

        NRG        

 

                    Manual blood lymphocytes/100 leukocytes           4 %       

          NRG        

 

                    Manual eosinophils/100 leukocytes in nose           1 %     

            NRG        

 

                    Blood smudge cells detection by light microscopy           M

OD                 NRG   

     

 

                    Blood anisocytosis detection by light microscopy           M

ODERATE            

NRG        

 

                                        Whole blood basic metabolic panel - 04/3

0/20 02:38         

 

                          Serum or plasma sodium measurement (moles/volume)     

      135 mmol/L          

                                        135-145        

 

                          Serum or plasma potassium measurement (moles/volume)  

         4.4 mmol/L       

                                        3.6-5.0        

 

                          Serum or plasma chloride measurement (moles/volume)   

        99 mmol/L         

                                                

 

                    Carbon dioxide           27 mmol/L           21-32        

 

                          Serum or plasma anion gap determination (moles/volume)

           9 mmol/L       

                                        5-14        

 

                          Serum or plasma urea nitrogen measurement (mass/volume

)           34 mg/dL      

                                        7-18        

 

                          Serum or plasma creatinine measurement (mass/volume)  

         0.78 mg/dL       

                                        0.60-1.30        

 

                    Serum or plasma urea nitrogen/creatinine mass ratio         

  44                  NRG 

       

 

                                        Serum or plasma creatinine measurement w

ith calculation of estimated glomerular 

filtration rate           >                         NRG        

 

                    Serum or plasma glucose measurement (mass/volume)           

140 mg/dL           

        

 

                    Serum or plasma calcium measurement (mass/volume)           

8.4 mg/dL           

8.5-10.1        

 

                                        Serum or plasma phosphate measurement (m

ass/volume) - 20 02:38         

 

                          Serum or plasma phosphate measurement (mass/volume)   

        2.3 mg/dL         

                                        2.3-4.7        

 

                                        Magnesium - 20 02:38         

 

                    Magnesium           2.0 mg/dL           1.6-2.4        

 

                                        Capillary blood glucose measurement by g

lucometer (mass/volume) - 20 11:30

         

 

                          Capillary blood glucose measurement by glucometer (mas

s/volume)           291 

mg/dL                                           

 

                                        Capillary blood glucose measurement by g

lucometer (mass/volume) - 20 15:55

         

 

                          Capillary blood glucose measurement by glucometer (mas

s/volume)           189 

mg/dL                                           

 

                                        Capillary blood glucose measurement by g

lucometer (mass/volume) - 20 21:30

         

 

                          Capillary blood glucose measurement by glucometer (mas

s/volume)           128 

mg/dL                                           

 

                                        Complete blood count (CBC) with automate

d white blood cell (WBC) differential - 

05/01/20 05:28         

 

                          Blood leukocytes automated count (number/volume)      

     7.9 10*3/uL          

                                        4.3-11.0        

 

                          Blood erythrocytes automated count (number/volume)    

       3.24 10*6/uL       

                                        4.35-5.85        

 

                    Venous blood hemoglobin measurement (mass/volume)           

9.8 g/dL            

13.3-17.7        

 

                    Blood hematocrit (volume fraction)           31 %           

     40-54        

 

                    Automated erythrocyte mean corpuscular volume           94 [

foz_us]           

80-99        

 

                                        Automated erythrocyte mean corpuscular h

emoglobin (mass per erythrocyte)        

                          30 pg                     25-34        

 

                                        Automated erythrocyte mean corpuscular h

emoglobin concentration measurement 

(mass/volume)             32 g/dL                   32-36        

 

                    Automated erythrocyte distribution width ratio           16.

5 %              10.0-

14.5        

 

                    Automated blood platelet count (count/volume)           115 

10*3/uL           

130-400        

 

                          Automated blood platelet mean volume measurement      

     12.1 [foz_us]        

                                        7.4-10.4        

 

                    Automated blood neutrophils/100 leukocytes           91 %   

             42-75       

 

 

                    Automated blood lymphocytes/100 leukocytes           6 %    

             12-44        

 

                    Blood monocytes/100 leukocytes           4 %                

 0-12        

 

                    Automated blood eosinophils/100 leukocytes           0 %    

             0-10        

 

                    Automated blood basophils/100 leukocytes           0 %      

           0-10        

 

                    Blood neutrophils automated count (number/volume)           

7.2 10*3            

1.8-7.8        

 

                    Blood lymphocytes automated count (number/volume)           

0.4 10*3            

1.0-4.0        

 

                    Blood monocytes automated count (number/volume)           0.

3 10*3            

0.0-1.0        

 

                    Automated eosinophil count           0.0 10*3/uL           0

.0-0.3        

 

                    Automated blood basophil count (count/volume)           0.0 

10*3/uL           

0.0-0.1        

 

                                        Whole blood basic metabolic panel - 05/0

 05:28         

 

                          Serum or plasma sodium measurement (moles/volume)     

      137 mmol/L          

                                        135-145        

 

                          Serum or plasma potassium measurement (moles/volume)  

         4.3 mmol/L       

                                        3.6-5.0        

 

                          Serum or plasma chloride measurement (moles/volume)   

        100 mmol/L        

                                                

 

                    Carbon dioxide           26 mmol/L           21-32        

 

                          Serum or plasma anion gap determination (moles/volume)

           11 mmol/L      

                                        5-14        

 

                          Serum or plasma urea nitrogen measurement (mass/volume

)           34 mg/dL      

                                        7-18        

 

                          Serum or plasma creatinine measurement (mass/volume)  

         0.76 mg/dL       

                                        0.60-1.30        

 

                    Serum or plasma urea nitrogen/creatinine mass ratio         

  45                  NRG 

       

 

                                        Serum or plasma creatinine measurement w

ith calculation of estimated glomerular 

filtration rate           >                         NRG        

 

                    Serum or plasma glucose measurement (mass/volume)           

179 mg/dL           

        

 

                    Serum or plasma calcium measurement (mass/volume)           

8.3 mg/dL           

8.5-10.1        

 

                                        Serum or plasma phosphate measurement (m

ass/volume) - 20 05:28         

 

                          Serum or plasma phosphate measurement (mass/volume)   

        3.2 mg/dL         

                                        2.3-4.7        

 

                                        Magnesium - 20 05:28         

 

                    Magnesium           1.8 mg/dL           1.6-2.4        

 

                                        Capillary blood glucose measurement by g

lucometer (mass/volume) - 20 12:26

         

 

                          Capillary blood glucose measurement by glucometer (mas

s/volume)           152 

mg/dL                                           

 

                                        Capillary blood glucose measurement by g

lucometer (mass/volume) - 20 15:52

         

 

                          Capillary blood glucose measurement by glucometer (mas

s/volume)           174 

mg/dL                                           

 

                                        Capillary blood glucose measurement by g

lucometer (mass/volume) - 20 20:48

         

 

                          Capillary blood glucose measurement by glucometer (mas

s/volume)           132 

mg/dL                                           

 

                                        Complete blood count (CBC) with automate

d white blood cell (WBC) differential - 

20 05:00         

 

                          Blood leukocytes automated count (number/volume)      

     8.8 10*3/uL          

                                        4.3-11.0        

 

                          Blood erythrocytes automated count (number/volume)    

       3.30 10*6/uL       

                                        4.35-5.85        

 

                    Venous blood hemoglobin measurement (mass/volume)           

9.9 g/dL            

13.3-17.7        

 

                    Blood hematocrit (volume fraction)           31 %           

     40-54        

 

                    Automated erythrocyte mean corpuscular volume           94 [

foz_us]           

80-99        

 

                                        Automated erythrocyte mean corpuscular h

emoglobin (mass per erythrocyte)        

                          30 pg                     25-34        

 

                                        Automated erythrocyte mean corpuscular h

emoglobin concentration measurement 

(mass/volume)             32 g/dL                   32-36        

 

                    Automated erythrocyte distribution width ratio           16.

4 %              10.0-

14.5        

 

                    Automated blood platelet count (count/volume)           102 

10*3/uL           

130-400        

 

                          Automated blood platelet mean volume measurement      

     12.3 [foz_us]        

                                        7.4-10.4        

 

                    Automated blood neutrophils/100 leukocytes           83 %   

             42-75       

 

 

                    Automated blood lymphocytes/100 leukocytes           12 %   

             12-44       

 

 

                    Blood monocytes/100 leukocytes           5 %                

 0-12        

 

                    Automated blood eosinophils/100 leukocytes           0 %    

             0-10        

 

                    Automated blood basophils/100 leukocytes           0 %      

           0-10        

 

                    Blood neutrophils automated count (number/volume)           

7.3 10*3            

1.8-7.8        

 

                    Blood lymphocytes automated count (number/volume)           

1.0 10*3            

1.0-4.0        

 

                    Blood monocytes automated count (number/volume)           0.

4 10*3            

0.0-1.0        

 

                    Automated eosinophil count           0.0 10*3/uL           0

.0-0.3        

 

                    Automated blood basophil count (count/volume)           0.0 

10*3/uL           

0.0-0.1        

 

                                        Whole blood basic metabolic panel -  05:00         

 

                          Serum or plasma sodium measurement (moles/volume)     

      135 mmol/L          

                                        135-145        

 

                          Serum or plasma potassium measurement (moles/volume)  

         4.1 mmol/L       

                                        3.6-5.0        

 

                          Serum or plasma chloride measurement (moles/volume)   

        100 mmol/L        

                                                

 

                    Carbon dioxide           25 mmol/L           21-32        

 

                          Serum or plasma anion gap determination (moles/volume)

           10 mmol/L      

                                        5-14        

 

                          Serum or plasma urea nitrogen measurement (mass/volume

)           29 mg/dL      

                                        7-18        

 

                          Serum or plasma creatinine measurement (mass/volume)  

         0.74 mg/dL       

                                        0.60-1.30        

 

                    Serum or plasma urea nitrogen/creatinine mass ratio         

  39                  NRG 

       

 

                                        Serum or plasma creatinine measurement w

ith calculation of estimated glomerular 

filtration rate           >                         NRG        

 

                    Serum or plasma glucose measurement (mass/volume)           

137 mg/dL           

        

 

                    Serum or plasma calcium measurement (mass/volume)           

8.2 mg/dL           

8.5-10.1        

 

                                        Serum or plasma phosphate measurement (m

ass/volume) - 20 05:00         

 

                          Serum or plasma phosphate measurement (mass/volume)   

        3.0 mg/dL         

                                        2.3-4.7        

 

                                        Magnesium - 20 05:00         

 

                    Magnesium           1.7 mg/dL           1.6-2.4        

 

                                        Capillary blood glucose measurement by g

lucometer (mass/volume) - 20 10:59

         

 

                          Capillary blood glucose measurement by glucometer (mas

s/volume)           94 

mg/dL                                           

 

                                        Capillary blood glucose measurement by g

lucometer (mass/volume) - 20 16:01

         

 

                          Capillary blood glucose measurement by glucometer (mas

s/volume)           232 

mg/dL                                           

 

                                        Capillary blood glucose measurement by g

lucometer (mass/volume) - 20 21:15

         

 

                          Capillary blood glucose measurement by glucometer (mas

s/volume)           97 

mg/dL                                           

 

                                        Complete blood count (CBC) with automate

d white blood cell (WBC) differential - 

20 05:20         

 

                          Blood leukocytes automated count (number/volume)      

     10.2 10*3/uL         

                                        4.3-11.0        

 

                          Blood erythrocytes automated count (number/volume)    

       3.23 10*6/uL       

                                        4.35-5.85        

 

                    Venous blood hemoglobin measurement (mass/volume)           

9.8 g/dL            

13.3-17.7        

 

                    Blood hematocrit (volume fraction)           30 %           

     40-54        

 

                    Automated erythrocyte mean corpuscular volume           94 [

foz_us]           

80-99        

 

                                        Automated erythrocyte mean corpuscular h

emoglobin (mass per erythrocyte)        

                          30 pg                     25-34        

 

                                        Automated erythrocyte mean corpuscular h

emoglobin concentration measurement 

(mass/volume)             32 g/dL                   32-36        

 

                    Automated erythrocyte distribution width ratio           16.

1 %              10.0-

14.5        

 

                    Automated blood platelet count (count/volume)           100 

10*3/uL           

130-400        

 

                          Automated blood platelet mean volume measurement      

     11.8 [foz_us]        

                                        7.4-10.4        

 

                    Automated blood neutrophils/100 leukocytes           81 %   

             42-75       

 

 

                    Automated blood lymphocytes/100 leukocytes           14 %   

             12-44       

 

 

                    Blood monocytes/100 leukocytes           4 %                

 0-12        

 

                    Automated blood eosinophils/100 leukocytes           1 %    

             0-10        

 

                    Automated blood basophils/100 leukocytes           0 %      

           0-10        

 

                    Blood neutrophils automated count (number/volume)           

8.2 10*3            

1.8-7.8        

 

                    Blood lymphocytes automated count (number/volume)           

1.4 10*3            

1.0-4.0        

 

                    Blood monocytes automated count (number/volume)           0.

5 10*3            

0.0-1.0        

 

                    Automated eosinophil count           0.1 10*3/uL           0

.0-0.3        

 

                    Automated blood basophil count (count/volume)           0.0 

10*3/uL           

0.0-0.1        

 

                                        Whole blood basic metabolic panel - 05/0

3/20 05:20         

 

                          Serum or plasma sodium measurement (moles/volume)     

      137 mmol/L          

                                        135-145        

 

                          Serum or plasma potassium measurement (moles/volume)  

         3.6 mmol/L       

                                        3.6-5.0        

 

                          Serum or plasma chloride measurement (moles/volume)   

        101 mmol/L        

                                                

 

                    Carbon dioxide           26 mmol/L           21-32        

 

                          Serum or plasma anion gap determination (moles/volume)

           10 mmol/L      

                                        5-14        

 

                          Serum or plasma urea nitrogen measurement (mass/volume

)           24 mg/dL      

                                        7-18        

 

                          Serum or plasma creatinine measurement (mass/volume)  

         0.65 mg/dL       

                                        0.60-1.30        

 

                    Serum or plasma urea nitrogen/creatinine mass ratio         

  37                  NRG 

       

 

                                        Serum or plasma creatinine measurement w

ith calculation of estimated glomerular 

filtration rate           >                         NRG        

 

                    Serum or plasma glucose measurement (mass/volume)           

49 mg/dL            

        

 

                    Serum or plasma calcium measurement (mass/volume)           

8.1 mg/dL           

8.5-10.1        

 

                                        Serum or plasma phosphate measurement (m

ass/volume) - 20 05:20         

 

                          Serum or plasma phosphate measurement (mass/volume)   

        2.7 mg/dL         

                                        2.3-4.7        

 

                                        Magnesium - 20 05:20         

 

                    Magnesium           1.7 mg/dL           1.6-2.4        

 

                                        Capillary blood glucose measurement by g

lucometer (mass/volume) - 20 06:02

         

 

                          Capillary blood glucose measurement by glucometer (mas

s/volume)           56 

mg/dL                                           

 

                                        Capillary blood glucose measurement by g

lucometer (mass/volume) - 20 06:20

         

 

                          Capillary blood glucose measurement by glucometer (mas

s/volume)           83 

mg/dL                                           

 

                                        Capillary blood glucose measurement by g

lucometer (mass/volume) - 20 06:49

         

 

                          Capillary blood glucose measurement by glucometer (mas

s/volume)           107 

mg/dL                                           

 

                                        Capillary blood glucose measurement by g

lucometer (mass/volume) - 20 11:27

         

 

                          Capillary blood glucose measurement by glucometer (mas

s/volume)           186 

mg/dL                                           

 

                                        Capillary blood glucose measurement by g

lucometer (mass/volume) - 20 16:16

         

 

                          Capillary blood glucose measurement by glucometer (mas

s/volume)           347 

mg/dL                                           

 

                                        Capillary blood glucose measurement by g

lucometer (mass/volume) - 20 20:15

         

 

                          Capillary blood glucose measurement by glucometer (mas

s/volume)           213 

mg/dL                                           

 

                                        Vancomycin trough - 20 21:00      

   

 

                    Vancomycin trough           26.5 ug/mL           10.0-20.0  

      

 

                                        Capillary blood glucose measurement by g

lucometer (mass/volume) - 20 03:20

         

 

                          Capillary blood glucose measurement by glucometer (mas

s/volume)           121 

mg/dL                                           

 

                                        Complete blood count (CBC) with automate

d white blood cell (WBC) differential - 

20 03:20         

 

                          Blood leukocytes automated count (number/volume)      

     9.2 10*3/uL          

                                        4.3-11.0        

 

                          Blood erythrocytes automated count (number/volume)    

       3.09 10*6/uL       

                                        4.35-5.85        

 

                    Venous blood hemoglobin measurement (mass/volume)           

9.3 g/dL            

13.3-17.7        

 

                    Blood hematocrit (volume fraction)           29 %           

     40-54        

 

                    Automated erythrocyte mean corpuscular volume           95 [

foz_us]           

80-99        

 

                                        Automated erythrocyte mean corpuscular h

emoglobin (mass per erythrocyte)        

                          30 pg                     25-34        

 

                                        Automated erythrocyte mean corpuscular h

emoglobin concentration measurement 

(mass/volume)             32 g/dL                   32-36        

 

                    Automated erythrocyte distribution width ratio           16.

5 %              10.0-

14.5        

 

                    Automated blood platelet count (count/volume)           93 1

0*3/uL           

130-400        

 

                          Automated blood platelet mean volume measurement      

     11.7 [foz_us]        

                                        7.4-10.4        

 

                    Automated blood neutrophils/100 leukocytes           80 %   

             42-75       

 

 

                    Automated blood lymphocytes/100 leukocytes           14 %   

             12-44       

 

 

                    Blood monocytes/100 leukocytes           5 %                

 0-12        

 

                    Automated blood eosinophils/100 leukocytes           1 %    

             0-10        

 

                    Automated blood basophils/100 leukocytes           0 %      

           0-10        

 

                    Blood neutrophils automated count (number/volume)           

7.3 10*3            

1.8-7.8        

 

                    Blood lymphocytes automated count (number/volume)           

1.3 10*3            

1.0-4.0        

 

                    Blood monocytes automated count (number/volume)           0.

5 10*3            

0.0-1.0        

 

                    Automated eosinophil count           0.1 10*3/uL           0

.0-0.3        

 

                    Automated blood basophil count (count/volume)           0.0 

10*3/uL           

0.0-0.1        

 

                                        Whole blood basic metabolic panel -  03:20         

 

                          Serum or plasma sodium measurement (moles/volume)     

      136 mmol/L          

                                        135-145        

 

                          Serum or plasma potassium measurement (moles/volume)  

         3.9 mmol/L       

                                        3.6-5.0        

 

                          Serum or plasma chloride measurement (moles/volume)   

        102 mmol/L        

                                                

 

                    Carbon dioxide           26 mmol/L           21-32        

 

                          Serum or plasma anion gap determination (moles/volume)

           8 mmol/L       

                                        5-14        

 

                          Serum or plasma urea nitrogen measurement (mass/volume

)           22 mg/dL      

                                        7-18        

 

                          Serum or plasma creatinine measurement (mass/volume)  

         0.71 mg/dL       

                                        0.60-1.30        

 

                    Serum or plasma urea nitrogen/creatinine mass ratio         

  31                  NRG 

       

 

                                        Serum or plasma creatinine measurement w

ith calculation of estimated glomerular 

filtration rate           >                         NRG        

 

                    Serum or plasma glucose measurement (mass/volume)           

124 mg/dL           

        

 

                    Serum or plasma calcium measurement (mass/volume)           

7.9 mg/dL           

8.5-10.1        

 

                                        Serum or plasma phosphate measurement (m

ass/volume) - 20 03:20         

 

                          Serum or plasma phosphate measurement (mass/volume)   

        2.3 mg/dL         

                                        2.3-4.7        

 

                                        Magnesium - 20 03:20         

 

                    Magnesium           2.5 mg/dL           1.6-2.4        

 

                                        Serum or plasma lithium measurement (mol

es/volume) - 20 03:30         

 

                    BNP PT              70.6 pg/mL           <100.0        

 

                                        Vancomycin trough - 20 03:30      

   

 

                    Vancomycin trough           18.4 ug/mL           10.0-20.0  

      

 

                                        Capillary blood glucose measurement by g

lucometer (mass/volume) - 20 10:56

         

 

                          Capillary blood glucose measurement by glucometer (mas

s/volume)           167 

mg/dL                                           

 

                                        Comprehensive metabolic panel - 20

 00:00         

 

                          Serum or plasma sodium measurement (moles/volume)     

      148 mmol/L          

                                        135-145        

 

                          Serum or plasma potassium measurement (moles/volume)  

         3.7 mmol/L       

                                        3.6-5.0        

 

                          Serum or plasma chloride measurement (moles/volume)   

        112 mmol/L        

                                                

 

                    Carbon dioxide           20 mmol/L           21-32        

 

                          Serum or plasma anion gap determination (moles/volume)

           16 mmol/L      

                                        5-14        

 

                          Serum or plasma urea nitrogen measurement (mass/volume

)           30 mg/dL      

                                        7-18        

 

                          Serum or plasma creatinine measurement (mass/volume)  

         1.00 mg/dL       

                                        0.60-1.30        

 

                    Serum or plasma urea nitrogen/creatinine mass ratio         

  30                  NRG 

       

 

                                        Serum or plasma creatinine measurement w

ith calculation of estimated glomerular 

filtration rate           >                         NRG        

 

                    Serum or plasma glucose measurement (mass/volume)           

110 mg/dL           

        

 

                    Serum or plasma calcium measurement (mass/volume)           

7.8 mg/dL           

8.5-10.1        

 

                          Serum or plasma total bilirubin measurement (mass/volu

me)           0.2 mg/dL   

                                        0.1-1.0        

 

                                        Serum or plasma alkaline phosphatase juarez

surement (enzymatic activity/volume)    

                          100 U/L                           

 

                                        Serum or plasma aspartate aminotransfera

se measurement (enzymatic 

activity/volume)           16 U/L                    5-34        

 

                                        Serum or plasma alanine aminotransferase

 measurement (enzymatic activity/volume)

                          23 U/L                    0-55        

 

                    Serum or plasma protein measurement (mass/volume)           

5.7 g/dL            

6.4-8.2        

 

                    Serum or plasma albumin measurement (mass/volume)           

3.1 g/dL            

3.2-4.5        

 

                    CALCIUM CORRECTED           8.5 mg/dL           8.5-10.1    

    

 

                                        Complete blood count (CBC) with automate

d white blood cell (WBC) differential - 

20 00:00         

 

                          Blood leukocytes automated count (number/volume)      

     7.8 10*3/uL          

                                        4.3-11.0        

 

                          Blood erythrocytes automated count (number/volume)    

       2.73 10*6/uL       

                                        4.35-5.85        

 

                    Venous blood hemoglobin measurement (mass/volume)           

8.4 g/dL            

13.3-17.7        

 

                    Blood hematocrit (volume fraction)           28 %           

     40-54        

 

                    Automated erythrocyte mean corpuscular volume           102 

[foz_us]           

80-99        

 

                                        Automated erythrocyte mean corpuscular h

emoglobin (mass per erythrocyte)        

                          31 pg                     25-34        

 

                                        Automated erythrocyte mean corpuscular h

emoglobin concentration measurement 

(mass/volume)             30 g/dL                   32-36        

 

                    Automated erythrocyte distribution width ratio           20.

4 %              10.0-

14.5        

 

                    Automated blood platelet count (count/volume)           115 

10*3/uL           

130-400        

 

                          Automated blood platelet mean volume measurement      

     14.1 [foz_us]        

                                        7.4-10.4        

 

                    Automated blood neutrophils/100 leukocytes           69 %   

             42-75       

 

 

                    Automated blood lymphocytes/100 leukocytes           21 %   

             12-44       

 

 

                    Blood monocytes/100 leukocytes           7 %                

 0-12        

 

                    Automated blood eosinophils/100 leukocytes           1 %    

             0-10        

 

                    Automated blood basophils/100 leukocytes           1 %      

           0-10        

 

                    Blood neutrophils automated count (number/volume)           

5.4 10*3            

1.8-7.8        

 

                    Blood lymphocytes automated count (number/volume)           

1.6 10*3            

1.0-4.0        

 

                    Blood monocytes automated count (number/volume)           0.

6 10*3            

0.0-1.0        

 

                    Automated eosinophil count           0.1 10*3/uL           0

.0-0.3        

 

                    Automated blood basophil count (count/volume)           0.0 

10*3/uL           

0.0-0.1        

 

                                        Hemoglobin A1c measurement - 20 00

:00         

 

                    Blood hemoglobin A1C measurement (mass/volume)           7.2

 %               4.0-5.6

        

 

                    MEAN BLOOD GLUCOSE           160 %               <=126      

  

 

                                        Capillary blood glucose measurement by g

lucometer (mass/volume) - 20 13:09

         

 

                          Capillary blood glucose measurement by glucometer (mas

s/volume)           77 

mg/dL                                           

 

                                        Comprehensive metabolic panel - 20

 13:13         

 

                          Serum or plasma sodium measurement (moles/volume)     

      146 mmol/L          

                                        135-145        

 

                          Serum or plasma potassium measurement (moles/volume)  

         3.5 mmol/L       

                                        3.6-5.0        

 

                          Serum or plasma chloride measurement (moles/volume)   

        115 mmol/L        

                                                

 

                    Carbon dioxide           20 mmol/L           21-32        

 

                          Serum or plasma anion gap determination (moles/volume)

           11 mmol/L      

                                        5-14        

 

                          Serum or plasma urea nitrogen measurement (mass/volume

)           29 mg/dL      

                                        7-18        

 

                          Serum or plasma creatinine measurement (mass/volume)  

         0.93 mg/dL       

                                        0.60-1.30        

 

                    Serum or plasma urea nitrogen/creatinine mass ratio         

  31                  NRG 

       

 

                                        Serum or plasma creatinine measurement w

ith calculation of estimated glomerular 

filtration rate           >                         NRG        

 

                    Serum or plasma glucose measurement (mass/volume)           

63 mg/dL            

        

 

                    Serum or plasma calcium measurement (mass/volume)           

7.8 mg/dL           

8.5-10.1        

 

                          Serum or plasma total bilirubin measurement (mass/volu

me)           0.2 mg/dL   

                                        0.1-1.0        

 

                                        Serum or plasma alkaline phosphatase juarez

surement (enzymatic activity/volume)    

                          90 U/L                            

 

                                        Serum or plasma aspartate aminotransfera

se measurement (enzymatic 

activity/volume)           16 U/L                    5-34        

 

                                        Serum or plasma alanine aminotransferase

 measurement (enzymatic activity/volume)

                          26 U/L                    0-55        

 

                    Serum or plasma protein measurement (mass/volume)           

6.0 g/dL            

6.4-8.2        

 

                    Serum or plasma albumin measurement (mass/volume)           

3.3 g/dL            

3.2-4.5        

 

                    CALCIUM CORRECTED           8.4 mg/dL           8.5-10.1    

    

 

                                        Complete blood count (CBC) with automate

d white blood cell (WBC) differential - 

20 13:13         

 

                          Blood leukocytes automated count (number/volume)      

     8.6 10*3/uL          

                                        4.3-11.0        

 

                          Blood erythrocytes automated count (number/volume)    

       2.92 10*6/uL       

                                        4.35-5.85        

 

                    Venous blood hemoglobin measurement (mass/volume)           

8.9 g/dL            

13.3-17.7        

 

                    Blood hematocrit (volume fraction)           29 %           

     40-54        

 

                    Automated erythrocyte mean corpuscular volume           100 

[foz_us]           

80-99        

 

                                        Automated erythrocyte mean corpuscular h

emoglobin (mass per erythrocyte)        

                          31 pg                     25-34        

 

                                        Automated erythrocyte mean corpuscular h

emoglobin concentration measurement 

(mass/volume)             31 g/dL                   32-36        

 

                    Automated erythrocyte distribution width ratio           20.

6 %              10.0-

14.5        

 

                    Automated blood platelet count (count/volume)           129 

10*3/uL           

130-400        

 

                    Automated blood platelet mean volume measurement           T

NP                 7.4-

10.4        

 

                    Automated blood neutrophils/100 leukocytes           68 %   

             42-75       

 

 

                    Automated blood lymphocytes/100 leukocytes           23 %   

             12-44       

 

 

                    Blood monocytes/100 leukocytes           7 %                

 0-12        

 

                    Automated blood eosinophils/100 leukocytes           1 %    

             0-10        

 

                    Automated blood basophils/100 leukocytes           0 %      

           0-10        

 

                    Blood neutrophils automated count (number/volume)           

5.8 10*3            

1.8-7.8        

 

                    Blood lymphocytes automated count (number/volume)           

2.0 10*3            

1.0-4.0        

 

                    Blood monocytes automated count (number/volume)           0.

6 10*3            

0.0-1.0        

 

                    Automated eosinophil count           0.1 10*3/uL           0

.0-0.3        

 

                    Automated blood basophil count (count/volume)           0.0 

10*3/uL           

0.0-0.1        

 

                                        Magnesium - 20 13:13         

 

                    Magnesium           1.3 mg/dL           1.6-2.4        

 

                                        THYROID STIMULATING HORMONE - 20 1

3:13         

 

                    THYROID STIMULATING HORMONE           2.39 u[iU]/mL         

  0.35-4.94        

 

                                        Serum or plasma thyroxine (T4) free kathleen

urement (mass/volume) - 20 13:13  

       

 

                          Serum or plasma thyroxine (T4) free measurement (mass/

volume)           0.88 

ng/dL                                   0.70-1.48        

 

                                        Serum or plasma lithium measurement (mol

es/volume) - 20 13:13         

 

                    BNP PT              936.9 pg/mL           <100.0        

 

                                        Complete urinalysis with reflex to cultu

re - 20 13:47         

 

                    Urine color determination           YELLOW              NRG 

       

 

                    Urine clarity determination           CLEAR               NR

G        

 

                    Urine pH measurement by test strip           5.0            

     5-9        

 

                    Specific gravity of urine by test strip           1.015     

          1.016-1.022  

      

 

                          Urine protein assay by test strip, semi-quantitative  

         NEGATIVE         

                                        NEGATIVE        

 

                    Urine glucose detection by automated test strip           NE

GATIVE            

NEGATIVE        

 

                          Erythrocytes detection in urine sediment by light micr

oscopy           NEGATIVE 

                                        NEGATIVE        

 

                    Urine ketones detection by automated test strip           NE

GATIVE            

NEGATIVE        

 

                    Urine nitrite detection by test strip           NEGATIVE    

        NEGATIVE    

    

 

                    Urine total bilirubin detection by test strip           NEGA

TIVE            

NEGATIVE        

 

                          Urine urobilinogen measurement by automated test strip

 (mass/volume)           

0.2 mg/dL                               < = 1.0        

 

                    Urine leukocyte esterase detection by dipstick           NEG

ATIVE            

NEGATIVE        

 

                                        Automated urine sediment erythrocyte cou

nt by microscopy (number/high power 

field)                    NONE                      NRG        

 

                                        Automated urine sediment leukocyte count

 by microscopy (number/high power field)

                          NONE                      NRG        

 

                          Bacteria detection in urine sediment by light microsco

py           NEGATIVE     

                                        NRG        

 

                          Crystals detection in urine sediment by light microsco

py           NONE         

                                        NRG        

 

                          Casts detection in urine sediment by light microscopy 

          PRESENT         

                                        NRG        

 

                          Mucus detection in urine sediment by light microscopy 

          NEGATIVE        

                                        NRG        

 

                    Complete urinalysis with reflex to culture           NO     

             NRG        

 

                          Hyaline casts detection in urine sediment by light kodi

roscopy           0-2     

                                        NRG        

 

                                        Urine drug screening test - 20 13:

47         

 

                    Urine phencyclidine detection by screening method           

NEGATIVE            

NEGATIVE        

 

                          Urine benzodiazepines detection by screening method   

        NEGATIVE          

                                        NEGATIVE        

 

                    Urine cocaine detection           NEGATIVE            NEGATI

VE        

 

                    Urine amphetamines detection by screening method           N

EGATIVE            

NEGATIVE        

 

                          Urine methamphetamine detection by screening method   

        NEGATIVE          

                                        NEGATIVE        

 

                    Urine cannabinoids detection by screening method           N

EGATIVE            

NEGATIVE        

 

                    Urine opiates detection by screening method           NEGATI

VE            

NEGATIVE        

 

                    Urine barbiturates detection           NEGATIVE            N

EGATIVE        

 

                          Screening urine tricyclic antidepressants detection   

        NEGATIVE          

                                        NEGATIVE        

 

                    Urine methadone detection by screening method           NEGA

TIVE            

NEGATIVE        

 

                    Urine oxycodone detection           NEGATIVE            NEGA

TIVE        

 

                    Urine propoxyphene detection           NEGATIVE            N

EGATIVE        

 

                                        Prealbumin - 20 12:50         

 

                    Serum or plasma prealbumin measurement (mass/volume)        

   13.6 %              

18.0-37.0        

 

                                        Coronavirus SARS-CoV-2 SO  - 

0 08:42         

 

                    Coronavirus Ab [Units/volume] in Serum           Negative   

         Negative   

     

 

                                        Automated blood complete blood count (

mogram) panel - 06/15/20 09:35         

 

                          Blood leukocytes automated count (number/volume)      

     16.6 10*3/uL         

                                        4.3-11.0        

 

                          Blood erythrocytes automated count (number/volume)    

       4.10 10*6/uL       

                                        4.35-5.85        

 

                    Venous blood hemoglobin measurement (mass/volume)           

12.5 g/dL           

13.3-17.7        

 

                    Blood hematocrit (volume fraction)           39 %           

     40-54        

 

                    Automated erythrocyte mean corpuscular volume           96 [

foz_us]           

80-99        

 

                                        Automated erythrocyte mean corpuscular h

emoglobin (mass per erythrocyte)        

                          31 pg                     25-34        

 

                                        Automated erythrocyte mean corpuscular h

emoglobin concentration measurement 

(mass/volume)             32 g/dL                   32-36        

 

                    Automated erythrocyte distribution width ratio           19.

2 %              10.0-

14.5        

 

                    Automated blood platelet count (count/volume)           205 

10*3/uL           

130-400        

 

                          Automated blood platelet mean volume measurement      

     12.4 [foz_us]        

                                        7.4-10.4        

 

                                        Methicillin resistant Staphylococcus aur

eus (MRSA) screening culture - 06/15/20 

09:35         

 

                          Methicillin resistant Staphylococcus aureus (MRSA) scr

eening culture           

NEG                                     NRG        

 

                                        Automated blood complete blood count (

mogram) panel - 20 03:04         

 

                          Blood leukocytes automated count (number/volume)      

     10.7 10*3/uL         

                                        4.3-11.0        

 

                          Blood erythrocytes automated count (number/volume)    

       3.92 10*6/uL       

                                        4.35-5.85        

 

                    Venous blood hemoglobin measurement (mass/volume)           

11.8 g/dL           

13.3-17.7        

 

                    Blood hematocrit (volume fraction)           38 %           

     40-54        

 

                    Automated erythrocyte mean corpuscular volume           97 [

foz_us]           

80-99        

 

                                        Automated erythrocyte mean corpuscular h

emoglobin (mass per erythrocyte)        

                          30 pg                     25-34        

 

                                        Automated erythrocyte mean corpuscular h

emoglobin concentration measurement 

(mass/volume)             31 g/dL                   32-36        

 

                    Automated erythrocyte distribution width ratio           19.

0 %              10.0-

14.5        

 

                    Automated blood platelet count (count/volume)           167 

10*3/uL           

130-400        

 

                          Automated blood platelet mean volume measurement      

     12.6 [foz_us]        

                                        7.4-10.4        

 

                                        Whole blood basic metabolic panel -  03:04         

 

                          Serum or plasma sodium measurement (moles/volume)     

      137 mmol/L          

                                        135-145        

 

                          Serum or plasma potassium measurement (moles/volume)  

         4.7 mmol/L       

                                        3.6-5.0        

 

                          Serum or plasma chloride measurement (moles/volume)   

        101 mmol/L        

                                                

 

                    Carbon dioxide           26 mmol/L           21-32        

 

                          Serum or plasma anion gap determination (moles/volume)

           10 mmol/L      

                                        5-14        

 

                          Serum or plasma urea nitrogen measurement (mass/volume

)           11 mg/dL      

                                        7-18        

 

                          Serum or plasma creatinine measurement (mass/volume)  

         0.96 mg/dL       

                                        0.60-1.30        

 

                    Serum or plasma urea nitrogen/creatinine mass ratio         

  11                  NRG 

       

 

                                        Serum or plasma creatinine measurement w

ith calculation of estimated glomerular 

filtration rate           >                         NRG        

 

                    Serum or plasma glucose measurement (mass/volume)           

213 mg/dL           

        

 

                    Serum or plasma calcium measurement (mass/volume)           

8.1 mg/dL           

8.5-10.1        



                                                                                
                                                                                
                                                                                
                                                                                
                                                                                
                                                                                
                                                                                
                                                                                
                                                                                
                                                                                
                    



Encounters

      



                ACCT No.           Visit Date/Time           Discharge          

 Status         

             Pt. Type           Provider           Facility           Loc./Unit 

          

Complaint        

 

             [id number]           2016 10:06:00                         

            

Document Registration                                                           

         

 

                    [id number]           2020 13:56:00            23:59:59        

                CLS             Outpatient           YULISA DENNIS MD        

   [facility]                          

 

                    [id number]           06/15/2020 09:00:00            10:17:00        

                DIS             Outpatient           ELISE RODRIGUEZ MD        

   [facility]           CATH                      TYPICAL ATRIAL FLUTTER 

       

 

                    [id number]           2020 07:57:00            23:59:59        

                CLS             Outpatient           YULISA DENNIS MD        

   [facility]                          

 

                    [id number]           2020 07:18:00            23:59:59        

                CLS             Outpatient           ELISE RODRIGUEZ MD        

   [facility]           LABT                             

 

                    [id number]           2020 14:00:00            23:59:59        

                CLS             Outpatient           YULISA DENNIS MD        

   [facility]                          

 

                    [id number]           2020 16:13:00            23:59:59        

                CLS             Outpatient           YULISA DENNIS MD        

   [facility]           LAB FS                    D63.1        

 

                    [id number]           2020 13:28:00            23:59:59        

                CLS             Outpatient           MK LÓPEZ MD        

   [facility]           LAB FS                    E11.22         

 

                    [id number]           2020 12:53:00            16:40:00        

                DIS             Outpatient           ARASELI RICHARDSON        

   [facility]           ER                        BLOOD SUGAR ISSUES,LEG 

SWELLING     

   

 

                    [id number]           2020 14:55:00            23:59:59        

                CLS             Outpatient           YULISA DENNIS MD        

   [facility]           [facility]                          

 

                    [id number]           2020 12:55:00            14:58:00        

                DIS             Outpatient           ELISE RODRIGUEZ MD        

   [facility]           CATH                      TYPICAL ATRIAL FLUTTER 

       

 

                    [id number]           2020 12:39:00            23:59:59        

                CLS             Outpatient           YULISA DENNIS MD        

   [facility]           [facility]                          

 

                    [id number]           2020 21:51:00            12:10:00        

                DIS             Inpatient           MARIELY DELANEY MD         

  [address]                                

 

                    [id number]           2020 12:10:00            23:59:59        

                CLS             Outpatient           YULISA DENNIS MD        

   [facility]           [facility]                          

 

                    [id number]           2020 12:08:00            23:59:59        

                CLS             Outpatient           YULISA DENNIS MD        

   [facility]           [facility]                          

 

                    [id number]           2020 13:03:00            14:08:00        

                DIS             Outpatient           YULISA DENNIS MD        

   [facility]                       L97.416        

 

                    [id number]           2020 12:04:00            23:59:59        

                CLS             Outpatient           YULISA DENNIS MD        

   [facility]           [facility]                          

 

                    [id number]           2020 12:45:00            23:59:59        

                CLS             Outpatient           YULISA DENNIS MD        

   [facility]           [facility]                          

 

                    [id number]           2020 11:02:00            23:59:59        

                CLS             Outpatient           ELISE RODRIGUEZ MD        

   [facility]           CARD                      CAD,CRITICAL LOWER LIMB

 

ISCHEMIA,DIABETES        

 

                    [id number]           2020 12:32:00            23:59:59        

                CLS             Outpatient           THA ODOM MD           Via

 [facility]           [facility]                          

 

                    [id number]           2020 12:39:00            23:59:59        

                CLS             Outpatient           THA ODOM MD           Via

 [facility]           [facility]                          

 

                    [id number]           2020 14:15:00            14:40:00        

                DIS             Outpatient           JASEN DAY DO           

[facility]           4TH                       SEPSIS, RIGHT FOOT WOUN

D        

 

                    [id number]           2020 12:27:00            23:59:59        

                CLS             Outpatient           THA ODOM MD           Via

 [facility]           [facility]                          

 

                    [id number]           2020 12:32:00            23:59:59        

                CLS             Outpatient           THA ODOM MD           Via

 [facility]           [facility]                          

 

                    [id number]           2020 12:36:00            23:59:59        

                CLS             Outpatient           THA ODOM MD           Via

 [facility]           [facility]                          

 

                    [id number]           01/15/2020 12:32:00           01/15/2

020 23:59:59        

                CLS             Outpatient           THA ODOM MD           Via

 [facility]           [facility]                          

 

                    [id number]           2019 12:34:00            23:59:59        

                CLS             Outpatient           YULISA DENNIS MD        

   [facility]           [facility]                          

 

                    [id number]           2019 12:34:00            23:59:59        

                CLS             Outpatient           YULISA DENNIS MD        

   [facility]           [facility]                          

 

                    [id number]           2019 12:35:00            23:59:59        

                CLS             Outpatient           YULISA DENNIS MD        

   [facility]           [facility]                          

 

                    [id number]           2019 12:35:00            23:59:59        

                CLS             Outpatient           YULISA DENNIS MD        

   [facility]           [facility]                          

 

                    [id number]           2019 12:09:00            23:59:59        

                CLS             Outpatient           YULISA DENNIS MD        

   [facility]           RAD                       TYPE 2 DIABETES        

 

                    [id number]           2019 12:41:00            23:59:59        

                CLS             Outpatient           YULISA DENNIS MD        

   [facility]           [facility]                          

 

                    [id number]           2019 13:30:00            15:01:00        

                DIS             Emergency           ARASELI RICHARDSON         

  [facility]           ER                        LOW OXYGEN, AMS        

 

                    [id number]           2019 12:18:00            23:59:59        

                CLS             Outpatient           YULISA DENNIS MD        

   [facility]           [facility]                          

 

                    [id number]           10/30/2019 12:32:00           10/30/2

019 23:59:59        

                CLS             Outpatient           YULISA DENNIS MD        

   [facility]           [facility]                          

 

                    [id number]           10/23/2019 13:34:00           10/23/2

019 23:59:59        

                CLS             Outpatient           YULISA DENNIS MD        

   [facility]           LAB                       DM FOOT ULCER        

 

                    [id number]           10/23/2019 12:33:00           10/23/2

019 23:59:59        

                CLS             Outpatient           YULISA DENNIS MD        

   [facility]           [facility]                          

 

                    [id number]           10/09/2019 11:04:00           10/09/2

019 23:59:59        

                CLS             Outpatient           YULISA DENNIS MD        

   [facility]           [facility]                          

 

                    [id number]           10/02/2019 12:45:00           10/02/2

019 23:59:59        

                CLS             Outpatient           YULISA DENNIS MD        

   [facility]           [facility]                          

 

                    [id number]           2019 12:21:00            23:59:59        

                CLS             Outpatient           YULISA DENNIS MD        

   [facility]           [facility]                          

 

                    [id number]           2019 09:50:00            23:59:59        

                CLS             Outpatient           YULISA DENNIS MD        

   [facility]                          

 

                    [id number]           2019 12:36:00            23:59:59        

                CLS             Outpatient           YULISA DENNIS MD        

   [facility]                          

 

                    [id number]           2019 09:46:00            11:27:00        

                DIS             Outpatient           ELISE RODRIGUEZ MD        

   [facility]           CATH                      CRITICAL LIMB ISCHEMIA 

       

 

                    [id number]           2019 12:32:00            23:59:59        

                CLS             Outpatient           YULISA DENNIS MD        

   [facility]                          

 

                    [id number]           2019 12:37:00            23:59:59        

                CLS             Outpatient           YULISA DENNIS MD        

   [facility]                          

 

                    [id number]           2019 10:30:00            23:59:59        

                CLS             Outpatient           ELISE RODRIGUEZ MD        

   [facility]           CARD                      CAD        

 

                    [id number]           2019 12:47:00            23:59:59        

                CLS             Outpatient           YULISA DENNIS MD        

   [facility]                          

 

                    [id number]           2019 13:01:00            23:59:59        

                CLS             Outpatient           YULISA DENNIS MD        

   [facility]                          

 

                    [id number]           2019 12:57:00            23:59:59        

                CLS             Outpatient           YULISA DENNIS MD        

   [facility]           [facility]                          

 

                    [id number]           2019 13:01:00            23:59:59        

                CLS             Outpatient           YULISA DENNIS MD        

   [facility]                          

 

                    [id number]           2019 13:06:00            23:59:59        

                CLS             Outpatient           YULISA DENNIS MD        

   [facility]                          

 

                    [id number]           2019 13:31:00           

019 15:33:00        

                DIS             Inpatient           NITO SALDIVAR, YUE ALMANZAR          

 [facility]           4TH                       RT FOOT SWOLLEN AND RED

        

 

                    [id number]           2017 09:47:00           

017 13:22:00        

                DIS             Emergency           YAZ SALDIVAR, CHRISTINE VARGAS    

       [facility] 

[facility]           ER                        POSS STROKE/FAC

IAL 

DROOPING/CANNOT LIFT LEFT ARM        

 

                    [id number]           2017 14:45:00           

017 23:59:59        

                CLS             Preadmit           TEOFILO REYNOLDS        

   [facility]           RAD                       CLAUDICATION I73.9     

   

 

                    [id number]           2017 11:00:00           

017 14:39:00        

                DIS             Outpatient           ARIADNE SALDIVAR FACC, JOHNNIE MERCEDES CC

DS           

[facility]           CATH                      CHEST PAIN 

       

 

             [id number]           2020 14:46:00                        P

EN           

Preadmit                  YULISA DENNIS MD           [facility]                 [facility]                          

 

             [id number]           2020 15:04:00                        A

CT           

Emergency                 EMILIO SALDIVAR, MAHOGANY BELL           [facility]                 ER FS                     WEAKNESS        

 

             [id number]           2017 07:29:00                         

            

Document Registration                                                           

         

 

             [id number]           2013 14:08:00                         

            

Document Registration                                                           

         

 

             [id number]           2012 10:35:00                         

            

Document Registration                                                           

         

 

             [id number]           2016 13:05:00                         

            

Document Registration                                                           

         

 

                [id number]           2014 08:56:00           2014 23:59:

59           CLS

                Outpatient           MK LÓPEZ MD                          

         

                                                 

 

                [id number]           2014 10:06:00           2014 23:59:

59           CLS

                Outpatient           MK LÓPEZ MD                          

         

                                                 

 

                [id number]           2014 13:19:00           2014 23:59:

59           CLS

                Outpatient           MK LÓPEZ MD                          

         

                                                 

 

                [id number]           2014 14:30:00           2014 23:59:

59           CLS

                Outpatient           MK LÓPEZ MD                          

         

                                                 

 

                [id number]           2014 11:12:00           2014 23:59:

59           CLS

                Outpatient           MK LÓPEZ MD                          

         

                                                 

 

                [id number]           10/22/2013 16:06:00           10/22/2013 23:59:

59           CLS

                Outpatient           MK LÓPEZ MD                          

         

                                                 

 

                [id number]           2013 13:33:00           2013 23:59:

59           CLS

                Outpatient           MK LÓEPZ MD                          

         

                                                 

 

                [id number]           2013 15:28:00           2013 23:59:

59           CLS

                Outpatient           MK LÓPEZ MD                          

         

                                                 

 

                [id number]           01/10/2013 11:03:00           01/10/2013 23:59:

59           CLS

             Outpatient                                                         

  

 

                [id number]           10/23/2012 14:55:00           10/23/2012 23:59:

59           CLS

                Outpatient           MK LÓPEZ MD                          

         

                                                 

 

                [id number]           10/23/2012 14:55:00           10/23/2012 23:59:5

9           CLS 

                Outpatient           MK LÓPZE MD                          

          

                                                 

 

                [id number]           2020 11:40:00           2020 23:59:

59           CLS

                Outpatient           MK LÓPEZ MD                          

 [facility] 

[facility]                                   

 

             [id number]           2019 15:00:00                              

       Document

 Registration                                                                   

 

 

             [id number]           2018 11:20:00                              

       Document

 Registration                                                                   

 

 

             [id number]           2018 14:20:00                              

       Document

 Registration

## 2020-07-03 NOTE — DIAGNOSTIC IMAGING REPORT
INDICATION: Generalized weakness, history of recent cardiac

ablation.



EXAMINATION: Frontal chest was obtained at 3:53 p.m. 



COMPARISON: 05/21/2020.



FINDINGS: Heart is mildly enlarged. There is mild central

vascular prominence, without bettie edema. There is no

consolidation, pneumothorax or pleural fluid.



IMPRESSION: Mild cardiomegaly with central vascular prominence.

No bettie edema, infiltrate or pleural fluid. 



Dictated by: 



  Dictated on workstation # [id number]

## 2020-07-03 NOTE — DIAGNOSTIC IMAGING REPORT
PROCEDURE: CT head without contrast.



TECHNIQUE: Multiple contiguous axial images were obtained through

the brain without the use of intravenous contrast. Auto Exposure

Controls were utilized during the CT exam to meet ALARA standards

for radiation dose reduction. 



INDICATION: Generalized weakness.



COMPARISON: Study of 04/12/2020.



CT HEAD: CT images of the head were obtained. 



FINDINGS: Ventricles and sulci are within normal limits for size.

There is no intracranial hemorrhage identified. There is no

abnormal mass effect or shift of midline structures.



IMPRESSION: Unremarkable CT of the head.



Dictated by: 



  Dictated on workstation # [id number]

## 2020-07-03 NOTE — ED GENERAL
General


Chief Complaint:  Cardiac/General Problems


Stated Complaint:  WEAKNESS


Nursing Triage Note:  


Patient brought to ED by EMS for chief complaint of generalized weakness. 


Patient states he had a cardiac ablation on Tuesday and has been gradually 


weakening since. EMS reports B/P of 50s/20s.


Nursing Sepsis Screen:  No Definite Risk





History of Present Illness


Date Seen by Provider:  Jul 3, 2020


Time Seen by Provider:  15:30


Initial Comments


The patient is a 69-year-old male with a history of hypertension, 

hyperlipidemia, questionable history of coronary artery disease, history of 

cardiac ablation completed at [facility] within the last 10 days 

secondary to refractory atrial fibrillation/flutter, COPD. He presents with 

concern for progressive generalized weakness with onset over the last 2 days. 

Patient has been sitting in his wheelchair and not moving around much and today 

family noticed that he was not acting like himself and seemed lethargic and a li

ttle altered. EMS were activated and upon their arrival patient was noted to 

have a very low blood pressure in the 50s systolic. Crystalloid rehydration was 

immediately initiated in route by EMS. Upon arrival to the emergency department 

blood pressure was a little better in the 70s to 80s systolic on manual checks 

and the patient was alert and responsive and moving all extremities equally and 

following commands appropriately, although disoriented to the year.  He appeared

somewhat sleepy and lethargic upon initial evaluation. Patient denied pain 

anywhere during initial evaluation but did not provide much further history 

secondary to alteration in mental status.





Allergies and Home Medications


Allergies


Coded Allergies:  


     No Known Drug Allergies (Unverified , 1/17/17)





Home Medications


Apixaban 5 Mg Tablet, 5 MG PO Q12H


   Prescribed by: JASEN DAY on 5/4/20 1027


Fluticasone/Salmeterol 12 Gm Hfa.aer.ad, 1 PUFF IH RTBID


   Prescribed by: JASEN DAY on 5/4/20 1027


Glipizide 5 Mg Tablet, 2.5 MG PO DAILY


   Prescribed by: JASEN DAY on 5/4/20 1027


Ipratropium/Albuterol Sulfate 3 Ml Ampul.neb, 3 ML IH BID PRN for SHORTNESS OF 

BREATH, (Reported)


Linagliptin 5 Mg Tablet, 5 MG PO DAILY, (Reported)


   LAST FILLED 01- #30/30 DAY SUPPLY 


Metoprolol Tartrate 50 Mg Tablet, 12.5 MG PO BID


   Prescribed by: JASEN DAY on 5/4/20 1027


Multivitamin 1 Each Tablet, 1 TAB PO DAILY, (Reported)


Nitroglycerin 0.4 Mg Tab.subl, 0.4 MG SL UD PRN for CHEST PAIN, (Reported)


Nortriptyline HCl 50 Mg Capsule, 50 MG PO HS, (Reported)


Oxycodone HCl/Acetaminophen 1 Each Tablet, 1 EACH PO QID PRN for PAIN-MODERATE


   Prescribed by: JASEN DAY on 5/4/20 1028


Pregabalin 150 Mg Capsule, 150 MG PO TID, (Reported)


Sitagliptin Phosphate 100 Mg Tablet, 100 MG PO DAILY, (Reported)


Umeclidinium Bromide 62.5 Mcg Blst.w.dev, 1 INH IH DAILY@0800


   Prescribed by: JASEN DAY on 5/4/20 1027





Patient Home Medication List


Home Medication List Reviewed:  Yes





Review of Systems


Review of Systems


Constitutional:  see HPI





All Other Systems Reviewed


Negative Unless Noted:  Yes (Negative excepted noted.)





Past Medical-Social-Family Hx


Past Med/Social Hx:  Reviewed Nursing Past Med/Soc Hx


Patient Social History


Alcohol Use:  Regular Use


Number of Drinks Today:  AA


Alcohol Beverage of Choice:  Beer


Recreational Drug Use:  No


Smoking Status:  Current Everyday Smoker


Type Used:  Cigarettes


2nd Hand Smoke Exposure:  Yes


Recent Foreign Travel:  No


Contact w/Someone Who Travel:  No


Recent Infectious Disease Expo:  No


Recent Hopitalizations:  No


Physical Abuse:  No


Sexual Abuse:  No


Mistreated:  No


Fear:  No





Immunizations Up To Date


Tetanus Booster (TDap):  Unknown


PED Vaccines UTD:  No


Date of Pneumonia Vaccine:  Aug 22, 2016


Date of Influenza Vaccine:  Dec 1, 2019





Seasonal Allergies


Seasonal Allergies:  No





Past Medical History


Surgeries:  Yes (Liver biopsy and stent placement)


Appendectomy, Coronary Stent, Gallbladder


Respiratory:  No


COPD


Currently Using CPAP:  Yes


Currently Using BIPAP:  No


Cardiac:  Yes (cardiac cath w/ balloon)


Coronary Artery Disease, High Cholesterol, Hypertension


Neurological:  Yes (2018)


Stroke


Reproductive Disorders:  No


Sexually Transmitted Disease:  No


HIV/AIDS:  No


Genitourinary:  No


Prostate Problems


Gastrointestinal:  Yes (cholecystectomy and liver biopsy)


Gall Bladder Disease


Musculoskeletal:  Yes


Arthritis


Endocrine:  Yes


Diabetes, Non-Insulin dep


HEENT:  Yes


Cataract


Loss of Vision:  Right


Cancer:  Yes


Prostate


Did You Recieve Any Treatments:  Yes


What Type of Treatment Did You:  Surgical Intervention


Psychosocial:  No


Integumentary:  Yes (R foot wound 11/19)


Blood Disorders:  No





Family Medical History


Reviewed Nursing Family Hx





Cardiovascular disease


  G8 SISTER


Cataracts


  19 MOTHER


Colon cancer


  19 MOTHER


Completed stroke


  19 MOTHER


Diabetes mellitus


  G8 SISTER


Drug abuse


  G8 BROTHER


Hypercholesterolemia


  G8 BROTHER


Hypertension


  G8 BROTHER


  G8 SISTER


Myocardial infarction


  19 FATHER


  19 MOTHER


Respiratory disorder


  19 FATHER


  19 MOTHER


  G8 BROTHER


  G8 BROTHER


  G8 SISTER


  G8 SISTER


No Pertinent Family Hx, Diabetes





Physical Exam


Vital Signs





Vital Signs - First Documented








 7/3/20 7/3/20





 15:10 15:21


 


Temp  37.1


 


Pulse 74 


 


Resp  15


 


B/P (MAP) 54/32 


 


Pulse Ox  96


 


O2 Delivery  Nasal Cannula


 


O2 Flow Rate  2.00





Capillary Refill : Less Than 3 Seconds


Height, Weight, BMI


Height: 5'8.50"


Weight: 206lbs. 0.8oz. 93.544345at; 26.00 BMI


Method:Stated


General Appearance:  No Apparent Distress


Comments


This is an elderly  male appearing ill and somewhat lethargic. Head is 

normocephalic and atraumatic. Neck is supple and nontender. Oropharynx is moist.

Lungs are clear to auscultation at all stations. There is a normal S1 and S2 

without rubs or gallops and capillary refill is appropriate, less than 2 seconds

globally. Abdomen is soft, nontender nondistended. Skin is warm and dry without 

cyanosis, clubbing or edema. Psychiatrically, the patient demonstrates 

appropriate mood and affect but is somnolent.  Neurologically, patient moves all

extremities equally and follows commands appropriately but is somnolent and 

lethargic and mildly confused; he is oriented to self and situation but 

disoriented to the year.





Focused Exam


Lactate Level


7/3/20 15:10: Lactic Acid Level 1.14





Lactic Acid Level





Laboratory Tests








Test


 7/3/20


15:10


 


Lactic Acid Level


 1.14 MMOL/L


(0.50-2.00)











Procedures/Interventions


Date of ETT Placement:  Apr 13, 2020


Time of ETT Placement:  1511





Progress/Results/Core Measures


Suspected Sepsis


Recent Fever Within 48 Hours:  No


Infection Criteria Present:  Suspected New Infection


New/Unexplained  Altered Menta:  No


Sepsis Screen:  No Definite Risk


SIRS


Temperature: 


Pulse: 78 


Respiratory Rate: 15


 


Laboratory Tests


7/3/20 15:10: White Blood Count 13.7H


Blood Pressure 50 /35 


Mean: 40


 


7/3/20 15:10: Lactic Acid Level 1.14


Laboratory Tests


7/3/20 15:10: 


Creatinine 5.64H, INR Comment 1.0, Platelet Count 153, Total Bilirubin 0.2








Results/Orders


Lab Results





Laboratory Tests








Test


 7/3/20


15:10 7/3/20


15:38 Range/Units


 


 


White Blood Count


 13.7 H


 


 4.3-11.0


10^3/uL


 


Red Blood Count


 3.63 L


 


 4.35-5.85


10^6/uL


 


Hemoglobin 11.0 L  13.3-17.7  G/DL


 


Hematocrit 35 L  40-54  %


 


Mean Corpuscular Volume 95   80-99  FL


 


Mean Corpuscular Hemoglobin 30   25-34  PG


 


Mean Corpuscular Hemoglobin


Concent 32 


 


 32-36  G/DL





 


Red Cell Distribution Width 17.5 H  10.0-14.5  %


 


Platelet Count


 153 


 


 130-400


10^3/uL


 


Mean Platelet Volume 13.0 H  7.4-10.4  FL


 


Neutrophils (%) (Auto) 69   42-75  %


 


Lymphocytes (%) (Auto) 21   12-44  %


 


Monocytes (%) (Auto) 7   0-12  %


 


Eosinophils (%) (Auto) 2   0-10  %


 


Basophils (%) (Auto) 1   0-10  %


 


Neutrophils # (Auto) 9.5 H  1.8-7.8  X 10^3


 


Lymphocytes # (Auto) 2.8   1.0-4.0  X 10^3


 


Monocytes # (Auto) 0.9   0.0-1.0  X 10^3


 


Eosinophils # (Auto)


 0.3 


 


 0.0-0.3


10^3/uL


 


Basophils # (Auto)


 0.1 


 


 0.0-0.1


10^3/uL


 


Prothrombin Time 13.6   12.2-14.7  SEC


 


INR Comment 1.0   0.8-1.4  


 


Activated Partial


Thromboplast Time 30 


 


 24-35  SEC





 


Sodium Level 133 L  135-145  MMOL/L


 


Potassium Level 4.6   3.6-5.0  MMOL/L


 


Chloride Level 95 L    MMOL/L


 


Carbon Dioxide Level 19 L  21-32  MMOL/L


 


Anion Gap 19 H  5-14  MMOL/L


 


Blood Urea Nitrogen 63 H  7-18  MG/DL


 


Creatinine


 5.64 H


 


 0.60-1.30


MG/DL


 


Estimat Glomerular Filtration


Rate 10 


 


  





 


BUN/Creatinine Ratio 11    


 


Glucose Level 87     MG/DL


 


Lactic Acid Level


 1.14 


 


 0.50-2.00


MMOL/L


 


Calcium Level 8.2 L  8.5-10.1  MG/DL


 


Corrected Calcium 8.6   8.5-10.1  MG/DL


 


Total Bilirubin 0.2   0.1-1.0  MG/DL


 


Aspartate Amino Transf


(AST/SGOT) 12 


 


 5-34  U/L





 


Alanine Aminotransferase


(ALT/SGPT) 9 


 


 0-55  U/L





 


Alkaline Phosphatase 80     U/L


 


Troponin I < 0.30   <0.30  NG/ML


 


C-Reactive Protein 4.18 H  <0.50  MG/DL


 


Total Protein 5.9 L  6.4-8.2  GM/DL


 


Albumin 3.5   3.2-4.5  GM/DL


 


Urine Color  DARK YELLOW   


 


Urine Clarity  CLEAR   


 


Urine pH  5.0  5-9  


 


Urine Specific Gravity  >=1.030  1.016-1.022  


 


Urine Protein  TRACE H NEGATIVE  


 


Urine Glucose (UA)  NEGATIVE  NEGATIVE  


 


Urine Ketones  TRACE H NEGATIVE  


 


Urine Nitrite  NEGATIVE  NEGATIVE  


 


Urine Bilirubin  1+ H NEGATIVE  


 


Urine Urobilinogen  0.2  < = 1.0  MG/DL


 


Urine Leukocyte Esterase  NEGATIVE  NEGATIVE  


 


Urine RBC (Auto)  NEGATIVE  NEGATIVE  


 


Urine RBC  NONE   /HPF


 


Urine WBC  RARE   /HPF


 


Urine Squamous Epithelial


Cells 


 0-2 


  /HPF





 


Urine Crystals  PRESENT H  /LPF


 


Urine Amorphous Sediment


 


 FEW REINA


URATES H  /LPF





 


Urine Bacteria  TRACE   /HPF


 


Urine Casts  PRESENT   /LPF


 


Urine Hyaline Casts  0-2 H  /LPF


 


Urine Mucus  SMALL H  /LPF


 


Urine Culture Indicated  NO   








My Orders





Orders - MAHOGANY JHAVERI MD


Norepinephrine 4 Mg/250 Ml (Norepinephri (7/3/20 15:10)


Cbc With Automated Diff (7/3/20 15:13)


Comprehensive Metabolic Panel (7/3/20 15:13)


Troponin I Fs (7/3/20 15:13)


Ekg Tracing (7/3/20 15:13)


Chest 1 View Ap/Pa Only (7/3/20 15:13)


Ct Head Wo (7/3/20 15:13)


Protime With Inr (7/3/20 15:13)


Partial Thromboplastin Time (7/3/20 15:13)


Crp Fs (7/3/20 15:13)


Lactic Acid Analyzer (7/3/20 15:13)


Blood Culture (7/3/20 15:13)


Ua Culture If Indicated (7/3/20 15:13)


Ed Iv/Invasive Line Start (7/3/20 15:13)


Ns Iv 1000 Ml (Sodium Chloride 0.9%) (7/3/20 15:13)


Vancomycin Injection (Vancomycin Injecti (7/3/20 15:15)


Cefepime Injection (Maxipime Injection) (7/3/20 15:15)


Blood Culture (7/3/20 15:30)





Medications Given in ED





Current Medications








 Medications  Dose


 Ordered  Sig/Fadumo


 Route  Start Time


 Stop Time Status Last Admin


Dose Admin


 


 Cefepime HCl 2000


 mg/Sterile Water  20 ml @ 


 240 mls/hr  ONCE  ONCE


 IV  7/3/20 15:15


 7/3/20 15:19 DC 7/3/20 16:36


240 MLS/HR


 


 Norepinephrine


 Bitartrate  250 ml @ ud  STK-MED ONCE


 IV  7/3/20 15:10


 7/3/20 15:13 DC 7/3/20 15:10


44.4 MLS/HR


 


 Vancomycin HCl


 1000 mg/Sodium


 Chloride  250 ml @ 


 250 mls/hr  ONCE  ONCE


 IV  7/3/20 15:15


 7/3/20 16:14 DC 7/3/20 16:39


250 MLS/HR








Vital Signs/I&O











 7/3/20 7/3/20





 15:10 15:21


 


Temp  37.1


 


Pulse 74 78


 


Resp  15


 


B/P (MAP) 54/32 50/35 (40)


 


Pulse Ox  96


 


O2 Delivery  Nasal Cannula


 


O2 Flow Rate  2.00





Capillary Refill : Less Than 3 Seconds








Blood Pressure Mean:                    40








Progress Note :  


   Time:  18:53


Progress Note


Elderly gentleman who presents profoundly hypotensive and mildly lethargic with 

otherwise generally appropriate vital signs; not tachycardic but I note that he 

is on a beta blocker so a tachycardic response may be suppressed at this time. 2

large bore antecubital IVs placed and a 30 mg/kg fluid bolus immediately 

initiated. Levophed hung for pressure support; patient is requiring only a small

amount to maintain blood pressures in the normotensive range after his fluid 

bolus. Blood and urine cultures drawn and patient initiated on vancomycin and 

cefepime as empiric coverage for possible septic shock although not febrile and 

not tachycardic and other etiologies are possible. CT head and XR chest 

nonacute.  Large workup is remarkable for leukocytosis of about 13,000 and 

chemistries with significant brand-new derangement and renal function with a 

creatinine of over 5. Patient had a documented normal creatinine at his recent 

[facility] admission. Suspect this may be the underlying etiology 

for his presentation today.





With copious fluid rehydration and antibiotics, blood pressure has normalized 

although the patient remains on a small dose of Levophed for pressure support. 

He is now alert and oriented 4, with mentation much improved although still a 

little lethargic. He continues to deny pain anywhere. Hemodynamics have 

stabilized sufficiently to facilitate transfer for inpatient admission. Given 

acute renal failure, patient will require nephrology support which is not 

available at [facility]. The patient is therefore graciously 

accepted in transfer to [facility] [place] by Dr. Prater.





ECG


Comment


Sinus rhythm, rate 79, no acute ST elevation or depression, , , QTC

459, EP interpretation.





Diagnostic Imaging





Comments


CHEST 1 VIEW AP/PA ONLY








INDICATION: Generalized weakness, history of recent cardiac


ablation.





EXAMINATION: Frontal chest was obtained at 3:53 p.m. 





COMPARISON: 05/21/2020.





FINDINGS: Heart is mildly enlarged. There is mild central


vascular prominence, without bettie edema. There is no


consolidation, pneumothorax or pleural fluid.





IMPRESSION: Mild cardiomegaly with central vascular prominence.


No bettie edema, infiltrate or pleural fluid. 





Dictated by: 





  Dictated on workstation # [id number]

















CT HEAD WO








PROCEDURE: CT head without contrast.





TECHNIQUE: Multiple contiguous axial images were obtained through


the brain without the use of intravenous contrast. Auto Exposure


Controls were utilized during the CT exam to meet ALARA standards


for radiation dose reduction. 





INDICATION: Generalized weakness.





COMPARISON: Study of 04/12/2020.





CT HEAD: CT images of the head were obtained. 





FINDINGS: Ventricles and sulci are within normal limits for size.


There is no intracranial hemorrhage identified. There is no


abnormal mass effect or shift of midline structures.





IMPRESSION: Unremarkable CT of the head.





Critical Care Note


Critical Care


Total Time (minutes)


55





Departure


Impression





   Primary Impression:  


   Hypotension (arterial)


   Qualified Codes:  I95.89 - Other hypotension


   Additional Impressions:  


   Acute renal failure


   Qualified Codes:  N17.9 - Acute kidney failure, unspecified


   Acute encephalopathy


Disposition:  02 XFER SHT-TRM HOSP


Condition:  Critical





Transfer


Transfer Reason:  Exceeds level of care


Time Spoke to Accepting Phy:  18:00


Transfer Progress Notes


Accepted by Dr. Prater at [facility]; needs nephrology which is not available at [facility].


Method of Transfer:  EMS





Departure-Patient Inst.


Referrals:  


MK LÓPEZ MD (PCP/Family)


Primary Care Physician











MAHOGANY JHAVERI MD               Jul 3, 2020 18:26

## 2020-07-06 ENCOUNTER — HOSPITAL ENCOUNTER (OUTPATIENT)
Dept: HOSPITAL 75 - WOUNDCARE | Age: 70
End: 2020-07-06
Attending: SURGERY
Payer: MEDICAID

## 2020-07-06 DIAGNOSIS — L97.412: ICD-10-CM

## 2020-07-06 DIAGNOSIS — E11.52: ICD-10-CM

## 2020-07-06 DIAGNOSIS — I70.244: ICD-10-CM

## 2020-07-06 DIAGNOSIS — T81.31XA: ICD-10-CM

## 2020-07-06 DIAGNOSIS — E44.1: ICD-10-CM

## 2020-07-06 DIAGNOSIS — E11.621: Primary | ICD-10-CM

## 2020-07-06 DIAGNOSIS — E11.42: ICD-10-CM

## 2020-07-06 PROCEDURE — 11042 DBRDMT SUBQ TIS 1ST 20SQCM/<: CPT

## 2020-07-13 ENCOUNTER — HOSPITAL ENCOUNTER (OUTPATIENT)
Dept: HOSPITAL 75 - WOUNDCARE | Age: 70
End: 2020-07-13
Attending: SURGERY
Payer: MEDICAID

## 2020-07-13 DIAGNOSIS — T81.31XA: ICD-10-CM

## 2020-07-13 DIAGNOSIS — E11.621: Primary | ICD-10-CM

## 2020-07-13 DIAGNOSIS — I70.234: ICD-10-CM

## 2020-07-13 DIAGNOSIS — E44.1: ICD-10-CM

## 2020-07-13 DIAGNOSIS — E11.42: ICD-10-CM

## 2020-07-13 DIAGNOSIS — L97.412: ICD-10-CM

## 2020-07-13 PROCEDURE — 99212 OFFICE O/P EST SF 10 MIN: CPT

## 2020-07-30 ENCOUNTER — HOSPITAL ENCOUNTER (OUTPATIENT)
Dept: HOSPITAL 75 - PREOP | Age: 70
LOS: 90 days | Discharge: HOME | End: 2020-10-28
Attending: SURGERY
Payer: MEDICAID

## 2020-07-30 VITALS — BODY MASS INDEX: 26.7 KG/M2 | WEIGHT: 174.17 LBS | HEIGHT: 67.72 IN

## 2020-07-30 DIAGNOSIS — Z01.818: Primary | ICD-10-CM

## 2020-07-30 DIAGNOSIS — K43.2: ICD-10-CM

## 2020-08-06 ENCOUNTER — HOSPITAL ENCOUNTER (OUTPATIENT)
Dept: HOSPITAL 75 - SDC | Age: 70
Discharge: HOME | End: 2020-08-06
Attending: SURGERY
Payer: MEDICAID

## 2020-08-06 VITALS — DIASTOLIC BLOOD PRESSURE: 58 MMHG | SYSTOLIC BLOOD PRESSURE: 86 MMHG

## 2020-08-06 VITALS — DIASTOLIC BLOOD PRESSURE: 84 MMHG | SYSTOLIC BLOOD PRESSURE: 142 MMHG

## 2020-08-06 VITALS — SYSTOLIC BLOOD PRESSURE: 96 MMHG | DIASTOLIC BLOOD PRESSURE: 64 MMHG

## 2020-08-06 VITALS — SYSTOLIC BLOOD PRESSURE: 103 MMHG | DIASTOLIC BLOOD PRESSURE: 79 MMHG

## 2020-08-06 VITALS — DIASTOLIC BLOOD PRESSURE: 58 MMHG | SYSTOLIC BLOOD PRESSURE: 84 MMHG

## 2020-08-06 VITALS — DIASTOLIC BLOOD PRESSURE: 60 MMHG | SYSTOLIC BLOOD PRESSURE: 91 MMHG

## 2020-08-06 VITALS — HEIGHT: 67.72 IN | BODY MASS INDEX: 26.7 KG/M2 | WEIGHT: 174.17 LBS

## 2020-08-06 VITALS — DIASTOLIC BLOOD PRESSURE: 59 MMHG | SYSTOLIC BLOOD PRESSURE: 81 MMHG

## 2020-08-06 VITALS — DIASTOLIC BLOOD PRESSURE: 59 MMHG | SYSTOLIC BLOOD PRESSURE: 91 MMHG

## 2020-08-06 VITALS — SYSTOLIC BLOOD PRESSURE: 85 MMHG | DIASTOLIC BLOOD PRESSURE: 63 MMHG

## 2020-08-06 VITALS — SYSTOLIC BLOOD PRESSURE: 95 MMHG | DIASTOLIC BLOOD PRESSURE: 67 MMHG

## 2020-08-06 VITALS — DIASTOLIC BLOOD PRESSURE: 61 MMHG | SYSTOLIC BLOOD PRESSURE: 81 MMHG

## 2020-08-06 VITALS — DIASTOLIC BLOOD PRESSURE: 86 MMHG | SYSTOLIC BLOOD PRESSURE: 105 MMHG

## 2020-08-06 VITALS — SYSTOLIC BLOOD PRESSURE: 87 MMHG | DIASTOLIC BLOOD PRESSURE: 60 MMHG

## 2020-08-06 VITALS — DIASTOLIC BLOOD PRESSURE: 61 MMHG | SYSTOLIC BLOOD PRESSURE: 95 MMHG

## 2020-08-06 VITALS — SYSTOLIC BLOOD PRESSURE: 105 MMHG | DIASTOLIC BLOOD PRESSURE: 86 MMHG

## 2020-08-06 DIAGNOSIS — Z82.3: ICD-10-CM

## 2020-08-06 DIAGNOSIS — Z95.5: ICD-10-CM

## 2020-08-06 DIAGNOSIS — M06.9: ICD-10-CM

## 2020-08-06 DIAGNOSIS — I48.92: ICD-10-CM

## 2020-08-06 DIAGNOSIS — I11.0: ICD-10-CM

## 2020-08-06 DIAGNOSIS — E66.9: ICD-10-CM

## 2020-08-06 DIAGNOSIS — K21.9: ICD-10-CM

## 2020-08-06 DIAGNOSIS — I48.91: ICD-10-CM

## 2020-08-06 DIAGNOSIS — E11.9: ICD-10-CM

## 2020-08-06 DIAGNOSIS — Z79.899: ICD-10-CM

## 2020-08-06 DIAGNOSIS — I50.32: ICD-10-CM

## 2020-08-06 DIAGNOSIS — J44.9: ICD-10-CM

## 2020-08-06 DIAGNOSIS — F17.210: ICD-10-CM

## 2020-08-06 DIAGNOSIS — I25.10: ICD-10-CM

## 2020-08-06 DIAGNOSIS — K43.0: Primary | ICD-10-CM

## 2020-08-06 LAB
BASOPHILS # BLD AUTO: 0.1 10^3/UL (ref 0–0.1)
BASOPHILS NFR BLD AUTO: 1 % (ref 0–10)
EOSINOPHIL # BLD AUTO: 0.2 10^3/UL (ref 0–0.3)
EOSINOPHIL NFR BLD AUTO: 2 % (ref 0–10)
ERYTHROCYTE [DISTWIDTH] IN BLOOD BY AUTOMATED COUNT: 18.8 % (ref 10–14.5)
HCT VFR BLD CALC: 40 % (ref 40–54)
HGB BLD-MCNC: 12.6 G/DL (ref 13.3–17.7)
LYMPHOCYTES # BLD AUTO: 2.6 X 10^3 (ref 1–4)
LYMPHOCYTES NFR BLD AUTO: 21 % (ref 12–44)
MANUAL DIFFERENTIAL PERFORMED BLD QL: NO
MCH RBC QN AUTO: 31 PG (ref 25–34)
MCHC RBC AUTO-ENTMCNC: 31 G/DL (ref 32–36)
MCV RBC AUTO: 99 FL (ref 80–99)
MONOCYTES # BLD AUTO: 0.9 X 10^3 (ref 0–1)
MONOCYTES NFR BLD AUTO: 7 % (ref 0–12)
NEUTROPHILS # BLD AUTO: 8.6 X 10^3 (ref 1.8–7.8)
NEUTROPHILS NFR BLD AUTO: 70 % (ref 42–75)
PLATELET # BLD: 133 10^3/UL (ref 130–400)
PMV BLD AUTO: 13.2 FL (ref 7.4–10.4)
WBC # BLD AUTO: 12.3 10^3/UL (ref 4.3–11)

## 2020-08-06 PROCEDURE — 36415 COLL VENOUS BLD VENIPUNCTURE: CPT

## 2020-08-06 PROCEDURE — 94664 DEMO&/EVAL PT USE INHALER: CPT

## 2020-08-06 PROCEDURE — 85025 COMPLETE CBC W/AUTO DIFF WBC: CPT

## 2020-08-06 PROCEDURE — 87081 CULTURE SCREEN ONLY: CPT

## 2020-08-06 RX ADMIN — SODIUM CHLORIDE, SODIUM LACTATE, POTASSIUM CHLORIDE, AND CALCIUM CHLORIDE PRN MLS/HR: 600; 310; 30; 20 INJECTION, SOLUTION INTRAVENOUS at 09:35

## 2020-08-06 RX ADMIN — SODIUM CHLORIDE, SODIUM LACTATE, POTASSIUM CHLORIDE, AND CALCIUM CHLORIDE PRN MLS/HR: 600; 310; 30; 20 INJECTION, SOLUTION INTRAVENOUS at 11:44

## 2020-08-06 NOTE — ANESTHESIA-GENERAL POST-OP
General


Patient Condition


Mental Status/LOC:  Same as Preop


Cardiovascular:  Satisfactory


Nausea/Vomiting:  Absent


Respiratory:  Satisfactory


Pain:  Controlled


Complications:  Absent





Post Op Complications


Complications


None





Follow Up Care/Instructions


Patient Instructions


None needed.





Anesthesia/Patient Condition


Patient Condition


Patient is doing well, no complaints, stable vital signs, no apparent adverse 

anesthesia problems.   


No complications reported per nursing.











[place] [facility]       Aug 6, 2020 12:16

## 2020-08-06 NOTE — PROGRESS NOTE-POST OPERATIVE
Post-Operative Progess Note


Surgeon (s)/Assistant (s)


Surgeon


MATEO ALEMAN DO


Assistant:  Dr. Britt to assist in retraction dissection and closure.





Pre-Operative Diagnosis


incisional hernia





Post-Operative Diagnosis





incarcerated incisional hernia





Procedure & Operative Findings


Date of Procedure


8/6/20


Procedure Performed/Findings


PROCEDURE:


Laparoscopic incarcerated incisional hernia repair with 6 in Ventralight mesh. 





COMPLICATIONS:


None. 


 


INDICATIONS:


The patient is a 69, male with an incarcerated incisional hernia, which


has continued to increase in size and cause discomfort. The


patient was explained the risk and benefits of the procedure and


wished to proceed with the procedure. Consent was signed on the


chart. 


 


DESCRIPTION OF PROCEDURE:


The patient was taken into the operating suite, prepped and draped in sterile


fashion.  Surgical pause was performed.  Local anesthetic was infiltrated in


left upper quadrant.  A 15 blade scalpel was used to make a small skin incision.


Cautery was used to dissect down to the fascia, which was then scored and


divided the muscle, went through the posterior sheath and a balloon trocar was


inserted into the abdomen.  The abdomen was then insufflated. Incarcerated fat 

through incisional hernia. 


A 5 mm trocar was placed in the right lower quadrant and a


5 mm trocar was placed in left lower quadrant. The defect was then closed using 


0 Vicryl with a Thomas-Yola. Echo Ventralight mesh was then inserted in the 

abdomen grabbed through


the stab incision. The balloon was inflated on the mesh.


Circumferential tacks were placed with a SecureStrap Tacker.  


The balloon was then removed and inner crown was created as well.  


The mesh was tacked with pressure being decreased.


The 12 mm fascial defect was then closed using 0 Vicryl.


The abdomen was then desufflated,the trocars were removed.  


The skin was then closed using 4-0 Monocryl in a running subcuticular fashion.  


The abdomen was washed and dried and Skin Affix


was placed over the incisions.  The patient tolerated procedure well without any


complications.  She was taken to recovery room in stable condition.


Anesthesia Type


general





Estimated Blood Loss


Estimated blood loss (mL):  minimal





Specimens/Packing


Specimens Removed


none











MATEO ALEMAN DO               Aug 6, 2020 11:10

## 2020-08-06 NOTE — DISCHARGE INST-SIMPLE/STANDARD
Discharge Inst-Standard


Discharge Medications


New, Converted or Re-Newed RX:  RX on Chart





Patient Instructions/Follow Up


Plan of Care/Instructions/FU:  


Hold eliquis 5 days then restart.


Hal follow up 2-3 weeks.


Activity as Tolerated:  No


Discharge Diet:  Regular Diet


Other Inst to Patient


Follow up Appt:


Make appointment for 2 week.





Instructions:


No lifting greater than 10 pounds.


No strenuous activity. 


May shower in 24 hours, no tub bath or soaking.


Use incentive spirometer at home as directed.


No Smoking


Hold Eliquis for 5 days then restart.





Skin/Wound Care:


You have special glue over your incision that will fall off on it's own. 


You have a bandage over umbilicus, remove it at 48 hours.





Symptoms to Report:


Appetite Changes, Extremity Discoloration, Numbness/Tingling, Swelling Increase

d, Bleeding Excessive, Eyesight Changes, Pain Increased, Urine Color Change, 

Constipation(Persistent), Fever over 101 degree F, Pain/Pressure in chest, 

Urinating Difficulty, Cough Up/Vomit Blood, Heart Beat Irreg/Pounding, 

Pain/Pressure in jaw, Vaginal Bleeding Increase, Cramps in feet or legs, 

Lightheadedness, Pain/Pressure in shoulder, Diarrhea(Persistent), Memory Changes

Suddenly, Questions/Concerns, Weight gain consecutive days, Dizziness/Fainting, 

Nausea/Vomiting, Shortness of Breath, Weight gain over 2 pounds








If questions or concerns contact your physician 


Or seek help at emergency department.











MATEO ALEMAN DO               Aug 6, 2020 11:14

## 2020-08-06 NOTE — PROGRESS NOTE-PRE OPERATIVE
Pre-Operative Progress Note


H&P Reviewed


The H&P was reviewed, patient examined and no changes noted.


Date Seen by Provider:  Aug 6, 2020


Time Seen by Provider:  09:39


Date H&P Reviewed:  Aug 6, 2020


Time H&P Reviewed:  09:39


Pre-Operative Diagnosis:  incisional hernia











MATEO ALEMAN DO               Aug 6, 2020 09:39

## 2021-01-18 ENCOUNTER — HOSPITAL ENCOUNTER (EMERGENCY)
Dept: HOSPITAL 75 - ER FS | Age: 71
Discharge: ANOTHER HOSPITAL | End: 2021-01-18
Payer: MEDICAID

## 2021-01-18 VITALS — DIASTOLIC BLOOD PRESSURE: 56 MMHG | SYSTOLIC BLOOD PRESSURE: 156 MMHG

## 2021-01-18 VITALS — WEIGHT: 180.78 LBS | BODY MASS INDEX: 27.08 KG/M2 | HEIGHT: 68.5 IN

## 2021-01-18 DIAGNOSIS — Z79.01: ICD-10-CM

## 2021-01-18 DIAGNOSIS — Z85.46: ICD-10-CM

## 2021-01-18 DIAGNOSIS — Z82.49: ICD-10-CM

## 2021-01-18 DIAGNOSIS — Z83.3: ICD-10-CM

## 2021-01-18 DIAGNOSIS — Z86.73: ICD-10-CM

## 2021-01-18 DIAGNOSIS — N17.9: Primary | ICD-10-CM

## 2021-01-18 DIAGNOSIS — E11.9: ICD-10-CM

## 2021-01-18 DIAGNOSIS — E86.0: ICD-10-CM

## 2021-01-18 DIAGNOSIS — I95.9: ICD-10-CM

## 2021-01-18 DIAGNOSIS — Z77.22: ICD-10-CM

## 2021-01-18 DIAGNOSIS — N30.00: ICD-10-CM

## 2021-01-18 DIAGNOSIS — Z80.0: ICD-10-CM

## 2021-01-18 DIAGNOSIS — I10: ICD-10-CM

## 2021-01-18 DIAGNOSIS — Z95.5: ICD-10-CM

## 2021-01-18 DIAGNOSIS — J44.9: ICD-10-CM

## 2021-01-18 DIAGNOSIS — I48.91: ICD-10-CM

## 2021-01-18 DIAGNOSIS — E87.5: ICD-10-CM

## 2021-01-18 LAB
ALBUMIN SERPL-MCNC: 3.6 GM/DL (ref 3.2–4.5)
ALP SERPL-CCNC: 94 U/L (ref 40–136)
ALT SERPL-CCNC: 7 U/L (ref 0–55)
AMORPH SED URNS QL MICRO: (no result) /LPF
APTT BLD: 32 SEC (ref 24–35)
APTT PPP: YELLOW S
ARTERIAL PATENCY WRIST A: (no result)
BACTERIA #/AREA URNS HPF: (no result) /HPF
BASE EXCESS STD BLDA CALC-SCNC: -2.7 MMOL/L (ref -2.5–2.5)
BASOPHILS # BLD AUTO: 0.1 10^3/UL (ref 0–0.1)
BASOPHILS NFR BLD AUTO: 1 % (ref 0–10)
BDY SITE: (no result)
BILIRUB SERPL-MCNC: 0.3 MG/DL (ref 0.1–1)
BILIRUB UR QL STRIP: (no result)
BODY TEMPERATURE: 36.4
BUN/CREAT SERPL: 11
CALCIUM SERPL-MCNC: 8.5 MG/DL (ref 8.5–10.1)
CHLORIDE SERPL-SCNC: 90 MMOL/L (ref 98–107)
CO2 BLDA CALC-SCNC: 27.2 MMOL/L (ref 21–31)
CO2 SERPL-SCNC: 24 MMOL/L (ref 21–32)
CREAT SERPL-MCNC: 7.18 MG/DL (ref 0.6–1.3)
EOSINOPHIL # BLD AUTO: 0.3 10^3/UL (ref 0–0.3)
EOSINOPHIL NFR BLD AUTO: 2 % (ref 0–10)
FIBRINOGEN PPP-MCNC: (no result) MG/DL
GFR SERPLBLD BASED ON 1.73 SQ M-ARVRAT: 8 ML/MIN
GLUCOSE SERPL-MCNC: 105 MG/DL (ref 70–105)
GLUCOSE UR STRIP-MCNC: (no result) MG/DL
HCT VFR BLD CALC: 37 % (ref 40–54)
HGB BLD-MCNC: 11.8 G/DL (ref 13.3–17.7)
HYALINE CASTS #/AREA URNS LPF: (no result) /LPF
INHALED O2 FLOW RATE: (no result) L/MIN
INR PPP: 1.2 (ref 0.8–1.4)
KETONES UR QL STRIP: (no result)
LEUKOCYTE ESTERASE UR QL STRIP: NEGATIVE
LIPASE SERPL-CCNC: 5 U/L (ref 8–78)
LYMPHOCYTES # BLD AUTO: 2.4 X 10^3 (ref 1–4)
LYMPHOCYTES NFR BLD AUTO: 18 % (ref 12–44)
MAGNESIUM SERPL-MCNC: 3.3 MG/DL (ref 1.6–2.4)
MANUAL DIFFERENTIAL PERFORMED BLD QL: NO
MCH RBC QN AUTO: 31 PG (ref 25–34)
MCHC RBC AUTO-ENTMCNC: 32 G/DL (ref 32–36)
MCV RBC AUTO: 97 FL (ref 80–99)
MONOCYTES # BLD AUTO: 1 X 10^3 (ref 0–1)
MONOCYTES NFR BLD AUTO: 7 % (ref 0–12)
NEUTROPHILS # BLD AUTO: 9.5 X 10^3 (ref 1.8–7.8)
NEUTROPHILS NFR BLD AUTO: 71 % (ref 42–75)
NITRITE UR QL STRIP: POSITIVE
PCO2 BLDA: 58 MMHG (ref 35–45)
PH BLDA: 7.25 [PH] (ref 7.37–7.43)
PH UR STRIP: 5 [PH] (ref 5–9)
PLATELET # BLD: 105 10^3/UL (ref 130–400)
PMV BLD AUTO: 13.5 FL (ref 7.4–10.4)
PO2 BLDA: 28 MMHG (ref 79–93)
POTASSIUM SERPL-SCNC: 6.1 MMOL/L (ref 3.6–5)
PROT SERPL-MCNC: 6.3 GM/DL (ref 6.4–8.2)
PROT UR QL STRIP: (no result)
PROTHROMBIN TIME: 15.2 SEC (ref 12.2–14.7)
RBC #/AREA URNS HPF: (no result) /HPF
SAO2 % BLDA FROM PO2: 41 % (ref 94–100)
SODIUM SERPL-SCNC: 129 MMOL/L (ref 135–145)
SP GR UR STRIP: >=1.03 (ref 1.02–1.02)
SQUAMOUS #/AREA URNS HPF: (no result) /HPF
VENTILATION MODE VENT: NO
WBC # BLD AUTO: 13.3 10^3/UL (ref 4.3–11)
WBC #/AREA URNS HPF: (no result) /HPF

## 2021-01-18 PROCEDURE — 70450 CT HEAD/BRAIN W/O DYE: CPT

## 2021-01-18 PROCEDURE — 83605 ASSAY OF LACTIC ACID: CPT

## 2021-01-18 PROCEDURE — 85025 COMPLETE CBC W/AUTO DIFF WBC: CPT

## 2021-01-18 PROCEDURE — 99291 CRITICAL CARE FIRST HOUR: CPT

## 2021-01-18 PROCEDURE — 87040 BLOOD CULTURE FOR BACTERIA: CPT

## 2021-01-18 PROCEDURE — 93005 ELECTROCARDIOGRAM TRACING: CPT

## 2021-01-18 PROCEDURE — 83735 ASSAY OF MAGNESIUM: CPT

## 2021-01-18 PROCEDURE — 51702 INSERT TEMP BLADDER CATH: CPT

## 2021-01-18 PROCEDURE — 83880 ASSAY OF NATRIURETIC PEPTIDE: CPT

## 2021-01-18 PROCEDURE — 84484 ASSAY OF TROPONIN QUANT: CPT

## 2021-01-18 PROCEDURE — 82805 BLOOD GASES W/O2 SATURATION: CPT

## 2021-01-18 PROCEDURE — 80053 COMPREHEN METABOLIC PANEL: CPT

## 2021-01-18 PROCEDURE — 83690 ASSAY OF LIPASE: CPT

## 2021-01-18 PROCEDURE — 71045 X-RAY EXAM CHEST 1 VIEW: CPT

## 2021-01-18 PROCEDURE — 81000 URINALYSIS NONAUTO W/SCOPE: CPT

## 2021-01-18 PROCEDURE — 87088 URINE BACTERIA CULTURE: CPT

## 2021-01-18 PROCEDURE — 85730 THROMBOPLASTIN TIME PARTIAL: CPT

## 2021-01-18 PROCEDURE — 82962 GLUCOSE BLOOD TEST: CPT

## 2021-01-18 PROCEDURE — 36415 COLL VENOUS BLD VENIPUNCTURE: CPT

## 2021-01-18 PROCEDURE — 85610 PROTHROMBIN TIME: CPT

## 2021-01-18 NOTE — DIAGNOSTIC IMAGING REPORT
INDICATION: Cough and shortness of breath.



TIME OF EXAM: 11:36 AM



Correlation is made with prior chest from 07/03/2020.



FINDINGS: Heart size is normal. Lungs appear to be clear. No

infiltrates are detected. There is some minimal density in the

left base near the costophrenic angle which may represent some

minimal atelectasis or scarring. Pulmonary vascularity is normal.

There is no effusion or pneumothorax.



IMPRESSION: No acute cardiopulmonary process is detected.



Dictated by: 



  Dictated on workstation # [id number]

## 2021-01-18 NOTE — ED GENERAL
General


Stated Complaint:  GEN WEAKNESS


Source of Information:  Patient, EMS





History of Present Illness


Date Seen by Provider:  2021


Time Seen by Provider:  11:08


Initial Comments


70-year-old male presenting with EMS from home with complaints of generalized 

weakness and shaking/tremor that has been increasing in the last 2 or 3 days. He

reports nausea but denies vomiting or diarrhea. He has chronic shortness of 

breath that is slightly worse and does have a history of COPD. He reports using 

oxygen at night only. He tried to let his dog out today and was too weak and 

shaky to be able to do it. He has increased weakness and dizziness with 

standing. He denies taking any of his medications today or having anything to 

eat or drink yet this morning. He denies pain with urination or change in bowel 

or bladder habits. He has generalized shaking/tremor that has been getting worse

for him in the last few days. He denies chest pain, abdominal pain, headache.





Allergies and Home Medications


Allergies


Coded Allergies:  


     No Known Drug Allergies (Unverified , 20)





Home Medications


Apixaban 5 Mg Tablet, 5 MG PO DAILY, (Reported)


Docusate Sodium 100 Mg Capsule, 100 MG PO BID


   Prescribed by: MATEO ALEMAN on 20 1112


Fluticasone/Salmeterol 12 Gm Hfa.aer.ad, 1 PUFF IH RTBID


   Prescribed by: JASEN DAY on 20 1027


Glipizide 5 Mg Tablet, 5 MG PO DAILY, (Reported)


Hydrochlorothiazide 25 Mg Tablet, 25 MG PO DAILY, (Reported)


Hydrocodone/Acetaminophen 1 Each Tablet, 1 TAB PO Q4-6HR


   Prescribed by: MATEO ALEMAN on 20 1112


Ipratropium/Albuterol Sulfate 3 Ml Ampul.neb, 3 ML IH BID PRN for SHORTNESS OF 

BREATH, (Reported)


Linagliptin 5 Mg Tablet, 5 MG PO DAILY, (Reported)


   LAST FILLED 2020 #30/30 DAY SUPPLY 


Lisinopril 10 Mg Tablet, 10 MG PO DAILY, (Reported)


Metoprolol Succinate 50 Mg Tab.er.24h, 50 MG PO DAILY, (Reported)


Multivitamin 1 Each Tablet, 1 TAB PO DAILY, (Reported)


Nitroglycerin 0.4 Mg Tab.subl, 0.4 MG SL UD PRN for CHEST PAIN, (Reported)


Nortriptyline HCl 50 Mg Capsule, 50 MG PO HS, (Reported)


Pregabalin 150 Mg Capsule, 150 MG PO TID, (Reported)


Sitagliptin Phosphate 100 Mg Tablet, 100 MG PO DAILY, (Reported)


Umeclidinium Bromide 62.5 Mcg Blst.w.dev, 1 INH IH DAILY@0800


   Prescribed by: JASEN DAY on 20 1027





Patient Home Medication List


Home Medication List Reviewed:  Yes





Review of Systems


Review of Systems


Constitutional:  No chills, No diaphoresis; dizziness (with standing); No fever;

malaise, weakness (generalized)


EENTM:  no symptoms reported


Respiratory:  cough (chronic but a little worse in last few days), dyspnea on 

exertion; No hemoptysis; short of breath (chronic but a little worse in last few

days); No stridor


Cardiovascular:  No chest pain


Gastrointestinal:  No abdominal pain, No diarrhea; nausea; No vomiting


Genitourinary:  decreased output; No dysuria


Musculoskeletal:  no symptoms reported


Skin:  no symptoms reported


Psychiatric/Neurological:  Denies Headache; Tremors (worsening in last 2-3 

days), Weakness (general)


Hematologic/Lymphatic:  Easy Bleeding, Easy Bruising


Immunological/Allergic:  no symptoms reported





Past Medical-Social-Family Hx


Past Med/Social Hx:  Reviewed Nursing Past Med/Soc Hx


Patient Social History


Alcohol Beverage of Choice:  Beer


Type Used:  Cigarettes


2nd Hand Smoke Exposure:  Yes


Recent Hopitalizations:  Yes (2020-HYPOTENSION ([facility]





Immunizations Up To Date


Tetanus Booster (TDap):  Unknown


PED Vaccines UTD:  No


Date of Pneumonia Vaccine:  Aug 22, 2016


Date of Influenza Vaccine:  Dec 1, 2019





Seasonal Allergies


Seasonal Allergies:  No





Past Medical History


Surgeries:  Yes (Liver biopsy and stent placement)


Appendectomy, Coronary Stent, Gallbladder


Respiratory:  Yes


COPD


Currently Using CPAP:  No


Currently Using BIPAP:  No


Cardiac:  Yes (cardiac cath w/ balloon)


Atrial Fibrillation, Coronary Artery Disease, High Cholesterol, Hypertension


Neurological:  Yes ()


Stroke


Reproductive Disorders:  No


Sexually Transmitted Disease:  No


HIV/AIDS:  No


Genitourinary:  No


Prostate Problems


Gastrointestinal:  Yes


Gall Bladder Disease


Musculoskeletal:  Yes


Arthritis


Endocrine:  Yes


Diabetes, Non-Insulin dep


HEENT:  Yes (GLASSES)


Cataract


Loss of Vision:  Denies


Cancer:  Yes


Prostate


Did You Recieve Any Treatments:  Yes


What Type of Treatment Did You:  Surgical Intervention


Psychosocial:  No


Integumentary:  Yes (R foot wound )


Blood Disorders:  No


Adverse Reaction/Blood Tranf:  No (N/A)





Family Medical History





Cardiovascular disease


  G8 SISTER


Cataracts


  19 MOTHER


Colon cancer


  19 MOTHER


Completed stroke


  19 MOTHER


Diabetes mellitus


  G8 SISTER


Drug abuse


  G8 BROTHER


Hypercholesterolemia


  G8 BROTHER


Hypertension


  G8 BROTHER


  G8 SISTER


Myocardial infarction


  19 FATHER


  19 MOTHER


Respiratory disorder


  19 FATHER


  19 MOTHER


  G8 BROTHER


  G8 BROTHER


  G8 SISTER


  G8 SISTER


No Pertinent Family Hx, Diabetes





Physical Exam


Vital Signs





Vital Signs - First Documented








 21





 11:11 11:30


 


Temp 36.0 


 


Pulse 82 


 


Resp 23 


 


B/P (MAP) 79/43 (55) 


 


Pulse Ox 92 


 


O2 Delivery Room Air 


 


O2 Flow Rate  2.00


 


FiO2  95





Capillary Refill :


Height, Weight, BMI


Height: 5'8.50"


Weight: 206lbs. 0.8oz. [id number]; 26.70 BMI


Method:Stated


General Appearance:  No Apparent Distress, WD/WN


HEENT:  PERRL/EOMI; No Moist Mucous Membranes (dry mucous membranes)


Neck:  Full Range of Motion, Non Tender, Supple


Respiratory:  Chest Non Tender, No Accessory Muscle Use, No Respiratory 

Distress, Decreased Breath Sounds; No Rales, No Rhonci, No Stridor


Cardiovascular:  Regular Rate, Rhythm, Normal Peripheral Pulses


Gastrointestinal:  Normal Bowel Sounds, No Pulsatile Mass, Non Tender, Soft


Rectal:  Deferred


Extremity:  Normal Capillary Refill, No Pedal Edema


Neurologic/Psychiatric:  Alert, Oriented x3


Skin:  Normal Color, Warm/Dry





Focused Exam


Sepsis Stage:  Ruled Out


Reason for ruling out sepsis:  no fever or specific source of infection


Possible Source:  Unknown


Lactate Level


21 11:25: Lactic Acid Level 2.37*H


21 13:48: Lactic Acid Level 0.99





Time of Focused Exam:  12:38


Respiratory:  Chest Non Tender, No Accessory Muscle Use, No Respiratory 

Distress, Decreased Breath Sounds


Cardiovascular:  Regular Rate, Rhythm, Normal Peripheral Pulses


Peripheral Pulses:  2+ Radial Pulses (R), 2+ Radial Pulses (L)


Skin:  normal color, warm/dry


Lactic Acid Level





Laboratory Tests








Test


 21


13:48


 


Lactic Acid Level


 0.99 MMOL/L


(0.50-2.00)








Within 3hrs of presentation:  Admin fluids, Blood cultures prior to ABX's, Focus

exam, Lactate level





Procedures/Interventions


Date of ETT Placement:  2020


Time of ETT Placement:  1511





Progress/Results/Core Measures


Suspected Sepsis


Recent Fever Within 48 Hours:  No


Infection Criteria Present:  None


New/Unexplained  Altered Menta:  No


Within 3hrs of presentation:  Admin fluids, Blood cultures prior to ABX's, Focus

exam, Lactate level


SIRS


Temperature: 


Pulse:  


Respiratory Rate: 


 


Laboratory Tests


21 11:25: White Blood Count 13.3H


Blood Pressure  / 


Mean: 


 





21 11:25: Lactic Acid Level 2.37*H


21 13:48: Lactic Acid Level 0.99


Laboratory Tests


21 11:25: 


Creatinine 7.18H, INR Comment 1.2, Platelet Count 105L, Total Bilirubin 0.3








Results/Orders


Lab Results





Laboratory Tests








Test


 21


11:25 21


12:50 21


13:48 21


13:51 Range/Units


 


 


White Blood Count


 13.3 H


 


 


 


 4.3-11.0


10^3/uL


 


Red Blood Count


 3.81 L


 


 


 


 4.35-5.85


10^6/uL


 


Hemoglobin 11.8 L    13.3-17.7  G/DL


 


Hematocrit 37 L    40-54  %


 


Mean Corpuscular Volume 97     80-99  FL


 


Mean Corpuscular Hemoglobin 31     25-34  PG


 


Mean Corpuscular Hemoglobin


Concent 32 


 


 


 


 32-36  G/DL





 


Red Cell Distribution Width 16.6 H    10.0-14.5  %


 


Platelet Count


 105 L


 


 


 


 130-400


10^3/uL


 


Mean Platelet Volume 13.5 H    7.4-10.4  FL


 


Immature Granulocyte % (Auto) 1      %


 


Neutrophils (%) (Auto) 71     42-75  %


 


Lymphocytes (%) (Auto) 18     12-44  %


 


Monocytes (%) (Auto) 7     0-12  %


 


Eosinophils (%) (Auto) 2     0-10  %


 


Basophils (%) (Auto) 1     0-10  %


 


Neutrophils # (Auto) 9.5 H    1.8-7.8  X 10^3


 


Lymphocytes # (Auto) 2.4     1.0-4.0  X 10^3


 


Monocytes # (Auto) 1.0     0.0-1.0  X 10^3


 


Eosinophils # (Auto)


 0.3 


 


 


 


 0.0-0.3


10^3/uL


 


Basophils # (Auto)


 0.1 


 


 


 


 0.0-0.1


10^3/uL


 


Immature Granulocyte # (Auto)


 0.1 


 


 


 


 0.0-0.1


10^3/uL


 


Prothrombin Time 15.2 H    12.2-14.7  SEC


 


INR Comment 1.2     0.8-1.4  


 


Activated Partial


Thromboplast Time 32 


 


 


 


 24-35  SEC





 


Blood Gas Puncture Site RT RADIAL      


 


Blood Gas Patient Temperature 36.4      


 


Arterial Blood pH 7.25 *L    7.37-7.43  


 


Arterial Blood Partial


Pressure CO2 58 H


 


 


 


 35-45  MMHG





 


Arterial Blood Partial


Pressure O2 28 *L


 


 


 


 79-93  MMHG





 


Arterial Blood HCO3 25     23-27  MMOL/L


 


Arterial Blood Total CO2


 27.2 


 


 


 


 21.0-31.0


MMOL/L


 


Arterial Blood Oxygen


Saturation 41 L


 


 


 


   %





 


Arterial Blood Base Excess


 -2.7 L


 


 


 


 -2.5-2.5


MMOL/L


 


Rojelio Test OK      


 


Blood Gas Ventilator Setting NO      


 


Blood Gas Inspired Oxygen 2 LITERS      


 


Sodium Level 129 L    135-145  MMOL/L


 


Potassium Level 6.1 H    3.6-5.0  MMOL/L


 


Chloride Level 90 L      MMOL/L


 


Carbon Dioxide Level 24     21-32  MMOL/L


 


Anion Gap 15 H    5-14  MMOL/L


 


Blood Urea Nitrogen 78 H    7-18  MG/DL


 


Creatinine


 7.18 H


 


 


 


 0.60-1.30


MG/DL


 


Estimat Glomerular Filtration


Rate 8 


 


 


 


  





 


BUN/Creatinine Ratio 11      


 


Glucose Level 105       MG/DL


 


Lactic Acid Level


 2.37 *H


 


 0.99 


 


 0.50-2.00


MMOL/L


 


Calcium Level 8.5     8.5-10.1  MG/DL


 


Corrected Calcium 8.8     8.5-10.1  MG/DL


 


Magnesium Level 3.3 H    1.6-2.4  MG/DL


 


Total Bilirubin 0.3     0.1-1.0  MG/DL


 


Aspartate Amino Transf


(AST/SGOT) 12 


 


 


 


 5-34  U/L





 


Alanine Aminotransferase


(ALT/SGPT) 7 


 


 


 


 0-55  U/L





 


Alkaline Phosphatase 94       U/L


 


Troponin I < 0.30     <0.30  NG/ML


 


Pro-B-Type Natriuretic Peptide 1674.0 H    <75.0  PG/ML


 


Total Protein 6.3 L    6.4-8.2  GM/DL


 


Albumin 3.6     3.2-4.5  GM/DL


 


Lipase 5 L    8-78  U/L


 


Urine Color  YELLOW     


 


Urine Clarity  CLOUDY     


 


Urine pH  5.0    5-9  


 


Urine Specific Gravity  >=1.030    1.016-1.022  


 


Urine Protein  TRACE H   NEGATIVE  


 


Urine Glucose (UA)  1+ H   NEGATIVE  


 


Urine Ketones  1+ H   NEGATIVE  


 


Urine Nitrite  POSITIVE H   NEGATIVE  


 


Urine Bilirubin  2+ H   NEGATIVE  


 


Urine Urobilinogen  1.0    < = 1.0  MG/DL


 


Urine Leukocyte Esterase  NEGATIVE    NEGATIVE  


 


Urine RBC (Auto)  NEGATIVE    NEGATIVE  


 


Urine RBC  NONE     /HPF


 


Urine WBC  NONE     /HPF


 


Urine Squamous Epithelial


Cells 


 RARE 


 


 


  /HPF





 


Urine Crystals  PRESENT H    /LPF


 


Urine Amorphous Sediment


 


 FEW REINA


URATES H 


 


  /LPF





 


Urine Bacteria  TRACE     /HPF


 


Urine Casts  PRESENT     /LPF


 


Urine Hyaline Casts  2-5 H    /LPF


 


Urine Mucus  SMALL H    /LPF


 


Urine Culture Indicated  YES     


 


Glucometer    164 H   MG/DL








My Orders





Orders - ELVIS CHAVARRIA MD


Ns Iv 1000 Ml (Sodium Chloride 0.9%) (21 11:15)


Comprehensive Metabolic Panel (21 11:20)


Lipase (21 11:20)


Ua Culture If Indicated (21 11:20)


Ed Iv/Invasive Line Start (21 11:20)


Cbc With Automated Diff (21 11:20)


Ekg Tracing (21 11:20)


Continuous Ekg Monitoring (21 11:20)


O2 (21 11:20)


Troponin I Fs (21 11:20)


Magnesium (21 11:20)


Probnp Fs (21 11:20)


Protime With Inr (21 11:20)


Partial Thromboplastin Time (21 11:20)


Arterial Blood Gas (21 11:20)


Blood Culture (21 11:22)


Lactic Acid Analyzer (21 11:22)


Chest 1 View Ap/Pa Only (21 11:22)


Ct Head Wo (21 11:22)


Ns Iv 1000 Ml (Sodium Chloride 0.9%) (21 12:00)


Ns Iv 1000 Ml (Sodium Chloride 0.9%) (21 11:23)


Santacruz Cath (21 12:40)


Calcium  Gluconate 10% Inj (Calcium  Glu (21 12:40)


Insulin (Regular) Human (Novolin R (Per (21 12:40)


D50w (Emergency) Syringe (Dextrose 50% 5 (21 12:40)


Ns Iv 1000 Ml (Sodium Chloride 0.9%) (21 13:15)


Norepinephrine 4 Mg/250 Ml (Norepinephri (21 13:15)


Ekg Tracing (21 13:41)


Ns Iv 1000 Ml (Sodium Chloride 0.9%) (21 13:44)


Urine Culture (21 12:50)





Vital Signs/I&O











 21





 11:11 11:30 13:40 14:28


 


Temp 36.0   36.4


 


Pulse 82   83


 


Resp 23   24


 


B/P (MAP) 79/43 (55)  63/83 156/56 (76)


 


Pulse Ox 92   95


 


O2 Delivery Room Air Nasal Cannula  Nasal Cannula


 


O2 Flow Rate  2.00  2.00


 


FiO2  95  





Capillary Refill :


Progress Note #1:  


Progress Note


check basic labs, ECG with cardiac enzymes, CXR with blood gas, CT head to 

evaluate his general weakness and tremors. He is hypotensive on arrival so give 

IVF to try and help with pressure and check blood cultures and lactic acid to 

evaluate for sepsis. 





Initial Differential Diagnosis includes Sepsis, COPD exacerbation, Pneumonia, 

Electrolyte imbalance, Myocardial infarction, Stroke, Dehydration, UTI


Progress Note #2:  


   Time:  11:49


Progress Note


CXR does not show any acute process. CBC has elevated WBC count to 13.3 with 

anemia of Hgb 11.8 and low platelets of 105. other tests are still pending.


Progress Note #3:  


   Time:  12:40


Progress Note


His pressure was still low after initial Liter bolus so 2nd Liter of NS ordered 

for bolus at 1224.





Chemistry showed recurrent findings of renal failure with his Cr 7.18 and BUN 

78. He had potassium elevated to 6.1. Lactic acid was slightly elevated 2.37, 

likely from dehydration more than infection as he did not have specific source 

for infection or a fever. Will need to place a Santacruz catheter to monitor urine 

output and production as well as continue with fluids for pressure support. 


Treat hyperkalemia with 1 gram Calcium Gluconate, 1 amp D50 and Regular insulin 

10 units IV. 


Telemetry monitoring placed for his history of CAD and having Hypotension, shows

sinus rhythm with RBBB. 


With no nephrology available at [place] asked pt about where he would like to 

transfer to and he was agreeable to [facility] where he went 2020 when he last had an episode of weakness, hypotension and acute renal 

failure. 





1313 ONEIDA Zaragoza, at [facility] notified of pt and need for transfer to 

[facility]. 





1323 ONEIDA Mckee, [facility] called back and connected me to the 

resident, Dr. Yogesh Rollins. He accepted pt for admit to hospitalist Dr. Munoz.




At this point I had ordered Norepinephrine (Levophed) for pressure support if 

needed but after the 2nd liter of NS he had pressure up to 113/52. Will try 

going with 3rd Liter of NS bolus and continue to monitor and pt will be going to

telemetry bed.





1341 After speaking with hospitalist service the pt had an episode of 

hypotension and decreased responsiveness. The Norepinephrine was started along 

with IVF. He had NS to run at 250 ml/hr after the 3rd Liter bolus. With 

Norephinephrine he had pressure come up to 130-150 systolic range. 


Called to [facility] and ONEIDA Iraheta, connected me with NARENDRA Ngo for intensivist service and she accepted on behalf of Dr. Squires. 





1348 Repeat Lactic acid came back normal at 0.99. Blood gas was reported out but

it still looked like a venous sample. 


UA came back and showed Nitrites and high specific gravity to go with 

dehydration and being concentrated. No antibiotics were started prior to 

transfer.





ECG


Initial ECG Impression Date:  2021


Initial ECG Impression Time:  11:25


Initial ECG Rate:  81


Initial ECG Rhythm:  Normal Sinus


Initial ECG Comparisson:  Unchanged


Comment


sinus rhythm with a heart rate of 81 bpm. Right bundle branch block. AK interval

176 ms. QT interval 461 ms with a QTc interval 536 ms. There is no acute ST 

elevation. Appears similar to prior tracings





Diagnostic Imaging





   Diagonstic Imaging:  CT


   Plain Films/CT/US/NM/MRI:  head


Comments


NAME:   DAVID SMITH BO


MED REC#:   [id number]


ACCOUNT#:   [id number]


PT STATUS:   REG ER


:   1950


PHYSICIAN:   ELVIS CHAVARRIA MD


ADMIT DATE:   21/ER FS


                                   ***Draft***


Date of Exam:21





CT HEAD WO








PROCEDURE: CT head without contrast.





TECHNIQUE: Multiple contiguous axial images were obtained through


the brain without the use of intravenous contrast. Auto Exposure


Controls were utilized during the CT exam to meet ALARA standards


for radiation dose reduction. 





INDICATION: Weakness.





COMPARISON: Correlation is made with prior CT head study from


2020.





FINDINGS: Ventricles and sulci are within normal limits. No


sulcal effacement or midline shift is identified. No acute


intra-axial or extra-axial hemorrhage is detected. Cisterns are


patent. Visualized paranasal sinuses are clear.





IMPRESSION: No acute intracranial process is detected.





  Dictated on workstation # [id number]








Dict:   21 1147


Trans:   21 1150


[id number]





Interpreted by:     CLIFF ALFARO MD


Electronically signed by:








   Diagonstic Imaging:  Xray


   Plain Films/CT/US/NM/MRI:  chest


Comments


NAME:   DAVID SMITH


MED REC#:   [id number]


ACCOUNT#:   [id number]


PT STATUS:   REG ER


:   1950


PHYSICIAN:   ELVIS CHAVARRIA MD


ADMIT DATE:   21/ER FS


                                   ***Draft***


Date of Exam:21





CHEST 1 VIEW AP/PA ONLY








INDICATION: Cough and shortness of breath.





TIME OF EXAM: 11:36 AM





Correlation is made with prior chest from 2020.





FINDINGS: Heart size is normal. Lungs appear to be clear. No


infiltrates are detected. There is some minimal density in the


left base near the costophrenic angle which may represent some


minimal atelectasis or scarring. Pulmonary vascularity is normal.


There is no effusion or pneumothorax.





IMPRESSION: No acute cardiopulmonary process is detected.





  Dictated on workstation # [id number]








Dict:   21 1144


Trans:   21 1146


[id number] [phone]





Interpreted by:     CLIFF ALFARO MD


Electronically signed by:





Critical Care Note


Critical Care


Total Time (minutes)


90 minutes


Progress


90 minutes of critical care time was spent with the patient. The time excludes 

separately billable procedures. This is time that was spent in direct care of 

the patient, obtaining history from the patient and review of medical chart, 

ordering tests and reviewing results, ordering interventions and reviewing 

response, discussion with consultants, documentation of the chart. He was at 

risk of imminent compromise of his cardiac, circulatory, renal, metabolic, 

neurologic systems.





Departure


Impression





   Primary Impression:  


   Acute renal failure


   Qualified Codes:  N17.9 - Acute kidney failure, unspecified


   Additional Impressions:  


   Dehydration


   Hypotension


   Qualified Codes:  I95.9 - Hypotension, unspecified


   Hyperkalemia


   Acute cystitis without hematuria


Disposition:   XFER SHT-TRM HOSP


Condition:  Critical





Transfer


Transfer Reason:  Exceeds level of care (No nephrology for renal failure)


Time Spoke to Accepting Phy:  13:13


Transfer Progress Notes


1313 ONEIDA Zaragoza, at [facility] notified of pt and need for transfer to 

[facility]. 





1323 ONEIDA Mckee, [facility] called back and connected me to the 

resident, Dr. Yogesh Rollins. He accepted pt for admit to hospitalist Dr. Munoz.




At this point I had ordered Norepinephrine (Levophed) for pressure support if 

needed but after the 2nd liter of NS he had pressure up to 113/52. Will try 

going with 3rd Liter of NS bolus and continue to monitor and pt will be going to

telemetry bed.





1341 After speaking with hospitalist service the pt had an episode of 

hypotension and decreased responsiveness. The Norepinephrine was started along 

with IVF. He had NS to run at 250 ml/hr after the 3rd Liter bolus. With 

Norephinephrine he had pressure come up to 130-150 systolic range. 


Called to [facility] and ONEIDA Iraheta, connected me with NARENDRA Ngo for intensivist service and she accepted on behalf of Dr. Squires.


Transfer Facility:  


[facility]


Method of Transfer:  EMS





Departure-Patient Inst.


Referrals:  


MK LÓPEZ MD (PCP/Family)


Primary Care Physician











ELVIS CHAVARRIA MD               2021 11:29

## 2021-01-18 NOTE — DIAGNOSTIC IMAGING REPORT
PROCEDURE: CT head without contrast.



TECHNIQUE: Multiple contiguous axial images were obtained through

the brain without the use of intravenous contrast. Auto Exposure

Controls were utilized during the CT exam to meet ALARA standards

for radiation dose reduction. 



INDICATION: Weakness.



COMPARISON: Correlation is made with prior CT head study from

07/03/2020.



FINDINGS: Ventricles and sulci are within normal limits. No

sulcal effacement or midline shift is identified. No acute

intra-axial or extra-axial hemorrhage is detected. Cisterns are

patent. Visualized paranasal sinuses are clear.



IMPRESSION: No acute intracranial process is detected.



Dictated by: 



  Dictated on workstation # [id number]

## 2021-06-18 ENCOUNTER — HOSPITAL ENCOUNTER (OUTPATIENT)
Dept: HOSPITAL 75 - IHC | Age: 71
End: 2021-06-18
Attending: INTERNAL MEDICINE
Payer: MEDICAID

## 2021-06-18 DIAGNOSIS — E11.621: Primary | ICD-10-CM

## 2021-06-18 PROCEDURE — 87070 CULTURE OTHR SPECIMN AEROBIC: CPT

## 2021-06-18 PROCEDURE — 87077 CULTURE AEROBIC IDENTIFY: CPT

## 2021-06-18 PROCEDURE — 87205 SMEAR GRAM STAIN: CPT

## 2021-06-18 PROCEDURE — 87186 SC STD MICRODIL/AGAR DIL: CPT

## 2021-06-25 ENCOUNTER — HOSPITAL ENCOUNTER (OUTPATIENT)
Dept: HOSPITAL 75 - WOUNDCARE | Age: 71
End: 2021-06-25
Attending: SURGERY
Payer: MEDICAID

## 2021-06-25 ENCOUNTER — HOSPITAL ENCOUNTER (OUTPATIENT)
Dept: HOSPITAL 75 - LAB | Age: 71
End: 2021-06-25
Attending: SURGERY
Payer: MEDICAID

## 2021-06-25 DIAGNOSIS — L97.222: ICD-10-CM

## 2021-06-25 DIAGNOSIS — L97.422: ICD-10-CM

## 2021-06-25 DIAGNOSIS — E11.65: ICD-10-CM

## 2021-06-25 DIAGNOSIS — T65.222A: ICD-10-CM

## 2021-06-25 DIAGNOSIS — E11.42: ICD-10-CM

## 2021-06-25 DIAGNOSIS — E11.621: Primary | ICD-10-CM

## 2021-06-25 DIAGNOSIS — I70.244: ICD-10-CM

## 2021-06-25 DIAGNOSIS — F17.218: ICD-10-CM

## 2021-06-25 DIAGNOSIS — J44.9: ICD-10-CM

## 2021-06-25 LAB
ALBUMIN SERPL-MCNC: 4.1 GM/DL (ref 3.2–4.5)
ALP SERPL-CCNC: 98 U/L (ref 40–136)
ALT SERPL-CCNC: 11 U/L (ref 0–55)
BASOPHILS # BLD AUTO: 0.1 10^3/UL (ref 0–0.1)
BASOPHILS NFR BLD AUTO: 1 % (ref 0–10)
BILIRUB SERPL-MCNC: 0.5 MG/DL (ref 0.1–1)
BUN/CREAT SERPL: 17
CALCIUM SERPL-MCNC: 9.5 MG/DL (ref 8.5–10.1)
CHLORIDE SERPL-SCNC: 101 MMOL/L (ref 98–107)
CO2 SERPL-SCNC: 27 MMOL/L (ref 21–32)
CREAT SERPL-MCNC: 1.28 MG/DL (ref 0.6–1.3)
EOSINOPHIL # BLD AUTO: 0.2 10^3/UL (ref 0–0.3)
EOSINOPHIL NFR BLD AUTO: 1 % (ref 0–10)
GFR SERPLBLD BASED ON 1.73 SQ M-ARVRAT: 56 ML/MIN
GLUCOSE SERPL-MCNC: 209 MG/DL (ref 70–105)
HCT VFR BLD CALC: 49 % (ref 40–54)
HGB BLD-MCNC: 16 G/DL (ref 13.3–17.7)
LYMPHOCYTES # BLD AUTO: 1.6 10^3/UL (ref 1–4)
LYMPHOCYTES NFR BLD AUTO: 13 % (ref 12–44)
MANUAL DIFFERENTIAL PERFORMED BLD QL: NO
MCH RBC QN AUTO: 33 PG (ref 25–34)
MCHC RBC AUTO-ENTMCNC: 33 G/DL (ref 32–36)
MCV RBC AUTO: 100 FL (ref 80–99)
MONOCYTES # BLD AUTO: 0.8 10^3/UL (ref 0–1)
MONOCYTES NFR BLD AUTO: 6 % (ref 0–12)
NEUTROPHILS # BLD AUTO: 9.4 10^3/UL (ref 1.8–7.8)
NEUTROPHILS NFR BLD AUTO: 78 % (ref 42–75)
PLATELET # BLD: 108 10^3/UL (ref 130–400)
PMV BLD AUTO: 12.6 FL (ref 9–12.2)
POTASSIUM SERPL-SCNC: 5.1 MMOL/L (ref 3.6–5)
PROT SERPL-MCNC: 7.4 GM/DL (ref 6.4–8.2)
SODIUM SERPL-SCNC: 136 MMOL/L (ref 135–145)
WBC # BLD AUTO: 12.1 10^3/UL (ref 4.3–11)

## 2021-06-25 PROCEDURE — 36415 COLL VENOUS BLD VENIPUNCTURE: CPT

## 2021-06-25 PROCEDURE — 11042 DBRDMT SUBQ TIS 1ST 20SQCM/<: CPT

## 2021-06-25 PROCEDURE — 83036 HEMOGLOBIN GLYCOSYLATED A1C: CPT

## 2021-06-25 PROCEDURE — 85025 COMPLETE CBC W/AUTO DIFF WBC: CPT

## 2021-06-25 PROCEDURE — 80053 COMPREHEN METABOLIC PANEL: CPT

## 2021-06-29 ENCOUNTER — HOSPITAL ENCOUNTER (OUTPATIENT)
Dept: HOSPITAL 75 - WOUNDCARE | Age: 71
End: 2021-06-29
Attending: SURGERY
Payer: MEDICAID

## 2021-06-29 DIAGNOSIS — T65.222A: ICD-10-CM

## 2021-06-29 DIAGNOSIS — E11.621: Primary | ICD-10-CM

## 2021-06-29 DIAGNOSIS — I70.244: ICD-10-CM

## 2021-06-29 DIAGNOSIS — J44.9: ICD-10-CM

## 2021-06-29 DIAGNOSIS — L97.422: ICD-10-CM

## 2021-06-29 DIAGNOSIS — I96: ICD-10-CM

## 2021-06-29 DIAGNOSIS — F17.218: ICD-10-CM

## 2021-06-29 DIAGNOSIS — E11.65: ICD-10-CM

## 2021-06-29 DIAGNOSIS — E11.42: ICD-10-CM

## 2021-06-29 PROCEDURE — 11042 DBRDMT SUBQ TIS 1ST 20SQCM/<: CPT

## 2021-07-13 ENCOUNTER — HOSPITAL ENCOUNTER (OUTPATIENT)
Dept: HOSPITAL 75 - WOUNDCARE | Age: 71
End: 2021-07-13
Attending: SURGERY
Payer: MEDICAID

## 2021-07-13 DIAGNOSIS — E11.65: ICD-10-CM

## 2021-07-13 DIAGNOSIS — J44.9: ICD-10-CM

## 2021-07-13 DIAGNOSIS — E11.52: ICD-10-CM

## 2021-07-13 DIAGNOSIS — T65.222A: ICD-10-CM

## 2021-07-13 DIAGNOSIS — E11.42: ICD-10-CM

## 2021-07-13 DIAGNOSIS — I70.244: ICD-10-CM

## 2021-07-13 DIAGNOSIS — E11.621: Primary | ICD-10-CM

## 2021-07-13 DIAGNOSIS — L97.422: ICD-10-CM

## 2021-07-13 DIAGNOSIS — F17.218: ICD-10-CM

## 2021-07-13 PROCEDURE — 11042 DBRDMT SUBQ TIS 1ST 20SQCM/<: CPT

## 2021-07-27 ENCOUNTER — HOSPITAL ENCOUNTER (OUTPATIENT)
Dept: HOSPITAL 75 - WOUNDCARE | Age: 71
End: 2021-07-27
Attending: SURGERY
Payer: MEDICAID

## 2021-07-27 ENCOUNTER — HOSPITAL ENCOUNTER (OUTPATIENT)
Dept: HOSPITAL 75 - RAD | Age: 71
End: 2021-07-27
Attending: SURGERY
Payer: MEDICAID

## 2021-07-27 DIAGNOSIS — I70.244: ICD-10-CM

## 2021-07-27 DIAGNOSIS — E11.52: ICD-10-CM

## 2021-07-27 DIAGNOSIS — E11.65: ICD-10-CM

## 2021-07-27 DIAGNOSIS — L97.422: ICD-10-CM

## 2021-07-27 DIAGNOSIS — T65.222A: ICD-10-CM

## 2021-07-27 DIAGNOSIS — E11.621: Primary | ICD-10-CM

## 2021-07-27 DIAGNOSIS — F17.218: ICD-10-CM

## 2021-07-27 DIAGNOSIS — J44.9: ICD-10-CM

## 2021-07-27 DIAGNOSIS — E11.42: ICD-10-CM

## 2021-07-27 PROCEDURE — 73630 X-RAY EXAM OF FOOT: CPT

## 2021-07-27 PROCEDURE — 11042 DBRDMT SUBQ TIS 1ST 20SQCM/<: CPT

## 2021-07-27 NOTE — DIAGNOSTIC IMAGING REPORT
INDICATION: 

Ulceration of the left heel.



FINDINGS:

Three views of the left foot show no fracture, dislocation, or

other acute abnormality. There is mild spurring at the insertion

of the Achilles tendon. No osteomyelitis is evident. No soft

tissue mass or foreign body is seen.



IMPRESSION: 

No acute abnormality is seen.



Dictated by: 



  Dictated on workstation # [id number]

## 2021-08-03 ENCOUNTER — HOSPITAL ENCOUNTER (OUTPATIENT)
Dept: HOSPITAL 75 - WOUNDCARE | Age: 71
End: 2021-08-03
Attending: SURGERY
Payer: MEDICAID

## 2021-08-03 DIAGNOSIS — I70.244: ICD-10-CM

## 2021-08-03 DIAGNOSIS — J44.9: ICD-10-CM

## 2021-08-03 DIAGNOSIS — T65.222A: ICD-10-CM

## 2021-08-03 DIAGNOSIS — L97.422: ICD-10-CM

## 2021-08-03 DIAGNOSIS — E11.52: ICD-10-CM

## 2021-08-03 DIAGNOSIS — F17.218: ICD-10-CM

## 2021-08-03 DIAGNOSIS — E11.65: ICD-10-CM

## 2021-08-03 DIAGNOSIS — E11.621: Primary | ICD-10-CM

## 2021-08-03 DIAGNOSIS — E11.42: ICD-10-CM

## 2021-08-03 PROCEDURE — 11042 DBRDMT SUBQ TIS 1ST 20SQCM/<: CPT

## 2021-08-10 ENCOUNTER — HOSPITAL ENCOUNTER (OUTPATIENT)
Dept: HOSPITAL 75 - WOUNDCARE | Age: 71
End: 2021-08-10
Attending: SURGERY
Payer: MEDICAID

## 2021-08-10 DIAGNOSIS — E11.42: ICD-10-CM

## 2021-08-10 DIAGNOSIS — J44.9: ICD-10-CM

## 2021-08-10 DIAGNOSIS — T65.222A: ICD-10-CM

## 2021-08-10 DIAGNOSIS — E11.621: Primary | ICD-10-CM

## 2021-08-10 DIAGNOSIS — F17.218: ICD-10-CM

## 2021-08-10 DIAGNOSIS — I70.244: ICD-10-CM

## 2021-08-10 DIAGNOSIS — E11.65: ICD-10-CM

## 2021-08-10 DIAGNOSIS — L97.422: ICD-10-CM

## 2021-08-10 DIAGNOSIS — E11.52: ICD-10-CM

## 2021-08-10 PROCEDURE — 11042 DBRDMT SUBQ TIS 1ST 20SQCM/<: CPT

## 2021-08-17 ENCOUNTER — HOSPITAL ENCOUNTER (OUTPATIENT)
Dept: HOSPITAL 75 - WOUNDCARE | Age: 71
End: 2021-08-17
Attending: SURGERY
Payer: MEDICAID

## 2021-08-17 DIAGNOSIS — T65.222A: ICD-10-CM

## 2021-08-17 DIAGNOSIS — F17.218: ICD-10-CM

## 2021-08-17 DIAGNOSIS — E11.42: ICD-10-CM

## 2021-08-17 DIAGNOSIS — L97.422: ICD-10-CM

## 2021-08-17 DIAGNOSIS — J44.9: ICD-10-CM

## 2021-08-17 DIAGNOSIS — E11.621: Primary | ICD-10-CM

## 2021-08-17 DIAGNOSIS — I70.244: ICD-10-CM

## 2021-08-17 DIAGNOSIS — E11.65: ICD-10-CM

## 2021-08-17 DIAGNOSIS — I96: ICD-10-CM

## 2021-08-17 PROCEDURE — 11042 DBRDMT SUBQ TIS 1ST 20SQCM/<: CPT

## 2021-09-07 ENCOUNTER — HOSPITAL ENCOUNTER (OUTPATIENT)
Dept: HOSPITAL 75 - WOUNDCARE | Age: 71
End: 2021-09-07
Attending: SURGERY
Payer: MEDICAID

## 2021-09-07 DIAGNOSIS — E11.65: ICD-10-CM

## 2021-09-07 DIAGNOSIS — J44.9: ICD-10-CM

## 2021-09-07 DIAGNOSIS — F17.218: ICD-10-CM

## 2021-09-07 DIAGNOSIS — L97.422: ICD-10-CM

## 2021-09-07 DIAGNOSIS — E11.621: Primary | ICD-10-CM

## 2021-09-07 DIAGNOSIS — I70.244: ICD-10-CM

## 2021-09-07 DIAGNOSIS — E11.42: ICD-10-CM

## 2021-09-07 DIAGNOSIS — E11.52: ICD-10-CM

## 2021-09-07 DIAGNOSIS — T65.222A: ICD-10-CM

## 2021-09-14 ENCOUNTER — HOSPITAL ENCOUNTER (OUTPATIENT)
Dept: HOSPITAL 75 - WOUNDCARE | Age: 71
End: 2021-09-14
Attending: SURGERY
Payer: MEDICAID

## 2021-09-14 DIAGNOSIS — E11.52: ICD-10-CM

## 2021-09-14 DIAGNOSIS — I70.244: ICD-10-CM

## 2021-09-14 DIAGNOSIS — J44.9: ICD-10-CM

## 2021-09-14 DIAGNOSIS — L97.422: ICD-10-CM

## 2021-09-14 DIAGNOSIS — F17.218: ICD-10-CM

## 2021-09-14 DIAGNOSIS — T65.222A: ICD-10-CM

## 2021-09-14 DIAGNOSIS — E11.65: ICD-10-CM

## 2021-09-14 DIAGNOSIS — E11.42: ICD-10-CM

## 2021-09-14 DIAGNOSIS — E11.621: Primary | ICD-10-CM

## 2021-09-14 PROCEDURE — 11042 DBRDMT SUBQ TIS 1ST 20SQCM/<: CPT

## 2021-09-21 ENCOUNTER — HOSPITAL ENCOUNTER (OUTPATIENT)
Dept: HOSPITAL 75 - WOUNDCARE | Age: 71
End: 2021-09-21
Attending: SURGERY
Payer: MEDICAID

## 2021-09-21 DIAGNOSIS — F17.218: ICD-10-CM

## 2021-09-21 DIAGNOSIS — E11.65: ICD-10-CM

## 2021-09-21 DIAGNOSIS — I70.244: ICD-10-CM

## 2021-09-21 DIAGNOSIS — E11.42: ICD-10-CM

## 2021-09-21 DIAGNOSIS — L97.422: ICD-10-CM

## 2021-09-21 DIAGNOSIS — E11.621: Primary | ICD-10-CM

## 2021-09-21 DIAGNOSIS — J44.9: ICD-10-CM

## 2021-09-21 DIAGNOSIS — T65.222A: ICD-10-CM

## 2021-09-21 DIAGNOSIS — I96: ICD-10-CM

## 2021-09-21 PROCEDURE — 11042 DBRDMT SUBQ TIS 1ST 20SQCM/<: CPT

## 2021-09-24 ENCOUNTER — HOSPITAL ENCOUNTER (EMERGENCY)
Dept: HOSPITAL 75 - ER FS | Age: 71
Discharge: ANOTHER HOSPITAL | End: 2021-09-24
Payer: MEDICAID

## 2021-09-24 VITALS — DIASTOLIC BLOOD PRESSURE: 57 MMHG | SYSTOLIC BLOOD PRESSURE: 91 MMHG

## 2021-09-24 DIAGNOSIS — E11.9: ICD-10-CM

## 2021-09-24 DIAGNOSIS — Z95.9: ICD-10-CM

## 2021-09-24 DIAGNOSIS — Z20.822: ICD-10-CM

## 2021-09-24 DIAGNOSIS — I10: ICD-10-CM

## 2021-09-24 DIAGNOSIS — N17.9: ICD-10-CM

## 2021-09-24 DIAGNOSIS — I46.9: Primary | ICD-10-CM

## 2021-09-24 DIAGNOSIS — Z79.899: ICD-10-CM

## 2021-09-24 DIAGNOSIS — Z79.01: ICD-10-CM

## 2021-09-24 DIAGNOSIS — J44.9: ICD-10-CM

## 2021-09-24 DIAGNOSIS — I48.91: ICD-10-CM

## 2021-09-24 DIAGNOSIS — Z86.73: ICD-10-CM

## 2021-09-24 LAB
ALBUMIN SERPL-MCNC: 2.9 GM/DL (ref 3.2–4.5)
ALP SERPL-CCNC: 112 U/L (ref 40–136)
ALT SERPL-CCNC: 14 U/L (ref 0–55)
APTT PPP: YELLOW S
ARTERIAL PATENCY WRIST A: (no result)
ARTERIAL PATENCY WRIST A: (no result)
BACTERIA #/AREA URNS HPF: (no result) /HPF
BASE EXCESS STD BLDA CALC-SCNC: -12 MMOL/L (ref -2.5–2.5)
BASE EXCESS STD BLDA CALC-SCNC: -16 MMOL/L (ref -2.5–2.5)
BASOPHILS # BLD AUTO: 0.1 10^3/UL (ref 0–0.1)
BASOPHILS NFR BLD AUTO: 1 % (ref 0–10)
BASOPHILS NFR BLD MANUAL: 0 %
BDY SITE: (no result)
BDY SITE: (no result)
BILIRUB SERPL-MCNC: 0.2 MG/DL (ref 0.1–1)
BILIRUB UR QL STRIP: (no result)
BODY TEMPERATURE: 34
BODY TEMPERATURE: 36.3
BUN/CREAT SERPL: 13
CALCIUM SERPL-MCNC: 6.9 MG/DL (ref 8.5–10.1)
CHLORIDE SERPL-SCNC: 104 MMOL/L (ref 98–107)
CO2 BLDA CALC-SCNC: 19.9 MMOL/L (ref 21–31)
CO2 BLDA CALC-SCNC: 20 MMOL/L (ref 21–31)
CO2 SERPL-SCNC: 17 MMOL/L (ref 21–32)
CREAT SERPL-MCNC: 3.75 MG/DL (ref 0.6–1.3)
EOSINOPHIL # BLD AUTO: 0.2 10^3/UL (ref 0–0.3)
EOSINOPHIL NFR BLD AUTO: 1 % (ref 0–10)
EOSINOPHIL NFR BLD MANUAL: 3 %
FIBRINOGEN PPP-MCNC: (no result) MG/DL
GFR SERPLBLD BASED ON 1.73 SQ M-ARVRAT: 16 ML/MIN
GLUCOSE SERPL-MCNC: 309 MG/DL (ref 70–105)
GLUCOSE UR STRIP-MCNC: (no result) MG/DL
HCT VFR BLD CALC: 43 % (ref 40–54)
HGB BLD-MCNC: 13.1 G/DL (ref 13.3–17.7)
HYALINE CASTS #/AREA URNS LPF: (no result) /LPF
INHALED O2 FLOW RATE: (no result) L/MIN
KETONES UR QL STRIP: (no result)
LEUKOCYTE ESTERASE UR QL STRIP: NEGATIVE
LYMPHOCYTES # BLD AUTO: 2.7 X 10^3 (ref 1–4)
LYMPHOCYTES NFR BLD AUTO: 15 % (ref 12–44)
MANUAL DIFFERENTIAL PERFORMED BLD QL: YES
MCH RBC QN AUTO: 33 PG (ref 25–34)
MCHC RBC AUTO-ENTMCNC: 30 G/DL (ref 32–36)
MCV RBC AUTO: 110 FL (ref 80–99)
METAMYELOCYTES NFR BLD: 1 %
MONOCYTES # BLD AUTO: 1.2 X 10^3 (ref 0–1)
MONOCYTES NFR BLD AUTO: 7 % (ref 0–12)
MONOCYTES NFR BLD: 6 %
MYELOCYTES NFR BLD: 1 %
NEUTROPHILS # BLD AUTO: 12.5 X 10^3 (ref 1.8–7.8)
NEUTROPHILS NFR BLD AUTO: 71 % (ref 42–75)
NEUTS BAND NFR BLD MANUAL: 61 %
NEUTS BAND NFR BLD: 9 %
NITRITE UR QL STRIP: NEGATIVE
NRBC BLD MANUAL-RTO: 2 %
PCO2 BLDA: 60 MMHG (ref 35–45)
PCO2 BLDA: 83 MMHG (ref 35–45)
PH BLDA: 6.93 [PH] (ref 7.37–7.43)
PH BLDA: 7.09 [PH] (ref 7.37–7.43)
PH UR STRIP: 5.5 [PH] (ref 5–9)
PLATELET # BLD: 157 10^3/UL (ref 130–400)
PMV BLD AUTO: 13.2 FL (ref 9–12.2)
PO2 BLDA: 295 MMHG (ref 79–93)
PO2 BLDA: 73 MMHG (ref 79–93)
POTASSIUM SERPL-SCNC: 5.6 MMOL/L (ref 3.6–5)
PROT SERPL-MCNC: 5.8 GM/DL (ref 6.4–8.2)
PROT UR QL STRIP: (no result)
SAO2 % BLDA FROM PO2: 100 % (ref 94–100)
SAO2 % BLDA FROM PO2: 80 % (ref 94–100)
SODIUM SERPL-SCNC: 135 MMOL/L (ref 135–145)
SP GR UR STRIP: >=1.03 (ref 1.02–1.02)
SQUAMOUS #/AREA URNS HPF: (no result) /HPF
VARIANT LYMPHS NFR BLD MANUAL: 19 %
VENTILATION MODE VENT: YES
VENTILATION MODE VENT: YES
WBC # BLD AUTO: 17.6 10^3/UL (ref 4.3–11)
WBC #/AREA URNS HPF: (no result) /HPF

## 2021-09-24 PROCEDURE — 85027 COMPLETE CBC AUTOMATED: CPT

## 2021-09-24 PROCEDURE — 71045 X-RAY EXAM CHEST 1 VIEW: CPT

## 2021-09-24 PROCEDURE — 31500 INSERT EMERGENCY AIRWAY: CPT

## 2021-09-24 PROCEDURE — 82805 BLOOD GASES W/O2 SATURATION: CPT

## 2021-09-24 PROCEDURE — 74176 CT ABD & PELVIS W/O CONTRAST: CPT

## 2021-09-24 PROCEDURE — 83605 ASSAY OF LACTIC ACID: CPT

## 2021-09-24 PROCEDURE — 71250 CT THORAX DX C-: CPT

## 2021-09-24 PROCEDURE — 99291 CRITICAL CARE FIRST HOUR: CPT

## 2021-09-24 PROCEDURE — 36680 INSERT NEEDLE BONE CAVITY: CPT

## 2021-09-24 PROCEDURE — 87636 SARSCOV2 & INF A&B AMP PRB: CPT

## 2021-09-24 PROCEDURE — 84484 ASSAY OF TROPONIN QUANT: CPT

## 2021-09-24 PROCEDURE — 83880 ASSAY OF NATRIURETIC PEPTIDE: CPT

## 2021-09-24 PROCEDURE — 51702 INSERT TEMP BLADDER CATH: CPT

## 2021-09-24 PROCEDURE — 36415 COLL VENOUS BLD VENIPUNCTURE: CPT

## 2021-09-24 PROCEDURE — 87088 URINE BACTERIA CULTURE: CPT

## 2021-09-24 PROCEDURE — 85007 BL SMEAR W/DIFF WBC COUNT: CPT

## 2021-09-24 PROCEDURE — 93005 ELECTROCARDIOGRAM TRACING: CPT

## 2021-09-24 PROCEDURE — 81000 URINALYSIS NONAUTO W/SCOPE: CPT

## 2021-09-24 PROCEDURE — 87040 BLOOD CULTURE FOR BACTERIA: CPT

## 2021-09-24 PROCEDURE — 80053 COMPREHEN METABOLIC PANEL: CPT

## 2021-09-24 PROCEDURE — 70450 CT HEAD/BRAIN W/O DYE: CPT

## 2021-09-24 RX ADMIN — SODIUM CHLORIDE SCH MLS/HR: 900 INJECTION, SOLUTION INTRAVENOUS at 09:23

## 2021-09-24 RX ADMIN — SODIUM CHLORIDE SCH MLS/HR: 900 INJECTION, SOLUTION INTRAVENOUS at 09:25

## 2021-09-24 NOTE — ED GENERAL
General


Stated Complaint:  CODE BLUE


Source of Information:  EMS, Family


Exam Limitations:  Other (Clinical condition)


 (TIRSO SHIRLEY DO)





History of Present Illness


Date Seen by Provider:  Sep 24, 2021


Time Seen by Provider:  09:10


Initial Comments


Patient is a 71-year-old male with witnessed cardiac arrest with no bystander 

CPR performed was found to be in asystole and apneic on EMS arrival who presents

in an unresponsive state with spontaneous return of circulation.  Patient i

nitially received 3 rounds of chest compressions and epinephrine per EMS 

resulting in a narrow complex sinus rhythm.  A combitube, IO in left peripheral 

IV were placed.  Estimated time in field was 20 minutes prior to prior 

transportation.  Patient maintained spontaneous circulation throughout 

transportation.  Patient unresponsive, minimally reactive midrange pupils, 

intubated manually bagged in sinus rhythm with systolic blood pressure of 80 and

thready peripheral pulses on ED arrival. 





Additional history per family members is that the patient did not feel well this

morning sat down outside and his eyes rolled but to the back of his head and 

then he became unresponsive.  No complaints of chest pain shortness of breath 

prior to this morning's episode.


Timing/Duration:  1 Hour


Severity:  Severe


Modifying Factors:  improves with Other


Associated Systoms:  Other (TIRSO SHIRLEY DO)





Allergies and Home Medications


Allergies


Coded Allergies:  


     No Known Drug Allergies (Unverified , 7/30/20)





Patient Home Medication List


Home Medication List Reviewed:  Yes


 (JUNIE BLAKE MD)


Apixaban (Eliquis) 5 Mg Tablet, 5 MG PO DAILY, (Reported)


   Entered as Reported by: BEVERLY BALDWIN on 7/30/20 1044


Docusate Sodium (Colace) 100 Mg Capsule, 100 MG PO BID


   Prescribed by: MATEO ALEMAN on 8/6/20 1112


Fluticasone/Salmeterol (Advair Hfa 115-21 Mcg Inhaler) 12 Gm Hfa.aer.ad, 1 PUFF 

IH RTBID


   Prescribed by: JASEN DAY on 5/4/20 1027


Glipizide (Glipizide) 5 Mg Tablet, 5 MG PO DAILY, (Reported)


   Entered as Reported by: BEVERLY BALDWIN on 7/30/20 1044


Hydrochlorothiazide (Hydrochlorothiazide) 25 Mg Tablet, 25 MG PO DAILY, 

(Reported)


   Entered as Reported by: BEVERLY BALDWIN on 7/30/20 1044


Hydrocodone/Acetaminophen (Hydrocodone/Acetaminophen 5 MG/325 MG TAB) 1 Each 

Tablet, 1 TAB PO Q4-6HR


   Prescribed by: MATEO ALEMAN on 8/6/20 1112


Ipratropium/Albuterol Sulfate (Iprat-Albut 0.5-3(2.5) mg/3 ml) 3 Ml Ampul.neb, 3

ML IH BID PRN for SHORTNESS OF BREATH, (Reported)


   Entered as Reported by: LUCIEN MEANS on 8/22/19 1045


Linagliptin (Tradjenta) 5 Mg Tablet, 5 MG PO DAILY, (Reported)


   Entered as Reported by: VIOLA MCNEILL on 1/17/17 0825


Lisinopril (Lisinopril) 10 Mg Tablet, 10 MG PO DAILY, (Reported)


   Entered as Reported by: BEVERLY BALDWIN on 7/30/20 1044


Metoprolol Succinate (Metoprolol Succinate) 50 Mg Tab.er.24h, 50 MG PO DAILY, 

(Reported)


   Entered as Reported by: BEVERLY BALDWIN on 7/30/20 1044


Multivitamin (Multivitamins) 1 Each Tablet, 1 TAB PO DAILY, (Reported)


   Entered as Reported by: WILLY NGUYỄN on 6/17/19 1026


Nitroglycerin (Nitroglycerin) 0.4 Mg Tab.subl, 0.4 MG SL UD PRN for CHEST PAIN, 

(Reported)


   Entered as Reported by: WILLY NGUYỄN on 6/17/19 1015


Nortriptyline HCl (Nortriptyline HCl) 50 Mg Capsule, 50 MG PO HS, (Reported)


   Entered as Reported by: VIOLA MCNEILL on 1/17/17 0825


Pregabalin (Pregabalin) 150 Mg Capsule, 150 MG PO TID, (Reported)


   Entered as Reported by: WILLY NGUYỄN on 2/6/20 1546


Sitagliptin Phosphate (Januvia) 100 Mg Tablet, 100 MG PO DAILY, (Reported)


   Entered as Reported by: LUCIEN MEANS on 4/13/20 1611


Umeclidinium Bromide (Incruse Ellipta) 62.5 Mcg Blst.w.dev, 1 INH IH DAILY@0800


   Prescribed by: JASEN DAY on 5/4/20 1027





Review of Systems


Review of Systems


Constitutional:  other (Unable to obtain) (TIRSO SHIRLEY DO)





Past Medical-Social-Family Hx


Patient Social History


Tobacco Use?:  Yes


 (TIRSO SHIRLEY DO)





Immunizations Up To Date


Tetanus Booster (TDap):  Unknown


PED Vaccines UTD:  No


 (TIRSO SHIRLEY DO)





Seasonal Allergies


Seasonal Allergies:  No


 (TIRSO SHIRLEY DO)





Past Medical History


Surgeries:  Yes (Liver biopsy and stent placement, R great toe amputation)


Amputation, Appendectomy, Coronary Stent, Gallbladder


Respiratory:  Yes


COPD


Currently Using CPAP:  No


Currently Using BIPAP:  No


Cardiac:  Yes (cardiac cath w/ balloon)


Atrial Fibrillation, Coronary Artery Disease, High Cholesterol, Hypertension


Neurological:  Yes (2018)


Stroke


Reproductive Disorders:  No


Sexually Transmitted Disease:  No


HIV/AIDS:  No


Genitourinary:  No


Prostate Problems


Gastrointestinal:  Yes


Gall Bladder Disease


Musculoskeletal:  Yes


Arthritis


Endocrine:  Yes


Diabetes, Non-Insulin dep


HEENT:  Yes (GLASSES)


Cataract


Loss of Vision:  Denies


Cancer:  Yes


Prostate


Did You Recieve Any Treatments:  Yes


What Type of Treatment Did You:  Surgical Intervention


Psychosocial:  No


Integumentary:  Yes (R foot wound 11/19)


Blood Disorders:  No


Adverse Reaction/Blood Tranf:  No (N/A)


 (TIRSO SHIRLEY DO)





Family Medical History





Cardiovascular disease


  G8 SISTER


Cataracts


  19 MOTHER


Colon cancer


  19 MOTHER


Completed stroke


  19 MOTHER


Diabetes mellitus


  G8 SISTER


Drug abuse


  G8 BROTHER


Hypercholesterolemia


  G8 BROTHER


Hypertension


  G8 BROTHER


  G8 SISTER


Myocardial infarction


  19 FATHER


  19 MOTHER


Respiratory disorder


  19 FATHER


  19 MOTHER


  G8 BROTHER


  G8 BROTHER


  G8 SISTER


  G8 SISTER


No Pertinent Family Hx, Diabetes


 (TIRSO SHIRLEY DO)





Physical Exam


Vital Signs





Vital Signs - First Documented























 (JUNIE BLAKE MD)


Vital Signs


Capillary Refill :  


 (TIRSO SHIRLEY DO)


Height, Weight, BMI


Height: 5'8.50"


Weight: 206lbs. 0.8oz. [id number]; 27.00 BMI


Method:Stated


General Appearance:  Other (Unresponsive, intubated with manual respirations)


Eyes:  Bilateral Eye Other (Pupils mid range sluggishly reactive, conjunctiva 

injected)


HEENT:  Other (Endotracheal tube noted to be present)


Neck:  Supple


Respiratory:  Lungs Clear, Normal Breath Sounds


Cardiovascular:  Regular Rate, Rhythm


Gastrointestinal:  Soft, Other (Distended)


Rectal:  Other (Incontinent of stool)


Back:  Normal Inspection


Extremity:  Other (Postop shoe left foot, IO left tibia)


Neurologic/Psychiatric:  Other (Unresponsive GCS 3)


Skin:  Other (Good color) (TIRSO SHIRLEY DO)





Focused Exam


Sepsis Stage:  Ruled Out


 (TIRSO SHIRLEY DO)


Lactate Level


9/24/21 09:20: Lactic Acid Level 7.50*H


9/24/21 11:21: Lactic Acid Level 2.77*H


 (JUNIE BLAKE MD)


Lactic Acid Level





Laboratory Tests








Test


 9/24/21


09:20 9/24/21


11:21


 


Lactic Acid Level


 7.50 MMOL/L


(0.50-2.00)  *H 2.77 MMOL/L


(0.50-2.00)  *H





 (JUNIE BLAKE MD)





Procedures/Interventions


Lumen:  triple


Central Line Procedure:  betadine prep, sterile drapes applied, sterile dressing

applied


Position:  subclavian (R)


Anesthesia:  


Complications:  none


Post Position:  sutured, good blood return, position confirmed w/ CXR


 (TIRSO SHIRLEY DO)


Date of ETT Placement:  Apr 13, 2020


Time of ETT Placement:  1511


Intubation Method:  orotracheal


Positive End Tide CO2:  Yes


Breath Sounds after Intubation:  bilateral-equal


Intubation Complications:  no complications


Post Intubation Xray:  Yes


Tube placement confirmed video laryngoscope, capnometry, breath sounds, XR


 (TIRSO SHIRLEY DO)





Progress/Results/Core Measures


Suspected Sepsis


SIRS


Temperature: 


Pulse:  


Respiratory Rate: 


 


Laboratory Tests


9/24/21 09:20: White Blood Count 17.6H


Blood Pressure  / 


Mean: 


 





9/24/21 09:20: Lactic Acid Level 7.50*H


9/24/21 11:21: Lactic Acid Level 2.77*H


Laboratory Tests


9/24/21 09:20: Platelet Count 157


9/24/21 10:07: 


Creatinine 3.75H, Total Bilirubin 0.2


 (TIRSO SHIRLEY DO)





Results/Orders


Lab Results





Laboratory Tests








Test


 9/24/21


09:20 9/24/21


09:40 9/24/21


10:07 9/24/21


10:54 Range/Units


 


 


White Blood Count


 17.6 H


 


 


 


 4.3-11.0


10^3/uL


 


Red Blood Count


 3.94 L


 


 


 


 4.30-5.52


10^6/uL


 


Hemoglobin 13.1 L    13.3-17.7  g/dL


 


Hematocrit 43     40-54  %


 


Mean Corpuscular Volume 110 H    80-99  fL


 


Mean Corpuscular Hemoglobin 33     25-34  pg


 


Mean Corpuscular Hemoglobin


Concent 30 L


 


 


 


 32-36  g/dL





 


Red Cell Distribution Width 18.6 H    10.0-14.5  %


 


Platelet Count


 157 


 


 


 


 130-400


10^3/uL


 


Mean Platelet Volume 13.2 H    9.0-12.2  fL


 


Immature Granulocyte % (Auto) 5      %


 


Neutrophils (%) (Auto) 71     42-75  %


 


Lymphocytes (%) (Auto) 15     12-44  %


 


Monocytes (%) (Auto) 7     0-12  %


 


Eosinophils (%) (Auto) 1     0-10  %


 


Basophils (%) (Auto) 1     0-10  %


 


Neutrophils # (Auto) 12.5 H    1.8-7.8  X 10^3


 


Lymphocytes # (Auto) 2.7     1.0-4.0  X 10^3


 


Monocytes # (Auto) 1.2 H    0.0-1.0  X 10^3


 


Eosinophils # (Auto)


 0.2 


 


 


 


 0.0-0.3


10^3/uL


 


Basophils # (Auto)


 0.1 


 


 


 


 0.0-0.1


10^3/uL


 


Immature Granulocyte # (Auto)


 0.9 H


 


 


 


 0.0-0.1


10^3/uL


 


Neutrophils % (Manual) 61      %


 


Lymphocytes % (Manual) 19      %


 


Monocytes % (Manual) 6      %


 


Eosinophils % (Manual) 3      %


 


Basophils % (Manual) 0      %


 


Metamyelocytes % 1      %


 


Myelocytes % 1      %


 


Band Neutrophils 9      %


 


Nucleated Red Blood Cells 2      


 


Lactic Acid Level


 7.50 *H


 


 


 


 0.50-2.00


MMOL/L


 


Urine Color  YELLOW     


 


Urine Clarity  CLOUDY     


 


Urine pH  5.5    5-9  


 


Urine Specific Gravity  >=1.030    1.016-1.022  


 


Urine Protein  1+ H   NEGATIVE  


 


Urine Glucose (UA)  TRACE H   NEGATIVE  


 


Urine Ketones  TRACE H   NEGATIVE  


 


Urine Nitrite  NEGATIVE    NEGATIVE  


 


Urine Bilirubin  1+ H   NEGATIVE  


 


Urine Urobilinogen  0.2    < = 1.0  MG/DL


 


Urine Leukocyte Esterase  NEGATIVE    NEGATIVE  


 


Urine RBC (Auto)  2+ H   NEGATIVE  


 


Urine RBC  10-25 H    /HPF


 


Urine WBC  25-50 H    /HPF


 


Urine Squamous Epithelial


Cells 


 2-5 


 


 


  /HPF





 


Urine Crystals  NONE     /LPF


 


Urine Bacteria  MODERATE H    /HPF


 


Urine Casts  PRESENT     /LPF


 


Urine Hyaline Casts  2-5 H    /LPF


 


Urine Mucus  SMALL H    /LPF


 


Urine Culture Indicated  YES     


 


Blood Gas Puncture Site  RT RADIAL   RT RADIAL   


 


Blood Gas Patient Temperature  36.3   34.0   


 


Arterial Blood pH  6.93 *L  7.09 *L 7.37-7.43  


 


Arterial Blood Partial


Pressure CO2 


 83 *H


 


 60 H


 35-45  MMHG





 


Arterial Blood Partial


Pressure O2 


 73 L


 


 295 H


 79-93  MMHG





 


Arterial Blood HCO3  17 *L  18 L 23-27  MMOL/L


 


Arterial Blood Total CO2


 


 19.9 L


 


 20.0 L


 21.0-31.0


MMOL/L


 


Arterial Blood Oxygen


Saturation 


 80 L


 


 100 


   %





 


Arterial Blood Base Excess


 


 -16.0 L


 


 -12.0 L


 -2.5-2.5


MMOL/L


 


Rojelio Test  UNABLE   UNABLE   


 


Blood Gas Ventilator Setting  YES   YES   


 


Blood Gas Inspired Oxygen  88%   97%   


 


Sodium Level   135   135-145  MMOL/L


 


Potassium Level   5.6 H  3.6-5.0  MMOL/L


 


Chloride Level   104     MMOL/L


 


Carbon Dioxide Level   17 L  21-32  MMOL/L


 


Anion Gap   14   5-14  MMOL/L


 


Blood Urea Nitrogen   48 H  7-18  MG/DL


 


Creatinine


 


 


 3.75 H


 


 0.60-1.30


MG/DL


 


Estimat Glomerular Filtration


Rate 


 


 16 


 


  





 


BUN/Creatinine Ratio   13    


 


Glucose Level   309 H    MG/DL


 


Calcium Level   6.9 L  8.5-10.1  MG/DL


 


Corrected Calcium   7.8 L  8.5-10.1  MG/DL


 


Total Bilirubin   0.2   0.1-1.0  MG/DL


 


Aspartate Amino Transf


(AST/SGOT) 


 


 28 


 


 5-34  U/L





 


Alanine Aminotransferase


(ALT/SGPT) 


 


 14 


 


 0-55  U/L





 


Alkaline Phosphatase   112     U/L


 


Troponin I   < 0.30   <0.30  NG/ML


 


Pro-B-Type Natriuretic Peptide   5636.0 H  <75.0  PG/ML


 


Total Protein   5.8 L  6.4-8.2  GM/DL


 


Albumin   2.9 L  3.2-4.5  GM/DL


 


Test


 9/24/21


11:21 9/24/21


12:20 


 


 Range/Units


 


 


Lactic Acid Level


 2.77 *H


 


 


 


 0.50-2.00


MMOL/L





 (JUNIE BLAKE MD)


Vital Signs/I&O











 9/24/21 9/24/21 9/24/21 9/24/21





 09:44 09:44 09:45 10:38


 


Temp 36.3 36.3  


 


Pulse 88 88 89 87


 


Resp 15 15  


 


B/P (MAP) 78/52 (61) 78/52 66/43 123/59


 


Pulse Ox 88 88  


 


O2 Delivery Ambu Bag Ambu Bag  


 


    





 9/24/21 9/24/21  





 10:39 12:35  


 


Pulse 87 84  


 


Resp  20  


 


B/P (MAP) 123/59 163/72  


 


Pulse Ox  98  


 


O2 Delivery  Room Air  








 (JUNIE BLAKE MD)


Vital Signs/I&O


Capillary Refill :  


 (TIRSO SHIRLEY DO)





Departure


Communication (Admissions)


EKG: Sinus rhythm, rate 95, bifascicular block, diffuse nonspecific ST-T wave 

changes





Chest x-ray: Good endotracheal tube placement.


Chest x-ray: Good central line placement, no pneumothorax


CT head/chest/abdomen/pelvis without: Consolidation lower lobes, abdominal 

aneurysm








Witnessed cardiac arrest with ROSC.  Patient maintains spontaneous circulation 

throughout ED stay. 





Initial Combitube placement confirmed and replaced with endotracheal tube.  

Patient given 2 L normal saline for pressure support.  EKG does not support 

STEMI.  Labs were obtained and an ICU bed was requested at [facility].





Initial labs reviewed.  Empiric antibiotics, pressors initiated.  Central line 

placed.  Vent adjustments made.





Patient declined to [facility] ICU per Dr. Day per report urinary

output and acute renal failure and lack of nephrology services at [place].





Patient declined at [facility] and [facility] in [place] due to lack of ICU resources





Patient accepted to [facility] research per nurse practitioner Carlos A





Patient will be transferred to the [place] [facility] emergency department 

due to proximity to comprehensive resources for ongoing pending admission at 

alternative facility.





CT imaging ordered prior to transfer to survey for alternative explanation of 

mental status changes and potential source of infection/sepsis.  Reports are 

pending.





Patient with stable vital signs on vent with minimal urine output at time of 

transfer with purposeful movement of extremities noted.  Ice packs placed for 

patient cooling





Critical care time excluding procedures: 75-minutes


 (TIRSO SHIRLEY DO)





Impression





   Primary Impression:  


   Cardiopulmonary arrest


   Additional Impression:  


   Acute kidney failure


Disposition:  02 XFER SHT-TRM HOSP


Condition:  Critical





Transfer


Transfer Reason:  Exceeds level of care


Method of Transfer:  Air


 (TIRSO SHIRLEY DO)





Departure-Patient Inst.


Referrals:  


MK LÓPEZ MD (PCP/Family)


Primary Care Physician











TIRSO SHIRLEY DO                   Sep 24, 2021 09:19


JUNIE BLAKE MD          Sep 24, 2021 12:40

## 2021-09-24 NOTE — DIAGNOSTIC IMAGING REPORT
Indication: Cardiopulmonary arrest



Portable chest 9:24 AM



There is an ET tube projecting over the trachea. Heart size and

pulmonary vascularity are normal. Lungs are clear. There are no

effusions or pneumothoraces.



IMPRESSION: No acute abnormalities in the chest



Dictated by: 



  Dictated on workstation # [id number]

## 2021-09-24 NOTE — DIAGNOSTIC IMAGING REPORT
PROCEDURE: CT chest, abdomen, and pelvis without contrast.



TECHNIQUE: Multiple contiguous axial images were obtained through

the chest, abdomen, and pelvis without the use of intravenous

contrast. Auto Exposure Controls were utilized during the CT exam

to meet ALARA standards for radiation dose reduction. 



INDICATION: CODE BLUE, intubated.



COMPARISON: 05/19/2020.



FINDINGS:



CT CHEST: The ET tube is just within the right mainstem bronchus,

recommend withdrawing 2 cm. Stable benign-appearing lymph nodes

in the mediastinum. There is consolidation atelectasis in both

lung bases. There is no pneumothorax or large effusion. There are

bilateral anterior rib deformities compatible with nondisplaced

fractures. The sternum is intact. Calcifications are seen

throughout the thoracic aorta without evidence of aneurysm.



IMPRESSION:

1. Consolidation atelectasis in both lung bases.

2. ET tube just within the right mainstem bronchus. Withdraw 2

cm. No pneumothorax is seen.



CT ABDOMEN AND PELVIS: There is a large atherosclerotic

infrarenal abdominal aortic aneurysm which appears non-ruptured

measuring approximately 6.5 x 5.5 cm. The gallbladder is

surgically absent. There is a trace of free fluid in the left

upper retroperitoneum adjacent to the kidney. No abscess is seen.

The course and caliber of the large and small bowel are normal.

There is a loop of a small segment of the colon in the left

inguinal hernia, which is nonobstructed. The urinary bladder is

decompressed with a Santacruz catheter. Osseous structures are age

appropriate.



IMPRESSION:

1. Large atherosclerotic infrarenal abdominal aortic aneurysm. No

large retroperitoneal hematoma is identified. However, consider

postcontrast imaging.

2. Nonspecific small amount of fluid which appears to be in the

left upper retroperitoneum adjacent to the superior and lateral

aspect of the kidney. No abscess is seen with certainty.

3. Surgically absent gallbladder.

4. No bowel obstruction, free air, free fluid, or hemoperitoneum.

5. Nonobstructed bowel within the left inguinal hernia.



Dictated by: 



  Dictated on workstation # [id number]

## 2021-09-24 NOTE — DIAGNOSTIC IMAGING REPORT
Clinical indications: Patient altered mental status. Patient

unresponsive, code.



Exam: Axial CT scan of the brain without IV contrast with coronal

 and sagittal reformatted images. Auto Exposure Controls were

utilized during the CT exam to meet ALARA standards for radiation

dose reduction.



Comparison: Head CT without contrast dated 01/18/2021..



Findings:



There is streak artifact obscuring the mid to upper aspect of the

head from a radiodense object overlying the left side of the

skull. This slightly distorts the brain parenchyma in the region.





There is no evidence of acute cerebral infarct, intracranial

hemorrhage, or gross mass effect. 



The brain parenchymal volume appears appropriate for patient's

age. There are subtle focal areas of low-attenuation white matter

changes involving both cerebral hemispheres, likely representing

chronic small vessel ischemic disease. There is normal gray-white

matter distinction.  There is no significant midline shift or

herniation. 



 There is no evidence of hydrocephalus. The basal cisterns are

unremarkable. The skull, extracranial soft tissue, and orbits are

unremarkable. There is mild mucosal thickening involving the

frontal sinus, both maxillary sinuses, sphenoid sinus. There is

moderate mucosal thickening and secretions involving the ethmoid

sinus. There is a complete consolidation with secretions and

mucosal thickening involving the nasal cavity.



The temporal bones show no significant abnormality.



Impression:

1: There is no definite CT evidence of acute intracranial

process. If there is continued concern, then MRI of the brain

would better evaluate.



2: Age related brain parenchymal changes.



3: Paranasal sinus disease.



Dictated by: 



  Dictated on workstation # [id number]

## 2021-09-24 NOTE — DIAGNOSTIC IMAGING REPORT
INDICATION: Respiratory failure.



EXAMINATION: Portable chest at 10:12 a.m.



FINDINGS: ET tube projects over the trachea. There are ECG leads

projecting over the chest. Heart size and pulmonary vascularity

are within normal limits. Lungs are clear. There are no effusions

or pneumothoraces.



IMPRESSION: No acute abnormality is seen in the chest.



Dictated by: 



  Dictated on workstation # [id number]